# Patient Record
Sex: MALE | Race: WHITE | NOT HISPANIC OR LATINO | Employment: PART TIME | URBAN - METROPOLITAN AREA
[De-identification: names, ages, dates, MRNs, and addresses within clinical notes are randomized per-mention and may not be internally consistent; named-entity substitution may affect disease eponyms.]

---

## 2017-01-04 ENCOUNTER — LAB (OUTPATIENT)
Dept: LAB | Facility: CLINIC | Age: 72
End: 2017-01-04
Payer: COMMERCIAL

## 2017-01-04 DIAGNOSIS — R11.0 NAUSEA ALONE: ICD-10-CM

## 2017-01-04 DIAGNOSIS — C90.00 MULTIPLE MYELOMA, WITHOUT MENTION OF HAVING ACHIEVED REMISSION: ICD-10-CM

## 2017-01-04 DIAGNOSIS — R53.81 OTHER MALAISE AND FATIGUE: ICD-10-CM

## 2017-01-04 DIAGNOSIS — Z00.00 ROUTINE GENERAL MEDICAL EXAMINATION AT A HEALTH CARE FACILITY: ICD-10-CM

## 2017-01-04 DIAGNOSIS — D64.9 ANEMIA, UNSPECIFIED: Primary | ICD-10-CM

## 2017-01-04 DIAGNOSIS — E03.9 UNSPECIFIED HYPOTHYROIDISM: ICD-10-CM

## 2017-01-04 DIAGNOSIS — R53.83 OTHER MALAISE AND FATIGUE: ICD-10-CM

## 2017-01-04 DIAGNOSIS — I10 ESSENTIAL HYPERTENSION, MALIGNANT: ICD-10-CM

## 2017-01-04 DIAGNOSIS — E78.5 HYPERLIPIDEMIA, UNSPECIFIED HYPERLIPIDEMIA TYPE: ICD-10-CM

## 2017-01-04 LAB
ALBUMIN SERPL BCP-MCNC: 3.5 G/DL (ref 3.5–5)
ALP SERPL-CCNC: 63 U/L (ref 46–116)
ALT SERPL W P-5'-P-CCNC: 35 U/L (ref 12–78)
ANION GAP SERPL CALCULATED.3IONS-SCNC: 6 MMOL/L (ref 4–13)
AST SERPL W P-5'-P-CCNC: 14 U/L (ref 5–45)
BASOPHILS # BLD AUTO: 0.03 THOUSANDS/ΜL (ref 0–0.1)
BASOPHILS NFR BLD AUTO: 2 % (ref 0–1)
BILIRUB SERPL-MCNC: 0.98 MG/DL (ref 0.2–1)
BUN SERPL-MCNC: 23 MG/DL (ref 5–25)
CALCIUM SERPL-MCNC: 8.6 MG/DL (ref 8.3–10.1)
CHLORIDE SERPL-SCNC: 101 MMOL/L (ref 100–108)
CO2 SERPL-SCNC: 30 MMOL/L (ref 21–32)
CREAT SERPL-MCNC: 1 MG/DL (ref 0.6–1.3)
EOSINOPHIL # BLD AUTO: 0.16 THOUSAND/ΜL (ref 0–0.61)
EOSINOPHIL NFR BLD AUTO: 9 % (ref 0–6)
ERYTHROCYTE [DISTWIDTH] IN BLOOD BY AUTOMATED COUNT: 14.8 % (ref 11.6–15.1)
GFR SERPL CREATININE-BSD FRML MDRD: >60 ML/MIN/1.73SQ M
GLUCOSE SERPL-MCNC: 88 MG/DL (ref 65–140)
HCT VFR BLD AUTO: 32.7 % (ref 36.5–49.3)
HGB BLD-MCNC: 11.2 G/DL (ref 12–17)
LDH FLD L TO P-CCNC: 138 U/L
LYMPHOCYTES # BLD AUTO: 0.41 THOUSANDS/ΜL (ref 0.6–4.47)
LYMPHOCYTES NFR BLD AUTO: 24 % (ref 14–44)
MCH RBC QN AUTO: 34.6 PG (ref 26.8–34.3)
MCHC RBC AUTO-ENTMCNC: 34.3 G/DL (ref 31.4–37.4)
MCV RBC AUTO: 101 FL (ref 82–98)
MONOCYTES # BLD AUTO: 0.32 THOUSAND/ΜL (ref 0.17–1.22)
MONOCYTES NFR BLD AUTO: 19 % (ref 4–12)
NEUTROPHILS # BLD AUTO: 0.78 THOUSANDS/ΜL (ref 1.85–7.62)
NEUTS SEG NFR BLD AUTO: 46 % (ref 43–75)
NRBC BLD AUTO-RTO: 0 /100 WBCS
PHOSPHATE SERPL-MCNC: 2.9 MG/DL (ref 2.3–4.1)
PLATELET # BLD AUTO: 67 THOUSANDS/UL (ref 149–390)
PMV BLD AUTO: 10.6 FL (ref 8.9–12.7)
POTASSIUM SERPL-SCNC: 3.8 MMOL/L (ref 3.5–5.3)
PROT SERPL-MCNC: 6 G/DL (ref 6.4–8.2)
RBC # BLD AUTO: 3.24 MILLION/UL (ref 3.88–5.62)
SODIUM SERPL-SCNC: 137 MMOL/L (ref 136–145)
URATE SERPL-MCNC: 5.7 MG/DL (ref 4.2–8)
WBC # BLD AUTO: 1.72 THOUSAND/UL (ref 4.31–10.16)

## 2017-01-04 PROCEDURE — 36415 COLL VENOUS BLD VENIPUNCTURE: CPT

## 2017-01-04 PROCEDURE — 84550 ASSAY OF BLOOD/URIC ACID: CPT

## 2017-01-04 PROCEDURE — 85025 COMPLETE CBC W/AUTO DIFF WBC: CPT

## 2017-01-04 PROCEDURE — 84100 ASSAY OF PHOSPHORUS: CPT

## 2017-01-04 PROCEDURE — 83615 LACTATE (LD) (LDH) ENZYME: CPT

## 2017-01-04 PROCEDURE — 80053 COMPREHEN METABOLIC PANEL: CPT

## 2017-01-05 RX ORDER — DEXTROSE MONOHYDRATE 50 MG/ML
20 INJECTION, SOLUTION INTRAVENOUS CONTINUOUS
Status: DISCONTINUED | OUTPATIENT
Start: 2017-01-06 | End: 2017-01-09 | Stop reason: HOSPADM

## 2017-01-06 ENCOUNTER — HOSPITAL ENCOUNTER (OUTPATIENT)
Dept: INFUSION CENTER | Facility: CLINIC | Age: 72
Discharge: HOME | End: 2017-01-06
Payer: COMMERCIAL

## 2017-01-06 VITALS
HEART RATE: 64 BPM | RESPIRATION RATE: 16 BRPM | SYSTOLIC BLOOD PRESSURE: 140 MMHG | DIASTOLIC BLOOD PRESSURE: 64 MMHG | TEMPERATURE: 97.6 F

## 2017-01-06 PROCEDURE — 96361 HYDRATE IV INFUSION ADD-ON: CPT

## 2017-01-06 PROCEDURE — 96413 CHEMO IV INFUSION 1 HR: CPT

## 2017-01-06 RX ADMIN — CARFILZOMIB 38 MG: 60 INJECTION, POWDER, LYOPHILIZED, FOR SOLUTION INTRAVENOUS at 09:41

## 2017-01-06 RX ADMIN — SODIUM CHLORIDE 500 ML: 0.9 INJECTION, SOLUTION INTRAVENOUS at 08:56

## 2017-01-06 RX ADMIN — SODIUM CHLORIDE 500 ML: 0.9 INJECTION, SOLUTION INTRAVENOUS at 10:05

## 2017-01-06 NOTE — PLAN OF CARE

## 2017-01-11 ENCOUNTER — APPOINTMENT (OUTPATIENT)
Dept: LAB | Facility: CLINIC | Age: 72
End: 2017-01-11
Payer: COMMERCIAL

## 2017-01-11 DIAGNOSIS — D64.9 ANEMIA: ICD-10-CM

## 2017-01-11 DIAGNOSIS — C90.00 MULTIPLE MYELOMA NOT HAVING ACHIEVED REMISSION (HCC): ICD-10-CM

## 2017-01-11 DIAGNOSIS — R11.0 NAUSEA: ICD-10-CM

## 2017-01-11 DIAGNOSIS — E78.5 HYPERLIPIDEMIA: ICD-10-CM

## 2017-01-11 DIAGNOSIS — Z00.00 ENCOUNTER FOR GENERAL ADULT MEDICAL EXAMINATION WITHOUT ABNORMAL FINDINGS: ICD-10-CM

## 2017-01-11 DIAGNOSIS — R53.83 OTHER FATIGUE: ICD-10-CM

## 2017-01-11 DIAGNOSIS — E03.9 HYPOTHYROIDISM: ICD-10-CM

## 2017-01-11 DIAGNOSIS — I10 ESSENTIAL (PRIMARY) HYPERTENSION: ICD-10-CM

## 2017-01-11 LAB
ALBUMIN SERPL BCP-MCNC: 3.7 G/DL (ref 3.5–5)
ALP SERPL-CCNC: 71 U/L (ref 46–116)
ALT SERPL W P-5'-P-CCNC: 30 U/L (ref 12–78)
ANION GAP SERPL CALCULATED.3IONS-SCNC: 7 MMOL/L (ref 4–13)
AST SERPL W P-5'-P-CCNC: 13 U/L (ref 5–45)
BASOPHILS # BLD AUTO: 0.02 THOUSANDS/ΜL (ref 0–0.1)
BASOPHILS NFR BLD AUTO: 1 % (ref 0–1)
BILIRUB SERPL-MCNC: 1.19 MG/DL (ref 0.2–1)
BUN SERPL-MCNC: 21 MG/DL (ref 5–25)
CALCIUM SERPL-MCNC: 8.5 MG/DL (ref 8.3–10.1)
CHLORIDE SERPL-SCNC: 103 MMOL/L (ref 100–108)
CO2 SERPL-SCNC: 30 MMOL/L (ref 21–32)
CREAT SERPL-MCNC: 1.14 MG/DL (ref 0.6–1.3)
EOSINOPHIL # BLD AUTO: 0.32 THOUSAND/ΜL (ref 0–0.61)
EOSINOPHIL NFR BLD AUTO: 14 % (ref 0–6)
ERYTHROCYTE [DISTWIDTH] IN BLOOD BY AUTOMATED COUNT: 15.3 % (ref 11.6–15.1)
GFR SERPL CREATININE-BSD FRML MDRD: >60 ML/MIN/1.73SQ M
GLUCOSE SERPL-MCNC: 80 MG/DL (ref 65–140)
HCT VFR BLD AUTO: 35.2 % (ref 36.5–49.3)
HGB BLD-MCNC: 11.8 G/DL (ref 12–17)
LDH SERPL-CCNC: 152 U/L (ref 81–234)
LYMPHOCYTES # BLD AUTO: 0.61 THOUSANDS/ΜL (ref 0.6–4.47)
LYMPHOCYTES NFR BLD AUTO: 27 % (ref 14–44)
MCH RBC QN AUTO: 34.5 PG (ref 26.8–34.3)
MCHC RBC AUTO-ENTMCNC: 33.5 G/DL (ref 31.4–37.4)
MCV RBC AUTO: 103 FL (ref 82–98)
MONOCYTES # BLD AUTO: 0.28 THOUSAND/ΜL (ref 0.17–1.22)
MONOCYTES NFR BLD AUTO: 12 % (ref 4–12)
NEUTROPHILS # BLD AUTO: 1.03 THOUSANDS/ΜL (ref 1.85–7.62)
NEUTS SEG NFR BLD AUTO: 46 % (ref 43–75)
NRBC BLD AUTO-RTO: 0 /100 WBCS
PHOSPHATE SERPL-MCNC: 3 MG/DL (ref 2.3–4.1)
PLATELET # BLD AUTO: 61 THOUSANDS/UL (ref 149–390)
PMV BLD AUTO: 11.4 FL (ref 8.9–12.7)
POTASSIUM SERPL-SCNC: 4 MMOL/L (ref 3.5–5.3)
PROT SERPL-MCNC: 6.6 G/DL (ref 6.4–8.2)
RBC # BLD AUTO: 3.42 MILLION/UL (ref 3.88–5.62)
SODIUM SERPL-SCNC: 140 MMOL/L (ref 136–145)
URATE SERPL-MCNC: 6 MG/DL (ref 4.2–8)
WBC # BLD AUTO: 2.26 THOUSAND/UL (ref 4.31–10.16)

## 2017-01-11 PROCEDURE — 80053 COMPREHEN METABOLIC PANEL: CPT

## 2017-01-11 PROCEDURE — 84550 ASSAY OF BLOOD/URIC ACID: CPT

## 2017-01-11 PROCEDURE — 84100 ASSAY OF PHOSPHORUS: CPT

## 2017-01-11 PROCEDURE — 36415 COLL VENOUS BLD VENIPUNCTURE: CPT

## 2017-01-11 PROCEDURE — 85025 COMPLETE CBC W/AUTO DIFF WBC: CPT

## 2017-01-11 PROCEDURE — 83615 LACTATE (LD) (LDH) ENZYME: CPT

## 2017-01-12 RX ORDER — DEXTROSE MONOHYDRATE 50 MG/ML
20 INJECTION, SOLUTION INTRAVENOUS CONTINUOUS
Status: DISCONTINUED | OUTPATIENT
Start: 2017-01-13 | End: 2017-01-16 | Stop reason: HOSPADM

## 2017-01-13 ENCOUNTER — HOSPITAL ENCOUNTER (OUTPATIENT)
Dept: INFUSION CENTER | Facility: CLINIC | Age: 72
Discharge: HOME | End: 2017-01-13
Payer: COMMERCIAL

## 2017-01-13 VITALS
DIASTOLIC BLOOD PRESSURE: 64 MMHG | TEMPERATURE: 97.6 F | SYSTOLIC BLOOD PRESSURE: 121 MMHG | HEART RATE: 62 BPM | RESPIRATION RATE: 18 BRPM

## 2017-01-13 PROCEDURE — 96361 HYDRATE IV INFUSION ADD-ON: CPT

## 2017-01-13 PROCEDURE — 96413 CHEMO IV INFUSION 1 HR: CPT

## 2017-01-13 RX ADMIN — SODIUM CHLORIDE 500 ML: 0.9 INJECTION, SOLUTION INTRAVENOUS at 08:54

## 2017-01-13 RX ADMIN — CARFILZOMIB 38 MG: 60 INJECTION, POWDER, LYOPHILIZED, FOR SOLUTION INTRAVENOUS at 09:47

## 2017-01-13 RX ADMIN — DEXTROSE 20 ML/HR: 5 SOLUTION INTRAVENOUS at 09:46

## 2017-01-13 RX ADMIN — SODIUM CHLORIDE 500 ML: 0.9 INJECTION, SOLUTION INTRAVENOUS at 10:09

## 2017-01-13 NOTE — PLAN OF CARE

## 2017-01-18 ENCOUNTER — APPOINTMENT (OUTPATIENT)
Dept: LAB | Facility: CLINIC | Age: 72
End: 2017-01-18
Payer: COMMERCIAL

## 2017-01-18 DIAGNOSIS — R53.83 OTHER FATIGUE: ICD-10-CM

## 2017-01-18 DIAGNOSIS — R11.0 NAUSEA: ICD-10-CM

## 2017-01-18 DIAGNOSIS — E78.5 HYPERLIPIDEMIA: ICD-10-CM

## 2017-01-18 DIAGNOSIS — D64.9 ANEMIA: ICD-10-CM

## 2017-01-18 DIAGNOSIS — I10 ESSENTIAL (PRIMARY) HYPERTENSION: ICD-10-CM

## 2017-01-18 DIAGNOSIS — C90.00 MULTIPLE MYELOMA NOT HAVING ACHIEVED REMISSION (HCC): ICD-10-CM

## 2017-01-18 DIAGNOSIS — Z00.00 ENCOUNTER FOR GENERAL ADULT MEDICAL EXAMINATION WITHOUT ABNORMAL FINDINGS: ICD-10-CM

## 2017-01-18 DIAGNOSIS — E03.9 HYPOTHYROIDISM: ICD-10-CM

## 2017-01-18 LAB
ALBUMIN SERPL BCP-MCNC: 3.8 G/DL (ref 3.5–5)
ALP SERPL-CCNC: 66 U/L (ref 46–116)
ALT SERPL W P-5'-P-CCNC: 28 U/L (ref 12–78)
ANION GAP SERPL CALCULATED.3IONS-SCNC: 7 MMOL/L (ref 4–13)
AST SERPL W P-5'-P-CCNC: 10 U/L (ref 5–45)
BASOPHILS # BLD AUTO: 0.01 THOUSANDS/ΜL (ref 0–0.1)
BASOPHILS NFR BLD AUTO: 0 % (ref 0–1)
BILIRUB SERPL-MCNC: 1.42 MG/DL (ref 0.2–1)
BUN SERPL-MCNC: 21 MG/DL (ref 5–25)
CALCIUM SERPL-MCNC: 8.5 MG/DL (ref 8.3–10.1)
CHLORIDE SERPL-SCNC: 103 MMOL/L (ref 100–108)
CO2 SERPL-SCNC: 29 MMOL/L (ref 21–32)
CREAT SERPL-MCNC: 1.1 MG/DL (ref 0.6–1.3)
EOSINOPHIL # BLD AUTO: 0.31 THOUSAND/ΜL (ref 0–0.61)
EOSINOPHIL NFR BLD AUTO: 13 % (ref 0–6)
ERYTHROCYTE [DISTWIDTH] IN BLOOD BY AUTOMATED COUNT: 15.4 % (ref 11.6–15.1)
GFR SERPL CREATININE-BSD FRML MDRD: >60 ML/MIN/1.73SQ M
GLUCOSE SERPL-MCNC: 89 MG/DL (ref 65–140)
HCT VFR BLD AUTO: 34.1 % (ref 36.5–49.3)
HGB BLD-MCNC: 11.3 G/DL (ref 12–17)
LDH SERPL-CCNC: 144 U/L (ref 81–234)
LYMPHOCYTES # BLD AUTO: 0.74 THOUSANDS/ΜL (ref 0.6–4.47)
LYMPHOCYTES NFR BLD AUTO: 30 % (ref 14–44)
MCH RBC QN AUTO: 34.5 PG (ref 26.8–34.3)
MCHC RBC AUTO-ENTMCNC: 33.1 G/DL (ref 31.4–37.4)
MCV RBC AUTO: 104 FL (ref 82–98)
MONOCYTES # BLD AUTO: 0.4 THOUSAND/ΜL (ref 0.17–1.22)
MONOCYTES NFR BLD AUTO: 16 % (ref 4–12)
NEUTROPHILS # BLD AUTO: 1.01 THOUSANDS/ΜL (ref 1.85–7.62)
NEUTS SEG NFR BLD AUTO: 41 % (ref 43–75)
NRBC BLD AUTO-RTO: 0 /100 WBCS
PHOSPHATE SERPL-MCNC: 3.3 MG/DL (ref 2.3–4.1)
PLATELET # BLD AUTO: 59 THOUSANDS/UL (ref 149–390)
PMV BLD AUTO: 11.6 FL (ref 8.9–12.7)
POTASSIUM SERPL-SCNC: 4.1 MMOL/L (ref 3.5–5.3)
PROT SERPL-MCNC: 6.3 G/DL (ref 6.4–8.2)
RBC # BLD AUTO: 3.28 MILLION/UL (ref 3.88–5.62)
SODIUM SERPL-SCNC: 139 MMOL/L (ref 136–145)
URATE SERPL-MCNC: 6 MG/DL (ref 4.2–8)
WBC # BLD AUTO: 2.48 THOUSAND/UL (ref 4.31–10.16)

## 2017-01-18 PROCEDURE — 84550 ASSAY OF BLOOD/URIC ACID: CPT

## 2017-01-18 PROCEDURE — 84100 ASSAY OF PHOSPHORUS: CPT

## 2017-01-18 PROCEDURE — 36415 COLL VENOUS BLD VENIPUNCTURE: CPT

## 2017-01-18 PROCEDURE — 83615 LACTATE (LD) (LDH) ENZYME: CPT

## 2017-01-18 PROCEDURE — 85025 COMPLETE CBC W/AUTO DIFF WBC: CPT

## 2017-01-18 PROCEDURE — 80053 COMPREHEN METABOLIC PANEL: CPT

## 2017-01-20 ENCOUNTER — HOSPITAL ENCOUNTER (OUTPATIENT)
Dept: INFUSION CENTER | Facility: CLINIC | Age: 72
Discharge: HOME | End: 2017-01-20
Payer: COMMERCIAL

## 2017-01-20 PROCEDURE — 96361 HYDRATE IV INFUSION ADD-ON: CPT

## 2017-01-20 PROCEDURE — 96409 CHEMO IV PUSH SNGL DRUG: CPT

## 2017-01-20 RX ORDER — DEXTROSE MONOHYDRATE 50 MG/ML
20 INJECTION, SOLUTION INTRAVENOUS CONTINUOUS
Status: DISCONTINUED | OUTPATIENT
Start: 2017-01-20 | End: 2017-01-23 | Stop reason: HOSPADM

## 2017-01-20 RX ADMIN — CARFILZOMIB 38 MG: 60 INJECTION, POWDER, LYOPHILIZED, FOR SOLUTION INTRAVENOUS at 09:48

## 2017-01-20 RX ADMIN — SODIUM CHLORIDE 500 ML: 0.9 INJECTION, SOLUTION INTRAVENOUS at 08:40

## 2017-01-20 RX ADMIN — SODIUM CHLORIDE 500 ML: 0.9 INJECTION, SOLUTION INTRAVENOUS at 10:10

## 2017-01-20 RX ADMIN — DEXTROSE 20 ML/HR: 5 SOLUTION INTRAVENOUS at 09:40

## 2017-01-20 NOTE — PROGRESS NOTES
Pt without complaint, platelet LNMLQ=64,948  Altagracia LÓPEZ notified, ok to treat order obtained from Dr Chrystal Hui

## 2017-01-25 ENCOUNTER — GENERIC CONVERSION - ENCOUNTER (OUTPATIENT)
Dept: OTHER | Facility: OTHER | Age: 72
End: 2017-01-25

## 2017-01-26 ENCOUNTER — GENERIC CONVERSION - ENCOUNTER (OUTPATIENT)
Dept: OTHER | Facility: OTHER | Age: 72
End: 2017-01-26

## 2017-02-01 ENCOUNTER — APPOINTMENT (OUTPATIENT)
Dept: LAB | Facility: CLINIC | Age: 72
End: 2017-02-01
Payer: COMMERCIAL

## 2017-02-01 ENCOUNTER — TRANSCRIBE ORDERS (OUTPATIENT)
Dept: LAB | Facility: CLINIC | Age: 72
End: 2017-02-01

## 2017-02-01 DIAGNOSIS — D64.9 ANEMIA: ICD-10-CM

## 2017-02-01 DIAGNOSIS — Z00.00 ENCOUNTER FOR GENERAL ADULT MEDICAL EXAMINATION WITHOUT ABNORMAL FINDINGS: ICD-10-CM

## 2017-02-01 DIAGNOSIS — E78.5 HYPERLIPIDEMIA: ICD-10-CM

## 2017-02-01 DIAGNOSIS — R11.0 NAUSEA: ICD-10-CM

## 2017-02-01 DIAGNOSIS — I10 ESSENTIAL (PRIMARY) HYPERTENSION: ICD-10-CM

## 2017-02-01 DIAGNOSIS — C90.00 MULTIPLE MYELOMA NOT HAVING ACHIEVED REMISSION (HCC): ICD-10-CM

## 2017-02-01 DIAGNOSIS — E03.9 HYPOTHYROIDISM: ICD-10-CM

## 2017-02-01 DIAGNOSIS — R53.83 OTHER FATIGUE: ICD-10-CM

## 2017-02-01 LAB
ALBUMIN SERPL BCP-MCNC: 4 G/DL (ref 3.5–5)
ALP SERPL-CCNC: 73 U/L (ref 46–116)
ALT SERPL W P-5'-P-CCNC: 33 U/L (ref 12–78)
ANION GAP SERPL CALCULATED.3IONS-SCNC: 6 MMOL/L (ref 4–13)
AST SERPL W P-5'-P-CCNC: 13 U/L (ref 5–45)
BASOPHILS # BLD AUTO: 0.03 THOUSANDS/ΜL (ref 0–0.1)
BASOPHILS NFR BLD AUTO: 1 % (ref 0–1)
BILIRUB SERPL-MCNC: 1.47 MG/DL (ref 0.2–1)
BUN SERPL-MCNC: 21 MG/DL (ref 5–25)
CALCIUM SERPL-MCNC: 9 MG/DL (ref 8.3–10.1)
CHLORIDE SERPL-SCNC: 102 MMOL/L (ref 100–108)
CO2 SERPL-SCNC: 31 MMOL/L (ref 21–32)
CREAT SERPL-MCNC: 1.08 MG/DL (ref 0.6–1.3)
EOSINOPHIL # BLD AUTO: 0.08 THOUSAND/ΜL (ref 0–0.61)
EOSINOPHIL NFR BLD AUTO: 4 % (ref 0–6)
ERYTHROCYTE [DISTWIDTH] IN BLOOD BY AUTOMATED COUNT: 15.4 % (ref 11.6–15.1)
GFR SERPL CREATININE-BSD FRML MDRD: >60 ML/MIN/1.73SQ M
GLUCOSE SERPL-MCNC: 86 MG/DL (ref 65–140)
HCT VFR BLD AUTO: 34.6 % (ref 36.5–49.3)
HGB BLD-MCNC: 11.6 G/DL (ref 12–17)
LDH SERPL-CCNC: 154 U/L (ref 81–234)
LYMPHOCYTES # BLD AUTO: 0.5 THOUSANDS/ΜL (ref 0.6–4.47)
LYMPHOCYTES NFR BLD AUTO: 24 % (ref 14–44)
MCH RBC QN AUTO: 35.3 PG (ref 26.8–34.3)
MCHC RBC AUTO-ENTMCNC: 33.5 G/DL (ref 31.4–37.4)
MCV RBC AUTO: 105 FL (ref 82–98)
MONOCYTES # BLD AUTO: 0.39 THOUSAND/ΜL (ref 0.17–1.22)
MONOCYTES NFR BLD AUTO: 19 % (ref 4–12)
NEUTROPHILS # BLD AUTO: 1.07 THOUSANDS/ΜL (ref 1.85–7.62)
NEUTS SEG NFR BLD AUTO: 52 % (ref 43–75)
NRBC BLD AUTO-RTO: 0 /100 WBCS
PHOSPHATE SERPL-MCNC: 2.6 MG/DL (ref 2.3–4.1)
PLATELET # BLD AUTO: 76 THOUSANDS/UL (ref 149–390)
PMV BLD AUTO: 10.4 FL (ref 8.9–12.7)
POTASSIUM SERPL-SCNC: 4.4 MMOL/L (ref 3.5–5.3)
PROT SERPL-MCNC: 6.7 G/DL (ref 6.4–8.2)
RBC # BLD AUTO: 3.29 MILLION/UL (ref 3.88–5.62)
SODIUM SERPL-SCNC: 139 MMOL/L (ref 136–145)
URATE SERPL-MCNC: 5.8 MG/DL (ref 4.2–8)
WBC # BLD AUTO: 2.08 THOUSAND/UL (ref 4.31–10.16)

## 2017-02-01 PROCEDURE — 36415 COLL VENOUS BLD VENIPUNCTURE: CPT

## 2017-02-01 PROCEDURE — 85025 COMPLETE CBC W/AUTO DIFF WBC: CPT

## 2017-02-01 PROCEDURE — 84550 ASSAY OF BLOOD/URIC ACID: CPT

## 2017-02-01 PROCEDURE — 84100 ASSAY OF PHOSPHORUS: CPT

## 2017-02-01 PROCEDURE — 80053 COMPREHEN METABOLIC PANEL: CPT

## 2017-02-01 PROCEDURE — 83615 LACTATE (LD) (LDH) ENZYME: CPT

## 2017-02-02 RX ORDER — DEXTROSE MONOHYDRATE 50 MG/ML
20 INJECTION, SOLUTION INTRAVENOUS CONTINUOUS
Status: DISCONTINUED | OUTPATIENT
Start: 2017-02-03 | End: 2017-02-06 | Stop reason: HOSPADM

## 2017-02-03 ENCOUNTER — HOSPITAL ENCOUNTER (OUTPATIENT)
Dept: INFUSION CENTER | Facility: CLINIC | Age: 72
Discharge: HOME | End: 2017-02-03
Payer: COMMERCIAL

## 2017-02-03 VITALS
HEART RATE: 64 BPM | RESPIRATION RATE: 20 BRPM | SYSTOLIC BLOOD PRESSURE: 142 MMHG | TEMPERATURE: 97.4 F | DIASTOLIC BLOOD PRESSURE: 76 MMHG

## 2017-02-03 PROCEDURE — 96413 CHEMO IV INFUSION 1 HR: CPT

## 2017-02-03 PROCEDURE — 96361 HYDRATE IV INFUSION ADD-ON: CPT

## 2017-02-03 RX ADMIN — CARFILZOMIB 38 MG: 60 INJECTION, POWDER, LYOPHILIZED, FOR SOLUTION INTRAVENOUS at 10:08

## 2017-02-03 RX ADMIN — DEXTROSE 20 ML/HR: 5 SOLUTION INTRAVENOUS at 10:07

## 2017-02-03 RX ADMIN — SODIUM CHLORIDE 500 ML: 0.9 INJECTION, SOLUTION INTRAVENOUS at 10:28

## 2017-02-03 RX ADMIN — SODIUM CHLORIDE 500 ML: 0.9 INJECTION, SOLUTION INTRAVENOUS at 08:59

## 2017-02-03 NOTE — PROGRESS NOTES
Patient arrived to the unit reporting that his treatment in [address] [place] [address] went well and denied any complications

## 2017-02-03 NOTE — PLAN OF CARE
Problem: SAFETY ADULT  Goal: Patient will remain free of falls  INTERVENTIONS:  - Assess patient frequently for physical needs  - Identify cognitive and physical deficits and behaviors that affect risk of falls    - [place] fall precautions as indicated by assessment   - Educate patient/family on patient safety including physical limitations  - Instruct patient to call for assistance with activity based on assessment  - Modify environment to reduce risk of injury  - Consider OT/PT consult to assist with strengthening/mobility  Outcome: Progressing

## 2017-02-08 ENCOUNTER — GENERIC CONVERSION - ENCOUNTER (OUTPATIENT)
Dept: OTHER | Facility: OTHER | Age: 72
End: 2017-02-08

## 2017-02-08 ENCOUNTER — APPOINTMENT (OUTPATIENT)
Dept: LAB | Facility: CLINIC | Age: 72
End: 2017-02-08
Payer: COMMERCIAL

## 2017-02-08 DIAGNOSIS — I10 ESSENTIAL (PRIMARY) HYPERTENSION: ICD-10-CM

## 2017-02-08 DIAGNOSIS — D64.9 ANEMIA: ICD-10-CM

## 2017-02-08 DIAGNOSIS — Z00.00 ENCOUNTER FOR GENERAL ADULT MEDICAL EXAMINATION WITHOUT ABNORMAL FINDINGS: ICD-10-CM

## 2017-02-08 DIAGNOSIS — R11.0 NAUSEA: ICD-10-CM

## 2017-02-08 DIAGNOSIS — C90.00 MULTIPLE MYELOMA NOT HAVING ACHIEVED REMISSION (HCC): ICD-10-CM

## 2017-02-08 DIAGNOSIS — E78.5 HYPERLIPIDEMIA: ICD-10-CM

## 2017-02-08 DIAGNOSIS — E03.9 HYPOTHYROIDISM: ICD-10-CM

## 2017-02-08 DIAGNOSIS — R53.83 OTHER FATIGUE: ICD-10-CM

## 2017-02-08 LAB
ALBUMIN SERPL BCP-MCNC: 3.5 G/DL (ref 3.5–5)
ALP SERPL-CCNC: 77 U/L (ref 46–116)
ALT SERPL W P-5'-P-CCNC: 35 U/L (ref 12–78)
ANION GAP SERPL CALCULATED.3IONS-SCNC: 8 MMOL/L (ref 4–13)
AST SERPL W P-5'-P-CCNC: 15 U/L (ref 5–45)
BASOPHILS # BLD AUTO: 0.02 THOUSANDS/ΜL (ref 0–0.1)
BASOPHILS NFR BLD AUTO: 1 % (ref 0–1)
BILIRUB SERPL-MCNC: 1.27 MG/DL (ref 0.2–1)
BUN SERPL-MCNC: 25 MG/DL (ref 5–25)
CALCIUM SERPL-MCNC: 8.6 MG/DL (ref 8.3–10.1)
CHLORIDE SERPL-SCNC: 103 MMOL/L (ref 100–108)
CO2 SERPL-SCNC: 29 MMOL/L (ref 21–32)
CREAT SERPL-MCNC: 1.13 MG/DL (ref 0.6–1.3)
EOSINOPHIL # BLD AUTO: 0.06 THOUSAND/ΜL (ref 0–0.61)
EOSINOPHIL NFR BLD AUTO: 3 % (ref 0–6)
ERYTHROCYTE [DISTWIDTH] IN BLOOD BY AUTOMATED COUNT: 14.7 % (ref 11.6–15.1)
GFR SERPL CREATININE-BSD FRML MDRD: >60 ML/MIN/1.73SQ M
GLUCOSE SERPL-MCNC: 92 MG/DL (ref 65–140)
HCT VFR BLD AUTO: 33.3 % (ref 36.5–49.3)
HGB BLD-MCNC: 11.1 G/DL (ref 12–17)
LDH SERPL-CCNC: 151 U/L (ref 81–234)
LYMPHOCYTES # BLD AUTO: 0.43 THOUSANDS/ΜL (ref 0.6–4.47)
LYMPHOCYTES NFR BLD AUTO: 21 % (ref 14–44)
MCH RBC QN AUTO: 34.9 PG (ref 26.8–34.3)
MCHC RBC AUTO-ENTMCNC: 33.3 G/DL (ref 31.4–37.4)
MCV RBC AUTO: 105 FL (ref 82–98)
MONOCYTES # BLD AUTO: 0.45 THOUSAND/ΜL (ref 0.17–1.22)
MONOCYTES NFR BLD AUTO: 22 % (ref 4–12)
NEUTROPHILS # BLD AUTO: 1.08 THOUSANDS/ΜL (ref 1.85–7.62)
NEUTS SEG NFR BLD AUTO: 53 % (ref 43–75)
NRBC BLD AUTO-RTO: 0 /100 WBCS
PHOSPHATE SERPL-MCNC: 3.6 MG/DL (ref 2.3–4.1)
PLATELET # BLD AUTO: 46 THOUSANDS/UL (ref 149–390)
PMV BLD AUTO: 11 FL (ref 8.9–12.7)
POTASSIUM SERPL-SCNC: 4.3 MMOL/L (ref 3.5–5.3)
PROT SERPL-MCNC: 6.2 G/DL (ref 6.4–8.2)
RBC # BLD AUTO: 3.18 MILLION/UL (ref 3.88–5.62)
SODIUM SERPL-SCNC: 140 MMOL/L (ref 136–145)
URATE SERPL-MCNC: 5.2 MG/DL (ref 4.2–8)
WBC # BLD AUTO: 2.04 THOUSAND/UL (ref 4.31–10.16)

## 2017-02-08 PROCEDURE — 85025 COMPLETE CBC W/AUTO DIFF WBC: CPT

## 2017-02-08 PROCEDURE — 36415 COLL VENOUS BLD VENIPUNCTURE: CPT

## 2017-02-08 PROCEDURE — 84100 ASSAY OF PHOSPHORUS: CPT

## 2017-02-08 PROCEDURE — 83615 LACTATE (LD) (LDH) ENZYME: CPT

## 2017-02-08 PROCEDURE — 84550 ASSAY OF BLOOD/URIC ACID: CPT

## 2017-02-08 PROCEDURE — 80053 COMPREHEN METABOLIC PANEL: CPT

## 2017-02-14 ENCOUNTER — ALLSCRIPTS OFFICE VISIT (OUTPATIENT)
Dept: OTHER | Facility: OTHER | Age: 72
End: 2017-02-14

## 2017-02-15 ENCOUNTER — TRANSCRIBE ORDERS (OUTPATIENT)
Dept: LAB | Facility: CLINIC | Age: 72
End: 2017-02-15

## 2017-02-15 ENCOUNTER — APPOINTMENT (OUTPATIENT)
Dept: LAB | Facility: CLINIC | Age: 72
End: 2017-02-15
Payer: COMMERCIAL

## 2017-02-15 DIAGNOSIS — E03.9 UNSPECIFIED HYPOTHYROIDISM: ICD-10-CM

## 2017-02-15 DIAGNOSIS — E78.5 HYPERLIPIDEMIA, UNSPECIFIED HYPERLIPIDEMIA TYPE: ICD-10-CM

## 2017-02-15 DIAGNOSIS — I10 ESSENTIAL HYPERTENSION, MALIGNANT: ICD-10-CM

## 2017-02-15 DIAGNOSIS — D64.9 ANEMIA, UNSPECIFIED: Primary | ICD-10-CM

## 2017-02-15 DIAGNOSIS — R53.83 OTHER MALAISE AND FATIGUE: ICD-10-CM

## 2017-02-15 DIAGNOSIS — C90.00 MULTIPLE MYELOMA, WITHOUT MENTION OF HAVING ACHIEVED REMISSION: ICD-10-CM

## 2017-02-15 DIAGNOSIS — R11.0 NAUSEA ALONE: ICD-10-CM

## 2017-02-15 DIAGNOSIS — D64.9 ANEMIA, UNSPECIFIED: ICD-10-CM

## 2017-02-15 DIAGNOSIS — R53.81 OTHER MALAISE AND FATIGUE: ICD-10-CM

## 2017-02-15 LAB
ALBUMIN SERPL BCP-MCNC: 3.8 G/DL (ref 3.5–5)
ALP SERPL-CCNC: 66 U/L (ref 46–116)
ALT SERPL W P-5'-P-CCNC: 28 U/L (ref 12–78)
ANION GAP SERPL CALCULATED.3IONS-SCNC: 9 MMOL/L (ref 4–13)
AST SERPL W P-5'-P-CCNC: 9 U/L (ref 5–45)
BASOPHILS # BLD AUTO: 0.02 THOUSANDS/ΜL (ref 0–0.1)
BASOPHILS NFR BLD AUTO: 1 % (ref 0–1)
BILIRUB SERPL-MCNC: 1.59 MG/DL (ref 0.2–1)
BUN SERPL-MCNC: 26 MG/DL (ref 5–25)
CALCIUM SERPL-MCNC: 8.7 MG/DL (ref 8.3–10.1)
CHLORIDE SERPL-SCNC: 105 MMOL/L (ref 100–108)
CO2 SERPL-SCNC: 28 MMOL/L (ref 21–32)
CREAT SERPL-MCNC: 1.25 MG/DL (ref 0.6–1.3)
EOSINOPHIL # BLD AUTO: 0.14 THOUSAND/ΜL (ref 0–0.61)
EOSINOPHIL NFR BLD AUTO: 6 % (ref 0–6)
ERYTHROCYTE [DISTWIDTH] IN BLOOD BY AUTOMATED COUNT: 14.6 % (ref 11.6–15.1)
GFR SERPL CREATININE-BSD FRML MDRD: 57.3 ML/MIN/1.73SQ M
GLUCOSE SERPL-MCNC: 80 MG/DL (ref 65–140)
HCT VFR BLD AUTO: 34 % (ref 36.5–49.3)
HGB BLD-MCNC: 11.4 G/DL (ref 12–17)
LDH SERPL-CCNC: 137 U/L (ref 81–234)
LYMPHOCYTES # BLD AUTO: 0.77 THOUSANDS/ΜL (ref 0.6–4.47)
LYMPHOCYTES NFR BLD AUTO: 34 % (ref 14–44)
MCH RBC QN AUTO: 35.3 PG (ref 26.8–34.3)
MCHC RBC AUTO-ENTMCNC: 33.5 G/DL (ref 31.4–37.4)
MCV RBC AUTO: 105 FL (ref 82–98)
MONOCYTES # BLD AUTO: 0.35 THOUSAND/ΜL (ref 0.17–1.22)
MONOCYTES NFR BLD AUTO: 15 % (ref 4–12)
NEUTROPHILS # BLD AUTO: 1 THOUSANDS/ΜL (ref 1.85–7.62)
NEUTS SEG NFR BLD AUTO: 44 % (ref 43–75)
NRBC BLD AUTO-RTO: 0 /100 WBCS
PHOSPHATE SERPL-MCNC: 3.3 MG/DL (ref 2.3–4.1)
PLATELET # BLD AUTO: 57 THOUSANDS/UL (ref 149–390)
PMV BLD AUTO: 11.3 FL (ref 8.9–12.7)
POTASSIUM SERPL-SCNC: 4 MMOL/L (ref 3.5–5.3)
PROT SERPL-MCNC: 6.2 G/DL (ref 6.4–8.2)
RBC # BLD AUTO: 3.23 MILLION/UL (ref 3.88–5.62)
SODIUM SERPL-SCNC: 142 MMOL/L (ref 136–145)
URATE SERPL-MCNC: 6.2 MG/DL (ref 4.2–8)
WBC # BLD AUTO: 2.29 THOUSAND/UL (ref 4.31–10.16)

## 2017-02-15 PROCEDURE — 36415 COLL VENOUS BLD VENIPUNCTURE: CPT

## 2017-02-15 PROCEDURE — 84100 ASSAY OF PHOSPHORUS: CPT

## 2017-02-15 PROCEDURE — 83615 LACTATE (LD) (LDH) ENZYME: CPT

## 2017-02-15 PROCEDURE — 85025 COMPLETE CBC W/AUTO DIFF WBC: CPT

## 2017-02-15 PROCEDURE — 84550 ASSAY OF BLOOD/URIC ACID: CPT

## 2017-02-15 PROCEDURE — 80053 COMPREHEN METABOLIC PANEL: CPT

## 2017-02-16 RX ORDER — DEXTROSE MONOHYDRATE 50 MG/ML
20 INJECTION, SOLUTION INTRAVENOUS CONTINUOUS
Status: DISCONTINUED | OUTPATIENT
Start: 2017-02-17 | End: 2017-02-20 | Stop reason: HOSPADM

## 2017-02-17 ENCOUNTER — HOSPITAL ENCOUNTER (OUTPATIENT)
Dept: INFUSION CENTER | Facility: CLINIC | Age: 72
Discharge: HOME | End: 2017-02-17
Payer: COMMERCIAL

## 2017-02-17 VITALS
HEART RATE: 55 BPM | HEIGHT: 69 IN | BODY MASS INDEX: 23.84 KG/M2 | DIASTOLIC BLOOD PRESSURE: 79 MMHG | WEIGHT: 160.94 LBS | SYSTOLIC BLOOD PRESSURE: 137 MMHG | TEMPERATURE: 97.6 F | RESPIRATION RATE: 18 BRPM

## 2017-02-17 VITALS
HEIGHT: 69 IN | TEMPERATURE: 97.4 F | WEIGHT: 162.04 LBS | HEART RATE: 62 BPM | DIASTOLIC BLOOD PRESSURE: 75 MMHG | BODY MASS INDEX: 24 KG/M2 | SYSTOLIC BLOOD PRESSURE: 139 MMHG | RESPIRATION RATE: 16 BRPM

## 2017-02-17 PROCEDURE — 96409 CHEMO IV PUSH SNGL DRUG: CPT

## 2017-02-17 PROCEDURE — 96361 HYDRATE IV INFUSION ADD-ON: CPT

## 2017-02-17 RX ADMIN — DEXTROSE 20 ML/HR: 5 SOLUTION INTRAVENOUS at 09:42

## 2017-02-17 RX ADMIN — SODIUM CHLORIDE 500 ML: 0.9 INJECTION, SOLUTION INTRAVENOUS at 08:41

## 2017-02-17 RX ADMIN — CARFILZOMIB 38 MG: 60 INJECTION, POWDER, LYOPHILIZED, FOR SOLUTION INTRAVENOUS at 09:47

## 2017-02-17 RX ADMIN — SODIUM CHLORIDE 500 ML: 0.9 INJECTION, SOLUTION INTRAVENOUS at 10:06

## 2017-02-17 NOTE — PROGRESS NOTES
Patient tolerated chemotherapy  Denies any discomforts  ANC- 1000  No Granix required today as per order  Patient/wife aware and understand   Aware of next appointment

## 2017-02-17 NOTE — PROGRESS NOTES
Spoke with Sahil Roberts RN and made aware of ANC- 1 00 and Total bilirubin- 1 59   Cleared for chemotherapy today

## 2017-02-22 ENCOUNTER — APPOINTMENT (OUTPATIENT)
Dept: LAB | Facility: CLINIC | Age: 72
End: 2017-02-22
Payer: COMMERCIAL

## 2017-02-22 DIAGNOSIS — I10 ESSENTIAL HYPERTENSION, MALIGNANT: ICD-10-CM

## 2017-02-22 DIAGNOSIS — C90.00 MULTIPLE MYELOMA, WITHOUT MENTION OF HAVING ACHIEVED REMISSION: ICD-10-CM

## 2017-02-22 DIAGNOSIS — E78.5 HYPERLIPIDEMIA, UNSPECIFIED HYPERLIPIDEMIA TYPE: ICD-10-CM

## 2017-02-22 DIAGNOSIS — R53.81 OTHER MALAISE AND FATIGUE: ICD-10-CM

## 2017-02-22 DIAGNOSIS — E03.9 UNSPECIFIED HYPOTHYROIDISM: ICD-10-CM

## 2017-02-22 DIAGNOSIS — R11.0 NAUSEA ALONE: ICD-10-CM

## 2017-02-22 DIAGNOSIS — R53.83 OTHER MALAISE AND FATIGUE: ICD-10-CM

## 2017-02-22 DIAGNOSIS — D64.9 ANEMIA, UNSPECIFIED: ICD-10-CM

## 2017-02-22 LAB
ALBUMIN SERPL BCP-MCNC: 3.9 G/DL (ref 3.5–5)
ALP SERPL-CCNC: 70 U/L (ref 46–116)
ALT SERPL W P-5'-P-CCNC: 26 U/L (ref 12–78)
ANION GAP SERPL CALCULATED.3IONS-SCNC: 8 MMOL/L (ref 4–13)
AST SERPL W P-5'-P-CCNC: 11 U/L (ref 5–45)
BILIRUB SERPL-MCNC: 1.82 MG/DL (ref 0.2–1)
BUN SERPL-MCNC: 18 MG/DL (ref 5–25)
CALCIUM SERPL-MCNC: 8.4 MG/DL (ref 8.3–10.1)
CHLORIDE SERPL-SCNC: 101 MMOL/L (ref 100–108)
CO2 SERPL-SCNC: 28 MMOL/L (ref 21–32)
CREAT SERPL-MCNC: 1.11 MG/DL (ref 0.6–1.3)
ERYTHROCYTE [DISTWIDTH] IN BLOOD BY AUTOMATED COUNT: 14.5 % (ref 11.6–15.1)
GFR SERPL CREATININE-BSD FRML MDRD: >60 ML/MIN/1.73SQ M
GLUCOSE SERPL-MCNC: 86 MG/DL (ref 65–140)
HCT VFR BLD AUTO: 34.6 % (ref 36.5–49.3)
HGB BLD-MCNC: 11.7 G/DL (ref 12–17)
MCH RBC QN AUTO: 35 PG (ref 26.8–34.3)
MCHC RBC AUTO-ENTMCNC: 33.8 G/DL (ref 31.4–37.4)
MCV RBC AUTO: 104 FL (ref 82–98)
PHOSPHATE SERPL-MCNC: 2.9 MG/DL (ref 2.3–4.1)
PLATELET # BLD AUTO: 63 THOUSANDS/UL (ref 149–390)
PMV BLD AUTO: 11.3 FL (ref 8.9–12.7)
POTASSIUM SERPL-SCNC: 4.2 MMOL/L (ref 3.5–5.3)
PROT SERPL-MCNC: 6 G/DL (ref 6.4–8.2)
RBC # BLD AUTO: 3.34 MILLION/UL (ref 3.88–5.62)
SODIUM SERPL-SCNC: 137 MMOL/L (ref 136–145)
URATE SERPL-MCNC: 5.2 MG/DL (ref 4.2–8)
WBC # BLD AUTO: 1.9 THOUSAND/UL (ref 4.31–10.16)

## 2017-02-22 PROCEDURE — 36415 COLL VENOUS BLD VENIPUNCTURE: CPT

## 2017-02-22 PROCEDURE — 80053 COMPREHEN METABOLIC PANEL: CPT

## 2017-02-22 PROCEDURE — 84100 ASSAY OF PHOSPHORUS: CPT

## 2017-02-22 PROCEDURE — 85027 COMPLETE CBC AUTOMATED: CPT

## 2017-02-22 PROCEDURE — 84550 ASSAY OF BLOOD/URIC ACID: CPT

## 2017-02-23 RX ORDER — DEXTROSE MONOHYDRATE 50 MG/ML
20 INJECTION, SOLUTION INTRAVENOUS CONTINUOUS
Status: DISCONTINUED | OUTPATIENT
Start: 2017-02-24 | End: 2017-02-27 | Stop reason: HOSPADM

## 2017-02-24 ENCOUNTER — HOSPITAL ENCOUNTER (OUTPATIENT)
Dept: INFUSION CENTER | Facility: CLINIC | Age: 72
Discharge: HOME | End: 2017-02-24
Payer: COMMERCIAL

## 2017-02-24 VITALS
RESPIRATION RATE: 16 BRPM | HEIGHT: 69 IN | HEART RATE: 65 BPM | SYSTOLIC BLOOD PRESSURE: 135 MMHG | DIASTOLIC BLOOD PRESSURE: 86 MMHG | BODY MASS INDEX: 24.49 KG/M2 | WEIGHT: 165.34 LBS | TEMPERATURE: 98 F

## 2017-02-24 LAB
ANISOCYTOSIS BLD QL SMEAR: PRESENT
BASOPHILS # BLD AUTO: 0.02 THOUSAND/UL (ref 0–0.1)
BASOPHILS NFR MAR MANUAL: 1 % (ref 0–1)
EOSINOPHIL # BLD AUTO: 0.04 THOUSAND/UL (ref 0–0.61)
EOSINOPHIL NFR BLD MANUAL: 2 % (ref 0–6)
ERYTHROCYTE [DISTWIDTH] IN BLOOD BY AUTOMATED COUNT: 16.3 % (ref 11.6–15.1)
HCT VFR BLD AUTO: 33.8 % (ref 36.5–49.3)
HGB BLD-MCNC: 11.4 G/DL (ref 12–17)
LYMPHOCYTES # BLD AUTO: 1.06 THOUSAND/UL (ref 0.6–4.47)
LYMPHOCYTES # BLD AUTO: 48 % (ref 14–44)
MACROCYTES BLD QL AUTO: PRESENT
MCH RBC QN AUTO: 35.4 PG (ref 26.8–34.3)
MCHC RBC AUTO-ENTMCNC: 33.6 G/DL (ref 31.4–37.4)
MCV RBC AUTO: 105 FL (ref 82–98)
MONOCYTES # BLD AUTO: 0.15 THOUSAND/UL (ref 0–1.22)
MONOCYTES NFR BLD AUTO: 7 % (ref 4–12)
NEUTS BAND NFR BLD MANUAL: 1 % (ref 0–8)
NEUTS SEG # BLD: 0.92 THOUSAND/UL (ref 1.81–6.82)
NEUTS SEG NFR BLD AUTO: 41 % (ref 43–75)
PLATELET # BLD AUTO: 60 THOUSANDS/UL (ref 149–390)
PLATELET BLD QL SMEAR: ABNORMAL
PMV BLD AUTO: 7.5 FL (ref 8.9–12.7)
RBC # BLD AUTO: 3.21 MILLION/UL (ref 3.88–5.62)
TOTAL CELLS COUNTED SPEC: 100
WBC # BLD AUTO: 2.2 THOUSAND/UL (ref 4.31–10.16)
WBC NRBC COR # BLD: 2.2 THOUSAND/UL (ref 4.31–10.16)

## 2017-02-24 PROCEDURE — 96372 THER/PROPH/DIAG INJ SC/IM: CPT

## 2017-02-24 PROCEDURE — 85007 BL SMEAR W/DIFF WBC COUNT: CPT | Performed by: INTERNAL MEDICINE

## 2017-02-24 PROCEDURE — 85027 COMPLETE CBC AUTOMATED: CPT | Performed by: INTERNAL MEDICINE

## 2017-02-24 PROCEDURE — 96413 CHEMO IV INFUSION 1 HR: CPT

## 2017-02-24 PROCEDURE — 96361 HYDRATE IV INFUSION ADD-ON: CPT

## 2017-02-24 RX ADMIN — DEXTROSE 20 ML/HR: 5 SOLUTION INTRAVENOUS at 10:43

## 2017-02-24 RX ADMIN — SODIUM CHLORIDE 500 ML: 0.9 INJECTION, SOLUTION INTRAVENOUS at 11:11

## 2017-02-24 RX ADMIN — SODIUM CHLORIDE 500 ML: 0.9 INJECTION, SOLUTION INTRAVENOUS at 09:45

## 2017-02-24 RX ADMIN — TBO-FILGRASTIM 480 MCG: 480 INJECTION, SOLUTION SUBCUTANEOUS at 12:23

## 2017-02-24 RX ADMIN — CARFILZOMIB 38 MG: 60 INJECTION, POWDER, LYOPHILIZED, FOR SOLUTION INTRAVENOUS at 10:44

## 2017-02-24 NOTE — PROGRESS NOTES
[facility] Dr Ryan Huizar and made aware of WBC 1 90 on 2/22/2017 and that no differential was done   CBC redrawn this am- presently waiting on results

## 2017-02-24 NOTE — PROGRESS NOTES
Tolerated chemotherapy  Denies any discomforts  Tolerated Granix injection in abdomen- ANC- 0 92  Aware of next appointment   Refused AVS

## 2017-02-24 NOTE — PROGRESS NOTES
Spoke with Crystal with Dr Dania Chung and made aware of [address]- 0 92  Steven Baker Cleared for treatment today   Order to be sent

## 2017-03-01 ENCOUNTER — APPOINTMENT (OUTPATIENT)
Dept: LAB | Facility: CLINIC | Age: 72
End: 2017-03-01
Payer: COMMERCIAL

## 2017-03-01 DIAGNOSIS — E03.9 HYPOTHYROIDISM: ICD-10-CM

## 2017-03-01 DIAGNOSIS — C90.00 MULTIPLE MYELOMA NOT HAVING ACHIEVED REMISSION (HCC): ICD-10-CM

## 2017-03-01 DIAGNOSIS — I10 ESSENTIAL (PRIMARY) HYPERTENSION: ICD-10-CM

## 2017-03-01 DIAGNOSIS — R11.0 NAUSEA: ICD-10-CM

## 2017-03-01 DIAGNOSIS — R53.83 OTHER FATIGUE: ICD-10-CM

## 2017-03-01 DIAGNOSIS — D64.9 ANEMIA: ICD-10-CM

## 2017-03-01 DIAGNOSIS — Z00.00 ENCOUNTER FOR GENERAL ADULT MEDICAL EXAMINATION WITHOUT ABNORMAL FINDINGS: ICD-10-CM

## 2017-03-01 DIAGNOSIS — E78.5 HYPERLIPIDEMIA: ICD-10-CM

## 2017-03-01 LAB
ALBUMIN SERPL BCP-MCNC: 3.6 G/DL (ref 3.5–5)
ALP SERPL-CCNC: 67 U/L (ref 46–116)
ALT SERPL W P-5'-P-CCNC: 36 U/L (ref 12–78)
ANION GAP SERPL CALCULATED.3IONS-SCNC: 5 MMOL/L (ref 4–13)
AST SERPL W P-5'-P-CCNC: 11 U/L (ref 5–45)
BASOPHILS # BLD AUTO: 0.02 THOUSANDS/ΜL (ref 0–0.1)
BASOPHILS NFR BLD AUTO: 1 % (ref 0–1)
BILIRUB SERPL-MCNC: 1.85 MG/DL (ref 0.2–1)
BUN SERPL-MCNC: 24 MG/DL (ref 5–25)
CALCIUM SERPL-MCNC: 9 MG/DL (ref 8.3–10.1)
CHLORIDE SERPL-SCNC: 105 MMOL/L (ref 100–108)
CO2 SERPL-SCNC: 31 MMOL/L (ref 21–32)
CREAT SERPL-MCNC: 1.18 MG/DL (ref 0.6–1.3)
EOSINOPHIL # BLD AUTO: 0.06 THOUSAND/ΜL (ref 0–0.61)
EOSINOPHIL NFR BLD AUTO: 3 % (ref 0–6)
ERYTHROCYTE [DISTWIDTH] IN BLOOD BY AUTOMATED COUNT: 14.9 % (ref 11.6–15.1)
GFR SERPL CREATININE-BSD FRML MDRD: >60 ML/MIN/1.73SQ M
GLUCOSE SERPL-MCNC: 89 MG/DL (ref 65–140)
HCT VFR BLD AUTO: 34.4 % (ref 36.5–49.3)
HGB BLD-MCNC: 11.6 G/DL (ref 12–17)
LDH SERPL-CCNC: 148 U/L (ref 81–234)
LYMPHOCYTES # BLD AUTO: 0.43 THOUSANDS/ΜL (ref 0.6–4.47)
LYMPHOCYTES NFR BLD AUTO: 24 % (ref 14–44)
MCH RBC QN AUTO: 35.6 PG (ref 26.8–34.3)
MCHC RBC AUTO-ENTMCNC: 33.7 G/DL (ref 31.4–37.4)
MCV RBC AUTO: 106 FL (ref 82–98)
MONOCYTES # BLD AUTO: 0.43 THOUSAND/ΜL (ref 0.17–1.22)
MONOCYTES NFR BLD AUTO: 24 % (ref 4–12)
NEUTROPHILS # BLD AUTO: 0.83 THOUSANDS/ΜL (ref 1.85–7.62)
NEUTS SEG NFR BLD AUTO: 48 % (ref 43–75)
NRBC BLD AUTO-RTO: 0 /100 WBCS
PHOSPHATE SERPL-MCNC: 2.8 MG/DL (ref 2.3–4.1)
PLATELET # BLD AUTO: 63 THOUSANDS/UL (ref 149–390)
PMV BLD AUTO: 11.7 FL (ref 8.9–12.7)
POTASSIUM SERPL-SCNC: 4.6 MMOL/L (ref 3.5–5.3)
PROT SERPL-MCNC: 6.1 G/DL (ref 6.4–8.2)
RBC # BLD AUTO: 3.26 MILLION/UL (ref 3.88–5.62)
SODIUM SERPL-SCNC: 141 MMOL/L (ref 136–145)
URATE SERPL-MCNC: 5.8 MG/DL (ref 4.2–8)
WBC # BLD AUTO: 1.79 THOUSAND/UL (ref 4.31–10.16)

## 2017-03-01 PROCEDURE — 84100 ASSAY OF PHOSPHORUS: CPT

## 2017-03-01 PROCEDURE — 83615 LACTATE (LD) (LDH) ENZYME: CPT

## 2017-03-01 PROCEDURE — 84550 ASSAY OF BLOOD/URIC ACID: CPT

## 2017-03-01 PROCEDURE — 80053 COMPREHEN METABOLIC PANEL: CPT

## 2017-03-01 PROCEDURE — 36415 COLL VENOUS BLD VENIPUNCTURE: CPT

## 2017-03-01 PROCEDURE — 85025 COMPLETE CBC W/AUTO DIFF WBC: CPT

## 2017-03-02 RX ORDER — DEXTROSE MONOHYDRATE 50 MG/ML
20 INJECTION, SOLUTION INTRAVENOUS CONTINUOUS
Status: DISCONTINUED | OUTPATIENT
Start: 2017-03-03 | End: 2017-03-06 | Stop reason: HOSPADM

## 2017-03-03 ENCOUNTER — HOSPITAL ENCOUNTER (OUTPATIENT)
Dept: INFUSION CENTER | Facility: CLINIC | Age: 72
Discharge: HOME | End: 2017-03-03
Payer: COMMERCIAL

## 2017-03-03 VITALS
DIASTOLIC BLOOD PRESSURE: 79 MMHG | RESPIRATION RATE: 18 BRPM | WEIGHT: 166 LBS | SYSTOLIC BLOOD PRESSURE: 164 MMHG | TEMPERATURE: 97 F | HEART RATE: 61 BPM | BODY MASS INDEX: 24.45 KG/M2

## 2017-03-03 PROCEDURE — 96409 CHEMO IV PUSH SNGL DRUG: CPT

## 2017-03-03 PROCEDURE — 96372 THER/PROPH/DIAG INJ SC/IM: CPT

## 2017-03-03 PROCEDURE — 96361 HYDRATE IV INFUSION ADD-ON: CPT

## 2017-03-03 RX ADMIN — SODIUM CHLORIDE 500 ML: 0.9 INJECTION, SOLUTION INTRAVENOUS at 10:00

## 2017-03-03 RX ADMIN — SODIUM CHLORIDE 500 ML: 0.9 INJECTION, SOLUTION INTRAVENOUS at 08:39

## 2017-03-03 RX ADMIN — CARFILZOMIB 38 MG: 60 INJECTION, POWDER, LYOPHILIZED, FOR SOLUTION INTRAVENOUS at 09:40

## 2017-03-03 RX ADMIN — DEXTROSE 20 ML/HR: 5 SOLUTION INTRAVENOUS at 09:38

## 2017-03-03 RX ADMIN — TBO-FILGRASTIM 480 MCG: 480 INJECTION, SOLUTION SUBCUTANEOUS at 08:47

## 2017-03-03 NOTE — PROGRESS NOTES
Pt arrived to unit without complaint, CSK=997, MD aware and ok given to proceed with chemo today  All other labs within approved parameters  Granix administered as ordered (see MAR)

## 2017-03-08 ENCOUNTER — APPOINTMENT (OUTPATIENT)
Dept: LAB | Facility: CLINIC | Age: 72
End: 2017-03-08
Payer: COMMERCIAL

## 2017-03-08 DIAGNOSIS — R11.0 NAUSEA ALONE: ICD-10-CM

## 2017-03-08 DIAGNOSIS — E03.9 UNSPECIFIED HYPOTHYROIDISM: ICD-10-CM

## 2017-03-08 DIAGNOSIS — D64.9 ANEMIA, UNSPECIFIED: ICD-10-CM

## 2017-03-08 DIAGNOSIS — C90.00 MULTIPLE MYELOMA, WITHOUT MENTION OF HAVING ACHIEVED REMISSION: ICD-10-CM

## 2017-03-08 DIAGNOSIS — I10 ESSENTIAL HYPERTENSION, MALIGNANT: ICD-10-CM

## 2017-03-08 DIAGNOSIS — E78.5 HYPERLIPIDEMIA, UNSPECIFIED HYPERLIPIDEMIA TYPE: ICD-10-CM

## 2017-03-08 DIAGNOSIS — R53.83 OTHER MALAISE AND FATIGUE: ICD-10-CM

## 2017-03-08 DIAGNOSIS — R53.81 OTHER MALAISE AND FATIGUE: ICD-10-CM

## 2017-03-08 LAB
ALBUMIN SERPL BCP-MCNC: 3.5 G/DL (ref 3.5–5)
ALP SERPL-CCNC: 75 U/L (ref 46–116)
ALT SERPL W P-5'-P-CCNC: 33 U/L (ref 12–78)
ANION GAP SERPL CALCULATED.3IONS-SCNC: 7 MMOL/L (ref 4–13)
AST SERPL W P-5'-P-CCNC: 10 U/L (ref 5–45)
BASOPHILS # BLD AUTO: 0.01 THOUSANDS/ΜL (ref 0–0.1)
BASOPHILS NFR BLD AUTO: 0 % (ref 0–1)
BILIRUB SERPL-MCNC: 1.04 MG/DL (ref 0.2–1)
BUN SERPL-MCNC: 24 MG/DL (ref 5–25)
CALCIUM SERPL-MCNC: 8.6 MG/DL (ref 8.3–10.1)
CHLORIDE SERPL-SCNC: 105 MMOL/L (ref 100–108)
CO2 SERPL-SCNC: 29 MMOL/L (ref 21–32)
CREAT SERPL-MCNC: 1.18 MG/DL (ref 0.6–1.3)
EOSINOPHIL # BLD AUTO: 0.11 THOUSAND/ΜL (ref 0–0.61)
EOSINOPHIL NFR BLD AUTO: 3 % (ref 0–6)
ERYTHROCYTE [DISTWIDTH] IN BLOOD BY AUTOMATED COUNT: 15 % (ref 11.6–15.1)
GFR SERPL CREATININE-BSD FRML MDRD: >60 ML/MIN/1.73SQ M
GLUCOSE SERPL-MCNC: 107 MG/DL (ref 65–140)
HCT VFR BLD AUTO: 34 % (ref 36.5–49.3)
HGB BLD-MCNC: 11.3 G/DL (ref 12–17)
LDH SERPL-CCNC: 135 U/L (ref 81–234)
LYMPHOCYTES # BLD AUTO: 0.57 THOUSANDS/ΜL (ref 0.6–4.47)
LYMPHOCYTES NFR BLD AUTO: 17 % (ref 14–44)
MCH RBC QN AUTO: 35.3 PG (ref 26.8–34.3)
MCHC RBC AUTO-ENTMCNC: 33.2 G/DL (ref 31.4–37.4)
MCV RBC AUTO: 106 FL (ref 82–98)
MONOCYTES # BLD AUTO: 0.52 THOUSAND/ΜL (ref 0.17–1.22)
MONOCYTES NFR BLD AUTO: 16 % (ref 4–12)
NEUTROPHILS # BLD AUTO: 2.06 THOUSANDS/ΜL (ref 1.85–7.62)
NEUTS SEG NFR BLD AUTO: 64 % (ref 43–75)
NRBC BLD AUTO-RTO: 0 /100 WBCS
PHOSPHATE SERPL-MCNC: 3.5 MG/DL (ref 2.3–4.1)
PLATELET # BLD AUTO: 43 THOUSANDS/UL (ref 149–390)
PMV BLD AUTO: 12.2 FL (ref 8.9–12.7)
POTASSIUM SERPL-SCNC: 4.7 MMOL/L (ref 3.5–5.3)
PROT SERPL-MCNC: 6.1 G/DL (ref 6.4–8.2)
RBC # BLD AUTO: 3.2 MILLION/UL (ref 3.88–5.62)
SODIUM SERPL-SCNC: 141 MMOL/L (ref 136–145)
URATE SERPL-MCNC: 5.4 MG/DL (ref 4.2–8)
WBC # BLD AUTO: 3.28 THOUSAND/UL (ref 4.31–10.16)

## 2017-03-08 PROCEDURE — 80053 COMPREHEN METABOLIC PANEL: CPT

## 2017-03-08 PROCEDURE — 85025 COMPLETE CBC W/AUTO DIFF WBC: CPT

## 2017-03-08 PROCEDURE — 84550 ASSAY OF BLOOD/URIC ACID: CPT

## 2017-03-08 PROCEDURE — 83615 LACTATE (LD) (LDH) ENZYME: CPT

## 2017-03-08 PROCEDURE — 84100 ASSAY OF PHOSPHORUS: CPT

## 2017-03-08 PROCEDURE — 36415 COLL VENOUS BLD VENIPUNCTURE: CPT

## 2017-03-09 RX ORDER — DEXTROSE MONOHYDRATE 50 MG/ML
20 INJECTION, SOLUTION INTRAVENOUS CONTINUOUS
Status: DISCONTINUED | OUTPATIENT
Start: 2017-03-10 | End: 2017-03-13 | Stop reason: HOSPADM

## 2017-03-10 ENCOUNTER — HOSPITAL ENCOUNTER (OUTPATIENT)
Dept: INFUSION CENTER | Facility: CLINIC | Age: 72
Discharge: HOME | End: 2017-03-10
Payer: COMMERCIAL

## 2017-03-10 VITALS
SYSTOLIC BLOOD PRESSURE: 142 MMHG | HEIGHT: 69 IN | HEART RATE: 64 BPM | WEIGHT: 170.86 LBS | TEMPERATURE: 97 F | BODY MASS INDEX: 25.31 KG/M2 | DIASTOLIC BLOOD PRESSURE: 90 MMHG | RESPIRATION RATE: 18 BRPM

## 2017-03-10 PROCEDURE — 96413 CHEMO IV INFUSION 1 HR: CPT

## 2017-03-10 PROCEDURE — 96361 HYDRATE IV INFUSION ADD-ON: CPT

## 2017-03-10 RX ADMIN — DEXTROSE 20 ML/HR: 5 SOLUTION INTRAVENOUS at 09:46

## 2017-03-10 RX ADMIN — CARFILZOMIB 38 MG: 60 INJECTION, POWDER, LYOPHILIZED, FOR SOLUTION INTRAVENOUS at 09:53

## 2017-03-10 RX ADMIN — SODIUM CHLORIDE 500 ML: 0.9 INJECTION, SOLUTION INTRAVENOUS at 08:40

## 2017-03-10 RX ADMIN — SODIUM CHLORIDE 500 ML: 0.9 INJECTION, SOLUTION INTRAVENOUS at 10:26

## 2017-03-10 NOTE — PROGRESS NOTES
Ok to treat with platelets 47,772   Slight light pink rash noted on forearms, pt stated "started a different soap "

## 2017-03-15 ENCOUNTER — APPOINTMENT (OUTPATIENT)
Dept: LAB | Facility: CLINIC | Age: 72
End: 2017-03-15
Payer: COMMERCIAL

## 2017-03-15 DIAGNOSIS — C90.00 MULTIPLE MYELOMA, WITHOUT MENTION OF HAVING ACHIEVED REMISSION: ICD-10-CM

## 2017-03-15 DIAGNOSIS — R53.83 OTHER MALAISE AND FATIGUE: ICD-10-CM

## 2017-03-15 DIAGNOSIS — I10 ESSENTIAL HYPERTENSION, MALIGNANT: ICD-10-CM

## 2017-03-15 DIAGNOSIS — E78.5 HYPERLIPIDEMIA, UNSPECIFIED HYPERLIPIDEMIA TYPE: ICD-10-CM

## 2017-03-15 DIAGNOSIS — E03.9 UNSPECIFIED HYPOTHYROIDISM: ICD-10-CM

## 2017-03-15 DIAGNOSIS — R53.81 OTHER MALAISE AND FATIGUE: ICD-10-CM

## 2017-03-15 DIAGNOSIS — R11.0 NAUSEA ALONE: ICD-10-CM

## 2017-03-15 DIAGNOSIS — D64.9 ANEMIA, UNSPECIFIED: ICD-10-CM

## 2017-03-15 LAB
ALBUMIN SERPL BCP-MCNC: 3.7 G/DL (ref 3.5–5)
ALP SERPL-CCNC: 78 U/L (ref 46–116)
ALT SERPL W P-5'-P-CCNC: 29 U/L (ref 12–78)
ANION GAP SERPL CALCULATED.3IONS-SCNC: 8 MMOL/L (ref 4–13)
AST SERPL W P-5'-P-CCNC: 9 U/L (ref 5–45)
BASOPHILS # BLD AUTO: 0.01 THOUSANDS/ΜL (ref 0–0.1)
BASOPHILS NFR BLD AUTO: 0 % (ref 0–1)
BILIRUB SERPL-MCNC: 1.33 MG/DL (ref 0.2–1)
BUN SERPL-MCNC: 22 MG/DL (ref 5–25)
CALCIUM SERPL-MCNC: 8.4 MG/DL (ref 8.3–10.1)
CHLORIDE SERPL-SCNC: 103 MMOL/L (ref 100–108)
CO2 SERPL-SCNC: 28 MMOL/L (ref 21–32)
CREAT SERPL-MCNC: 1.09 MG/DL (ref 0.6–1.3)
EOSINOPHIL # BLD AUTO: 0.15 THOUSAND/ΜL (ref 0–0.61)
EOSINOPHIL NFR BLD AUTO: 5 % (ref 0–6)
ERYTHROCYTE [DISTWIDTH] IN BLOOD BY AUTOMATED COUNT: 14.8 % (ref 11.6–15.1)
GFR SERPL CREATININE-BSD FRML MDRD: >60 ML/MIN/1.73SQ M
GLUCOSE SERPL-MCNC: 80 MG/DL (ref 65–140)
HCT VFR BLD AUTO: 33.8 % (ref 36.5–49.3)
HGB BLD-MCNC: 11.4 G/DL (ref 12–17)
LDH SERPL-CCNC: 166 U/L (ref 81–234)
LYMPHOCYTES # BLD AUTO: 0.81 THOUSANDS/ΜL (ref 0.6–4.47)
LYMPHOCYTES NFR BLD AUTO: 26 % (ref 14–44)
MCH RBC QN AUTO: 35.7 PG (ref 26.8–34.3)
MCHC RBC AUTO-ENTMCNC: 33.7 G/DL (ref 31.4–37.4)
MCV RBC AUTO: 106 FL (ref 82–98)
MONOCYTES # BLD AUTO: 0.44 THOUSAND/ΜL (ref 0.17–1.22)
MONOCYTES NFR BLD AUTO: 14 % (ref 4–12)
NEUTROPHILS # BLD AUTO: 1.65 THOUSANDS/ΜL (ref 1.85–7.62)
NEUTS SEG NFR BLD AUTO: 55 % (ref 43–75)
NRBC BLD AUTO-RTO: 0 /100 WBCS
PHOSPHATE SERPL-MCNC: 3 MG/DL (ref 2.3–4.1)
PLATELET # BLD AUTO: 35 THOUSANDS/UL (ref 149–390)
PMV BLD AUTO: 12.2 FL (ref 8.9–12.7)
POTASSIUM SERPL-SCNC: 4.1 MMOL/L (ref 3.5–5.3)
PROT SERPL-MCNC: 6.4 G/DL (ref 6.4–8.2)
RBC # BLD AUTO: 3.19 MILLION/UL (ref 3.88–5.62)
SODIUM SERPL-SCNC: 139 MMOL/L (ref 136–145)
URATE SERPL-MCNC: 5.1 MG/DL (ref 4.2–8)
WBC # BLD AUTO: 3.07 THOUSAND/UL (ref 4.31–10.16)

## 2017-03-15 PROCEDURE — 80053 COMPREHEN METABOLIC PANEL: CPT

## 2017-03-15 PROCEDURE — 83615 LACTATE (LD) (LDH) ENZYME: CPT

## 2017-03-15 PROCEDURE — 84100 ASSAY OF PHOSPHORUS: CPT

## 2017-03-15 PROCEDURE — 84550 ASSAY OF BLOOD/URIC ACID: CPT

## 2017-03-15 PROCEDURE — 85025 COMPLETE CBC W/AUTO DIFF WBC: CPT

## 2017-03-15 PROCEDURE — 36415 COLL VENOUS BLD VENIPUNCTURE: CPT

## 2017-03-16 RX ORDER — DEXTROSE MONOHYDRATE 50 MG/ML
20 INJECTION, SOLUTION INTRAVENOUS CONTINUOUS
Status: DISCONTINUED | OUTPATIENT
Start: 2017-03-17 | End: 2017-03-20 | Stop reason: HOSPADM

## 2017-03-17 ENCOUNTER — HOSPITAL ENCOUNTER (OUTPATIENT)
Dept: INFUSION CENTER | Facility: CLINIC | Age: 72
Discharge: HOME | End: 2017-03-17
Payer: COMMERCIAL

## 2017-03-17 VITALS
WEIGHT: 169.75 LBS | HEIGHT: 69 IN | BODY MASS INDEX: 25.14 KG/M2 | SYSTOLIC BLOOD PRESSURE: 152 MMHG | HEART RATE: 68 BPM | TEMPERATURE: 97.6 F | RESPIRATION RATE: 16 BRPM | DIASTOLIC BLOOD PRESSURE: 82 MMHG

## 2017-03-17 PROCEDURE — 96409 CHEMO IV PUSH SNGL DRUG: CPT

## 2017-03-17 PROCEDURE — 96361 HYDRATE IV INFUSION ADD-ON: CPT

## 2017-03-17 RX ADMIN — CARFILZOMIB 38 MG: 60 INJECTION, POWDER, LYOPHILIZED, FOR SOLUTION INTRAVENOUS at 10:13

## 2017-03-17 RX ADMIN — DEXTROSE 20 ML/HR: 5 SOLUTION INTRAVENOUS at 09:45

## 2017-03-17 RX ADMIN — SODIUM CHLORIDE 500 ML: 0.9 INJECTION, SOLUTION INTRAVENOUS at 08:34

## 2017-03-17 RX ADMIN — SODIUM CHLORIDE 500 ML: 0.9 INJECTION, SOLUTION INTRAVENOUS at 10:30

## 2017-03-22 ENCOUNTER — APPOINTMENT (OUTPATIENT)
Dept: LAB | Facility: CLINIC | Age: 72
End: 2017-03-22
Payer: COMMERCIAL

## 2017-03-22 ENCOUNTER — TRANSCRIBE ORDERS (OUTPATIENT)
Dept: LAB | Facility: CLINIC | Age: 72
End: 2017-03-22

## 2017-03-22 DIAGNOSIS — E03.9 UNSPECIFIED HYPOTHYROIDISM: ICD-10-CM

## 2017-03-22 DIAGNOSIS — C90.00 MULTIPLE MYELOMA, WITHOUT MENTION OF HAVING ACHIEVED REMISSION: ICD-10-CM

## 2017-03-22 DIAGNOSIS — R11.0 NAUSEA ALONE: ICD-10-CM

## 2017-03-22 DIAGNOSIS — D64.9 ANEMIA, UNSPECIFIED: ICD-10-CM

## 2017-03-22 DIAGNOSIS — D64.9 ANEMIA, UNSPECIFIED: Primary | ICD-10-CM

## 2017-03-22 DIAGNOSIS — R53.83 OTHER MALAISE AND FATIGUE: ICD-10-CM

## 2017-03-22 DIAGNOSIS — E78.5 HYPERLIPIDEMIA, UNSPECIFIED HYPERLIPIDEMIA TYPE: ICD-10-CM

## 2017-03-22 DIAGNOSIS — R53.81 OTHER MALAISE AND FATIGUE: ICD-10-CM

## 2017-03-22 DIAGNOSIS — Z00.00 ROUTINE GENERAL MEDICAL EXAMINATION AT A HEALTH CARE FACILITY: ICD-10-CM

## 2017-03-22 DIAGNOSIS — I10 ESSENTIAL HYPERTENSION, MALIGNANT: ICD-10-CM

## 2017-03-22 LAB
ALBUMIN SERPL BCP-MCNC: 3.5 G/DL (ref 3.5–5)
ALP SERPL-CCNC: 77 U/L (ref 46–116)
ALT SERPL W P-5'-P-CCNC: 30 U/L (ref 12–78)
ANION GAP SERPL CALCULATED.3IONS-SCNC: 5 MMOL/L (ref 4–13)
AST SERPL W P-5'-P-CCNC: 11 U/L (ref 5–45)
BASOPHILS # BLD AUTO: 0.02 THOUSANDS/ΜL (ref 0–0.1)
BASOPHILS NFR BLD AUTO: 1 % (ref 0–1)
BILIRUB SERPL-MCNC: 1.11 MG/DL (ref 0.2–1)
BUN SERPL-MCNC: 23 MG/DL (ref 5–25)
CALCIUM SERPL-MCNC: 8.5 MG/DL (ref 8.3–10.1)
CHLORIDE SERPL-SCNC: 104 MMOL/L (ref 100–108)
CO2 SERPL-SCNC: 31 MMOL/L (ref 21–32)
CREAT SERPL-MCNC: 1.16 MG/DL (ref 0.6–1.3)
EOSINOPHIL # BLD AUTO: 0.13 THOUSAND/ΜL (ref 0–0.61)
EOSINOPHIL NFR BLD AUTO: 7 % (ref 0–6)
ERYTHROCYTE [DISTWIDTH] IN BLOOD BY AUTOMATED COUNT: 14.1 % (ref 11.6–15.1)
GFR SERPL CREATININE-BSD FRML MDRD: >60 ML/MIN/1.73SQ M
GLUCOSE SERPL-MCNC: 79 MG/DL (ref 65–140)
HCT VFR BLD AUTO: 33.1 % (ref 36.5–49.3)
HGB BLD-MCNC: 11.5 G/DL (ref 12–17)
LDH SERPL-CCNC: 151 U/L (ref 81–234)
LYMPHOCYTES # BLD AUTO: 0.52 THOUSANDS/ΜL (ref 0.6–4.47)
LYMPHOCYTES NFR BLD AUTO: 26 % (ref 14–44)
MCH RBC QN AUTO: 36.1 PG (ref 26.8–34.3)
MCHC RBC AUTO-ENTMCNC: 34.7 G/DL (ref 31.4–37.4)
MCV RBC AUTO: 104 FL (ref 82–98)
MONOCYTES # BLD AUTO: 0.45 THOUSAND/ΜL (ref 0.17–1.22)
MONOCYTES NFR BLD AUTO: 23 % (ref 4–12)
NEUTROPHILS # BLD AUTO: 0.85 THOUSANDS/ΜL (ref 1.85–7.62)
NEUTS SEG NFR BLD AUTO: 43 % (ref 43–75)
NRBC BLD AUTO-RTO: 0 /100 WBCS
PHOSPHATE SERPL-MCNC: 3 MG/DL (ref 2.3–4.1)
PLATELET # BLD AUTO: 45 THOUSANDS/UL (ref 149–390)
PMV BLD AUTO: 12.2 FL (ref 8.9–12.7)
POTASSIUM SERPL-SCNC: 4.1 MMOL/L (ref 3.5–5.3)
PROT SERPL-MCNC: 6 G/DL (ref 6.4–8.2)
RBC # BLD AUTO: 3.19 MILLION/UL (ref 3.88–5.62)
SODIUM SERPL-SCNC: 140 MMOL/L (ref 136–145)
URATE SERPL-MCNC: 5 MG/DL (ref 4.2–8)
WBC # BLD AUTO: 1.97 THOUSAND/UL (ref 4.31–10.16)

## 2017-03-22 PROCEDURE — 83615 LACTATE (LD) (LDH) ENZYME: CPT

## 2017-03-22 PROCEDURE — 36415 COLL VENOUS BLD VENIPUNCTURE: CPT

## 2017-03-22 PROCEDURE — 80053 COMPREHEN METABOLIC PANEL: CPT

## 2017-03-22 PROCEDURE — 85025 COMPLETE CBC W/AUTO DIFF WBC: CPT

## 2017-03-22 PROCEDURE — 84550 ASSAY OF BLOOD/URIC ACID: CPT

## 2017-03-22 PROCEDURE — 84100 ASSAY OF PHOSPHORUS: CPT

## 2017-03-23 RX ORDER — DEXTROSE MONOHYDRATE 50 MG/ML
20 INJECTION, SOLUTION INTRAVENOUS CONTINUOUS
Status: DISCONTINUED | OUTPATIENT
Start: 2017-03-24 | End: 2017-03-27 | Stop reason: HOSPADM

## 2017-03-24 ENCOUNTER — HOSPITAL ENCOUNTER (OUTPATIENT)
Dept: INFUSION CENTER | Facility: CLINIC | Age: 72
Discharge: HOME | End: 2017-03-24
Payer: COMMERCIAL

## 2017-03-24 VITALS
RESPIRATION RATE: 18 BRPM | DIASTOLIC BLOOD PRESSURE: 72 MMHG | HEART RATE: 61 BPM | SYSTOLIC BLOOD PRESSURE: 133 MMHG | TEMPERATURE: 96.7 F

## 2017-03-24 PROCEDURE — 96361 HYDRATE IV INFUSION ADD-ON: CPT

## 2017-03-24 PROCEDURE — 96372 THER/PROPH/DIAG INJ SC/IM: CPT

## 2017-03-24 PROCEDURE — 96413 CHEMO IV INFUSION 1 HR: CPT

## 2017-03-24 RX ADMIN — DEXTROSE 20 ML/HR: 5 SOLUTION INTRAVENOUS at 09:56

## 2017-03-24 RX ADMIN — TBO-FILGRASTIM 480 MCG: 480 INJECTION, SOLUTION SUBCUTANEOUS at 10:43

## 2017-03-24 RX ADMIN — CARFILZOMIB 38 MG: 60 INJECTION, POWDER, LYOPHILIZED, FOR SOLUTION INTRAVENOUS at 09:59

## 2017-03-24 RX ADMIN — SODIUM CHLORIDE 500 ML: 0.9 INJECTION, SOLUTION INTRAVENOUS at 08:51

## 2017-03-24 RX ADMIN — SODIUM CHLORIDE 500 ML: 0.9 INJECTION, SOLUTION INTRAVENOUS at 10:26

## 2017-03-24 NOTE — PLAN OF CARE
Problem: INFECTION - ADULT  Goal: Absence or prevention of progression during hospitalization  INTERVENTIONS:  - Assess and monitor for signs and symptoms of infection  - Monitor lab/diagnostic results  - Monitor all insertion sites, i e  indwelling lines, tubes, and drains  - Monitor endotracheal (as able) and nasal secretions for changes in amount and color  - [place] appropriate cooling/warming therapies per order  - Administer medications as ordered  - Instruct and encourage patient and family to use good hand hygiene technique  - Identify and instruct in appropriate isolation precautions for identified infection/condition  Outcome: Progressing  Goal: Absence of fever/infection during neutropenic period  INTERVENTIONS:  - Monitor WBC  - Implement neutropenic guidelines  Outcome: Progressing

## 2017-03-29 ENCOUNTER — APPOINTMENT (OUTPATIENT)
Dept: LAB | Facility: CLINIC | Age: 72
End: 2017-03-29
Payer: COMMERCIAL

## 2017-03-29 DIAGNOSIS — D64.9 ANEMIA, UNSPECIFIED: ICD-10-CM

## 2017-03-29 DIAGNOSIS — E78.5 HYPERLIPIDEMIA, UNSPECIFIED HYPERLIPIDEMIA TYPE: ICD-10-CM

## 2017-03-29 DIAGNOSIS — R11.0 NAUSEA ALONE: ICD-10-CM

## 2017-03-29 DIAGNOSIS — I10 ESSENTIAL HYPERTENSION, MALIGNANT: ICD-10-CM

## 2017-03-29 DIAGNOSIS — R53.81 OTHER MALAISE AND FATIGUE: ICD-10-CM

## 2017-03-29 DIAGNOSIS — E03.9 UNSPECIFIED HYPOTHYROIDISM: ICD-10-CM

## 2017-03-29 DIAGNOSIS — R53.83 OTHER MALAISE AND FATIGUE: ICD-10-CM

## 2017-03-29 DIAGNOSIS — C90.00 MULTIPLE MYELOMA, WITHOUT MENTION OF HAVING ACHIEVED REMISSION: ICD-10-CM

## 2017-03-29 DIAGNOSIS — Z00.00 ROUTINE GENERAL MEDICAL EXAMINATION AT A HEALTH CARE FACILITY: ICD-10-CM

## 2017-03-29 LAB
ALBUMIN SERPL BCP-MCNC: 3.7 G/DL (ref 3.5–5)
ALP SERPL-CCNC: 75 U/L (ref 46–116)
ALT SERPL W P-5'-P-CCNC: 26 U/L (ref 12–78)
ANION GAP SERPL CALCULATED.3IONS-SCNC: 7 MMOL/L (ref 4–13)
AST SERPL W P-5'-P-CCNC: 10 U/L (ref 5–45)
BASOPHILS # BLD AUTO: 0.05 THOUSANDS/ΜL (ref 0–0.1)
BASOPHILS NFR BLD AUTO: 2 % (ref 0–1)
BILIRUB SERPL-MCNC: 1.13 MG/DL (ref 0.2–1)
BUN SERPL-MCNC: 29 MG/DL (ref 5–25)
CALCIUM SERPL-MCNC: 8.6 MG/DL (ref 8.3–10.1)
CHLORIDE SERPL-SCNC: 107 MMOL/L (ref 100–108)
CO2 SERPL-SCNC: 29 MMOL/L (ref 21–32)
CREAT SERPL-MCNC: 1.11 MG/DL (ref 0.6–1.3)
EOSINOPHIL # BLD AUTO: 0.03 THOUSAND/ΜL (ref 0–0.61)
EOSINOPHIL NFR BLD AUTO: 1 % (ref 0–6)
ERYTHROCYTE [DISTWIDTH] IN BLOOD BY AUTOMATED COUNT: 14.4 % (ref 11.6–15.1)
GFR SERPL CREATININE-BSD FRML MDRD: >60 ML/MIN/1.73SQ M
GLUCOSE P FAST SERPL-MCNC: 87 MG/DL (ref 65–99)
HCT VFR BLD AUTO: 34.4 % (ref 36.5–49.3)
HGB BLD-MCNC: 11.6 G/DL (ref 12–17)
LDH SERPL-CCNC: 158 U/L (ref 81–234)
LYMPHOCYTES # BLD AUTO: 0.66 THOUSANDS/ΜL (ref 0.6–4.47)
LYMPHOCYTES NFR BLD AUTO: 29 % (ref 14–44)
MCH RBC QN AUTO: 35.9 PG (ref 26.8–34.3)
MCHC RBC AUTO-ENTMCNC: 33.7 G/DL (ref 31.4–37.4)
MCV RBC AUTO: 107 FL (ref 82–98)
MONOCYTES # BLD AUTO: 0.36 THOUSAND/ΜL (ref 0.17–1.22)
MONOCYTES NFR BLD AUTO: 16 % (ref 4–12)
NEUTROPHILS # BLD AUTO: 1.19 THOUSANDS/ΜL (ref 1.85–7.62)
NEUTS SEG NFR BLD AUTO: 52 % (ref 43–75)
NRBC BLD AUTO-RTO: 0 /100 WBCS
PHOSPHATE SERPL-MCNC: 2.7 MG/DL (ref 2.3–4.1)
PLATELET # BLD AUTO: 54 THOUSANDS/UL (ref 149–390)
PMV BLD AUTO: 12.3 FL (ref 8.9–12.7)
POTASSIUM SERPL-SCNC: 4.2 MMOL/L (ref 3.5–5.3)
PROT SERPL-MCNC: 6.2 G/DL (ref 6.4–8.2)
RBC # BLD AUTO: 3.23 MILLION/UL (ref 3.88–5.62)
SODIUM SERPL-SCNC: 143 MMOL/L (ref 136–145)
URATE SERPL-MCNC: 6.6 MG/DL (ref 4.2–8)
WBC # BLD AUTO: 2.3 THOUSAND/UL (ref 4.31–10.16)

## 2017-03-29 PROCEDURE — 84100 ASSAY OF PHOSPHORUS: CPT

## 2017-03-29 PROCEDURE — 85025 COMPLETE CBC W/AUTO DIFF WBC: CPT

## 2017-03-29 PROCEDURE — 36415 COLL VENOUS BLD VENIPUNCTURE: CPT

## 2017-03-29 PROCEDURE — 84550 ASSAY OF BLOOD/URIC ACID: CPT

## 2017-03-29 PROCEDURE — 83615 LACTATE (LD) (LDH) ENZYME: CPT

## 2017-03-29 PROCEDURE — 80053 COMPREHEN METABOLIC PANEL: CPT

## 2017-03-29 RX ORDER — ACETAMINOPHEN 650 MG
TABLET, EXTENDED RELEASE ORAL AS NEEDED
Qty: 480 ML | Refills: 0 | Status: CANCELLED | OUTPATIENT
Start: 2017-03-29

## 2017-03-30 RX ORDER — SODIUM CHLORIDE 9 MG/ML
20 INJECTION, SOLUTION INTRAVENOUS CONTINUOUS
Status: DISCONTINUED | OUTPATIENT
Start: 2017-03-31 | End: 2017-03-31

## 2017-03-31 ENCOUNTER — HOSPITAL ENCOUNTER (OUTPATIENT)
Dept: INFUSION CENTER | Facility: CLINIC | Age: 72
Discharge: HOME | End: 2017-03-31
Payer: COMMERCIAL

## 2017-03-31 VITALS
SYSTOLIC BLOOD PRESSURE: 155 MMHG | WEIGHT: 167.99 LBS | HEART RATE: 60 BPM | DIASTOLIC BLOOD PRESSURE: 87 MMHG | TEMPERATURE: 96.4 F | BODY MASS INDEX: 24.74 KG/M2 | RESPIRATION RATE: 18 BRPM

## 2017-03-31 PROCEDURE — 96409 CHEMO IV PUSH SNGL DRUG: CPT

## 2017-03-31 PROCEDURE — 96361 HYDRATE IV INFUSION ADD-ON: CPT

## 2017-03-31 RX ORDER — DEXTROSE MONOHYDRATE 50 MG/ML
20 INJECTION, SOLUTION INTRAVENOUS CONTINUOUS
Status: DISCONTINUED | OUTPATIENT
Start: 2017-03-31 | End: 2017-04-03 | Stop reason: HOSPADM

## 2017-03-31 RX ADMIN — CARFILZOMIB 38 MG: 60 INJECTION, POWDER, LYOPHILIZED, FOR SOLUTION INTRAVENOUS at 09:33

## 2017-03-31 RX ADMIN — SODIUM CHLORIDE 500 ML: 0.9 INJECTION, SOLUTION INTRAVENOUS at 09:50

## 2017-03-31 RX ADMIN — DEXTROSE 20 ML/HR: 5 SOLUTION INTRAVENOUS at 09:33

## 2017-03-31 RX ADMIN — SODIUM CHLORIDE 500 ML: 0.9 INJECTION, SOLUTION INTRAVENOUS at 08:30

## 2017-03-31 NOTE — PROGRESS NOTES
Patient tolerated chemotherapy infusion today  Denies any discomfort  Pt is aware of next appointment   Refused AVS

## 2017-04-05 ENCOUNTER — APPOINTMENT (OUTPATIENT)
Dept: LAB | Facility: CLINIC | Age: 72
End: 2017-04-05
Payer: COMMERCIAL

## 2017-04-05 DIAGNOSIS — Z00.00 ROUTINE GENERAL MEDICAL EXAMINATION AT A HEALTH CARE FACILITY: ICD-10-CM

## 2017-04-05 DIAGNOSIS — E78.5 HYPERLIPIDEMIA, UNSPECIFIED HYPERLIPIDEMIA TYPE: ICD-10-CM

## 2017-04-05 DIAGNOSIS — E03.9 UNSPECIFIED HYPOTHYROIDISM: ICD-10-CM

## 2017-04-05 DIAGNOSIS — R11.0 NAUSEA ALONE: ICD-10-CM

## 2017-04-05 DIAGNOSIS — D64.9 ANEMIA, UNSPECIFIED: ICD-10-CM

## 2017-04-05 DIAGNOSIS — I10 ESSENTIAL HYPERTENSION, MALIGNANT: ICD-10-CM

## 2017-04-05 LAB
ALBUMIN SERPL BCP-MCNC: 3.7 G/DL (ref 3.5–5)
ALP SERPL-CCNC: 71 U/L (ref 46–116)
ALT SERPL W P-5'-P-CCNC: 34 U/L (ref 12–78)
ANION GAP SERPL CALCULATED.3IONS-SCNC: 8 MMOL/L (ref 4–13)
AST SERPL W P-5'-P-CCNC: 11 U/L (ref 5–45)
BASOPHILS # BLD AUTO: 0.02 THOUSANDS/ΜL (ref 0–0.1)
BASOPHILS NFR BLD AUTO: 1 % (ref 0–1)
BILIRUB SERPL-MCNC: 0.97 MG/DL (ref 0.2–1)
BUN SERPL-MCNC: 25 MG/DL (ref 5–25)
CALCIUM SERPL-MCNC: 8.8 MG/DL (ref 8.3–10.1)
CHLORIDE SERPL-SCNC: 103 MMOL/L (ref 100–108)
CO2 SERPL-SCNC: 27 MMOL/L (ref 21–32)
CREAT SERPL-MCNC: 1.14 MG/DL (ref 0.6–1.3)
EOSINOPHIL # BLD AUTO: 0.1 THOUSAND/ΜL (ref 0–0.61)
EOSINOPHIL NFR BLD AUTO: 3 % (ref 0–6)
ERYTHROCYTE [DISTWIDTH] IN BLOOD BY AUTOMATED COUNT: 14.2 % (ref 11.6–15.1)
GFR SERPL CREATININE-BSD FRML MDRD: >60 ML/MIN/1.73SQ M
GLUCOSE SERPL-MCNC: 88 MG/DL (ref 65–140)
HCT VFR BLD AUTO: 34.9 % (ref 36.5–49.3)
HGB BLD-MCNC: 11.7 G/DL (ref 12–17)
LDH SERPL-CCNC: 154 U/L (ref 81–234)
LYMPHOCYTES # BLD AUTO: 0.76 THOUSANDS/ΜL (ref 0.6–4.47)
LYMPHOCYTES NFR BLD AUTO: 20 % (ref 14–44)
MCH RBC QN AUTO: 35.6 PG (ref 26.8–34.3)
MCHC RBC AUTO-ENTMCNC: 33.5 G/DL (ref 31.4–37.4)
MCV RBC AUTO: 106 FL (ref 82–98)
MONOCYTES # BLD AUTO: 0.29 THOUSAND/ΜL (ref 0.17–1.22)
MONOCYTES NFR BLD AUTO: 8 % (ref 4–12)
NEUTROPHILS # BLD AUTO: 2.7 THOUSANDS/ΜL (ref 1.85–7.62)
NEUTS SEG NFR BLD AUTO: 68 % (ref 43–75)
NRBC BLD AUTO-RTO: 0 /100 WBCS
PHOSPHATE SERPL-MCNC: 3.3 MG/DL (ref 2.3–4.1)
PLATELET # BLD AUTO: 61 THOUSANDS/UL (ref 149–390)
PMV BLD AUTO: 13 FL (ref 8.9–12.7)
POTASSIUM SERPL-SCNC: 4.4 MMOL/L (ref 3.5–5.3)
PROT SERPL-MCNC: 6.3 G/DL (ref 6.4–8.2)
RBC # BLD AUTO: 3.29 MILLION/UL (ref 3.88–5.62)
SODIUM SERPL-SCNC: 138 MMOL/L (ref 136–145)
URATE SERPL-MCNC: 6.1 MG/DL (ref 4.2–8)
WBC # BLD AUTO: 3.89 THOUSAND/UL (ref 4.31–10.16)

## 2017-04-05 PROCEDURE — 85025 COMPLETE CBC W/AUTO DIFF WBC: CPT

## 2017-04-05 PROCEDURE — 84550 ASSAY OF BLOOD/URIC ACID: CPT

## 2017-04-05 PROCEDURE — 80053 COMPREHEN METABOLIC PANEL: CPT

## 2017-04-05 PROCEDURE — 83615 LACTATE (LD) (LDH) ENZYME: CPT

## 2017-04-05 PROCEDURE — 36415 COLL VENOUS BLD VENIPUNCTURE: CPT

## 2017-04-05 PROCEDURE — 84100 ASSAY OF PHOSPHORUS: CPT

## 2017-04-06 RX ORDER — DEXTROSE MONOHYDRATE 50 MG/ML
20 INJECTION, SOLUTION INTRAVENOUS CONTINUOUS
Status: DISCONTINUED | OUTPATIENT
Start: 2017-04-07 | End: 2017-04-10 | Stop reason: HOSPADM

## 2017-04-07 ENCOUNTER — HOSPITAL ENCOUNTER (OUTPATIENT)
Dept: INFUSION CENTER | Facility: CLINIC | Age: 72
Discharge: HOME | End: 2017-04-07
Payer: COMMERCIAL

## 2017-04-07 VITALS
SYSTOLIC BLOOD PRESSURE: 159 MMHG | RESPIRATION RATE: 16 BRPM | DIASTOLIC BLOOD PRESSURE: 74 MMHG | TEMPERATURE: 97.4 F | HEART RATE: 62 BPM

## 2017-04-07 PROCEDURE — 96409 CHEMO IV PUSH SNGL DRUG: CPT

## 2017-04-07 PROCEDURE — 96361 HYDRATE IV INFUSION ADD-ON: CPT

## 2017-04-07 RX ADMIN — SODIUM CHLORIDE 500 ML: 0.9 INJECTION, SOLUTION INTRAVENOUS at 08:25

## 2017-04-07 RX ADMIN — CARFILZOMIB 38 MG: 60 INJECTION, POWDER, LYOPHILIZED, FOR SOLUTION INTRAVENOUS at 10:01

## 2017-04-07 RX ADMIN — DEXTROSE 20 ML/HR: 5 SOLUTION INTRAVENOUS at 09:30

## 2017-04-07 RX ADMIN — SODIUM CHLORIDE 500 ML: 0.9 INJECTION, SOLUTION INTRAVENOUS at 10:20

## 2017-04-07 NOTE — PROGRESS NOTES
Pt  Tolerated Kyprolis and hydration without adverse event  Future appointments reviewed    Declined AVS

## 2017-04-07 NOTE — PROGRESS NOTES
Pt  Denies new symptoms or concerns at this time  Labs reviewed and within normal parameters  Kyprolis ordered for infusion today

## 2017-04-07 NOTE — PLAN OF CARE
Problem: PAIN - ADULT  Goal: Verbalizes/displays adequate comfort level or baseline comfort level  Interventions:  - Encourage patient to monitor pain and request assistance  - Assess pain using appropriate pain scale  - Administer analgesics based on type and severity of pain and evaluate response  - Implement non-pharmacological measures as appropriate and evaluate response  - Consider cultural and social influences on pain and pain management  - Notify physician/advanced practitioner if interventions unsuccessful or patient reports new pain  Outcome: Progressing    Problem: INFECTION - ADULT  Goal: Absence or prevention of progression during hospitalization  INTERVENTIONS:  - Assess and monitor for signs and symptoms of infection  - Monitor lab/diagnostic results  - Monitor all insertion sites, i e  indwelling lines, tubes, and drains  - Monitor endotracheal (as able) and nasal secretions for changes in amount and color  - [place] appropriate cooling/warming therapies per order  - Administer medications as ordered  - Instruct and encourage patient and family to use good hand hygiene technique  - Identify and instruct in appropriate isolation precautions for identified infection/condition  Outcome: Progressing  Goal: Absence of fever/infection during neutropenic period  INTERVENTIONS:  - Monitor WBC  - Implement neutropenic guidelines  Outcome: Progressing    Problem: Knowledge Deficit  Goal: Patient/family/caregiver demonstrates understanding of disease process, treatment plan, medications, and discharge instructions  Complete learning assessment and assess knowledge base    Interventions:  - Provide teaching at level of understanding  - Provide teaching via preferred learning methods  Outcome: Progressing

## 2017-04-12 ENCOUNTER — APPOINTMENT (OUTPATIENT)
Dept: LAB | Facility: CLINIC | Age: 72
End: 2017-04-12
Payer: COMMERCIAL

## 2017-04-12 DIAGNOSIS — D64.9 ANEMIA, UNSPECIFIED: ICD-10-CM

## 2017-04-12 DIAGNOSIS — E78.5 HYPERLIPIDEMIA, UNSPECIFIED HYPERLIPIDEMIA TYPE: ICD-10-CM

## 2017-04-12 DIAGNOSIS — I10 ESSENTIAL HYPERTENSION, MALIGNANT: ICD-10-CM

## 2017-04-12 DIAGNOSIS — R11.0 NAUSEA ALONE: ICD-10-CM

## 2017-04-12 DIAGNOSIS — E03.9 UNSPECIFIED HYPOTHYROIDISM: ICD-10-CM

## 2017-04-12 DIAGNOSIS — Z00.00 ROUTINE GENERAL MEDICAL EXAMINATION AT A HEALTH CARE FACILITY: ICD-10-CM

## 2017-04-12 LAB
ALBUMIN SERPL BCP-MCNC: 3.6 G/DL (ref 3.5–5)
ALP SERPL-CCNC: 63 U/L (ref 46–116)
ALT SERPL W P-5'-P-CCNC: 27 U/L (ref 12–78)
ANION GAP SERPL CALCULATED.3IONS-SCNC: 9 MMOL/L (ref 4–13)
AST SERPL W P-5'-P-CCNC: 10 U/L (ref 5–45)
BASOPHILS # BLD AUTO: 0.02 THOUSANDS/ΜL (ref 0–0.1)
BASOPHILS NFR BLD AUTO: 1 % (ref 0–1)
BILIRUB SERPL-MCNC: 1.09 MG/DL (ref 0.2–1)
BUN SERPL-MCNC: 19 MG/DL (ref 5–25)
CALCIUM SERPL-MCNC: 8 MG/DL (ref 8.3–10.1)
CHLORIDE SERPL-SCNC: 106 MMOL/L (ref 100–108)
CO2 SERPL-SCNC: 28 MMOL/L (ref 21–32)
CREAT SERPL-MCNC: 1.15 MG/DL (ref 0.6–1.3)
EOSINOPHIL # BLD AUTO: 0.09 THOUSAND/ΜL (ref 0–0.61)
EOSINOPHIL NFR BLD AUTO: 3 % (ref 0–6)
ERYTHROCYTE [DISTWIDTH] IN BLOOD BY AUTOMATED COUNT: 13.4 % (ref 11.6–15.1)
GFR SERPL CREATININE-BSD FRML MDRD: >60 ML/MIN/1.73SQ M
GLUCOSE SERPL-MCNC: 87 MG/DL (ref 65–140)
HCT VFR BLD AUTO: 33.8 % (ref 36.5–49.3)
HGB BLD-MCNC: 11.5 G/DL (ref 12–17)
LDH SERPL-CCNC: 136 U/L (ref 81–234)
LYMPHOCYTES # BLD AUTO: 0.49 THOUSANDS/ΜL (ref 0.6–4.47)
LYMPHOCYTES NFR BLD AUTO: 18 % (ref 14–44)
MCH RBC QN AUTO: 35.7 PG (ref 26.8–34.3)
MCHC RBC AUTO-ENTMCNC: 34 G/DL (ref 31.4–37.4)
MCV RBC AUTO: 105 FL (ref 82–98)
MONOCYTES # BLD AUTO: 0.45 THOUSAND/ΜL (ref 0.17–1.22)
MONOCYTES NFR BLD AUTO: 17 % (ref 4–12)
NEUTROPHILS # BLD AUTO: 1.67 THOUSANDS/ΜL (ref 1.85–7.62)
NEUTS SEG NFR BLD AUTO: 61 % (ref 43–75)
NRBC BLD AUTO-RTO: 0 /100 WBCS
PHOSPHATE SERPL-MCNC: 2.7 MG/DL (ref 2.3–4.1)
PLATELET # BLD AUTO: 45 THOUSANDS/UL (ref 149–390)
PMV BLD AUTO: 12.3 FL (ref 8.9–12.7)
POTASSIUM SERPL-SCNC: 3.5 MMOL/L (ref 3.5–5.3)
PROT SERPL-MCNC: 5.9 G/DL (ref 6.4–8.2)
RBC # BLD AUTO: 3.22 MILLION/UL (ref 3.88–5.62)
SODIUM SERPL-SCNC: 143 MMOL/L (ref 136–145)
URATE SERPL-MCNC: 5.8 MG/DL (ref 4.2–8)
WBC # BLD AUTO: 2.73 THOUSAND/UL (ref 4.31–10.16)

## 2017-04-12 PROCEDURE — 36415 COLL VENOUS BLD VENIPUNCTURE: CPT

## 2017-04-12 PROCEDURE — 83615 LACTATE (LD) (LDH) ENZYME: CPT

## 2017-04-12 PROCEDURE — 84550 ASSAY OF BLOOD/URIC ACID: CPT

## 2017-04-12 PROCEDURE — 84100 ASSAY OF PHOSPHORUS: CPT

## 2017-04-12 PROCEDURE — 85025 COMPLETE CBC W/AUTO DIFF WBC: CPT

## 2017-04-12 PROCEDURE — 80053 COMPREHEN METABOLIC PANEL: CPT

## 2017-04-13 RX ORDER — SODIUM CHLORIDE 9 MG/ML
20 INJECTION, SOLUTION INTRAVENOUS CONTINUOUS
Status: DISCONTINUED | OUTPATIENT
Start: 2017-04-14 | End: 2017-04-13

## 2017-04-13 RX ORDER — DEXTROSE MONOHYDRATE 50 MG/ML
20 INJECTION, SOLUTION INTRAVENOUS CONTINUOUS
Status: DISCONTINUED | OUTPATIENT
Start: 2017-04-14 | End: 2017-04-17 | Stop reason: HOSPADM

## 2017-04-14 ENCOUNTER — HOSPITAL ENCOUNTER (OUTPATIENT)
Dept: INFUSION CENTER | Facility: CLINIC | Age: 72
Discharge: HOME | End: 2017-04-14
Payer: COMMERCIAL

## 2017-04-14 VITALS
HEIGHT: 69 IN | RESPIRATION RATE: 16 BRPM | WEIGHT: 169.2 LBS | SYSTOLIC BLOOD PRESSURE: 138 MMHG | HEART RATE: 64 BPM | DIASTOLIC BLOOD PRESSURE: 76 MMHG | TEMPERATURE: 96.9 F | BODY MASS INDEX: 25.06 KG/M2

## 2017-04-14 PROCEDURE — 96413 CHEMO IV INFUSION 1 HR: CPT

## 2017-04-14 PROCEDURE — 96361 HYDRATE IV INFUSION ADD-ON: CPT

## 2017-04-14 RX ADMIN — SODIUM CHLORIDE 500 ML: 0.9 INJECTION, SOLUTION INTRAVENOUS at 10:24

## 2017-04-14 RX ADMIN — CARFILZOMIB 38 MG: 60 INJECTION, POWDER, LYOPHILIZED, FOR SOLUTION INTRAVENOUS at 10:01

## 2017-04-14 RX ADMIN — SODIUM CHLORIDE 500 ML: 0.9 INJECTION, SOLUTION INTRAVENOUS at 08:45

## 2017-04-14 RX ADMIN — DEXTROSE 20 ML/HR: 5 SOLUTION INTRAVENOUS at 10:00

## 2017-04-14 NOTE — PLAN OF CARE
Problem: Potential for Falls  Goal: Patient will remain free of falls  INTERVENTIONS:  - Assess patient frequently for physical needs  - Identify cognitive and physical deficits and behaviors that affect risk of falls    - [place] fall precautions as indicated by assessment   - Educate patient/family on patient safety including physical limitations  - Instruct patient to call for assistance with activity based on assessment  - Modify environment to reduce risk of injury  - Consider OT/PT consult to assist with strengthening/mobility   Outcome: Progressing

## 2017-04-14 NOTE — PROGRESS NOTES
Patient cleared for chemo today as per parameters on orders  4/12/17 ANC- 1 67  No Granix as per parameter on order

## 2017-04-19 ENCOUNTER — APPOINTMENT (OUTPATIENT)
Dept: LAB | Facility: CLINIC | Age: 72
End: 2017-04-19
Payer: COMMERCIAL

## 2017-04-19 DIAGNOSIS — Z00.00 ROUTINE GENERAL MEDICAL EXAMINATION AT A HEALTH CARE FACILITY: ICD-10-CM

## 2017-04-19 DIAGNOSIS — E78.5 HYPERLIPIDEMIA, UNSPECIFIED HYPERLIPIDEMIA TYPE: ICD-10-CM

## 2017-04-19 DIAGNOSIS — E03.9 UNSPECIFIED HYPOTHYROIDISM: ICD-10-CM

## 2017-04-19 DIAGNOSIS — D64.9 ANEMIA, UNSPECIFIED: ICD-10-CM

## 2017-04-19 DIAGNOSIS — R11.0 NAUSEA ALONE: ICD-10-CM

## 2017-04-19 DIAGNOSIS — I10 ESSENTIAL HYPERTENSION, MALIGNANT: ICD-10-CM

## 2017-04-19 LAB
ALBUMIN SERPL BCP-MCNC: 3.5 G/DL (ref 3.5–5)
ALP SERPL-CCNC: 62 U/L (ref 46–116)
ALT SERPL W P-5'-P-CCNC: 28 U/L (ref 12–78)
ANION GAP SERPL CALCULATED.3IONS-SCNC: 7 MMOL/L (ref 4–13)
AST SERPL W P-5'-P-CCNC: 10 U/L (ref 5–45)
BASOPHILS # BLD AUTO: 0.02 THOUSANDS/ΜL (ref 0–0.1)
BASOPHILS NFR BLD AUTO: 1 % (ref 0–1)
BILIRUB SERPL-MCNC: 0.9 MG/DL (ref 0.2–1)
BUN SERPL-MCNC: 22 MG/DL (ref 5–25)
CALCIUM SERPL-MCNC: 8.5 MG/DL (ref 8.3–10.1)
CHLORIDE SERPL-SCNC: 104 MMOL/L (ref 100–108)
CO2 SERPL-SCNC: 32 MMOL/L (ref 21–32)
CREAT SERPL-MCNC: 1.18 MG/DL (ref 0.6–1.3)
EOSINOPHIL # BLD AUTO: 0.15 THOUSAND/ΜL (ref 0–0.61)
EOSINOPHIL NFR BLD AUTO: 7 % (ref 0–6)
ERYTHROCYTE [DISTWIDTH] IN BLOOD BY AUTOMATED COUNT: 13.6 % (ref 11.6–15.1)
GFR SERPL CREATININE-BSD FRML MDRD: >60 ML/MIN/1.73SQ M
GLUCOSE P FAST SERPL-MCNC: 99 MG/DL (ref 65–99)
HCT VFR BLD AUTO: 34.4 % (ref 36.5–49.3)
HGB BLD-MCNC: 11.8 G/DL (ref 12–17)
LDH SERPL-CCNC: 137 U/L (ref 81–234)
LYMPHOCYTES # BLD AUTO: 0.46 THOUSANDS/ΜL (ref 0.6–4.47)
LYMPHOCYTES NFR BLD AUTO: 22 % (ref 14–44)
MCH RBC QN AUTO: 36 PG (ref 26.8–34.3)
MCHC RBC AUTO-ENTMCNC: 34.3 G/DL (ref 31.4–37.4)
MCV RBC AUTO: 105 FL (ref 82–98)
MONOCYTES # BLD AUTO: 0.33 THOUSAND/ΜL (ref 0.17–1.22)
MONOCYTES NFR BLD AUTO: 16 % (ref 4–12)
NEUTROPHILS # BLD AUTO: 1.16 THOUSANDS/ΜL (ref 1.85–7.62)
NEUTS SEG NFR BLD AUTO: 54 % (ref 43–75)
NRBC BLD AUTO-RTO: 0 /100 WBCS
PHOSPHATE SERPL-MCNC: 3.3 MG/DL (ref 2.3–4.1)
PLATELET # BLD AUTO: 50 THOUSANDS/UL (ref 149–390)
PMV BLD AUTO: 12.4 FL (ref 8.9–12.7)
POTASSIUM SERPL-SCNC: 3.9 MMOL/L (ref 3.5–5.3)
PROT SERPL-MCNC: 5.9 G/DL (ref 6.4–8.2)
RBC # BLD AUTO: 3.28 MILLION/UL (ref 3.88–5.62)
SODIUM SERPL-SCNC: 143 MMOL/L (ref 136–145)
URATE SERPL-MCNC: 5.2 MG/DL (ref 4.2–8)
WBC # BLD AUTO: 2.13 THOUSAND/UL (ref 4.31–10.16)

## 2017-04-19 PROCEDURE — 85025 COMPLETE CBC W/AUTO DIFF WBC: CPT

## 2017-04-19 PROCEDURE — 84550 ASSAY OF BLOOD/URIC ACID: CPT

## 2017-04-19 PROCEDURE — 84100 ASSAY OF PHOSPHORUS: CPT

## 2017-04-19 PROCEDURE — 80053 COMPREHEN METABOLIC PANEL: CPT

## 2017-04-19 PROCEDURE — 83615 LACTATE (LD) (LDH) ENZYME: CPT

## 2017-04-19 PROCEDURE — 36415 COLL VENOUS BLD VENIPUNCTURE: CPT

## 2017-04-20 RX ORDER — SODIUM CHLORIDE 9 MG/ML
20 INJECTION, SOLUTION INTRAVENOUS CONTINUOUS
Status: DISCONTINUED | OUTPATIENT
Start: 2017-04-21 | End: 2017-04-24 | Stop reason: HOSPADM

## 2017-04-21 ENCOUNTER — HOSPITAL ENCOUNTER (OUTPATIENT)
Dept: INFUSION CENTER | Facility: CLINIC | Age: 72
Discharge: HOME | End: 2017-04-21
Payer: COMMERCIAL

## 2017-04-21 PROCEDURE — 96413 CHEMO IV INFUSION 1 HR: CPT

## 2017-04-21 PROCEDURE — 96361 HYDRATE IV INFUSION ADD-ON: CPT

## 2017-04-21 RX ORDER — DEXTROSE MONOHYDRATE 50 MG/ML
20 INJECTION, SOLUTION INTRAVENOUS CONTINUOUS
Status: DISCONTINUED | OUTPATIENT
Start: 2017-04-21 | End: 2017-04-24 | Stop reason: HOSPADM

## 2017-04-21 RX ADMIN — SODIUM CHLORIDE 20 ML/HR: 0.9 INJECTION, SOLUTION INTRAVENOUS at 09:50

## 2017-04-21 RX ADMIN — SODIUM CHLORIDE 500 ML: 0.9 INJECTION, SOLUTION INTRAVENOUS at 10:16

## 2017-04-21 RX ADMIN — CARFILZOMIB 38 MG: 30 INJECTION, POWDER, LYOPHILIZED, FOR SOLUTION INTRAVENOUS at 09:54

## 2017-04-21 RX ADMIN — DEXTROSE 20 ML/HR: 5 SOLUTION INTRAVENOUS at 09:55

## 2017-04-21 RX ADMIN — SODIUM CHLORIDE 500 ML: 0.9 INJECTION, SOLUTION INTRAVENOUS at 08:46

## 2017-04-21 NOTE — PLAN OF CARE
Problem: Potential for Falls  Goal: Patient will remain free of falls  INTERVENTIONS:  - Assess patient frequently for physical needs  - Identify cognitive and physical deficits and behaviors that affect risk of falls    - [place] fall precautions as indicated by assessment   - Educate patient/family on patient safety including physical limitations  - Instruct patient to call for assistance with activity based on assessment  - Modify environment to reduce risk of injury  - Consider OT/PT consult to assist with strengthening/mobility   Outcome: Progressing

## 2017-04-27 ENCOUNTER — GENERIC CONVERSION - ENCOUNTER (OUTPATIENT)
Dept: OTHER | Facility: OTHER | Age: 72
End: 2017-04-27

## 2017-05-03 ENCOUNTER — APPOINTMENT (OUTPATIENT)
Dept: LAB | Facility: CLINIC | Age: 72
End: 2017-05-03
Payer: COMMERCIAL

## 2017-05-03 ENCOUNTER — TRANSCRIBE ORDERS (OUTPATIENT)
Dept: LAB | Facility: CLINIC | Age: 72
End: 2017-05-03

## 2017-05-03 DIAGNOSIS — D64.9 ANEMIA: ICD-10-CM

## 2017-05-03 DIAGNOSIS — C90.00 MULTIPLE MYELOMA NOT HAVING ACHIEVED REMISSION (HCC): ICD-10-CM

## 2017-05-03 DIAGNOSIS — Z00.00 ROUTINE GENERAL MEDICAL EXAMINATION AT A HEALTH CARE FACILITY: ICD-10-CM

## 2017-05-03 DIAGNOSIS — E78.5 HYPERLIPIDEMIA: ICD-10-CM

## 2017-05-03 DIAGNOSIS — D64.9 ANEMIA, UNSPECIFIED: Primary | ICD-10-CM

## 2017-05-03 DIAGNOSIS — E78.5 OTHER AND UNSPECIFIED HYPERLIPIDEMIA: ICD-10-CM

## 2017-05-03 DIAGNOSIS — I10 ESSENTIAL (PRIMARY) HYPERTENSION: ICD-10-CM

## 2017-05-03 DIAGNOSIS — E03.9 HYPOTHYROIDISM: ICD-10-CM

## 2017-05-03 DIAGNOSIS — N08 MYELOMA KIDNEY (HCC): ICD-10-CM

## 2017-05-03 DIAGNOSIS — R11.0 NAUSEA: ICD-10-CM

## 2017-05-03 DIAGNOSIS — Z00.00 ENCOUNTER FOR GENERAL ADULT MEDICAL EXAMINATION WITHOUT ABNORMAL FINDINGS: ICD-10-CM

## 2017-05-03 DIAGNOSIS — E03.9 UNSPECIFIED HYPOTHYROIDISM: ICD-10-CM

## 2017-05-03 DIAGNOSIS — R11.0 NAUSEA ALONE: ICD-10-CM

## 2017-05-03 DIAGNOSIS — R53.83 OTHER FATIGUE: ICD-10-CM

## 2017-05-03 DIAGNOSIS — I10 ESSENTIAL HYPERTENSION, MALIGNANT: ICD-10-CM

## 2017-05-03 DIAGNOSIS — C90.00 MYELOMA KIDNEY (HCC): ICD-10-CM

## 2017-05-03 DIAGNOSIS — R53.83 FATIGUE, UNSPECIFIED TYPE: ICD-10-CM

## 2017-05-03 LAB
ALBUMIN SERPL BCP-MCNC: 3.9 G/DL (ref 3.5–5)
ALP SERPL-CCNC: 73 U/L (ref 46–116)
ALT SERPL W P-5'-P-CCNC: 36 U/L (ref 12–78)
ANION GAP SERPL CALCULATED.3IONS-SCNC: 13 MMOL/L (ref 4–13)
AST SERPL W P-5'-P-CCNC: 12 U/L (ref 5–45)
BASOPHILS # BLD AUTO: 0.03 THOUSANDS/ΜL (ref 0–0.1)
BASOPHILS NFR BLD AUTO: 1 % (ref 0–1)
BILIRUB SERPL-MCNC: 1.07 MG/DL (ref 0.2–1)
BUN SERPL-MCNC: 24 MG/DL (ref 5–25)
CALCIUM SERPL-MCNC: 9.4 MG/DL (ref 8.3–10.1)
CHLORIDE SERPL-SCNC: 99 MMOL/L (ref 100–108)
CO2 SERPL-SCNC: 27 MMOL/L (ref 21–32)
CREAT SERPL-MCNC: 1.17 MG/DL (ref 0.6–1.3)
EOSINOPHIL # BLD AUTO: 0.05 THOUSAND/ΜL (ref 0–0.61)
EOSINOPHIL NFR BLD AUTO: 2 % (ref 0–6)
ERYTHROCYTE [DISTWIDTH] IN BLOOD BY AUTOMATED COUNT: 14.2 % (ref 11.6–15.1)
GFR SERPL CREATININE-BSD FRML MDRD: >60 ML/MIN/1.73SQ M
GLUCOSE P FAST SERPL-MCNC: 94 MG/DL (ref 65–99)
HCT VFR BLD AUTO: 38.7 % (ref 36.5–49.3)
HGB BLD-MCNC: 12.7 G/DL (ref 12–17)
LDH SERPL-CCNC: 154 U/L (ref 81–234)
LYMPHOCYTES # BLD AUTO: 0.87 THOUSANDS/ΜL (ref 0.6–4.47)
LYMPHOCYTES NFR BLD AUTO: 32 % (ref 14–44)
MCH RBC QN AUTO: 36.1 PG (ref 26.8–34.3)
MCHC RBC AUTO-ENTMCNC: 32.8 G/DL (ref 31.4–37.4)
MCV RBC AUTO: 110 FL (ref 82–98)
MONOCYTES # BLD AUTO: 0.29 THOUSAND/ΜL (ref 0.17–1.22)
MONOCYTES NFR BLD AUTO: 11 % (ref 4–12)
NEUTROPHILS # BLD AUTO: 1.45 THOUSANDS/ΜL (ref 1.85–7.62)
NEUTS SEG NFR BLD AUTO: 54 % (ref 43–75)
NRBC BLD AUTO-RTO: 0 /100 WBCS
PHOSPHATE SERPL-MCNC: 2.9 MG/DL (ref 2.3–4.1)
PLATELET # BLD AUTO: 68 THOUSANDS/UL (ref 149–390)
PMV BLD AUTO: 11.5 FL (ref 8.9–12.7)
POTASSIUM SERPL-SCNC: 4 MMOL/L (ref 3.5–5.3)
PROT SERPL-MCNC: 6.6 G/DL (ref 6.4–8.2)
RBC # BLD AUTO: 3.52 MILLION/UL (ref 3.88–5.62)
SODIUM SERPL-SCNC: 139 MMOL/L (ref 136–145)
URATE SERPL-MCNC: 6 MG/DL (ref 4.2–8)
WBC # BLD AUTO: 2.7 THOUSAND/UL (ref 4.31–10.16)

## 2017-05-03 PROCEDURE — 84100 ASSAY OF PHOSPHORUS: CPT

## 2017-05-03 PROCEDURE — 80053 COMPREHEN METABOLIC PANEL: CPT

## 2017-05-03 PROCEDURE — 83615 LACTATE (LD) (LDH) ENZYME: CPT

## 2017-05-03 PROCEDURE — 85025 COMPLETE CBC W/AUTO DIFF WBC: CPT

## 2017-05-03 PROCEDURE — 36415 COLL VENOUS BLD VENIPUNCTURE: CPT

## 2017-05-03 PROCEDURE — 84550 ASSAY OF BLOOD/URIC ACID: CPT

## 2017-05-04 RX ORDER — DEXTROSE MONOHYDRATE 50 MG/ML
20 INJECTION, SOLUTION INTRAVENOUS CONTINUOUS
Status: DISCONTINUED | OUTPATIENT
Start: 2017-05-05 | End: 2017-05-08 | Stop reason: HOSPADM

## 2017-05-05 ENCOUNTER — HOSPITAL ENCOUNTER (OUTPATIENT)
Dept: INFUSION CENTER | Facility: CLINIC | Age: 72
Discharge: HOME | End: 2017-05-05
Payer: COMMERCIAL

## 2017-05-05 VITALS
HEART RATE: 66 BPM | TEMPERATURE: 97.4 F | HEIGHT: 69 IN | BODY MASS INDEX: 25.27 KG/M2 | WEIGHT: 170.64 LBS | RESPIRATION RATE: 16 BRPM | DIASTOLIC BLOOD PRESSURE: 86 MMHG | SYSTOLIC BLOOD PRESSURE: 150 MMHG

## 2017-05-05 PROCEDURE — 96361 HYDRATE IV INFUSION ADD-ON: CPT

## 2017-05-05 PROCEDURE — 96409 CHEMO IV PUSH SNGL DRUG: CPT

## 2017-05-05 RX ADMIN — SODIUM CHLORIDE 500 ML: 0.9 INJECTION, SOLUTION INTRAVENOUS at 08:34

## 2017-05-05 RX ADMIN — DEXTROSE 20 ML/HR: 5 SOLUTION INTRAVENOUS at 09:45

## 2017-05-05 RX ADMIN — SODIUM CHLORIDE 500 ML: 0.9 INJECTION, SOLUTION INTRAVENOUS at 10:16

## 2017-05-05 RX ADMIN — CARFILZOMIB 38 MG: 30 INJECTION, POWDER, LYOPHILIZED, FOR SOLUTION INTRAVENOUS at 09:54

## 2017-05-05 NOTE — PROGRESS NOTES
Patient tolerated chemotherapy  No Granix required today as per parameters on order   Aware of next appointment

## 2017-05-10 ENCOUNTER — APPOINTMENT (OUTPATIENT)
Dept: LAB | Facility: CLINIC | Age: 72
End: 2017-05-10
Payer: COMMERCIAL

## 2017-05-10 DIAGNOSIS — R53.83 FATIGUE, UNSPECIFIED TYPE: ICD-10-CM

## 2017-05-10 DIAGNOSIS — E03.9 UNSPECIFIED HYPOTHYROIDISM: ICD-10-CM

## 2017-05-10 DIAGNOSIS — I10 ESSENTIAL HYPERTENSION, MALIGNANT: ICD-10-CM

## 2017-05-10 DIAGNOSIS — D64.9 ANEMIA, UNSPECIFIED: ICD-10-CM

## 2017-05-10 DIAGNOSIS — R11.0 NAUSEA ALONE: ICD-10-CM

## 2017-05-10 DIAGNOSIS — E78.5 OTHER AND UNSPECIFIED HYPERLIPIDEMIA: ICD-10-CM

## 2017-05-10 DIAGNOSIS — C90.00 MYELOMA KIDNEY (HCC): ICD-10-CM

## 2017-05-10 DIAGNOSIS — Z00.00 ROUTINE GENERAL MEDICAL EXAMINATION AT A HEALTH CARE FACILITY: ICD-10-CM

## 2017-05-10 DIAGNOSIS — N08 MYELOMA KIDNEY (HCC): ICD-10-CM

## 2017-05-10 LAB
ALBUMIN SERPL BCP-MCNC: 3.6 G/DL (ref 3.5–5)
ALP SERPL-CCNC: 75 U/L (ref 46–116)
ALT SERPL W P-5'-P-CCNC: 36 U/L (ref 12–78)
ANION GAP SERPL CALCULATED.3IONS-SCNC: 7 MMOL/L (ref 4–13)
AST SERPL W P-5'-P-CCNC: 14 U/L (ref 5–45)
BASOPHILS # BLD AUTO: 0.02 THOUSANDS/ΜL (ref 0–0.1)
BASOPHILS NFR BLD AUTO: 1 % (ref 0–1)
BILIRUB SERPL-MCNC: 1.5 MG/DL (ref 0.2–1)
BUN SERPL-MCNC: 26 MG/DL (ref 5–25)
CALCIUM SERPL-MCNC: 8.3 MG/DL (ref 8.3–10.1)
CHLORIDE SERPL-SCNC: 99 MMOL/L (ref 100–108)
CO2 SERPL-SCNC: 31 MMOL/L (ref 21–32)
CREAT SERPL-MCNC: 1.19 MG/DL (ref 0.6–1.3)
EOSINOPHIL # BLD AUTO: 0.03 THOUSAND/ΜL (ref 0–0.61)
EOSINOPHIL NFR BLD AUTO: 1 % (ref 0–6)
ERYTHROCYTE [DISTWIDTH] IN BLOOD BY AUTOMATED COUNT: 13.4 % (ref 11.6–15.1)
GFR SERPL CREATININE-BSD FRML MDRD: >60 ML/MIN/1.73SQ M
GLUCOSE P FAST SERPL-MCNC: 88 MG/DL (ref 65–99)
HCT VFR BLD AUTO: 34.3 % (ref 36.5–49.3)
HGB BLD-MCNC: 11.7 G/DL (ref 12–17)
LDH SERPL-CCNC: 152 U/L (ref 81–234)
LYMPHOCYTES # BLD AUTO: 0.23 THOUSANDS/ΜL (ref 0.6–4.47)
LYMPHOCYTES NFR BLD AUTO: 10 % (ref 14–44)
MCH RBC QN AUTO: 36 PG (ref 26.8–34.3)
MCHC RBC AUTO-ENTMCNC: 34.1 G/DL (ref 31.4–37.4)
MCV RBC AUTO: 106 FL (ref 82–98)
MONOCYTES # BLD AUTO: 0.33 THOUSAND/ΜL (ref 0.17–1.22)
MONOCYTES NFR BLD AUTO: 14 % (ref 4–12)
NEUTROPHILS # BLD AUTO: 1.68 THOUSANDS/ΜL (ref 1.85–7.62)
NEUTS SEG NFR BLD AUTO: 74 % (ref 43–75)
NRBC BLD AUTO-RTO: 0 /100 WBCS
PHOSPHATE SERPL-MCNC: 2.5 MG/DL (ref 2.3–4.1)
PLATELET # BLD AUTO: 31 THOUSANDS/UL (ref 149–390)
PMV BLD AUTO: 12.8 FL (ref 8.9–12.7)
POTASSIUM SERPL-SCNC: 4.1 MMOL/L (ref 3.5–5.3)
PROT SERPL-MCNC: 6.1 G/DL (ref 6.4–8.2)
RBC # BLD AUTO: 3.25 MILLION/UL (ref 3.88–5.62)
SODIUM SERPL-SCNC: 137 MMOL/L (ref 136–145)
URATE SERPL-MCNC: 5.5 MG/DL (ref 4.2–8)
WBC # BLD AUTO: 2.3 THOUSAND/UL (ref 4.31–10.16)

## 2017-05-10 PROCEDURE — 80053 COMPREHEN METABOLIC PANEL: CPT

## 2017-05-10 PROCEDURE — 83615 LACTATE (LD) (LDH) ENZYME: CPT

## 2017-05-10 PROCEDURE — 85025 COMPLETE CBC W/AUTO DIFF WBC: CPT

## 2017-05-10 PROCEDURE — 84100 ASSAY OF PHOSPHORUS: CPT

## 2017-05-10 PROCEDURE — 84550 ASSAY OF BLOOD/URIC ACID: CPT

## 2017-05-10 PROCEDURE — 36415 COLL VENOUS BLD VENIPUNCTURE: CPT

## 2017-05-11 ENCOUNTER — GENERIC CONVERSION - ENCOUNTER (OUTPATIENT)
Dept: OTHER | Facility: OTHER | Age: 72
End: 2017-05-11

## 2017-05-16 ENCOUNTER — ALLSCRIPTS OFFICE VISIT (OUTPATIENT)
Dept: OTHER | Facility: OTHER | Age: 72
End: 2017-05-16

## 2017-05-17 ENCOUNTER — APPOINTMENT (OUTPATIENT)
Dept: LAB | Facility: CLINIC | Age: 72
End: 2017-05-17
Payer: COMMERCIAL

## 2017-05-17 ENCOUNTER — TRANSCRIBE ORDERS (OUTPATIENT)
Dept: LAB | Facility: CLINIC | Age: 72
End: 2017-05-17

## 2017-05-17 DIAGNOSIS — E03.9 HYPOTHYROIDISM: ICD-10-CM

## 2017-05-17 DIAGNOSIS — I10 ESSENTIAL (PRIMARY) HYPERTENSION: ICD-10-CM

## 2017-05-17 DIAGNOSIS — Z00.00 ENCOUNTER FOR GENERAL ADULT MEDICAL EXAMINATION WITHOUT ABNORMAL FINDINGS: ICD-10-CM

## 2017-05-17 DIAGNOSIS — Z00.00 ROUTINE GENERAL MEDICAL EXAMINATION AT A HEALTH CARE FACILITY: ICD-10-CM

## 2017-05-17 DIAGNOSIS — E78.5 HYPERLIPIDEMIA: ICD-10-CM

## 2017-05-17 DIAGNOSIS — I10 ESSENTIAL HYPERTENSION, MALIGNANT: ICD-10-CM

## 2017-05-17 DIAGNOSIS — C90.00 MULTIPLE MYELOMA NOT HAVING ACHIEVED REMISSION (HCC): ICD-10-CM

## 2017-05-17 DIAGNOSIS — R53.83 OTHER FATIGUE: ICD-10-CM

## 2017-05-17 DIAGNOSIS — D64.9 ANEMIA: ICD-10-CM

## 2017-05-17 DIAGNOSIS — E03.9 UNSPECIFIED HYPOTHYROIDISM: ICD-10-CM

## 2017-05-17 DIAGNOSIS — R53.83 FATIGUE, UNSPECIFIED TYPE: ICD-10-CM

## 2017-05-17 DIAGNOSIS — R11.0 NAUSEA: ICD-10-CM

## 2017-05-17 DIAGNOSIS — C90.00 MYELOMA KIDNEY (HCC): ICD-10-CM

## 2017-05-17 DIAGNOSIS — E78.5 OTHER AND UNSPECIFIED HYPERLIPIDEMIA: ICD-10-CM

## 2017-05-17 DIAGNOSIS — N08 MYELOMA KIDNEY (HCC): ICD-10-CM

## 2017-05-17 DIAGNOSIS — D64.9 ANEMIA, UNSPECIFIED: Primary | ICD-10-CM

## 2017-05-17 DIAGNOSIS — R11.0 NAUSEA ALONE: ICD-10-CM

## 2017-05-17 LAB
ALBUMIN SERPL BCP-MCNC: 3.3 G/DL (ref 3.5–5)
ALP SERPL-CCNC: 79 U/L (ref 46–116)
ALT SERPL W P-5'-P-CCNC: 54 U/L (ref 12–78)
ANION GAP SERPL CALCULATED.3IONS-SCNC: 7 MMOL/L (ref 4–13)
AST SERPL W P-5'-P-CCNC: 25 U/L (ref 5–45)
BASOPHILS # BLD AUTO: 0.02 THOUSANDS/ΜL (ref 0–0.1)
BASOPHILS NFR BLD AUTO: 1 % (ref 0–1)
BILIRUB SERPL-MCNC: 1 MG/DL (ref 0.2–1)
BUN SERPL-MCNC: 27 MG/DL (ref 5–25)
CALCIUM SERPL-MCNC: 9 MG/DL (ref 8.3–10.1)
CHLORIDE SERPL-SCNC: 103 MMOL/L (ref 100–108)
CO2 SERPL-SCNC: 28 MMOL/L (ref 21–32)
CREAT SERPL-MCNC: 1.23 MG/DL (ref 0.6–1.3)
EOSINOPHIL # BLD AUTO: 0.12 THOUSAND/ΜL (ref 0–0.61)
EOSINOPHIL NFR BLD AUTO: 4 % (ref 0–6)
ERYTHROCYTE [DISTWIDTH] IN BLOOD BY AUTOMATED COUNT: 13.5 % (ref 11.6–15.1)
GFR SERPL CREATININE-BSD FRML MDRD: 58.3 ML/MIN/1.73SQ M
GLUCOSE P FAST SERPL-MCNC: 104 MG/DL (ref 65–99)
HCT VFR BLD AUTO: 33 % (ref 36.5–49.3)
HGB BLD-MCNC: 11 G/DL (ref 12–17)
LDH SERPL-CCNC: 169 U/L (ref 81–234)
LYMPHOCYTES # BLD AUTO: 0.63 THOUSANDS/ΜL (ref 0.6–4.47)
LYMPHOCYTES NFR BLD AUTO: 21 % (ref 14–44)
MCH RBC QN AUTO: 35.7 PG (ref 26.8–34.3)
MCHC RBC AUTO-ENTMCNC: 33.3 G/DL (ref 31.4–37.4)
MCV RBC AUTO: 107 FL (ref 82–98)
MONOCYTES # BLD AUTO: 0.4 THOUSAND/ΜL (ref 0.17–1.22)
MONOCYTES NFR BLD AUTO: 14 % (ref 4–12)
NEUTROPHILS # BLD AUTO: 1.79 THOUSANDS/ΜL (ref 1.85–7.62)
NEUTS SEG NFR BLD AUTO: 60 % (ref 43–75)
NRBC BLD AUTO-RTO: 0 /100 WBCS
PHOSPHATE SERPL-MCNC: 2.9 MG/DL (ref 2.3–4.1)
PLATELET # BLD AUTO: 56 THOUSANDS/UL (ref 149–390)
PMV BLD AUTO: 11.5 FL (ref 8.9–12.7)
POTASSIUM SERPL-SCNC: 3.9 MMOL/L (ref 3.5–5.3)
PROT SERPL-MCNC: 6.6 G/DL (ref 6.4–8.2)
RBC # BLD AUTO: 3.08 MILLION/UL (ref 3.88–5.62)
SODIUM SERPL-SCNC: 138 MMOL/L (ref 136–145)
URATE SERPL-MCNC: 5.9 MG/DL (ref 4.2–8)
WBC # BLD AUTO: 2.97 THOUSAND/UL (ref 4.31–10.16)

## 2017-05-17 PROCEDURE — 84100 ASSAY OF PHOSPHORUS: CPT

## 2017-05-17 PROCEDURE — 80053 COMPREHEN METABOLIC PANEL: CPT

## 2017-05-17 PROCEDURE — 36415 COLL VENOUS BLD VENIPUNCTURE: CPT

## 2017-05-17 PROCEDURE — 84550 ASSAY OF BLOOD/URIC ACID: CPT

## 2017-05-17 PROCEDURE — 83615 LACTATE (LD) (LDH) ENZYME: CPT

## 2017-05-17 PROCEDURE — 85025 COMPLETE CBC W/AUTO DIFF WBC: CPT

## 2017-05-18 RX ORDER — DEXTROSE MONOHYDRATE 50 MG/ML
20 INJECTION, SOLUTION INTRAVENOUS CONTINUOUS
Status: DISCONTINUED | OUTPATIENT
Start: 2017-05-19 | End: 2017-05-22 | Stop reason: HOSPADM

## 2017-05-19 ENCOUNTER — HOSPITAL ENCOUNTER (OUTPATIENT)
Dept: INFUSION CENTER | Facility: CLINIC | Age: 72
Discharge: HOME | End: 2017-05-19
Payer: COMMERCIAL

## 2017-05-19 VITALS
WEIGHT: 164.35 LBS | SYSTOLIC BLOOD PRESSURE: 118 MMHG | RESPIRATION RATE: 16 BRPM | HEIGHT: 69 IN | DIASTOLIC BLOOD PRESSURE: 64 MMHG | HEART RATE: 65 BPM | BODY MASS INDEX: 24.34 KG/M2 | TEMPERATURE: 96.6 F

## 2017-05-19 PROCEDURE — 96409 CHEMO IV PUSH SNGL DRUG: CPT

## 2017-05-19 PROCEDURE — 96361 HYDRATE IV INFUSION ADD-ON: CPT

## 2017-05-19 RX ADMIN — DEXTROSE 20 ML/HR: 5 SOLUTION INTRAVENOUS at 09:33

## 2017-05-19 RX ADMIN — SODIUM CHLORIDE 500 ML: 0.9 INJECTION, SOLUTION INTRAVENOUS at 09:46

## 2017-05-19 RX ADMIN — CARFILZOMIB 38 MG: 60 INJECTION, POWDER, LYOPHILIZED, FOR SOLUTION INTRAVENOUS at 09:36

## 2017-05-19 RX ADMIN — SODIUM CHLORIDE 500 ML: 0.9 INJECTION, SOLUTION INTRAVENOUS at 08:29

## 2017-05-19 NOTE — PLAN OF CARE
Problem: Potential for Falls  Goal: Patient will remain free of falls  INTERVENTIONS:  - Assess patient frequently for physical needs  - Identify cognitive and physical deficits and behaviors that affect risk of falls    - [place] fall precautions as indicated by assessment   - Educate patient/family on patient safety including physical limitations  - Instruct patient to call for assistance with activity based on assessment  - Modify environment to reduce risk of injury  - Consider OT/PT consult to assist with strengthening/mobility   Outcome: Progressing

## 2017-05-19 NOTE — PROGRESS NOTES
Patient arrived to the infusion center for Kyprolis without complaints  Pt tolerated infusion well  He is aware of next appointment

## 2017-05-24 ENCOUNTER — APPOINTMENT (OUTPATIENT)
Dept: LAB | Facility: CLINIC | Age: 72
End: 2017-05-24
Payer: COMMERCIAL

## 2017-05-24 DIAGNOSIS — E03.9 UNSPECIFIED HYPOTHYROIDISM: ICD-10-CM

## 2017-05-24 DIAGNOSIS — R11.0 NAUSEA ALONE: ICD-10-CM

## 2017-05-24 DIAGNOSIS — C90.00 MYELOMA KIDNEY (HCC): ICD-10-CM

## 2017-05-24 DIAGNOSIS — Z00.00 ROUTINE GENERAL MEDICAL EXAMINATION AT A HEALTH CARE FACILITY: ICD-10-CM

## 2017-05-24 DIAGNOSIS — I10 ESSENTIAL HYPERTENSION, MALIGNANT: ICD-10-CM

## 2017-05-24 DIAGNOSIS — N08 MYELOMA KIDNEY (HCC): ICD-10-CM

## 2017-05-24 DIAGNOSIS — E78.5 OTHER AND UNSPECIFIED HYPERLIPIDEMIA: ICD-10-CM

## 2017-05-24 DIAGNOSIS — D64.9 ANEMIA, UNSPECIFIED: ICD-10-CM

## 2017-05-24 DIAGNOSIS — R53.83 FATIGUE, UNSPECIFIED TYPE: ICD-10-CM

## 2017-05-24 LAB
ALBUMIN SERPL BCP-MCNC: 3.3 G/DL (ref 3.5–5)
ALP SERPL-CCNC: 69 U/L (ref 46–116)
ALT SERPL W P-5'-P-CCNC: 28 U/L (ref 12–78)
ANION GAP SERPL CALCULATED.3IONS-SCNC: 5 MMOL/L (ref 4–13)
AST SERPL W P-5'-P-CCNC: 11 U/L (ref 5–45)
BASOPHILS # BLD AUTO: 0.02 THOUSANDS/ΜL (ref 0–0.1)
BASOPHILS NFR BLD AUTO: 1 % (ref 0–1)
BILIRUB SERPL-MCNC: 0.76 MG/DL (ref 0.2–1)
BUN SERPL-MCNC: 25 MG/DL (ref 5–25)
CALCIUM SERPL-MCNC: 8.7 MG/DL (ref 8.3–10.1)
CHLORIDE SERPL-SCNC: 104 MMOL/L (ref 100–108)
CO2 SERPL-SCNC: 29 MMOL/L (ref 21–32)
CREAT SERPL-MCNC: 1.17 MG/DL (ref 0.6–1.3)
EOSINOPHIL # BLD AUTO: 0.03 THOUSAND/ΜL (ref 0–0.61)
EOSINOPHIL NFR BLD AUTO: 1 % (ref 0–6)
ERYTHROCYTE [DISTWIDTH] IN BLOOD BY AUTOMATED COUNT: 12.9 % (ref 11.6–15.1)
GFR SERPL CREATININE-BSD FRML MDRD: >60 ML/MIN/1.73SQ M
GLUCOSE P FAST SERPL-MCNC: 88 MG/DL (ref 65–99)
HCT VFR BLD AUTO: 30.7 % (ref 36.5–49.3)
HGB BLD-MCNC: 10 G/DL (ref 12–17)
LDH SERPL-CCNC: 138 U/L (ref 81–234)
LYMPHOCYTES # BLD AUTO: 0.44 THOUSANDS/ΜL (ref 0.6–4.47)
LYMPHOCYTES NFR BLD AUTO: 17 % (ref 14–44)
MCH RBC QN AUTO: 34.1 PG (ref 26.8–34.3)
MCHC RBC AUTO-ENTMCNC: 32.6 G/DL (ref 31.4–37.4)
MCV RBC AUTO: 105 FL (ref 82–98)
MONOCYTES # BLD AUTO: 0.3 THOUSAND/ΜL (ref 0.17–1.22)
MONOCYTES NFR BLD AUTO: 11 % (ref 4–12)
NEUTROPHILS # BLD AUTO: 1.83 THOUSANDS/ΜL (ref 1.85–7.62)
NEUTS SEG NFR BLD AUTO: 70 % (ref 43–75)
NRBC BLD AUTO-RTO: 0 /100 WBCS
PHOSPHATE SERPL-MCNC: 3 MG/DL (ref 2.3–4.1)
PLATELET # BLD AUTO: 59 THOUSANDS/UL (ref 149–390)
PMV BLD AUTO: 11.2 FL (ref 8.9–12.7)
POTASSIUM SERPL-SCNC: 4.3 MMOL/L (ref 3.5–5.3)
PROT SERPL-MCNC: 6.2 G/DL (ref 6.4–8.2)
RBC # BLD AUTO: 2.93 MILLION/UL (ref 3.88–5.62)
SODIUM SERPL-SCNC: 138 MMOL/L (ref 136–145)
URATE SERPL-MCNC: 5.7 MG/DL (ref 4.2–8)
WBC # BLD AUTO: 2.63 THOUSAND/UL (ref 4.31–10.16)

## 2017-05-24 PROCEDURE — 80053 COMPREHEN METABOLIC PANEL: CPT

## 2017-05-24 PROCEDURE — 83615 LACTATE (LD) (LDH) ENZYME: CPT

## 2017-05-24 PROCEDURE — 36415 COLL VENOUS BLD VENIPUNCTURE: CPT

## 2017-05-24 PROCEDURE — 84550 ASSAY OF BLOOD/URIC ACID: CPT

## 2017-05-24 PROCEDURE — 84100 ASSAY OF PHOSPHORUS: CPT

## 2017-05-24 PROCEDURE — 85025 COMPLETE CBC W/AUTO DIFF WBC: CPT

## 2017-05-25 ENCOUNTER — GENERIC CONVERSION - ENCOUNTER (OUTPATIENT)
Dept: OTHER | Facility: OTHER | Age: 72
End: 2017-05-25

## 2017-05-25 RX ORDER — DEXTROSE MONOHYDRATE 50 MG/ML
20 INJECTION, SOLUTION INTRAVENOUS CONTINUOUS
Status: DISCONTINUED | OUTPATIENT
Start: 2017-05-26 | End: 2017-05-29 | Stop reason: HOSPADM

## 2017-05-26 ENCOUNTER — HOSPITAL ENCOUNTER (OUTPATIENT)
Dept: INFUSION CENTER | Facility: CLINIC | Age: 72
Discharge: HOME | End: 2017-05-26
Payer: COMMERCIAL

## 2017-05-26 VITALS
WEIGHT: 165.34 LBS | HEIGHT: 69 IN | HEART RATE: 63 BPM | DIASTOLIC BLOOD PRESSURE: 75 MMHG | BODY MASS INDEX: 24.49 KG/M2 | TEMPERATURE: 96.7 F | RESPIRATION RATE: 16 BRPM | SYSTOLIC BLOOD PRESSURE: 128 MMHG

## 2017-05-26 PROCEDURE — 96361 HYDRATE IV INFUSION ADD-ON: CPT

## 2017-05-26 PROCEDURE — 96409 CHEMO IV PUSH SNGL DRUG: CPT

## 2017-05-26 RX ADMIN — DEXTROSE 20 ML/HR: 5 SOLUTION INTRAVENOUS at 09:59

## 2017-05-26 RX ADMIN — SODIUM CHLORIDE 500 ML: 0.9 INJECTION, SOLUTION INTRAVENOUS at 08:47

## 2017-05-26 RX ADMIN — SODIUM CHLORIDE 500 ML: 0.9 INJECTION, SOLUTION INTRAVENOUS at 10:35

## 2017-05-26 RX ADMIN — CARFILZOMIB 38 MG: 60 INJECTION, POWDER, LYOPHILIZED, FOR SOLUTION INTRAVENOUS at 10:08

## 2017-05-31 ENCOUNTER — APPOINTMENT (OUTPATIENT)
Dept: LAB | Facility: CLINIC | Age: 72
End: 2017-05-31
Payer: COMMERCIAL

## 2017-05-31 DIAGNOSIS — I10 ESSENTIAL HYPERTENSION, MALIGNANT: ICD-10-CM

## 2017-05-31 DIAGNOSIS — Z00.00 ROUTINE GENERAL MEDICAL EXAMINATION AT A HEALTH CARE FACILITY: ICD-10-CM

## 2017-05-31 DIAGNOSIS — R11.0 NAUSEA ALONE: ICD-10-CM

## 2017-05-31 DIAGNOSIS — E03.9 UNSPECIFIED HYPOTHYROIDISM: ICD-10-CM

## 2017-05-31 DIAGNOSIS — N08 MYELOMA KIDNEY (HCC): ICD-10-CM

## 2017-05-31 DIAGNOSIS — E78.5 OTHER AND UNSPECIFIED HYPERLIPIDEMIA: ICD-10-CM

## 2017-05-31 DIAGNOSIS — R53.83 FATIGUE, UNSPECIFIED TYPE: ICD-10-CM

## 2017-05-31 DIAGNOSIS — C90.00 MYELOMA KIDNEY (HCC): ICD-10-CM

## 2017-05-31 DIAGNOSIS — D64.9 ANEMIA, UNSPECIFIED: ICD-10-CM

## 2017-05-31 LAB
ALBUMIN SERPL BCP-MCNC: 3.5 G/DL (ref 3.5–5)
ALP SERPL-CCNC: 65 U/L (ref 46–116)
ALT SERPL W P-5'-P-CCNC: 26 U/L (ref 12–78)
ANION GAP SERPL CALCULATED.3IONS-SCNC: 7 MMOL/L (ref 4–13)
AST SERPL W P-5'-P-CCNC: 13 U/L (ref 5–45)
BASOPHILS # BLD AUTO: 0.02 THOUSANDS/ΜL (ref 0–0.1)
BASOPHILS NFR BLD AUTO: 1 % (ref 0–1)
BILIRUB SERPL-MCNC: 0.93 MG/DL (ref 0.2–1)
BUN SERPL-MCNC: 21 MG/DL (ref 5–25)
CALCIUM SERPL-MCNC: 9.1 MG/DL (ref 8.3–10.1)
CHLORIDE SERPL-SCNC: 102 MMOL/L (ref 100–108)
CO2 SERPL-SCNC: 30 MMOL/L (ref 21–32)
CREAT SERPL-MCNC: 1.12 MG/DL (ref 0.6–1.3)
EOSINOPHIL # BLD AUTO: 0.05 THOUSAND/ΜL (ref 0–0.61)
EOSINOPHIL NFR BLD AUTO: 4 % (ref 0–6)
ERYTHROCYTE [DISTWIDTH] IN BLOOD BY AUTOMATED COUNT: 13.9 % (ref 11.6–15.1)
GFR SERPL CREATININE-BSD FRML MDRD: >60 ML/MIN/1.73SQ M
GLUCOSE P FAST SERPL-MCNC: 79 MG/DL (ref 65–99)
HCT VFR BLD AUTO: 30.5 % (ref 36.5–49.3)
HGB BLD-MCNC: 10.3 G/DL (ref 12–17)
LDH SERPL-CCNC: 143 U/L (ref 81–234)
LYMPHOCYTES # BLD AUTO: 0.36 THOUSANDS/ΜL (ref 0.6–4.47)
LYMPHOCYTES NFR BLD AUTO: 26 % (ref 14–44)
MCH RBC QN AUTO: 35.9 PG (ref 26.8–34.3)
MCHC RBC AUTO-ENTMCNC: 33.8 G/DL (ref 31.4–37.4)
MCV RBC AUTO: 106 FL (ref 82–98)
MONOCYTES # BLD AUTO: 0.28 THOUSAND/ΜL (ref 0.17–1.22)
MONOCYTES NFR BLD AUTO: 20 % (ref 4–12)
NEUTROPHILS # BLD AUTO: 0.67 THOUSANDS/ΜL (ref 1.85–7.62)
NEUTS SEG NFR BLD AUTO: 49 % (ref 43–75)
NRBC BLD AUTO-RTO: 0 /100 WBCS
PHOSPHATE SERPL-MCNC: 2.7 MG/DL (ref 2.3–4.1)
PLATELET # BLD AUTO: 45 THOUSANDS/UL (ref 149–390)
PMV BLD AUTO: 12.5 FL (ref 8.9–12.7)
POTASSIUM SERPL-SCNC: 4.2 MMOL/L (ref 3.5–5.3)
PROT SERPL-MCNC: 6 G/DL (ref 6.4–8.2)
RBC # BLD AUTO: 2.87 MILLION/UL (ref 3.88–5.62)
SODIUM SERPL-SCNC: 139 MMOL/L (ref 136–145)
URATE SERPL-MCNC: 6.1 MG/DL (ref 4.2–8)
WBC # BLD AUTO: 1.38 THOUSAND/UL (ref 4.31–10.16)

## 2017-05-31 PROCEDURE — 80053 COMPREHEN METABOLIC PANEL: CPT

## 2017-05-31 PROCEDURE — 85025 COMPLETE CBC W/AUTO DIFF WBC: CPT

## 2017-05-31 PROCEDURE — 84550 ASSAY OF BLOOD/URIC ACID: CPT

## 2017-05-31 PROCEDURE — 83615 LACTATE (LD) (LDH) ENZYME: CPT

## 2017-05-31 PROCEDURE — 36415 COLL VENOUS BLD VENIPUNCTURE: CPT

## 2017-05-31 PROCEDURE — 84100 ASSAY OF PHOSPHORUS: CPT

## 2017-06-01 RX ORDER — SODIUM CHLORIDE 9 MG/ML
20 INJECTION, SOLUTION INTRAVENOUS CONTINUOUS
Status: DISCONTINUED | OUTPATIENT
Start: 2017-06-02 | End: 2017-06-02

## 2017-06-02 ENCOUNTER — HOSPITAL ENCOUNTER (OUTPATIENT)
Dept: INFUSION CENTER | Facility: CLINIC | Age: 72
Discharge: HOME | End: 2017-06-02
Payer: COMMERCIAL

## 2017-06-02 VITALS
TEMPERATURE: 96.7 F | DIASTOLIC BLOOD PRESSURE: 74 MMHG | BODY MASS INDEX: 24.95 KG/M2 | HEART RATE: 57 BPM | RESPIRATION RATE: 16 BRPM | WEIGHT: 168.43 LBS | SYSTOLIC BLOOD PRESSURE: 148 MMHG | HEIGHT: 69 IN

## 2017-06-02 PROCEDURE — 96409 CHEMO IV PUSH SNGL DRUG: CPT

## 2017-06-02 PROCEDURE — 96372 THER/PROPH/DIAG INJ SC/IM: CPT

## 2017-06-02 PROCEDURE — 96361 HYDRATE IV INFUSION ADD-ON: CPT

## 2017-06-02 RX ORDER — DEXTROSE MONOHYDRATE 50 MG/ML
20 INJECTION, SOLUTION INTRAVENOUS CONTINUOUS
Status: DISCONTINUED | OUTPATIENT
Start: 2017-06-02 | End: 2017-06-05 | Stop reason: HOSPADM

## 2017-06-02 RX ADMIN — CARFILZOMIB 38 MG: 60 INJECTION, POWDER, LYOPHILIZED, FOR SOLUTION INTRAVENOUS at 09:43

## 2017-06-02 RX ADMIN — SODIUM CHLORIDE 500 ML: 0.9 INJECTION, SOLUTION INTRAVENOUS at 08:40

## 2017-06-02 RX ADMIN — DEXTROSE 20 ML/HR: 5 SOLUTION INTRAVENOUS at 09:38

## 2017-06-02 RX ADMIN — SODIUM CHLORIDE 500 ML: 0.9 INJECTION, SOLUTION INTRAVENOUS at 10:05

## 2017-06-02 RX ADMIN — TBO-FILGRASTIM 480 MCG: 480 INJECTION, SOLUTION SUBCUTANEOUS at 11:02

## 2017-06-02 NOTE — PROGRESS NOTES
Patient cleared for chemotherapy as per parameters on orders  ANC- 0 67  Patient will receive Granix today as per parameter on order   Patient /wife aware and understand

## 2017-06-07 ENCOUNTER — APPOINTMENT (OUTPATIENT)
Dept: LAB | Facility: CLINIC | Age: 72
End: 2017-06-07
Payer: COMMERCIAL

## 2017-06-07 ENCOUNTER — TRANSCRIBE ORDERS (OUTPATIENT)
Dept: LAB | Facility: CLINIC | Age: 72
End: 2017-06-07

## 2017-06-07 DIAGNOSIS — E03.9 HYPOTHYROIDISM: ICD-10-CM

## 2017-06-07 DIAGNOSIS — E78.5 HYPERLIPIDEMIA: ICD-10-CM

## 2017-06-07 DIAGNOSIS — Z00.00 ENCOUNTER FOR GENERAL ADULT MEDICAL EXAMINATION WITHOUT ABNORMAL FINDINGS: ICD-10-CM

## 2017-06-07 DIAGNOSIS — R53.83 OTHER FATIGUE: ICD-10-CM

## 2017-06-07 DIAGNOSIS — I10 ESSENTIAL (PRIMARY) HYPERTENSION: ICD-10-CM

## 2017-06-07 DIAGNOSIS — D64.9 ANEMIA: ICD-10-CM

## 2017-06-07 DIAGNOSIS — C90.00 MULTIPLE MYELOMA NOT HAVING ACHIEVED REMISSION (HCC): ICD-10-CM

## 2017-06-07 DIAGNOSIS — R11.0 NAUSEA: ICD-10-CM

## 2017-06-07 LAB
ALBUMIN SERPL BCP-MCNC: 3.5 G/DL (ref 3.5–5)
ALP SERPL-CCNC: 63 U/L (ref 46–116)
ALT SERPL W P-5'-P-CCNC: 32 U/L (ref 12–78)
ANION GAP SERPL CALCULATED.3IONS-SCNC: 4 MMOL/L (ref 4–13)
AST SERPL W P-5'-P-CCNC: 24 U/L (ref 5–45)
BASOPHILS # BLD AUTO: 0.02 THOUSANDS/ΜL (ref 0–0.1)
BASOPHILS NFR BLD AUTO: 1 % (ref 0–1)
BILIRUB SERPL-MCNC: 1.21 MG/DL (ref 0.2–1)
BUN SERPL-MCNC: 24 MG/DL (ref 5–25)
CALCIUM SERPL-MCNC: 8.5 MG/DL (ref 8.3–10.1)
CHLORIDE SERPL-SCNC: 104 MMOL/L (ref 100–108)
CO2 SERPL-SCNC: 30 MMOL/L (ref 21–32)
CREAT SERPL-MCNC: 1.07 MG/DL (ref 0.6–1.3)
EOSINOPHIL # BLD AUTO: 0.09 THOUSAND/ΜL (ref 0–0.61)
EOSINOPHIL NFR BLD AUTO: 3 % (ref 0–6)
ERYTHROCYTE [DISTWIDTH] IN BLOOD BY AUTOMATED COUNT: 14.5 % (ref 11.6–15.1)
GFR SERPL CREATININE-BSD FRML MDRD: >60 ML/MIN/1.73SQ M
GLUCOSE P FAST SERPL-MCNC: 88 MG/DL (ref 65–99)
HCT VFR BLD AUTO: 32.2 % (ref 36.5–49.3)
HGB BLD-MCNC: 10.6 G/DL (ref 12–17)
LDH SERPL-CCNC: 145 U/L (ref 81–234)
LYMPHOCYTES # BLD AUTO: 0.66 THOUSANDS/ΜL (ref 0.6–4.47)
LYMPHOCYTES NFR BLD AUTO: 25 % (ref 14–44)
MCH RBC QN AUTO: 35.1 PG (ref 26.8–34.3)
MCHC RBC AUTO-ENTMCNC: 32.9 G/DL (ref 31.4–37.4)
MCV RBC AUTO: 107 FL (ref 82–98)
MONOCYTES # BLD AUTO: 0.57 THOUSAND/ΜL (ref 0.17–1.22)
MONOCYTES NFR BLD AUTO: 22 % (ref 4–12)
NEUTROPHILS # BLD AUTO: 1.28 THOUSANDS/ΜL (ref 1.85–7.62)
NEUTS SEG NFR BLD AUTO: 49 % (ref 43–75)
NRBC BLD AUTO-RTO: 1 /100 WBCS
PHOSPHATE SERPL-MCNC: 2.7 MG/DL (ref 2.3–4.1)
PLATELET # BLD AUTO: 41 THOUSANDS/UL (ref 149–390)
PMV BLD AUTO: 12.6 FL (ref 8.9–12.7)
POTASSIUM SERPL-SCNC: 4.1 MMOL/L (ref 3.5–5.3)
PROT SERPL-MCNC: 5.9 G/DL (ref 6.4–8.2)
RBC # BLD AUTO: 3.02 MILLION/UL (ref 3.88–5.62)
SODIUM SERPL-SCNC: 138 MMOL/L (ref 136–145)
URATE SERPL-MCNC: 6.3 MG/DL (ref 4.2–8)
WBC # BLD AUTO: 2.64 THOUSAND/UL (ref 4.31–10.16)

## 2017-06-07 PROCEDURE — 80053 COMPREHEN METABOLIC PANEL: CPT

## 2017-06-07 PROCEDURE — 84550 ASSAY OF BLOOD/URIC ACID: CPT

## 2017-06-07 PROCEDURE — 36415 COLL VENOUS BLD VENIPUNCTURE: CPT

## 2017-06-07 PROCEDURE — 84100 ASSAY OF PHOSPHORUS: CPT

## 2017-06-07 PROCEDURE — 83615 LACTATE (LD) (LDH) ENZYME: CPT

## 2017-06-07 PROCEDURE — 85025 COMPLETE CBC W/AUTO DIFF WBC: CPT

## 2017-06-08 RX ORDER — DEXTROSE MONOHYDRATE 50 MG/ML
20 INJECTION, SOLUTION INTRAVENOUS CONTINUOUS
Status: DISCONTINUED | OUTPATIENT
Start: 2017-06-09 | End: 2017-06-12 | Stop reason: HOSPADM

## 2017-06-08 RX ORDER — SODIUM CHLORIDE 9 MG/ML
20 INJECTION, SOLUTION INTRAVENOUS CONTINUOUS
Status: DISCONTINUED | OUTPATIENT
Start: 2017-06-09 | End: 2017-06-12 | Stop reason: HOSPADM

## 2017-06-09 ENCOUNTER — HOSPITAL ENCOUNTER (OUTPATIENT)
Dept: INFUSION CENTER | Facility: CLINIC | Age: 72
Discharge: HOME | End: 2017-06-09
Payer: COMMERCIAL

## 2017-06-09 VITALS
BODY MASS INDEX: 25.39 KG/M2 | RESPIRATION RATE: 16 BRPM | DIASTOLIC BLOOD PRESSURE: 85 MMHG | TEMPERATURE: 96.8 F | WEIGHT: 172.4 LBS | HEART RATE: 58 BPM | SYSTOLIC BLOOD PRESSURE: 144 MMHG

## 2017-06-09 PROCEDURE — 96409 CHEMO IV PUSH SNGL DRUG: CPT

## 2017-06-09 PROCEDURE — 96361 HYDRATE IV INFUSION ADD-ON: CPT

## 2017-06-09 RX ADMIN — CARFILZOMIB 38 MG: 60 INJECTION, POWDER, LYOPHILIZED, FOR SOLUTION INTRAVENOUS at 09:31

## 2017-06-09 RX ADMIN — DEXTROSE 20 ML/HR: 5 SOLUTION INTRAVENOUS at 09:27

## 2017-06-09 RX ADMIN — SODIUM CHLORIDE 500 ML: 0.9 INJECTION, SOLUTION INTRAVENOUS at 08:25

## 2017-06-09 RX ADMIN — SODIUM CHLORIDE 500 ML: 0.9 INJECTION, SOLUTION INTRAVENOUS at 09:46

## 2017-06-09 NOTE — PROGRESS NOTES
Patient denies any discomforts  Cleared for chemotherapy per lab parameters on orders  ANC- 1 28 on 6/7/2017   No granix required today as per orders

## 2017-06-14 ENCOUNTER — APPOINTMENT (OUTPATIENT)
Dept: LAB | Facility: CLINIC | Age: 72
End: 2017-06-14
Payer: COMMERCIAL

## 2017-06-14 ENCOUNTER — TRANSCRIBE ORDERS (OUTPATIENT)
Dept: LAB | Facility: CLINIC | Age: 72
End: 2017-06-14

## 2017-06-14 DIAGNOSIS — R11.0 NAUSEA: ICD-10-CM

## 2017-06-14 DIAGNOSIS — E78.5 HYPERLIPIDEMIA: ICD-10-CM

## 2017-06-14 DIAGNOSIS — Z00.00 ENCOUNTER FOR GENERAL ADULT MEDICAL EXAMINATION WITHOUT ABNORMAL FINDINGS: ICD-10-CM

## 2017-06-14 DIAGNOSIS — I10 ESSENTIAL (PRIMARY) HYPERTENSION: ICD-10-CM

## 2017-06-14 DIAGNOSIS — E03.9 HYPOTHYROIDISM: ICD-10-CM

## 2017-06-14 DIAGNOSIS — R53.83 OTHER FATIGUE: ICD-10-CM

## 2017-06-14 DIAGNOSIS — C90.00 MULTIPLE MYELOMA NOT HAVING ACHIEVED REMISSION (HCC): ICD-10-CM

## 2017-06-14 DIAGNOSIS — D64.9 ANEMIA: ICD-10-CM

## 2017-06-14 LAB
ALBUMIN SERPL BCP-MCNC: 3.7 G/DL (ref 3.5–5)
ALP SERPL-CCNC: 64 U/L (ref 46–116)
ALT SERPL W P-5'-P-CCNC: 36 U/L (ref 12–78)
ANION GAP SERPL CALCULATED.3IONS-SCNC: 8 MMOL/L (ref 4–13)
AST SERPL W P-5'-P-CCNC: 16 U/L (ref 5–45)
BASOPHILS # BLD AUTO: 0.01 THOUSANDS/ΜL (ref 0–0.1)
BASOPHILS NFR BLD AUTO: 0 % (ref 0–1)
BILIRUB SERPL-MCNC: 1.12 MG/DL (ref 0.2–1)
BUN SERPL-MCNC: 22 MG/DL (ref 5–25)
CALCIUM SERPL-MCNC: 8.6 MG/DL (ref 8.3–10.1)
CHLORIDE SERPL-SCNC: 104 MMOL/L (ref 100–108)
CO2 SERPL-SCNC: 30 MMOL/L (ref 21–32)
CREAT SERPL-MCNC: 1.11 MG/DL (ref 0.6–1.3)
EOSINOPHIL # BLD AUTO: 0.11 THOUSAND/ΜL (ref 0–0.61)
EOSINOPHIL NFR BLD AUTO: 5 % (ref 0–6)
ERYTHROCYTE [DISTWIDTH] IN BLOOD BY AUTOMATED COUNT: 15.5 % (ref 11.6–15.1)
GFR SERPL CREATININE-BSD FRML MDRD: >60 ML/MIN/1.73SQ M
GLUCOSE P FAST SERPL-MCNC: 88 MG/DL (ref 65–99)
HCT VFR BLD AUTO: 32.2 % (ref 36.5–49.3)
HGB BLD-MCNC: 10.8 G/DL (ref 12–17)
LDH SERPL-CCNC: 170 U/L (ref 81–234)
LYMPHOCYTES # BLD AUTO: 0.55 THOUSANDS/ΜL (ref 0.6–4.47)
LYMPHOCYTES NFR BLD AUTO: 23 % (ref 14–44)
MCH RBC QN AUTO: 36 PG (ref 26.8–34.3)
MCHC RBC AUTO-ENTMCNC: 33.5 G/DL (ref 31.4–37.4)
MCV RBC AUTO: 107 FL (ref 82–98)
MONOCYTES # BLD AUTO: 0.28 THOUSAND/ΜL (ref 0.17–1.22)
MONOCYTES NFR BLD AUTO: 12 % (ref 4–12)
NEUTROPHILS # BLD AUTO: 1.39 THOUSANDS/ΜL (ref 1.85–7.62)
NEUTS SEG NFR BLD AUTO: 60 % (ref 43–75)
NRBC BLD AUTO-RTO: 0 /100 WBCS
PHOSPHATE SERPL-MCNC: 3.3 MG/DL (ref 2.3–4.1)
PLATELET # BLD AUTO: 40 THOUSANDS/UL (ref 149–390)
PMV BLD AUTO: 12.5 FL (ref 8.9–12.7)
POTASSIUM SERPL-SCNC: 4 MMOL/L (ref 3.5–5.3)
PROT SERPL-MCNC: 6.1 G/DL (ref 6.4–8.2)
RBC # BLD AUTO: 3 MILLION/UL (ref 3.88–5.62)
SODIUM SERPL-SCNC: 142 MMOL/L (ref 136–145)
URATE SERPL-MCNC: 6.4 MG/DL (ref 4.2–8)
WBC # BLD AUTO: 2.35 THOUSAND/UL (ref 4.31–10.16)

## 2017-06-14 PROCEDURE — 36415 COLL VENOUS BLD VENIPUNCTURE: CPT

## 2017-06-14 PROCEDURE — 84100 ASSAY OF PHOSPHORUS: CPT

## 2017-06-14 PROCEDURE — 83615 LACTATE (LD) (LDH) ENZYME: CPT

## 2017-06-14 PROCEDURE — 85025 COMPLETE CBC W/AUTO DIFF WBC: CPT

## 2017-06-14 PROCEDURE — 84550 ASSAY OF BLOOD/URIC ACID: CPT

## 2017-06-14 PROCEDURE — 80053 COMPREHEN METABOLIC PANEL: CPT

## 2017-06-15 RX ORDER — DEXTROSE MONOHYDRATE 50 MG/ML
20 INJECTION, SOLUTION INTRAVENOUS CONTINUOUS
Status: DISCONTINUED | OUTPATIENT
Start: 2017-06-16 | End: 2017-06-19 | Stop reason: HOSPADM

## 2017-06-16 ENCOUNTER — HOSPITAL ENCOUNTER (OUTPATIENT)
Dept: INFUSION CENTER | Facility: CLINIC | Age: 72
Discharge: HOME | End: 2017-06-16
Payer: COMMERCIAL

## 2017-06-16 VITALS
HEART RATE: 58 BPM | HEIGHT: 69 IN | WEIGHT: 172.62 LBS | DIASTOLIC BLOOD PRESSURE: 71 MMHG | TEMPERATURE: 96.7 F | RESPIRATION RATE: 16 BRPM | BODY MASS INDEX: 25.57 KG/M2 | SYSTOLIC BLOOD PRESSURE: 155 MMHG

## 2017-06-16 PROCEDURE — 96413 CHEMO IV INFUSION 1 HR: CPT

## 2017-06-16 PROCEDURE — 96361 HYDRATE IV INFUSION ADD-ON: CPT

## 2017-06-16 RX ADMIN — CARFILZOMIB 38 MG: 60 INJECTION, POWDER, LYOPHILIZED, FOR SOLUTION INTRAVENOUS at 09:37

## 2017-06-16 RX ADMIN — SODIUM CHLORIDE 500 ML: 0.9 INJECTION, SOLUTION INTRAVENOUS at 08:40

## 2017-06-16 RX ADMIN — DEXTROSE 20 ML/HR: 5 SOLUTION INTRAVENOUS at 09:36

## 2017-06-16 RX ADMIN — SODIUM CHLORIDE 500 ML: 0.9 INJECTION, SOLUTION INTRAVENOUS at 10:01

## 2017-06-16 NOTE — PLAN OF CARE

## 2017-06-21 ENCOUNTER — TRANSCRIBE ORDERS (OUTPATIENT)
Dept: LAB | Facility: CLINIC | Age: 72
End: 2017-06-21

## 2017-06-21 ENCOUNTER — APPOINTMENT (OUTPATIENT)
Dept: LAB | Facility: CLINIC | Age: 72
End: 2017-06-21
Payer: COMMERCIAL

## 2017-06-21 DIAGNOSIS — R11.0 NAUSEA: ICD-10-CM

## 2017-06-21 DIAGNOSIS — R53.83 OTHER FATIGUE: ICD-10-CM

## 2017-06-21 DIAGNOSIS — E03.9 HYPOTHYROIDISM: ICD-10-CM

## 2017-06-21 DIAGNOSIS — E78.5 HYPERLIPIDEMIA: ICD-10-CM

## 2017-06-21 DIAGNOSIS — Z00.00 ENCOUNTER FOR GENERAL ADULT MEDICAL EXAMINATION WITHOUT ABNORMAL FINDINGS: ICD-10-CM

## 2017-06-21 DIAGNOSIS — I10 ESSENTIAL (PRIMARY) HYPERTENSION: ICD-10-CM

## 2017-06-21 DIAGNOSIS — C90.00 MULTIPLE MYELOMA NOT HAVING ACHIEVED REMISSION (HCC): ICD-10-CM

## 2017-06-21 DIAGNOSIS — D64.9 ANEMIA: ICD-10-CM

## 2017-06-21 LAB
ALBUMIN SERPL BCP-MCNC: 3.7 G/DL (ref 3.5–5)
ALP SERPL-CCNC: 72 U/L (ref 46–116)
ALT SERPL W P-5'-P-CCNC: 94 U/L (ref 12–78)
ANION GAP SERPL CALCULATED.3IONS-SCNC: 7 MMOL/L (ref 4–13)
AST SERPL W P-5'-P-CCNC: 54 U/L (ref 5–45)
BASOPHILS # BLD AUTO: 0.02 THOUSANDS/ΜL (ref 0–0.1)
BASOPHILS NFR BLD AUTO: 1 % (ref 0–1)
BILIRUB SERPL-MCNC: 1.37 MG/DL (ref 0.2–1)
BUN SERPL-MCNC: 20 MG/DL (ref 5–25)
CALCIUM SERPL-MCNC: 8.6 MG/DL (ref 8.3–10.1)
CHLORIDE SERPL-SCNC: 104 MMOL/L (ref 100–108)
CO2 SERPL-SCNC: 30 MMOL/L (ref 21–32)
CREAT SERPL-MCNC: 1.18 MG/DL (ref 0.6–1.3)
EOSINOPHIL # BLD AUTO: 0.05 THOUSAND/ΜL (ref 0–0.61)
EOSINOPHIL NFR BLD AUTO: 3 % (ref 0–6)
ERYTHROCYTE [DISTWIDTH] IN BLOOD BY AUTOMATED COUNT: 15.6 % (ref 11.6–15.1)
GFR SERPL CREATININE-BSD FRML MDRD: >60 ML/MIN/1.73SQ M
GLUCOSE SERPL-MCNC: 99 MG/DL (ref 65–140)
HCT VFR BLD AUTO: 33.6 % (ref 36.5–49.3)
HGB BLD-MCNC: 11.3 G/DL (ref 12–17)
LDH SERPL-CCNC: 196 U/L (ref 81–234)
LYMPHOCYTES # BLD AUTO: 0.48 THOUSANDS/ΜL (ref 0.6–4.47)
LYMPHOCYTES NFR BLD AUTO: 26 % (ref 14–44)
MCH RBC QN AUTO: 36.1 PG (ref 26.8–34.3)
MCHC RBC AUTO-ENTMCNC: 33.6 G/DL (ref 31.4–37.4)
MCV RBC AUTO: 107 FL (ref 82–98)
MONOCYTES # BLD AUTO: 0.36 THOUSAND/ΜL (ref 0.17–1.22)
MONOCYTES NFR BLD AUTO: 20 % (ref 4–12)
NEUTROPHILS # BLD AUTO: 0.9 THOUSANDS/ΜL (ref 1.85–7.62)
NEUTS SEG NFR BLD AUTO: 50 % (ref 43–75)
NRBC BLD AUTO-RTO: 0 /100 WBCS
PHOSPHATE SERPL-MCNC: 2.8 MG/DL (ref 2.3–4.1)
PLATELET # BLD AUTO: 48 THOUSANDS/UL (ref 149–390)
PMV BLD AUTO: 11.3 FL (ref 8.9–12.7)
POTASSIUM SERPL-SCNC: 3.9 MMOL/L (ref 3.5–5.3)
PROT SERPL-MCNC: 6.3 G/DL (ref 6.4–8.2)
RBC # BLD AUTO: 3.13 MILLION/UL (ref 3.88–5.62)
SODIUM SERPL-SCNC: 141 MMOL/L (ref 136–145)
URATE SERPL-MCNC: 6.9 MG/DL (ref 4.2–8)
WBC # BLD AUTO: 1.82 THOUSAND/UL (ref 4.31–10.16)

## 2017-06-21 PROCEDURE — 83615 LACTATE (LD) (LDH) ENZYME: CPT

## 2017-06-21 PROCEDURE — 84100 ASSAY OF PHOSPHORUS: CPT

## 2017-06-21 PROCEDURE — 83880 ASSAY OF NATRIURETIC PEPTIDE: CPT | Performed by: INTERNAL MEDICINE

## 2017-06-21 PROCEDURE — 80053 COMPREHEN METABOLIC PANEL: CPT

## 2017-06-21 PROCEDURE — 84550 ASSAY OF BLOOD/URIC ACID: CPT

## 2017-06-21 PROCEDURE — 85025 COMPLETE CBC W/AUTO DIFF WBC: CPT

## 2017-06-21 PROCEDURE — 36415 COLL VENOUS BLD VENIPUNCTURE: CPT

## 2017-06-22 RX ORDER — DEXTROSE MONOHYDRATE 50 MG/ML
20 INJECTION, SOLUTION INTRAVENOUS CONTINUOUS
Status: DISCONTINUED | OUTPATIENT
Start: 2017-06-23 | End: 2017-06-26 | Stop reason: HOSPADM

## 2017-06-23 ENCOUNTER — HOSPITAL ENCOUNTER (OUTPATIENT)
Dept: INFUSION CENTER | Facility: CLINIC | Age: 72
Discharge: HOME | End: 2017-06-23
Payer: COMMERCIAL

## 2017-06-23 VITALS
RESPIRATION RATE: 16 BRPM | WEIGHT: 169.09 LBS | BODY MASS INDEX: 25.04 KG/M2 | HEIGHT: 69 IN | HEART RATE: 67 BPM | SYSTOLIC BLOOD PRESSURE: 150 MMHG | DIASTOLIC BLOOD PRESSURE: 87 MMHG | TEMPERATURE: 97.2 F

## 2017-06-23 PROCEDURE — 96361 HYDRATE IV INFUSION ADD-ON: CPT

## 2017-06-23 PROCEDURE — 96372 THER/PROPH/DIAG INJ SC/IM: CPT

## 2017-06-23 PROCEDURE — 96409 CHEMO IV PUSH SNGL DRUG: CPT

## 2017-06-23 RX ADMIN — DEXTROSE 20 ML/HR: 5 SOLUTION INTRAVENOUS at 09:30

## 2017-06-23 RX ADMIN — TBO-FILGRASTIM 480 MCG: 480 INJECTION, SOLUTION SUBCUTANEOUS at 11:09

## 2017-06-23 RX ADMIN — SODIUM CHLORIDE 500 ML: 0.9 INJECTION, SOLUTION INTRAVENOUS at 08:30

## 2017-06-23 RX ADMIN — CARFILZOMIB 38 MG: 60 INJECTION, POWDER, LYOPHILIZED, FOR SOLUTION INTRAVENOUS at 09:40

## 2017-06-23 RX ADMIN — SODIUM CHLORIDE 500 ML: 0.9 INJECTION, SOLUTION INTRAVENOUS at 10:02

## 2017-06-28 ENCOUNTER — TRANSCRIBE ORDERS (OUTPATIENT)
Dept: LAB | Facility: CLINIC | Age: 72
End: 2017-06-28

## 2017-06-28 ENCOUNTER — APPOINTMENT (OUTPATIENT)
Dept: LAB | Facility: CLINIC | Age: 72
End: 2017-06-28
Payer: COMMERCIAL

## 2017-06-28 DIAGNOSIS — N08 MYELOMA KIDNEY (HCC): ICD-10-CM

## 2017-06-28 DIAGNOSIS — D64.9 ANEMIA, UNSPECIFIED: Primary | ICD-10-CM

## 2017-06-28 DIAGNOSIS — C90.00 MYELOMA KIDNEY (HCC): ICD-10-CM

## 2017-06-28 DIAGNOSIS — R11.0 NAUSEA ALONE: ICD-10-CM

## 2017-06-28 DIAGNOSIS — E78.5 OTHER AND UNSPECIFIED HYPERLIPIDEMIA: ICD-10-CM

## 2017-06-28 DIAGNOSIS — E03.9 UNSPECIFIED HYPOTHYROIDISM: ICD-10-CM

## 2017-06-28 DIAGNOSIS — D64.9 ANEMIA, UNSPECIFIED: ICD-10-CM

## 2017-06-28 DIAGNOSIS — I10 ESSENTIAL HYPERTENSION, MALIGNANT: ICD-10-CM

## 2017-06-28 LAB
ALBUMIN SERPL BCP-MCNC: 3.7 G/DL (ref 3.5–5)
ALP SERPL-CCNC: 80 U/L (ref 46–116)
ALT SERPL W P-5'-P-CCNC: 37 U/L (ref 12–78)
ANION GAP SERPL CALCULATED.3IONS-SCNC: 9 MMOL/L (ref 4–13)
AST SERPL W P-5'-P-CCNC: 17 U/L (ref 5–45)
BASOPHILS # BLD AUTO: 0.02 THOUSANDS/ΜL (ref 0–0.1)
BASOPHILS NFR BLD AUTO: 1 % (ref 0–1)
BILIRUB SERPL-MCNC: 2.03 MG/DL (ref 0.2–1)
BUN SERPL-MCNC: 25 MG/DL (ref 5–25)
CALCIUM SERPL-MCNC: 9.2 MG/DL (ref 8.3–10.1)
CHLORIDE SERPL-SCNC: 101 MMOL/L (ref 100–108)
CO2 SERPL-SCNC: 26 MMOL/L (ref 21–32)
CREAT SERPL-MCNC: 1.22 MG/DL (ref 0.6–1.3)
EOSINOPHIL # BLD AUTO: 0.04 THOUSAND/ΜL (ref 0–0.61)
EOSINOPHIL NFR BLD AUTO: 1 % (ref 0–6)
ERYTHROCYTE [DISTWIDTH] IN BLOOD BY AUTOMATED COUNT: 15.3 % (ref 11.6–15.1)
GFR SERPL CREATININE-BSD FRML MDRD: 58.9 ML/MIN/1.73SQ M
GLUCOSE SERPL-MCNC: 94 MG/DL (ref 65–140)
HCT VFR BLD AUTO: 34.3 % (ref 36.5–49.3)
HGB BLD-MCNC: 11.5 G/DL (ref 12–17)
LDH SERPL-CCNC: 148 U/L (ref 81–234)
LYMPHOCYTES # BLD AUTO: 0.65 THOUSANDS/ΜL (ref 0.6–4.47)
LYMPHOCYTES NFR BLD AUTO: 17 % (ref 14–44)
MCH RBC QN AUTO: 36.2 PG (ref 26.8–34.3)
MCHC RBC AUTO-ENTMCNC: 33.5 G/DL (ref 31.4–37.4)
MCV RBC AUTO: 108 FL (ref 82–98)
MONOCYTES # BLD AUTO: 0.66 THOUSAND/ΜL (ref 0.17–1.22)
MONOCYTES NFR BLD AUTO: 17 % (ref 4–12)
NEUTROPHILS # BLD AUTO: 2.56 THOUSANDS/ΜL (ref 1.85–7.62)
NEUTS SEG NFR BLD AUTO: 64 % (ref 43–75)
NRBC BLD AUTO-RTO: 0 /100 WBCS
PHOSPHATE SERPL-MCNC: 2.5 MG/DL (ref 2.3–4.1)
PLATELET # BLD AUTO: 52 THOUSANDS/UL (ref 149–390)
PMV BLD AUTO: 13.3 FL (ref 8.9–12.7)
POTASSIUM SERPL-SCNC: 4.3 MMOL/L (ref 3.5–5.3)
PROT SERPL-MCNC: 6.6 G/DL (ref 6.4–8.2)
RBC # BLD AUTO: 3.18 MILLION/UL (ref 3.88–5.62)
SODIUM SERPL-SCNC: 136 MMOL/L (ref 136–145)
URATE SERPL-MCNC: 6.2 MG/DL (ref 4.2–8)
WBC # BLD AUTO: 3.94 THOUSAND/UL (ref 4.31–10.16)

## 2017-06-28 PROCEDURE — 80053 COMPREHEN METABOLIC PANEL: CPT

## 2017-06-28 PROCEDURE — 83615 LACTATE (LD) (LDH) ENZYME: CPT

## 2017-06-28 PROCEDURE — 85025 COMPLETE CBC W/AUTO DIFF WBC: CPT

## 2017-06-28 PROCEDURE — 36415 COLL VENOUS BLD VENIPUNCTURE: CPT

## 2017-06-28 PROCEDURE — 84550 ASSAY OF BLOOD/URIC ACID: CPT

## 2017-06-28 PROCEDURE — 84100 ASSAY OF PHOSPHORUS: CPT

## 2017-06-29 RX ORDER — DEXTROSE MONOHYDRATE 50 MG/ML
20 INJECTION, SOLUTION INTRAVENOUS CONTINUOUS
Status: DISCONTINUED | OUTPATIENT
Start: 2017-06-30 | End: 2017-06-30 | Stop reason: HOSPADM

## 2017-06-30 ENCOUNTER — APPOINTMENT (EMERGENCY)
Dept: RADIOLOGY | Facility: HOSPITAL | Age: 72
End: 2017-06-30
Payer: COMMERCIAL

## 2017-06-30 ENCOUNTER — HOSPITAL ENCOUNTER (EMERGENCY)
Facility: HOSPITAL | Age: 72
Discharge: HOME | End: 2017-06-30
Attending: EMERGENCY MEDICINE
Payer: COMMERCIAL

## 2017-06-30 ENCOUNTER — HOSPITAL ENCOUNTER (OUTPATIENT)
Dept: INFUSION CENTER | Facility: CLINIC | Age: 72
Discharge: HOME | End: 2017-06-30
Payer: COMMERCIAL

## 2017-06-30 VITALS
TEMPERATURE: 101.6 F | BODY MASS INDEX: 24.32 KG/M2 | DIASTOLIC BLOOD PRESSURE: 64 MMHG | SYSTOLIC BLOOD PRESSURE: 108 MMHG | WEIGHT: 165.12 LBS | RESPIRATION RATE: 18 BRPM | HEART RATE: 114 BPM

## 2017-06-30 VITALS
HEIGHT: 69 IN | RESPIRATION RATE: 16 BRPM | HEART RATE: 88 BPM | BODY MASS INDEX: 24.44 KG/M2 | DIASTOLIC BLOOD PRESSURE: 56 MMHG | OXYGEN SATURATION: 98 % | TEMPERATURE: 100.2 F | WEIGHT: 165 LBS | SYSTOLIC BLOOD PRESSURE: 110 MMHG

## 2017-06-30 DIAGNOSIS — R50.9 FEVER: Primary | ICD-10-CM

## 2017-06-30 DIAGNOSIS — I48.91 A-FIB (HCC): ICD-10-CM

## 2017-06-30 DIAGNOSIS — R53.83 FATIGUE: ICD-10-CM

## 2017-06-30 LAB
ALBUMIN SERPL BCP-MCNC: 3.3 G/DL (ref 3.5–5)
ALP SERPL-CCNC: 113 U/L (ref 46–116)
ALT SERPL W P-5'-P-CCNC: 78 U/L (ref 12–78)
ANION GAP SERPL CALCULATED.3IONS-SCNC: 7 MMOL/L (ref 4–13)
ANISOCYTOSIS BLD QL SMEAR: PRESENT
APTT PPP: 29 SECONDS (ref 23–35)
AST SERPL W P-5'-P-CCNC: 68 U/L (ref 5–45)
ATRIAL RATE: 141 BPM
BACTERIA UR QL AUTO: ABNORMAL /HPF
BASOPHILS # BLD MANUAL: 0 THOUSAND/UL (ref 0–0.1)
BASOPHILS NFR MAR MANUAL: 0 % (ref 0–1)
BILIRUB SERPL-MCNC: 1.96 MG/DL (ref 0.2–1)
BILIRUB UR QL STRIP: ABNORMAL
BUN SERPL-MCNC: 22 MG/DL (ref 5–25)
CALCIUM SERPL-MCNC: 8.5 MG/DL (ref 8.3–10.1)
CHLORIDE SERPL-SCNC: 95 MMOL/L (ref 100–108)
CLARITY UR: ABNORMAL
CO2 SERPL-SCNC: 25 MMOL/L (ref 21–32)
COLOR UR: ABNORMAL
CREAT SERPL-MCNC: 1.52 MG/DL (ref 0.6–1.3)
DOHLE BOD BLD QL SMEAR: PRESENT
EOSINOPHIL # BLD MANUAL: 0 THOUSAND/UL (ref 0–0.4)
EOSINOPHIL NFR BLD MANUAL: 0 % (ref 0–6)
ERYTHROCYTE [DISTWIDTH] IN BLOOD BY AUTOMATED COUNT: 15 % (ref 11.6–15.1)
FINE GRAN CASTS URNS QL MICRO: ABNORMAL /LPF
GFR SERPL CREATININE-BSD FRML MDRD: 45.7 ML/MIN/1.73SQ M
GLUCOSE SERPL-MCNC: 123 MG/DL (ref 65–140)
GLUCOSE UR STRIP-MCNC: NEGATIVE MG/DL
HCT VFR BLD AUTO: 32.9 % (ref 36.5–49.3)
HGB BLD-MCNC: 11.1 G/DL (ref 12–17)
HGB UR QL STRIP.AUTO: ABNORMAL
HOLD SPECIMEN: NORMAL
INR PPP: 1.13 (ref 0.86–1.16)
KETONES UR STRIP-MCNC: ABNORMAL MG/DL
LACTATE SERPL-SCNC: 1.6 MMOL/L (ref 0.5–2)
LEUKOCYTE ESTERASE UR QL STRIP: NEGATIVE
LYMPHOCYTES # BLD AUTO: 0.57 THOUSAND/UL (ref 0.6–4.47)
LYMPHOCYTES # BLD AUTO: 6 % (ref 14–44)
MCH RBC QN AUTO: 35.8 PG (ref 26.8–34.3)
MCHC RBC AUTO-ENTMCNC: 33.7 G/DL (ref 31.4–37.4)
MCV RBC AUTO: 106 FL (ref 82–98)
MONOCYTES # BLD AUTO: 0.1 THOUSAND/UL (ref 0–1.22)
MONOCYTES NFR BLD: 1 % (ref 4–12)
MUCOUS THREADS UR QL AUTO: ABNORMAL
MYELOCYTES NFR BLD MANUAL: 1 % (ref 0–1)
NEUTROPHILS # BLD MANUAL: 8.79 THOUSAND/UL (ref 1.85–7.62)
NEUTS BAND NFR BLD MANUAL: 2 % (ref 0–8)
NEUTS SEG NFR BLD AUTO: 90 % (ref 43–75)
NITRITE UR QL STRIP: NEGATIVE
NON-SQ EPI CELLS URNS QL MICRO: ABNORMAL /HPF
NRBC BLD AUTO-RTO: 0 /100 WBCS
PH UR STRIP.AUTO: 5.5 [PH] (ref 4.5–8)
PLATELET # BLD AUTO: 61 THOUSANDS/UL (ref 149–390)
PLATELET BLD QL SMEAR: ABNORMAL
PMV BLD AUTO: 12 FL (ref 8.9–12.7)
POTASSIUM SERPL-SCNC: 3.7 MMOL/L (ref 3.5–5.3)
PROT SERPL-MCNC: 6.7 G/DL (ref 6.4–8.2)
PROT UR STRIP-MCNC: ABNORMAL MG/DL
PROTHROMBIN TIME: 14.5 SECONDS (ref 12.1–14.4)
QRS AXIS: 53 DEGREES
QRSD INTERVAL: 92 MS
QT INTERVAL: 342 MS
QTC INTERVAL: 456 MS
RBC # BLD AUTO: 3.1 MILLION/UL (ref 3.88–5.62)
RBC #/AREA URNS AUTO: ABNORMAL /HPF
RBC MORPH BLD: PRESENT
SODIUM SERPL-SCNC: 127 MMOL/L (ref 136–145)
SP GR UR STRIP.AUTO: 1.03 (ref 1–1.03)
SPECIMEN SOURCE: NORMAL
T WAVE AXIS: 40 DEGREES
TOXIC GRANULES BLD QL SMEAR: PRESENT
TROPONIN I BLD-MCNC: 0.01 NG/ML (ref 0–0.08)
UROBILINOGEN UR QL STRIP.AUTO: 1 E.U./DL
VENTRICULAR RATE: 107 BPM
WBC # BLD AUTO: 9.55 THOUSAND/UL (ref 4.31–10.16)
WBC #/AREA URNS AUTO: ABNORMAL /HPF

## 2017-06-30 PROCEDURE — 87040 BLOOD CULTURE FOR BACTERIA: CPT | Performed by: EMERGENCY MEDICINE

## 2017-06-30 PROCEDURE — 93005 ELECTROCARDIOGRAM TRACING: CPT

## 2017-06-30 PROCEDURE — 96361 HYDRATE IV INFUSION ADD-ON: CPT

## 2017-06-30 PROCEDURE — 84484 ASSAY OF TROPONIN QUANT: CPT

## 2017-06-30 PROCEDURE — 80053 COMPREHEN METABOLIC PANEL: CPT | Performed by: EMERGENCY MEDICINE

## 2017-06-30 PROCEDURE — 85007 BL SMEAR W/DIFF WBC COUNT: CPT | Performed by: EMERGENCY MEDICINE

## 2017-06-30 PROCEDURE — 96374 THER/PROPH/DIAG INJ IV PUSH: CPT

## 2017-06-30 PROCEDURE — 85610 PROTHROMBIN TIME: CPT | Performed by: EMERGENCY MEDICINE

## 2017-06-30 PROCEDURE — 83605 ASSAY OF LACTIC ACID: CPT | Performed by: EMERGENCY MEDICINE

## 2017-06-30 PROCEDURE — 85730 THROMBOPLASTIN TIME PARTIAL: CPT | Performed by: EMERGENCY MEDICINE

## 2017-06-30 PROCEDURE — 36415 COLL VENOUS BLD VENIPUNCTURE: CPT

## 2017-06-30 PROCEDURE — 85027 COMPLETE CBC AUTOMATED: CPT | Performed by: EMERGENCY MEDICINE

## 2017-06-30 PROCEDURE — 81001 URINALYSIS AUTO W/SCOPE: CPT | Performed by: EMERGENCY MEDICINE

## 2017-06-30 PROCEDURE — 99284 EMERGENCY DEPT VISIT MOD MDM: CPT

## 2017-06-30 PROCEDURE — 71020 HB CHEST X-RAY 2VW FRONTAL&LATL: CPT

## 2017-06-30 PROCEDURE — 93005 ELECTROCARDIOGRAM TRACING: CPT | Performed by: EMERGENCY MEDICINE

## 2017-06-30 RX ORDER — LEVOFLOXACIN 750 MG/1
750 TABLET ORAL ONCE
Status: COMPLETED | OUTPATIENT
Start: 2017-06-30 | End: 2017-06-30

## 2017-06-30 RX ORDER — LEVOFLOXACIN 750 MG/1
750 TABLET ORAL DAILY
Qty: 4 TABLET | Refills: 0 | Status: SHIPPED | OUTPATIENT
Start: 2017-06-30 | End: 2017-07-04

## 2017-06-30 RX ORDER — ACETAMINOPHEN 325 MG/1
TABLET ORAL
Status: COMPLETED
Start: 2017-06-30 | End: 2017-06-30

## 2017-06-30 RX ADMIN — SODIUM CHLORIDE 1000 ML: 0.9 INJECTION, SOLUTION INTRAVENOUS at 13:56

## 2017-06-30 RX ADMIN — LEVOFLOXACIN 750 MG: 750 TABLET, FILM COATED ORAL at 14:17

## 2017-06-30 RX ADMIN — METOPROLOL TARTRATE 5 MG: 5 INJECTION INTRAVENOUS at 13:07

## 2017-06-30 RX ADMIN — ACETAMINOPHEN 650 MG: 325 TABLET, FILM COATED ORAL at 09:44

## 2017-06-30 RX ADMIN — SODIUM CHLORIDE 1000 ML: 0.9 INJECTION, SOLUTION INTRAVENOUS at 09:55

## 2017-06-30 NOTE — PROGRESS NOTES
Patient arrived to the unit with his wife  He reported feeling very week on Thursday am and called out of work  Around 2pm patient experienced shaking chills with a temp of 99 8  Patient took tylenol but the temp returned around 3:30 in the morning  He arrived to the infusion center today with a temp of 101  6  He appears pale  Lungs are clear and patient denies burning on urination  Chemotherapy was held for today  Patient is heading to the ER

## 2017-07-05 ENCOUNTER — TRANSCRIBE ORDERS (OUTPATIENT)
Dept: LAB | Facility: CLINIC | Age: 72
End: 2017-07-05

## 2017-07-05 ENCOUNTER — APPOINTMENT (OUTPATIENT)
Dept: LAB | Facility: CLINIC | Age: 72
End: 2017-07-05
Payer: COMMERCIAL

## 2017-07-05 DIAGNOSIS — C90.00 MULTIPLE MYELOMA NOT HAVING ACHIEVED REMISSION (HCC): ICD-10-CM

## 2017-07-05 DIAGNOSIS — R11.0 NAUSEA: ICD-10-CM

## 2017-07-05 DIAGNOSIS — I10 ESSENTIAL (PRIMARY) HYPERTENSION: ICD-10-CM

## 2017-07-05 DIAGNOSIS — D64.9 ANEMIA: ICD-10-CM

## 2017-07-05 DIAGNOSIS — Z00.00 ENCOUNTER FOR GENERAL ADULT MEDICAL EXAMINATION WITHOUT ABNORMAL FINDINGS: ICD-10-CM

## 2017-07-05 DIAGNOSIS — E78.5 HYPERLIPIDEMIA: ICD-10-CM

## 2017-07-05 DIAGNOSIS — E03.9 HYPOTHYROIDISM: ICD-10-CM

## 2017-07-05 DIAGNOSIS — R53.83 OTHER FATIGUE: ICD-10-CM

## 2017-07-05 LAB
ALBUMIN SERPL BCP-MCNC: 2.7 G/DL (ref 3.5–5)
ALP SERPL-CCNC: 124 U/L (ref 46–116)
ALT SERPL W P-5'-P-CCNC: 64 U/L (ref 12–78)
ANION GAP SERPL CALCULATED.3IONS-SCNC: 10 MMOL/L (ref 4–13)
AST SERPL W P-5'-P-CCNC: 20 U/L (ref 5–45)
BACTERIA BLD CULT: NORMAL
BACTERIA BLD CULT: NORMAL
BASOPHILS # BLD AUTO: 0.02 THOUSANDS/ΜL (ref 0–0.1)
BASOPHILS NFR BLD AUTO: 1 % (ref 0–1)
BILIRUB SERPL-MCNC: 0.63 MG/DL (ref 0.2–1)
BUN SERPL-MCNC: 25 MG/DL (ref 5–25)
CALCIUM SERPL-MCNC: 9.4 MG/DL (ref 8.3–10.1)
CHLORIDE SERPL-SCNC: 103 MMOL/L (ref 100–108)
CO2 SERPL-SCNC: 25 MMOL/L (ref 21–32)
CREAT SERPL-MCNC: 1.38 MG/DL (ref 0.6–1.3)
EOSINOPHIL # BLD AUTO: 0.05 THOUSAND/ΜL (ref 0–0.61)
EOSINOPHIL NFR BLD AUTO: 2 % (ref 0–6)
ERYTHROCYTE [DISTWIDTH] IN BLOOD BY AUTOMATED COUNT: 15.1 % (ref 11.6–15.1)
GFR SERPL CREATININE-BSD FRML MDRD: 51.1 ML/MIN/1.73SQ M
GLUCOSE SERPL-MCNC: 94 MG/DL (ref 65–140)
HCT VFR BLD AUTO: 29.1 % (ref 36.5–49.3)
HGB BLD-MCNC: 9.8 G/DL (ref 12–17)
LDH SERPL-CCNC: 113 U/L (ref 81–234)
LYMPHOCYTES # BLD AUTO: 0.4 THOUSANDS/ΜL (ref 0.6–4.47)
LYMPHOCYTES NFR BLD AUTO: 15 % (ref 14–44)
MCH RBC QN AUTO: 35 PG (ref 26.8–34.3)
MCHC RBC AUTO-ENTMCNC: 33.7 G/DL (ref 31.4–37.4)
MCV RBC AUTO: 104 FL (ref 82–98)
MONOCYTES # BLD AUTO: 0.46 THOUSAND/ΜL (ref 0.17–1.22)
MONOCYTES NFR BLD AUTO: 17 % (ref 4–12)
NEUTROPHILS # BLD AUTO: 1.71 THOUSANDS/ΜL (ref 1.85–7.62)
NEUTS SEG NFR BLD AUTO: 65 % (ref 43–75)
NRBC BLD AUTO-RTO: 0 /100 WBCS
PHOSPHATE SERPL-MCNC: 3.7 MG/DL (ref 2.3–4.1)
PLATELET # BLD AUTO: 57 THOUSANDS/UL (ref 149–390)
PMV BLD AUTO: 11.2 FL (ref 8.9–12.7)
POTASSIUM SERPL-SCNC: 3.6 MMOL/L (ref 3.5–5.3)
PROT SERPL-MCNC: 6.6 G/DL (ref 6.4–8.2)
RBC # BLD AUTO: 2.8 MILLION/UL (ref 3.88–5.62)
SODIUM SERPL-SCNC: 138 MMOL/L (ref 136–145)
URATE SERPL-MCNC: 7 MG/DL (ref 4.2–8)
WBC # BLD AUTO: 2.66 THOUSAND/UL (ref 4.31–10.16)

## 2017-07-05 PROCEDURE — 85025 COMPLETE CBC W/AUTO DIFF WBC: CPT

## 2017-07-05 PROCEDURE — 36415 COLL VENOUS BLD VENIPUNCTURE: CPT

## 2017-07-05 PROCEDURE — 84550 ASSAY OF BLOOD/URIC ACID: CPT

## 2017-07-05 PROCEDURE — 80053 COMPREHEN METABOLIC PANEL: CPT

## 2017-07-05 PROCEDURE — 84100 ASSAY OF PHOSPHORUS: CPT

## 2017-07-05 PROCEDURE — 83615 LACTATE (LD) (LDH) ENZYME: CPT

## 2017-07-06 RX ORDER — SODIUM CHLORIDE 9 MG/ML
20 INJECTION, SOLUTION INTRAVENOUS CONTINUOUS
Status: DISCONTINUED | OUTPATIENT
Start: 2017-07-07 | End: 2017-07-10 | Stop reason: HOSPADM

## 2017-07-07 ENCOUNTER — HOSPITAL ENCOUNTER (OUTPATIENT)
Dept: INFUSION CENTER | Facility: CLINIC | Age: 72
Discharge: HOME | End: 2017-07-07
Payer: COMMERCIAL

## 2017-07-07 VITALS
TEMPERATURE: 96.2 F | DIASTOLIC BLOOD PRESSURE: 71 MMHG | HEART RATE: 77 BPM | RESPIRATION RATE: 18 BRPM | WEIGHT: 162.92 LBS | BODY MASS INDEX: 24.13 KG/M2 | HEIGHT: 69 IN | SYSTOLIC BLOOD PRESSURE: 112 MMHG

## 2017-07-07 PROCEDURE — 96413 CHEMO IV INFUSION 1 HR: CPT

## 2017-07-07 PROCEDURE — 96361 HYDRATE IV INFUSION ADD-ON: CPT

## 2017-07-07 RX ADMIN — CARFILZOMIB 38 MG: 60 INJECTION, POWDER, LYOPHILIZED, FOR SOLUTION INTRAVENOUS at 10:00

## 2017-07-07 RX ADMIN — SODIUM CHLORIDE 500 ML: 0.9 INJECTION, SOLUTION INTRAVENOUS at 10:18

## 2017-07-07 RX ADMIN — SODIUM CHLORIDE 20 ML/HR: 0.9 INJECTION, SOLUTION INTRAVENOUS at 08:47

## 2017-07-07 RX ADMIN — SODIUM CHLORIDE 500 ML: 0.9 INJECTION, SOLUTION INTRAVENOUS at 08:44

## 2017-07-07 NOTE — PROGRESS NOTES
Patient arrived to the unit for chemotherapy  He reported that he feels much better than last week but not 100%  He has completed his antibiotic  Sergo Kiran made Dr Ben Hernandez is aware of his ER visit  Ok to proceed

## 2017-07-07 NOTE — PLAN OF CARE
Problem: Potential for Falls  Goal: Patient will remain free of falls  INTERVENTIONS:  - Assess patient frequently for physical needs  -  Identify cognitive and physical deficits and behaviors that affect risk of falls    -  [place] fall precautions as indicated by assessment   - Educate patient/family on patient safety including physical limitations  - Instruct patient to call for assistance with activity based on assessment  - Modify environment to reduce risk of injury  - Consider OT/PT consult to assist with strengthening/mobility   Outcome: Progressing

## 2017-07-11 ENCOUNTER — GENERIC CONVERSION - ENCOUNTER (OUTPATIENT)
Dept: OTHER | Facility: OTHER | Age: 72
End: 2017-07-11

## 2017-07-12 ENCOUNTER — APPOINTMENT (OUTPATIENT)
Dept: LAB | Facility: CLINIC | Age: 72
End: 2017-07-12
Payer: COMMERCIAL

## 2017-07-12 ENCOUNTER — TRANSCRIBE ORDERS (OUTPATIENT)
Dept: LAB | Facility: CLINIC | Age: 72
End: 2017-07-12

## 2017-07-12 DIAGNOSIS — Z00.00 ENCOUNTER FOR GENERAL ADULT MEDICAL EXAMINATION WITHOUT ABNORMAL FINDINGS: ICD-10-CM

## 2017-07-12 DIAGNOSIS — D64.9 ANEMIA: ICD-10-CM

## 2017-07-12 DIAGNOSIS — E03.9 HYPOTHYROIDISM: ICD-10-CM

## 2017-07-12 DIAGNOSIS — R11.0 NAUSEA: ICD-10-CM

## 2017-07-12 DIAGNOSIS — R53.83 OTHER FATIGUE: ICD-10-CM

## 2017-07-12 DIAGNOSIS — C90.00 MULTIPLE MYELOMA NOT HAVING ACHIEVED REMISSION (HCC): ICD-10-CM

## 2017-07-12 DIAGNOSIS — E78.5 HYPERLIPIDEMIA: ICD-10-CM

## 2017-07-12 DIAGNOSIS — I10 ESSENTIAL (PRIMARY) HYPERTENSION: ICD-10-CM

## 2017-07-12 LAB
ALBUMIN SERPL BCP-MCNC: 2.9 G/DL (ref 3.5–5)
ALP SERPL-CCNC: 89 U/L (ref 46–116)
ALT SERPL W P-5'-P-CCNC: 25 U/L (ref 12–78)
ANION GAP SERPL CALCULATED.3IONS-SCNC: 4 MMOL/L (ref 4–13)
AST SERPL W P-5'-P-CCNC: 10 U/L (ref 5–45)
BASOPHILS # BLD AUTO: 0.02 THOUSANDS/ΜL (ref 0–0.1)
BASOPHILS NFR BLD AUTO: 1 % (ref 0–1)
BILIRUB SERPL-MCNC: 0.44 MG/DL (ref 0.2–1)
BUN SERPL-MCNC: 18 MG/DL (ref 5–25)
CALCIUM SERPL-MCNC: 8.8 MG/DL (ref 8.3–10.1)
CHLORIDE SERPL-SCNC: 103 MMOL/L (ref 100–108)
CO2 SERPL-SCNC: 30 MMOL/L (ref 21–32)
CREAT SERPL-MCNC: 1.22 MG/DL (ref 0.6–1.3)
EOSINOPHIL # BLD AUTO: 0.1 THOUSAND/ΜL (ref 0–0.61)
EOSINOPHIL NFR BLD AUTO: 6 % (ref 0–6)
ERYTHROCYTE [DISTWIDTH] IN BLOOD BY AUTOMATED COUNT: 16 % (ref 11.6–15.1)
GFR SERPL CREATININE-BSD FRML MDRD: 58.9 ML/MIN/1.73SQ M
GLUCOSE SERPL-MCNC: 98 MG/DL (ref 65–140)
HCT VFR BLD AUTO: 30.9 % (ref 36.5–49.3)
HGB BLD-MCNC: 10.3 G/DL (ref 12–17)
LDH SERPL-CCNC: 121 U/L (ref 81–234)
LYMPHOCYTES # BLD AUTO: 0.36 THOUSANDS/ΜL (ref 0.6–4.47)
LYMPHOCYTES NFR BLD AUTO: 21 % (ref 14–44)
MCH RBC QN AUTO: 35.3 PG (ref 26.8–34.3)
MCHC RBC AUTO-ENTMCNC: 33.3 G/DL (ref 31.4–37.4)
MCV RBC AUTO: 106 FL (ref 82–98)
MONOCYTES # BLD AUTO: 0.13 THOUSAND/ΜL (ref 0.17–1.22)
MONOCYTES NFR BLD AUTO: 8 % (ref 4–12)
NEUTROPHILS # BLD AUTO: 1.1 THOUSANDS/ΜL (ref 1.85–7.62)
NEUTS SEG NFR BLD AUTO: 64 % (ref 43–75)
NRBC BLD AUTO-RTO: 0 /100 WBCS
PHOSPHATE SERPL-MCNC: 3.1 MG/DL (ref 2.3–4.1)
PLATELET # BLD AUTO: 57 THOUSANDS/UL (ref 149–390)
PMV BLD AUTO: 12.8 FL (ref 8.9–12.7)
POTASSIUM SERPL-SCNC: 4.3 MMOL/L (ref 3.5–5.3)
PROT SERPL-MCNC: 5.6 G/DL (ref 6.4–8.2)
RBC # BLD AUTO: 2.92 MILLION/UL (ref 3.88–5.62)
SODIUM SERPL-SCNC: 137 MMOL/L (ref 136–145)
URATE SERPL-MCNC: 6.8 MG/DL (ref 4.2–8)
WBC # BLD AUTO: 1.72 THOUSAND/UL (ref 4.31–10.16)

## 2017-07-12 PROCEDURE — 84550 ASSAY OF BLOOD/URIC ACID: CPT

## 2017-07-12 PROCEDURE — 36415 COLL VENOUS BLD VENIPUNCTURE: CPT

## 2017-07-12 PROCEDURE — 80053 COMPREHEN METABOLIC PANEL: CPT

## 2017-07-12 PROCEDURE — 84100 ASSAY OF PHOSPHORUS: CPT

## 2017-07-12 PROCEDURE — 85025 COMPLETE CBC W/AUTO DIFF WBC: CPT

## 2017-07-12 PROCEDURE — 83615 LACTATE (LD) (LDH) ENZYME: CPT

## 2017-07-13 RX ORDER — DEXTROSE MONOHYDRATE 50 MG/ML
20 INJECTION, SOLUTION INTRAVENOUS CONTINUOUS
Status: DISCONTINUED | OUTPATIENT
Start: 2017-07-14 | End: 2017-07-17 | Stop reason: HOSPADM

## 2017-07-14 ENCOUNTER — HOSPITAL ENCOUNTER (OUTPATIENT)
Dept: INFUSION CENTER | Facility: CLINIC | Age: 72
Discharge: HOME | End: 2017-07-14
Payer: COMMERCIAL

## 2017-07-14 VITALS
HEIGHT: 69 IN | WEIGHT: 166.45 LBS | RESPIRATION RATE: 16 BRPM | DIASTOLIC BLOOD PRESSURE: 63 MMHG | HEART RATE: 56 BPM | BODY MASS INDEX: 24.65 KG/M2 | SYSTOLIC BLOOD PRESSURE: 120 MMHG | TEMPERATURE: 96.6 F

## 2017-07-14 PROCEDURE — 96409 CHEMO IV PUSH SNGL DRUG: CPT

## 2017-07-14 PROCEDURE — 96361 HYDRATE IV INFUSION ADD-ON: CPT

## 2017-07-14 RX ADMIN — CARFILZOMIB 38 MG: 60 INJECTION, POWDER, LYOPHILIZED, FOR SOLUTION INTRAVENOUS at 09:52

## 2017-07-14 RX ADMIN — SODIUM CHLORIDE 500 ML: 0.9 INJECTION, SOLUTION INTRAVENOUS at 08:47

## 2017-07-14 RX ADMIN — DEXTROSE 20 ML/HR: 5 SOLUTION INTRAVENOUS at 09:49

## 2017-07-14 RX ADMIN — SODIUM CHLORIDE 500 ML: 0.9 INJECTION, SOLUTION INTRAVENOUS at 10:10

## 2017-07-19 ENCOUNTER — APPOINTMENT (OUTPATIENT)
Dept: LAB | Facility: CLINIC | Age: 72
End: 2017-07-19
Payer: COMMERCIAL

## 2017-07-19 ENCOUNTER — TRANSCRIBE ORDERS (OUTPATIENT)
Dept: LAB | Facility: CLINIC | Age: 72
End: 2017-07-19

## 2017-07-19 DIAGNOSIS — Z00.00 ENCOUNTER FOR GENERAL ADULT MEDICAL EXAMINATION WITHOUT ABNORMAL FINDINGS: ICD-10-CM

## 2017-07-19 DIAGNOSIS — R53.83 OTHER FATIGUE: ICD-10-CM

## 2017-07-19 DIAGNOSIS — D64.9 ANEMIA: ICD-10-CM

## 2017-07-19 DIAGNOSIS — I10 ESSENTIAL (PRIMARY) HYPERTENSION: ICD-10-CM

## 2017-07-19 DIAGNOSIS — E78.5 HYPERLIPIDEMIA: ICD-10-CM

## 2017-07-19 DIAGNOSIS — E03.9 HYPOTHYROIDISM: ICD-10-CM

## 2017-07-19 DIAGNOSIS — C90.00 MULTIPLE MYELOMA NOT HAVING ACHIEVED REMISSION (HCC): ICD-10-CM

## 2017-07-19 DIAGNOSIS — R11.0 NAUSEA: ICD-10-CM

## 2017-07-19 LAB
ALBUMIN SERPL BCP-MCNC: 3.6 G/DL (ref 3.5–5)
ALP SERPL-CCNC: 81 U/L (ref 46–116)
ALT SERPL W P-5'-P-CCNC: 23 U/L (ref 12–78)
ANION GAP SERPL CALCULATED.3IONS-SCNC: 6 MMOL/L (ref 4–13)
AST SERPL W P-5'-P-CCNC: 13 U/L (ref 5–45)
BASOPHILS # BLD AUTO: 0.01 THOUSANDS/ΜL (ref 0–0.1)
BASOPHILS NFR BLD AUTO: 0 % (ref 0–1)
BILIRUB SERPL-MCNC: 0.85 MG/DL (ref 0.2–1)
BUN SERPL-MCNC: 17 MG/DL (ref 5–25)
CALCIUM SERPL-MCNC: 8.7 MG/DL (ref 8.3–10.1)
CHLORIDE SERPL-SCNC: 101 MMOL/L (ref 100–108)
CO2 SERPL-SCNC: 29 MMOL/L (ref 21–32)
CREAT SERPL-MCNC: 1.15 MG/DL (ref 0.6–1.3)
EOSINOPHIL # BLD AUTO: 0.12 THOUSAND/ΜL (ref 0–0.61)
EOSINOPHIL NFR BLD AUTO: 4 % (ref 0–6)
ERYTHROCYTE [DISTWIDTH] IN BLOOD BY AUTOMATED COUNT: 16.2 % (ref 11.6–15.1)
GFR SERPL CREATININE-BSD FRML MDRD: >60 ML/MIN/1.73SQ M
GLUCOSE SERPL-MCNC: 83 MG/DL (ref 65–140)
HCT VFR BLD AUTO: 32.3 % (ref 36.5–49.3)
HGB BLD-MCNC: 10.6 G/DL (ref 12–17)
LDH SERPL-CCNC: 139 U/L (ref 81–234)
LYMPHOCYTES # BLD AUTO: 0.64 THOUSANDS/ΜL (ref 0.6–4.47)
LYMPHOCYTES NFR BLD AUTO: 24 % (ref 14–44)
MCH RBC QN AUTO: 35.3 PG (ref 26.8–34.3)
MCHC RBC AUTO-ENTMCNC: 32.8 G/DL (ref 31.4–37.4)
MCV RBC AUTO: 108 FL (ref 82–98)
MONOCYTES # BLD AUTO: 0.39 THOUSAND/ΜL (ref 0.17–1.22)
MONOCYTES NFR BLD AUTO: 14 % (ref 4–12)
NEUTROPHILS # BLD AUTO: 1.52 THOUSANDS/ΜL (ref 1.85–7.62)
NEUTS SEG NFR BLD AUTO: 58 % (ref 43–75)
NRBC BLD AUTO-RTO: 0 /100 WBCS
PHOSPHATE SERPL-MCNC: 3.1 MG/DL (ref 2.3–4.1)
PLATELET # BLD AUTO: 42 THOUSANDS/UL (ref 149–390)
PMV BLD AUTO: 12.4 FL (ref 8.9–12.7)
POTASSIUM SERPL-SCNC: 4 MMOL/L (ref 3.5–5.3)
PROT SERPL-MCNC: 6.2 G/DL (ref 6.4–8.2)
RBC # BLD AUTO: 3 MILLION/UL (ref 3.88–5.62)
SODIUM SERPL-SCNC: 136 MMOL/L (ref 136–145)
URATE SERPL-MCNC: 6.2 MG/DL (ref 4.2–8)
WBC # BLD AUTO: 2.7 THOUSAND/UL (ref 4.31–10.16)

## 2017-07-19 PROCEDURE — 84100 ASSAY OF PHOSPHORUS: CPT

## 2017-07-19 PROCEDURE — 36415 COLL VENOUS BLD VENIPUNCTURE: CPT

## 2017-07-19 PROCEDURE — 83615 LACTATE (LD) (LDH) ENZYME: CPT

## 2017-07-19 PROCEDURE — 84550 ASSAY OF BLOOD/URIC ACID: CPT

## 2017-07-19 PROCEDURE — 85025 COMPLETE CBC W/AUTO DIFF WBC: CPT

## 2017-07-19 PROCEDURE — 80053 COMPREHEN METABOLIC PANEL: CPT

## 2017-07-19 RX ORDER — SODIUM CHLORIDE 9 MG/ML
20 INJECTION, SOLUTION INTRAVENOUS CONTINUOUS
Status: DISCONTINUED | OUTPATIENT
Start: 2017-07-21 | End: 2017-07-21 | Stop reason: CLARIF

## 2017-07-21 ENCOUNTER — HOSPITAL ENCOUNTER (OUTPATIENT)
Dept: INFUSION CENTER | Facility: CLINIC | Age: 72
Discharge: HOME | End: 2017-07-21
Payer: COMMERCIAL

## 2017-07-21 VITALS
TEMPERATURE: 97 F | HEIGHT: 69 IN | SYSTOLIC BLOOD PRESSURE: 117 MMHG | RESPIRATION RATE: 18 BRPM | HEART RATE: 65 BPM | WEIGHT: 166.45 LBS | DIASTOLIC BLOOD PRESSURE: 71 MMHG | BODY MASS INDEX: 24.65 KG/M2

## 2017-07-21 PROCEDURE — 96413 CHEMO IV INFUSION 1 HR: CPT

## 2017-07-21 PROCEDURE — 96361 HYDRATE IV INFUSION ADD-ON: CPT

## 2017-07-21 RX ORDER — DEXTROSE MONOHYDRATE 50 MG/ML
20 INJECTION, SOLUTION INTRAVENOUS CONTINUOUS
Status: DISCONTINUED | OUTPATIENT
Start: 2017-07-21 | End: 2017-07-24 | Stop reason: HOSPADM

## 2017-07-21 RX ADMIN — CARFILZOMIB 38 MG: 60 INJECTION, POWDER, LYOPHILIZED, FOR SOLUTION INTRAVENOUS at 09:37

## 2017-07-21 RX ADMIN — SODIUM CHLORIDE 500 ML: 0.9 INJECTION, SOLUTION INTRAVENOUS at 08:40

## 2017-07-21 RX ADMIN — SODIUM CHLORIDE 500 ML: 0.9 INJECTION, SOLUTION INTRAVENOUS at 10:01

## 2017-07-21 RX ADMIN — DEXTROSE 20 ML/HR: 5 SOLUTION INTRAVENOUS at 10:18

## 2017-07-21 NOTE — PLAN OF CARE

## 2017-07-27 ENCOUNTER — GENERIC CONVERSION - ENCOUNTER (OUTPATIENT)
Dept: OTHER | Facility: OTHER | Age: 72
End: 2017-07-27

## 2017-08-02 ENCOUNTER — TRANSCRIBE ORDERS (OUTPATIENT)
Dept: LAB | Facility: CLINIC | Age: 72
End: 2017-08-02

## 2017-08-02 ENCOUNTER — APPOINTMENT (OUTPATIENT)
Dept: LAB | Facility: CLINIC | Age: 72
End: 2017-08-02
Payer: COMMERCIAL

## 2017-08-02 DIAGNOSIS — E78.5 OTHER AND UNSPECIFIED HYPERLIPIDEMIA: ICD-10-CM

## 2017-08-02 DIAGNOSIS — N08 MYELOMA KIDNEY (HCC): ICD-10-CM

## 2017-08-02 DIAGNOSIS — I15.9 SECONDARY HYPERTENSION: ICD-10-CM

## 2017-08-02 DIAGNOSIS — R11.0 NAUSEA ALONE: ICD-10-CM

## 2017-08-02 DIAGNOSIS — C90.00 MYELOMA KIDNEY (HCC): ICD-10-CM

## 2017-08-02 DIAGNOSIS — R53.83 FATIGUE, UNSPECIFIED TYPE: ICD-10-CM

## 2017-08-02 DIAGNOSIS — D64.9 ANEMIA, UNSPECIFIED: ICD-10-CM

## 2017-08-02 DIAGNOSIS — E03.9 UNSPECIFIED HYPOTHYROIDISM: ICD-10-CM

## 2017-08-02 DIAGNOSIS — D64.9 ANEMIA, UNSPECIFIED: Primary | ICD-10-CM

## 2017-08-02 LAB
ALBUMIN SERPL BCP-MCNC: 3.5 G/DL (ref 3.5–5)
ALP SERPL-CCNC: 61 U/L (ref 46–116)
ALT SERPL W P-5'-P-CCNC: 20 U/L (ref 12–78)
ANION GAP SERPL CALCULATED.3IONS-SCNC: 5 MMOL/L (ref 4–13)
AST SERPL W P-5'-P-CCNC: 9 U/L (ref 5–45)
BASOPHILS # BLD AUTO: 0.02 THOUSANDS/ΜL (ref 0–0.1)
BASOPHILS NFR BLD AUTO: 1 % (ref 0–1)
BILIRUB SERPL-MCNC: 0.95 MG/DL (ref 0.2–1)
BUN SERPL-MCNC: 22 MG/DL (ref 5–25)
CALCIUM SERPL-MCNC: 8.6 MG/DL (ref 8.3–10.1)
CHLORIDE SERPL-SCNC: 105 MMOL/L (ref 100–108)
CO2 SERPL-SCNC: 30 MMOL/L (ref 21–32)
CREAT SERPL-MCNC: 1.12 MG/DL (ref 0.6–1.3)
EOSINOPHIL # BLD AUTO: 0.08 THOUSAND/ΜL (ref 0–0.61)
EOSINOPHIL NFR BLD AUTO: 5 % (ref 0–6)
ERYTHROCYTE [DISTWIDTH] IN BLOOD BY AUTOMATED COUNT: 16.2 % (ref 11.6–15.1)
GFR SERPL CREATININE-BSD FRML MDRD: 67 ML/MIN/1.73SQ M
GLUCOSE P FAST SERPL-MCNC: 84 MG/DL (ref 65–99)
HCT VFR BLD AUTO: 33.3 % (ref 36.5–49.3)
HGB BLD-MCNC: 10.9 G/DL (ref 12–17)
LDH SERPL-CCNC: 135 U/L (ref 81–234)
LYMPHOCYTES # BLD AUTO: 0.55 THOUSANDS/ΜL (ref 0.6–4.47)
LYMPHOCYTES NFR BLD AUTO: 32 % (ref 14–44)
MCH RBC QN AUTO: 34.7 PG (ref 26.8–34.3)
MCHC RBC AUTO-ENTMCNC: 32.7 G/DL (ref 31.4–37.4)
MCV RBC AUTO: 106 FL (ref 82–98)
MONOCYTES # BLD AUTO: 0.33 THOUSAND/ΜL (ref 0.17–1.22)
MONOCYTES NFR BLD AUTO: 19 % (ref 4–12)
NEUTROPHILS # BLD AUTO: 0.75 THOUSANDS/ΜL (ref 1.85–7.62)
NEUTS SEG NFR BLD AUTO: 43 % (ref 43–75)
NRBC BLD AUTO-RTO: 0 /100 WBCS
PHOSPHATE SERPL-MCNC: 2.9 MG/DL (ref 2.3–4.1)
PLATELET # BLD AUTO: 65 THOUSANDS/UL (ref 149–390)
PMV BLD AUTO: 11.4 FL (ref 8.9–12.7)
POTASSIUM SERPL-SCNC: 4.3 MMOL/L (ref 3.5–5.3)
PROT SERPL-MCNC: 5.8 G/DL (ref 6.4–8.2)
RBC # BLD AUTO: 3.14 MILLION/UL (ref 3.88–5.62)
SODIUM SERPL-SCNC: 140 MMOL/L (ref 136–145)
URATE SERPL-MCNC: 6.3 MG/DL (ref 4.2–8)
WBC # BLD AUTO: 1.73 THOUSAND/UL (ref 4.31–10.16)

## 2017-08-02 PROCEDURE — 84100 ASSAY OF PHOSPHORUS: CPT

## 2017-08-02 PROCEDURE — 84550 ASSAY OF BLOOD/URIC ACID: CPT

## 2017-08-02 PROCEDURE — 80053 COMPREHEN METABOLIC PANEL: CPT

## 2017-08-02 PROCEDURE — 83615 LACTATE (LD) (LDH) ENZYME: CPT

## 2017-08-02 PROCEDURE — 36415 COLL VENOUS BLD VENIPUNCTURE: CPT

## 2017-08-02 PROCEDURE — 85025 COMPLETE CBC W/AUTO DIFF WBC: CPT

## 2017-08-03 RX ORDER — DEXTROSE MONOHYDRATE 50 MG/ML
20 INJECTION, SOLUTION INTRAVENOUS CONTINUOUS
Status: DISCONTINUED | OUTPATIENT
Start: 2017-08-04 | End: 2017-08-07 | Stop reason: HOSPADM

## 2017-08-04 ENCOUNTER — HOSPITAL ENCOUNTER (OUTPATIENT)
Dept: INFUSION CENTER | Facility: CLINIC | Age: 72
Discharge: HOME | End: 2017-08-04
Payer: COMMERCIAL

## 2017-08-04 VITALS
RESPIRATION RATE: 18 BRPM | SYSTOLIC BLOOD PRESSURE: 133 MMHG | TEMPERATURE: 97 F | HEART RATE: 65 BPM | DIASTOLIC BLOOD PRESSURE: 82 MMHG

## 2017-08-04 PROCEDURE — 96361 HYDRATE IV INFUSION ADD-ON: CPT

## 2017-08-04 PROCEDURE — 96372 THER/PROPH/DIAG INJ SC/IM: CPT

## 2017-08-04 PROCEDURE — 96413 CHEMO IV INFUSION 1 HR: CPT

## 2017-08-04 RX ADMIN — CARFILZOMIB 38 MG: 60 INJECTION, POWDER, LYOPHILIZED, FOR SOLUTION INTRAVENOUS at 09:35

## 2017-08-04 RX ADMIN — SODIUM CHLORIDE 500 ML: 0.9 INJECTION, SOLUTION INTRAVENOUS at 09:52

## 2017-08-04 RX ADMIN — SODIUM CHLORIDE 500 ML: 0.9 INJECTION, SOLUTION INTRAVENOUS at 08:43

## 2017-08-04 RX ADMIN — DEXTROSE 20 ML/HR: 5 SOLUTION INTRAVENOUS at 09:34

## 2017-08-04 RX ADMIN — TBO-FILGRASTIM 480 MCG: 480 INJECTION, SOLUTION SUBCUTANEOUS at 08:44

## 2017-08-04 NOTE — PROGRESS NOTES
Pt arrived to unit without complaint, pt afebrile  Pt's SDU=430, Granix administered as ordered for ANC<1000  Pt tolerated well

## 2017-08-09 ENCOUNTER — APPOINTMENT (OUTPATIENT)
Dept: LAB | Facility: CLINIC | Age: 72
End: 2017-08-09
Payer: COMMERCIAL

## 2017-08-09 ENCOUNTER — TRANSCRIBE ORDERS (OUTPATIENT)
Dept: LAB | Facility: CLINIC | Age: 72
End: 2017-08-09

## 2017-08-09 DIAGNOSIS — C90.00 MYELOMA KIDNEY (HCC): ICD-10-CM

## 2017-08-09 DIAGNOSIS — R11.0 NAUSEA ALONE: ICD-10-CM

## 2017-08-09 DIAGNOSIS — I10 ESSENTIAL HYPERTENSION, MALIGNANT: ICD-10-CM

## 2017-08-09 DIAGNOSIS — E03.9 UNSPECIFIED HYPOTHYROIDISM: ICD-10-CM

## 2017-08-09 DIAGNOSIS — E78.5 OTHER AND UNSPECIFIED HYPERLIPIDEMIA: ICD-10-CM

## 2017-08-09 DIAGNOSIS — D64.9 ANEMIA, UNSPECIFIED: ICD-10-CM

## 2017-08-09 DIAGNOSIS — R53.83 FATIGUE, UNSPECIFIED TYPE: ICD-10-CM

## 2017-08-09 DIAGNOSIS — N08 MYELOMA KIDNEY (HCC): ICD-10-CM

## 2017-08-09 DIAGNOSIS — D64.9 ANEMIA, UNSPECIFIED: Primary | ICD-10-CM

## 2017-08-09 LAB
ALBUMIN SERPL BCP-MCNC: 3.6 G/DL (ref 3.5–5)
ALP SERPL-CCNC: 64 U/L (ref 46–116)
ALT SERPL W P-5'-P-CCNC: 21 U/L (ref 12–78)
ANION GAP SERPL CALCULATED.3IONS-SCNC: 7 MMOL/L (ref 4–13)
AST SERPL W P-5'-P-CCNC: 10 U/L (ref 5–45)
BASOPHILS # BLD AUTO: 0.01 THOUSANDS/ΜL (ref 0–0.1)
BASOPHILS NFR BLD AUTO: 0 % (ref 0–1)
BILIRUB SERPL-MCNC: 1.31 MG/DL (ref 0.2–1)
BUN SERPL-MCNC: 21 MG/DL (ref 5–25)
CALCIUM SERPL-MCNC: 8.7 MG/DL (ref 8.3–10.1)
CHLORIDE SERPL-SCNC: 101 MMOL/L (ref 100–108)
CO2 SERPL-SCNC: 30 MMOL/L (ref 21–32)
CREAT SERPL-MCNC: 1.22 MG/DL (ref 0.6–1.3)
EOSINOPHIL # BLD AUTO: 0.06 THOUSAND/ΜL (ref 0–0.61)
EOSINOPHIL NFR BLD AUTO: 3 % (ref 0–6)
ERYTHROCYTE [DISTWIDTH] IN BLOOD BY AUTOMATED COUNT: 15.3 % (ref 11.6–15.1)
GFR SERPL CREATININE-BSD FRML MDRD: 60 ML/MIN/1.73SQ M
GLUCOSE SERPL-MCNC: 99 MG/DL (ref 65–140)
HCT VFR BLD AUTO: 33.5 % (ref 36.5–49.3)
HGB BLD-MCNC: 11.2 G/DL (ref 12–17)
LDH SERPL-CCNC: 121 U/L (ref 81–234)
LYMPHOCYTES # BLD AUTO: 0.56 THOUSANDS/ΜL (ref 0.6–4.47)
LYMPHOCYTES NFR BLD AUTO: 24 % (ref 14–44)
MCH RBC QN AUTO: 35.2 PG (ref 26.8–34.3)
MCHC RBC AUTO-ENTMCNC: 33.4 G/DL (ref 31.4–37.4)
MCV RBC AUTO: 105 FL (ref 82–98)
MONOCYTES # BLD AUTO: 0.53 THOUSAND/ΜL (ref 0.17–1.22)
MONOCYTES NFR BLD AUTO: 22 % (ref 4–12)
NEUTROPHILS # BLD AUTO: 1.21 THOUSANDS/ΜL (ref 1.85–7.62)
NEUTS SEG NFR BLD AUTO: 51 % (ref 43–75)
NRBC BLD AUTO-RTO: 0 /100 WBCS
PHOSPHATE SERPL-MCNC: 3.2 MG/DL (ref 2.3–4.1)
PLATELET # BLD AUTO: 36 THOUSANDS/UL (ref 149–390)
PMV BLD AUTO: 13.1 FL (ref 8.9–12.7)
POTASSIUM SERPL-SCNC: 3.9 MMOL/L (ref 3.5–5.3)
PROT SERPL-MCNC: 5.9 G/DL (ref 6.4–8.2)
RBC # BLD AUTO: 3.18 MILLION/UL (ref 3.88–5.62)
SODIUM SERPL-SCNC: 138 MMOL/L (ref 136–145)
URATE SERPL-MCNC: 6.6 MG/DL (ref 4.2–8)
WBC # BLD AUTO: 2.38 THOUSAND/UL (ref 4.31–10.16)

## 2017-08-09 PROCEDURE — 84550 ASSAY OF BLOOD/URIC ACID: CPT

## 2017-08-09 PROCEDURE — 80053 COMPREHEN METABOLIC PANEL: CPT

## 2017-08-09 PROCEDURE — 36415 COLL VENOUS BLD VENIPUNCTURE: CPT

## 2017-08-09 PROCEDURE — 84100 ASSAY OF PHOSPHORUS: CPT

## 2017-08-09 PROCEDURE — 85025 COMPLETE CBC W/AUTO DIFF WBC: CPT

## 2017-08-09 PROCEDURE — 83615 LACTATE (LD) (LDH) ENZYME: CPT

## 2017-08-10 RX ORDER — DEXTROSE MONOHYDRATE 50 MG/ML
20 INJECTION, SOLUTION INTRAVENOUS CONTINUOUS
Status: DISCONTINUED | OUTPATIENT
Start: 2017-08-11 | End: 2017-08-14 | Stop reason: HOSPADM

## 2017-08-11 ENCOUNTER — HOSPITAL ENCOUNTER (OUTPATIENT)
Dept: INFUSION CENTER | Facility: CLINIC | Age: 72
Discharge: HOME | End: 2017-08-11
Payer: COMMERCIAL

## 2017-08-11 VITALS
SYSTOLIC BLOOD PRESSURE: 123 MMHG | DIASTOLIC BLOOD PRESSURE: 68 MMHG | TEMPERATURE: 96.1 F | HEIGHT: 69 IN | WEIGHT: 164.24 LBS | RESPIRATION RATE: 16 BRPM | HEART RATE: 86 BPM | BODY MASS INDEX: 24.33 KG/M2

## 2017-08-11 PROCEDURE — 96409 CHEMO IV PUSH SNGL DRUG: CPT

## 2017-08-11 PROCEDURE — 96361 HYDRATE IV INFUSION ADD-ON: CPT

## 2017-08-11 RX ADMIN — DEXTROSE 20 ML/HR: 5 SOLUTION INTRAVENOUS at 09:57

## 2017-08-11 RX ADMIN — SODIUM CHLORIDE 500 ML: 0.9 INJECTION, SOLUTION INTRAVENOUS at 10:07

## 2017-08-11 RX ADMIN — SODIUM CHLORIDE 500 ML: 0.9 INJECTION, SOLUTION INTRAVENOUS at 08:39

## 2017-08-11 RX ADMIN — CARFILZOMIB 38 MG: 60 INJECTION, POWDER, LYOPHILIZED, FOR SOLUTION INTRAVENOUS at 09:47

## 2017-08-11 NOTE — PLAN OF CARE
Problem: INFECTION - ADULT  Goal: Absence or prevention of progression during hospitalization  INTERVENTIONS:  - Assess and monitor for signs and symptoms of infection  - Monitor lab/diagnostic results  - Monitor all insertion sites, i e  indwelling lines, tubes, and drains  - Monitor endotracheal (as able) and nasal secretions for changes in amount and color  - [place] appropriate cooling/warming therapies per order  - Administer medications as ordered  - Instruct and encourage patient and family to use good hand hygiene technique  - Identify and instruct in appropriate isolation precautions for identified infection/condition   Outcome: Progressing    Goal: Absence of fever/infection during neutropenic period  INTERVENTIONS:  - Monitor WBC  - Implement neutropenic guidelines   Outcome: Progressing

## 2017-08-11 NOTE — PROGRESS NOTES
Pt arrives with no complaints  Received hydration and Kyprolis with no adverse events  Aware of future events   Refused AVS

## 2017-08-15 ENCOUNTER — ALLSCRIPTS OFFICE VISIT (OUTPATIENT)
Dept: OTHER | Facility: OTHER | Age: 72
End: 2017-08-15

## 2017-08-15 DIAGNOSIS — R53.83 OTHER FATIGUE: ICD-10-CM

## 2017-08-15 DIAGNOSIS — E03.9 HYPOTHYROIDISM: ICD-10-CM

## 2017-08-16 ENCOUNTER — TRANSCRIBE ORDERS (OUTPATIENT)
Dept: LAB | Facility: CLINIC | Age: 72
End: 2017-08-16

## 2017-08-16 ENCOUNTER — ALLSCRIPTS OFFICE VISIT (OUTPATIENT)
Dept: OTHER | Facility: OTHER | Age: 72
End: 2017-08-16

## 2017-08-16 ENCOUNTER — APPOINTMENT (OUTPATIENT)
Dept: LAB | Facility: CLINIC | Age: 72
End: 2017-08-16
Payer: COMMERCIAL

## 2017-08-16 DIAGNOSIS — D64.9 ANEMIA, UNSPECIFIED: ICD-10-CM

## 2017-08-16 DIAGNOSIS — E78.5 HYPERLIPIDEMIA, UNSPECIFIED HYPERLIPIDEMIA TYPE: ICD-10-CM

## 2017-08-16 DIAGNOSIS — R11.0 NAUSEA: ICD-10-CM

## 2017-08-16 DIAGNOSIS — N08 MYELOMA KIDNEY (HCC): ICD-10-CM

## 2017-08-16 DIAGNOSIS — I15.9 SECONDARY HYPERTENSION: ICD-10-CM

## 2017-08-16 DIAGNOSIS — E03.9 UNSPECIFIED HYPOTHYROIDISM: ICD-10-CM

## 2017-08-16 DIAGNOSIS — C90.00 MYELOMA KIDNEY (HCC): ICD-10-CM

## 2017-08-16 DIAGNOSIS — R53.83 OTHER FATIGUE: ICD-10-CM

## 2017-08-16 DIAGNOSIS — R53.83 FATIGUE, UNSPECIFIED TYPE: Primary | ICD-10-CM

## 2017-08-16 DIAGNOSIS — R53.83 FATIGUE, UNSPECIFIED TYPE: ICD-10-CM

## 2017-08-16 LAB
ALBUMIN SERPL BCP-MCNC: 3.6 G/DL (ref 3.5–5)
ALP SERPL-CCNC: 76 U/L (ref 46–116)
ALT SERPL W P-5'-P-CCNC: 25 U/L (ref 12–78)
ANION GAP SERPL CALCULATED.3IONS-SCNC: 10 MMOL/L (ref 4–13)
AST SERPL W P-5'-P-CCNC: 11 U/L (ref 5–45)
BASOPHILS # BLD AUTO: 0.02 THOUSANDS/ΜL (ref 0–0.1)
BASOPHILS NFR BLD AUTO: 1 % (ref 0–1)
BILIRUB SERPL-MCNC: 1.2 MG/DL (ref 0.2–1)
BUN SERPL-MCNC: 25 MG/DL (ref 5–25)
CALCIUM SERPL-MCNC: 9 MG/DL (ref 8.3–10.1)
CHLORIDE SERPL-SCNC: 100 MMOL/L (ref 100–108)
CO2 SERPL-SCNC: 27 MMOL/L (ref 21–32)
CREAT SERPL-MCNC: 1.24 MG/DL (ref 0.6–1.3)
EOSINOPHIL # BLD AUTO: 0.06 THOUSAND/ΜL (ref 0–0.61)
EOSINOPHIL NFR BLD AUTO: 2 % (ref 0–6)
ERYTHROCYTE [DISTWIDTH] IN BLOOD BY AUTOMATED COUNT: 15.2 % (ref 11.6–15.1)
GFR SERPL CREATININE-BSD FRML MDRD: 59 ML/MIN/1.73SQ M
GLUCOSE P FAST SERPL-MCNC: 103 MG/DL (ref 65–99)
HCT VFR BLD AUTO: 36 % (ref 36.5–49.3)
HGB BLD-MCNC: 11.9 G/DL (ref 12–17)
LDH SERPL-CCNC: 135 U/L (ref 81–234)
LYMPHOCYTES # BLD AUTO: 0.86 THOUSANDS/ΜL (ref 0.6–4.47)
LYMPHOCYTES NFR BLD AUTO: 30 % (ref 14–44)
MCH RBC QN AUTO: 34.7 PG (ref 26.8–34.3)
MCHC RBC AUTO-ENTMCNC: 33.1 G/DL (ref 31.4–37.4)
MCV RBC AUTO: 105 FL (ref 82–98)
MONOCYTES # BLD AUTO: 0.33 THOUSAND/ΜL (ref 0.17–1.22)
MONOCYTES NFR BLD AUTO: 12 % (ref 4–12)
NEUTROPHILS # BLD AUTO: 1.57 THOUSANDS/ΜL (ref 1.85–7.62)
NEUTS SEG NFR BLD AUTO: 55 % (ref 43–75)
NRBC BLD AUTO-RTO: 0 /100 WBCS
PHOSPHATE SERPL-MCNC: 3.3 MG/DL (ref 2.3–4.1)
PLATELET # BLD AUTO: 50 THOUSANDS/UL (ref 149–390)
PMV BLD AUTO: 11.7 FL (ref 8.9–12.7)
POTASSIUM SERPL-SCNC: 4.4 MMOL/L (ref 3.5–5.3)
PROT SERPL-MCNC: 6.4 G/DL (ref 6.4–8.2)
RBC # BLD AUTO: 3.43 MILLION/UL (ref 3.88–5.62)
SODIUM SERPL-SCNC: 137 MMOL/L (ref 136–145)
URATE SERPL-MCNC: 7.2 MG/DL (ref 4.2–8)
WBC # BLD AUTO: 2.85 THOUSAND/UL (ref 4.31–10.16)

## 2017-08-16 PROCEDURE — 84550 ASSAY OF BLOOD/URIC ACID: CPT

## 2017-08-16 PROCEDURE — 85025 COMPLETE CBC W/AUTO DIFF WBC: CPT

## 2017-08-16 PROCEDURE — 83615 LACTATE (LD) (LDH) ENZYME: CPT

## 2017-08-16 PROCEDURE — 80053 COMPREHEN METABOLIC PANEL: CPT

## 2017-08-16 PROCEDURE — 84402 ASSAY OF FREE TESTOSTERONE: CPT

## 2017-08-16 PROCEDURE — 36415 COLL VENOUS BLD VENIPUNCTURE: CPT

## 2017-08-16 PROCEDURE — 84100 ASSAY OF PHOSPHORUS: CPT

## 2017-08-16 PROCEDURE — 84403 ASSAY OF TOTAL TESTOSTERONE: CPT

## 2017-08-17 LAB
TESTOST FREE SERPL-MCNC: 9.4 PG/ML (ref 6.6–18.1)
TESTOST SERPL-MCNC: 489 NG/DL (ref 264–916)

## 2017-08-17 RX ORDER — DEXTROSE MONOHYDRATE 50 MG/ML
20 INJECTION, SOLUTION INTRAVENOUS CONTINUOUS
Status: DISCONTINUED | OUTPATIENT
Start: 2017-08-18 | End: 2017-08-21 | Stop reason: HOSPADM

## 2017-08-18 ENCOUNTER — HOSPITAL ENCOUNTER (OUTPATIENT)
Dept: INFUSION CENTER | Facility: CLINIC | Age: 72
Discharge: HOME | End: 2017-08-18
Payer: COMMERCIAL

## 2017-08-18 VITALS
TEMPERATURE: 96.8 F | BODY MASS INDEX: 23.77 KG/M2 | RESPIRATION RATE: 16 BRPM | SYSTOLIC BLOOD PRESSURE: 118 MMHG | DIASTOLIC BLOOD PRESSURE: 73 MMHG | HEIGHT: 69 IN | HEART RATE: 74 BPM | WEIGHT: 160.5 LBS

## 2017-08-18 PROCEDURE — 96413 CHEMO IV INFUSION 1 HR: CPT

## 2017-08-18 PROCEDURE — 96367 TX/PROPH/DG ADDL SEQ IV INF: CPT

## 2017-08-18 PROCEDURE — 96361 HYDRATE IV INFUSION ADD-ON: CPT

## 2017-08-18 PROCEDURE — 96366 THER/PROPH/DIAG IV INF ADDON: CPT

## 2017-08-18 RX ADMIN — SODIUM CHLORIDE 500 ML: 0.9 INJECTION, SOLUTION INTRAVENOUS at 10:56

## 2017-08-18 RX ADMIN — SODIUM CHLORIDE 500 ML: 0.9 INJECTION, SOLUTION INTRAVENOUS at 08:36

## 2017-08-18 RX ADMIN — IMMUNE GLOBULIN (HUMAN) 30 G: 10 INJECTION INTRAVENOUS; SUBCUTANEOUS at 12:08

## 2017-08-18 RX ADMIN — CARFILZOMIB 90 MG: 60 INJECTION, POWDER, LYOPHILIZED, FOR SOLUTION INTRAVENOUS at 10:02

## 2017-08-18 RX ADMIN — DEXTROSE 20 ML/HR: 5 SOLUTION INTRAVENOUS at 09:44

## 2017-08-18 NOTE — PROGRESS NOTES
Patient tolerated IVIG  Denies any discomforts  Encouraged to notify physician if any issues after leaving infusion center  AVS given to patient   Aware of next appointment

## 2017-08-18 NOTE — PROGRESS NOTES
Patient tolerated Kyprolis  Denies any discomforts  IVIG initiated as ordered  Titrated as per protocal as this is first dose

## 2017-08-23 ENCOUNTER — APPOINTMENT (OUTPATIENT)
Dept: LAB | Facility: CLINIC | Age: 72
End: 2017-08-23
Payer: COMMERCIAL

## 2017-08-23 ENCOUNTER — TRANSCRIBE ORDERS (OUTPATIENT)
Dept: LAB | Facility: CLINIC | Age: 72
End: 2017-08-23

## 2017-08-23 DIAGNOSIS — E03.9 HYPOTHYROIDISM: ICD-10-CM

## 2017-08-23 DIAGNOSIS — R11.0 NAUSEA ALONE: ICD-10-CM

## 2017-08-23 DIAGNOSIS — R53.83 FATIGUE, UNSPECIFIED TYPE: ICD-10-CM

## 2017-08-23 DIAGNOSIS — I15.9 SECONDARY HYPERTENSION: ICD-10-CM

## 2017-08-23 DIAGNOSIS — E03.9 UNSPECIFIED HYPOTHYROIDISM: ICD-10-CM

## 2017-08-23 DIAGNOSIS — D64.9 ANEMIA, UNSPECIFIED: ICD-10-CM

## 2017-08-23 DIAGNOSIS — E78.5 HYPERLIPIDEMIA, UNSPECIFIED HYPERLIPIDEMIA TYPE: ICD-10-CM

## 2017-08-23 DIAGNOSIS — E03.9 UNSPECIFIED HYPOTHYROIDISM: Primary | ICD-10-CM

## 2017-08-23 DIAGNOSIS — D64.9 ANEMIA, UNSPECIFIED: Primary | ICD-10-CM

## 2017-08-23 LAB
ALBUMIN SERPL BCP-MCNC: 3.6 G/DL (ref 3.5–5)
ALP SERPL-CCNC: 76 U/L (ref 46–116)
ALT SERPL W P-5'-P-CCNC: 27 U/L (ref 12–78)
ANION GAP SERPL CALCULATED.3IONS-SCNC: 7 MMOL/L (ref 4–13)
AST SERPL W P-5'-P-CCNC: 16 U/L (ref 5–45)
BASOPHILS # BLD AUTO: 0 THOUSANDS/ΜL (ref 0–0.1)
BASOPHILS NFR BLD AUTO: 0 % (ref 0–1)
BILIRUB SERPL-MCNC: 1.23 MG/DL (ref 0.2–1)
BUN SERPL-MCNC: 29 MG/DL (ref 5–25)
CALCIUM SERPL-MCNC: 8.9 MG/DL (ref 8.3–10.1)
CHLORIDE SERPL-SCNC: 103 MMOL/L (ref 100–108)
CO2 SERPL-SCNC: 27 MMOL/L (ref 21–32)
CREAT SERPL-MCNC: 1.31 MG/DL (ref 0.6–1.3)
EOSINOPHIL # BLD AUTO: 0.03 THOUSAND/ΜL (ref 0–0.61)
EOSINOPHIL NFR BLD AUTO: 2 % (ref 0–6)
ERYTHROCYTE [DISTWIDTH] IN BLOOD BY AUTOMATED COUNT: 14.9 % (ref 11.6–15.1)
GFR SERPL CREATININE-BSD FRML MDRD: 55 ML/MIN/1.73SQ M
GLUCOSE P FAST SERPL-MCNC: 77 MG/DL (ref 65–99)
HCT VFR BLD AUTO: 36 % (ref 36.5–49.3)
HGB BLD-MCNC: 12.5 G/DL (ref 12–17)
LDH SERPL-CCNC: 142 U/L (ref 81–234)
LYMPHOCYTES # BLD AUTO: 0.37 THOUSANDS/ΜL (ref 0.6–4.47)
LYMPHOCYTES NFR BLD AUTO: 25 % (ref 14–44)
MCH RBC QN AUTO: 36 PG (ref 26.8–34.3)
MCHC RBC AUTO-ENTMCNC: 34.7 G/DL (ref 31.4–37.4)
MCV RBC AUTO: 104 FL (ref 82–98)
MONOCYTES # BLD AUTO: 0.42 THOUSAND/ΜL (ref 0.17–1.22)
MONOCYTES NFR BLD AUTO: 29 % (ref 4–12)
NEUTROPHILS # BLD AUTO: 0.64 THOUSANDS/ΜL (ref 1.85–7.62)
NEUTS SEG NFR BLD AUTO: 44 % (ref 43–75)
NRBC BLD AUTO-RTO: 0 /100 WBCS
PHOSPHATE SERPL-MCNC: 3.1 MG/DL (ref 2.3–4.1)
PLATELET # BLD AUTO: 28 THOUSANDS/UL (ref 149–390)
POTASSIUM SERPL-SCNC: 4.8 MMOL/L (ref 3.5–5.3)
PROT SERPL-MCNC: 7.1 G/DL (ref 6.4–8.2)
RBC # BLD AUTO: 3.47 MILLION/UL (ref 3.88–5.62)
SODIUM SERPL-SCNC: 137 MMOL/L (ref 136–145)
TSH SERPL DL<=0.05 MIU/L-ACNC: 11.8 UIU/ML (ref 0.36–3.74)
URATE SERPL-MCNC: 7.6 MG/DL (ref 4.2–8)
WBC # BLD AUTO: 1.47 THOUSAND/UL (ref 4.31–10.16)

## 2017-08-23 PROCEDURE — 85025 COMPLETE CBC W/AUTO DIFF WBC: CPT

## 2017-08-23 PROCEDURE — 80053 COMPREHEN METABOLIC PANEL: CPT

## 2017-08-23 PROCEDURE — 84550 ASSAY OF BLOOD/URIC ACID: CPT

## 2017-08-23 PROCEDURE — 83615 LACTATE (LD) (LDH) ENZYME: CPT

## 2017-08-23 PROCEDURE — 84100 ASSAY OF PHOSPHORUS: CPT

## 2017-08-23 PROCEDURE — 36415 COLL VENOUS BLD VENIPUNCTURE: CPT

## 2017-08-23 PROCEDURE — 84443 ASSAY THYROID STIM HORMONE: CPT

## 2017-08-24 RX ORDER — DEXTROSE MONOHYDRATE 50 MG/ML
20 INJECTION, SOLUTION INTRAVENOUS CONTINUOUS
Status: DISCONTINUED | OUTPATIENT
Start: 2017-08-25 | End: 2017-08-28 | Stop reason: HOSPADM

## 2017-08-25 ENCOUNTER — HOSPITAL ENCOUNTER (OUTPATIENT)
Dept: INFUSION CENTER | Facility: CLINIC | Age: 72
Discharge: HOME | End: 2017-08-25
Payer: COMMERCIAL

## 2017-08-25 VITALS
DIASTOLIC BLOOD PRESSURE: 79 MMHG | HEART RATE: 88 BPM | TEMPERATURE: 97 F | SYSTOLIC BLOOD PRESSURE: 102 MMHG | RESPIRATION RATE: 14 BRPM

## 2017-08-25 PROCEDURE — 96361 HYDRATE IV INFUSION ADD-ON: CPT

## 2017-08-25 PROCEDURE — 96372 THER/PROPH/DIAG INJ SC/IM: CPT

## 2017-08-25 PROCEDURE — 96413 CHEMO IV INFUSION 1 HR: CPT

## 2017-08-25 RX ORDER — AMOXICILLIN 500 MG/1
500 TABLET, FILM COATED ORAL 3 TIMES DAILY
COMMUNITY
Start: 2017-08-22 | End: 2017-08-29

## 2017-08-25 RX ADMIN — CARFILZOMIB 90 MG: 30 INJECTION, POWDER, LYOPHILIZED, FOR SOLUTION INTRAVENOUS at 09:50

## 2017-08-25 RX ADMIN — TBO-FILGRASTIM 480 MCG: 480 INJECTION, SOLUTION SUBCUTANEOUS at 11:40

## 2017-08-25 RX ADMIN — SODIUM CHLORIDE 500 ML: 0.9 INJECTION, SOLUTION INTRAVENOUS at 08:37

## 2017-08-25 RX ADMIN — DEXTROSE 20 ML/HR: 5 SOLUTION INTRAVENOUS at 09:45

## 2017-08-25 RX ADMIN — SODIUM CHLORIDE 500 ML: 0.9 INJECTION, SOLUTION INTRAVENOUS at 10:27

## 2017-08-25 NOTE — PROGRESS NOTES
Pt arrived to unit without complaint, pt afebrile  Kyprolis infused, pt tolerated without adverse reaction  Pt's FSS=244, Granix administered as ordered for ANC<1000  Pt tolerated well  AVS declined, pt aware of next appointment

## 2017-08-30 ENCOUNTER — APPOINTMENT (OUTPATIENT)
Dept: LAB | Facility: CLINIC | Age: 72
End: 2017-08-30
Payer: COMMERCIAL

## 2017-08-30 ENCOUNTER — TRANSCRIBE ORDERS (OUTPATIENT)
Dept: LAB | Facility: CLINIC | Age: 72
End: 2017-08-30

## 2017-08-30 DIAGNOSIS — D64.9 ANEMIA, UNSPECIFIED: ICD-10-CM

## 2017-08-30 DIAGNOSIS — N08 MYELOMA KIDNEY (HCC): ICD-10-CM

## 2017-08-30 DIAGNOSIS — C90.00 MYELOMA KIDNEY (HCC): ICD-10-CM

## 2017-08-30 DIAGNOSIS — R53.83 FATIGUE, UNSPECIFIED TYPE: ICD-10-CM

## 2017-08-30 DIAGNOSIS — I15.9 SECONDARY HYPERTENSION: ICD-10-CM

## 2017-08-30 DIAGNOSIS — D64.9 ANEMIA, UNSPECIFIED: Primary | ICD-10-CM

## 2017-08-30 DIAGNOSIS — R11.0 NAUSEA ALONE: ICD-10-CM

## 2017-08-30 DIAGNOSIS — E78.5 OTHER AND UNSPECIFIED HYPERLIPIDEMIA: ICD-10-CM

## 2017-08-30 DIAGNOSIS — E03.9 UNSPECIFIED HYPOTHYROIDISM: ICD-10-CM

## 2017-08-30 LAB
ALBUMIN SERPL BCP-MCNC: 3.4 G/DL (ref 3.5–5)
ALP SERPL-CCNC: 68 U/L (ref 46–116)
ALT SERPL W P-5'-P-CCNC: 38 U/L (ref 12–78)
ANION GAP SERPL CALCULATED.3IONS-SCNC: 8 MMOL/L (ref 4–13)
AST SERPL W P-5'-P-CCNC: 25 U/L (ref 5–45)
BASOPHILS # BLD AUTO: 0 THOUSANDS/ΜL (ref 0–0.1)
BASOPHILS NFR BLD AUTO: 0 % (ref 0–1)
BILIRUB SERPL-MCNC: 0.66 MG/DL (ref 0.2–1)
BUN SERPL-MCNC: 23 MG/DL (ref 5–25)
CALCIUM SERPL-MCNC: 8.7 MG/DL (ref 8.3–10.1)
CHLORIDE SERPL-SCNC: 103 MMOL/L (ref 100–108)
CO2 SERPL-SCNC: 27 MMOL/L (ref 21–32)
CREAT SERPL-MCNC: 1.31 MG/DL (ref 0.6–1.3)
EOSINOPHIL # BLD AUTO: 0.06 THOUSAND/ΜL (ref 0–0.61)
EOSINOPHIL NFR BLD AUTO: 3 % (ref 0–6)
ERYTHROCYTE [DISTWIDTH] IN BLOOD BY AUTOMATED COUNT: 15 % (ref 11.6–15.1)
GFR SERPL CREATININE-BSD FRML MDRD: 55 ML/MIN/1.73SQ M
GLUCOSE SERPL-MCNC: 98 MG/DL (ref 65–140)
HCT VFR BLD AUTO: 32.6 % (ref 36.5–49.3)
HGB BLD-MCNC: 11.3 G/DL (ref 12–17)
LDH SERPL-CCNC: 141 U/L (ref 81–234)
LYMPHOCYTES # BLD AUTO: 0.55 THOUSANDS/ΜL (ref 0.6–4.47)
LYMPHOCYTES NFR BLD AUTO: 23 % (ref 14–44)
MCH RBC QN AUTO: 35.8 PG (ref 26.8–34.3)
MCHC RBC AUTO-ENTMCNC: 34.7 G/DL (ref 31.4–37.4)
MCV RBC AUTO: 103 FL (ref 82–98)
MONOCYTES # BLD AUTO: 0.29 THOUSAND/ΜL (ref 0.17–1.22)
MONOCYTES NFR BLD AUTO: 12 % (ref 4–12)
NEUTROPHILS # BLD AUTO: 1.49 THOUSANDS/ΜL (ref 1.85–7.62)
NEUTS SEG NFR BLD AUTO: 62 % (ref 43–75)
NRBC BLD AUTO-RTO: 0 /100 WBCS
PHOSPHATE SERPL-MCNC: 3.7 MG/DL (ref 2.3–4.1)
PLATELET # BLD AUTO: 14 THOUSANDS/UL (ref 149–390)
POTASSIUM SERPL-SCNC: 4.2 MMOL/L (ref 3.5–5.3)
PROT SERPL-MCNC: 6.6 G/DL (ref 6.4–8.2)
RBC # BLD AUTO: 3.16 MILLION/UL (ref 3.88–5.62)
SODIUM SERPL-SCNC: 138 MMOL/L (ref 136–145)
URATE SERPL-MCNC: 7.1 MG/DL (ref 4.2–8)
WBC # BLD AUTO: 2.42 THOUSAND/UL (ref 4.31–10.16)

## 2017-08-30 PROCEDURE — 83615 LACTATE (LD) (LDH) ENZYME: CPT

## 2017-08-30 PROCEDURE — 84550 ASSAY OF BLOOD/URIC ACID: CPT

## 2017-08-30 PROCEDURE — 80053 COMPREHEN METABOLIC PANEL: CPT

## 2017-08-30 PROCEDURE — 36415 COLL VENOUS BLD VENIPUNCTURE: CPT

## 2017-08-30 PROCEDURE — 85025 COMPLETE CBC W/AUTO DIFF WBC: CPT

## 2017-08-30 PROCEDURE — 84100 ASSAY OF PHOSPHORUS: CPT

## 2017-08-31 RX ORDER — DEXTROSE MONOHYDRATE 50 MG/ML
20 INJECTION, SOLUTION INTRAVENOUS CONTINUOUS
Status: DISCONTINUED | OUTPATIENT
Start: 2017-09-01 | End: 2017-09-04 | Stop reason: HOSPADM

## 2017-09-01 ENCOUNTER — HOSPITAL ENCOUNTER (OUTPATIENT)
Dept: INFUSION CENTER | Facility: CLINIC | Age: 72
Discharge: HOME | End: 2017-09-01
Payer: COMMERCIAL

## 2017-09-01 VITALS
SYSTOLIC BLOOD PRESSURE: 133 MMHG | DIASTOLIC BLOOD PRESSURE: 76 MMHG | RESPIRATION RATE: 14 BRPM | TEMPERATURE: 96.8 F | HEART RATE: 63 BPM | BODY MASS INDEX: 24.33 KG/M2 | HEIGHT: 69 IN | WEIGHT: 164.24 LBS

## 2017-09-01 PROCEDURE — P9037 PLATE PHERES LEUKOREDU IRRAD: HCPCS

## 2017-09-01 PROCEDURE — 96413 CHEMO IV INFUSION 1 HR: CPT

## 2017-09-01 PROCEDURE — 96361 HYDRATE IV INFUSION ADD-ON: CPT

## 2017-09-01 RX ADMIN — DEXTROSE 20 ML/HR: 5 SOLUTION INTRAVENOUS at 10:05

## 2017-09-01 RX ADMIN — SODIUM CHLORIDE 500 ML: 0.9 INJECTION, SOLUTION INTRAVENOUS at 08:40

## 2017-09-01 RX ADMIN — SODIUM CHLORIDE 500 ML: 0.9 INJECTION, SOLUTION INTRAVENOUS at 10:56

## 2017-09-01 RX ADMIN — CARFILZOMIB 90 MG: 30 INJECTION, POWDER, LYOPHILIZED, FOR SOLUTION INTRAVENOUS at 10:19

## 2017-09-01 NOTE — PROGRESS NOTES
Labs within order parameters  Kyprolis given as ordered, pt tolerated without adverse reactions  Pts given as ordered, pt tolerated without complications  AVS declined, pt aware of next appointment

## 2017-09-02 LAB
ABO GROUP BLD BPU: NORMAL
BPU ID: NORMAL
UNIT DISPENSE STATUS: NORMAL
UNIT PRODUCT CODE: NORMAL
UNIT RH: NORMAL

## 2017-09-06 ENCOUNTER — TRANSCRIBE ORDERS (OUTPATIENT)
Dept: LAB | Facility: CLINIC | Age: 72
End: 2017-09-06

## 2017-09-06 ENCOUNTER — APPOINTMENT (OUTPATIENT)
Dept: LAB | Facility: CLINIC | Age: 72
End: 2017-09-06
Payer: COMMERCIAL

## 2017-09-06 DIAGNOSIS — R53.83 FATIGUE, UNSPECIFIED TYPE: ICD-10-CM

## 2017-09-06 DIAGNOSIS — R11.0 NAUSEA ALONE: ICD-10-CM

## 2017-09-06 DIAGNOSIS — E78.5 OTHER AND UNSPECIFIED HYPERLIPIDEMIA: ICD-10-CM

## 2017-09-06 DIAGNOSIS — C90.00 MYELOMA KIDNEY (HCC): Primary | ICD-10-CM

## 2017-09-06 DIAGNOSIS — E03.9 UNSPECIFIED HYPOTHYROIDISM: ICD-10-CM

## 2017-09-06 DIAGNOSIS — N08 MYELOMA KIDNEY (HCC): Primary | ICD-10-CM

## 2017-09-06 DIAGNOSIS — N08 MYELOMA KIDNEY (HCC): ICD-10-CM

## 2017-09-06 DIAGNOSIS — I10 ESSENTIAL HYPERTENSION, MALIGNANT: ICD-10-CM

## 2017-09-06 DIAGNOSIS — C90.00 MYELOMA KIDNEY (HCC): ICD-10-CM

## 2017-09-06 DIAGNOSIS — D64.9 ANEMIA, UNSPECIFIED: ICD-10-CM

## 2017-09-06 LAB
ALBUMIN SERPL BCP-MCNC: 3.2 G/DL (ref 3.5–5)
ALP SERPL-CCNC: 64 U/L (ref 46–116)
ALT SERPL W P-5'-P-CCNC: 44 U/L (ref 12–78)
ANION GAP SERPL CALCULATED.3IONS-SCNC: 9 MMOL/L (ref 4–13)
AST SERPL W P-5'-P-CCNC: 22 U/L (ref 5–45)
BASOPHILS # BLD AUTO: 0.01 THOUSANDS/ΜL (ref 0–0.1)
BASOPHILS NFR BLD AUTO: 1 % (ref 0–1)
BILIRUB SERPL-MCNC: 0.85 MG/DL (ref 0.2–1)
BUN SERPL-MCNC: 26 MG/DL (ref 5–25)
CALCIUM SERPL-MCNC: 8.3 MG/DL (ref 8.3–10.1)
CHLORIDE SERPL-SCNC: 106 MMOL/L (ref 100–108)
CO2 SERPL-SCNC: 25 MMOL/L (ref 21–32)
CREAT SERPL-MCNC: 1.2 MG/DL (ref 0.6–1.3)
EOSINOPHIL # BLD AUTO: 0.05 THOUSAND/ΜL (ref 0–0.61)
EOSINOPHIL NFR BLD AUTO: 3 % (ref 0–6)
ERYTHROCYTE [DISTWIDTH] IN BLOOD BY AUTOMATED COUNT: 14.7 % (ref 11.6–15.1)
GFR SERPL CREATININE-BSD FRML MDRD: 61 ML/MIN/1.73SQ M
GLUCOSE SERPL-MCNC: 103 MG/DL (ref 65–140)
HCT VFR BLD AUTO: 28.8 % (ref 36.5–49.3)
HGB BLD-MCNC: 10 G/DL (ref 12–17)
LDH SERPL-CCNC: 151 U/L (ref 81–234)
LYMPHOCYTES # BLD AUTO: 0.4 THOUSANDS/ΜL (ref 0.6–4.47)
LYMPHOCYTES NFR BLD AUTO: 27 % (ref 14–44)
MCH RBC QN AUTO: 35.8 PG (ref 26.8–34.3)
MCHC RBC AUTO-ENTMCNC: 34.7 G/DL (ref 31.4–37.4)
MCV RBC AUTO: 103 FL (ref 82–98)
MONOCYTES # BLD AUTO: 0.21 THOUSAND/ΜL (ref 0.17–1.22)
MONOCYTES NFR BLD AUTO: 14 % (ref 4–12)
NEUTROPHILS # BLD AUTO: 0.8 THOUSANDS/ΜL (ref 1.85–7.62)
NEUTS SEG NFR BLD AUTO: 55 % (ref 43–75)
NRBC BLD AUTO-RTO: 0 /100 WBCS
PHOSPHATE SERPL-MCNC: 2.9 MG/DL (ref 2.3–4.1)
PLATELET # BLD AUTO: 11 THOUSANDS/UL (ref 149–390)
POTASSIUM SERPL-SCNC: 4 MMOL/L (ref 3.5–5.3)
PROT SERPL-MCNC: 6.1 G/DL (ref 6.4–8.2)
RBC # BLD AUTO: 2.79 MILLION/UL (ref 3.88–5.62)
SODIUM SERPL-SCNC: 140 MMOL/L (ref 136–145)
TSH SERPL DL<=0.05 MIU/L-ACNC: 12.9 UIU/ML (ref 0.36–3.74)
URATE SERPL-MCNC: 7 MG/DL (ref 4.2–8)
WBC # BLD AUTO: 1.48 THOUSAND/UL (ref 4.31–10.16)

## 2017-09-06 PROCEDURE — 84550 ASSAY OF BLOOD/URIC ACID: CPT

## 2017-09-06 PROCEDURE — 85025 COMPLETE CBC W/AUTO DIFF WBC: CPT

## 2017-09-06 PROCEDURE — 84100 ASSAY OF PHOSPHORUS: CPT

## 2017-09-06 PROCEDURE — 84443 ASSAY THYROID STIM HORMONE: CPT

## 2017-09-06 PROCEDURE — 83615 LACTATE (LD) (LDH) ENZYME: CPT

## 2017-09-06 PROCEDURE — 80053 COMPREHEN METABOLIC PANEL: CPT

## 2017-09-06 PROCEDURE — 36415 COLL VENOUS BLD VENIPUNCTURE: CPT

## 2017-09-07 RX ORDER — DEXTROSE MONOHYDRATE 50 MG/ML
20 INJECTION, SOLUTION INTRAVENOUS CONTINUOUS
Status: DISCONTINUED | OUTPATIENT
Start: 2017-09-08 | End: 2017-09-07

## 2017-09-08 ENCOUNTER — HOSPITAL ENCOUNTER (OUTPATIENT)
Dept: INFUSION CENTER | Facility: CLINIC | Age: 72
Discharge: HOME | End: 2017-09-08
Payer: COMMERCIAL

## 2017-09-08 VITALS
RESPIRATION RATE: 16 BRPM | HEART RATE: 60 BPM | TEMPERATURE: 96.9 F | DIASTOLIC BLOOD PRESSURE: 88 MMHG | SYSTOLIC BLOOD PRESSURE: 156 MMHG

## 2017-09-08 PROCEDURE — 96372 THER/PROPH/DIAG INJ SC/IM: CPT

## 2017-09-08 PROCEDURE — 36430 TRANSFUSION BLD/BLD COMPNT: CPT

## 2017-09-08 PROCEDURE — P9037 PLATE PHERES LEUKOREDU IRRAD: HCPCS

## 2017-09-08 RX ADMIN — TBO-FILGRASTIM 480 MCG: 480 INJECTION, SOLUTION SUBCUTANEOUS at 10:01

## 2017-09-08 NOTE — PROGRESS NOTES
Krprolis held per order  Plts = 11  Platelets given per protocol, pt tolerated without adverse effects  ANC = 0 8  Granix given per order in abdomen, pt tolerated well  AVS declined, pt aware of next appointment

## 2017-09-13 ENCOUNTER — APPOINTMENT (OUTPATIENT)
Dept: LAB | Facility: CLINIC | Age: 72
End: 2017-09-13
Payer: COMMERCIAL

## 2017-09-13 ENCOUNTER — TRANSCRIBE ORDERS (OUTPATIENT)
Dept: LAB | Facility: CLINIC | Age: 72
End: 2017-09-13

## 2017-09-13 DIAGNOSIS — D64.9 ANEMIA, UNSPECIFIED: Primary | ICD-10-CM

## 2017-09-13 DIAGNOSIS — D64.9 ANEMIA, UNSPECIFIED: ICD-10-CM

## 2017-09-13 DIAGNOSIS — R53.83 FATIGUE, UNSPECIFIED TYPE: ICD-10-CM

## 2017-09-13 DIAGNOSIS — R11.0 NAUSEA ALONE: ICD-10-CM

## 2017-09-13 DIAGNOSIS — I10 ESSENTIAL HYPERTENSION, MALIGNANT: ICD-10-CM

## 2017-09-13 DIAGNOSIS — E03.9 UNSPECIFIED HYPOTHYROIDISM: ICD-10-CM

## 2017-09-13 DIAGNOSIS — E78.5 OTHER AND UNSPECIFIED HYPERLIPIDEMIA: ICD-10-CM

## 2017-09-13 DIAGNOSIS — C90.00 MYELOMA KIDNEY (HCC): Primary | ICD-10-CM

## 2017-09-13 DIAGNOSIS — N08 MYELOMA KIDNEY (HCC): Primary | ICD-10-CM

## 2017-09-13 DIAGNOSIS — N08 MYELOMA KIDNEY (HCC): ICD-10-CM

## 2017-09-13 DIAGNOSIS — C90.00 MYELOMA KIDNEY (HCC): ICD-10-CM

## 2017-09-13 LAB
ALBUMIN SERPL BCP-MCNC: 3.7 G/DL (ref 3.5–5)
ALP SERPL-CCNC: 83 U/L (ref 46–116)
ALT SERPL W P-5'-P-CCNC: 39 U/L (ref 12–78)
ANION GAP SERPL CALCULATED.3IONS-SCNC: 8 MMOL/L (ref 4–13)
AST SERPL W P-5'-P-CCNC: 20 U/L (ref 5–45)
BASOPHILS # BLD AUTO: 0.01 THOUSANDS/ΜL (ref 0–0.1)
BASOPHILS NFR BLD AUTO: 1 % (ref 0–1)
BILIRUB SERPL-MCNC: 1.1 MG/DL (ref 0.2–1)
BUN SERPL-MCNC: 19 MG/DL (ref 5–25)
CALCIUM SERPL-MCNC: 9.3 MG/DL (ref 8.3–10.1)
CHLORIDE SERPL-SCNC: 101 MMOL/L (ref 100–108)
CO2 SERPL-SCNC: 28 MMOL/L (ref 21–32)
CREAT SERPL-MCNC: 1.15 MG/DL (ref 0.6–1.3)
EOSINOPHIL # BLD AUTO: 0.03 THOUSAND/ΜL (ref 0–0.61)
EOSINOPHIL NFR BLD AUTO: 2 % (ref 0–6)
ERYTHROCYTE [DISTWIDTH] IN BLOOD BY AUTOMATED COUNT: 15.8 % (ref 11.6–15.1)
GFR SERPL CREATININE-BSD FRML MDRD: 65 ML/MIN/1.73SQ M
GLUCOSE SERPL-MCNC: 79 MG/DL (ref 65–140)
HCT VFR BLD AUTO: 31.2 % (ref 36.5–49.3)
HGB BLD-MCNC: 10.7 G/DL (ref 12–17)
LDH SERPL-CCNC: 167 U/L (ref 81–234)
LYMPHOCYTES # BLD AUTO: 0.38 THOUSANDS/ΜL (ref 0.6–4.47)
LYMPHOCYTES NFR BLD AUTO: 26 % (ref 14–44)
MCH RBC QN AUTO: 36.5 PG (ref 26.8–34.3)
MCHC RBC AUTO-ENTMCNC: 34.3 G/DL (ref 31.4–37.4)
MCV RBC AUTO: 107 FL (ref 82–98)
MONOCYTES # BLD AUTO: 0.31 THOUSAND/ΜL (ref 0.17–1.22)
MONOCYTES NFR BLD AUTO: 21 % (ref 4–12)
NEUTROPHILS # BLD AUTO: 0.75 THOUSANDS/ΜL (ref 1.85–7.62)
NEUTS SEG NFR BLD AUTO: 50 % (ref 43–75)
NRBC BLD AUTO-RTO: 0 /100 WBCS
PHOSPHATE SERPL-MCNC: 3.1 MG/DL (ref 2.3–4.1)
PLATELET # BLD AUTO: 35 THOUSANDS/UL (ref 149–390)
PMV BLD AUTO: 12.3 FL (ref 8.9–12.7)
POTASSIUM SERPL-SCNC: 4.1 MMOL/L (ref 3.5–5.3)
PROT SERPL-MCNC: 6.7 G/DL (ref 6.4–8.2)
RBC # BLD AUTO: 2.93 MILLION/UL (ref 3.88–5.62)
SODIUM SERPL-SCNC: 137 MMOL/L (ref 136–145)
URATE SERPL-MCNC: 8.2 MG/DL (ref 4.2–8)
WBC # BLD AUTO: 1.49 THOUSAND/UL (ref 4.31–10.16)

## 2017-09-13 PROCEDURE — 84550 ASSAY OF BLOOD/URIC ACID: CPT

## 2017-09-13 PROCEDURE — 85025 COMPLETE CBC W/AUTO DIFF WBC: CPT

## 2017-09-13 PROCEDURE — 84100 ASSAY OF PHOSPHORUS: CPT

## 2017-09-13 PROCEDURE — 80053 COMPREHEN METABOLIC PANEL: CPT

## 2017-09-13 PROCEDURE — 36415 COLL VENOUS BLD VENIPUNCTURE: CPT

## 2017-09-13 PROCEDURE — 83615 LACTATE (LD) (LDH) ENZYME: CPT

## 2017-09-14 RX ORDER — DEXTROSE MONOHYDRATE 50 MG/ML
20 INJECTION, SOLUTION INTRAVENOUS CONTINUOUS
Status: DISCONTINUED | OUTPATIENT
Start: 2017-09-15 | End: 2017-09-18 | Stop reason: HOSPADM

## 2017-09-14 RX ORDER — SODIUM CHLORIDE 9 MG/ML
20 INJECTION, SOLUTION INTRAVENOUS CONTINUOUS
Status: DISCONTINUED | OUTPATIENT
Start: 2017-09-15 | End: 2017-09-18 | Stop reason: HOSPADM

## 2017-09-15 ENCOUNTER — HOSPITAL ENCOUNTER (OUTPATIENT)
Dept: INFUSION CENTER | Facility: CLINIC | Age: 72
Discharge: HOME | End: 2017-09-15
Payer: COMMERCIAL

## 2017-09-15 VITALS
DIASTOLIC BLOOD PRESSURE: 76 MMHG | WEIGHT: 161.8 LBS | RESPIRATION RATE: 16 BRPM | HEART RATE: 65 BPM | SYSTOLIC BLOOD PRESSURE: 147 MMHG | HEIGHT: 69 IN | TEMPERATURE: 97 F | BODY MASS INDEX: 23.96 KG/M2

## 2017-09-15 PROCEDURE — 96367 TX/PROPH/DG ADDL SEQ IV INF: CPT

## 2017-09-15 PROCEDURE — 96372 THER/PROPH/DIAG INJ SC/IM: CPT

## 2017-09-15 PROCEDURE — 96366 THER/PROPH/DIAG IV INF ADDON: CPT

## 2017-09-15 PROCEDURE — 96409 CHEMO IV PUSH SNGL DRUG: CPT

## 2017-09-15 PROCEDURE — 96361 HYDRATE IV INFUSION ADD-ON: CPT

## 2017-09-15 RX ORDER — LEVOTHYROXINE SODIUM 0.05 MG/1
50 TABLET ORAL DAILY
COMMUNITY
End: 2018-02-05 | Stop reason: SDUPTHER

## 2017-09-15 RX ADMIN — CARFILZOMIB 90 MG: 30 INJECTION, POWDER, LYOPHILIZED, FOR SOLUTION INTRAVENOUS at 10:06

## 2017-09-15 RX ADMIN — IMMUNE GLOBULIN (HUMAN) 30 G: 10 INJECTION INTRAVENOUS; SUBCUTANEOUS at 12:01

## 2017-09-15 RX ADMIN — DEXTROSE 20 ML/HR: 5 SOLUTION INTRAVENOUS at 10:00

## 2017-09-15 RX ADMIN — SODIUM CHLORIDE 500 ML: 0.9 INJECTION, SOLUTION INTRAVENOUS at 10:22

## 2017-09-15 RX ADMIN — TBO-FILGRASTIM 480 MCG: 480 INJECTION, SOLUTION SUBCUTANEOUS at 14:45

## 2017-09-15 RX ADMIN — SODIUM CHLORIDE 20 ML/HR: 0.9 INJECTION, SOLUTION INTRAVENOUS at 11:23

## 2017-09-15 RX ADMIN — SODIUM CHLORIDE 500 ML: 0.9 INJECTION, SOLUTION INTRAVENOUS at 08:57

## 2017-09-15 NOTE — PLAN OF CARE
Problem: PAIN - ADULT  Goal: Verbalizes/displays adequate comfort level or baseline comfort level  Interventions:  - Encourage patient to monitor pain and request assistance  - Assess pain using appropriate pain scale  - Administer analgesics based on type and severity of pain and evaluate response  - Implement non-pharmacological measures as appropriate and evaluate response  - Consider cultural and social influences on pain and pain management  - Notify physician/advanced practitioner if interventions unsuccessful or patient reports new pain  Outcome: Progressing      Problem: INFECTION - ADULT  Goal: Absence or prevention of progression during hospitalization  INTERVENTIONS:  - Assess and monitor for signs and symptoms of infection  - Monitor lab/diagnostic results  - Monitor all insertion sites, i e  indwelling lines, tubes, and drains  - Monitor endotracheal (as able) and nasal secretions for changes in amount and color  - [place] appropriate cooling/warming therapies per order  - Administer medications as ordered  - Instruct and encourage patient and family to use good hand hygiene technique  - Identify and instruct in appropriate isolation precautions for identified infection/condition  Outcome: Progressing    Goal: Absence of fever/infection during neutropenic period  INTERVENTIONS:  - Monitor WBC  - Implement neutropenic guidelines  Outcome: Progressing      Problem: Knowledge Deficit  Goal: Patient/family/caregiver demonstrates understanding of disease process, treatment plan, medications, and discharge instructions  Complete learning assessment and assess knowledge base    Interventions:  - Provide teaching at level of understanding  - Provide teaching via preferred learning methods  Outcome: Progressing

## 2017-09-15 NOTE — PROGRESS NOTES
Pt  Tolerated Kyprolis and IVIG without adverse event  Future appointment reviewed  Declined AVS   Granix 480mg given SQ in Right side of abdomen  Copy of Labs gvien as requested

## 2017-09-15 NOTE — PROGRESS NOTES
Pt  Denies new symptoms or concerns  Labs reviewed and meet parameters for infusion ordered today    Pt  Will receive Granix per parameters as ordered

## 2017-09-20 ENCOUNTER — APPOINTMENT (OUTPATIENT)
Dept: LAB | Facility: CLINIC | Age: 72
End: 2017-09-20
Payer: COMMERCIAL

## 2017-09-20 ENCOUNTER — TRANSCRIBE ORDERS (OUTPATIENT)
Dept: LAB | Facility: CLINIC | Age: 72
End: 2017-09-20

## 2017-09-20 DIAGNOSIS — I10 ESSENTIAL HYPERTENSION, MALIGNANT: ICD-10-CM

## 2017-09-20 DIAGNOSIS — D64.9 ANEMIA, UNSPECIFIED: ICD-10-CM

## 2017-09-20 DIAGNOSIS — E03.9 UNSPECIFIED HYPOTHYROIDISM: ICD-10-CM

## 2017-09-20 DIAGNOSIS — R11.0 NAUSEA ALONE: ICD-10-CM

## 2017-09-20 DIAGNOSIS — E78.5 OTHER AND UNSPECIFIED HYPERLIPIDEMIA: ICD-10-CM

## 2017-09-20 DIAGNOSIS — N08 MYELOMA KIDNEY (HCC): Primary | ICD-10-CM

## 2017-09-20 DIAGNOSIS — R53.83 FATIGUE, UNSPECIFIED TYPE: ICD-10-CM

## 2017-09-20 DIAGNOSIS — C90.00 MYELOMA KIDNEY (HCC): ICD-10-CM

## 2017-09-20 DIAGNOSIS — N08 MYELOMA KIDNEY (HCC): ICD-10-CM

## 2017-09-20 DIAGNOSIS — C90.00 MYELOMA KIDNEY (HCC): Primary | ICD-10-CM

## 2017-09-20 LAB
ALBUMIN SERPL BCP-MCNC: 3.4 G/DL (ref 3.5–5)
ALP SERPL-CCNC: 77 U/L (ref 46–116)
ALT SERPL W P-5'-P-CCNC: 29 U/L (ref 12–78)
ANION GAP SERPL CALCULATED.3IONS-SCNC: 7 MMOL/L (ref 4–13)
AST SERPL W P-5'-P-CCNC: 19 U/L (ref 5–45)
BASOPHILS # BLD AUTO: 0 THOUSANDS/ΜL (ref 0–0.1)
BASOPHILS NFR BLD AUTO: 0 % (ref 0–1)
BILIRUB SERPL-MCNC: 1.02 MG/DL (ref 0.2–1)
BUN SERPL-MCNC: 20 MG/DL (ref 5–25)
CALCIUM SERPL-MCNC: 8.7 MG/DL (ref 8.3–10.1)
CHLORIDE SERPL-SCNC: 102 MMOL/L (ref 100–108)
CO2 SERPL-SCNC: 28 MMOL/L (ref 21–32)
CREAT SERPL-MCNC: 1.05 MG/DL (ref 0.6–1.3)
EOSINOPHIL # BLD AUTO: 0.04 THOUSAND/ΜL (ref 0–0.61)
EOSINOPHIL NFR BLD AUTO: 2 % (ref 0–6)
ERYTHROCYTE [DISTWIDTH] IN BLOOD BY AUTOMATED COUNT: 15.4 % (ref 11.6–15.1)
GFR SERPL CREATININE-BSD FRML MDRD: 72 ML/MIN/1.73SQ M
GLUCOSE SERPL-MCNC: 78 MG/DL (ref 65–140)
HCT VFR BLD AUTO: 30 % (ref 36.5–49.3)
HGB BLD-MCNC: 10.5 G/DL (ref 12–17)
LDH SERPL-CCNC: 155 U/L (ref 81–234)
LYMPHOCYTES # BLD AUTO: 0.33 THOUSANDS/ΜL (ref 0.6–4.47)
LYMPHOCYTES NFR BLD AUTO: 16 % (ref 14–44)
MCH RBC QN AUTO: 36.7 PG (ref 26.8–34.3)
MCHC RBC AUTO-ENTMCNC: 35 G/DL (ref 31.4–37.4)
MCV RBC AUTO: 105 FL (ref 82–98)
MONOCYTES # BLD AUTO: 0.21 THOUSAND/ΜL (ref 0.17–1.22)
MONOCYTES NFR BLD AUTO: 10 % (ref 4–12)
NEUTROPHILS # BLD AUTO: 1.49 THOUSANDS/ΜL (ref 1.85–7.62)
NEUTS SEG NFR BLD AUTO: 72 % (ref 43–75)
NRBC BLD AUTO-RTO: 0 /100 WBCS
PHOSPHATE SERPL-MCNC: 3.2 MG/DL (ref 2.3–4.1)
PLATELET # BLD AUTO: 13 THOUSANDS/UL (ref 149–390)
POTASSIUM SERPL-SCNC: 4.2 MMOL/L (ref 3.5–5.3)
PROT SERPL-MCNC: 6.9 G/DL (ref 6.4–8.2)
RBC # BLD AUTO: 2.86 MILLION/UL (ref 3.88–5.62)
SODIUM SERPL-SCNC: 137 MMOL/L (ref 136–145)
URATE SERPL-MCNC: 7.3 MG/DL (ref 4.2–8)
WBC # BLD AUTO: 2.08 THOUSAND/UL (ref 4.31–10.16)

## 2017-09-20 PROCEDURE — 36415 COLL VENOUS BLD VENIPUNCTURE: CPT

## 2017-09-20 PROCEDURE — 83615 LACTATE (LD) (LDH) ENZYME: CPT

## 2017-09-20 PROCEDURE — 84550 ASSAY OF BLOOD/URIC ACID: CPT

## 2017-09-20 PROCEDURE — 85025 COMPLETE CBC W/AUTO DIFF WBC: CPT

## 2017-09-20 PROCEDURE — 80053 COMPREHEN METABOLIC PANEL: CPT

## 2017-09-20 PROCEDURE — 84100 ASSAY OF PHOSPHORUS: CPT

## 2017-09-21 ENCOUNTER — GENERIC CONVERSION - ENCOUNTER (OUTPATIENT)
Dept: OTHER | Facility: OTHER | Age: 72
End: 2017-09-21

## 2017-09-21 RX ORDER — DEXTROSE MONOHYDRATE 50 MG/ML
20 INJECTION, SOLUTION INTRAVENOUS CONTINUOUS
Status: DISCONTINUED | OUTPATIENT
Start: 2017-09-22 | End: 2017-09-25 | Stop reason: HOSPADM

## 2017-09-21 RX ORDER — SODIUM CHLORIDE 9 MG/ML
20 INJECTION, SOLUTION INTRAVENOUS CONTINUOUS
Status: DISCONTINUED | OUTPATIENT
Start: 2017-09-22 | End: 2017-09-25 | Stop reason: HOSPADM

## 2017-09-22 ENCOUNTER — HOSPITAL ENCOUNTER (OUTPATIENT)
Dept: INFUSION CENTER | Facility: CLINIC | Age: 72
Discharge: HOME | End: 2017-09-22
Payer: COMMERCIAL

## 2017-09-22 VITALS
TEMPERATURE: 96 F | RESPIRATION RATE: 16 BRPM | DIASTOLIC BLOOD PRESSURE: 83 MMHG | HEART RATE: 60 BPM | SYSTOLIC BLOOD PRESSURE: 123 MMHG

## 2017-09-22 PROCEDURE — 96413 CHEMO IV INFUSION 1 HR: CPT

## 2017-09-22 PROCEDURE — 96361 HYDRATE IV INFUSION ADD-ON: CPT

## 2017-09-22 PROCEDURE — P9037 PLATE PHERES LEUKOREDU IRRAD: HCPCS

## 2017-09-22 PROCEDURE — 36430 TRANSFUSION BLD/BLD COMPNT: CPT

## 2017-09-22 RX ADMIN — SODIUM CHLORIDE 20 ML/HR: 0.9 INJECTION, SOLUTION INTRAVENOUS at 08:30

## 2017-09-22 RX ADMIN — CARFILZOMIB 90 MG: 30 INJECTION, POWDER, LYOPHILIZED, FOR SOLUTION INTRAVENOUS at 10:10

## 2017-09-22 RX ADMIN — SODIUM CHLORIDE 500 ML: 0.9 INJECTION, SOLUTION INTRAVENOUS at 08:30

## 2017-09-22 RX ADMIN — SODIUM CHLORIDE 500 ML: 0.9 INJECTION, SOLUTION INTRAVENOUS at 10:50

## 2017-09-22 RX ADMIN — DEXTROSE 20 ML/HR: 5 SOLUTION INTRAVENOUS at 09:36

## 2017-09-22 NOTE — PROGRESS NOTES
Pt  Tolerated platelets, hydration and Kyprolis without adverse event  Future appointments reviewed  AVS provided

## 2017-09-22 NOTE — PLAN OF CARE
Problem: PAIN - ADULT  Goal: Verbalizes/displays adequate comfort level or baseline comfort level  Interventions:  - Encourage patient to monitor pain and request assistance  - Assess pain using appropriate pain scale  - Administer analgesics based on type and severity of pain and evaluate response  - Implement non-pharmacological measures as appropriate and evaluate response  - Consider cultural and social influences on pain and pain management  - Notify physician/advanced practitioner if interventions unsuccessful or patient reports new pain  Outcome: Progressing      Problem: INFECTION - ADULT  Goal: Absence or prevention of progression during hospitalization  INTERVENTIONS:  - Assess and monitor for signs and symptoms of infection  - Monitor lab/diagnostic results  - Monitor all insertion sites, i e  indwelling lines, tubes, and drains  - Monitor endotracheal (as able) and nasal secretions for changes in amount and color  - [place] appropriate cooling/warming therapies per order  - Administer medications as ordered  - Instruct and encourage patient and family to use good hand hygiene technique  - Identify and instruct in appropriate isolation precautions for identified infection/condition  Outcome: Progressing    Goal: Absence of fever/infection during neutropenic period  INTERVENTIONS:  - Monitor WBC  - Implement neutropenic guidelines  Outcome: Progressing      Problem: Knowledge Deficit  Goal: Patient/family/caregiver demonstrates understanding of disease process, treatment plan, medications, and discharge instructions  Complete learning assessment and assess knowledge base    Interventions:  - Provide teaching at level of understanding  - Provide teaching via preferred learning methods  Outcome: Progressing

## 2017-09-22 NOTE — PROGRESS NOTES
Pt  Denies new symptoms or concerns including bleeding at this time  Platelets ordered today for infusion  Kyprolis ordered for infusion today  ANC 1 49  Granix will not be given today per parameters

## 2017-09-27 ENCOUNTER — TRANSCRIBE ORDERS (OUTPATIENT)
Dept: LAB | Facility: CLINIC | Age: 72
End: 2017-09-27

## 2017-09-27 ENCOUNTER — APPOINTMENT (OUTPATIENT)
Dept: LAB | Facility: CLINIC | Age: 72
End: 2017-09-27
Payer: COMMERCIAL

## 2017-09-27 DIAGNOSIS — D64.9 ANEMIA, UNSPECIFIED: ICD-10-CM

## 2017-09-27 DIAGNOSIS — R53.83 FATIGUE, UNSPECIFIED TYPE: ICD-10-CM

## 2017-09-27 DIAGNOSIS — I10 ESSENTIAL HYPERTENSION, MALIGNANT: ICD-10-CM

## 2017-09-27 DIAGNOSIS — E03.9 UNSPECIFIED HYPOTHYROIDISM: ICD-10-CM

## 2017-09-27 DIAGNOSIS — R11.0 NAUSEA ALONE: ICD-10-CM

## 2017-09-27 DIAGNOSIS — N08 MYELOMA KIDNEY (HCC): Primary | ICD-10-CM

## 2017-09-27 DIAGNOSIS — C90.00 MYELOMA KIDNEY (HCC): Primary | ICD-10-CM

## 2017-09-27 DIAGNOSIS — E78.5 OTHER AND UNSPECIFIED HYPERLIPIDEMIA: ICD-10-CM

## 2017-09-27 DIAGNOSIS — C90.00 MYELOMA KIDNEY (HCC): ICD-10-CM

## 2017-09-27 DIAGNOSIS — N08 MYELOMA KIDNEY (HCC): ICD-10-CM

## 2017-09-27 LAB
ALBUMIN SERPL BCP-MCNC: 3.5 G/DL (ref 3.5–5)
ALP SERPL-CCNC: 68 U/L (ref 46–116)
ALT SERPL W P-5'-P-CCNC: 24 U/L (ref 12–78)
ANION GAP SERPL CALCULATED.3IONS-SCNC: 6 MMOL/L (ref 4–13)
AST SERPL W P-5'-P-CCNC: 14 U/L (ref 5–45)
BASOPHILS # BLD AUTO: 0.02 THOUSANDS/ΜL (ref 0–0.1)
BASOPHILS NFR BLD AUTO: 1 % (ref 0–1)
BILIRUB SERPL-MCNC: 1.02 MG/DL (ref 0.2–1)
BUN SERPL-MCNC: 27 MG/DL (ref 5–25)
CALCIUM SERPL-MCNC: 8.7 MG/DL (ref 8.3–10.1)
CHLORIDE SERPL-SCNC: 102 MMOL/L (ref 100–108)
CO2 SERPL-SCNC: 27 MMOL/L (ref 21–32)
CREAT SERPL-MCNC: 1.19 MG/DL (ref 0.6–1.3)
EOSINOPHIL # BLD AUTO: 0.04 THOUSAND/ΜL (ref 0–0.61)
EOSINOPHIL NFR BLD AUTO: 2 % (ref 0–6)
ERYTHROCYTE [DISTWIDTH] IN BLOOD BY AUTOMATED COUNT: 14.8 % (ref 11.6–15.1)
GFR SERPL CREATININE-BSD FRML MDRD: 62 ML/MIN/1.73SQ M
GLUCOSE SERPL-MCNC: 81 MG/DL (ref 65–140)
HCT VFR BLD AUTO: 29.7 % (ref 36.5–49.3)
HGB BLD-MCNC: 10.4 G/DL (ref 12–17)
LDH SERPL-CCNC: 139 U/L (ref 81–234)
LYMPHOCYTES # BLD AUTO: 0.41 THOUSANDS/ΜL (ref 0.6–4.47)
LYMPHOCYTES NFR BLD AUTO: 24 % (ref 14–44)
MCH RBC QN AUTO: 36.4 PG (ref 26.8–34.3)
MCHC RBC AUTO-ENTMCNC: 35 G/DL (ref 31.4–37.4)
MCV RBC AUTO: 104 FL (ref 82–98)
MONOCYTES # BLD AUTO: 0.32 THOUSAND/ΜL (ref 0.17–1.22)
MONOCYTES NFR BLD AUTO: 19 % (ref 4–12)
NEUTROPHILS # BLD AUTO: 0.92 THOUSANDS/ΜL (ref 1.85–7.62)
NEUTS SEG NFR BLD AUTO: 54 % (ref 43–75)
NRBC BLD AUTO-RTO: 0 /100 WBCS
PHOSPHATE SERPL-MCNC: 3.1 MG/DL (ref 2.3–4.1)
PLATELET # BLD AUTO: 16 THOUSANDS/UL (ref 149–390)
POTASSIUM SERPL-SCNC: 4.4 MMOL/L (ref 3.5–5.3)
PROT SERPL-MCNC: 6.9 G/DL (ref 6.4–8.2)
RBC # BLD AUTO: 2.86 MILLION/UL (ref 3.88–5.62)
SODIUM SERPL-SCNC: 135 MMOL/L (ref 136–145)
URATE SERPL-MCNC: 6 MG/DL (ref 4.2–8)
WBC # BLD AUTO: 1.72 THOUSAND/UL (ref 4.31–10.16)

## 2017-09-27 PROCEDURE — 36415 COLL VENOUS BLD VENIPUNCTURE: CPT

## 2017-09-27 PROCEDURE — 83615 LACTATE (LD) (LDH) ENZYME: CPT

## 2017-09-27 PROCEDURE — 84550 ASSAY OF BLOOD/URIC ACID: CPT

## 2017-09-27 PROCEDURE — 84100 ASSAY OF PHOSPHORUS: CPT

## 2017-09-27 PROCEDURE — 85025 COMPLETE CBC W/AUTO DIFF WBC: CPT

## 2017-09-27 PROCEDURE — 80053 COMPREHEN METABOLIC PANEL: CPT

## 2017-09-28 RX ORDER — SODIUM CHLORIDE 9 MG/ML
20 INJECTION, SOLUTION INTRAVENOUS CONTINUOUS
Status: DISCONTINUED | OUTPATIENT
Start: 2017-09-29 | End: 2017-10-02 | Stop reason: HOSPADM

## 2017-09-28 RX ORDER — DEXTROSE MONOHYDRATE 50 MG/ML
20 INJECTION, SOLUTION INTRAVENOUS CONTINUOUS
Status: DISCONTINUED | OUTPATIENT
Start: 2017-09-29 | End: 2017-10-02 | Stop reason: HOSPADM

## 2017-09-29 ENCOUNTER — HOSPITAL ENCOUNTER (OUTPATIENT)
Dept: INFUSION CENTER | Facility: CLINIC | Age: 72
Discharge: HOME | End: 2017-09-29
Payer: COMMERCIAL

## 2017-09-29 VITALS
SYSTOLIC BLOOD PRESSURE: 135 MMHG | RESPIRATION RATE: 18 BRPM | DIASTOLIC BLOOD PRESSURE: 70 MMHG | HEART RATE: 65 BPM | TEMPERATURE: 96.4 F

## 2017-09-29 PROCEDURE — 36430 TRANSFUSION BLD/BLD COMPNT: CPT

## 2017-09-29 PROCEDURE — 96372 THER/PROPH/DIAG INJ SC/IM: CPT

## 2017-09-29 PROCEDURE — 96361 HYDRATE IV INFUSION ADD-ON: CPT

## 2017-09-29 PROCEDURE — P9037 PLATE PHERES LEUKOREDU IRRAD: HCPCS

## 2017-09-29 PROCEDURE — 96413 CHEMO IV INFUSION 1 HR: CPT

## 2017-09-29 RX ADMIN — SODIUM CHLORIDE 500 ML: 0.9 INJECTION, SOLUTION INTRAVENOUS at 11:12

## 2017-09-29 RX ADMIN — DEXTROSE 20 ML/HR: 5 SOLUTION INTRAVENOUS at 10:11

## 2017-09-29 RX ADMIN — TBO-FILGRASTIM 480 MCG: 480 INJECTION, SOLUTION SUBCUTANEOUS at 12:19

## 2017-09-29 RX ADMIN — SODIUM CHLORIDE 500 ML: 0.9 INJECTION, SOLUTION INTRAVENOUS at 09:10

## 2017-09-29 RX ADMIN — CARFILZOMIB 90 MG: 30 INJECTION, POWDER, LYOPHILIZED, FOR SOLUTION INTRAVENOUS at 10:23

## 2017-09-29 NOTE — PLAN OF CARE

## 2017-09-29 NOTE — PROGRESS NOTES
Tolerated chemo & platelet infusion well, Granix 480mcg given as ordered in R abdomen for anc of 920  Will return for next chemo as scheduled

## 2017-10-04 ENCOUNTER — APPOINTMENT (OUTPATIENT)
Dept: LAB | Facility: CLINIC | Age: 72
End: 2017-10-04
Payer: COMMERCIAL

## 2017-10-04 ENCOUNTER — TRANSCRIBE ORDERS (OUTPATIENT)
Dept: LAB | Facility: CLINIC | Age: 72
End: 2017-10-04

## 2017-10-04 DIAGNOSIS — E85.9 MYELOMA ASSOCIATED AMYLOIDOSIS (HCC): ICD-10-CM

## 2017-10-04 DIAGNOSIS — R53.83 FATIGUE, UNSPECIFIED TYPE: ICD-10-CM

## 2017-10-04 DIAGNOSIS — I10 ESSENTIAL HYPERTENSION, MALIGNANT: ICD-10-CM

## 2017-10-04 DIAGNOSIS — E03.9 MYXEDEMA HEART DISEASE: ICD-10-CM

## 2017-10-04 DIAGNOSIS — R11.0 NAUSEA: ICD-10-CM

## 2017-10-04 DIAGNOSIS — I51.9 MYXEDEMA HEART DISEASE: ICD-10-CM

## 2017-10-04 DIAGNOSIS — E78.5 HYPERLIPIDEMIA, UNSPECIFIED HYPERLIPIDEMIA TYPE: ICD-10-CM

## 2017-10-04 DIAGNOSIS — C90.00 MYELOMA ASSOCIATED AMYLOIDOSIS (HCC): ICD-10-CM

## 2017-10-04 DIAGNOSIS — E85.9 MYELOMA ASSOCIATED AMYLOIDOSIS (HCC): Primary | ICD-10-CM

## 2017-10-04 DIAGNOSIS — C90.00 MYELOMA ASSOCIATED AMYLOIDOSIS (HCC): Primary | ICD-10-CM

## 2017-10-04 DIAGNOSIS — D64.9 ANEMIA, UNSPECIFIED TYPE: ICD-10-CM

## 2017-10-04 LAB
ALBUMIN SERPL BCP-MCNC: 3.3 G/DL (ref 3.5–5)
ALP SERPL-CCNC: 68 U/L (ref 46–116)
ALT SERPL W P-5'-P-CCNC: 15 U/L (ref 12–78)
ANION GAP SERPL CALCULATED.3IONS-SCNC: 7 MMOL/L (ref 4–13)
AST SERPL W P-5'-P-CCNC: 16 U/L (ref 5–45)
BASOPHILS # BLD AUTO: 0.01 THOUSANDS/ΜL (ref 0–0.1)
BASOPHILS NFR BLD AUTO: 1 % (ref 0–1)
BILIRUB SERPL-MCNC: 0.71 MG/DL (ref 0.2–1)
BUN SERPL-MCNC: 23 MG/DL (ref 5–25)
CALCIUM SERPL-MCNC: 8.4 MG/DL (ref 8.3–10.1)
CHLORIDE SERPL-SCNC: 104 MMOL/L (ref 100–108)
CO2 SERPL-SCNC: 26 MMOL/L (ref 21–32)
CREAT SERPL-MCNC: 1.17 MG/DL (ref 0.6–1.3)
EOSINOPHIL # BLD AUTO: 0.06 THOUSAND/ΜL (ref 0–0.61)
EOSINOPHIL NFR BLD AUTO: 3 % (ref 0–6)
ERYTHROCYTE [DISTWIDTH] IN BLOOD BY AUTOMATED COUNT: 14.4 % (ref 11.6–15.1)
GFR SERPL CREATININE-BSD FRML MDRD: 63 ML/MIN/1.73SQ M
GLUCOSE SERPL-MCNC: 114 MG/DL (ref 65–140)
HCT VFR BLD AUTO: 29 % (ref 36.5–49.3)
HGB BLD-MCNC: 10.2 G/DL (ref 12–17)
LDH SERPL-CCNC: 181 U/L (ref 81–234)
LYMPHOCYTES # BLD AUTO: 0.34 THOUSANDS/ΜL (ref 0.6–4.47)
LYMPHOCYTES NFR BLD AUTO: 17 % (ref 14–44)
MCH RBC QN AUTO: 37.2 PG (ref 26.8–34.3)
MCHC RBC AUTO-ENTMCNC: 35.2 G/DL (ref 31.4–37.4)
MCV RBC AUTO: 106 FL (ref 82–98)
MONOCYTES # BLD AUTO: 0.44 THOUSAND/ΜL (ref 0.17–1.22)
MONOCYTES NFR BLD AUTO: 22 % (ref 4–12)
NEUTROPHILS # BLD AUTO: 1.13 THOUSANDS/ΜL (ref 1.85–7.62)
NEUTS SEG NFR BLD AUTO: 57 % (ref 43–75)
NRBC BLD AUTO-RTO: 0 /100 WBCS
PHOSPHATE SERPL-MCNC: 3.6 MG/DL (ref 2.3–4.1)
PLATELET # BLD AUTO: 23 THOUSANDS/UL (ref 149–390)
PMV BLD AUTO: 12.2 FL (ref 8.9–12.7)
POTASSIUM SERPL-SCNC: 4.4 MMOL/L (ref 3.5–5.3)
PROT SERPL-MCNC: 6.6 G/DL (ref 6.4–8.2)
RBC # BLD AUTO: 2.74 MILLION/UL (ref 3.88–5.62)
SODIUM SERPL-SCNC: 137 MMOL/L (ref 136–145)
URATE SERPL-MCNC: 6.2 MG/DL (ref 4.2–8)
WBC # BLD AUTO: 1.99 THOUSAND/UL (ref 4.31–10.16)

## 2017-10-04 PROCEDURE — 84550 ASSAY OF BLOOD/URIC ACID: CPT

## 2017-10-04 PROCEDURE — 36415 COLL VENOUS BLD VENIPUNCTURE: CPT

## 2017-10-04 PROCEDURE — 84100 ASSAY OF PHOSPHORUS: CPT

## 2017-10-04 PROCEDURE — 80053 COMPREHEN METABOLIC PANEL: CPT

## 2017-10-04 PROCEDURE — 85025 COMPLETE CBC W/AUTO DIFF WBC: CPT

## 2017-10-04 PROCEDURE — 83615 LACTATE (LD) (LDH) ENZYME: CPT

## 2017-10-05 DIAGNOSIS — E03.9 HYPOTHYROIDISM: ICD-10-CM

## 2017-10-05 RX ORDER — DEXTROSE MONOHYDRATE 50 MG/ML
20 INJECTION, SOLUTION INTRAVENOUS CONTINUOUS
Status: DISCONTINUED | OUTPATIENT
Start: 2017-10-06 | End: 2017-10-09 | Stop reason: HOSPADM

## 2017-10-06 ENCOUNTER — HOSPITAL ENCOUNTER (OUTPATIENT)
Dept: INFUSION CENTER | Facility: CLINIC | Age: 72
Discharge: HOME | End: 2017-10-06
Payer: COMMERCIAL

## 2017-10-06 VITALS
HEART RATE: 60 BPM | TEMPERATURE: 97 F | DIASTOLIC BLOOD PRESSURE: 70 MMHG | RESPIRATION RATE: 16 BRPM | WEIGHT: 166.78 LBS | HEIGHT: 69 IN | SYSTOLIC BLOOD PRESSURE: 128 MMHG | BODY MASS INDEX: 24.7 KG/M2

## 2017-10-06 PROCEDURE — 96413 CHEMO IV INFUSION 1 HR: CPT

## 2017-10-06 PROCEDURE — 96361 HYDRATE IV INFUSION ADD-ON: CPT

## 2017-10-06 PROCEDURE — P9037 PLATE PHERES LEUKOREDU IRRAD: HCPCS

## 2017-10-06 PROCEDURE — 36430 TRANSFUSION BLD/BLD COMPNT: CPT

## 2017-10-06 RX ADMIN — CARFILZOMIB 90 MG: 30 INJECTION, POWDER, LYOPHILIZED, FOR SOLUTION INTRAVENOUS at 10:09

## 2017-10-06 RX ADMIN — DEXTROSE 20 ML/HR: 5 SOLUTION INTRAVENOUS at 10:09

## 2017-10-06 RX ADMIN — SODIUM CHLORIDE 500 ML: 0.9 INJECTION, SOLUTION INTRAVENOUS at 10:50

## 2017-10-06 RX ADMIN — SODIUM CHLORIDE 500 ML: 0.9 INJECTION, SOLUTION INTRAVENOUS at 09:05

## 2017-10-06 NOTE — PROGRESS NOTES
Patient tolerated transfusion, chemotherapy and hydration well with no complications  Pt is aware of next appointment   Refused AVS

## 2017-10-11 ENCOUNTER — TRANSCRIBE ORDERS (OUTPATIENT)
Dept: LAB | Facility: CLINIC | Age: 72
End: 2017-10-11

## 2017-10-11 ENCOUNTER — APPOINTMENT (OUTPATIENT)
Dept: LAB | Facility: CLINIC | Age: 72
End: 2017-10-11
Payer: COMMERCIAL

## 2017-10-11 DIAGNOSIS — R53.83 OTHER FATIGUE: ICD-10-CM

## 2017-10-11 DIAGNOSIS — I51.9 MYXEDEMA HEART DISEASE: ICD-10-CM

## 2017-10-11 DIAGNOSIS — E03.9 MYXEDEMA HEART DISEASE: ICD-10-CM

## 2017-10-11 DIAGNOSIS — C90.00 MYELOMA ASSOCIATED AMYLOIDOSIS (HCC): ICD-10-CM

## 2017-10-11 DIAGNOSIS — I10 ESSENTIAL HYPERTENSION, MALIGNANT: ICD-10-CM

## 2017-10-11 DIAGNOSIS — E78.5 HYPERLIPIDEMIA, UNSPECIFIED HYPERLIPIDEMIA TYPE: ICD-10-CM

## 2017-10-11 DIAGNOSIS — E85.9 MYELOMA ASSOCIATED AMYLOIDOSIS (HCC): ICD-10-CM

## 2017-10-11 DIAGNOSIS — R11.0 NAUSEA: ICD-10-CM

## 2017-10-11 DIAGNOSIS — D64.9 ANEMIA, UNSPECIFIED TYPE: ICD-10-CM

## 2017-10-11 DIAGNOSIS — D64.9 ANEMIA, UNSPECIFIED TYPE: Primary | ICD-10-CM

## 2017-10-11 LAB
ALBUMIN SERPL BCP-MCNC: 3.1 G/DL (ref 3.5–5)
ALP SERPL-CCNC: 59 U/L (ref 46–116)
ALT SERPL W P-5'-P-CCNC: 22 U/L (ref 12–78)
ANION GAP SERPL CALCULATED.3IONS-SCNC: 8 MMOL/L (ref 4–13)
AST SERPL W P-5'-P-CCNC: 15 U/L (ref 5–45)
BASOPHILS # BLD AUTO: 0 THOUSANDS/ΜL (ref 0–0.1)
BASOPHILS NFR BLD AUTO: 0 % (ref 0–1)
BILIRUB SERPL-MCNC: 0.84 MG/DL (ref 0.2–1)
BUN SERPL-MCNC: 18 MG/DL (ref 5–25)
CALCIUM SERPL-MCNC: 8.1 MG/DL (ref 8.3–10.1)
CHLORIDE SERPL-SCNC: 105 MMOL/L (ref 100–108)
CO2 SERPL-SCNC: 27 MMOL/L (ref 21–32)
CREAT SERPL-MCNC: 1.06 MG/DL (ref 0.6–1.3)
EOSINOPHIL # BLD AUTO: 0.06 THOUSAND/ΜL (ref 0–0.61)
EOSINOPHIL NFR BLD AUTO: 4 % (ref 0–6)
ERYTHROCYTE [DISTWIDTH] IN BLOOD BY AUTOMATED COUNT: 14.5 % (ref 11.6–15.1)
GFR SERPL CREATININE-BSD FRML MDRD: 71 ML/MIN/1.73SQ M
GLUCOSE P FAST SERPL-MCNC: 81 MG/DL (ref 65–99)
HCT VFR BLD AUTO: 27.5 % (ref 36.5–49.3)
HGB BLD-MCNC: 9.4 G/DL (ref 12–17)
LDH SERPL-CCNC: 140 U/L (ref 81–234)
LYMPHOCYTES # BLD AUTO: 0.27 THOUSANDS/ΜL (ref 0.6–4.47)
LYMPHOCYTES NFR BLD AUTO: 19 % (ref 14–44)
MCH RBC QN AUTO: 36.4 PG (ref 26.8–34.3)
MCHC RBC AUTO-ENTMCNC: 34.2 G/DL (ref 31.4–37.4)
MCV RBC AUTO: 107 FL (ref 82–98)
MONOCYTES # BLD AUTO: 0.12 THOUSAND/ΜL (ref 0.17–1.22)
MONOCYTES NFR BLD AUTO: 9 % (ref 4–12)
NEUTROPHILS # BLD AUTO: 0.96 THOUSANDS/ΜL (ref 1.85–7.62)
NEUTS SEG NFR BLD AUTO: 68 % (ref 43–75)
NRBC BLD AUTO-RTO: 0 /100 WBCS
PHOSPHATE SERPL-MCNC: 3.1 MG/DL (ref 2.3–4.1)
PLATELET # BLD AUTO: 21 THOUSANDS/UL (ref 149–390)
PMV BLD AUTO: 12.3 FL (ref 8.9–12.7)
POTASSIUM SERPL-SCNC: 4.2 MMOL/L (ref 3.5–5.3)
PROT SERPL-MCNC: 6.1 G/DL (ref 6.4–8.2)
RBC # BLD AUTO: 2.58 MILLION/UL (ref 3.88–5.62)
SODIUM SERPL-SCNC: 140 MMOL/L (ref 136–145)
TSH SERPL DL<=0.05 MIU/L-ACNC: 4.57 UIU/ML (ref 0.36–3.74)
URATE SERPL-MCNC: 6.4 MG/DL (ref 4.2–8)
WBC # BLD AUTO: 1.42 THOUSAND/UL (ref 4.31–10.16)

## 2017-10-11 PROCEDURE — 85025 COMPLETE CBC W/AUTO DIFF WBC: CPT

## 2017-10-11 PROCEDURE — 84100 ASSAY OF PHOSPHORUS: CPT

## 2017-10-11 PROCEDURE — 80053 COMPREHEN METABOLIC PANEL: CPT

## 2017-10-11 PROCEDURE — 84443 ASSAY THYROID STIM HORMONE: CPT

## 2017-10-11 PROCEDURE — 83615 LACTATE (LD) (LDH) ENZYME: CPT

## 2017-10-11 PROCEDURE — 36415 COLL VENOUS BLD VENIPUNCTURE: CPT

## 2017-10-11 PROCEDURE — 84550 ASSAY OF BLOOD/URIC ACID: CPT

## 2017-10-12 RX ORDER — SODIUM CHLORIDE 9 MG/ML
20 INJECTION, SOLUTION INTRAVENOUS CONTINUOUS
Status: DISCONTINUED | OUTPATIENT
Start: 2017-10-13 | End: 2017-10-16 | Stop reason: HOSPADM

## 2017-10-12 RX ORDER — DEXTROSE MONOHYDRATE 50 MG/ML
20 INJECTION, SOLUTION INTRAVENOUS CONTINUOUS
Status: DISCONTINUED | OUTPATIENT
Start: 2017-10-13 | End: 2017-10-16 | Stop reason: HOSPADM

## 2017-10-13 ENCOUNTER — HOSPITAL ENCOUNTER (OUTPATIENT)
Dept: INFUSION CENTER | Facility: CLINIC | Age: 72
Discharge: HOME | End: 2017-10-13
Payer: COMMERCIAL

## 2017-10-13 VITALS
TEMPERATURE: 97.1 F | RESPIRATION RATE: 16 BRPM | HEART RATE: 57 BPM | DIASTOLIC BLOOD PRESSURE: 80 MMHG | SYSTOLIC BLOOD PRESSURE: 125 MMHG

## 2017-10-13 PROCEDURE — 96372 THER/PROPH/DIAG INJ SC/IM: CPT

## 2017-10-13 PROCEDURE — 96413 CHEMO IV INFUSION 1 HR: CPT

## 2017-10-13 PROCEDURE — 36430 TRANSFUSION BLD/BLD COMPNT: CPT

## 2017-10-13 PROCEDURE — 96361 HYDRATE IV INFUSION ADD-ON: CPT

## 2017-10-13 PROCEDURE — 96367 TX/PROPH/DG ADDL SEQ IV INF: CPT

## 2017-10-13 PROCEDURE — P9037 PLATE PHERES LEUKOREDU IRRAD: HCPCS

## 2017-10-13 PROCEDURE — 96366 THER/PROPH/DIAG IV INF ADDON: CPT

## 2017-10-13 RX ADMIN — CARFILZOMIB 90 MG: 30 INJECTION, POWDER, LYOPHILIZED, FOR SOLUTION INTRAVENOUS at 12:32

## 2017-10-13 RX ADMIN — IMMUNE GLOBULIN (HUMAN) 30 G: 10 INJECTION INTRAVENOUS; SUBCUTANEOUS at 08:58

## 2017-10-13 RX ADMIN — SODIUM CHLORIDE 500 ML: 0.9 INJECTION, SOLUTION INTRAVENOUS at 11:30

## 2017-10-13 RX ADMIN — SODIUM CHLORIDE 500 ML: 0.9 INJECTION, SOLUTION INTRAVENOUS at 13:05

## 2017-10-13 RX ADMIN — TBO-FILGRASTIM 480 MCG: 480 INJECTION, SOLUTION SUBCUTANEOUS at 13:43

## 2017-10-13 RX ADMIN — DEXTROSE 20 ML/HR: 5 SOLUTION INTRAVENOUS at 12:32

## 2017-10-13 RX ADMIN — SODIUM CHLORIDE 20 ML/HR: 0.9 INJECTION, SOLUTION INTRAVENOUS at 08:35

## 2017-10-13 NOTE — PROGRESS NOTES
Patient tolerated transfusion of 1 unit of platelets, IVIG, and Kyprolis  Pt offers no complaints  Patient and wife are aware of next appointment

## 2017-10-18 ENCOUNTER — APPOINTMENT (OUTPATIENT)
Dept: LAB | Facility: CLINIC | Age: 72
End: 2017-10-18
Payer: COMMERCIAL

## 2017-10-18 ENCOUNTER — TRANSCRIBE ORDERS (OUTPATIENT)
Dept: LAB | Facility: CLINIC | Age: 72
End: 2017-10-18

## 2017-10-18 DIAGNOSIS — E03.9 MYXEDEMA HEART DISEASE: ICD-10-CM

## 2017-10-18 DIAGNOSIS — R11.0 NAUSEA: ICD-10-CM

## 2017-10-18 DIAGNOSIS — E78.5 HYPERLIPIDEMIA, UNSPECIFIED HYPERLIPIDEMIA TYPE: ICD-10-CM

## 2017-10-18 DIAGNOSIS — R53.83 FATIGUE, UNSPECIFIED TYPE: ICD-10-CM

## 2017-10-18 DIAGNOSIS — C90.00 MYELOMA ASSOCIATED AMYLOIDOSIS (HCC): Primary | ICD-10-CM

## 2017-10-18 DIAGNOSIS — D64.9 ANEMIA, UNSPECIFIED TYPE: ICD-10-CM

## 2017-10-18 DIAGNOSIS — C90.00 MYELOMA ASSOCIATED AMYLOIDOSIS (HCC): ICD-10-CM

## 2017-10-18 DIAGNOSIS — I51.9 MYXEDEMA HEART DISEASE: ICD-10-CM

## 2017-10-18 DIAGNOSIS — E85.9 MYELOMA ASSOCIATED AMYLOIDOSIS (HCC): ICD-10-CM

## 2017-10-18 DIAGNOSIS — I10 ESSENTIAL HYPERTENSION, MALIGNANT: ICD-10-CM

## 2017-10-18 DIAGNOSIS — E85.9 MYELOMA ASSOCIATED AMYLOIDOSIS (HCC): Primary | ICD-10-CM

## 2017-10-18 LAB
ALBUMIN SERPL BCP-MCNC: 3.5 G/DL (ref 3.5–5)
ALP SERPL-CCNC: 71 U/L (ref 46–116)
ALT SERPL W P-5'-P-CCNC: 28 U/L (ref 12–78)
ANION GAP SERPL CALCULATED.3IONS-SCNC: 8 MMOL/L (ref 4–13)
AST SERPL W P-5'-P-CCNC: 17 U/L (ref 5–45)
BASOPHILS # BLD AUTO: 0.01 THOUSANDS/ΜL (ref 0–0.1)
BASOPHILS NFR BLD AUTO: 1 % (ref 0–1)
BILIRUB SERPL-MCNC: 1.06 MG/DL (ref 0.2–1)
BUN SERPL-MCNC: 20 MG/DL (ref 5–25)
CALCIUM SERPL-MCNC: 8.5 MG/DL (ref 8.3–10.1)
CHLORIDE SERPL-SCNC: 102 MMOL/L (ref 100–108)
CO2 SERPL-SCNC: 27 MMOL/L (ref 21–32)
CREAT SERPL-MCNC: 1.14 MG/DL (ref 0.6–1.3)
EOSINOPHIL # BLD AUTO: 0.05 THOUSAND/ΜL (ref 0–0.61)
EOSINOPHIL NFR BLD AUTO: 3 % (ref 0–6)
ERYTHROCYTE [DISTWIDTH] IN BLOOD BY AUTOMATED COUNT: 14 % (ref 11.6–15.1)
GFR SERPL CREATININE-BSD FRML MDRD: 65 ML/MIN/1.73SQ M
GLUCOSE P FAST SERPL-MCNC: 84 MG/DL (ref 65–99)
HCT VFR BLD AUTO: 30.8 % (ref 36.5–49.3)
HGB BLD-MCNC: 10.3 G/DL (ref 12–17)
LDH SERPL-CCNC: 161 U/L (ref 81–234)
LYMPHOCYTES # BLD AUTO: 0.46 THOUSANDS/ΜL (ref 0.6–4.47)
LYMPHOCYTES NFR BLD AUTO: 24 % (ref 14–44)
MCH RBC QN AUTO: 36.1 PG (ref 26.8–34.3)
MCHC RBC AUTO-ENTMCNC: 33.4 G/DL (ref 31.4–37.4)
MCV RBC AUTO: 108 FL (ref 82–98)
MONOCYTES # BLD AUTO: 0.47 THOUSAND/ΜL (ref 0.17–1.22)
MONOCYTES NFR BLD AUTO: 25 % (ref 4–12)
NEUTROPHILS # BLD AUTO: 0.92 THOUSANDS/ΜL (ref 1.85–7.62)
NEUTS SEG NFR BLD AUTO: 47 % (ref 43–75)
NRBC BLD AUTO-RTO: 0 /100 WBCS
PHOSPHATE SERPL-MCNC: 3.2 MG/DL (ref 2.3–4.1)
PLATELET # BLD AUTO: 15 THOUSANDS/UL (ref 149–390)
POTASSIUM SERPL-SCNC: 4.3 MMOL/L (ref 3.5–5.3)
PROT SERPL-MCNC: 7 G/DL (ref 6.4–8.2)
RBC # BLD AUTO: 2.85 MILLION/UL (ref 3.88–5.62)
SODIUM SERPL-SCNC: 137 MMOL/L (ref 136–145)
URATE SERPL-MCNC: 6 MG/DL (ref 4.2–8)
WBC # BLD AUTO: 1.92 THOUSAND/UL (ref 4.31–10.16)

## 2017-10-18 PROCEDURE — 84100 ASSAY OF PHOSPHORUS: CPT

## 2017-10-18 PROCEDURE — 36415 COLL VENOUS BLD VENIPUNCTURE: CPT

## 2017-10-18 PROCEDURE — 80053 COMPREHEN METABOLIC PANEL: CPT

## 2017-10-18 PROCEDURE — 84550 ASSAY OF BLOOD/URIC ACID: CPT

## 2017-10-18 PROCEDURE — 85025 COMPLETE CBC W/AUTO DIFF WBC: CPT

## 2017-10-18 PROCEDURE — 83615 LACTATE (LD) (LDH) ENZYME: CPT

## 2017-10-19 RX ORDER — SODIUM CHLORIDE 9 MG/ML
20 INJECTION, SOLUTION INTRAVENOUS CONTINUOUS
Status: DISCONTINUED | OUTPATIENT
Start: 2017-10-20 | End: 2017-10-23 | Stop reason: HOSPADM

## 2017-10-19 RX ORDER — DEXTROSE MONOHYDRATE 50 MG/ML
20 INJECTION, SOLUTION INTRAVENOUS CONTINUOUS
Status: DISCONTINUED | OUTPATIENT
Start: 2017-10-20 | End: 2017-10-23 | Stop reason: HOSPADM

## 2017-10-20 ENCOUNTER — HOSPITAL ENCOUNTER (OUTPATIENT)
Dept: INFUSION CENTER | Facility: CLINIC | Age: 72
Discharge: HOME | End: 2017-10-20
Payer: COMMERCIAL

## 2017-10-20 VITALS
HEART RATE: 80 BPM | SYSTOLIC BLOOD PRESSURE: 150 MMHG | TEMPERATURE: 97 F | DIASTOLIC BLOOD PRESSURE: 90 MMHG | RESPIRATION RATE: 16 BRPM

## 2017-10-20 PROCEDURE — 96413 CHEMO IV INFUSION 1 HR: CPT

## 2017-10-20 PROCEDURE — P9037 PLATE PHERES LEUKOREDU IRRAD: HCPCS

## 2017-10-20 PROCEDURE — 96361 HYDRATE IV INFUSION ADD-ON: CPT

## 2017-10-20 PROCEDURE — 96372 THER/PROPH/DIAG INJ SC/IM: CPT

## 2017-10-20 PROCEDURE — 36430 TRANSFUSION BLD/BLD COMPNT: CPT

## 2017-10-20 RX ADMIN — CARFILZOMIB 90 MG: 30 INJECTION, POWDER, LYOPHILIZED, FOR SOLUTION INTRAVENOUS at 10:07

## 2017-10-20 RX ADMIN — SODIUM CHLORIDE 500 ML: 0.9 INJECTION, SOLUTION INTRAVENOUS at 09:00

## 2017-10-20 RX ADMIN — SODIUM CHLORIDE 20 ML/HR: 0.9 INJECTION, SOLUTION INTRAVENOUS at 08:15

## 2017-10-20 RX ADMIN — SODIUM CHLORIDE 500 ML: 0.9 INJECTION, SOLUTION INTRAVENOUS at 10:50

## 2017-10-20 RX ADMIN — TBO-FILGRASTIM 480 MCG: 480 INJECTION, SOLUTION SUBCUTANEOUS at 11:55

## 2017-10-20 RX ADMIN — DEXTROSE 20 ML/HR: 5 SOLUTION INTRAVENOUS at 10:07

## 2017-10-20 NOTE — PROGRESS NOTES
Patient tolerated transfusion of Platelets, Kyprolis with hydration and Granix well with no adverse events  Pt is aware of next appointment

## 2017-10-26 ENCOUNTER — GENERIC CONVERSION - ENCOUNTER (OUTPATIENT)
Dept: OTHER | Facility: OTHER | Age: 72
End: 2017-10-26

## 2017-11-01 ENCOUNTER — APPOINTMENT (OUTPATIENT)
Dept: LAB | Facility: CLINIC | Age: 72
End: 2017-11-01
Payer: COMMERCIAL

## 2017-11-01 ENCOUNTER — TRANSCRIBE ORDERS (OUTPATIENT)
Dept: LAB | Facility: CLINIC | Age: 72
End: 2017-11-01

## 2017-11-01 DIAGNOSIS — R11.0 NAUSEA: ICD-10-CM

## 2017-11-01 DIAGNOSIS — I51.9 MYXEDEMA HEART DISEASE: ICD-10-CM

## 2017-11-01 DIAGNOSIS — E85.9 MYELOMA ASSOCIATED AMYLOIDOSIS (HCC): ICD-10-CM

## 2017-11-01 DIAGNOSIS — D64.9 ANEMIA, UNSPECIFIED TYPE: ICD-10-CM

## 2017-11-01 DIAGNOSIS — E03.9 MYXEDEMA HEART DISEASE: ICD-10-CM

## 2017-11-01 DIAGNOSIS — D64.9 ANEMIA, UNSPECIFIED TYPE: Primary | ICD-10-CM

## 2017-11-01 DIAGNOSIS — E78.5 HYPERLIPIDEMIA, UNSPECIFIED HYPERLIPIDEMIA TYPE: ICD-10-CM

## 2017-11-01 DIAGNOSIS — C90.00 MYELOMA ASSOCIATED AMYLOIDOSIS (HCC): ICD-10-CM

## 2017-11-01 DIAGNOSIS — R53.83 OTHER FATIGUE: ICD-10-CM

## 2017-11-01 DIAGNOSIS — I10 ESSENTIAL HYPERTENSION, MALIGNANT: ICD-10-CM

## 2017-11-01 LAB
ALBUMIN SERPL BCP-MCNC: 3.6 G/DL (ref 3.5–5)
ALP SERPL-CCNC: 67 U/L (ref 46–116)
ALT SERPL W P-5'-P-CCNC: 24 U/L (ref 12–78)
ANION GAP SERPL CALCULATED.3IONS-SCNC: 7 MMOL/L (ref 4–13)
AST SERPL W P-5'-P-CCNC: 18 U/L (ref 5–45)
BASOPHILS # BLD AUTO: 0.03 THOUSANDS/ΜL (ref 0–0.1)
BASOPHILS NFR BLD AUTO: 2 % (ref 0–1)
BILIRUB SERPL-MCNC: 0.98 MG/DL (ref 0.2–1)
BUN SERPL-MCNC: 22 MG/DL (ref 5–25)
CALCIUM SERPL-MCNC: 8.6 MG/DL (ref 8.3–10.1)
CHLORIDE SERPL-SCNC: 104 MMOL/L (ref 100–108)
CO2 SERPL-SCNC: 28 MMOL/L (ref 21–32)
CREAT SERPL-MCNC: 1.06 MG/DL (ref 0.6–1.3)
EOSINOPHIL # BLD AUTO: 0.05 THOUSAND/ΜL (ref 0–0.61)
EOSINOPHIL NFR BLD AUTO: 3 % (ref 0–6)
ERYTHROCYTE [DISTWIDTH] IN BLOOD BY AUTOMATED COUNT: 14 % (ref 11.6–15.1)
GFR SERPL CREATININE-BSD FRML MDRD: 71 ML/MIN/1.73SQ M
GLUCOSE SERPL-MCNC: 86 MG/DL (ref 65–140)
HCT VFR BLD AUTO: 29.4 % (ref 36.5–49.3)
HGB BLD-MCNC: 10.2 G/DL (ref 12–17)
LDH SERPL-CCNC: 149 U/L (ref 81–234)
LYMPHOCYTES # BLD AUTO: 0.45 THOUSANDS/ΜL (ref 0.6–4.47)
LYMPHOCYTES NFR BLD AUTO: 23 % (ref 14–44)
MCH RBC QN AUTO: 37.2 PG (ref 26.8–34.3)
MCHC RBC AUTO-ENTMCNC: 34.7 G/DL (ref 31.4–37.4)
MCV RBC AUTO: 107 FL (ref 82–98)
MONOCYTES # BLD AUTO: 0.14 THOUSAND/ΜL (ref 0.17–1.22)
MONOCYTES NFR BLD AUTO: 7 % (ref 4–12)
NEUTROPHILS # BLD AUTO: 1.25 THOUSANDS/ΜL (ref 1.85–7.62)
NEUTS SEG NFR BLD AUTO: 65 % (ref 43–75)
NRBC BLD AUTO-RTO: 0 /100 WBCS
PHOSPHATE SERPL-MCNC: 3.5 MG/DL (ref 2.3–4.1)
PLATELET # BLD AUTO: 31 THOUSANDS/UL (ref 149–390)
PMV BLD AUTO: 12.7 FL (ref 8.9–12.7)
POTASSIUM SERPL-SCNC: 3.9 MMOL/L (ref 3.5–5.3)
PROT SERPL-MCNC: 6.7 G/DL (ref 6.4–8.2)
RBC # BLD AUTO: 2.74 MILLION/UL (ref 3.88–5.62)
SODIUM SERPL-SCNC: 139 MMOL/L (ref 136–145)
URATE SERPL-MCNC: 5.2 MG/DL (ref 4.2–8)
WBC # BLD AUTO: 1.92 THOUSAND/UL (ref 4.31–10.16)

## 2017-11-01 PROCEDURE — 85025 COMPLETE CBC W/AUTO DIFF WBC: CPT

## 2017-11-01 PROCEDURE — 80053 COMPREHEN METABOLIC PANEL: CPT

## 2017-11-01 PROCEDURE — 84550 ASSAY OF BLOOD/URIC ACID: CPT

## 2017-11-01 PROCEDURE — 83615 LACTATE (LD) (LDH) ENZYME: CPT

## 2017-11-01 PROCEDURE — 84100 ASSAY OF PHOSPHORUS: CPT

## 2017-11-01 PROCEDURE — 36415 COLL VENOUS BLD VENIPUNCTURE: CPT

## 2017-11-02 RX ORDER — DEXTROSE MONOHYDRATE 50 MG/ML
20 INJECTION, SOLUTION INTRAVENOUS CONTINUOUS
Status: DISCONTINUED | OUTPATIENT
Start: 2017-11-03 | End: 2017-11-06 | Stop reason: HOSPADM

## 2017-11-02 RX ORDER — SODIUM CHLORIDE 9 MG/ML
20 INJECTION, SOLUTION INTRAVENOUS CONTINUOUS
Status: DISCONTINUED | OUTPATIENT
Start: 2017-11-03 | End: 2017-11-02

## 2017-11-03 ENCOUNTER — HOSPITAL ENCOUNTER (OUTPATIENT)
Dept: INFUSION CENTER | Facility: CLINIC | Age: 72
Discharge: HOME | End: 2017-11-03
Payer: COMMERCIAL

## 2017-11-03 VITALS
TEMPERATURE: 96.9 F | HEIGHT: 69 IN | SYSTOLIC BLOOD PRESSURE: 156 MMHG | RESPIRATION RATE: 18 BRPM | WEIGHT: 165.79 LBS | BODY MASS INDEX: 24.56 KG/M2 | DIASTOLIC BLOOD PRESSURE: 90 MMHG | HEART RATE: 57 BPM

## 2017-11-03 PROCEDURE — 96413 CHEMO IV INFUSION 1 HR: CPT

## 2017-11-03 PROCEDURE — 96361 HYDRATE IV INFUSION ADD-ON: CPT

## 2017-11-03 RX ADMIN — SODIUM CHLORIDE 500 ML: 0.9 INJECTION, SOLUTION INTRAVENOUS at 08:20

## 2017-11-03 RX ADMIN — SODIUM CHLORIDE 500 ML: 0.9 INJECTION, SOLUTION INTRAVENOUS at 10:00

## 2017-11-03 RX ADMIN — CARFILZOMIB 90 MG: 30 INJECTION, POWDER, LYOPHILIZED, FOR SOLUTION INTRAVENOUS at 09:27

## 2017-11-03 RX ADMIN — DEXTROSE 20 ML/HR: 5 SOLUTION INTRAVENOUS at 09:25

## 2017-11-03 NOTE — PROGRESS NOTES
Patient tolerated infusion well  Denies any discomfort  Patient and wife are aware of next appointment

## 2017-11-08 ENCOUNTER — APPOINTMENT (OUTPATIENT)
Dept: LAB | Facility: CLINIC | Age: 72
End: 2017-11-08
Payer: COMMERCIAL

## 2017-11-08 ENCOUNTER — TRANSCRIBE ORDERS (OUTPATIENT)
Dept: LAB | Facility: CLINIC | Age: 72
End: 2017-11-08

## 2017-11-08 DIAGNOSIS — D64.9 ANEMIA, UNSPECIFIED TYPE: Primary | ICD-10-CM

## 2017-11-08 DIAGNOSIS — C90.00 MYELOMA ASSOCIATED AMYLOIDOSIS (HCC): ICD-10-CM

## 2017-11-08 DIAGNOSIS — I51.9 MYXEDEMA HEART DISEASE: ICD-10-CM

## 2017-11-08 DIAGNOSIS — E78.5 HYPERLIPIDEMIA, UNSPECIFIED HYPERLIPIDEMIA TYPE: ICD-10-CM

## 2017-11-08 DIAGNOSIS — R53.83 FATIGUE, UNSPECIFIED TYPE: ICD-10-CM

## 2017-11-08 DIAGNOSIS — R11.0 NAUSEA: ICD-10-CM

## 2017-11-08 DIAGNOSIS — E85.9 MYELOMA ASSOCIATED AMYLOIDOSIS (HCC): ICD-10-CM

## 2017-11-08 DIAGNOSIS — E03.9 MYXEDEMA HEART DISEASE: ICD-10-CM

## 2017-11-08 DIAGNOSIS — I10 ESSENTIAL HYPERTENSION, MALIGNANT: ICD-10-CM

## 2017-11-08 DIAGNOSIS — D64.9 ANEMIA, UNSPECIFIED TYPE: ICD-10-CM

## 2017-11-08 LAB
ALBUMIN SERPL BCP-MCNC: 3.4 G/DL (ref 3.5–5)
ALP SERPL-CCNC: 66 U/L (ref 46–116)
ALT SERPL W P-5'-P-CCNC: 24 U/L (ref 12–78)
ANION GAP SERPL CALCULATED.3IONS-SCNC: 6 MMOL/L (ref 4–13)
AST SERPL W P-5'-P-CCNC: 20 U/L (ref 5–45)
BASOPHILS # BLD AUTO: 0.01 THOUSANDS/ΜL (ref 0–0.1)
BASOPHILS NFR BLD AUTO: 1 % (ref 0–1)
BILIRUB SERPL-MCNC: 1.06 MG/DL (ref 0.2–1)
BUN SERPL-MCNC: 17 MG/DL (ref 5–25)
CALCIUM SERPL-MCNC: 8.6 MG/DL (ref 8.3–10.1)
CHLORIDE SERPL-SCNC: 106 MMOL/L (ref 100–108)
CO2 SERPL-SCNC: 29 MMOL/L (ref 21–32)
CREAT SERPL-MCNC: 1.01 MG/DL (ref 0.6–1.3)
EOSINOPHIL # BLD AUTO: 0.09 THOUSAND/ΜL (ref 0–0.61)
EOSINOPHIL NFR BLD AUTO: 5 % (ref 0–6)
ERYTHROCYTE [DISTWIDTH] IN BLOOD BY AUTOMATED COUNT: 13.4 % (ref 11.6–15.1)
GFR SERPL CREATININE-BSD FRML MDRD: 75 ML/MIN/1.73SQ M
GLUCOSE SERPL-MCNC: 82 MG/DL (ref 65–140)
HCT VFR BLD AUTO: 30.9 % (ref 36.5–49.3)
HGB BLD-MCNC: 10.7 G/DL (ref 12–17)
LDH SERPL-CCNC: 168 U/L (ref 81–234)
LYMPHOCYTES # BLD AUTO: 0.43 THOUSANDS/ΜL (ref 0.6–4.47)
LYMPHOCYTES NFR BLD AUTO: 23 % (ref 14–44)
MCH RBC QN AUTO: 37.2 PG (ref 26.8–34.3)
MCHC RBC AUTO-ENTMCNC: 34.6 G/DL (ref 31.4–37.4)
MCV RBC AUTO: 107 FL (ref 82–98)
MONOCYTES # BLD AUTO: 0.35 THOUSAND/ΜL (ref 0.17–1.22)
MONOCYTES NFR BLD AUTO: 19 % (ref 4–12)
NEUTROPHILS # BLD AUTO: 0.95 THOUSANDS/ΜL (ref 1.85–7.62)
NEUTS SEG NFR BLD AUTO: 52 % (ref 43–75)
NRBC BLD AUTO-RTO: 0 /100 WBCS
PHOSPHATE SERPL-MCNC: 2.8 MG/DL (ref 2.3–4.1)
PLATELET # BLD AUTO: 15 THOUSANDS/UL (ref 149–390)
POTASSIUM SERPL-SCNC: 4.4 MMOL/L (ref 3.5–5.3)
PROT SERPL-MCNC: 6.4 G/DL (ref 6.4–8.2)
RBC # BLD AUTO: 2.88 MILLION/UL (ref 3.88–5.62)
SODIUM SERPL-SCNC: 141 MMOL/L (ref 136–145)
URATE SERPL-MCNC: 5.4 MG/DL (ref 4.2–8)
WBC # BLD AUTO: 1.84 THOUSAND/UL (ref 4.31–10.16)

## 2017-11-08 PROCEDURE — 83615 LACTATE (LD) (LDH) ENZYME: CPT

## 2017-11-08 PROCEDURE — 84550 ASSAY OF BLOOD/URIC ACID: CPT

## 2017-11-08 PROCEDURE — 84100 ASSAY OF PHOSPHORUS: CPT

## 2017-11-08 PROCEDURE — 85025 COMPLETE CBC W/AUTO DIFF WBC: CPT

## 2017-11-08 PROCEDURE — 80053 COMPREHEN METABOLIC PANEL: CPT

## 2017-11-08 PROCEDURE — 36415 COLL VENOUS BLD VENIPUNCTURE: CPT

## 2017-11-09 ENCOUNTER — GENERIC CONVERSION - ENCOUNTER (OUTPATIENT)
Dept: OTHER | Facility: OTHER | Age: 72
End: 2017-11-09

## 2017-11-09 RX ORDER — DEXTROSE MONOHYDRATE 50 MG/ML
20 INJECTION, SOLUTION INTRAVENOUS CONTINUOUS
Status: DISCONTINUED | OUTPATIENT
Start: 2017-11-10 | End: 2017-11-13 | Stop reason: HOSPADM

## 2017-11-10 ENCOUNTER — HOSPITAL ENCOUNTER (OUTPATIENT)
Dept: INFUSION CENTER | Facility: CLINIC | Age: 72
Discharge: HOME | End: 2017-11-10
Payer: COMMERCIAL

## 2017-11-10 VITALS
HEART RATE: 62 BPM | BODY MASS INDEX: 24.87 KG/M2 | SYSTOLIC BLOOD PRESSURE: 141 MMHG | TEMPERATURE: 97 F | WEIGHT: 168.87 LBS | RESPIRATION RATE: 16 BRPM | DIASTOLIC BLOOD PRESSURE: 83 MMHG

## 2017-11-10 PROCEDURE — P9035 PLATELET PHERES LEUKOREDUCED: HCPCS

## 2017-11-10 PROCEDURE — 36430 TRANSFUSION BLD/BLD COMPNT: CPT

## 2017-11-10 PROCEDURE — 96413 CHEMO IV INFUSION 1 HR: CPT

## 2017-11-10 PROCEDURE — 96361 HYDRATE IV INFUSION ADD-ON: CPT

## 2017-11-10 PROCEDURE — 96372 THER/PROPH/DIAG INJ SC/IM: CPT

## 2017-11-10 RX ADMIN — DEXTROSE 20 ML/HR: 5 SOLUTION INTRAVENOUS at 10:15

## 2017-11-10 RX ADMIN — SODIUM CHLORIDE 500 ML: 0.9 INJECTION, SOLUTION INTRAVENOUS at 09:10

## 2017-11-10 RX ADMIN — TBO-FILGRASTIM 480 MCG: 480 INJECTION, SOLUTION SUBCUTANEOUS at 11:50

## 2017-11-10 RX ADMIN — CARFILZOMIB 90 MG: 30 INJECTION, POWDER, LYOPHILIZED, FOR SOLUTION INTRAVENOUS at 10:18

## 2017-11-10 RX ADMIN — SODIUM CHLORIDE 500 ML: 0.9 INJECTION, SOLUTION INTRAVENOUS at 10:55

## 2017-11-10 NOTE — PROGRESS NOTES
Patient tolerated transfusion of 1 unit of platelets, Kyprolis infusion and Granix injection well  Denies any discomfort  Pt and wife are aware of next appointment

## 2017-11-15 ENCOUNTER — APPOINTMENT (OUTPATIENT)
Dept: LAB | Facility: CLINIC | Age: 72
End: 2017-11-15
Payer: COMMERCIAL

## 2017-11-15 ENCOUNTER — TRANSCRIBE ORDERS (OUTPATIENT)
Dept: LAB | Facility: CLINIC | Age: 72
End: 2017-11-15

## 2017-11-15 DIAGNOSIS — I51.9 MYXEDEMA HEART DISEASE: ICD-10-CM

## 2017-11-15 DIAGNOSIS — I10 ESSENTIAL HYPERTENSION, MALIGNANT: ICD-10-CM

## 2017-11-15 DIAGNOSIS — D64.9 ANEMIA, UNSPECIFIED TYPE: Primary | ICD-10-CM

## 2017-11-15 DIAGNOSIS — D64.9 ANEMIA, UNSPECIFIED TYPE: ICD-10-CM

## 2017-11-15 DIAGNOSIS — C90.00 MYELOMA ASSOCIATED AMYLOIDOSIS (HCC): ICD-10-CM

## 2017-11-15 DIAGNOSIS — R11.0 NAUSEA: ICD-10-CM

## 2017-11-15 DIAGNOSIS — E78.5 HYPERLIPIDEMIA, UNSPECIFIED HYPERLIPIDEMIA TYPE: ICD-10-CM

## 2017-11-15 DIAGNOSIS — E03.9 MYXEDEMA HEART DISEASE: ICD-10-CM

## 2017-11-15 DIAGNOSIS — E85.9 MYELOMA ASSOCIATED AMYLOIDOSIS (HCC): ICD-10-CM

## 2017-11-15 DIAGNOSIS — R53.83 OTHER FATIGUE: ICD-10-CM

## 2017-11-15 DIAGNOSIS — I10 ESSENTIAL HYPERTENSION, BENIGN: ICD-10-CM

## 2017-11-15 LAB
ALBUMIN SERPL BCP-MCNC: 3.4 G/DL (ref 3.5–5)
ALP SERPL-CCNC: 68 U/L (ref 46–116)
ALT SERPL W P-5'-P-CCNC: 24 U/L (ref 12–78)
ANION GAP SERPL CALCULATED.3IONS-SCNC: 6 MMOL/L (ref 4–13)
AST SERPL W P-5'-P-CCNC: 12 U/L (ref 5–45)
BASOPHILS # BLD AUTO: 0.01 THOUSANDS/ΜL (ref 0–0.1)
BASOPHILS NFR BLD AUTO: 1 % (ref 0–1)
BILIRUB SERPL-MCNC: 1.25 MG/DL (ref 0.2–1)
BUN SERPL-MCNC: 22 MG/DL (ref 5–25)
CALCIUM SERPL-MCNC: 8.4 MG/DL (ref 8.3–10.1)
CHLORIDE SERPL-SCNC: 103 MMOL/L (ref 100–108)
CO2 SERPL-SCNC: 29 MMOL/L (ref 21–32)
CREAT SERPL-MCNC: 1.13 MG/DL (ref 0.6–1.3)
EOSINOPHIL # BLD AUTO: 0.08 THOUSAND/ΜL (ref 0–0.61)
EOSINOPHIL NFR BLD AUTO: 4 % (ref 0–6)
ERYTHROCYTE [DISTWIDTH] IN BLOOD BY AUTOMATED COUNT: 13.7 % (ref 11.6–15.1)
GFR SERPL CREATININE-BSD FRML MDRD: 65 ML/MIN/1.73SQ M
GLUCOSE SERPL-MCNC: 87 MG/DL (ref 65–140)
HCT VFR BLD AUTO: 30.3 % (ref 36.5–49.3)
HGB BLD-MCNC: 10.6 G/DL (ref 12–17)
LDH SERPL-CCNC: 161 U/L (ref 81–234)
LYMPHOCYTES # BLD AUTO: 0.65 THOUSANDS/ΜL (ref 0.6–4.47)
LYMPHOCYTES NFR BLD AUTO: 33 % (ref 14–44)
MCH RBC QN AUTO: 37.5 PG (ref 26.8–34.3)
MCHC RBC AUTO-ENTMCNC: 35 G/DL (ref 31.4–37.4)
MCV RBC AUTO: 107 FL (ref 82–98)
MONOCYTES # BLD AUTO: 0.46 THOUSAND/ΜL (ref 0.17–1.22)
MONOCYTES NFR BLD AUTO: 23 % (ref 4–12)
NEUTROPHILS # BLD AUTO: 0.77 THOUSANDS/ΜL (ref 1.85–7.62)
NEUTS SEG NFR BLD AUTO: 39 % (ref 43–75)
NRBC BLD AUTO-RTO: 0 /100 WBCS
PHOSPHATE SERPL-MCNC: 2.7 MG/DL (ref 2.3–4.1)
PLATELET # BLD AUTO: 27 THOUSANDS/UL (ref 149–390)
PMV BLD AUTO: 12.2 FL (ref 8.9–12.7)
POTASSIUM SERPL-SCNC: 4.4 MMOL/L (ref 3.5–5.3)
PROT SERPL-MCNC: 6.6 G/DL (ref 6.4–8.2)
RBC # BLD AUTO: 2.83 MILLION/UL (ref 3.88–5.62)
SODIUM SERPL-SCNC: 138 MMOL/L (ref 136–145)
URATE SERPL-MCNC: 6.3 MG/DL (ref 4.2–8)
WBC # BLD AUTO: 1.97 THOUSAND/UL (ref 4.31–10.16)

## 2017-11-15 PROCEDURE — 84550 ASSAY OF BLOOD/URIC ACID: CPT

## 2017-11-15 PROCEDURE — 85025 COMPLETE CBC W/AUTO DIFF WBC: CPT

## 2017-11-15 PROCEDURE — 84100 ASSAY OF PHOSPHORUS: CPT

## 2017-11-15 PROCEDURE — 36415 COLL VENOUS BLD VENIPUNCTURE: CPT

## 2017-11-15 PROCEDURE — 80053 COMPREHEN METABOLIC PANEL: CPT

## 2017-11-15 PROCEDURE — 83615 LACTATE (LD) (LDH) ENZYME: CPT

## 2017-11-16 RX ORDER — SODIUM CHLORIDE 9 MG/ML
20 INJECTION, SOLUTION INTRAVENOUS CONTINUOUS
Status: DISCONTINUED | OUTPATIENT
Start: 2017-11-17 | End: 2017-11-20 | Stop reason: HOSPADM

## 2017-11-16 RX ORDER — DEXTROSE MONOHYDRATE 50 MG/ML
20 INJECTION, SOLUTION INTRAVENOUS CONTINUOUS
Status: DISCONTINUED | OUTPATIENT
Start: 2017-11-17 | End: 2017-11-20 | Stop reason: HOSPADM

## 2017-11-17 ENCOUNTER — HOSPITAL ENCOUNTER (OUTPATIENT)
Dept: INFUSION CENTER | Facility: CLINIC | Age: 72
Discharge: HOME | End: 2017-11-17
Payer: COMMERCIAL

## 2017-11-17 VITALS
HEIGHT: 70 IN | TEMPERATURE: 96.9 F | HEART RATE: 58 BPM | WEIGHT: 165.34 LBS | SYSTOLIC BLOOD PRESSURE: 148 MMHG | BODY MASS INDEX: 23.67 KG/M2 | RESPIRATION RATE: 18 BRPM | DIASTOLIC BLOOD PRESSURE: 80 MMHG

## 2017-11-17 PROCEDURE — 96361 HYDRATE IV INFUSION ADD-ON: CPT

## 2017-11-17 PROCEDURE — 96413 CHEMO IV INFUSION 1 HR: CPT

## 2017-11-17 PROCEDURE — 96366 THER/PROPH/DIAG IV INF ADDON: CPT

## 2017-11-17 PROCEDURE — 96372 THER/PROPH/DIAG INJ SC/IM: CPT

## 2017-11-17 PROCEDURE — 96367 TX/PROPH/DG ADDL SEQ IV INF: CPT

## 2017-11-17 RX ADMIN — SODIUM CHLORIDE 500 ML: 0.9 INJECTION, SOLUTION INTRAVENOUS at 08:24

## 2017-11-17 RX ADMIN — CARFILZOMIB 90 MG: 30 INJECTION, POWDER, LYOPHILIZED, FOR SOLUTION INTRAVENOUS at 09:39

## 2017-11-17 RX ADMIN — IMMUNE GLOBULIN (HUMAN) 30 G: 10 INJECTION INTRAVENOUS; SUBCUTANEOUS at 11:34

## 2017-11-17 RX ADMIN — DEXTROSE 20 ML/HR: 5 SOLUTION INTRAVENOUS at 09:35

## 2017-11-17 RX ADMIN — TBO-FILGRASTIM 480 MCG: 480 INJECTION, SOLUTION SUBCUTANEOUS at 14:11

## 2017-11-17 RX ADMIN — SODIUM CHLORIDE 500 ML: 0.9 INJECTION, SOLUTION INTRAVENOUS at 10:22

## 2017-11-17 NOTE — PLAN OF CARE
Problem: Potential for Falls  Goal: Patient will remain free of falls  INTERVENTIONS:  - Assess patient frequently for physical needs  -  Identify cognitive and physical deficits and behaviors that affect risk of falls    -  [place] fall precautions as indicated by assessment   - Educate patient/family on patient safety including physical limitations  - Instruct patient to call for assistance with activity based on assessment  - Modify environment to reduce risk of injury  - Consider OT/PT consult to assist with strengthening/mobility   Outcome: Progressing

## 2017-11-21 ENCOUNTER — GENERIC CONVERSION - ENCOUNTER (OUTPATIENT)
Dept: OTHER | Facility: OTHER | Age: 72
End: 2017-11-21

## 2017-11-21 DIAGNOSIS — C90.00 MULTIPLE MYELOMA NOT HAVING ACHIEVED REMISSION (HCC): ICD-10-CM

## 2017-11-22 ENCOUNTER — TRANSCRIBE ORDERS (OUTPATIENT)
Dept: LAB | Facility: CLINIC | Age: 72
End: 2017-11-22

## 2017-11-22 ENCOUNTER — APPOINTMENT (OUTPATIENT)
Dept: LAB | Facility: CLINIC | Age: 72
End: 2017-11-22
Payer: COMMERCIAL

## 2017-11-22 DIAGNOSIS — I51.9 MYXEDEMA HEART DISEASE: ICD-10-CM

## 2017-11-22 DIAGNOSIS — R11.0 NAUSEA: ICD-10-CM

## 2017-11-22 DIAGNOSIS — C90.00 MYELOMA ASSOCIATED AMYLOIDOSIS (HCC): ICD-10-CM

## 2017-11-22 DIAGNOSIS — I10 ESSENTIAL HYPERTENSION, MALIGNANT: ICD-10-CM

## 2017-11-22 DIAGNOSIS — E03.9 MYXEDEMA HEART DISEASE: ICD-10-CM

## 2017-11-22 DIAGNOSIS — D64.9 ANEMIA, UNSPECIFIED TYPE: ICD-10-CM

## 2017-11-22 DIAGNOSIS — R53.83 OTHER FATIGUE: ICD-10-CM

## 2017-11-22 DIAGNOSIS — E85.9 MYELOMA ASSOCIATED AMYLOIDOSIS (HCC): ICD-10-CM

## 2017-11-22 DIAGNOSIS — D64.9 ANEMIA, UNSPECIFIED TYPE: Primary | ICD-10-CM

## 2017-11-22 DIAGNOSIS — E78.5 HYPERLIPIDEMIA, UNSPECIFIED HYPERLIPIDEMIA TYPE: ICD-10-CM

## 2017-11-22 LAB
ALBUMIN SERPL BCP-MCNC: 3.4 G/DL (ref 3.5–5)
ALP SERPL-CCNC: 71 U/L (ref 46–116)
ALT SERPL W P-5'-P-CCNC: 25 U/L (ref 12–78)
ANION GAP SERPL CALCULATED.3IONS-SCNC: 5 MMOL/L (ref 4–13)
AST SERPL W P-5'-P-CCNC: 15 U/L (ref 5–45)
BASOPHILS # BLD AUTO: 0.01 THOUSANDS/ΜL (ref 0–0.1)
BASOPHILS NFR BLD AUTO: 0 % (ref 0–1)
BILIRUB SERPL-MCNC: 1.38 MG/DL (ref 0.2–1)
BUN SERPL-MCNC: 20 MG/DL (ref 5–25)
CALCIUM SERPL-MCNC: 8.4 MG/DL (ref 8.3–10.1)
CHLORIDE SERPL-SCNC: 102 MMOL/L (ref 100–108)
CO2 SERPL-SCNC: 29 MMOL/L (ref 21–32)
CREAT SERPL-MCNC: 1.17 MG/DL (ref 0.6–1.3)
EOSINOPHIL # BLD AUTO: 0.1 THOUSAND/ΜL (ref 0–0.61)
EOSINOPHIL NFR BLD AUTO: 4 % (ref 0–6)
ERYTHROCYTE [DISTWIDTH] IN BLOOD BY AUTOMATED COUNT: 13.7 % (ref 11.6–15.1)
GFR SERPL CREATININE-BSD FRML MDRD: 63 ML/MIN/1.73SQ M
GLUCOSE SERPL-MCNC: 80 MG/DL (ref 65–140)
HCT VFR BLD AUTO: 30.9 % (ref 36.5–49.3)
HGB BLD-MCNC: 10.6 G/DL (ref 12–17)
LDH SERPL-CCNC: 187 U/L (ref 81–234)
LYMPHOCYTES # BLD AUTO: 0.52 THOUSANDS/ΜL (ref 0.6–4.47)
LYMPHOCYTES NFR BLD AUTO: 23 % (ref 14–44)
MCH RBC QN AUTO: 36.7 PG (ref 26.8–34.3)
MCHC RBC AUTO-ENTMCNC: 34.3 G/DL (ref 31.4–37.4)
MCV RBC AUTO: 107 FL (ref 82–98)
MONOCYTES # BLD AUTO: 0.22 THOUSAND/ΜL (ref 0.17–1.22)
MONOCYTES NFR BLD AUTO: 10 % (ref 4–12)
NEUTROPHILS # BLD AUTO: 1.39 THOUSANDS/ΜL (ref 1.85–7.62)
NEUTS SEG NFR BLD AUTO: 63 % (ref 43–75)
NRBC BLD AUTO-RTO: 0 /100 WBCS
PHOSPHATE SERPL-MCNC: 3.5 MG/DL (ref 2.3–4.1)
PLATELET # BLD AUTO: 12 THOUSANDS/UL (ref 149–390)
POTASSIUM SERPL-SCNC: 4 MMOL/L (ref 3.5–5.3)
PROT SERPL-MCNC: 6.9 G/DL (ref 6.4–8.2)
RBC # BLD AUTO: 2.89 MILLION/UL (ref 3.88–5.62)
SODIUM SERPL-SCNC: 136 MMOL/L (ref 136–145)
URATE SERPL-MCNC: 6.6 MG/DL (ref 4.2–8)
WBC # BLD AUTO: 2.25 THOUSAND/UL (ref 4.31–10.16)

## 2017-11-22 PROCEDURE — 85025 COMPLETE CBC W/AUTO DIFF WBC: CPT

## 2017-11-22 PROCEDURE — 80053 COMPREHEN METABOLIC PANEL: CPT

## 2017-11-22 PROCEDURE — 83615 LACTATE (LD) (LDH) ENZYME: CPT

## 2017-11-22 PROCEDURE — 36415 COLL VENOUS BLD VENIPUNCTURE: CPT

## 2017-11-22 PROCEDURE — 84100 ASSAY OF PHOSPHORUS: CPT

## 2017-11-22 PROCEDURE — 84550 ASSAY OF BLOOD/URIC ACID: CPT

## 2017-11-22 RX ORDER — DEXTROSE MONOHYDRATE 50 MG/ML
20 INJECTION, SOLUTION INTRAVENOUS CONTINUOUS
Status: DISCONTINUED | OUTPATIENT
Start: 2017-11-24 | End: 2017-11-27 | Stop reason: HOSPADM

## 2017-11-24 ENCOUNTER — HOSPITAL ENCOUNTER (OUTPATIENT)
Dept: INFUSION CENTER | Facility: CLINIC | Age: 72
Discharge: HOME | End: 2017-11-24
Payer: COMMERCIAL

## 2017-11-24 VITALS
RESPIRATION RATE: 18 BRPM | WEIGHT: 165.34 LBS | HEIGHT: 70 IN | DIASTOLIC BLOOD PRESSURE: 73 MMHG | BODY MASS INDEX: 23.67 KG/M2 | HEART RATE: 54 BPM | SYSTOLIC BLOOD PRESSURE: 167 MMHG | TEMPERATURE: 97.2 F

## 2017-11-24 PROCEDURE — 96361 HYDRATE IV INFUSION ADD-ON: CPT

## 2017-11-24 PROCEDURE — 36430 TRANSFUSION BLD/BLD COMPNT: CPT

## 2017-11-24 PROCEDURE — 96413 CHEMO IV INFUSION 1 HR: CPT

## 2017-11-24 PROCEDURE — P9037 PLATE PHERES LEUKOREDU IRRAD: HCPCS

## 2017-11-24 PROCEDURE — 96367 TX/PROPH/DG ADDL SEQ IV INF: CPT

## 2017-11-24 RX ADMIN — SODIUM CHLORIDE 500 ML: 0.9 INJECTION, SOLUTION INTRAVENOUS at 08:34

## 2017-11-24 RX ADMIN — DEXTROSE 20 ML/HR: 5 SOLUTION INTRAVENOUS at 09:48

## 2017-11-24 RX ADMIN — CARFILZOMIB 90 MG: 30 INJECTION, POWDER, LYOPHILIZED, FOR SOLUTION INTRAVENOUS at 09:48

## 2017-11-24 RX ADMIN — ZOLEDRONIC ACID 3.5 MG: 4 INJECTION, SOLUTION, CONCENTRATE INTRAVENOUS at 09:11

## 2017-11-24 RX ADMIN — SODIUM CHLORIDE 500 ML: 0.9 INJECTION, SOLUTION INTRAVENOUS at 10:42

## 2017-11-24 NOTE — PROGRESS NOTES
Pt arrived to unit without complaint  Platelet LKYHX=75,415  OK given by Dr Kamar Pope to treat with Kyprolis today  Pt transfused with one unit platelets today and tolerated well  Pt received Zometa today as well  Pt's Zometa had been on hold d/t dental "issues", but per pt and his wife, these "issues" have resolved and he is OK for Zometa now  Dr Ornelas Osorio office visit note does not reflect the same, but it was clarified with his nurse, Anshul Bee, that pt is ok for Zometa today and orders sent that are signed by Dr Kamar Pope  Pt tolerated Zometa well without adverse reaction  AVS provided

## 2017-11-29 ENCOUNTER — APPOINTMENT (OUTPATIENT)
Dept: LAB | Facility: CLINIC | Age: 72
End: 2017-11-29
Payer: COMMERCIAL

## 2017-11-29 ENCOUNTER — TRANSCRIBE ORDERS (OUTPATIENT)
Dept: LAB | Facility: CLINIC | Age: 72
End: 2017-11-29

## 2017-11-29 DIAGNOSIS — D69.6 THROMBOPENIA (HCC): Primary | ICD-10-CM

## 2017-11-29 DIAGNOSIS — D64.9 ANEMIA: ICD-10-CM

## 2017-11-29 DIAGNOSIS — Z00.00 ENCOUNTER FOR GENERAL ADULT MEDICAL EXAMINATION WITHOUT ABNORMAL FINDINGS: ICD-10-CM

## 2017-11-29 DIAGNOSIS — E78.5 HYPERLIPIDEMIA: ICD-10-CM

## 2017-11-29 DIAGNOSIS — R11.0 NAUSEA: ICD-10-CM

## 2017-11-29 DIAGNOSIS — I10 ESSENTIAL (PRIMARY) HYPERTENSION: ICD-10-CM

## 2017-11-29 DIAGNOSIS — R53.83 OTHER FATIGUE: ICD-10-CM

## 2017-11-29 DIAGNOSIS — C90.00 MULTIPLE MYELOMA NOT HAVING ACHIEVED REMISSION (HCC): ICD-10-CM

## 2017-11-29 DIAGNOSIS — E03.9 HYPOTHYROIDISM: ICD-10-CM

## 2017-11-29 LAB
ALBUMIN SERPL BCP-MCNC: 3.4 G/DL (ref 3.5–5)
ALP SERPL-CCNC: 83 U/L (ref 46–116)
ALT SERPL W P-5'-P-CCNC: 31 U/L (ref 12–78)
ANION GAP SERPL CALCULATED.3IONS-SCNC: 4 MMOL/L (ref 4–13)
AST SERPL W P-5'-P-CCNC: 17 U/L (ref 5–45)
BASOPHILS # BLD AUTO: 0.01 THOUSANDS/ΜL (ref 0–0.1)
BASOPHILS NFR BLD AUTO: 1 % (ref 0–1)
BILIRUB SERPL-MCNC: 1.06 MG/DL (ref 0.2–1)
BUN SERPL-MCNC: 23 MG/DL (ref 5–25)
CALCIUM SERPL-MCNC: 7.9 MG/DL (ref 8.3–10.1)
CHLORIDE SERPL-SCNC: 108 MMOL/L (ref 100–108)
CO2 SERPL-SCNC: 27 MMOL/L (ref 21–32)
CREAT SERPL-MCNC: 1.1 MG/DL (ref 0.6–1.3)
EOSINOPHIL # BLD AUTO: 0.08 THOUSAND/ΜL (ref 0–0.61)
EOSINOPHIL NFR BLD AUTO: 4 % (ref 0–6)
ERYTHROCYTE [DISTWIDTH] IN BLOOD BY AUTOMATED COUNT: 13.8 % (ref 11.6–15.1)
GFR SERPL CREATININE-BSD FRML MDRD: 68 ML/MIN/1.73SQ M
GLUCOSE SERPL-MCNC: 77 MG/DL (ref 65–140)
HCT VFR BLD AUTO: 32.2 % (ref 36.5–49.3)
HGB BLD-MCNC: 10.9 G/DL (ref 12–17)
LDH SERPL-CCNC: 155 U/L (ref 81–234)
LYMPHOCYTES # BLD AUTO: 0.58 THOUSANDS/ΜL (ref 0.6–4.47)
LYMPHOCYTES NFR BLD AUTO: 26 % (ref 14–44)
MCH RBC QN AUTO: 36.6 PG (ref 26.8–34.3)
MCHC RBC AUTO-ENTMCNC: 33.9 G/DL (ref 31.4–37.4)
MCV RBC AUTO: 108 FL (ref 82–98)
MONOCYTES # BLD AUTO: 0.37 THOUSAND/ΜL (ref 0.17–1.22)
MONOCYTES NFR BLD AUTO: 17 % (ref 4–12)
NEUTROPHILS # BLD AUTO: 1.15 THOUSANDS/ΜL (ref 1.85–7.62)
NEUTS SEG NFR BLD AUTO: 52 % (ref 43–75)
NRBC BLD AUTO-RTO: 0 /100 WBCS
PHOSPHATE SERPL-MCNC: 2.7 MG/DL (ref 2.3–4.1)
PLATELET # BLD AUTO: 16 THOUSANDS/UL (ref 149–390)
POTASSIUM SERPL-SCNC: 4 MMOL/L (ref 3.5–5.3)
PROT SERPL-MCNC: 6.9 G/DL (ref 6.4–8.2)
RBC # BLD AUTO: 2.98 MILLION/UL (ref 3.88–5.62)
SODIUM SERPL-SCNC: 139 MMOL/L (ref 136–145)
URATE SERPL-MCNC: 5.4 MG/DL (ref 4.2–8)
WBC # BLD AUTO: 2.2 THOUSAND/UL (ref 4.31–10.16)

## 2017-11-29 PROCEDURE — 80053 COMPREHEN METABOLIC PANEL: CPT

## 2017-11-29 PROCEDURE — 84550 ASSAY OF BLOOD/URIC ACID: CPT

## 2017-11-29 PROCEDURE — 85025 COMPLETE CBC W/AUTO DIFF WBC: CPT

## 2017-11-29 PROCEDURE — 36415 COLL VENOUS BLD VENIPUNCTURE: CPT

## 2017-11-29 PROCEDURE — 84100 ASSAY OF PHOSPHORUS: CPT

## 2017-11-29 PROCEDURE — 83615 LACTATE (LD) (LDH) ENZYME: CPT

## 2017-11-30 RX ORDER — DEXTROSE MONOHYDRATE 50 MG/ML
20 INJECTION, SOLUTION INTRAVENOUS CONTINUOUS
Status: DISCONTINUED | OUTPATIENT
Start: 2017-12-01 | End: 2017-12-04 | Stop reason: HOSPADM

## 2017-12-01 ENCOUNTER — HOSPITAL ENCOUNTER (OUTPATIENT)
Dept: INFUSION CENTER | Facility: CLINIC | Age: 72
Discharge: HOME | End: 2017-12-01
Payer: COMMERCIAL

## 2017-12-01 VITALS
DIASTOLIC BLOOD PRESSURE: 80 MMHG | WEIGHT: 170.19 LBS | HEART RATE: 66 BPM | TEMPERATURE: 97.1 F | RESPIRATION RATE: 18 BRPM | SYSTOLIC BLOOD PRESSURE: 150 MMHG | HEIGHT: 70 IN | BODY MASS INDEX: 24.37 KG/M2

## 2017-12-01 PROCEDURE — 36430 TRANSFUSION BLD/BLD COMPNT: CPT

## 2017-12-01 PROCEDURE — 96413 CHEMO IV INFUSION 1 HR: CPT

## 2017-12-01 PROCEDURE — P9037 PLATE PHERES LEUKOREDU IRRAD: HCPCS

## 2017-12-01 PROCEDURE — 96361 HYDRATE IV INFUSION ADD-ON: CPT

## 2017-12-01 RX ADMIN — SODIUM CHLORIDE 500 ML: 0.9 INJECTION, SOLUTION INTRAVENOUS at 11:44

## 2017-12-01 RX ADMIN — DEXTROSE 20 ML/HR: 5 SOLUTION INTRAVENOUS at 10:10

## 2017-12-01 RX ADMIN — CARFILZOMIB 90 MG: 30 INJECTION, POWDER, LYOPHILIZED, FOR SOLUTION INTRAVENOUS at 10:53

## 2017-12-01 RX ADMIN — SODIUM CHLORIDE 500 ML: 0.9 INJECTION, SOLUTION INTRAVENOUS at 09:05

## 2017-12-01 NOTE — PROGRESS NOTES
Patient tolerated kyprolis treatment and platelet transfusion today without complications  Patient aware of upcoming appointments   Declined AVS

## 2017-12-06 ENCOUNTER — APPOINTMENT (OUTPATIENT)
Dept: LAB | Facility: CLINIC | Age: 72
End: 2017-12-06
Payer: COMMERCIAL

## 2017-12-06 ENCOUNTER — TRANSCRIBE ORDERS (OUTPATIENT)
Dept: LAB | Facility: CLINIC | Age: 72
End: 2017-12-06

## 2017-12-06 DIAGNOSIS — R53.83 OTHER FATIGUE: ICD-10-CM

## 2017-12-06 DIAGNOSIS — R11.0 NAUSEA: ICD-10-CM

## 2017-12-06 DIAGNOSIS — D64.9 ANEMIA, UNSPECIFIED TYPE: ICD-10-CM

## 2017-12-06 DIAGNOSIS — C90.00 MYELOMA ASSOCIATED AMYLOIDOSIS (HCC): Primary | ICD-10-CM

## 2017-12-06 DIAGNOSIS — E03.9 MYXEDEMA HEART DISEASE: ICD-10-CM

## 2017-12-06 DIAGNOSIS — C90.00 MYELOMA ASSOCIATED AMYLOIDOSIS (HCC): ICD-10-CM

## 2017-12-06 DIAGNOSIS — E85.9 MYELOMA ASSOCIATED AMYLOIDOSIS (HCC): Primary | ICD-10-CM

## 2017-12-06 DIAGNOSIS — I51.9 MYXEDEMA HEART DISEASE: ICD-10-CM

## 2017-12-06 DIAGNOSIS — E78.5 HYPERLIPIDEMIA, UNSPECIFIED HYPERLIPIDEMIA TYPE: ICD-10-CM

## 2017-12-06 DIAGNOSIS — I10 ESSENTIAL HYPERTENSION, MALIGNANT: ICD-10-CM

## 2017-12-06 DIAGNOSIS — E85.9 MYELOMA ASSOCIATED AMYLOIDOSIS (HCC): ICD-10-CM

## 2017-12-06 LAB
ALBUMIN SERPL BCP-MCNC: 3.4 G/DL (ref 3.5–5)
ALP SERPL-CCNC: 70 U/L (ref 46–116)
ALT SERPL W P-5'-P-CCNC: 24 U/L (ref 12–78)
ANION GAP SERPL CALCULATED.3IONS-SCNC: 5 MMOL/L (ref 4–13)
AST SERPL W P-5'-P-CCNC: 14 U/L (ref 5–45)
BASOPHILS # BLD AUTO: 0 THOUSANDS/ΜL (ref 0–0.1)
BASOPHILS NFR BLD AUTO: 0 % (ref 0–1)
BILIRUB SERPL-MCNC: 1.05 MG/DL (ref 0.2–1)
BUN SERPL-MCNC: 26 MG/DL (ref 5–25)
CALCIUM SERPL-MCNC: 8.4 MG/DL (ref 8.3–10.1)
CHLORIDE SERPL-SCNC: 105 MMOL/L (ref 100–108)
CO2 SERPL-SCNC: 29 MMOL/L (ref 21–32)
CREAT SERPL-MCNC: 1.1 MG/DL (ref 0.6–1.3)
EOSINOPHIL # BLD AUTO: 0.07 THOUSAND/ΜL (ref 0–0.61)
EOSINOPHIL NFR BLD AUTO: 4 % (ref 0–6)
ERYTHROCYTE [DISTWIDTH] IN BLOOD BY AUTOMATED COUNT: 13.7 % (ref 11.6–15.1)
GFR SERPL CREATININE-BSD FRML MDRD: 68 ML/MIN/1.73SQ M
GLUCOSE SERPL-MCNC: 84 MG/DL (ref 65–140)
HCT VFR BLD AUTO: 30.8 % (ref 36.5–49.3)
HGB BLD-MCNC: 10.7 G/DL (ref 12–17)
LDH SERPL-CCNC: 158 U/L (ref 81–234)
LYMPHOCYTES # BLD AUTO: 0.3 THOUSANDS/ΜL (ref 0.6–4.47)
LYMPHOCYTES NFR BLD AUTO: 17 % (ref 14–44)
MCH RBC QN AUTO: 36.6 PG (ref 26.8–34.3)
MCHC RBC AUTO-ENTMCNC: 34.7 G/DL (ref 31.4–37.4)
MCV RBC AUTO: 106 FL (ref 82–98)
MONOCYTES # BLD AUTO: 0.38 THOUSAND/ΜL (ref 0.17–1.22)
MONOCYTES NFR BLD AUTO: 22 % (ref 4–12)
NEUTROPHILS # BLD AUTO: 1.02 THOUSANDS/ΜL (ref 1.85–7.62)
NEUTS SEG NFR BLD AUTO: 57 % (ref 43–75)
NRBC BLD AUTO-RTO: 0 /100 WBCS
PHOSPHATE SERPL-MCNC: 2.9 MG/DL (ref 2.3–4.1)
PLATELET # BLD AUTO: 18 THOUSANDS/UL (ref 149–390)
POTASSIUM SERPL-SCNC: 4.3 MMOL/L (ref 3.5–5.3)
PROT SERPL-MCNC: 6.5 G/DL (ref 6.4–8.2)
RBC # BLD AUTO: 2.92 MILLION/UL (ref 3.88–5.62)
SODIUM SERPL-SCNC: 139 MMOL/L (ref 136–145)
URATE SERPL-MCNC: 5.6 MG/DL (ref 4.2–8)
WBC # BLD AUTO: 1.77 THOUSAND/UL (ref 4.31–10.16)

## 2017-12-06 PROCEDURE — 85025 COMPLETE CBC W/AUTO DIFF WBC: CPT

## 2017-12-06 PROCEDURE — 83615 LACTATE (LD) (LDH) ENZYME: CPT

## 2017-12-06 PROCEDURE — 80053 COMPREHEN METABOLIC PANEL: CPT

## 2017-12-06 PROCEDURE — 36415 COLL VENOUS BLD VENIPUNCTURE: CPT

## 2017-12-06 PROCEDURE — 84100 ASSAY OF PHOSPHORUS: CPT

## 2017-12-06 PROCEDURE — 84550 ASSAY OF BLOOD/URIC ACID: CPT

## 2017-12-07 RX ORDER — DEXTROSE MONOHYDRATE 50 MG/ML
20 INJECTION, SOLUTION INTRAVENOUS CONTINUOUS
Status: DISCONTINUED | OUTPATIENT
Start: 2017-12-08 | End: 2017-12-11 | Stop reason: HOSPADM

## 2017-12-08 ENCOUNTER — HOSPITAL ENCOUNTER (OUTPATIENT)
Dept: INFUSION CENTER | Facility: CLINIC | Age: 72
Discharge: HOME | End: 2017-12-08
Payer: COMMERCIAL

## 2017-12-08 VITALS
DIASTOLIC BLOOD PRESSURE: 66 MMHG | SYSTOLIC BLOOD PRESSURE: 143 MMHG | HEART RATE: 57 BPM | WEIGHT: 165.34 LBS | BODY MASS INDEX: 23.94 KG/M2 | TEMPERATURE: 98.3 F | RESPIRATION RATE: 18 BRPM

## 2017-12-08 PROCEDURE — P9037 PLATE PHERES LEUKOREDU IRRAD: HCPCS

## 2017-12-08 PROCEDURE — 96361 HYDRATE IV INFUSION ADD-ON: CPT

## 2017-12-08 PROCEDURE — 36430 TRANSFUSION BLD/BLD COMPNT: CPT

## 2017-12-08 PROCEDURE — 96413 CHEMO IV INFUSION 1 HR: CPT

## 2017-12-08 RX ORDER — NEBIVOLOL 2.5 MG/1
2.5 TABLET ORAL DAILY
COMMUNITY
End: 2018-04-26 | Stop reason: SDUPTHER

## 2017-12-08 RX ADMIN — SODIUM CHLORIDE 500 ML: 0.9 INJECTION, SOLUTION INTRAVENOUS at 08:56

## 2017-12-08 RX ADMIN — CARFILZOMIB 90 MG: 30 INJECTION, POWDER, LYOPHILIZED, FOR SOLUTION INTRAVENOUS at 09:56

## 2017-12-08 RX ADMIN — SODIUM CHLORIDE 500 ML: 0.9 INJECTION, SOLUTION INTRAVENOUS at 10:36

## 2017-12-08 RX ADMIN — DEXTROSE 20 ML/HR: 5 SOLUTION INTRAVENOUS at 09:56

## 2017-12-08 NOTE — PROGRESS NOTES
Patient tolerated transfusion of 1 unit of platelets well  Kyprolis infused as ordered  Pt offers no complaints  Pt and wife are aware of all future appointments

## 2017-12-13 ENCOUNTER — TRANSCRIBE ORDERS (OUTPATIENT)
Dept: LAB | Facility: CLINIC | Age: 72
End: 2017-12-13

## 2017-12-13 ENCOUNTER — APPOINTMENT (OUTPATIENT)
Dept: LAB | Facility: CLINIC | Age: 72
End: 2017-12-13
Payer: COMMERCIAL

## 2017-12-13 DIAGNOSIS — E78.5 HYPERLIPIDEMIA, UNSPECIFIED HYPERLIPIDEMIA TYPE: ICD-10-CM

## 2017-12-13 DIAGNOSIS — I51.9 MYXEDEMA HEART DISEASE: ICD-10-CM

## 2017-12-13 DIAGNOSIS — I10 ESSENTIAL HYPERTENSION, MALIGNANT: ICD-10-CM

## 2017-12-13 DIAGNOSIS — C90.00 MYELOMA ASSOCIATED AMYLOIDOSIS (HCC): ICD-10-CM

## 2017-12-13 DIAGNOSIS — E85.9 MYELOMA ASSOCIATED AMYLOIDOSIS (HCC): ICD-10-CM

## 2017-12-13 DIAGNOSIS — R53.83 FATIGUE, UNSPECIFIED TYPE: ICD-10-CM

## 2017-12-13 DIAGNOSIS — E85.9 MYELOMA ASSOCIATED AMYLOIDOSIS (HCC): Primary | ICD-10-CM

## 2017-12-13 DIAGNOSIS — C90.00 MYELOMA ASSOCIATED AMYLOIDOSIS (HCC): Primary | ICD-10-CM

## 2017-12-13 DIAGNOSIS — D64.9 ANEMIA, UNSPECIFIED TYPE: ICD-10-CM

## 2017-12-13 DIAGNOSIS — E03.9 MYXEDEMA HEART DISEASE: ICD-10-CM

## 2017-12-13 DIAGNOSIS — R11.0 NAUSEA: ICD-10-CM

## 2017-12-13 LAB
ALBUMIN SERPL BCP-MCNC: 3.3 G/DL (ref 3.5–5)
ALP SERPL-CCNC: 77 U/L (ref 46–116)
ALT SERPL W P-5'-P-CCNC: 19 U/L (ref 12–78)
ANION GAP SERPL CALCULATED.3IONS-SCNC: 4 MMOL/L (ref 4–13)
AST SERPL W P-5'-P-CCNC: 14 U/L (ref 5–45)
BASOPHILS # BLD AUTO: 0 THOUSANDS/ΜL (ref 0–0.1)
BASOPHILS NFR BLD AUTO: 0 % (ref 0–1)
BILIRUB SERPL-MCNC: 1.09 MG/DL (ref 0.2–1)
BUN SERPL-MCNC: 16 MG/DL (ref 5–25)
CALCIUM SERPL-MCNC: 8 MG/DL (ref 8.3–10.1)
CHLORIDE SERPL-SCNC: 107 MMOL/L (ref 100–108)
CO2 SERPL-SCNC: 29 MMOL/L (ref 21–32)
CREAT SERPL-MCNC: 1.05 MG/DL (ref 0.6–1.3)
EOSINOPHIL # BLD AUTO: 0.05 THOUSAND/ΜL (ref 0–0.61)
EOSINOPHIL NFR BLD AUTO: 3 % (ref 0–6)
ERYTHROCYTE [DISTWIDTH] IN BLOOD BY AUTOMATED COUNT: 13.8 % (ref 11.6–15.1)
GFR SERPL CREATININE-BSD FRML MDRD: 72 ML/MIN/1.73SQ M
GLUCOSE SERPL-MCNC: 77 MG/DL (ref 65–140)
HCT VFR BLD AUTO: 29.3 % (ref 36.5–49.3)
HGB BLD-MCNC: 10 G/DL (ref 12–17)
IGG SERPL-MCNC: 579 MG/DL (ref 700–1600)
LDH SERPL-CCNC: 162 U/L (ref 81–234)
LYMPHOCYTES # BLD AUTO: 0.28 THOUSANDS/ΜL (ref 0.6–4.47)
LYMPHOCYTES NFR BLD AUTO: 19 % (ref 14–44)
MCH RBC QN AUTO: 36.4 PG (ref 26.8–34.3)
MCHC RBC AUTO-ENTMCNC: 34.1 G/DL (ref 31.4–37.4)
MCV RBC AUTO: 107 FL (ref 82–98)
MONOCYTES # BLD AUTO: 0.16 THOUSAND/ΜL (ref 0.17–1.22)
MONOCYTES NFR BLD AUTO: 11 % (ref 4–12)
NEUTROPHILS # BLD AUTO: 0.98 THOUSANDS/ΜL (ref 1.85–7.62)
NEUTS SEG NFR BLD AUTO: 67 % (ref 43–75)
NRBC BLD AUTO-RTO: 0 /100 WBCS
PHOSPHATE SERPL-MCNC: 2.7 MG/DL (ref 2.3–4.1)
PLATELET # BLD AUTO: 20 THOUSANDS/UL (ref 149–390)
POTASSIUM SERPL-SCNC: 4.2 MMOL/L (ref 3.5–5.3)
PROT SERPL-MCNC: 6.4 G/DL (ref 6.4–8.2)
RBC # BLD AUTO: 2.75 MILLION/UL (ref 3.88–5.62)
SODIUM SERPL-SCNC: 140 MMOL/L (ref 136–145)
URATE SERPL-MCNC: 5.7 MG/DL (ref 4.2–8)
WBC # BLD AUTO: 1.47 THOUSAND/UL (ref 4.31–10.16)

## 2017-12-13 PROCEDURE — 85025 COMPLETE CBC W/AUTO DIFF WBC: CPT

## 2017-12-13 PROCEDURE — 83615 LACTATE (LD) (LDH) ENZYME: CPT

## 2017-12-13 PROCEDURE — 82784 ASSAY IGA/IGD/IGG/IGM EACH: CPT

## 2017-12-13 PROCEDURE — 84550 ASSAY OF BLOOD/URIC ACID: CPT

## 2017-12-13 PROCEDURE — 80053 COMPREHEN METABOLIC PANEL: CPT

## 2017-12-13 PROCEDURE — 36415 COLL VENOUS BLD VENIPUNCTURE: CPT

## 2017-12-13 PROCEDURE — 84100 ASSAY OF PHOSPHORUS: CPT

## 2017-12-14 RX ORDER — DEXTROSE MONOHYDRATE 50 MG/ML
20 INJECTION, SOLUTION INTRAVENOUS CONTINUOUS
Status: DISCONTINUED | OUTPATIENT
Start: 2017-12-15 | End: 2017-12-18 | Stop reason: HOSPADM

## 2017-12-15 ENCOUNTER — HOSPITAL ENCOUNTER (OUTPATIENT)
Dept: INFUSION CENTER | Facility: CLINIC | Age: 72
Discharge: HOME | End: 2017-12-15
Payer: COMMERCIAL

## 2017-12-15 VITALS
TEMPERATURE: 97.8 F | RESPIRATION RATE: 16 BRPM | DIASTOLIC BLOOD PRESSURE: 86 MMHG | HEART RATE: 51 BPM | SYSTOLIC BLOOD PRESSURE: 152 MMHG

## 2017-12-15 PROCEDURE — 96367 TX/PROPH/DG ADDL SEQ IV INF: CPT

## 2017-12-15 PROCEDURE — 96372 THER/PROPH/DIAG INJ SC/IM: CPT

## 2017-12-15 PROCEDURE — 96366 THER/PROPH/DIAG IV INF ADDON: CPT

## 2017-12-15 PROCEDURE — 96361 HYDRATE IV INFUSION ADD-ON: CPT

## 2017-12-15 PROCEDURE — 96413 CHEMO IV INFUSION 1 HR: CPT

## 2017-12-15 RX ADMIN — CARFILZOMIB 90 MG: 30 INJECTION, POWDER, LYOPHILIZED, FOR SOLUTION INTRAVENOUS at 12:25

## 2017-12-15 RX ADMIN — TBO-FILGRASTIM 480 MCG: 480 INJECTION, SOLUTION SUBCUTANEOUS at 08:50

## 2017-12-15 RX ADMIN — SODIUM CHLORIDE 500 ML: 0.9 INJECTION, SOLUTION INTRAVENOUS at 13:07

## 2017-12-15 RX ADMIN — SODIUM CHLORIDE 500 ML: 0.9 INJECTION, SOLUTION INTRAVENOUS at 11:09

## 2017-12-15 RX ADMIN — DEXTROSE 20 ML/HR: 5 SOLUTION INTRAVENOUS at 12:23

## 2017-12-15 RX ADMIN — IMMUNE GLOBULIN (HUMAN) 30 G: 10 INJECTION INTRAVENOUS; SUBCUTANEOUS at 08:54

## 2017-12-15 NOTE — PROGRESS NOTES
Dorota Layne RN made aware of Platelets- 85,654  Cleared for chemotherapy today  Order sent  ANC-0 98- Granix 480mcg administered in RA as per parameter on orders   IVIG initiated first- titrated as per protocal  (70kg) and rate increased every 15 minutes as per protocal as patient has had before and tolerated

## 2017-12-20 ENCOUNTER — APPOINTMENT (OUTPATIENT)
Dept: LAB | Facility: CLINIC | Age: 72
End: 2017-12-20
Payer: COMMERCIAL

## 2017-12-20 ENCOUNTER — TRANSCRIBE ORDERS (OUTPATIENT)
Dept: LAB | Facility: CLINIC | Age: 72
End: 2017-12-20

## 2017-12-20 ENCOUNTER — GENERIC CONVERSION - ENCOUNTER (OUTPATIENT)
Dept: OTHER | Facility: OTHER | Age: 72
End: 2017-12-20

## 2017-12-20 DIAGNOSIS — R11.0 NAUSEA: ICD-10-CM

## 2017-12-20 DIAGNOSIS — D64.9 ANEMIA, UNSPECIFIED TYPE: ICD-10-CM

## 2017-12-20 DIAGNOSIS — E85.9 MYELOMA ASSOCIATED AMYLOIDOSIS (HCC): Primary | ICD-10-CM

## 2017-12-20 DIAGNOSIS — R53.83 OTHER FATIGUE: ICD-10-CM

## 2017-12-20 DIAGNOSIS — C90.00 MYELOMA ASSOCIATED AMYLOIDOSIS (HCC): ICD-10-CM

## 2017-12-20 DIAGNOSIS — I10 ESSENTIAL HYPERTENSION, MALIGNANT: ICD-10-CM

## 2017-12-20 DIAGNOSIS — I51.9 MYXEDEMA HEART DISEASE: ICD-10-CM

## 2017-12-20 DIAGNOSIS — D69.6 THROMBOCYTOPENIA (HCC): Primary | ICD-10-CM

## 2017-12-20 DIAGNOSIS — E85.9 MYELOMA ASSOCIATED AMYLOIDOSIS (HCC): ICD-10-CM

## 2017-12-20 DIAGNOSIS — E03.9 MYXEDEMA HEART DISEASE: ICD-10-CM

## 2017-12-20 DIAGNOSIS — C90.00 MYELOMA ASSOCIATED AMYLOIDOSIS (HCC): Primary | ICD-10-CM

## 2017-12-20 DIAGNOSIS — E78.5 HYPERLIPIDEMIA, UNSPECIFIED HYPERLIPIDEMIA TYPE: ICD-10-CM

## 2017-12-20 LAB
ALBUMIN SERPL BCP-MCNC: 3.5 G/DL (ref 3.5–5)
ALP SERPL-CCNC: 82 U/L (ref 46–116)
ALT SERPL W P-5'-P-CCNC: 39 U/L (ref 12–78)
ANION GAP SERPL CALCULATED.3IONS-SCNC: 8 MMOL/L (ref 4–13)
AST SERPL W P-5'-P-CCNC: 15 U/L (ref 5–45)
BASOPHILS # BLD AUTO: 0.02 THOUSANDS/ΜL (ref 0–0.1)
BASOPHILS NFR BLD AUTO: 1 % (ref 0–1)
BILIRUB SERPL-MCNC: 1.46 MG/DL (ref 0.2–1)
BUN SERPL-MCNC: 19 MG/DL (ref 5–25)
CALCIUM SERPL-MCNC: 7.6 MG/DL (ref 8.3–10.1)
CHLORIDE SERPL-SCNC: 106 MMOL/L (ref 100–108)
CO2 SERPL-SCNC: 26 MMOL/L (ref 21–32)
CREAT SERPL-MCNC: 1.2 MG/DL (ref 0.6–1.3)
EOSINOPHIL # BLD AUTO: 0.05 THOUSAND/ΜL (ref 0–0.61)
EOSINOPHIL NFR BLD AUTO: 3 % (ref 0–6)
ERYTHROCYTE [DISTWIDTH] IN BLOOD BY AUTOMATED COUNT: 13.8 % (ref 11.6–15.1)
GFR SERPL CREATININE-BSD FRML MDRD: 61 ML/MIN/1.73SQ M
GLUCOSE SERPL-MCNC: 78 MG/DL (ref 65–140)
HCT VFR BLD AUTO: 28.2 % (ref 36.5–49.3)
HGB BLD-MCNC: 9.5 G/DL (ref 12–17)
LDH SERPL-CCNC: 170 U/L (ref 81–234)
LYMPHOCYTES # BLD AUTO: 0.34 THOUSANDS/ΜL (ref 0.6–4.47)
LYMPHOCYTES NFR BLD AUTO: 19 % (ref 14–44)
MCH RBC QN AUTO: 35.3 PG (ref 26.8–34.3)
MCHC RBC AUTO-ENTMCNC: 33.7 G/DL (ref 31.4–37.4)
MCV RBC AUTO: 105 FL (ref 82–98)
MONOCYTES # BLD AUTO: 0.33 THOUSAND/ΜL (ref 0.17–1.22)
MONOCYTES NFR BLD AUTO: 18 % (ref 4–12)
NEUTROPHILS # BLD AUTO: 1.05 THOUSANDS/ΜL (ref 1.85–7.62)
NEUTS SEG NFR BLD AUTO: 59 % (ref 43–75)
NRBC BLD AUTO-RTO: 0 /100 WBCS
PHOSPHATE SERPL-MCNC: 2.6 MG/DL (ref 2.3–4.1)
PLATELET # BLD AUTO: 8 THOUSANDS/UL (ref 149–390)
POTASSIUM SERPL-SCNC: 3.6 MMOL/L (ref 3.5–5.3)
PROT SERPL-MCNC: 6.7 G/DL (ref 6.4–8.2)
RBC # BLD AUTO: 2.69 MILLION/UL (ref 3.88–5.62)
SODIUM SERPL-SCNC: 140 MMOL/L (ref 136–145)
URATE SERPL-MCNC: 6.6 MG/DL (ref 4.2–8)
WBC # BLD AUTO: 1.8 THOUSAND/UL (ref 4.31–10.16)

## 2017-12-20 PROCEDURE — 80053 COMPREHEN METABOLIC PANEL: CPT

## 2017-12-20 PROCEDURE — 85025 COMPLETE CBC W/AUTO DIFF WBC: CPT

## 2017-12-20 PROCEDURE — 83615 LACTATE (LD) (LDH) ENZYME: CPT

## 2017-12-20 PROCEDURE — 84550 ASSAY OF BLOOD/URIC ACID: CPT

## 2017-12-20 PROCEDURE — 36415 COLL VENOUS BLD VENIPUNCTURE: CPT

## 2017-12-20 PROCEDURE — 84100 ASSAY OF PHOSPHORUS: CPT

## 2017-12-20 RX ORDER — DEXTROSE MONOHYDRATE 50 MG/ML
20 INJECTION, SOLUTION INTRAVENOUS CONTINUOUS
Status: DISCONTINUED | OUTPATIENT
Start: 2017-12-22 | End: 2017-12-25 | Stop reason: HOSPADM

## 2017-12-21 ENCOUNTER — GENERIC CONVERSION - ENCOUNTER (OUTPATIENT)
Dept: OTHER | Facility: OTHER | Age: 72
End: 2017-12-21

## 2017-12-22 ENCOUNTER — HOSPITAL ENCOUNTER (OUTPATIENT)
Dept: INFUSION CENTER | Facility: CLINIC | Age: 72
Discharge: HOME | End: 2017-12-24
Payer: COMMERCIAL

## 2017-12-22 VITALS
DIASTOLIC BLOOD PRESSURE: 72 MMHG | TEMPERATURE: 97.5 F | RESPIRATION RATE: 18 BRPM | SYSTOLIC BLOOD PRESSURE: 160 MMHG | HEART RATE: 50 BPM

## 2017-12-22 PROCEDURE — P9037 PLATE PHERES LEUKOREDU IRRAD: HCPCS

## 2017-12-22 PROCEDURE — 36430 TRANSFUSION BLD/BLD COMPNT: CPT

## 2017-12-22 PROCEDURE — 96361 HYDRATE IV INFUSION ADD-ON: CPT

## 2017-12-22 PROCEDURE — 96413 CHEMO IV INFUSION 1 HR: CPT

## 2017-12-22 RX ADMIN — SODIUM CHLORIDE 500 ML: 0.9 INJECTION, SOLUTION INTRAVENOUS at 10:30

## 2017-12-22 RX ADMIN — DEXTROSE MONOHYDRATE 20 ML/HR: 50 INJECTION, SOLUTION INTRAVENOUS at 09:38

## 2017-12-22 RX ADMIN — CARFILZOMIB 90 MG: 30 INJECTION, POWDER, LYOPHILIZED, FOR SOLUTION INTRAVENOUS at 09:40

## 2017-12-22 RX ADMIN — SODIUM CHLORIDE 500 ML: 0.9 INJECTION, SOLUTION INTRAVENOUS at 08:36

## 2017-12-22 NOTE — PROGRESS NOTES
Pt arrived to unit without complaint  Platelet OHHFT=8561, Dr Jeff Curry aware and platelet transfusion ordered and given  Pt tolerated well  Kyprolis administered as ordered, pt tolerated well without adverse reaction  Pt ANC=1 05, Granix not indicated per parameters  Pt left unit in stable condition without question or concern

## 2017-12-27 ENCOUNTER — APPOINTMENT (OUTPATIENT)
Dept: LAB | Facility: CLINIC | Age: 72
End: 2017-12-27
Payer: COMMERCIAL

## 2017-12-27 DIAGNOSIS — E78.5 HYPERLIPIDEMIA, UNSPECIFIED HYPERLIPIDEMIA TYPE: ICD-10-CM

## 2017-12-27 DIAGNOSIS — I51.9 MYXEDEMA HEART DISEASE: ICD-10-CM

## 2017-12-27 DIAGNOSIS — R11.0 NAUSEA: ICD-10-CM

## 2017-12-27 DIAGNOSIS — E85.9 MYELOMA ASSOCIATED AMYLOIDOSIS (HCC): ICD-10-CM

## 2017-12-27 DIAGNOSIS — E03.9 MYXEDEMA HEART DISEASE: ICD-10-CM

## 2017-12-27 DIAGNOSIS — D64.9 ANEMIA, UNSPECIFIED TYPE: ICD-10-CM

## 2017-12-27 DIAGNOSIS — I10 ESSENTIAL HYPERTENSION, MALIGNANT: ICD-10-CM

## 2017-12-27 DIAGNOSIS — R53.83 OTHER FATIGUE: ICD-10-CM

## 2017-12-27 DIAGNOSIS — C90.00 MYELOMA ASSOCIATED AMYLOIDOSIS (HCC): ICD-10-CM

## 2017-12-27 LAB
ALBUMIN SERPL BCP-MCNC: 3.4 G/DL (ref 3.5–5)
ALP SERPL-CCNC: 98 U/L (ref 46–116)
ALT SERPL W P-5'-P-CCNC: 31 U/L (ref 12–78)
ANION GAP SERPL CALCULATED.3IONS-SCNC: 6 MMOL/L (ref 4–13)
AST SERPL W P-5'-P-CCNC: 16 U/L (ref 5–45)
BASOPHILS # BLD AUTO: 0.02 THOUSANDS/ΜL (ref 0–0.1)
BASOPHILS NFR BLD AUTO: 1 % (ref 0–1)
BILIRUB SERPL-MCNC: 1.35 MG/DL (ref 0.2–1)
BUN SERPL-MCNC: 20 MG/DL (ref 5–25)
CALCIUM SERPL-MCNC: 8.3 MG/DL (ref 8.3–10.1)
CHLORIDE SERPL-SCNC: 107 MMOL/L (ref 100–108)
CO2 SERPL-SCNC: 27 MMOL/L (ref 21–32)
CREAT SERPL-MCNC: 1.12 MG/DL (ref 0.6–1.3)
EOSINOPHIL # BLD AUTO: 0.07 THOUSAND/ΜL (ref 0–0.61)
EOSINOPHIL NFR BLD AUTO: 4 % (ref 0–6)
ERYTHROCYTE [DISTWIDTH] IN BLOOD BY AUTOMATED COUNT: 14.5 % (ref 11.6–15.1)
GFR SERPL CREATININE-BSD FRML MDRD: 66 ML/MIN/1.73SQ M
GLUCOSE SERPL-MCNC: 82 MG/DL (ref 65–140)
HCT VFR BLD AUTO: 29.2 % (ref 36.5–49.3)
HGB BLD-MCNC: 9.9 G/DL (ref 12–17)
LDH SERPL-CCNC: 185 U/L (ref 81–234)
LYMPHOCYTES # BLD AUTO: 0.36 THOUSANDS/ΜL (ref 0.6–4.47)
LYMPHOCYTES NFR BLD AUTO: 23 % (ref 14–44)
MCH RBC QN AUTO: 35.4 PG (ref 26.8–34.3)
MCHC RBC AUTO-ENTMCNC: 33.9 G/DL (ref 31.4–37.4)
MCV RBC AUTO: 104 FL (ref 82–98)
MONOCYTES # BLD AUTO: 0.27 THOUSAND/ΜL (ref 0.17–1.22)
MONOCYTES NFR BLD AUTO: 17 % (ref 4–12)
NEUTROPHILS # BLD AUTO: 0.86 THOUSANDS/ΜL (ref 1.85–7.62)
NEUTS SEG NFR BLD AUTO: 55 % (ref 43–75)
NRBC BLD AUTO-RTO: 0 /100 WBCS
PHOSPHATE SERPL-MCNC: 2.8 MG/DL (ref 2.3–4.1)
PLATELET # BLD AUTO: 9 THOUSANDS/UL (ref 149–390)
POTASSIUM SERPL-SCNC: 3.9 MMOL/L (ref 3.5–5.3)
PROT SERPL-MCNC: 6.6 G/DL (ref 6.4–8.2)
RBC # BLD AUTO: 2.8 MILLION/UL (ref 3.88–5.62)
SODIUM SERPL-SCNC: 140 MMOL/L (ref 136–145)
URATE SERPL-MCNC: 7.3 MG/DL (ref 4.2–8)
WBC # BLD AUTO: 1.58 THOUSAND/UL (ref 4.31–10.16)

## 2017-12-27 PROCEDURE — 84550 ASSAY OF BLOOD/URIC ACID: CPT

## 2017-12-27 PROCEDURE — 85025 COMPLETE CBC W/AUTO DIFF WBC: CPT

## 2017-12-27 PROCEDURE — 36415 COLL VENOUS BLD VENIPUNCTURE: CPT

## 2017-12-27 PROCEDURE — 83615 LACTATE (LD) (LDH) ENZYME: CPT

## 2017-12-27 PROCEDURE — 84100 ASSAY OF PHOSPHORUS: CPT

## 2017-12-27 PROCEDURE — 80053 COMPREHEN METABOLIC PANEL: CPT

## 2017-12-28 RX ORDER — DEXTROSE MONOHYDRATE 50 MG/ML
20 INJECTION, SOLUTION INTRAVENOUS CONTINUOUS
Status: DISCONTINUED | OUTPATIENT
Start: 2017-12-29 | End: 2018-01-01 | Stop reason: HOSPADM

## 2017-12-28 RX ORDER — SODIUM CHLORIDE 9 MG/ML
20 INJECTION, SOLUTION INTRAVENOUS CONTINUOUS
Status: DISCONTINUED | OUTPATIENT
Start: 2017-12-29 | End: 2018-01-01 | Stop reason: HOSPADM

## 2017-12-29 ENCOUNTER — HOSPITAL ENCOUNTER (OUTPATIENT)
Dept: INFUSION CENTER | Facility: CLINIC | Age: 72
Discharge: HOME | End: 2017-12-31
Payer: COMMERCIAL

## 2017-12-29 VITALS
RESPIRATION RATE: 18 BRPM | BODY MASS INDEX: 25.04 KG/M2 | DIASTOLIC BLOOD PRESSURE: 86 MMHG | WEIGHT: 169.09 LBS | SYSTOLIC BLOOD PRESSURE: 148 MMHG | HEIGHT: 69 IN | HEART RATE: 47 BPM | TEMPERATURE: 97.6 F

## 2017-12-29 PROCEDURE — 96361 HYDRATE IV INFUSION ADD-ON: CPT

## 2017-12-29 PROCEDURE — 96367 TX/PROPH/DG ADDL SEQ IV INF: CPT

## 2017-12-29 PROCEDURE — 36430 TRANSFUSION BLD/BLD COMPNT: CPT

## 2017-12-29 PROCEDURE — 96372 THER/PROPH/DIAG INJ SC/IM: CPT

## 2017-12-29 PROCEDURE — P9037 PLATE PHERES LEUKOREDU IRRAD: HCPCS

## 2017-12-29 PROCEDURE — 96413 CHEMO IV INFUSION 1 HR: CPT

## 2017-12-29 RX ADMIN — CARFILZOMIB 90 MG: 30 INJECTION, POWDER, LYOPHILIZED, FOR SOLUTION INTRAVENOUS at 10:32

## 2017-12-29 RX ADMIN — SODIUM CHLORIDE 500 ML: 0.9 INJECTION, SOLUTION INTRAVENOUS at 09:33

## 2017-12-29 RX ADMIN — SODIUM CHLORIDE 500 ML: 0.9 INJECTION, SOLUTION INTRAVENOUS at 11:09

## 2017-12-29 RX ADMIN — DEXTROSE MONOHYDRATE 20 ML/HR: 50 INJECTION, SOLUTION INTRAVENOUS at 10:31

## 2017-12-29 RX ADMIN — TBO-FILGRASTIM 480 MCG: 480 INJECTION, SOLUTION SUBCUTANEOUS at 11:22

## 2017-12-29 RX ADMIN — SODIUM CHLORIDE 3.5 MG: 900 INJECTION, SOLUTION INTRAVENOUS at 11:12

## 2017-12-29 NOTE — PROGRESS NOTES
Burke RN for Dr Stan Gannon is aware of elevated bp  Patient is not symptomatic  Will continue to monitor

## 2017-12-29 NOTE — PLAN OF CARE

## 2018-01-03 ENCOUNTER — TRANSCRIBE ORDERS (OUTPATIENT)
Dept: LAB | Facility: CLINIC | Age: 73
End: 2018-01-03

## 2018-01-03 ENCOUNTER — APPOINTMENT (OUTPATIENT)
Dept: LAB | Facility: CLINIC | Age: 73
End: 2018-01-03
Payer: COMMERCIAL

## 2018-01-03 DIAGNOSIS — R11.0 NAUSEA: ICD-10-CM

## 2018-01-03 DIAGNOSIS — I10 ESSENTIAL HYPERTENSION, MALIGNANT: ICD-10-CM

## 2018-01-03 DIAGNOSIS — I51.9 MYXEDEMA HEART DISEASE: ICD-10-CM

## 2018-01-03 DIAGNOSIS — C90.00 MYELOMA ASSOCIATED AMYLOIDOSIS (HCC): ICD-10-CM

## 2018-01-03 DIAGNOSIS — E85.9 MYELOMA ASSOCIATED AMYLOIDOSIS (HCC): ICD-10-CM

## 2018-01-03 DIAGNOSIS — D64.9 ANEMIA, UNSPECIFIED TYPE: ICD-10-CM

## 2018-01-03 DIAGNOSIS — E85.9 MYELOMA ASSOCIATED AMYLOIDOSIS (HCC): Primary | ICD-10-CM

## 2018-01-03 DIAGNOSIS — E03.9 MYXEDEMA HEART DISEASE: ICD-10-CM

## 2018-01-03 DIAGNOSIS — C90.00 MYELOMA ASSOCIATED AMYLOIDOSIS (HCC): Primary | ICD-10-CM

## 2018-01-03 DIAGNOSIS — R53.83 OTHER FATIGUE: ICD-10-CM

## 2018-01-03 DIAGNOSIS — E78.5 HYPERLIPIDEMIA, UNSPECIFIED HYPERLIPIDEMIA TYPE: ICD-10-CM

## 2018-01-03 LAB
ALBUMIN SERPL BCP-MCNC: 3.4 G/DL (ref 3.5–5)
ALP SERPL-CCNC: 99 U/L (ref 46–116)
ALT SERPL W P-5'-P-CCNC: 34 U/L (ref 12–78)
ANION GAP SERPL CALCULATED.3IONS-SCNC: 6 MMOL/L (ref 4–13)
AST SERPL W P-5'-P-CCNC: 16 U/L (ref 5–45)
BASOPHILS # BLD AUTO: 0.01 THOUSANDS/ΜL (ref 0–0.1)
BASOPHILS NFR BLD AUTO: 1 % (ref 0–1)
BILIRUB SERPL-MCNC: 1.46 MG/DL (ref 0.2–1)
BUN SERPL-MCNC: 22 MG/DL (ref 5–25)
CALCIUM SERPL-MCNC: 8.1 MG/DL (ref 8.3–10.1)
CHLORIDE SERPL-SCNC: 107 MMOL/L (ref 100–108)
CO2 SERPL-SCNC: 28 MMOL/L (ref 21–32)
CREAT SERPL-MCNC: 1.14 MG/DL (ref 0.6–1.3)
EOSINOPHIL # BLD AUTO: 0.1 THOUSAND/ΜL (ref 0–0.61)
EOSINOPHIL NFR BLD AUTO: 5 % (ref 0–6)
ERYTHROCYTE [DISTWIDTH] IN BLOOD BY AUTOMATED COUNT: 15.1 % (ref 11.6–15.1)
GFR SERPL CREATININE-BSD FRML MDRD: 65 ML/MIN/1.73SQ M
GLUCOSE SERPL-MCNC: 80 MG/DL (ref 65–140)
HCT VFR BLD AUTO: 30.1 % (ref 36.5–49.3)
HGB BLD-MCNC: 10 G/DL (ref 12–17)
LDH SERPL-CCNC: 180 U/L (ref 81–234)
LYMPHOCYTES # BLD AUTO: 0.3 THOUSANDS/ΜL (ref 0.6–4.47)
LYMPHOCYTES NFR BLD AUTO: 16 % (ref 14–44)
MCH RBC QN AUTO: 35.6 PG (ref 26.8–34.3)
MCHC RBC AUTO-ENTMCNC: 33.2 G/DL (ref 31.4–37.4)
MCV RBC AUTO: 107 FL (ref 82–98)
MONOCYTES # BLD AUTO: 0.2 THOUSAND/ΜL (ref 0.17–1.22)
MONOCYTES NFR BLD AUTO: 11 % (ref 4–12)
NEUTROPHILS # BLD AUTO: 1.21 THOUSANDS/ΜL (ref 1.85–7.62)
NEUTS SEG NFR BLD AUTO: 67 % (ref 43–75)
NRBC BLD AUTO-RTO: 0 /100 WBCS
PHOSPHATE SERPL-MCNC: 3.1 MG/DL (ref 2.3–4.1)
PLATELET # BLD AUTO: 7 THOUSANDS/UL (ref 149–390)
POTASSIUM SERPL-SCNC: 3.8 MMOL/L (ref 3.5–5.3)
PROT SERPL-MCNC: 6.4 G/DL (ref 6.4–8.2)
RBC # BLD AUTO: 2.81 MILLION/UL (ref 3.88–5.62)
SODIUM SERPL-SCNC: 141 MMOL/L (ref 136–145)
URATE SERPL-MCNC: 5.9 MG/DL (ref 4.2–8)
WBC # BLD AUTO: 1.84 THOUSAND/UL (ref 4.31–10.16)

## 2018-01-03 PROCEDURE — 84100 ASSAY OF PHOSPHORUS: CPT

## 2018-01-03 PROCEDURE — 36415 COLL VENOUS BLD VENIPUNCTURE: CPT

## 2018-01-03 PROCEDURE — 84550 ASSAY OF BLOOD/URIC ACID: CPT

## 2018-01-03 PROCEDURE — 80053 COMPREHEN METABOLIC PANEL: CPT

## 2018-01-03 PROCEDURE — 85025 COMPLETE CBC W/AUTO DIFF WBC: CPT

## 2018-01-03 PROCEDURE — 83615 LACTATE (LD) (LDH) ENZYME: CPT

## 2018-01-04 RX ORDER — SODIUM CHLORIDE 9 MG/ML
20 INJECTION, SOLUTION INTRAVENOUS CONTINUOUS
Status: DISCONTINUED | OUTPATIENT
Start: 2018-01-05 | End: 2018-01-08 | Stop reason: HOSPADM

## 2018-01-04 RX ORDER — DEXTROSE MONOHYDRATE 50 MG/ML
20 INJECTION, SOLUTION INTRAVENOUS CONTINUOUS
Status: DISCONTINUED | OUTPATIENT
Start: 2018-01-05 | End: 2018-01-08 | Stop reason: HOSPADM

## 2018-01-05 ENCOUNTER — HOSPITAL ENCOUNTER (OUTPATIENT)
Dept: INFUSION CENTER | Facility: CLINIC | Age: 73
Discharge: HOME | End: 2018-01-07
Payer: COMMERCIAL

## 2018-01-05 VITALS
WEIGHT: 168.21 LBS | BODY MASS INDEX: 24.63 KG/M2 | SYSTOLIC BLOOD PRESSURE: 152 MMHG | RESPIRATION RATE: 16 BRPM | DIASTOLIC BLOOD PRESSURE: 72 MMHG | TEMPERATURE: 98 F | HEART RATE: 56 BPM

## 2018-01-05 PROCEDURE — 36430 TRANSFUSION BLD/BLD COMPNT: CPT

## 2018-01-05 PROCEDURE — P9037 PLATE PHERES LEUKOREDU IRRAD: HCPCS

## 2018-01-05 PROCEDURE — 96413 CHEMO IV INFUSION 1 HR: CPT

## 2018-01-05 PROCEDURE — 96361 HYDRATE IV INFUSION ADD-ON: CPT

## 2018-01-05 RX ADMIN — CARFILZOMIB 90 MG: 30 INJECTION, POWDER, LYOPHILIZED, FOR SOLUTION INTRAVENOUS at 10:03

## 2018-01-05 RX ADMIN — SODIUM CHLORIDE 500 ML: 0.9 INJECTION, SOLUTION INTRAVENOUS at 08:59

## 2018-01-05 RX ADMIN — DEXTROSE 20 ML/HR: 5 SOLUTION INTRAVENOUS at 10:03

## 2018-01-05 RX ADMIN — SODIUM CHLORIDE 20 ML/HR: 0.9 INJECTION, SOLUTION INTRAVENOUS at 09:10

## 2018-01-05 RX ADMIN — SODIUM CHLORIDE 500 ML: 0.9 INJECTION, SOLUTION INTRAVENOUS at 10:40

## 2018-01-05 NOTE — PROGRESS NOTES
Pt received 1 unit Plt and kyprolis without adverse events  Aware of future appointments    Refused AVS

## 2018-01-05 NOTE — PLAN OF CARE
Problem: Potential for Falls  Goal: Patient will remain free of falls  INTERVENTIONS:  - Assess patient frequently for physical needs  -  Identify cognitive and physical deficits and behaviors that affect risk of falls    -  [place] fall precautions as indicated by assessment   - Educate patient/family on patient safety including physical limitations  - Instruct patient to call for assistance with activity based on assessment  - Modify environment to reduce risk of injury  - Consider OT/PT consult to assist with strengthening/mobility   Outcome: Progressing

## 2018-01-09 NOTE — MISCELLANEOUS
Message   Recorded as Task   Date: 12/13/2016 09:15 AM, Created By: Homer Greene   Task Name: Call Back   Assigned To: Altagracia Kearney   Regarding Patient: Bushra Villagomez, Status: Active   CommentConnie Carmona - 13 Dec 2016 9:15 AM     TASK CREATED  Khalif Esqueda wife called- pt has had  a bad cold that he cant seem to 'shake off' would like to know if he should be on meds    [phone]   Discussed with Dr Castle Fee - Z pack sent to pharmacy  patient developed cold symptoms - congestion and runny nose  Denies fever  Active Problems    1  Anemia (285 9) (D64 9)   2  Chemotherapy-induced nausea (787 02,E933 1) (R11 0,T45  1X5A)   3  Encounter for prostate cancer screening (V76 44) (Z12 5)   4  Fatigue (780 79) (R53 83)   5  Hyperlipidemia (272 4) (E78 5)   6  Hypertension (401 9) (I10)   7  Hypothyroidism (244 9) (E03 9)   8  Low hemoglobin (285 9) (D64 9)   9  Multiple myeloma (203 00) (C90 00)   10  Pancytopenia (284 19) (D61 818)   11  Shortness of breath (786 05) (R06 02)    Current Meds   1  Acyclovir 400 MG Oral Tablet; TAKE 1 TABLET TWICE DAILY as directed; Therapy: 45YBR6994 to (Evaluate:68Nui4734)  Requested for: 00Zma7657; Last   Rx:79Egt3291 Ordered   2  Acyclovir 400 MG Oral Tablet; TAKE 1 TABLET TWICE DAILY; Therapy: (Recorded:23Ozp4220) to Recorded   3  Aspirin 81 MG TABS; Therapy: (Recorded:95Dlw8802) to Recorded   4  Azithromycin 250 MG Oral Tablet (Zithromax Z-José Luis); TAKE 2 TABLETS ON DAY 1 THEN   TAKE 1 TABLET A DAY FOR 4 DAYS; Therapy: 39JJB5490 to (Last Rx:03Thl6279)  Requested for: 23Zpb0419; Status:   ACTIVE - Retrospective By Protocol Authorization Ordered   5  Dexamethasone 4 MG Oral Tablet; take 3 tabs by mouth weekly; Therapy: (Recorded:19Dtu1131) to Recorded   6  Dexamethasone 4 MG Oral Tablet; take 3 tabs once a week; Therapy: 65BUI6913 to (Evaluate:20Qzi7612)  Requested for: 03Trv2650; Last   Rx:08Xst1998 Ordered   7   Escitalopram Oxalate 20 MG Oral Tablet (Lexapro); take one tablet by mouth one time   daily; Therapy: 78TJJ2108 to (21 [phone])  Requested for: 02Aug2016; Last   Rx:02Aug2016 Ordered   8  LORazepam 0 5 MG Oral Tablet; take 1/2 to 1 tablet every 4 hours as needed; Therapy: 68EBQ2882 to (Evaluate:06Nov2015); Last Rx:07Oct2015 Ordered   9  Multi-Vitamins TABS; Therapy: (Recorded:76Tsl6951) to Recorded   10  Ondansetron HCl - 4 MG Oral Tablet; One tab PO q 4-6 hours PRN nausea; Therapy: 86ZHN2599 to (Last Rx:13Oct2015) Ordered   11  Revlimid 15 MG Oral Capsule; started 7/14/16; Therapy: (Recorded:61Nts6036) to Recorded   12  Vitamin D3 TABS; Therapy: (Recorded:08Cfq0521) to Recorded    Allergies    1   No Known Drug Allergies    Signatures   Electronically signed by : Lacey Brooks, ; Dec 13 2016 11:04AM EST                       (Author)

## 2018-01-10 ENCOUNTER — APPOINTMENT (OUTPATIENT)
Dept: LAB | Facility: CLINIC | Age: 73
End: 2018-01-10
Payer: COMMERCIAL

## 2018-01-10 DIAGNOSIS — C90.00 MYELOMA ASSOCIATED AMYLOIDOSIS (HCC): ICD-10-CM

## 2018-01-10 DIAGNOSIS — I10 ESSENTIAL HYPERTENSION, MALIGNANT: ICD-10-CM

## 2018-01-10 DIAGNOSIS — R11.0 NAUSEA: ICD-10-CM

## 2018-01-10 DIAGNOSIS — E03.9 MYXEDEMA HEART DISEASE: ICD-10-CM

## 2018-01-10 DIAGNOSIS — I51.9 MYXEDEMA HEART DISEASE: ICD-10-CM

## 2018-01-10 DIAGNOSIS — E85.9 MYELOMA ASSOCIATED AMYLOIDOSIS (HCC): ICD-10-CM

## 2018-01-10 DIAGNOSIS — R53.83 OTHER FATIGUE: ICD-10-CM

## 2018-01-10 DIAGNOSIS — D64.9 ANEMIA, UNSPECIFIED TYPE: ICD-10-CM

## 2018-01-10 DIAGNOSIS — E78.5 HYPERLIPIDEMIA, UNSPECIFIED HYPERLIPIDEMIA TYPE: ICD-10-CM

## 2018-01-10 DIAGNOSIS — C90.00 MULTIPLE MYELOMA NOT HAVING ACHIEVED REMISSION (HCC): ICD-10-CM

## 2018-01-10 LAB
ALBUMIN SERPL BCP-MCNC: 3.5 G/DL (ref 3.5–5)
ALP SERPL-CCNC: 103 U/L (ref 46–116)
ALT SERPL W P-5'-P-CCNC: 30 U/L (ref 12–78)
ANION GAP SERPL CALCULATED.3IONS-SCNC: 5 MMOL/L (ref 4–13)
AST SERPL W P-5'-P-CCNC: 14 U/L (ref 5–45)
BASOPHILS # BLD AUTO: 0.01 THOUSANDS/ΜL (ref 0–0.1)
BASOPHILS NFR BLD AUTO: 0 % (ref 0–1)
BILIRUB SERPL-MCNC: 1.47 MG/DL (ref 0.2–1)
BUN SERPL-MCNC: 21 MG/DL (ref 5–25)
CALCIUM SERPL-MCNC: 8 MG/DL (ref 8.3–10.1)
CHLORIDE SERPL-SCNC: 105 MMOL/L (ref 100–108)
CO2 SERPL-SCNC: 28 MMOL/L (ref 21–32)
CREAT SERPL-MCNC: 1.12 MG/DL (ref 0.6–1.3)
EOSINOPHIL # BLD AUTO: 0.14 THOUSAND/ΜL (ref 0–0.61)
EOSINOPHIL NFR BLD AUTO: 6 % (ref 0–6)
ERYTHROCYTE [DISTWIDTH] IN BLOOD BY AUTOMATED COUNT: 14.7 % (ref 11.6–15.1)
GFR SERPL CREATININE-BSD FRML MDRD: 66 ML/MIN/1.73SQ M
GLUCOSE SERPL-MCNC: 80 MG/DL (ref 65–140)
HCT VFR BLD AUTO: 30.9 % (ref 36.5–49.3)
HGB BLD-MCNC: 10.4 G/DL (ref 12–17)
IGG SERPL-MCNC: 635 MG/DL (ref 700–1600)
LDH SERPL-CCNC: 186 U/L (ref 81–234)
LYMPHOCYTES # BLD AUTO: 0.45 THOUSANDS/ΜL (ref 0.6–4.47)
LYMPHOCYTES NFR BLD AUTO: 19 % (ref 14–44)
MCH RBC QN AUTO: 35.4 PG (ref 26.8–34.3)
MCHC RBC AUTO-ENTMCNC: 33.7 G/DL (ref 31.4–37.4)
MCV RBC AUTO: 105 FL (ref 82–98)
MONOCYTES # BLD AUTO: 0.2 THOUSAND/ΜL (ref 0.17–1.22)
MONOCYTES NFR BLD AUTO: 8 % (ref 4–12)
NEUTROPHILS # BLD AUTO: 1.6 THOUSANDS/ΜL (ref 1.85–7.62)
NEUTS SEG NFR BLD AUTO: 67 % (ref 43–75)
NRBC BLD AUTO-RTO: 0 /100 WBCS
PHOSPHATE SERPL-MCNC: 2.9 MG/DL (ref 2.3–4.1)
PLATELET # BLD AUTO: 10 THOUSANDS/UL (ref 149–390)
POTASSIUM SERPL-SCNC: 3.8 MMOL/L (ref 3.5–5.3)
PROT SERPL-MCNC: 6.7 G/DL (ref 6.4–8.2)
RBC # BLD AUTO: 2.94 MILLION/UL (ref 3.88–5.62)
SODIUM SERPL-SCNC: 138 MMOL/L (ref 136–145)
URATE SERPL-MCNC: 5.8 MG/DL (ref 4.2–8)
WBC # BLD AUTO: 2.4 THOUSAND/UL (ref 4.31–10.16)

## 2018-01-10 PROCEDURE — 36415 COLL VENOUS BLD VENIPUNCTURE: CPT

## 2018-01-10 PROCEDURE — 83615 LACTATE (LD) (LDH) ENZYME: CPT

## 2018-01-10 PROCEDURE — 82784 ASSAY IGA/IGD/IGG/IGM EACH: CPT

## 2018-01-10 PROCEDURE — 84550 ASSAY OF BLOOD/URIC ACID: CPT

## 2018-01-10 PROCEDURE — 80053 COMPREHEN METABOLIC PANEL: CPT

## 2018-01-10 PROCEDURE — 85025 COMPLETE CBC W/AUTO DIFF WBC: CPT

## 2018-01-10 PROCEDURE — 84100 ASSAY OF PHOSPHORUS: CPT

## 2018-01-10 RX ORDER — DEXTROSE MONOHYDRATE 50 MG/ML
20 INJECTION, SOLUTION INTRAVENOUS CONTINUOUS
Status: DISCONTINUED | OUTPATIENT
Start: 2018-01-12 | End: 2018-01-15 | Stop reason: HOSPADM

## 2018-01-10 RX ORDER — SODIUM CHLORIDE 9 MG/ML
20 INJECTION, SOLUTION INTRAVENOUS CONTINUOUS
Status: DISCONTINUED | OUTPATIENT
Start: 2018-01-12 | End: 2018-01-15 | Stop reason: HOSPADM

## 2018-01-11 NOTE — MISCELLANEOUS
Message   Recorded as Task   Date: 11/08/2017 04:23 PM, Created By: Denice Juarez   Task Name: Miscellaneous   Assigned To: Lucy Judd   Regarding Patient: Damián Esteban, Status: Active   CommentLyjoãoe Peabody - 08 Nov 2017 4:23 PM     TASK CREATED  Caller: DAVE; Other; [phone] (Work)  CALL FROM [facility] LABS - CRITICAL WBC 1 84; PLTS 15,000   Altagracia Kearney - 09 Nov 2017 8:40 AM     TASK REASSIGNED: Previously Assigned To Altagracia Kearney   D/W Altagracia RN and Dr Sunni Negron  Ordered 1 unit of platelets to be given before chemo treatment on 11/10/17  Reviewed this with [place] infusion and order sent  Patient aware  Active Problems    1  Anemia (285 9) (D64 9)   2  Chemotherapy-induced nausea (787 02,E933 1) (R11 0,T45  1X5A)   3  Creatinine elevation (790 99) (R79 89)   4  Encounter for prostate cancer screening (V76 44) (Z12 5)   5  Fatigue (780 79) (R53 83)   6  Hyperlipidemia (272 4) (E78 5)   7  Hypertension (401 9) (I10)   8  Hypothyroidism (244 9) (E03 9)   9  Low hemoglobin (285 9) (D64 9)   10  Multiple myeloma (203 00) (C90 00)   11  Pancytopenia (284 19) (D61 818)   12  Shortness of breath (786 05) (R06 02)    Current Meds   1  Acyclovir 400 MG Oral Tablet; TAKE 1 TABLET TWICE DAILY as directed; Therapy: 21UWO9600 to (Evaluate:69Eki3942)  Requested for: 99JNJ1270; Last   Rx:84Mwm7497 Ordered   2  Aspirin 81 MG TABS; Therapy: (Recorded:87Rvs9768) to Recorded   3  Calcium 500 TABS; Therapy: (Recorded:65Vhk1915) to Recorded   4  Dexamethasone 4 MG Oral Tablet; take 3 tabs once a week; Therapy: 21DJS2311 to (Evaluate:66Zes9866)  Requested for: 33KJK9750; Last   Rx:16Zuf8691 Ordered   5  Escitalopram Oxalate 20 MG Oral Tablet (Lexapro); TAKE (1) TABLET DAILY; Therapy: 37GWH6295 to (Last Rx:02Oct2017)  Requested for: 25MJW7230 Ordered   6  Levothyroxine Sodium 50 MCG Oral Tablet (Synthroid); TAKE 1 TABLET DAILY;    Therapy: 35EXH9368 to (Evaluate:05Jan2018)  Requested for: 76PBK8051; Last   Rx:67Tag5181 Ordered   7  LORazepam 0 5 MG Oral Tablet; take 1/2 to 1 tablet every 4 hours as needed; Therapy: 61UXW9093 to (Evaluate:06Nov2015); Last Rx:07Oct2015 Ordered   8  Multi-Vitamins TABS; Therapy: (Recorded:69Sfv8629) to Recorded   9  Ondansetron HCl - 4 MG Oral Tablet; One tab PO q 4-6 hours PRN nausea; Therapy: 64ZIX1869 to (Last Rx:13Oct2015) Ordered   10  Revlimid 15 MG Oral Capsule; started 7/14/16; Therapy: (Recorded:02Prv8521) to Recorded   11  Vitamin D3 TABS; Therapy: (Recorded:99Drl6899) to Recorded    Allergies    1   No Known Drug Allergies    Signatures   Electronically signed by : Jessi José, ; Nov 9 2017  9:03AM EST                       (Author)

## 2018-01-11 NOTE — MISCELLANEOUS
Message   Recorded as Task   Date: 12/22/2016 09:51 AM, Created By: Robyn Delfin   Task Name: Care Coordination   Assigned To: Roopa Lopes   Regarding Patient: Filiberto Francis, Status: Active   Comment:    Robyn Lenz - 22 Dec 2016 9:51 AM     TASK CREATED  Caller: Vandana Kahn, Spouse; Care Coordination; [phone]  Miracleioanaia Femi / WIFE IS CALLING TO SAY THAT ERIBERTO / DR HEARN'S PT'S LABS ARE ABNORMAL  THE BUN,CREATININE ARE HIGH AND HIS GFR IS LOW  WHAT IS THE NEXT STEP? PLEASE CALL  Patient will be having creat repeated tomorrow at infusion  All myeloma markers drawn last week were normal per patients wife  Patient did start on bactrim last week for sinus infection  Instructed wife the have patient D/C this      Active Problems    1  Anemia (285 9) (D64 9)   2  Chemotherapy-induced nausea (787 02,E933 1) (R11 0,T45  1X5A)   3  Encounter for prostate cancer screening (V76 44) (Z12 5)   4  Fatigue (780 79) (R53 83)   5  Hyperlipidemia (272 4) (E78 5)   6  Hypertension (401 9) (I10)   7  Hypothyroidism (244 9) (E03 9)   8  Low hemoglobin (285 9) (D64 9)   9  Multiple myeloma (203 00) (C90 00)   10  Pancytopenia (284 19) (D61 818)   11  Shortness of breath (786 05) (R06 02)    Current Meds   1  Acyclovir 400 MG Oral Tablet; TAKE 1 TABLET TWICE DAILY as directed; Therapy: 34VEE7538 to (Evaluate:21Yxg9104)  Requested for: 05Bng5201; Last   Rx:71Sch9297 Ordered   2  Acyclovir 400 MG Oral Tablet; TAKE 1 TABLET TWICE DAILY; Therapy: (Recorded:05Psv5429) to Recorded   3  Aspirin 81 MG TABS; Therapy: (Recorded:41Xfr6601) to Recorded   4  Azithromycin 250 MG Oral Tablet (Zithromax Z-José Luis); TAKE 2 TABLETS ON DAY 1 THEN   TAKE 1 TABLET A DAY FOR 4 DAYS; Therapy: 59CUJ9435 to (Last Rx:55Tst5006)  Requested for: 45WHP5439 Ordered   5  Dexamethasone 4 MG Oral Tablet; take 3 tabs by mouth weekly; Therapy: (Recorded:55Sto6205) to Recorded   6  Dexamethasone 4 MG Oral Tablet; take 3 tabs once a week;    Therapy: 19PQD2194 to (Evaluate:74Dkm0305)  Requested for: 01Aoa6585; Last   Rx:17Par9100 Ordered   7  Escitalopram Oxalate 20 MG Oral Tablet (Lexapro); take one tablet by mouth one time   daily; Therapy: 75UAP1152 to ([phone])  Requested for: 51Qge8070; Last   Rx:49Lat8794 Ordered   8  LORazepam 0 5 MG Oral Tablet; take 1/2 to 1 tablet every 4 hours as needed; Therapy: 83QPM6698 to (Evaluate:06Nov2015); Last Rx:07Oct2015 Ordered   9  Multi-Vitamins TABS; Therapy: (Recorded:51Ewf2803) to Recorded   10  Ondansetron HCl - 4 MG Oral Tablet; One tab PO q 4-6 hours PRN nausea; Therapy: 03TSP7496 to (Last Rx:13Oct2015) Ordered   11  Revlimid 15 MG Oral Capsule; started 7/14/16; Therapy: (Recorded:17Gmg1917) to Recorded   12  Vitamin D3 TABS; Therapy: (Recorded:60Cqv0043) to Recorded    Allergies    1   No Known Drug Allergies    Signatures   Electronically signed by : Andria Rodrigez, ; Dec 22 2016  1:42PM EST                       (Author)

## 2018-01-12 ENCOUNTER — HOSPITAL ENCOUNTER (OUTPATIENT)
Dept: INFUSION CENTER | Facility: CLINIC | Age: 73
Discharge: HOME | End: 2018-01-14
Payer: COMMERCIAL

## 2018-01-12 VITALS
WEIGHT: 167.55 LBS | TEMPERATURE: 97.2 F | DIASTOLIC BLOOD PRESSURE: 86 MMHG | HEART RATE: 54 BPM | SYSTOLIC BLOOD PRESSURE: 150 MMHG | BODY MASS INDEX: 24.54 KG/M2 | RESPIRATION RATE: 18 BRPM

## 2018-01-12 PROCEDURE — P9037 PLATE PHERES LEUKOREDU IRRAD: HCPCS

## 2018-01-12 PROCEDURE — 36430 TRANSFUSION BLD/BLD COMPNT: CPT

## 2018-01-12 PROCEDURE — 96366 THER/PROPH/DIAG IV INF ADDON: CPT

## 2018-01-12 PROCEDURE — 96367 TX/PROPH/DG ADDL SEQ IV INF: CPT

## 2018-01-12 PROCEDURE — 96413 CHEMO IV INFUSION 1 HR: CPT

## 2018-01-12 PROCEDURE — 96361 HYDRATE IV INFUSION ADD-ON: CPT

## 2018-01-12 RX ADMIN — SODIUM CHLORIDE 500 ML: 0.9 INJECTION, SOLUTION INTRAVENOUS at 11:00

## 2018-01-12 RX ADMIN — IMMUNE GLOBULIN (HUMAN) 30 G: 10 INJECTION INTRAVENOUS; SUBCUTANEOUS at 08:57

## 2018-01-12 RX ADMIN — CARFILZOMIB 90 MG: 30 INJECTION, POWDER, LYOPHILIZED, FOR SOLUTION INTRAVENOUS at 12:05

## 2018-01-12 RX ADMIN — SODIUM CHLORIDE 20 ML/HR: 0.9 INJECTION, SOLUTION INTRAVENOUS at 08:25

## 2018-01-12 RX ADMIN — SODIUM CHLORIDE 500 ML: 0.9 INJECTION, SOLUTION INTRAVENOUS at 12:45

## 2018-01-12 RX ADMIN — DEXTROSE MONOHYDRATE 20 ML/HR: 50 INJECTION, SOLUTION INTRAVENOUS at 12:05

## 2018-01-12 NOTE — PROGRESS NOTES
Patient tolerated 1 unit of Platelets, IVIG and chemotherapy infusions well with no complications  Pt offers no complaints  Pt left the infusion center in stable condition accompanied by his wife

## 2018-01-12 NOTE — MISCELLANEOUS
Message   Recorded as Task   Date: 05/11/2017 11:03 AM, Created By: Eliecer Mas   Task Name: Call Back   Assigned To: Altagracia Kearney   Regarding Patient: Silas Castellanos, Status: Active   CommentTisha Devoid - 11 May 2017 11:03 AM     TASK CREATED  Caller: Elisa Adler; Other; [phone]  PATIENT HAD A COLD AND STARTED A Z-PACK LAST NIGHT  THEY WERE WONDERING IF THAT WILL AFFECT THE CHEMO THE PATIENT HAS TOMORROW  PLEASE GIVE A CALL BACK WHEN YOU GET THE CHANCE    chemo will be held this week - patient will resume next week  Active Problems    1  Anemia (285 9) (D64 9)   2  Chemotherapy-induced nausea (787 02,E933 1) (R11 0,T45  1X5A)   3  Creatinine elevation (790 99) (R79 89)   4  Encounter for prostate cancer screening (V76 44) (Z12 5)   5  Fatigue (780 79) (R53 83)   6  Hyperlipidemia (272 4) (E78 5)   7  Hypertension (401 9) (I10)   8  Hypothyroidism (244 9) (E03 9)   9  Low hemoglobin (285 9) (D64 9)   10  Multiple myeloma (203 00) (C90 00)   11  Pancytopenia (284 19) (D61 818)   12  Shortness of breath (786 05) (R06 02)    Current Meds   1  Acyclovir 400 MG Oral Tablet; TAKE 1 TABLET TWICE DAILY as directed; Therapy: 88YJP1335 to (Evaluate:71Nxd2973)  Requested for: 58Nim4435; Last   Rx:24Bgf7692 Ordered   2  Acyclovir 400 MG Oral Tablet; TAKE 1 TABLET TWICE DAILY; Therapy: (Recorded:15Alm5931) to Recorded   3  Aspirin 81 MG TABS; Therapy: (Recorded:52Zhl0939) to Recorded   4  Azithromycin 250 MG Oral Tablet (Zithromax Z-José Luis); TAKE 2 TABLETS ON DAY 1 THEN   TAKE 1 TABLET A DAY FOR 4 DAYS; Therapy: 73JGR8202 to (Last Rx:16Qxi4498)  Requested for: 10CMB6953 Ordered   5  Dexamethasone 4 MG Oral Tablet; take 3 tabs by mouth weekly; Therapy: (Recorded:41Bso4758) to Recorded   6  Dexamethasone 4 MG Oral Tablet; take 3 tabs once a week; Therapy: 59PXI2325 to (Evaluate:51Whb6602)  Requested for: 89Tuz1432; Last   Rx:65Zjy8474 Ordered   7   Escitalopram Oxalate 20 MG Oral Tablet (Lexapro); TAKE (1) TABLET DAILY; Therapy: 61OFJ6516 to (Last Rx:06Mar2017)  Requested for: 26EAA1498 Ordered   8  LORazepam 0 5 MG Oral Tablet; take 1/2 to 1 tablet every 4 hours as needed; Therapy: 08XUB2123 to (Evaluate:06Nov2015); Last Rx:07Oct2015 Ordered   9  Multi-Vitamins TABS; Therapy: (Recorded:19Cec9905) to Recorded   10  Ondansetron HCl - 4 MG Oral Tablet; One tab PO q 4-6 hours PRN nausea; Therapy: 33NAU9247 to (Last Rx:13Oct2015) Ordered   11  Revlimid 15 MG Oral Capsule; started 7/14/16; Therapy: (Recorded:95Jwu5129) to Recorded   12  Vitamin D3 TABS; Therapy: (Recorded:97Jxq4543) to Recorded    Allergies    1   No Known Drug Allergies    Signatures   Electronically signed by : Erika Littlejohn, ; May 11 2017 12:00PM EST                       (Author)

## 2018-01-12 NOTE — PLAN OF CARE
Problem: Potential for Falls  Goal: Patient will remain free of falls  INTERVENTIONS:  - Assess patient frequently for physical needs  -  Identify cognitive and physical deficits and behaviors that affect risk of falls    -  [place] fall precautions as indicated by assessment   - Educate patient/family on patient safety including physical limitations  - Instruct patient to call for assistance with activity based on assessment  - Modify environment to reduce risk of injury  - Consider OT/PT consult to assist with strengthening/mobility   Outcome: Progressing

## 2018-01-13 VITALS
HEIGHT: 70 IN | BODY MASS INDEX: 23.41 KG/M2 | DIASTOLIC BLOOD PRESSURE: 68 MMHG | RESPIRATION RATE: 18 BRPM | TEMPERATURE: 97.4 F | OXYGEN SATURATION: 98 % | HEART RATE: 64 BPM | WEIGHT: 163.5 LBS | SYSTOLIC BLOOD PRESSURE: 140 MMHG

## 2018-01-13 NOTE — MISCELLANEOUS
Message   Recorded as Task   Date: 08/17/2016 04:01 PM, Created By: CLIFFORD CAMPOS   Task Name: Call Back   Assigned To: Altagracia Kearney   Regarding Patient: Santi Johnson, Status: Active   Comment:    Trina Chapa - 17 Aug 2016 4:01 PM     TASK CREATED  Pt's wife called and stated pt has a low grade fever and is not feeling well  Callback#: [phone]   Spoke with patients wife - She reports that since Monday patient has had low grade fever with chills on and off  No other signs of infection - denies cough or congestion  Instructed patients wife to call me with an update tomorrow or sooner if symptoms get worse  She verbalized understanding      Active Problems    1  Anemia (285 9) (D64 9)   2  Chemotherapy-induced nausea (787 02,E933 1) (R11 0,T45  1X5A)   3  Fatigue (780 79) (R53 83)   4  Hyperlipidemia (272 4) (E78 5)   5  Hypertension (401 9) (I10)   6  Hypothyroidism (244 9) (E03 9)   7  Low hemoglobin (285 9) (D64 9)   8  Multiple myeloma (203 00) (C90 00)   9  Pancytopenia (284 19) (D61 818)   10  Shortness of breath (786 05) (R06 02)    Current Meds   1  Acyclovir 400 MG Oral Tablet; TAKE 1 TABLET TWICE DAILY as directed; Therapy: 52VSC3297 to (Evaluate:79Ymb7711)  Requested for: 34Fzi0519; Last   Rx:01Xbk0135 Ordered   2  Acyclovir 400 MG Oral Tablet; TAKE 1 TABLET TWICE DAILY; Therapy: (Recorded:11Kkj2450) to Recorded   3  Aspirin 81 MG TABS; Therapy: (Recorded:66Dpz1453) to Recorded   4  Dexamethasone 4 MG Oral Tablet; take 3 tabs by mouth weekly; Therapy: (Recorded:96Agh5485) to Recorded   5  Dexamethasone 4 MG Oral Tablet; take 3 tabs once a week; Therapy: 21BUY1439 to (Evaluate:33Rmp0225)  Requested for: 11Ufm6836; Last   Rx:81Kvr3736 Ordered   6  Escitalopram Oxalate 20 MG Oral Tablet (Lexapro); take one tablet by mouth one time   daily; Therapy: 18IAD5698 to ([phone])  Requested for: 08Crh2717; Last   Rx:88Pdc7525 Ordered   7   LORazepam 0 5 MG Oral Tablet; take 1/2 to 1 tablet every 4 hours as needed; Therapy: 64DEB0572 to (Evaluate:06Nov2015); Last Rx:07Oct2015 Ordered   8  Multi-Vitamins TABS; Therapy: (Recorded:39Wji5293) to Recorded   9  Ondansetron HCl - 4 MG Oral Tablet; One tab PO q 4-6 hours PRN nausea; Therapy: 70ZDA0711 to (Last Rx:13Oct2015) Ordered   10  Revlimid 15 MG Oral Capsule; started 7/14/16; Therapy: (Recorded:40Lok7850) to Recorded   11  Vitamin D3 TABS; Therapy: (Recorded:33Mgt9259) to Recorded    Allergies    1   No Known Drug Allergies    Signatures   Electronically signed by : Jaspreet Hayes, ; Aug 17 2016  4:31PM EST                       (Author)

## 2018-01-14 VITALS
DIASTOLIC BLOOD PRESSURE: 72 MMHG | SYSTOLIC BLOOD PRESSURE: 110 MMHG | HEART RATE: 75 BPM | HEIGHT: 70 IN | OXYGEN SATURATION: 97 % | WEIGHT: 166.31 LBS | BODY MASS INDEX: 23.81 KG/M2

## 2018-01-14 VITALS
HEART RATE: 54 BPM | RESPIRATION RATE: 18 BRPM | SYSTOLIC BLOOD PRESSURE: 128 MMHG | TEMPERATURE: 97.2 F | DIASTOLIC BLOOD PRESSURE: 66 MMHG | BODY MASS INDEX: 23.21 KG/M2 | HEIGHT: 70 IN | WEIGHT: 162.13 LBS | OXYGEN SATURATION: 98 %

## 2018-01-14 VITALS
SYSTOLIC BLOOD PRESSURE: 116 MMHG | OXYGEN SATURATION: 97 % | TEMPERATURE: 96.4 F | BODY MASS INDEX: 23.8 KG/M2 | WEIGHT: 166.25 LBS | RESPIRATION RATE: 18 BRPM | HEART RATE: 67 BPM | HEIGHT: 70 IN | DIASTOLIC BLOOD PRESSURE: 64 MMHG

## 2018-01-14 NOTE — MISCELLANEOUS
Message   Recorded as Task   Date: 08/23/2016 09:15 AM, Created By: Darin Shabazz   Task Name: Follow Up   Assigned To: Altagracia Kearney   Regarding Patient: Madison Herman, Status: Active   CommentCharity Matter - 23 Aug 2016 9:15 AM     TASK CREATED  Caller: Self; General Medical Question; [phone] (Home); [phone] x,,,,, (Work)  pt is having tooth pulled 9/8 and Dentist Dr Paola Rhoades #[phone] would like patients Revlimid stopped? please call the pt with a verbal or the dentist   Spoke with patients wife - Dr Moses Noel will be speaking with Dr Keisha Kuhn regarding tooth extraction and Revlimid      Active Problems    1  Anemia (285 9) (D64 9)   2  Chemotherapy-induced nausea (787 02,E933 1) (R11 0,T45  1X5A)   3  Fatigue (780 79) (R53 83)   4  Hyperlipidemia (272 4) (E78 5)   5  Hypertension (401 9) (I10)   6  Hypothyroidism (244 9) (E03 9)   7  Low hemoglobin (285 9) (D64 9)   8  Multiple myeloma (203 00) (C90 00)   9  Pancytopenia (284 19) (D61 818)   10  Shortness of breath (786 05) (R06 02)    Current Meds   1  Acyclovir 400 MG Oral Tablet; TAKE 1 TABLET TWICE DAILY as directed; Therapy: 77VOV8515 to (Evaluate:01Haw2579)  Requested for: 38Pxr5630; Last   Rx:34Vfn1824 Ordered   2  Acyclovir 400 MG Oral Tablet; TAKE 1 TABLET TWICE DAILY; Therapy: (Recorded:56Pdf0570) to Recorded   3  Aspirin 81 MG TABS; Therapy: (Recorded:34Ywv6212) to Recorded   4  Dexamethasone 4 MG Oral Tablet; take 3 tabs by mouth weekly; Therapy: (Recorded:67Deb3226) to Recorded   5  Dexamethasone 4 MG Oral Tablet; take 3 tabs once a week; Therapy: 35DTP3423 to (Evaluate:39Jyh2985)  Requested for: 75Yhf6932; Last   Rx:17Exs6060 Ordered   6  Escitalopram Oxalate 20 MG Oral Tablet (Lexapro); take one tablet by mouth one time   daily; Therapy: 42IUF0167 to (46 884745)  Requested for: 02Aug2016; Last   Rx:46Xyt7636 Ordered   7   LORazepam 0 5 MG Oral Tablet; take 1/2 to 1 tablet every 4 hours as needed; Therapy: 25IZQ1673 to (Evaluate:81Tdf6387); Last Rx:07Oct2015 Ordered   8  Multi-Vitamins TABS; Therapy: (Recorded:01Srv9988) to Recorded   9  Ondansetron HCl - 4 MG Oral Tablet; One tab PO q 4-6 hours PRN nausea; Therapy: 05QDQ9586 to (Last Rx:13Oct2015) Ordered   10  Revlimid 15 MG Oral Capsule; started 7/14/16; Therapy: (Recorded:27Wve3086) to Recorded   11  Vitamin D3 TABS; Therapy: (Recorded:01Bsu7174) to Recorded    Allergies    1   No Known Drug Allergies    Signatures   Electronically signed by : Harshal Farmer, ; Aug 23 2016 12:21PM EST                       (Author)

## 2018-01-15 NOTE — MISCELLANEOUS
Message   Recorded as Task   Date: 02/08/2017 04:05 PM, Created By: Andree Olson   Task Name: Hospital Call   Assigned To: Altagracia Kearney   Regarding Patient: Rosy Mendoza, Status: Active   Kalani Quiroga - 08 Feb 2017 4:05 PM     TASK CREATED  González from [facility] called critical [address]t 46   Kyprolis will be held on Friday - patient to see Dr Tasha Montoya on Tuesday  Infusion center and patients wife aware      Active Problems    1  Anemia (285 9) (D64 9)   2  Chemotherapy-induced nausea (787 02,E933 1) (R11 0,T45  1X5A)   3  Creatinine elevation (790 99) (R79 89)   4  Encounter for prostate cancer screening (V76 44) (Z12 5)   5  Fatigue (780 79) (R53 83)   6  Hyperlipidemia (272 4) (E78 5)   7  Hypertension (401 9) (I10)   8  Hypothyroidism (244 9) (E03 9)   9  Low hemoglobin (285 9) (D64 9)   10  Multiple myeloma (203 00) (C90 00)   11  Pancytopenia (284 19) (D61 818)   12  Shortness of breath (786 05) (R06 02)    Current Meds   1  Acyclovir 400 MG Oral Tablet; TAKE 1 TABLET TWICE DAILY as directed; Therapy: 82VOJ4805 to (Evaluate:20Wff2640)  Requested for: 79Ytb2492; Last   Rx:39Blj6365 Ordered   2  Acyclovir 400 MG Oral Tablet; TAKE 1 TABLET TWICE DAILY; Therapy: (Recorded:33Zfc2668) to Recorded   3  Aspirin 81 MG TABS; Therapy: (Recorded:35Pxx5285) to Recorded   4  Azithromycin 250 MG Oral Tablet (Zithromax Z-José Luis); TAKE 2 TABLETS ON DAY 1 THEN   TAKE 1 TABLET A DAY FOR 4 DAYS; Therapy: 41VBO8922 to (Last Rx:17Biu9897)  Requested for: 31OWF3136 Ordered   5  Dexamethasone 4 MG Oral Tablet; take 3 tabs by mouth weekly; Therapy: (Recorded:47Uak0210) to Recorded   6  Dexamethasone 4 MG Oral Tablet; take 3 tabs once a week; Therapy: 81WLC7193 to (Evaluate:84Vwb7817)  Requested for: 03Wfs1281; Last   Rx:00Sze8129 Ordered   7  Escitalopram Oxalate 20 MG Oral Tablet (Lexapro); TAKE (1) TABLET DAILY; Therapy: 98CRX8511 to (Last Rx:72Jtb7740)  Requested for: 18CKS6837 Ordered   8   LORazepam 0 5 MG Oral Tablet; take 1/2 to 1 tablet every 4 hours as needed; Therapy: 14TPG8028 to (Evaluate:06Nov2015); Last Rx:07Oct2015 Ordered   9  Multi-Vitamins TABS; Therapy: (Recorded:04Sxh4952) to Recorded   10  Ondansetron HCl - 4 MG Oral Tablet; One tab PO q 4-6 hours PRN nausea; Therapy: 49JWU9954 to (Last Rx:13Oct2015) Ordered   11  Revlimid 15 MG Oral Capsule; started 7/14/16; Therapy: (Recorded:43Hok7988) to Recorded   12  Vitamin D3 TABS; Therapy: (Recorded:21Mfy5823) to Recorded    Allergies    1   No Known Drug Allergies    Signatures   Electronically signed by : Harshal Farmer, ; Feb 8 2017  4:20PM EST                       (Author)

## 2018-01-15 NOTE — MISCELLANEOUS
Message  Spoke with patient and his wife regarding platelet count being 13,000  Patient will begin on Promacta per MD in [place] - Revlimid dose reduced to 10mg PO day 1-14 Q 21 days  Platelet transfusion will be tomorrow - Kyprolis will continue      Active Problems    1  Anemia (285 9) (D64 9)   2  Chemotherapy-induced nausea (787 02,E933 1) (R11 0,T45  1X5A)   3  Creatinine elevation (790 99) (R79 89)   4  Encounter for prostate cancer screening (V76 44) (Z12 5)   5  Fatigue (780 79) (R53 83)   6  Hyperlipidemia (272 4) (E78 5)   7  Hypertension (401 9) (I10)   8  Hypothyroidism (244 9) (E03 9)   9  Low hemoglobin (285 9) (D64 9)   10  Multiple myeloma (203 00) (C90 00)   11  Pancytopenia (284 19) (D61 818)   12  Shortness of breath (786 05) (R06 02)    Current Meds   1  Acyclovir 400 MG Oral Tablet; TAKE 1 TABLET TWICE DAILY as directed; Therapy: 50TZN9667 to (Evaluate:56Azk2279)  Requested for: 15Etd0388; Last   Rx:16Oxd0795 Ordered   2  Aspirin 81 MG TABS; Therapy: (Recorded:36Fku8139) to Recorded   3  Calcium 500 TABS; Therapy: (Recorded:54Qvw8161) to Recorded   4  Dexamethasone 4 MG Oral Tablet; take 3 tabs once a week; Therapy: 59NNF2504 to (Evaluate:90Kbz5772)  Requested for: 77Nkd4044; Last   Rx:26Nta0235 Ordered   5  Escitalopram Oxalate 20 MG Oral Tablet (Lexapro); TAKE (1) TABLET DAILY; Therapy: 80GGY0285 to (Last Rx:06Mar2017)  Requested for: 38BSS1898 Ordered   6  Levothyroxine Sodium 50 MCG Oral Tablet (Synthroid); TAKE 1 TABLET DAILY; Therapy: 83Bww5265 to (Evaluate:05Jan2018)  Requested for: 48Seg2483; Last   Rx:15Mqv4011 Ordered   7  LORazepam 0 5 MG Oral Tablet; take 1/2 to 1 tablet every 4 hours as needed; Therapy: 23QUE5787 to (Evaluate:06Nov2015); Last Rx:07Oct2015 Ordered   8  Multi-Vitamins TABS; Therapy: (Recorded:04Qrc4478) to Recorded   9  Ondansetron HCl - 4 MG Oral Tablet; One tab PO q 4-6 hours PRN nausea; Therapy: 66VLT4403 to (Last Rx:13Oct2015) Ordered   10  Revlimid 15 MG Oral Capsule; started 7/14/16; Therapy: (Recorded:47Tfx7850) to Recorded   11  Vitamin D3 TABS; Therapy: (Recorded:53Qke7327) to Recorded    Allergies    1   No Known Drug Allergies    Signatures   Electronically signed by : Jaspreet Hayes, ; Sep 21 2017 10:59AM EST                       (Author)

## 2018-01-16 DIAGNOSIS — C90.00 MULTIPLE MYELOMA NOT HAVING ACHIEVED REMISSION (HCC): ICD-10-CM

## 2018-01-16 NOTE — PROGRESS NOTES
Assessment    1  Multiple myeloma (203 00) (C90 00)    Plan  Multiple myeloma    · Drink plenty of fluids ; Status:Complete;   Done: 91OQU6020   Ordered; For:Multiple myeloma; Ordered By:Elinor Alexandre;   · Follow-up visit in 3 months Evaluation and Treatment  Follow-up  Status: Hold For -  Scheduling  Requested for: 14IQA7184   Ordered; For: Multiple myeloma; Ordered By: Yovany Montoya Performed:  Due: 52YAQ0827   · (1) NT- BNP (PRO BRAIN NATRIURETIC PEPTIDE); Status:Active; Requested  PCS:04QNB4021;    Perform:[facility]; ORAL:32JFG0138;IQZKPDF; For:Multiple myeloma; Ordered By:Bob Alexandre;   · (1) NT- BNP (PRO BRAIN NATRIURETIC PEPTIDE); Status: In Progress - Order  Generated; Requested for:Recurring Schedule: 10/4/2016; 11/1/2016; 11/29/2016;  12/27/2016; 1/24/2017; 2/21/2017; 3/21/2017; 4   ;    Perform:[facility]; [facility]IYQ3119[facility]; For:Multiple myeloma; Ordered By:Elinro Alexandre;    Discussion/Summary  Discussion Summary:   In summary, this is a 70-year-old male history multiple myeloma as outlined  He's currently status post tandem transplant  He is doing fairly well  He is getting Kyprolis on a weekly basis  He has moderate fatigue for a few days afterwards  He is able to function without restriction  In the interim, however  He had a dental extraction about 3 weeks ago  Zometa was held for this procedure  He is hoping to have an dental implant placed in December  I would continue to hold Zometa  Extending out to approximately February 2017 to avoid any increased risk of ONJ  This is theoretically reasonable given that he probably has minimal if any disease detectable at this time  He'll be going to [facility] in 2 weeks for repeat evaluation with bone marrow biopsy, radiography, labs, etc   I await their recommendations in this regard  We will continue his treatment moving forward with Revlimid and Kyprolis as outlined    I'll see him back in 3 months for review, presuming his well in the interim  I reviewed the above with the patient and his wife  They voiced understanding and agreement  Understands and agrees with treatment plan: The treatment plan was reviewed with the patient/guardian  The patient/guardian understands and agrees with the treatment plan   Counseling Documentation With Imm: The patient was counseled regarding diagnostic results, instructions for management, patient and family education, impressions  total time of encounter was 25 minutes  Chief Complaint  Chief Complaint Free Text Note Form: Follow-up regarding myeloma  History of Present Illness  HPI: June 2015- routine FU at PCP all good  August 2015- developed VALE, headaches, recurrent nosebleed without trauma  Went to see Dr Claudetta Bible  Hgb 6  Went to ER  Got 2 UPRBC's  and DC'd  Hemocult negative  WBC is 3 6, MCV 96, platelet count 710, normal differential, INR 1 5 PTT 37  CMP showed total protein of 12 4, albumin 2 1, creatinine 1 8, normal calcium  Sodium 133  September 29, 2015-patient had bone marrow biopsy, showing approximately 80% plasma cells with some immature forms noted  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, +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, 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)  Mid October 2015- Started Velcade 1 3 mg per meter squared days 1, 8, 15, 22, Cytoxan 300 mg by mouth every week, dexamethasone 40 mg by mouth every week  Zometa 4 mg IV day one, ongoing on a 28 day cycle  November 2015  Patient underwent induction therapy followed by high-dose chemotherapy with stem cell support in tandem fashion  He achieved a complete response  Repeat PET/CT and MRI showed no focal lesions  July 29, 2016- He was initiated on maintenance therapy with Kyprolis 20 mg/m^2 every week, Revlimid 15 mg by mouth daily, 1-21, every 28 days  Zometa 4 mg IV every week  Interval History: Has some fatigue for few days after chemo  Had lower left tooth removed Mid-September        Review of Systems  Complete-Male:   Constitutional: No fever or chills, feels well, no tiredness, no recent weight gain or weight loss  Eyes: No complaints of eye pain, no red eyes, no discharge from eyes, no itchy eyes  ENT: no complaints of earache, no hearing loss, no nosebleeds, no nasal discharge, no sore throat, no hoarseness  Cardiovascular: No complaints of slow heart rate, no fast heart rate, no chest pain, no palpitations, no leg claudication, no lower extremity  Respiratory: No complaints of shortness of breath, no wheezing, no cough, no SOB on exertion, no orthopnea or PND  Gastrointestinal: 2015- colonoscopy-minute polyp , but No complaints of abdominal pain, no constipation, no nausea or vomiting, no diarrhea or bloody stools  Genitourinary: No complaints of dysuria, no incontinence, no hesitancy, no nocturia, no genital lesion, no testicular pain  Musculoskeletal: No complaints of arthralgia, no myalgias, no joint swelling or stiffness, no limb pain or swelling  Integumentary: No complaints of skin rash or skin lesions, no itching, no skin wound, no dry skin  Neurological: No compliants of headache, no confusion, no convulsions, no numbness or tingling, no dizziness or fainting, no limb weakness, no difficulty walking  Psychiatric: Is not suicidal, no sleep disturbances, no anxiety or depression, no change in personality, no emotional problems  Endocrine: No complaints of proptosis, no hot flashes, no muscle weakness, no erectile dysfunction, no deepening of the voice, no feelings of weakness  Hematologic/Lymphatic: No complaints of swollen glands, no swollen glands in the neck, does not bleed easily, no easy bruising  Active Problems    1  Anemia (285 9) (D64 9)   2  Chemotherapy-induced nausea (787 02,E933 1) (R11 0,T45  1X5A)   3  Fatigue (780 79) (R53 83)   4  Hyperlipidemia (272 4) (E78 5)   5  Hypertension (401 9) (I10)   6  Hypothyroidism (244 9) (E03 9)   7  Low hemoglobin (285 9) (D64 9)   8  Multiple myeloma (203 00) (C90 00)   9  Pancytopenia (284 19) (D61 818)   10  Shortness of breath (786 05) (R06 02)    Past Medical History    1  History of chest pain (V13 89) (Z87 898)   2  History of insomnia (V13 89) (Z87 898)   3  History of migraine (V12 49) (Z86 69)   4  History of shortness of breath (V13 89) (Z87 898)   5  History of Transient global amnesia (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) (G45 4)    Surgical History    1  History of Appendectomy   2  History of Arthroscopy Knee Left    Family History  Father    1  Family history of Heart Disease (V17 49)  Paternal Grandmother    2  Family history of Colon cancer    Social History    · Denied: History of Alcohol Use (History)   · Never A Smoker    Current Meds   1  Acyclovir 400 MG Oral Tablet; TAKE 1 TABLET TWICE DAILY as directed; Therapy: 40YCE7384 to (Evaluate:44Weg9849)  Requested for: 79Zka6904; Last   Rx:61Drr1021 Ordered   2  Acyclovir 400 MG Oral Tablet; TAKE 1 TABLET TWICE DAILY; Therapy: (Recorded:51Ges3895) to Recorded   3  Aspirin 81 MG TABS; Therapy: (Recorded:93Fby1158) to Recorded   4  Dexamethasone 4 MG Oral Tablet; take 3 tabs by mouth weekly; Therapy: (Recorded:27Otd1639) to Recorded   5  Dexamethasone 4 MG Oral Tablet; take 3 tabs once a week; Therapy: 60RYH6252 to (Evaluate:74Tsb1967)  Requested for: 00Pzk5759; Last   Rx:14Phk1956 Ordered   6  Escitalopram Oxalate 20 MG Oral Tablet; take one tablet by mouth one time daily; Therapy: 42CZO0119 to (([phone])  Requested for: 36Iij6912; Last   Rx:98Bmi9984 Ordered   7  LORazepam 0 5 MG Oral Tablet; take 1/2 to 1 tablet every 4 hours as needed; Therapy: 73RLT0647 to (Evaluate:06Nov2015); Last Rx:07Oct2015 Ordered   8  Multi-Vitamins TABS; Therapy: (Recorded:90Obb4647) to Recorded   9  Ondansetron HCl - 4 MG Oral Tablet; One tab PO q 4-6 hours PRN nausea; Therapy: 65YHC3506 to (Last Rx:13Oct2015) Ordered   10  Revlimid 15 MG Oral Capsule; started 7/14/16; Therapy: (Recorded:20Fek6123) to Recorded   11   Vitamin D3 TABS; Therapy: (Recorded:46Efa5262) to Recorded    Allergies    1  No Known Drug Allergies    Vitals  Vital Signs    Recorded: 79MUW5864 96:97AL   Systolic 599   Diastolic 66   Heart Rate 67   Respiration 18   Temperature 97 7 F   Pain Scale 0   O2 Saturation 96   Height 5 ft 10 in   Weight 158 lb 2 08 oz   BMI Calculated 22 69   BSA Calculated 1 89     Physical Exam    Constitutional   General appearance: No acute distress, well appearing and well nourished  Eyes   Conjunctiva and lids: No swelling, erythema, or discharge  Ears, Nose, Mouth, and Throat   External inspection of ears and nose: Normal     Oropharynx: Normal with no erythema, edema, exudate or lesions  Pulmonary   Auscultation of lungs: Clear to auscultation, equal breath sounds bilaterally, no wheezes, no rales, no rhonci  Cardiovascular   Auscultation of heart: Normal rate and rhythm, normal S1 and S2, without murmurs  Examination of extremities for edema and/or varicosities: Normal     Abdomen   Abdomen: Non-tender, no masses  Liver and spleen: No hepatomegaly or splenomegaly  Lymphatic   Palpation of lymph nodes in neck: No lymphadenopathy  Musculoskeletal   Gait and station: Normal     Skin   Skin and subcutaneous tissue: Normal without rashes or lesions  Neurologic   Cranial nerves: Cranial nerves 2-12 intact      Psychiatric   Orientation to person, place and time: Normal          Signatures   Electronically signed by : DUC Fuentes ,DO; Oct  4 2016 10:02AM EST                       (Author)

## 2018-01-17 ENCOUNTER — APPOINTMENT (OUTPATIENT)
Dept: LAB | Facility: CLINIC | Age: 73
End: 2018-01-17
Payer: COMMERCIAL

## 2018-01-17 DIAGNOSIS — D50.9 IRON DEFICIENCY ANEMIA, UNSPECIFIED IRON DEFICIENCY ANEMIA TYPE: Primary | ICD-10-CM

## 2018-01-17 LAB
ALBUMIN SERPL BCP-MCNC: 3.6 G/DL (ref 3.5–5)
ALP SERPL-CCNC: 83 U/L (ref 46–116)
ALT SERPL W P-5'-P-CCNC: 22 U/L (ref 12–78)
ANION GAP SERPL CALCULATED.3IONS-SCNC: 5 MMOL/L (ref 4–13)
AST SERPL W P-5'-P-CCNC: 13 U/L (ref 5–45)
BASOPHILS # BLD AUTO: 0.01 THOUSANDS/ΜL (ref 0–0.1)
BASOPHILS NFR BLD AUTO: 1 % (ref 0–1)
BILIRUB SERPL-MCNC: 1.41 MG/DL (ref 0.2–1)
BUN SERPL-MCNC: 21 MG/DL (ref 5–25)
CALCIUM SERPL-MCNC: 8.3 MG/DL (ref 8.3–10.1)
CHLORIDE SERPL-SCNC: 109 MMOL/L (ref 100–108)
CO2 SERPL-SCNC: 26 MMOL/L (ref 21–32)
CREAT SERPL-MCNC: 1.19 MG/DL (ref 0.6–1.3)
EOSINOPHIL # BLD AUTO: 0.03 THOUSAND/ΜL (ref 0–0.61)
EOSINOPHIL NFR BLD AUTO: 2 % (ref 0–6)
ERYTHROCYTE [DISTWIDTH] IN BLOOD BY AUTOMATED COUNT: 15.2 % (ref 11.6–15.1)
GFR SERPL CREATININE-BSD FRML MDRD: 62 ML/MIN/1.73SQ M
GLUCOSE P FAST SERPL-MCNC: 80 MG/DL (ref 65–99)
HCT VFR BLD AUTO: 31.1 % (ref 36.5–49.3)
HGB BLD-MCNC: 10.3 G/DL (ref 12–17)
LDH SERPL-CCNC: 188 U/L (ref 81–234)
LYMPHOCYTES # BLD AUTO: 0.37 THOUSANDS/ΜL (ref 0.6–4.47)
LYMPHOCYTES NFR BLD AUTO: 26 % (ref 14–44)
MCH RBC QN AUTO: 35.4 PG (ref 26.8–34.3)
MCHC RBC AUTO-ENTMCNC: 33.1 G/DL (ref 31.4–37.4)
MCV RBC AUTO: 107 FL (ref 82–98)
MONOCYTES # BLD AUTO: 0.34 THOUSAND/ΜL (ref 0.17–1.22)
MONOCYTES NFR BLD AUTO: 23 % (ref 4–12)
NEUTROPHILS # BLD AUTO: 0.68 THOUSANDS/ΜL (ref 1.85–7.62)
NEUTS SEG NFR BLD AUTO: 48 % (ref 43–75)
NRBC BLD AUTO-RTO: 2 /100 WBCS
PHOSPHATE SERPL-MCNC: 2.9 MG/DL (ref 2.3–4.1)
PLATELET # BLD AUTO: 14 THOUSANDS/UL (ref 149–390)
POTASSIUM SERPL-SCNC: 4.2 MMOL/L (ref 3.5–5.3)
PROT SERPL-MCNC: 7.1 G/DL (ref 6.4–8.2)
RBC # BLD AUTO: 2.91 MILLION/UL (ref 3.88–5.62)
SODIUM SERPL-SCNC: 140 MMOL/L (ref 136–145)
URATE SERPL-MCNC: 6.3 MG/DL (ref 4.2–8)
WBC # BLD AUTO: 1.45 THOUSAND/UL (ref 4.31–10.16)

## 2018-01-17 PROCEDURE — 85025 COMPLETE CBC W/AUTO DIFF WBC: CPT

## 2018-01-17 PROCEDURE — 83615 LACTATE (LD) (LDH) ENZYME: CPT

## 2018-01-17 PROCEDURE — 36415 COLL VENOUS BLD VENIPUNCTURE: CPT

## 2018-01-17 PROCEDURE — 84100 ASSAY OF PHOSPHORUS: CPT

## 2018-01-17 PROCEDURE — 80053 COMPREHEN METABOLIC PANEL: CPT

## 2018-01-17 PROCEDURE — 84550 ASSAY OF BLOOD/URIC ACID: CPT

## 2018-01-17 NOTE — MISCELLANEOUS
Message   Recorded as Task   Date: 07/10/2017 04:31 PM, Created By: Serena Contreras   Task Name: Follow Up   Assigned To: Altagracia Kearney   Regarding Patient: Mireya Latham, Status: Active   Comment:    France Pinedo - 10 Jul 2017 4:31 PM     TASK CREATED  Caller: Dotty-wife; General Medical Question; [phone]  Please call back and clarify pt's Revlimid schedule moving forward  Spoke with patients wife - Alirio Manriquez did not take his Revlimid x 7 days due to illness  He restarted on 7/7/17 - he will take his remaining 2 weeks of medication and then renew      Active Problems    1  Anemia (285 9) (D64 9)   2  Chemotherapy-induced nausea (787 02,E933 1) (R11 0,T45  1X5A)   3  Creatinine elevation (790 99) (R79 89)   4  Encounter for prostate cancer screening (V76 44) (Z12 5)   5  Fatigue (780 79) (R53 83)   6  Hyperlipidemia (272 4) (E78 5)   7  Hypertension (401 9) (I10)   8  Hypothyroidism (244 9) (E03 9)   9  Low hemoglobin (285 9) (D64 9)   10  Multiple myeloma (203 00) (C90 00)   11  Pancytopenia (284 19) (D61 818)   12  Shortness of breath (786 05) (R06 02)    Current Meds   1  Acyclovir 400 MG Oral Tablet; TAKE 1 TABLET TWICE DAILY as directed; Therapy: 64IXQ3020 to (Evaluate:54Xdn6001)  Requested for: 86Qdy8064; Last   Rx:82Mwz8092 Ordered   2  Acyclovir 400 MG Oral Tablet; TAKE 1 TABLET TWICE DAILY; Therapy: (Recorded:16Jpq0095) to Recorded   3  Aspirin 81 MG TABS; Therapy: (Recorded:21Qjj8687) to Recorded   4  Azithromycin 250 MG Oral Tablet (Zithromax Z-José Luis); TAKE 2 TABLETS ON DAY 1 THEN   TAKE 1 TABLET A DAY FOR 4 DAYS; Therapy: 00KXU4326 to (Last Rx:95Xbz1684)  Requested for: 92IGG5833 Ordered   5  Dexamethasone 4 MG Oral Tablet; take 3 tabs by mouth weekly; Therapy: (Recorded:67Vzc9747) to Recorded   6  Dexamethasone 4 MG Oral Tablet; take 3 tabs once a week; Therapy: 64FQG1310 to (Evaluate:62Bao4966)  Requested for: 55Rxb4706; Last   Rx:09Hhe5260 Ordered   7   Escitalopram Oxalate 20 MG Oral Tablet (Lexapro); TAKE (1) TABLET DAILY; Therapy: 78JGN4742 to (Last Rx:06Mar2017)  Requested for: 98EAZ6168 Ordered   8  LORazepam 0 5 MG Oral Tablet; take 1/2 to 1 tablet every 4 hours as needed; Therapy: 52ABY8275 to (Evaluate:06Nov2015); Last Rx:07Oct2015 Ordered   9  Multi-Vitamins TABS; Therapy: (Recorded:21Vrq8781) to Recorded   10  Ondansetron HCl - 4 MG Oral Tablet; One tab PO q 4-6 hours PRN nausea; Therapy: 95DPM1683 to (Last Rx:13Oct2015) Ordered   11  Revlimid 15 MG Oral Capsule; started 7/14/16; Therapy: (Recorded:89Kxi3076) to Recorded   12  Vitamin D3 TABS; Therapy: (Recorded:28Wzt3468) to Recorded    Allergies    1   No Known Drug Allergies    Signatures   Electronically signed by : Holly Matta, ; Jul 11 2017  9:34AM EST                       (Author)

## 2018-01-18 RX ORDER — DEXTROSE MONOHYDRATE 50 MG/ML
20 INJECTION, SOLUTION INTRAVENOUS CONTINUOUS
Status: DISCONTINUED | OUTPATIENT
Start: 2018-01-19 | End: 2018-01-22 | Stop reason: HOSPADM

## 2018-01-18 NOTE — MISCELLANEOUS
Message   Recorded as Task   Date: 07/28/2016 01:52 PM, Created By: Azul Payan   Task Name: Call Back   Assigned To: Altagracia Kearney   Regarding Patient: Ivette Siu, Status: Active   Comment:    Katerine Larson - 28 Jul 2016 1:52 PM     TASK CREATED  Caller: Elva/Ijeoma BS; [phone]  Insurance was contacted by pt's employer regarding pt's cancer medication (they were not told which one) but stating pt could not get the medication b/c of cost   Pt's insurance was following up on this  Altagracia Kearney - 28 Jul 2016 3:31 PM     TASK REASSIGNED: Previously Assigned To Beatriz Vernon - 28 Jul 2016 3:48 PM     TASK REPLIED TO: Previously Assigned To Argenis Rodrigez        Active Problems    1  Anemia (285 9) (D64 9)   2  Chemotherapy-induced nausea (787 02,E933 1) (R11 0,T45  1X5A)   3  Fatigue (780 79) (R53 83)   4  Hyperlipidemia (272 4) (E78 5)   5  Hypertension (401 9) (I10)   6  Hypothyroidism (244 9) (E03 9)   7  Low hemoglobin (285 9) (D64 9)   8  Multiple myeloma (203 00) (C90 00)   9  Pancytopenia (284 19) (D61 818)   10  Shortness of breath (786 05) (R06 02)    Current Meds   1  Acyclovir 400 MG Oral Tablet; TAKE 1 TABLET TWICE DAILY as directed; Therapy: 73MTN2998 to (Evaluate:05Nnt6862)  Requested for: 56Xai6578; Last   Rx:34Crv7241 Ordered   2  Acyclovir 400 MG Oral Tablet; TAKE 1 TABLET TWICE DAILY; Therapy: (Recorded:69Fat1016) to Recorded   3  Aspirin 81 MG TABS; Therapy: (Recorded:75Pkk7553) to Recorded   4  Dexamethasone 4 MG Oral Tablet; take 3 tabs by mouth weekly; Therapy: (Recorded:03Sar6648) to Recorded   5  Dexamethasone 4 MG Oral Tablet; take 3 tabs once a week; Therapy: 51HVO1955 to (Evaluate:66Nrg8272)  Requested for: 60Lol0491; Last   Rx:52Axp3037 Ordered   6  Escitalopram Oxalate 20 MG Oral Tablet (Lexapro); take one tablet by mouth one time   daily; Therapy: 28NSC6527 to (Evaluate:66Nwa9524)  Requested for: 28Jun2016; Last   Rx:28Jun2016 Ordered   7  LORazepam 0 5 MG Oral Tablet; take 1/2 to 1 tablet every 4 hours as needed; Therapy: 68KOJ9955 to (Evaluate:06Nov2015); Last Rx:07Oct2015 Ordered   8  Multi-Vitamins TABS; Therapy: (Recorded:10Cgf3802) to Recorded   9  Ondansetron HCl - 4 MG Oral Tablet; One tab PO q 4-6 hours PRN nausea; Therapy: 08TEJ8213 to (Last Rx:13Oct2015) Ordered   10  Revlimid 15 MG Oral Capsule; started 7/14/16; Therapy: (Recorded:77Qod4649) to Recorded   11  Vitamin D3 TABS; Therapy: (Recorded:66Vco7900) to Recorded    Allergies    1   No Known Drug Allergies    Signatures   Electronically signed by : Berta Sanders, ; Jul 29 2016  8:53AM EST                       (Author)

## 2018-01-19 ENCOUNTER — HOSPITAL ENCOUNTER (OUTPATIENT)
Dept: INFUSION CENTER | Facility: CLINIC | Age: 73
Discharge: HOME | End: 2018-01-19
Payer: COMMERCIAL

## 2018-01-19 VITALS
TEMPERATURE: 96.2 F | WEIGHT: 165.12 LBS | RESPIRATION RATE: 18 BRPM | DIASTOLIC BLOOD PRESSURE: 76 MMHG | BODY MASS INDEX: 24.18 KG/M2 | HEART RATE: 50 BPM | SYSTOLIC BLOOD PRESSURE: 152 MMHG

## 2018-01-19 PROCEDURE — 96361 HYDRATE IV INFUSION ADD-ON: CPT

## 2018-01-19 PROCEDURE — P9037 PLATE PHERES LEUKOREDU IRRAD: HCPCS

## 2018-01-19 PROCEDURE — 96413 CHEMO IV INFUSION 1 HR: CPT

## 2018-01-19 PROCEDURE — 96372 THER/PROPH/DIAG INJ SC/IM: CPT

## 2018-01-19 PROCEDURE — 36430 TRANSFUSION BLD/BLD COMPNT: CPT

## 2018-01-19 RX ADMIN — CARFILZOMIB 90 MG: 30 INJECTION, POWDER, LYOPHILIZED, FOR SOLUTION INTRAVENOUS at 10:03

## 2018-01-19 RX ADMIN — DEXTROSE 20 ML/HR: 5 SOLUTION INTRAVENOUS at 10:03

## 2018-01-19 RX ADMIN — TBO-FILGRASTIM 480 MCG: 480 INJECTION, SOLUTION SUBCUTANEOUS at 10:44

## 2018-01-19 RX ADMIN — SODIUM CHLORIDE 500 ML: 0.9 INJECTION, SOLUTION INTRAVENOUS at 08:56

## 2018-01-19 RX ADMIN — SODIUM CHLORIDE 500 ML: 0.9 INJECTION, SOLUTION INTRAVENOUS at 10:43

## 2018-01-19 NOTE — PROGRESS NOTES
Patient tolerated his kyprilos and his platelet transfusion well without adverse affects   Patient did have his neulasta for his wbc of  68

## 2018-01-19 NOTE — PROGRESS NOTES
Confirmed with Arnaldo Monreal Rn that the patient is supposed to be receiving herceptin 6mg/kg and not the 8mg/kg referred to in Dr Tasha Horowitz note  Sai Cantrell was also made aware of patient's elevated bilirubin of 1 2

## 2018-01-19 NOTE — PLAN OF CARE
Problem: INFECTION - ADULT  Goal: Absence or prevention of progression during hospitalization  INTERVENTIONS:  - Assess and monitor for signs and symptoms of infection  - Monitor lab/diagnostic results  - Monitor all insertion sites, i e  indwelling lines, tubes, and drains  - Monitor endotracheal (as able) and nasal secretions for changes in amount and color  - [place] appropriate cooling/warming therapies per order  - Administer medications as ordered  - Instruct and encourage patient and family to use good hand hygiene technique  - Identify and instruct in appropriate isolation precautions for identified infection/condition  Outcome: Progressing    Goal: Absence of fever/infection during neutropenic period  INTERVENTIONS:  - Monitor WBC  - Implement neutropenic guidelines  Outcome: Progressing

## 2018-01-22 ENCOUNTER — GENERIC CONVERSION - ENCOUNTER (OUTPATIENT)
Dept: OTHER | Facility: OTHER | Age: 73
End: 2018-01-22

## 2018-01-22 VITALS
HEIGHT: 70 IN | SYSTOLIC BLOOD PRESSURE: 134 MMHG | WEIGHT: 168.13 LBS | RESPIRATION RATE: 17 BRPM | OXYGEN SATURATION: 98 % | TEMPERATURE: 96.8 F | BODY MASS INDEX: 24.07 KG/M2 | HEART RATE: 49 BPM | DIASTOLIC BLOOD PRESSURE: 68 MMHG

## 2018-01-23 NOTE — MISCELLANEOUS
Message  I spoke to patient's wife Phoebe Borden who in turn spoke to Lisbeth Roa by telephone this evening ([phone]): The patient and his wife are aware of lab testing reported today, i e  WBC 1 8, hemoglobin 9 5, platelets 8  He last received carfilzomib 90 mg and IVIG 30 g on December 14, 2017 and TBO filgrastim on December 15, 2017  He completed a 14 day course of lenalidomide 10 mg daily on December 19, 2017  At this time he is feeling well  There is no nose bleeds, gingival bleeding or excessive bruising  The patient and his wife are aware that he is at high risk for serious and potentially life-threatening bleeding including gastrointestinal bleeding and intracranial hemorrhage  Furthermore, they were advised that he should be evaluated at a hospital ED this evening, he could potentially be given a platelet transfusion and then be discharged to home  The patient is not in favor of being evaluated at a hospital ED this evening  He is willing to receive a platelet transfusion tomorrow at the [address] [place] [address]  The patient and his wife were advised that he seek medical attention promptly for fever, chills, nose bleeds, gingival bleeding, easy bruising, headache, visual disturbance, change in mental status, excessive fatigue, further new problems arise        Signatures   Electronically signed by : DUC Russell ; Dec 20 2017 11:55PM EST                       (Author)

## 2018-01-23 NOTE — MISCELLANEOUS
Message   Recorded as Task   Date: 12/21/2017 10:03 AM, Created By: Jewell Sarmiento   Task Name: Care Coordination   Assigned To: Natacha Meyer   Regarding Patient: Sy Ley, Status: Active   CommentAlveda Ree - 21 Dec 2017 10:03 AM     TASK CREATED  Caller: Karina Vee; Care Coordination; [phone]  Patient had labs drawn on 12/20 and had a PLT count of 8  Jak Bedoya spoke with Dr Curry Marrero last night and was instructed to take him to the ER  She told Dr Curry Marrero that he is scheduled for a PLT transfusion tomorrow but he asked that she call today and speak with you  Returned call to patients wife - Ambrose Lane will get platelets tomorrow - he denies any bleeding at the moment      Active Problems    1  Anemia (285 9) (D64 9)   2  Chemotherapy-induced nausea (787 02,E933 1) (R11 0,T45  1X5A)   3  Creatinine elevation (790 99) (R79 89)   4  Encounter for prostate cancer screening (V76 44) (Z12 5)   5  Fatigue (780 79) (R53 83)   6  Hyperlipidemia (272 4) (E78 5)   7  Hypertension (401 9) (I10)   8  Hypothyroidism (244 9) (E03 9)   9  Low hemoglobin (285 9) (D64 9)   10  Multiple myeloma (203 00) (C90 00)   11  Pancytopenia (284 19) (D61 818)   12  Shortness of breath (786 05) (R06 02)    Current Meds   1  Acyclovir 400 MG Oral Tablet; TAKE 1 TABLET TWICE DAILY as directed; Therapy: 41NLV2273 to (Rima Parks)  Requested for: 30BKO9595; Last   Rx:02Pix3218 Ordered   2  Aspirin 81 MG TABS; Therapy: (Recorded:94Duh1109) to Recorded   3  Bystolic 5 MG Oral Tablet; Take 1/2 po daily; Therapy: 10SCL9149 to (Evaluate:02Jun2018)  Requested for: 82SQF2948; Last   Rx:12Cwu4006 Ordered   4  Dexamethasone 4 MG Oral Tablet; take 3 tabs once a week; Therapy: 67YJJ7102 to (Evaluate:16Nov2018)  Requested for: 34LAG7754; Last   Rx:21Nov2017 Ordered   5  Escitalopram Oxalate 20 MG Oral Tablet (Lexapro); TAKE (1) TABLET DAILY; Therapy: 50WTH4496 to (Last Rx:02Oct2017)  Requested for: 92EZF8082 Ordered   6  Levothyroxine Sodium 50 MCG Oral Tablet (Synthroid); TAKE (1) TABLET DAILY; Therapy: 71VOJ0111 to (Last Rx:70Lau3152)  Requested for: 67IVF0172 Ordered   7  LORazepam 0 5 MG Oral Tablet; take 1/2 to 1 tablet every 4 hours as needed; Therapy: 25XFL7966 to (Evaluate:06Nov2015); Last Rx:07Oct2015 Ordered   8  Multi-Vitamins TABS; Therapy: (Recorded:43Ehv8889) to Recorded   9  Ondansetron HCl - 4 MG Oral Tablet; One tab PO q 4-6 hours PRN nausea; Therapy: 48NBN2960 to (Last Rx:13Oct2015) Ordered   10  Promacta 50 MG Oral Tablet; 2 TABLETS DAILY; Therapy: (Recorded:21Nov2017) to Recorded   11  Revlimid 15 MG Oral Capsule; started 7/14/16; Therapy: (Recorded:46Ryz5384) to Recorded   12  Vitamin D3 TABS; Therapy: (Recorded:32Zif0042) to Recorded    Allergies    1   No Known Drug Allergies    Signatures   Electronically signed by : Maggy Sanon, ; Dec 21 2017 11:13AM EST                       (Author)

## 2018-01-24 ENCOUNTER — TELEPHONE (OUTPATIENT)
Dept: HEMATOLOGY ONCOLOGY | Facility: CLINIC | Age: 73
End: 2018-01-24

## 2018-01-24 ENCOUNTER — APPOINTMENT (OUTPATIENT)
Dept: LAB | Facility: CLINIC | Age: 73
End: 2018-01-24
Payer: COMMERCIAL

## 2018-01-24 DIAGNOSIS — R53.83 OTHER FATIGUE: ICD-10-CM

## 2018-01-24 DIAGNOSIS — E03.9 MYXEDEMA HEART DISEASE: ICD-10-CM

## 2018-01-24 DIAGNOSIS — E78.5 HYPERLIPIDEMIA, UNSPECIFIED HYPERLIPIDEMIA TYPE: ICD-10-CM

## 2018-01-24 DIAGNOSIS — I10 ESSENTIAL HYPERTENSION, MALIGNANT: ICD-10-CM

## 2018-01-24 DIAGNOSIS — I51.9 MYXEDEMA HEART DISEASE: ICD-10-CM

## 2018-01-24 DIAGNOSIS — R11.0 NAUSEA: ICD-10-CM

## 2018-01-24 DIAGNOSIS — D64.9 ANEMIA, UNSPECIFIED TYPE: ICD-10-CM

## 2018-01-24 DIAGNOSIS — C90.00 MYELOMA ASSOCIATED AMYLOIDOSIS (HCC): ICD-10-CM

## 2018-01-24 DIAGNOSIS — E85.9 MYELOMA ASSOCIATED AMYLOIDOSIS (HCC): ICD-10-CM

## 2018-01-24 LAB
ALBUMIN SERPL BCP-MCNC: 3.4 G/DL (ref 3.5–5)
ALP SERPL-CCNC: 92 U/L (ref 46–116)
ALT SERPL W P-5'-P-CCNC: 32 U/L (ref 12–78)
ANION GAP SERPL CALCULATED.3IONS-SCNC: 6 MMOL/L (ref 4–13)
AST SERPL W P-5'-P-CCNC: 16 U/L (ref 5–45)
BASOPHILS # BLD AUTO: 0.01 THOUSANDS/ΜL (ref 0–0.1)
BASOPHILS NFR BLD AUTO: 1 % (ref 0–1)
BILIRUB SERPL-MCNC: 1.68 MG/DL (ref 0.2–1)
BUN SERPL-MCNC: 22 MG/DL (ref 5–25)
CALCIUM SERPL-MCNC: 8 MG/DL (ref 8.3–10.1)
CHLORIDE SERPL-SCNC: 106 MMOL/L (ref 100–108)
CO2 SERPL-SCNC: 27 MMOL/L (ref 21–32)
CREAT SERPL-MCNC: 1.16 MG/DL (ref 0.6–1.3)
EOSINOPHIL # BLD AUTO: 0.08 THOUSAND/ΜL (ref 0–0.61)
EOSINOPHIL NFR BLD AUTO: 5 % (ref 0–6)
ERYTHROCYTE [DISTWIDTH] IN BLOOD BY AUTOMATED COUNT: 15.6 % (ref 11.6–15.1)
GFR SERPL CREATININE-BSD FRML MDRD: 63 ML/MIN/1.73SQ M
GLUCOSE SERPL-MCNC: 81 MG/DL (ref 65–140)
HCT VFR BLD AUTO: 28.8 % (ref 36.5–49.3)
HGB BLD-MCNC: 9.6 G/DL (ref 12–17)
LDH SERPL-CCNC: 168 U/L (ref 81–234)
LYMPHOCYTES # BLD AUTO: 0.3 THOUSANDS/ΜL (ref 0.6–4.47)
LYMPHOCYTES NFR BLD AUTO: 18 % (ref 14–44)
MCH RBC QN AUTO: 35.7 PG (ref 26.8–34.3)
MCHC RBC AUTO-ENTMCNC: 33.3 G/DL (ref 31.4–37.4)
MCV RBC AUTO: 107 FL (ref 82–98)
MONOCYTES # BLD AUTO: 0.16 THOUSAND/ΜL (ref 0.17–1.22)
MONOCYTES NFR BLD AUTO: 10 % (ref 4–12)
NEUTROPHILS # BLD AUTO: 1.08 THOUSANDS/ΜL (ref 1.85–7.62)
NEUTS SEG NFR BLD AUTO: 66 % (ref 43–75)
NRBC BLD AUTO-RTO: 0 /100 WBCS
PHOSPHATE SERPL-MCNC: 3.5 MG/DL (ref 2.3–4.1)
PLATELET # BLD AUTO: 9 THOUSANDS/UL (ref 149–390)
POTASSIUM SERPL-SCNC: 3.9 MMOL/L (ref 3.5–5.3)
PROT SERPL-MCNC: 6.5 G/DL (ref 6.4–8.2)
RBC # BLD AUTO: 2.69 MILLION/UL (ref 3.88–5.62)
SODIUM SERPL-SCNC: 139 MMOL/L (ref 136–145)
URATE SERPL-MCNC: 7.4 MG/DL (ref 4.2–8)
WBC # BLD AUTO: 1.65 THOUSAND/UL (ref 4.31–10.16)

## 2018-01-24 PROCEDURE — 80053 COMPREHEN METABOLIC PANEL: CPT

## 2018-01-24 PROCEDURE — 83615 LACTATE (LD) (LDH) ENZYME: CPT

## 2018-01-24 PROCEDURE — 84100 ASSAY OF PHOSPHORUS: CPT

## 2018-01-24 PROCEDURE — 85025 COMPLETE CBC W/AUTO DIFF WBC: CPT

## 2018-01-24 PROCEDURE — 36415 COLL VENOUS BLD VENIPUNCTURE: CPT

## 2018-01-24 PROCEDURE — 84550 ASSAY OF BLOOD/URIC ACID: CPT

## 2018-01-24 NOTE — TELEPHONE ENCOUNTER
I was called by laboratory regarding his critical lab result, showing WBC 1 6 and platelet count 9  I gave in a call to his home phone number as well as his cell phone  Both of phone call reached to his answering machine  I left a message to his cell phone to call us back

## 2018-01-25 ENCOUNTER — TELEPHONE (OUTPATIENT)
Dept: HEMATOLOGY ONCOLOGY | Facility: CLINIC | Age: 73
End: 2018-01-25

## 2018-01-25 RX ORDER — DEXTROSE MONOHYDRATE 50 MG/ML
20 INJECTION, SOLUTION INTRAVENOUS CONTINUOUS
Status: DISCONTINUED | OUTPATIENT
Start: 2018-01-26 | End: 2018-01-26

## 2018-01-25 NOTE — TELEPHONE ENCOUNTER
Spoke with patients wife - They will be going to see MD in [place] on Sunday  Pt wishes to hold his Kyprolis tomorrow  He will get platelet transfusion and hydration with Zometa    I will call and speak with infusion center

## 2018-01-25 NOTE — TELEPHONE ENCOUNTER
terry that you call her back regarding his platelet count & his chemo txs  His next tx is on Friday

## 2018-01-26 ENCOUNTER — HOSPITAL ENCOUNTER (OUTPATIENT)
Dept: INFUSION CENTER | Facility: CLINIC | Age: 73
Discharge: HOME | End: 2018-01-26
Payer: COMMERCIAL

## 2018-01-26 VITALS
RESPIRATION RATE: 16 BRPM | HEART RATE: 48 BPM | DIASTOLIC BLOOD PRESSURE: 78 MMHG | TEMPERATURE: 96.8 F | SYSTOLIC BLOOD PRESSURE: 173 MMHG

## 2018-01-26 PROCEDURE — 36430 TRANSFUSION BLD/BLD COMPNT: CPT

## 2018-01-26 PROCEDURE — P9037 PLATE PHERES LEUKOREDU IRRAD: HCPCS

## 2018-01-26 PROCEDURE — 96365 THER/PROPH/DIAG IV INF INIT: CPT

## 2018-01-26 PROCEDURE — 96361 HYDRATE IV INFUSION ADD-ON: CPT

## 2018-01-26 RX ORDER — SODIUM CHLORIDE 9 MG/ML
20 INJECTION, SOLUTION INTRAVENOUS CONTINUOUS
Status: DISCONTINUED | OUTPATIENT
Start: 2018-01-26 | End: 2018-01-29 | Stop reason: HOSPADM

## 2018-01-26 RX ADMIN — SODIUM CHLORIDE 20 ML/HR: 0.9 INJECTION, SOLUTION INTRAVENOUS at 08:35

## 2018-01-26 RX ADMIN — ZOLEDRONIC ACID 3.5 MG: 0.8 INJECTION, SOLUTION, CONCENTRATE INTRAVENOUS at 08:35

## 2018-01-26 RX ADMIN — SODIUM CHLORIDE 500 ML: 0.9 INJECTION, SOLUTION INTRAVENOUS at 09:35

## 2018-01-26 RX ADMIN — SODIUM CHLORIDE 500 ML: 0.9 INJECTION, SOLUTION INTRAVENOUS at 08:35

## 2018-01-26 NOTE — PROGRESS NOTES
Patient tolerated Transfusion of 1 unit of platelets well  Hydration and Zometa given as ordered  Pt offers no complaints  Pt and wife are aware of all future appointments

## 2018-01-26 NOTE — PLAN OF CARE
Problem: INFECTION - ADULT  Goal: Absence of fever/infection during neutropenic period  INTERVENTIONS:  - Monitor WBC  - Implement neutropenic guidelines   Outcome: Progressing

## 2018-02-05 DIAGNOSIS — E03.9 HYPOTHYROIDISM, UNSPECIFIED TYPE: Primary | ICD-10-CM

## 2018-02-05 RX ORDER — LEVOTHYROXINE SODIUM 0.05 MG/1
TABLET ORAL
Qty: 30 TABLET | Refills: 0 | Status: SHIPPED | OUTPATIENT
Start: 2018-02-05 | End: 2018-04-04 | Stop reason: SDUPTHER

## 2018-02-07 ENCOUNTER — TRANSCRIBE ORDERS (OUTPATIENT)
Dept: LAB | Facility: CLINIC | Age: 73
End: 2018-02-07

## 2018-02-07 ENCOUNTER — LAB (OUTPATIENT)
Dept: LAB | Facility: CLINIC | Age: 73
End: 2018-02-07
Payer: COMMERCIAL

## 2018-02-07 DIAGNOSIS — E85.9 MYELOMA ASSOCIATED AMYLOIDOSIS (HCC): ICD-10-CM

## 2018-02-07 DIAGNOSIS — R53.83 FATIGUE, UNSPECIFIED TYPE: ICD-10-CM

## 2018-02-07 DIAGNOSIS — E78.5 HYPERLIPIDEMIA, UNSPECIFIED HYPERLIPIDEMIA TYPE: ICD-10-CM

## 2018-02-07 DIAGNOSIS — R11.0 NAUSEA: ICD-10-CM

## 2018-02-07 DIAGNOSIS — E03.9 MYXEDEMA HEART DISEASE: ICD-10-CM

## 2018-02-07 DIAGNOSIS — I10 ESSENTIAL HYPERTENSION, MALIGNANT: ICD-10-CM

## 2018-02-07 DIAGNOSIS — I51.9 MYXEDEMA HEART DISEASE: ICD-10-CM

## 2018-02-07 DIAGNOSIS — D64.9 ANEMIA, UNSPECIFIED TYPE: ICD-10-CM

## 2018-02-07 DIAGNOSIS — C90.00 MYELOMA ASSOCIATED AMYLOIDOSIS (HCC): Primary | ICD-10-CM

## 2018-02-07 DIAGNOSIS — E85.9 MYELOMA ASSOCIATED AMYLOIDOSIS (HCC): Primary | ICD-10-CM

## 2018-02-07 DIAGNOSIS — C90.00 MYELOMA ASSOCIATED AMYLOIDOSIS (HCC): ICD-10-CM

## 2018-02-07 LAB
ALBUMIN SERPL BCP-MCNC: 3.6 G/DL (ref 3.5–5)
ALP SERPL-CCNC: 80 U/L (ref 46–116)
ALT SERPL W P-5'-P-CCNC: 22 U/L (ref 12–78)
ANION GAP SERPL CALCULATED.3IONS-SCNC: 5 MMOL/L (ref 4–13)
AST SERPL W P-5'-P-CCNC: 15 U/L (ref 5–45)
BASOPHILS # BLD AUTO: 0.05 THOUSANDS/ΜL (ref 0–0.1)
BASOPHILS NFR BLD AUTO: 3 % (ref 0–1)
BILIRUB SERPL-MCNC: 1.32 MG/DL (ref 0.2–1)
BUN SERPL-MCNC: 18 MG/DL (ref 5–25)
CALCIUM SERPL-MCNC: 8.1 MG/DL (ref 8.3–10.1)
CHLORIDE SERPL-SCNC: 106 MMOL/L (ref 100–108)
CO2 SERPL-SCNC: 28 MMOL/L (ref 21–32)
CREAT SERPL-MCNC: 0.99 MG/DL (ref 0.6–1.3)
EOSINOPHIL # BLD AUTO: 0.04 THOUSAND/ΜL (ref 0–0.61)
EOSINOPHIL NFR BLD AUTO: 3 % (ref 0–6)
ERYTHROCYTE [DISTWIDTH] IN BLOOD BY AUTOMATED COUNT: 14.3 % (ref 11.6–15.1)
GFR SERPL CREATININE-BSD FRML MDRD: 77 ML/MIN/1.73SQ M
GLUCOSE SERPL-MCNC: 77 MG/DL (ref 65–140)
HCT VFR BLD AUTO: 32 % (ref 36.5–49.3)
HGB BLD-MCNC: 10.6 G/DL (ref 12–17)
LDH SERPL-CCNC: 178 U/L (ref 81–234)
LYMPHOCYTES # BLD AUTO: 0.43 THOUSANDS/ΜL (ref 0.6–4.47)
LYMPHOCYTES NFR BLD AUTO: 28 % (ref 14–44)
MCH RBC QN AUTO: 34.9 PG (ref 26.8–34.3)
MCHC RBC AUTO-ENTMCNC: 33.1 G/DL (ref 31.4–37.4)
MCV RBC AUTO: 105 FL (ref 82–98)
MONOCYTES # BLD AUTO: 0.27 THOUSAND/ΜL (ref 0.17–1.22)
MONOCYTES NFR BLD AUTO: 18 % (ref 4–12)
NEUTROPHILS # BLD AUTO: 0.73 THOUSANDS/ΜL (ref 1.85–7.62)
NEUTS SEG NFR BLD AUTO: 48 % (ref 43–75)
NRBC BLD AUTO-RTO: 0 /100 WBCS
PHOSPHATE SERPL-MCNC: 2.2 MG/DL (ref 2.3–4.1)
PLATELET # BLD AUTO: 25 THOUSANDS/UL (ref 149–390)
POTASSIUM SERPL-SCNC: 3.9 MMOL/L (ref 3.5–5.3)
PROT SERPL-MCNC: 6.6 G/DL (ref 6.4–8.2)
RBC # BLD AUTO: 3.04 MILLION/UL (ref 3.88–5.62)
SODIUM SERPL-SCNC: 139 MMOL/L (ref 136–145)
URATE SERPL-MCNC: 5.6 MG/DL (ref 4.2–8)
WBC # BLD AUTO: 1.52 THOUSAND/UL (ref 4.31–10.16)

## 2018-02-07 PROCEDURE — 83615 LACTATE (LD) (LDH) ENZYME: CPT

## 2018-02-07 PROCEDURE — 84550 ASSAY OF BLOOD/URIC ACID: CPT

## 2018-02-07 PROCEDURE — 36415 COLL VENOUS BLD VENIPUNCTURE: CPT

## 2018-02-07 PROCEDURE — 84100 ASSAY OF PHOSPHORUS: CPT

## 2018-02-07 PROCEDURE — 85025 COMPLETE CBC W/AUTO DIFF WBC: CPT

## 2018-02-07 PROCEDURE — 80053 COMPREHEN METABOLIC PANEL: CPT

## 2018-02-08 ENCOUNTER — TELEPHONE (OUTPATIENT)
Dept: HEMATOLOGY ONCOLOGY | Facility: CLINIC | Age: 73
End: 2018-02-08

## 2018-02-08 RX ORDER — SODIUM CHLORIDE 9 MG/ML
20 INJECTION, SOLUTION INTRAVENOUS CONTINUOUS
Status: DISCONTINUED | OUTPATIENT
Start: 2018-02-09 | End: 2018-02-08 | Stop reason: CLARIF

## 2018-02-08 NOTE — TELEPHONE ENCOUNTER
Spoke with patients wife - treatment on HOLD for the moment - platelet count needs to recover  Current result reviewed  MD from [facility] to contact Dr Carina Archibald about future treatment plan    Infusion center aware patient will not be coming

## 2018-02-09 ENCOUNTER — HOSPITAL ENCOUNTER (OUTPATIENT)
Dept: INFUSION CENTER | Facility: CLINIC | Age: 73
Discharge: HOME | End: 2018-02-09
Payer: COMMERCIAL

## 2018-02-13 ENCOUNTER — OFFICE VISIT (OUTPATIENT)
Dept: INTERNAL MEDICINE CLINIC | Facility: CLINIC | Age: 73
End: 2018-02-13
Payer: COMMERCIAL

## 2018-02-13 DIAGNOSIS — I10 HYPERTENSION, UNSPECIFIED TYPE: ICD-10-CM

## 2018-02-13 DIAGNOSIS — R00.1 BRADYCARDIA: ICD-10-CM

## 2018-02-13 DIAGNOSIS — Z13.0 SCREENING FOR ENDOCRINE, METABOLIC AND IMMUNITY DISORDER: Primary | ICD-10-CM

## 2018-02-13 DIAGNOSIS — Z13.29 SCREENING FOR ENDOCRINE, METABOLIC AND IMMUNITY DISORDER: Primary | ICD-10-CM

## 2018-02-13 DIAGNOSIS — Z13.228 SCREENING FOR ENDOCRINE, METABOLIC AND IMMUNITY DISORDER: Primary | ICD-10-CM

## 2018-02-13 PROBLEM — C90.00 MYELOMA (HCC): Status: ACTIVE | Noted: 2018-02-13

## 2018-02-13 PROBLEM — I48.19 PERSISTENT ATRIAL FIBRILLATION (HCC): Status: ACTIVE | Noted: 2018-02-13

## 2018-02-13 PROCEDURE — 99214 OFFICE O/P EST MOD 30 MIN: CPT | Performed by: INTERNAL MEDICINE

## 2018-02-13 PROCEDURE — 93000 ELECTROCARDIOGRAM COMPLETE: CPT | Performed by: INTERNAL MEDICINE

## 2018-02-13 RX ORDER — AMLODIPINE BESYLATE 2.5 MG/1
2.5 TABLET ORAL DAILY
Qty: 90 TABLET | Refills: 0 | Status: SHIPPED | OUTPATIENT
Start: 2018-02-13 | End: 2018-05-25 | Stop reason: ALTCHOICE

## 2018-02-13 NOTE — PROGRESS NOTES
Gladis Taylor today for visit accompanied by his wife does the  He continues to be under the care of Dr Javi Katz his local oncologist for multiple myeloma and also Dr Ivana Palomo his myeloma specialist in [place]  He has developed severe thrombocytopenia as part of his chemotherapy and is also getting some medication called grand X to raise his platelet count unfortunately the medication has resulted in spikes in his blood pressure and his wife has restarted 2 5 mg of Bystolic  Pierce Rao is clearly a survivor he has wonderful attitude he continues to work full-time he is involved in many community projects and is clearly a vigorous optimist Dr Swathi Payne man's progress note of 01/22/2018 very nicely outlines his treatment  At this point Pierce Jalen feels well his main complaint when is the headache that he gets several hours after his chemotherapy is initiated his wife is a registered nurse and has been taking his blood pressure and often his blood pressure will rise from 120-1 30-1  after the medication is ingested  Markus Myers appears well and in no distress he is alopecia toe talus his blood pressure taken by me today was 158/84 taken in the left arm with a large cuff his pulse is approximately 70 but seems irregularly irregular on physical examination the neck veins are flat carotid pulses normal lungs are clear cardiac examination reveals in impalpable heart size irregular rhythm I do not hear any murmurs or gallops today  His abdomen is soft the no palpable organ enlargement or masses the skin is warm and dry and there is no edema a resting electrocardiogram done in the office shows increased precordial voltage an irregular rhythm with a per with a heart rate of 70 at the baseline is difficult to discern but I do believe I can see some P waves I am not sure that the rhythm may not be atrial fibrillation    One to suggest a Pierce Rao this point that he continue Bystolic 2 5 mg which she actually took earlier today we are going to refer him to 1 of the cardiologist he does tell me that he had an echocardiogram done as part of his and oncologic evaluation and has an ejection fraction of between 50 and 55%  Also supplied with a prescription for Norvasc 2 5 in case his blood pressure spikes and we need to use additional medication    Complete set of lab studies will be done including a CBC chemistries magnesium level and a TSH to assist the cardiologist   In view of the patient's thrombocytopenia I would be reluctant to initiate any anticoagulation at this time

## 2018-02-14 ENCOUNTER — LAB (OUTPATIENT)
Dept: LAB | Facility: CLINIC | Age: 73
End: 2018-02-14
Payer: COMMERCIAL

## 2018-02-14 DIAGNOSIS — Z13.0 SCREENING FOR ENDOCRINE, METABOLIC AND IMMUNITY DISORDER: ICD-10-CM

## 2018-02-14 DIAGNOSIS — C90.00 MYELOMA ASSOCIATED AMYLOIDOSIS (HCC): ICD-10-CM

## 2018-02-14 DIAGNOSIS — Z13.228 SCREENING FOR ENDOCRINE, METABOLIC AND IMMUNITY DISORDER: ICD-10-CM

## 2018-02-14 DIAGNOSIS — I10 ESSENTIAL HYPERTENSION, MALIGNANT: ICD-10-CM

## 2018-02-14 DIAGNOSIS — D64.9 ANEMIA, UNSPECIFIED TYPE: ICD-10-CM

## 2018-02-14 DIAGNOSIS — I51.9 MYXEDEMA HEART DISEASE: ICD-10-CM

## 2018-02-14 DIAGNOSIS — Z13.29 SCREENING FOR ENDOCRINE, METABOLIC AND IMMUNITY DISORDER: ICD-10-CM

## 2018-02-14 DIAGNOSIS — R11.0 NAUSEA: ICD-10-CM

## 2018-02-14 DIAGNOSIS — E03.9 MYXEDEMA HEART DISEASE: ICD-10-CM

## 2018-02-14 DIAGNOSIS — R53.83 FATIGUE, UNSPECIFIED TYPE: ICD-10-CM

## 2018-02-14 DIAGNOSIS — E85.9 MYELOMA ASSOCIATED AMYLOIDOSIS (HCC): ICD-10-CM

## 2018-02-14 DIAGNOSIS — E78.5 HYPERLIPIDEMIA, UNSPECIFIED HYPERLIPIDEMIA TYPE: ICD-10-CM

## 2018-02-14 LAB
ALBUMIN SERPL BCP-MCNC: 3.5 G/DL (ref 3.5–5)
ALP SERPL-CCNC: 68 U/L (ref 46–116)
ALT SERPL W P-5'-P-CCNC: 18 U/L (ref 12–78)
ANION GAP SERPL CALCULATED.3IONS-SCNC: 6 MMOL/L (ref 4–13)
AST SERPL W P-5'-P-CCNC: 14 U/L (ref 5–45)
BASOPHILS # BLD AUTO: 0.03 THOUSANDS/ΜL (ref 0–0.1)
BASOPHILS NFR BLD AUTO: 2 % (ref 0–1)
BILIRUB SERPL-MCNC: 1.26 MG/DL (ref 0.2–1)
BUN SERPL-MCNC: 20 MG/DL (ref 5–25)
CALCIUM SERPL-MCNC: 8.4 MG/DL (ref 8.3–10.1)
CHLORIDE SERPL-SCNC: 107 MMOL/L (ref 100–108)
CO2 SERPL-SCNC: 29 MMOL/L (ref 21–32)
CREAT SERPL-MCNC: 1.06 MG/DL (ref 0.6–1.3)
EOSINOPHIL # BLD AUTO: 0.05 THOUSAND/ΜL (ref 0–0.61)
EOSINOPHIL NFR BLD AUTO: 4 % (ref 0–6)
ERYTHROCYTE [DISTWIDTH] IN BLOOD BY AUTOMATED COUNT: 13.8 % (ref 11.6–15.1)
GFR SERPL CREATININE-BSD FRML MDRD: 71 ML/MIN/1.73SQ M
GLUCOSE SERPL-MCNC: 86 MG/DL (ref 65–140)
HCT VFR BLD AUTO: 31.6 % (ref 36.5–49.3)
HGB BLD-MCNC: 10.4 G/DL (ref 12–17)
IGG SERPL-MCNC: 596 MG/DL (ref 700–1600)
LDH SERPL-CCNC: 155 U/L (ref 81–234)
LYMPHOCYTES # BLD AUTO: 0.33 THOUSANDS/ΜL (ref 0.6–4.47)
LYMPHOCYTES NFR BLD AUTO: 25 % (ref 14–44)
MAGNESIUM SERPL-MCNC: 2 MG/DL (ref 1.6–2.6)
MCH RBC QN AUTO: 34.4 PG (ref 26.8–34.3)
MCHC RBC AUTO-ENTMCNC: 32.9 G/DL (ref 31.4–37.4)
MCV RBC AUTO: 105 FL (ref 82–98)
MONOCYTES # BLD AUTO: 0.3 THOUSAND/ΜL (ref 0.17–1.22)
MONOCYTES NFR BLD AUTO: 23 % (ref 4–12)
NEUTROPHILS # BLD AUTO: 0.6 THOUSANDS/ΜL (ref 1.85–7.62)
NEUTS SEG NFR BLD AUTO: 46 % (ref 43–75)
NRBC BLD AUTO-RTO: 0 /100 WBCS
PHOSPHATE SERPL-MCNC: 2.8 MG/DL (ref 2.3–4.1)
PLATELET # BLD AUTO: 55 THOUSANDS/UL (ref 149–390)
PMV BLD AUTO: 12.2 FL (ref 8.9–12.7)
POTASSIUM SERPL-SCNC: 4.1 MMOL/L (ref 3.5–5.3)
PROT SERPL-MCNC: 6.4 G/DL (ref 6.4–8.2)
RBC # BLD AUTO: 3.02 MILLION/UL (ref 3.88–5.62)
SODIUM SERPL-SCNC: 142 MMOL/L (ref 136–145)
TSH SERPL DL<=0.05 MIU/L-ACNC: 6.53 UIU/ML
URATE SERPL-MCNC: 5.5 MG/DL (ref 4.2–8)
WBC # BLD AUTO: 1.31 THOUSAND/UL (ref 4.31–10.16)

## 2018-02-14 PROCEDURE — 80053 COMPREHEN METABOLIC PANEL: CPT

## 2018-02-14 PROCEDURE — 36415 COLL VENOUS BLD VENIPUNCTURE: CPT

## 2018-02-14 PROCEDURE — 85025 COMPLETE CBC W/AUTO DIFF WBC: CPT

## 2018-02-14 PROCEDURE — 82784 ASSAY IGA/IGD/IGG/IGM EACH: CPT

## 2018-02-14 PROCEDURE — 83735 ASSAY OF MAGNESIUM: CPT

## 2018-02-14 PROCEDURE — 83615 LACTATE (LD) (LDH) ENZYME: CPT

## 2018-02-14 PROCEDURE — 84550 ASSAY OF BLOOD/URIC ACID: CPT

## 2018-02-14 PROCEDURE — 84100 ASSAY OF PHOSPHORUS: CPT

## 2018-02-14 PROCEDURE — 84443 ASSAY THYROID STIM HORMONE: CPT

## 2018-02-15 ENCOUNTER — TELEPHONE (OUTPATIENT)
Dept: HEMATOLOGY ONCOLOGY | Facility: CLINIC | Age: 73
End: 2018-02-15

## 2018-02-15 NOTE — TELEPHONE ENCOUNTER
Spoke with patients wife - Treatment plan to be reviewed tomorrow in office  Infusion center aware that pt only getting IVIG tomorrow

## 2018-02-15 NOTE — TELEPHONE ENCOUNTER
Patient's wife calling about platelets-drawn yesterday at --and wants an update on the treatment plan   Call [phone]

## 2018-02-16 ENCOUNTER — HOSPITAL ENCOUNTER (OUTPATIENT)
Dept: INFUSION CENTER | Facility: CLINIC | Age: 73
Discharge: HOME | End: 2018-02-16
Payer: COMMERCIAL

## 2018-02-16 VITALS
HEART RATE: 52 BPM | SYSTOLIC BLOOD PRESSURE: 160 MMHG | RESPIRATION RATE: 16 BRPM | DIASTOLIC BLOOD PRESSURE: 84 MMHG | TEMPERATURE: 96.8 F

## 2018-02-16 PROCEDURE — 96372 THER/PROPH/DIAG INJ SC/IM: CPT

## 2018-02-16 PROCEDURE — 96366 THER/PROPH/DIAG IV INF ADDON: CPT

## 2018-02-16 PROCEDURE — 96365 THER/PROPH/DIAG IV INF INIT: CPT

## 2018-02-16 RX ORDER — SODIUM CHLORIDE 9 MG/ML
20 INJECTION, SOLUTION INTRAVENOUS CONTINUOUS
Status: DISCONTINUED | OUTPATIENT
Start: 2018-02-16 | End: 2018-02-19 | Stop reason: HOSPADM

## 2018-02-16 RX ADMIN — TBO-FILGRASTIM 480 MCG: 480 INJECTION, SOLUTION SUBCUTANEOUS at 10:22

## 2018-02-16 RX ADMIN — SODIUM CHLORIDE 20 ML/HR: 0.9 INJECTION, SOLUTION INTRAVENOUS at 08:30

## 2018-02-16 RX ADMIN — IMMUNE GLOBULIN (HUMAN) 30 G: 10 INJECTION INTRAVENOUS; SUBCUTANEOUS at 08:39

## 2018-02-16 NOTE — PROGRESS NOTES
IVIG infused and Granix given as ordered  Pt tolerated well  Pt and wife are aware of all future appointments

## 2018-02-19 DIAGNOSIS — C90.00 MULTIPLE MYELOMA NOT HAVING ACHIEVED REMISSION (HCC): Primary | ICD-10-CM

## 2018-02-21 ENCOUNTER — TELEPHONE (OUTPATIENT)
Dept: HEMATOLOGY ONCOLOGY | Facility: CLINIC | Age: 73
End: 2018-02-21

## 2018-02-21 ENCOUNTER — APPOINTMENT (OUTPATIENT)
Dept: LAB | Facility: CLINIC | Age: 73
End: 2018-02-21
Payer: COMMERCIAL

## 2018-02-21 DIAGNOSIS — C90.00 MYELOMA ASSOCIATED AMYLOIDOSIS (HCC): ICD-10-CM

## 2018-02-21 DIAGNOSIS — D64.9 ANEMIA, UNSPECIFIED TYPE: ICD-10-CM

## 2018-02-21 DIAGNOSIS — E78.5 HYPERLIPIDEMIA, UNSPECIFIED HYPERLIPIDEMIA TYPE: ICD-10-CM

## 2018-02-21 DIAGNOSIS — C90.00: ICD-10-CM

## 2018-02-21 DIAGNOSIS — I51.9 MYXEDEMA HEART DISEASE: ICD-10-CM

## 2018-02-21 DIAGNOSIS — R11.0 NAUSEA: ICD-10-CM

## 2018-02-21 DIAGNOSIS — R53.83 FATIGUE, UNSPECIFIED TYPE: ICD-10-CM

## 2018-02-21 DIAGNOSIS — D63.0: ICD-10-CM

## 2018-02-21 DIAGNOSIS — E85.9 MYELOMA ASSOCIATED AMYLOIDOSIS (HCC): ICD-10-CM

## 2018-02-21 DIAGNOSIS — I10 ESSENTIAL HYPERTENSION, MALIGNANT: ICD-10-CM

## 2018-02-21 DIAGNOSIS — E03.9 MYXEDEMA HEART DISEASE: ICD-10-CM

## 2018-02-21 DIAGNOSIS — C90.00 MULTIPLE MYELOMA NOT HAVING ACHIEVED REMISSION (HCC): Primary | ICD-10-CM

## 2018-02-21 LAB
ALBUMIN SERPL BCP-MCNC: 3.5 G/DL (ref 3.5–5)
ALP SERPL-CCNC: 81 U/L (ref 46–116)
ALT SERPL W P-5'-P-CCNC: 17 U/L (ref 12–78)
ANION GAP SERPL CALCULATED.3IONS-SCNC: 5 MMOL/L (ref 4–13)
AST SERPL W P-5'-P-CCNC: 19 U/L (ref 5–45)
BASOPHILS # BLD AUTO: 0.02 THOUSANDS/ΜL (ref 0–0.1)
BASOPHILS NFR BLD AUTO: 1 % (ref 0–1)
BILIRUB SERPL-MCNC: 0.96 MG/DL (ref 0.2–1)
BUN SERPL-MCNC: 20 MG/DL (ref 5–25)
CALCIUM SERPL-MCNC: 8.7 MG/DL (ref 8.3–10.1)
CHLORIDE SERPL-SCNC: 104 MMOL/L (ref 100–108)
CO2 SERPL-SCNC: 31 MMOL/L (ref 21–32)
CREAT SERPL-MCNC: 1.1 MG/DL (ref 0.6–1.3)
EOSINOPHIL # BLD AUTO: 0.04 THOUSAND/ΜL (ref 0–0.61)
EOSINOPHIL NFR BLD AUTO: 2 % (ref 0–6)
ERYTHROCYTE [DISTWIDTH] IN BLOOD BY AUTOMATED COUNT: 13.4 % (ref 11.6–15.1)
GFR SERPL CREATININE-BSD FRML MDRD: 68 ML/MIN/1.73SQ M
GLUCOSE SERPL-MCNC: 83 MG/DL (ref 65–140)
HCT VFR BLD AUTO: 33.8 % (ref 36.5–49.3)
HGB BLD-MCNC: 11.4 G/DL (ref 12–17)
LDH SERPL-CCNC: 174 U/L (ref 81–234)
LYMPHOCYTES # BLD AUTO: 0.44 THOUSANDS/ΜL (ref 0.6–4.47)
LYMPHOCYTES NFR BLD AUTO: 17 % (ref 14–44)
MCH RBC QN AUTO: 34.7 PG (ref 26.8–34.3)
MCHC RBC AUTO-ENTMCNC: 33.7 G/DL (ref 31.4–37.4)
MCV RBC AUTO: 103 FL (ref 82–98)
MONOCYTES # BLD AUTO: 0.43 THOUSAND/ΜL (ref 0.17–1.22)
MONOCYTES NFR BLD AUTO: 16 % (ref 4–12)
NEUTROPHILS # BLD AUTO: 1.68 THOUSANDS/ΜL (ref 1.85–7.62)
NEUTS SEG NFR BLD AUTO: 64 % (ref 43–75)
NRBC BLD AUTO-RTO: 0 /100 WBCS
PHOSPHATE SERPL-MCNC: 3 MG/DL (ref 2.3–4.1)
PLATELET # BLD AUTO: 17 THOUSANDS/UL (ref 149–390)
POTASSIUM SERPL-SCNC: 3.7 MMOL/L (ref 3.5–5.3)
PROT SERPL-MCNC: 6.9 G/DL (ref 6.4–8.2)
RBC # BLD AUTO: 3.29 MILLION/UL (ref 3.88–5.62)
SODIUM SERPL-SCNC: 140 MMOL/L (ref 136–145)
URATE SERPL-MCNC: 5.8 MG/DL (ref 4.2–8)
WBC # BLD AUTO: 2.62 THOUSAND/UL (ref 4.31–10.16)

## 2018-02-21 PROCEDURE — 83615 LACTATE (LD) (LDH) ENZYME: CPT

## 2018-02-21 PROCEDURE — 84100 ASSAY OF PHOSPHORUS: CPT

## 2018-02-21 PROCEDURE — 85025 COMPLETE CBC W/AUTO DIFF WBC: CPT

## 2018-02-21 PROCEDURE — 36415 COLL VENOUS BLD VENIPUNCTURE: CPT

## 2018-02-21 PROCEDURE — 80053 COMPREHEN METABOLIC PANEL: CPT

## 2018-02-21 PROCEDURE — 84550 ASSAY OF BLOOD/URIC ACID: CPT

## 2018-02-22 RX ORDER — ACETAMINOPHEN 325 MG/1
650 TABLET ORAL ONCE
Status: COMPLETED | OUTPATIENT
Start: 2018-02-23 | End: 2018-02-23

## 2018-02-22 RX ORDER — SODIUM CHLORIDE 9 MG/ML
20 INJECTION, SOLUTION INTRAVENOUS CONTINUOUS
Status: DISCONTINUED | OUTPATIENT
Start: 2018-02-23 | End: 2018-02-26 | Stop reason: HOSPADM

## 2018-02-23 ENCOUNTER — HOSPITAL ENCOUNTER (OUTPATIENT)
Dept: INFUSION CENTER | Facility: CLINIC | Age: 73
Discharge: HOME | End: 2018-02-23
Payer: COMMERCIAL

## 2018-02-23 VITALS
WEIGHT: 168.43 LBS | BODY MASS INDEX: 24.11 KG/M2 | HEART RATE: 61 BPM | SYSTOLIC BLOOD PRESSURE: 168 MMHG | RESPIRATION RATE: 16 BRPM | HEIGHT: 70 IN | DIASTOLIC BLOOD PRESSURE: 90 MMHG | TEMPERATURE: 97.3 F

## 2018-02-23 LAB
ABO GROUP BLD: NORMAL
BLD GP AB SCN SERPL QL: NEGATIVE
RH BLD: POSITIVE
SPECIMEN EXPIRATION DATE: NORMAL

## 2018-02-23 PROCEDURE — 96367 TX/PROPH/DG ADDL SEQ IV INF: CPT

## 2018-02-23 PROCEDURE — 96413 CHEMO IV INFUSION 1 HR: CPT

## 2018-02-23 PROCEDURE — 86850 RBC ANTIBODY SCREEN: CPT | Performed by: INTERNAL MEDICINE

## 2018-02-23 PROCEDURE — 96415 CHEMO IV INFUSION ADDL HR: CPT

## 2018-02-23 PROCEDURE — 36430 TRANSFUSION BLD/BLD COMPNT: CPT

## 2018-02-23 PROCEDURE — P9037 PLATE PHERES LEUKOREDU IRRAD: HCPCS

## 2018-02-23 PROCEDURE — 86900 BLOOD TYPING SEROLOGIC ABO: CPT | Performed by: INTERNAL MEDICINE

## 2018-02-23 PROCEDURE — 86901 BLOOD TYPING SEROLOGIC RH(D): CPT | Performed by: INTERNAL MEDICINE

## 2018-02-23 RX ADMIN — DARATUMUMAB 1200 MG: 100 INJECTION, SOLUTION, CONCENTRATE INTRAVENOUS at 09:26

## 2018-02-23 RX ADMIN — SODIUM CHLORIDE 100 MG: 0.9 INJECTION, SOLUTION INTRAVENOUS at 08:10

## 2018-02-23 RX ADMIN — SODIUM CHLORIDE 20 ML/HR: 0.9 INJECTION, SOLUTION INTRAVENOUS at 07:50

## 2018-02-23 RX ADMIN — ACETAMINOPHEN 650 MG: 325 TABLET, FILM COATED ORAL at 08:40

## 2018-02-23 RX ADMIN — DIPHENHYDRAMINE HYDROCHLORIDE 25 MG: 50 INJECTION, SOLUTION INTRAMUSCULAR; INTRAVENOUS at 07:50

## 2018-02-23 RX ADMIN — ZOLEDRONIC ACID 3.5 MG: 0.8 INJECTION, SOLUTION, CONCENTRATE INTRAVENOUS at 08:35

## 2018-02-23 NOTE — PROGRESS NOTES
Patient tolerated Darzalex infusion well with no adverse events  Patient offers no complaints  Pt and wife are aware of all future appointments   Refused AVS

## 2018-02-28 ENCOUNTER — TELEPHONE (OUTPATIENT)
Dept: HEMATOLOGY ONCOLOGY | Facility: CLINIC | Age: 73
End: 2018-02-28

## 2018-02-28 ENCOUNTER — APPOINTMENT (OUTPATIENT)
Dept: LAB | Facility: CLINIC | Age: 73
End: 2018-02-28
Payer: COMMERCIAL

## 2018-02-28 DIAGNOSIS — C90.00 MYELOMA ASSOCIATED AMYLOIDOSIS (HCC): ICD-10-CM

## 2018-02-28 DIAGNOSIS — I10 ESSENTIAL HYPERTENSION, MALIGNANT: ICD-10-CM

## 2018-02-28 DIAGNOSIS — R53.83 FATIGUE, UNSPECIFIED TYPE: ICD-10-CM

## 2018-02-28 DIAGNOSIS — D64.9 ANEMIA, UNSPECIFIED TYPE: ICD-10-CM

## 2018-02-28 DIAGNOSIS — I51.9 MYXEDEMA HEART DISEASE: ICD-10-CM

## 2018-02-28 DIAGNOSIS — R11.0 NAUSEA: ICD-10-CM

## 2018-02-28 DIAGNOSIS — E03.9 MYXEDEMA HEART DISEASE: ICD-10-CM

## 2018-02-28 DIAGNOSIS — E85.9 MYELOMA ASSOCIATED AMYLOIDOSIS (HCC): ICD-10-CM

## 2018-02-28 DIAGNOSIS — E78.5 HYPERLIPIDEMIA, UNSPECIFIED HYPERLIPIDEMIA TYPE: ICD-10-CM

## 2018-02-28 LAB
ALBUMIN SERPL BCP-MCNC: 3.5 G/DL (ref 3.5–5)
ALP SERPL-CCNC: 87 U/L (ref 46–116)
ALT SERPL W P-5'-P-CCNC: 30 U/L (ref 12–78)
ANION GAP SERPL CALCULATED.3IONS-SCNC: 6 MMOL/L (ref 4–13)
AST SERPL W P-5'-P-CCNC: 19 U/L (ref 5–45)
BASOPHILS # BLD AUTO: 0.01 THOUSANDS/ΜL (ref 0–0.1)
BASOPHILS NFR BLD AUTO: 0 % (ref 0–1)
BILIRUB SERPL-MCNC: 1.17 MG/DL (ref 0.2–1)
BUN SERPL-MCNC: 20 MG/DL (ref 5–25)
CALCIUM SERPL-MCNC: 8.2 MG/DL (ref 8.3–10.1)
CHLORIDE SERPL-SCNC: 105 MMOL/L (ref 100–108)
CO2 SERPL-SCNC: 29 MMOL/L (ref 21–32)
CREAT SERPL-MCNC: 1.07 MG/DL (ref 0.6–1.3)
EOSINOPHIL # BLD AUTO: 0.05 THOUSAND/ΜL (ref 0–0.61)
EOSINOPHIL NFR BLD AUTO: 1 % (ref 0–6)
ERYTHROCYTE [DISTWIDTH] IN BLOOD BY AUTOMATED COUNT: 13.6 % (ref 11.6–15.1)
GFR SERPL CREATININE-BSD FRML MDRD: 70 ML/MIN/1.73SQ M
GLUCOSE SERPL-MCNC: 92 MG/DL (ref 65–140)
HCT VFR BLD AUTO: 34.6 % (ref 36.5–49.3)
HGB BLD-MCNC: 11.4 G/DL (ref 12–17)
LDH SERPL-CCNC: 176 U/L (ref 81–234)
LYMPHOCYTES # BLD AUTO: 0.31 THOUSANDS/ΜL (ref 0.6–4.47)
LYMPHOCYTES NFR BLD AUTO: 7 % (ref 14–44)
MCH RBC QN AUTO: 33.7 PG (ref 26.8–34.3)
MCHC RBC AUTO-ENTMCNC: 32.9 G/DL (ref 31.4–37.4)
MCV RBC AUTO: 102 FL (ref 82–98)
MONOCYTES # BLD AUTO: 0.22 THOUSAND/ΜL (ref 0.17–1.22)
MONOCYTES NFR BLD AUTO: 5 % (ref 4–12)
NEUTROPHILS # BLD AUTO: 4.02 THOUSANDS/ΜL (ref 1.85–7.62)
NEUTS SEG NFR BLD AUTO: 87 % (ref 43–75)
NRBC BLD AUTO-RTO: 0 /100 WBCS
PHOSPHATE SERPL-MCNC: 3.2 MG/DL (ref 2.3–4.1)
PLATELET # BLD AUTO: 32 THOUSANDS/UL (ref 149–390)
POTASSIUM SERPL-SCNC: 3.7 MMOL/L (ref 3.5–5.3)
PROT SERPL-MCNC: 7 G/DL (ref 6.4–8.2)
RBC # BLD AUTO: 3.38 MILLION/UL (ref 3.88–5.62)
SODIUM SERPL-SCNC: 140 MMOL/L (ref 136–145)
URATE SERPL-MCNC: 6.1 MG/DL (ref 4.2–8)
WBC # BLD AUTO: 4.63 THOUSAND/UL (ref 4.31–10.16)

## 2018-02-28 PROCEDURE — 83615 LACTATE (LD) (LDH) ENZYME: CPT

## 2018-02-28 PROCEDURE — 84100 ASSAY OF PHOSPHORUS: CPT

## 2018-02-28 PROCEDURE — 80053 COMPREHEN METABOLIC PANEL: CPT

## 2018-02-28 PROCEDURE — 85025 COMPLETE CBC W/AUTO DIFF WBC: CPT

## 2018-02-28 PROCEDURE — 36415 COLL VENOUS BLD VENIPUNCTURE: CPT

## 2018-02-28 PROCEDURE — 84550 ASSAY OF BLOOD/URIC ACID: CPT

## 2018-03-01 RX ORDER — SODIUM CHLORIDE 9 MG/ML
20 INJECTION, SOLUTION INTRAVENOUS CONTINUOUS
Status: DISCONTINUED | OUTPATIENT
Start: 2018-03-02 | End: 2018-03-05 | Stop reason: HOSPADM

## 2018-03-01 RX ORDER — ACETAMINOPHEN 325 MG/1
650 TABLET ORAL ONCE
Status: COMPLETED | OUTPATIENT
Start: 2018-03-02 | End: 2018-03-02

## 2018-03-02 ENCOUNTER — HOSPITAL ENCOUNTER (OUTPATIENT)
Dept: INFUSION CENTER | Facility: CLINIC | Age: 73
Discharge: HOME | End: 2018-03-02
Payer: COMMERCIAL

## 2018-03-02 VITALS
SYSTOLIC BLOOD PRESSURE: 170 MMHG | RESPIRATION RATE: 16 BRPM | TEMPERATURE: 96.2 F | DIASTOLIC BLOOD PRESSURE: 95 MMHG | HEART RATE: 63 BPM | BODY MASS INDEX: 24.58 KG/M2 | WEIGHT: 171.3 LBS

## 2018-03-02 PROCEDURE — 96413 CHEMO IV INFUSION 1 HR: CPT

## 2018-03-02 PROCEDURE — 96367 TX/PROPH/DG ADDL SEQ IV INF: CPT

## 2018-03-02 PROCEDURE — 96375 TX/PRO/DX INJ NEW DRUG ADDON: CPT

## 2018-03-02 PROCEDURE — 96415 CHEMO IV INFUSION ADDL HR: CPT

## 2018-03-02 RX ADMIN — DARATUMUMAB 1200 MG: 100 INJECTION, SOLUTION, CONCENTRATE INTRAVENOUS at 09:33

## 2018-03-02 RX ADMIN — SODIUM CHLORIDE 100 MG: 9 INJECTION, SOLUTION INTRAVENOUS at 08:35

## 2018-03-02 RX ADMIN — ACETAMINOPHEN 650 MG: 325 TABLET, FILM COATED ORAL at 08:11

## 2018-03-02 RX ADMIN — DIPHENHYDRAMINE HYDROCHLORIDE 25 MG: 50 INJECTION, SOLUTION INTRAMUSCULAR; INTRAVENOUS at 08:20

## 2018-03-02 RX ADMIN — SODIUM CHLORIDE 20 ML/HR: 9 INJECTION, SOLUTION INTRAVENOUS at 08:20

## 2018-03-02 NOTE — PROGRESS NOTES
Patient tolerated infusion well  Denies any discomfort  Pt and and wife are aware of all future appointments

## 2018-03-08 ENCOUNTER — APPOINTMENT (OUTPATIENT)
Dept: LAB | Facility: CLINIC | Age: 73
End: 2018-03-08
Payer: COMMERCIAL

## 2018-03-08 DIAGNOSIS — C90.00 MYELOMA ASSOCIATED AMYLOIDOSIS (HCC): ICD-10-CM

## 2018-03-08 DIAGNOSIS — E85.9 MYELOMA ASSOCIATED AMYLOIDOSIS (HCC): ICD-10-CM

## 2018-03-08 DIAGNOSIS — D64.9 ANEMIA, UNSPECIFIED TYPE: ICD-10-CM

## 2018-03-08 DIAGNOSIS — R11.0 NAUSEA: ICD-10-CM

## 2018-03-08 DIAGNOSIS — I51.9 MYXEDEMA HEART DISEASE: ICD-10-CM

## 2018-03-08 DIAGNOSIS — E03.9 MYXEDEMA HEART DISEASE: ICD-10-CM

## 2018-03-08 DIAGNOSIS — R53.83 FATIGUE, UNSPECIFIED TYPE: ICD-10-CM

## 2018-03-08 DIAGNOSIS — E78.5 HYPERLIPIDEMIA, UNSPECIFIED HYPERLIPIDEMIA TYPE: ICD-10-CM

## 2018-03-08 DIAGNOSIS — I10 ESSENTIAL HYPERTENSION, MALIGNANT: ICD-10-CM

## 2018-03-08 LAB
ALBUMIN SERPL BCP-MCNC: 3.4 G/DL (ref 3.5–5)
ALP SERPL-CCNC: 89 U/L (ref 46–116)
ALT SERPL W P-5'-P-CCNC: 34 U/L (ref 12–78)
ANION GAP SERPL CALCULATED.3IONS-SCNC: 4 MMOL/L (ref 4–13)
AST SERPL W P-5'-P-CCNC: 12 U/L (ref 5–45)
BASOPHILS # BLD AUTO: 0 THOUSANDS/ΜL (ref 0–0.1)
BASOPHILS NFR BLD AUTO: 0 % (ref 0–1)
BILIRUB SERPL-MCNC: 1.62 MG/DL (ref 0.2–1)
BUN SERPL-MCNC: 21 MG/DL (ref 5–25)
CALCIUM SERPL-MCNC: 8.5 MG/DL (ref 8.3–10.1)
CHLORIDE SERPL-SCNC: 102 MMOL/L (ref 100–108)
CO2 SERPL-SCNC: 31 MMOL/L (ref 21–32)
CREAT SERPL-MCNC: 1.06 MG/DL (ref 0.6–1.3)
EOSINOPHIL # BLD AUTO: 0.06 THOUSAND/ΜL (ref 0–0.61)
EOSINOPHIL NFR BLD AUTO: 2 % (ref 0–6)
ERYTHROCYTE [DISTWIDTH] IN BLOOD BY AUTOMATED COUNT: 14.1 % (ref 11.6–15.1)
GFR SERPL CREATININE-BSD FRML MDRD: 71 ML/MIN/1.73SQ M
GLUCOSE SERPL-MCNC: 86 MG/DL (ref 65–140)
HCT VFR BLD AUTO: 31.1 % (ref 36.5–49.3)
HGB BLD-MCNC: 10.3 G/DL (ref 12–17)
IGG SERPL-MCNC: 636 MG/DL (ref 700–1600)
LDH SERPL-CCNC: 153 U/L (ref 81–234)
LYMPHOCYTES # BLD AUTO: 0.22 THOUSANDS/ΜL (ref 0.6–4.47)
LYMPHOCYTES NFR BLD AUTO: 8 % (ref 14–44)
MCH RBC QN AUTO: 34.2 PG (ref 26.8–34.3)
MCHC RBC AUTO-ENTMCNC: 33.1 G/DL (ref 31.4–37.4)
MCV RBC AUTO: 103 FL (ref 82–98)
MONOCYTES # BLD AUTO: 0.35 THOUSAND/ΜL (ref 0.17–1.22)
MONOCYTES NFR BLD AUTO: 13 % (ref 4–12)
NEUTROPHILS # BLD AUTO: 2.08 THOUSANDS/ΜL (ref 1.85–7.62)
NEUTS SEG NFR BLD AUTO: 77 % (ref 43–75)
NRBC BLD AUTO-RTO: 0 /100 WBCS
PHOSPHATE SERPL-MCNC: 2.8 MG/DL (ref 2.3–4.1)
PLATELET # BLD AUTO: 21 THOUSANDS/UL (ref 149–390)
POTASSIUM SERPL-SCNC: 4.1 MMOL/L (ref 3.5–5.3)
PROT SERPL-MCNC: 6.6 G/DL (ref 6.4–8.2)
RBC # BLD AUTO: 3.01 MILLION/UL (ref 3.88–5.62)
SODIUM SERPL-SCNC: 137 MMOL/L (ref 136–145)
URATE SERPL-MCNC: 5.5 MG/DL (ref 4.2–8)
WBC # BLD AUTO: 2.71 THOUSAND/UL (ref 4.31–10.16)

## 2018-03-08 PROCEDURE — 84100 ASSAY OF PHOSPHORUS: CPT

## 2018-03-08 PROCEDURE — 85025 COMPLETE CBC W/AUTO DIFF WBC: CPT

## 2018-03-08 PROCEDURE — 84550 ASSAY OF BLOOD/URIC ACID: CPT

## 2018-03-08 PROCEDURE — 36415 COLL VENOUS BLD VENIPUNCTURE: CPT

## 2018-03-08 PROCEDURE — 82784 ASSAY IGA/IGD/IGG/IGM EACH: CPT

## 2018-03-08 PROCEDURE — 80053 COMPREHEN METABOLIC PANEL: CPT

## 2018-03-08 PROCEDURE — 83615 LACTATE (LD) (LDH) ENZYME: CPT

## 2018-03-08 RX ORDER — SODIUM CHLORIDE 9 MG/ML
20 INJECTION, SOLUTION INTRAVENOUS CONTINUOUS
Status: DISCONTINUED | OUTPATIENT
Start: 2018-03-09 | End: 2018-03-12 | Stop reason: HOSPADM

## 2018-03-08 RX ORDER — ACETAMINOPHEN 325 MG/1
650 TABLET ORAL ONCE
Status: COMPLETED | OUTPATIENT
Start: 2018-03-09 | End: 2018-03-09

## 2018-03-09 ENCOUNTER — HOSPITAL ENCOUNTER (OUTPATIENT)
Dept: INFUSION CENTER | Facility: CLINIC | Age: 73
Discharge: HOME | End: 2018-03-09
Payer: COMMERCIAL

## 2018-03-09 VITALS
BODY MASS INDEX: 24.48 KG/M2 | WEIGHT: 170.64 LBS | TEMPERATURE: 97.4 F | HEART RATE: 64 BPM | RESPIRATION RATE: 16 BRPM | SYSTOLIC BLOOD PRESSURE: 162 MMHG | DIASTOLIC BLOOD PRESSURE: 84 MMHG

## 2018-03-09 PROCEDURE — 96367 TX/PROPH/DG ADDL SEQ IV INF: CPT

## 2018-03-09 PROCEDURE — 96413 CHEMO IV INFUSION 1 HR: CPT

## 2018-03-09 PROCEDURE — 96415 CHEMO IV INFUSION ADDL HR: CPT

## 2018-03-09 PROCEDURE — 96375 TX/PRO/DX INJ NEW DRUG ADDON: CPT

## 2018-03-09 RX ADMIN — DARATUMUMAB 1200 MG: 100 INJECTION, SOLUTION, CONCENTRATE INTRAVENOUS at 10:01

## 2018-03-09 RX ADMIN — ACETAMINOPHEN 650 MG: 325 TABLET, FILM COATED ORAL at 08:35

## 2018-03-09 RX ADMIN — Medication 25 MG: at 09:12

## 2018-03-09 RX ADMIN — SODIUM CHLORIDE 20 ML/HR: 0.9 INJECTION, SOLUTION INTRAVENOUS at 08:35

## 2018-03-09 RX ADMIN — SODIUM CHLORIDE 100 MG: 0.9 INJECTION, SOLUTION INTRAVENOUS at 08:36

## 2018-03-09 NOTE — PROGRESS NOTES
Patient arrived on unit for Darzalex  Denies any discomforts  Platelets on 6/9/3626- 21,000  Cleared for chemotherapy today- order received  Premeds initiated

## 2018-03-09 NOTE — PLAN OF CARE
Problem: Potential for Falls  Goal: Patient will remain free of falls  INTERVENTIONS:  - Assess patient frequently for physical needs  -  Identify cognitive and physical deficits and behaviors that affect risk of falls    -  [place] fall precautions as indicated by assessment   - Educate patient/family on patient safety including physical limitations  - Instruct patient to call for assistance with activity based on assessment  - Modify environment to reduce risk of injury  - Consider OT/PT consult to assist with strengthening/mobility   Outcome: Progressing

## 2018-03-13 DIAGNOSIS — C90.00 MULTIPLE MYELOMA NOT HAVING ACHIEVED REMISSION (HCC): ICD-10-CM

## 2018-03-14 ENCOUNTER — TRANSCRIBE ORDERS (OUTPATIENT)
Dept: LAB | Facility: CLINIC | Age: 73
End: 2018-03-14

## 2018-03-14 ENCOUNTER — APPOINTMENT (OUTPATIENT)
Dept: LAB | Facility: CLINIC | Age: 73
End: 2018-03-14
Payer: COMMERCIAL

## 2018-03-14 DIAGNOSIS — E03.9 MYXEDEMA HEART DISEASE: ICD-10-CM

## 2018-03-14 DIAGNOSIS — E85.9 MYELOMA ASSOCIATED AMYLOIDOSIS (HCC): ICD-10-CM

## 2018-03-14 DIAGNOSIS — R53.83 FATIGUE, UNSPECIFIED TYPE: ICD-10-CM

## 2018-03-14 DIAGNOSIS — E85.9 MYELOMA ASSOCIATED AMYLOIDOSIS (HCC): Primary | ICD-10-CM

## 2018-03-14 DIAGNOSIS — E78.5 HYPERLIPIDEMIA, UNSPECIFIED HYPERLIPIDEMIA TYPE: ICD-10-CM

## 2018-03-14 DIAGNOSIS — I10 ESSENTIAL HYPERTENSION, MALIGNANT: ICD-10-CM

## 2018-03-14 DIAGNOSIS — C90.00 MYELOMA ASSOCIATED AMYLOIDOSIS (HCC): ICD-10-CM

## 2018-03-14 DIAGNOSIS — D64.9 ANEMIA, UNSPECIFIED TYPE: ICD-10-CM

## 2018-03-14 DIAGNOSIS — R11.0 NAUSEA: ICD-10-CM

## 2018-03-14 DIAGNOSIS — C90.00 MYELOMA ASSOCIATED AMYLOIDOSIS (HCC): Primary | ICD-10-CM

## 2018-03-14 DIAGNOSIS — I51.9 MYXEDEMA HEART DISEASE: ICD-10-CM

## 2018-03-14 LAB
ALBUMIN SERPL BCP-MCNC: 3.7 G/DL (ref 3.5–5)
ALP SERPL-CCNC: 85 U/L (ref 46–116)
ALT SERPL W P-5'-P-CCNC: 24 U/L (ref 12–78)
ANION GAP SERPL CALCULATED.3IONS-SCNC: 6 MMOL/L (ref 4–13)
AST SERPL W P-5'-P-CCNC: 11 U/L (ref 5–45)
BASOPHILS # BLD AUTO: 0.01 THOUSANDS/ΜL (ref 0–0.1)
BASOPHILS NFR BLD AUTO: 0 % (ref 0–1)
BILIRUB SERPL-MCNC: 1.39 MG/DL (ref 0.2–1)
BUN SERPL-MCNC: 23 MG/DL (ref 5–25)
CALCIUM SERPL-MCNC: 8.9 MG/DL (ref 8.3–10.1)
CHLORIDE SERPL-SCNC: 100 MMOL/L (ref 100–108)
CO2 SERPL-SCNC: 31 MMOL/L (ref 21–32)
CREAT SERPL-MCNC: 1.2 MG/DL (ref 0.6–1.3)
EOSINOPHIL # BLD AUTO: 0.05 THOUSAND/ΜL (ref 0–0.61)
EOSINOPHIL NFR BLD AUTO: 2 % (ref 0–6)
ERYTHROCYTE [DISTWIDTH] IN BLOOD BY AUTOMATED COUNT: 14 % (ref 11.6–15.1)
GFR SERPL CREATININE-BSD FRML MDRD: 61 ML/MIN/1.73SQ M
GLUCOSE SERPL-MCNC: 93 MG/DL (ref 65–140)
HCT VFR BLD AUTO: 36.6 % (ref 36.5–49.3)
HGB BLD-MCNC: 12.4 G/DL (ref 12–17)
LDH SERPL-CCNC: 153 U/L (ref 81–234)
LYMPHOCYTES # BLD AUTO: 0.2 THOUSANDS/ΜL (ref 0.6–4.47)
LYMPHOCYTES NFR BLD AUTO: 7 % (ref 14–44)
MCH RBC QN AUTO: 34.4 PG (ref 26.8–34.3)
MCHC RBC AUTO-ENTMCNC: 33.9 G/DL (ref 31.4–37.4)
MCV RBC AUTO: 102 FL (ref 82–98)
MONOCYTES # BLD AUTO: 0.89 THOUSAND/ΜL (ref 0.17–1.22)
MONOCYTES NFR BLD AUTO: 32 % (ref 4–12)
NEUTROPHILS # BLD AUTO: 1.62 THOUSANDS/ΜL (ref 1.85–7.62)
NEUTS SEG NFR BLD AUTO: 59 % (ref 43–75)
NRBC BLD AUTO-RTO: 0 /100 WBCS
PHOSPHATE SERPL-MCNC: 3.6 MG/DL (ref 2.3–4.1)
PLATELET # BLD AUTO: 30 THOUSANDS/UL (ref 149–390)
POTASSIUM SERPL-SCNC: 4 MMOL/L (ref 3.5–5.3)
PROT SERPL-MCNC: 7.2 G/DL (ref 6.4–8.2)
RBC # BLD AUTO: 3.6 MILLION/UL (ref 3.88–5.62)
SODIUM SERPL-SCNC: 137 MMOL/L (ref 136–145)
URATE SERPL-MCNC: 5.9 MG/DL (ref 4.2–8)
WBC # BLD AUTO: 2.77 THOUSAND/UL (ref 4.31–10.16)

## 2018-03-14 PROCEDURE — 80053 COMPREHEN METABOLIC PANEL: CPT

## 2018-03-14 PROCEDURE — 84550 ASSAY OF BLOOD/URIC ACID: CPT

## 2018-03-14 PROCEDURE — 85025 COMPLETE CBC W/AUTO DIFF WBC: CPT

## 2018-03-14 PROCEDURE — 36415 COLL VENOUS BLD VENIPUNCTURE: CPT

## 2018-03-14 PROCEDURE — 84100 ASSAY OF PHOSPHORUS: CPT

## 2018-03-14 PROCEDURE — 83615 LACTATE (LD) (LDH) ENZYME: CPT

## 2018-03-15 RX ORDER — SODIUM CHLORIDE 9 MG/ML
20 INJECTION, SOLUTION INTRAVENOUS CONTINUOUS
Status: DISCONTINUED | OUTPATIENT
Start: 2018-03-16 | End: 2018-03-19 | Stop reason: HOSPADM

## 2018-03-15 RX ORDER — ACETAMINOPHEN 325 MG/1
650 TABLET ORAL ONCE
Status: COMPLETED | OUTPATIENT
Start: 2018-03-16 | End: 2018-03-16

## 2018-03-16 ENCOUNTER — HOSPITAL ENCOUNTER (OUTPATIENT)
Dept: INFUSION CENTER | Facility: CLINIC | Age: 73
Discharge: HOME | End: 2018-03-16
Payer: COMMERCIAL

## 2018-03-16 VITALS
WEIGHT: 168.87 LBS | DIASTOLIC BLOOD PRESSURE: 105 MMHG | HEART RATE: 62 BPM | TEMPERATURE: 97.1 F | RESPIRATION RATE: 16 BRPM | SYSTOLIC BLOOD PRESSURE: 163 MMHG | BODY MASS INDEX: 24.23 KG/M2

## 2018-03-16 PROCEDURE — 96367 TX/PROPH/DG ADDL SEQ IV INF: CPT

## 2018-03-16 PROCEDURE — 96375 TX/PRO/DX INJ NEW DRUG ADDON: CPT

## 2018-03-16 PROCEDURE — 96415 CHEMO IV INFUSION ADDL HR: CPT

## 2018-03-16 PROCEDURE — 96413 CHEMO IV INFUSION 1 HR: CPT

## 2018-03-16 PROCEDURE — 96366 THER/PROPH/DIAG IV INF ADDON: CPT

## 2018-03-16 RX ADMIN — IMMUNE GLOBULIN (HUMAN) 30 G: 10 INJECTION INTRAVENOUS; SUBCUTANEOUS at 12:57

## 2018-03-16 RX ADMIN — SODIUM CHLORIDE 100 MG: 9 INJECTION, SOLUTION INTRAVENOUS at 08:45

## 2018-03-16 RX ADMIN — SODIUM CHLORIDE 20 ML/HR: 0.9 INJECTION, SOLUTION INTRAVENOUS at 08:30

## 2018-03-16 RX ADMIN — ACETAMINOPHEN 650 MG: 325 TABLET, FILM COATED ORAL at 08:31

## 2018-03-16 RX ADMIN — DARATUMUMAB 1200 MG: 100 INJECTION, SOLUTION, CONCENTRATE INTRAVENOUS at 09:20

## 2018-03-16 RX ADMIN — DIPHENHYDRAMINE HYDROCHLORIDE 25 MG: 50 INJECTION, SOLUTION INTRAMUSCULAR; INTRAVENOUS at 08:30

## 2018-03-16 NOTE — PROGRESS NOTES
Patient tolerated Darzalex and IVIG infusion well  Denies any discomfort  Pt and wife are aware of next appointment

## 2018-03-21 ENCOUNTER — APPOINTMENT (OUTPATIENT)
Dept: LAB | Facility: CLINIC | Age: 73
End: 2018-03-21
Payer: COMMERCIAL

## 2018-03-21 DIAGNOSIS — R53.83 FATIGUE, UNSPECIFIED TYPE: ICD-10-CM

## 2018-03-21 DIAGNOSIS — D64.9 ANEMIA, UNSPECIFIED TYPE: ICD-10-CM

## 2018-03-21 DIAGNOSIS — I51.9 MYXEDEMA HEART DISEASE: ICD-10-CM

## 2018-03-21 DIAGNOSIS — C90.00 MYELOMA ASSOCIATED AMYLOIDOSIS (HCC): ICD-10-CM

## 2018-03-21 DIAGNOSIS — E85.9 MYELOMA ASSOCIATED AMYLOIDOSIS (HCC): ICD-10-CM

## 2018-03-21 DIAGNOSIS — E03.9 MYXEDEMA HEART DISEASE: ICD-10-CM

## 2018-03-21 DIAGNOSIS — R11.0 NAUSEA: ICD-10-CM

## 2018-03-21 DIAGNOSIS — E78.5 HYPERLIPIDEMIA, UNSPECIFIED HYPERLIPIDEMIA TYPE: ICD-10-CM

## 2018-03-21 DIAGNOSIS — I10 ESSENTIAL HYPERTENSION, MALIGNANT: ICD-10-CM

## 2018-03-21 LAB
ALBUMIN SERPL BCP-MCNC: 3.5 G/DL (ref 3.5–5)
ALP SERPL-CCNC: 83 U/L (ref 46–116)
ALT SERPL W P-5'-P-CCNC: 28 U/L (ref 12–78)
ANION GAP SERPL CALCULATED.3IONS-SCNC: 5 MMOL/L (ref 4–13)
AST SERPL W P-5'-P-CCNC: 17 U/L (ref 5–45)
BASOPHILS # BLD AUTO: 0.02 THOUSANDS/ΜL (ref 0–0.1)
BASOPHILS NFR BLD AUTO: 2 % (ref 0–1)
BILIRUB SERPL-MCNC: 1.24 MG/DL (ref 0.2–1)
BUN SERPL-MCNC: 22 MG/DL (ref 5–25)
CALCIUM SERPL-MCNC: 8.4 MG/DL (ref 8.3–10.1)
CHLORIDE SERPL-SCNC: 102 MMOL/L (ref 100–108)
CO2 SERPL-SCNC: 30 MMOL/L (ref 21–32)
CREAT SERPL-MCNC: 1.1 MG/DL (ref 0.6–1.3)
EOSINOPHIL # BLD AUTO: 0.02 THOUSAND/ΜL (ref 0–0.61)
EOSINOPHIL NFR BLD AUTO: 2 % (ref 0–6)
ERYTHROCYTE [DISTWIDTH] IN BLOOD BY AUTOMATED COUNT: 13.8 % (ref 11.6–15.1)
GFR SERPL CREATININE-BSD FRML MDRD: 68 ML/MIN/1.73SQ M
GLUCOSE SERPL-MCNC: 91 MG/DL (ref 65–140)
HCT VFR BLD AUTO: 37.4 % (ref 36.5–49.3)
HGB BLD-MCNC: 12.7 G/DL (ref 12–17)
LDH SERPL-CCNC: 142 U/L (ref 81–234)
LYMPHOCYTES # BLD AUTO: 0.35 THOUSANDS/ΜL (ref 0.6–4.47)
LYMPHOCYTES NFR BLD AUTO: 27 % (ref 14–44)
MCH RBC QN AUTO: 34.1 PG (ref 26.8–34.3)
MCHC RBC AUTO-ENTMCNC: 34 G/DL (ref 31.4–37.4)
MCV RBC AUTO: 101 FL (ref 82–98)
MONOCYTES # BLD AUTO: 0.15 THOUSAND/ΜL (ref 0.17–1.22)
MONOCYTES NFR BLD AUTO: 12 % (ref 4–12)
NEUTROPHILS # BLD AUTO: 0.77 THOUSANDS/ΜL (ref 1.85–7.62)
NEUTS SEG NFR BLD AUTO: 57 % (ref 43–75)
NRBC BLD AUTO-RTO: 0 /100 WBCS
PHOSPHATE SERPL-MCNC: 3.3 MG/DL (ref 2.3–4.1)
PLATELET # BLD AUTO: 29 THOUSANDS/UL (ref 149–390)
POTASSIUM SERPL-SCNC: 4.1 MMOL/L (ref 3.5–5.3)
PROT SERPL-MCNC: 7.1 G/DL (ref 6.4–8.2)
RBC # BLD AUTO: 3.72 MILLION/UL (ref 3.88–5.62)
SODIUM SERPL-SCNC: 137 MMOL/L (ref 136–145)
URATE SERPL-MCNC: 4.2 MG/DL (ref 4.2–8)
WBC # BLD AUTO: 1.31 THOUSAND/UL (ref 4.31–10.16)

## 2018-03-21 PROCEDURE — 84100 ASSAY OF PHOSPHORUS: CPT

## 2018-03-21 PROCEDURE — 80053 COMPREHEN METABOLIC PANEL: CPT

## 2018-03-21 PROCEDURE — 85025 COMPLETE CBC W/AUTO DIFF WBC: CPT

## 2018-03-21 PROCEDURE — 84550 ASSAY OF BLOOD/URIC ACID: CPT

## 2018-03-21 PROCEDURE — 36415 COLL VENOUS BLD VENIPUNCTURE: CPT

## 2018-03-21 PROCEDURE — 83615 LACTATE (LD) (LDH) ENZYME: CPT

## 2018-03-22 RX ORDER — ACETAMINOPHEN 325 MG/1
650 TABLET ORAL ONCE
Status: COMPLETED | OUTPATIENT
Start: 2018-03-23 | End: 2018-03-23

## 2018-03-22 RX ORDER — SODIUM CHLORIDE 9 MG/ML
20 INJECTION, SOLUTION INTRAVENOUS CONTINUOUS
Status: DISCONTINUED | OUTPATIENT
Start: 2018-03-23 | End: 2018-03-26 | Stop reason: HOSPADM

## 2018-03-23 ENCOUNTER — HOSPITAL ENCOUNTER (OUTPATIENT)
Dept: INFUSION CENTER | Facility: CLINIC | Age: 73
Discharge: HOME | End: 2018-03-23
Payer: COMMERCIAL

## 2018-03-23 VITALS
HEIGHT: 70 IN | BODY MASS INDEX: 24.78 KG/M2 | SYSTOLIC BLOOD PRESSURE: 149 MMHG | HEART RATE: 56 BPM | TEMPERATURE: 95.7 F | WEIGHT: 173.06 LBS | RESPIRATION RATE: 18 BRPM | DIASTOLIC BLOOD PRESSURE: 76 MMHG

## 2018-03-23 PROCEDURE — 96375 TX/PRO/DX INJ NEW DRUG ADDON: CPT

## 2018-03-23 PROCEDURE — 96413 CHEMO IV INFUSION 1 HR: CPT

## 2018-03-23 PROCEDURE — 96415 CHEMO IV INFUSION ADDL HR: CPT

## 2018-03-23 PROCEDURE — 96372 THER/PROPH/DIAG INJ SC/IM: CPT

## 2018-03-23 PROCEDURE — 96367 TX/PROPH/DG ADDL SEQ IV INF: CPT

## 2018-03-23 RX ADMIN — SODIUM CHLORIDE 20 ML/HR: 0.9 INJECTION, SOLUTION INTRAVENOUS at 08:25

## 2018-03-23 RX ADMIN — SODIUM CHLORIDE 100 MG: 9 INJECTION, SOLUTION INTRAVENOUS at 08:45

## 2018-03-23 RX ADMIN — ZOLEDRONIC ACID 3.5 MG: 0.8 INJECTION, SOLUTION, CONCENTRATE INTRAVENOUS at 09:10

## 2018-03-23 RX ADMIN — DIPHENHYDRAMINE HYDROCHLORIDE 25 MG: 50 INJECTION, SOLUTION INTRAMUSCULAR; INTRAVENOUS at 08:25

## 2018-03-23 RX ADMIN — DARATUMUMAB 1200 MG: 100 INJECTION, SOLUTION, CONCENTRATE INTRAVENOUS at 09:41

## 2018-03-23 RX ADMIN — TBO-FILGRASTIM 480 MCG: 480 INJECTION, SOLUTION SUBCUTANEOUS at 13:23

## 2018-03-23 RX ADMIN — ACETAMINOPHEN 650 MG: 325 TABLET, FILM COATED ORAL at 08:20

## 2018-03-23 NOTE — PROGRESS NOTES
Patient tolerated chemotherapy and Zometa infusions well  Granix given as ordered  Pt denies any discomfort  Pt and wife are aware of all future appointments

## 2018-03-28 ENCOUNTER — APPOINTMENT (OUTPATIENT)
Dept: LAB | Facility: CLINIC | Age: 73
End: 2018-03-28
Payer: COMMERCIAL

## 2018-03-28 ENCOUNTER — TELEPHONE (OUTPATIENT)
Dept: HEMATOLOGY ONCOLOGY | Facility: CLINIC | Age: 73
End: 2018-03-28

## 2018-03-28 DIAGNOSIS — I51.9 MYXEDEMA HEART DISEASE: ICD-10-CM

## 2018-03-28 DIAGNOSIS — C90.00 MYELOMA ASSOCIATED AMYLOIDOSIS (HCC): ICD-10-CM

## 2018-03-28 DIAGNOSIS — E03.9 MYXEDEMA HEART DISEASE: ICD-10-CM

## 2018-03-28 DIAGNOSIS — D64.9 ANEMIA, UNSPECIFIED TYPE: ICD-10-CM

## 2018-03-28 DIAGNOSIS — R11.0 NAUSEA: ICD-10-CM

## 2018-03-28 DIAGNOSIS — E85.9 MYELOMA ASSOCIATED AMYLOIDOSIS (HCC): ICD-10-CM

## 2018-03-28 DIAGNOSIS — E78.5 HYPERLIPIDEMIA, UNSPECIFIED HYPERLIPIDEMIA TYPE: ICD-10-CM

## 2018-03-28 DIAGNOSIS — R53.83 FATIGUE, UNSPECIFIED TYPE: ICD-10-CM

## 2018-03-28 DIAGNOSIS — I10 ESSENTIAL HYPERTENSION, MALIGNANT: ICD-10-CM

## 2018-03-28 LAB
ALBUMIN SERPL BCP-MCNC: 3.3 G/DL (ref 3.5–5)
ALP SERPL-CCNC: 76 U/L (ref 46–116)
ALT SERPL W P-5'-P-CCNC: 31 U/L (ref 12–78)
ANION GAP SERPL CALCULATED.3IONS-SCNC: 4 MMOL/L (ref 4–13)
AST SERPL W P-5'-P-CCNC: 14 U/L (ref 5–45)
BASOPHILS # BLD AUTO: 0.05 THOUSANDS/ΜL (ref 0–0.1)
BASOPHILS NFR BLD AUTO: 3 % (ref 0–1)
BILIRUB SERPL-MCNC: 0.98 MG/DL (ref 0.2–1)
BUN SERPL-MCNC: 24 MG/DL (ref 5–25)
CALCIUM SERPL-MCNC: 8.7 MG/DL (ref 8.3–10.1)
CHLORIDE SERPL-SCNC: 100 MMOL/L (ref 100–108)
CO2 SERPL-SCNC: 31 MMOL/L (ref 21–32)
CREAT SERPL-MCNC: 1.14 MG/DL (ref 0.6–1.3)
EOSINOPHIL # BLD AUTO: 0.04 THOUSAND/ΜL (ref 0–0.61)
EOSINOPHIL NFR BLD AUTO: 2 % (ref 0–6)
ERYTHROCYTE [DISTWIDTH] IN BLOOD BY AUTOMATED COUNT: 13.5 % (ref 11.6–15.1)
GFR SERPL CREATININE-BSD FRML MDRD: 65 ML/MIN/1.73SQ M
GLUCOSE SERPL-MCNC: 91 MG/DL (ref 65–140)
HCT VFR BLD AUTO: 33.7 % (ref 36.5–49.3)
HGB BLD-MCNC: 11 G/DL (ref 12–17)
LDH SERPL-CCNC: 117 U/L (ref 81–234)
LYMPHOCYTES # BLD AUTO: 0.28 THOUSANDS/ΜL (ref 0.6–4.47)
LYMPHOCYTES NFR BLD AUTO: 16 % (ref 14–44)
MCH RBC QN AUTO: 33.7 PG (ref 26.8–34.3)
MCHC RBC AUTO-ENTMCNC: 32.6 G/DL (ref 31.4–37.4)
MCV RBC AUTO: 103 FL (ref 82–98)
MONOCYTES # BLD AUTO: 0.83 THOUSAND/ΜL (ref 0.17–1.22)
MONOCYTES NFR BLD AUTO: 47 % (ref 4–12)
NEUTROPHILS # BLD AUTO: 0.57 THOUSANDS/ΜL (ref 1.85–7.62)
NEUTS SEG NFR BLD AUTO: 32 % (ref 43–75)
NRBC BLD AUTO-RTO: 0 /100 WBCS
PHOSPHATE SERPL-MCNC: 3 MG/DL (ref 2.3–4.1)
PLATELET # BLD AUTO: 59 THOUSANDS/UL (ref 149–390)
PMV BLD AUTO: 12.3 FL (ref 8.9–12.7)
POTASSIUM SERPL-SCNC: 4.4 MMOL/L (ref 3.5–5.3)
PROT SERPL-MCNC: 6.9 G/DL (ref 6.4–8.2)
RBC # BLD AUTO: 3.26 MILLION/UL (ref 3.88–5.62)
SODIUM SERPL-SCNC: 135 MMOL/L (ref 136–145)
URATE SERPL-MCNC: 6.2 MG/DL (ref 4.2–8)
WBC # BLD AUTO: 1.78 THOUSAND/UL (ref 4.31–10.16)

## 2018-03-28 PROCEDURE — 36415 COLL VENOUS BLD VENIPUNCTURE: CPT

## 2018-03-28 PROCEDURE — 80053 COMPREHEN METABOLIC PANEL: CPT

## 2018-03-28 PROCEDURE — 83615 LACTATE (LD) (LDH) ENZYME: CPT

## 2018-03-28 PROCEDURE — 85025 COMPLETE CBC W/AUTO DIFF WBC: CPT

## 2018-03-28 PROCEDURE — 84100 ASSAY OF PHOSPHORUS: CPT

## 2018-03-28 PROCEDURE — 84550 ASSAY OF BLOOD/URIC ACID: CPT

## 2018-03-29 RX ORDER — SODIUM CHLORIDE 9 MG/ML
20 INJECTION, SOLUTION INTRAVENOUS CONTINUOUS
Status: DISCONTINUED | OUTPATIENT
Start: 2018-03-30 | End: 2018-04-02 | Stop reason: HOSPADM

## 2018-03-29 RX ORDER — ACETAMINOPHEN 325 MG/1
650 TABLET ORAL ONCE
Status: COMPLETED | OUTPATIENT
Start: 2018-03-30 | End: 2018-03-30

## 2018-03-30 ENCOUNTER — HOSPITAL ENCOUNTER (OUTPATIENT)
Dept: INFUSION CENTER | Facility: CLINIC | Age: 73
Discharge: HOME | End: 2018-03-30
Payer: COMMERCIAL

## 2018-03-30 VITALS
BODY MASS INDEX: 24.32 KG/M2 | HEART RATE: 56 BPM | TEMPERATURE: 97.8 F | DIASTOLIC BLOOD PRESSURE: 75 MMHG | SYSTOLIC BLOOD PRESSURE: 134 MMHG | WEIGHT: 167.99 LBS | RESPIRATION RATE: 18 BRPM

## 2018-03-30 PROCEDURE — 96413 CHEMO IV INFUSION 1 HR: CPT

## 2018-03-30 PROCEDURE — 96375 TX/PRO/DX INJ NEW DRUG ADDON: CPT

## 2018-03-30 PROCEDURE — 96367 TX/PROPH/DG ADDL SEQ IV INF: CPT

## 2018-03-30 PROCEDURE — 96372 THER/PROPH/DIAG INJ SC/IM: CPT

## 2018-03-30 PROCEDURE — 96415 CHEMO IV INFUSION ADDL HR: CPT

## 2018-03-30 RX ADMIN — ACETAMINOPHEN 650 MG: 325 TABLET, FILM COATED ORAL at 08:19

## 2018-03-30 RX ADMIN — SODIUM CHLORIDE 100 MG: 0.9 INJECTION, SOLUTION INTRAVENOUS at 08:40

## 2018-03-30 RX ADMIN — DIPHENHYDRAMINE HYDROCHLORIDE 25 MG: 50 INJECTION, SOLUTION INTRAMUSCULAR; INTRAVENOUS at 08:25

## 2018-03-30 RX ADMIN — DARATUMUMAB 1200 MG: 100 INJECTION, SOLUTION, CONCENTRATE INTRAVENOUS at 09:12

## 2018-03-30 RX ADMIN — SODIUM CHLORIDE 20 ML/HR: 0.9 INJECTION, SOLUTION INTRAVENOUS at 08:25

## 2018-03-30 RX ADMIN — TBO-FILGRASTIM 480 MCG: 480 INJECTION, SOLUTION SUBCUTANEOUS at 12:34

## 2018-03-30 NOTE — PROGRESS NOTES
Patient tolerated Darzalex infusion well with no complications  Granix given as ordered  Pt and wife are aware of all future appointments

## 2018-03-30 NOTE — PLAN OF CARE
Problem: Potential for Falls  Goal: Patient will remain free of falls  INTERVENTIONS:  - Assess patient frequently for physical needs  -  Identify cognitive and physical deficits and behaviors that affect risk of falls    -  [place] fall precautions as indicated by assessment   - Educate patient/family on patient safety including physical limitations  - Instruct patient to call for assistance with activity based on assessment  - Modify environment to reduce risk of injury  - Consider OT/PT consult to assist with strengthening/mobility   Outcome: Progressing

## 2018-04-04 ENCOUNTER — APPOINTMENT (OUTPATIENT)
Dept: LAB | Facility: CLINIC | Age: 73
End: 2018-04-04
Payer: COMMERCIAL

## 2018-04-04 DIAGNOSIS — I10 ESSENTIAL HYPERTENSION, MALIGNANT: ICD-10-CM

## 2018-04-04 DIAGNOSIS — D64.9 ANEMIA, UNSPECIFIED TYPE: ICD-10-CM

## 2018-04-04 DIAGNOSIS — E03.9 HYPOTHYROIDISM, UNSPECIFIED TYPE: ICD-10-CM

## 2018-04-04 DIAGNOSIS — E03.9 MYXEDEMA HEART DISEASE: ICD-10-CM

## 2018-04-04 DIAGNOSIS — C90.00 MYELOMA ASSOCIATED AMYLOIDOSIS (HCC): ICD-10-CM

## 2018-04-04 DIAGNOSIS — E78.5 HYPERLIPIDEMIA, UNSPECIFIED HYPERLIPIDEMIA TYPE: ICD-10-CM

## 2018-04-04 DIAGNOSIS — R53.83 FATIGUE, UNSPECIFIED TYPE: ICD-10-CM

## 2018-04-04 DIAGNOSIS — R11.0 NAUSEA: ICD-10-CM

## 2018-04-04 DIAGNOSIS — I51.9 MYXEDEMA HEART DISEASE: ICD-10-CM

## 2018-04-04 DIAGNOSIS — E85.9 MYELOMA ASSOCIATED AMYLOIDOSIS (HCC): ICD-10-CM

## 2018-04-04 LAB
ALBUMIN SERPL BCP-MCNC: 3.3 G/DL (ref 3.5–5)
ALP SERPL-CCNC: 77 U/L (ref 46–116)
ALT SERPL W P-5'-P-CCNC: 24 U/L (ref 12–78)
ANION GAP SERPL CALCULATED.3IONS-SCNC: 4 MMOL/L (ref 4–13)
AST SERPL W P-5'-P-CCNC: 11 U/L (ref 5–45)
BASOPHILS # BLD AUTO: 0.02 THOUSANDS/ΜL (ref 0–0.1)
BASOPHILS NFR BLD AUTO: 1 % (ref 0–1)
BILIRUB SERPL-MCNC: 0.86 MG/DL (ref 0.2–1)
BUN SERPL-MCNC: 28 MG/DL (ref 5–25)
CALCIUM SERPL-MCNC: 8.7 MG/DL (ref 8.3–10.1)
CHLORIDE SERPL-SCNC: 101 MMOL/L (ref 100–108)
CO2 SERPL-SCNC: 30 MMOL/L (ref 21–32)
CREAT SERPL-MCNC: 1.13 MG/DL (ref 0.6–1.3)
EOSINOPHIL # BLD AUTO: 0.04 THOUSAND/ΜL (ref 0–0.61)
EOSINOPHIL NFR BLD AUTO: 1 % (ref 0–6)
ERYTHROCYTE [DISTWIDTH] IN BLOOD BY AUTOMATED COUNT: 14.2 % (ref 11.6–15.1)
GFR SERPL CREATININE-BSD FRML MDRD: 65 ML/MIN/1.73SQ M
GLUCOSE SERPL-MCNC: 87 MG/DL (ref 65–140)
HCT VFR BLD AUTO: 33.8 % (ref 36.5–49.3)
HGB BLD-MCNC: 11.3 G/DL (ref 12–17)
LDH SERPL-CCNC: 125 U/L (ref 81–234)
LYMPHOCYTES # BLD AUTO: 0.21 THOUSANDS/ΜL (ref 0.6–4.47)
LYMPHOCYTES NFR BLD AUTO: 7 % (ref 14–44)
MCH RBC QN AUTO: 33.8 PG (ref 26.8–34.3)
MCHC RBC AUTO-ENTMCNC: 33.4 G/DL (ref 31.4–37.4)
MCV RBC AUTO: 101 FL (ref 82–98)
MONOCYTES # BLD AUTO: 0.24 THOUSAND/ΜL (ref 0.17–1.22)
MONOCYTES NFR BLD AUTO: 8 % (ref 4–12)
NEUTROPHILS # BLD AUTO: 2.65 THOUSANDS/ΜL (ref 1.85–7.62)
NEUTS SEG NFR BLD AUTO: 83 % (ref 43–75)
NRBC BLD AUTO-RTO: 0 /100 WBCS
PHOSPHATE SERPL-MCNC: 3.8 MG/DL (ref 2.3–4.1)
PLATELET # BLD AUTO: 80 THOUSANDS/UL (ref 149–390)
PMV BLD AUTO: 11.2 FL (ref 8.9–12.7)
POTASSIUM SERPL-SCNC: 4.1 MMOL/L (ref 3.5–5.3)
PROT SERPL-MCNC: 6.7 G/DL (ref 6.4–8.2)
RBC # BLD AUTO: 3.34 MILLION/UL (ref 3.88–5.62)
SODIUM SERPL-SCNC: 135 MMOL/L (ref 136–145)
URATE SERPL-MCNC: 6 MG/DL (ref 4.2–8)
WBC # BLD AUTO: 3.18 THOUSAND/UL (ref 4.31–10.16)

## 2018-04-04 PROCEDURE — 36415 COLL VENOUS BLD VENIPUNCTURE: CPT

## 2018-04-04 PROCEDURE — 85025 COMPLETE CBC W/AUTO DIFF WBC: CPT

## 2018-04-04 PROCEDURE — 84100 ASSAY OF PHOSPHORUS: CPT

## 2018-04-04 PROCEDURE — 83615 LACTATE (LD) (LDH) ENZYME: CPT

## 2018-04-04 PROCEDURE — 84550 ASSAY OF BLOOD/URIC ACID: CPT

## 2018-04-04 PROCEDURE — 80053 COMPREHEN METABOLIC PANEL: CPT

## 2018-04-04 RX ORDER — LEVOTHYROXINE SODIUM 0.05 MG/1
50 TABLET ORAL DAILY
Qty: 30 TABLET | Refills: 5 | Status: SHIPPED | OUTPATIENT
Start: 2018-04-04 | End: 2018-10-04 | Stop reason: SDUPTHER

## 2018-04-05 RX ORDER — ACETAMINOPHEN 325 MG/1
650 TABLET ORAL ONCE
Status: COMPLETED | OUTPATIENT
Start: 2018-04-06 | End: 2018-04-06

## 2018-04-05 RX ORDER — SODIUM CHLORIDE 9 MG/ML
20 INJECTION, SOLUTION INTRAVENOUS CONTINUOUS
Status: DISCONTINUED | OUTPATIENT
Start: 2018-04-06 | End: 2018-04-09 | Stop reason: HOSPADM

## 2018-04-06 ENCOUNTER — HOSPITAL ENCOUNTER (OUTPATIENT)
Dept: INFUSION CENTER | Facility: CLINIC | Age: 73
Discharge: HOME | End: 2018-04-06
Payer: COMMERCIAL

## 2018-04-06 PROCEDURE — 96367 TX/PROPH/DG ADDL SEQ IV INF: CPT

## 2018-04-06 PROCEDURE — 96415 CHEMO IV INFUSION ADDL HR: CPT

## 2018-04-06 PROCEDURE — 96413 CHEMO IV INFUSION 1 HR: CPT

## 2018-04-06 PROCEDURE — 96375 TX/PRO/DX INJ NEW DRUG ADDON: CPT

## 2018-04-06 RX ADMIN — DIPHENHYDRAMINE HYDROCHLORIDE 25 MG: 50 INJECTION, SOLUTION INTRAMUSCULAR; INTRAVENOUS at 08:20

## 2018-04-06 RX ADMIN — SODIUM CHLORIDE 100 MG: 9 INJECTION, SOLUTION INTRAVENOUS at 08:40

## 2018-04-06 RX ADMIN — ACETAMINOPHEN 650 MG: 325 TABLET, FILM COATED ORAL at 08:14

## 2018-04-06 RX ADMIN — DARATUMUMAB 1200 MG: 100 INJECTION, SOLUTION, CONCENTRATE INTRAVENOUS at 09:10

## 2018-04-06 RX ADMIN — SODIUM CHLORIDE 20 ML/HR: 0.9 INJECTION, SOLUTION INTRAVENOUS at 08:20

## 2018-04-06 NOTE — PROGRESS NOTES
Patient tolerated Darzalex infusion well  Denies any discomfort  Pt and wife are aware of all future appointments

## 2018-04-11 ENCOUNTER — APPOINTMENT (OUTPATIENT)
Dept: LAB | Facility: CLINIC | Age: 73
End: 2018-04-11
Payer: COMMERCIAL

## 2018-04-11 DIAGNOSIS — C90.00 MYELOMA ASSOCIATED AMYLOIDOSIS (HCC): ICD-10-CM

## 2018-04-11 DIAGNOSIS — E85.9 MYELOMA ASSOCIATED AMYLOIDOSIS (HCC): ICD-10-CM

## 2018-04-11 DIAGNOSIS — R11.0 NAUSEA: ICD-10-CM

## 2018-04-11 DIAGNOSIS — E03.9 MYXEDEMA HEART DISEASE: ICD-10-CM

## 2018-04-11 DIAGNOSIS — E78.5 HYPERLIPIDEMIA, UNSPECIFIED HYPERLIPIDEMIA TYPE: ICD-10-CM

## 2018-04-11 DIAGNOSIS — I51.9 MYXEDEMA HEART DISEASE: ICD-10-CM

## 2018-04-11 DIAGNOSIS — I10 ESSENTIAL HYPERTENSION, MALIGNANT: ICD-10-CM

## 2018-04-11 DIAGNOSIS — R53.83 FATIGUE, UNSPECIFIED TYPE: ICD-10-CM

## 2018-04-11 DIAGNOSIS — D64.9 ANEMIA, UNSPECIFIED TYPE: ICD-10-CM

## 2018-04-11 LAB
ALBUMIN SERPL BCP-MCNC: 3.5 G/DL (ref 3.5–5)
ALP SERPL-CCNC: 75 U/L (ref 46–116)
ALT SERPL W P-5'-P-CCNC: 23 U/L (ref 12–78)
ANION GAP SERPL CALCULATED.3IONS-SCNC: 5 MMOL/L (ref 4–13)
AST SERPL W P-5'-P-CCNC: 12 U/L (ref 5–45)
BASOPHILS # BLD AUTO: 0.02 THOUSANDS/ΜL (ref 0–0.1)
BASOPHILS NFR BLD AUTO: 1 % (ref 0–1)
BILIRUB SERPL-MCNC: 1.09 MG/DL (ref 0.2–1)
BUN SERPL-MCNC: 27 MG/DL (ref 5–25)
CALCIUM SERPL-MCNC: 8.9 MG/DL
CHLORIDE SERPL-SCNC: 101 MMOL/L (ref 100–108)
CO2 SERPL-SCNC: 30 MMOL/L (ref 21–32)
CREAT SERPL-MCNC: 1.22 MG/DL (ref 0.6–1.3)
EOSINOPHIL # BLD AUTO: 0.11 THOUSAND/ΜL (ref 0–0.61)
EOSINOPHIL NFR BLD AUTO: 3 % (ref 0–6)
ERYTHROCYTE [DISTWIDTH] IN BLOOD BY AUTOMATED COUNT: 14.2 % (ref 11.6–15.1)
GFR SERPL CREATININE-BSD FRML MDRD: 60 ML/MIN/1.73SQ M
GLUCOSE SERPL-MCNC: 92 MG/DL (ref 65–140)
HCT VFR BLD AUTO: 34.8 % (ref 36.5–49.3)
HGB BLD-MCNC: 11.8 G/DL (ref 12–17)
IGG SERPL-MCNC: 605 MG/DL (ref 700–1600)
LDH SERPL-CCNC: 120 U/L (ref 81–234)
LYMPHOCYTES # BLD AUTO: 0.54 THOUSANDS/ΜL (ref 0.6–4.47)
LYMPHOCYTES NFR BLD AUTO: 15 % (ref 14–44)
MCH RBC QN AUTO: 34.4 PG (ref 26.8–34.3)
MCHC RBC AUTO-ENTMCNC: 33.9 G/DL (ref 31.4–37.4)
MCV RBC AUTO: 102 FL (ref 82–98)
MONOCYTES # BLD AUTO: 0.47 THOUSAND/ΜL (ref 0.17–1.22)
MONOCYTES NFR BLD AUTO: 13 % (ref 4–12)
NEUTROPHILS # BLD AUTO: 2.51 THOUSANDS/ΜL (ref 1.85–7.62)
NEUTS SEG NFR BLD AUTO: 68 % (ref 43–75)
NRBC BLD AUTO-RTO: 0 /100 WBCS
PHOSPHATE SERPL-MCNC: 3.7 MG/DL (ref 2.3–4.1)
PLATELET # BLD AUTO: 72 THOUSANDS/UL (ref 149–390)
PMV BLD AUTO: 12 FL (ref 8.9–12.7)
POTASSIUM SERPL-SCNC: 4.2 MMOL/L (ref 3.5–5.3)
PROT SERPL-MCNC: 6.7 G/DL (ref 6.4–8.2)
RBC # BLD AUTO: 3.43 MILLION/UL (ref 3.88–5.62)
SODIUM SERPL-SCNC: 136 MMOL/L (ref 136–145)
URATE SERPL-MCNC: 5.6 MG/DL (ref 4.2–8)
WBC # BLD AUTO: 3.66 THOUSAND/UL (ref 4.31–10.16)

## 2018-04-11 PROCEDURE — 83615 LACTATE (LD) (LDH) ENZYME: CPT

## 2018-04-11 PROCEDURE — 85025 COMPLETE CBC W/AUTO DIFF WBC: CPT

## 2018-04-11 PROCEDURE — 84100 ASSAY OF PHOSPHORUS: CPT

## 2018-04-11 PROCEDURE — 84550 ASSAY OF BLOOD/URIC ACID: CPT

## 2018-04-11 PROCEDURE — 36415 COLL VENOUS BLD VENIPUNCTURE: CPT

## 2018-04-11 PROCEDURE — 80053 COMPREHEN METABOLIC PANEL: CPT

## 2018-04-11 PROCEDURE — 82784 ASSAY IGA/IGD/IGG/IGM EACH: CPT

## 2018-04-12 RX ORDER — ACETAMINOPHEN 325 MG/1
650 TABLET ORAL ONCE
Status: COMPLETED | OUTPATIENT
Start: 2018-04-13 | End: 2018-04-13

## 2018-04-12 RX ORDER — SODIUM CHLORIDE 9 MG/ML
20 INJECTION, SOLUTION INTRAVENOUS CONTINUOUS
Status: DISCONTINUED | OUTPATIENT
Start: 2018-04-13 | End: 2018-04-16 | Stop reason: HOSPADM

## 2018-04-13 ENCOUNTER — HOSPITAL ENCOUNTER (OUTPATIENT)
Dept: INFUSION CENTER | Facility: CLINIC | Age: 73
Discharge: HOME | End: 2018-04-13
Payer: COMMERCIAL

## 2018-04-13 VITALS
DIASTOLIC BLOOD PRESSURE: 72 MMHG | BODY MASS INDEX: 24.93 KG/M2 | RESPIRATION RATE: 18 BRPM | WEIGHT: 172.18 LBS | TEMPERATURE: 96.7 F | SYSTOLIC BLOOD PRESSURE: 168 MMHG | HEART RATE: 60 BPM

## 2018-04-13 PROCEDURE — 96413 CHEMO IV INFUSION 1 HR: CPT

## 2018-04-13 PROCEDURE — 96366 THER/PROPH/DIAG IV INF ADDON: CPT

## 2018-04-13 PROCEDURE — 96367 TX/PROPH/DG ADDL SEQ IV INF: CPT

## 2018-04-13 PROCEDURE — 96415 CHEMO IV INFUSION ADDL HR: CPT

## 2018-04-13 PROCEDURE — 96375 TX/PRO/DX INJ NEW DRUG ADDON: CPT

## 2018-04-13 RX ADMIN — ACETAMINOPHEN 650 MG: 325 TABLET, FILM COATED ORAL at 08:17

## 2018-04-13 RX ADMIN — DIPHENHYDRAMINE HYDROCHLORIDE 25 MG: 50 INJECTION, SOLUTION INTRAMUSCULAR; INTRAVENOUS at 08:25

## 2018-04-13 RX ADMIN — SODIUM CHLORIDE 100 MG: 0.9 INJECTION, SOLUTION INTRAVENOUS at 08:40

## 2018-04-13 RX ADMIN — DARATUMUMAB 1200 MG: 100 INJECTION, SOLUTION, CONCENTRATE INTRAVENOUS at 09:08

## 2018-04-13 RX ADMIN — IMMUNE GLOBULIN (HUMAN) 30 G: 10 INJECTION INTRAVENOUS; SUBCUTANEOUS at 12:45

## 2018-04-13 RX ADMIN — SODIUM CHLORIDE 20 ML/HR: 0.9 INJECTION, SOLUTION INTRAVENOUS at 08:25

## 2018-04-13 NOTE — PROGRESS NOTES
Patient tolerated Darzalex and IVIG infusions well  Denies any discomfort  Pt and wife are aware of all future appointments

## 2018-04-18 ENCOUNTER — APPOINTMENT (OUTPATIENT)
Dept: LAB | Facility: CLINIC | Age: 73
End: 2018-04-18
Payer: COMMERCIAL

## 2018-04-18 ENCOUNTER — TRANSCRIBE ORDERS (OUTPATIENT)
Dept: LAB | Facility: CLINIC | Age: 73
End: 2018-04-18

## 2018-04-18 DIAGNOSIS — E78.5 HYPERLIPIDEMIA, UNSPECIFIED HYPERLIPIDEMIA TYPE: ICD-10-CM

## 2018-04-18 DIAGNOSIS — E85.9 MYELOMA ASSOCIATED AMYLOIDOSIS (HCC): ICD-10-CM

## 2018-04-18 DIAGNOSIS — I10 ESSENTIAL HYPERTENSION, MALIGNANT: ICD-10-CM

## 2018-04-18 DIAGNOSIS — D64.9 ANEMIA, UNSPECIFIED TYPE: ICD-10-CM

## 2018-04-18 DIAGNOSIS — I51.9 MYXEDEMA HEART DISEASE: ICD-10-CM

## 2018-04-18 DIAGNOSIS — E03.9 MYXEDEMA HEART DISEASE: ICD-10-CM

## 2018-04-18 DIAGNOSIS — E85.9 MYELOMA ASSOCIATED AMYLOIDOSIS (HCC): Primary | ICD-10-CM

## 2018-04-18 DIAGNOSIS — C90.00 MYELOMA ASSOCIATED AMYLOIDOSIS (HCC): ICD-10-CM

## 2018-04-18 DIAGNOSIS — R53.83 OTHER FATIGUE: ICD-10-CM

## 2018-04-18 DIAGNOSIS — R11.0 NAUSEA: ICD-10-CM

## 2018-04-18 DIAGNOSIS — C90.00 MYELOMA ASSOCIATED AMYLOIDOSIS (HCC): Primary | ICD-10-CM

## 2018-04-18 LAB
ALBUMIN SERPL BCP-MCNC: 3.2 G/DL (ref 3.5–5)
ALP SERPL-CCNC: 69 U/L (ref 46–116)
ALT SERPL W P-5'-P-CCNC: 25 U/L (ref 12–78)
ANION GAP SERPL CALCULATED.3IONS-SCNC: 7 MMOL/L (ref 4–13)
AST SERPL W P-5'-P-CCNC: 15 U/L (ref 5–45)
BASOPHILS # BLD AUTO: 0.01 THOUSANDS/ΜL (ref 0–0.1)
BASOPHILS NFR BLD AUTO: 1 % (ref 0–1)
BILIRUB SERPL-MCNC: 0.81 MG/DL (ref 0.2–1)
BUN SERPL-MCNC: 27 MG/DL (ref 5–25)
CALCIUM SERPL-MCNC: 8.8 MG/DL (ref 8.3–10.1)
CHLORIDE SERPL-SCNC: 106 MMOL/L (ref 100–108)
CO2 SERPL-SCNC: 27 MMOL/L (ref 21–32)
CREAT SERPL-MCNC: 1.27 MG/DL (ref 0.6–1.3)
EOSINOPHIL # BLD AUTO: 0.11 THOUSAND/ΜL (ref 0–0.61)
EOSINOPHIL NFR BLD AUTO: 8 % (ref 0–6)
ERYTHROCYTE [DISTWIDTH] IN BLOOD BY AUTOMATED COUNT: 14.4 % (ref 11.6–15.1)
GFR SERPL CREATININE-BSD FRML MDRD: 57 ML/MIN/1.73SQ M
GLUCOSE SERPL-MCNC: 78 MG/DL (ref 65–140)
HCT VFR BLD AUTO: 33.8 % (ref 36.5–49.3)
HGB BLD-MCNC: 11 G/DL (ref 12–17)
LDH SERPL-CCNC: 127 U/L (ref 81–234)
LYMPHOCYTES # BLD AUTO: 0.16 THOUSANDS/ΜL (ref 0.6–4.47)
LYMPHOCYTES NFR BLD AUTO: 34 % (ref 14–44)
MCH RBC QN AUTO: 34 PG (ref 26.8–34.3)
MCHC RBC AUTO-ENTMCNC: 32.5 G/DL (ref 31.4–37.4)
MCV RBC AUTO: 104 FL (ref 82–98)
MONOCYTES # BLD AUTO: 0.59 THOUSAND/ΜL (ref 0.17–1.22)
MONOCYTES NFR BLD AUTO: 8 % (ref 4–12)
NEUTROPHILS # BLD AUTO: 0.65 THOUSANDS/ΜL (ref 1.85–7.62)
NEUTS SEG NFR BLD AUTO: 49 % (ref 43–75)
NRBC BLD AUTO-RTO: 0 /100 WBCS
PHOSPHATE SERPL-MCNC: 4 MG/DL (ref 2.3–4.1)
PLATELET # BLD AUTO: 53 THOUSANDS/UL (ref 149–390)
PMV BLD AUTO: 11.5 FL (ref 8.9–12.7)
POTASSIUM SERPL-SCNC: 4.3 MMOL/L (ref 3.5–5.3)
PROT SERPL-MCNC: 6.8 G/DL (ref 6.4–8.2)
RBC # BLD AUTO: 3.24 MILLION/UL (ref 3.88–5.62)
SODIUM SERPL-SCNC: 140 MMOL/L (ref 136–145)
URATE SERPL-MCNC: 4.7 MG/DL (ref 4.2–8)
WBC # BLD AUTO: 1.52 THOUSAND/UL (ref 4.31–10.16)

## 2018-04-18 PROCEDURE — 80053 COMPREHEN METABOLIC PANEL: CPT

## 2018-04-18 PROCEDURE — 85025 COMPLETE CBC W/AUTO DIFF WBC: CPT

## 2018-04-18 PROCEDURE — 84550 ASSAY OF BLOOD/URIC ACID: CPT

## 2018-04-18 PROCEDURE — 84100 ASSAY OF PHOSPHORUS: CPT

## 2018-04-18 PROCEDURE — 36415 COLL VENOUS BLD VENIPUNCTURE: CPT

## 2018-04-18 PROCEDURE — 83615 LACTATE (LD) (LDH) ENZYME: CPT

## 2018-04-25 ENCOUNTER — APPOINTMENT (OUTPATIENT)
Dept: LAB | Facility: CLINIC | Age: 73
End: 2018-04-25
Payer: COMMERCIAL

## 2018-04-25 DIAGNOSIS — I51.9 MYXEDEMA HEART DISEASE: ICD-10-CM

## 2018-04-25 DIAGNOSIS — R11.0 NAUSEA: ICD-10-CM

## 2018-04-25 DIAGNOSIS — E03.9 MYXEDEMA HEART DISEASE: ICD-10-CM

## 2018-04-25 DIAGNOSIS — D64.9 ANEMIA, UNSPECIFIED TYPE: ICD-10-CM

## 2018-04-25 DIAGNOSIS — I10 ESSENTIAL HYPERTENSION, MALIGNANT: ICD-10-CM

## 2018-04-25 DIAGNOSIS — E85.9 MYELOMA ASSOCIATED AMYLOIDOSIS (HCC): ICD-10-CM

## 2018-04-25 DIAGNOSIS — E78.5 HYPERLIPIDEMIA, UNSPECIFIED HYPERLIPIDEMIA TYPE: ICD-10-CM

## 2018-04-25 DIAGNOSIS — R53.83 OTHER FATIGUE: ICD-10-CM

## 2018-04-25 DIAGNOSIS — C90.00 MYELOMA ASSOCIATED AMYLOIDOSIS (HCC): ICD-10-CM

## 2018-04-25 LAB
ALBUMIN SERPL BCP-MCNC: 3.3 G/DL (ref 3.5–5)
ALP SERPL-CCNC: 81 U/L (ref 46–116)
ALT SERPL W P-5'-P-CCNC: 27 U/L (ref 12–78)
ANION GAP SERPL CALCULATED.3IONS-SCNC: 8 MMOL/L (ref 4–13)
AST SERPL W P-5'-P-CCNC: 15 U/L (ref 5–45)
BASOPHILS # BLD AUTO: 0.05 THOUSANDS/ΜL (ref 0–0.1)
BASOPHILS NFR BLD AUTO: 2 % (ref 0–1)
BILIRUB SERPL-MCNC: 0.93 MG/DL (ref 0.2–1)
BUN SERPL-MCNC: 22 MG/DL (ref 5–25)
CALCIUM SERPL-MCNC: 9.1 MG/DL (ref 8.3–10.1)
CHLORIDE SERPL-SCNC: 100 MMOL/L (ref 100–108)
CO2 SERPL-SCNC: 27 MMOL/L (ref 21–32)
CREAT SERPL-MCNC: 1.18 MG/DL (ref 0.6–1.3)
EOSINOPHIL # BLD AUTO: 0.03 THOUSAND/ΜL (ref 0–0.61)
EOSINOPHIL NFR BLD AUTO: 1 % (ref 0–6)
ERYTHROCYTE [DISTWIDTH] IN BLOOD BY AUTOMATED COUNT: 14.4 % (ref 11.6–15.1)
GFR SERPL CREATININE-BSD FRML MDRD: 62 ML/MIN/1.73SQ M
GLUCOSE SERPL-MCNC: 99 MG/DL (ref 65–140)
HCT VFR BLD AUTO: 34 % (ref 36.5–49.3)
HGB BLD-MCNC: 11.1 G/DL (ref 12–17)
LDH SERPL-CCNC: 156 U/L (ref 81–234)
LYMPHOCYTES # BLD AUTO: 0.25 THOUSANDS/ΜL (ref 0.6–4.47)
LYMPHOCYTES NFR BLD AUTO: 11 % (ref 14–44)
MCH RBC QN AUTO: 33.8 PG (ref 26.8–34.3)
MCHC RBC AUTO-ENTMCNC: 32.6 G/DL (ref 31.4–37.4)
MCV RBC AUTO: 104 FL (ref 82–98)
MONOCYTES # BLD AUTO: 0.91 THOUSAND/ΜL (ref 0.17–1.22)
MONOCYTES NFR BLD AUTO: 38 % (ref 4–12)
NEUTROPHILS # BLD AUTO: 1.12 THOUSANDS/ΜL (ref 1.85–7.62)
NEUTS SEG NFR BLD AUTO: 48 % (ref 43–75)
NRBC BLD AUTO-RTO: 0 /100 WBCS
PHOSPHATE SERPL-MCNC: 3.1 MG/DL (ref 2.3–4.1)
PLATELET # BLD AUTO: 96 THOUSANDS/UL (ref 149–390)
PMV BLD AUTO: 10.6 FL (ref 8.9–12.7)
POTASSIUM SERPL-SCNC: 4.2 MMOL/L (ref 3.5–5.3)
PROT SERPL-MCNC: 7.4 G/DL (ref 6.4–8.2)
RBC # BLD AUTO: 3.28 MILLION/UL (ref 3.88–5.62)
SODIUM SERPL-SCNC: 135 MMOL/L (ref 136–145)
URATE SERPL-MCNC: 5.1 MG/DL (ref 4.2–8)
WBC # BLD AUTO: 2.37 THOUSAND/UL (ref 4.31–10.16)

## 2018-04-25 PROCEDURE — 83615 LACTATE (LD) (LDH) ENZYME: CPT

## 2018-04-25 PROCEDURE — 84550 ASSAY OF BLOOD/URIC ACID: CPT

## 2018-04-25 PROCEDURE — 80053 COMPREHEN METABOLIC PANEL: CPT

## 2018-04-25 PROCEDURE — 84100 ASSAY OF PHOSPHORUS: CPT

## 2018-04-25 PROCEDURE — 36415 COLL VENOUS BLD VENIPUNCTURE: CPT

## 2018-04-25 PROCEDURE — 85025 COMPLETE CBC W/AUTO DIFF WBC: CPT

## 2018-04-26 ENCOUNTER — OFFICE VISIT (OUTPATIENT)
Dept: INTERNAL MEDICINE CLINIC | Facility: CLINIC | Age: 73
End: 2018-04-26
Payer: COMMERCIAL

## 2018-04-26 VITALS
HEIGHT: 70 IN | HEART RATE: 60 BPM | OXYGEN SATURATION: 99 % | DIASTOLIC BLOOD PRESSURE: 72 MMHG | WEIGHT: 169 LBS | SYSTOLIC BLOOD PRESSURE: 116 MMHG | BODY MASS INDEX: 24.2 KG/M2

## 2018-04-26 DIAGNOSIS — C90.01 MULTIPLE MYELOMA IN REMISSION (HCC): ICD-10-CM

## 2018-04-26 DIAGNOSIS — I10 HYPERTENSION, UNSPECIFIED TYPE: Primary | ICD-10-CM

## 2018-04-26 PROCEDURE — 99213 OFFICE O/P EST LOW 20 MIN: CPT | Performed by: INTERNAL MEDICINE

## 2018-04-26 RX ORDER — METHYLPREDNISOLONE SODIUM SUCCINATE 125 MG/2ML
100 INJECTION, POWDER, LYOPHILIZED, FOR SOLUTION INTRAMUSCULAR; INTRAVENOUS ONCE
Status: DISCONTINUED | OUTPATIENT
Start: 2018-04-27 | End: 2018-04-27 | Stop reason: SDUPTHER

## 2018-04-26 RX ORDER — BISOPROLOL FUMARATE 5 MG/1
5 TABLET ORAL DAILY
Qty: 15 TABLET | Refills: 3 | Status: SHIPPED | OUTPATIENT
Start: 2018-04-26 | End: 2018-05-25 | Stop reason: ALTCHOICE

## 2018-04-26 RX ORDER — SODIUM CHLORIDE 9 MG/ML
20 INJECTION, SOLUTION INTRAVENOUS CONTINUOUS
Status: DISCONTINUED | OUTPATIENT
Start: 2018-04-27 | End: 2018-04-30 | Stop reason: HOSPADM

## 2018-04-26 RX ORDER — NEBIVOLOL 2.5 MG/1
2.5 TABLET ORAL DAILY
Qty: 30 TABLET | Refills: 3 | Status: SHIPPED | OUTPATIENT
Start: 2018-04-26 | End: 2019-05-16 | Stop reason: HOSPADM

## 2018-04-26 RX ORDER — ACETAMINOPHEN 325 MG/1
650 TABLET ORAL ONCE
Status: COMPLETED | OUTPATIENT
Start: 2018-04-27 | End: 2018-04-27

## 2018-04-26 NOTE — PROGRESS NOTES
Assessment/Plan:    No problem-specific Assessment & Plan notes found for this encounter  Diagnoses and all orders for this visit:    Hypertension, unspecified type  -     bisoprolol (ZEBETA) 5 mg tablet; Take 1 tablet (5 mg total) by mouth daily  -     nebivolol (BYSTOLIC) 2 5 mg tablet; Take 1 tablet (2 5 mg total) by mouth daily    Multiple myeloma in remission ([facility] [address] )    Other orders  -      Colonoscopy        Subjective:      Patient ID: Cirilo Ledesma is a 81 y o  male  HPI  Jade Karimi is here today for visit accompanied by his wife piotr  He actually feels well he will be going to [facility] in the near future where his myeloma care has originated  He continues to feel well is active and vigorous he does tire easily he takes his medications correctly  One of his oncologic medications could raise the blood pressure and he has a supply of amlodipine at home which she has not needed to take     The following portions of the patient's history were reviewed and updated as appropriate: allergies, current medications, past family history, past medical history, past social history, past surgical history and problem list     Review of Systems  Jade Karimi occasionally gets migraine type headaches when he is tired or stressed but these seem to respond to small doses of Tylenol  Objective:      /72   Pulse 60   Ht 5' 9 69" (1 77 m)   Wt 76 7 kg (169 lb)   SpO2 99%   BMI 24 47 kg/m²          Physical Exam  Jade Karimi is smiling and looks quite well he is thin but otherwise in no distress his pressure is 116/72 tympanic membranes canals and pharynx are normal cardiac examination reveals a regular rhythm physiologic rate no murmurs or gallops his lungs are clear the abdomen is unremarkable the skin is warm and dry    Antonio's quite stable the only thing we are going to do is suggested he take the modify his dose of escitalopram by taking a full tablet 1 day and alternating that with a half tablet the next day will also gave a prescription for some bisoprolol 2 5 to try as the Bystolic is a bit expensive will see him back in 4 months we did talk to about a variety of other subjects pertinent to his situation  I also discussed with the Mr  Mrs Fernandez in the new a vaccine for herpes zoster and asked that they discuss it with the oncologist for his approval

## 2018-04-27 ENCOUNTER — HOSPITAL ENCOUNTER (OUTPATIENT)
Dept: INFUSION CENTER | Facility: CLINIC | Age: 73
Discharge: HOME | End: 2018-04-27
Payer: COMMERCIAL

## 2018-04-27 VITALS
WEIGHT: 172.62 LBS | HEIGHT: 70 IN | BODY MASS INDEX: 24.71 KG/M2 | TEMPERATURE: 98.2 F | RESPIRATION RATE: 16 BRPM | HEART RATE: 60 BPM | SYSTOLIC BLOOD PRESSURE: 143 MMHG | DIASTOLIC BLOOD PRESSURE: 76 MMHG

## 2018-04-27 PROCEDURE — 96367 TX/PROPH/DG ADDL SEQ IV INF: CPT

## 2018-04-27 PROCEDURE — 96413 CHEMO IV INFUSION 1 HR: CPT

## 2018-04-27 PROCEDURE — 96375 TX/PRO/DX INJ NEW DRUG ADDON: CPT

## 2018-04-27 PROCEDURE — 96415 CHEMO IV INFUSION ADDL HR: CPT

## 2018-04-27 RX ADMIN — ZOLEDRONIC ACID 3.5 MG: 4 INJECTION, SOLUTION, CONCENTRATE INTRAVENOUS at 09:00

## 2018-04-27 RX ADMIN — DIPHENHYDRAMINE HYDROCHLORIDE 25 MG: 50 INJECTION, SOLUTION INTRAMUSCULAR; INTRAVENOUS at 08:20

## 2018-04-27 RX ADMIN — DARATUMUMAB 1200 MG: 100 INJECTION, SOLUTION, CONCENTRATE INTRAVENOUS at 09:29

## 2018-04-27 RX ADMIN — ACETAMINOPHEN 650 MG: 325 TABLET, FILM COATED ORAL at 08:14

## 2018-04-27 RX ADMIN — SODIUM CHLORIDE 20 ML/HR: 0.9 INJECTION, SOLUTION INTRAVENOUS at 08:20

## 2018-04-27 RX ADMIN — SODIUM CHLORIDE 100 MG: 0.9 INJECTION, SOLUTION INTRAVENOUS at 08:35

## 2018-05-02 ENCOUNTER — APPOINTMENT (OUTPATIENT)
Dept: LAB | Facility: CLINIC | Age: 73
End: 2018-05-02
Payer: COMMERCIAL

## 2018-05-02 DIAGNOSIS — I51.9 MYXEDEMA HEART DISEASE: ICD-10-CM

## 2018-05-02 DIAGNOSIS — C90.00 MYELOMA ASSOCIATED AMYLOIDOSIS (HCC): ICD-10-CM

## 2018-05-02 DIAGNOSIS — E85.9 MYELOMA ASSOCIATED AMYLOIDOSIS (HCC): ICD-10-CM

## 2018-05-02 DIAGNOSIS — R11.0 NAUSEA: ICD-10-CM

## 2018-05-02 DIAGNOSIS — E78.5 HYPERLIPIDEMIA, UNSPECIFIED HYPERLIPIDEMIA TYPE: ICD-10-CM

## 2018-05-02 DIAGNOSIS — R53.83 OTHER FATIGUE: ICD-10-CM

## 2018-05-02 DIAGNOSIS — I10 ESSENTIAL HYPERTENSION, MALIGNANT: ICD-10-CM

## 2018-05-02 DIAGNOSIS — D64.9 ANEMIA, UNSPECIFIED TYPE: ICD-10-CM

## 2018-05-02 DIAGNOSIS — E03.9 MYXEDEMA HEART DISEASE: ICD-10-CM

## 2018-05-02 LAB
ALBUMIN SERPL BCP-MCNC: 3.2 G/DL (ref 3.5–5)
ALP SERPL-CCNC: 82 U/L (ref 46–116)
ALT SERPL W P-5'-P-CCNC: 21 U/L (ref 12–78)
ANION GAP SERPL CALCULATED.3IONS-SCNC: 5 MMOL/L (ref 4–13)
AST SERPL W P-5'-P-CCNC: 11 U/L (ref 5–45)
BASOPHILS # BLD AUTO: 0.02 THOUSANDS/ΜL (ref 0–0.1)
BASOPHILS NFR BLD AUTO: 1 % (ref 0–1)
BILIRUB SERPL-MCNC: 0.82 MG/DL (ref 0.2–1)
BUN SERPL-MCNC: 21 MG/DL (ref 5–25)
CALCIUM SERPL-MCNC: 9.2 MG/DL (ref 8.3–10.1)
CHLORIDE SERPL-SCNC: 103 MMOL/L (ref 100–108)
CO2 SERPL-SCNC: 27 MMOL/L (ref 21–32)
CREAT SERPL-MCNC: 1.2 MG/DL (ref 0.6–1.3)
EOSINOPHIL # BLD AUTO: 0.06 THOUSAND/ΜL (ref 0–0.61)
EOSINOPHIL NFR BLD AUTO: 2 % (ref 0–6)
ERYTHROCYTE [DISTWIDTH] IN BLOOD BY AUTOMATED COUNT: 14.5 % (ref 11.6–15.1)
GFR SERPL CREATININE-BSD FRML MDRD: 61 ML/MIN/1.73SQ M
GLUCOSE SERPL-MCNC: 95 MG/DL (ref 65–140)
HCT VFR BLD AUTO: 34 % (ref 36.5–49.3)
HGB BLD-MCNC: 11.1 G/DL (ref 12–17)
LDH SERPL-CCNC: 147 U/L (ref 81–234)
LYMPHOCYTES # BLD AUTO: 0.16 THOUSANDS/ΜL (ref 0.6–4.47)
LYMPHOCYTES NFR BLD AUTO: 4 % (ref 14–44)
MCH RBC QN AUTO: 33.8 PG (ref 26.8–34.3)
MCHC RBC AUTO-ENTMCNC: 32.6 G/DL (ref 31.4–37.4)
MCV RBC AUTO: 104 FL (ref 82–98)
MONOCYTES # BLD AUTO: 0.24 THOUSAND/ΜL (ref 0.17–1.22)
MONOCYTES NFR BLD AUTO: 7 % (ref 4–12)
NEUTROPHILS # BLD AUTO: 3.14 THOUSANDS/ΜL (ref 1.85–7.62)
NEUTS SEG NFR BLD AUTO: 86 % (ref 43–75)
NRBC BLD AUTO-RTO: 1 /100 WBCS
PHOSPHATE SERPL-MCNC: 3.3 MG/DL (ref 2.3–4.1)
PLATELET # BLD AUTO: 126 THOUSANDS/UL (ref 149–390)
PMV BLD AUTO: 10 FL (ref 8.9–12.7)
POTASSIUM SERPL-SCNC: 4.3 MMOL/L (ref 3.5–5.3)
PROT SERPL-MCNC: 7.3 G/DL (ref 6.4–8.2)
RBC # BLD AUTO: 3.28 MILLION/UL (ref 3.88–5.62)
SODIUM SERPL-SCNC: 135 MMOL/L (ref 136–145)
URATE SERPL-MCNC: 4.6 MG/DL (ref 4.2–8)
WBC # BLD AUTO: 3.64 THOUSAND/UL (ref 4.31–10.16)

## 2018-05-02 PROCEDURE — 84100 ASSAY OF PHOSPHORUS: CPT

## 2018-05-02 PROCEDURE — 36415 COLL VENOUS BLD VENIPUNCTURE: CPT

## 2018-05-02 PROCEDURE — 84550 ASSAY OF BLOOD/URIC ACID: CPT

## 2018-05-02 PROCEDURE — 80053 COMPREHEN METABOLIC PANEL: CPT

## 2018-05-02 PROCEDURE — 83615 LACTATE (LD) (LDH) ENZYME: CPT

## 2018-05-02 PROCEDURE — 85025 COMPLETE CBC W/AUTO DIFF WBC: CPT

## 2018-05-03 ENCOUNTER — OFFICE VISIT (OUTPATIENT)
Dept: HEMATOLOGY ONCOLOGY | Facility: CLINIC | Age: 73
End: 2018-05-03
Payer: COMMERCIAL

## 2018-05-03 VITALS
OXYGEN SATURATION: 97 % | WEIGHT: 171.6 LBS | DIASTOLIC BLOOD PRESSURE: 76 MMHG | SYSTOLIC BLOOD PRESSURE: 130 MMHG | TEMPERATURE: 98.7 F | RESPIRATION RATE: 16 BRPM | HEART RATE: 64 BPM | HEIGHT: 70 IN | BODY MASS INDEX: 24.57 KG/M2

## 2018-05-03 DIAGNOSIS — C90.01 MULTIPLE MYELOMA IN REMISSION (HCC): Primary | ICD-10-CM

## 2018-05-03 PROCEDURE — 99214 OFFICE O/P EST MOD 30 MIN: CPT | Performed by: INTERNAL MEDICINE

## 2018-05-03 NOTE — LETTER
May 3, 2018     Michael Munoz MD  [id number] [place] [address]    Patient: Marni Smith   YOB: 1945   Date of Visit: 5/3/2018       Dear Dr Yoni Welch: Thank you for referring Cobymanjinder Brannonkner to me for evaluation  Below are my notes for this consultation  If you have questions, please do not hesitate to call me  I look forward to following your patient along with you  Sincerely,        Alexander Bentley DO        CC: No Recipients  Alexander Bentley DO  5/3/2018  8:25 AM  Sign at close encounter  Marni Smith  1945  [address] [facility] [address]  [phone]    Chief Complaint   Patient presents with   Kylie Mackey Follow-up           Oncology History    June 2015- routine FU at [facility]  2015- developed VALE, headaches, recurrent nosebleed without trauma  to see Dr Polo Lagunas  Hgb 6  Went to ER  Got 2 UPRBC's  and DC'd  Hemocult negative  WBC is 3 6, MCV 96, platelet count 654, normal differential, INR 1 5 PTT 37  CMP showed total protein of 12 4, albumin 2 1, creatinine 1 8, normal calcium  Sodium 133  29, 2015-patient had bone marrow biopsy, showing approximately 80% plasma cells with some immature forms noted  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, +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, 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)  October 2015- Started Velcade 1 3 mg per meter squared days 1, 8, 15, 22, Cytoxan 300 mg by mouth every week, dexamethasone 40 mg by mouth every week  Zometa 4 mg IV day one, ongoing on a 28 day cycle  2015  Patient underwent induction therapy followed by high-dose chemotherapy with stem cell support in tandem fashion  He achieved a complete response  Repeat PET/CT and MRI showed no focal lesions  29, 2016  He was initiated on maintenance therapy with Kyprolis 20 mg/mÂ² every week, Revlimid 15 mg by mouth daily, 1-21, every 28 days  Zometa 4 mg IV every week     February 23, 2018 bone marrow biopsy showed positive minimum residual disease  Daratumumab 60 mg/m2 weekly x8 then every other week x8 then monthly  Pomalyst 4 mg p o  daily 1-21 Q 28 days  Dexamethasone 20 mg p o  q week  Myeloma ([facility] )    6/13/2015 Initial Diagnosis     Myeloma ([facility] )         2/23/2018 -  Chemotherapy     February 23, 2018 bone marrow biopsy showed positive minimum residual disease  Daratumumab 60 mg/m2 weekly x8 then every other week x8 then monthly  Pomalyst 4 mg p o  daily 1-21 Q 28 days  Dexamethasone 20 mg p o  q week  History of Present Illness:  -No ref  provider found:  -Previous Therapy (if not listed in Oncology History):  -Current Therapy (if not listed in Oncology History):  -Disease Status:  -Interval History:  -Tumor Markers:    Review of Systems:  Review of Systems   Constitutional: Negative for chills and fever  HENT: Negative for nosebleeds  Eyes: Negative for discharge  Respiratory: Negative for cough and shortness of breath  Cardiovascular: Negative for chest pain  Gastrointestinal: Negative for abdominal pain, constipation and diarrhea  Endocrine: Negative for polydipsia  Genitourinary: Negative for hematuria  Musculoskeletal: Negative for arthralgias  Skin: Negative for color change  Allergic/Immunologic: Negative for immunocompromised state  Neurological: Negative for dizziness and headaches  Hematological: Negative for adenopathy  Psychiatric/Behavioral: Negative for agitation         Patient Active Problem List   Diagnosis    Myeloma ([facility] )    Persistent atrial fibrillation ([place] [address] )    Essential hypertension     Past Medical History:   Diagnosis Date    History of autologous stem cell transplant ([place] [address] )     Hypertension     History of HTN-stable since weight loss    Multiple myeloma ([facility] )      Past Surgical History:   Procedure Laterality Date    APPENDECTOMY      EYE SURGERY      Lasik    KNEE CARTILAGE SURGERY      LIMBAL STEM CELL TRANSPLANT      Patient had 2 in [place]-2/29/2016 and 4/13/2016     No family history on file  Social History     Social History    Marital status: /Civil Union     Spouse name: N/A    Number of children: N/A    Years of education: N/A     Occupational History    Not on file  Social History Main Topics    Smoking status: Never Smoker    Smokeless tobacco: Not on file    Alcohol use No    Drug use: No    Sexual activity: Not on file     Other Topics Concern    Not on file     Social History Narrative    No narrative on file       Current Outpatient Prescriptions:     acetaminophen (TYLENOL) 325 mg tablet, Take 650 mg by mouth every 6 (six) hours as needed for mild pain, Disp: , Rfl:     acyclovir (ZOVIRAX) 400 MG tablet, Take 400 mg by mouth 2 (two) times a day , Disp: , Rfl:     aspirin 81 MG tablet, Take 81 mg by mouth daily  , Disp: , Rfl:     Cholecalciferol (VITAMIN D-3 PO), Take 1 tablet by mouth daily  , Disp: , Rfl:     dexamethasone (DECADRON) 4 mg tablet, Take 4 mg by mouth once a week  3 tabs, Disp: , Rfl:     Docusate Sodium (COLACE PO), Take 1 tablet by mouth as needed  , Disp: , Rfl:     eltrombopag (PROMACTA) 50 MG tablet, Take 50 mg by mouth daily Administer on an empty stomach, 1 hour before or 2 hours after a meal   , Disp: , Rfl:     escitalopram (LEXAPRO) 20 mg tablet, Take 20 mg by mouth daily  , Disp: , Rfl:     Glutamine POWD, by Does not apply route, Disp: , Rfl:     levothyroxine 50 mcg tablet, Take 1 tablet (50 mcg total) by mouth daily, Disp: 30 tablet, Rfl: 5    LORazepam (ATIVAN) 0 5 mg tablet, Take 0 5 mg by mouth every 6 (six) hours as needed for anxiety  , Disp: , Rfl:     Multiple Vitamin (MULTIVITAMINS PO), Take 1 tablet by mouth daily  , Disp: , Rfl:     nebivolol (BYSTOLIC) 2 5 mg tablet, Take 1 tablet (2 5 mg total) by mouth daily, Disp: 30 tablet, Rfl: 3    ondansetron (ZOFRAN) 4 mg tablet, Take 4 mg by mouth every 8 (eight) hours as needed for nausea or vomiting , Disp: , Rfl:   Pomalidomide (POMALYST) 4 MG CAPS, Take 4 mg by mouth daily Days 1 thru 21, Disp: , Rfl:     amLODIPine (NORVASC) 2 5 mg tablet, Take 1 tablet (2 5 mg total) by mouth daily, Disp: 90 tablet, Rfl: 0    bisoprolol (ZEBETA) 5 mg tablet, Take 1 tablet (5 mg total) by mouth daily, Disp: 15 tablet, Rfl: 3  No Known Allergies  Vitals:    05/03/18 0804   BP: 130/76   Pulse: 64   Resp: 16   Temp: 98 7 °F (37 1 °C)   SpO2: 97%         Physical Exam   Constitutional: He is oriented to person, place, and time  He appears well-developed  HENT:   Head: Normocephalic  Eyes: Pupils are equal, round, and reactive to light  Neck: Neck supple  Cardiovascular: Normal rate and regular rhythm  No murmur heard  Pulmonary/Chest: Breath sounds normal  He has no wheezes  He has no rales  Abdominal: Soft  There is no tenderness  Musculoskeletal: Normal range of motion  He exhibits no edema or tenderness  Lymphadenopathy:     He has no cervical adenopathy  Neurological: He is alert and oriented to person, place, and time  He has normal reflexes  No cranial nerve deficit  Skin: No rash noted  No erythema  Psychiatric: He has a normal mood and affect  His behavior is normal            Performance Status: ECOG/Zubrod/WHO: 0 - Asymptomatic    Labs:  CBC, Coags, BMP, Mg, Phos     Imaging  No results found  I reviewed the above laboratory and imaging data        Discussion/Summary:

## 2018-05-03 NOTE — PROGRESS NOTES
Isacc Tatum  1945  [address] [facility] Enedina Lucas  [address] [address] [phone]    Chief Complaint   Patient presents with   Tiki Patel Follow-up           Oncology History    June 2015- routine FU at [facility]  2015- developed VALE, headaches, recurrent nosebleed without trauma  to see Dr Britney Ruiz  Hgb 6  Went to ER  Got 2 UPRBC's  and DC'd  Hemocult negative  WBC is 3 6, MCV 96, platelet count 105, normal differential, INR 1 5 PTT 37  CMP showed total protein of 12 4, albumin 2 1, creatinine 1 8, normal calcium  Sodium 133  29, 2015-patient had bone marrow biopsy, showing approximately 80% plasma cells with some immature forms noted  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, +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, 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)  October 2015- Started Velcade 1 3 mg per meter squared days 1, 8, 15, 22, Cytoxan 300 mg by mouth every week, dexamethasone 40 mg by mouth every week  Zometa 4 mg IV day one, ongoing on a 28 day cycle  2015  Patient underwent induction therapy followed by high-dose chemotherapy with stem cell support in tandem fashion  He achieved a complete response  Repeat PET/CT and MRI showed no focal lesions  29, 2016  He was initiated on maintenance therapy with Kyprolis 20 mg/mÂ² every week, Revlimid 15 mg by mouth daily, 1-21, every 28 days  Zometa 4 mg IV every week  February 23, 2018 bone marrow biopsy showed positive minimum residual disease  Daratumumab 60 mg/m2 weekly x8 then every other week x8 then monthly  Pomalyst 4 mg p o  daily 1-21 Q 28 days  Dexamethasone 20 mg p o  q week  Myeloma ([facility] [address] )    6/13/2015 Initial Diagnosis     Myeloma ([address] )         2/23/2018 -  Chemotherapy     February 23, 2018 bone marrow biopsy showed positive minimum residual disease  Daratumumab 60 mg/m2 weekly x8 then every other week x8 then monthly  Pomalyst 4 mg p o  daily 1-21 Q 28 days  Dexamethasone 20 mg p o  q week              History of Present Illness:  -No ref  provider found:  -Previous Therapy (if not listed in Oncology History):  -Current Therapy (if not listed in Oncology History):  -Disease Status:  -Interval History:  -Tumor Markers:    Review of Systems:  Review of Systems   Constitutional: Negative for chills and fever  HENT: Negative for nosebleeds  Eyes: Negative for discharge  Respiratory: Negative for cough and shortness of breath  Cardiovascular: Negative for chest pain  Gastrointestinal: Negative for abdominal pain, constipation and diarrhea  Endocrine: Negative for polydipsia  Genitourinary: Negative for hematuria  Musculoskeletal: Negative for arthralgias  Skin: Negative for color change  Allergic/Immunologic: Negative for immunocompromised state  Neurological: Negative for dizziness and headaches  Hematological: Negative for adenopathy  Psychiatric/Behavioral: Negative for agitation  Patient Active Problem List   Diagnosis    Myeloma ([facility] )    Persistent atrial fibrillation ([facility] )    Essential hypertension     Past Medical History:   Diagnosis Date    History of autologous stem cell transplant ([facility] )     Hypertension     History of HTN-stable since weight loss    Multiple myeloma ([facility] )      Past Surgical History:   Procedure Laterality Date    APPENDECTOMY      EYE SURGERY      Lasik    KNEE CARTILAGE SURGERY      LIMBAL STEM CELL TRANSPLANT      Patient had 2 in [place]-2/29/2016 and 4/13/2016     No family history on file  Social History     Social History    Marital status: /Civil Union     Spouse name: N/A    Number of children: N/A    Years of education: N/A     Occupational History    Not on file       Social History Main Topics    Smoking status: Never Smoker    Smokeless tobacco: Not on file    Alcohol use No    Drug use: No    Sexual activity: Not on file     Other Topics Concern    Not on file     Social History Narrative    No narrative on file       Current Outpatient Prescriptions:     acetaminophen (TYLENOL) 325 mg tablet, Take 650 mg by mouth every 6 (six) hours as needed for mild pain, Disp: , Rfl:     acyclovir (ZOVIRAX) 400 MG tablet, Take 400 mg by mouth 2 (two) times a day , Disp: , Rfl:     aspirin 81 MG tablet, Take 81 mg by mouth daily  , Disp: , Rfl:     Cholecalciferol (VITAMIN D-3 PO), Take 1 tablet by mouth daily  , Disp: , Rfl:     dexamethasone (DECADRON) 4 mg tablet, Take 4 mg by mouth once a week  3 tabs, Disp: , Rfl:     Docusate Sodium (COLACE PO), Take 1 tablet by mouth as needed  , Disp: , Rfl:     eltrombopag (PROMACTA) 50 MG tablet, Take 50 mg by mouth daily Administer on an empty stomach, 1 hour before or 2 hours after a meal   , Disp: , Rfl:     escitalopram (LEXAPRO) 20 mg tablet, Take 20 mg by mouth daily  , Disp: , Rfl:     Glutamine POWD, by Does not apply route, Disp: , Rfl:     levothyroxine 50 mcg tablet, Take 1 tablet (50 mcg total) by mouth daily, Disp: 30 tablet, Rfl: 5    LORazepam (ATIVAN) 0 5 mg tablet, Take 0 5 mg by mouth every 6 (six) hours as needed for anxiety  , Disp: , Rfl:     Multiple Vitamin (MULTIVITAMINS PO), Take 1 tablet by mouth daily  , Disp: , Rfl:     nebivolol (BYSTOLIC) 2 5 mg tablet, Take 1 tablet (2 5 mg total) by mouth daily, Disp: 30 tablet, Rfl: 3    ondansetron (ZOFRAN) 4 mg tablet, Take 4 mg by mouth every 8 (eight) hours as needed for nausea or vomiting , Disp: , Rfl:     Pomalidomide (POMALYST) 4 MG CAPS, Take 4 mg by mouth daily Days 1 thru 21, Disp: , Rfl:     amLODIPine (NORVASC) 2 5 mg tablet, Take 1 tablet (2 5 mg total) by mouth daily, Disp: 90 tablet, Rfl: 0    bisoprolol (ZEBETA) 5 mg tablet, Take 1 tablet (5 mg total) by mouth daily, Disp: 15 tablet, Rfl: 3  No Known Allergies  Vitals:    05/03/18 0804   BP: 130/76   Pulse: 64   Resp: 16   Temp: 98 7 °F (37 1 °C)   SpO2: 97%         Physical Exam   Constitutional: He is oriented to person, place, and time   He appears well-developed  HENT:   Head: Normocephalic  Eyes: Pupils are equal, round, and reactive to light  Neck: Neck supple  Cardiovascular: Normal rate and regular rhythm  No murmur heard  Pulmonary/Chest: Breath sounds normal  He has no wheezes  He has no rales  Abdominal: Soft  There is no tenderness  Musculoskeletal: Normal range of motion  He exhibits no edema or tenderness  Lymphadenopathy:     He has no cervical adenopathy  Neurological: He is alert and oriented to person, place, and time  He has normal reflexes  No cranial nerve deficit  Skin: No rash noted  No erythema  Psychiatric: He has a normal mood and affect  His behavior is normal            Performance Status: ECOG/Zubrod/WHO: 0 - Asymptomatic    Labs:  CBC, Coags, BMP, Mg, Phos     Imaging  No results found  I reviewed the above laboratory and imaging data  Discussion/Summary: In summary, this is a 68-year-old male history of multiple myeloma  He is currently on daratumumab, Pomalyst, dexamethasone  He continues on Promacta 50 mg p o  Daily for thrombocytopenia  His thrombocytopenia has gradually been improving  I suspect this was largely attributable to previous   Carfilzomib therapy  He will be traveling to [place] in 3 weeks  Bone marrow biopsy is anticipated at that time  Treatment is continued in the interim  Pancytopenia has largely improved/ near normalized  Chemistries are normal     I reviewed the above with the patient and his wife  They voiced understanding and agreement

## 2018-05-07 DIAGNOSIS — J40 BRONCHITIS: Primary | ICD-10-CM

## 2018-05-07 DIAGNOSIS — R50.81 FEVER AND NEUTROPENIA (HCC): Primary | ICD-10-CM

## 2018-05-07 DIAGNOSIS — D70.9 FEVER AND NEUTROPENIA (HCC): Primary | ICD-10-CM

## 2018-05-07 RX ORDER — AZITHROMYCIN 250 MG/1
TABLET, FILM COATED ORAL
Qty: 6 TABLET | Refills: 0 | Status: SHIPPED | OUTPATIENT
Start: 2018-05-07 | End: 2018-05-11

## 2018-05-07 NOTE — TELEPHONE ENCOUNTER
Spoke with patients wife - Pt c/o chills, low grade temp, fatigue, congestion and cough  He is coughing up minimal amounts of sputum  Dr Gigi Norman would like to culture sputum if possible - wife will obtain specimen container from the lab and drop off if possible  Z-pack sent to pharmacy  Reinforced the importance that patient be seen / evaluated if symptoms do not resolve  Wife is aware that with his compromised immune system he needs to be treated appropriately     She verbalized understanding

## 2018-05-07 NOTE — TELEPHONE ENCOUNTER
Pt's wife Winsome Mcdaniels called  Pt was seen last Thursday by Dr Richmond Nix and cold sx Camille Krause were discussed  Has not gone away  Pt started with chills yesterday morning and T 99, given Tylenol  Last not chills started again  T  99 and Tylenol given  Then had profuse diaphoresis  Recommended pt go to ER for blood cultures and work up  States pt does not want to go to ER and wondering if Dr Richmond Nix would order an antibiotic  Continues with URI sx and reports lungs clear and BP fine   Please call Winsome Mcdaniels  [phone]

## 2018-05-08 DIAGNOSIS — C90.00 MULTIPLE MYELOMA NOT HAVING ACHIEVED REMISSION (HCC): ICD-10-CM

## 2018-05-09 ENCOUNTER — APPOINTMENT (OUTPATIENT)
Dept: LAB | Facility: CLINIC | Age: 73
End: 2018-05-09
Payer: COMMERCIAL

## 2018-05-09 DIAGNOSIS — E78.5 HYPERLIPIDEMIA, UNSPECIFIED HYPERLIPIDEMIA TYPE: ICD-10-CM

## 2018-05-09 DIAGNOSIS — I51.9 MYXEDEMA HEART DISEASE: ICD-10-CM

## 2018-05-09 DIAGNOSIS — E03.9 MYXEDEMA HEART DISEASE: ICD-10-CM

## 2018-05-09 DIAGNOSIS — D64.9 ANEMIA, UNSPECIFIED TYPE: ICD-10-CM

## 2018-05-09 DIAGNOSIS — R11.0 NAUSEA: ICD-10-CM

## 2018-05-09 DIAGNOSIS — E85.9 MYELOMA ASSOCIATED AMYLOIDOSIS (HCC): ICD-10-CM

## 2018-05-09 DIAGNOSIS — C90.00 MYELOMA ASSOCIATED AMYLOIDOSIS (HCC): ICD-10-CM

## 2018-05-09 DIAGNOSIS — R53.83 OTHER FATIGUE: ICD-10-CM

## 2018-05-09 DIAGNOSIS — I10 ESSENTIAL HYPERTENSION, MALIGNANT: ICD-10-CM

## 2018-05-09 LAB
ALBUMIN SERPL BCP-MCNC: 2.8 G/DL (ref 3.5–5)
ALP SERPL-CCNC: 80 U/L (ref 46–116)
ALT SERPL W P-5'-P-CCNC: 24 U/L (ref 12–78)
ANION GAP SERPL CALCULATED.3IONS-SCNC: 10 MMOL/L (ref 4–13)
AST SERPL W P-5'-P-CCNC: 12 U/L (ref 5–45)
BASOPHILS # BLD AUTO: 0.02 THOUSANDS/ΜL (ref 0–0.1)
BASOPHILS NFR BLD AUTO: 0 % (ref 0–1)
BILIRUB SERPL-MCNC: 0.68 MG/DL (ref 0.2–1)
BUN SERPL-MCNC: 21 MG/DL (ref 5–25)
CALCIUM SERPL-MCNC: 8.8 MG/DL (ref 8.3–10.1)
CHLORIDE SERPL-SCNC: 100 MMOL/L (ref 100–108)
CO2 SERPL-SCNC: 25 MMOL/L (ref 21–32)
CREAT SERPL-MCNC: 1.17 MG/DL (ref 0.6–1.3)
EOSINOPHIL # BLD AUTO: 0.12 THOUSAND/ΜL (ref 0–0.61)
EOSINOPHIL NFR BLD AUTO: 2 % (ref 0–6)
ERYTHROCYTE [DISTWIDTH] IN BLOOD BY AUTOMATED COUNT: 14.4 % (ref 11.6–15.1)
GFR SERPL CREATININE-BSD FRML MDRD: 63 ML/MIN/1.73SQ M
GLUCOSE SERPL-MCNC: 123 MG/DL (ref 65–140)
HCT VFR BLD AUTO: 33 % (ref 36.5–49.3)
HGB BLD-MCNC: 10.7 G/DL (ref 12–17)
IGG SERPL-MCNC: 452 MG/DL (ref 700–1600)
LDH SERPL-CCNC: 160 U/L (ref 81–234)
LYMPHOCYTES # BLD AUTO: 0.29 THOUSANDS/ΜL (ref 0.6–4.47)
LYMPHOCYTES NFR BLD AUTO: 6 % (ref 14–44)
MCH RBC QN AUTO: 33.2 PG (ref 26.8–34.3)
MCHC RBC AUTO-ENTMCNC: 32.4 G/DL (ref 31.4–37.4)
MCV RBC AUTO: 103 FL (ref 82–98)
MONOCYTES # BLD AUTO: 0.82 THOUSAND/ΜL (ref 0.17–1.22)
MONOCYTES NFR BLD AUTO: 16 % (ref 4–12)
NEUTROPHILS # BLD AUTO: 3.91 THOUSANDS/ΜL (ref 1.85–7.62)
NEUTS SEG NFR BLD AUTO: 76 % (ref 43–75)
NRBC BLD AUTO-RTO: 0 /100 WBCS
PHOSPHATE SERPL-MCNC: 2.3 MG/DL (ref 2.3–4.1)
PLATELET # BLD AUTO: 100 THOUSANDS/UL (ref 149–390)
PMV BLD AUTO: 9.9 FL (ref 8.9–12.7)
POTASSIUM SERPL-SCNC: 4 MMOL/L (ref 3.5–5.3)
PROT SERPL-MCNC: 7 G/DL (ref 6.4–8.2)
RBC # BLD AUTO: 3.22 MILLION/UL (ref 3.88–5.62)
SODIUM SERPL-SCNC: 135 MMOL/L (ref 136–145)
URATE SERPL-MCNC: 4.3 MG/DL (ref 4.2–8)
WBC # BLD AUTO: 5.18 THOUSAND/UL (ref 4.31–10.16)

## 2018-05-09 PROCEDURE — 80053 COMPREHEN METABOLIC PANEL: CPT

## 2018-05-09 PROCEDURE — 82784 ASSAY IGA/IGD/IGG/IGM EACH: CPT

## 2018-05-09 PROCEDURE — 84550 ASSAY OF BLOOD/URIC ACID: CPT

## 2018-05-09 PROCEDURE — 85025 COMPLETE CBC W/AUTO DIFF WBC: CPT

## 2018-05-09 PROCEDURE — 84100 ASSAY OF PHOSPHORUS: CPT

## 2018-05-09 PROCEDURE — 83615 LACTATE (LD) (LDH) ENZYME: CPT

## 2018-05-09 PROCEDURE — 36415 COLL VENOUS BLD VENIPUNCTURE: CPT

## 2018-05-10 DIAGNOSIS — F41.9 ANXIETY: Primary | ICD-10-CM

## 2018-05-10 RX ORDER — SODIUM CHLORIDE 9 MG/ML
20 INJECTION, SOLUTION INTRAVENOUS CONTINUOUS
Status: DISCONTINUED | OUTPATIENT
Start: 2018-05-11 | End: 2018-05-14 | Stop reason: HOSPADM

## 2018-05-10 RX ORDER — ACETAMINOPHEN 325 MG/1
650 TABLET ORAL ONCE
Status: COMPLETED | OUTPATIENT
Start: 2018-05-11 | End: 2018-05-11

## 2018-05-10 RX ORDER — ACETAMINOPHEN 325 MG/1
650 TABLET ORAL ONCE
Status: DISCONTINUED | OUTPATIENT
Start: 2018-05-11 | End: 2018-05-10 | Stop reason: CLARIF

## 2018-05-10 RX ORDER — ESCITALOPRAM OXALATE 20 MG/1
20 TABLET ORAL DAILY
Qty: 90 TABLET | Refills: 1 | Status: SHIPPED | OUTPATIENT
Start: 2018-05-10 | End: 2018-11-05 | Stop reason: SDUPTHER

## 2018-05-11 ENCOUNTER — HOSPITAL ENCOUNTER (OUTPATIENT)
Dept: INFUSION CENTER | Facility: CLINIC | Age: 73
Discharge: HOME | End: 2018-05-11
Payer: COMMERCIAL

## 2018-05-11 VITALS
SYSTOLIC BLOOD PRESSURE: 131 MMHG | BODY MASS INDEX: 24.87 KG/M2 | RESPIRATION RATE: 16 BRPM | DIASTOLIC BLOOD PRESSURE: 67 MMHG | TEMPERATURE: 97.9 F | WEIGHT: 170.86 LBS | HEART RATE: 65 BPM

## 2018-05-11 PROCEDURE — 96415 CHEMO IV INFUSION ADDL HR: CPT

## 2018-05-11 PROCEDURE — 96367 TX/PROPH/DG ADDL SEQ IV INF: CPT

## 2018-05-11 PROCEDURE — 96375 TX/PRO/DX INJ NEW DRUG ADDON: CPT

## 2018-05-11 PROCEDURE — 96366 THER/PROPH/DIAG IV INF ADDON: CPT

## 2018-05-11 PROCEDURE — 96413 CHEMO IV INFUSION 1 HR: CPT

## 2018-05-11 RX ADMIN — DARATUMUMAB 1200 MG: 100 INJECTION, SOLUTION, CONCENTRATE INTRAVENOUS at 09:26

## 2018-05-11 RX ADMIN — SODIUM CHLORIDE 100 MG: 9 INJECTION, SOLUTION INTRAVENOUS at 09:00

## 2018-05-11 RX ADMIN — DIPHENHYDRAMINE HYDROCHLORIDE 25 MG: 50 INJECTION, SOLUTION INTRAMUSCULAR; INTRAVENOUS at 08:30

## 2018-05-11 RX ADMIN — IMMUNE GLOBULIN (HUMAN) 30 G: 10 INJECTION INTRAVENOUS; SUBCUTANEOUS at 13:00

## 2018-05-11 RX ADMIN — ACETAMINOPHEN 650 MG: 325 TABLET, FILM COATED ORAL at 08:21

## 2018-05-11 RX ADMIN — SODIUM CHLORIDE 20 ML/HR: 0.9 INJECTION, SOLUTION INTRAVENOUS at 08:30

## 2018-05-11 NOTE — PLAN OF CARE
Problem: Potential for Falls  Goal: Patient will remain free of falls  INTERVENTIONS:  - Assess patient frequently for physical needs  -  Identify cognitive and physical deficits and behaviors that affect risk of falls    -  [place] fall precautions as indicated by assessment   - Educate patient/family on patient safety including physical limitations  - Instruct patient to call for assistance with activity based on assessment  - Modify environment to reduce risk of injury  - Consider OT/PT consult to assist with strengthening/mobility   Outcome: Progressing

## 2018-05-16 ENCOUNTER — APPOINTMENT (OUTPATIENT)
Dept: LAB | Facility: CLINIC | Age: 73
End: 2018-05-16
Payer: COMMERCIAL

## 2018-05-16 DIAGNOSIS — C90.00 MYELOMA ASSOCIATED AMYLOIDOSIS (HCC): ICD-10-CM

## 2018-05-16 DIAGNOSIS — E85.9 MYELOMA ASSOCIATED AMYLOIDOSIS (HCC): ICD-10-CM

## 2018-05-16 DIAGNOSIS — E78.5 HYPERLIPIDEMIA, UNSPECIFIED HYPERLIPIDEMIA TYPE: ICD-10-CM

## 2018-05-16 DIAGNOSIS — R53.83 OTHER FATIGUE: ICD-10-CM

## 2018-05-16 DIAGNOSIS — R11.0 NAUSEA: ICD-10-CM

## 2018-05-16 DIAGNOSIS — I51.9 MYXEDEMA HEART DISEASE: ICD-10-CM

## 2018-05-16 DIAGNOSIS — D64.9 ANEMIA, UNSPECIFIED TYPE: ICD-10-CM

## 2018-05-16 DIAGNOSIS — E03.9 MYXEDEMA HEART DISEASE: ICD-10-CM

## 2018-05-16 DIAGNOSIS — I10 ESSENTIAL HYPERTENSION, MALIGNANT: ICD-10-CM

## 2018-05-16 LAB
ALBUMIN SERPL BCP-MCNC: 2.8 G/DL (ref 3.5–5)
ALP SERPL-CCNC: 67 U/L (ref 46–116)
ALT SERPL W P-5'-P-CCNC: 17 U/L (ref 12–78)
ANION GAP SERPL CALCULATED.3IONS-SCNC: 6 MMOL/L (ref 4–13)
AST SERPL W P-5'-P-CCNC: 6 U/L (ref 5–45)
BASOPHILS # BLD AUTO: 0.04 THOUSANDS/ΜL (ref 0–0.1)
BASOPHILS NFR BLD AUTO: 2 % (ref 0–1)
BILIRUB SERPL-MCNC: 0.59 MG/DL (ref 0.2–1)
BUN SERPL-MCNC: 15 MG/DL (ref 5–25)
CALCIUM SERPL-MCNC: 8.5 MG/DL (ref 8.3–10.1)
CHLORIDE SERPL-SCNC: 105 MMOL/L (ref 100–108)
CO2 SERPL-SCNC: 26 MMOL/L (ref 21–32)
CREAT SERPL-MCNC: 1.03 MG/DL (ref 0.6–1.3)
EOSINOPHIL # BLD AUTO: 0.13 THOUSAND/ΜL (ref 0–0.61)
EOSINOPHIL NFR BLD AUTO: 7 % (ref 0–6)
ERYTHROCYTE [DISTWIDTH] IN BLOOD BY AUTOMATED COUNT: 14.8 % (ref 11.6–15.1)
GFR SERPL CREATININE-BSD FRML MDRD: 73 ML/MIN/1.73SQ M
GLUCOSE SERPL-MCNC: 87 MG/DL (ref 65–140)
HCT VFR BLD AUTO: 29.7 % (ref 36.5–49.3)
HGB BLD-MCNC: 9.8 G/DL (ref 12–17)
IMM GRANULOCYTES # BLD AUTO: 0 THOUSAND/UL (ref 0–0.2)
IMM GRANULOCYTES NFR BLD AUTO: 0 % (ref 0–2)
LDH SERPL-CCNC: 127 U/L (ref 81–234)
LYMPHOCYTES # BLD AUTO: 0.33 THOUSANDS/ΜL (ref 0.6–4.47)
LYMPHOCYTES NFR BLD AUTO: 17 % (ref 14–44)
MCH RBC QN AUTO: 33.6 PG (ref 26.8–34.3)
MCHC RBC AUTO-ENTMCNC: 33 G/DL (ref 31.4–37.4)
MCV RBC AUTO: 102 FL (ref 82–98)
MONOCYTES # BLD AUTO: 0.4 THOUSAND/ΜL (ref 0.17–1.22)
MONOCYTES NFR BLD AUTO: 21 % (ref 4–12)
NEUTROPHILS # BLD AUTO: 1.01 THOUSANDS/ΜL (ref 1.85–7.62)
NEUTS SEG NFR BLD AUTO: 53 % (ref 43–75)
NRBC BLD AUTO-RTO: 0 /100 WBCS
PHOSPHATE SERPL-MCNC: 2.8 MG/DL (ref 2.3–4.1)
PLATELET # BLD AUTO: 60 THOUSANDS/UL (ref 149–390)
PMV BLD AUTO: 10.6 FL (ref 8.9–12.7)
POTASSIUM SERPL-SCNC: 3.8 MMOL/L (ref 3.5–5.3)
PROT SERPL-MCNC: 6.7 G/DL (ref 6.4–8.2)
RBC # BLD AUTO: 2.92 MILLION/UL (ref 3.88–5.62)
SODIUM SERPL-SCNC: 137 MMOL/L (ref 136–145)
URATE SERPL-MCNC: 4.5 MG/DL (ref 4.2–8)
WBC # BLD AUTO: 1.91 THOUSAND/UL (ref 4.31–10.16)

## 2018-05-16 PROCEDURE — 36415 COLL VENOUS BLD VENIPUNCTURE: CPT

## 2018-05-16 PROCEDURE — 83615 LACTATE (LD) (LDH) ENZYME: CPT

## 2018-05-16 PROCEDURE — 85025 COMPLETE CBC W/AUTO DIFF WBC: CPT

## 2018-05-16 PROCEDURE — 80053 COMPREHEN METABOLIC PANEL: CPT

## 2018-05-16 PROCEDURE — 84550 ASSAY OF BLOOD/URIC ACID: CPT

## 2018-05-16 PROCEDURE — 84100 ASSAY OF PHOSPHORUS: CPT

## 2018-05-24 RX ORDER — ACETAMINOPHEN 325 MG/1
650 TABLET ORAL ONCE
Status: COMPLETED | OUTPATIENT
Start: 2018-05-25 | End: 2018-05-25

## 2018-05-24 RX ORDER — SODIUM CHLORIDE 9 MG/ML
20 INJECTION, SOLUTION INTRAVENOUS CONTINUOUS
Status: DISCONTINUED | OUTPATIENT
Start: 2018-05-25 | End: 2018-05-28 | Stop reason: HOSPADM

## 2018-05-25 ENCOUNTER — HOSPITAL ENCOUNTER (OUTPATIENT)
Dept: INFUSION CENTER | Facility: CLINIC | Age: 73
Discharge: HOME | End: 2018-05-25
Payer: COMMERCIAL

## 2018-05-25 VITALS
BODY MASS INDEX: 23.99 KG/M2 | SYSTOLIC BLOOD PRESSURE: 146 MMHG | HEIGHT: 70 IN | WEIGHT: 167.55 LBS | HEART RATE: 69 BPM | DIASTOLIC BLOOD PRESSURE: 90 MMHG | TEMPERATURE: 97 F | RESPIRATION RATE: 18 BRPM

## 2018-05-25 PROCEDURE — 96413 CHEMO IV INFUSION 1 HR: CPT

## 2018-05-25 PROCEDURE — 96367 TX/PROPH/DG ADDL SEQ IV INF: CPT

## 2018-05-25 PROCEDURE — 96415 CHEMO IV INFUSION ADDL HR: CPT

## 2018-05-25 RX ADMIN — DARATUMUMAB 1200 MG: 100 INJECTION, SOLUTION, CONCENTRATE INTRAVENOUS at 10:25

## 2018-05-25 RX ADMIN — ACETAMINOPHEN 650 MG: 325 TABLET, FILM COATED ORAL at 08:22

## 2018-05-25 RX ADMIN — ZOLEDRONIC ACID 3.5 MG: 4 INJECTION, SOLUTION, CONCENTRATE INTRAVENOUS at 09:18

## 2018-05-25 RX ADMIN — SODIUM CHLORIDE 20 ML/HR: 0.9 INJECTION, SOLUTION INTRAVENOUS at 08:33

## 2018-05-25 RX ADMIN — SODIUM CHLORIDE 100 MG: 0.9 INJECTION, SOLUTION INTRAVENOUS at 08:33

## 2018-05-25 RX ADMIN — DIPHENHYDRAMINE HYDROCHLORIDE 25 MG: 50 INJECTION, SOLUTION INTRAMUSCULAR; INTRAVENOUS at 08:57

## 2018-05-25 NOTE — PROGRESS NOTES
Pt arrived to unit without complaint, Zometa and Darzalex administered as ordered  Pt's Zometa dose decreased per pharmacy protocol to 3 5mg based on creat clearance calculated at 58  6  Pt tolerated both well without any adverse reaction  Pt left unit in stable condition without question or concern, AVS provided

## 2018-05-25 NOTE — PLAN OF CARE
Problem: Potential for Falls  Goal: Patient will remain free of falls  INTERVENTIONS:  - Assess patient frequently for physical needs  -  Identify cognitive and physical deficits and behaviors that affect risk of falls    -  [place] fall precautions as indicated by assessment   - Educate patient/family on patient safety including physical limitations  - Instruct patient to call for assistance with activity based on assessment  - Modify environment to reduce risk of injury  - Consider OT/PT consult to assist with strengthening/mobility   Outcome: Progressing

## 2018-05-30 ENCOUNTER — TRANSCRIBE ORDERS (OUTPATIENT)
Dept: LAB | Facility: CLINIC | Age: 73
End: 2018-05-30

## 2018-05-30 ENCOUNTER — APPOINTMENT (OUTPATIENT)
Dept: LAB | Facility: CLINIC | Age: 73
End: 2018-05-30
Payer: COMMERCIAL

## 2018-05-30 ENCOUNTER — TELEPHONE (OUTPATIENT)
Dept: HEMATOLOGY ONCOLOGY | Facility: CLINIC | Age: 73
End: 2018-05-30

## 2018-05-30 DIAGNOSIS — E03.9 MYXEDEMA HEART DISEASE: ICD-10-CM

## 2018-05-30 DIAGNOSIS — I51.9 MYXEDEMA HEART DISEASE: ICD-10-CM

## 2018-05-30 DIAGNOSIS — R53.83 FATIGUE, UNSPECIFIED TYPE: ICD-10-CM

## 2018-05-30 DIAGNOSIS — E78.5 HYPERLIPIDEMIA, UNSPECIFIED HYPERLIPIDEMIA TYPE: ICD-10-CM

## 2018-05-30 DIAGNOSIS — R11.0 NAUSEA: ICD-10-CM

## 2018-05-30 DIAGNOSIS — C90.00 MYELOMA ASSOCIATED AMYLOIDOSIS (HCC): Primary | ICD-10-CM

## 2018-05-30 DIAGNOSIS — D64.9 ANEMIA, UNSPECIFIED TYPE: ICD-10-CM

## 2018-05-30 DIAGNOSIS — C90.00 MYELOMA ASSOCIATED AMYLOIDOSIS (HCC): ICD-10-CM

## 2018-05-30 DIAGNOSIS — E85.9 MYELOMA ASSOCIATED AMYLOIDOSIS (HCC): ICD-10-CM

## 2018-05-30 DIAGNOSIS — E85.9 MYELOMA ASSOCIATED AMYLOIDOSIS (HCC): Primary | ICD-10-CM

## 2018-05-30 DIAGNOSIS — I10 ESSENTIAL HYPERTENSION, MALIGNANT: ICD-10-CM

## 2018-05-30 LAB
ALBUMIN SERPL BCP-MCNC: 3.2 G/DL (ref 3.5–5)
ALP SERPL-CCNC: 61 U/L (ref 46–116)
ALT SERPL W P-5'-P-CCNC: 22 U/L (ref 12–78)
ANION GAP SERPL CALCULATED.3IONS-SCNC: 6 MMOL/L (ref 4–13)
ANISOCYTOSIS BLD QL SMEAR: PRESENT
AST SERPL W P-5'-P-CCNC: 13 U/L (ref 5–45)
BASOPHILS # BLD MANUAL: 0.06 THOUSAND/UL (ref 0–0.1)
BASOPHILS NFR MAR MANUAL: 3 % (ref 0–1)
BILIRUB SERPL-MCNC: 1.18 MG/DL (ref 0.2–1)
BUN SERPL-MCNC: 24 MG/DL (ref 5–25)
CALCIUM SERPL-MCNC: 8.9 MG/DL (ref 8.3–10.1)
CHLORIDE SERPL-SCNC: 105 MMOL/L (ref 100–108)
CO2 SERPL-SCNC: 28 MMOL/L (ref 21–32)
CREAT SERPL-MCNC: 1.16 MG/DL (ref 0.6–1.3)
EOSINOPHIL # BLD MANUAL: 0.11 THOUSAND/UL (ref 0–0.4)
EOSINOPHIL NFR BLD MANUAL: 6 % (ref 0–6)
ERYTHROCYTE [DISTWIDTH] IN BLOOD BY AUTOMATED COUNT: 16.1 % (ref 11.6–15.1)
GFR SERPL CREATININE-BSD FRML MDRD: 63 ML/MIN/1.73SQ M
GLUCOSE SERPL-MCNC: 82 MG/DL (ref 65–140)
HCT VFR BLD AUTO: 31.1 % (ref 36.5–49.3)
HGB BLD-MCNC: 10.3 G/DL (ref 12–17)
LDH SERPL-CCNC: 148 U/L (ref 81–234)
LYMPHOCYTES # BLD AUTO: 0.38 THOUSAND/UL (ref 0.6–4.47)
LYMPHOCYTES # BLD AUTO: 20 % (ref 14–44)
MCH RBC QN AUTO: 35 PG (ref 26.8–34.3)
MCHC RBC AUTO-ENTMCNC: 33.1 G/DL (ref 31.4–37.4)
MCV RBC AUTO: 106 FL (ref 82–98)
MONOCYTES # BLD AUTO: 0.36 THOUSAND/UL (ref 0–1.22)
MONOCYTES NFR BLD: 19 % (ref 4–12)
NEUTROPHILS # BLD MANUAL: 0.99 THOUSAND/UL (ref 1.85–7.62)
NEUTS SEG NFR BLD AUTO: 52 % (ref 43–75)
NRBC BLD AUTO-RTO: 0 /100 WBCS
PHOSPHATE SERPL-MCNC: 3 MG/DL (ref 2.3–4.1)
PLATELET # BLD AUTO: 94 THOUSANDS/UL (ref 149–390)
PLATELET BLD QL SMEAR: ABNORMAL
PMV BLD AUTO: 11.4 FL (ref 8.9–12.7)
POTASSIUM SERPL-SCNC: 4.1 MMOL/L (ref 3.5–5.3)
PROT SERPL-MCNC: 6.3 G/DL (ref 6.4–8.2)
RBC # BLD AUTO: 2.94 MILLION/UL (ref 3.88–5.62)
SODIUM SERPL-SCNC: 139 MMOL/L (ref 136–145)
TOTAL CELLS COUNTED SPEC: 100
URATE SERPL-MCNC: 6.2 MG/DL (ref 4.2–8)
WBC # BLD AUTO: 1.91 THOUSAND/UL (ref 4.31–10.16)

## 2018-05-30 PROCEDURE — 85007 BL SMEAR W/DIFF WBC COUNT: CPT

## 2018-05-30 PROCEDURE — 80053 COMPREHEN METABOLIC PANEL: CPT

## 2018-05-30 PROCEDURE — 85027 COMPLETE CBC AUTOMATED: CPT

## 2018-05-30 PROCEDURE — 84550 ASSAY OF BLOOD/URIC ACID: CPT

## 2018-05-30 PROCEDURE — 36415 COLL VENOUS BLD VENIPUNCTURE: CPT

## 2018-05-30 PROCEDURE — 83615 LACTATE (LD) (LDH) ENZYME: CPT

## 2018-05-30 PROCEDURE — 84100 ASSAY OF PHOSPHORUS: CPT

## 2018-05-30 NOTE — TELEPHONE ENCOUNTER
Critical WBC 1 91 called by Karime West at [facility] lab   Results called to [facility] office given to KEESHA JOYCE [facility]

## 2018-06-06 ENCOUNTER — APPOINTMENT (OUTPATIENT)
Dept: LAB | Facility: CLINIC | Age: 73
End: 2018-06-06
Payer: COMMERCIAL

## 2018-06-06 DIAGNOSIS — E85.9 MYELOMA ASSOCIATED AMYLOIDOSIS (HCC): ICD-10-CM

## 2018-06-06 DIAGNOSIS — R53.83 FATIGUE, UNSPECIFIED TYPE: ICD-10-CM

## 2018-06-06 DIAGNOSIS — I10 ESSENTIAL HYPERTENSION, MALIGNANT: ICD-10-CM

## 2018-06-06 DIAGNOSIS — I51.9 MYXEDEMA HEART DISEASE: ICD-10-CM

## 2018-06-06 DIAGNOSIS — D64.9 ANEMIA, UNSPECIFIED TYPE: ICD-10-CM

## 2018-06-06 DIAGNOSIS — E03.9 MYXEDEMA HEART DISEASE: ICD-10-CM

## 2018-06-06 DIAGNOSIS — E78.5 HYPERLIPIDEMIA, UNSPECIFIED HYPERLIPIDEMIA TYPE: ICD-10-CM

## 2018-06-06 DIAGNOSIS — C90.00 MYELOMA ASSOCIATED AMYLOIDOSIS (HCC): ICD-10-CM

## 2018-06-06 DIAGNOSIS — R11.0 NAUSEA: ICD-10-CM

## 2018-06-06 LAB
ALBUMIN SERPL BCP-MCNC: 3.5 G/DL (ref 3.5–5)
ALP SERPL-CCNC: 67 U/L (ref 46–116)
ALT SERPL W P-5'-P-CCNC: 25 U/L (ref 12–78)
ANION GAP SERPL CALCULATED.3IONS-SCNC: 7 MMOL/L (ref 4–13)
AST SERPL W P-5'-P-CCNC: 11 U/L (ref 5–45)
BASOPHILS # BLD AUTO: 0.02 THOUSANDS/ΜL (ref 0–0.1)
BASOPHILS NFR BLD AUTO: 1 % (ref 0–1)
BILIRUB SERPL-MCNC: 1.18 MG/DL (ref 0.2–1)
BUN SERPL-MCNC: 21 MG/DL (ref 5–25)
CALCIUM SERPL-MCNC: 8.2 MG/DL (ref 8.3–10.1)
CHLORIDE SERPL-SCNC: 105 MMOL/L (ref 100–108)
CO2 SERPL-SCNC: 27 MMOL/L (ref 21–32)
CREAT SERPL-MCNC: 1.2 MG/DL (ref 0.6–1.3)
EOSINOPHIL # BLD AUTO: 0.13 THOUSAND/ΜL (ref 0–0.61)
EOSINOPHIL NFR BLD AUTO: 5 % (ref 0–6)
ERYTHROCYTE [DISTWIDTH] IN BLOOD BY AUTOMATED COUNT: 16.1 % (ref 11.6–15.1)
GFR SERPL CREATININE-BSD FRML MDRD: 61 ML/MIN/1.73SQ M
GLUCOSE SERPL-MCNC: 86 MG/DL (ref 65–140)
HCT VFR BLD AUTO: 31.5 % (ref 36.5–49.3)
HGB BLD-MCNC: 10.3 G/DL (ref 12–17)
IMM GRANULOCYTES # BLD AUTO: 0.03 THOUSAND/UL (ref 0–0.2)
IMM GRANULOCYTES NFR BLD AUTO: 1 % (ref 0–2)
LDH SERPL-CCNC: 142 U/L (ref 81–234)
LYMPHOCYTES # BLD AUTO: 0.2 THOUSANDS/ΜL (ref 0.6–4.47)
LYMPHOCYTES NFR BLD AUTO: 8 % (ref 14–44)
MCH RBC QN AUTO: 35 PG (ref 26.8–34.3)
MCHC RBC AUTO-ENTMCNC: 32.7 G/DL (ref 31.4–37.4)
MCV RBC AUTO: 107 FL (ref 82–98)
MONOCYTES # BLD AUTO: 0.58 THOUSAND/ΜL (ref 0.17–1.22)
MONOCYTES NFR BLD AUTO: 22 % (ref 4–12)
NEUTROPHILS # BLD AUTO: 1.68 THOUSANDS/ΜL (ref 1.85–7.62)
NEUTS SEG NFR BLD AUTO: 63 % (ref 43–75)
NRBC BLD AUTO-RTO: 0 /100 WBCS
PHOSPHATE SERPL-MCNC: 2.4 MG/DL (ref 2.3–4.1)
PLATELET # BLD AUTO: 67 THOUSANDS/UL (ref 149–390)
PMV BLD AUTO: 11.2 FL (ref 8.9–12.7)
POTASSIUM SERPL-SCNC: 3.8 MMOL/L (ref 3.5–5.3)
PROT SERPL-MCNC: 6.5 G/DL (ref 6.4–8.2)
RBC # BLD AUTO: 2.94 MILLION/UL (ref 3.88–5.62)
SODIUM SERPL-SCNC: 139 MMOL/L (ref 136–145)
URATE SERPL-MCNC: 5.5 MG/DL (ref 4.2–8)
WBC # BLD AUTO: 2.64 THOUSAND/UL (ref 4.31–10.16)

## 2018-06-06 PROCEDURE — 80053 COMPREHEN METABOLIC PANEL: CPT

## 2018-06-06 PROCEDURE — 84100 ASSAY OF PHOSPHORUS: CPT

## 2018-06-06 PROCEDURE — 84550 ASSAY OF BLOOD/URIC ACID: CPT

## 2018-06-06 PROCEDURE — 85025 COMPLETE CBC W/AUTO DIFF WBC: CPT

## 2018-06-06 PROCEDURE — 36415 COLL VENOUS BLD VENIPUNCTURE: CPT

## 2018-06-06 PROCEDURE — 83615 LACTATE (LD) (LDH) ENZYME: CPT

## 2018-06-07 RX ORDER — ACETAMINOPHEN 325 MG/1
650 TABLET ORAL ONCE
Status: COMPLETED | OUTPATIENT
Start: 2018-06-08 | End: 2018-06-08

## 2018-06-07 RX ORDER — SODIUM CHLORIDE 9 MG/ML
20 INJECTION, SOLUTION INTRAVENOUS CONTINUOUS
Status: DISCONTINUED | OUTPATIENT
Start: 2018-06-08 | End: 2018-06-11 | Stop reason: HOSPADM

## 2018-06-08 ENCOUNTER — HOSPITAL ENCOUNTER (OUTPATIENT)
Dept: INFUSION CENTER | Facility: CLINIC | Age: 73
Discharge: HOME | End: 2018-06-08
Payer: COMMERCIAL

## 2018-06-08 VITALS
TEMPERATURE: 97.1 F | HEART RATE: 60 BPM | DIASTOLIC BLOOD PRESSURE: 64 MMHG | RESPIRATION RATE: 18 BRPM | SYSTOLIC BLOOD PRESSURE: 137 MMHG | BODY MASS INDEX: 24.96 KG/M2 | WEIGHT: 172.4 LBS

## 2018-06-08 PROCEDURE — 96413 CHEMO IV INFUSION 1 HR: CPT

## 2018-06-08 PROCEDURE — 96375 TX/PRO/DX INJ NEW DRUG ADDON: CPT

## 2018-06-08 PROCEDURE — 96367 TX/PROPH/DG ADDL SEQ IV INF: CPT

## 2018-06-08 PROCEDURE — 96366 THER/PROPH/DIAG IV INF ADDON: CPT

## 2018-06-08 PROCEDURE — 96415 CHEMO IV INFUSION ADDL HR: CPT

## 2018-06-08 RX ADMIN — Medication 30 G: at 12:30

## 2018-06-08 RX ADMIN — ACETAMINOPHEN 650 MG: 325 TABLET, FILM COATED ORAL at 08:22

## 2018-06-08 RX ADMIN — SODIUM CHLORIDE 20 ML/HR: 0.9 INJECTION, SOLUTION INTRAVENOUS at 08:15

## 2018-06-08 RX ADMIN — DARATUMUMAB 1200 MG: 100 INJECTION, SOLUTION, CONCENTRATE INTRAVENOUS at 08:59

## 2018-06-08 RX ADMIN — SODIUM CHLORIDE 100 MG: 0.9 INJECTION, SOLUTION INTRAVENOUS at 08:30

## 2018-06-08 RX ADMIN — DIPHENHYDRAMINE HYDROCHLORIDE 25 MG: 50 INJECTION, SOLUTION INTRAMUSCULAR; INTRAVENOUS at 08:15

## 2018-06-08 NOTE — PROGRESS NOTES
Patient tolerated Darzalex and IVIG infusions well  Denies any discomfort  Pt and wife are aware of all future appointment   Refused AVS

## 2018-06-08 NOTE — PLAN OF CARE
Problem: Potential for Falls  Goal: Patient will remain free of falls  INTERVENTIONS:  - Assess patient frequently for physical needs  -  Identify cognitive and physical deficits and behaviors that affect risk of falls    -  [place] fall precautions as indicated by assessment   - Educate patient/family on patient safety including physical limitations  - Instruct patient to call for assistance with activity based on assessment  - Modify environment to reduce risk of injury  - Consider OT/PT consult to assist with strengthening/mobility   Outcome: Progressing

## 2018-06-20 ENCOUNTER — APPOINTMENT (OUTPATIENT)
Dept: LAB | Facility: CLINIC | Age: 73
End: 2018-06-20
Payer: COMMERCIAL

## 2018-06-20 DIAGNOSIS — C90.00 MYELOMA ASSOCIATED AMYLOIDOSIS (HCC): ICD-10-CM

## 2018-06-20 DIAGNOSIS — I10 ESSENTIAL HYPERTENSION, MALIGNANT: ICD-10-CM

## 2018-06-20 DIAGNOSIS — E85.9 MYELOMA ASSOCIATED AMYLOIDOSIS (HCC): ICD-10-CM

## 2018-06-20 DIAGNOSIS — E03.9 MYXEDEMA HEART DISEASE: ICD-10-CM

## 2018-06-20 DIAGNOSIS — D64.9 ANEMIA, UNSPECIFIED TYPE: ICD-10-CM

## 2018-06-20 DIAGNOSIS — E78.5 HYPERLIPIDEMIA, UNSPECIFIED HYPERLIPIDEMIA TYPE: ICD-10-CM

## 2018-06-20 DIAGNOSIS — R53.83 FATIGUE, UNSPECIFIED TYPE: ICD-10-CM

## 2018-06-20 DIAGNOSIS — I51.9 MYXEDEMA HEART DISEASE: ICD-10-CM

## 2018-06-20 DIAGNOSIS — R11.0 NAUSEA: ICD-10-CM

## 2018-06-20 LAB
ALBUMIN SERPL BCP-MCNC: 3.6 G/DL (ref 3.5–5)
ALP SERPL-CCNC: 62 U/L (ref 46–116)
ALT SERPL W P-5'-P-CCNC: 25 U/L (ref 12–78)
ANION GAP SERPL CALCULATED.3IONS-SCNC: 6 MMOL/L (ref 4–13)
AST SERPL W P-5'-P-CCNC: 16 U/L (ref 5–45)
BASOPHILS # BLD AUTO: 0.1 THOUSANDS/ΜL (ref 0–0.1)
BASOPHILS NFR BLD AUTO: 4 % (ref 0–1)
BILIRUB SERPL-MCNC: 1.43 MG/DL (ref 0.2–1)
BUN SERPL-MCNC: 28 MG/DL (ref 5–25)
CALCIUM SERPL-MCNC: 8.5 MG/DL (ref 8.3–10.1)
CHLORIDE SERPL-SCNC: 103 MMOL/L (ref 100–108)
CO2 SERPL-SCNC: 28 MMOL/L (ref 21–32)
CREAT SERPL-MCNC: 1.32 MG/DL (ref 0.6–1.3)
EOSINOPHIL # BLD AUTO: 0.13 THOUSAND/ΜL (ref 0–0.61)
EOSINOPHIL NFR BLD AUTO: 5 % (ref 0–6)
ERYTHROCYTE [DISTWIDTH] IN BLOOD BY AUTOMATED COUNT: 15.3 % (ref 11.6–15.1)
GFR SERPL CREATININE-BSD FRML MDRD: 54 ML/MIN/1.73SQ M
GLUCOSE SERPL-MCNC: 87 MG/DL (ref 65–140)
HCT VFR BLD AUTO: 33.3 % (ref 36.5–49.3)
HGB BLD-MCNC: 11 G/DL (ref 12–17)
IMM GRANULOCYTES # BLD AUTO: 0 THOUSAND/UL (ref 0–0.2)
IMM GRANULOCYTES NFR BLD AUTO: 0 % (ref 0–2)
LDH SERPL-CCNC: 157 U/L (ref 81–234)
LYMPHOCYTES # BLD AUTO: 0.33 THOUSANDS/ΜL (ref 0.6–4.47)
LYMPHOCYTES NFR BLD AUTO: 13 % (ref 14–44)
MCH RBC QN AUTO: 35.7 PG (ref 26.8–34.3)
MCHC RBC AUTO-ENTMCNC: 33 G/DL (ref 31.4–37.4)
MCV RBC AUTO: 108 FL (ref 82–98)
MONOCYTES # BLD AUTO: 0.7 THOUSAND/ΜL (ref 0.17–1.22)
MONOCYTES NFR BLD AUTO: 27 % (ref 4–12)
NEUTROPHILS # BLD AUTO: 1.3 THOUSANDS/ΜL (ref 1.85–7.62)
NEUTS SEG NFR BLD AUTO: 51 % (ref 43–75)
NRBC BLD AUTO-RTO: 0 /100 WBCS
PHOSPHATE SERPL-MCNC: 2.7 MG/DL (ref 2.3–4.1)
PLATELET # BLD AUTO: 101 THOUSANDS/UL (ref 149–390)
PMV BLD AUTO: 10.5 FL (ref 8.9–12.7)
POTASSIUM SERPL-SCNC: 4.1 MMOL/L (ref 3.5–5.3)
PROT SERPL-MCNC: 7 G/DL (ref 6.4–8.2)
RBC # BLD AUTO: 3.08 MILLION/UL (ref 3.88–5.62)
SODIUM SERPL-SCNC: 137 MMOL/L (ref 136–145)
URATE SERPL-MCNC: 5.3 MG/DL (ref 4.2–8)
WBC # BLD AUTO: 2.56 THOUSAND/UL (ref 4.31–10.16)

## 2018-06-20 PROCEDURE — 83615 LACTATE (LD) (LDH) ENZYME: CPT

## 2018-06-20 PROCEDURE — 85025 COMPLETE CBC W/AUTO DIFF WBC: CPT

## 2018-06-20 PROCEDURE — 80053 COMPREHEN METABOLIC PANEL: CPT

## 2018-06-20 PROCEDURE — 84550 ASSAY OF BLOOD/URIC ACID: CPT

## 2018-06-20 PROCEDURE — 36415 COLL VENOUS BLD VENIPUNCTURE: CPT

## 2018-06-20 PROCEDURE — 84100 ASSAY OF PHOSPHORUS: CPT

## 2018-06-22 ENCOUNTER — HOSPITAL ENCOUNTER (OUTPATIENT)
Dept: INFUSION CENTER | Facility: CLINIC | Age: 73
Discharge: HOME | End: 2018-06-22
Payer: COMMERCIAL

## 2018-06-22 VITALS
DIASTOLIC BLOOD PRESSURE: 81 MMHG | RESPIRATION RATE: 20 BRPM | BODY MASS INDEX: 24.9 KG/M2 | SYSTOLIC BLOOD PRESSURE: 142 MMHG | TEMPERATURE: 97.8 F | OXYGEN SATURATION: 99 % | WEIGHT: 171.96 LBS | HEART RATE: 65 BPM

## 2018-06-22 PROCEDURE — 96413 CHEMO IV INFUSION 1 HR: CPT

## 2018-06-22 PROCEDURE — 96367 TX/PROPH/DG ADDL SEQ IV INF: CPT

## 2018-06-22 PROCEDURE — 96415 CHEMO IV INFUSION ADDL HR: CPT

## 2018-06-22 RX ORDER — ACETAMINOPHEN 325 MG/1
650 TABLET ORAL ONCE
Status: COMPLETED | OUTPATIENT
Start: 2018-06-22 | End: 2018-06-22

## 2018-06-22 RX ORDER — SODIUM CHLORIDE 9 MG/ML
20 INJECTION, SOLUTION INTRAVENOUS CONTINUOUS
Status: DISCONTINUED | OUTPATIENT
Start: 2018-06-22 | End: 2018-06-25 | Stop reason: HOSPADM

## 2018-06-22 RX ADMIN — ZOLEDRONIC ACID 3.3 MG: 4 INJECTION, SOLUTION, CONCENTRATE INTRAVENOUS at 14:25

## 2018-06-22 RX ADMIN — SODIUM CHLORIDE 100 MG: 0.9 INJECTION, SOLUTION INTRAVENOUS at 09:51

## 2018-06-22 RX ADMIN — ACETAMINOPHEN 650 MG: 325 TABLET, FILM COATED ORAL at 09:14

## 2018-06-22 RX ADMIN — DARATUMUMAB 1200 MG: 100 INJECTION, SOLUTION, CONCENTRATE INTRAVENOUS at 10:41

## 2018-06-22 RX ADMIN — DIPHENHYDRAMINE HYDROCHLORIDE 25 MG: 50 INJECTION, SOLUTION INTRAMUSCULAR; INTRAVENOUS at 09:17

## 2018-06-22 RX ADMIN — SODIUM CHLORIDE 20 ML/HR: 0.9 INJECTION, SOLUTION INTRAVENOUS at 08:51

## 2018-06-22 NOTE — PLAN OF CARE
Problem: Potential for Falls  Goal: Patient will remain free of falls  INTERVENTIONS:  - Assess patient frequently for physical needs  -  Identify cognitive and physical deficits and behaviors that affect risk of falls    -  [place] fall precautions as indicated by assessment   - Educate patient/family on patient safety including physical limitations  - Instruct patient to call for assistance with activity based on assessment  - Modify environment to reduce risk of injury  - Consider OT/PT consult to assist with strengthening/mobility   Outcome: Progressing

## 2018-06-27 ENCOUNTER — APPOINTMENT (OUTPATIENT)
Dept: LAB | Facility: CLINIC | Age: 73
End: 2018-06-27
Payer: COMMERCIAL

## 2018-06-27 DIAGNOSIS — R53.83 FATIGUE, UNSPECIFIED TYPE: ICD-10-CM

## 2018-06-27 DIAGNOSIS — R11.0 NAUSEA: ICD-10-CM

## 2018-06-27 DIAGNOSIS — E78.5 HYPERLIPIDEMIA, UNSPECIFIED HYPERLIPIDEMIA TYPE: ICD-10-CM

## 2018-06-27 DIAGNOSIS — E85.9 MYELOMA ASSOCIATED AMYLOIDOSIS (HCC): ICD-10-CM

## 2018-06-27 DIAGNOSIS — C90.00 MYELOMA ASSOCIATED AMYLOIDOSIS (HCC): ICD-10-CM

## 2018-06-27 DIAGNOSIS — E03.9 MYXEDEMA HEART DISEASE: ICD-10-CM

## 2018-06-27 DIAGNOSIS — I51.9 MYXEDEMA HEART DISEASE: ICD-10-CM

## 2018-06-27 DIAGNOSIS — I10 ESSENTIAL HYPERTENSION, MALIGNANT: ICD-10-CM

## 2018-06-27 DIAGNOSIS — D64.9 ANEMIA, UNSPECIFIED TYPE: ICD-10-CM

## 2018-06-27 LAB
ALBUMIN SERPL BCP-MCNC: 3.4 G/DL (ref 3.5–5)
ALP SERPL-CCNC: 56 U/L (ref 46–116)
ALT SERPL W P-5'-P-CCNC: 24 U/L (ref 12–78)
ANION GAP SERPL CALCULATED.3IONS-SCNC: 6 MMOL/L (ref 4–13)
ANISOCYTOSIS BLD QL SMEAR: PRESENT
AST SERPL W P-5'-P-CCNC: 14 U/L (ref 5–45)
BASOPHILS # BLD MANUAL: 0.14 THOUSAND/UL (ref 0–0.1)
BASOPHILS NFR MAR MANUAL: 6 % (ref 0–1)
BILIRUB SERPL-MCNC: 0.83 MG/DL (ref 0.2–1)
BUN SERPL-MCNC: 21 MG/DL (ref 5–25)
CALCIUM SERPL-MCNC: 8.3 MG/DL (ref 8.3–10.1)
CHLORIDE SERPL-SCNC: 103 MMOL/L (ref 100–108)
CO2 SERPL-SCNC: 29 MMOL/L (ref 21–32)
CREAT SERPL-MCNC: 1.22 MG/DL (ref 0.6–1.3)
EOSINOPHIL # BLD MANUAL: 0.21 THOUSAND/UL (ref 0–0.4)
EOSINOPHIL NFR BLD MANUAL: 9 % (ref 0–6)
ERYTHROCYTE [DISTWIDTH] IN BLOOD BY AUTOMATED COUNT: 14.9 % (ref 11.6–15.1)
GFR SERPL CREATININE-BSD FRML MDRD: 60 ML/MIN/1.73SQ M
GLUCOSE SERPL-MCNC: 86 MG/DL (ref 65–140)
HCT VFR BLD AUTO: 32 % (ref 36.5–49.3)
HGB BLD-MCNC: 10.6 G/DL (ref 12–17)
LDH SERPL-CCNC: 145 U/L (ref 81–234)
LYMPHOCYTES # BLD AUTO: 0.21 THOUSAND/UL (ref 0.6–4.47)
LYMPHOCYTES # BLD AUTO: 9 % (ref 14–44)
MACROCYTES BLD QL AUTO: PRESENT
MCH RBC QN AUTO: 36.1 PG (ref 26.8–34.3)
MCHC RBC AUTO-ENTMCNC: 33.1 G/DL (ref 31.4–37.4)
MCV RBC AUTO: 109 FL (ref 82–98)
MONOCYTES # BLD AUTO: 0.21 THOUSAND/UL (ref 0–1.22)
MONOCYTES NFR BLD: 9 % (ref 4–12)
NEUTROPHILS # BLD MANUAL: 1.54 THOUSAND/UL (ref 1.85–7.62)
NEUTS SEG NFR BLD AUTO: 67 % (ref 43–75)
NRBC BLD AUTO-RTO: 0 /100 WBCS
PHOSPHATE SERPL-MCNC: 3.4 MG/DL (ref 2.3–4.1)
PLATELET # BLD AUTO: 127 THOUSANDS/UL (ref 149–390)
PLATELET BLD QL SMEAR: ABNORMAL
PMV BLD AUTO: 10.4 FL (ref 8.9–12.7)
POIKILOCYTOSIS BLD QL SMEAR: PRESENT
POLYCHROMASIA BLD QL SMEAR: PRESENT
POTASSIUM SERPL-SCNC: 3.9 MMOL/L (ref 3.5–5.3)
PROT SERPL-MCNC: 6.4 G/DL (ref 6.4–8.2)
RBC # BLD AUTO: 2.94 MILLION/UL (ref 3.88–5.62)
RBC MORPH BLD: PRESENT
SODIUM SERPL-SCNC: 138 MMOL/L (ref 136–145)
URATE SERPL-MCNC: 5.9 MG/DL (ref 4.2–8)
WBC # BLD AUTO: 2.3 THOUSAND/UL (ref 4.31–10.16)

## 2018-06-27 PROCEDURE — 85027 COMPLETE CBC AUTOMATED: CPT

## 2018-06-27 PROCEDURE — 84550 ASSAY OF BLOOD/URIC ACID: CPT

## 2018-06-27 PROCEDURE — 36415 COLL VENOUS BLD VENIPUNCTURE: CPT

## 2018-06-27 PROCEDURE — 84100 ASSAY OF PHOSPHORUS: CPT

## 2018-06-27 PROCEDURE — 83615 LACTATE (LD) (LDH) ENZYME: CPT

## 2018-06-27 PROCEDURE — 80053 COMPREHEN METABOLIC PANEL: CPT

## 2018-06-27 PROCEDURE — 85007 BL SMEAR W/DIFF WBC COUNT: CPT

## 2018-07-03 ENCOUNTER — TRANSCRIBE ORDERS (OUTPATIENT)
Dept: LAB | Facility: CLINIC | Age: 73
End: 2018-07-03

## 2018-07-03 ENCOUNTER — APPOINTMENT (OUTPATIENT)
Dept: LAB | Facility: CLINIC | Age: 73
End: 2018-07-03
Payer: COMMERCIAL

## 2018-07-03 DIAGNOSIS — E03.9 MYXEDEMA HEART DISEASE: ICD-10-CM

## 2018-07-03 DIAGNOSIS — C90.00 MYELOMA ASSOCIATED AMYLOIDOSIS (HCC): ICD-10-CM

## 2018-07-03 DIAGNOSIS — D64.9 ANEMIA, UNSPECIFIED TYPE: Primary | ICD-10-CM

## 2018-07-03 DIAGNOSIS — D64.9 ANEMIA, UNSPECIFIED TYPE: ICD-10-CM

## 2018-07-03 DIAGNOSIS — E85.9 MYELOMA ASSOCIATED AMYLOIDOSIS (HCC): ICD-10-CM

## 2018-07-03 DIAGNOSIS — C90.00 MULTIPLE MYELOMA NOT HAVING ACHIEVED REMISSION (HCC): ICD-10-CM

## 2018-07-03 DIAGNOSIS — I10 ESSENTIAL HYPERTENSION, MALIGNANT: ICD-10-CM

## 2018-07-03 DIAGNOSIS — R11.0 NAUSEA: ICD-10-CM

## 2018-07-03 DIAGNOSIS — I51.9 MYXEDEMA HEART DISEASE: ICD-10-CM

## 2018-07-03 DIAGNOSIS — R53.83 OTHER FATIGUE: ICD-10-CM

## 2018-07-03 LAB
ALBUMIN SERPL BCP-MCNC: 3.6 G/DL (ref 3.5–5)
ALP SERPL-CCNC: 57 U/L (ref 46–116)
ALT SERPL W P-5'-P-CCNC: 23 U/L (ref 12–78)
ANION GAP SERPL CALCULATED.3IONS-SCNC: 4 MMOL/L (ref 4–13)
AST SERPL W P-5'-P-CCNC: 15 U/L (ref 5–45)
BASOPHILS # BLD AUTO: 0.08 THOUSANDS/ΜL (ref 0–0.1)
BASOPHILS NFR BLD AUTO: 2 % (ref 0–1)
BILIRUB SERPL-MCNC: 0.81 MG/DL (ref 0.2–1)
BUN SERPL-MCNC: 23 MG/DL (ref 5–25)
CALCIUM SERPL-MCNC: 9.1 MG/DL (ref 8.3–10.1)
CHLORIDE SERPL-SCNC: 102 MMOL/L (ref 100–108)
CO2 SERPL-SCNC: 30 MMOL/L (ref 21–32)
CREAT SERPL-MCNC: 1.21 MG/DL (ref 0.6–1.3)
EOSINOPHIL # BLD AUTO: 0.22 THOUSAND/ΜL (ref 0–0.61)
EOSINOPHIL NFR BLD AUTO: 5 % (ref 0–6)
ERYTHROCYTE [DISTWIDTH] IN BLOOD BY AUTOMATED COUNT: 14.6 % (ref 11.6–15.1)
GFR SERPL CREATININE-BSD FRML MDRD: 60 ML/MIN/1.73SQ M
GLUCOSE SERPL-MCNC: 97 MG/DL (ref 65–140)
HCT VFR BLD AUTO: 33.2 % (ref 36.5–49.3)
HGB BLD-MCNC: 11 G/DL (ref 12–17)
IMM GRANULOCYTES # BLD AUTO: 0.03 THOUSAND/UL (ref 0–0.2)
IMM GRANULOCYTES NFR BLD AUTO: 1 % (ref 0–2)
LDH SERPL-CCNC: 145 U/L (ref 81–234)
LYMPHOCYTES # BLD AUTO: 0.26 THOUSANDS/ΜL (ref 0.6–4.47)
LYMPHOCYTES NFR BLD AUTO: 6 % (ref 14–44)
MCH RBC QN AUTO: 35.6 PG (ref 26.8–34.3)
MCHC RBC AUTO-ENTMCNC: 33.1 G/DL (ref 31.4–37.4)
MCV RBC AUTO: 107 FL (ref 82–98)
MONOCYTES # BLD AUTO: 1.04 THOUSAND/ΜL (ref 0.17–1.22)
MONOCYTES NFR BLD AUTO: 23 % (ref 4–12)
NEUTROPHILS # BLD AUTO: 2.88 THOUSANDS/ΜL (ref 1.85–7.62)
NEUTS SEG NFR BLD AUTO: 63 % (ref 43–75)
NRBC BLD AUTO-RTO: 0 /100 WBCS
PHOSPHATE SERPL-MCNC: 3.3 MG/DL (ref 2.3–4.1)
PMV BLD AUTO: 10.5 FL (ref 8.9–12.7)
POTASSIUM SERPL-SCNC: 4.1 MMOL/L (ref 3.5–5.3)
PROT SERPL-MCNC: 6.8 G/DL (ref 6.4–8.2)
RBC # BLD AUTO: 3.09 MILLION/UL (ref 3.88–5.62)
SODIUM SERPL-SCNC: 136 MMOL/L (ref 136–145)
URATE SERPL-MCNC: 4.9 MG/DL (ref 4.2–8)
WBC # BLD AUTO: 4.51 THOUSAND/UL (ref 4.31–10.16)

## 2018-07-03 PROCEDURE — 80053 COMPREHEN METABOLIC PANEL: CPT

## 2018-07-03 PROCEDURE — 36415 COLL VENOUS BLD VENIPUNCTURE: CPT

## 2018-07-03 PROCEDURE — 84550 ASSAY OF BLOOD/URIC ACID: CPT

## 2018-07-03 PROCEDURE — 85025 COMPLETE CBC W/AUTO DIFF WBC: CPT

## 2018-07-03 PROCEDURE — 83615 LACTATE (LD) (LDH) ENZYME: CPT

## 2018-07-03 PROCEDURE — 84100 ASSAY OF PHOSPHORUS: CPT

## 2018-07-05 ENCOUNTER — TELEPHONE (OUTPATIENT)
Dept: HEMATOLOGY ONCOLOGY | Facility: CLINIC | Age: 73
End: 2018-07-05

## 2018-07-05 DIAGNOSIS — C90.00 MULTIPLE MYELOMA, REMISSION STATUS UNSPECIFIED (HCC): Primary | ICD-10-CM

## 2018-07-05 RX ORDER — ACETAMINOPHEN 325 MG/1
650 TABLET ORAL ONCE
Status: COMPLETED | OUTPATIENT
Start: 2018-07-06 | End: 2018-07-06

## 2018-07-05 RX ORDER — SODIUM CHLORIDE 9 MG/ML
20 INJECTION, SOLUTION INTRAVENOUS CONTINUOUS
Status: DISCONTINUED | OUTPATIENT
Start: 2018-07-06 | End: 2018-07-09 | Stop reason: HOSPADM

## 2018-07-05 NOTE — TELEPHONE ENCOUNTER
Calling because the blood results for platelets says Clumping  Could this be related to the Pomalyst? Should she stop giving him the Promacta? Could the platelets be low and get clumping? He will be getting his Darzolex tomorrow

## 2018-07-05 NOTE — TELEPHONE ENCOUNTER
Called the patient's wife and informed her that we would be redrawing the platelet count before treatment tomorrow  I instructed her not to discontinue any of his medications

## 2018-07-06 ENCOUNTER — HOSPITAL ENCOUNTER (OUTPATIENT)
Dept: INFUSION CENTER | Facility: CLINIC | Age: 73
Discharge: HOME | End: 2018-07-06
Payer: COMMERCIAL

## 2018-07-06 VITALS
HEART RATE: 64 BPM | DIASTOLIC BLOOD PRESSURE: 80 MMHG | RESPIRATION RATE: 18 BRPM | SYSTOLIC BLOOD PRESSURE: 146 MMHG | WEIGHT: 175.49 LBS | TEMPERATURE: 97.2 F | BODY MASS INDEX: 25.41 KG/M2

## 2018-07-06 DIAGNOSIS — C90.00 MULTIPLE MYELOMA, REMISSION STATUS UNSPECIFIED (HCC): ICD-10-CM

## 2018-07-06 LAB
ANISOCYTOSIS BLD QL SMEAR: PRESENT
BASOPHILS # BLD AUTO: 0 THOUSAND/UL (ref 0–0.1)
BASOPHILS NFR MAR MANUAL: 0 % (ref 0–1)
EOSINOPHIL # BLD AUTO: 0.04 THOUSAND/UL (ref 0–0.61)
EOSINOPHIL NFR BLD MANUAL: 1 % (ref 0–6)
ERYTHROCYTE [DISTWIDTH] IN BLOOD BY AUTOMATED COUNT: 17.4 % (ref 11.6–15.1)
HCT VFR BLD AUTO: 36.1 % (ref 36.5–49.3)
HGB BLD-MCNC: 11.6 G/DL (ref 12–17)
LG PLATELETS BLD QL SMEAR: PRESENT
LYMPHOCYTES # BLD AUTO: 0.84 THOUSAND/UL (ref 0.6–4.47)
LYMPHOCYTES # BLD AUTO: 20 % (ref 14–44)
MACROCYTES BLD QL AUTO: PRESENT
MCH RBC QN AUTO: 35.2 PG (ref 26.8–34.3)
MCHC RBC AUTO-ENTMCNC: 32 G/DL (ref 31.4–37.4)
MCV RBC AUTO: 110 FL (ref 82–98)
METAMYELOCYTES NFR BLD MANUAL: 4 % (ref 0–1)
MONOCYTES # BLD AUTO: 0.08 THOUSAND/UL (ref 0–1.22)
MONOCYTES NFR BLD AUTO: 2 % (ref 4–12)
MYELOCYTES NFR BLD MANUAL: 2 % (ref 0–1)
NEUTS BAND NFR BLD MANUAL: 5 % (ref 0–8)
NEUTS SEG # BLD: 2.98 THOUSAND/UL (ref 1.81–6.82)
NEUTS SEG NFR BLD AUTO: 66 % (ref 43–75)
OVALOCYTES BLD QL SMEAR: PRESENT
PLATELET # BLD AUTO: 74 THOUSANDS/UL (ref 149–390)
PLATELET BLD QL SMEAR: ABNORMAL
PMV BLD AUTO: 7.2 FL (ref 8.9–12.7)
RBC # BLD AUTO: 3.28 MILLION/UL (ref 3.88–5.62)
TOTAL CELLS COUNTED SPEC: 100
WBC # BLD AUTO: 4.2 THOUSAND/UL (ref 4.31–10.16)
WBC NRBC COR # BLD: 4.2 THOUSAND/UL (ref 4.31–10.16)

## 2018-07-06 PROCEDURE — 96375 TX/PRO/DX INJ NEW DRUG ADDON: CPT

## 2018-07-06 PROCEDURE — 96366 THER/PROPH/DIAG IV INF ADDON: CPT

## 2018-07-06 PROCEDURE — 85007 BL SMEAR W/DIFF WBC COUNT: CPT

## 2018-07-06 PROCEDURE — 96413 CHEMO IV INFUSION 1 HR: CPT

## 2018-07-06 PROCEDURE — 85027 COMPLETE CBC AUTOMATED: CPT

## 2018-07-06 PROCEDURE — 96367 TX/PROPH/DG ADDL SEQ IV INF: CPT

## 2018-07-06 PROCEDURE — 96415 CHEMO IV INFUSION ADDL HR: CPT

## 2018-07-06 RX ADMIN — SODIUM CHLORIDE 20 ML/HR: 0.9 INJECTION, SOLUTION INTRAVENOUS at 08:20

## 2018-07-06 RX ADMIN — Medication 30 G: at 08:20

## 2018-07-06 RX ADMIN — DARATUMUMAB 1200 MG: 100 INJECTION, SOLUTION, CONCENTRATE INTRAVENOUS at 11:00

## 2018-07-06 RX ADMIN — SODIUM CHLORIDE 100 MG: 0.9 INJECTION, SOLUTION INTRAVENOUS at 10:30

## 2018-07-06 RX ADMIN — ACETAMINOPHEN 650 MG: 325 TABLET, FILM COATED ORAL at 10:00

## 2018-07-06 RX ADMIN — DIPHENHYDRAMINE HYDROCHLORIDE 25 MG: 50 INJECTION, SOLUTION INTRAMUSCULAR; INTRAVENOUS at 10:15

## 2018-07-06 NOTE — PLAN OF CARE
Problem: Potential for Falls  Goal: Patient will remain free of falls  INTERVENTIONS:  - Assess patient frequently for physical needs  -  Identify cognitive and physical deficits and behaviors that affect risk of falls    -  [place] fall precautions as indicated by assessment   - Educate patient/family on patient safety including physical limitations  - Instruct patient to call for assistance with activity based on assessment  - Modify environment to reduce risk of injury  - Consider OT/PT consult to assist with strengthening/mobility   Outcome: Progressing

## 2018-07-12 ENCOUNTER — APPOINTMENT (OUTPATIENT)
Dept: LAB | Facility: CLINIC | Age: 73
End: 2018-07-12
Payer: COMMERCIAL

## 2018-07-12 DIAGNOSIS — I51.9 MYXEDEMA HEART DISEASE: ICD-10-CM

## 2018-07-12 DIAGNOSIS — E03.9 MYXEDEMA HEART DISEASE: ICD-10-CM

## 2018-07-12 DIAGNOSIS — I10 ESSENTIAL HYPERTENSION, MALIGNANT: ICD-10-CM

## 2018-07-12 DIAGNOSIS — R53.83 OTHER FATIGUE: ICD-10-CM

## 2018-07-12 DIAGNOSIS — D64.9 ANEMIA, UNSPECIFIED TYPE: ICD-10-CM

## 2018-07-12 DIAGNOSIS — C90.00 MYELOMA ASSOCIATED AMYLOIDOSIS (HCC): ICD-10-CM

## 2018-07-12 DIAGNOSIS — R11.0 NAUSEA: ICD-10-CM

## 2018-07-12 DIAGNOSIS — E85.9 MYELOMA ASSOCIATED AMYLOIDOSIS (HCC): ICD-10-CM

## 2018-07-12 LAB
ALBUMIN SERPL BCP-MCNC: 3.4 G/DL (ref 3.5–5)
ALP SERPL-CCNC: 55 U/L (ref 46–116)
ALT SERPL W P-5'-P-CCNC: 23 U/L (ref 12–78)
ANION GAP SERPL CALCULATED.3IONS-SCNC: 5 MMOL/L (ref 4–13)
AST SERPL W P-5'-P-CCNC: 18 U/L (ref 5–45)
BASOPHILS # BLD AUTO: 0.04 THOUSANDS/ΜL (ref 0–0.1)
BASOPHILS NFR BLD AUTO: 2 % (ref 0–1)
BILIRUB SERPL-MCNC: 1.22 MG/DL (ref 0.2–1)
BUN SERPL-MCNC: 20 MG/DL (ref 5–25)
CALCIUM SERPL-MCNC: 9.1 MG/DL (ref 8.3–10.1)
CHLORIDE SERPL-SCNC: 103 MMOL/L (ref 100–108)
CO2 SERPL-SCNC: 27 MMOL/L (ref 21–32)
CREAT SERPL-MCNC: 1.09 MG/DL (ref 0.6–1.3)
EOSINOPHIL # BLD AUTO: 0.2 THOUSAND/ΜL (ref 0–0.61)
EOSINOPHIL NFR BLD AUTO: 10 % (ref 0–6)
ERYTHROCYTE [DISTWIDTH] IN BLOOD BY AUTOMATED COUNT: 14.2 % (ref 11.6–15.1)
GFR SERPL CREATININE-BSD FRML MDRD: 68 ML/MIN/1.73SQ M
GLUCOSE SERPL-MCNC: 87 MG/DL (ref 65–140)
HCT VFR BLD AUTO: 32.4 % (ref 36.5–49.3)
HGB BLD-MCNC: 10.8 G/DL (ref 12–17)
IMM GRANULOCYTES # BLD AUTO: 0.01 THOUSAND/UL (ref 0–0.2)
IMM GRANULOCYTES NFR BLD AUTO: 1 % (ref 0–2)
LDH SERPL-CCNC: 158 U/L (ref 81–234)
LYMPHOCYTES # BLD AUTO: 0.19 THOUSANDS/ΜL (ref 0.6–4.47)
LYMPHOCYTES NFR BLD AUTO: 10 % (ref 14–44)
MCH RBC QN AUTO: 35.9 PG (ref 26.8–34.3)
MCHC RBC AUTO-ENTMCNC: 33.3 G/DL (ref 31.4–37.4)
MCV RBC AUTO: 108 FL (ref 82–98)
MONOCYTES # BLD AUTO: 0.66 THOUSAND/ΜL (ref 0.17–1.22)
MONOCYTES NFR BLD AUTO: 33 % (ref 4–12)
NEUTROPHILS # BLD AUTO: 0.91 THOUSANDS/ΜL (ref 1.85–7.62)
NEUTS SEG NFR BLD AUTO: 44 % (ref 43–75)
NRBC BLD AUTO-RTO: 0 /100 WBCS
PHOSPHATE SERPL-MCNC: 2.8 MG/DL (ref 2.3–4.1)
PLATELET # BLD AUTO: 66 THOUSANDS/UL (ref 149–390)
PMV BLD AUTO: 11.5 FL (ref 8.9–12.7)
POTASSIUM SERPL-SCNC: 4.2 MMOL/L (ref 3.5–5.3)
PROT SERPL-MCNC: 7 G/DL (ref 6.4–8.2)
RBC # BLD AUTO: 3.01 MILLION/UL (ref 3.88–5.62)
SODIUM SERPL-SCNC: 135 MMOL/L (ref 136–145)
URATE SERPL-MCNC: 4.9 MG/DL (ref 4.2–8)
WBC # BLD AUTO: 2.01 THOUSAND/UL (ref 4.31–10.16)

## 2018-07-12 PROCEDURE — 83615 LACTATE (LD) (LDH) ENZYME: CPT

## 2018-07-12 PROCEDURE — 36415 COLL VENOUS BLD VENIPUNCTURE: CPT

## 2018-07-12 PROCEDURE — 84550 ASSAY OF BLOOD/URIC ACID: CPT

## 2018-07-12 PROCEDURE — 80053 COMPREHEN METABOLIC PANEL: CPT

## 2018-07-12 PROCEDURE — 84100 ASSAY OF PHOSPHORUS: CPT

## 2018-07-12 PROCEDURE — 85025 COMPLETE CBC W/AUTO DIFF WBC: CPT

## 2018-07-18 ENCOUNTER — APPOINTMENT (OUTPATIENT)
Dept: LAB | Facility: CLINIC | Age: 73
End: 2018-07-18
Payer: COMMERCIAL

## 2018-07-18 DIAGNOSIS — E03.9 MYXEDEMA HEART DISEASE: ICD-10-CM

## 2018-07-18 DIAGNOSIS — D64.9 ANEMIA, UNSPECIFIED TYPE: ICD-10-CM

## 2018-07-18 DIAGNOSIS — E85.9 MYELOMA ASSOCIATED AMYLOIDOSIS (HCC): ICD-10-CM

## 2018-07-18 DIAGNOSIS — C90.00 MYELOMA ASSOCIATED AMYLOIDOSIS (HCC): ICD-10-CM

## 2018-07-18 DIAGNOSIS — R53.83 OTHER FATIGUE: ICD-10-CM

## 2018-07-18 DIAGNOSIS — I10 ESSENTIAL HYPERTENSION, MALIGNANT: ICD-10-CM

## 2018-07-18 DIAGNOSIS — I51.9 MYXEDEMA HEART DISEASE: ICD-10-CM

## 2018-07-18 DIAGNOSIS — R11.0 NAUSEA: ICD-10-CM

## 2018-07-18 LAB
ALBUMIN SERPL BCP-MCNC: 3.7 G/DL (ref 3.5–5)
ALP SERPL-CCNC: 67 U/L (ref 46–116)
ALT SERPL W P-5'-P-CCNC: 25 U/L (ref 12–78)
ANION GAP SERPL CALCULATED.3IONS-SCNC: 6 MMOL/L (ref 4–13)
AST SERPL W P-5'-P-CCNC: 17 U/L (ref 5–45)
BASOPHILS # BLD AUTO: 0.08 THOUSANDS/ΜL (ref 0–0.1)
BASOPHILS NFR BLD AUTO: 3 % (ref 0–1)
BILIRUB SERPL-MCNC: 0.85 MG/DL (ref 0.2–1)
BUN SERPL-MCNC: 24 MG/DL (ref 5–25)
CALCIUM SERPL-MCNC: 8.6 MG/DL (ref 8.3–10.1)
CHLORIDE SERPL-SCNC: 104 MMOL/L (ref 100–108)
CO2 SERPL-SCNC: 27 MMOL/L (ref 21–32)
CREAT SERPL-MCNC: 1.24 MG/DL (ref 0.6–1.3)
EOSINOPHIL # BLD AUTO: 0.12 THOUSAND/ΜL (ref 0–0.61)
EOSINOPHIL NFR BLD AUTO: 5 % (ref 0–6)
ERYTHROCYTE [DISTWIDTH] IN BLOOD BY AUTOMATED COUNT: 14.2 % (ref 11.6–15.1)
GFR SERPL CREATININE-BSD FRML MDRD: 59 ML/MIN/1.73SQ M
GLUCOSE SERPL-MCNC: 83 MG/DL (ref 65–140)
HCT VFR BLD AUTO: 35.8 % (ref 36.5–49.3)
HGB BLD-MCNC: 11.7 G/DL (ref 12–17)
IMM GRANULOCYTES # BLD AUTO: 0.01 THOUSAND/UL (ref 0–0.2)
IMM GRANULOCYTES NFR BLD AUTO: 0 % (ref 0–2)
LDH SERPL-CCNC: 171 U/L (ref 81–234)
LYMPHOCYTES # BLD AUTO: 0.32 THOUSANDS/ΜL (ref 0.6–4.47)
LYMPHOCYTES NFR BLD AUTO: 13 % (ref 14–44)
MCH RBC QN AUTO: 35.1 PG (ref 26.8–34.3)
MCHC RBC AUTO-ENTMCNC: 32.7 G/DL (ref 31.4–37.4)
MCV RBC AUTO: 108 FL (ref 82–98)
MONOCYTES # BLD AUTO: 0.83 THOUSAND/ΜL (ref 0.17–1.22)
MONOCYTES NFR BLD AUTO: 33 % (ref 4–12)
NEUTROPHILS # BLD AUTO: 1.18 THOUSANDS/ΜL (ref 1.85–7.62)
NEUTS SEG NFR BLD AUTO: 46 % (ref 43–75)
NRBC BLD AUTO-RTO: 0 /100 WBCS
PHOSPHATE SERPL-MCNC: 2.2 MG/DL (ref 2.3–4.1)
PLATELET # BLD AUTO: 97 THOUSANDS/UL (ref 149–390)
PMV BLD AUTO: 10.9 FL (ref 8.9–12.7)
POTASSIUM SERPL-SCNC: 4.3 MMOL/L (ref 3.5–5.3)
PROT SERPL-MCNC: 7.4 G/DL (ref 6.4–8.2)
RBC # BLD AUTO: 3.33 MILLION/UL (ref 3.88–5.62)
SODIUM SERPL-SCNC: 137 MMOL/L (ref 136–145)
URATE SERPL-MCNC: 5 MG/DL (ref 4.2–8)
WBC # BLD AUTO: 2.54 THOUSAND/UL (ref 4.31–10.16)

## 2018-07-18 PROCEDURE — 84550 ASSAY OF BLOOD/URIC ACID: CPT

## 2018-07-18 PROCEDURE — 84100 ASSAY OF PHOSPHORUS: CPT

## 2018-07-18 PROCEDURE — 80053 COMPREHEN METABOLIC PANEL: CPT

## 2018-07-18 PROCEDURE — 83615 LACTATE (LD) (LDH) ENZYME: CPT

## 2018-07-18 PROCEDURE — 85025 COMPLETE CBC W/AUTO DIFF WBC: CPT

## 2018-07-18 PROCEDURE — 36415 COLL VENOUS BLD VENIPUNCTURE: CPT

## 2018-07-19 RX ORDER — ACETAMINOPHEN 325 MG/1
650 TABLET ORAL ONCE
Status: COMPLETED | OUTPATIENT
Start: 2018-07-20 | End: 2018-07-20

## 2018-07-19 RX ORDER — SODIUM CHLORIDE 9 MG/ML
20 INJECTION, SOLUTION INTRAVENOUS CONTINUOUS
Status: DISCONTINUED | OUTPATIENT
Start: 2018-07-20 | End: 2018-07-23 | Stop reason: HOSPADM

## 2018-07-20 ENCOUNTER — HOSPITAL ENCOUNTER (OUTPATIENT)
Dept: INFUSION CENTER | Facility: CLINIC | Age: 73
Discharge: HOME | End: 2018-07-20
Payer: COMMERCIAL

## 2018-07-20 VITALS
HEART RATE: 63 BPM | BODY MASS INDEX: 24.78 KG/M2 | RESPIRATION RATE: 16 BRPM | DIASTOLIC BLOOD PRESSURE: 82 MMHG | SYSTOLIC BLOOD PRESSURE: 114 MMHG | HEIGHT: 70 IN | WEIGHT: 173.06 LBS | TEMPERATURE: 97.1 F

## 2018-07-20 PROCEDURE — 96415 CHEMO IV INFUSION ADDL HR: CPT

## 2018-07-20 PROCEDURE — 96413 CHEMO IV INFUSION 1 HR: CPT

## 2018-07-20 PROCEDURE — 96367 TX/PROPH/DG ADDL SEQ IV INF: CPT

## 2018-07-20 RX ADMIN — SODIUM CHLORIDE 20 ML/HR: 0.9 INJECTION, SOLUTION INTRAVENOUS at 08:45

## 2018-07-20 RX ADMIN — ACETAMINOPHEN 650 MG: 325 TABLET, FILM COATED ORAL at 08:56

## 2018-07-20 RX ADMIN — DARATUMUMAB 1200 MG: 100 INJECTION, SOLUTION, CONCENTRATE INTRAVENOUS at 10:21

## 2018-07-20 RX ADMIN — DIPHENHYDRAMINE HYDROCHLORIDE 25 MG: 50 INJECTION, SOLUTION INTRAMUSCULAR; INTRAVENOUS at 09:17

## 2018-07-20 RX ADMIN — SODIUM CHLORIDE 100 MG: 0.9 INJECTION, SOLUTION INTRAVENOUS at 08:51

## 2018-07-20 RX ADMIN — ZOLEDRONIC ACID 3.5 MG: 4 INJECTION, SOLUTION, CONCENTRATE INTRAVENOUS at 09:37

## 2018-07-20 NOTE — PLAN OF CARE
Problem: PAIN - ADULT  Goal: Verbalizes/displays adequate comfort level or baseline comfort level  Interventions:  - Encourage patient to monitor pain and request assistance  - Assess pain using appropriate pain scale  - Administer analgesics based on type and severity of pain and evaluate response  - Implement non-pharmacological measures as appropriate and evaluate response  - Consider cultural and social influences on pain and pain management  - Notify physician/advanced practitioner if interventions unsuccessful or patient reports new pain  Outcome: Progressing      Problem: INFECTION - ADULT  Goal: Absence or prevention of progression during hospitalization  INTERVENTIONS:  - Assess and monitor for signs and symptoms of infection  - Monitor lab/diagnostic results  - Monitor all insertion sites, i e  indwelling lines, tubes, and drains  - Monitor endotracheal (as able) and nasal secretions for changes in amount and color  - [place] appropriate cooling/warming therapies per order  - Administer medications as ordered  - Instruct and encourage patient and family to use good hand hygiene technique  - Identify and instruct in appropriate isolation precautions for identified infection/condition  Outcome: Progressing    Goal: Absence of fever/infection during neutropenic period  INTERVENTIONS:  - Monitor WBC  - Implement neutropenic guidelines  Outcome: Progressing      Problem: Knowledge Deficit  Goal: Patient/family/caregiver demonstrates understanding of disease process, treatment plan, medications, and discharge instructions  Complete learning assessment and assess knowledge base    Interventions:  - Provide teaching at level of understanding  - Provide teaching via preferred learning methods  Outcome: Progressing

## 2018-07-25 ENCOUNTER — APPOINTMENT (OUTPATIENT)
Dept: LAB | Facility: CLINIC | Age: 73
End: 2018-07-25
Payer: COMMERCIAL

## 2018-07-25 DIAGNOSIS — I10 ESSENTIAL HYPERTENSION, MALIGNANT: ICD-10-CM

## 2018-07-25 DIAGNOSIS — D64.9 ANEMIA, UNSPECIFIED TYPE: ICD-10-CM

## 2018-07-25 DIAGNOSIS — E85.9 MYELOMA ASSOCIATED AMYLOIDOSIS (HCC): ICD-10-CM

## 2018-07-25 DIAGNOSIS — R11.0 NAUSEA: ICD-10-CM

## 2018-07-25 DIAGNOSIS — C90.00 MYELOMA ASSOCIATED AMYLOIDOSIS (HCC): ICD-10-CM

## 2018-07-25 DIAGNOSIS — R53.83 OTHER FATIGUE: ICD-10-CM

## 2018-07-25 DIAGNOSIS — I51.9 MYXEDEMA HEART DISEASE: ICD-10-CM

## 2018-07-25 DIAGNOSIS — E03.9 MYXEDEMA HEART DISEASE: ICD-10-CM

## 2018-07-25 LAB
ALBUMIN SERPL BCP-MCNC: 3.3 G/DL (ref 3.5–5)
ALP SERPL-CCNC: 64 U/L (ref 46–116)
ALT SERPL W P-5'-P-CCNC: 25 U/L (ref 12–78)
ANION GAP SERPL CALCULATED.3IONS-SCNC: 6 MMOL/L (ref 4–13)
AST SERPL W P-5'-P-CCNC: 12 U/L (ref 5–45)
BASOPHILS # BLD MANUAL: 0.05 THOUSAND/UL (ref 0–0.1)
BASOPHILS NFR MAR MANUAL: 2 % (ref 0–1)
BILIRUB SERPL-MCNC: 0.87 MG/DL (ref 0.2–1)
BUN SERPL-MCNC: 23 MG/DL (ref 5–25)
CALCIUM SERPL-MCNC: 8.4 MG/DL (ref 8.3–10.1)
CHLORIDE SERPL-SCNC: 104 MMOL/L (ref 100–108)
CO2 SERPL-SCNC: 26 MMOL/L (ref 21–32)
CREAT SERPL-MCNC: 1.16 MG/DL (ref 0.6–1.3)
EOSINOPHIL # BLD MANUAL: 0.12 THOUSAND/UL (ref 0–0.4)
EOSINOPHIL NFR BLD MANUAL: 5 % (ref 0–6)
ERYTHROCYTE [DISTWIDTH] IN BLOOD BY AUTOMATED COUNT: 13.7 % (ref 11.6–15.1)
GFR SERPL CREATININE-BSD FRML MDRD: 63 ML/MIN/1.73SQ M
GLUCOSE SERPL-MCNC: 91 MG/DL (ref 65–140)
HCT VFR BLD AUTO: 33.9 % (ref 36.5–49.3)
HGB BLD-MCNC: 11.6 G/DL (ref 12–17)
LDH SERPL-CCNC: 151 U/L (ref 81–234)
LYMPHOCYTES # BLD AUTO: 0.1 THOUSAND/UL (ref 0.6–4.47)
LYMPHOCYTES # BLD AUTO: 4 % (ref 14–44)
MCH RBC QN AUTO: 36.4 PG (ref 26.8–34.3)
MCHC RBC AUTO-ENTMCNC: 34.2 G/DL (ref 31.4–37.4)
MCV RBC AUTO: 106 FL (ref 82–98)
METAMYELOCYTES NFR BLD MANUAL: 1 % (ref 0–1)
MONOCYTES # BLD AUTO: 0.36 THOUSAND/UL (ref 0–1.22)
MONOCYTES NFR BLD: 15 % (ref 4–12)
NEUTROPHILS # BLD MANUAL: 1.76 THOUSAND/UL (ref 1.85–7.62)
NEUTS SEG NFR BLD AUTO: 73 % (ref 43–75)
NRBC BLD AUTO-RTO: 0 /100 WBCS
PHOSPHATE SERPL-MCNC: 2.6 MG/DL (ref 2.3–4.1)
PLATELET # BLD AUTO: 136 THOUSANDS/UL (ref 149–390)
PLATELET BLD QL SMEAR: ABNORMAL
PMV BLD AUTO: 11.1 FL (ref 8.9–12.7)
POTASSIUM SERPL-SCNC: 3.8 MMOL/L (ref 3.5–5.3)
PROT SERPL-MCNC: 6.8 G/DL (ref 6.4–8.2)
RBC # BLD AUTO: 3.19 MILLION/UL (ref 3.88–5.62)
SODIUM SERPL-SCNC: 136 MMOL/L (ref 136–145)
URATE SERPL-MCNC: 5.2 MG/DL (ref 4.2–8)
WBC # BLD AUTO: 2.41 THOUSAND/UL (ref 4.31–10.16)

## 2018-07-25 PROCEDURE — 83615 LACTATE (LD) (LDH) ENZYME: CPT

## 2018-07-25 PROCEDURE — 85007 BL SMEAR W/DIFF WBC COUNT: CPT

## 2018-07-25 PROCEDURE — 85027 COMPLETE CBC AUTOMATED: CPT

## 2018-07-25 PROCEDURE — 84550 ASSAY OF BLOOD/URIC ACID: CPT

## 2018-07-25 PROCEDURE — 80053 COMPREHEN METABOLIC PANEL: CPT

## 2018-07-25 PROCEDURE — 36415 COLL VENOUS BLD VENIPUNCTURE: CPT

## 2018-07-25 PROCEDURE — 84100 ASSAY OF PHOSPHORUS: CPT

## 2018-08-01 ENCOUNTER — APPOINTMENT (OUTPATIENT)
Dept: LAB | Facility: CLINIC | Age: 73
End: 2018-08-01
Payer: COMMERCIAL

## 2018-08-01 ENCOUNTER — TRANSCRIBE ORDERS (OUTPATIENT)
Dept: LAB | Facility: CLINIC | Age: 73
End: 2018-08-01

## 2018-08-01 DIAGNOSIS — E78.5 HYPERLIPIDEMIA, UNSPECIFIED HYPERLIPIDEMIA TYPE: ICD-10-CM

## 2018-08-01 DIAGNOSIS — R11.0 NAUSEA: ICD-10-CM

## 2018-08-01 DIAGNOSIS — D64.89 ANEMIA DUE TO OTHER CAUSE, NOT CLASSIFIED: ICD-10-CM

## 2018-08-01 DIAGNOSIS — Z00.00 ROUTINE GENERAL MEDICAL EXAMINATION AT A HEALTH CARE FACILITY: ICD-10-CM

## 2018-08-01 DIAGNOSIS — R53.83 FATIGUE, UNSPECIFIED TYPE: ICD-10-CM

## 2018-08-01 DIAGNOSIS — D64.89 ANEMIA DUE TO OTHER CAUSE, NOT CLASSIFIED: Primary | ICD-10-CM

## 2018-08-01 LAB
ALBUMIN SERPL BCP-MCNC: 3.6 G/DL (ref 3.5–5)
ALP SERPL-CCNC: 73 U/L (ref 46–116)
ALT SERPL W P-5'-P-CCNC: 21 U/L (ref 12–78)
ANION GAP SERPL CALCULATED.3IONS-SCNC: 8 MMOL/L (ref 4–13)
AST SERPL W P-5'-P-CCNC: 14 U/L (ref 5–45)
BASOPHILS # BLD AUTO: 0.03 THOUSANDS/ΜL (ref 0–0.1)
BASOPHILS NFR BLD AUTO: 1 % (ref 0–1)
BILIRUB SERPL-MCNC: 1.12 MG/DL (ref 0.2–1)
BUN SERPL-MCNC: 20 MG/DL (ref 5–25)
CALCIUM SERPL-MCNC: 8.7 MG/DL (ref 8.3–10.1)
CHLORIDE SERPL-SCNC: 99 MMOL/L (ref 100–108)
CO2 SERPL-SCNC: 26 MMOL/L (ref 21–32)
CREAT SERPL-MCNC: 1.28 MG/DL (ref 0.6–1.3)
EOSINOPHIL # BLD AUTO: 0.29 THOUSAND/ΜL (ref 0–0.61)
EOSINOPHIL NFR BLD AUTO: 7 % (ref 0–6)
ERYTHROCYTE [DISTWIDTH] IN BLOOD BY AUTOMATED COUNT: 13.6 % (ref 11.6–15.1)
GFR SERPL CREATININE-BSD FRML MDRD: 56 ML/MIN/1.73SQ M
GLUCOSE P FAST SERPL-MCNC: 86 MG/DL (ref 65–99)
HCT VFR BLD AUTO: 36.2 % (ref 36.5–49.3)
HGB BLD-MCNC: 12.2 G/DL (ref 12–17)
IMM GRANULOCYTES # BLD AUTO: 0.03 THOUSAND/UL (ref 0–0.2)
IMM GRANULOCYTES NFR BLD AUTO: 1 % (ref 0–2)
LDH SERPL-CCNC: 157 U/L (ref 81–234)
LYMPHOCYTES # BLD AUTO: 0.15 THOUSANDS/ΜL (ref 0.6–4.47)
LYMPHOCYTES NFR BLD AUTO: 4 % (ref 14–44)
MCH RBC QN AUTO: 35.8 PG (ref 26.8–34.3)
MCHC RBC AUTO-ENTMCNC: 33.7 G/DL (ref 31.4–37.4)
MCV RBC AUTO: 106 FL (ref 82–98)
MONOCYTES # BLD AUTO: 0.82 THOUSAND/ΜL (ref 0.17–1.22)
MONOCYTES NFR BLD AUTO: 21 % (ref 4–12)
NEUTROPHILS # BLD AUTO: 2.63 THOUSANDS/ΜL (ref 1.85–7.62)
NEUTS SEG NFR BLD AUTO: 66 % (ref 43–75)
NRBC BLD AUTO-RTO: 0 /100 WBCS
PHOSPHATE SERPL-MCNC: 2.8 MG/DL (ref 2.3–4.1)
PLATELET # BLD AUTO: 107 THOUSANDS/UL (ref 149–390)
PMV BLD AUTO: 10.8 FL (ref 8.9–12.7)
POTASSIUM SERPL-SCNC: 4.1 MMOL/L (ref 3.5–5.3)
PROT SERPL-MCNC: 7.4 G/DL (ref 6.4–8.2)
RBC # BLD AUTO: 3.41 MILLION/UL (ref 3.88–5.62)
SODIUM SERPL-SCNC: 133 MMOL/L (ref 136–145)
URATE SERPL-MCNC: 6.1 MG/DL (ref 4.2–8)
WBC # BLD AUTO: 3.95 THOUSAND/UL (ref 4.31–10.16)

## 2018-08-01 PROCEDURE — 80053 COMPREHEN METABOLIC PANEL: CPT

## 2018-08-01 PROCEDURE — 83615 LACTATE (LD) (LDH) ENZYME: CPT

## 2018-08-01 PROCEDURE — 84100 ASSAY OF PHOSPHORUS: CPT

## 2018-08-01 PROCEDURE — 84550 ASSAY OF BLOOD/URIC ACID: CPT

## 2018-08-01 PROCEDURE — 36415 COLL VENOUS BLD VENIPUNCTURE: CPT

## 2018-08-01 PROCEDURE — 85025 COMPLETE CBC W/AUTO DIFF WBC: CPT

## 2018-08-02 ENCOUNTER — OFFICE VISIT (OUTPATIENT)
Dept: INTERNAL MEDICINE CLINIC | Facility: CLINIC | Age: 73
End: 2018-08-02
Payer: COMMERCIAL

## 2018-08-02 VITALS
SYSTOLIC BLOOD PRESSURE: 120 MMHG | OXYGEN SATURATION: 96 % | BODY MASS INDEX: 26.66 KG/M2 | WEIGHT: 180 LBS | DIASTOLIC BLOOD PRESSURE: 70 MMHG | HEIGHT: 69 IN | HEART RATE: 62 BPM

## 2018-08-02 DIAGNOSIS — E55.9 VITAMIN D DEFICIENCY: ICD-10-CM

## 2018-08-02 DIAGNOSIS — Z11.59 NEED FOR HEPATITIS C SCREENING TEST: Primary | ICD-10-CM

## 2018-08-02 DIAGNOSIS — E03.9 HYPOTHYROIDISM, UNSPECIFIED TYPE: ICD-10-CM

## 2018-08-02 DIAGNOSIS — Z12.11 SCREEN FOR COLON CANCER: ICD-10-CM

## 2018-08-02 DIAGNOSIS — I10 ESSENTIAL HYPERTENSION: ICD-10-CM

## 2018-08-02 PROCEDURE — 3074F SYST BP LT 130 MM HG: CPT | Performed by: INTERNAL MEDICINE

## 2018-08-02 PROCEDURE — 1101F PT FALLS ASSESS-DOCD LE1/YR: CPT | Performed by: INTERNAL MEDICINE

## 2018-08-02 PROCEDURE — 1036F TOBACCO NON-USER: CPT | Performed by: INTERNAL MEDICINE

## 2018-08-02 PROCEDURE — 99213 OFFICE O/P EST LOW 20 MIN: CPT | Performed by: INTERNAL MEDICINE

## 2018-08-02 PROCEDURE — 3078F DIAST BP <80 MM HG: CPT | Performed by: INTERNAL MEDICINE

## 2018-08-02 RX ORDER — ACETAMINOPHEN 325 MG/1
650 TABLET ORAL ONCE
Status: COMPLETED | OUTPATIENT
Start: 2018-08-03 | End: 2018-08-03

## 2018-08-02 RX ORDER — SODIUM CHLORIDE 9 MG/ML
20 INJECTION, SOLUTION INTRAVENOUS CONTINUOUS
Status: DISCONTINUED | OUTPATIENT
Start: 2018-08-03 | End: 2018-08-06 | Stop reason: HOSPADM

## 2018-08-03 ENCOUNTER — HOSPITAL ENCOUNTER (OUTPATIENT)
Dept: INFUSION CENTER | Facility: CLINIC | Age: 73
Discharge: HOME | End: 2018-08-03
Payer: COMMERCIAL

## 2018-08-03 VITALS
RESPIRATION RATE: 18 BRPM | DIASTOLIC BLOOD PRESSURE: 75 MMHG | TEMPERATURE: 97.5 F | SYSTOLIC BLOOD PRESSURE: 133 MMHG | HEART RATE: 57 BPM

## 2018-08-03 PROCEDURE — 96367 TX/PROPH/DG ADDL SEQ IV INF: CPT

## 2018-08-03 PROCEDURE — 96413 CHEMO IV INFUSION 1 HR: CPT

## 2018-08-03 PROCEDURE — 96375 TX/PRO/DX INJ NEW DRUG ADDON: CPT

## 2018-08-03 PROCEDURE — 96415 CHEMO IV INFUSION ADDL HR: CPT

## 2018-08-03 PROCEDURE — 96366 THER/PROPH/DIAG IV INF ADDON: CPT

## 2018-08-03 RX ADMIN — SODIUM CHLORIDE 100 MG: 0.9 INJECTION, SOLUTION INTRAVENOUS at 08:35

## 2018-08-03 RX ADMIN — Medication 30 G: at 12:33

## 2018-08-03 RX ADMIN — ACETAMINOPHEN 650 MG: 325 TABLET, FILM COATED ORAL at 08:28

## 2018-08-03 RX ADMIN — DARATUMUMAB 1200 MG: 100 INJECTION, SOLUTION, CONCENTRATE INTRAVENOUS at 09:09

## 2018-08-03 RX ADMIN — SODIUM CHLORIDE 20 ML/HR: 0.9 INJECTION, SOLUTION INTRAVENOUS at 08:20

## 2018-08-03 RX ADMIN — DIPHENHYDRAMINE HYDROCHLORIDE 25 MG: 50 INJECTION, SOLUTION INTRAMUSCULAR; INTRAVENOUS at 08:20

## 2018-08-03 NOTE — PROGRESS NOTES
Patient tolerated infusion well  Denies any discomfort  Pt and wife are aware of all future appointments   Refused AVS

## 2018-08-07 ENCOUNTER — OFFICE VISIT (OUTPATIENT)
Dept: HEMATOLOGY ONCOLOGY | Facility: CLINIC | Age: 73
End: 2018-08-07
Payer: COMMERCIAL

## 2018-08-07 VITALS
HEIGHT: 69 IN | SYSTOLIC BLOOD PRESSURE: 128 MMHG | DIASTOLIC BLOOD PRESSURE: 70 MMHG | BODY MASS INDEX: 27.4 KG/M2 | OXYGEN SATURATION: 98 % | WEIGHT: 185 LBS | RESPIRATION RATE: 18 BRPM | HEART RATE: 68 BPM | TEMPERATURE: 97.8 F

## 2018-08-07 DIAGNOSIS — C90.01 MULTIPLE MYELOMA IN REMISSION (HCC): Primary | ICD-10-CM

## 2018-08-07 PROCEDURE — 99214 OFFICE O/P EST MOD 30 MIN: CPT | Performed by: INTERNAL MEDICINE

## 2018-08-07 NOTE — LETTER
August 7, 2018     Narciso Patel MD  [id number] [place] [address]    Patient: Dillon Dobbins   YOB: 1945   Date of Visit: 8/7/2018       Dear Dr Ofelia Aldana: Thank you for referring Eileen Ruiz to me for evaluation  Below are my notes for this consultation  If you have questions, please do not hesitate to call me  I look forward to following your patient along with you  Sincerely,        Lebron Paez DO        CC: MD Lebron Joshua DO  8/7/2018  7:48 AM  Sign at close encounter  [address]  [address] [id number] [phone]    Jodi Hwang,1945, [id number]  08/07/18    Discussion:   In summary, this is a 44-year-old male history of multiple myeloma  Clinically he is doing quite well  He generally functions without restriction  CBC shows minor thrombocytopenia and leukopenia  He continues on daratumumab, Pomalyst, dexamethasone, IVIG, 0 4 grams/kilogram Q 28 days, Zometa  He is also on Promacta 50 mg p o  daily  He is able to work full-time  He occasionally takes a nap in the afternoon  He has an upcoming appointment at [facility] in a few weeks  He has a minor streak of erythema on the mid ventral forearm extending toward the antecubital fossa  There is some ecchymosis at the bottom of this from a previous vena puncture  I suspect he may have some mild lymphangitis  The patient prefers observation  If this worsens antibiotic would be prescribed  I discussed the above with the patient  The patient and his wife voiced understanding and agreement   ______________________________________________________________________    Chief Complaint   Patient presents with    Follow-up       HPI:  Oncology History    June 2015- [facility] FU [facility]  2015- developed VALE, headaches, recurrent nosebleed without trauma  to see Dr Chasidy Myers   Hgb 6  Went to ER  Got 2 UPRBC's  and DC'd  Hemocult negative  WBC is 3 6, MCV 96, platelet count 309, normal differential, INR 1 5 PTT 37  CMP showed total protein of 12 4, albumin 2 1, creatinine 1 8, normal calcium  Sodium 133  29, 2015-patient had bone marrow biopsy, showing approximately 80% plasma cells with some immature forms noted  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, +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, 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)  October 2015- Started Velcade 1 3 mg per meter squared days 1, 8, 15, 22, Cytoxan 300 mg by mouth every week, dexamethasone 40 mg by mouth every week  Zometa 4 mg IV day one, ongoing on a 28 day cycle  2015  Patient underwent induction therapy followed by high-dose chemotherapy with stem cell support in tandem fashion  He achieved a complete response  Repeat PET/CT and MRI showed no focal lesions  29, 2016  He was initiated on maintenance therapy with Kyprolis 20 mg/mÂ² every week, Revlimid 15 mg by mouth daily, 1-21, every 28 days  Zometa 4 mg IV every week  February 23, 2018 bone marrow biopsy showed positive minimum residual disease  Daratumumab 60 mg/m2 weekly x8 then every other week x8 then monthly  Pomalyst 4 mg p o  daily 1-21 Q 28 days  Dexamethasone 20 mg p o  q week  Myeloma ([facility] [address] )    6/13/2015 Initial Diagnosis     Myeloma ([facility] [address] )         2/23/2018 -  Chemotherapy     February 23, 2018 bone marrow biopsy showed positive minimum residual disease  Daratumumab 60 mg/m2 weekly x8 then every other week x8 then monthly  Pomalyst 4 mg p o  daily 1-21 Q 28 days  Dexamethasone 20 mg p o  q week  Interval History:  Clinically stable  0 - Asymptomatic    Review of Systems   Constitutional: Negative for chills and fever  HENT: Negative for nosebleeds  Eyes: Negative for discharge  Respiratory: Negative for cough and shortness of breath  Cardiovascular: Negative for chest pain  Gastrointestinal: Negative for abdominal pain, constipation and diarrhea  Endocrine: Negative for polydipsia  Genitourinary: Negative for hematuria  Musculoskeletal: Negative for arthralgias  Skin: Negative for color change  Allergic/Immunologic: Negative for immunocompromised state  Neurological: Negative for dizziness and headaches  Hematological: Negative for adenopathy  Psychiatric/Behavioral: Negative for agitation  Past Medical History:   Diagnosis Date    History of autologous stem cell transplant ([place] [address] )     Hypertension     History of HTN-stable since weight loss    Insomnia     Multiple myeloma (HCC)      Patient Active Problem List   Diagnosis    Myeloma ([place] [address] )    Persistent atrial fibrillation ([place] [address] )    Essential hypertension       Current Outpatient Prescriptions:     acetaminophen (TYLENOL) 325 mg tablet, Take 650 mg by mouth every 6 (six) hours as needed for mild pain, Disp: , Rfl:     acyclovir (ZOVIRAX) 400 MG tablet, Take 400 mg by mouth 2 (two) times a day , Disp: , Rfl:     aspirin 81 MG tablet, Take 81 mg by mouth daily  , Disp: , Rfl:     Cholecalciferol (VITAMIN D-3 PO), Take 1 tablet by mouth daily  , Disp: , Rfl:     dexamethasone (DECADRON) 4 mg tablet, Take 4 mg by mouth see administration instructions Pt takes total of 12 mg every week on Friday other than week he received Darzalex  On weeks he receivs Darzalex, he takes 4mg day 2 and day 3 of the cycle  , Disp: , Rfl:     Docusate Sodium (COLACE PO), Take 1 tablet by mouth as needed  , Disp: , Rfl:     eltrombopag (PROMACTA) 50 MG tablet, Take 50 mg by mouth daily Administer on an empty stomach, 1 hour before or 2 hours after a meal   , Disp: , Rfl:     escitalopram (LEXAPRO) 20 mg tablet, Take 1 tablet (20 mg total) by mouth daily, Disp: 90 tablet, Rfl: 1    Glutamine POWD, by Does not apply route, Disp: , Rfl:     levothyroxine 50 mcg tablet, Take 1 tablet (50 mcg total) by mouth daily, Disp: 30 tablet, Rfl: 5    LORazepam (ATIVAN) 0 5 mg tablet, Take 0 5 mg by mouth every 6 (six) hours as needed for anxiety  , Disp: , Rfl:     Multiple Vitamin (MULTIVITAMINS PO), Take 1 tablet by mouth daily  , Disp: , Rfl:     nebivolol (BYSTOLIC) 2 5 mg tablet, Take 1 tablet (2 5 mg total) by mouth daily, Disp: 30 tablet, Rfl: 3    ondansetron (ZOFRAN) 4 mg tablet, Take 4 mg by mouth every 8 (eight) hours as needed for nausea or vomiting , Disp: , Rfl:     Pomalidomide (POMALYST) 4 MG CAPS, Take 4 mg by mouth daily Days 1 thru 21, Disp: , Rfl:   No Known Allergies  Past Surgical History:   Procedure Laterality Date    APPENDECTOMY      Last assessed: 9/25/15    ARTHROSCOPY KNEE Left     9/30/09    EYE SURGERY      Lasik    KNEE CARTILAGE SURGERY      LIMBAL STEM CELL TRANSPLANT      Patient had 2 in [place]-2/29/2016 and 4/13/2016     Social History     Objective:  Vitals:    08/07/18 0700   BP: 128/70   BP Location: Left arm   Cuff Size: Large   Pulse: 68   Resp: 18   Temp: 97 8 °F (36 6 °C)   TempSrc: Tympanic   SpO2: 98%   Weight: 83 9 kg (185 lb)   Height: 5' 9" (1 753 m)     Physical Exam   Constitutional: He is oriented to person, place, and time  He appears well-developed  HENT:   Head: Normocephalic  Eyes: Pupils are equal, round, and reactive to light  Neck: Neck supple  Cardiovascular: Normal rate and regular rhythm  No murmur heard  Pulmonary/Chest: Breath sounds normal  He has no wheezes  He has no rales  Abdominal: Soft  There is no tenderness  Musculoskeletal: Normal range of motion  He exhibits no edema or tenderness  Lymphadenopathy:     He has no cervical adenopathy  Neurological: He is alert and oriented to person, place, and time  He has normal reflexes  No cranial nerve deficit  Skin: No rash noted  No erythema  Psychiatric: He has a normal mood and affect  His behavior is normal          Labs: I personally reviewed the labs and imaging pertinent to this patient care

## 2018-08-07 NOTE — PROGRESS NOTES
[facility] Junious Manifold  [address] [address] [phone]    Tracy Hwang,1945, [id number]  08/07/18    Discussion:   In summary, this is a 79-year-old male history of multiple myeloma  Clinically he is doing quite well  He generally functions without restriction  CBC shows minor thrombocytopenia and leukopenia  He continues on daratumumab, Pomalyst, dexamethasone, IVIG, 0 4 grams/kilogram Q 28 days, Zometa  He is also on Promacta 50 mg p o  daily  He is able to work full-time  He occasionally takes a nap in the afternoon  He has an upcoming appointment at [facility] in a few weeks  He has a minor streak of erythema on the mid ventral forearm extending toward the antecubital fossa  There is some ecchymosis at the bottom of this from a previous vena puncture  I suspect he may have some mild lymphangitis  The patient prefers observation  If this worsens antibiotic would be prescribed  I discussed the above with the patient  The patient and his wife voiced understanding and agreement   ______________________________________________________________________    Chief Complaint   Patient presents with    Follow-up       HPI:  Oncology History    June 2015- routine FU at [facility]  2015- developed VALE, headaches, recurrent nosebleed without trauma  to see Dr Betty Mckinley  Hgb 6  Went to ER  Got 2 UPRBC's  and DC'd  Hemocult negative  WBC is 3 6, MCV 96, platelet count 397, normal differential, INR 1 5 PTT 37  CMP showed total protein of 12 4, albumin 2 1, creatinine 1 8, normal calcium  Sodium 133  29, 2015-patient had bone marrow biopsy, showing approximately 80% plasma cells with some immature forms noted  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, +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, 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)  October 2015- Started Velcade 1 3 mg per meter squared days 1, 8, 15, 22, Cytoxan 300 mg by mouth every week, dexamethasone 40 mg by mouth every week   Zometa 4 mg IV day one, ongoing on a 28 day cycle  2015  Patient underwent induction therapy followed by high-dose chemotherapy with stem cell support in tandem fashion  He achieved a complete response  Repeat PET/CT and MRI showed no focal lesions  29, 2016  He was initiated on maintenance therapy with Kyprolis 20 mg/mÂ² every week, Revlimid 15 mg by mouth daily, 1-21, every 28 days  Zometa 4 mg IV every week  February 23, 2018 bone marrow biopsy showed positive minimum residual disease  Daratumumab 60 mg/m2 weekly x8 then every other week x8 then monthly  Pomalyst 4 mg p o  daily 1-21 Q 28 days  Dexamethasone 20 mg p o  q week  Myeloma ([facility] )    6/13/2015 Initial Diagnosis     Myeloma ([place] [address] )         2/23/2018 -  Chemotherapy     February 23, 2018 bone marrow biopsy showed positive minimum residual disease  Daratumumab 60 mg/m2 weekly x8 then every other week x8 then monthly  Pomalyst 4 mg p o  daily 1-21 Q 28 days  Dexamethasone 20 mg p o  q week  Interval History:  Clinically stable  0 - Asymptomatic    Review of Systems   Constitutional: Negative for chills and fever  HENT: Negative for nosebleeds  Eyes: Negative for discharge  Respiratory: Negative for cough and shortness of breath  Cardiovascular: Negative for chest pain  Gastrointestinal: Negative for abdominal pain, constipation and diarrhea  Endocrine: Negative for polydipsia  Genitourinary: Negative for hematuria  Musculoskeletal: Negative for arthralgias  Skin: Negative for color change  Allergic/Immunologic: Negative for immunocompromised state  Neurological: Negative for dizziness and headaches  Hematological: Negative for adenopathy  Psychiatric/Behavioral: Negative for agitation         Past Medical History:   Diagnosis Date    History of autologous stem cell transplant ([facility] )     Hypertension     History of HTN-stable since weight loss    Insomnia     Multiple myeloma [facility])      Patient Active Problem List Diagnosis    [facility] [address] [facility]    [facility]    Essential hypertension       Current Outpatient Prescriptions:     acetaminophen (TYLENOL) 325 mg tablet, Take 650 mg by mouth every 6 (six) hours as needed for mild pain, Disp: , Rfl:     acyclovir (ZOVIRAX) 400 MG tablet, Take 400 mg by mouth 2 (two) times a day , Disp: , Rfl:     aspirin 81 MG tablet, Take 81 mg by mouth daily  , Disp: , Rfl:     Cholecalciferol (VITAMIN D-3 PO), Take 1 tablet by mouth daily  , Disp: , Rfl:     dexamethasone (DECADRON) 4 mg tablet, Take 4 mg by mouth see administration instructions Pt takes total of 12 mg every week on Friday other than week he received Darzalex  On weeks he receivs Darzalex, he takes 4mg day 2 and day 3 of the cycle  , Disp: , Rfl:     Docusate Sodium (COLACE PO), Take 1 tablet by mouth as needed  , Disp: , Rfl:     eltrombopag (PROMACTA) 50 MG tablet, Take 50 mg by mouth daily Administer on an empty stomach, 1 hour before or 2 hours after a meal   , Disp: , Rfl:     escitalopram (LEXAPRO) 20 mg tablet, Take 1 tablet (20 mg total) by mouth daily, Disp: 90 tablet, Rfl: 1    Glutamine POWD, by Does not apply route, Disp: , Rfl:     levothyroxine 50 mcg tablet, Take 1 tablet (50 mcg total) by mouth daily, Disp: 30 tablet, Rfl: 5    LORazepam (ATIVAN) 0 5 mg tablet, Take 0 5 mg by mouth every 6 (six) hours as needed for anxiety  , Disp: , Rfl:     Multiple Vitamin (MULTIVITAMINS PO), Take 1 tablet by mouth daily  , Disp: , Rfl:     nebivolol (BYSTOLIC) 2 5 mg tablet, Take 1 tablet (2 5 mg total) by mouth daily, Disp: 30 tablet, Rfl: 3    ondansetron (ZOFRAN) 4 mg tablet, Take 4 mg by mouth every 8 (eight) hours as needed for nausea or vomiting , Disp: , Rfl:     Pomalidomide (POMALYST) 4 MG CAPS, Take 4 mg by mouth daily Days 1 thru 21, Disp: , Rfl:   No Known Allergies  Past Surgical History:   Procedure Laterality Date    APPENDECTOMY      Last assessed: 9/25/15    ARTHROSCOPY KNEE Left     9/30/09    EYE SURGERY      Lasik    KNEE CARTILAGE SURGERY      LIMBAL STEM CELL TRANSPLANT      Patient had 2 in [place]-2/29/2016 and 4/13/2016     Social History     Objective:  Vitals:    08/07/18 0700   BP: 128/70   BP Location: Left arm   Cuff Size: Large   Pulse: 68   Resp: 18   Temp: 97 8 °F (36 6 °C)   TempSrc: Tympanic   SpO2: 98%   Weight: 83 9 kg (185 lb)   Height: 5' 9" (1 753 m)     Physical Exam   Constitutional: He is oriented to person, place, and time  He appears well-developed  HENT:   Head: Normocephalic  Eyes: Pupils are equal, round, and reactive to light  Neck: Neck supple  Cardiovascular: Normal rate and regular rhythm  No murmur heard  Pulmonary/Chest: Breath sounds normal  He has no wheezes  He has no rales  Abdominal: Soft  There is no tenderness  Musculoskeletal: Normal range of motion  He exhibits no edema or tenderness  Lymphadenopathy:     He has no cervical adenopathy  Neurological: He is alert and oriented to person, place, and time  He has normal reflexes  No cranial nerve deficit  Skin: No rash noted  No erythema  Psychiatric: He has a normal mood and affect  His behavior is normal          Labs: I personally reviewed the labs and imaging pertinent to this patient care

## 2018-08-28 DIAGNOSIS — C90.00 MULTIPLE MYELOMA NOT HAVING ACHIEVED REMISSION (HCC): ICD-10-CM

## 2018-08-29 ENCOUNTER — LAB (OUTPATIENT)
Dept: LAB | Facility: CLINIC | Age: 73
End: 2018-08-29
Payer: COMMERCIAL

## 2018-08-29 ENCOUNTER — TELEPHONE (OUTPATIENT)
Dept: INTERNAL MEDICINE CLINIC | Facility: CLINIC | Age: 73
End: 2018-08-29

## 2018-08-29 DIAGNOSIS — C90.01 MULTIPLE MYELOMA IN REMISSION (HCC): ICD-10-CM

## 2018-08-29 DIAGNOSIS — Z11.59 NEED FOR HEPATITIS C SCREENING TEST: ICD-10-CM

## 2018-08-29 DIAGNOSIS — E03.9 HYPOTHYROIDISM, UNSPECIFIED TYPE: ICD-10-CM

## 2018-08-29 DIAGNOSIS — I10 ESSENTIAL HYPERTENSION: ICD-10-CM

## 2018-08-29 DIAGNOSIS — E55.9 VITAMIN D DEFICIENCY: ICD-10-CM

## 2018-08-29 LAB
25(OH)D3 SERPL-MCNC: 28.5 NG/ML (ref 30–100)
ALBUMIN SERPL BCP-MCNC: 3.3 G/DL (ref 3.5–5)
ALP SERPL-CCNC: 61 U/L (ref 46–116)
ALT SERPL W P-5'-P-CCNC: 20 U/L (ref 12–78)
ANION GAP SERPL CALCULATED.3IONS-SCNC: 9 MMOL/L (ref 4–13)
AST SERPL W P-5'-P-CCNC: 13 U/L (ref 5–45)
BASOPHILS # BLD AUTO: 0.06 THOUSANDS/ΜL (ref 0–0.1)
BASOPHILS NFR BLD AUTO: 2 % (ref 0–1)
BILIRUB SERPL-MCNC: 0.93 MG/DL (ref 0.2–1)
BUN SERPL-MCNC: 25 MG/DL (ref 5–25)
CALCIUM SERPL-MCNC: 9.3 MG/DL (ref 8.3–10.1)
CHLORIDE SERPL-SCNC: 106 MMOL/L (ref 100–108)
CHOLEST SERPL-MCNC: 294 MG/DL (ref 50–200)
CO2 SERPL-SCNC: 26 MMOL/L (ref 21–32)
CREAT SERPL-MCNC: 1.27 MG/DL (ref 0.6–1.3)
EOSINOPHIL # BLD AUTO: 0.24 THOUSAND/ΜL (ref 0–0.61)
EOSINOPHIL NFR BLD AUTO: 7 % (ref 0–6)
ERYTHROCYTE [DISTWIDTH] IN BLOOD BY AUTOMATED COUNT: 13.4 % (ref 11.6–15.1)
GFR SERPL CREATININE-BSD FRML MDRD: 57 ML/MIN/1.73SQ M
GLUCOSE P FAST SERPL-MCNC: 88 MG/DL (ref 65–99)
HCT VFR BLD AUTO: 35.5 % (ref 36.5–49.3)
HDLC SERPL-MCNC: 61 MG/DL (ref 40–60)
HGB BLD-MCNC: 12 G/DL (ref 12–17)
IMM GRANULOCYTES # BLD AUTO: 0.03 THOUSAND/UL (ref 0–0.2)
IMM GRANULOCYTES NFR BLD AUTO: 1 % (ref 0–2)
LDLC SERPL CALC-MCNC: 179 MG/DL (ref 0–100)
LYMPHOCYTES # BLD AUTO: 0.18 THOUSANDS/ΜL (ref 0.6–4.47)
LYMPHOCYTES NFR BLD AUTO: 5 % (ref 14–44)
MCH RBC QN AUTO: 35.1 PG (ref 26.8–34.3)
MCHC RBC AUTO-ENTMCNC: 33.8 G/DL (ref 31.4–37.4)
MCV RBC AUTO: 104 FL (ref 82–98)
MONOCYTES # BLD AUTO: 0.76 THOUSAND/ΜL (ref 0.17–1.22)
MONOCYTES NFR BLD AUTO: 20 % (ref 4–12)
NEUTROPHILS # BLD AUTO: 2.45 THOUSANDS/ΜL (ref 1.85–7.62)
NEUTS SEG NFR BLD AUTO: 65 % (ref 43–75)
NONHDLC SERPL-MCNC: 233 MG/DL
NRBC BLD AUTO-RTO: 0 /100 WBCS
PLATELET # BLD AUTO: 108 THOUSANDS/UL (ref 149–390)
PMV BLD AUTO: 10.3 FL (ref 8.9–12.7)
POTASSIUM SERPL-SCNC: 4.1 MMOL/L (ref 3.5–5.3)
PROT SERPL-MCNC: 6.8 G/DL (ref 6.4–8.2)
RBC # BLD AUTO: 3.42 MILLION/UL (ref 3.88–5.62)
SODIUM SERPL-SCNC: 141 MMOL/L (ref 136–145)
TRIGL SERPL-MCNC: 271 MG/DL
TSH SERPL DL<=0.05 MIU/L-ACNC: 5.58 UIU/ML (ref 0.36–3.74)
WBC # BLD AUTO: 3.72 THOUSAND/UL (ref 4.31–10.16)

## 2018-08-29 PROCEDURE — 84443 ASSAY THYROID STIM HORMONE: CPT

## 2018-08-29 PROCEDURE — 85025 COMPLETE CBC W/AUTO DIFF WBC: CPT

## 2018-08-29 PROCEDURE — 80061 LIPID PANEL: CPT

## 2018-08-29 PROCEDURE — 86803 HEPATITIS C AB TEST: CPT

## 2018-08-29 PROCEDURE — 36415 COLL VENOUS BLD VENIPUNCTURE: CPT

## 2018-08-29 PROCEDURE — 80053 COMPREHEN METABOLIC PANEL: CPT

## 2018-08-29 PROCEDURE — 82306 VITAMIN D 25 HYDROXY: CPT

## 2018-08-30 DIAGNOSIS — E78.5 HYPERLIPIDEMIA, UNSPECIFIED HYPERLIPIDEMIA TYPE: Primary | ICD-10-CM

## 2018-08-30 LAB — HCV AB SER QL: NORMAL

## 2018-08-30 RX ORDER — ACETAMINOPHEN 325 MG/1
650 TABLET ORAL ONCE
Status: COMPLETED | OUTPATIENT
Start: 2018-08-31 | End: 2018-08-31

## 2018-08-30 RX ORDER — ATORVASTATIN CALCIUM 20 MG/1
20 TABLET, FILM COATED ORAL DAILY
Qty: 30 TABLET | Refills: 5 | Status: SHIPPED | OUTPATIENT
Start: 2018-08-30 | End: 2019-03-07 | Stop reason: SDUPTHER

## 2018-08-30 RX ORDER — SODIUM CHLORIDE 9 MG/ML
20 INJECTION, SOLUTION INTRAVENOUS CONTINUOUS
Status: DISCONTINUED | OUTPATIENT
Start: 2018-08-31 | End: 2018-09-03 | Stop reason: HOSPADM

## 2018-08-30 NOTE — TELEPHONE ENCOUNTER
----- Message from Abdulaziz Quiroga MD sent at 8/29/2018  8:44 PM EDT -----  Please call the patient regarding his abnormal result  Cholesterol  Now 294     Suggest going back on a statin, e g  lipitor 20 qd if ok with pt and oncologist

## 2018-08-30 NOTE — TELEPHONE ENCOUNTER
----- Message from Stepan Barron MD sent at 8/29/2018  8:44 PM EDT -----  Please call the patient regarding his abnormal result  Cholesterol  Now 294     Suggest going back on a statin, e g  lipitor 20 qd if ok with pt and oncologist

## 2018-08-31 ENCOUNTER — HOSPITAL ENCOUNTER (OUTPATIENT)
Dept: INFUSION CENTER | Facility: CLINIC | Age: 73
Discharge: HOME | End: 2018-08-31
Payer: COMMERCIAL

## 2018-08-31 VITALS
BODY MASS INDEX: 27.02 KG/M2 | WEIGHT: 182.98 LBS | TEMPERATURE: 97.5 F | HEART RATE: 54 BPM | RESPIRATION RATE: 16 BRPM | SYSTOLIC BLOOD PRESSURE: 129 MMHG | DIASTOLIC BLOOD PRESSURE: 82 MMHG

## 2018-08-31 PROCEDURE — 96367 TX/PROPH/DG ADDL SEQ IV INF: CPT

## 2018-08-31 PROCEDURE — 96366 THER/PROPH/DIAG IV INF ADDON: CPT

## 2018-08-31 PROCEDURE — 96415 CHEMO IV INFUSION ADDL HR: CPT

## 2018-08-31 PROCEDURE — 96375 TX/PRO/DX INJ NEW DRUG ADDON: CPT

## 2018-08-31 PROCEDURE — 96413 CHEMO IV INFUSION 1 HR: CPT

## 2018-08-31 RX ADMIN — ZOLEDRONIC ACID 3.5 MG: 4 INJECTION, SOLUTION, CONCENTRATE INTRAVENOUS at 11:03

## 2018-08-31 RX ADMIN — ACETAMINOPHEN 650 MG: 325 TABLET, FILM COATED ORAL at 10:33

## 2018-08-31 RX ADMIN — SODIUM CHLORIDE 100 MG: 0.9 INJECTION, SOLUTION INTRAVENOUS at 10:40

## 2018-08-31 RX ADMIN — DARATUMUMAB 1300 MG: 100 INJECTION, SOLUTION, CONCENTRATE INTRAVENOUS at 11:34

## 2018-08-31 RX ADMIN — Medication 30 G: at 08:30

## 2018-08-31 RX ADMIN — SODIUM CHLORIDE 20 ML/HR: 0.9 INJECTION, SOLUTION INTRAVENOUS at 08:30

## 2018-08-31 RX ADMIN — DIPHENHYDRAMINE HYDROCHLORIDE 25 MG: 50 INJECTION, SOLUTION INTRAMUSCULAR; INTRAVENOUS at 10:25

## 2018-08-31 NOTE — PLAN OF CARE
Problem: Potential for Falls  Goal: Patient will remain free of falls  INTERVENTIONS:  - Assess patient frequently for physical needs  -  Identify cognitive and physical deficits and behaviors that affect risk of falls    -  [place] fall precautions as indicated by assessment   - Educate patient/family on patient safety including physical limitations  - Instruct patient to call for assistance with activity based on assessment  - Modify environment to reduce risk of injury  - Consider OT/PT consult to assist with strengthening/mobility   Outcome: Progressing

## 2018-08-31 NOTE — PROGRESS NOTES
Patient tolerated infusion well  Offers no complaints  Pt and wife are aware of all future appointments

## 2018-09-07 ENCOUNTER — TELEPHONE (OUTPATIENT)
Dept: HEMATOLOGY ONCOLOGY | Facility: CLINIC | Age: 73
End: 2018-09-07

## 2018-09-10 DIAGNOSIS — C90.00 MULTIPLE MYELOMA NOT HAVING ACHIEVED REMISSION (HCC): Primary | ICD-10-CM

## 2018-09-12 ENCOUNTER — TRANSCRIBE ORDERS (OUTPATIENT)
Dept: LAB | Facility: CLINIC | Age: 73
End: 2018-09-12

## 2018-09-12 ENCOUNTER — APPOINTMENT (OUTPATIENT)
Dept: LAB | Facility: CLINIC | Age: 73
End: 2018-09-12
Payer: COMMERCIAL

## 2018-09-12 LAB
ALBUMIN SERPL BCP-MCNC: 3.6 G/DL (ref 3.5–5)
ALP SERPL-CCNC: 64 U/L (ref 46–116)
ALT SERPL W P-5'-P-CCNC: 24 U/L (ref 12–78)
ANION GAP SERPL CALCULATED.3IONS-SCNC: 8 MMOL/L (ref 4–13)
AST SERPL W P-5'-P-CCNC: 15 U/L (ref 5–45)
BASOPHILS # BLD AUTO: 0.07 THOUSANDS/ΜL (ref 0–0.1)
BASOPHILS NFR BLD AUTO: 2 % (ref 0–1)
BILIRUB SERPL-MCNC: 0.91 MG/DL (ref 0.2–1)
BUN SERPL-MCNC: 24 MG/DL (ref 5–25)
CALCIUM SERPL-MCNC: 8.9 MG/DL (ref 8.3–10.1)
CHLORIDE SERPL-SCNC: 104 MMOL/L (ref 100–108)
CO2 SERPL-SCNC: 26 MMOL/L (ref 21–32)
CREAT SERPL-MCNC: 1.3 MG/DL (ref 0.6–1.3)
EOSINOPHIL # BLD AUTO: 0.12 THOUSAND/ΜL (ref 0–0.61)
EOSINOPHIL NFR BLD AUTO: 4 % (ref 0–6)
ERYTHROCYTE [DISTWIDTH] IN BLOOD BY AUTOMATED COUNT: 13.8 % (ref 11.6–15.1)
GFR SERPL CREATININE-BSD FRML MDRD: 55 ML/MIN/1.73SQ M
GLUCOSE SERPL-MCNC: 89 MG/DL (ref 65–140)
HCT VFR BLD AUTO: 35.5 % (ref 36.5–49.3)
HGB BLD-MCNC: 11.7 G/DL (ref 12–17)
IMM GRANULOCYTES # BLD AUTO: 0.01 THOUSAND/UL (ref 0–0.2)
IMM GRANULOCYTES NFR BLD AUTO: 0 % (ref 0–2)
LYMPHOCYTES # BLD AUTO: 0.29 THOUSANDS/ΜL (ref 0.6–4.47)
LYMPHOCYTES NFR BLD AUTO: 10 % (ref 14–44)
MCH RBC QN AUTO: 34.7 PG (ref 26.8–34.3)
MCHC RBC AUTO-ENTMCNC: 33 G/DL (ref 31.4–37.4)
MCV RBC AUTO: 105 FL (ref 82–98)
MONOCYTES # BLD AUTO: 0.78 THOUSAND/ΜL (ref 0.17–1.22)
MONOCYTES NFR BLD AUTO: 26 % (ref 4–12)
NEUTROPHILS # BLD AUTO: 1.71 THOUSANDS/ΜL (ref 1.85–7.62)
NEUTS SEG NFR BLD AUTO: 58 % (ref 43–75)
NRBC BLD AUTO-RTO: 0 /100 WBCS
PLATELET # BLD AUTO: 121 THOUSANDS/UL (ref 149–390)
PMV BLD AUTO: 11.3 FL (ref 8.9–12.7)
POTASSIUM SERPL-SCNC: 3.9 MMOL/L (ref 3.5–5.3)
PROT SERPL-MCNC: 7.2 G/DL (ref 6.4–8.2)
RBC # BLD AUTO: 3.37 MILLION/UL (ref 3.88–5.62)
SODIUM SERPL-SCNC: 138 MMOL/L (ref 136–145)
WBC # BLD AUTO: 2.98 THOUSAND/UL (ref 4.31–10.16)

## 2018-09-12 PROCEDURE — 85025 COMPLETE CBC W/AUTO DIFF WBC: CPT

## 2018-09-12 PROCEDURE — 36415 COLL VENOUS BLD VENIPUNCTURE: CPT

## 2018-09-12 PROCEDURE — 80053 COMPREHEN METABOLIC PANEL: CPT

## 2018-09-26 ENCOUNTER — TRANSCRIBE ORDERS (OUTPATIENT)
Dept: LAB | Facility: CLINIC | Age: 73
End: 2018-09-26

## 2018-09-26 ENCOUNTER — APPOINTMENT (OUTPATIENT)
Dept: LAB | Facility: CLINIC | Age: 73
End: 2018-09-26
Payer: COMMERCIAL

## 2018-09-26 DIAGNOSIS — C90.01 MULTIPLE MYELOMA IN REMISSION (HCC): Primary | ICD-10-CM

## 2018-09-26 DIAGNOSIS — C90.01 MULTIPLE MYELOMA IN REMISSION (HCC): ICD-10-CM

## 2018-09-26 LAB
ALBUMIN SERPL BCP-MCNC: 3.7 G/DL (ref 3.5–5)
ALP SERPL-CCNC: 61 U/L (ref 46–116)
ALT SERPL W P-5'-P-CCNC: 28 U/L (ref 12–78)
ANION GAP SERPL CALCULATED.3IONS-SCNC: 7 MMOL/L (ref 4–13)
AST SERPL W P-5'-P-CCNC: 14 U/L (ref 5–45)
BASOPHILS # BLD AUTO: 0.08 THOUSANDS/ΜL (ref 0–0.1)
BASOPHILS NFR BLD AUTO: 1 % (ref 0–1)
BILIRUB SERPL-MCNC: 1.2 MG/DL (ref 0.2–1)
BUN SERPL-MCNC: 23 MG/DL (ref 5–25)
CALCIUM SERPL-MCNC: 8.7 MG/DL (ref 8.3–10.1)
CHLORIDE SERPL-SCNC: 102 MMOL/L (ref 100–108)
CO2 SERPL-SCNC: 27 MMOL/L (ref 21–32)
CREAT SERPL-MCNC: 1.2 MG/DL (ref 0.6–1.3)
EOSINOPHIL # BLD AUTO: 0.22 THOUSAND/ΜL (ref 0–0.61)
EOSINOPHIL NFR BLD AUTO: 4 % (ref 0–6)
ERYTHROCYTE [DISTWIDTH] IN BLOOD BY AUTOMATED COUNT: 13.9 % (ref 11.6–15.1)
GFR SERPL CREATININE-BSD FRML MDRD: 61 ML/MIN/1.73SQ M
GLUCOSE P FAST SERPL-MCNC: 99 MG/DL (ref 65–99)
HCT VFR BLD AUTO: 35.7 % (ref 36.5–49.3)
HGB BLD-MCNC: 11.8 G/DL (ref 12–17)
IMM GRANULOCYTES # BLD AUTO: 0.03 THOUSAND/UL (ref 0–0.2)
IMM GRANULOCYTES NFR BLD AUTO: 1 % (ref 0–2)
LYMPHOCYTES # BLD AUTO: 0.22 THOUSANDS/ΜL (ref 0.6–4.47)
LYMPHOCYTES NFR BLD AUTO: 4 % (ref 14–44)
MCH RBC QN AUTO: 35.1 PG (ref 26.8–34.3)
MCHC RBC AUTO-ENTMCNC: 33.1 G/DL (ref 31.4–37.4)
MCV RBC AUTO: 106 FL (ref 82–98)
MONOCYTES # BLD AUTO: 0.94 THOUSAND/ΜL (ref 0.17–1.22)
MONOCYTES NFR BLD AUTO: 17 % (ref 4–12)
NEUTROPHILS # BLD AUTO: 4.22 THOUSANDS/ΜL (ref 1.85–7.62)
NEUTS SEG NFR BLD AUTO: 73 % (ref 43–75)
NRBC BLD AUTO-RTO: 0 /100 WBCS
PLATELET # BLD AUTO: 119 THOUSANDS/UL (ref 149–390)
PMV BLD AUTO: 10.7 FL (ref 8.9–12.7)
POTASSIUM SERPL-SCNC: 3.9 MMOL/L (ref 3.5–5.3)
PROT SERPL-MCNC: 7.3 G/DL (ref 6.4–8.2)
RBC # BLD AUTO: 3.36 MILLION/UL (ref 3.88–5.62)
SODIUM SERPL-SCNC: 136 MMOL/L (ref 136–145)
WBC # BLD AUTO: 5.71 THOUSAND/UL (ref 4.31–10.16)

## 2018-09-26 PROCEDURE — 36415 COLL VENOUS BLD VENIPUNCTURE: CPT

## 2018-09-26 PROCEDURE — 80053 COMPREHEN METABOLIC PANEL: CPT

## 2018-09-26 PROCEDURE — 85025 COMPLETE CBC W/AUTO DIFF WBC: CPT

## 2018-09-27 RX ORDER — ACETAMINOPHEN 325 MG/1
650 TABLET ORAL ONCE
Status: COMPLETED | OUTPATIENT
Start: 2018-09-28 | End: 2018-09-28

## 2018-09-27 RX ORDER — SODIUM CHLORIDE 9 MG/ML
20 INJECTION, SOLUTION INTRAVENOUS CONTINUOUS
Status: DISCONTINUED | OUTPATIENT
Start: 2018-09-28 | End: 2018-10-01 | Stop reason: HOSPADM

## 2018-09-28 ENCOUNTER — HOSPITAL ENCOUNTER (OUTPATIENT)
Dept: INFUSION CENTER | Facility: CLINIC | Age: 73
Discharge: HOME | End: 2018-09-28
Payer: COMMERCIAL

## 2018-09-28 VITALS
HEART RATE: 69 BPM | TEMPERATURE: 98.7 F | DIASTOLIC BLOOD PRESSURE: 87 MMHG | SYSTOLIC BLOOD PRESSURE: 148 MMHG | HEIGHT: 70 IN | WEIGHT: 185.19 LBS | RESPIRATION RATE: 16 BRPM | BODY MASS INDEX: 26.51 KG/M2

## 2018-09-28 PROCEDURE — 96415 CHEMO IV INFUSION ADDL HR: CPT

## 2018-09-28 PROCEDURE — 96367 TX/PROPH/DG ADDL SEQ IV INF: CPT

## 2018-09-28 PROCEDURE — 96413 CHEMO IV INFUSION 1 HR: CPT

## 2018-09-28 PROCEDURE — 96366 THER/PROPH/DIAG IV INF ADDON: CPT

## 2018-09-28 RX ADMIN — Medication 30 G: at 13:59

## 2018-09-28 RX ADMIN — ACETAMINOPHEN 650 MG: 325 TABLET, FILM COATED ORAL at 08:28

## 2018-09-28 RX ADMIN — SODIUM CHLORIDE 20 ML/HR: 0.9 INJECTION, SOLUTION INTRAVENOUS at 08:43

## 2018-09-28 RX ADMIN — DARATUMUMAB 1300 MG: 100 INJECTION, SOLUTION, CONCENTRATE INTRAVENOUS at 10:22

## 2018-09-28 RX ADMIN — ZOLEDRONIC ACID 3.5 MG: 4 INJECTION, SOLUTION, CONCENTRATE INTRAVENOUS at 09:36

## 2018-09-28 RX ADMIN — SODIUM CHLORIDE 100 MG: 0.9 INJECTION, SOLUTION INTRAVENOUS at 08:43

## 2018-09-28 RX ADMIN — DIPHENHYDRAMINE HYDROCHLORIDE 25 MG: 50 INJECTION, SOLUTION INTRAMUSCULAR; INTRAVENOUS at 09:10

## 2018-09-28 NOTE — PROGRESS NOTES
Pt reported to unit today without complaint  Pt denies recent or upcoming invasive dental procedures, jaw pain, or infections/abx usage  Pt tolerated Zometa, Darzalex, and IVIGG infusions well, no adverse events  AVS provided to pt  Pt left unit without questions/concerns

## 2018-10-04 DIAGNOSIS — E03.9 HYPOTHYROIDISM, UNSPECIFIED TYPE: ICD-10-CM

## 2018-10-04 RX ORDER — LEVOTHYROXINE SODIUM 0.05 MG/1
TABLET ORAL
Qty: 30 TABLET | Refills: 0 | Status: SHIPPED | OUTPATIENT
Start: 2018-10-04 | End: 2018-11-05 | Stop reason: SDUPTHER

## 2018-10-10 ENCOUNTER — APPOINTMENT (OUTPATIENT)
Dept: LAB | Facility: CLINIC | Age: 73
End: 2018-10-10
Payer: COMMERCIAL

## 2018-10-24 ENCOUNTER — APPOINTMENT (OUTPATIENT)
Dept: LAB | Facility: CLINIC | Age: 73
End: 2018-10-24
Payer: COMMERCIAL

## 2018-10-24 DIAGNOSIS — C90.00 MULTIPLE MYELOMA NOT HAVING ACHIEVED REMISSION (HCC): ICD-10-CM

## 2018-10-24 LAB
ALBUMIN SERPL BCP-MCNC: 3.4 G/DL (ref 3.5–5)
ALP SERPL-CCNC: 57 U/L (ref 46–116)
ALT SERPL W P-5'-P-CCNC: 27 U/L (ref 12–78)
ANION GAP SERPL CALCULATED.3IONS-SCNC: 5 MMOL/L (ref 4–13)
AST SERPL W P-5'-P-CCNC: 14 U/L (ref 5–45)
BASOPHILS # BLD AUTO: 0.03 THOUSANDS/ΜL (ref 0–0.1)
BASOPHILS NFR BLD AUTO: 1 % (ref 0–1)
BILIRUB SERPL-MCNC: 1.31 MG/DL (ref 0.2–1)
BUN SERPL-MCNC: 21 MG/DL (ref 5–25)
CALCIUM SERPL-MCNC: 8.4 MG/DL (ref 8.3–10.1)
CHLORIDE SERPL-SCNC: 103 MMOL/L (ref 100–108)
CO2 SERPL-SCNC: 29 MMOL/L (ref 21–32)
CREAT SERPL-MCNC: 1.22 MG/DL (ref 0.6–1.3)
EOSINOPHIL # BLD AUTO: 0.27 THOUSAND/ΜL (ref 0–0.61)
EOSINOPHIL NFR BLD AUTO: 6 % (ref 0–6)
ERYTHROCYTE [DISTWIDTH] IN BLOOD BY AUTOMATED COUNT: 14.5 % (ref 11.6–15.1)
GFR SERPL CREATININE-BSD FRML MDRD: 59 ML/MIN/1.73SQ M
GLUCOSE SERPL-MCNC: 86 MG/DL (ref 65–140)
HCT VFR BLD AUTO: 34.9 % (ref 36.5–49.3)
HGB BLD-MCNC: 11.7 G/DL (ref 12–17)
IMM GRANULOCYTES # BLD AUTO: 0.02 THOUSAND/UL (ref 0–0.2)
IMM GRANULOCYTES NFR BLD AUTO: 1 % (ref 0–2)
LYMPHOCYTES # BLD AUTO: 0.11 THOUSANDS/ΜL (ref 0.6–4.47)
LYMPHOCYTES NFR BLD AUTO: 3 % (ref 14–44)
MCH RBC QN AUTO: 35 PG (ref 26.8–34.3)
MCHC RBC AUTO-ENTMCNC: 33.5 G/DL (ref 31.4–37.4)
MCV RBC AUTO: 105 FL (ref 82–98)
MONOCYTES # BLD AUTO: 0.9 THOUSAND/ΜL (ref 0.17–1.22)
MONOCYTES NFR BLD AUTO: 21 % (ref 4–12)
NEUTROPHILS # BLD AUTO: 2.93 THOUSANDS/ΜL (ref 1.85–7.62)
NEUTS SEG NFR BLD AUTO: 68 % (ref 43–75)
NRBC BLD AUTO-RTO: 0 /100 WBCS
PMV BLD AUTO: 10.9 FL (ref 8.9–12.7)
POTASSIUM SERPL-SCNC: 4 MMOL/L (ref 3.5–5.3)
PROT SERPL-MCNC: 6.8 G/DL (ref 6.4–8.2)
RBC # BLD AUTO: 3.34 MILLION/UL (ref 3.88–5.62)
SODIUM SERPL-SCNC: 137 MMOL/L (ref 136–145)
WBC # BLD AUTO: 4.26 THOUSAND/UL (ref 4.31–10.16)

## 2018-10-24 PROCEDURE — 85025 COMPLETE CBC W/AUTO DIFF WBC: CPT

## 2018-10-24 PROCEDURE — 36415 COLL VENOUS BLD VENIPUNCTURE: CPT

## 2018-10-24 PROCEDURE — 80053 COMPREHEN METABOLIC PANEL: CPT

## 2018-10-25 RX ORDER — SODIUM CHLORIDE 9 MG/ML
20 INJECTION, SOLUTION INTRAVENOUS CONTINUOUS
Status: DISCONTINUED | OUTPATIENT
Start: 2018-10-26 | End: 2018-10-29 | Stop reason: HOSPADM

## 2018-10-25 RX ORDER — ACETAMINOPHEN 325 MG/1
650 TABLET ORAL ONCE
Status: COMPLETED | OUTPATIENT
Start: 2018-10-26 | End: 2018-10-26

## 2018-10-26 ENCOUNTER — HOSPITAL ENCOUNTER (OUTPATIENT)
Dept: INFUSION CENTER | Facility: CLINIC | Age: 73
Discharge: HOME | End: 2018-10-26
Payer: COMMERCIAL

## 2018-10-26 VITALS
SYSTOLIC BLOOD PRESSURE: 155 MMHG | WEIGHT: 189.6 LBS | HEIGHT: 70 IN | DIASTOLIC BLOOD PRESSURE: 82 MMHG | RESPIRATION RATE: 18 BRPM | HEART RATE: 65 BPM | TEMPERATURE: 97.9 F | BODY MASS INDEX: 27.14 KG/M2

## 2018-10-26 LAB
ANISOCYTOSIS BLD QL SMEAR: PRESENT
BASOPHILS # BLD AUTO: 0 THOUSAND/UL (ref 0–0.1)
BASOPHILS NFR MAR MANUAL: 0 % (ref 0–1)
EOSINOPHIL # BLD AUTO: 0.52 THOUSAND/UL (ref 0–0.61)
EOSINOPHIL NFR BLD MANUAL: 10 % (ref 0–6)
ERYTHROCYTE [DISTWIDTH] IN BLOOD BY AUTOMATED COUNT: 15.6 % (ref 11.6–15.1)
HCT VFR BLD AUTO: 34.9 % (ref 36.5–49.3)
HGB BLD-MCNC: 11.7 G/DL (ref 12–17)
LYMPHOCYTES # BLD AUTO: 0.52 THOUSAND/UL (ref 0.6–4.47)
LYMPHOCYTES # BLD AUTO: 10 % (ref 14–44)
MACROCYTES BLD QL AUTO: PRESENT
MCH RBC QN AUTO: 35.1 PG (ref 26.8–34.3)
MCHC RBC AUTO-ENTMCNC: 33.6 G/DL (ref 31.4–37.4)
MCV RBC AUTO: 104 FL (ref 82–98)
MONOCYTES # BLD AUTO: 0.47 THOUSAND/UL (ref 0–1.22)
MONOCYTES NFR BLD AUTO: 9 % (ref 4–12)
NEUTS BAND NFR BLD MANUAL: 9 % (ref 0–8)
NEUTS SEG # BLD: 3.69 THOUSAND/UL (ref 1.81–6.82)
NEUTS SEG NFR BLD AUTO: 62 % (ref 43–75)
PLATELET # BLD AUTO: 105 THOUSANDS/UL (ref 149–390)
PLATELET BLD QL SMEAR: ABNORMAL
PMV BLD AUTO: 7.2 FL (ref 8.9–12.7)
RBC # BLD AUTO: 3.34 MILLION/UL (ref 3.88–5.62)
TOTAL CELLS COUNTED SPEC: 100
WBC # BLD AUTO: 5.2 THOUSAND/UL (ref 4.31–10.16)
WBC NRBC COR # BLD: 5.2 THOUSAND/UL (ref 4.31–10.16)

## 2018-10-26 PROCEDURE — 96413 CHEMO IV INFUSION 1 HR: CPT

## 2018-10-26 PROCEDURE — 85007 BL SMEAR W/DIFF WBC COUNT: CPT | Performed by: INTERNAL MEDICINE

## 2018-10-26 PROCEDURE — 85027 COMPLETE CBC AUTOMATED: CPT | Performed by: INTERNAL MEDICINE

## 2018-10-26 PROCEDURE — 96415 CHEMO IV INFUSION ADDL HR: CPT

## 2018-10-26 PROCEDURE — 96367 TX/PROPH/DG ADDL SEQ IV INF: CPT

## 2018-10-26 PROCEDURE — 96366 THER/PROPH/DIAG IV INF ADDON: CPT

## 2018-10-26 PROCEDURE — 96375 TX/PRO/DX INJ NEW DRUG ADDON: CPT

## 2018-10-26 RX ADMIN — Medication 30 G: at 09:18

## 2018-10-26 RX ADMIN — ACETAMINOPHEN 650 MG: 325 TABLET, FILM COATED ORAL at 11:55

## 2018-10-26 RX ADMIN — DIPHENHYDRAMINE HYDROCHLORIDE 25 MG: 50 INJECTION, SOLUTION INTRAMUSCULAR; INTRAVENOUS at 12:33

## 2018-10-26 RX ADMIN — SODIUM CHLORIDE 100 MG: 0.9 INJECTION, SOLUTION INTRAVENOUS at 12:04

## 2018-10-26 RX ADMIN — DARATUMUMAB 1300 MG: 100 INJECTION, SOLUTION, CONCENTRATE INTRAVENOUS at 12:57

## 2018-10-26 RX ADMIN — ZOLEDRONIC ACID 3.5 MG: 4 INJECTION, SOLUTION, CONCENTRATE INTRAVENOUS at 11:24

## 2018-10-26 NOTE — PLAN OF CARE
Problem: PAIN - ADULT  Goal: Verbalizes/displays adequate comfort level or baseline comfort level  Interventions:  - Encourage patient to monitor pain and request assistance  - Assess pain using appropriate pain scale  - Administer analgesics based on type and severity of pain and evaluate response  - Implement non-pharmacological measures as appropriate and evaluate response  - Consider cultural and social influences on pain and pain management  - Notify physician/advanced practitioner if interventions unsuccessful or patient reports new pain  Outcome: Progressing      Problem: INFECTION - ADULT  Goal: Absence or prevention of progression during hospitalization  INTERVENTIONS:  - Assess and monitor for signs and symptoms of infection  - Monitor lab/diagnostic results  - Monitor all insertion sites, i e  indwelling lines, tubes, and drains  - Monitor endotracheal (as able) and nasal secretions for changes in amount and color  - [place] appropriate cooling/warming therapies per order  - Administer medications as ordered  - Instruct and encourage patient and family to use good hand hygiene technique  - Identify and instruct in appropriate isolation precautions for identified infection/condition  Outcome: Progressing    Goal: Absence of fever/infection during neutropenic period  INTERVENTIONS:  - Monitor WBC  - Implement neutropenic guidelines  Outcome: Progressing      Problem: Knowledge Deficit  Goal: Patient/family/caregiver demonstrates understanding of disease process, treatment plan, medications, and discharge instructions  Complete learning assessment and assess knowledge base    Interventions:  - Provide teaching at level of understanding  - Provide teaching via preferred learning methods  Outcome: Progressing

## 2018-10-26 NOTE — PROGRESS NOTES
Pt  Denies new symptoms or concerns  CBC obtained and ordered related to platelet clumping  Zometa, IVIG and Darzalex ordered for infusion today  Will wait for CBC results

## 2018-10-26 NOTE — PROGRESS NOTES
Pt  Tolerated IVIG, Zometa 3 5, and Darzalex without adverse event  Future appointments reviewed  Declined AVS  Granix not given per parameters  AND 3 69

## 2018-11-02 ENCOUNTER — TELEPHONE (OUTPATIENT)
Dept: HEMATOLOGY ONCOLOGY | Facility: CLINIC | Age: 73
End: 2018-11-02

## 2018-11-02 NOTE — TELEPHONE ENCOUNTER
LM for pt stating that Dr Abbe Yarbrough will not be available at 7:20 on 11/6, told pt I moved his appt to the same day with Janet BORGES  Asked pt to call if pt was not able to make that appt

## 2018-11-05 DIAGNOSIS — F41.9 ANXIETY: ICD-10-CM

## 2018-11-05 DIAGNOSIS — E03.9 HYPOTHYROIDISM, UNSPECIFIED TYPE: ICD-10-CM

## 2018-11-05 RX ORDER — ESCITALOPRAM OXALATE 20 MG/1
20 TABLET ORAL DAILY
Qty: 90 TABLET | Refills: 0 | Status: SHIPPED | OUTPATIENT
Start: 2018-11-05 | End: 2019-02-21 | Stop reason: SDUPTHER

## 2018-11-05 RX ORDER — LEVOTHYROXINE SODIUM 0.05 MG/1
50 TABLET ORAL DAILY
Qty: 90 TABLET | Refills: 0 | Status: SHIPPED | OUTPATIENT
Start: 2018-11-05 | End: 2019-02-06 | Stop reason: SDUPTHER

## 2018-11-06 ENCOUNTER — OFFICE VISIT (OUTPATIENT)
Dept: HEMATOLOGY ONCOLOGY | Facility: CLINIC | Age: 73
End: 2018-11-06
Payer: COMMERCIAL

## 2018-11-06 VITALS
HEIGHT: 70 IN | TEMPERATURE: 97.6 F | DIASTOLIC BLOOD PRESSURE: 62 MMHG | RESPIRATION RATE: 16 BRPM | WEIGHT: 190 LBS | SYSTOLIC BLOOD PRESSURE: 142 MMHG | HEART RATE: 59 BPM | OXYGEN SATURATION: 96 % | BODY MASS INDEX: 27.2 KG/M2

## 2018-11-06 DIAGNOSIS — C90.00 IGG MULTIPLE MYELOMA (HCC): Primary | ICD-10-CM

## 2018-11-06 PROCEDURE — 99214 OFFICE O/P EST MOD 30 MIN: CPT | Performed by: PHYSICIAN ASSISTANT

## 2018-11-06 NOTE — PROGRESS NOTES
[address] [facility] [place]  [address] [address] [phone]  Progress Note  Arian Bennett, 1945, [id number]  11/6/2018    Assessment/Plan:  1  IgG multiple myeloma ([address] )  Patient is doing quite well and past workup in August 2018 demonstrated no evidence of disease  He is due for re-staging at the end of November  Hemoglobin creatinine and calcium appear to be stable over the last 2 months  Patient will be attending appointments at the [address] [place] [address] at that time  Patient is doing quite well on below maintenance regiment  Patient is due for next daratumumab on 11/23/18  Patient does not have any questions or concerns at this time  Regimen:  Daratumumab 16mg/m2 IV Day 1  Zometa 4 mg IV Day 1  Pomalyst 4 mg p o  daily 1-21  Dexamethasone 4 mg PO Days 2, 3  Dexamethasone 12 mg PODays 8, 15, 22  Cycle length = 28 days    2  Thrombocytopenia, improved  This was likely multifactorial secondary to stem Cell Transplant/chemotherapy  Patient started on Promacta and continues to take 50 mg of this medication daily  Patient will discuss discontinuation/reduced dose or taper with physicians at the [address] [place] [address]  Patient's baseline is between 106-117  One sample did demonstrate platelet clumping  The patient is scheduled for follow-up in approximately 3 months with Dr Sulma Barrow  Patient voiced agreement and understanding to the above  Patient knows to call the Hematology/Oncology office with any questions and concerns regarding the above  Carefully review your medication list in verify the list is accurate and up-to-date  Please call the hematologic/Oncology office if there medications missing from the less, medications on the list that your not currently taking or if there is a dosage or instruction that is different from higher taking medication      I have spent 20 minutes with Patient and family today in which greater than 50% of this time was spent in counseling/coordination of care regarding Diagnostic results and Intructions for management  The patient's current treatment goals are continued remission  Barrier(s) to care identified  None  The patient is able to self care     -------------------------------------------------------------------------------------------------------    Chief complaint:   Chief Complaint   Patient presents with    Follow-up     3 month myeloma       History of present illness/Cancer History:   Oncology History    June 2015- routine FU at PCP all good  IgG multiple myeloma ([facility] [address] )    9/2015 Initial Diagnosis     Found to have Hbg of 6 with SOB, creatinine 1 8 and normal calcium with albumin of 2 1  Additonial blood work showed elevated protien level (12 1g/dL)  9/29/2015 Biopsy     Bone marrow biopsy demonstrated approximately 80% plasma cells with some immature forms noted  These studies showed 13q14 deletion, +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, 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)           10/14/2015 - 11/11/2015 Chemotherapy     Velcade 1 3 mg/m2 Days 1,8,15,22  Cytoxan 300 mg/m2  PO Days 1, 8,15, 22  Dexamethasone 40 mg PO Days 1,8,15, 22  Zometa 4 mg IV Day 1  Cycle length = 28 days    Completed 2 cycles  Day one of cycle 2 was on 11/11/15          12/2015 - 2/2016 Chemotherapy     VDT-PACE x 2 cycles   (completed at [facility] in [place], [address] [place] [address])         2/2016 Transplant     Melphalane 200 mg/m2 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  MRD +         8/2016 - 1/26/2018 Chemotherapy     Maintenance therapy:  Carfilzomib, orginally dosed 27 mg weekly then increased to 45 mg weekly after MRD reactivitation  Revlimid  Dexamethasone          2/2018 Biopsy     Bone marrow demonstrated positive minimal residual disease           2/23/2018 -  Chemotherapy     Daratumumab 16mg/m2 IV Day 1  Pomalyst 4 mg p o  daily 1-21  Dexamethasone 4 mg PO Days 2, 3  Dexamethasone 12 mg PODays 8, 15, 22  Cycle length = 28 days           ECO - Asymptomatic    Interval history:       Patient notes feeling well  Patient states occasional fatigue  However patient looks forward to when he takes his steroids as it appears to have a stimulating effect for him  Otherwise patient notes that he is due for follow-up at the [address] [place] [address] at the end of November  Patient does not have any additional questions or concerns  Patient's wife does not have any comments regarding declining performance status  Patient's review of systems was largely negative with the exception of some baseline fatigue which she has noted previously  There is no progression of fatigue  Review of Systems   Constitutional: Positive for fatigue  Negative for activity change, appetite change, chills, fever and unexpected weight change  HENT: Negative for hearing loss, mouth sores, nosebleeds, sore throat, trouble swallowing and voice change  Eyes: Negative for visual disturbance  Respiratory: Negative for cough and shortness of breath  Cardiovascular: Negative for chest pain, palpitations and leg swelling  Gastrointestinal: Negative for abdominal pain, blood in stool, constipation, diarrhea, nausea and vomiting  Endocrine: Negative for cold intolerance  Genitourinary: Negative for decreased urine volume, dysuria and hematuria  Musculoskeletal: Negative for arthralgias and myalgias  Skin: Negative for rash  Neurological: Negative for dizziness, weakness, numbness and headaches  Hematological: Negative for adenopathy  Does not bruise/bleed easily  Psychiatric/Behavioral: Negative for dysphoric mood  Current Outpatient Prescriptions:     acetaminophen (TYLENOL) 325 mg tablet, Take 650 mg by mouth every 6 (six) hours as needed for mild pain, Disp: , Rfl:     acyclovir (ZOVIRAX) 400 MG tablet, Take 400 mg by mouth 2 (two) times a day , Disp: , Rfl:     aspirin 81 MG tablet, Take 81 mg by mouth daily  , Disp: , Rfl:     atorvastatin (LIPITOR) 20 mg tablet, Take 1 tablet (20 mg total) by mouth daily, Disp: 30 tablet, Rfl: 5    Cholecalciferol (VITAMIN D-3 PO), Take 1 tablet by mouth daily  , Disp: , Rfl:     dexamethasone (DECADRON) 4 mg tablet, Take 4 mg by mouth see administration instructions Pt takes total of 12 mg every week on Friday other than week he received Darzalex  On weeks he receivs Darzalex, he takes 4mg day 2 and day 3 of the cycle  , Disp: , Rfl:     Docusate Sodium (COLACE PO), Take 1 tablet by mouth as needed  , Disp: , Rfl:     eltrombopag (PROMACTA) 50 MG tablet, Take 50 mg by mouth daily Administer on an empty stomach, 1 hour before or 2 hours after a meal   , Disp: , Rfl:     escitalopram (LEXAPRO) 20 mg tablet, Take 1 tablet (20 mg total) by mouth daily, Disp: 90 tablet, Rfl: 0    Glutamine POWD, by Does not apply route, Disp: , Rfl:     levothyroxine 50 mcg tablet, Take 1 tablet (50 mcg total) by mouth daily, Disp: 90 tablet, Rfl: 0    LORazepam (ATIVAN) 0 5 mg tablet, Take 0 5 mg by mouth every 6 (six) hours as needed for anxiety  , Disp: , Rfl:     Multiple Vitamin (MULTIVITAMINS PO), Take 1 tablet by mouth daily  , Disp: , Rfl:     nebivolol (BYSTOLIC) 2 5 mg tablet, Take 1 tablet (2 5 mg total) by mouth daily, Disp: 30 tablet, Rfl: 3    ondansetron (ZOFRAN) 4 mg tablet, Take 4 mg by mouth every 8 (eight) hours as needed for nausea or vomiting , Disp: , Rfl:     Pomalidomide (POMALYST) 4 MG CAPS, Take 4 mg by mouth daily Days 1 thru 21, Disp: , Rfl:       No current facility-administered medications for this visit        No Known Allergies    Objective:   /62   Pulse 59   Temp 97 6 °F (36 4 °C) (Tympanic)   Resp 16   Ht 5' 10" (1 778 m)   Wt 86 2 kg (190 lb)   SpO2 96%   BMI 27 26 kg/m²   Wt Readings from Last 3 Encounters:   11/06/18 86 2 kg (190 lb)   10/26/18 86 kg (189 lb 9 5 oz)   09/28/18 84 kg (185 lb 3 oz)     Physical Exam   Constitutional: He is oriented to person, place, and time  He appears well-developed and well-nourished  No distress  HENT:   Head: Normocephalic and atraumatic  Mouth/Throat: No oropharyngeal exudate  Eyes: Pupils are equal, round, and reactive to light  Conjunctivae and EOM are normal  No scleral icterus  Neck: Normal range of motion  Cardiovascular: Normal rate and regular rhythm  No murmur heard  Pulmonary/Chest: Effort normal and breath sounds normal  No respiratory distress  Abdominal: Soft  Bowel sounds are normal  He exhibits no distension  There is no hepatosplenomegaly  There is no tenderness  Musculoskeletal: He exhibits no edema or tenderness  Lymphadenopathy:     He has no cervical adenopathy  Neurological: He is alert and oriented to person, place, and time  No cranial nerve deficit  Skin: No rash noted  No erythema  No pallor  Pertinent Laboratory Results and Imaging Review:  Hospital Outpatient Visit on 10/26/2018   Component Date Value Ref Range Status    WBC 10/26/2018 5 20  4  31 - 10 16 Thousand/uL Final    Adjusted WBC 10/26/2018 5 20  4  31 - 10 16 Thousand/uL Final    RBC 10/26/2018 3 34* 3 88 - 5 62 Million/uL Final    Hemoglobin 10/26/2018 11 7* 12 0 - 17 0 g/dL Final    Hematocrit 10/26/2018 34 9* 36 5 - 49 3 % Final    MCV 10/26/2018 104* 82 - 98 fL Final    MCH 10/26/2018 35 1* 26 8 - 34 3 pg Final    MCHC 10/26/2018 33 6  31 4 - 37 4 g/dL Final    RDW 10/26/2018 15 6* 11 6 - 15 1 % Final    MPV 10/26/2018 7 2* 8 9 - 12 7 fL Final    Platelets 78/66/3995 105* 149 - 390 Thousands/uL Final    Segmented % 10/26/2018 62  43 - 75 % Final    Bands % 10/26/2018 9* 0 - 8 % Final    Lymphocytes % 10/26/2018 10* 14 - 44 % Final    Monocytes % 10/26/2018 9  4 - 12 % Final    Eosinophils, % 10/26/2018 10* 0 - 6 % Final    Basophils % 10/26/2018 0  0 - 1 % Final    Neutrophils Absolute 10/26/2018 3 69  1 81 - 6 82 Thousand/uL Final    Lymphocytes Absolute 10/26/2018 0 52* 0 60 - 4 47 Thousand/uL Final    Monocytes Absolute 10/26/2018 0 47  0 00 - 1 22 Thousand/uL Final    Eosinophils Absolute 10/26/2018 0 52  0 00 - 0 61 Thousand/uL Final    Basophils Absolute 10/26/2018 0 00  0 00 - 0 10 Thousand/uL Final    Total Counted 10/26/2018 100   Final    Anisocytosis 10/26/2018 Present   Final    Macrocytes 10/26/2018 Present   Final    Platelet Estimate 83/24/2231 Borderline* Adequate Final   Appointment on 10/24/2018   Component Date Value Ref Range Status    WBC 10/24/2018 4 26* 4 31 - 10 16 Thousand/uL Final    RBC 10/24/2018 3 34* 3 88 - 5 62 Million/uL Final    Hemoglobin 10/24/2018 11 7* 12 0 - 17 0 g/dL Final    Hematocrit 10/24/2018 34 9* 36 5 - 49 3 % Final    MCV 10/24/2018 105* 82 - 98 fL Final    MCH 10/24/2018 35 0* 26 8 - 34 3 pg Final    MCHC 10/24/2018 33 5  31 4 - 37 4 g/dL Final    RDW 10/24/2018 14 5  11 6 - 15 1 % Final    MPV 10/24/2018 10 9  8 9 - 12 7 fL Final    Platelets 08/81/2879   149 - 390 Thousands/uL Final    Not reported due to platelet clumping      nRBC 10/24/2018 0  /100 WBCs Final    Neutrophils Relative 10/24/2018 68  43 - 75 % Final    Immat GRANS % 10/24/2018 1  0 - 2 % Final    Lymphocytes Relative 10/24/2018 3* 14 - 44 % Final    Monocytes Relative 10/24/2018 21* 4 - 12 % Final    Eosinophils Relative 10/24/2018 6  0 - 6 % Final    Basophils Relative 10/24/2018 1  0 - 1 % Final    Neutrophils Absolute 10/24/2018 2 93  1 85 - 7 62 Thousands/µL Final    Immature Grans Absolute 10/24/2018 0 02  0 00 - 0 20 Thousand/uL Final    Lymphocytes Absolute 10/24/2018 0 11* 0 60 - 4 47 Thousands/µL Final    Monocytes Absolute 10/24/2018 0 90  0 17 - 1 22 Thousand/µL Final    Eosinophils Absolute 10/24/2018 0 27  0 00 - 0 61 Thousand/µL Final    Basophils Absolute 10/24/2018 0 03  0 00 - 0 10 Thousands/µL Final    Sodium 10/24/2018 137  136 - 145 mmol/L Final    Potassium 10/24/2018 4 0  3 5 - 5 3 mmol/L Final    Chloride 10/24/2018 103  100 - 108 mmol/L Final    CO2 10/24/2018 29  21 - 32 mmol/L Final    ANION GAP 10/24/2018 5  4 - 13 mmol/L Final    BUN 10/24/2018 21  5 - 25 mg/dL Final    Creatinine 10/24/2018 1 22  0 60 - 1 30 mg/dL Final    Standardized to IDMS reference method    Glucose 10/24/2018 86  65 - 140 mg/dL Final      If the patient is fasting, the ADA then defines impaired fasting glucose as > 100 mg/dL and diabetes as > or equal to 123 mg/dL  Specimen collection should occur prior to Sulfasalazine administration due to the potential for falsely depressed results  Specimen collection should occur prior to Sulfapyridine administration due to the potential for falsely elevated results   Calcium 10/24/2018 8 4  8 3 - 10 1 mg/dL Final    AST 10/24/2018 14  5 - 45 U/L Final      Specimen collection should occur prior to Sulfasalazine administration due to the potential for falsely depressed results   ALT 10/24/2018 27  12 - 78 U/L Final      Specimen collection should occur prior to Sulfasalazine and/or Sulfapyridine administration due to the potential for falsely depressed results       Alkaline Phosphatase 10/24/2018 57  46 - 116 U/L Final    Total Protein 10/24/2018 6 8  6 4 - 8 2 g/dL Final    Albumin 10/24/2018 3 4* 3 5 - 5 0 g/dL Final    Total Bilirubin 10/24/2018 1 31* 0 20 - 1 00 mg/dL Final    eGFR 10/24/2018 59  ml/min/1 73sq m Final   Ancillary Orders on 10/05/2018   Component Date Value Ref Range Status    WBC 10/10/2018 4 18* 4 31 - 10 16 Thousand/uL Final    RBC 10/10/2018 3 55* 3 88 - 5 62 Million/uL Final    Hemoglobin 10/10/2018 12 4  12 0 - 17 0 g/dL Final    Hematocrit 10/10/2018 36 9  36 5 - 49 3 % Final    MCV 10/10/2018 104* 82 - 98 fL Final    MCH 10/10/2018 34 9* 26 8 - 34 3 pg Final    MCHC 10/10/2018 33 6  31 4 - 37 4 g/dL Final    RDW 10/10/2018 14 2  11 6 - 15 1 % Final    MPV 10/10/2018 10 8  8 9 - 12 7 fL Final    Platelets 34/11/1764 117* 149 - 390 Thousands/uL Final    nRBC 10/10/2018 0  /100 WBCs Final    Neutrophils Relative 10/10/2018 63  43 - 75 % Final    Immat GRANS % 10/10/2018 0  0 - 2 % Final    Lymphocytes Relative 10/10/2018 7* 14 - 44 % Final    Monocytes Relative 10/10/2018 25* 4 - 12 % Final    Eosinophils Relative 10/10/2018 3  0 - 6 % Final    Basophils Relative 10/10/2018 2* 0 - 1 % Final    Neutrophils Absolute 10/10/2018 2 61  1 85 - 7 62 Thousands/µL Final    Immature Grans Absolute 10/10/2018 0 01  0 00 - 0 20 Thousand/uL Final    Lymphocytes Absolute 10/10/2018 0 30* 0 60 - 4 47 Thousands/µL Final    Monocytes Absolute 10/10/2018 1 06  0 17 - 1 22 Thousand/µL Final    Eosinophils Absolute 10/10/2018 0 11  0 00 - 0 61 Thousand/µL Final    Basophils Absolute 10/10/2018 0 09  0 00 - 0 10 Thousands/µL Final    Sodium 10/10/2018 133* 136 - 145 mmol/L Final    Potassium 10/10/2018 4 1  3 5 - 5 3 mmol/L Final    Chloride 10/10/2018 101  100 - 108 mmol/L Final    CO2 10/10/2018 28  21 - 32 mmol/L Final    ANION GAP 10/10/2018 4  4 - 13 mmol/L Final    BUN 10/10/2018 24  5 - 25 mg/dL Final    Creatinine 10/10/2018 1 35* 0 60 - 1 30 mg/dL Final    Standardized to IDMS reference method    Glucose 10/10/2018 95  65 - 140 mg/dL Final      If the patient is fasting, the ADA then defines impaired fasting glucose as > 100 mg/dL and diabetes as > or equal to 123 mg/dL  Specimen collection should occur prior to Sulfasalazine administration due to the potential for falsely depressed results  Specimen collection should occur prior to Sulfapyridine administration due to the potential for falsely elevated results   Calcium 10/10/2018 8 7  8 3 - 10 1 mg/dL Final    AST 10/10/2018 15  5 - 45 U/L Final      Specimen collection should occur prior to Sulfasalazine administration due to the potential for falsely depressed results       ALT 10/10/2018 32  12 - 78 U/L Final      Specimen collection should occur prior to Sulfasalazine and/or Sulfapyridine administration due to the potential for falsely depressed results       Alkaline Phosphatase 10/10/2018 63  46 - 116 U/L Final    Total Protein 10/10/2018 7 2  6 4 - 8 2 g/dL Final    Albumin 10/10/2018 3 5  3 5 - 5 0 g/dL Final    Total Bilirubin 10/10/2018 1 31* 0 20 - 1 00 mg/dL Final    eGFR 10/10/2018 53  ml/min/1 73sq m Final   Appointment on 09/26/2018   Component Date Value Ref Range Status    WBC 09/26/2018 5 71  4 31 - 10 16 Thousand/uL Final    RBC 09/26/2018 3 36* 3 88 - 5 62 Million/uL Final    Hemoglobin 09/26/2018 11 8* 12 0 - 17 0 g/dL Final    Hematocrit 09/26/2018 35 7* 36 5 - 49 3 % Final    MCV 09/26/2018 106* 82 - 98 fL Final    MCH 09/26/2018 35 1* 26 8 - 34 3 pg Final    MCHC 09/26/2018 33 1  31 4 - 37 4 g/dL Final    RDW 09/26/2018 13 9  11 6 - 15 1 % Final    MPV 09/26/2018 10 7  8 9 - 12 7 fL Final    Platelets 54/24/6140 119* 149 - 390 Thousands/uL Final    nRBC 09/26/2018 0  /100 WBCs Final    Neutrophils Relative 09/26/2018 73  43 - 75 % Final    Immat GRANS % 09/26/2018 1  0 - 2 % Final    Lymphocytes Relative 09/26/2018 4* 14 - 44 % Final    Monocytes Relative 09/26/2018 17* 4 - 12 % Final    Eosinophils Relative 09/26/2018 4  0 - 6 % Final    Basophils Relative 09/26/2018 1  0 - 1 % Final    Neutrophils Absolute 09/26/2018 4 22  1 85 - 7 62 Thousands/µL Final    Immature Grans Absolute 09/26/2018 0 03  0 00 - 0 20 Thousand/uL Final    Lymphocytes Absolute 09/26/2018 0 22* 0 60 - 4 47 Thousands/µL Final    Monocytes Absolute 09/26/2018 0 94  0 17 - 1 22 Thousand/µL Final    Eosinophils Absolute 09/26/2018 0 22  0 00 - 0 61 Thousand/µL Final    Basophils Absolute 09/26/2018 0 08  0 00 - 0 10 Thousands/µL Final    Sodium 09/26/2018 136  136 - 145 mmol/L Final    Potassium 09/26/2018 3 9  3 5 - 5 3 mmol/L Final    Chloride 09/26/2018 102  100 - 108 mmol/L Final    CO2 09/26/2018 27  21 - 32 mmol/L Final    ANION GAP 09/26/2018 7  4 - 13 mmol/L Final    BUN 09/26/2018 23 5 - 25 mg/dL Final    Creatinine 09/26/2018 1 20  0 60 - 1 30 mg/dL Final    Standardized to IDMS reference method    Glucose, Fasting 09/26/2018 99  65 - 99 mg/dL Final      Specimen collection should occur prior to Sulfasalazine administration due to the potential for falsely depressed results  Specimen collection should occur prior to Sulfapyridine administration due to the potential for falsely elevated results   Calcium 09/26/2018 8 7  8 3 - 10 1 mg/dL Final    AST 09/26/2018 14  5 - 45 U/L Final      Specimen collection should occur prior to Sulfasalazine administration due to the potential for falsely depressed results   ALT 09/26/2018 28  12 - 78 U/L Final      Specimen collection should occur prior to Sulfasalazine and/or Sulfapyridine administration due to the potential for falsely depressed results       Alkaline Phosphatase 09/26/2018 61  46 - 116 U/L Final    Total Protein 09/26/2018 7 3  6 4 - 8 2 g/dL Final    Albumin 09/26/2018 3 7  3 5 - 5 0 g/dL Final    Total Bilirubin 09/26/2018 1 20* 0 20 - 1 00 mg/dL Final    eGFR 09/26/2018 61  ml/min/1 73sq m Final   Orders Only on 09/10/2018   Component Date Value Ref Range Status    WBC 09/12/2018 2 98* 4 31 - 10 16 Thousand/uL Final    RBC 09/12/2018 3 37* 3 88 - 5 62 Million/uL Final    Hemoglobin 09/12/2018 11 7* 12 0 - 17 0 g/dL Final    Hematocrit 09/12/2018 35 5* 36 5 - 49 3 % Final    MCV 09/12/2018 105* 82 - 98 fL Final    MCH 09/12/2018 34 7* 26 8 - 34 3 pg Final    MCHC 09/12/2018 33 0  31 4 - 37 4 g/dL Final    RDW 09/12/2018 13 8  11 6 - 15 1 % Final    MPV 09/12/2018 11 3  8 9 - 12 7 fL Final    Platelets 93/78/4793 121* 149 - 390 Thousands/uL Final    nRBC 09/12/2018 0  /100 WBCs Final    Neutrophils Relative 09/12/2018 58  43 - 75 % Final    Immat GRANS % 09/12/2018 0  0 - 2 % Final    Lymphocytes Relative 09/12/2018 10* 14 - 44 % Final    Monocytes Relative 09/12/2018 26* 4 - 12 % Final    Eosinophils Relative 09/12/2018 4  0 - 6 % Final    Basophils Relative 09/12/2018 2* 0 - 1 % Final    Neutrophils Absolute 09/12/2018 1 71* 1 85 - 7 62 Thousands/µL Final    Immature Grans Absolute 09/12/2018 0 01  0 00 - 0 20 Thousand/uL Final    Lymphocytes Absolute 09/12/2018 0 29* 0 60 - 4 47 Thousands/µL Final    Monocytes Absolute 09/12/2018 0 78  0 17 - 1 22 Thousand/µL Final    Eosinophils Absolute 09/12/2018 0 12  0 00 - 0 61 Thousand/µL Final    Basophils Absolute 09/12/2018 0 07  0 00 - 0 10 Thousands/µL Final    Sodium 09/12/2018 138  136 - 145 mmol/L Final    Potassium 09/12/2018 3 9  3 5 - 5 3 mmol/L Final    Chloride 09/12/2018 104  100 - 108 mmol/L Final    CO2 09/12/2018 26  21 - 32 mmol/L Final    ANION GAP 09/12/2018 8  4 - 13 mmol/L Final    BUN 09/12/2018 24  5 - 25 mg/dL Final    Creatinine 09/12/2018 1 30  0 60 - 1 30 mg/dL Final    Standardized to IDMS reference method    Glucose 09/12/2018 89  65 - 140 mg/dL Final      If the patient is fasting, the ADA then defines impaired fasting glucose as > 100 mg/dL and diabetes as > or equal to 123 mg/dL  Specimen collection should occur prior to Sulfasalazine administration due to the potential for falsely depressed results  Specimen collection should occur prior to Sulfapyridine administration due to the potential for falsely elevated results   Calcium 09/12/2018 8 9  8 3 - 10 1 mg/dL Final    AST 09/12/2018 15  5 - 45 U/L Final      Specimen collection should occur prior to Sulfasalazine administration due to the potential for falsely depressed results   ALT 09/12/2018 24  12 - 78 U/L Final      Specimen collection should occur prior to Sulfasalazine and/or Sulfapyridine administration due to the potential for falsely depressed results       Alkaline Phosphatase 09/12/2018 64  46 - 116 U/L Final    Total Protein 09/12/2018 7 2  6 4 - 8 2 g/dL Final    Albumin 09/12/2018 3 6  3 5 - 5 0 g/dL Final    Total Bilirubin 09/12/2018 0 91  0 20 - 1 00 mg/dL Final    eGFR 09/12/2018 55  ml/min/1 73sq m Final         The following historical data was reviewed  Past Medical History:   Diagnosis Date    History of autologous stem cell transplant ([facility] [address] )     Hypertension     History of HTN-stable since weight loss    Insomnia     Multiple myeloma (HCC)        Past Surgical History:   Procedure Laterality Date    APPENDECTOMY      Last assessed: 9/25/15    ARTHROSCOPY KNEE Left     9/30/09    EYE SURGERY      Lasik    KNEE CARTILAGE SURGERY      LIMBAL STEM CELL TRANSPLANT      Patient had 2 in [place]-2/29/2016 and 4/13/2016       Social History     Social History    Marital status: /Civil Union     Spouse name: N/A    Number of children: N/A    Years of education: N/A     Social History Main Topics    Smoking status: Never Smoker    Smokeless tobacco: Never Used    Alcohol use No    Drug use: No    Sexual activity: Not on file     Other Topics Concern    Not on file     Social History Narrative    No narrative on file       Family History   Problem Relation Age of Onset    Heart disease Father     Colon cancer Paternal Grandmother        Please note: This report has been generated by a voice recognition software system  Therefore there may be syntax, spelling, and/or grammatical errors  Please call if you have any questions

## 2018-11-07 ENCOUNTER — APPOINTMENT (OUTPATIENT)
Dept: LAB | Facility: CLINIC | Age: 73
End: 2018-11-07
Payer: COMMERCIAL

## 2018-11-07 DIAGNOSIS — C90.00 MULTIPLE MYELOMA NOT HAVING ACHIEVED REMISSION (HCC): ICD-10-CM

## 2018-11-07 LAB
ALBUMIN SERPL BCP-MCNC: 3.5 G/DL (ref 3.5–5)
ALP SERPL-CCNC: 68 U/L (ref 46–116)
ALT SERPL W P-5'-P-CCNC: 29 U/L (ref 12–78)
ANION GAP SERPL CALCULATED.3IONS-SCNC: 4 MMOL/L (ref 4–13)
AST SERPL W P-5'-P-CCNC: 17 U/L (ref 5–45)
BASOPHILS # BLD AUTO: 0.08 THOUSANDS/ΜL (ref 0–0.1)
BASOPHILS NFR BLD AUTO: 2 % (ref 0–1)
BILIRUB SERPL-MCNC: 1.08 MG/DL (ref 0.2–1)
BUN SERPL-MCNC: 25 MG/DL (ref 5–25)
CALCIUM SERPL-MCNC: 9.1 MG/DL (ref 8.3–10.1)
CHLORIDE SERPL-SCNC: 105 MMOL/L (ref 100–108)
CO2 SERPL-SCNC: 30 MMOL/L (ref 21–32)
CREAT SERPL-MCNC: 1.39 MG/DL (ref 0.6–1.3)
EOSINOPHIL # BLD AUTO: 0.11 THOUSAND/ΜL (ref 0–0.61)
EOSINOPHIL NFR BLD AUTO: 3 % (ref 0–6)
ERYTHROCYTE [DISTWIDTH] IN BLOOD BY AUTOMATED COUNT: 14.4 % (ref 11.6–15.1)
GFR SERPL CREATININE-BSD FRML MDRD: 51 ML/MIN/1.73SQ M
GLUCOSE SERPL-MCNC: 88 MG/DL (ref 65–140)
HCT VFR BLD AUTO: 35.9 % (ref 36.5–49.3)
HGB BLD-MCNC: 11.7 G/DL (ref 12–17)
IMM GRANULOCYTES # BLD AUTO: 0.01 THOUSAND/UL (ref 0–0.2)
IMM GRANULOCYTES NFR BLD AUTO: 0 % (ref 0–2)
LYMPHOCYTES # BLD AUTO: 0.33 THOUSANDS/ΜL (ref 0.6–4.47)
LYMPHOCYTES NFR BLD AUTO: 8 % (ref 14–44)
MCH RBC QN AUTO: 34.5 PG (ref 26.8–34.3)
MCHC RBC AUTO-ENTMCNC: 32.6 G/DL (ref 31.4–37.4)
MCV RBC AUTO: 106 FL (ref 82–98)
MONOCYTES # BLD AUTO: 0.89 THOUSAND/ΜL (ref 0.17–1.22)
MONOCYTES NFR BLD AUTO: 22 % (ref 4–12)
NEUTROPHILS # BLD AUTO: 2.55 THOUSANDS/ΜL (ref 1.85–7.62)
NEUTS SEG NFR BLD AUTO: 65 % (ref 43–75)
NRBC BLD AUTO-RTO: 0 /100 WBCS
PLATELET # BLD AUTO: 128 THOUSANDS/UL (ref 149–390)
PMV BLD AUTO: 10.7 FL (ref 8.9–12.7)
POTASSIUM SERPL-SCNC: 4.2 MMOL/L (ref 3.5–5.3)
PROT SERPL-MCNC: 7.4 G/DL (ref 6.4–8.2)
RBC # BLD AUTO: 3.39 MILLION/UL (ref 3.88–5.62)
SODIUM SERPL-SCNC: 139 MMOL/L (ref 136–145)
WBC # BLD AUTO: 3.97 THOUSAND/UL (ref 4.31–10.16)

## 2018-11-07 PROCEDURE — 36415 COLL VENOUS BLD VENIPUNCTURE: CPT

## 2018-11-07 PROCEDURE — 85025 COMPLETE CBC W/AUTO DIFF WBC: CPT

## 2018-11-07 PROCEDURE — 80053 COMPREHEN METABOLIC PANEL: CPT

## 2018-11-21 ENCOUNTER — APPOINTMENT (OUTPATIENT)
Dept: LAB | Facility: CLINIC | Age: 73
End: 2018-11-21
Payer: COMMERCIAL

## 2018-11-21 RX ORDER — ACETAMINOPHEN 325 MG/1
650 TABLET ORAL ONCE
Status: COMPLETED | OUTPATIENT
Start: 2018-11-23 | End: 2018-11-23

## 2018-11-21 RX ORDER — SODIUM CHLORIDE 9 MG/ML
20 INJECTION, SOLUTION INTRAVENOUS CONTINUOUS
Status: DISCONTINUED | OUTPATIENT
Start: 2018-11-23 | End: 2018-11-26 | Stop reason: HOSPADM

## 2018-11-23 ENCOUNTER — HOSPITAL ENCOUNTER (OUTPATIENT)
Dept: INFUSION CENTER | Facility: CLINIC | Age: 73
Discharge: HOME | End: 2018-11-23
Payer: COMMERCIAL

## 2018-11-23 VITALS
WEIGHT: 193.12 LBS | HEART RATE: 60 BPM | SYSTOLIC BLOOD PRESSURE: 131 MMHG | BODY MASS INDEX: 27.65 KG/M2 | RESPIRATION RATE: 16 BRPM | HEIGHT: 70 IN | DIASTOLIC BLOOD PRESSURE: 62 MMHG | TEMPERATURE: 99.2 F

## 2018-11-23 PROCEDURE — 96413 CHEMO IV INFUSION 1 HR: CPT

## 2018-11-23 PROCEDURE — 96415 CHEMO IV INFUSION ADDL HR: CPT

## 2018-11-23 PROCEDURE — 96367 TX/PROPH/DG ADDL SEQ IV INF: CPT

## 2018-11-23 PROCEDURE — 96366 THER/PROPH/DIAG IV INF ADDON: CPT

## 2018-11-23 RX ADMIN — SODIUM CHLORIDE 100 MG: 0.9 INJECTION, SOLUTION INTRAVENOUS at 12:07

## 2018-11-23 RX ADMIN — ACETAMINOPHEN 650 MG: 325 TABLET, FILM COATED ORAL at 12:07

## 2018-11-23 RX ADMIN — SODIUM CHLORIDE 20 ML/HR: 0.9 INJECTION, SOLUTION INTRAVENOUS at 08:25

## 2018-11-23 RX ADMIN — ZOLEDRONIC ACID 3.3 MG: 4 INJECTION, SOLUTION, CONCENTRATE INTRAVENOUS at 11:24

## 2018-11-23 RX ADMIN — Medication 30 G: at 09:05

## 2018-11-23 RX ADMIN — DIPHENHYDRAMINE HYDROCHLORIDE 25 MG: 50 INJECTION, SOLUTION INTRAMUSCULAR; INTRAVENOUS at 12:32

## 2018-11-23 RX ADMIN — DARATUMUMAB 1400 MG: 100 INJECTION, SOLUTION, CONCENTRATE INTRAVENOUS at 12:55

## 2018-11-23 NOTE — PROGRESS NOTES
Pt  Denies new symptoms or concerns at this time  Labs reviewed and within normal parameters for Darzalex infusion  IVIG and Zometa also ordered for infusion today

## 2018-11-23 NOTE — PROGRESS NOTES
Pt  Tolerated IVIG, Zometa and Darzalex without adverse event  Future appointments reviewed  AVS provided

## 2018-11-23 NOTE — PLAN OF CARE
Problem: PAIN - ADULT  Goal: Verbalizes/displays adequate comfort level or baseline comfort level  Interventions:  - Encourage patient to monitor pain and request assistance  - Assess pain using appropriate pain scale  - Administer analgesics based on type and severity of pain and evaluate response  - Implement non-pharmacological measures as appropriate and evaluate response  - Consider cultural and social influences on pain and pain management  - Notify physician/advanced practitioner if interventions unsuccessful or patient reports new pain  Outcome: Progressing      Problem: INFECTION - ADULT  Goal: Absence or prevention of progression during hospitalization  INTERVENTIONS:  - Assess and monitor for signs and symptoms of infection  - Monitor lab/diagnostic results  - Monitor all insertion sites, i e  indwelling lines, tubes, and drains  - Monitor endotracheal (as able) and nasal secretions for changes in amount and color  - Loiza appropriate cooling/warming therapies per order  - Administer medications as ordered  - Instruct and encourage patient and family to use good hand hygiene technique  - Identify and instruct in appropriate isolation precautions for identified infection/condition  Outcome: Progressing    Goal: Absence of fever/infection during neutropenic period  INTERVENTIONS:  - Monitor WBC  - Implement neutropenic guidelines  Outcome: Progressing      Problem: Knowledge Deficit  Goal: Patient/family/caregiver demonstrates understanding of disease process, treatment plan, medications, and discharge instructions  Complete learning assessment and assess knowledge base    Interventions:  - Provide teaching at level of understanding  - Provide teaching via preferred learning methods  Outcome: Progressing

## 2018-12-03 ENCOUNTER — TELEPHONE (OUTPATIENT)
Dept: HEMATOLOGY ONCOLOGY | Facility: CLINIC | Age: 73
End: 2018-12-03

## 2018-12-03 DIAGNOSIS — C90.00 MULTIPLE MYELOMA NOT HAVING ACHIEVED REMISSION (HCC): Primary | ICD-10-CM

## 2018-12-03 RX ORDER — ACYCLOVIR 400 MG/1
400 TABLET ORAL 2 TIMES DAILY
Qty: 60 TABLET | Refills: 3 | Status: SHIPPED | OUTPATIENT
Start: 2018-12-03 | End: 2019-03-26 | Stop reason: SDUPTHER

## 2018-12-03 NOTE — TELEPHONE ENCOUNTER
Requesting a refill  Acyclovir 400mg  # 60 sitab Bid   Please call in to [facility]

## 2018-12-06 ENCOUNTER — OFFICE VISIT (OUTPATIENT)
Dept: INTERNAL MEDICINE CLINIC | Facility: CLINIC | Age: 73
End: 2018-12-06
Payer: COMMERCIAL

## 2018-12-06 VITALS
HEIGHT: 70 IN | OXYGEN SATURATION: 97 % | BODY MASS INDEX: 27.11 KG/M2 | WEIGHT: 189.4 LBS | HEART RATE: 64 BPM | TEMPERATURE: 97.6 F

## 2018-12-06 DIAGNOSIS — I10 HYPERTENSION, UNSPECIFIED TYPE: Primary | ICD-10-CM

## 2018-12-06 PROCEDURE — 99214 OFFICE O/P EST MOD 30 MIN: CPT | Performed by: INTERNAL MEDICINE

## 2018-12-06 PROCEDURE — 3008F BODY MASS INDEX DOCD: CPT | Performed by: INTERNAL MEDICINE

## 2018-12-06 PROCEDURE — 1160F RVW MEDS BY RX/DR IN RCRD: CPT | Performed by: INTERNAL MEDICINE

## 2018-12-06 NOTE — PROGRESS NOTES
Assessment/Plan:    No problem-specific Assessment & Plan notes found for this encounter  Problem List Items Addressed This Visit     None            Subjective:      Patient ID: Nikolas Mane is a 72 y o  male  HPIael is here today for a visit accompanied by his wife Skylar  He feels well he continues under the care of Dr Castro Bring in his local oncologist and also a team at the [facility] where he had a stem-cell transplant he has been off nebivolol for several months as blood pressures at home have been satisfactory  He takes his other medications very faithful in compulsively is still working in is in involved in a variety a very meaningful community commitments  Appetite bowels digestion of her are are good and he really has no major complaints  He did have a colonoscopy in September of 2014 had a small polyp and a repeat was suggested in 2019  The following portions of the patient's history were reviewed and updated as appropriate: allergies, current medications, past family history, past medical history, past social history, past surgical history and problem list     Review of Systems  entirely noncontributory    Objective:      Pulse 64   Temp 97 6 °F (36 4 °C) (Tympanic)   Ht 5' 9 69" (1 77 m)   Wt 85 9 kg (189 lb 6 4 oz)   SpO2 97%   BMI 27 42 kg/m²          Physical Exam  vital appears well in no distress he has total alopecia toe talus his pressure is 122/80 the carotids are quiet neck is unremarkable lungs are clear cardiac examination reveals regular rhythm physiologic rate no murmurs or gallops the abdomen is benign and there is no peripheral edema the skin is warm and dry Adelina Mura looks great we talked about the pros and cons of PSA testing and at this point my suggestion is that this to be abandoned  I am going to ask him to discuss the merits if any of PSA testing with Dr Kidd and also his oncologist in [place]    Normally he might be due for a colonoscopy in the near future but because of his myeloma low platelet counts and so forth I am not sure that this is particularly good idea either at any rate no changes are made in his medication he is up-to-date with lab studies he did get a flu shot we did notice that his creatinine has been slowly clip creeping up and the most recent 1 was 1 4 of questionable to go easy on NSAIDs in any physician that prescribes medication for him must keep his renal function in mind will see him back for blood pressure check in general surveillance in a few months

## 2018-12-19 ENCOUNTER — APPOINTMENT (OUTPATIENT)
Dept: LAB | Facility: CLINIC | Age: 73
End: 2018-12-19
Payer: COMMERCIAL

## 2018-12-19 LAB
ALBUMIN SERPL BCP-MCNC: 3.4 G/DL (ref 3.5–5)
ALP SERPL-CCNC: 57 U/L (ref 46–116)
ALT SERPL W P-5'-P-CCNC: 32 U/L (ref 12–78)
ANION GAP SERPL CALCULATED.3IONS-SCNC: 5 MMOL/L (ref 4–13)
AST SERPL W P-5'-P-CCNC: 17 U/L (ref 5–45)
BILIRUB SERPL-MCNC: 1.25 MG/DL (ref 0.2–1)
BUN SERPL-MCNC: 18 MG/DL (ref 5–25)
CALCIUM SERPL-MCNC: 8.4 MG/DL (ref 8.3–10.1)
CHLORIDE SERPL-SCNC: 104 MMOL/L (ref 100–108)
CO2 SERPL-SCNC: 27 MMOL/L (ref 21–32)
CREAT SERPL-MCNC: 1.18 MG/DL (ref 0.6–1.3)
GFR SERPL CREATININE-BSD FRML MDRD: 62 ML/MIN/1.73SQ M
GLUCOSE SERPL-MCNC: 83 MG/DL (ref 65–140)
POTASSIUM SERPL-SCNC: 4 MMOL/L (ref 3.5–5.3)
PROT SERPL-MCNC: 6.6 G/DL (ref 6.4–8.2)
SODIUM SERPL-SCNC: 136 MMOL/L (ref 136–145)

## 2018-12-19 PROCEDURE — 36415 COLL VENOUS BLD VENIPUNCTURE: CPT | Performed by: INTERNAL MEDICINE

## 2018-12-19 PROCEDURE — 80053 COMPREHEN METABOLIC PANEL: CPT | Performed by: INTERNAL MEDICINE

## 2018-12-19 PROCEDURE — 85025 COMPLETE CBC W/AUTO DIFF WBC: CPT | Performed by: INTERNAL MEDICINE

## 2018-12-20 LAB
BASOPHILS # BLD AUTO: 0.04 THOUSANDS/ΜL (ref 0–0.1)
BASOPHILS NFR BLD AUTO: 1 % (ref 0–1)
EOSINOPHIL # BLD AUTO: 0.15 THOUSAND/ΜL (ref 0–0.61)
EOSINOPHIL NFR BLD AUTO: 4 % (ref 0–6)
ERYTHROCYTE [DISTWIDTH] IN BLOOD BY AUTOMATED COUNT: 14.2 % (ref 11.6–15.1)
HCT VFR BLD AUTO: 35.9 % (ref 36.5–49.3)
HGB BLD-MCNC: 11.7 G/DL (ref 12–17)
IMM GRANULOCYTES # BLD AUTO: 0.02 THOUSAND/UL (ref 0–0.2)
IMM GRANULOCYTES NFR BLD AUTO: 1 % (ref 0–2)
LYMPHOCYTES # BLD AUTO: 0.19 THOUSANDS/ΜL (ref 0.6–4.47)
LYMPHOCYTES NFR BLD AUTO: 6 % (ref 14–44)
MCH RBC QN AUTO: 34.2 PG (ref 26.8–34.3)
MCHC RBC AUTO-ENTMCNC: 32.6 G/DL (ref 31.4–37.4)
MCV RBC AUTO: 105 FL (ref 82–98)
MONOCYTES # BLD AUTO: 0.81 THOUSAND/ΜL (ref 0.17–1.22)
MONOCYTES NFR BLD AUTO: 24 % (ref 4–12)
NEUTROPHILS # BLD AUTO: 2.23 THOUSANDS/ΜL (ref 1.85–7.62)
NEUTS SEG NFR BLD AUTO: 64 % (ref 43–75)
NRBC BLD AUTO-RTO: 0 /100 WBCS
PLATELET # BLD AUTO: 96 THOUSANDS/UL (ref 149–390)
RBC # BLD AUTO: 3.42 MILLION/UL (ref 3.88–5.62)
WBC # BLD AUTO: 3.44 THOUSAND/UL (ref 4.31–10.16)

## 2018-12-20 RX ORDER — SODIUM CHLORIDE 9 MG/ML
20 INJECTION, SOLUTION INTRAVENOUS CONTINUOUS
Status: DISCONTINUED | OUTPATIENT
Start: 2018-12-21 | End: 2018-12-24 | Stop reason: HOSPADM

## 2018-12-20 RX ORDER — ACETAMINOPHEN 325 MG/1
650 TABLET ORAL ONCE
Status: COMPLETED | OUTPATIENT
Start: 2018-12-21 | End: 2018-12-21

## 2018-12-21 ENCOUNTER — HOSPITAL ENCOUNTER (OUTPATIENT)
Dept: INFUSION CENTER | Facility: CLINIC | Age: 73
Discharge: HOME | End: 2018-12-21
Payer: COMMERCIAL

## 2018-12-21 VITALS
RESPIRATION RATE: 16 BRPM | HEIGHT: 70 IN | BODY MASS INDEX: 27.87 KG/M2 | WEIGHT: 194.67 LBS | HEART RATE: 59 BPM | TEMPERATURE: 98.7 F | DIASTOLIC BLOOD PRESSURE: 74 MMHG | SYSTOLIC BLOOD PRESSURE: 133 MMHG

## 2018-12-21 PROCEDURE — 96367 TX/PROPH/DG ADDL SEQ IV INF: CPT

## 2018-12-21 PROCEDURE — 96413 CHEMO IV INFUSION 1 HR: CPT

## 2018-12-21 PROCEDURE — 96375 TX/PRO/DX INJ NEW DRUG ADDON: CPT

## 2018-12-21 PROCEDURE — 96415 CHEMO IV INFUSION ADDL HR: CPT

## 2018-12-21 PROCEDURE — 96366 THER/PROPH/DIAG IV INF ADDON: CPT

## 2018-12-21 RX ORDER — FEXOFENADINE HCL 180 MG/1
180 TABLET ORAL 2 TIMES DAILY PRN
COMMUNITY
End: 2020-07-16 | Stop reason: ALTCHOICE

## 2018-12-21 RX ADMIN — DARATUMUMAB 1400 MG: 100 INJECTION, SOLUTION, CONCENTRATE INTRAVENOUS at 09:26

## 2018-12-21 RX ADMIN — Medication 30 G: at 12:52

## 2018-12-21 RX ADMIN — SODIUM CHLORIDE 100 MG: 0.9 INJECTION, SOLUTION INTRAVENOUS at 08:30

## 2018-12-21 RX ADMIN — SODIUM CHLORIDE 20 ML/HR: 0.9 INJECTION, SOLUTION INTRAVENOUS at 08:15

## 2018-12-21 RX ADMIN — DIPHENHYDRAMINE HYDROCHLORIDE 25 MG: 50 INJECTION, SOLUTION INTRAMUSCULAR; INTRAVENOUS at 08:15

## 2018-12-21 RX ADMIN — ZOLEDRONIC ACID 3.5 MG: 4 INJECTION, SOLUTION, CONCENTRATE INTRAVENOUS at 08:55

## 2018-12-21 RX ADMIN — ACETAMINOPHEN 650 MG: 325 TABLET, FILM COATED ORAL at 08:23

## 2018-12-21 NOTE — PLAN OF CARE
Problem: Potential for Falls  Goal: Patient will remain free of falls  INTERVENTIONS:  - Assess patient frequently for physical needs  -  Identify cognitive and physical deficits and behaviors that affect risk of falls    -  [place] fall precautions as indicated by assessment   - Educate patient/family on patient safety including physical limitations  - Instruct patient to call for assistance with activity based on assessment  - Modify environment to reduce risk of injury  - Consider OT/PT consult to assist with strengthening/mobility   Outcome: Progressing

## 2018-12-21 NOTE — PROGRESS NOTES
Patient tolerated Darzalex, Zometa and IVIG infusions well  Denies any discomfort  Pt and wife are aware of all future appointments

## 2019-01-02 ENCOUNTER — APPOINTMENT (OUTPATIENT)
Dept: LAB | Facility: CLINIC | Age: 74
End: 2019-01-02
Payer: COMMERCIAL

## 2019-01-02 ENCOUNTER — TRANSCRIBE ORDERS (OUTPATIENT)
Dept: LAB | Facility: CLINIC | Age: 74
End: 2019-01-02

## 2019-01-02 DIAGNOSIS — C90.01 MULTIPLE MYELOMA IN REMISSION (HCC): Primary | ICD-10-CM

## 2019-01-02 DIAGNOSIS — C90.01 MULTIPLE MYELOMA IN REMISSION (HCC): ICD-10-CM

## 2019-01-02 LAB
ALBUMIN SERPL BCP-MCNC: 3.6 G/DL (ref 3.5–5)
ALP SERPL-CCNC: 73 U/L (ref 46–116)
ALT SERPL W P-5'-P-CCNC: 26 U/L (ref 12–78)
ANION GAP SERPL CALCULATED.3IONS-SCNC: 7 MMOL/L (ref 4–13)
AST SERPL W P-5'-P-CCNC: 16 U/L (ref 5–45)
BASOPHILS # BLD AUTO: 0.1 THOUSANDS/ΜL (ref 0–0.1)
BASOPHILS NFR BLD AUTO: 3 % (ref 0–1)
BILIRUB SERPL-MCNC: 1.31 MG/DL (ref 0.2–1)
BUN SERPL-MCNC: 23 MG/DL (ref 5–25)
CALCIUM SERPL-MCNC: 9 MG/DL (ref 8.3–10.1)
CHLORIDE SERPL-SCNC: 102 MMOL/L (ref 100–108)
CO2 SERPL-SCNC: 27 MMOL/L (ref 21–32)
CREAT SERPL-MCNC: 1.35 MG/DL (ref 0.6–1.3)
EOSINOPHIL # BLD AUTO: 0.07 THOUSAND/ΜL (ref 0–0.61)
EOSINOPHIL NFR BLD AUTO: 2 % (ref 0–6)
ERYTHROCYTE [DISTWIDTH] IN BLOOD BY AUTOMATED COUNT: 14.3 % (ref 11.6–15.1)
GFR SERPL CREATININE-BSD FRML MDRD: 52 ML/MIN/1.73SQ M
GLUCOSE SERPL-MCNC: 82 MG/DL (ref 65–140)
HCT VFR BLD AUTO: 37.3 % (ref 36.5–49.3)
HGB BLD-MCNC: 12.5 G/DL (ref 12–17)
IMM GRANULOCYTES # BLD AUTO: 0.02 THOUSAND/UL (ref 0–0.2)
IMM GRANULOCYTES NFR BLD AUTO: 1 % (ref 0–2)
LYMPHOCYTES # BLD AUTO: 0.38 THOUSANDS/ΜL (ref 0.6–4.47)
LYMPHOCYTES NFR BLD AUTO: 10 % (ref 14–44)
MCH RBC QN AUTO: 34.4 PG (ref 26.8–34.3)
MCHC RBC AUTO-ENTMCNC: 33.5 G/DL (ref 31.4–37.4)
MCV RBC AUTO: 103 FL (ref 82–98)
MONOCYTES # BLD AUTO: 1.19 THOUSAND/ΜL (ref 0.17–1.22)
MONOCYTES NFR BLD AUTO: 33 % (ref 4–12)
NEUTROPHILS # BLD AUTO: 1.9 THOUSANDS/ΜL (ref 1.85–7.62)
NEUTS SEG NFR BLD AUTO: 51 % (ref 43–75)
NRBC BLD AUTO-RTO: 0 /100 WBCS
PLATELET # BLD AUTO: 129 THOUSANDS/UL (ref 149–390)
PMV BLD AUTO: 11.1 FL (ref 8.9–12.7)
POTASSIUM SERPL-SCNC: 3.8 MMOL/L (ref 3.5–5.3)
PROT SERPL-MCNC: 7.4 G/DL (ref 6.4–8.2)
RBC # BLD AUTO: 3.63 MILLION/UL (ref 3.88–5.62)
SODIUM SERPL-SCNC: 136 MMOL/L (ref 136–145)
WBC # BLD AUTO: 3.66 THOUSAND/UL (ref 4.31–10.16)

## 2019-01-02 PROCEDURE — 36415 COLL VENOUS BLD VENIPUNCTURE: CPT

## 2019-01-02 PROCEDURE — 80053 COMPREHEN METABOLIC PANEL: CPT

## 2019-01-02 PROCEDURE — 85025 COMPLETE CBC W/AUTO DIFF WBC: CPT

## 2019-01-16 ENCOUNTER — APPOINTMENT (OUTPATIENT)
Dept: LAB | Facility: CLINIC | Age: 74
End: 2019-01-16
Payer: COMMERCIAL

## 2019-01-16 DIAGNOSIS — C90.01 MULTIPLE MYELOMA IN REMISSION (HCC): ICD-10-CM

## 2019-01-16 LAB
ALBUMIN SERPL BCP-MCNC: 3.4 G/DL (ref 3.5–5)
ALP SERPL-CCNC: 67 U/L (ref 46–116)
ALT SERPL W P-5'-P-CCNC: 25 U/L (ref 12–78)
ANION GAP SERPL CALCULATED.3IONS-SCNC: 6 MMOL/L (ref 4–13)
AST SERPL W P-5'-P-CCNC: 11 U/L (ref 5–45)
BASOPHILS # BLD AUTO: 0.03 THOUSANDS/ΜL (ref 0–0.1)
BASOPHILS NFR BLD AUTO: 1 % (ref 0–1)
BILIRUB SERPL-MCNC: 0.87 MG/DL (ref 0.2–1)
BUN SERPL-MCNC: 24 MG/DL (ref 5–25)
CALCIUM SERPL-MCNC: 8.5 MG/DL (ref 8.3–10.1)
CHLORIDE SERPL-SCNC: 105 MMOL/L (ref 100–108)
CO2 SERPL-SCNC: 27 MMOL/L (ref 21–32)
CREAT SERPL-MCNC: 1.16 MG/DL (ref 0.6–1.3)
EOSINOPHIL # BLD AUTO: 0.13 THOUSAND/ΜL (ref 0–0.61)
EOSINOPHIL NFR BLD AUTO: 2 % (ref 0–6)
ERYTHROCYTE [DISTWIDTH] IN BLOOD BY AUTOMATED COUNT: 14.4 % (ref 11.6–15.1)
GFR SERPL CREATININE-BSD FRML MDRD: 63 ML/MIN/1.73SQ M
GLUCOSE SERPL-MCNC: 98 MG/DL (ref 65–140)
HCT VFR BLD AUTO: 35.4 % (ref 36.5–49.3)
HGB BLD-MCNC: 11.4 G/DL (ref 12–17)
IMM GRANULOCYTES # BLD AUTO: 0.03 THOUSAND/UL (ref 0–0.2)
IMM GRANULOCYTES NFR BLD AUTO: 1 % (ref 0–2)
LYMPHOCYTES # BLD AUTO: 0.24 THOUSANDS/ΜL (ref 0.6–4.47)
LYMPHOCYTES NFR BLD AUTO: 4 % (ref 14–44)
MCH RBC QN AUTO: 33.4 PG (ref 26.8–34.3)
MCHC RBC AUTO-ENTMCNC: 32.2 G/DL (ref 31.4–37.4)
MCV RBC AUTO: 104 FL (ref 82–98)
MONOCYTES # BLD AUTO: 1.01 THOUSAND/ΜL (ref 0.17–1.22)
MONOCYTES NFR BLD AUTO: 18 % (ref 4–12)
NEUTROPHILS # BLD AUTO: 4.31 THOUSANDS/ΜL (ref 1.85–7.62)
NEUTS SEG NFR BLD AUTO: 74 % (ref 43–75)
NRBC BLD AUTO-RTO: 0 /100 WBCS
PLATELET # BLD AUTO: 117 THOUSANDS/UL (ref 149–390)
PMV BLD AUTO: 10.8 FL (ref 8.9–12.7)
POTASSIUM SERPL-SCNC: 3.8 MMOL/L (ref 3.5–5.3)
PROT SERPL-MCNC: 6.9 G/DL (ref 6.4–8.2)
RBC # BLD AUTO: 3.41 MILLION/UL (ref 3.88–5.62)
SODIUM SERPL-SCNC: 138 MMOL/L (ref 136–145)
WBC # BLD AUTO: 5.75 THOUSAND/UL (ref 4.31–10.16)

## 2019-01-16 PROCEDURE — 36415 COLL VENOUS BLD VENIPUNCTURE: CPT

## 2019-01-16 PROCEDURE — 85025 COMPLETE CBC W/AUTO DIFF WBC: CPT

## 2019-01-16 PROCEDURE — 80053 COMPREHEN METABOLIC PANEL: CPT

## 2019-01-17 RX ORDER — SODIUM CHLORIDE 9 MG/ML
20 INJECTION, SOLUTION INTRAVENOUS CONTINUOUS
Status: DISCONTINUED | OUTPATIENT
Start: 2019-01-18 | End: 2019-01-21 | Stop reason: HOSPADM

## 2019-01-17 RX ORDER — ACETAMINOPHEN 325 MG/1
650 TABLET ORAL ONCE
Status: COMPLETED | OUTPATIENT
Start: 2019-01-18 | End: 2019-01-18

## 2019-01-18 ENCOUNTER — HOSPITAL ENCOUNTER (OUTPATIENT)
Dept: INFUSION CENTER | Facility: CLINIC | Age: 74
Discharge: HOME | End: 2019-01-18
Payer: COMMERCIAL

## 2019-01-18 VITALS
TEMPERATURE: 98.2 F | HEIGHT: 70 IN | RESPIRATION RATE: 16 BRPM | BODY MASS INDEX: 27.62 KG/M2 | HEART RATE: 70 BPM | DIASTOLIC BLOOD PRESSURE: 72 MMHG | SYSTOLIC BLOOD PRESSURE: 165 MMHG | WEIGHT: 192.9 LBS

## 2019-01-18 PROCEDURE — 96413 CHEMO IV INFUSION 1 HR: CPT

## 2019-01-18 PROCEDURE — 96367 TX/PROPH/DG ADDL SEQ IV INF: CPT

## 2019-01-18 PROCEDURE — 96366 THER/PROPH/DIAG IV INF ADDON: CPT

## 2019-01-18 PROCEDURE — 96415 CHEMO IV INFUSION ADDL HR: CPT

## 2019-01-18 RX ADMIN — DIPHENHYDRAMINE HYDROCHLORIDE 25 MG: 50 INJECTION, SOLUTION INTRAMUSCULAR; INTRAVENOUS at 09:07

## 2019-01-18 RX ADMIN — SODIUM CHLORIDE 20 ML/HR: 0.9 INJECTION, SOLUTION INTRAVENOUS at 08:34

## 2019-01-18 RX ADMIN — Medication 30 G: at 13:32

## 2019-01-18 RX ADMIN — ACETAMINOPHEN 650 MG: 325 TABLET, FILM COATED ORAL at 08:41

## 2019-01-18 RX ADMIN — DARATUMUMAB 1400 MG: 100 INJECTION, SOLUTION, CONCENTRATE INTRAVENOUS at 10:10

## 2019-01-18 RX ADMIN — SODIUM CHLORIDE 100 MG: 0.9 INJECTION, SOLUTION INTRAVENOUS at 08:34

## 2019-01-18 RX ADMIN — ZOLEDRONIC ACID 3.5 MG: 4 INJECTION, SOLUTION, CONCENTRATE INTRAVENOUS at 09:32

## 2019-01-18 NOTE — PROGRESS NOTES
Patient arrived on unit for treatment today  Denies any discomforts  Cleared for Darzalex as per lab parameters on orders  Also to receive Zometa and IVIG  Treatment initiated

## 2019-01-18 NOTE — PROGRESS NOTES
Patient arrived on unit for treatment  Denies any discomforts  Cleared for treatment today as per parameters on orders   Treatment initiated

## 2019-01-18 NOTE — PLAN OF CARE
Problem: Potential for Falls  Goal: Patient will remain free of falls  INTERVENTIONS:  - Assess patient frequently for physical needs  -  Identify cognitive and physical deficits and behaviors that affect risk of falls    -  [place] fall precautions as indicated by assessment   - Educate patient/family on patient safety including physical limitations  - Instruct patient to call for assistance with activity based on assessment  - Modify environment to reduce risk of injury  - Consider OT/PT consult to assist with strengthening/mobility   Outcome: Progressing

## 2019-01-18 NOTE — PROGRESS NOTES
Patient arrived on unit for treatment  Denies any discomforts  Cleared for chemotherapy as per note on labs by Estrada PINTO   Treatment initiated

## 2019-01-18 NOTE — PROGRESS NOTES
Tolerated Zometa, Darzalex, and IVIG infusions  Denies any discomforts  Aware of next appointment   Declined AVS

## 2019-02-06 DIAGNOSIS — E03.9 HYPOTHYROIDISM, UNSPECIFIED TYPE: ICD-10-CM

## 2019-02-06 RX ORDER — LEVOTHYROXINE SODIUM 0.05 MG/1
50 TABLET ORAL DAILY
Qty: 90 TABLET | Refills: 3 | Status: SHIPPED | OUTPATIENT
Start: 2019-02-06 | End: 2020-01-07 | Stop reason: SDUPTHER

## 2019-02-07 ENCOUNTER — OFFICE VISIT (OUTPATIENT)
Dept: HEMATOLOGY ONCOLOGY | Facility: CLINIC | Age: 74
End: 2019-02-07
Payer: COMMERCIAL

## 2019-02-07 ENCOUNTER — TELEPHONE (OUTPATIENT)
Dept: HEMATOLOGY ONCOLOGY | Facility: CLINIC | Age: 74
End: 2019-02-07

## 2019-02-07 VITALS
WEIGHT: 191.4 LBS | HEART RATE: 63 BPM | OXYGEN SATURATION: 95 % | DIASTOLIC BLOOD PRESSURE: 72 MMHG | TEMPERATURE: 98 F | BODY MASS INDEX: 27.4 KG/M2 | RESPIRATION RATE: 17 BRPM | HEIGHT: 70 IN | SYSTOLIC BLOOD PRESSURE: 130 MMHG

## 2019-02-07 DIAGNOSIS — C90.00 IGG MULTIPLE MYELOMA (HCC): Primary | ICD-10-CM

## 2019-02-07 PROCEDURE — 99215 OFFICE O/P EST HI 40 MIN: CPT | Performed by: INTERNAL MEDICINE

## 2019-02-07 RX ORDER — DEXAMETHASONE 4 MG/1
TABLET ORAL
Qty: 36 TABLET | Refills: 5 | Status: SHIPPED | OUTPATIENT
Start: 2019-02-07 | End: 2020-05-11 | Stop reason: SDUPTHER

## 2019-02-07 NOTE — TELEPHONE ENCOUNTER
FYI: Patient's wife called to inform that patient signed up for the Medical Marijuana program through the dept of Health's website  Patient will call back if any other questions

## 2019-02-07 NOTE — PROGRESS NOTES
[facility] HEMATOLOGY ONCOLOGY Mallory Valadez  [address] [place] [id number] [phone]    Huber Hwang,1945, [id number]  02/07/19    Discussion:   In summary, this is a 65-year-old man with history of multiple myeloma  He is currently on daratumumab, Pomalyst, dexamethasone  He tolerates this relatively well  Recent blood work shows minimal anemia and thrombocytopenia  He continues on Promacta which he tolerates well  Platelets are certainly acceptable, 117  Generally he functions without restriction  A few days after daratumumab he has some fatigue and leaves work early  He will be going to [place] in the near future for re-evaluation  Daratumumab and other elements of his maintenance program are continued for another year  Additionally, he reports that for the past 2-3 years he has had moderate to severe headaches  He has used variety of analgesics without much benefit  These are quite debilitating when they occur  There random in onset and can occur several times a week  The patient qualifies for use of MMJ in the [facility] by having the following medical condition - cancer, intractable headache  From a palliative care stand point the patient is suffering with cancer, intractable headache  These symptoms and side effects might be alleviated with use of MMJ products  The patient registered online  The patient read and I reviewed the informed consent document with the patient  I answered all questions related to it before the patient signed it  The patient's medical certification was completed on this date  The patient was given a signed copy of the informed consent and medical certification  I issued a certification for MMJ use with palliative intent -  To help alleviate cancer related symptoms and cancer treatment related side effects    I do not endorse the belief that MMJ can treat cancer and strongly encouraged the patient to continue treatments and surveillance as recommend by cancer specialists  I discussed the above with the patient  The patient and his wife voiced understanding and agreement   ______________________________________________________________________    Chief Complaint   Patient presents with    Follow-up       HPI:  Oncology History    June 2015- routine FU at PCP all good  IgG multiple myeloma ([facility] [address] )    9/2015 Initial Diagnosis     Found to have Hbg of 6 with SOB, creatinine 1 8 and normal calcium with albumin of 2 1  Additonial blood work showed elevated protien level (12 1g/dL)  9/29/2015 Biopsy     Bone marrow biopsy demonstrated approximately 80% plasma cells with some immature forms noted  These studies showed 13q14 deletion, +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, T (4:14)           10/14/2015 - 11/11/2015 Chemotherapy     Velcade 1 3 mg/m2 Days 1,8,15,22  Cytoxan 300 mg/m2  PO Days 1, 8,15, 22  Dexamethasone 40 mg PO Days 1,8,15, 22  Zometa 4 mg IV Day 1  Cycle length = 28 days    Completed 2 cycles  Day one of cycle 2 was on 11/11/15          12/2015 - 2/2016 Chemotherapy     VDT-PACE x 2 cycles   (completed at [facility] in [place], [address] [place] [address])         2/2016 Transplant     Melphalane 200 mg/m2 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  MRD +         8/2016 - 1/26/2018 Chemotherapy     Maintenance therapy:  Carfilzomib, orginally dosed 27 mg weekly then increased to 45 mg weekly after MRD reactivitation  Revlimid  Dexamethasone          2/2018 Biopsy     Bone marrow demonstrated positive minimal residual disease  2/23/2018 -  Chemotherapy     Daratumumab 16mg/m2 IV Day 1  Pomalyst 4 mg p o  daily 1-21  Dexamethasone 4 mg PO Days 2, 3  Dexamethasone 12 mg PODays 8, 15, 22  Cycle length = 28 days            Interval History:  Clinically stable  1 - Symptomatic but completely ambulatory    Review of Systems   Constitutional: Negative for chills and fever  HENT: Negative for nosebleeds  Eyes: Negative for discharge  Respiratory: Negative for cough and shortness of breath  Cardiovascular: Negative for chest pain  Gastrointestinal: Negative for abdominal pain, constipation and diarrhea  Endocrine: Negative for polydipsia  Genitourinary: Negative for hematuria  Musculoskeletal: Negative for arthralgias  Skin: Negative for color change  Allergic/Immunologic: Negative for immunocompromised state  Neurological: Negative for dizziness and headaches  Hematological: Negative for adenopathy  Psychiatric/Behavioral: Negative for agitation  Past Medical History:   Diagnosis Date    History of autologous stem cell transplant ([facility] [address] )     Hypertension     History of HTN-stable since weight loss    Insomnia     Multiple myeloma (HCC)      Patient Active Problem List   Diagnosis    IgG multiple myeloma (HCC)    Persistent atrial fibrillation (HCC)    Essential hypertension       Current Outpatient Prescriptions:     acetaminophen (TYLENOL) 325 mg tablet, Take 650 mg by mouth every 6 (six) hours as needed for mild pain, Disp: , Rfl:     acyclovir (ZOVIRAX) 400 MG tablet, Take 1 tablet (400 mg total) by mouth 2 (two) times a day for 30 days, Disp: 60 tablet, Rfl: 3    aspirin 81 MG tablet, Take 81 mg by mouth daily  , Disp: , Rfl:     atorvastatin (LIPITOR) 20 mg tablet, Take 1 tablet (20 mg total) by mouth daily, Disp: 30 tablet, Rfl: 5    Cholecalciferol (VITAMIN D-3 PO), Take 1 tablet by mouth daily  , Disp: , Rfl:     daratumumab (DARZALEX) 100 mg/5 mL injection, Infuse 16 mg/kg into a venous catheter every 28 days, Disp: , Rfl:     dexamethasone (DECADRON) 4 mg tablet, Take 4 mg by mouth see administration instructions Pt takes total of 12 mg every week on Friday other than week he received Darzalex  On weeks he receivs Darzalex, he takes 4mg day 2 and day 3 of the cycle  , Disp: , Rfl:     Docusate Sodium (COLACE PO), Take 1 tablet by mouth as needed  , Disp: , Rfl:    eltrombopag (PROMACTA) 50 MG tablet, Take 25 mg by mouth daily Administer on an empty stomach, 1 hour before or 2 hours after a meal   , Disp: , Rfl:     escitalopram (LEXAPRO) 20 mg tablet, Take 1 tablet (20 mg total) by mouth daily, Disp: 90 tablet, Rfl: 0    fexofenadine (ALLEGRA) 180 MG tablet, Take 180 mg by mouth 2 (two) times a day as needed, Disp: , Rfl:     Glutamine POWD, by Does not apply route, Disp: , Rfl:     levothyroxine 50 mcg tablet, Take 1 tablet (50 mcg total) by mouth daily, Disp: 90 tablet, Rfl: 3    LORazepam (ATIVAN) 0 5 mg tablet, Take 0 5 mg by mouth every 6 (six) hours as needed for anxiety  , Disp: , Rfl:     Multiple Vitamin (MULTIVITAMINS PO), Take 1 tablet by mouth daily  , Disp: , Rfl:     ondansetron (ZOFRAN) 4 mg tablet, Take 4 mg by mouth every 8 (eight) hours as needed for nausea or vomiting , Disp: , Rfl:     Pomalidomide (POMALYST) 4 MG CAPS, Take 4 mg by mouth daily Days 1 thru 21, Disp: , Rfl:     nebivolol (BYSTOLIC) 2 5 mg tablet, Take 1 tablet (2 5 mg total) by mouth daily (Patient not taking: Reported on 12/6/2018 ), Disp: 30 tablet, Rfl: 3  No Known Allergies  Past Surgical History:   Procedure Laterality Date    APPENDECTOMY      Last assessed: 9/25/15    ARTHROSCOPY KNEE Left     9/30/09    EYE SURGERY      Lasik    KNEE CARTILAGE SURGERY      LIMBAL STEM CELL TRANSPLANT      Patient had 2 in [place]-2/29/2016 and 4/13/2016     Social History     Objective:  Vitals:    02/07/19 0803   BP: 130/72   BP Location: Right arm   Patient Position: Sitting   Pulse: 63   Resp: 17   Temp: 98 °F (36 7 °C)   TempSrc: Tympanic   SpO2: 95%   Weight: 86 8 kg (191 lb 6 4 oz)   Height: 5' 10" (1 778 m)     Physical Exam   Constitutional: He is oriented to person, place, and time  He appears well-developed  HENT:   Head: Normocephalic  Eyes: Pupils are equal, round, and reactive to light  Neck: Neck supple  Cardiovascular: Normal rate and regular rhythm      No murmur heard   Pulmonary/Chest: Breath sounds normal  He has no wheezes  He has no rales  Abdominal: Soft  There is no tenderness  Musculoskeletal: Normal range of motion  He exhibits no edema or tenderness  Lymphadenopathy:     He has no cervical adenopathy  Neurological: He is alert and oriented to person, place, and time  He has normal reflexes  No cranial nerve deficit  Skin: No rash noted  No erythema  Psychiatric: He has a normal mood and affect  His behavior is normal          Labs: I personally reviewed the labs and imaging pertinent to this patient care

## 2019-02-13 ENCOUNTER — TRANSCRIBE ORDERS (OUTPATIENT)
Dept: LAB | Facility: CLINIC | Age: 74
End: 2019-02-13

## 2019-02-13 ENCOUNTER — APPOINTMENT (OUTPATIENT)
Dept: LAB | Facility: CLINIC | Age: 74
End: 2019-02-13
Payer: COMMERCIAL

## 2019-02-13 DIAGNOSIS — C90.01 MULTIPLE MYELOMA IN REMISSION (HCC): ICD-10-CM

## 2019-02-13 DIAGNOSIS — C90.01 MULTIPLE MYELOMA IN REMISSION (HCC): Primary | ICD-10-CM

## 2019-02-13 LAB
ALBUMIN SERPL BCP-MCNC: 3.6 G/DL (ref 3.5–5)
ALP SERPL-CCNC: 73 U/L (ref 46–116)
ALT SERPL W P-5'-P-CCNC: 28 U/L (ref 12–78)
ANION GAP SERPL CALCULATED.3IONS-SCNC: 6 MMOL/L (ref 4–13)
AST SERPL W P-5'-P-CCNC: 20 U/L (ref 5–45)
BASOPHILS # BLD AUTO: 0.04 THOUSANDS/ΜL (ref 0–0.1)
BASOPHILS NFR BLD AUTO: 1 % (ref 0–1)
BILIRUB SERPL-MCNC: 0.82 MG/DL (ref 0.2–1)
BUN SERPL-MCNC: 22 MG/DL (ref 5–25)
CALCIUM SERPL-MCNC: 8.5 MG/DL (ref 8.3–10.1)
CHLORIDE SERPL-SCNC: 104 MMOL/L (ref 100–108)
CO2 SERPL-SCNC: 27 MMOL/L (ref 21–32)
CREAT SERPL-MCNC: 1.25 MG/DL (ref 0.6–1.3)
EOSINOPHIL # BLD AUTO: 0.13 THOUSAND/ΜL (ref 0–0.61)
EOSINOPHIL NFR BLD AUTO: 3 % (ref 0–6)
ERYTHROCYTE [DISTWIDTH] IN BLOOD BY AUTOMATED COUNT: 14.3 % (ref 11.6–15.1)
GFR SERPL CREATININE-BSD FRML MDRD: 58 ML/MIN/1.73SQ M
GLUCOSE SERPL-MCNC: 86 MG/DL (ref 65–140)
HCT VFR BLD AUTO: 36.8 % (ref 36.5–49.3)
HGB BLD-MCNC: 12.1 G/DL (ref 12–17)
IMM GRANULOCYTES # BLD AUTO: 0.03 THOUSAND/UL (ref 0–0.2)
IMM GRANULOCYTES NFR BLD AUTO: 1 % (ref 0–2)
LYMPHOCYTES # BLD AUTO: 0.23 THOUSANDS/ΜL (ref 0.6–4.47)
LYMPHOCYTES NFR BLD AUTO: 5 % (ref 14–44)
MCH RBC QN AUTO: 34.1 PG (ref 26.8–34.3)
MCHC RBC AUTO-ENTMCNC: 32.9 G/DL (ref 31.4–37.4)
MCV RBC AUTO: 104 FL (ref 82–98)
MONOCYTES # BLD AUTO: 1 THOUSAND/ΜL (ref 0.17–1.22)
MONOCYTES NFR BLD AUTO: 21 % (ref 4–12)
NEUTROPHILS # BLD AUTO: 3.45 THOUSANDS/ΜL (ref 1.85–7.62)
NEUTS SEG NFR BLD AUTO: 69 % (ref 43–75)
NRBC BLD AUTO-RTO: 0 /100 WBCS
PLATELET # BLD AUTO: 123 THOUSANDS/UL (ref 149–390)
PMV BLD AUTO: 10.9 FL (ref 8.9–12.7)
POTASSIUM SERPL-SCNC: 4.2 MMOL/L (ref 3.5–5.3)
PROT SERPL-MCNC: 6.9 G/DL (ref 6.4–8.2)
RBC # BLD AUTO: 3.55 MILLION/UL (ref 3.88–5.62)
SODIUM SERPL-SCNC: 137 MMOL/L (ref 136–145)
WBC # BLD AUTO: 4.88 THOUSAND/UL (ref 4.31–10.16)

## 2019-02-13 PROCEDURE — 80053 COMPREHEN METABOLIC PANEL: CPT

## 2019-02-13 PROCEDURE — 36415 COLL VENOUS BLD VENIPUNCTURE: CPT

## 2019-02-13 PROCEDURE — 85025 COMPLETE CBC W/AUTO DIFF WBC: CPT

## 2019-02-14 RX ORDER — SODIUM CHLORIDE 9 MG/ML
20 INJECTION, SOLUTION INTRAVENOUS CONTINUOUS
Status: DISCONTINUED | OUTPATIENT
Start: 2019-02-15 | End: 2019-02-18 | Stop reason: HOSPADM

## 2019-02-14 RX ORDER — ACETAMINOPHEN 325 MG/1
650 TABLET ORAL ONCE
Status: COMPLETED | OUTPATIENT
Start: 2019-02-15 | End: 2019-02-15

## 2019-02-15 ENCOUNTER — HOSPITAL ENCOUNTER (OUTPATIENT)
Dept: INFUSION CENTER | Facility: CLINIC | Age: 74
Discharge: HOME | End: 2019-02-15
Payer: COMMERCIAL

## 2019-02-15 VITALS
HEART RATE: 58 BPM | DIASTOLIC BLOOD PRESSURE: 77 MMHG | HEIGHT: 70 IN | WEIGHT: 197.31 LBS | TEMPERATURE: 98 F | SYSTOLIC BLOOD PRESSURE: 125 MMHG | RESPIRATION RATE: 16 BRPM | BODY MASS INDEX: 28.25 KG/M2

## 2019-02-15 PROCEDURE — 96413 CHEMO IV INFUSION 1 HR: CPT

## 2019-02-15 PROCEDURE — 96366 THER/PROPH/DIAG IV INF ADDON: CPT

## 2019-02-15 PROCEDURE — 96415 CHEMO IV INFUSION ADDL HR: CPT

## 2019-02-15 PROCEDURE — 96367 TX/PROPH/DG ADDL SEQ IV INF: CPT

## 2019-02-15 RX ADMIN — SODIUM CHLORIDE 100 MG: 0.9 INJECTION, SOLUTION INTRAVENOUS at 08:38

## 2019-02-15 RX ADMIN — Medication 30 G: at 13:08

## 2019-02-15 RX ADMIN — ZOLEDRONIC ACID 3.5 MG: 4 INJECTION, SOLUTION, CONCENTRATE INTRAVENOUS at 09:04

## 2019-02-15 RX ADMIN — DIPHENHYDRAMINE HYDROCHLORIDE 25 MG: 50 INJECTION, SOLUTION INTRAMUSCULAR; INTRAVENOUS at 08:20

## 2019-02-15 RX ADMIN — SODIUM CHLORIDE 20 ML/HR: 0.9 INJECTION, SOLUTION INTRAVENOUS at 08:20

## 2019-02-15 RX ADMIN — DARATUMUMAB 1400 MG: 100 INJECTION, SOLUTION, CONCENTRATE INTRAVENOUS at 09:36

## 2019-02-15 RX ADMIN — ACETAMINOPHEN 650 MG: 325 TABLET, FILM COATED ORAL at 08:17

## 2019-02-15 NOTE — PROGRESS NOTES
Patient tolerated Darzalex, IVIG and Zometa infusions well  Denies any discomfort  Pt and wife are aware of all future appointments   Refused AVS

## 2019-02-15 NOTE — PLAN OF CARE
Problem: Potential for Falls  Goal: Patient will remain free of falls  Description  INTERVENTIONS:  - Assess patient frequently for physical needs  -  Identify cognitive and physical deficits and behaviors that affect risk of falls    -  [place] fall precautions as indicated by assessment   - Educate patient/family on patient safety including physical limitations  - Instruct patient to call for assistance with activity based on assessment  - Modify environment to reduce risk of injury  - Consider OT/PT consult to assist with strengthening/mobility  Outcome: Progressing

## 2019-02-21 DIAGNOSIS — F41.9 ANXIETY: ICD-10-CM

## 2019-02-21 RX ORDER — ESCITALOPRAM OXALATE 20 MG/1
TABLET ORAL
Qty: 90 TABLET | Refills: 0 | Status: SHIPPED | OUTPATIENT
Start: 2019-02-21 | End: 2019-03-06 | Stop reason: SDUPTHER

## 2019-02-22 RX ORDER — ESCITALOPRAM OXALATE 20 MG/1
20 TABLET ORAL DAILY
Qty: 90 TABLET | Refills: 3 | Status: SHIPPED | OUTPATIENT
Start: 2019-02-22 | End: 2019-06-16 | Stop reason: SDUPTHER

## 2019-03-06 ENCOUNTER — OFFICE VISIT (OUTPATIENT)
Dept: INTERNAL MEDICINE CLINIC | Facility: CLINIC | Age: 74
End: 2019-03-06
Payer: COMMERCIAL

## 2019-03-06 VITALS
DIASTOLIC BLOOD PRESSURE: 82 MMHG | HEART RATE: 66 BPM | TEMPERATURE: 98.1 F | HEIGHT: 69 IN | OXYGEN SATURATION: 97 % | BODY MASS INDEX: 29.03 KG/M2 | SYSTOLIC BLOOD PRESSURE: 140 MMHG | WEIGHT: 196 LBS

## 2019-03-06 DIAGNOSIS — E03.9 HYPOTHYROIDISM, UNSPECIFIED TYPE: ICD-10-CM

## 2019-03-06 DIAGNOSIS — E55.9 VITAMIN D DEFICIENCY: ICD-10-CM

## 2019-03-06 DIAGNOSIS — Z12.5 SCREENING PSA (PROSTATE SPECIFIC ANTIGEN): ICD-10-CM

## 2019-03-06 DIAGNOSIS — E78.5 HYPERLIPIDEMIA, UNSPECIFIED HYPERLIPIDEMIA TYPE: ICD-10-CM

## 2019-03-06 DIAGNOSIS — I10 ESSENTIAL HYPERTENSION: Primary | ICD-10-CM

## 2019-03-06 PROBLEM — D69.3 CHRONIC ITP (IDIOPATHIC THROMBOCYTOPENIA) (HCC): Status: ACTIVE | Noted: 2017-09-18

## 2019-03-06 PROCEDURE — 3008F BODY MASS INDEX DOCD: CPT | Performed by: INTERNAL MEDICINE

## 2019-03-06 PROCEDURE — 1160F RVW MEDS BY RX/DR IN RCRD: CPT | Performed by: INTERNAL MEDICINE

## 2019-03-06 PROCEDURE — 1036F TOBACCO NON-USER: CPT | Performed by: INTERNAL MEDICINE

## 2019-03-06 PROCEDURE — 99214 OFFICE O/P EST MOD 30 MIN: CPT | Performed by: INTERNAL MEDICINE

## 2019-03-06 NOTE — PROGRESS NOTES
Assessment/Plan:    No problem-specific Assessment & Plan notes found for this encounter  Diagnoses and all orders for this visit:    Essential hypertension  -     Lipid panel; Future  -     UA w Reflex to Microscopic w Reflex to Culture    Hypothyroidism, unspecified type  -     TSH, 3rd generation; Future    Hyperlipidemia, unspecified hyperlipidemia type    Vitamin D deficiency  -     Vitamin D 25 hydroxy; Future    Screening PSA (prostate specific antigen)  -     PSA, Total Screen; Future        Subjective:      Patient ID: Meena Matson is a 72 y o  male  HPI  Bob Garcia is here today for visit accompanied by his wife  He continues to feel well continues to get excellent oncologic care at [facility] under the auspices of Dr Cassie Kaur  He had a bone marrow done which revealed no myeloma cells and apparently is a has no myeloma cells seen in the 100,000 other cells further studies are in progress but he generally feels well he continues on a variety of medications to keep the myeloma quiescent    Continues to be active in a variety of activities at and and is now on the board of directors of [employer] review of systems is entirely 9 he previously has discussed with his oncologist whether not he should be a candidate for the new shingles vaccine it was recommended that he avoid this    The following portions of the patient's history were reviewed and updated as appropriate: allergies, current medications, past family history, past medical history, past social history, past surgical history and problem list     Review of Systems  contributory only for fatigue    Objective:      /82   Pulse 66   Temp 98 1 °F (36 7 °C) (Tympanic)   Ht 5' 9" (1 753 m)   Wt 88 9 kg (196 lb)   SpO2 97%   BMI 28 94 kg/m²          Physical Exam  Bob Garcia appears well in no distress he has a little bit cushingoid his pressure is 140/82 the neck is unremarkable the heart lungs unremarkable the abdomen is benign I did see that he is not able to button his trousers however because of his increased abdominal girth extremities show a trace of edema in the skin is warm and dry he has alopecia toe talus we will send him for variety of lab studies we discussed at length the pros and cons of PSA testing his wife is clearly and not for this she is a registered nurse Laney Salas is deliberation he did decline also a digital rectal exam today    He is up-to-date with colonoscopy will only suggest that he changed the Lexapro from 20 mg daily to 10 mg alternating with 20 and see if his fatigue improves he is off the Bystolic will see him in 4 months

## 2019-03-07 DIAGNOSIS — E03.9 HYPOTHYROIDISM, UNSPECIFIED TYPE: ICD-10-CM

## 2019-03-07 DIAGNOSIS — E78.5 HYPERLIPIDEMIA, UNSPECIFIED HYPERLIPIDEMIA TYPE: ICD-10-CM

## 2019-03-07 RX ORDER — ATORVASTATIN CALCIUM 20 MG/1
TABLET, FILM COATED ORAL
Qty: 30 TABLET | Refills: 0 | Status: SHIPPED | OUTPATIENT
Start: 2019-03-07 | End: 2019-04-09 | Stop reason: SDUPTHER

## 2019-03-07 RX ORDER — LEVOTHYROXINE SODIUM 0.05 MG/1
TABLET ORAL
Qty: 30 TABLET | Refills: 0 | Status: SHIPPED | OUTPATIENT
Start: 2019-03-07 | End: 2019-05-16 | Stop reason: SDUPTHER

## 2019-03-13 ENCOUNTER — APPOINTMENT (OUTPATIENT)
Dept: LAB | Facility: CLINIC | Age: 74
End: 2019-03-13
Payer: COMMERCIAL

## 2019-03-13 DIAGNOSIS — I10 ESSENTIAL HYPERTENSION: ICD-10-CM

## 2019-03-13 DIAGNOSIS — E03.9 HYPOTHYROIDISM, UNSPECIFIED TYPE: ICD-10-CM

## 2019-03-13 DIAGNOSIS — C90.01 MULTIPLE MYELOMA IN REMISSION (HCC): Primary | ICD-10-CM

## 2019-03-13 DIAGNOSIS — E55.9 VITAMIN D DEFICIENCY: ICD-10-CM

## 2019-03-13 DIAGNOSIS — Z12.5 SCREENING PSA (PROSTATE SPECIFIC ANTIGEN): ICD-10-CM

## 2019-03-13 LAB
25(OH)D3 SERPL-MCNC: 36.6 NG/ML (ref 30–100)
ALBUMIN SERPL BCP-MCNC: 3.6 G/DL (ref 3.5–5)
ALP SERPL-CCNC: 73 U/L (ref 46–116)
ALT SERPL W P-5'-P-CCNC: 28 U/L (ref 12–78)
ANION GAP SERPL CALCULATED.3IONS-SCNC: 9 MMOL/L (ref 4–13)
AST SERPL W P-5'-P-CCNC: 15 U/L (ref 5–45)
BACTERIA UR QL AUTO: ABNORMAL /HPF
BASOPHILS # BLD AUTO: 0.07 THOUSANDS/ΜL (ref 0–0.1)
BASOPHILS NFR BLD AUTO: 1 % (ref 0–1)
BILIRUB SERPL-MCNC: 0.9 MG/DL (ref 0.2–1)
BILIRUB UR QL STRIP: NEGATIVE
BUN SERPL-MCNC: 19 MG/DL (ref 5–25)
CALCIUM SERPL-MCNC: 8.3 MG/DL (ref 8.3–10.1)
CHLORIDE SERPL-SCNC: 101 MMOL/L (ref 100–108)
CHOLEST SERPL-MCNC: 207 MG/DL (ref 50–200)
CLARITY UR: CLEAR
CO2 SERPL-SCNC: 28 MMOL/L (ref 21–32)
COLOR UR: YELLOW
CREAT SERPL-MCNC: 1.22 MG/DL (ref 0.6–1.3)
EOSINOPHIL # BLD AUTO: 0.14 THOUSAND/ΜL (ref 0–0.61)
EOSINOPHIL NFR BLD AUTO: 3 % (ref 0–6)
ERYTHROCYTE [DISTWIDTH] IN BLOOD BY AUTOMATED COUNT: 14.4 % (ref 11.6–15.1)
GFR SERPL CREATININE-BSD FRML MDRD: 59 ML/MIN/1.73SQ M
GLUCOSE P FAST SERPL-MCNC: 83 MG/DL (ref 65–99)
GLUCOSE UR STRIP-MCNC: NEGATIVE MG/DL
HCT VFR BLD AUTO: 36.7 % (ref 36.5–49.3)
HDLC SERPL-MCNC: 67 MG/DL (ref 40–60)
HGB BLD-MCNC: 12.1 G/DL (ref 12–17)
HGB UR QL STRIP.AUTO: ABNORMAL
HYALINE CASTS #/AREA URNS LPF: ABNORMAL /LPF
IMM GRANULOCYTES # BLD AUTO: 0.02 THOUSAND/UL (ref 0–0.2)
IMM GRANULOCYTES NFR BLD AUTO: 0 % (ref 0–2)
KETONES UR STRIP-MCNC: NEGATIVE MG/DL
LDLC SERPL CALC-MCNC: 100 MG/DL (ref 0–100)
LEUKOCYTE ESTERASE UR QL STRIP: NEGATIVE
LYMPHOCYTES # BLD AUTO: 0.14 THOUSANDS/ΜL (ref 0.6–4.47)
LYMPHOCYTES NFR BLD AUTO: 3 % (ref 14–44)
MCH RBC QN AUTO: 34 PG (ref 26.8–34.3)
MCHC RBC AUTO-ENTMCNC: 33 G/DL (ref 31.4–37.4)
MCV RBC AUTO: 103 FL (ref 82–98)
MONOCYTES # BLD AUTO: 0.99 THOUSAND/ΜL (ref 0.17–1.22)
MONOCYTES NFR BLD AUTO: 19 % (ref 4–12)
NEUTROPHILS # BLD AUTO: 3.77 THOUSANDS/ΜL (ref 1.85–7.62)
NEUTS SEG NFR BLD AUTO: 74 % (ref 43–75)
NITRITE UR QL STRIP: NEGATIVE
NON-SQ EPI CELLS URNS QL MICRO: ABNORMAL /HPF
NONHDLC SERPL-MCNC: 140 MG/DL
NRBC BLD AUTO-RTO: 0 /100 WBCS
PH UR STRIP.AUTO: 6 [PH]
PLATELET # BLD AUTO: 127 THOUSANDS/UL (ref 149–390)
PMV BLD AUTO: 10.9 FL (ref 8.9–12.7)
POTASSIUM SERPL-SCNC: 3.7 MMOL/L (ref 3.5–5.3)
PROT SERPL-MCNC: 7 G/DL (ref 6.4–8.2)
PROT UR STRIP-MCNC: ABNORMAL MG/DL
PSA SERPL-MCNC: 0.5 NG/ML (ref 0–4)
RBC # BLD AUTO: 3.56 MILLION/UL (ref 3.88–5.62)
RBC #/AREA URNS AUTO: ABNORMAL /HPF
SODIUM SERPL-SCNC: 138 MMOL/L (ref 136–145)
SP GR UR STRIP.AUTO: 1.02 (ref 1–1.03)
TRIGL SERPL-MCNC: 199 MG/DL
TSH SERPL DL<=0.05 MIU/L-ACNC: 5.22 UIU/ML (ref 0.36–3.74)
UROBILINOGEN UR QL STRIP.AUTO: 0.2 E.U./DL
WBC # BLD AUTO: 5.13 THOUSAND/UL (ref 4.31–10.16)
WBC #/AREA URNS AUTO: ABNORMAL /HPF

## 2019-03-13 PROCEDURE — 82306 VITAMIN D 25 HYDROXY: CPT

## 2019-03-13 PROCEDURE — 85025 COMPLETE CBC W/AUTO DIFF WBC: CPT

## 2019-03-13 PROCEDURE — 80053 COMPREHEN METABOLIC PANEL: CPT

## 2019-03-13 PROCEDURE — 80061 LIPID PANEL: CPT

## 2019-03-13 PROCEDURE — 84443 ASSAY THYROID STIM HORMONE: CPT

## 2019-03-13 PROCEDURE — 36415 COLL VENOUS BLD VENIPUNCTURE: CPT

## 2019-03-13 PROCEDURE — G0103 PSA SCREENING: HCPCS

## 2019-03-13 PROCEDURE — 81001 URINALYSIS AUTO W/SCOPE: CPT | Performed by: INTERNAL MEDICINE

## 2019-03-14 RX ORDER — ACETAMINOPHEN 325 MG/1
650 TABLET ORAL ONCE
Status: COMPLETED | OUTPATIENT
Start: 2019-03-15 | End: 2019-03-15

## 2019-03-14 RX ORDER — SODIUM CHLORIDE 9 MG/ML
20 INJECTION, SOLUTION INTRAVENOUS CONTINUOUS
Status: DISCONTINUED | OUTPATIENT
Start: 2019-03-15 | End: 2019-03-18 | Stop reason: HOSPADM

## 2019-03-15 ENCOUNTER — HOSPITAL ENCOUNTER (OUTPATIENT)
Dept: INFUSION CENTER | Facility: CLINIC | Age: 74
Discharge: HOME | End: 2019-03-15
Payer: COMMERCIAL

## 2019-03-15 VITALS
HEART RATE: 62 BPM | SYSTOLIC BLOOD PRESSURE: 121 MMHG | TEMPERATURE: 97.8 F | RESPIRATION RATE: 16 BRPM | WEIGHT: 195.33 LBS | DIASTOLIC BLOOD PRESSURE: 78 MMHG | BODY MASS INDEX: 27.96 KG/M2 | HEIGHT: 70 IN

## 2019-03-15 PROCEDURE — 96367 TX/PROPH/DG ADDL SEQ IV INF: CPT

## 2019-03-15 PROCEDURE — 96415 CHEMO IV INFUSION ADDL HR: CPT

## 2019-03-15 PROCEDURE — 96413 CHEMO IV INFUSION 1 HR: CPT

## 2019-03-15 PROCEDURE — 96375 TX/PRO/DX INJ NEW DRUG ADDON: CPT

## 2019-03-15 RX ADMIN — ACETAMINOPHEN 650 MG: 325 TABLET, FILM COATED ORAL at 08:34

## 2019-03-15 RX ADMIN — ZOLEDRONIC ACID 3.5 MG: 4 INJECTION, SOLUTION, CONCENTRATE INTRAVENOUS at 09:12

## 2019-03-15 RX ADMIN — DIPHENHYDRAMINE HYDROCHLORIDE 25 MG: 50 INJECTION, SOLUTION INTRAMUSCULAR; INTRAVENOUS at 08:25

## 2019-03-15 RX ADMIN — SODIUM CHLORIDE 20 ML/HR: 0.9 INJECTION, SOLUTION INTRAVENOUS at 08:25

## 2019-03-15 RX ADMIN — DARATUMUMAB 1400 MG: 100 INJECTION, SOLUTION, CONCENTRATE INTRAVENOUS at 09:47

## 2019-03-15 RX ADMIN — SODIUM CHLORIDE 100 MG: 0.9 INJECTION, SOLUTION INTRAVENOUS at 08:40

## 2019-03-15 NOTE — PLAN OF CARE
Problem: Potential for Falls  Goal: Patient will remain free of falls  Description  INTERVENTIONS:  - Assess patient frequently for physical needs  -  Identify cognitive and physical deficits and behaviors that affect risk of falls    -  [place] fall precautions as indicated by assessment   - Educate patient/family on patient safety including physical limitations  - Instruct patient to call for assistance with activity based on assessment  - Modify environment to reduce risk of injury  - Consider OT/PT consult to assist with strengthening/mobility  Outcome: Progressing

## 2019-03-15 NOTE — PROGRESS NOTES
Patient tolerated Zometa and Darzalex infusions well  Offers no complaints  Pt and wife are aware of all future appointments   Refused AVS

## 2019-03-18 ENCOUNTER — TELEPHONE (OUTPATIENT)
Dept: INTERNAL MEDICINE CLINIC | Facility: CLINIC | Age: 74
End: 2019-03-18

## 2019-03-18 DIAGNOSIS — R31.29 OTHER MICROSCOPIC HEMATURIA: Primary | ICD-10-CM

## 2019-03-18 NOTE — TELEPHONE ENCOUNTER
----- Message from Daniela Saldivar MD sent at 3/13/2019  4:39 PM EDT -----  Note trace of blood in urine    Suggest u/s kidneys and bladder, repeat  u/a

## 2019-03-18 NOTE — TELEPHONE ENCOUNTER
Wife called in, I relayed the message, advised her order is in and the patient can contact Rafaela Rubio to set up an appt

## 2019-03-22 ENCOUNTER — HOSPITAL ENCOUNTER (OUTPATIENT)
Dept: ULTRASOUND IMAGING | Facility: HOSPITAL | Age: 74
Discharge: HOME | End: 2019-03-22
Payer: COMMERCIAL

## 2019-03-22 DIAGNOSIS — R31.29 OTHER MICROSCOPIC HEMATURIA: ICD-10-CM

## 2019-03-22 PROCEDURE — 76770 US EXAM ABDO BACK WALL COMP: CPT

## 2019-03-24 DIAGNOSIS — C90.00 MULTIPLE MYELOMA NOT HAVING ACHIEVED REMISSION (HCC): ICD-10-CM

## 2019-03-24 RX ORDER — ACYCLOVIR 400 MG/1
400 TABLET ORAL 2 TIMES DAILY
Qty: 60 TABLET | Refills: 0 | Status: CANCELLED | OUTPATIENT
Start: 2019-03-24 | End: 2019-04-23

## 2019-03-25 ENCOUNTER — TELEPHONE (OUTPATIENT)
Dept: INTERNAL MEDICINE CLINIC | Facility: CLINIC | Age: 74
End: 2019-03-25

## 2019-03-26 DIAGNOSIS — C90.00 MULTIPLE MYELOMA NOT HAVING ACHIEVED REMISSION (HCC): ICD-10-CM

## 2019-03-27 ENCOUNTER — TELEPHONE (OUTPATIENT)
Dept: HEMATOLOGY ONCOLOGY | Facility: CLINIC | Age: 74
End: 2019-03-27

## 2019-03-27 RX ORDER — ACYCLOVIR 400 MG/1
400 TABLET ORAL 2 TIMES DAILY
Qty: 60 TABLET | Refills: 3 | Status: SHIPPED | OUTPATIENT
Start: 2019-03-27 | End: 2019-08-06 | Stop reason: SDUPTHER

## 2019-03-27 NOTE — TELEPHONE ENCOUNTER
Pt's wife called for an update regarding the authorization needed for the IVIG  There was an issue two weeks ago with the insurance and it not being covered  If you can please call pt back

## 2019-04-02 ENCOUNTER — TELEPHONE (OUTPATIENT)
Dept: HEMATOLOGY ONCOLOGY | Facility: CLINIC | Age: 74
End: 2019-04-02

## 2019-04-03 ENCOUNTER — TELEPHONE (OUTPATIENT)
Dept: HEMATOLOGY ONCOLOGY | Facility: CLINIC | Age: 74
End: 2019-04-03

## 2019-04-08 DIAGNOSIS — E78.5 HYPERLIPIDEMIA, UNSPECIFIED HYPERLIPIDEMIA TYPE: ICD-10-CM

## 2019-04-09 DIAGNOSIS — E78.5 HYPERLIPIDEMIA, UNSPECIFIED HYPERLIPIDEMIA TYPE: ICD-10-CM

## 2019-04-10 ENCOUNTER — TRANSCRIBE ORDERS (OUTPATIENT)
Dept: LAB | Facility: CLINIC | Age: 74
End: 2019-04-10

## 2019-04-10 ENCOUNTER — APPOINTMENT (OUTPATIENT)
Dept: LAB | Facility: CLINIC | Age: 74
End: 2019-04-10
Payer: COMMERCIAL

## 2019-04-10 DIAGNOSIS — C90.01 MULTIPLE MYELOMA IN REMISSION (HCC): Primary | ICD-10-CM

## 2019-04-10 DIAGNOSIS — C90.01 MULTIPLE MYELOMA IN REMISSION (HCC): ICD-10-CM

## 2019-04-10 LAB
ALBUMIN SERPL BCP-MCNC: 3.8 G/DL (ref 3.5–5)
ALP SERPL-CCNC: 64 U/L (ref 46–116)
ALT SERPL W P-5'-P-CCNC: 28 U/L (ref 12–78)
ANION GAP SERPL CALCULATED.3IONS-SCNC: 6 MMOL/L (ref 4–13)
AST SERPL W P-5'-P-CCNC: 14 U/L (ref 5–45)
BASOPHILS # BLD AUTO: 0.07 THOUSANDS/ΜL (ref 0–0.1)
BASOPHILS NFR BLD AUTO: 2 % (ref 0–1)
BILIRUB SERPL-MCNC: 0.83 MG/DL (ref 0.2–1)
BUN SERPL-MCNC: 21 MG/DL (ref 5–25)
CALCIUM SERPL-MCNC: 8.8 MG/DL (ref 8.3–10.1)
CHLORIDE SERPL-SCNC: 105 MMOL/L (ref 100–108)
CO2 SERPL-SCNC: 28 MMOL/L (ref 21–32)
CREAT SERPL-MCNC: 1.56 MG/DL (ref 0.6–1.3)
EOSINOPHIL # BLD AUTO: 0.14 THOUSAND/ΜL (ref 0–0.61)
EOSINOPHIL NFR BLD AUTO: 3 % (ref 0–6)
ERYTHROCYTE [DISTWIDTH] IN BLOOD BY AUTOMATED COUNT: 14.5 % (ref 11.6–15.1)
GFR SERPL CREATININE-BSD FRML MDRD: 44 ML/MIN/1.73SQ M
GLUCOSE SERPL-MCNC: 97 MG/DL (ref 65–140)
HCT VFR BLD AUTO: 40.3 % (ref 36.5–49.3)
HGB BLD-MCNC: 13.1 G/DL (ref 12–17)
IMM GRANULOCYTES # BLD AUTO: 0.04 THOUSAND/UL (ref 0–0.2)
IMM GRANULOCYTES NFR BLD AUTO: 1 % (ref 0–2)
LYMPHOCYTES # BLD AUTO: 0.24 THOUSANDS/ΜL (ref 0.6–4.47)
LYMPHOCYTES NFR BLD AUTO: 5 % (ref 14–44)
MCH RBC QN AUTO: 33.5 PG (ref 26.8–34.3)
MCHC RBC AUTO-ENTMCNC: 32.5 G/DL (ref 31.4–37.4)
MCV RBC AUTO: 103 FL (ref 82–98)
MONOCYTES # BLD AUTO: 0.94 THOUSAND/ΜL (ref 0.17–1.22)
MONOCYTES NFR BLD AUTO: 20 % (ref 4–12)
NEUTROPHILS # BLD AUTO: 3.3 THOUSANDS/ΜL (ref 1.85–7.62)
NEUTS SEG NFR BLD AUTO: 69 % (ref 43–75)
NRBC BLD AUTO-RTO: 0 /100 WBCS
PLATELET # BLD AUTO: 112 THOUSANDS/UL (ref 149–390)
PMV BLD AUTO: 10.8 FL (ref 8.9–12.7)
POTASSIUM SERPL-SCNC: 3.9 MMOL/L (ref 3.5–5.3)
PROT SERPL-MCNC: 6.7 G/DL (ref 6.4–8.2)
RBC # BLD AUTO: 3.91 MILLION/UL (ref 3.88–5.62)
SODIUM SERPL-SCNC: 139 MMOL/L (ref 136–145)
WBC # BLD AUTO: 4.73 THOUSAND/UL (ref 4.31–10.16)

## 2019-04-10 PROCEDURE — 36415 COLL VENOUS BLD VENIPUNCTURE: CPT

## 2019-04-10 PROCEDURE — 85025 COMPLETE CBC W/AUTO DIFF WBC: CPT

## 2019-04-10 PROCEDURE — 80053 COMPREHEN METABOLIC PANEL: CPT

## 2019-04-10 RX ORDER — ATORVASTATIN CALCIUM 20 MG/1
TABLET, FILM COATED ORAL
Qty: 30 TABLET | Refills: 0 | Status: SHIPPED | OUTPATIENT
Start: 2019-04-10 | End: 2019-05-16 | Stop reason: SDUPTHER

## 2019-04-10 RX ORDER — ATORVASTATIN CALCIUM 20 MG/1
20 TABLET, FILM COATED ORAL DAILY
Qty: 30 TABLET | Refills: 5 | Status: SHIPPED | OUTPATIENT
Start: 2019-04-10 | End: 2019-10-29 | Stop reason: SDUPTHER

## 2019-04-11 RX ORDER — SODIUM CHLORIDE 9 MG/ML
20 INJECTION, SOLUTION INTRAVENOUS CONTINUOUS
Status: DISCONTINUED | OUTPATIENT
Start: 2019-04-12 | End: 2019-04-15 | Stop reason: HOSPADM

## 2019-04-11 RX ORDER — ACETAMINOPHEN 325 MG/1
650 TABLET ORAL ONCE
Status: COMPLETED | OUTPATIENT
Start: 2019-04-12 | End: 2019-04-12

## 2019-04-12 ENCOUNTER — HOSPITAL ENCOUNTER (OUTPATIENT)
Dept: INFUSION CENTER | Facility: CLINIC | Age: 74
Discharge: HOME | End: 2019-04-12
Payer: COMMERCIAL

## 2019-04-12 VITALS
HEIGHT: 70 IN | BODY MASS INDEX: 27.93 KG/M2 | SYSTOLIC BLOOD PRESSURE: 122 MMHG | HEART RATE: 60 BPM | DIASTOLIC BLOOD PRESSURE: 83 MMHG | RESPIRATION RATE: 16 BRPM | WEIGHT: 195.11 LBS | TEMPERATURE: 96.7 F

## 2019-04-12 PROCEDURE — 96415 CHEMO IV INFUSION ADDL HR: CPT

## 2019-04-12 PROCEDURE — 96367 TX/PROPH/DG ADDL SEQ IV INF: CPT

## 2019-04-12 PROCEDURE — 96413 CHEMO IV INFUSION 1 HR: CPT

## 2019-04-12 PROCEDURE — 96366 THER/PROPH/DIAG IV INF ADDON: CPT

## 2019-04-12 RX ADMIN — Medication 30 G: at 13:07

## 2019-04-12 RX ADMIN — SODIUM CHLORIDE 100 MG: 0.9 INJECTION, SOLUTION INTRAVENOUS at 08:33

## 2019-04-12 RX ADMIN — ZOLEDRONIC ACID 3.3 MG: 4 INJECTION, SOLUTION, CONCENTRATE INTRAVENOUS at 09:00

## 2019-04-12 RX ADMIN — ACETAMINOPHEN 650 MG: 325 TABLET, FILM COATED ORAL at 08:34

## 2019-04-12 RX ADMIN — DARATUMUMAB 1400 MG: 100 INJECTION, SOLUTION, CONCENTRATE INTRAVENOUS at 09:34

## 2019-04-12 RX ADMIN — DIPHENHYDRAMINE HYDROCHLORIDE 25 MG: 50 INJECTION, SOLUTION INTRAMUSCULAR; INTRAVENOUS at 08:15

## 2019-04-12 RX ADMIN — SODIUM CHLORIDE 20 ML/HR: 0.9 INJECTION, SOLUTION INTRAVENOUS at 08:15

## 2019-04-12 NOTE — PROGRESS NOTES
Patient arrived to the infusion center for Darzalex, Zometa and IVIG infusions  Pt offers no complaints  Per my conversation with Claudai Umaña RN, Dr Cory Rodrigez is aware of elevated Creatinine 1 56 and we are okay to proceed with treatment  Pt instructed to increase PO fluid intake  Verbalized understanding

## 2019-04-12 NOTE — PLAN OF CARE
Problem: Potential for Falls  Goal: Patient will remain free of falls  Description  INTERVENTIONS:  - Assess patient frequently for physical needs  -  Identify cognitive and physical deficits and behaviors that affect risk of falls    -  [place] fall precautions as indicated by assessment   - Educate patient/family on patient safety including physical limitations  - Instruct patient to call for assistance with activity based on assessment  - Modify environment to reduce risk of injury  - Consider OT/PT consult to assist with strengthening/mobility  Outcome: Progressing

## 2019-04-24 DIAGNOSIS — C90.00 IGG MULTIPLE MYELOMA (HCC): ICD-10-CM

## 2019-04-24 DIAGNOSIS — D80.1 HYPOGAMMAGLOBULINEMIA (HCC): ICD-10-CM

## 2019-04-24 RX ORDER — SODIUM CHLORIDE 9 MG/ML
20 INJECTION, SOLUTION INTRAVENOUS ONCE
Status: CANCELLED | OUTPATIENT
Start: 2019-06-07

## 2019-04-24 RX ORDER — ACETAMINOPHEN 325 MG/1
650 TABLET ORAL ONCE
Status: CANCELLED | OUTPATIENT
Start: 2019-06-07

## 2019-04-24 RX ORDER — DIPHENHYDRAMINE HCL 25 MG
50 TABLET ORAL ONCE
Status: CANCELLED | OUTPATIENT
Start: 2019-06-07

## 2019-05-08 ENCOUNTER — APPOINTMENT (OUTPATIENT)
Dept: LAB | Facility: CLINIC | Age: 74
End: 2019-05-08
Payer: COMMERCIAL

## 2019-05-08 DIAGNOSIS — C90.00 IGG MULTIPLE MYELOMA (HCC): ICD-10-CM

## 2019-05-08 LAB
ALBUMIN SERPL BCP-MCNC: 3.6 G/DL (ref 3.5–5)
ALP SERPL-CCNC: 58 U/L (ref 46–116)
ALT SERPL W P-5'-P-CCNC: 30 U/L (ref 12–78)
ANION GAP SERPL CALCULATED.3IONS-SCNC: 7 MMOL/L (ref 4–13)
AST SERPL W P-5'-P-CCNC: 12 U/L (ref 5–45)
BASOPHILS # BLD AUTO: 0.03 THOUSANDS/ΜL (ref 0–0.1)
BASOPHILS NFR BLD AUTO: 1 % (ref 0–1)
BILIRUB SERPL-MCNC: 1.18 MG/DL (ref 0.2–1)
BUN SERPL-MCNC: 20 MG/DL (ref 5–25)
CALCIUM SERPL-MCNC: 8.6 MG/DL (ref 8.3–10.1)
CHLORIDE SERPL-SCNC: 103 MMOL/L (ref 100–108)
CO2 SERPL-SCNC: 27 MMOL/L (ref 21–32)
CREAT SERPL-MCNC: 1.37 MG/DL (ref 0.6–1.3)
EOSINOPHIL # BLD AUTO: 0.1 THOUSAND/ΜL (ref 0–0.61)
EOSINOPHIL NFR BLD AUTO: 2 % (ref 0–6)
ERYTHROCYTE [DISTWIDTH] IN BLOOD BY AUTOMATED COUNT: 14.4 % (ref 11.6–15.1)
GFR SERPL CREATININE-BSD FRML MDRD: 52 ML/MIN/1.73SQ M
GLUCOSE SERPL-MCNC: 94 MG/DL (ref 65–140)
HCT VFR BLD AUTO: 36.5 % (ref 36.5–49.3)
HGB BLD-MCNC: 12 G/DL (ref 12–17)
IMM GRANULOCYTES # BLD AUTO: 0.03 THOUSAND/UL (ref 0–0.2)
IMM GRANULOCYTES NFR BLD AUTO: 1 % (ref 0–2)
LYMPHOCYTES # BLD AUTO: 0.22 THOUSANDS/ΜL (ref 0.6–4.47)
LYMPHOCYTES NFR BLD AUTO: 5 % (ref 14–44)
MCH RBC QN AUTO: 33.6 PG (ref 26.8–34.3)
MCHC RBC AUTO-ENTMCNC: 32.9 G/DL (ref 31.4–37.4)
MCV RBC AUTO: 102 FL (ref 82–98)
MONOCYTES # BLD AUTO: 0.97 THOUSAND/ΜL (ref 0.17–1.22)
MONOCYTES NFR BLD AUTO: 23 % (ref 4–12)
NEUTROPHILS # BLD AUTO: 2.93 THOUSANDS/ΜL (ref 1.85–7.62)
NEUTS SEG NFR BLD AUTO: 68 % (ref 43–75)
NRBC BLD AUTO-RTO: 0 /100 WBCS
PLATELET # BLD AUTO: 96 THOUSANDS/UL (ref 149–390)
PMV BLD AUTO: 11.1 FL (ref 8.9–12.7)
POTASSIUM SERPL-SCNC: 3.7 MMOL/L (ref 3.5–5.3)
PROT SERPL-MCNC: 6.8 G/DL (ref 6.4–8.2)
RBC # BLD AUTO: 3.57 MILLION/UL (ref 3.88–5.62)
SODIUM SERPL-SCNC: 137 MMOL/L (ref 136–145)
WBC # BLD AUTO: 4.28 THOUSAND/UL (ref 4.31–10.16)

## 2019-05-08 PROCEDURE — 80053 COMPREHEN METABOLIC PANEL: CPT

## 2019-05-08 PROCEDURE — 36415 COLL VENOUS BLD VENIPUNCTURE: CPT

## 2019-05-08 PROCEDURE — 85025 COMPLETE CBC W/AUTO DIFF WBC: CPT

## 2019-05-09 RX ORDER — ACETAMINOPHEN 325 MG/1
650 TABLET ORAL ONCE
Status: DISCONTINUED | OUTPATIENT
Start: 2019-05-10 | End: 2019-05-09 | Stop reason: SDUPTHER

## 2019-05-09 RX ORDER — SODIUM CHLORIDE 9 MG/ML
20 INJECTION, SOLUTION INTRAVENOUS CONTINUOUS
Status: DISCONTINUED | OUTPATIENT
Start: 2019-05-10 | End: 2019-05-13 | Stop reason: HOSPADM

## 2019-05-09 RX ORDER — ACETAMINOPHEN 325 MG/1
650 TABLET ORAL ONCE
Status: COMPLETED | OUTPATIENT
Start: 2019-05-10 | End: 2019-05-10

## 2019-05-10 ENCOUNTER — HOSPITAL ENCOUNTER (OUTPATIENT)
Dept: INFUSION CENTER | Facility: CLINIC | Age: 74
Discharge: HOME | End: 2019-05-10
Payer: COMMERCIAL

## 2019-05-10 VITALS
HEIGHT: 70 IN | TEMPERATURE: 97.3 F | WEIGHT: 194.22 LBS | SYSTOLIC BLOOD PRESSURE: 138 MMHG | RESPIRATION RATE: 16 BRPM | DIASTOLIC BLOOD PRESSURE: 78 MMHG | HEART RATE: 62 BPM | BODY MASS INDEX: 27.81 KG/M2

## 2019-05-10 PROCEDURE — 96375 TX/PRO/DX INJ NEW DRUG ADDON: CPT

## 2019-05-10 PROCEDURE — 96366 THER/PROPH/DIAG IV INF ADDON: CPT

## 2019-05-10 PROCEDURE — 96415 CHEMO IV INFUSION ADDL HR: CPT

## 2019-05-10 PROCEDURE — 96413 CHEMO IV INFUSION 1 HR: CPT

## 2019-05-10 PROCEDURE — 96367 TX/PROPH/DG ADDL SEQ IV INF: CPT

## 2019-05-10 RX ADMIN — ZOLEDRONIC ACID 3.3 MG: 4 INJECTION, SOLUTION, CONCENTRATE INTRAVENOUS at 09:16

## 2019-05-10 RX ADMIN — SODIUM CHLORIDE 100 MG: 0.9 INJECTION, SOLUTION INTRAVENOUS at 08:40

## 2019-05-10 RX ADMIN — DARATUMUMAB 1400 MG: 100 INJECTION, SOLUTION, CONCENTRATE INTRAVENOUS at 09:48

## 2019-05-10 RX ADMIN — SODIUM CHLORIDE 20 ML/HR: 0.9 INJECTION, SOLUTION INTRAVENOUS at 08:25

## 2019-05-10 RX ADMIN — DIPHENHYDRAMINE HYDROCHLORIDE 25 MG: 50 INJECTION INTRAMUSCULAR; INTRAVENOUS at 08:25

## 2019-05-10 RX ADMIN — ACETAMINOPHEN 650 MG: 325 TABLET, FILM COATED ORAL at 08:28

## 2019-05-10 RX ADMIN — Medication 30 G: at 13:19

## 2019-05-10 NOTE — PLAN OF CARE
Problem: Potential for Falls  Goal: Patient will remain free of falls  Description  INTERVENTIONS:  - Assess patient frequently for physical needs  -  Identify cognitive and physical deficits and behaviors that affect risk of falls    -  [place] fall precautions as indicated by assessment   - Educate patient/family on patient safety including physical limitations  - Instruct patient to call for assistance with activity based on assessment  - Modify environment to reduce risk of injury  - Consider OT/PT consult to assist with strengthening/mobility  Outcome: Progressing

## 2019-05-10 NOTE — PROGRESS NOTES
Patient tolerated Zometa, Darzalex and IVIG infusions well  Denies any discomfort  Pt is aware of all future appointments   Refused AVS

## 2019-05-16 ENCOUNTER — OFFICE VISIT (OUTPATIENT)
Dept: HEMATOLOGY ONCOLOGY | Facility: CLINIC | Age: 74
End: 2019-05-16
Payer: COMMERCIAL

## 2019-05-16 VITALS
DIASTOLIC BLOOD PRESSURE: 74 MMHG | HEART RATE: 60 BPM | TEMPERATURE: 98.6 F | SYSTOLIC BLOOD PRESSURE: 126 MMHG | HEIGHT: 69 IN | BODY MASS INDEX: 28.88 KG/M2 | WEIGHT: 195 LBS | RESPIRATION RATE: 16 BRPM

## 2019-05-16 DIAGNOSIS — C90.00 IGG MULTIPLE MYELOMA (HCC): Primary | ICD-10-CM

## 2019-05-16 DIAGNOSIS — D80.1 HYPOGAMMAGLOBULINEMIA (HCC): ICD-10-CM

## 2019-05-16 PROCEDURE — 99214 OFFICE O/P EST MOD 30 MIN: CPT | Performed by: INTERNAL MEDICINE

## 2019-06-05 ENCOUNTER — APPOINTMENT (OUTPATIENT)
Dept: LAB | Facility: CLINIC | Age: 74
End: 2019-06-05
Payer: COMMERCIAL

## 2019-06-05 DIAGNOSIS — C90.00 IGG MULTIPLE MYELOMA (HCC): ICD-10-CM

## 2019-06-05 LAB
ALBUMIN SERPL BCP-MCNC: 3.5 G/DL (ref 3.5–5)
ALP SERPL-CCNC: 67 U/L (ref 46–116)
ALT SERPL W P-5'-P-CCNC: 21 U/L (ref 12–78)
ANION GAP SERPL CALCULATED.3IONS-SCNC: 7 MMOL/L (ref 4–13)
AST SERPL W P-5'-P-CCNC: 7 U/L (ref 5–45)
BASOPHILS # BLD AUTO: 0.05 THOUSANDS/ΜL (ref 0–0.1)
BASOPHILS NFR BLD AUTO: 1 % (ref 0–1)
BILIRUB SERPL-MCNC: 1.54 MG/DL (ref 0.2–1)
BUN SERPL-MCNC: 15 MG/DL (ref 5–25)
CALCIUM SERPL-MCNC: 8.6 MG/DL (ref 8.3–10.1)
CHLORIDE SERPL-SCNC: 101 MMOL/L (ref 100–108)
CO2 SERPL-SCNC: 26 MMOL/L (ref 21–32)
CREAT SERPL-MCNC: 1.43 MG/DL (ref 0.6–1.3)
EOSINOPHIL # BLD AUTO: 0.15 THOUSAND/ΜL (ref 0–0.61)
EOSINOPHIL NFR BLD AUTO: 2 % (ref 0–6)
ERYTHROCYTE [DISTWIDTH] IN BLOOD BY AUTOMATED COUNT: 14.8 % (ref 11.6–15.1)
GFR SERPL CREATININE-BSD FRML MDRD: 49 ML/MIN/1.73SQ M
GLUCOSE SERPL-MCNC: 111 MG/DL (ref 65–140)
HCT VFR BLD AUTO: 37.7 % (ref 36.5–49.3)
HGB BLD-MCNC: 12.4 G/DL (ref 12–17)
IMM GRANULOCYTES # BLD AUTO: 0.05 THOUSAND/UL (ref 0–0.2)
IMM GRANULOCYTES NFR BLD AUTO: 1 % (ref 0–2)
LYMPHOCYTES # BLD AUTO: 0.1 THOUSANDS/ΜL (ref 0.6–4.47)
LYMPHOCYTES NFR BLD AUTO: 2 % (ref 14–44)
MCH RBC QN AUTO: 34.1 PG (ref 26.8–34.3)
MCHC RBC AUTO-ENTMCNC: 32.9 G/DL (ref 31.4–37.4)
MCV RBC AUTO: 104 FL (ref 82–98)
MONOCYTES # BLD AUTO: 1.06 THOUSAND/ΜL (ref 0.17–1.22)
MONOCYTES NFR BLD AUTO: 17 % (ref 4–12)
NEUTROPHILS # BLD AUTO: 4.97 THOUSANDS/ΜL (ref 1.85–7.62)
NEUTS SEG NFR BLD AUTO: 77 % (ref 43–75)
NRBC BLD AUTO-RTO: 0 /100 WBCS
PLATELET # BLD AUTO: 116 THOUSANDS/UL (ref 149–390)
PMV BLD AUTO: 10.9 FL (ref 8.9–12.7)
POTASSIUM SERPL-SCNC: 4.1 MMOL/L (ref 3.5–5.3)
PROT SERPL-MCNC: 7.5 G/DL (ref 6.4–8.2)
RBC # BLD AUTO: 3.64 MILLION/UL (ref 3.88–5.62)
SODIUM SERPL-SCNC: 134 MMOL/L (ref 136–145)
WBC # BLD AUTO: 6.38 THOUSAND/UL (ref 4.31–10.16)

## 2019-06-05 PROCEDURE — 80053 COMPREHEN METABOLIC PANEL: CPT

## 2019-06-05 PROCEDURE — 36415 COLL VENOUS BLD VENIPUNCTURE: CPT

## 2019-06-05 PROCEDURE — 85025 COMPLETE CBC W/AUTO DIFF WBC: CPT

## 2019-06-07 ENCOUNTER — HOSPITAL ENCOUNTER (OUTPATIENT)
Dept: INFUSION CENTER | Facility: CLINIC | Age: 74
Discharge: HOME | End: 2019-06-07
Payer: COMMERCIAL

## 2019-06-07 VITALS
RESPIRATION RATE: 16 BRPM | BODY MASS INDEX: 27.92 KG/M2 | WEIGHT: 188.49 LBS | TEMPERATURE: 98.6 F | DIASTOLIC BLOOD PRESSURE: 70 MMHG | HEART RATE: 66 BPM | SYSTOLIC BLOOD PRESSURE: 109 MMHG | HEIGHT: 69 IN

## 2019-06-07 DIAGNOSIS — D80.1 HYPOGAMMAGLOBULINEMIA (HCC): Primary | ICD-10-CM

## 2019-06-07 DIAGNOSIS — C90.00 IGG MULTIPLE MYELOMA (HCC): ICD-10-CM

## 2019-06-07 PROCEDURE — 96366 THER/PROPH/DIAG IV INF ADDON: CPT

## 2019-06-07 PROCEDURE — 96415 CHEMO IV INFUSION ADDL HR: CPT

## 2019-06-07 PROCEDURE — 96367 TX/PROPH/DG ADDL SEQ IV INF: CPT

## 2019-06-07 PROCEDURE — 96413 CHEMO IV INFUSION 1 HR: CPT

## 2019-06-07 RX ORDER — SODIUM CHLORIDE 9 MG/ML
20 INJECTION, SOLUTION INTRAVENOUS ONCE
Status: COMPLETED | OUTPATIENT
Start: 2019-06-07 | End: 2019-06-07

## 2019-06-07 RX ORDER — ACETAMINOPHEN 325 MG/1
650 TABLET ORAL ONCE
Status: COMPLETED | OUTPATIENT
Start: 2019-06-07 | End: 2019-06-07

## 2019-06-07 RX ADMIN — DARATUMUMAB 1416 MG: 100 INJECTION, SOLUTION, CONCENTRATE INTRAVENOUS at 09:47

## 2019-06-07 RX ADMIN — Medication 30 G: at 13:18

## 2019-06-07 RX ADMIN — DIPHENHYDRAMINE HYDROCHLORIDE 25 MG: 50 INJECTION INTRAMUSCULAR; INTRAVENOUS at 08:35

## 2019-06-07 RX ADMIN — ACETAMINOPHEN 650 MG: 325 TABLET, FILM COATED ORAL at 08:25

## 2019-06-07 RX ADMIN — SODIUM CHLORIDE 20 ML/HR: 0.9 INJECTION, SOLUTION INTRAVENOUS at 08:35

## 2019-06-07 RX ADMIN — SODIUM CHLORIDE 100 MG: 0.9 INJECTION, SOLUTION INTRAVENOUS at 08:52

## 2019-06-07 RX ADMIN — ZOLEDRONIC ACID 3.5 MG: 4 INJECTION, SOLUTION, CONCENTRATE INTRAVENOUS at 09:15

## 2019-06-07 NOTE — PROGRESS NOTES
Patient tolerated Darzalex, Zometa and IVIG infusions well  Offers no complaints  Pt and wife are aware of all future appointments   Refused AVS

## 2019-06-07 NOTE — PLAN OF CARE
Problem: Potential for Falls  Goal: Patient will remain free of falls  Description  INTERVENTIONS:  - Assess patient frequently for physical needs  -  Identify cognitive and physical deficits and behaviors that affect risk of falls    -  [place] fall precautions as indicated by assessment   - Educate patient/family on patient safety including physical limitations  - Instruct patient to call for assistance with activity based on assessment  - Modify environment to reduce risk of injury  - Consider OT/PT consult to assist with strengthening/mobility  Outcome: Progressing

## 2019-06-07 NOTE — PROGRESS NOTES
Per the Zometa renal dose protocol, adjust Zometa dose to 3 5mg IV  Based on CrCl 513ml/min using adjusted body weight    Ht:69in  Wt:85 5Kg  Cr: 1 43

## 2019-06-16 DIAGNOSIS — F41.9 ANXIETY: ICD-10-CM

## 2019-06-17 RX ORDER — ESCITALOPRAM OXALATE 20 MG/1
20 TABLET ORAL DAILY
Qty: 90 TABLET | Refills: 0 | Status: SHIPPED | OUTPATIENT
Start: 2019-06-17 | End: 2019-09-19 | Stop reason: SDUPTHER

## 2019-06-28 RX ORDER — SODIUM CHLORIDE 9 MG/ML
20 INJECTION, SOLUTION INTRAVENOUS ONCE
Status: CANCELLED | OUTPATIENT
Start: 2019-07-05

## 2019-06-28 RX ORDER — ACETAMINOPHEN 325 MG/1
650 TABLET ORAL ONCE
Status: CANCELLED | OUTPATIENT
Start: 2019-07-05

## 2019-07-03 ENCOUNTER — APPOINTMENT (OUTPATIENT)
Dept: LAB | Facility: CLINIC | Age: 74
End: 2019-07-03
Payer: COMMERCIAL

## 2019-07-03 DIAGNOSIS — C90.00 IGG MULTIPLE MYELOMA (HCC): ICD-10-CM

## 2019-07-03 LAB
ALBUMIN SERPL BCP-MCNC: 3.5 G/DL (ref 3.5–5)
ALP SERPL-CCNC: 86 U/L (ref 46–116)
ALT SERPL W P-5'-P-CCNC: 22 U/L (ref 12–78)
ANION GAP SERPL CALCULATED.3IONS-SCNC: 10 MMOL/L (ref 4–13)
AST SERPL W P-5'-P-CCNC: 14 U/L (ref 5–45)
BASOPHILS # BLD AUTO: 0.05 THOUSANDS/ΜL (ref 0–0.1)
BASOPHILS NFR BLD AUTO: 1 % (ref 0–1)
BILIRUB SERPL-MCNC: 1.29 MG/DL (ref 0.2–1)
BUN SERPL-MCNC: 17 MG/DL (ref 5–25)
CALCIUM SERPL-MCNC: 8.9 MG/DL (ref 8.3–10.1)
CHLORIDE SERPL-SCNC: 102 MMOL/L (ref 100–108)
CO2 SERPL-SCNC: 27 MMOL/L (ref 21–32)
CREAT SERPL-MCNC: 1.42 MG/DL (ref 0.6–1.3)
EOSINOPHIL # BLD AUTO: 0.16 THOUSAND/ΜL (ref 0–0.61)
EOSINOPHIL NFR BLD AUTO: 3 % (ref 0–6)
ERYTHROCYTE [DISTWIDTH] IN BLOOD BY AUTOMATED COUNT: 15.3 % (ref 11.6–15.1)
GFR SERPL CREATININE-BSD FRML MDRD: 49 ML/MIN/1.73SQ M
GLUCOSE SERPL-MCNC: 87 MG/DL (ref 65–140)
HCT VFR BLD AUTO: 34.7 % (ref 36.5–49.3)
HGB BLD-MCNC: 11.4 G/DL (ref 12–17)
IMM GRANULOCYTES # BLD AUTO: 0.03 THOUSAND/UL (ref 0–0.2)
IMM GRANULOCYTES NFR BLD AUTO: 1 % (ref 0–2)
LYMPHOCYTES # BLD AUTO: 0.09 THOUSANDS/ΜL (ref 0.6–4.47)
LYMPHOCYTES NFR BLD AUTO: 2 % (ref 14–44)
MCH RBC QN AUTO: 34.4 PG (ref 26.8–34.3)
MCHC RBC AUTO-ENTMCNC: 32.9 G/DL (ref 31.4–37.4)
MCV RBC AUTO: 105 FL (ref 82–98)
MONOCYTES # BLD AUTO: 0.84 THOUSAND/ΜL (ref 0.17–1.22)
MONOCYTES NFR BLD AUTO: 18 % (ref 4–12)
NEUTROPHILS # BLD AUTO: 3.57 THOUSANDS/ΜL (ref 1.85–7.62)
NEUTS SEG NFR BLD AUTO: 75 % (ref 43–75)
NRBC BLD AUTO-RTO: 0 /100 WBCS
PLATELET # BLD AUTO: 103 THOUSANDS/UL (ref 149–390)
PMV BLD AUTO: 11.5 FL (ref 8.9–12.7)
POTASSIUM SERPL-SCNC: 3.9 MMOL/L (ref 3.5–5.3)
PROT SERPL-MCNC: 6.7 G/DL (ref 6.4–8.2)
RBC # BLD AUTO: 3.31 MILLION/UL (ref 3.88–5.62)
SODIUM SERPL-SCNC: 139 MMOL/L (ref 136–145)
WBC # BLD AUTO: 4.74 THOUSAND/UL (ref 4.31–10.16)

## 2019-07-03 PROCEDURE — 85025 COMPLETE CBC W/AUTO DIFF WBC: CPT

## 2019-07-03 PROCEDURE — 80053 COMPREHEN METABOLIC PANEL: CPT

## 2019-07-03 PROCEDURE — 36415 COLL VENOUS BLD VENIPUNCTURE: CPT

## 2019-07-05 ENCOUNTER — HOSPITAL ENCOUNTER (OUTPATIENT)
Dept: INFUSION CENTER | Facility: CLINIC | Age: 74
Discharge: HOME | End: 2019-07-05
Payer: COMMERCIAL

## 2019-07-05 VITALS
HEART RATE: 56 BPM | TEMPERATURE: 97.7 F | DIASTOLIC BLOOD PRESSURE: 75 MMHG | WEIGHT: 187.39 LBS | HEIGHT: 69 IN | RESPIRATION RATE: 16 BRPM | BODY MASS INDEX: 27.76 KG/M2 | SYSTOLIC BLOOD PRESSURE: 149 MMHG

## 2019-07-05 DIAGNOSIS — D80.1 HYPOGAMMAGLOBULINEMIA (HCC): Primary | ICD-10-CM

## 2019-07-05 DIAGNOSIS — C90.00 IGG MULTIPLE MYELOMA (HCC): ICD-10-CM

## 2019-07-05 PROCEDURE — 96366 THER/PROPH/DIAG IV INF ADDON: CPT

## 2019-07-05 PROCEDURE — 96415 CHEMO IV INFUSION ADDL HR: CPT

## 2019-07-05 PROCEDURE — 96367 TX/PROPH/DG ADDL SEQ IV INF: CPT

## 2019-07-05 PROCEDURE — 96413 CHEMO IV INFUSION 1 HR: CPT

## 2019-07-05 RX ORDER — ACETAMINOPHEN 325 MG/1
650 TABLET ORAL ONCE
Status: COMPLETED | OUTPATIENT
Start: 2019-07-05 | End: 2019-07-05

## 2019-07-05 RX ORDER — SODIUM CHLORIDE 9 MG/ML
20 INJECTION, SOLUTION INTRAVENOUS ONCE
Status: COMPLETED | OUTPATIENT
Start: 2019-07-05 | End: 2019-07-05

## 2019-07-05 RX ADMIN — Medication 30 G: at 13:27

## 2019-07-05 RX ADMIN — DIPHENHYDRAMINE HYDROCHLORIDE 25 MG: 50 INJECTION, SOLUTION INTRAMUSCULAR; INTRAVENOUS at 08:40

## 2019-07-05 RX ADMIN — SODIUM CHLORIDE 100 MG: 0.9 INJECTION, SOLUTION INTRAVENOUS at 09:00

## 2019-07-05 RX ADMIN — ZOLEDRONIC ACID 3.3 MG: 4 INJECTION, SOLUTION, CONCENTRATE INTRAVENOUS at 09:20

## 2019-07-05 RX ADMIN — DARATUMUMAB 1400 MG: 100 INJECTION, SOLUTION, CONCENTRATE INTRAVENOUS at 09:54

## 2019-07-05 RX ADMIN — SODIUM CHLORIDE 20 ML/HR: 0.9 INJECTION, SOLUTION INTRAVENOUS at 08:40

## 2019-07-05 RX ADMIN — ACETAMINOPHEN 650 MG: 325 TABLET, FILM COATED ORAL at 08:26

## 2019-07-05 NOTE — PLAN OF CARE
Problem: Potential for Falls  Goal: Patient will remain free of falls  Description  INTERVENTIONS:  - Assess patient frequently for physical needs  -  Identify cognitive and physical deficits and behaviors that affect risk of falls    -  [place] fall precautions as indicated by assessment   - Educate patient/family on patient safety including physical limitations  - Instruct patient to call for assistance with activity based on assessment  - Modify environment to reduce risk of injury  - Consider OT/PT consult to assist with strengthening/mobility  Outcome: Progressing

## 2019-07-05 NOTE — PROGRESS NOTES
Per the Zometa renal dose protocol, adjust Zometa dose to 3 3mg IV  Based on CrCl 42 9 ml/min using ideal body weight    Ht:175 2 cm  Wt: 85 Kg  Cr: 1 42 mg/dl  --corrected to 1 58 mg/dl

## 2019-07-26 RX ORDER — ACETAMINOPHEN 325 MG/1
650 TABLET ORAL ONCE
Status: CANCELLED | OUTPATIENT
Start: 2019-08-02

## 2019-07-26 RX ORDER — SODIUM CHLORIDE 9 MG/ML
20 INJECTION, SOLUTION INTRAVENOUS ONCE
Status: CANCELLED | OUTPATIENT
Start: 2019-08-02

## 2019-07-31 ENCOUNTER — APPOINTMENT (OUTPATIENT)
Dept: LAB | Facility: CLINIC | Age: 74
End: 2019-07-31
Payer: COMMERCIAL

## 2019-07-31 DIAGNOSIS — C90.00 IGG MULTIPLE MYELOMA (HCC): ICD-10-CM

## 2019-07-31 LAB
ALBUMIN SERPL BCP-MCNC: 3.3 G/DL (ref 3.5–5)
ALP SERPL-CCNC: 80 U/L (ref 46–116)
ALT SERPL W P-5'-P-CCNC: 18 U/L (ref 12–78)
ANION GAP SERPL CALCULATED.3IONS-SCNC: 7 MMOL/L (ref 4–13)
AST SERPL W P-5'-P-CCNC: 12 U/L (ref 5–45)
BASOPHILS # BLD AUTO: 0.05 THOUSANDS/ΜL (ref 0–0.1)
BASOPHILS NFR BLD AUTO: 1 % (ref 0–1)
BILIRUB SERPL-MCNC: 1.15 MG/DL (ref 0.2–1)
BUN SERPL-MCNC: 18 MG/DL (ref 5–25)
CALCIUM SERPL-MCNC: 9.8 MG/DL (ref 8.3–10.1)
CHLORIDE SERPL-SCNC: 102 MMOL/L (ref 100–108)
CO2 SERPL-SCNC: 28 MMOL/L (ref 21–32)
CREAT SERPL-MCNC: 1.48 MG/DL (ref 0.6–1.3)
EOSINOPHIL # BLD AUTO: 0.49 THOUSAND/ΜL (ref 0–0.61)
EOSINOPHIL NFR BLD AUTO: 11 % (ref 0–6)
ERYTHROCYTE [DISTWIDTH] IN BLOOD BY AUTOMATED COUNT: 14.8 % (ref 11.6–15.1)
GFR SERPL CREATININE-BSD FRML MDRD: 47 ML/MIN/1.73SQ M
GLUCOSE P FAST SERPL-MCNC: 85 MG/DL (ref 65–99)
HCT VFR BLD AUTO: 34.7 % (ref 36.5–49.3)
HGB BLD-MCNC: 11.2 G/DL (ref 12–17)
IMM GRANULOCYTES # BLD AUTO: 0.03 THOUSAND/UL (ref 0–0.2)
IMM GRANULOCYTES NFR BLD AUTO: 1 % (ref 0–2)
LYMPHOCYTES # BLD AUTO: 0.09 THOUSANDS/ΜL (ref 0.6–4.47)
LYMPHOCYTES NFR BLD AUTO: 2 % (ref 14–44)
MCH RBC QN AUTO: 34 PG (ref 26.8–34.3)
MCHC RBC AUTO-ENTMCNC: 32.3 G/DL (ref 31.4–37.4)
MCV RBC AUTO: 106 FL (ref 82–98)
MONOCYTES # BLD AUTO: 1.04 THOUSAND/ΜL (ref 0.17–1.22)
MONOCYTES NFR BLD AUTO: 23 % (ref 4–12)
NEUTROPHILS # BLD AUTO: 2.81 THOUSANDS/ΜL (ref 1.85–7.62)
NEUTS SEG NFR BLD AUTO: 62 % (ref 43–75)
NRBC BLD AUTO-RTO: 0 /100 WBCS
PLATELET # BLD AUTO: 139 THOUSANDS/UL (ref 149–390)
PMV BLD AUTO: 11.3 FL (ref 8.9–12.7)
POTASSIUM SERPL-SCNC: 4.3 MMOL/L (ref 3.5–5.3)
PROT SERPL-MCNC: 7 G/DL (ref 6.4–8.2)
RBC # BLD AUTO: 3.29 MILLION/UL (ref 3.88–5.62)
SODIUM SERPL-SCNC: 137 MMOL/L (ref 136–145)
WBC # BLD AUTO: 4.51 THOUSAND/UL (ref 4.31–10.16)

## 2019-07-31 PROCEDURE — 36415 COLL VENOUS BLD VENIPUNCTURE: CPT

## 2019-07-31 PROCEDURE — 80053 COMPREHEN METABOLIC PANEL: CPT

## 2019-07-31 PROCEDURE — 85025 COMPLETE CBC W/AUTO DIFF WBC: CPT

## 2019-08-02 ENCOUNTER — HOSPITAL ENCOUNTER (OUTPATIENT)
Dept: INFUSION CENTER | Facility: CLINIC | Age: 74
Discharge: HOME | End: 2019-08-02
Payer: COMMERCIAL

## 2019-08-02 VITALS
DIASTOLIC BLOOD PRESSURE: 65 MMHG | HEART RATE: 80 BPM | HEIGHT: 69 IN | SYSTOLIC BLOOD PRESSURE: 112 MMHG | BODY MASS INDEX: 27.33 KG/M2 | WEIGHT: 184.53 LBS | TEMPERATURE: 98.3 F | RESPIRATION RATE: 18 BRPM

## 2019-08-02 DIAGNOSIS — D80.1 HYPOGAMMAGLOBULINEMIA (HCC): Primary | ICD-10-CM

## 2019-08-02 DIAGNOSIS — C90.00 IGG MULTIPLE MYELOMA (HCC): ICD-10-CM

## 2019-08-02 PROCEDURE — 96367 TX/PROPH/DG ADDL SEQ IV INF: CPT

## 2019-08-02 PROCEDURE — 96366 THER/PROPH/DIAG IV INF ADDON: CPT

## 2019-08-02 PROCEDURE — 96375 TX/PRO/DX INJ NEW DRUG ADDON: CPT

## 2019-08-02 PROCEDURE — 96415 CHEMO IV INFUSION ADDL HR: CPT

## 2019-08-02 PROCEDURE — 96413 CHEMO IV INFUSION 1 HR: CPT

## 2019-08-02 RX ORDER — ACETAMINOPHEN 325 MG/1
650 TABLET ORAL ONCE
Status: COMPLETED | OUTPATIENT
Start: 2019-08-02 | End: 2019-08-02

## 2019-08-02 RX ORDER — SODIUM CHLORIDE 9 MG/ML
20 INJECTION, SOLUTION INTRAVENOUS ONCE
Status: COMPLETED | OUTPATIENT
Start: 2019-08-02 | End: 2019-08-02

## 2019-08-02 RX ADMIN — ACETAMINOPHEN 650 MG: 325 TABLET, FILM COATED ORAL at 10:33

## 2019-08-02 RX ADMIN — SODIUM CHLORIDE 100 MG: 0.9 INJECTION, SOLUTION INTRAVENOUS at 11:13

## 2019-08-02 RX ADMIN — Medication 30 G: at 09:00

## 2019-08-02 RX ADMIN — ZOLEDRONIC ACID 3.3 MG: 4 INJECTION, SOLUTION, CONCENTRATE INTRAVENOUS at 08:25

## 2019-08-02 RX ADMIN — SODIUM CHLORIDE 20 ML/HR: 0.9 INJECTION, SOLUTION INTRAVENOUS at 08:20

## 2019-08-02 RX ADMIN — DARATUMUMAB 1400 MG: 100 INJECTION, SOLUTION, CONCENTRATE INTRAVENOUS at 11:41

## 2019-08-02 RX ADMIN — DIPHENHYDRAMINE HYDROCHLORIDE 25 MG: 50 INJECTION, SOLUTION INTRAMUSCULAR; INTRAVENOUS at 10:59

## 2019-08-02 NOTE — PROGRESS NOTES
Patient tolerated Zometa, IVIG, and Darzalex infusions well  Offers no complaints  Pt is aware of all future appointments   Refused AVS

## 2019-08-02 NOTE — PLAN OF CARE
Problem: Potential for Falls  Goal: Patient will remain free of falls  Description  INTERVENTIONS:  - Assess patient frequently for physical needs  -  Identify cognitive and physical deficits and behaviors that affect risk of falls    -  [place] fall precautions as indicated by assessment   - Educate patient/family on patient safety including physical limitations  - Instruct patient to call for assistance with activity based on assessment  - Modify environment to reduce risk of injury  - Consider OT/PT consult to assist with strengthening/mobility  Outcome: Progressing

## 2019-08-05 ENCOUNTER — OFFICE VISIT (OUTPATIENT)
Dept: INTERNAL MEDICINE CLINIC | Facility: CLINIC | Age: 74
End: 2019-08-05
Payer: COMMERCIAL

## 2019-08-05 VITALS — HEART RATE: 65 BPM | BODY MASS INDEX: 27.85 KG/M2 | OXYGEN SATURATION: 98 % | WEIGHT: 188 LBS | HEIGHT: 69 IN

## 2019-08-05 DIAGNOSIS — C90.00 MULTIPLE MYELOMA NOT HAVING ACHIEVED REMISSION (HCC): ICD-10-CM

## 2019-08-05 DIAGNOSIS — Z00.00 HEALTHCARE MAINTENANCE: Primary | ICD-10-CM

## 2019-08-05 PROCEDURE — 1160F RVW MEDS BY RX/DR IN RCRD: CPT | Performed by: INTERNAL MEDICINE

## 2019-08-05 PROCEDURE — 3008F BODY MASS INDEX DOCD: CPT | Performed by: INTERNAL MEDICINE

## 2019-08-05 PROCEDURE — 99214 OFFICE O/P EST MOD 30 MIN: CPT | Performed by: INTERNAL MEDICINE

## 2019-08-05 RX ORDER — ACYCLOVIR 400 MG/1
400 TABLET ORAL 2 TIMES DAILY
Qty: 60 TABLET | Refills: 0 | Status: CANCELLED | OUTPATIENT
Start: 2019-08-05 | End: 2019-12-03

## 2019-08-05 NOTE — PROGRESS NOTES
Assessment/Plan:    No problem-specific Assessment & Plan notes found for this encounter  There are no diagnoses linked to this encounter  Subjective:      Patient ID: Cirilo Ledesma is a 72 y o  male  HPI  Jade Karimi is here today for visit accompanied by his wife noted he looks extremely well he is very active and involved with a bunch of community organizations he has got the lots of energy he continues to see the oncology group at [facility] a by think of and he gets a monthly chemotherapy and also infusions of IgG really has no complaints the scar the he is in he he is all continues 80 him aspirin as some of his medications are thrombogenic    The following portions of the patient's history were reviewed and updated as appropriate: allergies, current medications, past family history, past medical history, past social history, past surgical history and problem list     Review of Systems      Objective:      Pulse 65   Ht 5' 8 98" (1 752 m)   Wt 85 3 kg (188 lb)   SpO2 98%   BMI 27 78 kg/m²    Physical Exam  noncontributory Jade Karimi his color is good he is the distress the heart lungs extremities and so   This voice dictation system continues to confound may    Blood 142/82 the heart lungs and extremities unremarkable the skin is warm and dry he is quite stable I will order some lab studies his current regimen is satisfactory will see him back at his request at 3 months he will discuss with the people [facility] the advisability of the new shingles vaccine

## 2019-08-06 DIAGNOSIS — C90.00 MULTIPLE MYELOMA NOT HAVING ACHIEVED REMISSION (HCC): ICD-10-CM

## 2019-08-06 RX ORDER — ACYCLOVIR 400 MG/1
400 TABLET ORAL 2 TIMES DAILY
Qty: 60 TABLET | Refills: 3 | Status: SHIPPED | OUTPATIENT
Start: 2019-08-06 | End: 2020-03-23 | Stop reason: DRUGHIGH

## 2019-08-28 ENCOUNTER — APPOINTMENT (OUTPATIENT)
Dept: LAB | Facility: CLINIC | Age: 74
End: 2019-08-28
Payer: COMMERCIAL

## 2019-08-28 DIAGNOSIS — C90.00 IGG MULTIPLE MYELOMA (HCC): ICD-10-CM

## 2019-08-28 LAB
ALBUMIN SERPL BCP-MCNC: 3.5 G/DL (ref 3.5–5)
ALP SERPL-CCNC: 98 U/L (ref 46–116)
ALT SERPL W P-5'-P-CCNC: 22 U/L (ref 12–78)
ANION GAP SERPL CALCULATED.3IONS-SCNC: 7 MMOL/L (ref 4–13)
AST SERPL W P-5'-P-CCNC: 12 U/L (ref 5–45)
BASOPHILS # BLD AUTO: 0.06 THOUSANDS/ΜL (ref 0–0.1)
BASOPHILS NFR BLD AUTO: 1 % (ref 0–1)
BILIRUB SERPL-MCNC: 1.21 MG/DL (ref 0.2–1)
BUN SERPL-MCNC: 19 MG/DL (ref 5–25)
CALCIUM SERPL-MCNC: 9.5 MG/DL (ref 8.3–10.1)
CHLORIDE SERPL-SCNC: 102 MMOL/L (ref 100–108)
CO2 SERPL-SCNC: 26 MMOL/L (ref 21–32)
CREAT SERPL-MCNC: 1.77 MG/DL (ref 0.6–1.3)
EOSINOPHIL # BLD AUTO: 0.14 THOUSAND/ΜL (ref 0–0.61)
EOSINOPHIL NFR BLD AUTO: 3 % (ref 0–6)
ERYTHROCYTE [DISTWIDTH] IN BLOOD BY AUTOMATED COUNT: 15.4 % (ref 11.6–15.1)
GFR SERPL CREATININE-BSD FRML MDRD: 38 ML/MIN/1.73SQ M
GLUCOSE SERPL-MCNC: 97 MG/DL (ref 65–140)
HCT VFR BLD AUTO: 36.9 % (ref 36.5–49.3)
HGB BLD-MCNC: 11.9 G/DL (ref 12–17)
IMM GRANULOCYTES # BLD AUTO: 0.01 THOUSAND/UL (ref 0–0.2)
IMM GRANULOCYTES NFR BLD AUTO: 0 % (ref 0–2)
LYMPHOCYTES # BLD AUTO: 0.26 THOUSANDS/ΜL (ref 0.6–4.47)
LYMPHOCYTES NFR BLD AUTO: 5 % (ref 14–44)
MCH RBC QN AUTO: 33.4 PG (ref 26.8–34.3)
MCHC RBC AUTO-ENTMCNC: 32.2 G/DL (ref 31.4–37.4)
MCV RBC AUTO: 104 FL (ref 82–98)
MONOCYTES # BLD AUTO: 1.08 THOUSAND/ΜL (ref 0.17–1.22)
MONOCYTES NFR BLD AUTO: 19 % (ref 4–12)
NEUTROPHILS # BLD AUTO: 4.01 THOUSANDS/ΜL (ref 1.85–7.62)
NEUTS SEG NFR BLD AUTO: 72 % (ref 43–75)
NRBC BLD AUTO-RTO: 0 /100 WBCS
PLATELET # BLD AUTO: 127 THOUSANDS/UL (ref 149–390)
PMV BLD AUTO: 11 FL (ref 8.9–12.7)
POTASSIUM SERPL-SCNC: 3.7 MMOL/L (ref 3.5–5.3)
PROT SERPL-MCNC: 7.4 G/DL (ref 6.4–8.2)
RBC # BLD AUTO: 3.56 MILLION/UL (ref 3.88–5.62)
SODIUM SERPL-SCNC: 135 MMOL/L (ref 136–145)
WBC # BLD AUTO: 5.56 THOUSAND/UL (ref 4.31–10.16)

## 2019-08-28 PROCEDURE — 85025 COMPLETE CBC W/AUTO DIFF WBC: CPT

## 2019-08-28 PROCEDURE — 36415 COLL VENOUS BLD VENIPUNCTURE: CPT

## 2019-08-28 PROCEDURE — 80053 COMPREHEN METABOLIC PANEL: CPT

## 2019-08-29 ENCOUNTER — DOCUMENTATION (OUTPATIENT)
Dept: HEMATOLOGY ONCOLOGY | Facility: CLINIC | Age: 74
End: 2019-08-29

## 2019-08-29 ENCOUNTER — OFFICE VISIT (OUTPATIENT)
Dept: HEMATOLOGY ONCOLOGY | Facility: CLINIC | Age: 74
End: 2019-08-29
Payer: COMMERCIAL

## 2019-08-29 VITALS
TEMPERATURE: 98.2 F | RESPIRATION RATE: 17 BRPM | HEIGHT: 69 IN | DIASTOLIC BLOOD PRESSURE: 72 MMHG | OXYGEN SATURATION: 97 % | HEART RATE: 68 BPM | WEIGHT: 181.4 LBS | SYSTOLIC BLOOD PRESSURE: 116 MMHG | BODY MASS INDEX: 26.87 KG/M2

## 2019-08-29 DIAGNOSIS — C90.00 IGG MULTIPLE MYELOMA (HCC): ICD-10-CM

## 2019-08-29 PROCEDURE — 99215 OFFICE O/P EST HI 40 MIN: CPT | Performed by: INTERNAL MEDICINE

## 2019-08-29 RX ORDER — SODIUM CHLORIDE 9 MG/ML
500 INJECTION, SOLUTION INTRAVENOUS ONCE
Status: CANCELLED
Start: 2019-08-30

## 2019-08-29 RX ORDER — SODIUM CHLORIDE 9 MG/ML
500 INJECTION, SOLUTION INTRAVENOUS ONCE
Status: CANCELLED
Start: 2019-09-13

## 2019-08-29 NOTE — PROGRESS NOTES
[facility] [place]  [address] [id number] [phone]    Corina Hwang,1945, [id number]  08/29/19    Discussion:   In summary, this is a 72-year-old male history of multiple myeloma  He was recently seen at the [place] [address] in [place]  He had a bone marrow biopsy showing an increase in minimum residual disease  Change in treatment was recommended  Velcade 1 3 mg/m2 subcu q week and Cytoxan intravenous 500 mg/m2 Q 2 weeks was recommended  Dexamethasone 12 mg p o  Q week  The patient has been on these medications at the beginning of his diagnosis  He tolerated this relatively well  We reviewed potential toxicities including but not limited to nausea, vomiting, diarrhea, peripheral neuropathy, fatigue, cytopenias, etc   We reviewed prophylactic measures taken routinely as well as monitoring to allow for early intervention as appropriate  Our chemotherapy nurse review the above information as well as providing written information is regard  Patient was agreeable to proceed as outlined and we made arrangements accordingly  Zometa and IVIG continue on the regular schedule  I discussed the above with the patient  The patient and his wife voiced understanding and agreement   ______________________________________________________________________    Chief Complaint   Patient presents with    Follow-up       HPI:  Oncology History    June 2015- routine FU at [facility]CP [facility]  IgG multiple myeloma ([facility] [address] )    9/2015 Initial Diagnosis     Found to have Hbg of 6 with SOB, creatinine 1 8 and normal calcium with albumin of 2 1  Additonial blood work showed elevated protien level (12 1g/dL)  9/29/2015 Biopsy     Bone marrow biopsy demonstrated approximately 80% plasma cells with some immature forms noted    These studies showed 13q14 deletion, +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, T (4:14)        10/14/2015 - 11/11/2015 Chemotherapy     Velcade 1 3 mg/m2 Days 1,8,15,22  Cytoxan 300 mg/m2  PO Days 1, 8,15, 22  Dexamethasone 40 mg PO Days 1,8,15, 22  Zometa 4 mg IV Day 1  Cycle length = 28 days    Completed 2 cycles  Day one of cycle 2 was on 11/11/15       12/2015 - 2/2016 Chemotherapy     VDT-PACE x 2 cycles   (completed at [facility] in [place], [address] [place] [address])      2/2016 Transplant     Melphalane 200 mg/m2 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  MRD +      8/2016 - 1/26/2018 Chemotherapy     Maintenance therapy:  Carfilzomib, orginally dosed 27 mg weekly then increased to 45 mg weekly after MRD reactivitation  Revlimid  Dexamethasone       2/2018 Biopsy     Bone marrow demonstrated positive minimal residual disease  2/23/2018 -  Chemotherapy     Daratumumab 16mg/m2 IV Day 1  Pomalyst 4 mg p o  daily 1-21  Dexamethasone 4 mg PO Days 2, 3  Dexamethasone 12 mg PODays 8, 15, 22  Cycle length = 28 days      4/11/2019 -  Chemotherapy     methylPREDNISolone sodium succinate (Solu-MEDROL) 100 mg in sodium chloride 0 9 % 250 mL IVPB, 100 mg, Intravenous, Once, 5 of 12 cycles  Administration: 100 mg (6/7/2019), 100 mg (7/5/2019), 100 mg (8/2/2019)         Interval History:  Clinically stable  Resolving sinus infection after completion of 5 days of Levaquin yesterday  ECOG-  1 - Symptomatic but completely ambulatory    Review of Systems   Constitutional: Negative for chills and fever  HENT: Negative for nosebleeds  Eyes: Negative for discharge  Respiratory: Negative for cough and shortness of breath  Cardiovascular: Negative for chest pain  Gastrointestinal: Negative for abdominal pain, constipation and diarrhea  Endocrine: Negative for polydipsia  Genitourinary: Negative for hematuria  Musculoskeletal: Negative for arthralgias  Skin: Negative for color change  Allergic/Immunologic: Negative for immunocompromised state  Neurological: Negative for dizziness and headaches  Hematological: Negative for adenopathy  Psychiatric/Behavioral: Negative for agitation  Past Medical History:   Diagnosis Date    History of autologous stem cell transplant ([facility] [address] )     Hypertension     History of HTN-stable since weight loss    Insomnia     Multiple myeloma (HCC)      Patient Active Problem List   Diagnosis    IgG multiple myeloma (HCC)    Persistent atrial fibrillation (HCC)    Essential hypertension    Chronic ITP (idiopathic thrombocytopenia) (HCC)    Hyperlipidemia    Hypocalcemia    Hypothyroidism    Long term current use of systemic steroids    Other fatigue    Prophylactic measure    Hypogammaglobulinemia (HCC)       Current Outpatient Medications:     acetaminophen (TYLENOL) 325 mg tablet, Take 650 mg by mouth every 6 (six) hours as needed for mild pain, Disp: , Rfl:     acyclovir (ZOVIRAX) 400 MG tablet, Take 1 tablet (400 mg total) by mouth 2 (two) times a day for 120 days, Disp: 60 tablet, Rfl: 3    aspirin 81 MG tablet, Take 81 mg by mouth daily  , Disp: , Rfl:     atorvastatin (LIPITOR) 20 mg tablet, Take 1 tablet (20 mg total) by mouth daily, Disp: 30 tablet, Rfl: 5    Cholecalciferol (VITAMIN D-3 PO), Take 1 tablet by mouth daily  , Disp: , Rfl:     dexamethasone (DECADRON) 4 mg tablet, Take as directed (Patient taking differently: 12 mg weekly or as directed), Disp: 36 tablet, Rfl: 5    escitalopram (LEXAPRO) 20 mg tablet, Take 1 tablet (20 mg total) by mouth daily, Disp: 90 tablet, Rfl: 0    fexofenadine (ALLEGRA) 180 MG tablet, Take 180 mg by mouth 2 (two) times a day as needed, Disp: , Rfl:     Glutamine POWD, by Does not apply route, Disp: , Rfl:     levothyroxine 50 mcg tablet, Take 1 tablet (50 mcg total) by mouth daily, Disp: 90 tablet, Rfl: 3    LORazepam (ATIVAN) 0 5 mg tablet, Take 0 5 mg by mouth every 6 (six) hours as needed for anxiety  , Disp: , Rfl:     Multiple Vitamin (MULTIVITAMINS PO), Take 1 tablet by mouth daily  , Disp: , Rfl:     ondansetron (ZOFRAN) 4 mg tablet, Take 4 mg by mouth every 8 (eight) hours as needed for nausea or vomiting , Disp: , Rfl:     daratumumab (DARZALEX) 100 mg/5 mL injection, Infuse 16 mg/kg into a venous catheter every 28 days, Disp: , Rfl:     Docusate Sodium (COLACE PO), Take 1 tablet by mouth as needed  , Disp: , Rfl:   No Known Allergies  Past Surgical History:   Procedure Laterality Date    APPENDECTOMY      Last assessed: 9/25/15    ARTHROSCOPY KNEE Left     9/30/09    EYE SURGERY      Lasik    KNEE CARTILAGE SURGERY      LIMBAL STEM CELL TRANSPLANT      Patient had 2 in [place]-2/29/2016 and 4/13/2016     Social History     Objective:  Vitals:    08/29/19 0803   BP: 116/72   BP Location: Right arm   Patient Position: Sitting   Pulse: 68   Resp: 17   Temp: 98 2 °F (36 8 °C)   TempSrc: Tympanic   SpO2: 97%   Weight: 82 3 kg (181 lb 6 4 oz)   Height: 5' 8 9" (1 75 m)     Physical Exam   Constitutional: He is oriented to person, place, and time  He appears well-developed  HENT:   Head: Normocephalic  Eyes: Pupils are equal, round, and reactive to light  Neck: Neck supple  Cardiovascular: Normal rate and regular rhythm  No murmur heard  Pulmonary/Chest: Breath sounds normal  He has no wheezes  He has no rales  Abdominal: Soft  There is no tenderness  Musculoskeletal: Normal range of motion  He exhibits no edema or tenderness  Lymphadenopathy:     He has no cervical adenopathy  Neurological: He is alert and oriented to person, place, and time  He has normal reflexes  No cranial nerve deficit  Skin: No rash noted  No erythema  Psychiatric: He has a normal mood and affect  His behavior is normal          Labs: I personally reviewed the labs and imaging pertinent to this patient care

## 2019-08-29 NOTE — PROGRESS NOTES
kayode email from Jose J at [facility] that pt is having a treatment change & wants to know if cytoxan & velcade will be covered  Called the insurance co spoke to [facility] & she sd that the drugs are covered at 100%   Emailed Jose J to let her know this & she sd that she will tell the pt

## 2019-08-30 ENCOUNTER — HOSPITAL ENCOUNTER (OUTPATIENT)
Dept: INFUSION CENTER | Facility: CLINIC | Age: 74
Discharge: HOME | End: 2019-08-30
Payer: COMMERCIAL

## 2019-08-30 VITALS
HEART RATE: 93 BPM | SYSTOLIC BLOOD PRESSURE: 124 MMHG | TEMPERATURE: 98.4 F | RESPIRATION RATE: 18 BRPM | BODY MASS INDEX: 27 KG/M2 | DIASTOLIC BLOOD PRESSURE: 80 MMHG | WEIGHT: 182.32 LBS | HEIGHT: 69 IN

## 2019-08-30 DIAGNOSIS — C90.00 IGG MULTIPLE MYELOMA (HCC): Primary | ICD-10-CM

## 2019-08-30 DIAGNOSIS — D80.1 HYPOGAMMAGLOBULINEMIA (HCC): ICD-10-CM

## 2019-08-30 PROCEDURE — 96375 TX/PRO/DX INJ NEW DRUG ADDON: CPT

## 2019-08-30 PROCEDURE — 96366 THER/PROPH/DIAG IV INF ADDON: CPT

## 2019-08-30 PROCEDURE — 96401 CHEMO ANTI-NEOPL SQ/IM: CPT

## 2019-08-30 PROCEDURE — 96413 CHEMO IV INFUSION 1 HR: CPT

## 2019-08-30 PROCEDURE — 96367 TX/PROPH/DG ADDL SEQ IV INF: CPT

## 2019-08-30 PROCEDURE — 96361 HYDRATE IV INFUSION ADD-ON: CPT

## 2019-08-30 RX ORDER — ONDANSETRON HYDROCHLORIDE 8 MG/1
8 TABLET, FILM COATED ORAL EVERY 8 HOURS PRN
Qty: 20 TABLET | Refills: 3 | Status: SHIPPED | OUTPATIENT
Start: 2019-08-30 | End: 2019-12-02 | Stop reason: SDUPTHER

## 2019-08-30 RX ORDER — SODIUM CHLORIDE 9 MG/ML
500 INJECTION, SOLUTION INTRAVENOUS ONCE
Status: COMPLETED | OUTPATIENT
Start: 2019-08-30 | End: 2019-08-30

## 2019-08-30 RX ADMIN — SODIUM CHLORIDE 500 ML/HR: 0.9 INJECTION, SOLUTION INTRAVENOUS at 11:30

## 2019-08-30 RX ADMIN — BORTEZOMIB 2.6 MG: 3.5 INJECTION, POWDER, LYOPHILIZED, FOR SOLUTION INTRAVENOUS; SUBCUTANEOUS at 12:32

## 2019-08-30 RX ADMIN — ONDANSETRON 16 MG: 2 INJECTION INTRAMUSCULAR; INTRAVENOUS at 10:35

## 2019-08-30 RX ADMIN — Medication 30 G: at 08:40

## 2019-08-30 RX ADMIN — CYCLOPHOSPHAMIDE 1000 MG: 1 INJECTION, POWDER, FOR SOLUTION INTRAVENOUS; ORAL at 10:56

## 2019-08-30 RX ADMIN — ZOLEDRONIC ACID 3 MG: 4 INJECTION, SOLUTION, CONCENTRATE INTRAVENOUS at 11:26

## 2019-08-30 NOTE — PROGRESS NOTES
Patient tolerated IVIG, Zometa and Cytoxan infusionS well  Velcade given sq  Pt offers no complaints  Pt is aware of all future appointments   Refused AVS

## 2019-09-04 ENCOUNTER — APPOINTMENT (OUTPATIENT)
Dept: LAB | Facility: CLINIC | Age: 74
End: 2019-09-04
Payer: COMMERCIAL

## 2019-09-04 DIAGNOSIS — C90.00 IGG MULTIPLE MYELOMA (HCC): ICD-10-CM

## 2019-09-04 LAB
ALBUMIN SERPL BCP-MCNC: 3.5 G/DL (ref 3.5–5)
ALP SERPL-CCNC: 99 U/L (ref 46–116)
ALT SERPL W P-5'-P-CCNC: 20 U/L (ref 12–78)
ANION GAP SERPL CALCULATED.3IONS-SCNC: 9 MMOL/L (ref 4–13)
AST SERPL W P-5'-P-CCNC: 18 U/L (ref 5–45)
BASOPHILS # BLD AUTO: 0.04 THOUSANDS/ΜL (ref 0–0.1)
BASOPHILS NFR BLD AUTO: 2 % (ref 0–1)
BILIRUB SERPL-MCNC: 0.95 MG/DL (ref 0.2–1)
BUN SERPL-MCNC: 19 MG/DL (ref 5–25)
CALCIUM SERPL-MCNC: 9.3 MG/DL (ref 8.3–10.1)
CHLORIDE SERPL-SCNC: 102 MMOL/L (ref 100–108)
CO2 SERPL-SCNC: 24 MMOL/L (ref 21–32)
CREAT SERPL-MCNC: 1.7 MG/DL (ref 0.6–1.3)
EOSINOPHIL # BLD AUTO: 0.14 THOUSAND/ΜL (ref 0–0.61)
EOSINOPHIL NFR BLD AUTO: 6 % (ref 0–6)
ERYTHROCYTE [DISTWIDTH] IN BLOOD BY AUTOMATED COUNT: 15.5 % (ref 11.6–15.1)
GFR SERPL CREATININE-BSD FRML MDRD: 40 ML/MIN/1.73SQ M
GLUCOSE SERPL-MCNC: 92 MG/DL (ref 65–140)
HCT VFR BLD AUTO: 36 % (ref 36.5–49.3)
HGB BLD-MCNC: 12.2 G/DL (ref 12–17)
IMM GRANULOCYTES # BLD AUTO: 0.01 THOUSAND/UL (ref 0–0.2)
IMM GRANULOCYTES NFR BLD AUTO: 0 % (ref 0–2)
LYMPHOCYTES # BLD AUTO: 0 THOUSANDS/ΜL (ref 0.6–4.47)
LYMPHOCYTES NFR BLD AUTO: 0 % (ref 14–44)
MCH RBC QN AUTO: 34.7 PG (ref 26.8–34.3)
MCHC RBC AUTO-ENTMCNC: 33.9 G/DL (ref 31.4–37.4)
MCV RBC AUTO: 102 FL (ref 82–98)
MONOCYTES # BLD AUTO: 1.15 THOUSAND/ΜL (ref 0.17–1.22)
MONOCYTES NFR BLD AUTO: 51 % (ref 4–12)
NEUTROPHILS # BLD AUTO: 0.93 THOUSANDS/ΜL (ref 1.85–7.62)
NEUTS SEG NFR BLD AUTO: 41 % (ref 43–75)
NRBC BLD AUTO-RTO: 1 /100 WBCS
PMV BLD AUTO: 12 FL (ref 8.9–12.7)
POTASSIUM SERPL-SCNC: 3.8 MMOL/L (ref 3.5–5.3)
PROT SERPL-MCNC: 7.9 G/DL (ref 6.4–8.2)
RBC # BLD AUTO: 3.52 MILLION/UL (ref 3.88–5.62)
SODIUM SERPL-SCNC: 135 MMOL/L (ref 136–145)
WBC # BLD AUTO: 2.27 THOUSAND/UL (ref 4.31–10.16)

## 2019-09-04 PROCEDURE — 80053 COMPREHEN METABOLIC PANEL: CPT

## 2019-09-04 PROCEDURE — 36415 COLL VENOUS BLD VENIPUNCTURE: CPT

## 2019-09-04 PROCEDURE — 85025 COMPLETE CBC W/AUTO DIFF WBC: CPT

## 2019-09-05 ENCOUNTER — TELEPHONE (OUTPATIENT)
Dept: INTERNAL MEDICINE CLINIC | Facility: CLINIC | Age: 74
End: 2019-09-05

## 2019-09-05 ENCOUNTER — TELEPHONE (OUTPATIENT)
Dept: HEMATOLOGY ONCOLOGY | Facility: CLINIC | Age: 74
End: 2019-09-05

## 2019-09-05 NOTE — TELEPHONE ENCOUNTER
Patients wife called to notify Dr Virginia Ying that her   is suffering from a sinus infection  Cough, congestion and stuffy nose  No fever  He was on an antibiotic for similar symptoms last week, but he doesn't seen to be feeling any better

## 2019-09-05 NOTE — TELEPHONE ENCOUNTER
Pt was in [place] at the end of August, pt was put on five day of Levaquin to help with his sinusitis  Patient is again stuffy, post nasal drip, and cough  Can we call something in?   Pt's lungs are clear and there is no fever

## 2019-09-06 ENCOUNTER — APPOINTMENT (OUTPATIENT)
Dept: LAB | Facility: CLINIC | Age: 74
End: 2019-09-06
Payer: COMMERCIAL

## 2019-09-06 ENCOUNTER — HOSPITAL ENCOUNTER (OUTPATIENT)
Dept: INFUSION CENTER | Facility: CLINIC | Age: 74
Discharge: HOME | End: 2019-09-06
Payer: COMMERCIAL

## 2019-09-06 VITALS
BODY MASS INDEX: 26.45 KG/M2 | RESPIRATION RATE: 16 BRPM | TEMPERATURE: 97.8 F | HEART RATE: 93 BPM | WEIGHT: 178.57 LBS | SYSTOLIC BLOOD PRESSURE: 116 MMHG | HEIGHT: 69 IN | DIASTOLIC BLOOD PRESSURE: 81 MMHG

## 2019-09-06 DIAGNOSIS — C90.00 IGG MULTIPLE MYELOMA (HCC): ICD-10-CM

## 2019-09-06 DIAGNOSIS — C90.00 IGG MULTIPLE MYELOMA (HCC): Primary | ICD-10-CM

## 2019-09-06 LAB
BASOPHILS # BLD AUTO: 0.04 THOUSANDS/ΜL (ref 0–0.1)
BASOPHILS NFR BLD AUTO: 2 % (ref 0–1)
EOSINOPHIL # BLD AUTO: 0.12 THOUSAND/ΜL (ref 0–0.61)
EOSINOPHIL NFR BLD AUTO: 6 % (ref 0–6)
ERYTHROCYTE [DISTWIDTH] IN BLOOD BY AUTOMATED COUNT: 15.4 % (ref 11.6–15.1)
HCT VFR BLD AUTO: 32.9 % (ref 36.5–49.3)
HGB BLD-MCNC: 11.3 G/DL (ref 12–17)
LYMPHOCYTES # BLD AUTO: 0.19 THOUSANDS/ΜL (ref 0.6–4.47)
LYMPHOCYTES NFR BLD AUTO: 9 % (ref 14–44)
MCH RBC QN AUTO: 34.5 PG (ref 26.8–34.3)
MCHC RBC AUTO-ENTMCNC: 34.3 G/DL (ref 31.4–37.4)
MCV RBC AUTO: 100 FL (ref 82–98)
MONOCYTES # BLD AUTO: 0.5 THOUSAND/ΜL (ref 0.17–1.22)
MONOCYTES NFR BLD AUTO: 25 % (ref 4–12)
NEUTROPHILS # BLD AUTO: 1.17 THOUSANDS/ΜL (ref 1.85–7.62)
NEUTS SEG NFR BLD AUTO: 58 % (ref 43–75)
PLATELET # BLD AUTO: 76 THOUSANDS/UL (ref 149–390)
PLATELET # BLD AUTO: 78 THOUSANDS/UL (ref 149–390)
PMV BLD AUTO: 10.8 FL (ref 8.9–12.7)
RBC # BLD AUTO: 3.28 MILLION/UL (ref 3.88–5.62)
WBC # BLD AUTO: 2.02 THOUSAND/UL (ref 4.31–10.16)

## 2019-09-06 PROCEDURE — 36415 COLL VENOUS BLD VENIPUNCTURE: CPT

## 2019-09-06 PROCEDURE — 85025 COMPLETE CBC W/AUTO DIFF WBC: CPT

## 2019-09-06 NOTE — PROGRESS NOTES
Patient arrived on unit for Day #8 of velcade  CBC and platelet clumping lab drawn as ordered  ANC-1 17; platelets- 25,184  Called and spoke with Michelle Hernandez RN , who made Tyler PINTO aware of lab values  Velcade held today  Labs to be drawn next week and patient to proceed with Day #15 if within parameters  Patient and wife made aware and verbalized understanding  Aware of next appointment   Declined AVS

## 2019-09-10 RX ORDER — SODIUM CHLORIDE 9 MG/ML
20 INJECTION, SOLUTION INTRAVENOUS CONTINUOUS
Status: DISCONTINUED | OUTPATIENT
Start: 2019-09-13 | End: 2019-09-16 | Stop reason: HOSPADM

## 2019-09-11 ENCOUNTER — APPOINTMENT (OUTPATIENT)
Dept: LAB | Facility: CLINIC | Age: 74
End: 2019-09-11
Payer: COMMERCIAL

## 2019-09-11 DIAGNOSIS — C90.00 IGG MULTIPLE MYELOMA (HCC): ICD-10-CM

## 2019-09-11 LAB
ALBUMIN SERPL BCP-MCNC: 3.9 G/DL (ref 3.5–5)
ALP SERPL-CCNC: 82 U/L (ref 46–116)
ALT SERPL W P-5'-P-CCNC: 20 U/L (ref 12–78)
ANION GAP SERPL CALCULATED.3IONS-SCNC: 9 MMOL/L (ref 4–13)
AST SERPL W P-5'-P-CCNC: 13 U/L (ref 5–45)
BASOPHILS # BLD AUTO: 0.05 THOUSANDS/ΜL (ref 0–0.1)
BASOPHILS NFR BLD AUTO: 2 % (ref 0–1)
BILIRUB SERPL-MCNC: 0.79 MG/DL (ref 0.2–1)
BUN SERPL-MCNC: 25 MG/DL (ref 5–25)
CALCIUM SERPL-MCNC: 9.7 MG/DL (ref 8.3–10.1)
CHLORIDE SERPL-SCNC: 106 MMOL/L (ref 100–108)
CO2 SERPL-SCNC: 23 MMOL/L (ref 21–32)
CREAT SERPL-MCNC: 1.59 MG/DL (ref 0.6–1.3)
EOSINOPHIL # BLD AUTO: 0.07 THOUSAND/ΜL (ref 0–0.61)
EOSINOPHIL NFR BLD AUTO: 2 % (ref 0–6)
ERYTHROCYTE [DISTWIDTH] IN BLOOD BY AUTOMATED COUNT: 15.8 % (ref 11.6–15.1)
GFR SERPL CREATININE-BSD FRML MDRD: 43 ML/MIN/1.73SQ M
GLUCOSE SERPL-MCNC: 99 MG/DL (ref 65–140)
HCT VFR BLD AUTO: 33.5 % (ref 36.5–49.3)
HGB BLD-MCNC: 10.9 G/DL (ref 12–17)
IMM GRANULOCYTES # BLD AUTO: 0.02 THOUSAND/UL (ref 0–0.2)
IMM GRANULOCYTES NFR BLD AUTO: 1 % (ref 0–2)
LYMPHOCYTES # BLD AUTO: 0.17 THOUSANDS/ΜL (ref 0.6–4.47)
LYMPHOCYTES NFR BLD AUTO: 5 % (ref 14–44)
MCH RBC QN AUTO: 33.4 PG (ref 26.8–34.3)
MCHC RBC AUTO-ENTMCNC: 32.5 G/DL (ref 31.4–37.4)
MCV RBC AUTO: 103 FL (ref 82–98)
MONOCYTES # BLD AUTO: 0.5 THOUSAND/ΜL (ref 0.17–1.22)
MONOCYTES NFR BLD AUTO: 15 % (ref 4–12)
NEUTROPHILS # BLD AUTO: 2.55 THOUSANDS/ΜL (ref 1.85–7.62)
NEUTS SEG NFR BLD AUTO: 75 % (ref 43–75)
NRBC BLD AUTO-RTO: 0 /100 WBCS
PLATELET # BLD AUTO: 101 THOUSANDS/UL (ref 149–390)
PMV BLD AUTO: 12.4 FL (ref 8.9–12.7)
POTASSIUM SERPL-SCNC: 4 MMOL/L (ref 3.5–5.3)
PROT SERPL-MCNC: 7.4 G/DL (ref 6.4–8.2)
RBC # BLD AUTO: 3.26 MILLION/UL (ref 3.88–5.62)
SODIUM SERPL-SCNC: 138 MMOL/L (ref 136–145)
WBC # BLD AUTO: 3.36 THOUSAND/UL (ref 4.31–10.16)

## 2019-09-11 PROCEDURE — 36415 COLL VENOUS BLD VENIPUNCTURE: CPT

## 2019-09-11 PROCEDURE — 80053 COMPREHEN METABOLIC PANEL: CPT

## 2019-09-11 PROCEDURE — 85025 COMPLETE CBC W/AUTO DIFF WBC: CPT

## 2019-09-13 ENCOUNTER — HOSPITAL ENCOUNTER (OUTPATIENT)
Dept: INFUSION CENTER | Facility: CLINIC | Age: 74
Discharge: HOME | End: 2019-09-13
Payer: COMMERCIAL

## 2019-09-13 VITALS
WEIGHT: 180.78 LBS | HEIGHT: 69 IN | DIASTOLIC BLOOD PRESSURE: 91 MMHG | BODY MASS INDEX: 26.78 KG/M2 | TEMPERATURE: 97.8 F | HEART RATE: 94 BPM | SYSTOLIC BLOOD PRESSURE: 135 MMHG | RESPIRATION RATE: 16 BRPM

## 2019-09-13 DIAGNOSIS — C90.00 IGG MULTIPLE MYELOMA (HCC): Primary | ICD-10-CM

## 2019-09-13 PROCEDURE — 96413 CHEMO IV INFUSION 1 HR: CPT

## 2019-09-13 PROCEDURE — 96375 TX/PRO/DX INJ NEW DRUG ADDON: CPT

## 2019-09-13 PROCEDURE — 96361 HYDRATE IV INFUSION ADD-ON: CPT

## 2019-09-13 PROCEDURE — 96401 CHEMO ANTI-NEOPL SQ/IM: CPT

## 2019-09-13 RX ORDER — ONDANSETRON HYDROCHLORIDE 8 MG/1
8 TABLET, FILM COATED ORAL EVERY 8 HOURS PRN
Qty: 20 TABLET | Refills: 3 | Status: SHIPPED | OUTPATIENT
Start: 2019-09-13 | End: 2019-10-04 | Stop reason: SDUPTHER

## 2019-09-13 RX ORDER — SODIUM CHLORIDE 9 MG/ML
500 INJECTION, SOLUTION INTRAVENOUS ONCE
Status: COMPLETED | OUTPATIENT
Start: 2019-09-13 | End: 2019-09-13

## 2019-09-13 RX ADMIN — BORTEZOMIB 2.6 MG: 3.5 INJECTION, POWDER, LYOPHILIZED, FOR SOLUTION INTRAVENOUS; SUBCUTANEOUS at 10:36

## 2019-09-13 RX ADMIN — CYCLOPHOSPHAMIDE 1000 MG: 1 INJECTION, POWDER, FOR SOLUTION INTRAVENOUS; ORAL at 09:19

## 2019-09-13 RX ADMIN — SODIUM CHLORIDE 20 ML/HR: 0.9 INJECTION, SOLUTION INTRAVENOUS at 08:33

## 2019-09-13 RX ADMIN — SODIUM CHLORIDE 500 ML/HR: 0.9 INJECTION, SOLUTION INTRAVENOUS at 09:50

## 2019-09-13 RX ADMIN — ONDANSETRON 16 MG: 2 INJECTION INTRAMUSCULAR; INTRAVENOUS at 08:33

## 2019-09-13 NOTE — PLAN OF CARE
Problem: Potential for Falls  Goal: Patient will remain free of falls  Description  INTERVENTIONS:  - Assess patient frequently for physical needs  -  Identify cognitive and physical deficits and behaviors that affect risk of falls    -  [place] fall precautions as indicated by assessment   - Educate patient/family on patient safety including physical limitations  - Instruct patient to call for assistance with activity based on assessment  - Modify environment to reduce risk of injury  - Consider OT/PT consult to assist with strengthening/mobility  Outcome: Progressing

## 2019-09-13 NOTE — PROGRESS NOTES
Patient tolerated Cytoxan infusion and Velcade injection well  Offers no complaints  Pt is aware of all future appointments   Refused AVS

## 2019-09-15 DIAGNOSIS — F41.9 ANXIETY: ICD-10-CM

## 2019-09-16 RX ORDER — ESCITALOPRAM OXALATE 20 MG/1
20 TABLET ORAL DAILY
Qty: 90 TABLET | Refills: 0 | OUTPATIENT
Start: 2019-09-16

## 2019-09-18 ENCOUNTER — APPOINTMENT (OUTPATIENT)
Dept: LAB | Facility: CLINIC | Age: 74
End: 2019-09-18
Payer: COMMERCIAL

## 2019-09-18 DIAGNOSIS — C90.00 IGG MULTIPLE MYELOMA (HCC): ICD-10-CM

## 2019-09-18 LAB
ALBUMIN SERPL BCP-MCNC: 3.9 G/DL (ref 3.5–5)
ALP SERPL-CCNC: 82 U/L (ref 46–116)
ALT SERPL W P-5'-P-CCNC: 15 U/L (ref 12–78)
ANION GAP SERPL CALCULATED.3IONS-SCNC: 8 MMOL/L (ref 4–13)
AST SERPL W P-5'-P-CCNC: 10 U/L (ref 5–45)
BASOPHILS # BLD AUTO: 0.02 THOUSANDS/ΜL (ref 0–0.1)
BASOPHILS NFR BLD AUTO: 1 % (ref 0–1)
BILIRUB SERPL-MCNC: 0.78 MG/DL (ref 0.2–1)
BUN SERPL-MCNC: 17 MG/DL (ref 5–25)
CALCIUM SERPL-MCNC: 9.5 MG/DL (ref 8.3–10.1)
CHLORIDE SERPL-SCNC: 103 MMOL/L (ref 100–108)
CO2 SERPL-SCNC: 26 MMOL/L (ref 21–32)
CREAT SERPL-MCNC: 1.49 MG/DL (ref 0.6–1.3)
EOSINOPHIL # BLD AUTO: 0.11 THOUSAND/ΜL (ref 0–0.61)
EOSINOPHIL NFR BLD AUTO: 3 % (ref 0–6)
ERYTHROCYTE [DISTWIDTH] IN BLOOD BY AUTOMATED COUNT: 15.8 % (ref 11.6–15.1)
GFR SERPL CREATININE-BSD FRML MDRD: 47 ML/MIN/1.73SQ M
GLUCOSE SERPL-MCNC: 90 MG/DL (ref 65–140)
HCT VFR BLD AUTO: 31.8 % (ref 36.5–49.3)
HGB BLD-MCNC: 10.3 G/DL (ref 12–17)
IMM GRANULOCYTES # BLD AUTO: 0.04 THOUSAND/UL (ref 0–0.2)
IMM GRANULOCYTES NFR BLD AUTO: 1 % (ref 0–2)
LYMPHOCYTES # BLD AUTO: 0.21 THOUSANDS/ΜL (ref 0.6–4.47)
LYMPHOCYTES NFR BLD AUTO: 5 % (ref 14–44)
MCH RBC QN AUTO: 33.4 PG (ref 26.8–34.3)
MCHC RBC AUTO-ENTMCNC: 32.4 G/DL (ref 31.4–37.4)
MCV RBC AUTO: 103 FL (ref 82–98)
MONOCYTES # BLD AUTO: 0.55 THOUSAND/ΜL (ref 0.17–1.22)
MONOCYTES NFR BLD AUTO: 13 % (ref 4–12)
NEUTROPHILS # BLD AUTO: 3.44 THOUSANDS/ΜL (ref 1.85–7.62)
NEUTS SEG NFR BLD AUTO: 77 % (ref 43–75)
NRBC BLD AUTO-RTO: 0 /100 WBCS
PLATELET # BLD AUTO: 87 THOUSANDS/UL (ref 149–390)
PMV BLD AUTO: 12.4 FL (ref 8.9–12.7)
POTASSIUM SERPL-SCNC: 3.7 MMOL/L (ref 3.5–5.3)
PROT SERPL-MCNC: 7.6 G/DL (ref 6.4–8.2)
RBC # BLD AUTO: 3.08 MILLION/UL (ref 3.88–5.62)
SODIUM SERPL-SCNC: 137 MMOL/L (ref 136–145)
WBC # BLD AUTO: 4.37 THOUSAND/UL (ref 4.31–10.16)

## 2019-09-18 PROCEDURE — 85025 COMPLETE CBC W/AUTO DIFF WBC: CPT

## 2019-09-18 PROCEDURE — 36415 COLL VENOUS BLD VENIPUNCTURE: CPT

## 2019-09-18 PROCEDURE — 80053 COMPREHEN METABOLIC PANEL: CPT

## 2019-09-19 DIAGNOSIS — F41.9 ANXIETY: ICD-10-CM

## 2019-09-20 ENCOUNTER — HOSPITAL ENCOUNTER (OUTPATIENT)
Dept: INFUSION CENTER | Facility: CLINIC | Age: 74
Discharge: HOME | End: 2019-09-20
Payer: COMMERCIAL

## 2019-09-20 VITALS
RESPIRATION RATE: 16 BRPM | SYSTOLIC BLOOD PRESSURE: 116 MMHG | TEMPERATURE: 97.3 F | DIASTOLIC BLOOD PRESSURE: 76 MMHG | HEIGHT: 69 IN | BODY MASS INDEX: 26.38 KG/M2 | WEIGHT: 178.13 LBS | HEART RATE: 89 BPM

## 2019-09-20 DIAGNOSIS — C90.00 IGG MULTIPLE MYELOMA (HCC): Primary | ICD-10-CM

## 2019-09-20 PROCEDURE — 96401 CHEMO ANTI-NEOPL SQ/IM: CPT

## 2019-09-20 RX ORDER — ESCITALOPRAM OXALATE 20 MG/1
20 TABLET ORAL DAILY
Qty: 90 TABLET | Refills: 0 | Status: SHIPPED | OUTPATIENT
Start: 2019-09-20 | End: 2020-01-07 | Stop reason: SDUPTHER

## 2019-09-20 RX ADMIN — BORTEZOMIB 2.6 MG: 3.5 INJECTION, POWDER, LYOPHILIZED, FOR SOLUTION INTRAVENOUS; SUBCUTANEOUS at 08:37

## 2019-09-20 NOTE — PLAN OF CARE
Problem: Potential for Falls  Goal: Patient will remain free of falls  Description  INTERVENTIONS:  - Assess patient frequently for physical needs  -  Identify cognitive and physical deficits and behaviors that affect risk of falls    -  [place] fall precautions as indicated by assessment   - Educate patient/family on patient safety including physical limitations  - Instruct patient to call for assistance with activity based on assessment  - Modify environment to reduce risk of injury  - Consider OT/PT consult to assist with strengthening/mobility  Outcome: Progressing

## 2019-09-20 NOTE — PROGRESS NOTES
Velcade given as ordered  Pt offers no complaints  Pt is aware of all future appointments   Refused AVS

## 2019-09-25 ENCOUNTER — APPOINTMENT (OUTPATIENT)
Dept: LAB | Facility: CLINIC | Age: 74
End: 2019-09-25
Payer: COMMERCIAL

## 2019-09-25 DIAGNOSIS — C90.00 IGG MULTIPLE MYELOMA (HCC): ICD-10-CM

## 2019-09-25 LAB
ALBUMIN SERPL BCP-MCNC: 3.4 G/DL (ref 3.5–5)
ALP SERPL-CCNC: 84 U/L (ref 46–116)
ALT SERPL W P-5'-P-CCNC: 22 U/L (ref 12–78)
ANION GAP SERPL CALCULATED.3IONS-SCNC: 8 MMOL/L (ref 4–13)
AST SERPL W P-5'-P-CCNC: 14 U/L (ref 5–45)
BASOPHILS # BLD AUTO: 0.02 THOUSANDS/ΜL (ref 0–0.1)
BASOPHILS NFR BLD AUTO: 0 % (ref 0–1)
BILIRUB SERPL-MCNC: 0.7 MG/DL (ref 0.2–1)
BUN SERPL-MCNC: 18 MG/DL (ref 5–25)
CALCIUM SERPL-MCNC: 9 MG/DL (ref 8.3–10.1)
CHLORIDE SERPL-SCNC: 103 MMOL/L (ref 100–108)
CO2 SERPL-SCNC: 25 MMOL/L (ref 21–32)
CREAT SERPL-MCNC: 1.43 MG/DL (ref 0.6–1.3)
EOSINOPHIL # BLD AUTO: 0.06 THOUSAND/ΜL (ref 0–0.61)
EOSINOPHIL NFR BLD AUTO: 1 % (ref 0–6)
ERYTHROCYTE [DISTWIDTH] IN BLOOD BY AUTOMATED COUNT: 15.7 % (ref 11.6–15.1)
GFR SERPL CREATININE-BSD FRML MDRD: 49 ML/MIN/1.73SQ M
GLUCOSE SERPL-MCNC: 83 MG/DL (ref 65–140)
HCT VFR BLD AUTO: 33.9 % (ref 36.5–49.3)
HGB BLD-MCNC: 10.9 G/DL (ref 12–17)
IMM GRANULOCYTES # BLD AUTO: 0.01 THOUSAND/UL (ref 0–0.2)
IMM GRANULOCYTES NFR BLD AUTO: 0 % (ref 0–2)
LYMPHOCYTES # BLD AUTO: 0.15 THOUSANDS/ΜL (ref 0.6–4.47)
LYMPHOCYTES NFR BLD AUTO: 3 % (ref 14–44)
MCH RBC QN AUTO: 33 PG (ref 26.8–34.3)
MCHC RBC AUTO-ENTMCNC: 32.2 G/DL (ref 31.4–37.4)
MCV RBC AUTO: 103 FL (ref 82–98)
MONOCYTES # BLD AUTO: 0.53 THOUSAND/ΜL (ref 0.17–1.22)
MONOCYTES NFR BLD AUTO: 11 % (ref 4–12)
NEUTROPHILS # BLD AUTO: 3.97 THOUSANDS/ΜL (ref 1.85–7.62)
NEUTS SEG NFR BLD AUTO: 85 % (ref 43–75)
NRBC BLD AUTO-RTO: 0 /100 WBCS
PLATELET # BLD AUTO: 60 THOUSANDS/UL (ref 149–390)
PMV BLD AUTO: 12.3 FL (ref 8.9–12.7)
POTASSIUM SERPL-SCNC: 3.6 MMOL/L (ref 3.5–5.3)
PROT SERPL-MCNC: 7.3 G/DL (ref 6.4–8.2)
RBC # BLD AUTO: 3.3 MILLION/UL (ref 3.88–5.62)
SODIUM SERPL-SCNC: 136 MMOL/L (ref 136–145)
WBC # BLD AUTO: 4.74 THOUSAND/UL (ref 4.31–10.16)

## 2019-09-25 PROCEDURE — 85025 COMPLETE CBC W/AUTO DIFF WBC: CPT

## 2019-09-25 PROCEDURE — 80053 COMPREHEN METABOLIC PANEL: CPT

## 2019-09-25 PROCEDURE — 36415 COLL VENOUS BLD VENIPUNCTURE: CPT

## 2019-09-26 ENCOUNTER — OFFICE VISIT (OUTPATIENT)
Dept: HEMATOLOGY ONCOLOGY | Facility: CLINIC | Age: 74
End: 2019-09-26
Payer: COMMERCIAL

## 2019-09-26 VITALS
DIASTOLIC BLOOD PRESSURE: 62 MMHG | TEMPERATURE: 97.3 F | HEIGHT: 69 IN | RESPIRATION RATE: 14 BRPM | OXYGEN SATURATION: 97 % | WEIGHT: 177.4 LBS | HEART RATE: 62 BPM | BODY MASS INDEX: 26.28 KG/M2 | SYSTOLIC BLOOD PRESSURE: 122 MMHG

## 2019-09-26 DIAGNOSIS — C90.00 IGG MULTIPLE MYELOMA (HCC): Primary | ICD-10-CM

## 2019-09-26 DIAGNOSIS — D69.3 CHRONIC ITP (IDIOPATHIC THROMBOCYTOPENIA) (HCC): ICD-10-CM

## 2019-09-26 PROCEDURE — 99214 OFFICE O/P EST MOD 30 MIN: CPT | Performed by: INTERNAL MEDICINE

## 2019-09-26 RX ORDER — SODIUM CHLORIDE 9 MG/ML
500 INJECTION, SOLUTION INTRAVENOUS ONCE
Status: CANCELLED
Start: 2019-10-11

## 2019-09-26 RX ORDER — SODIUM CHLORIDE 9 MG/ML
20 INJECTION, SOLUTION INTRAVENOUS CONTINUOUS
Status: DISCONTINUED | OUTPATIENT
Start: 2019-09-27 | End: 2019-09-30 | Stop reason: HOSPADM

## 2019-09-26 RX ORDER — SODIUM CHLORIDE 9 MG/ML
500 INJECTION, SOLUTION INTRAVENOUS ONCE
Status: CANCELLED
Start: 2019-09-27

## 2019-09-26 NOTE — PROGRESS NOTES
[facility]  [address] [facility] [id number] [phone]    Edinson Hwang,1945, [id number]  09/26/19    Discussion:   In summary, this is a 37-year-old male history of multiple myeloma  He is on CYBOR-D  He is tolerating this well with the exception of cytopenias, leukopenia, moderate thrombocytopenia  He will be starting his 3rd cycle tomorrow  I would make any changes in his treatment at this time  He had neutropenia  Neulasta is considered  He had some sinus symptoms which are improving with treatment  He will be returning to [place] in early November  We await the results of biopsy to be performed at that visit  He continues to work without restriction  I discussed the above with the patient  The patient and his wife voiced understanding and agreement   ______________________________________________________________________    Chief Complaint   Patient presents with    Follow-up       HPI:  Oncology History    June 2015- routine FU at PCP [facility]  IgG multiple myeloma ([address] )    9/2015 Initial Diagnosis     Found to have Hbg of 6 with SOB, creatinine 1 8 and normal calcium with albumin of 2 1  Additonial blood work showed elevated protien level (12 1g/dL)  9/29/2015 Biopsy     Bone marrow biopsy demonstrated approximately 80% plasma cells with some immature forms noted  These studies showed 13q14 deletion, +1q21, T (4:14)        10/14/2015 - 11/11/2015 Chemotherapy     Velcade 1 3 mg/m2 Days 1,8,15,22  Cytoxan 300 mg/m2  PO Days 1, 8,15, 22  Dexamethasone 40 mg PO Days 1,8,15, 22  Zometa 4 mg IV Day 1  Cycle length = 28 days    Completed 2 cycles    Day one of cycle 2 was on 11/11/15       12/2015 - 2/2016 Chemotherapy     VDT-PACE x 2 cycles   (completed at [facility] in [place], [address] [place] [address])      2/2016 Transplant     Melphalane 200 mg/m2 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  MRD +      8/2016 - 1/26/2018 Chemotherapy     Maintenance therapy:  Carfilzomib, orginally dosed 27 mg weekly then increased to 45 mg weekly after MRD reactivitation  Revlimid  Dexamethasone       2/2018 Biopsy     Bone marrow demonstrated positive minimal residual disease  2/23/2018 -  Chemotherapy     Daratumumab 16mg/m2 IV Day 1  Pomalyst 4 mg p o  daily 1-21  Dexamethasone 4 mg PO Days 2, 3  Dexamethasone 12 mg PODays 8, 15, 22  Cycle length = 28 days      4/11/2019 - 8/29/2019 Chemotherapy     methylPREDNISolone sodium succinate (Solu-MEDROL) 100 mg in sodium chloride 0 9 % 250 mL IVPB, 100 mg, Intravenous, Once, 5 of 12 cycles  Administration: 100 mg (6/7/2019), 100 mg (7/5/2019), 100 mg (8/2/2019)      8/30/2019 -  Chemotherapy     cyclophosphamide (CYTOXAN) 1,000 mg in sodium chloride 0 9 % 250 mL IVPB, 990 mg (66 7 % of original dose 750 mg/m2), Intravenous, Once, 1 of 4 cycles  Dose modification: 500 mg/m2 (original dose 750 mg/m2, Cycle 1, Reason: Other (See Comments))  Administration: 1,000 mg (8/30/2019), 1,000 mg (9/13/2019)  bortezomib (VELCADE) subcutaneous injection 2 6 mg, 1 3 mg/m2 = 2 6 mg (86 7 % of original dose 1 5 mg/m2), Subcutaneous, Once, 1 of 4 cycles  Dose modification: 1 3 mg/m2 (original dose 1 5 mg/m2, Cycle 1, Reason: Other (See Comments))  Administration: 2 6 mg (8/30/2019), 2 6 mg (9/13/2019), 2 6 mg (9/20/2019)         Interval History:  Clinically stable  ECOG -  1 - Symptomatic but completely ambulatory    Review of Systems   Constitutional: Negative for chills and fever  HENT: Negative for nosebleeds  Eyes: Negative for discharge  Respiratory: Negative for cough and shortness of breath  Cardiovascular: Negative for chest pain  Gastrointestinal: Negative for abdominal pain, constipation and diarrhea  Endocrine: Negative for polydipsia  Genitourinary: Negative for hematuria  Musculoskeletal: Negative for arthralgias     Skin: Negative for color change  Allergic/Immunologic: Negative for immunocompromised state  Neurological: Negative for dizziness and headaches  Hematological: Negative for adenopathy  Psychiatric/Behavioral: Negative for agitation  Past Medical History:   Diagnosis Date    History of autologous stem cell transplant ([facility] [address] )     Hypertension     History of HTN-stable since weight loss    Insomnia     Multiple myeloma (HCC)      Patient Active Problem List   Diagnosis    IgG multiple myeloma (HCC)    Persistent atrial fibrillation (HCC)    Essential hypertension    Chronic ITP (idiopathic thrombocytopenia) (HCC)    Hyperlipidemia    Hypocalcemia    Hypothyroidism    Long term current use of systemic steroids    Other fatigue    Prophylactic measure    Hypogammaglobulinemia (HCC)       Current Outpatient Medications:     acetaminophen (TYLENOL) 325 mg tablet, Take 650 mg by mouth every 6 (six) hours as needed for mild pain, Disp: , Rfl:     acyclovir (ZOVIRAX) 400 MG tablet, Take 1 tablet (400 mg total) by mouth 2 (two) times a day for 120 days, Disp: 60 tablet, Rfl: 3    aspirin 81 MG tablet, Take 81 mg by mouth daily  , Disp: , Rfl:     atorvastatin (LIPITOR) 20 mg tablet, Take 1 tablet (20 mg total) by mouth daily, Disp: 30 tablet, Rfl: 5    Cholecalciferol (VITAMIN D-3 PO), Take 1 tablet by mouth daily  , Disp: , Rfl:     daratumumab (DARZALEX) 100 mg/5 mL injection, Infuse 16 mg/kg into a venous catheter every 28 days, Disp: , Rfl:     dexamethasone (DECADRON) 4 mg tablet, Take as directed (Patient taking differently: 12 mg weekly or as directed), Disp: 36 tablet, Rfl: 5    Docusate Sodium (COLACE PO), Take 1 tablet by mouth as needed  , Disp: , Rfl:     escitalopram (LEXAPRO) 20 mg tablet, Take 1 tablet (20 mg total) by mouth daily, Disp: 90 tablet, Rfl: 0    fexofenadine (ALLEGRA) 180 MG tablet, Take 180 mg by mouth 2 (two) times a day as needed, Disp: , Rfl:     Glutamine POWD, by Does not apply route, Disp: , Rfl:     levothyroxine 50 mcg tablet, Take 1 tablet (50 mcg total) by mouth daily, Disp: 90 tablet, Rfl: 3    LORazepam (ATIVAN) 0 5 mg tablet, Take 0 5 mg by mouth every 6 (six) hours as needed for anxiety  , Disp: , Rfl:     Multiple Vitamin (MULTIVITAMINS PO), Take 1 tablet by mouth daily  , Disp: , Rfl:     ondansetron (ZOFRAN) 4 mg tablet, Take 4 mg by mouth every 8 (eight) hours as needed for nausea or vomiting , Disp: , Rfl:     ondansetron (ZOFRAN) 8 mg tablet, Take 1 tablet (8 mg total) by mouth every 8 (eight) hours as needed for nausea or vomiting, Disp: 20 tablet, Rfl: 3    ondansetron (ZOFRAN) 8 mg tablet, Take 1 tablet (8 mg total) by mouth every 8 (eight) hours as needed for nausea or vomiting, Disp: 20 tablet, Rfl: 3  No Known Allergies  Past Surgical History:   Procedure Laterality Date    APPENDECTOMY      Last assessed: 9/25/15    ARTHROSCOPY KNEE Left     9/30/09    EYE SURGERY      Lasik    KNEE CARTILAGE SURGERY      LIMBAL STEM CELL TRANSPLANT      Patient had 2 in [place]-2/29/2016 and 4/13/2016     Social History     Objective:  Vitals:    09/26/19 0758   BP: 122/62   BP Location: Right arm   Patient Position: Sitting   Cuff Size: Standard   Pulse: 62   Resp: 14   Temp: (!) 97 3 °F (36 3 °C)   SpO2: 97%   Weight: 80 5 kg (177 lb 6 4 oz)   Height: 5' 8 9" (1 75 m)     Physical Exam   Constitutional: He is oriented to person, place, and time  He appears well-developed  HENT:   Head: Normocephalic  Eyes: Pupils are equal, round, and reactive to light  Neck: Neck supple  Cardiovascular: Normal rate and regular rhythm  No murmur heard  Pulmonary/Chest: Breath sounds normal  He has no wheezes  He has no rales  Abdominal: Soft  There is no tenderness  Musculoskeletal: Normal range of motion  He exhibits no edema or tenderness  Lymphadenopathy:     He has no cervical adenopathy  Neurological: He is alert and oriented to person, place, and time   He has normal reflexes  No cranial nerve deficit  Skin: No rash noted  No erythema  Psychiatric: He has a normal mood and affect  His behavior is normal          Labs: I personally reviewed the labs and imaging pertinent to this patient care

## 2019-09-27 ENCOUNTER — HOSPITAL ENCOUNTER (OUTPATIENT)
Dept: INFUSION CENTER | Facility: CLINIC | Age: 74
Discharge: HOME | End: 2019-09-27
Payer: COMMERCIAL

## 2019-09-27 VITALS
DIASTOLIC BLOOD PRESSURE: 73 MMHG | RESPIRATION RATE: 16 BRPM | HEART RATE: 75 BPM | SYSTOLIC BLOOD PRESSURE: 134 MMHG | WEIGHT: 176.81 LBS | TEMPERATURE: 97.9 F | BODY MASS INDEX: 26.19 KG/M2 | HEIGHT: 69 IN

## 2019-09-27 DIAGNOSIS — D80.1 HYPOGAMMAGLOBULINEMIA (HCC): ICD-10-CM

## 2019-09-27 DIAGNOSIS — C90.00 IGG MULTIPLE MYELOMA (HCC): Primary | ICD-10-CM

## 2019-09-27 PROCEDURE — 96401 CHEMO ANTI-NEOPL SQ/IM: CPT

## 2019-09-27 PROCEDURE — 96375 TX/PRO/DX INJ NEW DRUG ADDON: CPT

## 2019-09-27 PROCEDURE — 96366 THER/PROPH/DIAG IV INF ADDON: CPT

## 2019-09-27 PROCEDURE — 96413 CHEMO IV INFUSION 1 HR: CPT

## 2019-09-27 PROCEDURE — 96361 HYDRATE IV INFUSION ADD-ON: CPT

## 2019-09-27 PROCEDURE — 96367 TX/PROPH/DG ADDL SEQ IV INF: CPT

## 2019-09-27 RX ORDER — SODIUM CHLORIDE 9 MG/ML
500 INJECTION, SOLUTION INTRAVENOUS ONCE
Status: COMPLETED | OUTPATIENT
Start: 2019-09-27 | End: 2019-09-27

## 2019-09-27 RX ORDER — ONDANSETRON HYDROCHLORIDE 8 MG/1
8 TABLET, FILM COATED ORAL EVERY 8 HOURS PRN
Qty: 20 TABLET | Refills: 3 | Status: SHIPPED | OUTPATIENT
Start: 2019-09-27 | End: 2019-10-04 | Stop reason: SDUPTHER

## 2019-09-27 RX ADMIN — ZOLEDRONIC ACID 3.3 MG: 4 INJECTION, SOLUTION, CONCENTRATE INTRAVENOUS at 08:26

## 2019-09-27 RX ADMIN — BORTEZOMIB 2.6 MG: 3.5 INJECTION, POWDER, LYOPHILIZED, FOR SOLUTION INTRAVENOUS; SUBCUTANEOUS at 12:56

## 2019-09-27 RX ADMIN — CYCLOPHOSPHAMIDE 1000 MG: 1 INJECTION, POWDER, FOR SOLUTION INTRAVENOUS; ORAL at 11:20

## 2019-09-27 RX ADMIN — ONDANSETRON 16 MG: 2 INJECTION INTRAMUSCULAR; INTRAVENOUS at 11:00

## 2019-09-27 RX ADMIN — SODIUM CHLORIDE 500 ML/HR: 0.9 INJECTION, SOLUTION INTRAVENOUS at 11:55

## 2019-09-27 RX ADMIN — SODIUM CHLORIDE 20 ML/HR: 0.9 INJECTION, SOLUTION INTRAVENOUS at 08:26

## 2019-09-27 RX ADMIN — Medication 30 G: at 09:01

## 2019-09-27 NOTE — PLAN OF CARE
Problem: Potential for Falls  Goal: Patient will remain free of falls  Description  INTERVENTIONS:  - Assess patient frequently for physical needs  -  Identify cognitive and physical deficits and behaviors that affect risk of falls    -  [place] fall precautions as indicated by assessment   - Educate patient/family on patient safety including physical limitations  - Instruct patient to call for assistance with activity based on assessment  - Modify environment to reduce risk of injury  - Consider OT/PT consult to assist with strengthening/mobility  Outcome: Progressing

## 2019-09-27 NOTE — PROGRESS NOTES
Patient tolerated Zometa, IVIG and Cytoxan infusions well  Offers no complaints  Pt is aware of all future appointments   Refused AVS

## 2019-10-02 ENCOUNTER — APPOINTMENT (OUTPATIENT)
Dept: LAB | Facility: CLINIC | Age: 74
End: 2019-10-02
Payer: COMMERCIAL

## 2019-10-02 ENCOUNTER — TRANSCRIBE ORDERS (OUTPATIENT)
Dept: LAB | Facility: CLINIC | Age: 74
End: 2019-10-02

## 2019-10-02 DIAGNOSIS — C90.00 IGG MULTIPLE MYELOMA (HCC): ICD-10-CM

## 2019-10-02 LAB
ALBUMIN SERPL BCP-MCNC: 3.6 G/DL (ref 3.5–5)
ALP SERPL-CCNC: 67 U/L (ref 46–116)
ALT SERPL W P-5'-P-CCNC: 17 U/L (ref 12–78)
ANION GAP SERPL CALCULATED.3IONS-SCNC: 8 MMOL/L (ref 4–13)
AST SERPL W P-5'-P-CCNC: 13 U/L (ref 5–45)
BASOPHILS # BLD AUTO: 0.01 THOUSANDS/ΜL (ref 0–0.1)
BASOPHILS NFR BLD AUTO: 0 % (ref 0–1)
BILIRUB SERPL-MCNC: 0.72 MG/DL (ref 0.2–1)
BUN SERPL-MCNC: 16 MG/DL (ref 5–25)
CALCIUM SERPL-MCNC: 8.9 MG/DL (ref 8.3–10.1)
CHLORIDE SERPL-SCNC: 105 MMOL/L (ref 100–108)
CO2 SERPL-SCNC: 24 MMOL/L (ref 21–32)
CREAT SERPL-MCNC: 1.48 MG/DL (ref 0.6–1.3)
EOSINOPHIL # BLD AUTO: 0.01 THOUSAND/ΜL (ref 0–0.61)
EOSINOPHIL NFR BLD AUTO: 0 % (ref 0–6)
ERYTHROCYTE [DISTWIDTH] IN BLOOD BY AUTOMATED COUNT: 15.6 % (ref 11.6–15.1)
GFR SERPL CREATININE-BSD FRML MDRD: 47 ML/MIN/1.73SQ M
GLUCOSE P FAST SERPL-MCNC: 86 MG/DL (ref 65–99)
HCT VFR BLD AUTO: 31.7 % (ref 36.5–49.3)
HGB BLD-MCNC: 10.3 G/DL (ref 12–17)
IMM GRANULOCYTES # BLD AUTO: 0.03 THOUSAND/UL (ref 0–0.2)
IMM GRANULOCYTES NFR BLD AUTO: 1 % (ref 0–2)
LYMPHOCYTES # BLD AUTO: 0.08 THOUSANDS/ΜL (ref 0.6–4.47)
LYMPHOCYTES NFR BLD AUTO: 2 % (ref 14–44)
MCH RBC QN AUTO: 33.3 PG (ref 26.8–34.3)
MCHC RBC AUTO-ENTMCNC: 32.5 G/DL (ref 31.4–37.4)
MCV RBC AUTO: 103 FL (ref 82–98)
MONOCYTES # BLD AUTO: 0.55 THOUSAND/ΜL (ref 0.17–1.22)
MONOCYTES NFR BLD AUTO: 11 % (ref 4–12)
NEUTROPHILS # BLD AUTO: 4.56 THOUSANDS/ΜL (ref 1.85–7.62)
NEUTS SEG NFR BLD AUTO: 86 % (ref 43–75)
NRBC BLD AUTO-RTO: 0 /100 WBCS
PLATELET # BLD AUTO: 25 THOUSANDS/UL (ref 149–390)
PMV BLD AUTO: 12.1 FL (ref 8.9–12.7)
POTASSIUM SERPL-SCNC: 3.8 MMOL/L (ref 3.5–5.3)
PROT SERPL-MCNC: 7.6 G/DL (ref 6.4–8.2)
RBC # BLD AUTO: 3.09 MILLION/UL (ref 3.88–5.62)
SODIUM SERPL-SCNC: 137 MMOL/L (ref 136–145)
WBC # BLD AUTO: 5.24 THOUSAND/UL (ref 4.31–10.16)

## 2019-10-02 PROCEDURE — 80053 COMPREHEN METABOLIC PANEL: CPT

## 2019-10-02 PROCEDURE — 36415 COLL VENOUS BLD VENIPUNCTURE: CPT

## 2019-10-02 PROCEDURE — 85025 COMPLETE CBC W/AUTO DIFF WBC: CPT

## 2019-10-03 ENCOUNTER — TELEPHONE (OUTPATIENT)
Dept: HEMATOLOGY ONCOLOGY | Facility: CLINIC | Age: 74
End: 2019-10-03

## 2019-10-03 NOTE — TELEPHONE ENCOUNTER
Patients most recent platelet count = 69,675  Per Dr Crockett Less - patient to resume Promacta 25mg PO daily  Instructions reviewed with patients wife  Patient is ok for velcade tomorrow as ordered

## 2019-10-03 NOTE — TELEPHONE ENCOUNTER
Patients wife called stating that she is   Returning your   Phone call if you could  Please call them back

## 2019-10-04 ENCOUNTER — HOSPITAL ENCOUNTER (OUTPATIENT)
Dept: INFUSION CENTER | Facility: CLINIC | Age: 74
Discharge: HOME | End: 2019-10-04
Payer: COMMERCIAL

## 2019-10-04 VITALS
WEIGHT: 176.37 LBS | HEART RATE: 61 BPM | SYSTOLIC BLOOD PRESSURE: 122 MMHG | TEMPERATURE: 97.5 F | BODY MASS INDEX: 26.12 KG/M2 | RESPIRATION RATE: 18 BRPM | HEIGHT: 69 IN | DIASTOLIC BLOOD PRESSURE: 72 MMHG

## 2019-10-04 DIAGNOSIS — C90.00 IGG MULTIPLE MYELOMA (HCC): Primary | ICD-10-CM

## 2019-10-04 PROCEDURE — 96401 CHEMO ANTI-NEOPL SQ/IM: CPT

## 2019-10-04 RX ADMIN — BORTEZOMIB 2.6 MG: 3.5 INJECTION, POWDER, LYOPHILIZED, FOR SOLUTION INTRAVENOUS; SUBCUTANEOUS at 09:50

## 2019-10-04 NOTE — PROGRESS NOTES
Velcade given as ordered  Pt offers no complaints  Pt is aware of all future appointments   Declines AVS

## 2019-10-09 ENCOUNTER — TELEPHONE (OUTPATIENT)
Dept: HEMATOLOGY ONCOLOGY | Facility: CLINIC | Age: 74
End: 2019-10-09

## 2019-10-09 ENCOUNTER — APPOINTMENT (OUTPATIENT)
Dept: LAB | Facility: CLINIC | Age: 74
End: 2019-10-09
Payer: COMMERCIAL

## 2019-10-09 DIAGNOSIS — C90.00 IGG MULTIPLE MYELOMA (HCC): ICD-10-CM

## 2019-10-09 LAB
ALBUMIN SERPL BCP-MCNC: 3.4 G/DL (ref 3.5–5)
ALP SERPL-CCNC: 66 U/L (ref 46–116)
ALT SERPL W P-5'-P-CCNC: 21 U/L (ref 12–78)
ANION GAP SERPL CALCULATED.3IONS-SCNC: 7 MMOL/L (ref 4–13)
AST SERPL W P-5'-P-CCNC: 15 U/L (ref 5–45)
BASOPHILS # BLD AUTO: 0.01 THOUSANDS/ΜL (ref 0–0.1)
BASOPHILS NFR BLD AUTO: 0 % (ref 0–1)
BILIRUB SERPL-MCNC: 0.94 MG/DL (ref 0.2–1)
BUN SERPL-MCNC: 19 MG/DL (ref 5–25)
CALCIUM SERPL-MCNC: 9 MG/DL (ref 8.3–10.1)
CHLORIDE SERPL-SCNC: 106 MMOL/L (ref 100–108)
CO2 SERPL-SCNC: 25 MMOL/L (ref 21–32)
CREAT SERPL-MCNC: 1.46 MG/DL (ref 0.6–1.3)
EOSINOPHIL # BLD AUTO: 0.03 THOUSAND/ΜL (ref 0–0.61)
EOSINOPHIL NFR BLD AUTO: 1 % (ref 0–6)
ERYTHROCYTE [DISTWIDTH] IN BLOOD BY AUTOMATED COUNT: 15.3 % (ref 11.6–15.1)
GFR SERPL CREATININE-BSD FRML MDRD: 48 ML/MIN/1.73SQ M
GLUCOSE SERPL-MCNC: 91 MG/DL (ref 65–140)
HCT VFR BLD AUTO: 31.2 % (ref 36.5–49.3)
HGB BLD-MCNC: 10.4 G/DL (ref 12–17)
IMM GRANULOCYTES # BLD AUTO: 0.01 THOUSAND/UL (ref 0–0.2)
IMM GRANULOCYTES NFR BLD AUTO: 0 % (ref 0–2)
LYMPHOCYTES # BLD AUTO: 0.13 THOUSANDS/ΜL (ref 0.6–4.47)
LYMPHOCYTES NFR BLD AUTO: 4 % (ref 14–44)
MCH RBC QN AUTO: 33.5 PG (ref 26.8–34.3)
MCHC RBC AUTO-ENTMCNC: 33.3 G/DL (ref 31.4–37.4)
MCV RBC AUTO: 101 FL (ref 82–98)
MONOCYTES # BLD AUTO: 0.52 THOUSAND/ΜL (ref 0.17–1.22)
MONOCYTES NFR BLD AUTO: 15 % (ref 4–12)
NEUTROPHILS # BLD AUTO: 2.69 THOUSANDS/ΜL (ref 1.85–7.62)
NEUTS SEG NFR BLD AUTO: 80 % (ref 43–75)
NRBC BLD AUTO-RTO: 0 /100 WBCS
PLATELET # BLD AUTO: 20 THOUSANDS/UL (ref 149–390)
POTASSIUM SERPL-SCNC: 4 MMOL/L (ref 3.5–5.3)
PROT SERPL-MCNC: 7 G/DL (ref 6.4–8.2)
RBC # BLD AUTO: 3.1 MILLION/UL (ref 3.88–5.62)
SODIUM SERPL-SCNC: 138 MMOL/L (ref 136–145)
WBC # BLD AUTO: 3.39 THOUSAND/UL (ref 4.31–10.16)

## 2019-10-09 PROCEDURE — 36415 COLL VENOUS BLD VENIPUNCTURE: CPT

## 2019-10-09 PROCEDURE — 80053 COMPREHEN METABOLIC PANEL: CPT

## 2019-10-09 PROCEDURE — 85025 COMPLETE CBC W/AUTO DIFF WBC: CPT

## 2019-10-10 DIAGNOSIS — D69.3 CHRONIC ITP (IDIOPATHIC THROMBOCYTOPENIA) (HCC): Primary | ICD-10-CM

## 2019-10-10 NOTE — TELEPHONE ENCOUNTER
Spoke with patients wife today - she is aware that platelet count is 63,550  Patient will proceed with treatment tomorrow with a dose reduction of Cytoxan  Promacta dose increase from 25 - 50mg PO daily  Patient will repeat CBC on Monday and call me for the results    Patient does not have bleeding symptoms at this time

## 2019-10-11 ENCOUNTER — HOSPITAL ENCOUNTER (OUTPATIENT)
Dept: INFUSION CENTER | Facility: CLINIC | Age: 74
Discharge: HOME | End: 2019-10-11
Payer: COMMERCIAL

## 2019-10-11 VITALS
HEIGHT: 69 IN | TEMPERATURE: 97.5 F | HEART RATE: 74 BPM | SYSTOLIC BLOOD PRESSURE: 132 MMHG | DIASTOLIC BLOOD PRESSURE: 88 MMHG | WEIGHT: 176.37 LBS | RESPIRATION RATE: 18 BRPM | BODY MASS INDEX: 26.12 KG/M2

## 2019-10-11 DIAGNOSIS — C90.00 IGG MULTIPLE MYELOMA (HCC): Primary | ICD-10-CM

## 2019-10-11 PROCEDURE — 96413 CHEMO IV INFUSION 1 HR: CPT

## 2019-10-11 PROCEDURE — 96401 CHEMO ANTI-NEOPL SQ/IM: CPT

## 2019-10-11 PROCEDURE — 96367 TX/PROPH/DG ADDL SEQ IV INF: CPT

## 2019-10-11 RX ORDER — SODIUM CHLORIDE 9 MG/ML
20 INJECTION, SOLUTION INTRAVENOUS CONTINUOUS
Status: DISCONTINUED | OUTPATIENT
Start: 2019-10-11 | End: 2019-10-14 | Stop reason: HOSPADM

## 2019-10-11 RX ORDER — SODIUM CHLORIDE 9 MG/ML
500 INJECTION, SOLUTION INTRAVENOUS ONCE
Status: COMPLETED | OUTPATIENT
Start: 2019-10-11 | End: 2019-10-11

## 2019-10-11 RX ORDER — ONDANSETRON HYDROCHLORIDE 8 MG/1
8 TABLET, FILM COATED ORAL EVERY 8 HOURS PRN
Qty: 20 TABLET | Refills: 3 | Status: SHIPPED | OUTPATIENT
Start: 2019-10-11 | End: 2019-12-03

## 2019-10-11 RX ADMIN — SODIUM CHLORIDE 500 ML/HR: 0.9 INJECTION, SOLUTION INTRAVENOUS at 09:15

## 2019-10-11 RX ADMIN — CYCLOPHOSPHAMIDE 500 MG: 500 INJECTION, POWDER, FOR SOLUTION INTRAVENOUS; ORAL at 08:43

## 2019-10-11 RX ADMIN — ONDANSETRON 16 MG: 2 INJECTION INTRAMUSCULAR; INTRAVENOUS at 08:20

## 2019-10-11 RX ADMIN — SODIUM CHLORIDE 20 ML/HR: 0.9 INJECTION, SOLUTION INTRAVENOUS at 08:20

## 2019-10-11 RX ADMIN — BORTEZOMIB 2.6 MG: 3.5 INJECTION, POWDER, LYOPHILIZED, FOR SOLUTION INTRAVENOUS; SUBCUTANEOUS at 10:18

## 2019-10-11 NOTE — PLAN OF CARE
Problem: Potential for Falls  Goal: Patient will remain free of falls  Description  INTERVENTIONS:  - Assess patient frequently for physical needs  -  Identify cognitive and physical deficits and behaviors that affect risk of falls    -  [place] fall precautions as indicated by assessment   - Educate patient/family on patient safety including physical limitations  - Instruct patient to call for assistance with activity based on assessment  - Modify environment to reduce risk of injury  - Consider OT/PT consult to assist with strengthening/mobility  Outcome: Progressing

## 2019-10-14 ENCOUNTER — APPOINTMENT (OUTPATIENT)
Dept: LAB | Facility: CLINIC | Age: 74
End: 2019-10-14
Payer: COMMERCIAL

## 2019-10-14 ENCOUNTER — TELEPHONE (OUTPATIENT)
Dept: HEMATOLOGY ONCOLOGY | Facility: CLINIC | Age: 74
End: 2019-10-14

## 2019-10-14 DIAGNOSIS — D69.3 CHRONIC ITP (IDIOPATHIC THROMBOCYTOPENIA) (HCC): ICD-10-CM

## 2019-10-14 LAB
BASOPHILS # BLD AUTO: 0.01 THOUSANDS/ΜL (ref 0–0.1)
BASOPHILS NFR BLD AUTO: 0 % (ref 0–1)
EOSINOPHIL # BLD AUTO: 0.03 THOUSAND/ΜL (ref 0–0.61)
EOSINOPHIL NFR BLD AUTO: 1 % (ref 0–6)
ERYTHROCYTE [DISTWIDTH] IN BLOOD BY AUTOMATED COUNT: 15.4 % (ref 11.6–15.1)
HCT VFR BLD AUTO: 33.6 % (ref 36.5–49.3)
HGB BLD-MCNC: 11 G/DL (ref 12–17)
IMM GRANULOCYTES # BLD AUTO: 0.02 THOUSAND/UL (ref 0–0.2)
IMM GRANULOCYTES NFR BLD AUTO: 1 % (ref 0–2)
LYMPHOCYTES # BLD AUTO: 0.11 THOUSANDS/ΜL (ref 0.6–4.47)
LYMPHOCYTES NFR BLD AUTO: 3 % (ref 14–44)
MCH RBC QN AUTO: 33.3 PG (ref 26.8–34.3)
MCHC RBC AUTO-ENTMCNC: 32.7 G/DL (ref 31.4–37.4)
MCV RBC AUTO: 102 FL (ref 82–98)
MONOCYTES # BLD AUTO: 0.6 THOUSAND/ΜL (ref 0.17–1.22)
MONOCYTES NFR BLD AUTO: 16 % (ref 4–12)
NEUTROPHILS # BLD AUTO: 2.92 THOUSANDS/ΜL (ref 1.85–7.62)
NEUTS SEG NFR BLD AUTO: 79 % (ref 43–75)
NRBC BLD AUTO-RTO: 0 /100 WBCS
PLATELET # BLD AUTO: 21 THOUSANDS/UL (ref 149–390)
RBC # BLD AUTO: 3.3 MILLION/UL (ref 3.88–5.62)
WBC # BLD AUTO: 3.69 THOUSAND/UL (ref 4.31–10.16)

## 2019-10-14 PROCEDURE — 85025 COMPLETE CBC W/AUTO DIFF WBC: CPT

## 2019-10-14 PROCEDURE — 36415 COLL VENOUS BLD VENIPUNCTURE: CPT

## 2019-10-15 ENCOUNTER — TELEPHONE (OUTPATIENT)
Dept: HEMATOLOGY ONCOLOGY | Facility: CLINIC | Age: 74
End: 2019-10-15

## 2019-10-17 ENCOUNTER — APPOINTMENT (OUTPATIENT)
Dept: LAB | Facility: CLINIC | Age: 74
End: 2019-10-17
Payer: COMMERCIAL

## 2019-10-17 DIAGNOSIS — C90.00 IGG MULTIPLE MYELOMA (HCC): ICD-10-CM

## 2019-10-17 LAB
ALBUMIN SERPL BCP-MCNC: 4.1 G/DL (ref 3.5–5)
ALP SERPL-CCNC: 72 U/L (ref 46–116)
ALT SERPL W P-5'-P-CCNC: 21 U/L (ref 12–78)
ANION GAP SERPL CALCULATED.3IONS-SCNC: 11 MMOL/L (ref 4–13)
AST SERPL W P-5'-P-CCNC: 17 U/L (ref 5–45)
BASOPHILS # BLD AUTO: 0.01 THOUSANDS/ΜL (ref 0–0.1)
BASOPHILS NFR BLD AUTO: 0 % (ref 0–1)
BILIRUB SERPL-MCNC: 1.19 MG/DL (ref 0.2–1)
BUN SERPL-MCNC: 14 MG/DL (ref 5–25)
CALCIUM SERPL-MCNC: 9.1 MG/DL (ref 8.3–10.1)
CHLORIDE SERPL-SCNC: 104 MMOL/L (ref 100–108)
CO2 SERPL-SCNC: 24 MMOL/L (ref 21–32)
CREAT SERPL-MCNC: 1.46 MG/DL (ref 0.6–1.3)
EOSINOPHIL # BLD AUTO: 0.02 THOUSAND/ΜL (ref 0–0.61)
EOSINOPHIL NFR BLD AUTO: 1 % (ref 0–6)
ERYTHROCYTE [DISTWIDTH] IN BLOOD BY AUTOMATED COUNT: 15.5 % (ref 11.6–15.1)
GFR SERPL CREATININE-BSD FRML MDRD: 48 ML/MIN/1.73SQ M
GLUCOSE SERPL-MCNC: 84 MG/DL (ref 65–140)
HCT VFR BLD AUTO: 31.3 % (ref 36.5–49.3)
HGB BLD-MCNC: 10.1 G/DL (ref 12–17)
IMM GRANULOCYTES # BLD AUTO: 0.01 THOUSAND/UL (ref 0–0.2)
IMM GRANULOCYTES NFR BLD AUTO: 0 % (ref 0–2)
LYMPHOCYTES # BLD AUTO: 0.11 THOUSANDS/ΜL (ref 0.6–4.47)
LYMPHOCYTES NFR BLD AUTO: 5 % (ref 14–44)
MCH RBC QN AUTO: 33.3 PG (ref 26.8–34.3)
MCHC RBC AUTO-ENTMCNC: 32.3 G/DL (ref 31.4–37.4)
MCV RBC AUTO: 103 FL (ref 82–98)
MONOCYTES # BLD AUTO: 0.38 THOUSAND/ΜL (ref 0.17–1.22)
MONOCYTES NFR BLD AUTO: 17 % (ref 4–12)
NEUTROPHILS # BLD AUTO: 1.78 THOUSANDS/ΜL (ref 1.85–7.62)
NEUTS SEG NFR BLD AUTO: 77 % (ref 43–75)
NRBC BLD AUTO-RTO: 0 /100 WBCS
PLATELET # BLD AUTO: 20 THOUSANDS/UL (ref 149–390)
PMV BLD AUTO: 14.1 FL (ref 8.9–12.7)
POTASSIUM SERPL-SCNC: 3.6 MMOL/L (ref 3.5–5.3)
PROT SERPL-MCNC: 7.1 G/DL (ref 6.4–8.2)
RBC # BLD AUTO: 3.03 MILLION/UL (ref 3.88–5.62)
SODIUM SERPL-SCNC: 139 MMOL/L (ref 136–145)
WBC # BLD AUTO: 2.31 THOUSAND/UL (ref 4.31–10.16)

## 2019-10-17 PROCEDURE — 36415 COLL VENOUS BLD VENIPUNCTURE: CPT

## 2019-10-17 PROCEDURE — 85025 COMPLETE CBC W/AUTO DIFF WBC: CPT

## 2019-10-17 PROCEDURE — 80053 COMPREHEN METABOLIC PANEL: CPT

## 2019-10-18 ENCOUNTER — HOSPITAL ENCOUNTER (OUTPATIENT)
Dept: INFUSION CENTER | Facility: CLINIC | Age: 74
Discharge: HOME | End: 2019-10-18
Payer: COMMERCIAL

## 2019-10-18 VITALS
HEART RATE: 71 BPM | RESPIRATION RATE: 18 BRPM | BODY MASS INDEX: 26.12 KG/M2 | WEIGHT: 176.37 LBS | DIASTOLIC BLOOD PRESSURE: 77 MMHG | TEMPERATURE: 97.8 F | HEIGHT: 69 IN | SYSTOLIC BLOOD PRESSURE: 120 MMHG

## 2019-10-18 DIAGNOSIS — C90.00 IGG MULTIPLE MYELOMA (HCC): Primary | ICD-10-CM

## 2019-10-18 PROCEDURE — 96401 CHEMO ANTI-NEOPL SQ/IM: CPT

## 2019-10-18 RX ADMIN — BORTEZOMIB 2.6 MG: 3.5 INJECTION, POWDER, LYOPHILIZED, FOR SOLUTION INTRAVENOUS; SUBCUTANEOUS at 08:40

## 2019-10-18 NOTE — PLAN OF CARE
Problem: Potential for Falls  Goal: Patient will remain free of falls  Description  INTERVENTIONS:  - Assess patient frequently for physical needs  -  Identify cognitive and physical deficits and behaviors that affect risk of falls    -  [place] fall precautions as indicated by assessment   - Educate patient/family on patient safety including physical limitations  - Instruct patient to call for assistance with activity based on assessment  - Modify environment to reduce risk of injury  - Consider OT/PT consult to assist with strengthening/mobility  Outcome: Progressing

## 2019-10-18 NOTE — PROGRESS NOTES
Pt without complaint, tolerated Velcade injection in R abdomen well, declines AVS today, aware of all appts

## 2019-10-23 ENCOUNTER — TELEPHONE (OUTPATIENT)
Dept: HEMATOLOGY ONCOLOGY | Facility: CLINIC | Age: 74
End: 2019-10-23

## 2019-10-23 ENCOUNTER — APPOINTMENT (OUTPATIENT)
Dept: LAB | Facility: CLINIC | Age: 74
End: 2019-10-23
Payer: COMMERCIAL

## 2019-10-23 DIAGNOSIS — C90.00 IGG MULTIPLE MYELOMA (HCC): ICD-10-CM

## 2019-10-23 LAB
ALBUMIN SERPL BCP-MCNC: 4.1 G/DL (ref 3.5–5)
ALP SERPL-CCNC: 73 U/L (ref 46–116)
ALT SERPL W P-5'-P-CCNC: 20 U/L (ref 12–78)
ANION GAP SERPL CALCULATED.3IONS-SCNC: 9 MMOL/L (ref 4–13)
AST SERPL W P-5'-P-CCNC: 13 U/L (ref 5–45)
BASOPHILS # BLD AUTO: 0 THOUSANDS/ΜL (ref 0–0.1)
BASOPHILS NFR BLD AUTO: 0 % (ref 0–1)
BILIRUB SERPL-MCNC: 1.12 MG/DL (ref 0.2–1)
BUN SERPL-MCNC: 19 MG/DL (ref 5–25)
CALCIUM SERPL-MCNC: 9.4 MG/DL (ref 8.3–10.1)
CHLORIDE SERPL-SCNC: 106 MMOL/L (ref 100–108)
CO2 SERPL-SCNC: 24 MMOL/L (ref 21–32)
CREAT SERPL-MCNC: 1.54 MG/DL (ref 0.6–1.3)
EOSINOPHIL # BLD AUTO: 0.02 THOUSAND/ΜL (ref 0–0.61)
EOSINOPHIL NFR BLD AUTO: 1 % (ref 0–6)
ERYTHROCYTE [DISTWIDTH] IN BLOOD BY AUTOMATED COUNT: 15.7 % (ref 11.6–15.1)
GFR SERPL CREATININE-BSD FRML MDRD: 44 ML/MIN/1.73SQ M
GLUCOSE SERPL-MCNC: 90 MG/DL (ref 65–140)
HCT VFR BLD AUTO: 31.3 % (ref 36.5–49.3)
HGB BLD-MCNC: 10.2 G/DL (ref 12–17)
IMM GRANULOCYTES # BLD AUTO: 0.01 THOUSAND/UL (ref 0–0.2)
IMM GRANULOCYTES NFR BLD AUTO: 0 % (ref 0–2)
LYMPHOCYTES # BLD AUTO: 0.11 THOUSANDS/ΜL (ref 0.6–4.47)
LYMPHOCYTES NFR BLD AUTO: 4 % (ref 14–44)
MCH RBC QN AUTO: 33.4 PG (ref 26.8–34.3)
MCHC RBC AUTO-ENTMCNC: 32.6 G/DL (ref 31.4–37.4)
MCV RBC AUTO: 103 FL (ref 82–98)
MONOCYTES # BLD AUTO: 0.49 THOUSAND/ΜL (ref 0.17–1.22)
MONOCYTES NFR BLD AUTO: 17 % (ref 4–12)
NEUTROPHILS # BLD AUTO: 2.21 THOUSANDS/ΜL (ref 1.85–7.62)
NEUTS SEG NFR BLD AUTO: 78 % (ref 43–75)
NRBC BLD AUTO-RTO: 0 /100 WBCS
PLATELET # BLD AUTO: 16 THOUSANDS/UL (ref 149–390)
POTASSIUM SERPL-SCNC: 3.8 MMOL/L (ref 3.5–5.3)
PROT SERPL-MCNC: 7.2 G/DL (ref 6.4–8.2)
RBC # BLD AUTO: 3.05 MILLION/UL (ref 3.88–5.62)
SODIUM SERPL-SCNC: 139 MMOL/L (ref 136–145)
WBC # BLD AUTO: 2.84 THOUSAND/UL (ref 4.31–10.16)

## 2019-10-23 PROCEDURE — 85025 COMPLETE CBC W/AUTO DIFF WBC: CPT

## 2019-10-23 PROCEDURE — 36415 COLL VENOUS BLD VENIPUNCTURE: CPT

## 2019-10-23 PROCEDURE — 80053 COMPREHEN METABOLIC PANEL: CPT

## 2019-10-23 RX ORDER — SODIUM CHLORIDE 9 MG/ML
500 INJECTION, SOLUTION INTRAVENOUS ONCE
Status: CANCELLED
Start: 2019-11-08

## 2019-10-23 RX ORDER — SODIUM CHLORIDE 9 MG/ML
500 INJECTION, SOLUTION INTRAVENOUS ONCE
Status: CANCELLED
Start: 2019-10-25

## 2019-10-24 ENCOUNTER — TELEPHONE (OUTPATIENT)
Dept: HEMATOLOGY ONCOLOGY | Facility: CLINIC | Age: 74
End: 2019-10-24

## 2019-10-24 DIAGNOSIS — D69.3 CHRONIC ITP (IDIOPATHIC THROMBOCYTOPENIA) (HCC): Primary | ICD-10-CM

## 2019-10-24 NOTE — PROGRESS NOTES
TIME OUT - Call received from [facility] with Dr Liseth Carson  Pt's platelets are [phone]  Cytoxan will be held from tomorrow's treatment  Muriel Morrell in pharmacy notified

## 2019-10-24 NOTE — TELEPHONE ENCOUNTER
Per Crystal patient is to increase dosage to 75 mg--patient will need refill as soon as possible  Only has enough for one week    Confirmed pharmacy as  [facility]

## 2019-10-24 NOTE — TELEPHONE ENCOUNTER
Spoke with patients wife and infusion center today  Cytoxan will be held tomorrow  Patient to increase Promacta to 75mg PO daily    Re check CBC in 1 week

## 2019-10-25 ENCOUNTER — HOSPITAL ENCOUNTER (OUTPATIENT)
Dept: INFUSION CENTER | Facility: CLINIC | Age: 74
Discharge: HOME | End: 2019-10-25
Payer: COMMERCIAL

## 2019-10-25 VITALS
RESPIRATION RATE: 16 BRPM | SYSTOLIC BLOOD PRESSURE: 123 MMHG | WEIGHT: 174.16 LBS | BODY MASS INDEX: 25.8 KG/M2 | TEMPERATURE: 97.2 F | HEIGHT: 69 IN | DIASTOLIC BLOOD PRESSURE: 75 MMHG | HEART RATE: 71 BPM

## 2019-10-25 DIAGNOSIS — D80.1 HYPOGAMMAGLOBULINEMIA (HCC): ICD-10-CM

## 2019-10-25 DIAGNOSIS — C90.00 IGG MULTIPLE MYELOMA (HCC): Primary | ICD-10-CM

## 2019-10-25 PROCEDURE — 96366 THER/PROPH/DIAG IV INF ADDON: CPT

## 2019-10-25 PROCEDURE — 96365 THER/PROPH/DIAG IV INF INIT: CPT

## 2019-10-25 PROCEDURE — 96367 TX/PROPH/DG ADDL SEQ IV INF: CPT

## 2019-10-25 PROCEDURE — 96401 CHEMO ANTI-NEOPL SQ/IM: CPT

## 2019-10-25 RX ADMIN — Medication 30 G: at 08:20

## 2019-10-25 RX ADMIN — BORTEZOMIB 2.6 MG: 3.5 INJECTION, POWDER, LYOPHILIZED, FOR SOLUTION INTRAVENOUS; SUBCUTANEOUS at 11:00

## 2019-10-25 RX ADMIN — ZOLEDRONIC ACID 3.3 MG: 4 INJECTION, SOLUTION, CONCENTRATE INTRAVENOUS at 10:20

## 2019-10-25 NOTE — PROGRESS NOTES
Patient tolerated IVIG, Zometa, and Velcade  Offers no complaints  Pt and wife are aware of all future appointments   Refused AVS

## 2019-10-29 DIAGNOSIS — E78.5 HYPERLIPIDEMIA, UNSPECIFIED HYPERLIPIDEMIA TYPE: ICD-10-CM

## 2019-10-30 ENCOUNTER — TELEPHONE (OUTPATIENT)
Dept: HEMATOLOGY ONCOLOGY | Facility: CLINIC | Age: 74
End: 2019-10-30

## 2019-10-30 ENCOUNTER — APPOINTMENT (OUTPATIENT)
Dept: LAB | Facility: CLINIC | Age: 74
End: 2019-10-30
Payer: COMMERCIAL

## 2019-10-30 DIAGNOSIS — C90.00 IGG MULTIPLE MYELOMA (HCC): ICD-10-CM

## 2019-10-30 LAB
ALBUMIN SERPL BCP-MCNC: 3.4 G/DL (ref 3.5–5)
ALP SERPL-CCNC: 76 U/L (ref 46–116)
ALT SERPL W P-5'-P-CCNC: 24 U/L (ref 12–78)
ANION GAP SERPL CALCULATED.3IONS-SCNC: 8 MMOL/L (ref 4–13)
AST SERPL W P-5'-P-CCNC: 16 U/L (ref 5–45)
BASOPHILS # BLD AUTO: 0.01 THOUSANDS/ΜL (ref 0–0.1)
BASOPHILS NFR BLD AUTO: 0 % (ref 0–1)
BILIRUB SERPL-MCNC: 0.98 MG/DL (ref 0.2–1)
BUN SERPL-MCNC: 19 MG/DL (ref 5–25)
CALCIUM SERPL-MCNC: 8.9 MG/DL (ref 8.3–10.1)
CHLORIDE SERPL-SCNC: 107 MMOL/L (ref 100–108)
CO2 SERPL-SCNC: 23 MMOL/L (ref 21–32)
CREAT SERPL-MCNC: 1.39 MG/DL (ref 0.6–1.3)
EOSINOPHIL # BLD AUTO: 0.03 THOUSAND/ΜL (ref 0–0.61)
EOSINOPHIL NFR BLD AUTO: 1 % (ref 0–6)
ERYTHROCYTE [DISTWIDTH] IN BLOOD BY AUTOMATED COUNT: 15.8 % (ref 11.6–15.1)
GFR SERPL CREATININE-BSD FRML MDRD: 50 ML/MIN/1.73SQ M
GLUCOSE SERPL-MCNC: 87 MG/DL (ref 65–140)
HCT VFR BLD AUTO: 30.2 % (ref 36.5–49.3)
HGB BLD-MCNC: 9.8 G/DL (ref 12–17)
IMM GRANULOCYTES # BLD AUTO: 0 THOUSAND/UL (ref 0–0.2)
IMM GRANULOCYTES NFR BLD AUTO: 0 % (ref 0–2)
LYMPHOCYTES # BLD AUTO: 0.11 THOUSANDS/ΜL (ref 0.6–4.47)
LYMPHOCYTES NFR BLD AUTO: 4 % (ref 14–44)
MCH RBC QN AUTO: 33.4 PG (ref 26.8–34.3)
MCHC RBC AUTO-ENTMCNC: 32.5 G/DL (ref 31.4–37.4)
MCV RBC AUTO: 103 FL (ref 82–98)
MONOCYTES # BLD AUTO: 0.69 THOUSAND/ΜL (ref 0.17–1.22)
MONOCYTES NFR BLD AUTO: 26 % (ref 4–12)
NEUTROPHILS # BLD AUTO: 1.81 THOUSANDS/ΜL (ref 1.85–7.62)
NEUTS SEG NFR BLD AUTO: 69 % (ref 43–75)
NRBC BLD AUTO-RTO: 0 /100 WBCS
PLATELET # BLD AUTO: 14 THOUSANDS/UL (ref 149–390)
POTASSIUM SERPL-SCNC: 4.1 MMOL/L (ref 3.5–5.3)
PROT SERPL-MCNC: 7.6 G/DL (ref 6.4–8.2)
RBC # BLD AUTO: 2.93 MILLION/UL (ref 3.88–5.62)
SODIUM SERPL-SCNC: 138 MMOL/L (ref 136–145)
WBC # BLD AUTO: 2.65 THOUSAND/UL (ref 4.31–10.16)

## 2019-10-30 PROCEDURE — 85025 COMPLETE CBC W/AUTO DIFF WBC: CPT

## 2019-10-30 PROCEDURE — 80053 COMPREHEN METABOLIC PANEL: CPT

## 2019-10-30 PROCEDURE — 36415 COLL VENOUS BLD VENIPUNCTURE: CPT

## 2019-10-30 RX ORDER — ATORVASTATIN CALCIUM 20 MG/1
20 TABLET, FILM COATED ORAL DAILY
Qty: 30 TABLET | Refills: 0 | Status: SHIPPED | OUTPATIENT
Start: 2019-10-30 | End: 2020-01-07 | Stop reason: SDUPTHER

## 2019-10-31 NOTE — TELEPHONE ENCOUNTER
Spoke with patients wife today - explained that patient ok to proceed with Velcade as ordered  Patient will be going to [address] [place] [address] on 11/10/19 for follow up bone marrow biopsy and scans  I will be calling to check the status of [facility]  Wife verbalized understanding

## 2019-11-01 ENCOUNTER — HOSPITAL ENCOUNTER (OUTPATIENT)
Dept: INFUSION CENTER | Facility: CLINIC | Age: 74
Discharge: HOME | End: 2019-11-01
Payer: COMMERCIAL

## 2019-11-01 VITALS
DIASTOLIC BLOOD PRESSURE: 76 MMHG | SYSTOLIC BLOOD PRESSURE: 133 MMHG | HEART RATE: 75 BPM | RESPIRATION RATE: 18 BRPM | HEIGHT: 69 IN | BODY MASS INDEX: 25.89 KG/M2 | WEIGHT: 174.82 LBS | TEMPERATURE: 97.1 F

## 2019-11-01 DIAGNOSIS — C90.00 IGG MULTIPLE MYELOMA (HCC): Primary | ICD-10-CM

## 2019-11-01 PROCEDURE — 96401 CHEMO ANTI-NEOPL SQ/IM: CPT

## 2019-11-01 RX ADMIN — BORTEZOMIB 2.6 MG: 3.5 INJECTION, POWDER, LYOPHILIZED, FOR SOLUTION INTRAVENOUS; SUBCUTANEOUS at 08:34

## 2019-11-01 NOTE — PLAN OF CARE
Problem: Potential for Falls  Goal: Patient will remain free of falls  Description  INTERVENTIONS:  - Assess patient frequently for physical needs  -  Identify cognitive and physical deficits and behaviors that affect risk of falls    -  [place] fall precautions as indicated by assessment   - Educate patient/family on patient safety including physical limitations  - Instruct patient to call for assistance with activity based on assessment  - Modify environment to reduce risk of injury  - Consider OT/PT consult to assist with strengthening/mobility  Outcome: Progressing

## 2019-11-01 NOTE — PROGRESS NOTES
Pt given velcade sq as ordered in the right abdominal tissue  Pt declined AVS but is aware of next appt

## 2019-11-06 ENCOUNTER — TELEPHONE (OUTPATIENT)
Dept: HEMATOLOGY ONCOLOGY | Facility: CLINIC | Age: 74
End: 2019-11-06

## 2019-11-06 ENCOUNTER — APPOINTMENT (OUTPATIENT)
Dept: LAB | Facility: CLINIC | Age: 74
End: 2019-11-06
Payer: COMMERCIAL

## 2019-11-06 DIAGNOSIS — C90.00 IGG MULTIPLE MYELOMA (HCC): ICD-10-CM

## 2019-11-06 LAB
ALBUMIN SERPL BCP-MCNC: 3.7 G/DL (ref 3.5–5)
ALP SERPL-CCNC: 79 U/L (ref 46–116)
ALT SERPL W P-5'-P-CCNC: 25 U/L (ref 12–78)
ANION GAP SERPL CALCULATED.3IONS-SCNC: 9 MMOL/L (ref 4–13)
AST SERPL W P-5'-P-CCNC: 20 U/L (ref 5–45)
BASOPHILS # BLD AUTO: 0.02 THOUSANDS/ΜL (ref 0–0.1)
BASOPHILS NFR BLD AUTO: 1 % (ref 0–1)
BILIRUB SERPL-MCNC: 0.93 MG/DL (ref 0.2–1)
BUN SERPL-MCNC: 18 MG/DL (ref 5–25)
CALCIUM SERPL-MCNC: 9.4 MG/DL (ref 8.3–10.1)
CHLORIDE SERPL-SCNC: 104 MMOL/L (ref 100–108)
CO2 SERPL-SCNC: 24 MMOL/L (ref 21–32)
CREAT SERPL-MCNC: 1.42 MG/DL (ref 0.6–1.3)
EOSINOPHIL # BLD AUTO: 0.01 THOUSAND/ΜL (ref 0–0.61)
EOSINOPHIL NFR BLD AUTO: 0 % (ref 0–6)
ERYTHROCYTE [DISTWIDTH] IN BLOOD BY AUTOMATED COUNT: 15.6 % (ref 11.6–15.1)
GFR SERPL CREATININE-BSD FRML MDRD: 49 ML/MIN/1.73SQ M
GLUCOSE SERPL-MCNC: 93 MG/DL (ref 65–140)
HCT VFR BLD AUTO: 32.2 % (ref 36.5–49.3)
HGB BLD-MCNC: 10.5 G/DL (ref 12–17)
IMM GRANULOCYTES # BLD AUTO: 0.02 THOUSAND/UL (ref 0–0.2)
IMM GRANULOCYTES NFR BLD AUTO: 1 % (ref 0–2)
LYMPHOCYTES # BLD AUTO: 0.17 THOUSANDS/ΜL (ref 0.6–4.47)
LYMPHOCYTES NFR BLD AUTO: 5 % (ref 14–44)
MCH RBC QN AUTO: 33.2 PG (ref 26.8–34.3)
MCHC RBC AUTO-ENTMCNC: 32.6 G/DL (ref 31.4–37.4)
MCV RBC AUTO: 102 FL (ref 82–98)
MONOCYTES # BLD AUTO: 0.59 THOUSAND/ΜL (ref 0.17–1.22)
MONOCYTES NFR BLD AUTO: 16 % (ref 4–12)
NEUTROPHILS # BLD AUTO: 2.92 THOUSANDS/ΜL (ref 1.85–7.62)
NEUTS SEG NFR BLD AUTO: 77 % (ref 43–75)
NRBC BLD AUTO-RTO: 0 /100 WBCS
PLATELET # BLD AUTO: 19 THOUSANDS/UL (ref 149–390)
POTASSIUM SERPL-SCNC: 3.8 MMOL/L (ref 3.5–5.3)
PROT SERPL-MCNC: 7.8 G/DL (ref 6.4–8.2)
RBC # BLD AUTO: 3.16 MILLION/UL (ref 3.88–5.62)
SODIUM SERPL-SCNC: 137 MMOL/L (ref 136–145)
WBC # BLD AUTO: 3.73 THOUSAND/UL (ref 4.31–10.16)

## 2019-11-06 PROCEDURE — 36415 COLL VENOUS BLD VENIPUNCTURE: CPT

## 2019-11-06 PROCEDURE — 85025 COMPLETE CBC W/AUTO DIFF WBC: CPT

## 2019-11-06 PROCEDURE — 80053 COMPREHEN METABOLIC PANEL: CPT

## 2019-11-06 NOTE — TELEPHONE ENCOUNTER
Call transferred from ZAIRA Zepeda from [facility] [facility] reporting a critical platelet count of [phone]  Labs repeated with read back  Mane Soria RN contacted via IM and tasked for Dr Ben Hernandez

## 2019-11-08 ENCOUNTER — HOSPITAL ENCOUNTER (OUTPATIENT)
Dept: INFUSION CENTER | Facility: CLINIC | Age: 74
Discharge: HOME | End: 2019-11-08
Payer: COMMERCIAL

## 2019-11-08 VITALS
RESPIRATION RATE: 16 BRPM | SYSTOLIC BLOOD PRESSURE: 135 MMHG | HEART RATE: 74 BPM | TEMPERATURE: 98.3 F | WEIGHT: 173.2 LBS | DIASTOLIC BLOOD PRESSURE: 80 MMHG | HEIGHT: 69 IN | BODY MASS INDEX: 25.65 KG/M2

## 2019-11-08 DIAGNOSIS — C90.00 IGG MULTIPLE MYELOMA (HCC): Primary | ICD-10-CM

## 2019-11-08 PROCEDURE — 96401 CHEMO ANTI-NEOPL SQ/IM: CPT

## 2019-11-08 RX ADMIN — BORTEZOMIB 2.6 MG: 3.5 INJECTION, POWDER, LYOPHILIZED, FOR SOLUTION INTRAVENOUS; SUBCUTANEOUS at 08:54

## 2019-11-08 NOTE — PROGRESS NOTES
Patient to the unit for Velcade injection  Denies any fever or recent infection  Denies any abnormal bleeding  Injection given in left abdomen  Voices no questions or concerns    Declined AVS

## 2019-11-08 NOTE — PLAN OF CARE
Problem: SAFETY ADULT  Goal: Patient will remain free of falls  Description  INTERVENTIONS:  - Assess patient frequently for physical needs  -  Identify cognitive and physical deficits and behaviors that affect risk of falls    -  [place] fall precautions as indicated by assessment   - Educate patient/family on patient safety including physical limitations  - Instruct patient to call for assistance with activity based on assessment  - Modify environment to reduce risk of injury  - Consider OT/PT consult to assist with strengthening/mobility  Outcome: Progressing

## 2019-11-15 ENCOUNTER — HOSPITAL ENCOUNTER (OUTPATIENT)
Dept: INFUSION CENTER | Facility: CLINIC | Age: 74
Discharge: HOME | End: 2019-11-15

## 2019-11-20 ENCOUNTER — APPOINTMENT (OUTPATIENT)
Dept: LAB | Facility: CLINIC | Age: 74
End: 2019-11-20
Payer: COMMERCIAL

## 2019-11-20 ENCOUNTER — TELEPHONE (OUTPATIENT)
Dept: HEMATOLOGY ONCOLOGY | Facility: CLINIC | Age: 74
End: 2019-11-20

## 2019-11-20 DIAGNOSIS — C90.00 IGG MULTIPLE MYELOMA (HCC): ICD-10-CM

## 2019-11-20 LAB
ALBUMIN SERPL BCP-MCNC: 3.9 G/DL (ref 3.5–5)
ALP SERPL-CCNC: 76 U/L (ref 46–116)
ALT SERPL W P-5'-P-CCNC: 21 U/L (ref 12–78)
ANION GAP SERPL CALCULATED.3IONS-SCNC: 10 MMOL/L (ref 4–13)
AST SERPL W P-5'-P-CCNC: 15 U/L (ref 5–45)
BASOPHILS # BLD AUTO: 0.01 THOUSANDS/ΜL (ref 0–0.1)
BASOPHILS NFR BLD AUTO: 0 % (ref 0–1)
BILIRUB SERPL-MCNC: 1.04 MG/DL (ref 0.2–1)
BUN SERPL-MCNC: 18 MG/DL (ref 5–25)
CALCIUM SERPL-MCNC: 9.4 MG/DL (ref 8.3–10.1)
CHLORIDE SERPL-SCNC: 104 MMOL/L (ref 100–108)
CO2 SERPL-SCNC: 25 MMOL/L (ref 21–32)
CREAT SERPL-MCNC: 1.4 MG/DL (ref 0.6–1.3)
EOSINOPHIL # BLD AUTO: 0.03 THOUSAND/ΜL (ref 0–0.61)
EOSINOPHIL NFR BLD AUTO: 1 % (ref 0–6)
ERYTHROCYTE [DISTWIDTH] IN BLOOD BY AUTOMATED COUNT: 15.6 % (ref 11.6–15.1)
GFR SERPL CREATININE-BSD FRML MDRD: 50 ML/MIN/1.73SQ M
GLUCOSE SERPL-MCNC: 96 MG/DL (ref 65–140)
HCT VFR BLD AUTO: 31.2 % (ref 36.5–49.3)
HGB BLD-MCNC: 10.1 G/DL (ref 12–17)
IMM GRANULOCYTES # BLD AUTO: 0.01 THOUSAND/UL (ref 0–0.2)
IMM GRANULOCYTES NFR BLD AUTO: 0 % (ref 0–2)
LYMPHOCYTES # BLD AUTO: 0.16 THOUSANDS/ΜL (ref 0.6–4.47)
LYMPHOCYTES NFR BLD AUTO: 4 % (ref 14–44)
MCH RBC QN AUTO: 33.6 PG (ref 26.8–34.3)
MCHC RBC AUTO-ENTMCNC: 32.4 G/DL (ref 31.4–37.4)
MCV RBC AUTO: 104 FL (ref 82–98)
MONOCYTES # BLD AUTO: 0.63 THOUSAND/ΜL (ref 0.17–1.22)
MONOCYTES NFR BLD AUTO: 15 % (ref 4–12)
NEUTROPHILS # BLD AUTO: 3.36 THOUSANDS/ΜL (ref 1.85–7.62)
NEUTS SEG NFR BLD AUTO: 80 % (ref 43–75)
NRBC BLD AUTO-RTO: 0 /100 WBCS
PLATELET # BLD AUTO: 52 THOUSANDS/UL (ref 149–390)
PMV BLD AUTO: 13.2 FL (ref 8.9–12.7)
POTASSIUM SERPL-SCNC: 4 MMOL/L (ref 3.5–5.3)
PROT SERPL-MCNC: 7.4 G/DL (ref 6.4–8.2)
RBC # BLD AUTO: 3.01 MILLION/UL (ref 3.88–5.62)
SODIUM SERPL-SCNC: 139 MMOL/L (ref 136–145)
WBC # BLD AUTO: 4.2 THOUSAND/UL (ref 4.31–10.16)

## 2019-11-20 PROCEDURE — 80053 COMPREHEN METABOLIC PANEL: CPT

## 2019-11-20 PROCEDURE — 85025 COMPLETE CBC W/AUTO DIFF WBC: CPT

## 2019-11-20 PROCEDURE — 36415 COLL VENOUS BLD VENIPUNCTURE: CPT

## 2019-11-20 NOTE — TELEPHONE ENCOUNTER
Returned call to patients wife - she reports that per MD in [place] patient is to HOLD velcade this week  OK to proceed with Zometa and IVIG as arranged  Chemotherapy plan for the following week well be determined based on lab results  Wife will call next week with an update on treatment plan  Infusion center notified of chemo hold for this week

## 2019-11-20 NOTE — TELEPHONE ENCOUNTER
Patients wife called to say they are back from their trip to [facility], and wanted to update Altagracia on what they learned, just wanted to keep her updated

## 2019-11-22 ENCOUNTER — HOSPITAL ENCOUNTER (OUTPATIENT)
Dept: INFUSION CENTER | Facility: CLINIC | Age: 74
Discharge: HOME | End: 2019-11-22
Payer: COMMERCIAL

## 2019-11-22 VITALS
TEMPERATURE: 98.1 F | WEIGHT: 174.38 LBS | HEART RATE: 74 BPM | HEIGHT: 69 IN | BODY MASS INDEX: 25.83 KG/M2 | RESPIRATION RATE: 18 BRPM | SYSTOLIC BLOOD PRESSURE: 147 MMHG | DIASTOLIC BLOOD PRESSURE: 82 MMHG

## 2019-11-22 DIAGNOSIS — C90.00 IGG MULTIPLE MYELOMA (HCC): Primary | ICD-10-CM

## 2019-11-22 DIAGNOSIS — D80.1 HYPOGAMMAGLOBULINEMIA (HCC): ICD-10-CM

## 2019-11-22 PROCEDURE — 96366 THER/PROPH/DIAG IV INF ADDON: CPT

## 2019-11-22 PROCEDURE — 96365 THER/PROPH/DIAG IV INF INIT: CPT

## 2019-11-22 PROCEDURE — 96367 TX/PROPH/DG ADDL SEQ IV INF: CPT

## 2019-11-22 RX ADMIN — ZOLEDRONIC ACID 3.3 MG: 4 INJECTION, SOLUTION, CONCENTRATE INTRAVENOUS at 09:00

## 2019-11-22 RX ADMIN — Medication 30 G: at 09:47

## 2019-11-22 NOTE — PROGRESS NOTES
Patient to the unit, denies any recent dental work, fevers or recent infections  Patient tolerated Zometa and Gamunex-c without adverse reaction  Declined AVS   Aware of next appointment  Denies any questions or concerns at time of discharge

## 2019-11-22 NOTE — PLAN OF CARE
Problem: SAFETY ADULT  Goal: Patient will remain free of falls  Description  INTERVENTIONS:  - Assess patient frequently for physical needs  -  Identify cognitive and physical deficits and behaviors that affect risk of falls    -  [place] fall precautions as indicated by assessment   - Educate patient/family on patient safety including physical limitations  - Instruct patient to call for assistance with activity based on assessment  - Modify environment to reduce risk of injury  - Consider OT/PT consult to assist with strengthening/mobility  Outcome: Progressing

## 2019-11-22 NOTE — PLAN OF CARE

## 2019-11-25 DIAGNOSIS — E78.5 HYPERLIPIDEMIA, UNSPECIFIED HYPERLIPIDEMIA TYPE: ICD-10-CM

## 2019-11-25 RX ORDER — ATORVASTATIN CALCIUM 20 MG/1
20 TABLET, FILM COATED ORAL DAILY
Qty: 30 TABLET | Refills: 0 | Status: CANCELLED | OUTPATIENT
Start: 2019-11-25

## 2019-11-27 ENCOUNTER — APPOINTMENT (OUTPATIENT)
Dept: LAB | Facility: CLINIC | Age: 74
End: 2019-11-27
Payer: COMMERCIAL

## 2019-11-27 DIAGNOSIS — C90.00 IGG MULTIPLE MYELOMA (HCC): ICD-10-CM

## 2019-11-27 LAB
ALBUMIN SERPL BCP-MCNC: 3.8 G/DL (ref 3.5–5)
ALP SERPL-CCNC: 91 U/L (ref 46–116)
ALT SERPL W P-5'-P-CCNC: 24 U/L (ref 12–78)
ANION GAP SERPL CALCULATED.3IONS-SCNC: 5 MMOL/L (ref 4–13)
AST SERPL W P-5'-P-CCNC: 16 U/L (ref 5–45)
BASOPHILS # BLD AUTO: 0.02 THOUSANDS/ΜL (ref 0–0.1)
BASOPHILS NFR BLD AUTO: 1 % (ref 0–1)
BILIRUB SERPL-MCNC: 0.93 MG/DL (ref 0.2–1)
BUN SERPL-MCNC: 18 MG/DL (ref 5–25)
CALCIUM SERPL-MCNC: 9.3 MG/DL (ref 8.3–10.1)
CHLORIDE SERPL-SCNC: 105 MMOL/L (ref 100–108)
CO2 SERPL-SCNC: 28 MMOL/L (ref 21–32)
CREAT SERPL-MCNC: 1.51 MG/DL (ref 0.6–1.3)
EOSINOPHIL # BLD AUTO: 0.03 THOUSAND/ΜL (ref 0–0.61)
EOSINOPHIL NFR BLD AUTO: 1 % (ref 0–6)
ERYTHROCYTE [DISTWIDTH] IN BLOOD BY AUTOMATED COUNT: 16 % (ref 11.6–15.1)
GFR SERPL CREATININE-BSD FRML MDRD: 45 ML/MIN/1.73SQ M
GLUCOSE SERPL-MCNC: 97 MG/DL (ref 65–140)
HCT VFR BLD AUTO: 30.8 % (ref 36.5–49.3)
HGB BLD-MCNC: 9.7 G/DL (ref 12–17)
IMM GRANULOCYTES # BLD AUTO: 0.01 THOUSAND/UL (ref 0–0.2)
IMM GRANULOCYTES NFR BLD AUTO: 0 % (ref 0–2)
LYMPHOCYTES # BLD AUTO: 0.22 THOUSANDS/ΜL (ref 0.6–4.47)
LYMPHOCYTES NFR BLD AUTO: 5 % (ref 14–44)
MCH RBC QN AUTO: 33.4 PG (ref 26.8–34.3)
MCHC RBC AUTO-ENTMCNC: 31.5 G/DL (ref 31.4–37.4)
MCV RBC AUTO: 106 FL (ref 82–98)
MONOCYTES # BLD AUTO: 0.77 THOUSAND/ΜL (ref 0.17–1.22)
MONOCYTES NFR BLD AUTO: 18 % (ref 4–12)
NEUTROPHILS # BLD AUTO: 3.33 THOUSANDS/ΜL (ref 1.85–7.62)
NEUTS SEG NFR BLD AUTO: 75 % (ref 43–75)
NRBC BLD AUTO-RTO: 0 /100 WBCS
PLATELET # BLD AUTO: 61 THOUSANDS/UL (ref 149–390)
PMV BLD AUTO: 12.7 FL (ref 8.9–12.7)
POTASSIUM SERPL-SCNC: 3.9 MMOL/L (ref 3.5–5.3)
PROT SERPL-MCNC: 8.1 G/DL (ref 6.4–8.2)
RBC # BLD AUTO: 2.9 MILLION/UL (ref 3.88–5.62)
SODIUM SERPL-SCNC: 138 MMOL/L (ref 136–145)
WBC # BLD AUTO: 4.38 THOUSAND/UL (ref 4.31–10.16)

## 2019-11-27 PROCEDURE — 36415 COLL VENOUS BLD VENIPUNCTURE: CPT

## 2019-11-27 PROCEDURE — 80053 COMPREHEN METABOLIC PANEL: CPT

## 2019-11-27 PROCEDURE — 85025 COMPLETE CBC W/AUTO DIFF WBC: CPT

## 2019-11-29 ENCOUNTER — HOSPITAL ENCOUNTER (OUTPATIENT)
Dept: INFUSION CENTER | Facility: CLINIC | Age: 74
End: 2019-11-29

## 2019-12-02 ENCOUNTER — OFFICE VISIT (OUTPATIENT)
Dept: HEMATOLOGY ONCOLOGY | Facility: CLINIC | Age: 74
End: 2019-12-02
Payer: COMMERCIAL

## 2019-12-02 ENCOUNTER — TELEPHONE (OUTPATIENT)
Dept: HEMATOLOGY ONCOLOGY | Facility: CLINIC | Age: 74
End: 2019-12-02

## 2019-12-02 VITALS
WEIGHT: 176.2 LBS | SYSTOLIC BLOOD PRESSURE: 128 MMHG | BODY MASS INDEX: 26.1 KG/M2 | RESPIRATION RATE: 17 BRPM | HEART RATE: 87 BPM | DIASTOLIC BLOOD PRESSURE: 72 MMHG | OXYGEN SATURATION: 98 % | HEIGHT: 69 IN | TEMPERATURE: 98.2 F

## 2019-12-02 DIAGNOSIS — D80.1 HYPOGAMMAGLOBULINEMIA (HCC): ICD-10-CM

## 2019-12-02 DIAGNOSIS — C90.00 IGG MULTIPLE MYELOMA (HCC): Primary | ICD-10-CM

## 2019-12-02 PROCEDURE — 99214 OFFICE O/P EST MOD 30 MIN: CPT | Performed by: INTERNAL MEDICINE

## 2019-12-02 NOTE — TELEPHONE ENCOUNTER
Pt's wife called and was wondering if your team received treatment orders yet from [facility] for patients future tx appts

## 2019-12-03 ENCOUNTER — TELEPHONE (OUTPATIENT)
Dept: HEMATOLOGY ONCOLOGY | Facility: CLINIC | Age: 74
End: 2019-12-03

## 2019-12-03 NOTE — TELEPHONE ENCOUNTER
Treatment plan information received from Dr Tania Monique  Patient will begin Daratumumab on Friday 12/6  Orders placed and scheduled with infusion center    Spoke with patients wife - she is aware to arrive at 7:30

## 2019-12-03 NOTE — TELEPHONE ENCOUNTER
Raffi Gonzalez states [facility] is taking care of the Thalidomide for patient's treatment    [phone]

## 2019-12-04 ENCOUNTER — TRANSCRIBE ORDERS (OUTPATIENT)
Dept: LAB | Facility: CLINIC | Age: 74
End: 2019-12-04

## 2019-12-04 ENCOUNTER — APPOINTMENT (OUTPATIENT)
Dept: LAB | Facility: CLINIC | Age: 74
End: 2019-12-04
Payer: COMMERCIAL

## 2019-12-04 DIAGNOSIS — C90.00 IGG MULTIPLE MYELOMA (HCC): ICD-10-CM

## 2019-12-04 DIAGNOSIS — C90.00 IGG MULTIPLE MYELOMA (HCC): Primary | ICD-10-CM

## 2019-12-04 LAB
ALBUMIN SERPL BCP-MCNC: 3.7 G/DL (ref 3.5–5)
ALP SERPL-CCNC: 85 U/L (ref 46–116)
ALT SERPL W P-5'-P-CCNC: 20 U/L (ref 12–78)
ANION GAP SERPL CALCULATED.3IONS-SCNC: 6 MMOL/L (ref 4–13)
AST SERPL W P-5'-P-CCNC: 12 U/L (ref 5–45)
BASOPHILS # BLD AUTO: 0.01 THOUSANDS/ΜL (ref 0–0.1)
BASOPHILS NFR BLD AUTO: 0 % (ref 0–1)
BILIRUB SERPL-MCNC: 0.68 MG/DL (ref 0.2–1)
BUN SERPL-MCNC: 17 MG/DL (ref 5–25)
CALCIUM SERPL-MCNC: 9.5 MG/DL (ref 8.3–10.1)
CHLORIDE SERPL-SCNC: 104 MMOL/L (ref 100–108)
CO2 SERPL-SCNC: 27 MMOL/L (ref 21–32)
CREAT SERPL-MCNC: 1.48 MG/DL (ref 0.6–1.3)
EOSINOPHIL # BLD AUTO: 0.03 THOUSAND/ΜL (ref 0–0.61)
EOSINOPHIL NFR BLD AUTO: 1 % (ref 0–6)
ERYTHROCYTE [DISTWIDTH] IN BLOOD BY AUTOMATED COUNT: 15.3 % (ref 11.6–15.1)
GFR SERPL CREATININE-BSD FRML MDRD: 47 ML/MIN/1.73SQ M
GLUCOSE SERPL-MCNC: 87 MG/DL (ref 65–140)
HCT VFR BLD AUTO: 30.1 % (ref 36.5–49.3)
HGB BLD-MCNC: 9.7 G/DL (ref 12–17)
IMM GRANULOCYTES # BLD AUTO: 0.01 THOUSAND/UL (ref 0–0.2)
IMM GRANULOCYTES NFR BLD AUTO: 0 % (ref 0–2)
LYMPHOCYTES # BLD AUTO: 0.19 THOUSANDS/ΜL (ref 0.6–4.47)
LYMPHOCYTES NFR BLD AUTO: 4 % (ref 14–44)
MCH RBC QN AUTO: 33.4 PG (ref 26.8–34.3)
MCHC RBC AUTO-ENTMCNC: 32.2 G/DL (ref 31.4–37.4)
MCV RBC AUTO: 104 FL (ref 82–98)
MONOCYTES # BLD AUTO: 0.53 THOUSAND/ΜL (ref 0.17–1.22)
MONOCYTES NFR BLD AUTO: 12 % (ref 4–12)
NEUTROPHILS # BLD AUTO: 3.67 THOUSANDS/ΜL (ref 1.85–7.62)
NEUTS SEG NFR BLD AUTO: 83 % (ref 43–75)
NRBC BLD AUTO-RTO: 0 /100 WBCS
PLATELET # BLD AUTO: 69 THOUSANDS/UL (ref 149–390)
PMV BLD AUTO: 12 FL (ref 8.9–12.7)
POTASSIUM SERPL-SCNC: 3.6 MMOL/L (ref 3.5–5.3)
PROT SERPL-MCNC: 7.7 G/DL (ref 6.4–8.2)
RBC # BLD AUTO: 2.9 MILLION/UL (ref 3.88–5.62)
SODIUM SERPL-SCNC: 137 MMOL/L (ref 136–145)
WBC # BLD AUTO: 4.44 THOUSAND/UL (ref 4.31–10.16)

## 2019-12-04 PROCEDURE — 36415 COLL VENOUS BLD VENIPUNCTURE: CPT

## 2019-12-04 PROCEDURE — 85025 COMPLETE CBC W/AUTO DIFF WBC: CPT

## 2019-12-04 PROCEDURE — 80053 COMPREHEN METABOLIC PANEL: CPT

## 2019-12-04 RX ORDER — SODIUM CHLORIDE 9 MG/ML
20 INJECTION, SOLUTION INTRAVENOUS ONCE
Status: CANCELLED | OUTPATIENT
Start: 2019-12-27

## 2019-12-04 RX ORDER — SODIUM CHLORIDE 9 MG/ML
20 INJECTION, SOLUTION INTRAVENOUS ONCE
Status: CANCELLED | OUTPATIENT
Start: 2019-12-20

## 2019-12-04 RX ORDER — SODIUM CHLORIDE 9 MG/ML
20 INJECTION, SOLUTION INTRAVENOUS ONCE
Status: CANCELLED | OUTPATIENT
Start: 2019-12-13

## 2019-12-04 RX ORDER — ACETAMINOPHEN 325 MG/1
650 TABLET ORAL ONCE
Status: CANCELLED | OUTPATIENT
Start: 2019-12-20

## 2019-12-04 RX ORDER — ACETAMINOPHEN 325 MG/1
650 TABLET ORAL ONCE
Status: CANCELLED | OUTPATIENT
Start: 2019-12-27

## 2019-12-04 RX ORDER — ACETAMINOPHEN 325 MG/1
650 TABLET ORAL ONCE
Status: CANCELLED | OUTPATIENT
Start: 2019-12-13

## 2019-12-04 RX ORDER — ACETAMINOPHEN 325 MG/1
650 TABLET ORAL ONCE
Status: CANCELLED | OUTPATIENT
Start: 2019-12-06

## 2019-12-04 RX ORDER — SODIUM CHLORIDE 9 MG/ML
20 INJECTION, SOLUTION INTRAVENOUS ONCE
Status: CANCELLED | OUTPATIENT
Start: 2019-12-06

## 2019-12-06 ENCOUNTER — HOSPITAL ENCOUNTER (OUTPATIENT)
Dept: INFUSION CENTER | Facility: CLINIC | Age: 74
Discharge: HOME | End: 2019-12-06

## 2019-12-06 ENCOUNTER — HOSPITAL ENCOUNTER (OUTPATIENT)
Dept: INFUSION CENTER | Facility: CLINIC | Age: 74
Discharge: HOME | End: 2019-12-06
Payer: COMMERCIAL

## 2019-12-06 VITALS
DIASTOLIC BLOOD PRESSURE: 89 MMHG | BODY MASS INDEX: 25.8 KG/M2 | WEIGHT: 174.2 LBS | HEIGHT: 69 IN | RESPIRATION RATE: 18 BRPM | TEMPERATURE: 97.7 F | SYSTOLIC BLOOD PRESSURE: 143 MMHG | HEART RATE: 78 BPM

## 2019-12-06 DIAGNOSIS — C90.00 IGG MULTIPLE MYELOMA (HCC): Primary | ICD-10-CM

## 2019-12-06 DIAGNOSIS — C90.00 IGG MULTIPLE MYELOMA (HCC): ICD-10-CM

## 2019-12-06 PROCEDURE — 86850 RBC ANTIBODY SCREEN: CPT | Performed by: INTERNAL MEDICINE

## 2019-12-06 PROCEDURE — 96415 CHEMO IV INFUSION ADDL HR: CPT

## 2019-12-06 PROCEDURE — 96413 CHEMO IV INFUSION 1 HR: CPT

## 2019-12-06 PROCEDURE — 86901 BLOOD TYPING SEROLOGIC RH(D): CPT | Performed by: INTERNAL MEDICINE

## 2019-12-06 PROCEDURE — 86900 BLOOD TYPING SEROLOGIC ABO: CPT | Performed by: INTERNAL MEDICINE

## 2019-12-06 PROCEDURE — 96367 TX/PROPH/DG ADDL SEQ IV INF: CPT

## 2019-12-06 RX ORDER — SODIUM CHLORIDE 9 MG/ML
20 INJECTION, SOLUTION INTRAVENOUS ONCE
Status: COMPLETED | OUTPATIENT
Start: 2019-12-06 | End: 2019-12-06

## 2019-12-06 RX ORDER — ACETAMINOPHEN 325 MG/1
650 TABLET ORAL ONCE
Status: COMPLETED | OUTPATIENT
Start: 2019-12-06 | End: 2019-12-06

## 2019-12-06 RX ADMIN — DIPHENHYDRAMINE HYDROCHLORIDE 25 MG: 50 INJECTION, SOLUTION INTRAMUSCULAR; INTRAVENOUS at 07:46

## 2019-12-06 RX ADMIN — SODIUM CHLORIDE 20 ML/HR: 0.9 INJECTION, SOLUTION INTRAVENOUS at 07:44

## 2019-12-06 RX ADMIN — ACETAMINOPHEN 650 MG: 325 TABLET, FILM COATED ORAL at 07:46

## 2019-12-06 RX ADMIN — DARATUMUMAB 1300 MG: 100 INJECTION, SOLUTION, CONCENTRATE INTRAVENOUS at 09:36

## 2019-12-06 RX ADMIN — SODIUM CHLORIDE 100 MG: 0.9 INJECTION, SOLUTION INTRAVENOUS at 08:10

## 2019-12-06 NOTE — PLAN OF CARE
Problem: Potential for Falls  Goal: Patient will remain free of falls  Description  INTERVENTIONS:  - Assess patient frequently for physical needs  -  Identify cognitive and physical deficits and behaviors that affect risk of falls    -  [place] fall precautions as indicated by assessment   - Educate patient/family on patient safety including physical limitations  - Instruct patient to call for assistance with activity based on assessment  - Modify environment to reduce risk of injury  - Consider OT/PT consult to assist with strengthening/mobility  Outcome: Progressing

## 2019-12-11 ENCOUNTER — APPOINTMENT (OUTPATIENT)
Dept: LAB | Facility: CLINIC | Age: 74
End: 2019-12-11
Payer: COMMERCIAL

## 2019-12-11 DIAGNOSIS — C90.00 IGG MULTIPLE MYELOMA (HCC): ICD-10-CM

## 2019-12-11 LAB
ALBUMIN SERPL BCP-MCNC: 3.7 G/DL (ref 3.5–5)
ALP SERPL-CCNC: 82 U/L (ref 46–116)
ALT SERPL W P-5'-P-CCNC: 26 U/L (ref 12–78)
ANION GAP SERPL CALCULATED.3IONS-SCNC: 6 MMOL/L (ref 4–13)
AST SERPL W P-5'-P-CCNC: 12 U/L (ref 5–45)
BASOPHILS # BLD AUTO: 0.01 THOUSANDS/ΜL (ref 0–0.1)
BASOPHILS NFR BLD AUTO: 0 % (ref 0–1)
BILIRUB SERPL-MCNC: 0.99 MG/DL (ref 0.2–1)
BUN SERPL-MCNC: 23 MG/DL (ref 5–25)
CALCIUM SERPL-MCNC: 9.1 MG/DL (ref 8.3–10.1)
CHLORIDE SERPL-SCNC: 105 MMOL/L (ref 100–108)
CO2 SERPL-SCNC: 27 MMOL/L (ref 21–32)
CREAT SERPL-MCNC: 1.53 MG/DL (ref 0.6–1.3)
EOSINOPHIL # BLD AUTO: 0.03 THOUSAND/ΜL (ref 0–0.61)
EOSINOPHIL NFR BLD AUTO: 1 % (ref 0–6)
ERYTHROCYTE [DISTWIDTH] IN BLOOD BY AUTOMATED COUNT: 15.8 % (ref 11.6–15.1)
GFR SERPL CREATININE-BSD FRML MDRD: 45 ML/MIN/1.73SQ M
GLUCOSE P FAST SERPL-MCNC: 94 MG/DL (ref 65–99)
HCT VFR BLD AUTO: 31.9 % (ref 36.5–49.3)
HGB BLD-MCNC: 10.2 G/DL (ref 12–17)
IMM GRANULOCYTES # BLD AUTO: 0.02 THOUSAND/UL (ref 0–0.2)
IMM GRANULOCYTES NFR BLD AUTO: 0 % (ref 0–2)
LYMPHOCYTES # BLD AUTO: 0.14 THOUSANDS/ΜL (ref 0.6–4.47)
LYMPHOCYTES NFR BLD AUTO: 2 % (ref 14–44)
MCH RBC QN AUTO: 34 PG (ref 26.8–34.3)
MCHC RBC AUTO-ENTMCNC: 32 G/DL (ref 31.4–37.4)
MCV RBC AUTO: 106 FL (ref 82–98)
MONOCYTES # BLD AUTO: 0.79 THOUSAND/ΜL (ref 0.17–1.22)
MONOCYTES NFR BLD AUTO: 13 % (ref 4–12)
NEUTROPHILS # BLD AUTO: 5.24 THOUSANDS/ΜL (ref 1.85–7.62)
NEUTS SEG NFR BLD AUTO: 84 % (ref 43–75)
NRBC BLD AUTO-RTO: 0 /100 WBCS
PLATELET # BLD AUTO: 76 THOUSANDS/UL (ref 149–390)
PMV BLD AUTO: 12.4 FL (ref 8.9–12.7)
POTASSIUM SERPL-SCNC: 3.7 MMOL/L (ref 3.5–5.3)
PROT SERPL-MCNC: 7.5 G/DL (ref 6.4–8.2)
RBC # BLD AUTO: 3 MILLION/UL (ref 3.88–5.62)
SODIUM SERPL-SCNC: 138 MMOL/L (ref 136–145)
WBC # BLD AUTO: 6.23 THOUSAND/UL (ref 4.31–10.16)

## 2019-12-11 PROCEDURE — 80053 COMPREHEN METABOLIC PANEL: CPT

## 2019-12-11 PROCEDURE — 36415 COLL VENOUS BLD VENIPUNCTURE: CPT

## 2019-12-11 PROCEDURE — 85025 COMPLETE CBC W/AUTO DIFF WBC: CPT

## 2019-12-13 ENCOUNTER — HOSPITAL ENCOUNTER (OUTPATIENT)
Dept: INFUSION CENTER | Facility: CLINIC | Age: 74
Discharge: HOME | End: 2019-12-13
Payer: COMMERCIAL

## 2019-12-13 ENCOUNTER — DOCUMENTATION (OUTPATIENT)
Dept: HEMATOLOGY ONCOLOGY | Facility: CLINIC | Age: 74
End: 2019-12-13

## 2019-12-13 VITALS
TEMPERATURE: 98.7 F | SYSTOLIC BLOOD PRESSURE: 129 MMHG | DIASTOLIC BLOOD PRESSURE: 84 MMHG | HEART RATE: 65 BPM | WEIGHT: 172.62 LBS | RESPIRATION RATE: 16 BRPM | BODY MASS INDEX: 25.57 KG/M2

## 2019-12-13 DIAGNOSIS — C90.00 IGG MULTIPLE MYELOMA (HCC): Primary | ICD-10-CM

## 2019-12-13 PROCEDURE — 96413 CHEMO IV INFUSION 1 HR: CPT

## 2019-12-13 PROCEDURE — 96415 CHEMO IV INFUSION ADDL HR: CPT

## 2019-12-13 PROCEDURE — 96367 TX/PROPH/DG ADDL SEQ IV INF: CPT

## 2019-12-13 RX ORDER — ACETAMINOPHEN 325 MG/1
650 TABLET ORAL ONCE
Status: COMPLETED | OUTPATIENT
Start: 2019-12-13 | End: 2019-12-13

## 2019-12-13 RX ORDER — SODIUM CHLORIDE 9 MG/ML
20 INJECTION, SOLUTION INTRAVENOUS ONCE
Status: COMPLETED | OUTPATIENT
Start: 2019-12-13 | End: 2019-12-13

## 2019-12-13 RX ADMIN — DIPHENHYDRAMINE HYDROCHLORIDE 25 MG: 50 INJECTION, SOLUTION INTRAMUSCULAR; INTRAVENOUS at 08:11

## 2019-12-13 RX ADMIN — DARATUMUMAB 1300 MG: 100 INJECTION, SOLUTION, CONCENTRATE INTRAVENOUS at 09:30

## 2019-12-13 RX ADMIN — SODIUM CHLORIDE 20 ML/HR: 0.9 INJECTION, SOLUTION INTRAVENOUS at 08:10

## 2019-12-13 RX ADMIN — ACETAMINOPHEN 650 MG: 325 TABLET, FILM COATED ORAL at 08:07

## 2019-12-13 RX ADMIN — SODIUM CHLORIDE 100 MG: 0.9 INJECTION, SOLUTION INTRAVENOUS at 08:35

## 2019-12-13 NOTE — PROGRESS NOTES
12/11/2019  Received notification from [place] the pt's promacta needs to be re-authorized  Pt has Sophia 73  ID # [id number]  BIN #[id number]  GRP # [id number]    Submitted for auth through cover my meds  12/13/2019  Received notification from cover my meds that this  Has been approved  Called [employer] @ 1:48 ([phone]) spoke with Jacinda Sanchez who stated  CASE ID # [id number] is valid from 10/13/2019 through 12/12/2020    Notified clinical and [place]

## 2019-12-13 NOTE — PROGRESS NOTES
Patient tolerated Darzalex infusion well  Offers no complaints  Pt and wife are aware of all future appointments   Refused AVS

## 2019-12-13 NOTE — PLAN OF CARE
Problem: Potential for Falls  Goal: Patient will remain free of falls  Description  INTERVENTIONS:  - Assess patient frequently for physical needs  -  Identify cognitive and physical deficits and behaviors that affect risk of falls  -  [place] fall precautions as indicated by assessment   - Educate patient/family on patient safety including physical limitations  - Instruct patient to call for assistance with activity based on assessment  - Modify environment to reduce risk of injury  - Consider OT/PT consult to assist with strengthening/mobility  Outcome: Progressing     Problem: PAIN - ADULT  Goal: Verbalizes/displays adequate comfort level or baseline comfort level  Description  Interventions:  - Encourage patient to monitor pain and request assistance  - Assess pain using appropriate pain scale  - Administer analgesics based on type and severity of pain and evaluate response  - Implement non-pharmacological measures as appropriate and evaluate response  - Consider cultural and social influences on pain and pain management  - Notify physician/advanced practitioner if interventions unsuccessful or patient reports new pain  Outcome: Progressing     Problem: SAFETY ADULT  Goal: Patient will remain free of falls  Description  INTERVENTIONS:  - Assess patient frequently for physical needs  -  Identify cognitive and physical deficits and behaviors that affect risk of falls    -  [place] fall precautions as indicated by assessment   - Educate patient/family on patient safety including physical limitations  - Instruct patient to call for assistance with activity based on assessment  - Modify environment to reduce risk of injury  - Consider OT/PT consult to assist with strengthening/mobility  Outcome: Progressing     Problem: DISCHARGE PLANNING  Goal: Discharge to home or other facility with appropriate resources  Description  INTERVENTIONS:  - Identify barriers to discharge w/patient and caregiver  - Arrange for needed discharge resources and transportation as appropriate  - Identify discharge learning needs (meds, wound care, etc )  - Arrange for interpretive services to assist at discharge as needed  - Refer to Case Management Department for coordinating discharge planning if the patient needs post-hospital services based on physician/advanced practitioner order or complex needs related to functional status, cognitive ability, or social support system  Outcome: Progressing     Problem: Knowledge Deficit  Goal: Patient/family/caregiver demonstrates understanding of disease process, treatment plan, medications, and discharge instructions  Description  Complete learning assessment and assess knowledge base    Interventions:  - Provide teaching at level of understanding  - Provide teaching via preferred learning methods  Outcome: Progressing

## 2019-12-17 ENCOUNTER — OFFICE VISIT (OUTPATIENT)
Dept: INTERNAL MEDICINE CLINIC | Facility: CLINIC | Age: 74
End: 2019-12-17
Payer: COMMERCIAL

## 2019-12-17 VITALS
BODY MASS INDEX: 25.92 KG/M2 | DIASTOLIC BLOOD PRESSURE: 72 MMHG | TEMPERATURE: 98.4 F | WEIGHT: 175 LBS | OXYGEN SATURATION: 98 % | HEART RATE: 70 BPM | SYSTOLIC BLOOD PRESSURE: 138 MMHG | HEIGHT: 69 IN

## 2019-12-17 DIAGNOSIS — E55.9 VITAMIN D DEFICIENCY: ICD-10-CM

## 2019-12-17 DIAGNOSIS — F41.9 ANXIETY: ICD-10-CM

## 2019-12-17 DIAGNOSIS — R22.41 NODULE OF SKIN OF RIGHT LOWER LEG: ICD-10-CM

## 2019-12-17 DIAGNOSIS — E03.9 HYPOTHYROIDISM, UNSPECIFIED TYPE: ICD-10-CM

## 2019-12-17 DIAGNOSIS — Z12.5 SCREENING PSA (PROSTATE SPECIFIC ANTIGEN): ICD-10-CM

## 2019-12-17 DIAGNOSIS — C90.00 IGG MULTIPLE MYELOMA (HCC): Primary | ICD-10-CM

## 2019-12-17 DIAGNOSIS — E78.5 HYPERLIPIDEMIA, UNSPECIFIED HYPERLIPIDEMIA TYPE: ICD-10-CM

## 2019-12-17 PROCEDURE — 1036F TOBACCO NON-USER: CPT | Performed by: INTERNAL MEDICINE

## 2019-12-17 PROCEDURE — 1101F PT FALLS ASSESS-DOCD LE1/YR: CPT | Performed by: INTERNAL MEDICINE

## 2019-12-17 PROCEDURE — 3008F BODY MASS INDEX DOCD: CPT | Performed by: INTERNAL MEDICINE

## 2019-12-17 PROCEDURE — 1160F RVW MEDS BY RX/DR IN RCRD: CPT | Performed by: INTERNAL MEDICINE

## 2019-12-17 PROCEDURE — 99214 OFFICE O/P EST MOD 30 MIN: CPT | Performed by: INTERNAL MEDICINE

## 2019-12-17 RX ORDER — ESCITALOPRAM OXALATE 20 MG/1
20 TABLET ORAL DAILY
Qty: 90 TABLET | Refills: 0 | Status: CANCELLED | OUTPATIENT
Start: 2019-12-17

## 2019-12-17 RX ORDER — ATORVASTATIN CALCIUM 20 MG/1
20 TABLET, FILM COATED ORAL DAILY
Qty: 30 TABLET | Refills: 0 | Status: CANCELLED | OUTPATIENT
Start: 2019-12-17

## 2019-12-17 NOTE — PROGRESS NOTES
Assessment/Plan:     Diagnoses and all orders for this visit: IgG multiple myeloma (HCC)    Nodule of skin of right lower leg    Hyperlipidemia, unspecified hyperlipidemia type  -     Lipid panel; Future    Vitamin D deficiency  -     Vitamin D 25 hydroxy; Future    Hypothyroidism, unspecified type  -     TSH, 3rd generation; Future  -     UA w Reflex to Microscopic w Reflex to Culture    Screening PSA (prostate specific antigen)  -     PSA, Total Screen; Future          Subjective:      Patient ID: Xin Blanco is a 69 y o  male  HPI  Candy Neves is here today for visit accompanied by his wife he actually is doing quite well he continues under the care of Dr Ivett Pleitez his oncologist at [address] [place] [address] and also the group at [facility]  We reviewed his chemotherapy and also his recent lab studies continues to be very busy he is in have a  generally feels well although he tires much more quickly than he had in the past he discussed with his oncology team the possibilities of receiving the new shingles vaccine and he was advised against it he has been on acyclovir 400 mg twice a day chronically however    The following portions of the patient's history were reviewed and updated as appropriate: allergies, current medications, past family history, past medical history, past social history, past surgical history and problem list     Review of Systems    Noncontributory  Objective:      /72   Pulse 70   Temp 98 4 °F (36 9 °C) (Tympanic)   Ht 5' 8 9" (1 75 m)   Wt 79 4 kg (175 lb)   SpO2 98%   BMI 25 92 kg/m²    BP Readings from Last 3 Encounters:   12/17/19 138/72   12/13/19 129/84   12/06/19 143/89      Wt Readings from Last 3 Encounters:   12/17/19 79 4 kg (175 lb)   12/13/19 78 3 kg (172 lb 9 9 oz)   12/06/19 79 kg (174 lb 3 2 oz)      BMI: Estimated body mass index is 25 92 kg/m² as calculated from the following:    Height as of this encounter: 5' 8 9" (1 75 m)      Weight as of this encounter: 79 4 kg (175 lb)  BSA: Estimated body surface area is 1 95 meters squared as calculated from the following:    Height as of this encounter: 5' 8 9" (1 75 m)  Weight as of this encounter: 79 4 kg (175 lb)  Physical Exam   he has a approximately 1 cm bluish nodule on his I believe right lower leg an appointment has already been made for him to see a plastic surgeon for an excisional biopsy  he appears well in no distress his pressure is 138/72 his lungs are clear cardiac examination reveals a regular rhythm physiologic rate no murmurs or gallops extremities are free of edema will give him a note for TSH and PSA lipids and a vitamin-D will see him back in a few months he is welcome to call me at any time for problems we discussed this TSH level which I think is acceptable  Colonoscopy (not recommended due to the patients myeloma and possible risks)    Current Outpatient Medications:     acetaminophen (TYLENOL) 325 mg tablet, Take 650 mg by mouth every 6 (six) hours as needed for mild pain, Disp: , Rfl:     aspirin 81 MG tablet, Take 81 mg by mouth daily  , Disp: , Rfl:     atorvastatin (LIPITOR) 20 mg tablet, Take 1 tablet (20 mg total) by mouth daily, Disp: 30 tablet, Rfl: 0    Cholecalciferol (VITAMIN D-3 PO), Take 1 tablet by mouth daily  , Disp: , Rfl:     dexamethasone (DECADRON) 4 mg tablet, Take as directed (Patient taking differently: 12 mg weekly or as directed), Disp: 36 tablet, Rfl: 5    Docusate Sodium (COLACE PO), Take 1 tablet by mouth as needed  , Disp: , Rfl:     eltrombopag (PROMACTA) 25 mg tablet, Take 3 tablets (75 mg total) by mouth daily Administer on an empty stomach, 1 hour before or 2 hours after a meal , Disp: 30 tablet, Rfl: 6    escitalopram (LEXAPRO) 20 mg tablet, Take 1 tablet (20 mg total) by mouth daily, Disp: 90 tablet, Rfl: 0    fexofenadine (ALLEGRA) 180 MG tablet, Take 180 mg by mouth 2 (two) times a day as needed, Disp: , Rfl:     Glutamine POWD, by Does not apply route, Disp: , Rfl:     levothyroxine 50 mcg tablet, Take 1 tablet (50 mcg total) by mouth daily, Disp: 90 tablet, Rfl: 3    Multiple Vitamin (MULTIVITAMINS PO), Take 1 tablet by mouth daily  , Disp: , Rfl:     ondansetron (ZOFRAN) 4 mg tablet, Take 4 mg by mouth every 8 (eight) hours as needed for nausea or vomiting , Disp: , Rfl:     acyclovir (ZOVIRAX) 400 MG tablet, Take 1 tablet (400 mg total) by mouth 2 (two) times a day for 120 days (Patient taking differently: Take 200 mg by mouth 2 (two) times a day ), Disp: 60 tablet, Rfl: 3    LORazepam (ATIVAN) 0 5 mg tablet, Take 0 5 mg by mouth every 6 (six) hours as needed for anxiety  , Disp: , Rfl:     This note was completed in part utilizing [employer] Software  Grammatical errors, random word insertions, spelling mistakes, and incomplete sentences can be an occasional consequence of this system secondary to software limitations, ambient noise, and hardware issues  If you have any question or concerns about the content, text, or information contained within the body of this dictation, please contact the provider for clarification

## 2019-12-18 ENCOUNTER — APPOINTMENT (OUTPATIENT)
Dept: LAB | Facility: CLINIC | Age: 74
End: 2019-12-18
Payer: COMMERCIAL

## 2019-12-18 DIAGNOSIS — C90.00 IGG MULTIPLE MYELOMA (HCC): ICD-10-CM

## 2019-12-18 LAB
ALBUMIN SERPL BCP-MCNC: 3.5 G/DL (ref 3.5–5)
ALP SERPL-CCNC: 80 U/L (ref 46–116)
ALT SERPL W P-5'-P-CCNC: 28 U/L (ref 12–78)
ANION GAP SERPL CALCULATED.3IONS-SCNC: 7 MMOL/L (ref 4–13)
AST SERPL W P-5'-P-CCNC: 14 U/L (ref 5–45)
BASOPHILS # BLD AUTO: 0.02 THOUSANDS/ΜL (ref 0–0.1)
BASOPHILS NFR BLD AUTO: 0 % (ref 0–1)
BILIRUB SERPL-MCNC: 1.21 MG/DL (ref 0.2–1)
BUN SERPL-MCNC: 20 MG/DL (ref 5–25)
CALCIUM SERPL-MCNC: 9.3 MG/DL (ref 8.3–10.1)
CHLORIDE SERPL-SCNC: 104 MMOL/L (ref 100–108)
CO2 SERPL-SCNC: 27 MMOL/L (ref 21–32)
CREAT SERPL-MCNC: 1.45 MG/DL (ref 0.6–1.3)
EOSINOPHIL # BLD AUTO: 0.01 THOUSAND/ΜL (ref 0–0.61)
EOSINOPHIL NFR BLD AUTO: 0 % (ref 0–6)
ERYTHROCYTE [DISTWIDTH] IN BLOOD BY AUTOMATED COUNT: 16.2 % (ref 11.6–15.1)
GFR SERPL CREATININE-BSD FRML MDRD: 48 ML/MIN/1.73SQ M
GLUCOSE SERPL-MCNC: 92 MG/DL (ref 65–140)
HCT VFR BLD AUTO: 33.1 % (ref 36.5–49.3)
HGB BLD-MCNC: 10.5 G/DL (ref 12–17)
IMM GRANULOCYTES # BLD AUTO: 0.01 THOUSAND/UL (ref 0–0.2)
IMM GRANULOCYTES NFR BLD AUTO: 0 % (ref 0–2)
LYMPHOCYTES # BLD AUTO: 0.17 THOUSANDS/ΜL (ref 0.6–4.47)
LYMPHOCYTES NFR BLD AUTO: 3 % (ref 14–44)
MCH RBC QN AUTO: 33.7 PG (ref 26.8–34.3)
MCHC RBC AUTO-ENTMCNC: 31.7 G/DL (ref 31.4–37.4)
MCV RBC AUTO: 106 FL (ref 82–98)
MONOCYTES # BLD AUTO: 0.56 THOUSAND/ΜL (ref 0.17–1.22)
MONOCYTES NFR BLD AUTO: 10 % (ref 4–12)
NEUTROPHILS # BLD AUTO: 4.76 THOUSANDS/ΜL (ref 1.85–7.62)
NEUTS SEG NFR BLD AUTO: 87 % (ref 43–75)
NRBC BLD AUTO-RTO: 0 /100 WBCS
PLATELET # BLD AUTO: 83 THOUSANDS/UL (ref 149–390)
PMV BLD AUTO: 12 FL (ref 8.9–12.7)
POTASSIUM SERPL-SCNC: 4 MMOL/L (ref 3.5–5.3)
PROT SERPL-MCNC: 7.5 G/DL (ref 6.4–8.2)
RBC # BLD AUTO: 3.12 MILLION/UL (ref 3.88–5.62)
SODIUM SERPL-SCNC: 138 MMOL/L (ref 136–145)
WBC # BLD AUTO: 5.53 THOUSAND/UL (ref 4.31–10.16)

## 2019-12-18 PROCEDURE — 36415 COLL VENOUS BLD VENIPUNCTURE: CPT

## 2019-12-18 PROCEDURE — 80053 COMPREHEN METABOLIC PANEL: CPT

## 2019-12-18 PROCEDURE — 85025 COMPLETE CBC W/AUTO DIFF WBC: CPT

## 2019-12-19 DIAGNOSIS — F41.9 ANXIETY: ICD-10-CM

## 2019-12-19 DIAGNOSIS — E78.5 HYPERLIPIDEMIA, UNSPECIFIED HYPERLIPIDEMIA TYPE: ICD-10-CM

## 2019-12-19 RX ORDER — ESCITALOPRAM OXALATE 20 MG/1
20 TABLET ORAL DAILY
Qty: 90 TABLET | Refills: 0 | Status: CANCELLED | OUTPATIENT
Start: 2019-12-19

## 2019-12-19 RX ORDER — ATORVASTATIN CALCIUM 20 MG/1
20 TABLET, FILM COATED ORAL DAILY
Qty: 30 TABLET | Refills: 0 | Status: CANCELLED | OUTPATIENT
Start: 2019-12-19

## 2019-12-20 ENCOUNTER — HOSPITAL ENCOUNTER (OUTPATIENT)
Dept: INFUSION CENTER | Facility: CLINIC | Age: 74
Discharge: HOME | End: 2019-12-20
Payer: COMMERCIAL

## 2019-12-20 VITALS
RESPIRATION RATE: 18 BRPM | BODY MASS INDEX: 25.47 KG/M2 | WEIGHT: 171.96 LBS | SYSTOLIC BLOOD PRESSURE: 121 MMHG | TEMPERATURE: 97.8 F | DIASTOLIC BLOOD PRESSURE: 83 MMHG | HEART RATE: 76 BPM

## 2019-12-20 DIAGNOSIS — C90.00 IGG MULTIPLE MYELOMA (HCC): Primary | ICD-10-CM

## 2019-12-20 DIAGNOSIS — D80.1 HYPOGAMMAGLOBULINEMIA (HCC): ICD-10-CM

## 2019-12-20 PROCEDURE — 96415 CHEMO IV INFUSION ADDL HR: CPT

## 2019-12-20 PROCEDURE — 96366 THER/PROPH/DIAG IV INF ADDON: CPT

## 2019-12-20 PROCEDURE — 96413 CHEMO IV INFUSION 1 HR: CPT

## 2019-12-20 PROCEDURE — 96367 TX/PROPH/DG ADDL SEQ IV INF: CPT

## 2019-12-20 RX ORDER — SODIUM CHLORIDE 9 MG/ML
20 INJECTION, SOLUTION INTRAVENOUS ONCE
Status: COMPLETED | OUTPATIENT
Start: 2019-12-20 | End: 2019-12-20

## 2019-12-20 RX ORDER — ACETAMINOPHEN 325 MG/1
650 TABLET ORAL ONCE
Status: COMPLETED | OUTPATIENT
Start: 2019-12-20 | End: 2019-12-20

## 2019-12-20 RX ADMIN — DARATUMUMAB 1300 MG: 100 INJECTION, SOLUTION, CONCENTRATE INTRAVENOUS at 09:48

## 2019-12-20 RX ADMIN — SODIUM CHLORIDE 100 MG: 0.9 INJECTION, SOLUTION INTRAVENOUS at 08:49

## 2019-12-20 RX ADMIN — SODIUM CHLORIDE 20 ML/HR: 0.9 INJECTION, SOLUTION INTRAVENOUS at 08:29

## 2019-12-20 RX ADMIN — Medication 30 G: at 13:15

## 2019-12-20 RX ADMIN — ZOLEDRONIC ACID 3.5 MG: 4 INJECTION, SOLUTION, CONCENTRATE INTRAVENOUS at 09:11

## 2019-12-20 RX ADMIN — DIPHENHYDRAMINE HYDROCHLORIDE 25 MG: 50 INJECTION, SOLUTION INTRAMUSCULAR; INTRAVENOUS at 08:30

## 2019-12-20 RX ADMIN — ACETAMINOPHEN 650 MG: 325 TABLET, FILM COATED ORAL at 08:16

## 2019-12-20 NOTE — PLAN OF CARE
Problem: Potential for Falls  Goal: Patient will remain free of falls  Description  INTERVENTIONS:  - Assess patient frequently for physical needs  -  Identify cognitive and physical deficits and behaviors that affect risk of falls    -  [place] fall precautions as indicated by assessment   - Educate patient/family on patient safety including physical limitations  - Instruct patient to call for assistance with activity based on assessment  - Modify environment to reduce risk of injury  - Consider OT/PT consult to assist with strengthening/mobility  Outcome: Progressing

## 2019-12-23 PROCEDURE — 88341 IMHCHEM/IMCYTCHM EA ADD ANTB: CPT | Performed by: PATHOLOGY

## 2019-12-23 PROCEDURE — 88342 IMHCHEM/IMCYTCHM 1ST ANTB: CPT | Performed by: PATHOLOGY

## 2019-12-23 PROCEDURE — 88305 TISSUE EXAM BY PATHOLOGIST: CPT | Performed by: PATHOLOGY

## 2019-12-24 ENCOUNTER — APPOINTMENT (OUTPATIENT)
Dept: LAB | Facility: CLINIC | Age: 74
End: 2019-12-24
Payer: COMMERCIAL

## 2019-12-24 DIAGNOSIS — C90.00 IGG MULTIPLE MYELOMA (HCC): ICD-10-CM

## 2019-12-24 LAB
ALBUMIN SERPL BCP-MCNC: 3.5 G/DL (ref 3.5–5)
ALP SERPL-CCNC: 70 U/L (ref 46–116)
ALT SERPL W P-5'-P-CCNC: 30 U/L (ref 12–78)
ANION GAP SERPL CALCULATED.3IONS-SCNC: 7 MMOL/L (ref 4–13)
AST SERPL W P-5'-P-CCNC: 16 U/L (ref 5–45)
BASOPHILS # BLD AUTO: 0.01 THOUSANDS/ΜL (ref 0–0.1)
BASOPHILS NFR BLD AUTO: 0 % (ref 0–1)
BILIRUB SERPL-MCNC: 1.02 MG/DL (ref 0.2–1)
BUN SERPL-MCNC: 19 MG/DL (ref 5–25)
CALCIUM SERPL-MCNC: 9.3 MG/DL (ref 8.3–10.1)
CHLORIDE SERPL-SCNC: 105 MMOL/L (ref 100–108)
CO2 SERPL-SCNC: 27 MMOL/L (ref 21–32)
CREAT SERPL-MCNC: 1.41 MG/DL (ref 0.6–1.3)
EOSINOPHIL # BLD AUTO: 0.03 THOUSAND/ΜL (ref 0–0.61)
EOSINOPHIL NFR BLD AUTO: 1 % (ref 0–6)
ERYTHROCYTE [DISTWIDTH] IN BLOOD BY AUTOMATED COUNT: 16.4 % (ref 11.6–15.1)
GFR SERPL CREATININE-BSD FRML MDRD: 49 ML/MIN/1.73SQ M
GLUCOSE SERPL-MCNC: 84 MG/DL (ref 65–140)
HCT VFR BLD AUTO: 29.9 % (ref 36.5–49.3)
HGB BLD-MCNC: 9.6 G/DL (ref 12–17)
IMM GRANULOCYTES # BLD AUTO: 0.01 THOUSAND/UL (ref 0–0.2)
IMM GRANULOCYTES NFR BLD AUTO: 0 % (ref 0–2)
LYMPHOCYTES # BLD AUTO: 0.13 THOUSANDS/ΜL (ref 0.6–4.47)
LYMPHOCYTES NFR BLD AUTO: 3 % (ref 14–44)
MCH RBC QN AUTO: 34.4 PG (ref 26.8–34.3)
MCHC RBC AUTO-ENTMCNC: 32.1 G/DL (ref 31.4–37.4)
MCV RBC AUTO: 107 FL (ref 82–98)
MONOCYTES # BLD AUTO: 0.46 THOUSAND/ΜL (ref 0.17–1.22)
MONOCYTES NFR BLD AUTO: 12 % (ref 4–12)
NEUTROPHILS # BLD AUTO: 3.15 THOUSANDS/ΜL (ref 1.85–7.62)
NEUTS SEG NFR BLD AUTO: 84 % (ref 43–75)
NRBC BLD AUTO-RTO: 0 /100 WBCS
PLATELET # BLD AUTO: 69 THOUSANDS/UL (ref 149–390)
PMV BLD AUTO: 12.2 FL (ref 8.9–12.7)
POTASSIUM SERPL-SCNC: 3.7 MMOL/L (ref 3.5–5.3)
PROT SERPL-MCNC: 7.8 G/DL (ref 6.4–8.2)
RBC # BLD AUTO: 2.79 MILLION/UL (ref 3.88–5.62)
SODIUM SERPL-SCNC: 139 MMOL/L (ref 136–145)
WBC # BLD AUTO: 3.79 THOUSAND/UL (ref 4.31–10.16)

## 2019-12-24 PROCEDURE — 36415 COLL VENOUS BLD VENIPUNCTURE: CPT

## 2019-12-24 PROCEDURE — 85025 COMPLETE CBC W/AUTO DIFF WBC: CPT

## 2019-12-24 PROCEDURE — 80053 COMPREHEN METABOLIC PANEL: CPT

## 2019-12-26 ENCOUNTER — LAB REQUISITION (OUTPATIENT)
Dept: LAB | Facility: HOSPITAL | Age: 74
End: 2019-12-26
Payer: COMMERCIAL

## 2019-12-26 DIAGNOSIS — D49.2 NEOPLASM OF UNSPECIFIED BEHAVIOR OF BONE, SOFT TISSUE, AND SKIN: ICD-10-CM

## 2019-12-27 ENCOUNTER — HOSPITAL ENCOUNTER (OUTPATIENT)
Dept: INFUSION CENTER | Facility: CLINIC | Age: 74
Discharge: HOME | End: 2019-12-27
Payer: COMMERCIAL

## 2019-12-27 VITALS
SYSTOLIC BLOOD PRESSURE: 118 MMHG | DIASTOLIC BLOOD PRESSURE: 67 MMHG | RESPIRATION RATE: 18 BRPM | TEMPERATURE: 97.3 F | HEART RATE: 99 BPM | WEIGHT: 170.86 LBS | BODY MASS INDEX: 25.3 KG/M2

## 2019-12-27 DIAGNOSIS — C90.00 IGG MULTIPLE MYELOMA (HCC): Primary | ICD-10-CM

## 2019-12-27 PROCEDURE — 96413 CHEMO IV INFUSION 1 HR: CPT

## 2019-12-27 PROCEDURE — 96417 CHEMO IV INFUS EACH ADDL SEQ: CPT

## 2019-12-27 PROCEDURE — 96415 CHEMO IV INFUSION ADDL HR: CPT

## 2019-12-27 PROCEDURE — 96367 TX/PROPH/DG ADDL SEQ IV INF: CPT

## 2019-12-27 RX ORDER — SODIUM CHLORIDE 9 MG/ML
20 INJECTION, SOLUTION INTRAVENOUS ONCE
Status: COMPLETED | OUTPATIENT
Start: 2019-12-27 | End: 2019-12-27

## 2019-12-27 RX ORDER — ACETAMINOPHEN 325 MG/1
650 TABLET ORAL ONCE
Status: COMPLETED | OUTPATIENT
Start: 2019-12-27 | End: 2019-12-27

## 2019-12-27 RX ADMIN — SODIUM CHLORIDE 100 MG: 0.9 INJECTION, SOLUTION INTRAVENOUS at 08:35

## 2019-12-27 RX ADMIN — DIPHENHYDRAMINE HYDROCHLORIDE 25 MG: 50 INJECTION, SOLUTION INTRAMUSCULAR; INTRAVENOUS at 08:15

## 2019-12-27 RX ADMIN — SODIUM CHLORIDE 20 ML/HR: 0.9 INJECTION, SOLUTION INTRAVENOUS at 08:15

## 2019-12-27 RX ADMIN — DARATUMUMAB 1300 MG: 100 INJECTION, SOLUTION, CONCENTRATE INTRAVENOUS at 09:06

## 2019-12-27 RX ADMIN — ACETAMINOPHEN 650 MG: 325 TABLET, FILM COATED ORAL at 08:14

## 2019-12-27 NOTE — PLAN OF CARE
Problem: Potential for Falls  Goal: Patient will remain free of falls  Description  INTERVENTIONS:  - Assess patient frequently for physical needs  -  Identify cognitive and physical deficits and behaviors that affect risk of falls    -  [place] fall precautions as indicated by assessment   - Educate patient/family on patient safety including physical limitations  - Instruct patient to call for assistance with activity based on assessment  - Modify environment to reduce risk of injury  - Consider OT/PT consult to assist with strengthening/mobility  Outcome: Progressing

## 2019-12-27 NOTE — PROGRESS NOTES
Patient tolerated Darzalex infusion well  Offers no complaints  Pt is aware of all future appointments   Refused AVS

## 2019-12-31 ENCOUNTER — APPOINTMENT (OUTPATIENT)
Dept: LAB | Facility: CLINIC | Age: 74
End: 2019-12-31
Payer: COMMERCIAL

## 2019-12-31 DIAGNOSIS — C90.00 IGG MULTIPLE MYELOMA (HCC): ICD-10-CM

## 2019-12-31 LAB
ALBUMIN SERPL BCP-MCNC: 3.4 G/DL (ref 3.5–5)
ALP SERPL-CCNC: 79 U/L (ref 46–116)
ALT SERPL W P-5'-P-CCNC: 22 U/L (ref 12–78)
ANION GAP SERPL CALCULATED.3IONS-SCNC: 4 MMOL/L (ref 4–13)
AST SERPL W P-5'-P-CCNC: 9 U/L (ref 5–45)
BASOPHILS # BLD AUTO: 0.01 THOUSANDS/ΜL (ref 0–0.1)
BASOPHILS NFR BLD AUTO: 0 % (ref 0–1)
BILIRUB SERPL-MCNC: 1.23 MG/DL (ref 0.2–1)
BUN SERPL-MCNC: 22 MG/DL (ref 5–25)
CALCIUM SERPL-MCNC: 9 MG/DL (ref 8.3–10.1)
CHLORIDE SERPL-SCNC: 107 MMOL/L (ref 100–108)
CO2 SERPL-SCNC: 28 MMOL/L (ref 21–32)
CREAT SERPL-MCNC: 1.42 MG/DL (ref 0.6–1.3)
EOSINOPHIL # BLD AUTO: 0.02 THOUSAND/ΜL (ref 0–0.61)
EOSINOPHIL NFR BLD AUTO: 1 % (ref 0–6)
ERYTHROCYTE [DISTWIDTH] IN BLOOD BY AUTOMATED COUNT: 16.2 % (ref 11.6–15.1)
GFR SERPL CREATININE-BSD FRML MDRD: 49 ML/MIN/1.73SQ M
GLUCOSE SERPL-MCNC: 84 MG/DL (ref 65–140)
HCT VFR BLD AUTO: 30.6 % (ref 36.5–49.3)
HGB BLD-MCNC: 9.8 G/DL (ref 12–17)
IMM GRANULOCYTES # BLD AUTO: 0.01 THOUSAND/UL (ref 0–0.2)
IMM GRANULOCYTES NFR BLD AUTO: 0 % (ref 0–2)
LYMPHOCYTES # BLD AUTO: 0.1 THOUSANDS/ΜL (ref 0.6–4.47)
LYMPHOCYTES NFR BLD AUTO: 2 % (ref 14–44)
MCH RBC QN AUTO: 34.5 PG (ref 26.8–34.3)
MCHC RBC AUTO-ENTMCNC: 32 G/DL (ref 31.4–37.4)
MCV RBC AUTO: 108 FL (ref 82–98)
MONOCYTES # BLD AUTO: 0.74 THOUSAND/ΜL (ref 0.17–1.22)
MONOCYTES NFR BLD AUTO: 18 % (ref 4–12)
NEUTROPHILS # BLD AUTO: 3.24 THOUSANDS/ΜL (ref 1.85–7.62)
NEUTS SEG NFR BLD AUTO: 79 % (ref 43–75)
NRBC BLD AUTO-RTO: 0 /100 WBCS
PLATELET # BLD AUTO: 52 THOUSANDS/UL (ref 149–390)
PMV BLD AUTO: 11.1 FL (ref 8.9–12.7)
POTASSIUM SERPL-SCNC: 3.9 MMOL/L (ref 3.5–5.3)
PROT SERPL-MCNC: 7.3 G/DL (ref 6.4–8.2)
RBC # BLD AUTO: 2.84 MILLION/UL (ref 3.88–5.62)
SODIUM SERPL-SCNC: 139 MMOL/L (ref 136–145)
WBC # BLD AUTO: 4.12 THOUSAND/UL (ref 4.31–10.16)

## 2019-12-31 PROCEDURE — 36415 COLL VENOUS BLD VENIPUNCTURE: CPT

## 2019-12-31 PROCEDURE — 80053 COMPREHEN METABOLIC PANEL: CPT

## 2019-12-31 PROCEDURE — 85025 COMPLETE CBC W/AUTO DIFF WBC: CPT

## 2020-01-02 ENCOUNTER — OFFICE VISIT (OUTPATIENT)
Dept: HEMATOLOGY ONCOLOGY | Facility: CLINIC | Age: 75
End: 2020-01-02
Payer: COMMERCIAL

## 2020-01-02 VITALS
WEIGHT: 174.8 LBS | OXYGEN SATURATION: 98 % | HEIGHT: 69 IN | HEART RATE: 57 BPM | TEMPERATURE: 97.8 F | SYSTOLIC BLOOD PRESSURE: 130 MMHG | RESPIRATION RATE: 17 BRPM | BODY MASS INDEX: 25.89 KG/M2 | DIASTOLIC BLOOD PRESSURE: 72 MMHG

## 2020-01-02 DIAGNOSIS — J00 NASOPHARYNGITIS: Primary | ICD-10-CM

## 2020-01-02 PROCEDURE — 99214 OFFICE O/P EST MOD 30 MIN: CPT | Performed by: INTERNAL MEDICINE

## 2020-01-02 RX ORDER — AZITHROMYCIN 250 MG/1
TABLET, FILM COATED ORAL
Qty: 6 TABLET | Refills: 0 | Status: SHIPPED | OUTPATIENT
Start: 2020-01-02 | End: 2020-03-10

## 2020-01-02 NOTE — PROGRESS NOTES
[facility] [place]  [address] [facility] [id number] [phone]    Nasra Hwang,1945, [id number]  01/02/20    Discussion:   In summary, this is a 68-year-old male history of multiple myeloma as outlined  He is currently on daratumumab, thalidomide, dexamethasone  He has increase in sinus congestion over the past few days  Previously had actually felt relatively well  He admits that he is in the public sphere and may have had some exposures this way  He has mild pancytopenia which is stable  Platelets are trending downward, 52  This is likely due to a combination of treatment toxicities  Currently acceptable  Observation favored  Antibiotics are prescribed for the possibility of superimposed bacterial nasal pharyngitis  We will see him back after he returns from [facility] in 6 weeks  I discussed the above with the patient  The patient and his wife voiced understanding and agreement   ______________________________________________________________________    Chief Complaint   Patient presents with    Follow-up       HPI:  Oncology History    June 2015- routine FU at [facility]  IgG multiple myeloma ([facility] [address] )    9/2015 Initial Diagnosis     Found to have Hbg of 6 with SOB, creatinine 1 8 and normal calcium with albumin of 2 1  Additonial blood work showed elevated protien level (12 1g/dL)  9/29/2015 Biopsy     Bone marrow biopsy demonstrated approximately 80% plasma cells with some immature forms noted  These studies showed 13q14 deletion, +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, T (4:14)        10/14/2015 - 11/11/2015 Chemotherapy     Velcade 1 3 mg/m2 Days 1,8,15,22  Cytoxan 300 mg/m2  PO Days 1, 8,15, 22  Dexamethasone 40 mg PO Days 1,8,15, 22  Zometa 4 mg IV Day 1  Cycle length = 28 days    Completed 2 cycles    Day one of cycle 2 was on 11/11/15       12/2015 - 2/2016 Chemotherapy     VDT-PACE x 2 cycles   (completed at [facility] in [facility], [place])      2/2016 Transplant     Melphalane 200 mg/m2 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  MRD +      8/2016 - 1/26/2018 Chemotherapy     Maintenance therapy:  Carfilzomib, orginally dosed 27 mg weekly then increased to 45 mg weekly after MRD reactivitation  Revlimid  Dexamethasone       2/2018 Biopsy     Bone marrow demonstrated positive minimal residual disease        2/23/2018 -  Chemotherapy     Daratumumab 16mg/m2 IV Day 1  Pomalyst 4 mg p o  daily 1-21  Dexamethasone 4 mg PO Days 2, 3  Dexamethasone 12 mg PODays 8, 15, 22  Cycle length = 28 days      4/11/2019 - 8/29/2019 Chemotherapy     methylPREDNISolone sodium succinate (Solu-MEDROL) 100 mg in sodium chloride 0 9 % 250 mL IVPB, 100 mg, Intravenous, Once, 5 of 12 cycles  Administration: 100 mg (6/7/2019), 100 mg (7/5/2019), 100 mg (8/2/2019)      8/30/2019 - 11/8/2019 Chemotherapy     cyclophosphamide (CYTOXAN) 1,000 mg in sodium chloride 0 9 % 250 mL IVPB, 990 mg (66 7 % of original dose 750 mg/m2), Intravenous, Once, 2 of 3 cycles  Dose modification: 500 mg/m2 (original dose 750 mg/m2, Cycle 1, Reason: Other (See Comments)), 250 mg/m2 (original dose 750 mg/m2, Cycle 4, Reason: Treatment Parameters Not Met)  Administration: 1,000 mg (8/30/2019), 1,000 mg (9/13/2019), 1,000 mg (9/27/2019), 500 mg (10/11/2019)  bortezomib (VELCADE) subcutaneous injection 2 6 mg, 1 3 mg/m2 = 2 6 mg (86 7 % of original dose 1 5 mg/m2), Subcutaneous, Once, 3 of 4 cycles  Dose modification: 1 3 mg/m2 (original dose 1 5 mg/m2, Cycle 1, Reason: Other (See Comments))  Administration: 2 6 mg (8/30/2019), 2 6 mg (9/13/2019), 2 6 mg (10/25/2019), 2 6 mg (11/8/2019), 2 6 mg (9/27/2019), 2 6 mg (11/1/2019), 2 6 mg (9/20/2019), 2 6 mg (10/4/2019), 2 6 mg (10/11/2019), 2 6 mg (10/18/2019)      12/6/2019 -  Chemotherapy     methylPREDNISolone sodium succinate (Solu-MEDROL) 100 mg in sodium chloride 0 9 % 250 mL IVPB, 100 mg, Intravenous, Once, 1 of 12 cycles  Administration: 100 mg (12/6/2019), 100 mg (12/13/2019), 100 mg (12/20/2019), 100 mg (12/27/2019)         Interval History:  Clinically stable  ECOG-  1 - Symptomatic but completely ambulatory    Review of Systems   Constitutional: Negative for chills and fever  HENT: Negative for nosebleeds  Eyes: Negative for discharge  Respiratory: Negative for cough and shortness of breath  Cardiovascular: Negative for chest pain  Gastrointestinal: Negative for abdominal pain, constipation and diarrhea  Endocrine: Negative for polydipsia  Genitourinary: Negative for hematuria  Musculoskeletal: Negative for arthralgias  Skin: Negative for color change  Allergic/Immunologic: Negative for immunocompromised state  Neurological: Negative for dizziness and headaches  Hematological: Negative for adenopathy  Psychiatric/Behavioral: Negative for agitation  Past Medical History:   Diagnosis Date    History of autologous stem cell transplant ([facility] [address] )     Hypertension     History of HTN-stable since weight loss    Insomnia     Multiple myeloma (HCC)      Patient Active Problem List   Diagnosis    IgG multiple myeloma (HCC)    Persistent atrial fibrillation    Essential hypertension    Chronic ITP (idiopathic thrombocytopenia) ([facility])    Hyperlipidemia    Hypocalcemia    Hypothyroidism    Long term current use of systemic steroids    Other fatigue    Prophylactic measure    Hypogammaglobulinemia (HCC)       Current Outpatient Medications:     acetaminophen (TYLENOL) 325 mg tablet, Take 650 mg by mouth every 6 (six) hours as needed for mild pain, Disp: , Rfl:     aspirin 81 MG tablet, Take 81 mg by mouth daily  , Disp: , Rfl:     atorvastatin (LIPITOR) 20 mg tablet, Take 1 tablet (20 mg total) by mouth daily, Disp: 30 tablet, Rfl: 0    Cholecalciferol (VITAMIN D-3 PO), Take 1 tablet by mouth daily  , Disp: , Rfl:     dexamethasone (DECADRON) 4 mg tablet, Take as directed (Patient taking differently: 12 mg weekly or as directed), Disp: 36 tablet, Rfl: 5    Docusate Sodium (COLACE PO), Take 1 tablet by mouth as needed  , Disp: , Rfl:     eltrombopag (PROMACTA) 25 mg tablet, Take 3 tablets (75 mg total) by mouth daily Administer on an empty stomach, 1 hour before or 2 hours after a meal , Disp: 30 tablet, Rfl: 6    escitalopram (LEXAPRO) 20 mg tablet, Take 1 tablet (20 mg total) by mouth daily, Disp: 90 tablet, Rfl: 0    fexofenadine (ALLEGRA) 180 MG tablet, Take 180 mg by mouth 2 (two) times a day as needed, Disp: , Rfl:     Glutamine POWD, by Does not apply route, Disp: , Rfl:     levothyroxine 50 mcg tablet, Take 1 tablet (50 mcg total) by mouth daily, Disp: 90 tablet, Rfl: 3    LORazepam (ATIVAN) 0 5 mg tablet, Take 0 5 mg by mouth every 6 (six) hours as needed for anxiety  , Disp: , Rfl:     Multiple Vitamin (MULTIVITAMINS PO), Take 1 tablet by mouth daily  , Disp: , Rfl:     ondansetron (ZOFRAN) 4 mg tablet, Take 4 mg by mouth every 8 (eight) hours as needed for nausea or vomiting , Disp: , Rfl:     thalidomide (THALOMID) 100 MG capsule, Take 100 mg by mouth daily at bedtime, Disp: , Rfl:     acyclovir (ZOVIRAX) 400 MG tablet, Take 1 tablet (400 mg total) by mouth 2 (two) times a day for 120 days (Patient taking differently: Take 200 mg by mouth 2 (two) times a day ), Disp: 60 tablet, Rfl: 3  No Known Allergies  Past Surgical History:   Procedure Laterality Date    APPENDECTOMY      Last assessed: 9/25/15    ARTHROSCOPY KNEE Left     9/30/09    EYE SURGERY      Lasik    KNEE CARTILAGE SURGERY      LIMBAL STEM CELL TRANSPLANT      Patient had 2 in [place]-2/29/2016 and 4/13/2016     Social History     Objective:  Vitals:    01/02/20 1422   BP: 130/72   BP Location: Right arm   Patient Position: Sitting   Pulse: 57   Resp: 17   Temp: 97 8 °F (36 6 °C)   TempSrc: Tympanic   SpO2: 98%   Weight: 79 3 kg (174 lb 12 8 oz)   Height: 5' 8 9" (1 75 m)     Physical Exam   Constitutional: He is oriented to person, place, and time  He appears well-developed  HENT:   Head: Normocephalic  Eyes: Pupils are equal, round, and reactive to light  Neck: Neck supple  Cardiovascular: Normal rate and regular rhythm  No murmur heard  Pulmonary/Chest: Breath sounds normal  He has no wheezes  He has no rales  Abdominal: Soft  There is no tenderness  Musculoskeletal: Normal range of motion  He exhibits no edema or tenderness  Lymphadenopathy:     He has no cervical adenopathy  Neurological: He is alert and oriented to person, place, and time  He has normal reflexes  No cranial nerve deficit  Skin: No rash noted  No erythema  Psychiatric: He has a normal mood and affect  His behavior is normal          Labs: I personally reviewed the labs and imaging pertinent to this patient care

## 2020-01-03 ENCOUNTER — HOSPITAL ENCOUNTER (OUTPATIENT)
Dept: INFUSION CENTER | Facility: CLINIC | Age: 75
Discharge: HOME | End: 2020-01-03
Payer: COMMERCIAL

## 2020-01-03 VITALS
RESPIRATION RATE: 18 BRPM | DIASTOLIC BLOOD PRESSURE: 70 MMHG | SYSTOLIC BLOOD PRESSURE: 132 MMHG | HEART RATE: 61 BPM | BODY MASS INDEX: 25.47 KG/M2 | TEMPERATURE: 97.8 F | WEIGHT: 171.96 LBS

## 2020-01-03 DIAGNOSIS — C90.00 IGG MULTIPLE MYELOMA (HCC): Primary | ICD-10-CM

## 2020-01-03 PROCEDURE — 96415 CHEMO IV INFUSION ADDL HR: CPT

## 2020-01-03 PROCEDURE — 96413 CHEMO IV INFUSION 1 HR: CPT

## 2020-01-03 PROCEDURE — 96367 TX/PROPH/DG ADDL SEQ IV INF: CPT

## 2020-01-03 RX ORDER — ACETAMINOPHEN 325 MG/1
650 TABLET ORAL ONCE
Status: CANCELLED | OUTPATIENT
Start: 2020-01-10

## 2020-01-03 RX ORDER — SODIUM CHLORIDE 9 MG/ML
20 INJECTION, SOLUTION INTRAVENOUS ONCE
Status: COMPLETED | OUTPATIENT
Start: 2020-01-03 | End: 2020-01-03

## 2020-01-03 RX ORDER — SODIUM CHLORIDE 9 MG/ML
20 INJECTION, SOLUTION INTRAVENOUS ONCE
Status: CANCELLED | OUTPATIENT
Start: 2020-01-03

## 2020-01-03 RX ORDER — ACETAMINOPHEN 325 MG/1
650 TABLET ORAL ONCE
Status: COMPLETED | OUTPATIENT
Start: 2020-01-03 | End: 2020-01-03

## 2020-01-03 RX ORDER — SODIUM CHLORIDE 9 MG/ML
20 INJECTION, SOLUTION INTRAVENOUS ONCE
Status: CANCELLED | OUTPATIENT
Start: 2020-01-24

## 2020-01-03 RX ORDER — ACETAMINOPHEN 325 MG/1
650 TABLET ORAL ONCE
Status: CANCELLED | OUTPATIENT
Start: 2020-01-03

## 2020-01-03 RX ORDER — SODIUM CHLORIDE 9 MG/ML
20 INJECTION, SOLUTION INTRAVENOUS ONCE
Status: CANCELLED | OUTPATIENT
Start: 2020-01-10

## 2020-01-03 RX ORDER — SODIUM CHLORIDE 9 MG/ML
20 INJECTION, SOLUTION INTRAVENOUS ONCE
Status: CANCELLED | OUTPATIENT
Start: 2020-01-17

## 2020-01-03 RX ORDER — ACETAMINOPHEN 325 MG/1
650 TABLET ORAL ONCE
Status: CANCELLED | OUTPATIENT
Start: 2020-01-24

## 2020-01-03 RX ORDER — ACETAMINOPHEN 325 MG/1
650 TABLET ORAL ONCE
Status: CANCELLED | OUTPATIENT
Start: 2020-01-17

## 2020-01-03 RX ADMIN — DARATUMUMAB 1300 MG: 100 INJECTION, SOLUTION, CONCENTRATE INTRAVENOUS at 09:38

## 2020-01-03 RX ADMIN — ACETAMINOPHEN 650 MG: 325 TABLET ORAL at 08:34

## 2020-01-03 RX ADMIN — DIPHENHYDRAMINE HYDROCHLORIDE 25 MG: 50 INJECTION, SOLUTION INTRAMUSCULAR; INTRAVENOUS at 08:49

## 2020-01-03 RX ADMIN — SODIUM CHLORIDE 100 MG: 0.9 INJECTION, SOLUTION INTRAVENOUS at 09:09

## 2020-01-03 RX ADMIN — SODIUM CHLORIDE 20 ML/HR: 0.9 INJECTION, SOLUTION INTRAVENOUS at 08:25

## 2020-01-03 NOTE — PLAN OF CARE
Problem: Potential for Falls  Goal: Patient will remain free of falls  Description  INTERVENTIONS:  - Assess patient frequently for physical needs  -  Identify cognitive and physical deficits and behaviors that affect risk of falls    -  [place] fall precautions as indicated by assessment   - Educate patient/family on patient safety including physical limitations  - Instruct patient to call for assistance with activity based on assessment  - Modify environment to reduce risk of injury  - Consider OT/PT consult to assist with strengthening/mobility  Outcome: Progressing

## 2020-01-07 DIAGNOSIS — F41.9 ANXIETY: ICD-10-CM

## 2020-01-07 DIAGNOSIS — E78.5 HYPERLIPIDEMIA, UNSPECIFIED HYPERLIPIDEMIA TYPE: ICD-10-CM

## 2020-01-07 DIAGNOSIS — E03.9 HYPOTHYROIDISM, UNSPECIFIED TYPE: ICD-10-CM

## 2020-01-07 RX ORDER — LEVOTHYROXINE SODIUM 0.05 MG/1
50 TABLET ORAL DAILY
Qty: 90 TABLET | Refills: 3 | Status: SHIPPED | OUTPATIENT
Start: 2020-01-07 | End: 2020-10-05 | Stop reason: SDUPTHER

## 2020-01-07 RX ORDER — ATORVASTATIN CALCIUM 20 MG/1
20 TABLET, FILM COATED ORAL DAILY
Qty: 30 TABLET | Refills: 0 | Status: SHIPPED | OUTPATIENT
Start: 2020-01-07 | End: 2020-02-03

## 2020-01-07 RX ORDER — ESCITALOPRAM OXALATE 20 MG/1
20 TABLET ORAL DAILY
Qty: 90 TABLET | Refills: 0 | Status: SHIPPED | OUTPATIENT
Start: 2020-01-07 | End: 2020-03-31 | Stop reason: SDUPTHER

## 2020-01-08 ENCOUNTER — APPOINTMENT (OUTPATIENT)
Dept: LAB | Facility: CLINIC | Age: 75
End: 2020-01-08
Payer: COMMERCIAL

## 2020-01-08 ENCOUNTER — TRANSCRIBE ORDERS (OUTPATIENT)
Dept: LAB | Facility: CLINIC | Age: 75
End: 2020-01-08

## 2020-01-08 DIAGNOSIS — C90.00 IGG MULTIPLE MYELOMA (HCC): ICD-10-CM

## 2020-01-08 DIAGNOSIS — C90.00 IGG MULTIPLE MYELOMA (HCC): Primary | ICD-10-CM

## 2020-01-08 LAB
ALBUMIN SERPL BCP-MCNC: 3.3 G/DL (ref 3.5–5)
ALP SERPL-CCNC: 78 U/L (ref 46–116)
ALT SERPL W P-5'-P-CCNC: 23 U/L (ref 12–78)
ANION GAP SERPL CALCULATED.3IONS-SCNC: 6 MMOL/L (ref 4–13)
AST SERPL W P-5'-P-CCNC: 8 U/L (ref 5–45)
BASOPHILS # BLD AUTO: 0.04 THOUSANDS/ΜL (ref 0–0.1)
BASOPHILS NFR BLD AUTO: 1 % (ref 0–1)
BILIRUB SERPL-MCNC: 0.85 MG/DL (ref 0.2–1)
BUN SERPL-MCNC: 18 MG/DL (ref 5–25)
CALCIUM SERPL-MCNC: 8.4 MG/DL (ref 8.3–10.1)
CHLORIDE SERPL-SCNC: 107 MMOL/L (ref 100–108)
CO2 SERPL-SCNC: 27 MMOL/L (ref 21–32)
CREAT SERPL-MCNC: 1.36 MG/DL (ref 0.6–1.3)
EOSINOPHIL # BLD AUTO: 0.01 THOUSAND/ΜL (ref 0–0.61)
EOSINOPHIL NFR BLD AUTO: 0 % (ref 0–6)
ERYTHROCYTE [DISTWIDTH] IN BLOOD BY AUTOMATED COUNT: 16.1 % (ref 11.6–15.1)
GFR SERPL CREATININE-BSD FRML MDRD: 52 ML/MIN/1.73SQ M
GLUCOSE SERPL-MCNC: 86 MG/DL (ref 65–140)
HCT VFR BLD AUTO: 32.9 % (ref 36.5–49.3)
HGB BLD-MCNC: 10.5 G/DL (ref 12–17)
IMM GRANULOCYTES # BLD AUTO: 0.01 THOUSAND/UL (ref 0–0.2)
IMM GRANULOCYTES NFR BLD AUTO: 0 % (ref 0–2)
LYMPHOCYTES # BLD AUTO: 0.17 THOUSANDS/ΜL (ref 0.6–4.47)
LYMPHOCYTES NFR BLD AUTO: 5 % (ref 14–44)
MCH RBC QN AUTO: 34.1 PG (ref 26.8–34.3)
MCHC RBC AUTO-ENTMCNC: 31.9 G/DL (ref 31.4–37.4)
MCV RBC AUTO: 107 FL (ref 82–98)
MONOCYTES # BLD AUTO: 0.75 THOUSAND/ΜL (ref 0.17–1.22)
MONOCYTES NFR BLD AUTO: 20 % (ref 4–12)
NEUTROPHILS # BLD AUTO: 2.81 THOUSANDS/ΜL (ref 1.85–7.62)
NEUTS SEG NFR BLD AUTO: 74 % (ref 43–75)
NRBC BLD AUTO-RTO: 0 /100 WBCS
PLATELET # BLD AUTO: 80 THOUSANDS/UL (ref 149–390)
PMV BLD AUTO: 11.4 FL (ref 8.9–12.7)
POTASSIUM SERPL-SCNC: 4.1 MMOL/L (ref 3.5–5.3)
PROT SERPL-MCNC: 7.1 G/DL (ref 6.4–8.2)
RBC # BLD AUTO: 3.08 MILLION/UL (ref 3.88–5.62)
SODIUM SERPL-SCNC: 140 MMOL/L (ref 136–145)
WBC # BLD AUTO: 3.79 THOUSAND/UL (ref 4.31–10.16)

## 2020-01-08 PROCEDURE — 80053 COMPREHEN METABOLIC PANEL: CPT

## 2020-01-08 PROCEDURE — 36415 COLL VENOUS BLD VENIPUNCTURE: CPT

## 2020-01-08 PROCEDURE — 85025 COMPLETE CBC W/AUTO DIFF WBC: CPT

## 2020-01-10 ENCOUNTER — HOSPITAL ENCOUNTER (OUTPATIENT)
Dept: INFUSION CENTER | Facility: CLINIC | Age: 75
Discharge: HOME | End: 2020-01-10
Payer: COMMERCIAL

## 2020-01-10 VITALS
WEIGHT: 172.4 LBS | TEMPERATURE: 96.7 F | HEART RATE: 87 BPM | RESPIRATION RATE: 18 BRPM | BODY MASS INDEX: 25.53 KG/M2 | SYSTOLIC BLOOD PRESSURE: 136 MMHG | DIASTOLIC BLOOD PRESSURE: 87 MMHG

## 2020-01-10 DIAGNOSIS — C90.00 IGG MULTIPLE MYELOMA (HCC): Primary | ICD-10-CM

## 2020-01-10 PROCEDURE — 96413 CHEMO IV INFUSION 1 HR: CPT

## 2020-01-10 PROCEDURE — 96367 TX/PROPH/DG ADDL SEQ IV INF: CPT

## 2020-01-10 PROCEDURE — 96415 CHEMO IV INFUSION ADDL HR: CPT

## 2020-01-10 RX ORDER — ACETAMINOPHEN 325 MG/1
650 TABLET ORAL ONCE
Status: COMPLETED | OUTPATIENT
Start: 2020-01-10 | End: 2020-01-10

## 2020-01-10 RX ORDER — SODIUM CHLORIDE 9 MG/ML
20 INJECTION, SOLUTION INTRAVENOUS ONCE
Status: COMPLETED | OUTPATIENT
Start: 2020-01-10 | End: 2020-01-10

## 2020-01-10 RX ADMIN — SODIUM CHLORIDE 20 ML/HR: 0.9 INJECTION, SOLUTION INTRAVENOUS at 08:20

## 2020-01-10 RX ADMIN — SODIUM CHLORIDE 100 MG: 0.9 INJECTION, SOLUTION INTRAVENOUS at 08:40

## 2020-01-10 RX ADMIN — DIPHENHYDRAMINE HYDROCHLORIDE 25 MG: 50 INJECTION, SOLUTION INTRAMUSCULAR; INTRAVENOUS at 08:20

## 2020-01-10 RX ADMIN — DARATUMUMAB 1300 MG: 100 INJECTION, SOLUTION, CONCENTRATE INTRAVENOUS at 09:11

## 2020-01-10 RX ADMIN — ACETAMINOPHEN 650 MG: 325 TABLET, FILM COATED ORAL at 08:13

## 2020-01-10 NOTE — PLAN OF CARE
Problem: Potential for Falls  Goal: Patient will remain free of falls  Description  INTERVENTIONS:  - Assess patient frequently for physical needs  -  Identify cognitive and physical deficits and behaviors that affect risk of falls    -  [place] fall precautions as indicated by assessment   - Educate patient/family on patient safety including physical limitations  - Instruct patient to call for assistance with activity based on assessment  - Modify environment to reduce risk of injury  - Consider OT/PT consult to assist with strengthening/mobility  Outcome: Progressing

## 2020-01-15 ENCOUNTER — APPOINTMENT (OUTPATIENT)
Dept: LAB | Facility: CLINIC | Age: 75
End: 2020-01-15
Payer: COMMERCIAL

## 2020-01-15 DIAGNOSIS — C90.00 IGG MULTIPLE MYELOMA (HCC): Primary | ICD-10-CM

## 2020-01-15 DIAGNOSIS — E55.9 VITAMIN D DEFICIENCY: ICD-10-CM

## 2020-01-15 DIAGNOSIS — Z12.5 SCREENING PSA (PROSTATE SPECIFIC ANTIGEN): ICD-10-CM

## 2020-01-15 DIAGNOSIS — E78.5 HYPERLIPIDEMIA, UNSPECIFIED HYPERLIPIDEMIA TYPE: ICD-10-CM

## 2020-01-15 DIAGNOSIS — E03.9 HYPOTHYROIDISM, UNSPECIFIED TYPE: ICD-10-CM

## 2020-01-15 LAB
ALBUMIN SERPL BCP-MCNC: 3.3 G/DL (ref 3.5–5)
ALP SERPL-CCNC: 72 U/L (ref 46–116)
ALT SERPL W P-5'-P-CCNC: 22 U/L (ref 12–78)
ANION GAP SERPL CALCULATED.3IONS-SCNC: 7 MMOL/L (ref 4–13)
AST SERPL W P-5'-P-CCNC: 11 U/L (ref 5–45)
BACTERIA UR QL AUTO: ABNORMAL /HPF
BASOPHILS # BLD AUTO: 0.04 THOUSANDS/ΜL (ref 0–0.1)
BASOPHILS NFR BLD AUTO: 1 % (ref 0–1)
BILIRUB SERPL-MCNC: 0.71 MG/DL (ref 0.2–1)
BILIRUB UR QL STRIP: NEGATIVE
BUN SERPL-MCNC: 24 MG/DL (ref 5–25)
CALCIUM SERPL-MCNC: 8.7 MG/DL (ref 8.3–10.1)
CHLORIDE SERPL-SCNC: 103 MMOL/L (ref 100–108)
CHOLEST SERPL-MCNC: 216 MG/DL (ref 50–200)
CLARITY UR: CLEAR
CO2 SERPL-SCNC: 28 MMOL/L (ref 21–32)
COLOR UR: YELLOW
CREAT SERPL-MCNC: 1.25 MG/DL (ref 0.6–1.3)
EOSINOPHIL # BLD AUTO: 0.03 THOUSAND/ΜL (ref 0–0.61)
EOSINOPHIL NFR BLD AUTO: 1 % (ref 0–6)
ERYTHROCYTE [DISTWIDTH] IN BLOOD BY AUTOMATED COUNT: 15.6 % (ref 11.6–15.1)
GFR SERPL CREATININE-BSD FRML MDRD: 57 ML/MIN/1.73SQ M
GLUCOSE P FAST SERPL-MCNC: 86 MG/DL (ref 65–99)
GLUCOSE UR STRIP-MCNC: NEGATIVE MG/DL
HCT VFR BLD AUTO: 32.5 % (ref 36.5–49.3)
HDLC SERPL-MCNC: 54 MG/DL
HGB BLD-MCNC: 10.5 G/DL (ref 12–17)
HGB UR QL STRIP.AUTO: NEGATIVE
HYALINE CASTS #/AREA URNS LPF: ABNORMAL /LPF
IMM GRANULOCYTES # BLD AUTO: 0.02 THOUSAND/UL (ref 0–0.2)
IMM GRANULOCYTES NFR BLD AUTO: 1 % (ref 0–2)
KETONES UR STRIP-MCNC: NEGATIVE MG/DL
LDLC SERPL CALC-MCNC: 115 MG/DL (ref 0–100)
LEUKOCYTE ESTERASE UR QL STRIP: NEGATIVE
LYMPHOCYTES # BLD AUTO: 0.2 THOUSANDS/ΜL (ref 0.6–4.47)
LYMPHOCYTES NFR BLD AUTO: 6 % (ref 14–44)
MCH RBC QN AUTO: 34.8 PG (ref 26.8–34.3)
MCHC RBC AUTO-ENTMCNC: 32.3 G/DL (ref 31.4–37.4)
MCV RBC AUTO: 108 FL (ref 82–98)
MONOCYTES # BLD AUTO: 0.65 THOUSAND/ΜL (ref 0.17–1.22)
MONOCYTES NFR BLD AUTO: 20 % (ref 4–12)
NEUTROPHILS # BLD AUTO: 2.3 THOUSANDS/ΜL (ref 1.85–7.62)
NEUTS SEG NFR BLD AUTO: 71 % (ref 43–75)
NITRITE UR QL STRIP: NEGATIVE
NON-SQ EPI CELLS URNS QL MICRO: ABNORMAL /HPF
NONHDLC SERPL-MCNC: 162 MG/DL
NRBC BLD AUTO-RTO: 0 /100 WBCS
PH UR STRIP.AUTO: 7 [PH]
PLATELET # BLD AUTO: 106 THOUSANDS/UL (ref 149–390)
PMV BLD AUTO: 11 FL (ref 8.9–12.7)
POTASSIUM SERPL-SCNC: 4.2 MMOL/L (ref 3.5–5.3)
PROT SERPL-MCNC: 6.9 G/DL (ref 6.4–8.2)
PROT UR STRIP-MCNC: ABNORMAL MG/DL
PSA SERPL-MCNC: 0.2 NG/ML (ref 0–4)
RBC # BLD AUTO: 3.02 MILLION/UL (ref 3.88–5.62)
RBC #/AREA URNS AUTO: ABNORMAL /HPF
SODIUM SERPL-SCNC: 138 MMOL/L (ref 136–145)
SP GR UR STRIP.AUTO: 1.02 (ref 1–1.03)
TRIGL SERPL-MCNC: 234 MG/DL
TSH SERPL DL<=0.05 MIU/L-ACNC: 2.64 UIU/ML (ref 0.36–3.74)
UROBILINOGEN UR QL STRIP.AUTO: 0.2 E.U./DL
WBC # BLD AUTO: 3.24 THOUSAND/UL (ref 4.31–10.16)
WBC #/AREA URNS AUTO: ABNORMAL /HPF

## 2020-01-15 PROCEDURE — 84443 ASSAY THYROID STIM HORMONE: CPT

## 2020-01-15 PROCEDURE — 81001 URINALYSIS AUTO W/SCOPE: CPT | Performed by: INTERNAL MEDICINE

## 2020-01-15 PROCEDURE — 80061 LIPID PANEL: CPT

## 2020-01-15 PROCEDURE — 85025 COMPLETE CBC W/AUTO DIFF WBC: CPT

## 2020-01-15 PROCEDURE — 80053 COMPREHEN METABOLIC PANEL: CPT

## 2020-01-15 PROCEDURE — 36415 COLL VENOUS BLD VENIPUNCTURE: CPT

## 2020-01-15 PROCEDURE — G0103 PSA SCREENING: HCPCS

## 2020-01-15 PROCEDURE — 82306 VITAMIN D 25 HYDROXY: CPT

## 2020-01-16 LAB — 25(OH)D3 SERPL-MCNC: 43.1 NG/ML (ref 30–100)

## 2020-01-17 ENCOUNTER — HOSPITAL ENCOUNTER (OUTPATIENT)
Dept: INFUSION CENTER | Facility: CLINIC | Age: 75
Discharge: HOME | End: 2020-01-17
Payer: COMMERCIAL

## 2020-01-17 ENCOUNTER — TELEPHONE (OUTPATIENT)
Dept: INTERNAL MEDICINE CLINIC | Facility: CLINIC | Age: 75
End: 2020-01-17

## 2020-01-17 VITALS
HEART RATE: 52 BPM | SYSTOLIC BLOOD PRESSURE: 130 MMHG | DIASTOLIC BLOOD PRESSURE: 76 MMHG | BODY MASS INDEX: 25.89 KG/M2 | TEMPERATURE: 97.1 F | WEIGHT: 174.82 LBS | RESPIRATION RATE: 18 BRPM

## 2020-01-17 DIAGNOSIS — D80.1 HYPOGAMMAGLOBULINEMIA (HCC): ICD-10-CM

## 2020-01-17 DIAGNOSIS — C90.00 IGG MULTIPLE MYELOMA (HCC): Primary | ICD-10-CM

## 2020-01-17 PROCEDURE — 96366 THER/PROPH/DIAG IV INF ADDON: CPT

## 2020-01-17 PROCEDURE — 96415 CHEMO IV INFUSION ADDL HR: CPT

## 2020-01-17 PROCEDURE — 96413 CHEMO IV INFUSION 1 HR: CPT

## 2020-01-17 PROCEDURE — 96367 TX/PROPH/DG ADDL SEQ IV INF: CPT

## 2020-01-17 RX ORDER — SODIUM CHLORIDE 9 MG/ML
20 INJECTION, SOLUTION INTRAVENOUS ONCE
Status: COMPLETED | OUTPATIENT
Start: 2020-01-17 | End: 2020-01-17

## 2020-01-17 RX ORDER — ACETAMINOPHEN 325 MG/1
650 TABLET ORAL ONCE
Status: COMPLETED | OUTPATIENT
Start: 2020-01-17 | End: 2020-01-17

## 2020-01-17 RX ADMIN — SODIUM CHLORIDE 100 MG: 0.9 INJECTION, SOLUTION INTRAVENOUS at 08:35

## 2020-01-17 RX ADMIN — ACETAMINOPHEN 650 MG: 325 TABLET ORAL at 08:39

## 2020-01-17 RX ADMIN — Medication 30 G: at 13:10

## 2020-01-17 RX ADMIN — ZOLEDRONIC ACID 3.5 MG: 4 INJECTION, SOLUTION, CONCENTRATE INTRAVENOUS at 09:03

## 2020-01-17 RX ADMIN — DIPHENHYDRAMINE HYDROCHLORIDE 25 MG: 50 INJECTION, SOLUTION INTRAMUSCULAR; INTRAVENOUS at 08:15

## 2020-01-17 RX ADMIN — SODIUM CHLORIDE 20 ML/HR: 0.9 INJECTION, SOLUTION INTRAVENOUS at 08:15

## 2020-01-17 RX ADMIN — DARATUMUMAB 1300 MG: 100 INJECTION, SOLUTION, CONCENTRATE INTRAVENOUS at 09:47

## 2020-01-17 NOTE — PROGRESS NOTES
Patient tolerated Darzalex, IVIG and Zometa infusions well  Offers no complaints  Pt is aware of all future appointments   Refused AVS

## 2020-01-17 NOTE — PLAN OF CARE
Problem: Potential for Falls  Goal: Patient will remain free of falls  Description  INTERVENTIONS:  - Assess patient frequently for physical needs  -  Identify cognitive and physical deficits and behaviors that affect risk of falls    -  [place] fall precautions as indicated by assessment   - Educate patient/family on patient safety including physical limitations  - Instruct patient to call for assistance with activity based on assessment  - Modify environment to reduce risk of injury  - Consider OT/PT consult to assist with strengthening/mobility  Outcome: Progressing

## 2020-01-22 ENCOUNTER — APPOINTMENT (OUTPATIENT)
Dept: LAB | Facility: CLINIC | Age: 75
End: 2020-01-22
Payer: COMMERCIAL

## 2020-01-22 DIAGNOSIS — C90.00 IGG MULTIPLE MYELOMA (HCC): ICD-10-CM

## 2020-01-22 LAB
ALBUMIN SERPL BCP-MCNC: 3.2 G/DL (ref 3.5–5)
ALP SERPL-CCNC: 65 U/L (ref 46–116)
ALT SERPL W P-5'-P-CCNC: 27 U/L (ref 12–78)
ANION GAP SERPL CALCULATED.3IONS-SCNC: 5 MMOL/L (ref 4–13)
AST SERPL W P-5'-P-CCNC: 16 U/L (ref 5–45)
BASOPHILS # BLD AUTO: 0.04 THOUSANDS/ΜL (ref 0–0.1)
BASOPHILS NFR BLD AUTO: 1 % (ref 0–1)
BILIRUB SERPL-MCNC: 0.62 MG/DL (ref 0.2–1)
BUN SERPL-MCNC: 25 MG/DL (ref 5–25)
CALCIUM SERPL-MCNC: 9.4 MG/DL (ref 8.3–10.1)
CHLORIDE SERPL-SCNC: 100 MMOL/L (ref 100–108)
CO2 SERPL-SCNC: 29 MMOL/L (ref 21–32)
CREAT SERPL-MCNC: 1.38 MG/DL (ref 0.6–1.3)
EOSINOPHIL # BLD AUTO: 0.05 THOUSAND/ΜL (ref 0–0.61)
EOSINOPHIL NFR BLD AUTO: 2 % (ref 0–6)
ERYTHROCYTE [DISTWIDTH] IN BLOOD BY AUTOMATED COUNT: 16.1 % (ref 11.6–15.1)
GFR SERPL CREATININE-BSD FRML MDRD: 51 ML/MIN/1.73SQ M
GLUCOSE SERPL-MCNC: 82 MG/DL (ref 65–140)
HCT VFR BLD AUTO: 34.2 % (ref 36.5–49.3)
HGB BLD-MCNC: 10.9 G/DL (ref 12–17)
IMM GRANULOCYTES # BLD AUTO: 0.03 THOUSAND/UL (ref 0–0.2)
IMM GRANULOCYTES NFR BLD AUTO: 1 % (ref 0–2)
LYMPHOCYTES # BLD AUTO: 0.27 THOUSANDS/ΜL (ref 0.6–4.47)
LYMPHOCYTES NFR BLD AUTO: 8 % (ref 14–44)
MCH RBC QN AUTO: 34.7 PG (ref 26.8–34.3)
MCHC RBC AUTO-ENTMCNC: 31.9 G/DL (ref 31.4–37.4)
MCV RBC AUTO: 109 FL (ref 82–98)
MONOCYTES # BLD AUTO: 0.8 THOUSAND/ΜL (ref 0.17–1.22)
MONOCYTES NFR BLD AUTO: 23 % (ref 4–12)
NEUTROPHILS # BLD AUTO: 2.24 THOUSANDS/ΜL (ref 1.85–7.62)
NEUTS SEG NFR BLD AUTO: 65 % (ref 43–75)
NRBC BLD AUTO-RTO: 0 /100 WBCS
PLATELET # BLD AUTO: 98 THOUSANDS/UL (ref 149–390)
PMV BLD AUTO: 11.4 FL (ref 8.9–12.7)
POTASSIUM SERPL-SCNC: 4.1 MMOL/L (ref 3.5–5.3)
PROT SERPL-MCNC: 7.3 G/DL (ref 6.4–8.2)
RBC # BLD AUTO: 3.14 MILLION/UL (ref 3.88–5.62)
SODIUM SERPL-SCNC: 134 MMOL/L (ref 136–145)
WBC # BLD AUTO: 3.43 THOUSAND/UL (ref 4.31–10.16)

## 2020-01-22 PROCEDURE — 85025 COMPLETE CBC W/AUTO DIFF WBC: CPT

## 2020-01-22 PROCEDURE — 80053 COMPREHEN METABOLIC PANEL: CPT

## 2020-01-22 PROCEDURE — 36415 COLL VENOUS BLD VENIPUNCTURE: CPT

## 2020-01-24 ENCOUNTER — HOSPITAL ENCOUNTER (OUTPATIENT)
Dept: INFUSION CENTER | Facility: CLINIC | Age: 75
Discharge: HOME | End: 2020-01-24
Payer: COMMERCIAL

## 2020-01-24 VITALS
SYSTOLIC BLOOD PRESSURE: 144 MMHG | DIASTOLIC BLOOD PRESSURE: 81 MMHG | RESPIRATION RATE: 18 BRPM | TEMPERATURE: 97.4 F | HEART RATE: 54 BPM | BODY MASS INDEX: 26.38 KG/M2 | WEIGHT: 178.13 LBS

## 2020-01-24 DIAGNOSIS — C90.00 IGG MULTIPLE MYELOMA (HCC): Primary | ICD-10-CM

## 2020-01-24 PROCEDURE — 96375 TX/PRO/DX INJ NEW DRUG ADDON: CPT

## 2020-01-24 PROCEDURE — 96415 CHEMO IV INFUSION ADDL HR: CPT

## 2020-01-24 PROCEDURE — 96367 TX/PROPH/DG ADDL SEQ IV INF: CPT

## 2020-01-24 PROCEDURE — 96413 CHEMO IV INFUSION 1 HR: CPT

## 2020-01-24 RX ORDER — ACETAMINOPHEN 325 MG/1
650 TABLET ORAL ONCE
Status: COMPLETED | OUTPATIENT
Start: 2020-01-24 | End: 2020-01-24

## 2020-01-24 RX ORDER — SODIUM CHLORIDE 9 MG/ML
20 INJECTION, SOLUTION INTRAVENOUS ONCE
Status: COMPLETED | OUTPATIENT
Start: 2020-01-24 | End: 2020-01-24

## 2020-01-24 RX ADMIN — SODIUM CHLORIDE 100 MG: 0.9 INJECTION, SOLUTION INTRAVENOUS at 08:39

## 2020-01-24 RX ADMIN — SODIUM CHLORIDE 20 ML/HR: 0.9 INJECTION, SOLUTION INTRAVENOUS at 08:24

## 2020-01-24 RX ADMIN — DARATUMUMAB 1300 MG: 100 INJECTION, SOLUTION, CONCENTRATE INTRAVENOUS at 09:22

## 2020-01-24 RX ADMIN — ACETAMINOPHEN 650 MG: 325 TABLET, FILM COATED ORAL at 08:14

## 2020-01-24 RX ADMIN — DIPHENHYDRAMINE HYDROCHLORIDE 25 MG: 50 INJECTION, SOLUTION INTRAMUSCULAR; INTRAVENOUS at 08:24

## 2020-01-24 NOTE — PLAN OF CARE
Problem: Potential for Falls  Goal: Patient will remain free of falls  Description  INTERVENTIONS:  - Assess patient frequently for physical needs  -  Identify cognitive and physical deficits and behaviors that affect risk of falls    -  [place] fall precautions as indicated by assessment   - Educate patient/family on patient safety including physical limitations  - Instruct patient to call for assistance with activity based on assessment  - Modify environment to reduce risk of injury  - Consider OT/PT consult to assist with strengthening/mobility  Outcome: Progressing

## 2020-01-28 DIAGNOSIS — C90.00 IGG MULTIPLE MYELOMA (HCC): ICD-10-CM

## 2020-01-28 RX ORDER — SODIUM CHLORIDE 9 MG/ML
20 INJECTION, SOLUTION INTRAVENOUS ONCE
Status: CANCELLED | OUTPATIENT
Start: 2020-02-14

## 2020-01-28 RX ORDER — ACETAMINOPHEN 325 MG/1
650 TABLET ORAL ONCE
Status: CANCELLED | OUTPATIENT
Start: 2020-01-31

## 2020-01-28 RX ORDER — SODIUM CHLORIDE 9 MG/ML
20 INJECTION, SOLUTION INTRAVENOUS ONCE
Status: CANCELLED | OUTPATIENT
Start: 2020-01-31

## 2020-01-28 RX ORDER — ACETAMINOPHEN 325 MG/1
650 TABLET ORAL ONCE
Status: CANCELLED | OUTPATIENT
Start: 2020-02-14

## 2020-01-29 ENCOUNTER — APPOINTMENT (OUTPATIENT)
Dept: LAB | Facility: CLINIC | Age: 75
End: 2020-01-29
Payer: COMMERCIAL

## 2020-01-29 DIAGNOSIS — C90.00 IGG MULTIPLE MYELOMA (HCC): ICD-10-CM

## 2020-01-29 LAB
ALBUMIN SERPL BCP-MCNC: 3.2 G/DL (ref 3.5–5)
ALP SERPL-CCNC: 70 U/L (ref 46–116)
ALT SERPL W P-5'-P-CCNC: 27 U/L (ref 12–78)
ANION GAP SERPL CALCULATED.3IONS-SCNC: 6 MMOL/L (ref 4–13)
AST SERPL W P-5'-P-CCNC: 13 U/L (ref 5–45)
BASOPHILS # BLD AUTO: 0.03 THOUSANDS/ΜL (ref 0–0.1)
BASOPHILS NFR BLD AUTO: 1 % (ref 0–1)
BILIRUB SERPL-MCNC: 0.9 MG/DL (ref 0.2–1)
BUN SERPL-MCNC: 25 MG/DL (ref 5–25)
CALCIUM SERPL-MCNC: 8.9 MG/DL (ref 8.3–10.1)
CHLORIDE SERPL-SCNC: 104 MMOL/L (ref 100–108)
CO2 SERPL-SCNC: 27 MMOL/L (ref 21–32)
CREAT SERPL-MCNC: 1.25 MG/DL (ref 0.6–1.3)
EOSINOPHIL # BLD AUTO: 0.08 THOUSAND/ΜL (ref 0–0.61)
EOSINOPHIL NFR BLD AUTO: 2 % (ref 0–6)
ERYTHROCYTE [DISTWIDTH] IN BLOOD BY AUTOMATED COUNT: 15.6 % (ref 11.6–15.1)
GFR SERPL CREATININE-BSD FRML MDRD: 57 ML/MIN/1.73SQ M
GLUCOSE SERPL-MCNC: 79 MG/DL (ref 65–140)
HCT VFR BLD AUTO: 33.3 % (ref 36.5–49.3)
HGB BLD-MCNC: 10.8 G/DL (ref 12–17)
IMM GRANULOCYTES # BLD AUTO: 0 THOUSAND/UL (ref 0–0.2)
IMM GRANULOCYTES NFR BLD AUTO: 0 % (ref 0–2)
LYMPHOCYTES # BLD AUTO: 0.18 THOUSANDS/ΜL (ref 0.6–4.47)
LYMPHOCYTES NFR BLD AUTO: 5 % (ref 14–44)
MCH RBC QN AUTO: 34.7 PG (ref 26.8–34.3)
MCHC RBC AUTO-ENTMCNC: 32.4 G/DL (ref 31.4–37.4)
MCV RBC AUTO: 107 FL (ref 82–98)
MONOCYTES # BLD AUTO: 0.76 THOUSAND/ΜL (ref 0.17–1.22)
MONOCYTES NFR BLD AUTO: 22 % (ref 4–12)
NEUTROPHILS # BLD AUTO: 2.49 THOUSANDS/ΜL (ref 1.85–7.62)
NEUTS SEG NFR BLD AUTO: 70 % (ref 43–75)
NRBC BLD AUTO-RTO: 0 /100 WBCS
PLATELET # BLD AUTO: 106 THOUSANDS/UL (ref 149–390)
PMV BLD AUTO: 10.7 FL (ref 8.9–12.7)
POTASSIUM SERPL-SCNC: 3.8 MMOL/L (ref 3.5–5.3)
PROT SERPL-MCNC: 7 G/DL (ref 6.4–8.2)
RBC # BLD AUTO: 3.11 MILLION/UL (ref 3.88–5.62)
SODIUM SERPL-SCNC: 137 MMOL/L (ref 136–145)
WBC # BLD AUTO: 3.54 THOUSAND/UL (ref 4.31–10.16)

## 2020-01-29 PROCEDURE — 80053 COMPREHEN METABOLIC PANEL: CPT

## 2020-01-29 PROCEDURE — 85025 COMPLETE CBC W/AUTO DIFF WBC: CPT

## 2020-01-29 PROCEDURE — 36415 COLL VENOUS BLD VENIPUNCTURE: CPT

## 2020-01-31 ENCOUNTER — HOSPITAL ENCOUNTER (OUTPATIENT)
Dept: INFUSION CENTER | Facility: CLINIC | Age: 75
Discharge: HOME | End: 2020-01-31
Payer: COMMERCIAL

## 2020-01-31 VITALS
RESPIRATION RATE: 18 BRPM | HEART RATE: 59 BPM | DIASTOLIC BLOOD PRESSURE: 79 MMHG | TEMPERATURE: 97.8 F | WEIGHT: 178.13 LBS | SYSTOLIC BLOOD PRESSURE: 137 MMHG | BODY MASS INDEX: 26.38 KG/M2

## 2020-01-31 DIAGNOSIS — C90.00 IGG MULTIPLE MYELOMA (HCC): Primary | ICD-10-CM

## 2020-01-31 PROCEDURE — 96367 TX/PROPH/DG ADDL SEQ IV INF: CPT

## 2020-01-31 PROCEDURE — 96413 CHEMO IV INFUSION 1 HR: CPT

## 2020-01-31 PROCEDURE — 96415 CHEMO IV INFUSION ADDL HR: CPT

## 2020-01-31 RX ORDER — SODIUM CHLORIDE 9 MG/ML
20 INJECTION, SOLUTION INTRAVENOUS ONCE
Status: COMPLETED | OUTPATIENT
Start: 2020-01-31 | End: 2020-01-31

## 2020-01-31 RX ORDER — ACETAMINOPHEN 325 MG/1
650 TABLET ORAL ONCE
Status: COMPLETED | OUTPATIENT
Start: 2020-01-31 | End: 2020-01-31

## 2020-01-31 RX ADMIN — DARATUMUMAB 1300 MG: 100 INJECTION, SOLUTION, CONCENTRATE INTRAVENOUS at 09:18

## 2020-01-31 RX ADMIN — SODIUM CHLORIDE 100 MG: 0.9 INJECTION, SOLUTION INTRAVENOUS at 08:47

## 2020-01-31 RX ADMIN — DIPHENHYDRAMINE HYDROCHLORIDE 25 MG: 50 INJECTION, SOLUTION INTRAMUSCULAR; INTRAVENOUS at 08:30

## 2020-01-31 RX ADMIN — ACETAMINOPHEN 650 MG: 325 TABLET ORAL at 08:17

## 2020-01-31 RX ADMIN — SODIUM CHLORIDE 20 ML/HR: 0.9 INJECTION, SOLUTION INTRAVENOUS at 08:25

## 2020-01-31 NOTE — PLAN OF CARE
Problem: Potential for Falls  Goal: Patient will remain free of falls  Description  INTERVENTIONS:  - Assess patient frequently for physical needs  -  Identify cognitive and physical deficits and behaviors that affect risk of falls    -  [place] fall precautions as indicated by assessment   - Educate patient/family on patient safety including physical limitations  - Instruct patient to call for assistance with activity based on assessment  - Modify environment to reduce risk of injury  - Consider OT/PT consult to assist with strengthening/mobility  Outcome: Progressing

## 2020-02-03 DIAGNOSIS — E78.5 HYPERLIPIDEMIA, UNSPECIFIED HYPERLIPIDEMIA TYPE: ICD-10-CM

## 2020-02-03 RX ORDER — ATORVASTATIN CALCIUM 20 MG/1
TABLET, FILM COATED ORAL
Qty: 30 TABLET | Refills: 0 | Status: SHIPPED | OUTPATIENT
Start: 2020-02-03 | End: 2020-03-02 | Stop reason: SDUPTHER

## 2020-02-12 ENCOUNTER — APPOINTMENT (OUTPATIENT)
Dept: LAB | Facility: CLINIC | Age: 75
End: 2020-02-12
Payer: COMMERCIAL

## 2020-02-12 DIAGNOSIS — C90.00 IGG MULTIPLE MYELOMA (HCC): Primary | ICD-10-CM

## 2020-02-12 LAB
ALBUMIN SERPL BCP-MCNC: 3.3 G/DL (ref 3.5–5)
ALP SERPL-CCNC: 69 U/L (ref 46–116)
ALT SERPL W P-5'-P-CCNC: 29 U/L (ref 12–78)
ANION GAP SERPL CALCULATED.3IONS-SCNC: 2 MMOL/L (ref 4–13)
AST SERPL W P-5'-P-CCNC: 13 U/L (ref 5–45)
BASOPHILS # BLD AUTO: 0.03 THOUSANDS/ΜL (ref 0–0.1)
BASOPHILS NFR BLD AUTO: 1 % (ref 0–1)
BILIRUB SERPL-MCNC: 0.67 MG/DL (ref 0.2–1)
BUN SERPL-MCNC: 25 MG/DL (ref 5–25)
CALCIUM SERPL-MCNC: 8.7 MG/DL (ref 8.3–10.1)
CHLORIDE SERPL-SCNC: 107 MMOL/L (ref 100–108)
CO2 SERPL-SCNC: 27 MMOL/L (ref 21–32)
CREAT SERPL-MCNC: 1.37 MG/DL (ref 0.6–1.3)
EOSINOPHIL # BLD AUTO: 0.06 THOUSAND/ΜL (ref 0–0.61)
EOSINOPHIL NFR BLD AUTO: 2 % (ref 0–6)
ERYTHROCYTE [DISTWIDTH] IN BLOOD BY AUTOMATED COUNT: 15.6 % (ref 11.6–15.1)
GFR SERPL CREATININE-BSD FRML MDRD: 51 ML/MIN/1.73SQ M
GLUCOSE SERPL-MCNC: 86 MG/DL (ref 65–140)
HCT VFR BLD AUTO: 37.1 % (ref 36.5–49.3)
HGB BLD-MCNC: 11.7 G/DL (ref 12–17)
IMM GRANULOCYTES # BLD AUTO: 0.03 THOUSAND/UL (ref 0–0.2)
IMM GRANULOCYTES NFR BLD AUTO: 1 % (ref 0–2)
LYMPHOCYTES # BLD AUTO: 0.16 THOUSANDS/ΜL (ref 0.6–4.47)
LYMPHOCYTES NFR BLD AUTO: 5 % (ref 14–44)
MCH RBC QN AUTO: 35 PG (ref 26.8–34.3)
MCHC RBC AUTO-ENTMCNC: 31.5 G/DL (ref 31.4–37.4)
MCV RBC AUTO: 111 FL (ref 82–98)
MONOCYTES # BLD AUTO: 0.71 THOUSAND/ΜL (ref 0.17–1.22)
MONOCYTES NFR BLD AUTO: 22 % (ref 4–12)
NEUTROPHILS # BLD AUTO: 2.29 THOUSANDS/ΜL (ref 1.85–7.62)
NEUTS SEG NFR BLD AUTO: 69 % (ref 43–75)
NRBC BLD AUTO-RTO: 0 /100 WBCS
PLATELET # BLD AUTO: 133 THOUSANDS/UL (ref 149–390)
PMV BLD AUTO: 10.8 FL (ref 8.9–12.7)
POTASSIUM SERPL-SCNC: 4.5 MMOL/L (ref 3.5–5.3)
PROT SERPL-MCNC: 7 G/DL (ref 6.4–8.2)
RBC # BLD AUTO: 3.34 MILLION/UL (ref 3.88–5.62)
SODIUM SERPL-SCNC: 136 MMOL/L (ref 136–145)
WBC # BLD AUTO: 3.28 THOUSAND/UL (ref 4.31–10.16)

## 2020-02-12 PROCEDURE — 36415 COLL VENOUS BLD VENIPUNCTURE: CPT

## 2020-02-12 PROCEDURE — 85025 COMPLETE CBC W/AUTO DIFF WBC: CPT

## 2020-02-12 PROCEDURE — 80053 COMPREHEN METABOLIC PANEL: CPT

## 2020-02-13 ENCOUNTER — TELEPHONE (OUTPATIENT)
Dept: HEMATOLOGY ONCOLOGY | Facility: CLINIC | Age: 75
End: 2020-02-13

## 2020-02-13 NOTE — TELEPHONE ENCOUNTER
Patient wife Ayaka Bey called stating that Christine Rodriguez renewed his registration for medical marijuana and will need a new form from Dr Peter Hamman  She provided his patient registration # [id number]  Best call back [phone]

## 2020-02-14 ENCOUNTER — HOSPITAL ENCOUNTER (OUTPATIENT)
Dept: INFUSION CENTER | Facility: CLINIC | Age: 75
Discharge: HOME | End: 2020-02-14
Payer: COMMERCIAL

## 2020-02-14 VITALS
TEMPERATURE: 97.8 F | RESPIRATION RATE: 18 BRPM | WEIGHT: 181.22 LBS | HEIGHT: 69 IN | DIASTOLIC BLOOD PRESSURE: 78 MMHG | BODY MASS INDEX: 26.84 KG/M2 | HEART RATE: 56 BPM | SYSTOLIC BLOOD PRESSURE: 131 MMHG

## 2020-02-14 DIAGNOSIS — C90.00 IGG MULTIPLE MYELOMA (HCC): Primary | ICD-10-CM

## 2020-02-14 DIAGNOSIS — D80.1 HYPOGAMMAGLOBULINEMIA (HCC): ICD-10-CM

## 2020-02-14 DIAGNOSIS — C90.00 IGG MULTIPLE MYELOMA (HCC): ICD-10-CM

## 2020-02-14 PROCEDURE — 96413 CHEMO IV INFUSION 1 HR: CPT

## 2020-02-14 PROCEDURE — 96415 CHEMO IV INFUSION ADDL HR: CPT

## 2020-02-14 PROCEDURE — 96366 THER/PROPH/DIAG IV INF ADDON: CPT

## 2020-02-14 PROCEDURE — 96367 TX/PROPH/DG ADDL SEQ IV INF: CPT

## 2020-02-14 RX ORDER — SODIUM CHLORIDE 9 MG/ML
20 INJECTION, SOLUTION INTRAVENOUS ONCE
Status: COMPLETED | OUTPATIENT
Start: 2020-02-14 | End: 2020-02-14

## 2020-02-14 RX ORDER — ACETAMINOPHEN 325 MG/1
650 TABLET ORAL ONCE
Status: COMPLETED | OUTPATIENT
Start: 2020-02-14 | End: 2020-02-14

## 2020-02-14 RX ADMIN — ZOLEDRONIC ACID 3.3 MG: 4 INJECTION, SOLUTION, CONCENTRATE INTRAVENOUS at 09:12

## 2020-02-14 RX ADMIN — ACETAMINOPHEN 650 MG: 325 TABLET, FILM COATED ORAL at 08:53

## 2020-02-14 RX ADMIN — DARATUMUMAB 1300 MG: 100 INJECTION, SOLUTION, CONCENTRATE INTRAVENOUS at 09:45

## 2020-02-14 RX ADMIN — DIPHENHYDRAMINE HYDROCHLORIDE 25 MG: 50 INJECTION, SOLUTION INTRAMUSCULAR; INTRAVENOUS at 08:15

## 2020-02-14 RX ADMIN — SODIUM CHLORIDE 20 ML/HR: 0.9 INJECTION, SOLUTION INTRAVENOUS at 08:15

## 2020-02-14 RX ADMIN — Medication 30 G: at 13:13

## 2020-02-14 RX ADMIN — SODIUM CHLORIDE 100 MG: 0.9 INJECTION, SOLUTION INTRAVENOUS at 08:52

## 2020-02-14 NOTE — PLAN OF CARE
Problem: Potential for Falls  Goal: Patient will remain free of falls  Description  INTERVENTIONS:  - Assess patient frequently for physical needs  -  Identify cognitive and physical deficits and behaviors that affect risk of falls    -  [place] fall precautions as indicated by assessment   - Educate patient/family on patient safety including physical limitations  - Instruct patient to call for assistance with activity based on assessment  - Modify environment to reduce risk of injury  - Consider OT/PT consult to assist with strengthening/mobility  Outcome: Progressing

## 2020-02-14 NOTE — PROGRESS NOTES
Patient tolerated Darzalex, Zometa, and IVIG infusions well  Offers no complaints  Pt and wife are aware of all future appointments   Refused AVS

## 2020-02-26 ENCOUNTER — TRANSCRIBE ORDERS (OUTPATIENT)
Dept: LAB | Facility: CLINIC | Age: 75
End: 2020-02-26

## 2020-02-26 ENCOUNTER — APPOINTMENT (OUTPATIENT)
Dept: LAB | Facility: CLINIC | Age: 75
End: 2020-02-26
Payer: COMMERCIAL

## 2020-02-26 DIAGNOSIS — C90.00 IGG MULTIPLE MYELOMA (HCC): ICD-10-CM

## 2020-02-26 DIAGNOSIS — C90.00 IGG MULTIPLE MYELOMA (HCC): Primary | ICD-10-CM

## 2020-02-26 LAB
ALBUMIN SERPL BCP-MCNC: 3.5 G/DL (ref 3.5–5)
ALP SERPL-CCNC: 70 U/L (ref 46–116)
ALT SERPL W P-5'-P-CCNC: 25 U/L (ref 12–78)
ANION GAP SERPL CALCULATED.3IONS-SCNC: 7 MMOL/L (ref 4–13)
AST SERPL W P-5'-P-CCNC: 14 U/L (ref 5–45)
BASOPHILS # BLD AUTO: 0.03 THOUSANDS/ΜL (ref 0–0.1)
BASOPHILS NFR BLD AUTO: 1 % (ref 0–1)
BILIRUB SERPL-MCNC: 0.92 MG/DL (ref 0.2–1)
BUN SERPL-MCNC: 16 MG/DL (ref 5–25)
CALCIUM SERPL-MCNC: 9.3 MG/DL (ref 8.3–10.1)
CHLORIDE SERPL-SCNC: 105 MMOL/L (ref 100–108)
CO2 SERPL-SCNC: 26 MMOL/L (ref 21–32)
CREAT SERPL-MCNC: 1.51 MG/DL (ref 0.6–1.3)
EOSINOPHIL # BLD AUTO: 0.04 THOUSAND/ΜL (ref 0–0.61)
EOSINOPHIL NFR BLD AUTO: 1 % (ref 0–6)
ERYTHROCYTE [DISTWIDTH] IN BLOOD BY AUTOMATED COUNT: 14.6 % (ref 11.6–15.1)
GFR SERPL CREATININE-BSD FRML MDRD: 45 ML/MIN/1.73SQ M
GLUCOSE SERPL-MCNC: 80 MG/DL (ref 65–140)
HCT VFR BLD AUTO: 36.7 % (ref 36.5–49.3)
HGB BLD-MCNC: 11.9 G/DL (ref 12–17)
IMM GRANULOCYTES # BLD AUTO: 0.02 THOUSAND/UL (ref 0–0.2)
IMM GRANULOCYTES NFR BLD AUTO: 1 % (ref 0–2)
LYMPHOCYTES # BLD AUTO: 0.2 THOUSANDS/ΜL (ref 0.6–4.47)
LYMPHOCYTES NFR BLD AUTO: 6 % (ref 14–44)
MCH RBC QN AUTO: 34.6 PG (ref 26.8–34.3)
MCHC RBC AUTO-ENTMCNC: 32.4 G/DL (ref 31.4–37.4)
MCV RBC AUTO: 107 FL (ref 82–98)
MONOCYTES # BLD AUTO: 0.74 THOUSAND/ΜL (ref 0.17–1.22)
MONOCYTES NFR BLD AUTO: 21 % (ref 4–12)
NEUTROPHILS # BLD AUTO: 2.45 THOUSANDS/ΜL (ref 1.85–7.62)
NEUTS SEG NFR BLD AUTO: 70 % (ref 43–75)
NRBC BLD AUTO-RTO: 0 /100 WBCS
PLATELET # BLD AUTO: 112 THOUSANDS/UL (ref 149–390)
PMV BLD AUTO: 11 FL (ref 8.9–12.7)
POTASSIUM SERPL-SCNC: 3.5 MMOL/L (ref 3.5–5.3)
PROT SERPL-MCNC: 7.5 G/DL (ref 6.4–8.2)
RBC # BLD AUTO: 3.44 MILLION/UL (ref 3.88–5.62)
SODIUM SERPL-SCNC: 138 MMOL/L (ref 136–145)
WBC # BLD AUTO: 3.48 THOUSAND/UL (ref 4.31–10.16)

## 2020-02-26 PROCEDURE — 36415 COLL VENOUS BLD VENIPUNCTURE: CPT

## 2020-02-26 PROCEDURE — 80053 COMPREHEN METABOLIC PANEL: CPT

## 2020-02-26 PROCEDURE — 85025 COMPLETE CBC W/AUTO DIFF WBC: CPT

## 2020-02-26 RX ORDER — SODIUM CHLORIDE 9 MG/ML
20 INJECTION, SOLUTION INTRAVENOUS ONCE
Status: CANCELLED | OUTPATIENT
Start: 2020-03-13

## 2020-02-26 RX ORDER — SODIUM CHLORIDE 9 MG/ML
20 INJECTION, SOLUTION INTRAVENOUS ONCE
Status: CANCELLED | OUTPATIENT
Start: 2020-02-28

## 2020-02-26 RX ORDER — ACETAMINOPHEN 325 MG/1
650 TABLET ORAL ONCE
Status: CANCELLED | OUTPATIENT
Start: 2020-03-13

## 2020-02-26 RX ORDER — ACETAMINOPHEN 325 MG/1
650 TABLET ORAL ONCE
Status: CANCELLED | OUTPATIENT
Start: 2020-02-28

## 2020-02-27 ENCOUNTER — OFFICE VISIT (OUTPATIENT)
Dept: HEMATOLOGY ONCOLOGY | Facility: CLINIC | Age: 75
End: 2020-02-27
Payer: COMMERCIAL

## 2020-02-27 VITALS
WEIGHT: 176.8 LBS | HEART RATE: 67 BPM | SYSTOLIC BLOOD PRESSURE: 116 MMHG | BODY MASS INDEX: 26.19 KG/M2 | HEIGHT: 69 IN | TEMPERATURE: 97.8 F | OXYGEN SATURATION: 99 % | DIASTOLIC BLOOD PRESSURE: 64 MMHG | RESPIRATION RATE: 17 BRPM

## 2020-02-27 DIAGNOSIS — C90.00 IGG MULTIPLE MYELOMA (HCC): ICD-10-CM

## 2020-02-27 PROCEDURE — 3078F DIAST BP <80 MM HG: CPT | Performed by: INTERNAL MEDICINE

## 2020-02-27 PROCEDURE — 99215 OFFICE O/P EST HI 40 MIN: CPT | Performed by: INTERNAL MEDICINE

## 2020-02-27 PROCEDURE — 1160F RVW MEDS BY RX/DR IN RCRD: CPT | Performed by: INTERNAL MEDICINE

## 2020-02-27 PROCEDURE — 3074F SYST BP LT 130 MM HG: CPT | Performed by: INTERNAL MEDICINE

## 2020-02-27 PROCEDURE — 3008F BODY MASS INDEX DOCD: CPT | Performed by: INTERNAL MEDICINE

## 2020-02-27 PROCEDURE — 1036F TOBACCO NON-USER: CPT | Performed by: INTERNAL MEDICINE

## 2020-02-27 PROCEDURE — 4040F PNEUMOC VAC/ADMIN/RCVD: CPT | Performed by: INTERNAL MEDICINE

## 2020-02-27 RX ORDER — SODIUM CHLORIDE 9 MG/ML
250 INJECTION, SOLUTION INTRAVENOUS CONTINUOUS
Status: CANCELLED | OUTPATIENT
Start: 2020-02-28

## 2020-02-27 RX ORDER — SODIUM CHLORIDE 9 MG/ML
250 INJECTION, SOLUTION INTRAVENOUS CONTINUOUS
Status: CANCELLED | OUTPATIENT
Start: 2020-03-13

## 2020-02-27 RX ORDER — SODIUM CHLORIDE 9 MG/ML
250 INJECTION, SOLUTION INTRAVENOUS ONCE
Status: CANCELLED | OUTPATIENT
Start: 2020-03-13

## 2020-02-27 RX ORDER — SODIUM CHLORIDE 9 MG/ML
250 INJECTION, SOLUTION INTRAVENOUS ONCE
Status: CANCELLED | OUTPATIENT
Start: 2020-02-28

## 2020-02-27 NOTE — LETTER
February 27, 2020     Iram Espino MD  [address]  [facility] [address]    Patient: Derek Martines   YOB: 1945   Date of Visit: 2/27/2020       Dear Dr Jose Raul Martinez: Thank you for referring Charbel Go to me for evaluation  Below are my notes for this consultation  If you have questions, please do not hesitate to call me  I look forward to following your patient along with you  Sincerely,        Alessandro Mina DO        CC: MD Alessandro Valentin DO  2/27/2020 12:50 PM  Sign at close encounter  [address]  [address] [facility]  [place] [address]  [facility] [id number]  [facility] [address],1945, [id number]  02/27/20    Discussion:   In summary, this is a 31-year-old male history of multiple myeloma  He was recently re-evaluated at [facility]  There was some uptake in his myeloma status  Bone marrow biopsy showed 10-15% monoclonal plasma cells  M protein increased only minimally, however  I suspect that the truth is probably somewhere between these 2 things with serologic tests indicating progression and the biopsy probably over estimating the progression through sampling issues  In any event, a change in treatment was recommended with the addition of Cytoxan 400 mg/m2 IV Q 2 weeks in addition to ongoing daratumumab, thalidomide 100 mg p o  Daily and dexamethasone q week  He has previously been on Cytoxan  This was generally well tolerated with the exception of thrombocytopenia  Obviously continued vigilance for thrombocytopenia will be carried out  Neulasta is planned to minimize impact of neutropenia which is expected  Hydration is added to diminish urologic complications  I discussed the above with the patient    The patient and his wife voiced understanding and agreement   ______________________________________________________________________    Chief Complaint Patient presents with    Follow-up       HPI:  Oncology History    June 2015- routine FU at [facility]  IgG multiple myeloma ([facility] [address] )    9/2015 Initial Diagnosis     Found to have Hbg of 6 with SOB, creatinine 1 8 and normal calcium with albumin of 2 1  Additonial blood work showed elevated protien level (12 1g/dL)  9/29/2015 Biopsy     Bone marrow biopsy demonstrated approximately 80% plasma cells with some immature forms noted  These studies showed 13q14 deletion, +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, T (4:14)        10/14/2015 - 11/11/2015 Chemotherapy     Velcade 1 3 mg/m2 Days 1,8,15,22  Cytoxan 300 mg/m2  PO Days 1, 8,15, 22  Dexamethasone 40 mg PO Days 1,8,15, 22  Zometa 4 mg IV Day 1  Cycle length = 28 days    Completed 2 cycles  Day one of cycle 2 was on 11/11/15       12/2015 - 2/2016 Chemotherapy     VDT-PACE x 2 cycles   (completed at [facility] in [address])      2/2016 Transplant     Melphalane 200 mg/m2 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  MRD +      8/2016 - 1/26/2018 Chemotherapy     Maintenance therapy:  Carfilzomib, orginally dosed 27 mg weekly then increased to 45 mg weekly after MRD reactivitation  Revlimid  Dexamethasone       2/2018 Biopsy     Bone marrow demonstrated positive minimal residual disease        2/23/2018 -  Chemotherapy     Daratumumab 16mg/m2 IV Day 1  Pomalyst 4 mg p o  daily 1-21  Dexamethasone 4 mg PO Days 2, 3  Dexamethasone 12 mg PODays 8, 15, 22  Cycle length = 28 days      4/11/2019 - 8/29/2019 Chemotherapy     methylPREDNISolone sodium succinate (Solu-MEDROL) 100 mg in sodium chloride 0 9 % 250 mL IVPB, 100 mg, Intravenous, Once, 5 of 12 cycles  Administration: 100 mg (6/7/2019), 100 mg (7/5/2019), 100 mg (8/2/2019)      8/30/2019 - 11/8/2019 Chemotherapy     cyclophosphamide (CYTOXAN) 1,000 mg in sodium chloride 0 9 % 250 mL IVPB, 990 mg (66 7 % of original dose 750 mg/m2), Intravenous, Once, 2 of 3 cycles  Dose modification: 500 mg/m2 (original dose 750 mg/m2, Cycle 1, Reason: Other (See Comments)), 250 mg/m2 (original dose 750 mg/m2, Cycle 4, Reason: Treatment Parameters Not Met)  Administration: 1,000 mg (8/30/2019), 1,000 mg (9/13/2019), 1,000 mg (9/27/2019), 500 mg (10/11/2019)  bortezomib (VELCADE) subcutaneous injection 2 6 mg, 1 3 mg/m2 = 2 6 mg (86 7 % of original dose 1 5 mg/m2), Subcutaneous, Once, 3 of 4 cycles  Dose modification: 1 3 mg/m2 (original dose 1 5 mg/m2, Cycle 1, Reason: Other (See Comments))  Administration: 2 6 mg (8/30/2019), 2 6 mg (9/13/2019), 2 6 mg (10/25/2019), 2 6 mg (11/8/2019), 2 6 mg (9/27/2019), 2 6 mg (11/1/2019), 2 6 mg (9/20/2019), 2 6 mg (10/4/2019), 2 6 mg (10/11/2019), 2 6 mg (10/18/2019)      12/6/2019 -  Chemotherapy     methylPREDNISolone sodium succinate (Solu-MEDROL) 100 mg in sodium chloride 0 9 % 250 mL IVPB, 100 mg, Intravenous, Once, 3 of 12 cycles  Administration: 100 mg (12/6/2019), 100 mg (12/13/2019), 100 mg (12/20/2019), 100 mg (1/3/2020), 100 mg (1/10/2020), 100 mg (1/17/2020), 100 mg (1/31/2020), 100 mg (2/14/2020), 100 mg (12/27/2019), 100 mg (1/24/2020)         Interval History:  Clinically stable  0 - Asymptomatic    Review of Systems   Constitutional: Negative for chills and fever  HENT: Negative for nosebleeds  Eyes: Negative for discharge  Respiratory: Negative for cough and shortness of breath  Cardiovascular: Negative for chest pain  Gastrointestinal: Negative for abdominal pain, constipation and diarrhea  Endocrine: Negative for polydipsia  Genitourinary: Negative for hematuria  Musculoskeletal: Negative for arthralgias  Skin: Negative for color change  Allergic/Immunologic: Negative for immunocompromised state  Neurological: Negative for dizziness and headaches  Hematological: Negative for adenopathy  Psychiatric/Behavioral: Negative for agitation         Past Medical History:   Diagnosis Date    [facility]     Hypertension     History of HTN-stable since weight loss    Insomnia     Multiple myeloma (HCC)      Patient Active Problem List   Diagnosis    IgG multiple myeloma (HCC)    Persistent atrial fibrillation    Essential hypertension    Chronic ITP (idiopathic thrombocytopenia) (HCC)    Hyperlipidemia    Hypocalcemia    Hypothyroidism    Long term current use of systemic steroids    Other fatigue    Prophylactic measure    Hypogammaglobulinemia (HCC)       Current Outpatient Medications:     acetaminophen (TYLENOL) 325 mg tablet, Take 650 mg by mouth every 6 (six) hours as needed for mild pain, Disp: , Rfl:     aspirin 81 MG tablet, Take 81 mg by mouth daily  , Disp: , Rfl:     atorvastatin (LIPITOR) 20 mg tablet, TAKE (1) TABLET DAILY  , Disp: 30 tablet, Rfl: 0    Cholecalciferol (VITAMIN D-3 PO), Take 1 tablet by mouth daily  , Disp: , Rfl:     dexamethasone (DECADRON) 4 mg tablet, Take as directed (Patient taking differently: 12 mg weekly or as directed), Disp: 36 tablet, Rfl: 5    Docusate Sodium (COLACE PO), Take 1 tablet by mouth as needed  , Disp: , Rfl:     eltrombopag (PROMACTA) 25 mg tablet, Take 3 tablets (75 mg total) by mouth daily Administer on an empty stomach, 1 hour before or 2 hours after a meal , Disp: 30 tablet, Rfl: 6    escitalopram (LEXAPRO) 20 mg tablet, Take 1 tablet (20 mg total) by mouth daily, Disp: 90 tablet, Rfl: 0    fexofenadine (ALLEGRA) 180 MG tablet, Take 180 mg by mouth 2 (two) times a day as needed, Disp: , Rfl:     Glutamine POWD, by Does not apply route, Disp: , Rfl:     levothyroxine 50 mcg tablet, Take 1 tablet (50 mcg total) by mouth daily, Disp: 90 tablet, Rfl: 3    LORazepam (ATIVAN) 0 5 mg tablet, Take 0 5 mg by mouth every 6 (six) hours as needed for anxiety  , Disp: , Rfl:     Multiple Vitamin (MULTIVITAMINS PO), Take 1 tablet by mouth daily  , Disp: , Rfl:     ondansetron (ZOFRAN) 4 mg tablet, Take 4 mg by mouth every 8 (eight) hours as needed for nausea or vomiting , Disp: , Rfl:     thalidomide (THALOMID) 100 MG capsule, Take 100 mg by mouth daily at bedtime, Disp: , Rfl:     acyclovir (ZOVIRAX) 400 MG tablet, Take 1 tablet (400 mg total) by mouth 2 (two) times a day for 120 days (Patient taking differently: Take 200 mg by mouth 2 (two) times a day ), Disp: 60 tablet, Rfl: 3  No Known Allergies  Past Surgical History:   Procedure Laterality Date    APPENDECTOMY      Last assessed: 9/25/15    ARTHROSCOPY KNEE Left     9/30/09    EYE SURGERY      Lasik    KNEE CARTILAGE SURGERY      LIMBAL STEM CELL TRANSPLANT      Patient had 2 in [place]-2/29/2016 and 4/13/2016     Social History     Objective:  Vitals:    02/27/20 1129   BP: 116/64   BP Location: Right arm   Patient Position: Sitting   Pulse: 67   Resp: 17   Temp: 97 8 °F (36 6 °C)   TempSrc: Tympanic   SpO2: 99%   Weight: 80 2 kg (176 lb 12 8 oz)   Height: 5' 8 8" (1 748 m)     Physical Exam   Constitutional: He is oriented to person, place, and time  He appears well-developed  HENT:   Head: Normocephalic  Eyes: Pupils are equal, round, and reactive to light  Neck: Neck supple  Cardiovascular: Normal rate and regular rhythm  No murmur heard  Pulmonary/Chest: Breath sounds normal  He has no wheezes  He has no rales  Abdominal: Soft  There is no tenderness  Musculoskeletal: Normal range of motion  He exhibits no edema or tenderness  Lymphadenopathy:     He has no cervical adenopathy  Neurological: He is alert and oriented to person, place, and time  He has normal reflexes  No cranial nerve deficit  Skin: No rash noted  No erythema  Psychiatric: He has a normal mood and affect  His behavior is normal          Labs: I personally reviewed the labs and imaging pertinent to this patient care  Cheek Interpolation Flap Text: A decision was made to reconstruct the defect utilizing an interpolation axial flap and a staged reconstruction.  A telfa template was made of the defect.  This telfa template was then used to outline the Cheek Interpolation flap.  The donor area for the pedicle flap was then injected with anesthesia.  The flap was excised through the skin and subcutaneous tissue down to the layer of the underlying musculature.  The interpolation flap was carefully excised within this deep plane to maintain its blood supply.  The edges of the donor site were undermined.   The donor site was closed in a primary fashion.  The pedicle was then rotated into position and sutured.  Once the tube was sutured into place, adequate blood supply was confirmed with blanching and refill.  The pedicle was then wrapped with xeroform gauze and dressed appropriately with a telfa and gauze bandage to ensure continued blood supply and protect the attached pedicle.

## 2020-02-27 NOTE — LETTER
February 27, 2020     Gildardo Fletcher MD  [address]  [facility]  [address]    Patient: Julian Masters   YOB: 1945   Date of Visit: 2/27/2020       Dear Dr Janes Vickers: Thank you for referring Kasandra Rojas to me for evaluation  Below are my notes for this consultation  If you have questions, please do not hesitate to call me  I look forward to following your patient along with you  Sincerely,        Davonna Cheadle, DO        CC: Geraldene Locker, MD Davonna Cheadle, DO  2/27/2020 12:49 PM  Incomplete  [facility] [place]  [address] [facility]  [address]  [facility] [address]  [phone]    Leyla Hwang,1945, [id number]  02/27/20    Discussion:   In summary, this is a 72-year-old male history of multiple myeloma  He was recently re-evaluated at [facility]  There was some uptake in his myeloma status  Bone marrow biopsy showed 10-15% monoclonal plasma cells  M protein increased only minimally, however  I suspect that the truth is probably somewhere between these 2 things with serologic tests indicating progression and the biopsy probably over estimating the progression through sampling issues  In any event, a change in treatment was recommended with the addition of Cytoxan 400 mg/m2 IV Q 2 weeks in addition to ongoing daratumumab, thalidomide 100 mg p o  Daily and dexamethasone q week  He has previously been on Cytoxan  This was generally well tolerated with the exception of thrombocytopenia  Obviously continued vigilance for thrombocytopenia will be carried out  Neulasta is planned to minimize impact of neutropenia which is expected  Hydration is added to diminish urologic complications  I discussed the above with the patient    The patient and his wife voiced understanding and agreement   ______________________________________________________________________    Chief Complaint   Patient presents with    Follow-up       HPI:  Oncology History    June 2015- routine FU at PCP all good  IgG multiple myeloma ([facility] [address] )    9/2015 Initial Diagnosis     Found to have Hbg of 6 with SOB, creatinine 1 8 and normal calcium with albumin of 2 1  Additonial blood work showed elevated protien level (12 1g/dL)  9/29/2015 Biopsy     Bone marrow biopsy demonstrated approximately 80% plasma cells with some immature forms noted  These studies showed 13q14 deletion, +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, T (4:14)        10/14/2015 - 11/11/2015 Chemotherapy     Velcade 1 3 mg/m2 Days 1,8,15,22  Cytoxan 300 mg/m2  PO Days 1, 8,15, 22  Dexamethasone 40 mg PO Days 1,8,15, 22  Zometa 4 mg IV Day 1  Cycle length = 28 days    Completed 2 cycles  Day one of cycle 2 was on 11/11/15       12/2015 - 2/2016 Chemotherapy     VDT-PACE x 2 cycles   (completed at [facility] in [address])      2/2016 Transplant     Melphalane 200 mg/m2 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  MRD +      8/2016 - 1/26/2018 Chemotherapy     Maintenance therapy:  Carfilzomib, orginally dosed 27 mg weekly then increased to 45 mg weekly after MRD reactivitation  Revlimid  Dexamethasone       2/2018 Biopsy     Bone marrow demonstrated positive minimal residual disease        2/23/2018 -  Chemotherapy     Daratumumab 16mg/m2 IV Day 1  Pomalyst 4 mg p o  daily 1-21  Dexamethasone 4 mg PO Days 2, 3  Dexamethasone 12 mg PODays 8, 15, 22  Cycle length = 28 days      4/11/2019 - 8/29/2019 Chemotherapy     methylPREDNISolone sodium succinate (Solu-MEDROL) 100 mg in sodium chloride 0 9 % 250 mL IVPB, 100 mg, Intravenous, Once, 5 of 12 cycles  Administration: 100 mg (6/7/2019), 100 mg (7/5/2019), 100 mg (8/2/2019)      8/30/2019 - 11/8/2019 Chemotherapy     cyclophosphamide (CYTOXAN) 1,000 mg in sodium chloride 0 9 % 250 mL IVPB, 990 mg (66 7 % of original dose 750 mg/m2), Intravenous, Once, 2 of 3 cycles  Dose modification: 500 mg/m2 (original dose 750 mg/m2, Cycle 1, Reason: Other (See Comments)), 250 mg/m2 (original dose 750 mg/m2, Cycle 4, Reason: Treatment Parameters Not Met)  Administration: 1,000 mg (8/30/2019), 1,000 mg (9/13/2019), 1,000 mg (9/27/2019), 500 mg (10/11/2019)  bortezomib (VELCADE) subcutaneous injection 2 6 mg, 1 3 mg/m2 = 2 6 mg (86 7 % of original dose 1 5 mg/m2), Subcutaneous, Once, 3 of 4 cycles  Dose modification: 1 3 mg/m2 (original dose 1 5 mg/m2, Cycle 1, Reason: Other (See Comments))  Administration: 2 6 mg (8/30/2019), 2 6 mg (9/13/2019), 2 6 mg (10/25/2019), 2 6 mg (11/8/2019), 2 6 mg (9/27/2019), 2 6 mg (11/1/2019), 2 6 mg (9/20/2019), 2 6 mg (10/4/2019), 2 6 mg (10/11/2019), 2 6 mg (10/18/2019)      12/6/2019 -  Chemotherapy     methylPREDNISolone sodium succinate (Solu-MEDROL) 100 mg in sodium chloride 0 9 % 250 mL IVPB, 100 mg, Intravenous, Once, 3 of 12 cycles  Administration: 100 mg (12/6/2019), 100 mg (12/13/2019), 100 mg (12/20/2019), 100 mg (1/3/2020), 100 mg (1/10/2020), 100 mg (1/17/2020), 100 mg (1/31/2020), 100 mg (2/14/2020), 100 mg (12/27/2019), 100 mg (1/24/2020)         Interval History:  Clinically stable  0 - Asymptomatic    Review of Systems   Constitutional: Negative for chills and fever  HENT: Negative for nosebleeds  Eyes: Negative for discharge  Respiratory: Negative for cough and shortness of breath  Cardiovascular: Negative for chest pain  Gastrointestinal: Negative for abdominal pain, constipation and diarrhea  Endocrine: Negative for polydipsia  Genitourinary: Negative for hematuria  Musculoskeletal: Negative for arthralgias  Skin: Negative for color change  Allergic/Immunologic: Negative for immunocompromised state  Neurological: Negative for dizziness and headaches  Hematological: Negative for adenopathy  Psychiatric/Behavioral: Negative for agitation         Past Medical History:   Diagnosis Date    History of autologous stem cell [facility] ([facility] [address] )     Hypertension     History of HTN-stable since weight loss    Insomnia     Multiple myeloma (HCC)      Patient Active Problem List   Diagnosis    IgG multiple myeloma (HCC)    Persistent atrial fibrillation    Essential hypertension    Chronic ITP (idiopathic thrombocytopenia) (HCC)    Hyperlipidemia    Hypocalcemia    Hypothyroidism    Long term current use of systemic steroids    Other fatigue    Prophylactic measure    Hypogammaglobulinemia (HCC)       Current Outpatient Medications:     acetaminophen (TYLENOL) 325 mg tablet, Take 650 mg by mouth every 6 (six) hours as needed for mild pain, Disp: , Rfl:     aspirin 81 MG tablet, Take 81 mg by mouth daily  , Disp: , Rfl:     atorvastatin (LIPITOR) 20 mg tablet, TAKE (1) TABLET DAILY  , Disp: 30 tablet, Rfl: 0    Cholecalciferol (VITAMIN D-3 PO), Take 1 tablet by mouth daily  , Disp: , Rfl:     dexamethasone (DECADRON) 4 mg tablet, Take as directed (Patient taking differently: 12 mg weekly or as directed), Disp: 36 tablet, Rfl: 5    Docusate Sodium (COLACE PO), Take 1 tablet by mouth as needed  , Disp: , Rfl:     eltrombopag (PROMACTA) 25 mg tablet, Take 3 tablets (75 mg total) by mouth daily Administer on an empty stomach, 1 hour before or 2 hours after a meal , Disp: 30 tablet, Rfl: 6    escitalopram (LEXAPRO) 20 mg tablet, Take 1 tablet (20 mg total) by mouth daily, Disp: 90 tablet, Rfl: 0    fexofenadine (ALLEGRA) 180 MG tablet, Take 180 mg by mouth 2 (two) times a day as needed, Disp: , Rfl:     Glutamine POWD, by Does not apply route, Disp: , Rfl:     levothyroxine 50 mcg tablet, Take 1 tablet (50 mcg total) by mouth daily, Disp: 90 tablet, Rfl: 3    LORazepam (ATIVAN) 0 5 mg tablet, Take 0 5 mg by mouth every 6 (six) hours as needed for anxiety  , Disp: , Rfl:     Multiple Vitamin (MULTIVITAMINS PO), Take 1 tablet by mouth daily  , Disp: , Rfl:     ondansetron (ZOFRAN) 4 mg tablet, Take 4 mg by mouth every 8 (eight) hours as needed for nausea or vomiting , Disp: , Rfl:     thalidomide (THALOMID) 100 MG capsule, Take 100 mg by mouth daily at bedtime, Disp: , Rfl:     acyclovir (ZOVIRAX) 400 MG tablet, Take 1 tablet (400 mg total) by mouth 2 (two) times a day for 120 days (Patient taking differently: Take 200 mg by mouth 2 (two) times a day ), Disp: 60 tablet, Rfl: 3  No Known Allergies  Past Surgical History:   Procedure Laterality Date    APPENDECTOMY      Last assessed: 9/25/15    ARTHROSCOPY KNEE Left     9/30/09    EYE SURGERY      Lasik    KNEE CARTILAGE SURGERY      LIMBAL STEM CELL TRANSPLANT      Patient had 2 in [place]-2/29/2016 and 4/13/2016     Social History     Objective:  Vitals:    02/27/20 1129   BP: 116/64   BP Location: Right arm   Patient Position: Sitting   Pulse: 67   Resp: 17   Temp: 97 8 °F (36 6 °C)   TempSrc: Tympanic   SpO2: 99%   Weight: 80 2 kg (176 lb 12 8 oz)   Height: 5' 8 8" (1 748 m)     Physical Exam   Constitutional: He is oriented to person, place, and time  He appears well-developed  HENT:   Head: Normocephalic  Eyes: Pupils are equal, round, and reactive to light  Neck: Neck supple  Cardiovascular: Normal rate and regular rhythm  No murmur heard  Pulmonary/Chest: Breath sounds normal  He has no wheezes  He has no rales  Abdominal: Soft  There is no tenderness  Musculoskeletal: Normal range of motion  He exhibits no edema or tenderness  Lymphadenopathy:     He has no cervical adenopathy  Neurological: He is alert and oriented to person, place, and time  He has normal reflexes  No cranial nerve deficit  Skin: No rash noted  No erythema  Psychiatric: He has a normal mood and affect  His behavior is normal          Labs: I personally reviewed the labs and imaging pertinent to this patient care      Gypsy Murphy DO  2/27/2020 11:46 AM  Sign at close encounter  [address]  [phone]    Natanael Zimmerman [place]iden,1945, [id number]  02/27/20    Discussion:   ***    I discussed the above with the patient  The patient *** voiced understanding and agreement   ______________________________________________________________________    Chief Complaint   Patient presents with    Follow-up       HPI:  Oncology History    June 2015- routine FU at PCP all good  IgG multiple myeloma ([facility] [address] )    9/2015 Initial Diagnosis     Found to have Hbg of 6 with SOB, creatinine 1 8 and normal calcium with albumin of 2 1  Additonial blood work showed elevated protien level (12 1g/dL)  9/29/2015 Biopsy     Bone marrow biopsy demonstrated approximately 80% plasma cells with some immature forms noted  These studies showed 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 deletion, +1q21, T (4:14)        10/14/2015 - 11/11/2015 Chemotherapy     Velcade 1 3 mg/m2 Days 1,8,15,22  Cytoxan 300 mg/m2  PO Days 1, 8,15, 22  Dexamethasone 40 mg PO Days 1,8,15, 22  Zometa 4 mg IV Day 1  Cycle length = 28 days    Completed 2 cycles  Day one of cycle 2 was on 11/11/15       12/2015 - 2/2016 Chemotherapy     VDT-PACE x 2 cycles   (completed at [facility] in [place], [address] [place] [address])      2/2016 Transplant     Melphalane 200 mg/m2 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  MRD +      8/2016 - 1/26/2018 Chemotherapy     Maintenance therapy:  Carfilzomib, orginally dosed 27 mg weekly then increased to 45 mg weekly after MRD reactivitation  Revlimid  Dexamethasone       2/2018 Biopsy     Bone marrow demonstrated positive minimal residual disease        2/23/2018 -  Chemotherapy     Daratumumab 16mg/m2 IV Day 1  Pomalyst 4 mg p o  daily 1-21  Dexamethasone 4 mg PO Days 2, 3  Dexamethasone 12 mg PODays 8, 15, 22  Cycle length = 28 days      4/11/2019 - 8/29/2019 Chemotherapy     methylPREDNISolone sodium succinate (Solu-MEDROL) 100 mg in sodium chloride 0 9 % 250 mL IVPB, 100 mg, Intravenous, Once, 5 of 12 cycles  Administration: 100 mg (6/7/2019), 100 mg (7/5/2019), 100 mg (8/2/2019)      8/30/2019 - 11/8/2019 Chemotherapy     cyclophosphamide (CYTOXAN) 1,000 mg in sodium chloride 0 9 % 250 mL IVPB, 990 mg (66 7 % of original dose 750 mg/m2), Intravenous, Once, 2 of 3 cycles  Dose modification: 500 mg/m2 (original dose 750 mg/m2, Cycle 1, Reason: Other (See Comments)), 250 mg/m2 (original dose 750 mg/m2, Cycle 4, Reason: Treatment Parameters Not Met)  Administration: 1,000 mg (8/30/2019), 1,000 mg (9/13/2019), 1,000 mg (9/27/2019), 500 mg (10/11/2019)  bortezomib (VELCADE) subcutaneous injection 2 6 mg, 1 3 mg/m2 = 2 6 mg (86 7 % of original dose 1 5 mg/m2), Subcutaneous, Once, 3 of 4 cycles  Dose modification: 1 3 mg/m2 (original dose 1 5 mg/m2, Cycle 1, Reason: Other (See Comments))  Administration: 2 6 mg (8/30/2019), 2 6 mg (9/13/2019), 2 6 mg (10/25/2019), 2 6 mg (11/8/2019), 2 6 mg (9/27/2019), 2 6 mg (11/1/2019), 2 6 mg (9/20/2019), 2 6 mg (10/4/2019), 2 6 mg (10/11/2019), 2 6 mg (10/18/2019)      12/6/2019 -  Chemotherapy     methylPREDNISolone sodium succinate (Solu-MEDROL) 100 mg in sodium chloride 0 9 % 250 mL IVPB, 100 mg, Intravenous, Once, 3 of 12 cycles  Administration: 100 mg (12/6/2019), 100 mg (12/13/2019), 100 mg (12/20/2019), 100 mg (1/3/2020), 100 mg (1/10/2020), 100 mg (1/17/2020), 100 mg (1/31/2020), 100 mg (2/14/2020), 100 mg (12/27/2019), 100 mg (1/24/2020)         Interval History:  ***  {performance status:75478}    Review of Systems    Past Medical History:   Diagnosis Date    History of autologous [facility] cell transplant ([facility] )     Hypertension     History of HTN-stable since weight loss    Insomnia     Multiple myeloma ([facility] )      Patient Active Problem List   Diagnosis    IgG multiple myeloma (HCC)    Persistent atrial fibrillation    Essential hypertension    Chronic ITP (idiopathic thrombocytopenia) (HCC)    Hyperlipidemia    Hypocalcemia    Hypothyroidism    Long term current use of systemic steroids    Other fatigue    Prophylactic measure    Hypogammaglobulinemia (HCC)       Current Outpatient Medications:     acetaminophen (TYLENOL) 325 mg tablet, Take 650 mg by mouth every 6 (six) hours as needed for mild pain, Disp: , Rfl:     aspirin 81 MG tablet, Take 81 mg by mouth daily  , Disp: , Rfl:     atorvastatin (LIPITOR) 20 mg tablet, TAKE (1) TABLET DAILY  , Disp: 30 tablet, Rfl: 0    Cholecalciferol (VITAMIN D-3 PO), Take 1 tablet by mouth daily  , Disp: , Rfl:     dexamethasone (DECADRON) 4 mg tablet, Take as directed (Patient taking differently: 12 mg weekly or as directed), Disp: 36 tablet, Rfl: 5    Docusate Sodium (COLACE PO), Take 1 tablet by mouth as needed  , Disp: , Rfl:     eltrombopag (PROMACTA) 25 mg tablet, Take 3 tablets (75 mg total) by mouth daily Administer on an empty stomach, 1 hour before or 2 hours after a meal , Disp: 30 tablet, Rfl: 6    escitalopram (LEXAPRO) 20 mg tablet, Take 1 tablet (20 mg total) by mouth daily, Disp: 90 tablet, Rfl: 0    fexofenadine (ALLEGRA) 180 MG tablet, Take 180 mg by mouth 2 (two) times a day as needed, Disp: , Rfl:     Glutamine POWD, by Does not apply route, Disp: , Rfl:     levothyroxine 50 mcg tablet, Take 1 tablet (50 mcg total) by mouth daily, Disp: 90 tablet, Rfl: 3    LORazepam (ATIVAN) 0 5 mg tablet, Take 0 5 mg by mouth every 6 (six) hours as needed for anxiety  , Disp: , Rfl:     Multiple Vitamin (MULTIVITAMINS PO), Take 1 tablet by mouth daily  , Disp: , Rfl:     ondansetron (ZOFRAN) 4 mg tablet, Take 4 mg by mouth every 8 (eight) hours as needed for nausea or vomiting , Disp: , Rfl:     thalidomide (THALOMID) 100 MG capsule, Take 100 mg by mouth daily at bedtime, Disp: , Rfl:     acyclovir (ZOVIRAX) 400 MG tablet, Take 1 tablet (400 mg total) by mouth 2 (two) times a day for 120 days (Patient taking differently: Take 200 mg by mouth 2 (two) times a day ), Disp: 60 tablet, Rfl: 3  No Known Allergies  Past Surgical History:   Procedure Laterality Date    APPENDECTOMY Last assessed: 9/25/15    ARTHROSCOPY KNEE Left     9/30/09    EYE SURGERY      Lasik    KNEE CARTILAGE SURGERY      LIMBAL STEM CELL TRANSPLANT      Patient had 2 in [place]-2/29/2016 and 4/13/2016     Social History     Objective:  Vitals:    02/27/20 1129   BP: 116/64   BP Location: Right arm   Patient Position: Sitting   Pulse: 67   Resp: 17   Temp: 97 8 °F (36 6 °C)   TempSrc: Tympanic   SpO2: 99%   Weight: 80 2 kg (176 lb 12 8 oz)   Height: 5' 8 8" (1 748 m)     Physical Exam      Labs: I personally reviewed the labs and imaging pertinent to this patient care

## 2020-02-27 NOTE — PROGRESS NOTES
[facility] [place]  [address] [facility] [address]  [address] [place] [address] Jaiden,1945, [id number]  02/27/20    Discussion:   In summary, this is a 24-year-old male history of multiple myeloma  He was recently re-evaluated at [facility]  There was some uptake in his myeloma status  Bone marrow biopsy showed 10-15% monoclonal plasma cells  M protein increased only minimally, however  I suspect that the truth is probably somewhere between these 2 things with serologic tests indicating progression and the biopsy probably over estimating the progression through sampling issues  In any event, a change in treatment was recommended with the addition of Cytoxan 400 mg/m2 IV Q 2 weeks in addition to ongoing daratumumab, thalidomide 100 mg p o  Daily and dexamethasone q week  He has previously been on Cytoxan  This was generally well tolerated with the exception of thrombocytopenia  Obviously continued vigilance for thrombocytopenia will be carried out  Neulasta is planned to minimize impact of neutropenia which is expected  Hydration is added to diminish urologic complications  I discussed the above with the patient  The patient and his wife voiced understanding and agreement   ______________________________________________________________________    Chief Complaint   Patient presents with    Follow-up       HPI:  Oncology History    June 2015- routine FU at PCP all good  IgG multiple myeloma ([facility] [address] )    9/2015 Initial Diagnosis     Found to have Hbg of 6 with SOB, creatinine 1 8 and normal calcium with albumin of 2 1  Additonial blood work showed elevated protien level (12 1g/dL)  9/29/2015 Biopsy     Bone marrow biopsy demonstrated approximately 80% plasma cells with some immature forms noted    These studies showed 13q14 deletion, +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, T (4:14)        10/14/2015 - 11/11/2015 Chemotherapy     Velcade 1 3 mg/m2 Days 1,8,15,22  Cytoxan 300 mg/m2  PO Days 1, 8,15, 22  Dexamethasone 40 mg PO Days 1,8,15, 22  Zometa 4 mg IV Day 1  Cycle length = 28 days    Completed 2 cycles  Day one of cycle 2 was on 11/11/15       12/2015 - 2/2016 Chemotherapy     VDT-PACE x 2 cycles   (completed at [facility] in [place], [address] [place] [address] [place])      2/2016 Transplant     Melphalane 200 mg/m2 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  MRD +      8/2016 - 1/26/2018 Chemotherapy     Maintenance therapy:  Carfilzomib, orginally dosed 27 mg weekly then increased to 45 mg weekly after MRD reactivitation  Revlimid  Dexamethasone       2/2018 Biopsy     Bone marrow demonstrated positive minimal residual disease        2/23/2018 -  Chemotherapy     Daratumumab 16mg/m2 IV Day 1  Pomalyst 4 mg p o  daily 1-21  Dexamethasone 4 mg PO Days 2, 3  Dexamethasone 12 mg PODays 8, 15, 22  Cycle length = 28 days      4/11/2019 - 8/29/2019 Chemotherapy     methylPREDNISolone sodium succinate (Solu-MEDROL) 100 mg in sodium chloride 0 9 % 250 mL IVPB, 100 mg, Intravenous, Once, 5 of 12 cycles  Administration: 100 mg (6/7/2019), 100 mg (7/5/2019), 100 mg (8/2/2019)      8/30/2019 - 11/8/2019 Chemotherapy     cyclophosphamide (CYTOXAN) 1,000 mg in sodium chloride 0 9 % 250 mL IVPB, 990 mg (66 7 % of original dose 750 mg/m2), Intravenous, Once, 2 of 3 cycles  Dose modification: 500 mg/m2 (original dose 750 mg/m2, Cycle 1, Reason: Other (See Comments)), 250 mg/m2 (original dose 750 mg/m2, Cycle 4, Reason: Treatment Parameters Not Met)  Administration: 1,000 mg (8/30/2019), 1,000 mg (9/13/2019), 1,000 mg (9/27/2019), 500 mg (10/11/2019)  bortezomib (VELCADE) subcutaneous injection 2 6 mg, 1 3 mg/m2 = 2 6 mg (86 7 % of original dose 1 5 mg/m2), Subcutaneous, Once, 3 of 4 cycles  Dose modification: 1 3 mg/m2 (original dose 1 5 mg/m2, Cycle 1, Reason: Other (See Comments))  Administration: 2 6 mg (8/30/2019), 2 6 mg (9/13/2019), 2 6 mg (10/25/2019), 2 6 mg (11/8/2019), 2 6 mg (9/27/2019), 2 6 mg (11/1/2019), 2 6 mg (9/20/2019), 2 6 mg (10/4/2019), 2 6 mg (10/11/2019), 2 6 mg (10/18/2019)      12/6/2019 -  Chemotherapy     methylPREDNISolone sodium succinate (Solu-MEDROL) 100 mg in sodium chloride 0 9 % 250 mL IVPB, 100 mg, Intravenous, Once, 3 of 12 cycles  Administration: 100 mg (12/6/2019), 100 mg (12/13/2019), 100 mg (12/20/2019), 100 mg (1/3/2020), 100 mg (1/10/2020), 100 mg (1/17/2020), 100 mg (1/31/2020), 100 mg (2/14/2020), 100 mg (12/27/2019), 100 mg (1/24/2020)         Interval History:  Clinically stable  0 - Asymptomatic    Review of Systems   Constitutional: Negative for chills and fever  HENT: Negative for nosebleeds  Eyes: Negative for discharge  Respiratory: Negative for cough and shortness of breath  Cardiovascular: Negative for chest pain  Gastrointestinal: Negative for abdominal pain, constipation and diarrhea  Endocrine: Negative for polydipsia  Genitourinary: Negative for hematuria  Musculoskeletal: Negative for arthralgias  Skin: Negative for color change  Allergic/Immunologic: Negative for immunocompromised state  Neurological: Negative for dizziness and headaches  Hematological: Negative for adenopathy  Psychiatric/Behavioral: Negative for agitation         Past Medical History:   Diagnosis Date    History of autologous stem cell transplant ([facility] [address] )     Hypertension     History of HTN-stable since weight loss    Insomnia     Multiple myeloma (HCC)      Patient Active Problem List   Diagnosis    IgG multiple myeloma (HCC)    Persistent atrial fibrillation    Essential hypertension    Chronic ITP (idiopathic thrombocytopenia) (HCC)    Hyperlipidemia    Hypocalcemia    Hypothyroidism    Long term current use of systemic steroids    Other fatigue    Prophylactic measure    Hypogammaglobulinemia (HCC)       Current Outpatient Medications:     acetaminophen (TYLENOL) 325 mg tablet, Take 650 mg by mouth every 6 (six) hours as needed for mild pain, Disp: , Rfl:     aspirin 81 MG tablet, Take 81 mg by mouth daily  , Disp: , Rfl:     atorvastatin (LIPITOR) 20 mg tablet, TAKE (1) TABLET DAILY  , Disp: 30 tablet, Rfl: 0    Cholecalciferol (VITAMIN D-3 PO), Take 1 tablet by mouth daily  , Disp: , Rfl:     dexamethasone (DECADRON) 4 mg tablet, Take as directed (Patient taking differently: 12 mg weekly or as directed), Disp: 36 tablet, Rfl: 5    Docusate Sodium (COLACE PO), Take 1 tablet by mouth as needed  , Disp: , Rfl:     eltrombopag (PROMACTA) 25 mg tablet, Take 3 tablets (75 mg total) by mouth daily Administer on an empty stomach, 1 hour before or 2 hours after a meal , Disp: 30 tablet, Rfl: 6    escitalopram (LEXAPRO) 20 mg tablet, Take 1 tablet (20 mg total) by mouth daily, Disp: 90 tablet, Rfl: 0    fexofenadine (ALLEGRA) 180 MG tablet, Take 180 mg by mouth 2 (two) times a day as needed, Disp: , Rfl:     Glutamine POWD, by Does not apply route, Disp: , Rfl:     levothyroxine 50 mcg tablet, Take 1 tablet (50 mcg total) by mouth daily, Disp: 90 tablet, Rfl: 3    LORazepam (ATIVAN) 0 5 mg tablet, Take 0 5 mg by mouth every 6 (six) hours as needed for anxiety  , Disp: , Rfl:     Multiple Vitamin (MULTIVITAMINS PO), Take 1 tablet by mouth daily  , Disp: , Rfl:     ondansetron (ZOFRAN) 4 mg tablet, Take 4 mg by mouth every 8 (eight) hours as needed for nausea or vomiting , Disp: , Rfl:     thalidomide (THALOMID) 100 MG capsule, Take 100 mg by mouth daily at bedtime, Disp: , Rfl:     acyclovir (ZOVIRAX) 400 MG tablet, Take 1 tablet (400 mg total) by mouth 2 (two) times a day for 120 days (Patient taking differently: Take 200 mg by mouth 2 (two) times a day ), Disp: 60 tablet, Rfl: 3  No Known Allergies  Past Surgical History:   Procedure Laterality Date    APPENDECTOMY      Last assessed: 9/25/15    ARTHROSCOPY KNEE Left     9/30/09    EYE SURGERY      Lasik    KNEE CARTILAGE SURGERY      LIMBAL STEM CELL TRANSPLANT      Patient had 2 in [place]-2/29/2016 and 4/13/2016     Social History     Objective:  Vitals:    02/27/20 1129   BP: 116/64   BP Location: Right arm   Patient Position: Sitting   Pulse: 67   Resp: 17   Temp: 97 8 °F (36 6 °C)   TempSrc: Tympanic   SpO2: 99%   Weight: 80 2 kg (176 lb 12 8 oz)   Height: 5' 8 8" (1 748 m)     Physical Exam   Constitutional: He is oriented to person, place, and time  He appears well-developed  HENT:   Head: Normocephalic  Eyes: Pupils are equal, round, and reactive to light  Neck: Neck supple  Cardiovascular: Normal rate and regular rhythm  No murmur heard  Pulmonary/Chest: Breath sounds normal  He has no wheezes  He has no rales  Abdominal: Soft  There is no tenderness  Musculoskeletal: Normal range of motion  He exhibits no edema or tenderness  Lymphadenopathy:     He has no cervical adenopathy  Neurological: He is alert and oriented to person, place, and time  He has normal reflexes  No cranial nerve deficit  Skin: No rash noted  No erythema  Psychiatric: He has a normal mood and affect  His behavior is normal          Labs: I personally reviewed the labs and imaging pertinent to this patient care

## 2020-02-28 ENCOUNTER — HOSPITAL ENCOUNTER (OUTPATIENT)
Dept: INFUSION CENTER | Facility: CLINIC | Age: 75
Discharge: HOME | End: 2020-02-28
Payer: COMMERCIAL

## 2020-02-28 VITALS
HEIGHT: 69 IN | BODY MASS INDEX: 26.12 KG/M2 | DIASTOLIC BLOOD PRESSURE: 74 MMHG | TEMPERATURE: 97.6 F | HEART RATE: 56 BPM | SYSTOLIC BLOOD PRESSURE: 109 MMHG | RESPIRATION RATE: 18 BRPM | WEIGHT: 176.37 LBS

## 2020-02-28 DIAGNOSIS — C90.00 IGG MULTIPLE MYELOMA (HCC): Primary | ICD-10-CM

## 2020-02-28 PROCEDURE — 96415 CHEMO IV INFUSION ADDL HR: CPT

## 2020-02-28 PROCEDURE — 96413 CHEMO IV INFUSION 1 HR: CPT

## 2020-02-28 PROCEDURE — 96417 CHEMO IV INFUS EACH ADDL SEQ: CPT

## 2020-02-28 PROCEDURE — 96377 APPLICATON ON-BODY INJECTOR: CPT

## 2020-02-28 PROCEDURE — 96367 TX/PROPH/DG ADDL SEQ IV INF: CPT

## 2020-02-28 PROCEDURE — 96375 TX/PRO/DX INJ NEW DRUG ADDON: CPT

## 2020-02-28 RX ORDER — ACETAMINOPHEN 325 MG/1
650 TABLET ORAL ONCE
Status: COMPLETED | OUTPATIENT
Start: 2020-02-28 | End: 2020-02-28

## 2020-02-28 RX ORDER — SODIUM CHLORIDE 9 MG/ML
20 INJECTION, SOLUTION INTRAVENOUS ONCE
Status: COMPLETED | OUTPATIENT
Start: 2020-02-28 | End: 2020-02-28

## 2020-02-28 RX ORDER — SODIUM CHLORIDE 9 MG/ML
250 INJECTION, SOLUTION INTRAVENOUS ONCE
Status: COMPLETED | OUTPATIENT
Start: 2020-02-28 | End: 2020-02-28

## 2020-02-28 RX ADMIN — CYCLOPHOSPHAMIDE 784 MG: 1 INJECTION, POWDER, FOR SOLUTION INTRAVENOUS; ORAL at 13:34

## 2020-02-28 RX ADMIN — DIPHENHYDRAMINE HYDROCHLORIDE 25 MG: 50 INJECTION, SOLUTION INTRAMUSCULAR; INTRAVENOUS at 08:30

## 2020-02-28 RX ADMIN — SODIUM CHLORIDE 250 ML/HR: 0.9 INJECTION, SOLUTION INTRAVENOUS at 08:30

## 2020-02-28 RX ADMIN — ACETAMINOPHEN 650 MG: 325 TABLET ORAL at 09:00

## 2020-02-28 RX ADMIN — SODIUM CHLORIDE 100 MG: 0.9 INJECTION, SOLUTION INTRAVENOUS at 08:45

## 2020-02-28 RX ADMIN — ONDANSETRON 16 MG: 2 INJECTION INTRAMUSCULAR; INTRAVENOUS at 13:15

## 2020-02-28 RX ADMIN — PEGFILGRASTIM 6 MG: KIT SUBCUTANEOUS at 13:36

## 2020-02-28 RX ADMIN — DARATUMUMAB 1300 MG: 100 INJECTION, SOLUTION, CONCENTRATE INTRAVENOUS at 09:33

## 2020-02-28 RX ADMIN — SODIUM CHLORIDE 20 ML/HR: 0.9 INJECTION, SOLUTION INTRAVENOUS at 08:30

## 2020-02-28 NOTE — PROGRESS NOTES
Patient tolerated Darzalex and Cytoxan infusions well  Neulasta Onpro placed on right arm  Pt offers no complaints  Pt is aware of all future appointments   Refused AVS

## 2020-03-01 NOTE — PROGRESS NOTES
Patient tolerated Cytoxan infusion and Velcade injection well  Offers no complaints  Pt is aware of all future appointments   Refused AVS 
TIME OUT:     Per Altagracia Callejas RN, A 50% dose reduction of Cytoxan will be in place r/t plt count of 20  Cornelia Calvo in pharmacy is aware 
wine

## 2020-03-02 ENCOUNTER — TELEPHONE (OUTPATIENT)
Dept: HEMATOLOGY ONCOLOGY | Facility: CLINIC | Age: 75
End: 2020-03-02

## 2020-03-02 DIAGNOSIS — E78.5 HYPERLIPIDEMIA, UNSPECIFIED HYPERLIPIDEMIA TYPE: ICD-10-CM

## 2020-03-02 RX ORDER — ATORVASTATIN CALCIUM 20 MG/1
20 TABLET, FILM COATED ORAL DAILY
Qty: 90 TABLET | Refills: 0 | Status: SHIPPED | OUTPATIENT
Start: 2020-03-02 | End: 2020-05-27 | Stop reason: SDUPTHER

## 2020-03-02 NOTE — TELEPHONE ENCOUNTER
Patient's wife, Christine Ramey, is calling in requesting for a script of acyclovir 200mg, she indicates that they discussed with Stacey Mendoza on Friday but when she called the pharmacy there was no script     They use [facility] in [facility]      Please return her call at :[phone]

## 2020-03-05 ENCOUNTER — APPOINTMENT (OUTPATIENT)
Dept: LAB | Facility: CLINIC | Age: 75
End: 2020-03-05
Payer: COMMERCIAL

## 2020-03-05 DIAGNOSIS — C90.00 IGG MULTIPLE MYELOMA (HCC): ICD-10-CM

## 2020-03-05 LAB
ALBUMIN SERPL BCP-MCNC: 3.3 G/DL (ref 3.5–5)
ALP SERPL-CCNC: 126 U/L (ref 46–116)
ALT SERPL W P-5'-P-CCNC: 19 U/L (ref 12–78)
ANION GAP SERPL CALCULATED.3IONS-SCNC: 11 MMOL/L (ref 4–13)
ANISOCYTOSIS BLD QL SMEAR: PRESENT
AST SERPL W P-5'-P-CCNC: 15 U/L (ref 5–45)
BASOPHILS # BLD MANUAL: 0.3 THOUSAND/UL (ref 0–0.1)
BASOPHILS NFR MAR MANUAL: 3 % (ref 0–1)
BILIRUB SERPL-MCNC: 0.64 MG/DL (ref 0.2–1)
BUN SERPL-MCNC: 21 MG/DL (ref 5–25)
CALCIUM SERPL-MCNC: 8.6 MG/DL (ref 8.3–10.1)
CHLORIDE SERPL-SCNC: 105 MMOL/L (ref 100–108)
CO2 SERPL-SCNC: 24 MMOL/L (ref 21–32)
CREAT SERPL-MCNC: 1.64 MG/DL (ref 0.6–1.3)
EOSINOPHIL # BLD MANUAL: 0.2 THOUSAND/UL (ref 0–0.4)
EOSINOPHIL NFR BLD MANUAL: 2 % (ref 0–6)
ERYTHROCYTE [DISTWIDTH] IN BLOOD BY AUTOMATED COUNT: 15.1 % (ref 11.6–15.1)
GFR SERPL CREATININE-BSD FRML MDRD: 41 ML/MIN/1.73SQ M
GLUCOSE SERPL-MCNC: 95 MG/DL (ref 65–140)
HCT VFR BLD AUTO: 37.5 % (ref 36.5–49.3)
HGB BLD-MCNC: 11.9 G/DL (ref 12–17)
LYMPHOCYTES # BLD AUTO: 0.1 THOUSAND/UL (ref 0.6–4.47)
LYMPHOCYTES # BLD AUTO: 1 % (ref 14–44)
MACROCYTES BLD QL AUTO: PRESENT
MCH RBC QN AUTO: 34.2 PG (ref 26.8–34.3)
MCHC RBC AUTO-ENTMCNC: 31.7 G/DL (ref 31.4–37.4)
MCV RBC AUTO: 108 FL (ref 82–98)
METAMYELOCYTES NFR BLD MANUAL: 1 % (ref 0–1)
MONOCYTES # BLD AUTO: 1.31 THOUSAND/UL (ref 0–1.22)
MONOCYTES NFR BLD: 13 % (ref 4–12)
MYELOCYTES NFR BLD MANUAL: 3 % (ref 0–1)
NEUTROPHILS # BLD MANUAL: 7.78 THOUSAND/UL (ref 1.85–7.62)
NEUTS BAND NFR BLD MANUAL: 15 % (ref 0–8)
NEUTS SEG NFR BLD AUTO: 62 % (ref 43–75)
NRBC BLD AUTO-RTO: 0 /100 WBCS
PLATELET # BLD AUTO: 90 THOUSANDS/UL (ref 149–390)
PLATELET BLD QL SMEAR: ABNORMAL
PMV BLD AUTO: 11.5 FL (ref 8.9–12.7)
POIKILOCYTOSIS BLD QL SMEAR: PRESENT
POLYCHROMASIA BLD QL SMEAR: PRESENT
POTASSIUM SERPL-SCNC: 3.2 MMOL/L (ref 3.5–5.3)
PROT SERPL-MCNC: 7.4 G/DL (ref 6.4–8.2)
RBC # BLD AUTO: 3.48 MILLION/UL (ref 3.88–5.62)
RBC MORPH BLD: PRESENT
SODIUM SERPL-SCNC: 140 MMOL/L (ref 136–145)
WBC # BLD AUTO: 10.1 THOUSAND/UL (ref 4.31–10.16)

## 2020-03-05 PROCEDURE — 85007 BL SMEAR W/DIFF WBC COUNT: CPT

## 2020-03-05 PROCEDURE — 85027 COMPLETE CBC AUTOMATED: CPT

## 2020-03-05 PROCEDURE — 36415 COLL VENOUS BLD VENIPUNCTURE: CPT

## 2020-03-05 PROCEDURE — 80053 COMPREHEN METABOLIC PANEL: CPT

## 2020-03-09 ENCOUNTER — TELEPHONE (OUTPATIENT)
Dept: HEMATOLOGY ONCOLOGY | Facility: CLINIC | Age: 75
End: 2020-03-09

## 2020-03-09 ENCOUNTER — HOSPITAL ENCOUNTER (OUTPATIENT)
Dept: RADIOLOGY | Facility: HOSPITAL | Age: 75
Discharge: HOME | End: 2020-03-09
Payer: COMMERCIAL

## 2020-03-09 DIAGNOSIS — J00 NASOPHARYNGITIS: ICD-10-CM

## 2020-03-09 DIAGNOSIS — J40 BRONCHITIS: ICD-10-CM

## 2020-03-09 DIAGNOSIS — R50.81 FEVER AND NEUTROPENIA (HCC): Primary | ICD-10-CM

## 2020-03-09 DIAGNOSIS — R05.9 COUGH: ICD-10-CM

## 2020-03-09 DIAGNOSIS — R05.9 COUGH: Primary | ICD-10-CM

## 2020-03-09 DIAGNOSIS — D70.9 FEVER AND NEUTROPENIA (HCC): Primary | ICD-10-CM

## 2020-03-09 PROCEDURE — 71046 X-RAY EXAM CHEST 2 VIEWS: CPT

## 2020-03-09 NOTE — TELEPHONE ENCOUNTER
Telephone Triage-Symptom Call      Derek Martines  1945  [phone]    [address]  Chief Complaint:Cough  Current Treatment patient:  Yes  Name of treatment:Daratumumab  Date of last treatment:2/28/20  Recent illness or Surgery: No      Description of symptoms per ONS Guidelines review (charting by exception): Patient's wife Vicsilvia De La Torrews called to report that patient is experiencing a nonproductive cough x 1 5 weeks  Occasionally he will have a productive cough for yellow/tan mucous  Patient temperature range 98 0 to 99 0   + clear nasal drainage, bilateral ears blockage  Estefania Perez is requesting that Dr Khari Yuan prescribe a Humza Feeling for patient  Preferred pharmacy:[facility], [address] [place] [address]  Guidelines followed: Yes    Disposition: Requesting Z-betty  NEXT STEPS REQUIRED: Call Estefania Perez if Dr Khari Yuan will order Z-betty       Patient verbalizes understanding and is in agreement with plan: YES    Verified that guidelines were completed and documented: YES

## 2020-03-09 NOTE — TELEPHONE ENCOUNTER
Spoke with patient's wife regarding his cough with congestion  RX approved by Dr Lynne Valdez for [address]  Also ordered a chest ray  Spoke with Mrs Soy aHuser, and Mr  Soy Hauser will be having this Xray today if possible

## 2020-03-10 ENCOUNTER — TELEPHONE (OUTPATIENT)
Dept: HEMATOLOGY ONCOLOGY | Facility: MEDICAL CENTER | Age: 75
End: 2020-03-10

## 2020-03-10 ENCOUNTER — TELEPHONE (OUTPATIENT)
Dept: INFUSION CENTER | Facility: HOSPITAL | Age: 75
End: 2020-03-10

## 2020-03-10 RX ORDER — AZITHROMYCIN 250 MG/1
TABLET, FILM COATED ORAL
Qty: 6 TABLET | Refills: 0 | Status: SHIPPED | OUTPATIENT
Start: 2020-03-10 | End: 2020-03-14

## 2020-03-10 RX ORDER — AZITHROMYCIN 250 MG/1
TABLET, FILM COATED ORAL
Qty: 6 TABLET | Refills: 0 | Status: SHIPPED | OUTPATIENT
Start: 2020-03-10 | End: 2020-03-13

## 2020-03-10 NOTE — TELEPHONE ENCOUNTER
Patient informed that Z-pac is available at [facility]  Patient had CXR yesterday, has not been read  Patient instructed to call office if respiratory symptoms do not improve after starting abx  Patient verbalizes understanding

## 2020-03-10 NOTE — TELEPHONE ENCOUNTER
Patient called requesting a refill on: ZPak  Patient has 0   pills left      [facility] pharmacy:    [facility]          Patient's call back #  [phone]

## 2020-03-11 ENCOUNTER — APPOINTMENT (OUTPATIENT)
Dept: LAB | Facility: CLINIC | Age: 75
End: 2020-03-11
Payer: COMMERCIAL

## 2020-03-11 DIAGNOSIS — C90.00 IGG MULTIPLE MYELOMA (HCC): ICD-10-CM

## 2020-03-11 LAB
ALBUMIN SERPL BCP-MCNC: 3 G/DL (ref 3.5–5)
ALP SERPL-CCNC: 92 U/L (ref 46–116)
ALT SERPL W P-5'-P-CCNC: 16 U/L (ref 12–78)
ANION GAP SERPL CALCULATED.3IONS-SCNC: 8 MMOL/L (ref 4–13)
AST SERPL W P-5'-P-CCNC: 15 U/L (ref 5–45)
BASOPHILS # BLD AUTO: 0.04 THOUSANDS/ΜL (ref 0–0.1)
BASOPHILS NFR BLD AUTO: 1 % (ref 0–1)
BILIRUB SERPL-MCNC: 0.96 MG/DL (ref 0.2–1)
BUN SERPL-MCNC: 18 MG/DL (ref 5–25)
CALCIUM SERPL-MCNC: 8.7 MG/DL (ref 8.3–10.1)
CHLORIDE SERPL-SCNC: 107 MMOL/L (ref 100–108)
CO2 SERPL-SCNC: 25 MMOL/L (ref 21–32)
CREAT SERPL-MCNC: 1.46 MG/DL (ref 0.6–1.3)
EOSINOPHIL # BLD AUTO: 0.15 THOUSAND/ΜL (ref 0–0.61)
EOSINOPHIL NFR BLD AUTO: 3 % (ref 0–6)
ERYTHROCYTE [DISTWIDTH] IN BLOOD BY AUTOMATED COUNT: 14.6 % (ref 11.6–15.1)
GFR SERPL CREATININE-BSD FRML MDRD: 47 ML/MIN/1.73SQ M
GLUCOSE SERPL-MCNC: 93 MG/DL (ref 65–140)
HCT VFR BLD AUTO: 37.6 % (ref 36.5–49.3)
HGB BLD-MCNC: 12 G/DL (ref 12–17)
IMM GRANULOCYTES # BLD AUTO: 0.03 THOUSAND/UL (ref 0–0.2)
IMM GRANULOCYTES NFR BLD AUTO: 1 % (ref 0–2)
LYMPHOCYTES # BLD AUTO: 0.07 THOUSANDS/ΜL (ref 0.6–4.47)
LYMPHOCYTES NFR BLD AUTO: 1 % (ref 14–44)
MCH RBC QN AUTO: 33.9 PG (ref 26.8–34.3)
MCHC RBC AUTO-ENTMCNC: 31.9 G/DL (ref 31.4–37.4)
MCV RBC AUTO: 106 FL (ref 82–98)
MONOCYTES # BLD AUTO: 0.31 THOUSAND/ΜL (ref 0.17–1.22)
MONOCYTES NFR BLD AUTO: 6 % (ref 4–12)
NEUTROPHILS # BLD AUTO: 4.36 THOUSANDS/ΜL (ref 1.85–7.62)
NEUTS SEG NFR BLD AUTO: 88 % (ref 43–75)
NRBC BLD AUTO-RTO: 0 /100 WBCS
PLATELET # BLD AUTO: 101 THOUSANDS/UL (ref 149–390)
PMV BLD AUTO: 12 FL (ref 8.9–12.7)
POTASSIUM SERPL-SCNC: 3.6 MMOL/L (ref 3.5–5.3)
PROT SERPL-MCNC: 7.5 G/DL (ref 6.4–8.2)
RBC # BLD AUTO: 3.54 MILLION/UL (ref 3.88–5.62)
SODIUM SERPL-SCNC: 140 MMOL/L (ref 136–145)
WBC # BLD AUTO: 4.96 THOUSAND/UL (ref 4.31–10.16)

## 2020-03-11 PROCEDURE — 85025 COMPLETE CBC W/AUTO DIFF WBC: CPT

## 2020-03-11 PROCEDURE — 36415 COLL VENOUS BLD VENIPUNCTURE: CPT

## 2020-03-11 PROCEDURE — 80053 COMPREHEN METABOLIC PANEL: CPT

## 2020-03-13 ENCOUNTER — HOSPITAL ENCOUNTER (OUTPATIENT)
Dept: INFUSION CENTER | Facility: CLINIC | Age: 75
Discharge: HOME | End: 2020-03-13
Payer: COMMERCIAL

## 2020-03-13 VITALS
SYSTOLIC BLOOD PRESSURE: 119 MMHG | WEIGHT: 171.08 LBS | BODY MASS INDEX: 25.34 KG/M2 | RESPIRATION RATE: 18 BRPM | TEMPERATURE: 98.3 F | HEIGHT: 69 IN | HEART RATE: 60 BPM | DIASTOLIC BLOOD PRESSURE: 65 MMHG

## 2020-03-13 DIAGNOSIS — D80.1 HYPOGAMMAGLOBULINEMIA (HCC): ICD-10-CM

## 2020-03-13 DIAGNOSIS — C90.00 IGG MULTIPLE MYELOMA (HCC): Primary | ICD-10-CM

## 2020-03-13 PROCEDURE — 96413 CHEMO IV INFUSION 1 HR: CPT

## 2020-03-13 PROCEDURE — 96415 CHEMO IV INFUSION ADDL HR: CPT

## 2020-03-13 PROCEDURE — 96367 TX/PROPH/DG ADDL SEQ IV INF: CPT

## 2020-03-13 PROCEDURE — 96375 TX/PRO/DX INJ NEW DRUG ADDON: CPT

## 2020-03-13 PROCEDURE — 96366 THER/PROPH/DIAG IV INF ADDON: CPT

## 2020-03-13 PROCEDURE — 96417 CHEMO IV INFUS EACH ADDL SEQ: CPT

## 2020-03-13 PROCEDURE — 96377 APPLICATON ON-BODY INJECTOR: CPT

## 2020-03-13 RX ORDER — SODIUM CHLORIDE 9 MG/ML
20 INJECTION, SOLUTION INTRAVENOUS ONCE
Status: COMPLETED | OUTPATIENT
Start: 2020-03-13 | End: 2020-03-13

## 2020-03-13 RX ORDER — SODIUM CHLORIDE 9 MG/ML
250 INJECTION, SOLUTION INTRAVENOUS ONCE
Status: COMPLETED | OUTPATIENT
Start: 2020-03-13 | End: 2020-03-13

## 2020-03-13 RX ORDER — ACETAMINOPHEN 325 MG/1
650 TABLET ORAL ONCE
Status: COMPLETED | OUTPATIENT
Start: 2020-03-13 | End: 2020-03-13

## 2020-03-13 RX ADMIN — ONDANSETRON 16 MG: 2 INJECTION INTRAMUSCULAR; INTRAVENOUS at 08:25

## 2020-03-13 RX ADMIN — CYCLOPHOSPHAMIDE 784 MG: 1 INJECTION, POWDER, FOR SOLUTION INTRAVENOUS; ORAL at 10:02

## 2020-03-13 RX ADMIN — SODIUM CHLORIDE 100 MG: 0.9 INJECTION, SOLUTION INTRAVENOUS at 08:55

## 2020-03-13 RX ADMIN — ZOLEDRONIC ACID 3.3 MG: 4 INJECTION, SOLUTION, CONCENTRATE INTRAVENOUS at 09:20

## 2020-03-13 RX ADMIN — SODIUM CHLORIDE 250 ML/HR: 0.9 INJECTION, SOLUTION INTRAVENOUS at 08:35

## 2020-03-13 RX ADMIN — PEGFILGRASTIM 6 MG: KIT SUBCUTANEOUS at 10:37

## 2020-03-13 RX ADMIN — SODIUM CHLORIDE 20 ML/HR: 9 INJECTION, SOLUTION INTRAVENOUS at 08:23

## 2020-03-13 RX ADMIN — ACETAMINOPHEN 650 MG: 325 TABLET, FILM COATED ORAL at 08:22

## 2020-03-13 RX ADMIN — Medication 30 G: at 14:05

## 2020-03-13 RX ADMIN — DIPHENHYDRAMINE HYDROCHLORIDE 25 MG: 50 INJECTION, SOLUTION INTRAMUSCULAR; INTRAVENOUS at 08:40

## 2020-03-13 RX ADMIN — DARATUMUMAB 1300 MG: 100 INJECTION, SOLUTION, CONCENTRATE INTRAVENOUS at 10:36

## 2020-03-13 NOTE — PLAN OF CARE
Problem: Potential for Falls  Goal: Patient will remain free of falls  Description  INTERVENTIONS:  - Assess patient frequently for physical needs  -  Identify cognitive and physical deficits and behaviors that affect risk of falls    -  [place] fall precautions as indicated by assessment   - Educate patient/family on patient safety including physical limitations  - Instruct patient to call for assistance with activity based on assessment  - Modify environment to reduce risk of injury  - Consider OT/PT consult to assist with strengthening/mobility  Outcome: Progressing

## 2020-03-13 NOTE — PROGRESS NOTES
Patient tolerated Zometa, Cytoxan, Darzalex, and IVIG infusions well  Neulasta Onpro placed on right arm  Pt offers no complaints  Pt and wife are aware of all future appointments   Refused AVS

## 2020-03-18 ENCOUNTER — APPOINTMENT (OUTPATIENT)
Dept: LAB | Facility: CLINIC | Age: 75
End: 2020-03-18
Payer: COMMERCIAL

## 2020-03-18 DIAGNOSIS — C90.00 IGG MULTIPLE MYELOMA (HCC): ICD-10-CM

## 2020-03-18 LAB
ALBUMIN SERPL BCP-MCNC: 2.9 G/DL (ref 3.5–5)
ALP SERPL-CCNC: 126 U/L (ref 46–116)
ALT SERPL W P-5'-P-CCNC: 20 U/L (ref 12–78)
ANION GAP SERPL CALCULATED.3IONS-SCNC: 6 MMOL/L (ref 4–13)
ANISOCYTOSIS BLD QL SMEAR: PRESENT
AST SERPL W P-5'-P-CCNC: 17 U/L (ref 5–45)
BASOPHILS # BLD MANUAL: 0 THOUSAND/UL (ref 0–0.1)
BASOPHILS NFR MAR MANUAL: 0 % (ref 0–1)
BILIRUB SERPL-MCNC: 0.65 MG/DL (ref 0.2–1)
BUN SERPL-MCNC: 14 MG/DL (ref 5–25)
CALCIUM SERPL-MCNC: 8.7 MG/DL (ref 8.3–10.1)
CHLORIDE SERPL-SCNC: 106 MMOL/L (ref 100–108)
CO2 SERPL-SCNC: 25 MMOL/L (ref 21–32)
CREAT SERPL-MCNC: 1.6 MG/DL (ref 0.6–1.3)
EOSINOPHIL # BLD MANUAL: 0.23 THOUSAND/UL (ref 0–0.4)
EOSINOPHIL NFR BLD MANUAL: 2 % (ref 0–6)
ERYTHROCYTE [DISTWIDTH] IN BLOOD BY AUTOMATED COUNT: 14.6 % (ref 11.6–15.1)
GFR SERPL CREATININE-BSD FRML MDRD: 42 ML/MIN/1.73SQ M
GLUCOSE SERPL-MCNC: 91 MG/DL (ref 65–140)
HCT VFR BLD AUTO: 34.9 % (ref 36.5–49.3)
HGB BLD-MCNC: 11.1 G/DL (ref 12–17)
LYMPHOCYTES # BLD AUTO: 0.11 THOUSAND/UL (ref 0.6–4.47)
LYMPHOCYTES # BLD AUTO: 1 % (ref 14–44)
MACROCYTES BLD QL AUTO: PRESENT
MCH RBC QN AUTO: 33.9 PG (ref 26.8–34.3)
MCHC RBC AUTO-ENTMCNC: 31.8 G/DL (ref 31.4–37.4)
MCV RBC AUTO: 107 FL (ref 82–98)
METAMYELOCYTES NFR BLD MANUAL: 1 % (ref 0–1)
MONOCYTES # BLD AUTO: 0.68 THOUSAND/UL (ref 0–1.22)
MONOCYTES NFR BLD: 6 % (ref 4–12)
NEUTROPHILS # BLD MANUAL: 10.04 THOUSAND/UL (ref 1.85–7.62)
NEUTS BAND NFR BLD MANUAL: 8 % (ref 0–8)
NEUTS SEG NFR BLD AUTO: 80 % (ref 43–75)
NRBC BLD AUTO-RTO: 0 /100 WBCS
PLATELET # BLD AUTO: 144 THOUSANDS/UL (ref 149–390)
PLATELET BLD QL SMEAR: ADEQUATE
PMV BLD AUTO: 11.7 FL (ref 8.9–12.7)
POIKILOCYTOSIS BLD QL SMEAR: PRESENT
POTASSIUM SERPL-SCNC: 3.4 MMOL/L (ref 3.5–5.3)
PROT SERPL-MCNC: 7.8 G/DL (ref 6.4–8.2)
RBC # BLD AUTO: 3.27 MILLION/UL (ref 3.88–5.62)
RBC MORPH BLD: PRESENT
SODIUM SERPL-SCNC: 137 MMOL/L (ref 136–145)
VARIANT LYMPHS # BLD AUTO: 2 %
WBC # BLD AUTO: 11.41 THOUSAND/UL (ref 4.31–10.16)
WBC TOXIC VACUOLES BLD QL SMEAR: PRESENT

## 2020-03-18 PROCEDURE — 80053 COMPREHEN METABOLIC PANEL: CPT

## 2020-03-18 PROCEDURE — 36415 COLL VENOUS BLD VENIPUNCTURE: CPT

## 2020-03-18 PROCEDURE — 85007 BL SMEAR W/DIFF WBC COUNT: CPT

## 2020-03-18 PROCEDURE — 85027 COMPLETE CBC AUTOMATED: CPT

## 2020-03-23 DIAGNOSIS — C90.00 MULTIPLE MYELOMA NOT HAVING ACHIEVED REMISSION (HCC): Primary | ICD-10-CM

## 2020-03-23 DIAGNOSIS — C90.00 MULTIPLE MYELOMA NOT HAVING ACHIEVED REMISSION (HCC): ICD-10-CM

## 2020-03-23 RX ORDER — ACYCLOVIR 400 MG/1
TABLET ORAL
Qty: 60 TABLET | Refills: 0 | OUTPATIENT
Start: 2020-03-23

## 2020-03-23 RX ORDER — ACYCLOVIR 400 MG/1
400 TABLET ORAL 2 TIMES DAILY
Qty: 60 TABLET | Refills: 0 | Status: CANCELLED | OUTPATIENT
Start: 2020-03-23 | End: 2020-07-21

## 2020-03-23 RX ORDER — ACYCLOVIR 200 MG/1
200 CAPSULE ORAL 2 TIMES DAILY
Qty: 60 CAPSULE | Refills: 1 | Status: SHIPPED | OUTPATIENT
Start: 2020-03-23 | End: 2020-05-20 | Stop reason: SDUPTHER

## 2020-03-23 NOTE — TELEPHONE ENCOUNTER
Gabrielle Marrero from [address] [place] [address] called to verify acyclovir (ZOVIRAX) 200 mg capsule BID  Patient had previously been on 400mg BID  Advised that Dr Stan Gannon discontinues the 400mg and ordered 200mg BID  Called patient and left a message on his voicemail

## 2020-03-24 ENCOUNTER — TELEPHONE (OUTPATIENT)
Dept: HEMATOLOGY ONCOLOGY | Facility: MEDICAL CENTER | Age: 75
End: 2020-03-24

## 2020-03-24 DIAGNOSIS — C90.00 IGG MULTIPLE MYELOMA (HCC): Primary | ICD-10-CM

## 2020-03-24 RX ORDER — SODIUM CHLORIDE 9 MG/ML
20 INJECTION, SOLUTION INTRAVENOUS ONCE
Status: CANCELLED | OUTPATIENT
Start: 2020-04-10

## 2020-03-24 RX ORDER — ACETAMINOPHEN 325 MG/1
650 TABLET ORAL ONCE
Status: CANCELLED | OUTPATIENT
Start: 2020-04-10

## 2020-03-24 RX ORDER — SODIUM CHLORIDE 9 MG/ML
20 INJECTION, SOLUTION INTRAVENOUS ONCE
Status: CANCELLED | OUTPATIENT
Start: 2020-03-27

## 2020-03-24 RX ORDER — ACETAMINOPHEN 325 MG/1
650 TABLET ORAL ONCE
Status: CANCELLED | OUTPATIENT
Start: 2020-03-27

## 2020-03-24 RX ORDER — SODIUM CHLORIDE 9 MG/ML
250 INJECTION, SOLUTION INTRAVENOUS ONCE
Status: CANCELLED | OUTPATIENT
Start: 2020-03-27

## 2020-03-24 RX ORDER — SODIUM CHLORIDE 9 MG/ML
250 INJECTION, SOLUTION INTRAVENOUS ONCE
Status: CANCELLED | OUTPATIENT
Start: 2020-04-10

## 2020-03-24 NOTE — TELEPHONE ENCOUNTER
Patient wife called in stated that she wants to know if they can use the labs from last week can be used for Dr Lennox Pepper appointment,  A good call back number for Kayleigh Hsu is [phone]

## 2020-03-24 NOTE — TELEPHONE ENCOUNTER
Returned call to patients wife  Per Dr Pacheco Flank patient does not need to have labs repeated again this week before treatment    Ok to use labs from 3/17/20

## 2020-03-24 NOTE — TELEPHONE ENCOUNTER
Patient's wife Estefania Perez called back  She was questioning if his 3/18/20 labs could be used for his 3/27/20 Infusion  She reported that the [facility] lab close to their house is closed down during the COVID-19  I did inform her that normally we like the labs to be drawn within 72 hours prior to the infusion  She did request that we review with Dr Khari Caballero's call back # [phone] (M)

## 2020-03-27 ENCOUNTER — HOSPITAL ENCOUNTER (OUTPATIENT)
Dept: INFUSION CENTER | Facility: CLINIC | Age: 75
Discharge: HOME | End: 2020-03-27
Payer: COMMERCIAL

## 2020-03-27 VITALS
DIASTOLIC BLOOD PRESSURE: 68 MMHG | RESPIRATION RATE: 18 BRPM | HEART RATE: 52 BPM | SYSTOLIC BLOOD PRESSURE: 117 MMHG | HEIGHT: 69 IN | WEIGHT: 172.84 LBS | TEMPERATURE: 97.4 F | BODY MASS INDEX: 25.6 KG/M2

## 2020-03-27 DIAGNOSIS — C90.00 IGG MULTIPLE MYELOMA (HCC): Primary | ICD-10-CM

## 2020-03-27 DIAGNOSIS — C90.00 IGG MULTIPLE MYELOMA (HCC): ICD-10-CM

## 2020-03-27 PROCEDURE — 96367 TX/PROPH/DG ADDL SEQ IV INF: CPT

## 2020-03-27 PROCEDURE — 96413 CHEMO IV INFUSION 1 HR: CPT

## 2020-03-27 PROCEDURE — 96377 APPLICATON ON-BODY INJECTOR: CPT

## 2020-03-27 PROCEDURE — 96375 TX/PRO/DX INJ NEW DRUG ADDON: CPT

## 2020-03-27 PROCEDURE — 96417 CHEMO IV INFUS EACH ADDL SEQ: CPT

## 2020-03-27 PROCEDURE — 96415 CHEMO IV INFUSION ADDL HR: CPT

## 2020-03-27 RX ORDER — SODIUM CHLORIDE 9 MG/ML
20 INJECTION, SOLUTION INTRAVENOUS ONCE
Status: COMPLETED | OUTPATIENT
Start: 2020-03-27 | End: 2020-03-27

## 2020-03-27 RX ORDER — SODIUM CHLORIDE 9 MG/ML
250 INJECTION, SOLUTION INTRAVENOUS ONCE
Status: COMPLETED | OUTPATIENT
Start: 2020-03-27 | End: 2020-03-27

## 2020-03-27 RX ORDER — ACETAMINOPHEN 325 MG/1
650 TABLET ORAL ONCE
Status: COMPLETED | OUTPATIENT
Start: 2020-03-27 | End: 2020-03-27

## 2020-03-27 RX ADMIN — DARATUMUMAB 1300 MG: 100 INJECTION, SOLUTION, CONCENTRATE INTRAVENOUS at 09:52

## 2020-03-27 RX ADMIN — SODIUM CHLORIDE 250 ML/HR: 0.9 INJECTION, SOLUTION INTRAVENOUS at 08:20

## 2020-03-27 RX ADMIN — CYCLOPHOSPHAMIDE 784 MG: 1 INJECTION, POWDER, FOR SOLUTION INTRAVENOUS; ORAL at 09:17

## 2020-03-27 RX ADMIN — SODIUM CHLORIDE 100 MG: 0.9 INJECTION, SOLUTION INTRAVENOUS at 08:54

## 2020-03-27 RX ADMIN — DIPHENHYDRAMINE HYDROCHLORIDE 25 MG: 50 INJECTION, SOLUTION INTRAMUSCULAR; INTRAVENOUS at 08:39

## 2020-03-27 RX ADMIN — ACETAMINOPHEN 650 MG: 325 TABLET, FILM COATED ORAL at 08:16

## 2020-03-27 RX ADMIN — SODIUM CHLORIDE 20 ML/HR: 0.9 INJECTION, SOLUTION INTRAVENOUS at 08:20

## 2020-03-27 RX ADMIN — ONDANSETRON 16 MG: 2 INJECTION INTRAMUSCULAR; INTRAVENOUS at 08:20

## 2020-03-27 RX ADMIN — PEGFILGRASTIM 6 MG: KIT SUBCUTANEOUS at 10:17

## 2020-03-31 DIAGNOSIS — F41.9 ANXIETY: ICD-10-CM

## 2020-03-31 RX ORDER — ESCITALOPRAM OXALATE 20 MG/1
20 TABLET ORAL DAILY
Qty: 90 TABLET | Refills: 0 | Status: SHIPPED | OUTPATIENT
Start: 2020-03-31 | End: 2020-07-06 | Stop reason: SDUPTHER

## 2020-04-01 ENCOUNTER — TELEPHONE (OUTPATIENT)
Dept: HEMATOLOGY ONCOLOGY | Facility: CLINIC | Age: 75
End: 2020-04-01

## 2020-04-02 ENCOUNTER — TELEMEDICINE (OUTPATIENT)
Dept: HEMATOLOGY ONCOLOGY | Facility: CLINIC | Age: 75
End: 2020-04-02
Payer: COMMERCIAL

## 2020-04-02 ENCOUNTER — TELEMEDICINE (OUTPATIENT)
Dept: HEMATOLOGY ONCOLOGY | Facility: CLINIC | Age: 75
End: 2020-04-02

## 2020-04-02 DIAGNOSIS — C90.00 IGG MULTIPLE MYELOMA (HCC): Primary | ICD-10-CM

## 2020-04-02 PROCEDURE — 1160F RVW MEDS BY RX/DR IN RCRD: CPT | Performed by: INTERNAL MEDICINE

## 2020-04-02 PROCEDURE — 99214 OFFICE O/P EST MOD 30 MIN: CPT | Performed by: INTERNAL MEDICINE

## 2020-04-08 ENCOUNTER — APPOINTMENT (OUTPATIENT)
Dept: LAB | Facility: CLINIC | Age: 75
End: 2020-04-08
Payer: COMMERCIAL

## 2020-04-08 DIAGNOSIS — C90.00 IGG MULTIPLE MYELOMA (HCC): ICD-10-CM

## 2020-04-08 LAB
ALBUMIN SERPL BCP-MCNC: 3.5 G/DL (ref 3.5–5)
ALP SERPL-CCNC: 122 U/L (ref 46–116)
ALT SERPL W P-5'-P-CCNC: 28 U/L (ref 12–78)
ANION GAP SERPL CALCULATED.3IONS-SCNC: 7 MMOL/L (ref 4–13)
AST SERPL W P-5'-P-CCNC: 15 U/L (ref 5–45)
BASOPHILS # BLD AUTO: 0.1 THOUSANDS/ΜL (ref 0–0.1)
BASOPHILS NFR BLD AUTO: 2 % (ref 0–1)
BILIRUB SERPL-MCNC: 0.8 MG/DL (ref 0.2–1)
BUN SERPL-MCNC: 17 MG/DL (ref 5–25)
CALCIUM SERPL-MCNC: 9.3 MG/DL (ref 8.3–10.1)
CHLORIDE SERPL-SCNC: 108 MMOL/L (ref 100–108)
CO2 SERPL-SCNC: 27 MMOL/L (ref 21–32)
CREAT SERPL-MCNC: 1.47 MG/DL (ref 0.6–1.3)
EOSINOPHIL # BLD AUTO: 0.51 THOUSAND/ΜL (ref 0–0.61)
EOSINOPHIL NFR BLD AUTO: 8 % (ref 0–6)
ERYTHROCYTE [DISTWIDTH] IN BLOOD BY AUTOMATED COUNT: 17.2 % (ref 11.6–15.1)
GFR SERPL CREATININE-BSD FRML MDRD: 47 ML/MIN/1.73SQ M
GLUCOSE P FAST SERPL-MCNC: 92 MG/DL (ref 65–99)
HCT VFR BLD AUTO: 36.8 % (ref 36.5–49.3)
HGB BLD-MCNC: 11.6 G/DL (ref 12–17)
IMM GRANULOCYTES # BLD AUTO: 0.15 THOUSAND/UL (ref 0–0.2)
IMM GRANULOCYTES NFR BLD AUTO: 2 % (ref 0–2)
LYMPHOCYTES # BLD AUTO: 0.27 THOUSANDS/ΜL (ref 0.6–4.47)
LYMPHOCYTES NFR BLD AUTO: 4 % (ref 14–44)
MCH RBC QN AUTO: 34.5 PG (ref 26.8–34.3)
MCHC RBC AUTO-ENTMCNC: 31.5 G/DL (ref 31.4–37.4)
MCV RBC AUTO: 110 FL (ref 82–98)
MONOCYTES # BLD AUTO: 0.63 THOUSAND/ΜL (ref 0.17–1.22)
MONOCYTES NFR BLD AUTO: 9 % (ref 4–12)
NEUTROPHILS # BLD AUTO: 5.18 THOUSANDS/ΜL (ref 1.85–7.62)
NEUTS SEG NFR BLD AUTO: 75 % (ref 43–75)
NRBC BLD AUTO-RTO: 0 /100 WBCS
PLATELET # BLD AUTO: 163 THOUSANDS/UL (ref 149–390)
PMV BLD AUTO: 11 FL (ref 8.9–12.7)
POTASSIUM SERPL-SCNC: 4.6 MMOL/L (ref 3.5–5.3)
PROT SERPL-MCNC: 7.3 G/DL (ref 6.4–8.2)
RBC # BLD AUTO: 3.36 MILLION/UL (ref 3.88–5.62)
SODIUM SERPL-SCNC: 142 MMOL/L (ref 136–145)
WBC # BLD AUTO: 6.84 THOUSAND/UL (ref 4.31–10.16)

## 2020-04-08 PROCEDURE — 80053 COMPREHEN METABOLIC PANEL: CPT

## 2020-04-08 PROCEDURE — 36415 COLL VENOUS BLD VENIPUNCTURE: CPT

## 2020-04-08 PROCEDURE — 85025 COMPLETE CBC W/AUTO DIFF WBC: CPT

## 2020-04-10 ENCOUNTER — HOSPITAL ENCOUNTER (OUTPATIENT)
Dept: INFUSION CENTER | Facility: CLINIC | Age: 75
Discharge: HOME | End: 2020-04-10
Payer: COMMERCIAL

## 2020-04-10 VITALS
HEIGHT: 69 IN | HEART RATE: 57 BPM | WEIGHT: 172.62 LBS | DIASTOLIC BLOOD PRESSURE: 61 MMHG | SYSTOLIC BLOOD PRESSURE: 108 MMHG | TEMPERATURE: 97.8 F | BODY MASS INDEX: 25.57 KG/M2 | RESPIRATION RATE: 18 BRPM

## 2020-04-10 DIAGNOSIS — D80.1 HYPOGAMMAGLOBULINEMIA (HCC): ICD-10-CM

## 2020-04-10 DIAGNOSIS — C90.00 IGG MULTIPLE MYELOMA (HCC): Primary | ICD-10-CM

## 2020-04-10 PROCEDURE — 96377 APPLICATON ON-BODY INJECTOR: CPT

## 2020-04-10 PROCEDURE — 96367 TX/PROPH/DG ADDL SEQ IV INF: CPT

## 2020-04-10 PROCEDURE — 96413 CHEMO IV INFUSION 1 HR: CPT

## 2020-04-10 PROCEDURE — 96415 CHEMO IV INFUSION ADDL HR: CPT

## 2020-04-10 PROCEDURE — 96417 CHEMO IV INFUS EACH ADDL SEQ: CPT

## 2020-04-10 PROCEDURE — 96366 THER/PROPH/DIAG IV INF ADDON: CPT

## 2020-04-10 RX ORDER — SODIUM CHLORIDE 9 MG/ML
20 INJECTION, SOLUTION INTRAVENOUS ONCE
Status: COMPLETED | OUTPATIENT
Start: 2020-04-10 | End: 2020-04-10

## 2020-04-10 RX ORDER — ACETAMINOPHEN 325 MG/1
650 TABLET ORAL ONCE
Status: COMPLETED | OUTPATIENT
Start: 2020-04-10 | End: 2020-04-10

## 2020-04-10 RX ORDER — SODIUM CHLORIDE 9 MG/ML
250 INJECTION, SOLUTION INTRAVENOUS ONCE
Status: COMPLETED | OUTPATIENT
Start: 2020-04-10 | End: 2020-04-10

## 2020-04-10 RX ADMIN — PEGFILGRASTIM 6 MG: KIT SUBCUTANEOUS at 10:16

## 2020-04-10 RX ADMIN — DARATUMUMAB 1300 MG: 100 INJECTION, SOLUTION, CONCENTRATE INTRAVENOUS at 10:29

## 2020-04-10 RX ADMIN — ACETAMINOPHEN 650 MG: 325 TABLET, FILM COATED ORAL at 08:06

## 2020-04-10 RX ADMIN — SODIUM CHLORIDE 250 ML/HR: 0.9 INJECTION, SOLUTION INTRAVENOUS at 08:10

## 2020-04-10 RX ADMIN — DIPHENHYDRAMINE HYDROCHLORIDE 25 MG: 50 INJECTION, SOLUTION INTRAMUSCULAR; INTRAVENOUS at 08:39

## 2020-04-10 RX ADMIN — ONDANSETRON 16 MG: 2 INJECTION INTRAMUSCULAR; INTRAVENOUS at 08:20

## 2020-04-10 RX ADMIN — SODIUM CHLORIDE 100 MG: 0.9 INJECTION, SOLUTION INTRAVENOUS at 08:59

## 2020-04-10 RX ADMIN — SODIUM CHLORIDE 20 ML/HR: 0.9 INJECTION, SOLUTION INTRAVENOUS at 08:10

## 2020-04-10 RX ADMIN — Medication 30 G: at 13:54

## 2020-04-10 RX ADMIN — CYCLOPHOSPHAMIDE 784 MG: 1 INJECTION, POWDER, FOR SOLUTION INTRAVENOUS; ORAL at 09:56

## 2020-04-10 RX ADMIN — ZOLEDRONIC ACID 3.3 MG: 4 INJECTION, SOLUTION, CONCENTRATE INTRAVENOUS at 09:20

## 2020-04-15 ENCOUNTER — TELEPHONE (OUTPATIENT)
Dept: HEMATOLOGY ONCOLOGY | Facility: CLINIC | Age: 75
End: 2020-04-15

## 2020-04-21 RX ORDER — SODIUM CHLORIDE 9 MG/ML
20 INJECTION, SOLUTION INTRAVENOUS ONCE
Status: CANCELLED | OUTPATIENT
Start: 2020-04-24

## 2020-04-21 RX ORDER — SODIUM CHLORIDE 9 MG/ML
20 INJECTION, SOLUTION INTRAVENOUS ONCE
Status: CANCELLED | OUTPATIENT
Start: 2020-05-08

## 2020-04-21 RX ORDER — ACETAMINOPHEN 325 MG/1
650 TABLET ORAL ONCE
Status: CANCELLED | OUTPATIENT
Start: 2020-05-08

## 2020-04-21 RX ORDER — SODIUM CHLORIDE 9 MG/ML
250 INJECTION, SOLUTION INTRAVENOUS ONCE
Status: CANCELLED | OUTPATIENT
Start: 2020-04-24

## 2020-04-21 RX ORDER — ACETAMINOPHEN 325 MG/1
650 TABLET ORAL ONCE
Status: CANCELLED | OUTPATIENT
Start: 2020-04-24

## 2020-04-21 RX ORDER — SODIUM CHLORIDE 9 MG/ML
250 INJECTION, SOLUTION INTRAVENOUS ONCE
Status: CANCELLED | OUTPATIENT
Start: 2020-05-08

## 2020-04-22 ENCOUNTER — APPOINTMENT (OUTPATIENT)
Dept: LAB | Facility: CLINIC | Age: 75
End: 2020-04-22
Payer: COMMERCIAL

## 2020-04-22 ENCOUNTER — TRANSCRIBE ORDERS (OUTPATIENT)
Dept: URGENT CARE | Facility: CLINIC | Age: 75
End: 2020-04-22

## 2020-04-22 DIAGNOSIS — C90.00 IGG MULTIPLE MYELOMA (HCC): Primary | ICD-10-CM

## 2020-04-22 DIAGNOSIS — C90.00 IGG MULTIPLE MYELOMA (HCC): ICD-10-CM

## 2020-04-22 LAB
ALBUMIN SERPL BCP-MCNC: 3.5 G/DL (ref 3.5–5)
ALP SERPL-CCNC: 112 U/L (ref 46–116)
ALT SERPL W P-5'-P-CCNC: 32 U/L (ref 12–78)
ANION GAP SERPL CALCULATED.3IONS-SCNC: 7 MMOL/L (ref 4–13)
ANISOCYTOSIS BLD QL SMEAR: PRESENT
AST SERPL W P-5'-P-CCNC: 15 U/L (ref 5–45)
BASOPHILS # BLD MANUAL: 0.26 THOUSAND/UL (ref 0–0.1)
BASOPHILS NFR MAR MANUAL: 3 % (ref 0–1)
BILIRUB SERPL-MCNC: 0.68 MG/DL (ref 0.2–1)
BUN SERPL-MCNC: 22 MG/DL (ref 5–25)
CALCIUM SERPL-MCNC: 8.9 MG/DL (ref 8.3–10.1)
CHLORIDE SERPL-SCNC: 106 MMOL/L (ref 100–108)
CO2 SERPL-SCNC: 25 MMOL/L (ref 21–32)
CREAT SERPL-MCNC: 1.55 MG/DL (ref 0.6–1.3)
DACRYOCYTES BLD QL SMEAR: PRESENT
EOSINOPHIL # BLD MANUAL: 0.52 THOUSAND/UL (ref 0–0.4)
EOSINOPHIL NFR BLD MANUAL: 6 % (ref 0–6)
ERYTHROCYTE [DISTWIDTH] IN BLOOD BY AUTOMATED COUNT: 18.2 % (ref 11.6–15.1)
GFR SERPL CREATININE-BSD FRML MDRD: 44 ML/MIN/1.73SQ M
GLUCOSE P FAST SERPL-MCNC: 94 MG/DL (ref 65–99)
HCT VFR BLD AUTO: 36 % (ref 36.5–49.3)
HGB BLD-MCNC: 11.5 G/DL (ref 12–17)
LYMPHOCYTES # BLD AUTO: 0.17 THOUSAND/UL (ref 0.6–4.47)
LYMPHOCYTES # BLD AUTO: 2 % (ref 14–44)
MACROCYTES BLD QL AUTO: PRESENT
MCH RBC QN AUTO: 35 PG (ref 26.8–34.3)
MCHC RBC AUTO-ENTMCNC: 31.9 G/DL (ref 31.4–37.4)
MCV RBC AUTO: 109 FL (ref 82–98)
MONOCYTES # BLD AUTO: 0.95 THOUSAND/UL (ref 0–1.22)
MONOCYTES NFR BLD: 11 % (ref 4–12)
NEUTROPHILS # BLD MANUAL: 6.45 THOUSAND/UL (ref 1.85–7.62)
NEUTS BAND NFR BLD MANUAL: 2 % (ref 0–8)
NEUTS SEG NFR BLD AUTO: 73 % (ref 43–75)
NRBC BLD AUTO-RTO: 0 /100 WBCS
PLATELET # BLD AUTO: 132 THOUSANDS/UL (ref 149–390)
PLATELET BLD QL SMEAR: ABNORMAL
PMV BLD AUTO: 11.4 FL (ref 8.9–12.7)
POIKILOCYTOSIS BLD QL SMEAR: PRESENT
POLYCHROMASIA BLD QL SMEAR: PRESENT
POTASSIUM SERPL-SCNC: 4.4 MMOL/L (ref 3.5–5.3)
PROT SERPL-MCNC: 7.1 G/DL (ref 6.4–8.2)
RBC # BLD AUTO: 3.29 MILLION/UL (ref 3.88–5.62)
RBC MORPH BLD: PRESENT
SODIUM SERPL-SCNC: 138 MMOL/L (ref 136–145)
VARIANT LYMPHS # BLD AUTO: 3 %
WBC # BLD AUTO: 8.6 THOUSAND/UL (ref 4.31–10.16)

## 2020-04-22 PROCEDURE — 85027 COMPLETE CBC AUTOMATED: CPT | Performed by: INTERNAL MEDICINE

## 2020-04-22 PROCEDURE — 85007 BL SMEAR W/DIFF WBC COUNT: CPT | Performed by: INTERNAL MEDICINE

## 2020-04-22 PROCEDURE — 36415 COLL VENOUS BLD VENIPUNCTURE: CPT | Performed by: INTERNAL MEDICINE

## 2020-04-22 PROCEDURE — 80053 COMPREHEN METABOLIC PANEL: CPT

## 2020-04-24 ENCOUNTER — HOSPITAL ENCOUNTER (OUTPATIENT)
Dept: INFUSION CENTER | Facility: CLINIC | Age: 75
Discharge: HOME | End: 2020-04-24
Payer: COMMERCIAL

## 2020-04-24 VITALS
HEIGHT: 69 IN | WEIGHT: 178.13 LBS | HEART RATE: 58 BPM | BODY MASS INDEX: 26.38 KG/M2 | DIASTOLIC BLOOD PRESSURE: 70 MMHG | SYSTOLIC BLOOD PRESSURE: 128 MMHG | TEMPERATURE: 97.8 F | RESPIRATION RATE: 18 BRPM

## 2020-04-24 DIAGNOSIS — C90.00 IGG MULTIPLE MYELOMA (HCC): Primary | ICD-10-CM

## 2020-04-24 PROCEDURE — 96413 CHEMO IV INFUSION 1 HR: CPT

## 2020-04-24 PROCEDURE — 96377 APPLICATON ON-BODY INJECTOR: CPT

## 2020-04-24 PROCEDURE — 96417 CHEMO IV INFUS EACH ADDL SEQ: CPT

## 2020-04-24 PROCEDURE — 96367 TX/PROPH/DG ADDL SEQ IV INF: CPT

## 2020-04-24 PROCEDURE — 96415 CHEMO IV INFUSION ADDL HR: CPT

## 2020-04-24 RX ORDER — SODIUM CHLORIDE 9 MG/ML
20 INJECTION, SOLUTION INTRAVENOUS ONCE
Status: COMPLETED | OUTPATIENT
Start: 2020-04-24 | End: 2020-04-24

## 2020-04-24 RX ORDER — SODIUM CHLORIDE 9 MG/ML
250 INJECTION, SOLUTION INTRAVENOUS ONCE
Status: COMPLETED | OUTPATIENT
Start: 2020-04-24 | End: 2020-04-24

## 2020-04-24 RX ORDER — ACETAMINOPHEN 325 MG/1
650 TABLET ORAL ONCE
Status: COMPLETED | OUTPATIENT
Start: 2020-04-24 | End: 2020-04-24

## 2020-04-24 RX ADMIN — CYCLOPHOSPHAMIDE 784 MG: 1 INJECTION, POWDER, FOR SOLUTION INTRAVENOUS; ORAL at 09:29

## 2020-04-24 RX ADMIN — DARATUMUMAB 1300 MG: 100 INJECTION, SOLUTION, CONCENTRATE INTRAVENOUS at 10:06

## 2020-04-24 RX ADMIN — SODIUM CHLORIDE 100 MG: 0.9 INJECTION, SOLUTION INTRAVENOUS at 09:05

## 2020-04-24 RX ADMIN — ACETAMINOPHEN 650 MG: 325 TABLET, FILM COATED ORAL at 08:19

## 2020-04-24 RX ADMIN — PEGFILGRASTIM 6 MG: KIT SUBCUTANEOUS at 10:08

## 2020-04-24 RX ADMIN — ONDANSETRON 16 MG: 2 INJECTION INTRAMUSCULAR; INTRAVENOUS at 08:25

## 2020-04-24 RX ADMIN — SODIUM CHLORIDE 20 ML/HR: 0.9 INJECTION, SOLUTION INTRAVENOUS at 08:25

## 2020-04-24 RX ADMIN — DIPHENHYDRAMINE HYDROCHLORIDE 25 MG: 50 INJECTION, SOLUTION INTRAMUSCULAR; INTRAVENOUS at 08:45

## 2020-04-24 RX ADMIN — SODIUM CHLORIDE 250 ML/HR: 0.9 INJECTION, SOLUTION INTRAVENOUS at 08:25

## 2020-05-06 ENCOUNTER — APPOINTMENT (OUTPATIENT)
Dept: LAB | Facility: CLINIC | Age: 75
End: 2020-05-06
Payer: COMMERCIAL

## 2020-05-06 ENCOUNTER — TRANSCRIBE ORDERS (OUTPATIENT)
Dept: URGENT CARE | Facility: CLINIC | Age: 75
End: 2020-05-06

## 2020-05-06 DIAGNOSIS — D75.9 BLOOD DISORDER: ICD-10-CM

## 2020-05-06 DIAGNOSIS — D75.9 BLOOD DISORDER: Primary | ICD-10-CM

## 2020-05-06 LAB
ALBUMIN SERPL BCP-MCNC: 3.5 G/DL (ref 3.5–5)
ALP SERPL-CCNC: 99 U/L (ref 46–116)
ALT SERPL W P-5'-P-CCNC: 23 U/L (ref 12–78)
ANION GAP SERPL CALCULATED.3IONS-SCNC: 7 MMOL/L (ref 4–13)
AST SERPL W P-5'-P-CCNC: 11 U/L (ref 5–45)
BASOPHILS # BLD AUTO: 0.11 THOUSANDS/ΜL (ref 0–0.1)
BASOPHILS NFR BLD AUTO: 1 % (ref 0–1)
BILIRUB SERPL-MCNC: 0.65 MG/DL (ref 0.2–1)
BUN SERPL-MCNC: 20 MG/DL (ref 5–25)
CALCIUM SERPL-MCNC: 8.8 MG/DL (ref 8.3–10.1)
CHLORIDE SERPL-SCNC: 106 MMOL/L (ref 100–108)
CO2 SERPL-SCNC: 27 MMOL/L (ref 21–32)
CREAT SERPL-MCNC: 1.47 MG/DL (ref 0.6–1.3)
EOSINOPHIL # BLD AUTO: 0.54 THOUSAND/ΜL (ref 0–0.61)
EOSINOPHIL NFR BLD AUTO: 6 % (ref 0–6)
ERYTHROCYTE [DISTWIDTH] IN BLOOD BY AUTOMATED COUNT: 18 % (ref 11.6–15.1)
GFR SERPL CREATININE-BSD FRML MDRD: 47 ML/MIN/1.73SQ M
GLUCOSE P FAST SERPL-MCNC: 91 MG/DL (ref 65–99)
HCT VFR BLD AUTO: 33.6 % (ref 36.5–49.3)
HGB BLD-MCNC: 10.8 G/DL (ref 12–17)
IMM GRANULOCYTES # BLD AUTO: 0.14 THOUSAND/UL (ref 0–0.2)
IMM GRANULOCYTES NFR BLD AUTO: 2 % (ref 0–2)
LYMPHOCYTES # BLD AUTO: 0.17 THOUSANDS/ΜL (ref 0.6–4.47)
LYMPHOCYTES NFR BLD AUTO: 2 % (ref 14–44)
MCH RBC QN AUTO: 35.6 PG (ref 26.8–34.3)
MCHC RBC AUTO-ENTMCNC: 32.1 G/DL (ref 31.4–37.4)
MCV RBC AUTO: 111 FL (ref 82–98)
MONOCYTES # BLD AUTO: 0.87 THOUSAND/ΜL (ref 0.17–1.22)
MONOCYTES NFR BLD AUTO: 10 % (ref 4–12)
NEUTROPHILS # BLD AUTO: 6.7 THOUSANDS/ΜL (ref 1.85–7.62)
NEUTS SEG NFR BLD AUTO: 79 % (ref 43–75)
NRBC BLD AUTO-RTO: 0 /100 WBCS
PLATELET # BLD AUTO: 137 THOUSANDS/UL (ref 149–390)
PMV BLD AUTO: 11.8 FL (ref 8.9–12.7)
POTASSIUM SERPL-SCNC: 4.3 MMOL/L (ref 3.5–5.3)
PROT SERPL-MCNC: 6.8 G/DL (ref 6.4–8.2)
RBC # BLD AUTO: 3.03 MILLION/UL (ref 3.88–5.62)
SODIUM SERPL-SCNC: 140 MMOL/L (ref 136–145)
WBC # BLD AUTO: 8.53 THOUSAND/UL (ref 4.31–10.16)

## 2020-05-06 PROCEDURE — 80053 COMPREHEN METABOLIC PANEL: CPT

## 2020-05-06 PROCEDURE — 36415 COLL VENOUS BLD VENIPUNCTURE: CPT | Performed by: INTERNAL MEDICINE

## 2020-05-06 PROCEDURE — 85025 COMPLETE CBC W/AUTO DIFF WBC: CPT | Performed by: INTERNAL MEDICINE

## 2020-05-08 ENCOUNTER — HOSPITAL ENCOUNTER (OUTPATIENT)
Dept: INFUSION CENTER | Facility: CLINIC | Age: 75
Discharge: HOME | End: 2020-05-08
Payer: COMMERCIAL

## 2020-05-08 VITALS
DIASTOLIC BLOOD PRESSURE: 66 MMHG | HEART RATE: 54 BPM | BODY MASS INDEX: 26.45 KG/M2 | WEIGHT: 178.57 LBS | TEMPERATURE: 98.5 F | SYSTOLIC BLOOD PRESSURE: 116 MMHG | HEIGHT: 69 IN | RESPIRATION RATE: 18 BRPM

## 2020-05-08 DIAGNOSIS — C90.00 IGG MULTIPLE MYELOMA (HCC): ICD-10-CM

## 2020-05-08 DIAGNOSIS — D80.1 HYPOGAMMAGLOBULINEMIA (HCC): ICD-10-CM

## 2020-05-08 DIAGNOSIS — C90.00 IGG MULTIPLE MYELOMA (HCC): Primary | ICD-10-CM

## 2020-05-08 PROCEDURE — 96377 APPLICATON ON-BODY INJECTOR: CPT

## 2020-05-08 PROCEDURE — 96413 CHEMO IV INFUSION 1 HR: CPT

## 2020-05-08 PROCEDURE — 96417 CHEMO IV INFUS EACH ADDL SEQ: CPT

## 2020-05-08 PROCEDURE — 96367 TX/PROPH/DG ADDL SEQ IV INF: CPT

## 2020-05-08 PROCEDURE — 96415 CHEMO IV INFUSION ADDL HR: CPT

## 2020-05-08 PROCEDURE — 96366 THER/PROPH/DIAG IV INF ADDON: CPT

## 2020-05-08 RX ORDER — ACETAMINOPHEN 325 MG/1
650 TABLET ORAL ONCE
Status: COMPLETED | OUTPATIENT
Start: 2020-05-08 | End: 2020-05-08

## 2020-05-08 RX ORDER — DEXAMETHASONE 4 MG/1
TABLET ORAL
Qty: 36 TABLET | Refills: 0 | Status: CANCELLED | OUTPATIENT
Start: 2020-05-08

## 2020-05-08 RX ORDER — SODIUM CHLORIDE 9 MG/ML
20 INJECTION, SOLUTION INTRAVENOUS ONCE
Status: COMPLETED | OUTPATIENT
Start: 2020-05-08 | End: 2020-05-08

## 2020-05-08 RX ORDER — SODIUM CHLORIDE 9 MG/ML
250 INJECTION, SOLUTION INTRAVENOUS ONCE
Status: COMPLETED | OUTPATIENT
Start: 2020-05-08 | End: 2020-05-08

## 2020-05-08 RX ADMIN — ACETAMINOPHEN 650 MG: 325 TABLET ORAL at 08:29

## 2020-05-08 RX ADMIN — SODIUM CHLORIDE 250 ML/HR: 0.9 INJECTION, SOLUTION INTRAVENOUS at 08:30

## 2020-05-08 RX ADMIN — SODIUM CHLORIDE 100 MG: 0.9 INJECTION, SOLUTION INTRAVENOUS at 09:08

## 2020-05-08 RX ADMIN — Medication 30 G: at 14:14

## 2020-05-08 RX ADMIN — ONDANSETRON 16 MG: 2 INJECTION INTRAMUSCULAR; INTRAVENOUS at 08:31

## 2020-05-08 RX ADMIN — DIPHENHYDRAMINE HYDROCHLORIDE 25 MG: 50 INJECTION, SOLUTION INTRAMUSCULAR; INTRAVENOUS at 08:49

## 2020-05-08 RX ADMIN — SODIUM CHLORIDE 20 ML/HR: 0.9 INJECTION, SOLUTION INTRAVENOUS at 08:30

## 2020-05-08 RX ADMIN — ZOLEDRONIC ACID 3.3 MG: 4 INJECTION, SOLUTION, CONCENTRATE INTRAVENOUS at 09:31

## 2020-05-08 RX ADMIN — DARATUMUMAB 1300 MG: 100 INJECTION, SOLUTION, CONCENTRATE INTRAVENOUS at 10:38

## 2020-05-08 RX ADMIN — CYCLOPHOSPHAMIDE 784 MG: 1 INJECTION, POWDER, FOR SOLUTION INTRAVENOUS; ORAL at 10:03

## 2020-05-08 RX ADMIN — PEGFILGRASTIM 6 MG: KIT SUBCUTANEOUS at 15:43

## 2020-05-11 DIAGNOSIS — C90.00 IGG MULTIPLE MYELOMA (HCC): ICD-10-CM

## 2020-05-11 RX ORDER — DEXAMETHASONE 4 MG/1
TABLET ORAL
Qty: 36 TABLET | Refills: 5 | Status: SHIPPED | OUTPATIENT
Start: 2020-05-11 | End: 2020-07-16 | Stop reason: ALTCHOICE

## 2020-05-19 DIAGNOSIS — C90.00 IGG MULTIPLE MYELOMA (HCC): ICD-10-CM

## 2020-05-19 RX ORDER — ACETAMINOPHEN 325 MG/1
650 TABLET ORAL ONCE
Status: CANCELLED | OUTPATIENT
Start: 2020-05-22

## 2020-05-19 RX ORDER — SODIUM CHLORIDE 9 MG/ML
20 INJECTION, SOLUTION INTRAVENOUS ONCE
Status: CANCELLED | OUTPATIENT
Start: 2020-05-22

## 2020-05-20 ENCOUNTER — TRANSCRIBE ORDERS (OUTPATIENT)
Dept: URGENT CARE | Facility: CLINIC | Age: 75
End: 2020-05-20

## 2020-05-20 ENCOUNTER — APPOINTMENT (OUTPATIENT)
Dept: LAB | Facility: CLINIC | Age: 75
End: 2020-05-20
Payer: COMMERCIAL

## 2020-05-20 ENCOUNTER — TELEPHONE (OUTPATIENT)
Dept: HEMATOLOGY ONCOLOGY | Facility: CLINIC | Age: 75
End: 2020-05-20

## 2020-05-20 DIAGNOSIS — C90.00 MULTIPLE MYELOMA, REMISSION STATUS UNSPECIFIED (HCC): Primary | ICD-10-CM

## 2020-05-20 DIAGNOSIS — C90.00 MULTIPLE MYELOMA NOT HAVING ACHIEVED REMISSION (HCC): ICD-10-CM

## 2020-05-20 DIAGNOSIS — C90.00 IGG MULTIPLE MYELOMA (HCC): ICD-10-CM

## 2020-05-20 LAB
ALBUMIN SERPL BCP-MCNC: 3.6 G/DL (ref 3.5–5)
ALP SERPL-CCNC: 112 U/L (ref 46–116)
ALT SERPL W P-5'-P-CCNC: 26 U/L (ref 12–78)
ANION GAP SERPL CALCULATED.3IONS-SCNC: 3 MMOL/L (ref 4–13)
ANISOCYTOSIS BLD QL SMEAR: PRESENT
AST SERPL W P-5'-P-CCNC: 10 U/L (ref 5–45)
BASOPHILS # BLD MANUAL: 0 THOUSAND/UL (ref 0–0.1)
BASOPHILS NFR MAR MANUAL: 0 % (ref 0–1)
BILIRUB SERPL-MCNC: 0.63 MG/DL (ref 0.2–1)
BLASTS NFR BLD MANUAL: 1 %
BUN SERPL-MCNC: 24 MG/DL (ref 5–25)
CALCIUM SERPL-MCNC: 9.1 MG/DL (ref 8.3–10.1)
CHLORIDE SERPL-SCNC: 108 MMOL/L (ref 100–108)
CO2 SERPL-SCNC: 26 MMOL/L (ref 21–32)
CREAT SERPL-MCNC: 1.55 MG/DL (ref 0.6–1.3)
EOSINOPHIL # BLD MANUAL: 0.35 THOUSAND/UL (ref 0–0.4)
EOSINOPHIL NFR BLD MANUAL: 4 % (ref 0–6)
ERYTHROCYTE [DISTWIDTH] IN BLOOD BY AUTOMATED COUNT: 17.3 % (ref 11.6–15.1)
GFR SERPL CREATININE-BSD FRML MDRD: 44 ML/MIN/1.73SQ M
GLUCOSE P FAST SERPL-MCNC: 89 MG/DL (ref 65–99)
HCT VFR BLD AUTO: 35.6 % (ref 36.5–49.3)
HGB BLD-MCNC: 11.5 G/DL (ref 12–17)
LYMPHOCYTES # BLD AUTO: 0 % (ref 14–44)
LYMPHOCYTES # BLD AUTO: 0 THOUSAND/UL (ref 0.6–4.47)
MACROCYTES BLD QL AUTO: PRESENT
MCH RBC QN AUTO: 35.7 PG (ref 26.8–34.3)
MCHC RBC AUTO-ENTMCNC: 32.3 G/DL (ref 31.4–37.4)
MCV RBC AUTO: 111 FL (ref 82–98)
METAMYELOCYTES NFR BLD MANUAL: 4 % (ref 0–1)
MONOCYTES # BLD AUTO: 0.35 THOUSAND/UL (ref 0–1.22)
MONOCYTES NFR BLD: 4 % (ref 4–12)
MYELOCYTES NFR BLD MANUAL: 6 % (ref 0–1)
NEUTROPHILS # BLD MANUAL: 6.94 THOUSAND/UL (ref 1.85–7.62)
NEUTS BAND NFR BLD MANUAL: 6 % (ref 0–8)
NEUTS SEG NFR BLD AUTO: 74 % (ref 43–75)
NRBC BLD AUTO-RTO: 0 /100 WBCS
PLATELET # BLD AUTO: 139 THOUSANDS/UL (ref 149–390)
PLATELET BLD QL SMEAR: ADEQUATE
PMV BLD AUTO: 11.4 FL (ref 8.9–12.7)
POLYCHROMASIA BLD QL SMEAR: PRESENT
POTASSIUM SERPL-SCNC: 4.5 MMOL/L (ref 3.5–5.3)
PROT SERPL-MCNC: 7.3 G/DL (ref 6.4–8.2)
RBC # BLD AUTO: 3.22 MILLION/UL (ref 3.88–5.62)
RBC MORPH BLD: PRESENT
SODIUM SERPL-SCNC: 137 MMOL/L (ref 136–145)
VARIANT LYMPHS # BLD AUTO: 1 %
WBC # BLD AUTO: 8.67 THOUSAND/UL (ref 4.31–10.16)

## 2020-05-20 PROCEDURE — 36415 COLL VENOUS BLD VENIPUNCTURE: CPT | Performed by: INTERNAL MEDICINE

## 2020-05-20 PROCEDURE — 80053 COMPREHEN METABOLIC PANEL: CPT

## 2020-05-20 PROCEDURE — 85007 BL SMEAR W/DIFF WBC COUNT: CPT | Performed by: INTERNAL MEDICINE

## 2020-05-20 PROCEDURE — 85027 COMPLETE CBC AUTOMATED: CPT | Performed by: INTERNAL MEDICINE

## 2020-05-20 RX ORDER — SODIUM CHLORIDE 9 MG/ML
20 INJECTION, SOLUTION INTRAVENOUS ONCE
Status: CANCELLED | OUTPATIENT
Start: 2020-06-05

## 2020-05-20 RX ORDER — ACETAMINOPHEN 325 MG/1
650 TABLET ORAL ONCE
Status: CANCELLED | OUTPATIENT
Start: 2020-06-05

## 2020-05-20 RX ORDER — SODIUM CHLORIDE 9 MG/ML
250 INJECTION, SOLUTION INTRAVENOUS ONCE
Status: CANCELLED | OUTPATIENT
Start: 2020-06-05

## 2020-05-20 RX ORDER — SODIUM CHLORIDE 9 MG/ML
250 INJECTION, SOLUTION INTRAVENOUS ONCE
Status: CANCELLED | OUTPATIENT
Start: 2020-05-22

## 2020-05-20 RX ORDER — ACYCLOVIR 200 MG/1
200 CAPSULE ORAL 2 TIMES DAILY
Qty: 60 CAPSULE | Refills: 1 | Status: SHIPPED | OUTPATIENT
Start: 2020-05-20 | End: 2020-07-08

## 2020-05-20 RX ORDER — ACYCLOVIR 200 MG/1
200 CAPSULE ORAL 2 TIMES DAILY
Qty: 60 CAPSULE | Refills: 1 | Status: CANCELLED | OUTPATIENT
Start: 2020-05-20 | End: 2020-06-19

## 2020-05-22 ENCOUNTER — HOSPITAL ENCOUNTER (OUTPATIENT)
Dept: INFUSION CENTER | Facility: CLINIC | Age: 75
Discharge: HOME | End: 2020-05-22
Payer: COMMERCIAL

## 2020-05-22 VITALS
RESPIRATION RATE: 18 BRPM | BODY MASS INDEX: 26.78 KG/M2 | HEIGHT: 69 IN | SYSTOLIC BLOOD PRESSURE: 122 MMHG | DIASTOLIC BLOOD PRESSURE: 77 MMHG | TEMPERATURE: 97.5 F | WEIGHT: 180.78 LBS | HEART RATE: 54 BPM

## 2020-05-22 DIAGNOSIS — C90.00 IGG MULTIPLE MYELOMA (HCC): Primary | ICD-10-CM

## 2020-05-22 PROCEDURE — 96377 APPLICATON ON-BODY INJECTOR: CPT

## 2020-05-22 PROCEDURE — 96413 CHEMO IV INFUSION 1 HR: CPT

## 2020-05-22 PROCEDURE — 96417 CHEMO IV INFUS EACH ADDL SEQ: CPT

## 2020-05-22 PROCEDURE — 96367 TX/PROPH/DG ADDL SEQ IV INF: CPT

## 2020-05-22 PROCEDURE — 96375 TX/PRO/DX INJ NEW DRUG ADDON: CPT

## 2020-05-22 PROCEDURE — 96415 CHEMO IV INFUSION ADDL HR: CPT

## 2020-05-22 RX ORDER — ACETAMINOPHEN 325 MG/1
650 TABLET ORAL ONCE
Status: COMPLETED | OUTPATIENT
Start: 2020-05-22 | End: 2020-05-22

## 2020-05-22 RX ORDER — SODIUM CHLORIDE 9 MG/ML
250 INJECTION, SOLUTION INTRAVENOUS ONCE
Status: COMPLETED | OUTPATIENT
Start: 2020-05-22 | End: 2020-05-22

## 2020-05-22 RX ORDER — SODIUM CHLORIDE 9 MG/ML
20 INJECTION, SOLUTION INTRAVENOUS ONCE
Status: COMPLETED | OUTPATIENT
Start: 2020-05-22 | End: 2020-05-22

## 2020-05-22 RX ADMIN — DIPHENHYDRAMINE HYDROCHLORIDE 25 MG: 50 INJECTION, SOLUTION INTRAMUSCULAR; INTRAVENOUS at 08:29

## 2020-05-22 RX ADMIN — SODIUM CHLORIDE 20 ML/HR: 0.9 INJECTION, SOLUTION INTRAVENOUS at 08:29

## 2020-05-22 RX ADMIN — ONDANSETRON 16 MG: 2 INJECTION, SOLUTION INTRAMUSCULAR; INTRAVENOUS at 08:45

## 2020-05-22 RX ADMIN — PEGFILGRASTIM 6 MG: KIT SUBCUTANEOUS at 10:06

## 2020-05-22 RX ADMIN — DARATUMUMAB 1300 MG: 100 INJECTION, SOLUTION, CONCENTRATE INTRAVENOUS at 10:04

## 2020-05-22 RX ADMIN — SODIUM CHLORIDE 100 MG: 0.9 INJECTION, SOLUTION INTRAVENOUS at 09:00

## 2020-05-22 RX ADMIN — SODIUM CHLORIDE 250 ML/HR: 0.9 INJECTION, SOLUTION INTRAVENOUS at 09:00

## 2020-05-22 RX ADMIN — ACETAMINOPHEN 650 MG: 325 TABLET, FILM COATED ORAL at 08:33

## 2020-05-22 RX ADMIN — CYCLOPHOSPHAMIDE 784 MG: 1 INJECTION, POWDER, FOR SOLUTION INTRAVENOUS; ORAL at 09:27

## 2020-05-27 DIAGNOSIS — E78.5 HYPERLIPIDEMIA, UNSPECIFIED HYPERLIPIDEMIA TYPE: ICD-10-CM

## 2020-05-27 RX ORDER — ATORVASTATIN CALCIUM 20 MG/1
20 TABLET, FILM COATED ORAL DAILY
Qty: 90 TABLET | Refills: 0 | Status: SHIPPED | OUTPATIENT
Start: 2020-05-27 | End: 2020-07-10

## 2020-06-03 ENCOUNTER — APPOINTMENT (OUTPATIENT)
Dept: LAB | Facility: CLINIC | Age: 75
End: 2020-06-03
Payer: COMMERCIAL

## 2020-06-03 ENCOUNTER — TRANSCRIBE ORDERS (OUTPATIENT)
Dept: URGENT CARE | Facility: CLINIC | Age: 75
End: 2020-06-03

## 2020-06-03 DIAGNOSIS — C90.00 IGG MULTIPLE MYELOMA (HCC): ICD-10-CM

## 2020-06-03 DIAGNOSIS — C90.00 IGG MULTIPLE MYELOMA (HCC): Primary | ICD-10-CM

## 2020-06-03 LAB
ALBUMIN SERPL BCP-MCNC: 3.4 G/DL (ref 3.5–5)
ALP SERPL-CCNC: 100 U/L (ref 46–116)
ALT SERPL W P-5'-P-CCNC: 24 U/L (ref 12–78)
ANION GAP SERPL CALCULATED.3IONS-SCNC: 7 MMOL/L (ref 4–13)
AST SERPL W P-5'-P-CCNC: 12 U/L (ref 5–45)
BASOPHILS # BLD AUTO: 0.07 THOUSANDS/ΜL (ref 0–0.1)
BASOPHILS NFR BLD AUTO: 1 % (ref 0–1)
BILIRUB SERPL-MCNC: 0.8 MG/DL (ref 0.2–1)
BUN SERPL-MCNC: 20 MG/DL (ref 5–25)
CALCIUM SERPL-MCNC: 8.7 MG/DL (ref 8.3–10.1)
CHLORIDE SERPL-SCNC: 106 MMOL/L (ref 100–108)
CO2 SERPL-SCNC: 26 MMOL/L (ref 21–32)
CREAT SERPL-MCNC: 1.51 MG/DL (ref 0.6–1.3)
EOSINOPHIL # BLD AUTO: 0.37 THOUSAND/ΜL (ref 0–0.61)
EOSINOPHIL NFR BLD AUTO: 4 % (ref 0–6)
ERYTHROCYTE [DISTWIDTH] IN BLOOD BY AUTOMATED COUNT: 16.2 % (ref 11.6–15.1)
GFR SERPL CREATININE-BSD FRML MDRD: 45 ML/MIN/1.73SQ M
GLUCOSE P FAST SERPL-MCNC: 85 MG/DL (ref 65–99)
HCT VFR BLD AUTO: 36 % (ref 36.5–49.3)
HGB BLD-MCNC: 11.6 G/DL (ref 12–17)
IMM GRANULOCYTES # BLD AUTO: 0.13 THOUSAND/UL (ref 0–0.2)
IMM GRANULOCYTES NFR BLD AUTO: 2 % (ref 0–2)
LYMPHOCYTES # BLD AUTO: 0.43 THOUSANDS/ΜL (ref 0.6–4.47)
LYMPHOCYTES NFR BLD AUTO: 5 % (ref 14–44)
MCH RBC QN AUTO: 35.5 PG (ref 26.8–34.3)
MCHC RBC AUTO-ENTMCNC: 32.2 G/DL (ref 31.4–37.4)
MCV RBC AUTO: 110 FL (ref 82–98)
MONOCYTES # BLD AUTO: 0.79 THOUSAND/ΜL (ref 0.17–1.22)
MONOCYTES NFR BLD AUTO: 9 % (ref 4–12)
NEUTROPHILS # BLD AUTO: 6.61 THOUSANDS/ΜL (ref 1.85–7.62)
NEUTS SEG NFR BLD AUTO: 79 % (ref 43–75)
NRBC BLD AUTO-RTO: 0 /100 WBCS
PLATELET # BLD AUTO: 144 THOUSANDS/UL (ref 149–390)
PMV BLD AUTO: 11.2 FL (ref 8.9–12.7)
POTASSIUM SERPL-SCNC: 4 MMOL/L (ref 3.5–5.3)
PROT SERPL-MCNC: 6.9 G/DL (ref 6.4–8.2)
RBC # BLD AUTO: 3.27 MILLION/UL (ref 3.88–5.62)
SODIUM SERPL-SCNC: 139 MMOL/L (ref 136–145)
WBC # BLD AUTO: 8.4 THOUSAND/UL (ref 4.31–10.16)

## 2020-06-03 PROCEDURE — 80053 COMPREHEN METABOLIC PANEL: CPT

## 2020-06-03 PROCEDURE — 36415 COLL VENOUS BLD VENIPUNCTURE: CPT | Performed by: INTERNAL MEDICINE

## 2020-06-03 PROCEDURE — 85025 COMPLETE CBC W/AUTO DIFF WBC: CPT | Performed by: INTERNAL MEDICINE

## 2020-06-05 ENCOUNTER — HOSPITAL ENCOUNTER (OUTPATIENT)
Dept: INFUSION CENTER | Facility: CLINIC | Age: 75
Discharge: HOME | End: 2020-06-05
Payer: COMMERCIAL

## 2020-06-05 VITALS
RESPIRATION RATE: 18 BRPM | SYSTOLIC BLOOD PRESSURE: 117 MMHG | HEART RATE: 68 BPM | HEIGHT: 69 IN | WEIGHT: 179.68 LBS | DIASTOLIC BLOOD PRESSURE: 79 MMHG | TEMPERATURE: 87.6 F | BODY MASS INDEX: 26.61 KG/M2

## 2020-06-05 DIAGNOSIS — D80.1 HYPOGAMMAGLOBULINEMIA (HCC): ICD-10-CM

## 2020-06-05 DIAGNOSIS — C90.00 IGG MULTIPLE MYELOMA (HCC): Primary | ICD-10-CM

## 2020-06-05 PROCEDURE — 96366 THER/PROPH/DIAG IV INF ADDON: CPT

## 2020-06-05 PROCEDURE — 96367 TX/PROPH/DG ADDL SEQ IV INF: CPT

## 2020-06-05 PROCEDURE — 96377 APPLICATON ON-BODY INJECTOR: CPT

## 2020-06-05 PROCEDURE — 96413 CHEMO IV INFUSION 1 HR: CPT

## 2020-06-05 PROCEDURE — 96361 HYDRATE IV INFUSION ADD-ON: CPT

## 2020-06-05 RX ORDER — SODIUM CHLORIDE 9 MG/ML
250 INJECTION, SOLUTION INTRAVENOUS ONCE
Status: COMPLETED | OUTPATIENT
Start: 2020-06-05 | End: 2020-06-05

## 2020-06-05 RX ORDER — SODIUM CHLORIDE 9 MG/ML
20 INJECTION, SOLUTION INTRAVENOUS ONCE
Status: COMPLETED | OUTPATIENT
Start: 2020-06-05 | End: 2020-06-05

## 2020-06-05 RX ADMIN — PEGFILGRASTIM 6 MG: KIT SUBCUTANEOUS at 11:08

## 2020-06-05 RX ADMIN — CYCLOPHOSPHAMIDE 784 MG: 1 INJECTION, POWDER, FOR SOLUTION INTRAVENOUS; ORAL at 10:08

## 2020-06-05 RX ADMIN — SODIUM CHLORIDE 250 ML/HR: 0.9 INJECTION, SOLUTION INTRAVENOUS at 08:36

## 2020-06-05 RX ADMIN — ONDANSETRON 16 MG: 2 INJECTION INTRAMUSCULAR; INTRAVENOUS at 09:00

## 2020-06-05 RX ADMIN — SODIUM CHLORIDE 20 ML/HR: 0.9 INJECTION, SOLUTION INTRAVENOUS at 08:34

## 2020-06-05 RX ADMIN — ZOLEDRONIC ACID 3.3 MG: 4 INJECTION, SOLUTION, CONCENTRATE INTRAVENOUS at 09:26

## 2020-06-05 RX ADMIN — Medication 30 G: at 10:55

## 2020-06-16 DIAGNOSIS — C90.00 IGG MULTIPLE MYELOMA (HCC): ICD-10-CM

## 2020-06-16 RX ORDER — ACETAMINOPHEN 325 MG/1
650 TABLET ORAL ONCE
Status: CANCELLED | OUTPATIENT
Start: 2020-06-19

## 2020-06-16 RX ORDER — SODIUM CHLORIDE 9 MG/ML
250 INJECTION, SOLUTION INTRAVENOUS ONCE
Status: CANCELLED | OUTPATIENT
Start: 2020-06-19

## 2020-06-16 RX ORDER — SODIUM CHLORIDE 9 MG/ML
250 INJECTION, SOLUTION INTRAVENOUS ONCE
Status: CANCELLED | OUTPATIENT
Start: 2020-07-03

## 2020-06-16 RX ORDER — SODIUM CHLORIDE 9 MG/ML
20 INJECTION, SOLUTION INTRAVENOUS ONCE
Status: CANCELLED | OUTPATIENT
Start: 2020-06-19

## 2020-06-16 RX ORDER — SODIUM CHLORIDE 9 MG/ML
20 INJECTION, SOLUTION INTRAVENOUS ONCE
Status: CANCELLED | OUTPATIENT
Start: 2020-07-03

## 2020-06-19 ENCOUNTER — HOSPITAL ENCOUNTER (OUTPATIENT)
Dept: INFUSION CENTER | Facility: CLINIC | Age: 75
Discharge: HOME | End: 2020-06-19
Payer: COMMERCIAL

## 2020-06-19 VITALS
DIASTOLIC BLOOD PRESSURE: 67 MMHG | TEMPERATURE: 97.4 F | RESPIRATION RATE: 18 BRPM | HEIGHT: 69 IN | HEART RATE: 56 BPM | BODY MASS INDEX: 26.29 KG/M2 | WEIGHT: 177.47 LBS | SYSTOLIC BLOOD PRESSURE: 109 MMHG

## 2020-06-19 DIAGNOSIS — C90.00 IGG MULTIPLE MYELOMA (HCC): Primary | ICD-10-CM

## 2020-06-19 LAB
ALBUMIN SERPL BCP-MCNC: 3.9 G/DL (ref 3.5–5.7)
ALP SERPL-CCNC: 80 U/L (ref 46–116)
ALT SERPL W P-5'-P-CCNC: 18 U/L (ref 12–78)
ANION GAP SERPL CALCULATED.3IONS-SCNC: 8 MMOL/L (ref 4–13)
AST SERPL W P-5'-P-CCNC: 23 U/L (ref 5–45)
BASOPHILS # BLD MANUAL: 0 THOUSAND/UL (ref 0–0.1)
BASOPHILS NFR MAR MANUAL: 0 % (ref 0–1)
BILIRUB SERPL-MCNC: 1.2 MG/DL (ref 0.2–1)
BUN SERPL-MCNC: 15 MG/DL (ref 5–25)
CALCIUM SERPL-MCNC: 8.9 MG/DL (ref 8.3–10.1)
CHLORIDE SERPL-SCNC: 103 MMOL/L (ref 98–108)
CO2 SERPL-SCNC: 26 MMOL/L (ref 21–32)
CREAT SERPL-MCNC: 1.6 MG/DL (ref 0.6–1.3)
DACRYOCYTES BLD QL SMEAR: PRESENT
EOSINOPHIL # BLD MANUAL: 0.54 THOUSAND/UL (ref 0–0.4)
EOSINOPHIL NFR BLD MANUAL: 8 % (ref 0–6)
ERYTHROCYTE [DISTWIDTH] IN BLOOD BY AUTOMATED COUNT: 14.5 % (ref 11.6–15.1)
GFR SERPL CREATININE-BSD FRML MDRD: 42 ML/MIN/1.73SQ M
GLUCOSE SERPL-MCNC: 93 MG/DL (ref 65–140)
HCT VFR BLD AUTO: 35.4 % (ref 36.5–49.3)
HGB BLD-MCNC: 11.9 G/DL (ref 12–17)
LYMPHOCYTES # BLD AUTO: 0.34 THOUSAND/UL (ref 0.6–4.47)
LYMPHOCYTES # BLD AUTO: 5 % (ref 14–44)
MACROCYTES BLD QL AUTO: PRESENT
MCH RBC QN AUTO: 36 PG (ref 26.8–34.3)
MCHC RBC AUTO-ENTMCNC: 33.6 G/DL (ref 31.4–37.4)
MCV RBC AUTO: 107 FL (ref 82–98)
MONOCYTES # BLD AUTO: 0.47 THOUSAND/UL (ref 0–1.22)
MONOCYTES NFR BLD: 7 % (ref 4–12)
MYELOCYTES NFR BLD MANUAL: 1 % (ref 0–1)
NEUTROPHILS # BLD MANUAL: 5.32 THOUSAND/UL (ref 1.85–7.62)
NEUTS BAND NFR BLD MANUAL: 9 % (ref 0–8)
NEUTS SEG NFR BLD AUTO: 70 % (ref 43–75)
OVALOCYTES BLD QL SMEAR: PRESENT
PLATELET # BLD AUTO: 154 THOUSANDS/UL (ref 149–390)
PLATELET BLD QL SMEAR: ADEQUATE
PMV BLD AUTO: 10.5 FL (ref 8.9–12.7)
POTASSIUM SERPL-SCNC: 3.6 MMOL/L (ref 3.5–5.3)
PROT SERPL-MCNC: 6.8 G/DL (ref 6.4–8.2)
RBC # BLD AUTO: 3.31 MILLION/UL (ref 3.88–5.62)
SODIUM SERPL-SCNC: 137 MMOL/L (ref 136–145)
TOTAL CELLS COUNTED SPEC: 100
WBC # BLD AUTO: 6.74 THOUSAND/UL (ref 4.31–10.16)

## 2020-06-19 PROCEDURE — 96413 CHEMO IV INFUSION 1 HR: CPT

## 2020-06-19 PROCEDURE — 96367 TX/PROPH/DG ADDL SEQ IV INF: CPT

## 2020-06-19 PROCEDURE — 96377 APPLICATON ON-BODY INJECTOR: CPT

## 2020-06-19 PROCEDURE — 96415 CHEMO IV INFUSION ADDL HR: CPT

## 2020-06-19 PROCEDURE — 80053 COMPREHEN METABOLIC PANEL: CPT | Performed by: INTERNAL MEDICINE

## 2020-06-19 PROCEDURE — 96375 TX/PRO/DX INJ NEW DRUG ADDON: CPT

## 2020-06-19 PROCEDURE — 85007 BL SMEAR W/DIFF WBC COUNT: CPT | Performed by: INTERNAL MEDICINE

## 2020-06-19 PROCEDURE — 96417 CHEMO IV INFUS EACH ADDL SEQ: CPT

## 2020-06-19 PROCEDURE — 85027 COMPLETE CBC AUTOMATED: CPT | Performed by: INTERNAL MEDICINE

## 2020-06-19 RX ORDER — SODIUM CHLORIDE 9 MG/ML
20 INJECTION, SOLUTION INTRAVENOUS ONCE
Status: COMPLETED | OUTPATIENT
Start: 2020-06-19 | End: 2020-06-19

## 2020-06-19 RX ORDER — SODIUM CHLORIDE 9 MG/ML
250 INJECTION, SOLUTION INTRAVENOUS ONCE
Status: COMPLETED | OUTPATIENT
Start: 2020-06-19 | End: 2020-06-19

## 2020-06-19 RX ORDER — ACETAMINOPHEN 325 MG/1
650 TABLET ORAL ONCE
Status: COMPLETED | OUTPATIENT
Start: 2020-06-19 | End: 2020-06-19

## 2020-06-19 RX ADMIN — DIPHENHYDRAMINE HYDROCHLORIDE 25 MG: 50 INJECTION, SOLUTION INTRAMUSCULAR; INTRAVENOUS at 08:45

## 2020-06-19 RX ADMIN — ACETAMINOPHEN 650 MG: 325 TABLET ORAL at 08:31

## 2020-06-19 RX ADMIN — PEGFILGRASTIM 6 MG: KIT SUBCUTANEOUS at 10:24

## 2020-06-19 RX ADMIN — SODIUM CHLORIDE 100 MG: 0.9 INJECTION, SOLUTION INTRAVENOUS at 08:58

## 2020-06-19 RX ADMIN — SODIUM CHLORIDE 250 ML/HR: 0.9 INJECTION, SOLUTION INTRAVENOUS at 08:25

## 2020-06-19 RX ADMIN — ONDANSETRON 16 MG: 2 INJECTION INTRAMUSCULAR; INTRAVENOUS at 08:25

## 2020-06-19 RX ADMIN — DARATUMUMAB 1300 MG: 100 INJECTION, SOLUTION, CONCENTRATE INTRAVENOUS at 10:11

## 2020-06-19 RX ADMIN — SODIUM CHLORIDE 20 ML/HR: 0.9 INJECTION, SOLUTION INTRAVENOUS at 08:25

## 2020-06-19 RX ADMIN — CYCLOPHOSPHAMIDE 784 MG: 1 INJECTION, POWDER, FOR SOLUTION INTRAVENOUS; ORAL at 09:35

## 2020-06-25 ENCOUNTER — TELEPHONE (OUTPATIENT)
Dept: HEMATOLOGY ONCOLOGY | Facility: CLINIC | Age: 75
End: 2020-06-25

## 2020-06-29 RX ORDER — SODIUM CHLORIDE 9 MG/ML
20 INJECTION, SOLUTION INTRAVENOUS CONTINUOUS
Status: DISCONTINUED | OUTPATIENT
Start: 2020-07-02 | End: 2020-07-05 | Stop reason: HOSPADM

## 2020-06-29 RX ORDER — DIPHENHYDRAMINE HCL 25 MG
50 TABLET ORAL ONCE
Status: COMPLETED | OUTPATIENT
Start: 2020-07-02 | End: 2020-07-02

## 2020-06-29 RX ORDER — ACETAMINOPHEN 325 MG/1
650 TABLET ORAL ONCE
Status: COMPLETED | OUTPATIENT
Start: 2020-07-02 | End: 2020-07-02

## 2020-06-29 RX ORDER — FAMOTIDINE 20 MG/1
40 TABLET, FILM COATED ORAL ONCE
Status: COMPLETED | OUTPATIENT
Start: 2020-07-02 | End: 2020-07-02

## 2020-06-30 ENCOUNTER — APPOINTMENT (OUTPATIENT)
Dept: LAB | Facility: CLINIC | Age: 75
End: 2020-06-30
Payer: COMMERCIAL

## 2020-06-30 ENCOUNTER — TELEPHONE (OUTPATIENT)
Dept: HEMATOLOGY ONCOLOGY | Facility: CLINIC | Age: 75
End: 2020-06-30

## 2020-06-30 ENCOUNTER — TRANSCRIBE ORDERS (OUTPATIENT)
Dept: URGENT CARE | Facility: CLINIC | Age: 75
End: 2020-06-30

## 2020-06-30 DIAGNOSIS — C90.00 IGG MULTIPLE MYELOMA (HCC): ICD-10-CM

## 2020-06-30 DIAGNOSIS — C90.00 IGG MULTIPLE MYELOMA (HCC): Primary | ICD-10-CM

## 2020-06-30 LAB
ALBUMIN SERPL BCP-MCNC: 3.7 G/DL (ref 3.5–5)
ALP SERPL-CCNC: 106 U/L (ref 46–116)
ALT SERPL W P-5'-P-CCNC: 27 U/L (ref 12–78)
ANION GAP SERPL CALCULATED.3IONS-SCNC: 7 MMOL/L (ref 4–13)
AST SERPL W P-5'-P-CCNC: 12 U/L (ref 5–45)
BASOPHILS # BLD MANUAL: 0.29 THOUSAND/UL (ref 0–0.1)
BASOPHILS NFR MAR MANUAL: 3 % (ref 0–1)
BILIRUB SERPL-MCNC: 0.75 MG/DL (ref 0.2–1)
BUN SERPL-MCNC: 23 MG/DL (ref 5–25)
CALCIUM SERPL-MCNC: 8.8 MG/DL (ref 8.3–10.1)
CHLORIDE SERPL-SCNC: 102 MMOL/L (ref 100–108)
CO2 SERPL-SCNC: 26 MMOL/L (ref 21–32)
CREAT SERPL-MCNC: 1.71 MG/DL (ref 0.6–1.3)
EOSINOPHIL # BLD MANUAL: 0.29 THOUSAND/UL (ref 0–0.4)
EOSINOPHIL NFR BLD MANUAL: 3 % (ref 0–6)
ERYTHROCYTE [DISTWIDTH] IN BLOOD BY AUTOMATED COUNT: 14 % (ref 11.6–15.1)
GFR SERPL CREATININE-BSD FRML MDRD: 39 ML/MIN/1.73SQ M
GLUCOSE P FAST SERPL-MCNC: 87 MG/DL (ref 65–99)
HCT VFR BLD AUTO: 36.6 % (ref 36.5–49.3)
HGB BLD-MCNC: 11.9 G/DL (ref 12–17)
LYMPHOCYTES # BLD AUTO: 0.1 THOUSAND/UL (ref 0.6–4.47)
LYMPHOCYTES # BLD AUTO: 1 % (ref 14–44)
MCH RBC QN AUTO: 35.5 PG (ref 26.8–34.3)
MCHC RBC AUTO-ENTMCNC: 32.5 G/DL (ref 31.4–37.4)
MCV RBC AUTO: 109 FL (ref 82–98)
METAMYELOCYTES NFR BLD MANUAL: 2 % (ref 0–1)
MONOCYTES # BLD AUTO: 0.67 THOUSAND/UL (ref 0–1.22)
MONOCYTES NFR BLD: 7 % (ref 4–12)
NEUTROPHILS # BLD MANUAL: 7.73 THOUSAND/UL (ref 1.85–7.62)
NEUTS BAND NFR BLD MANUAL: 1 % (ref 0–8)
NEUTS SEG NFR BLD AUTO: 80 % (ref 43–75)
NRBC BLD AUTO-RTO: 0 /100 WBCS
PLATELET # BLD AUTO: 166 THOUSANDS/UL (ref 149–390)
PLATELET BLD QL SMEAR: ADEQUATE
PMV BLD AUTO: 12.1 FL (ref 8.9–12.7)
POTASSIUM SERPL-SCNC: 4.1 MMOL/L (ref 3.5–5.3)
PROT SERPL-MCNC: 7.2 G/DL (ref 6.4–8.2)
RBC # BLD AUTO: 3.35 MILLION/UL (ref 3.88–5.62)
SODIUM SERPL-SCNC: 135 MMOL/L (ref 136–145)
VARIANT LYMPHS # BLD AUTO: 3 %
WBC # BLD AUTO: 9.54 THOUSAND/UL (ref 4.31–10.16)

## 2020-06-30 PROCEDURE — 36415 COLL VENOUS BLD VENIPUNCTURE: CPT | Performed by: INTERNAL MEDICINE

## 2020-06-30 PROCEDURE — 80053 COMPREHEN METABOLIC PANEL: CPT

## 2020-06-30 PROCEDURE — 85027 COMPLETE CBC AUTOMATED: CPT | Performed by: INTERNAL MEDICINE

## 2020-06-30 PROCEDURE — 85007 BL SMEAR W/DIFF WBC COUNT: CPT | Performed by: INTERNAL MEDICINE

## 2020-07-02 ENCOUNTER — HOSPITAL ENCOUNTER (OUTPATIENT)
Dept: INFUSION CENTER | Facility: CLINIC | Age: 75
Discharge: HOME | End: 2020-07-02
Payer: COMMERCIAL

## 2020-07-02 VITALS
HEART RATE: 50 BPM | SYSTOLIC BLOOD PRESSURE: 111 MMHG | DIASTOLIC BLOOD PRESSURE: 68 MMHG | RESPIRATION RATE: 16 BRPM | TEMPERATURE: 97.3 F | WEIGHT: 179.68 LBS | BODY MASS INDEX: 26.64 KG/M2

## 2020-07-02 DIAGNOSIS — C90.00 IGG MULTIPLE MYELOMA (HCC): Primary | ICD-10-CM

## 2020-07-02 DIAGNOSIS — D80.1 HYPOGAMMAGLOBULINEMIA (HCC): ICD-10-CM

## 2020-07-02 PROCEDURE — 96366 THER/PROPH/DIAG IV INF ADDON: CPT

## 2020-07-02 PROCEDURE — 96413 CHEMO IV INFUSION 1 HR: CPT

## 2020-07-02 PROCEDURE — 96367 TX/PROPH/DG ADDL SEQ IV INF: CPT

## 2020-07-02 PROCEDURE — 96415 CHEMO IV INFUSION ADDL HR: CPT

## 2020-07-02 RX ADMIN — SODIUM CHLORIDE 20 ML/HR: 0.9 INJECTION, SOLUTION INTRAVENOUS at 08:25

## 2020-07-02 RX ADMIN — DIPHENHYDRAMINE HCL 50 MG: 25 TABLET ORAL at 08:20

## 2020-07-02 RX ADMIN — FAMOTIDINE 40 MG: 20 TABLET ORAL at 08:21

## 2020-07-02 RX ADMIN — DEXAMETHASONE SODIUM PHOSPHATE 20 MG: 10 INJECTION, SOLUTION INTRAMUSCULAR; INTRAVENOUS at 08:25

## 2020-07-02 RX ADMIN — Medication 30 G: at 13:06

## 2020-07-02 RX ADMIN — SODIUM CHLORIDE: 0.9 INJECTION, SOLUTION INTRAVENOUS at 09:38

## 2020-07-02 RX ADMIN — ACETAMINOPHEN 650 MG: 325 TABLET ORAL at 08:21

## 2020-07-02 NOTE — PLAN OF CARE
Problem: Potential for Falls  Goal: Patient will remain free of falls  Description  INTERVENTIONS:  - Assess patient frequently for physical needs  -  Identify cognitive and physical deficits and behaviors that affect risk of falls    -  [place] fall precautions as indicated by assessment   - Educate patient/family on patient safety including physical limitations  - Instruct patient to call for assistance with activity based on assessment  - Modify environment to reduce risk of injury  - Consider OT/PT consult to assist with strengthening/mobility  Outcome: Progressing

## 2020-07-02 NOTE — PROGRESS NOTES
Patient tolerated first dose of isatuximab infusion well with no adverse events or complications  Also, tolerated IVIG infusion  Pt refused Zometa due to tooth pain  Pt will follow up with his dentist  Pt is aware to return next week for 2nd dose of isatuximab  Discharged ambulatory in stable condition

## 2020-07-06 DIAGNOSIS — F41.9 ANXIETY: ICD-10-CM

## 2020-07-06 RX ORDER — ESCITALOPRAM OXALATE 20 MG/1
20 TABLET ORAL DAILY
Qty: 90 TABLET | Refills: 0 | Status: SHIPPED | OUTPATIENT
Start: 2020-07-06 | End: 2020-07-08

## 2020-07-07 RX ORDER — DIPHENHYDRAMINE HCL 25 MG
50 TABLET ORAL ONCE
Status: DISCONTINUED | OUTPATIENT
Start: 2020-07-10 | End: 2020-07-10

## 2020-07-07 RX ORDER — ACETAMINOPHEN 325 MG/1
650 TABLET ORAL ONCE
Status: DISCONTINUED | OUTPATIENT
Start: 2020-07-10 | End: 2020-07-10

## 2020-07-07 RX ORDER — FAMOTIDINE 20 MG/1
40 TABLET, FILM COATED ORAL ONCE
Status: DISCONTINUED | OUTPATIENT
Start: 2020-07-10 | End: 2020-07-10

## 2020-07-07 RX ORDER — SODIUM CHLORIDE 9 MG/ML
20 INJECTION, SOLUTION INTRAVENOUS CONTINUOUS
Status: DISCONTINUED | OUTPATIENT
Start: 2020-07-10 | End: 2020-07-13 | Stop reason: HOSPADM

## 2020-07-08 ENCOUNTER — APPOINTMENT (OUTPATIENT)
Dept: LAB | Facility: CLINIC | Age: 75
End: 2020-07-08
Payer: COMMERCIAL

## 2020-07-08 ENCOUNTER — TRANSCRIBE ORDERS (OUTPATIENT)
Dept: URGENT CARE | Facility: CLINIC | Age: 75
End: 2020-07-08

## 2020-07-08 DIAGNOSIS — E78.5 HYPERLIPIDEMIA, UNSPECIFIED HYPERLIPIDEMIA TYPE: ICD-10-CM

## 2020-07-08 DIAGNOSIS — E85.9 MYELOMA ASSOCIATED AMYLOIDOSIS (HCC): Primary | ICD-10-CM

## 2020-07-08 DIAGNOSIS — C90.00 IGG MULTIPLE MYELOMA (HCC): ICD-10-CM

## 2020-07-08 DIAGNOSIS — F41.9 ANXIETY: ICD-10-CM

## 2020-07-08 DIAGNOSIS — C90.00 MULTIPLE MYELOMA NOT HAVING ACHIEVED REMISSION (HCC): ICD-10-CM

## 2020-07-08 DIAGNOSIS — C90.00 MYELOMA ASSOCIATED AMYLOIDOSIS (HCC): Primary | ICD-10-CM

## 2020-07-08 LAB
ALBUMIN SERPL BCP-MCNC: 3.7 G/DL (ref 3.5–5)
ALP SERPL-CCNC: 74 U/L (ref 46–116)
ALT SERPL W P-5'-P-CCNC: 22 U/L (ref 12–78)
ANION GAP SERPL CALCULATED.3IONS-SCNC: 4 MMOL/L (ref 4–13)
AST SERPL W P-5'-P-CCNC: 15 U/L (ref 5–45)
BASOPHILS # BLD AUTO: 0.03 THOUSANDS/ΜL (ref 0–0.1)
BASOPHILS NFR BLD AUTO: 1 % (ref 0–1)
BILIRUB SERPL-MCNC: 1.02 MG/DL (ref 0.2–1)
BUN SERPL-MCNC: 24 MG/DL (ref 5–25)
CALCIUM SERPL-MCNC: 8.9 MG/DL (ref 8.3–10.1)
CHLORIDE SERPL-SCNC: 105 MMOL/L (ref 100–108)
CO2 SERPL-SCNC: 27 MMOL/L (ref 21–32)
CREAT SERPL-MCNC: 1.76 MG/DL (ref 0.6–1.3)
EOSINOPHIL # BLD AUTO: 0.17 THOUSAND/ΜL (ref 0–0.61)
EOSINOPHIL NFR BLD AUTO: 4 % (ref 0–6)
ERYTHROCYTE [DISTWIDTH] IN BLOOD BY AUTOMATED COUNT: 13.9 % (ref 11.6–15.1)
GFR SERPL CREATININE-BSD FRML MDRD: 38 ML/MIN/1.73SQ M
GLUCOSE P FAST SERPL-MCNC: 89 MG/DL (ref 65–99)
HCT VFR BLD AUTO: 38.6 % (ref 36.5–49.3)
HGB BLD-MCNC: 12.4 G/DL (ref 12–17)
IMM GRANULOCYTES # BLD AUTO: 0.01 THOUSAND/UL (ref 0–0.2)
IMM GRANULOCYTES NFR BLD AUTO: 0 % (ref 0–2)
LYMPHOCYTES # BLD AUTO: 0.25 THOUSANDS/ΜL (ref 0.6–4.47)
LYMPHOCYTES NFR BLD AUTO: 5 % (ref 14–44)
MCH RBC QN AUTO: 35.1 PG (ref 26.8–34.3)
MCHC RBC AUTO-ENTMCNC: 32.1 G/DL (ref 31.4–37.4)
MCV RBC AUTO: 109 FL (ref 82–98)
MONOCYTES # BLD AUTO: 0.85 THOUSAND/ΜL (ref 0.17–1.22)
MONOCYTES NFR BLD AUTO: 18 % (ref 4–12)
NEUTROPHILS # BLD AUTO: 3.44 THOUSANDS/ΜL (ref 1.85–7.62)
NEUTS SEG NFR BLD AUTO: 72 % (ref 43–75)
NRBC BLD AUTO-RTO: 0 /100 WBCS
PLATELET # BLD AUTO: 182 THOUSANDS/UL (ref 149–390)
PMV BLD AUTO: 11.4 FL (ref 8.9–12.7)
POTASSIUM SERPL-SCNC: 4 MMOL/L (ref 3.5–5.3)
PROT SERPL-MCNC: 7.4 G/DL (ref 6.4–8.2)
RBC # BLD AUTO: 3.53 MILLION/UL (ref 3.88–5.62)
SODIUM SERPL-SCNC: 136 MMOL/L (ref 136–145)
WBC # BLD AUTO: 4.75 THOUSAND/UL (ref 4.31–10.16)

## 2020-07-08 PROCEDURE — 85025 COMPLETE CBC W/AUTO DIFF WBC: CPT | Performed by: INTERNAL MEDICINE

## 2020-07-08 PROCEDURE — 80053 COMPREHEN METABOLIC PANEL: CPT

## 2020-07-08 PROCEDURE — 36415 COLL VENOUS BLD VENIPUNCTURE: CPT | Performed by: INTERNAL MEDICINE

## 2020-07-08 RX ORDER — ACYCLOVIR 200 MG/1
CAPSULE ORAL
Qty: 60 CAPSULE | Refills: 0 | Status: SHIPPED | OUTPATIENT
Start: 2020-07-08 | End: 2020-07-17 | Stop reason: SDUPTHER

## 2020-07-08 RX ORDER — ESCITALOPRAM OXALATE 20 MG/1
TABLET ORAL
Qty: 90 TABLET | Refills: 0 | Status: SHIPPED | OUTPATIENT
Start: 2020-07-08 | End: 2020-10-05 | Stop reason: SDUPTHER

## 2020-07-10 ENCOUNTER — HOSPITAL ENCOUNTER (OUTPATIENT)
Dept: INFUSION CENTER | Facility: CLINIC | Age: 75
Discharge: HOME | End: 2020-07-10
Payer: COMMERCIAL

## 2020-07-10 VITALS
DIASTOLIC BLOOD PRESSURE: 73 MMHG | WEIGHT: 177.69 LBS | HEART RATE: 58 BPM | RESPIRATION RATE: 16 BRPM | TEMPERATURE: 97.6 F | BODY MASS INDEX: 26.35 KG/M2 | SYSTOLIC BLOOD PRESSURE: 118 MMHG

## 2020-07-10 PROCEDURE — C9399 UNCLASSIFIED DRUGS OR BIOLOG: HCPCS | Performed by: INTERNAL MEDICINE

## 2020-07-10 PROCEDURE — 96367 TX/PROPH/DG ADDL SEQ IV INF: CPT

## 2020-07-10 PROCEDURE — 96413 CHEMO IV INFUSION 1 HR: CPT

## 2020-07-10 PROCEDURE — 96415 CHEMO IV INFUSION ADDL HR: CPT

## 2020-07-10 RX ORDER — ACETAMINOPHEN 325 MG/1
650 TABLET ORAL ONCE
Status: COMPLETED | OUTPATIENT
Start: 2020-07-10 | End: 2020-07-10

## 2020-07-10 RX ORDER — FAMOTIDINE 20 MG/1
40 TABLET, FILM COATED ORAL ONCE
Status: COMPLETED | OUTPATIENT
Start: 2020-07-10 | End: 2020-07-10

## 2020-07-10 RX ORDER — ATORVASTATIN CALCIUM 20 MG/1
TABLET, FILM COATED ORAL
Qty: 90 TABLET | Refills: 0 | Status: SHIPPED | OUTPATIENT
Start: 2020-07-10 | End: 2020-08-31 | Stop reason: SDUPTHER

## 2020-07-10 RX ORDER — DIPHENHYDRAMINE HCL 25 MG
50 TABLET ORAL ONCE
Status: COMPLETED | OUTPATIENT
Start: 2020-07-10 | End: 2020-07-10

## 2020-07-10 RX ADMIN — DEXAMETHASONE SODIUM PHOSPHATE 20 MG: 10 INJECTION, SOLUTION INTRAMUSCULAR; INTRAVENOUS at 08:15

## 2020-07-10 RX ADMIN — SODIUM CHLORIDE 20 ML/HR: 0.9 INJECTION, SOLUTION INTRAVENOUS at 08:15

## 2020-07-10 RX ADMIN — FAMOTIDINE 40 MG: 20 TABLET ORAL at 08:13

## 2020-07-10 RX ADMIN — ACETAMINOPHEN 650 MG: 325 TABLET ORAL at 08:14

## 2020-07-10 RX ADMIN — SODIUM CHLORIDE: 0.9 INJECTION, SOLUTION INTRAVENOUS at 09:03

## 2020-07-10 RX ADMIN — DIPHENHYDRAMINE HCL 50 MG: 25 TABLET ORAL at 08:14

## 2020-07-10 NOTE — PLAN OF CARE
Problem: Potential for Falls  Goal: Patient will remain free of falls  Description  INTERVENTIONS:  - Assess patient frequently for physical needs  -  Identify cognitive and physical deficits and behaviors that affect risk of falls    -  [place] fall precautions as indicated by assessment   - Educate patient/family on patient safety including physical limitations  - Instruct patient to call for assistance with activity based on assessment  - Modify environment to reduce risk of injury  - Consider OT/PT consult to assist with strengthening/mobility  Outcome: Progressing

## 2020-07-10 NOTE — PROGRESS NOTES
Patient tolerated isatuxImab infusion without incident  Offers no complaints  Pt is aware to return in 1 week for next infusion   Refused AVS

## 2020-07-14 RX ORDER — DIPHENHYDRAMINE HCL 25 MG
50 TABLET ORAL ONCE
Status: COMPLETED | OUTPATIENT
Start: 2020-07-17 | End: 2020-07-17

## 2020-07-14 RX ORDER — FAMOTIDINE 20 MG/1
40 TABLET, FILM COATED ORAL ONCE
Status: COMPLETED | OUTPATIENT
Start: 2020-07-17 | End: 2020-07-17

## 2020-07-14 RX ORDER — ACETAMINOPHEN 325 MG/1
650 TABLET ORAL ONCE
Status: COMPLETED | OUTPATIENT
Start: 2020-07-17 | End: 2020-07-17

## 2020-07-14 RX ORDER — SODIUM CHLORIDE 9 MG/ML
20 INJECTION, SOLUTION INTRAVENOUS CONTINUOUS
Status: DISCONTINUED | OUTPATIENT
Start: 2020-07-17 | End: 2020-07-20 | Stop reason: HOSPADM

## 2020-07-15 ENCOUNTER — TRANSCRIBE ORDERS (OUTPATIENT)
Dept: URGENT CARE | Facility: CLINIC | Age: 75
End: 2020-07-15

## 2020-07-15 ENCOUNTER — APPOINTMENT (OUTPATIENT)
Dept: LAB | Facility: CLINIC | Age: 75
End: 2020-07-15
Payer: COMMERCIAL

## 2020-07-15 DIAGNOSIS — E85.9 MYELOMA ASSOCIATED AMYLOIDOSIS (HCC): Primary | ICD-10-CM

## 2020-07-15 DIAGNOSIS — C90.00 MYELOMA ASSOCIATED AMYLOIDOSIS (HCC): Primary | ICD-10-CM

## 2020-07-15 DIAGNOSIS — C90.00 IGG MULTIPLE MYELOMA (HCC): ICD-10-CM

## 2020-07-15 LAB
ALBUMIN SERPL BCP-MCNC: 3.4 G/DL (ref 3.5–5)
ALP SERPL-CCNC: 57 U/L (ref 46–116)
ALT SERPL W P-5'-P-CCNC: 20 U/L (ref 12–78)
ANION GAP SERPL CALCULATED.3IONS-SCNC: 7 MMOL/L (ref 4–13)
AST SERPL W P-5'-P-CCNC: 10 U/L (ref 5–45)
BASOPHILS # BLD AUTO: 0.04 THOUSANDS/ΜL (ref 0–0.1)
BASOPHILS NFR BLD AUTO: 1 % (ref 0–1)
BILIRUB SERPL-MCNC: 1.02 MG/DL (ref 0.2–1)
BUN SERPL-MCNC: 21 MG/DL (ref 5–25)
CALCIUM SERPL-MCNC: 9 MG/DL (ref 8.3–10.1)
CHLORIDE SERPL-SCNC: 103 MMOL/L (ref 100–108)
CO2 SERPL-SCNC: 25 MMOL/L (ref 21–32)
CREAT SERPL-MCNC: 1.61 MG/DL (ref 0.6–1.3)
EOSINOPHIL # BLD AUTO: 0.12 THOUSAND/ΜL (ref 0–0.61)
EOSINOPHIL NFR BLD AUTO: 4 % (ref 0–6)
ERYTHROCYTE [DISTWIDTH] IN BLOOD BY AUTOMATED COUNT: 13.4 % (ref 11.6–15.1)
GFR SERPL CREATININE-BSD FRML MDRD: 42 ML/MIN/1.73SQ M
GLUCOSE P FAST SERPL-MCNC: 91 MG/DL (ref 65–99)
HCT VFR BLD AUTO: 35.1 % (ref 36.5–49.3)
HGB BLD-MCNC: 11.6 G/DL (ref 12–17)
IMM GRANULOCYTES # BLD AUTO: 0.01 THOUSAND/UL (ref 0–0.2)
IMM GRANULOCYTES NFR BLD AUTO: 0 % (ref 0–2)
LYMPHOCYTES # BLD AUTO: 0.17 THOUSANDS/ΜL (ref 0.6–4.47)
LYMPHOCYTES NFR BLD AUTO: 5 % (ref 14–44)
MCH RBC QN AUTO: 35.4 PG (ref 26.8–34.3)
MCHC RBC AUTO-ENTMCNC: 33 G/DL (ref 31.4–37.4)
MCV RBC AUTO: 107 FL (ref 82–98)
MONOCYTES # BLD AUTO: 0.66 THOUSAND/ΜL (ref 0.17–1.22)
MONOCYTES NFR BLD AUTO: 21 % (ref 4–12)
NEUTROPHILS # BLD AUTO: 2.15 THOUSANDS/ΜL (ref 1.85–7.62)
NEUTS SEG NFR BLD AUTO: 69 % (ref 43–75)
NRBC BLD AUTO-RTO: 0 /100 WBCS
PLATELET # BLD AUTO: 156 THOUSANDS/UL (ref 149–390)
PMV BLD AUTO: 11.2 FL (ref 8.9–12.7)
POTASSIUM SERPL-SCNC: 3.8 MMOL/L (ref 3.5–5.3)
PROT SERPL-MCNC: 7.3 G/DL (ref 6.4–8.2)
RBC # BLD AUTO: 3.28 MILLION/UL (ref 3.88–5.62)
SODIUM SERPL-SCNC: 135 MMOL/L (ref 136–145)
WBC # BLD AUTO: 3.15 THOUSAND/UL (ref 4.31–10.16)

## 2020-07-15 PROCEDURE — 85025 COMPLETE CBC W/AUTO DIFF WBC: CPT | Performed by: INTERNAL MEDICINE

## 2020-07-15 PROCEDURE — 36415 COLL VENOUS BLD VENIPUNCTURE: CPT | Performed by: INTERNAL MEDICINE

## 2020-07-15 PROCEDURE — 80053 COMPREHEN METABOLIC PANEL: CPT

## 2020-07-16 ENCOUNTER — OFFICE VISIT (OUTPATIENT)
Dept: HEMATOLOGY ONCOLOGY | Facility: CLINIC | Age: 75
End: 2020-07-16
Payer: COMMERCIAL

## 2020-07-16 VITALS
RESPIRATION RATE: 17 BRPM | SYSTOLIC BLOOD PRESSURE: 128 MMHG | WEIGHT: 179.4 LBS | HEIGHT: 68 IN | DIASTOLIC BLOOD PRESSURE: 78 MMHG | OXYGEN SATURATION: 98 % | HEART RATE: 64 BPM | TEMPERATURE: 97.9 F | BODY MASS INDEX: 27.19 KG/M2

## 2020-07-16 DIAGNOSIS — N18.30 ANEMIA DUE TO STAGE 3 CHRONIC KIDNEY DISEASE (HCC): ICD-10-CM

## 2020-07-16 DIAGNOSIS — C90.00 MULTIPLE MYELOMA NOT HAVING ACHIEVED REMISSION (HCC): Primary | ICD-10-CM

## 2020-07-16 DIAGNOSIS — D70.9 NEUTROPENIA, UNSPECIFIED TYPE (HCC): ICD-10-CM

## 2020-07-16 DIAGNOSIS — Z29.9 PROPHYLACTIC MEASURE: ICD-10-CM

## 2020-07-16 DIAGNOSIS — D63.1 ANEMIA DUE TO STAGE 3 CHRONIC KIDNEY DISEASE (HCC): ICD-10-CM

## 2020-07-16 DIAGNOSIS — C90.00 IGG MULTIPLE MYELOMA (HCC): ICD-10-CM

## 2020-07-16 PROBLEM — D72.819 LEUKOPENIA: Status: ACTIVE | Noted: 2020-07-16

## 2020-07-16 PROCEDURE — 99215 OFFICE O/P EST HI 40 MIN: CPT | Performed by: INTERNAL MEDICINE

## 2020-07-16 PROCEDURE — 1036F TOBACCO NON-USER: CPT | Performed by: INTERNAL MEDICINE

## 2020-07-16 PROCEDURE — 3008F BODY MASS INDEX DOCD: CPT | Performed by: INTERNAL MEDICINE

## 2020-07-16 PROCEDURE — 1160F RVW MEDS BY RX/DR IN RCRD: CPT | Performed by: INTERNAL MEDICINE

## 2020-07-16 PROCEDURE — 3074F SYST BP LT 130 MM HG: CPT | Performed by: INTERNAL MEDICINE

## 2020-07-16 PROCEDURE — 4040F PNEUMOC VAC/ADMIN/RCVD: CPT | Performed by: INTERNAL MEDICINE

## 2020-07-16 PROCEDURE — 3078F DIAST BP <80 MM HG: CPT | Performed by: INTERNAL MEDICINE

## 2020-07-16 RX ORDER — LEVOFLOXACIN 250 MG/1
250 TABLET ORAL DAILY
Qty: 30 TABLET | Refills: 5 | Status: SHIPPED | OUTPATIENT
Start: 2020-07-16 | End: 2020-08-15

## 2020-07-16 NOTE — PROGRESS NOTES
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  Platelets are normal   Clinically he is doing well  He generally functions without restriction  He has no specific complaints at this time  He is on acyclovir prophylaxis  Levaquin for bacterial prophylaxis will be added, 250 mg p o  Daily, renal adjustment  He will be going to [place] for re-evaluation in early September  I will see him back thereafter to review recommendations  I discussed the above with the patient  The patient and his wife voiced understanding and agreement   ______________________________________________________________________    Chief Complaint   Patient presents with    Follow-up       HPI:     IgG multiple myeloma ([address] )    9/2015 Initial Diagnosis     Found to have Hbg of 6 with SOB, creatinine 1 8 and normal calcium with albumin of 2 1  Additonial blood work showed elevated protien level (12 1g/dL)  9/29/2015 Biopsy     Bone marrow biopsy demonstrated approximately 80% plasma cells with some immature forms noted  These studies showed 13q14 deletion, +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, 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)        10/14/2015 - 11/11/2015 Chemotherapy     Velcade 1 3 mg/m2 Days 1,8,15,22  Cytoxan 300 mg/m2  PO Days 1, 8,15, 22  Dexamethasone 40 mg PO Days 1,8,15, 22  Zometa 4 mg IV Day 1  Cycle length = 28 days    Completed 2 cycles    Day one of cycle 2 was on 11/11/15       12/2015 - 2/2016 Chemotherapy     VDT-PACE x 2 cycles   (completed at [facility] [place], [address] [place] [address])      2/2016 Transplant     Melphalane 200 mg/m2 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  MRD +      8/2016 - 1/26/2018 Chemotherapy     Maintenance therapy:  Carfilzomib, orginally dosed 27 mg weekly then increased to 45 mg weekly after MRD reactivitation  Revlimid  Dexamethasone       2/2018 Biopsy     Bone marrow demonstrated positive minimal residual disease        2/23/2018 -  Chemotherapy     Daratumumab 16mg/m2 IV Day 1  Pomalyst 4 mg p o  daily 1-21  Dexamethasone 4 mg PO Days 2, 3  Dexamethasone 12 mg PODays 8, 15, 22  Cycle length = 28 days      4/11/2019 - 8/29/2019 Chemotherapy     methylPREDNISolone sodium succinate (Solu-MEDROL) 100 mg in sodium chloride 0 9 % 250 mL IVPB, 100 mg, Intravenous, Once, 5 of 12 cycles  Administration: 100 mg (6/7/2019), 100 mg (7/5/2019), 100 mg (8/2/2019)      8/30/2019 - 11/8/2019 Chemotherapy     cyclophosphamide (CYTOXAN) 1,000 mg in sodium chloride 0 9 % 250 mL IVPB, 990 mg (66 7 % of original dose 750 mg/m2), Intravenous, Once, 2 of 3 cycles  Dose modification: 500 mg/m2 (original dose 750 mg/m2, Cycle 1, Reason: Other (See Comments)), 250 mg/m2 (original dose 750 mg/m2, Cycle 4, Reason: Treatment Parameters Not Met)  Administration: 1,000 mg (8/30/2019), 1,000 mg (9/13/2019), 1,000 mg (9/27/2019), 500 mg (10/11/2019)  bortezomib (VELCADE) subcutaneous injection 2 6 mg, 1 3 mg/m2 = 2 6 mg (86 7 % of original dose 1 5 mg/m2), Subcutaneous, Once, 3 of 4 cycles  Dose modification: 1 3 mg/m2 (original dose 1 5 mg/m2, Cycle 1, Reason: Other (See Comments))  Administration: 2 6 mg (8/30/2019), 2 6 mg (9/13/2019), 2 6 mg (10/25/2019), 2 6 mg (11/8/2019), 2 6 mg (9/27/2019), 2 6 mg (11/1/2019), 2 6 mg (9/20/2019), 2 6 mg (10/4/2019), 2 6 mg (10/11/2019), 2 6 mg (10/18/2019)      12/6/2019 - 6/19/2020 Chemotherapy     pegfilgrastim (NEULASTA ONPRO) subcutaneous injection kit 6 mg, 6 mg, Subcutaneous, Once, 5 of 9 cycles  Administration: 6 mg (2/28/2020), 6 mg (3/13/2020), 6 mg (3/27/2020), 6 mg (4/10/2020), 6 mg (4/24/2020), 6 mg (5/8/2020), 6 mg (6/5/2020), 6 mg (5/22/2020), 6 mg (6/19/2020)  cyclophosphamide (CYTOXAN) 784 mg in sodium chloride 0 9 % 250 mL IVPB, 400 mg/m2 = 784 mg, Intravenous, Once, 5 of 9 cycles  Dose modification: 400 mg/m2 (original dose 750 mg/m2, Cycle 7, Reason: Other (See Comments))  Administration: 784 mg (2/28/2020), 784 mg (3/13/2020), 784 mg (3/27/2020), 784 mg (4/10/2020), 784 mg (4/24/2020), 784 mg (5/8/2020), 784 mg (5/22/2020), 784 mg (6/19/2020), 784 mg (6/5/2020)  methylPREDNISolone sodium succinate (Solu-MEDROL) 100 mg in sodium chloride 0 9 % 250 mL IVPB, 100 mg, Intravenous, Once, 8 of 12 cycles  Administration: 100 mg (12/6/2019), 100 mg (12/13/2019), 100 mg (12/20/2019), 100 mg (1/3/2020), 100 mg (1/10/2020), 100 mg (1/17/2020), 100 mg (1/31/2020), 100 mg (2/14/2020), 100 mg (5/22/2020), 100 mg (12/27/2019), 100 mg (1/24/2020), 100 mg (2/28/2020), 100 mg (3/13/2020), 100 mg (3/27/2020), 100 mg (4/10/2020), 100 mg (4/24/2020), 100 mg (5/8/2020), 100 mg (6/19/2020)      7/2/2020 -  Chemotherapy            Interval History:  Clinically stable  ECOG-  0 - Asymptomatic    Review of Systems   Constitutional: Negative for chills and fever  HENT: Negative for nosebleeds  Eyes: Negative for discharge  Respiratory: Negative for cough and shortness of breath  Cardiovascular: Negative for chest pain  Gastrointestinal: Negative for abdominal pain, constipation and diarrhea  Endocrine: Negative for polydipsia  Genitourinary: Negative for hematuria  Musculoskeletal: Negative for arthralgias  Skin: Negative for color change  Allergic/Immunologic: Negative for immunocompromised state  Neurological: Negative for dizziness and headaches  Hematological: Negative for adenopathy  Psychiatric/Behavioral: Negative for agitation         Past Medical History:   Diagnosis Date    History of autologous stem cell transplant ([facility] [address] )     Hypertension     History of HTN-stable since weight loss    Insomnia     Multiple myeloma (HCC)      Patient Active Problem List   Diagnosis    IgG multiple myeloma (HCC)    Persistent atrial fibrillation (HCC)    Essential hypertension    Chronic ITP (idiopathic thrombocytopenia) (HCC)    Hyperlipidemia    Hypocalcemia    Hypothyroidism    Long term current use of systemic steroids    Other fatigue    Prophylactic measure    Hypogammaglobulinemia (HCC)       Current Outpatient Medications:     acetaminophen (TYLENOL) 325 mg tablet, Take 650 mg by mouth every 6 (six) hours as needed for mild pain, Disp: , Rfl:     acyclovir (ZOVIRAX) 200 mg capsule, TAKE  (1)  CAPSULE  TWICE DAILY  , Disp: 60 capsule, Rfl: 0    aspirin 81 MG tablet, Take 81 mg by mouth daily  , Disp: , Rfl:     atorvastatin (LIPITOR) 20 mg tablet, TAKE (1) TABLET DAILY  , Disp: 90 tablet, Rfl: 0    Cholecalciferol (VITAMIN D-3 PO), Take 1 tablet by mouth daily  , Disp: , Rfl:     Docusate Sodium (COLACE PO), Take 1 tablet by mouth as needed  , Disp: , Rfl:     eltrombopag (PROMACTA) 50 MG tablet, Take 50 mg by mouth daily Administer on an empty stomach, 1 hour before or 2 hours after a meal , Disp: , Rfl:     escitalopram (LEXAPRO) 20 mg tablet, TAKE (1) TABLET DAILY  , Disp: 90 tablet, Rfl: 0    Glutamine POWD, by Does not apply route, Disp: , Rfl:     levothyroxine 50 mcg tablet, Take 1 tablet (50 mcg total) by mouth daily (Patient taking differently: Take 50 mcg by mouth daily ), Disp: 90 tablet, Rfl: 3    LORazepam (ATIVAN) 0 5 mg tablet, Take 0 5 mg by mouth every 6 (six) hours as needed for anxiety  , Disp: , Rfl:     Multiple Vitamin (MULTIVITAMINS PO), Take 1 tablet by mouth daily  , Disp: , Rfl:     ondansetron (ZOFRAN) 4 mg tablet, Take 4 mg by mouth every 8 (eight) hours as needed for nausea or vomiting , Disp: , Rfl:     Pomalidomide (Pomalyst) 4 MG CAPS, Take 4 mg by mouth Days 1-21 off 7 days, Disp: , Rfl:   No current facility-administered medications for this visit  Facility-Administered Medications Ordered in Other Visits:     [START ON 7/17/2020] acetaminophen (TYLENOL) tablet 650 mg, 650 mg, Oral, Once, Devere Jacob, DO    [START ON 7/17/2020] dexamethasone (DECADRON) 20 mg in sodium chloride 0 9 % 52 mL IVPB, 20 mg, Intravenous, Once, Devere Jacob, DO    [START ON 7/17/2020] diphenhydrAMINE (BENADRYL) tablet 50 mg, 50 mg, Oral, Once, Devere Jacob, DO    [START ON 7/17/2020] famotidine (PEPCID) tablet 40 mg, 40 mg, Oral, Once, Devere Jacob, DO    [START ON 7/17/2020] isatuximab (SARCLISA) 800 mg in sodium chloride 0 9 % 250 mL IVPB, , Intravenous, Once, Devere Jacob, DO    [START ON 7/17/2020] sodium chloride 0 9 % infusion, 20 mL/hr, Intravenous, Continuous, Devere Jacob, DO  No Known Allergies  Past Surgical History:   Procedure Laterality Date    APPENDECTOMY      Last assessed: 9/25/15    ARTHROSCOPY KNEE Left     9/30/09    EYE SURGERY      Lasik    KNEE CARTILAGE SURGERY      LIMBAL STEM CELL TRANSPLANT      Patient had 2 in [place]-2/29/2016 and 4/13/2016     Social History     Objective:  Vitals:    07/16/20 0856   BP: 128/78   BP Location: Right arm   Patient Position: Sitting   Pulse: 64   Resp: 17   Temp: 97 9 °F (36 6 °C)   TempSrc: Tympanic   SpO2: 98%   Weight: 81 4 kg (179 lb 6 4 oz)   Height: 5' 8" (1 727 m)     Physical Exam   Constitutional: He is oriented to person, place, and time  He appears well-developed  HENT:   Head: Normocephalic and atraumatic  Eyes: Pupils are equal, round, and reactive to light  Neck: Neck supple  Cardiovascular: Normal rate and regular rhythm  No murmur heard  Pulmonary/Chest: Breath sounds normal  He has no wheezes  He has no rales  Abdominal: Soft  There is no tenderness  Musculoskeletal: Normal range of motion  He exhibits no edema or tenderness  Lymphadenopathy:     He has no cervical adenopathy  Neurological: He is alert and oriented to person, place, and time   He has normal reflexes  No cranial nerve deficit  Skin: No rash noted  No erythema  Psychiatric: He has a normal mood and affect  His behavior is normal          Labs: I personally reviewed the labs and imaging pertinent to this patient care

## 2020-07-17 ENCOUNTER — HOSPITAL ENCOUNTER (OUTPATIENT)
Dept: INFUSION CENTER | Facility: CLINIC | Age: 75
Discharge: HOME | End: 2020-07-17
Payer: COMMERCIAL

## 2020-07-17 VITALS
BODY MASS INDEX: 27.45 KG/M2 | TEMPERATURE: 97.7 F | DIASTOLIC BLOOD PRESSURE: 66 MMHG | RESPIRATION RATE: 18 BRPM | WEIGHT: 180.56 LBS | SYSTOLIC BLOOD PRESSURE: 113 MMHG | HEART RATE: 63 BPM

## 2020-07-17 DIAGNOSIS — C90.00 MULTIPLE MYELOMA NOT HAVING ACHIEVED REMISSION (HCC): Primary | ICD-10-CM

## 2020-07-17 PROCEDURE — 96367 TX/PROPH/DG ADDL SEQ IV INF: CPT

## 2020-07-17 PROCEDURE — 96413 CHEMO IV INFUSION 1 HR: CPT

## 2020-07-17 RX ORDER — ACYCLOVIR 200 MG/1
200 CAPSULE ORAL 2 TIMES DAILY
Qty: 60 CAPSULE | Refills: 2 | Status: SHIPPED | OUTPATIENT
Start: 2020-07-17 | End: 2020-11-12 | Stop reason: SDUPTHER

## 2020-07-17 RX ADMIN — DEXAMETHASONE SODIUM PHOSPHATE 20 MG: 10 INJECTION, SOLUTION INTRAMUSCULAR; INTRAVENOUS at 08:12

## 2020-07-17 RX ADMIN — FAMOTIDINE 40 MG: 20 TABLET ORAL at 08:16

## 2020-07-17 RX ADMIN — SODIUM CHLORIDE 20 ML/HR: 0.9 INJECTION, SOLUTION INTRAVENOUS at 08:12

## 2020-07-17 RX ADMIN — SODIUM CHLORIDE: 0.9 INJECTION, SOLUTION INTRAVENOUS at 08:48

## 2020-07-17 RX ADMIN — DIPHENHYDRAMINE HCL 50 MG: 25 TABLET ORAL at 08:16

## 2020-07-17 RX ADMIN — ACETAMINOPHEN 650 MG: 325 TABLET ORAL at 08:16

## 2020-07-17 NOTE — PROGRESS NOTES
Patient tolerated Isatuximab infusion well  Offers no complaints  Pt is aware of all future appointments   Refused AVS

## 2020-07-17 NOTE — PLAN OF CARE
Problem: Potential for Falls  Goal: Patient will remain free of falls  Description  INTERVENTIONS:  - Assess patient frequently for physical needs  -  Identify cognitive and physical deficits and behaviors that affect risk of falls    -  [place] fall precautions as indicated by assessment   - Educate patient/family on patient safety including physical limitations  - Instruct patient to call for assistance with activity based on assessment  - Modify environment to reduce risk of injury  - Consider OT/PT consult to assist with strengthening/mobility  Outcome: Progressing

## 2020-07-22 ENCOUNTER — TRANSCRIBE ORDERS (OUTPATIENT)
Dept: URGENT CARE | Facility: CLINIC | Age: 75
End: 2020-07-22

## 2020-07-22 ENCOUNTER — APPOINTMENT (OUTPATIENT)
Dept: LAB | Facility: CLINIC | Age: 75
End: 2020-07-22
Payer: COMMERCIAL

## 2020-07-22 DIAGNOSIS — C90.00 IGG MULTIPLE MYELOMA (HCC): ICD-10-CM

## 2020-07-22 DIAGNOSIS — C90.00 IGG MULTIPLE MYELOMA (HCC): Primary | ICD-10-CM

## 2020-07-22 LAB
ALBUMIN SERPL BCP-MCNC: 3.6 G/DL (ref 3.5–5)
ALP SERPL-CCNC: 55 U/L (ref 46–116)
ALT SERPL W P-5'-P-CCNC: 23 U/L (ref 12–78)
ANION GAP SERPL CALCULATED.3IONS-SCNC: 8 MMOL/L (ref 4–13)
AST SERPL W P-5'-P-CCNC: 12 U/L (ref 5–45)
BASOPHILS # BLD AUTO: 0.02 THOUSANDS/ΜL (ref 0–0.1)
BASOPHILS NFR BLD AUTO: 1 % (ref 0–1)
BILIRUB SERPL-MCNC: 0.71 MG/DL (ref 0.2–1)
BUN SERPL-MCNC: 23 MG/DL (ref 5–25)
CALCIUM SERPL-MCNC: 9.2 MG/DL (ref 8.3–10.1)
CHLORIDE SERPL-SCNC: 108 MMOL/L (ref 100–108)
CO2 SERPL-SCNC: 25 MMOL/L (ref 21–32)
CREAT SERPL-MCNC: 1.71 MG/DL (ref 0.6–1.3)
EOSINOPHIL # BLD AUTO: 0.21 THOUSAND/ΜL (ref 0–0.61)
EOSINOPHIL NFR BLD AUTO: 5 % (ref 0–6)
ERYTHROCYTE [DISTWIDTH] IN BLOOD BY AUTOMATED COUNT: 13.6 % (ref 11.6–15.1)
GFR SERPL CREATININE-BSD FRML MDRD: 39 ML/MIN/1.73SQ M
GLUCOSE P FAST SERPL-MCNC: 88 MG/DL (ref 65–99)
HCT VFR BLD AUTO: 34.8 % (ref 36.5–49.3)
HGB BLD-MCNC: 11.2 G/DL (ref 12–17)
IMM GRANULOCYTES # BLD AUTO: 0.03 THOUSAND/UL (ref 0–0.2)
IMM GRANULOCYTES NFR BLD AUTO: 1 % (ref 0–2)
LYMPHOCYTES # BLD AUTO: 0.22 THOUSANDS/ΜL (ref 0.6–4.47)
LYMPHOCYTES NFR BLD AUTO: 5 % (ref 14–44)
MCH RBC QN AUTO: 34.6 PG (ref 26.8–34.3)
MCHC RBC AUTO-ENTMCNC: 32.2 G/DL (ref 31.4–37.4)
MCV RBC AUTO: 107 FL (ref 82–98)
MONOCYTES # BLD AUTO: 1.32 THOUSAND/ΜL (ref 0.17–1.22)
MONOCYTES NFR BLD AUTO: 31 % (ref 4–12)
NEUTROPHILS # BLD AUTO: 2.46 THOUSANDS/ΜL (ref 1.85–7.62)
NEUTS SEG NFR BLD AUTO: 57 % (ref 43–75)
NRBC BLD AUTO-RTO: 0 /100 WBCS
PLATELET # BLD AUTO: 146 THOUSANDS/UL (ref 149–390)
PMV BLD AUTO: 11.1 FL (ref 8.9–12.7)
POTASSIUM SERPL-SCNC: 4.1 MMOL/L (ref 3.5–5.3)
PROT SERPL-MCNC: 6.9 G/DL (ref 6.4–8.2)
RBC # BLD AUTO: 3.24 MILLION/UL (ref 3.88–5.62)
SODIUM SERPL-SCNC: 141 MMOL/L (ref 136–145)
WBC # BLD AUTO: 4.26 THOUSAND/UL (ref 4.31–10.16)

## 2020-07-22 PROCEDURE — 36415 COLL VENOUS BLD VENIPUNCTURE: CPT | Performed by: INTERNAL MEDICINE

## 2020-07-22 PROCEDURE — 85025 COMPLETE CBC W/AUTO DIFF WBC: CPT | Performed by: INTERNAL MEDICINE

## 2020-07-22 PROCEDURE — 80053 COMPREHEN METABOLIC PANEL: CPT

## 2020-07-23 RX ORDER — SODIUM CHLORIDE 9 MG/ML
20 INJECTION, SOLUTION INTRAVENOUS CONTINUOUS
Status: DISCONTINUED | OUTPATIENT
Start: 2020-07-24 | End: 2020-07-27 | Stop reason: HOSPADM

## 2020-07-23 RX ORDER — DIPHENHYDRAMINE HCL 25 MG
50 TABLET ORAL ONCE
Status: COMPLETED | OUTPATIENT
Start: 2020-07-24 | End: 2020-07-24

## 2020-07-23 RX ORDER — ACETAMINOPHEN 325 MG/1
650 TABLET ORAL ONCE
Status: COMPLETED | OUTPATIENT
Start: 2020-07-24 | End: 2020-07-24

## 2020-07-23 RX ORDER — FAMOTIDINE 20 MG/1
40 TABLET, FILM COATED ORAL ONCE
Status: COMPLETED | OUTPATIENT
Start: 2020-07-24 | End: 2020-07-24

## 2020-07-24 ENCOUNTER — HOSPITAL ENCOUNTER (OUTPATIENT)
Dept: INFUSION CENTER | Facility: CLINIC | Age: 75
Discharge: HOME | End: 2020-07-24
Payer: COMMERCIAL

## 2020-07-24 VITALS
SYSTOLIC BLOOD PRESSURE: 121 MMHG | TEMPERATURE: 97.5 F | BODY MASS INDEX: 28.02 KG/M2 | WEIGHT: 184.3 LBS | DIASTOLIC BLOOD PRESSURE: 74 MMHG | RESPIRATION RATE: 18 BRPM | HEART RATE: 64 BPM

## 2020-07-24 PROCEDURE — 96367 TX/PROPH/DG ADDL SEQ IV INF: CPT

## 2020-07-24 PROCEDURE — 96415 CHEMO IV INFUSION ADDL HR: CPT

## 2020-07-24 PROCEDURE — C9399 UNCLASSIFIED DRUGS OR BIOLOG: HCPCS | Performed by: INTERNAL MEDICINE

## 2020-07-24 PROCEDURE — 96413 CHEMO IV INFUSION 1 HR: CPT

## 2020-07-24 RX ADMIN — SODIUM CHLORIDE 20 ML/HR: 0.9 INJECTION, SOLUTION INTRAVENOUS at 08:20

## 2020-07-24 RX ADMIN — DIPHENHYDRAMINE HCL 50 MG: 25 TABLET ORAL at 08:12

## 2020-07-24 RX ADMIN — FAMOTIDINE 40 MG: 20 TABLET, FILM COATED ORAL at 08:12

## 2020-07-24 RX ADMIN — SODIUM CHLORIDE: 0.9 INJECTION, SOLUTION INTRAVENOUS at 09:13

## 2020-07-24 RX ADMIN — DEXAMETHASONE SODIUM PHOSPHATE 20 MG: 10 INJECTION, SOLUTION INTRAMUSCULAR; INTRAVENOUS at 08:20

## 2020-07-24 RX ADMIN — ACETAMINOPHEN 650 MG: 325 TABLET ORAL at 08:12

## 2020-07-24 NOTE — PLAN OF CARE
Problem: Potential for Falls  Goal: Patient will remain free of falls  Description  INTERVENTIONS:  - Assess patient frequently for physical needs  -  Identify cognitive and physical deficits and behaviors that affect risk of falls    -  [place] fall precautions as indicated by assessment   - Educate patient/family on patient safety including physical limitations  - Instruct patient to call for assistance with activity based on assessment  - Modify environment to reduce risk of injury  - Consider OT/PT consult to assist with strengthening/mobility  Outcome: Progressing

## 2020-07-24 NOTE — PROGRESS NOTES
Patient tolerated infusion well  Offers no complaints  Pt is aware of all future appointments   Refused AVS

## 2020-07-31 LAB — MISCELLANEOUS LAB TEST RESULT: NORMAL

## 2020-08-05 ENCOUNTER — APPOINTMENT (OUTPATIENT)
Dept: LAB | Facility: CLINIC | Age: 75
End: 2020-08-05
Payer: COMMERCIAL

## 2020-08-05 ENCOUNTER — TRANSCRIBE ORDERS (OUTPATIENT)
Dept: URGENT CARE | Facility: CLINIC | Age: 75
End: 2020-08-05

## 2020-08-05 DIAGNOSIS — C90.00 IGG MULTIPLE MYELOMA (HCC): ICD-10-CM

## 2020-08-05 DIAGNOSIS — C90.00 IGG MULTIPLE MYELOMA (HCC): Primary | ICD-10-CM

## 2020-08-05 LAB
ALBUMIN SERPL BCP-MCNC: 3.5 G/DL (ref 3.5–5)
ALP SERPL-CCNC: 54 U/L (ref 46–116)
ALT SERPL W P-5'-P-CCNC: 21 U/L (ref 12–78)
ANION GAP SERPL CALCULATED.3IONS-SCNC: 5 MMOL/L (ref 4–13)
AST SERPL W P-5'-P-CCNC: 17 U/L (ref 5–45)
BASOPHILS # BLD AUTO: 0.07 THOUSANDS/ΜL (ref 0–0.1)
BASOPHILS NFR BLD AUTO: 3 % (ref 0–1)
BILIRUB SERPL-MCNC: 1.07 MG/DL (ref 0.2–1)
BUN SERPL-MCNC: 23 MG/DL (ref 5–25)
CALCIUM SERPL-MCNC: 9.3 MG/DL (ref 8.3–10.1)
CHLORIDE SERPL-SCNC: 108 MMOL/L (ref 100–108)
CO2 SERPL-SCNC: 26 MMOL/L (ref 21–32)
CREAT SERPL-MCNC: 1.58 MG/DL (ref 0.6–1.3)
EOSINOPHIL # BLD AUTO: 0.07 THOUSAND/ΜL (ref 0–0.61)
EOSINOPHIL NFR BLD AUTO: 3 % (ref 0–6)
ERYTHROCYTE [DISTWIDTH] IN BLOOD BY AUTOMATED COUNT: 13.7 % (ref 11.6–15.1)
GFR SERPL CREATININE-BSD FRML MDRD: 43 ML/MIN/1.73SQ M
GLUCOSE P FAST SERPL-MCNC: 96 MG/DL (ref 65–99)
HCT VFR BLD AUTO: 35.7 % (ref 36.5–49.3)
HGB BLD-MCNC: 11.8 G/DL (ref 12–17)
IMM GRANULOCYTES # BLD AUTO: 0.02 THOUSAND/UL (ref 0–0.2)
IMM GRANULOCYTES NFR BLD AUTO: 1 % (ref 0–2)
LYMPHOCYTES # BLD AUTO: 0.26 THOUSANDS/ΜL (ref 0.6–4.47)
LYMPHOCYTES NFR BLD AUTO: 10 % (ref 14–44)
MCH RBC QN AUTO: 35 PG (ref 26.8–34.3)
MCHC RBC AUTO-ENTMCNC: 33.1 G/DL (ref 31.4–37.4)
MCV RBC AUTO: 106 FL (ref 82–98)
MONOCYTES # BLD AUTO: 0.83 THOUSAND/ΜL (ref 0.17–1.22)
MONOCYTES NFR BLD AUTO: 33 % (ref 4–12)
NEUTROPHILS # BLD AUTO: 1.27 THOUSANDS/ΜL (ref 1.85–7.62)
NEUTS SEG NFR BLD AUTO: 50 % (ref 43–75)
NRBC BLD AUTO-RTO: 0 /100 WBCS
PLATELET # BLD AUTO: 121 THOUSANDS/UL (ref 149–390)
PMV BLD AUTO: 11.4 FL (ref 8.9–12.7)
POTASSIUM SERPL-SCNC: 4.1 MMOL/L (ref 3.5–5.3)
PROT SERPL-MCNC: 6.9 G/DL (ref 6.4–8.2)
RBC # BLD AUTO: 3.37 MILLION/UL (ref 3.88–5.62)
SODIUM SERPL-SCNC: 139 MMOL/L (ref 136–145)
WBC # BLD AUTO: 2.52 THOUSAND/UL (ref 4.31–10.16)

## 2020-08-05 PROCEDURE — 36415 COLL VENOUS BLD VENIPUNCTURE: CPT | Performed by: INTERNAL MEDICINE

## 2020-08-05 PROCEDURE — 80053 COMPREHEN METABOLIC PANEL: CPT

## 2020-08-05 PROCEDURE — 85025 COMPLETE CBC W/AUTO DIFF WBC: CPT | Performed by: INTERNAL MEDICINE

## 2020-08-06 RX ORDER — SODIUM CHLORIDE 9 MG/ML
20 INJECTION, SOLUTION INTRAVENOUS CONTINUOUS
Status: DISCONTINUED | OUTPATIENT
Start: 2020-08-07 | End: 2020-08-10 | Stop reason: HOSPADM

## 2020-08-06 RX ORDER — ACETAMINOPHEN 325 MG/1
650 TABLET ORAL ONCE
Status: COMPLETED | OUTPATIENT
Start: 2020-08-07 | End: 2020-08-07

## 2020-08-07 ENCOUNTER — HOSPITAL ENCOUNTER (OUTPATIENT)
Dept: INFUSION CENTER | Facility: CLINIC | Age: 75
Discharge: HOME | End: 2020-08-07
Payer: COMMERCIAL

## 2020-08-07 VITALS
DIASTOLIC BLOOD PRESSURE: 75 MMHG | WEIGHT: 183.64 LBS | HEART RATE: 66 BPM | TEMPERATURE: 97.7 F | RESPIRATION RATE: 16 BRPM | SYSTOLIC BLOOD PRESSURE: 130 MMHG | BODY MASS INDEX: 27.92 KG/M2

## 2020-08-07 DIAGNOSIS — D80.1 HYPOGAMMAGLOBULINEMIA (HCC): ICD-10-CM

## 2020-08-07 DIAGNOSIS — C90.00 IGG MULTIPLE MYELOMA (HCC): Primary | ICD-10-CM

## 2020-08-07 PROCEDURE — 96413 CHEMO IV INFUSION 1 HR: CPT

## 2020-08-07 PROCEDURE — 96367 TX/PROPH/DG ADDL SEQ IV INF: CPT

## 2020-08-07 PROCEDURE — 96366 THER/PROPH/DIAG IV INF ADDON: CPT

## 2020-08-07 RX ORDER — FAMOTIDINE 20 MG/1
40 TABLET, FILM COATED ORAL ONCE
Status: COMPLETED | OUTPATIENT
Start: 2020-08-07 | End: 2020-08-07

## 2020-08-07 RX ORDER — DIPHENHYDRAMINE HCL 25 MG
50 TABLET ORAL ONCE
Status: COMPLETED | OUTPATIENT
Start: 2020-08-07 | End: 2020-08-07

## 2020-08-07 RX ADMIN — SODIUM CHLORIDE: 0.9 INJECTION, SOLUTION INTRAVENOUS at 12:00

## 2020-08-07 RX ADMIN — SODIUM CHLORIDE 20 ML/HR: 9 INJECTION, SOLUTION INTRAVENOUS at 08:22

## 2020-08-07 RX ADMIN — ACETAMINOPHEN 650 MG: 325 TABLET, FILM COATED ORAL at 10:54

## 2020-08-07 RX ADMIN — Medication 30 G: at 08:35

## 2020-08-07 RX ADMIN — DIPHENHYDRAMINE HCL 50 MG: 25 TABLET ORAL at 10:54

## 2020-08-07 RX ADMIN — FAMOTIDINE 40 MG: 20 TABLET ORAL at 10:54

## 2020-08-07 RX ADMIN — ZOLEDRONIC ACID 3.3 MG: 4 INJECTION INTRAVENOUS at 11:18

## 2020-08-07 RX ADMIN — DEXAMETHASONE SODIUM PHOSPHATE 20 MG: 10 INJECTION, SOLUTION INTRAMUSCULAR; INTRAVENOUS at 10:50

## 2020-08-07 NOTE — PLAN OF CARE
Problem: Potential for Falls  Goal: Patient will remain free of falls  Description: INTERVENTIONS:  - Assess patient frequently for physical needs  -  Identify cognitive and physical deficits and behaviors that affect risk of falls    -  [place] fall precautions as indicated by assessment   - Educate patient/family on patient safety including physical limitations  - Instruct patient to call for assistance with activity based on assessment  - Modify environment to reduce risk of injury  - Consider OT/PT consult to assist with strengthening/mobility  Outcome: Progressing

## 2020-08-18 RX ORDER — ACETAMINOPHEN 325 MG/1
650 TABLET ORAL ONCE
Status: COMPLETED | OUTPATIENT
Start: 2020-08-21 | End: 2020-08-21

## 2020-08-18 RX ORDER — FAMOTIDINE 20 MG/1
40 TABLET, FILM COATED ORAL ONCE
Status: COMPLETED | OUTPATIENT
Start: 2020-08-21 | End: 2020-08-21

## 2020-08-18 RX ORDER — DIPHENHYDRAMINE HCL 25 MG
50 TABLET ORAL ONCE
Status: COMPLETED | OUTPATIENT
Start: 2020-08-21 | End: 2020-08-21

## 2020-08-18 RX ORDER — SODIUM CHLORIDE 9 MG/ML
20 INJECTION, SOLUTION INTRAVENOUS CONTINUOUS
Status: DISCONTINUED | OUTPATIENT
Start: 2020-08-21 | End: 2020-08-24 | Stop reason: HOSPADM

## 2020-08-19 ENCOUNTER — APPOINTMENT (OUTPATIENT)
Dept: LAB | Facility: CLINIC | Age: 75
End: 2020-08-19
Payer: COMMERCIAL

## 2020-08-19 ENCOUNTER — TRANSCRIBE ORDERS (OUTPATIENT)
Dept: URGENT CARE | Facility: CLINIC | Age: 75
End: 2020-08-19

## 2020-08-19 DIAGNOSIS — C90.00 IGG MULTIPLE MYELOMA (HCC): ICD-10-CM

## 2020-08-19 DIAGNOSIS — C90.00 IGG MULTIPLE MYELOMA (HCC): Primary | ICD-10-CM

## 2020-08-19 LAB
ALBUMIN SERPL BCP-MCNC: 3.7 G/DL (ref 3.5–5)
ALP SERPL-CCNC: 56 U/L (ref 46–116)
ALT SERPL W P-5'-P-CCNC: 28 U/L (ref 12–78)
ANION GAP SERPL CALCULATED.3IONS-SCNC: 5 MMOL/L (ref 4–13)
AST SERPL W P-5'-P-CCNC: 15 U/L (ref 5–45)
BASOPHILS # BLD AUTO: 0.06 THOUSANDS/ΜL (ref 0–0.1)
BASOPHILS NFR BLD AUTO: 1 % (ref 0–1)
BILIRUB SERPL-MCNC: 0.75 MG/DL (ref 0.2–1)
BUN SERPL-MCNC: 27 MG/DL (ref 5–25)
CALCIUM SERPL-MCNC: 8.7 MG/DL (ref 8.3–10.1)
CHLORIDE SERPL-SCNC: 107 MMOL/L (ref 100–108)
CO2 SERPL-SCNC: 28 MMOL/L (ref 21–32)
CREAT SERPL-MCNC: 1.72 MG/DL (ref 0.6–1.3)
EOSINOPHIL # BLD AUTO: 0.2 THOUSAND/ΜL (ref 0–0.61)
EOSINOPHIL NFR BLD AUTO: 4 % (ref 0–6)
ERYTHROCYTE [DISTWIDTH] IN BLOOD BY AUTOMATED COUNT: 14 % (ref 11.6–15.1)
GFR SERPL CREATININE-BSD FRML MDRD: 39 ML/MIN/1.73SQ M
GLUCOSE P FAST SERPL-MCNC: 89 MG/DL (ref 65–99)
HCT VFR BLD AUTO: 36.9 % (ref 36.5–49.3)
HGB BLD-MCNC: 11.7 G/DL (ref 12–17)
IMM GRANULOCYTES # BLD AUTO: 0.04 THOUSAND/UL (ref 0–0.2)
IMM GRANULOCYTES NFR BLD AUTO: 1 % (ref 0–2)
LYMPHOCYTES # BLD AUTO: 0.21 THOUSANDS/ΜL (ref 0.6–4.47)
LYMPHOCYTES NFR BLD AUTO: 5 % (ref 14–44)
MCH RBC QN AUTO: 33.9 PG (ref 26.8–34.3)
MCHC RBC AUTO-ENTMCNC: 31.7 G/DL (ref 31.4–37.4)
MCV RBC AUTO: 107 FL (ref 82–98)
MONOCYTES # BLD AUTO: 1.23 THOUSAND/ΜL (ref 0.17–1.22)
MONOCYTES NFR BLD AUTO: 27 % (ref 4–12)
NEUTROPHILS # BLD AUTO: 2.76 THOUSANDS/ΜL (ref 1.85–7.62)
NEUTS SEG NFR BLD AUTO: 62 % (ref 43–75)
NRBC BLD AUTO-RTO: 0 /100 WBCS
PLATELET # BLD AUTO: 102 THOUSANDS/UL (ref 149–390)
PMV BLD AUTO: 11.4 FL (ref 8.9–12.7)
POTASSIUM SERPL-SCNC: 4.1 MMOL/L (ref 3.5–5.3)
PROT SERPL-MCNC: 7.3 G/DL (ref 6.4–8.2)
RBC # BLD AUTO: 3.45 MILLION/UL (ref 3.88–5.62)
SODIUM SERPL-SCNC: 140 MMOL/L (ref 136–145)
WBC # BLD AUTO: 4.5 THOUSAND/UL (ref 4.31–10.16)

## 2020-08-19 PROCEDURE — 36415 COLL VENOUS BLD VENIPUNCTURE: CPT | Performed by: INTERNAL MEDICINE

## 2020-08-19 PROCEDURE — 85025 COMPLETE CBC W/AUTO DIFF WBC: CPT | Performed by: INTERNAL MEDICINE

## 2020-08-19 PROCEDURE — 80053 COMPREHEN METABOLIC PANEL: CPT

## 2020-08-21 ENCOUNTER — HOSPITAL ENCOUNTER (OUTPATIENT)
Dept: INFUSION CENTER | Facility: CLINIC | Age: 75
Discharge: HOME | End: 2020-08-21
Payer: COMMERCIAL

## 2020-08-21 VITALS
DIASTOLIC BLOOD PRESSURE: 79 MMHG | BODY MASS INDEX: 28.12 KG/M2 | SYSTOLIC BLOOD PRESSURE: 122 MMHG | TEMPERATURE: 98.3 F | HEART RATE: 61 BPM | WEIGHT: 184.97 LBS | RESPIRATION RATE: 16 BRPM

## 2020-08-21 PROCEDURE — 96367 TX/PROPH/DG ADDL SEQ IV INF: CPT

## 2020-08-21 PROCEDURE — 96413 CHEMO IV INFUSION 1 HR: CPT

## 2020-08-21 RX ADMIN — ACETAMINOPHEN 650 MG: 325 TABLET, FILM COATED ORAL at 08:12

## 2020-08-21 RX ADMIN — DEXAMETHASONE SODIUM PHOSPHATE 20 MG: 10 INJECTION, SOLUTION INTRAMUSCULAR; INTRAVENOUS at 08:25

## 2020-08-21 RX ADMIN — SODIUM CHLORIDE 20 ML/HR: 0.9 INJECTION, SOLUTION INTRAVENOUS at 08:14

## 2020-08-21 RX ADMIN — SODIUM CHLORIDE: 0.9 INJECTION, SOLUTION INTRAVENOUS at 08:57

## 2020-08-21 RX ADMIN — DIPHENHYDRAMINE HCL 50 MG: 25 TABLET ORAL at 08:12

## 2020-08-21 RX ADMIN — FAMOTIDINE 40 MG: 20 TABLET, FILM COATED ORAL at 08:12

## 2020-08-21 NOTE — PLAN OF CARE
Problem: Potential for Falls  Goal: Patient will remain free of falls  Description: INTERVENTIONS:  - Assess patient frequently for physical needs  -  Identify cognitive and physical deficits and behaviors that affect risk of falls    -  [place] fall precautions as indicated by assessment   - Educate patient/family on patient safety including physical limitations  - Instruct patient to call for assistance with activity based on assessment  - Modify environment to reduce risk of injury  - Consider OT/PT consult to assist with strengthening/mobility  Outcome: Progressing

## 2020-08-31 DIAGNOSIS — E78.5 HYPERLIPIDEMIA, UNSPECIFIED HYPERLIPIDEMIA TYPE: ICD-10-CM

## 2020-08-31 RX ORDER — ATORVASTATIN CALCIUM 20 MG/1
20 TABLET, FILM COATED ORAL DAILY
Qty: 90 TABLET | Refills: 0 | Status: SHIPPED | OUTPATIENT
Start: 2020-08-31 | End: 2021-02-17 | Stop reason: SDUPTHER

## 2020-09-01 RX ORDER — FAMOTIDINE 20 MG/1
40 TABLET, FILM COATED ORAL ONCE
Status: COMPLETED | OUTPATIENT
Start: 2020-09-04 | End: 2020-09-04

## 2020-09-01 RX ORDER — SODIUM CHLORIDE 9 MG/ML
20 INJECTION, SOLUTION INTRAVENOUS CONTINUOUS
Status: DISCONTINUED | OUTPATIENT
Start: 2020-09-04 | End: 2020-09-07 | Stop reason: HOSPADM

## 2020-09-01 RX ORDER — DIPHENHYDRAMINE HCL 25 MG
50 TABLET ORAL ONCE
Status: COMPLETED | OUTPATIENT
Start: 2020-09-04 | End: 2020-09-04

## 2020-09-01 RX ORDER — ACETAMINOPHEN 325 MG/1
650 TABLET ORAL ONCE
Status: COMPLETED | OUTPATIENT
Start: 2020-09-04 | End: 2020-09-04

## 2020-09-02 ENCOUNTER — APPOINTMENT (OUTPATIENT)
Dept: LAB | Facility: CLINIC | Age: 75
End: 2020-09-02
Payer: COMMERCIAL

## 2020-09-02 ENCOUNTER — TRANSCRIBE ORDERS (OUTPATIENT)
Dept: URGENT CARE | Facility: CLINIC | Age: 75
End: 2020-09-02

## 2020-09-02 DIAGNOSIS — C90.00 IGG MULTIPLE MYELOMA (HCC): Primary | ICD-10-CM

## 2020-09-02 DIAGNOSIS — C90.00 IGG MULTIPLE MYELOMA (HCC): ICD-10-CM

## 2020-09-02 DIAGNOSIS — C90.00 MULTIPLE MYELOMA, REMISSION STATUS UNSPECIFIED (HCC): ICD-10-CM

## 2020-09-02 LAB
ALBUMIN SERPL BCP-MCNC: 3.8 G/DL (ref 3.5–5)
ALP SERPL-CCNC: 62 U/L (ref 46–116)
ALT SERPL W P-5'-P-CCNC: 26 U/L (ref 12–78)
ANION GAP SERPL CALCULATED.3IONS-SCNC: 8 MMOL/L (ref 4–13)
AST SERPL W P-5'-P-CCNC: 18 U/L (ref 5–45)
BASOPHILS # BLD AUTO: 0.07 THOUSANDS/ΜL (ref 0–0.1)
BASOPHILS NFR BLD AUTO: 3 % (ref 0–1)
BILIRUB SERPL-MCNC: 1.43 MG/DL (ref 0.2–1)
BUN SERPL-MCNC: 21 MG/DL (ref 5–25)
CALCIUM SERPL-MCNC: 9.4 MG/DL (ref 8.3–10.1)
CHLORIDE SERPL-SCNC: 105 MMOL/L (ref 100–108)
CO2 SERPL-SCNC: 26 MMOL/L (ref 21–32)
CREAT SERPL-MCNC: 1.81 MG/DL (ref 0.6–1.3)
EOSINOPHIL # BLD AUTO: 0.08 THOUSAND/ΜL (ref 0–0.61)
EOSINOPHIL NFR BLD AUTO: 3 % (ref 0–6)
ERYTHROCYTE [DISTWIDTH] IN BLOOD BY AUTOMATED COUNT: 14.5 % (ref 11.6–15.1)
GFR SERPL CREATININE-BSD FRML MDRD: 37 ML/MIN/1.73SQ M
GLUCOSE P FAST SERPL-MCNC: 90 MG/DL (ref 65–99)
HCT VFR BLD AUTO: 38.2 % (ref 36.5–49.3)
HGB BLD-MCNC: 12.5 G/DL (ref 12–17)
IMM GRANULOCYTES # BLD AUTO: 0 THOUSAND/UL (ref 0–0.2)
IMM GRANULOCYTES NFR BLD AUTO: 0 % (ref 0–2)
LYMPHOCYTES # BLD AUTO: 0.49 THOUSANDS/ΜL (ref 0.6–4.47)
LYMPHOCYTES NFR BLD AUTO: 20 % (ref 14–44)
MCH RBC QN AUTO: 33.9 PG (ref 26.8–34.3)
MCHC RBC AUTO-ENTMCNC: 32.7 G/DL (ref 31.4–37.4)
MCV RBC AUTO: 104 FL (ref 82–98)
MONOCYTES # BLD AUTO: 0.8 THOUSAND/ΜL (ref 0.17–1.22)
MONOCYTES NFR BLD AUTO: 33 % (ref 4–12)
NEUTROPHILS # BLD AUTO: 0.98 THOUSANDS/ΜL (ref 1.85–7.62)
NEUTS SEG NFR BLD AUTO: 41 % (ref 43–75)
NRBC BLD AUTO-RTO: 0 /100 WBCS
PLATELET # BLD AUTO: 139 THOUSANDS/UL (ref 149–390)
PMV BLD AUTO: 10.7 FL (ref 8.9–12.7)
POTASSIUM SERPL-SCNC: 4.1 MMOL/L (ref 3.5–5.3)
PROT SERPL-MCNC: 7.4 G/DL (ref 6.4–8.2)
RBC # BLD AUTO: 3.69 MILLION/UL (ref 3.88–5.62)
SODIUM SERPL-SCNC: 139 MMOL/L (ref 136–145)
WBC # BLD AUTO: 2.42 THOUSAND/UL (ref 4.31–10.16)

## 2020-09-02 PROCEDURE — 36415 COLL VENOUS BLD VENIPUNCTURE: CPT

## 2020-09-02 PROCEDURE — 80053 COMPREHEN METABOLIC PANEL: CPT

## 2020-09-02 PROCEDURE — 85025 COMPLETE CBC W/AUTO DIFF WBC: CPT

## 2020-09-04 ENCOUNTER — HOSPITAL ENCOUNTER (OUTPATIENT)
Dept: INFUSION CENTER | Facility: CLINIC | Age: 75
Discharge: HOME | End: 2020-09-04
Payer: COMMERCIAL

## 2020-09-04 VITALS
DIASTOLIC BLOOD PRESSURE: 78 MMHG | SYSTOLIC BLOOD PRESSURE: 124 MMHG | BODY MASS INDEX: 28.49 KG/M2 | WEIGHT: 187.39 LBS | TEMPERATURE: 98 F | HEART RATE: 94 BPM | RESPIRATION RATE: 18 BRPM

## 2020-09-04 DIAGNOSIS — C90.00 IGG MULTIPLE MYELOMA (HCC): Primary | ICD-10-CM

## 2020-09-04 DIAGNOSIS — D70.9 NEUTROPENIA, UNSPECIFIED TYPE (HCC): ICD-10-CM

## 2020-09-04 DIAGNOSIS — D80.1 HYPOGAMMAGLOBULINEMIA (HCC): ICD-10-CM

## 2020-09-04 DIAGNOSIS — C90.00 IGG MULTIPLE MYELOMA (HCC): ICD-10-CM

## 2020-09-04 DIAGNOSIS — C90.00 MULTIPLE MYELOMA NOT HAVING ACHIEVED REMISSION (HCC): Primary | ICD-10-CM

## 2020-09-04 PROCEDURE — 96368 THER/DIAG CONCURRENT INF: CPT

## 2020-09-04 PROCEDURE — 96367 TX/PROPH/DG ADDL SEQ IV INF: CPT

## 2020-09-04 PROCEDURE — 96413 CHEMO IV INFUSION 1 HR: CPT

## 2020-09-04 PROCEDURE — 96366 THER/PROPH/DIAG IV INF ADDON: CPT

## 2020-09-04 PROCEDURE — 96415 CHEMO IV INFUSION ADDL HR: CPT

## 2020-09-04 RX ADMIN — DIPHENHYDRAMINE HCL 50 MG: 25 TABLET ORAL at 08:36

## 2020-09-04 RX ADMIN — Medication 30 G: at 10:38

## 2020-09-04 RX ADMIN — SODIUM CHLORIDE 20 ML/HR: 0.9 INJECTION, SOLUTION INTRAVENOUS at 08:31

## 2020-09-04 RX ADMIN — ACETAMINOPHEN 650 MG: 325 TABLET, FILM COATED ORAL at 08:36

## 2020-09-04 RX ADMIN — FAMOTIDINE 40 MG: 20 TABLET, FILM COATED ORAL at 08:36

## 2020-09-04 RX ADMIN — DEXAMETHASONE SODIUM PHOSPHATE 20 MG: 10 INJECTION, SOLUTION INTRAMUSCULAR; INTRAVENOUS at 08:31

## 2020-09-04 RX ADMIN — SODIUM CHLORIDE: 0.9 INJECTION, SOLUTION INTRAVENOUS at 09:04

## 2020-09-04 NOTE — PLAN OF CARE
Problem: Potential for Falls  Goal: Patient will remain free of falls  Description: INTERVENTIONS:  - Assess patient frequently for physical needs  -  Identify cognitive and physical deficits and behaviors that affect risk of falls    -  [place] fall precautions as indicated by assessment   - Educate patient/family on patient safety including physical limitations  - Instruct patient to call for assistance with activity based on assessment  - Modify environment to reduce risk of injury  - Consider OT/PT consult to assist with strengthening/mobility  Outcome: Progressing

## 2020-09-04 NOTE — PROGRESS NOTES
Pt tolerated isatuximab and IVIGG infusions today without any adverse reaction  Pt left unit in stable condition, AVS provided  Pt without question or concern

## 2020-09-04 NOTE — PROGRESS NOTES
Pt arrived to unit without complaint  CBC CMP reviewed and within parameters for treatment today  Pt is declining Zometa today, he has a tooth extraction scheduled for October 13th  Dr Bear Price made aware via Mariano Barahona

## 2020-09-15 RX ORDER — ACETAMINOPHEN 325 MG/1
650 TABLET ORAL ONCE
Status: COMPLETED | OUTPATIENT
Start: 2020-09-18 | End: 2020-09-18

## 2020-09-15 RX ORDER — FAMOTIDINE 20 MG/1
40 TABLET, FILM COATED ORAL ONCE
Status: DISCONTINUED | OUTPATIENT
Start: 2020-09-18 | End: 2020-09-17

## 2020-09-15 RX ORDER — DIPHENHYDRAMINE HCL 25 MG
50 TABLET ORAL ONCE
Status: COMPLETED | OUTPATIENT
Start: 2020-09-18 | End: 2020-09-18

## 2020-09-15 RX ORDER — SODIUM CHLORIDE 9 MG/ML
20 INJECTION, SOLUTION INTRAVENOUS CONTINUOUS
Status: DISCONTINUED | OUTPATIENT
Start: 2020-09-18 | End: 2020-09-21 | Stop reason: HOSPADM

## 2020-09-16 ENCOUNTER — APPOINTMENT (OUTPATIENT)
Dept: LAB | Facility: CLINIC | Age: 75
End: 2020-09-16
Payer: COMMERCIAL

## 2020-09-16 DIAGNOSIS — C90.00 IGG MULTIPLE MYELOMA (HCC): ICD-10-CM

## 2020-09-16 LAB
ALBUMIN SERPL BCP-MCNC: 3.8 G/DL (ref 3.5–5)
ALP SERPL-CCNC: 59 U/L (ref 46–116)
ALT SERPL W P-5'-P-CCNC: 24 U/L (ref 12–78)
ANION GAP SERPL CALCULATED.3IONS-SCNC: 10 MMOL/L (ref 4–13)
AST SERPL W P-5'-P-CCNC: 15 U/L (ref 5–45)
BASOPHILS # BLD AUTO: 0.04 THOUSANDS/ΜL (ref 0–0.1)
BASOPHILS NFR BLD AUTO: 1 % (ref 0–1)
BILIRUB SERPL-MCNC: 0.95 MG/DL (ref 0.2–1)
BUN SERPL-MCNC: 20 MG/DL (ref 5–25)
CALCIUM SERPL-MCNC: 9.5 MG/DL (ref 8.3–10.1)
CHLORIDE SERPL-SCNC: 106 MMOL/L (ref 100–108)
CO2 SERPL-SCNC: 23 MMOL/L (ref 21–32)
CREAT SERPL-MCNC: 1.69 MG/DL (ref 0.6–1.3)
EOSINOPHIL # BLD AUTO: 0.15 THOUSAND/ΜL (ref 0–0.61)
EOSINOPHIL NFR BLD AUTO: 3 % (ref 0–6)
ERYTHROCYTE [DISTWIDTH] IN BLOOD BY AUTOMATED COUNT: 14.4 % (ref 11.6–15.1)
GFR SERPL CREATININE-BSD FRML MDRD: 40 ML/MIN/1.73SQ M
GLUCOSE P FAST SERPL-MCNC: 83 MG/DL (ref 65–99)
HCT VFR BLD AUTO: 40.6 % (ref 36.5–49.3)
HGB BLD-MCNC: 12.9 G/DL (ref 12–17)
IMM GRANULOCYTES # BLD AUTO: 0.04 THOUSAND/UL (ref 0–0.2)
IMM GRANULOCYTES NFR BLD AUTO: 1 % (ref 0–2)
LYMPHOCYTES # BLD AUTO: 0.38 THOUSANDS/ΜL (ref 0.6–4.47)
LYMPHOCYTES NFR BLD AUTO: 8 % (ref 14–44)
MCH RBC QN AUTO: 33.3 PG (ref 26.8–34.3)
MCHC RBC AUTO-ENTMCNC: 31.8 G/DL (ref 31.4–37.4)
MCV RBC AUTO: 105 FL (ref 82–98)
MONOCYTES # BLD AUTO: 1.13 THOUSAND/ΜL (ref 0.17–1.22)
MONOCYTES NFR BLD AUTO: 23 % (ref 4–12)
NEUTROPHILS # BLD AUTO: 3.16 THOUSANDS/ΜL (ref 1.85–7.62)
NEUTS SEG NFR BLD AUTO: 64 % (ref 43–75)
NRBC BLD AUTO-RTO: 0 /100 WBCS
PLATELET # BLD AUTO: 124 THOUSANDS/UL (ref 149–390)
PMV BLD AUTO: 11.6 FL (ref 8.9–12.7)
POTASSIUM SERPL-SCNC: 4 MMOL/L (ref 3.5–5.3)
PROT SERPL-MCNC: 7.7 G/DL (ref 6.4–8.2)
RBC # BLD AUTO: 3.87 MILLION/UL (ref 3.88–5.62)
SODIUM SERPL-SCNC: 139 MMOL/L (ref 136–145)
WBC # BLD AUTO: 4.9 THOUSAND/UL (ref 4.31–10.16)

## 2020-09-16 PROCEDURE — 80053 COMPREHEN METABOLIC PANEL: CPT

## 2020-09-16 PROCEDURE — 36415 COLL VENOUS BLD VENIPUNCTURE: CPT

## 2020-09-16 PROCEDURE — 85025 COMPLETE CBC W/AUTO DIFF WBC: CPT

## 2020-09-17 ENCOUNTER — OFFICE VISIT (OUTPATIENT)
Dept: HEMATOLOGY ONCOLOGY | Facility: CLINIC | Age: 75
End: 2020-09-17
Payer: COMMERCIAL

## 2020-09-17 VITALS
HEART RATE: 64 BPM | OXYGEN SATURATION: 96 % | BODY MASS INDEX: 28.95 KG/M2 | WEIGHT: 190.4 LBS | TEMPERATURE: 97.4 F | RESPIRATION RATE: 17 BRPM | DIASTOLIC BLOOD PRESSURE: 74 MMHG | SYSTOLIC BLOOD PRESSURE: 126 MMHG

## 2020-09-17 DIAGNOSIS — C90.00 IGG MULTIPLE MYELOMA (HCC): Primary | ICD-10-CM

## 2020-09-17 DIAGNOSIS — R53.83 OTHER FATIGUE: ICD-10-CM

## 2020-09-17 PROCEDURE — 3078F DIAST BP <80 MM HG: CPT | Performed by: INTERNAL MEDICINE

## 2020-09-17 PROCEDURE — 99215 OFFICE O/P EST HI 40 MIN: CPT | Performed by: INTERNAL MEDICINE

## 2020-09-17 PROCEDURE — 1160F RVW MEDS BY RX/DR IN RCRD: CPT | Performed by: INTERNAL MEDICINE

## 2020-09-17 RX ORDER — FAMOTIDINE 20 MG/1
20 TABLET, FILM COATED ORAL ONCE
Status: COMPLETED | OUTPATIENT
Start: 2020-09-18 | End: 2020-09-18

## 2020-09-17 NOTE — PROGRESS NOTES
TIME OUT - call received from [place]  with Dr Haris Valencia  Cytoxan will be added to patient treatment starting tomorrow 9/18/20  Pharmacist, Lavon Sipcer, is aware

## 2020-09-17 NOTE — PROGRESS NOTES
[facility] [place]  [address] [address]  [address] [place] [id number], [id number]  09/17/20    Discussion:   In summary, this is a 79-year-old male history of multiple myeloma  He was evaluated in [employer] last week  He has persistent disease  Recommendation was to add Cytoxan to ongoing [place], Pomalyst, dexamethasone  He had been on Cytoxan in the past and tolerated this relatively well  Neulasta on pro is also recommended  We reviewed the above at some length today  Fortunately, he is able to continue to function without restriction  He does have some fatigue and takes a nap occasionally  He is going to work on a regular basis although a lot of this is done remotely during the [facility]  I discussed the above with the patient  The patient and his wife voiced understanding and agreement   ______________________________________________________________________    Chief Complaint   Patient presents with    Follow-up       HPI:  Oncology History   IgG multiple myeloma ([facility] [address] )   9/2015 Initial Diagnosis    Found to have Hbg of 6 with SOB, creatinine 1 8 and normal calcium with albumin of 2 1  Additonial blood work showed elevated protien level (12 1g/dL)  9/29/2015 Biopsy    Bone marrow biopsy demonstrated approximately 80% plasma cells with some immature forms noted  These studies showed 13q14 deletion, +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, T (4:14)       10/14/2015 - 11/11/2015 Chemotherapy    Velcade 1 3 mg/m2 Days 1,8,15,22  Cytoxan 300 mg/m2  PO Days 1, 8,15, 22  Dexamethasone 40 mg PO Days 1,8,15, 22  Zometa 4 mg IV Day 1  Cycle length = 28 days    Completed 2 cycles  Day one of cycle 2 was on 11/11/15      12/2015 - 2/2016 Chemotherapy    VDT-PACE x 2 cycles   (completed at [facility] in [employer] [facility], [address]")     2/2016 Transplant    Melphalane 200 mg/m2 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      MRD + 8/2016 - 1/26/2018 Chemotherapy    Maintenance therapy:  Carfilzomib, orginally dosed 27 mg weekly then increased to 45 mg weekly after MRD reactivitation  Revlimid  Dexamethasone      2/2018 Biopsy    Bone marrow demonstrated positive minimal residual disease       2/23/2018 -  Chemotherapy    Daratumumab 16mg/m2 IV Day 1  Pomalyst 4 mg p o  daily 1-21  Dexamethasone 4 mg PO Days 2, 3  Dexamethasone 12 mg PODays 8, 15, 22  Cycle length = 28 days     4/11/2019 - 8/29/2019 Chemotherapy    methylPREDNISolone sodium succinate (Solu-MEDROL) 100 mg in sodium chloride 0 9 % 250 mL IVPB, 100 mg, Intravenous, Once, 5 of 12 cycles  Administration: 100 mg (6/7/2019), 100 mg (7/5/2019), 100 mg (8/2/2019)     8/30/2019 - 11/8/2019 Chemotherapy    cyclophosphamide (CYTOXAN) 1,000 mg in sodium chloride 0 9 % 250 mL IVPB, 990 mg (66 7 % of original dose 750 mg/m2), Intravenous, Once, 2 of 3 cycles  Dose modification: 500 mg/m2 (original dose 750 mg/m2, Cycle 1, Reason: Other (See Comments)), 250 mg/m2 (original dose 750 mg/m2, Cycle 4, Reason: Treatment Parameters Not Met)  Administration: 1,000 mg (8/30/2019), 1,000 mg (9/13/2019), 1,000 mg (9/27/2019), 500 mg (10/11/2019)  bortezomib (VELCADE) subcutaneous injection 2 6 mg, 1 3 mg/m2 = 2 6 mg (86 7 % of original dose 1 5 mg/m2), Subcutaneous, Once, 3 of 4 cycles  Dose modification: 1 3 mg/m2 (original dose 1 5 mg/m2, Cycle 1, Reason: Other (See Comments))  Administration: 2 6 mg (8/30/2019), 2 6 mg (9/13/2019), 2 6 mg (10/25/2019), 2 6 mg (11/8/2019), 2 6 mg (9/27/2019), 2 6 mg (11/1/2019), 2 6 mg (9/20/2019), 2 6 mg (10/4/2019), 2 6 mg (10/11/2019), 2 6 mg (10/18/2019)     12/6/2019 - 6/19/2020 Chemotherapy    pegfilgrastim (NEULASTA ONPRO) subcutaneous injection kit 6 mg, 6 mg, Subcutaneous, Once, 5 of 9 cycles  Administration: 6 mg (2/28/2020), 6 mg (3/13/2020), 6 mg (3/27/2020), 6 mg (4/10/2020), 6 mg (4/24/2020), 6 mg (5/8/2020), 6 mg (6/5/2020), 6 mg (5/22/2020), 6 mg (6/19/2020)  cyclophosphamide (CYTOXAN) 784 mg in sodium chloride 0 9 % 250 mL IVPB, 400 mg/m2 = 784 mg, Intravenous, Once, 5 of 9 cycles  Dose modification: 400 mg/m2 (original dose 750 mg/m2, Cycle 7, Reason: Other (See Comments))  Administration: 784 mg (2/28/2020), 784 mg (3/13/2020), 784 mg (3/27/2020), 784 mg (4/10/2020), 784 mg (4/24/2020), 784 mg (5/8/2020), 784 mg (5/22/2020), 784 mg (6/19/2020), 784 mg (6/5/2020)  methylPREDNISolone sodium succinate (Solu-MEDROL) 100 mg in sodium chloride 0 9 % 250 mL IVPB, 100 mg, Intravenous, Once, 8 of 12 cycles  Administration: 100 mg (12/6/2019), 100 mg (12/13/2019), 100 mg (12/20/2019), 100 mg (1/3/2020), 100 mg (1/10/2020), 100 mg (1/17/2020), 100 mg (1/31/2020), 100 mg (2/14/2020), 100 mg (5/22/2020), 100 mg (12/27/2019), 100 mg (1/24/2020), 100 mg (2/28/2020), 100 mg (3/13/2020), 100 mg (3/27/2020), 100 mg (4/10/2020), 100 mg (4/24/2020), 100 mg (5/8/2020), 100 mg (6/19/2020)     7/2/2020 -  Chemotherapy           Interval History:  Clinically stable  ECOG-  1 - Symptomatic but completely ambulatory    Review of Systems   Constitutional: Negative for chills and fever  HENT: Negative for nosebleeds  Eyes: Negative for discharge  Respiratory: Negative for cough and shortness of breath  Cardiovascular: Negative for chest pain  Gastrointestinal: Negative for abdominal pain, constipation and diarrhea  Endocrine: Negative for polydipsia  Genitourinary: Negative for hematuria  Musculoskeletal: Negative for arthralgias  Skin: Negative for color change  Allergic/Immunologic: Negative for immunocompromised state  Neurological: Negative for dizziness and headaches  Hematological: Negative for adenopathy  Psychiatric/Behavioral: Negative for agitation         Past Medical History:   Diagnosis Date    History of autologous stem cell transplant ([facility] [address] )     Hypertension     History of HTN-stable since weight loss    Insomnia     [facility] myeloma [facility])      Patient Active Problem List   Diagnosis    IgG multiple myeloma (HCC)    Persistent atrial fibrillation (HCC)    Essential hypertension    Chronic ITP (idiopathic thrombocytopenia) (HCC)    Hyperlipidemia    Hypocalcemia    Hypothyroidism    Long term current use of systemic steroids    Other fatigue    Prophylactic measure    Hypogammaglobulinemia (HCC)    Leukopenia    Anemia due to stage 3 chronic kidney disease (HCC)       Current Outpatient Medications:     acetaminophen (TYLENOL) 325 mg tablet, Take 650 mg by mouth every 6 (six) hours as needed for mild pain, Disp: , Rfl:     aspirin 81 MG tablet, Take 81 mg by mouth daily  , Disp: , Rfl:     atorvastatin (LIPITOR) 20 mg tablet, Take 1 tablet (20 mg total) by mouth daily, Disp: 90 tablet, Rfl: 0    Cholecalciferol (VITAMIN D-3 PO), Take 1 tablet by mouth daily  , Disp: , Rfl:     Docusate Sodium (COLACE PO), Take 1 tablet by mouth as needed  , Disp: , Rfl:     eltrombopag (PROMACTA) 50 MG tablet, Take 50 mg by mouth daily Administer on an empty stomach, 1 hour before or 2 hours after a meal , Disp: , Rfl:     escitalopram (LEXAPRO) 20 mg tablet, TAKE (1) TABLET DAILY  , Disp: 90 tablet, Rfl: 0    Glutamine POWD, by Does not apply route, Disp: , Rfl:     levothyroxine 50 mcg tablet, Take 1 tablet (50 mcg total) by mouth daily (Patient taking differently: Take 50 mcg by mouth daily ), Disp: 90 tablet, Rfl: 3    LORazepam (ATIVAN) 0 5 mg tablet, Take 0 5 mg by mouth every 6 (six) hours as needed for anxiety  , Disp: , Rfl:     Multiple Vitamin (MULTIVITAMINS PO), Take 1 tablet by mouth daily  , Disp: , Rfl:     ondansetron (ZOFRAN) 4 mg tablet, Take 4 mg by mouth every 8 (eight) hours as needed for nausea or vomiting , Disp: , Rfl:     Pomalidomide (Pomalyst) 4 MG CAPS, Take 4 mg by mouth Days 1-21 off 7 days, Disp: , Rfl:     acyclovir (ZOVIRAX) 200 mg capsule, Take 1 capsule (200 mg total) by mouth 2 (two) times a day, Disp: 60 capsule, Rfl: 2  No current facility-administered medications for this visit  Facility-Administered Medications Ordered in Other Visits:     [START ON 9/18/2020] acetaminophen (TYLENOL) tablet 650 mg, 650 mg, Oral, Once, Kesha Awkward, DO    [START ON 9/18/2020] dexamethasone (DECADRON) 20 mg in sodium chloride 0 9 % 52 mL IVPB, 20 mg, Intravenous, Once, Kesha Awkward, DO    [START ON 9/18/2020] diphenhydrAMINE (BENADRYL) tablet 50 mg, 50 mg, Oral, Once, Kesha Awkward, DO    [START ON 9/18/2020] famotidine (PEPCID) tablet 40 mg, 40 mg, Oral, Once, Kesha Awkward, DO    [START ON 9/18/2020] isatuximab (SARCLISA) 850 mg in sodium chloride 0 9 % 250 mL IVPB, , Intravenous, Once, Kesha Awkward, DO    [START ON 9/18/2020] sodium chloride 0 9 % infusion, 20 mL/hr, Intravenous, Continuous, Kesha Awkward, DO  No Known Allergies  Past Surgical History:   Procedure Laterality Date    APPENDECTOMY      Last assessed: 9/25/15    ARTHROSCOPY KNEE Left     9/30/09    EYE SURGERY      Lasik    KNEE CARTILAGE SURGERY      LIMBAL STEM CELL TRANSPLANT      Patient had 2 in [place]-2/29/2016 and 4/13/2016     Social History     Objective:  Vitals:    09/17/20 0910   BP: 126/74   BP Location: Left arm   Patient Position: Sitting   Pulse: 64   Resp: 17   Temp: (!) 97 4 °F (36 3 °C)   TempSrc: Tympanic   SpO2: 96%   Weight: 86 4 kg (190 lb 6 4 oz)     Physical Exam  Constitutional:       Appearance: He is well-developed  HENT:      Head: Normocephalic and atraumatic  Eyes:      Pupils: Pupils are equal, round, and reactive to light  Neck:      Musculoskeletal: Neck supple  Cardiovascular:      Rate and Rhythm: Normal rate and regular rhythm  Heart sounds: No murmur  Pulmonary:      Breath sounds: Normal breath sounds  No wheezing or rales  Abdominal:      Palpations: Abdomen is soft  Tenderness: There is no abdominal tenderness  Musculoskeletal: Normal range of motion  General: No tenderness  Lymphadenopathy:      Cervical: No cervical adenopathy  Skin:     Findings: No erythema or rash  Neurological:      Mental Status: He is alert and oriented to person, place, and time  Cranial Nerves: No cranial nerve deficit  Deep Tendon Reflexes: Reflexes are normal and symmetric  Psychiatric:         Behavior: Behavior normal            Labs: I personally reviewed the labs and imaging pertinent to this patient care

## 2020-09-18 ENCOUNTER — HOSPITAL ENCOUNTER (OUTPATIENT)
Dept: INFUSION CENTER | Facility: CLINIC | Age: 75
Discharge: HOME | End: 2020-09-18
Payer: COMMERCIAL

## 2020-09-18 VITALS
TEMPERATURE: 97.5 F | HEART RATE: 66 BPM | RESPIRATION RATE: 18 BRPM | SYSTOLIC BLOOD PRESSURE: 145 MMHG | DIASTOLIC BLOOD PRESSURE: 79 MMHG | BODY MASS INDEX: 28.73 KG/M2 | HEIGHT: 68 IN | WEIGHT: 189.6 LBS

## 2020-09-18 PROCEDURE — 96413 CHEMO IV INFUSION 1 HR: CPT

## 2020-09-18 PROCEDURE — 96377 APPLICATON ON-BODY INJECTOR: CPT

## 2020-09-18 PROCEDURE — 96417 CHEMO IV INFUS EACH ADDL SEQ: CPT

## 2020-09-18 PROCEDURE — 96367 TX/PROPH/DG ADDL SEQ IV INF: CPT

## 2020-09-18 PROCEDURE — 96415 CHEMO IV INFUSION ADDL HR: CPT

## 2020-09-18 RX ADMIN — ACETAMINOPHEN 650 MG: 325 TABLET, FILM COATED ORAL at 08:46

## 2020-09-18 RX ADMIN — FAMOTIDINE 20 MG: 20 TABLET ORAL at 08:46

## 2020-09-18 RX ADMIN — DIPHENHYDRAMINE HCL 50 MG: 25 TABLET ORAL at 08:46

## 2020-09-18 RX ADMIN — SODIUM CHLORIDE 864 MG: 0.9 INJECTION, SOLUTION INTRAVENOUS at 09:27

## 2020-09-18 RX ADMIN — PEGFILGRASTIM 6 MG: KIT SUBCUTANEOUS at 11:55

## 2020-09-18 RX ADMIN — ONDANSETRON 16 MG: 2 INJECTION INTRAMUSCULAR; INTRAVENOUS at 08:42

## 2020-09-18 RX ADMIN — DEXAMETHASONE SODIUM PHOSPHATE 20 MG: 10 INJECTION, SOLUTION INTRAMUSCULAR; INTRAVENOUS at 09:06

## 2020-09-18 RX ADMIN — CYCLOPHOSPHAMIDE 800 MG: 1 INJECTION, POWDER, FOR SOLUTION INTRAVENOUS; ORAL at 11:00

## 2020-09-18 RX ADMIN — SODIUM CHLORIDE 20 ML/HR: 0.9 INJECTION, SOLUTION INTRAVENOUS at 08:43

## 2020-09-18 NOTE — PROGRESS NOTES
Pt arrived to unit without complaint,   Treatment administered today as ordered including Cytoxan and isatuximab  Pt continues to decline Zometa d/t upcoming dental procedure  Pt tolerated all well without adverse reaction  Pt left unit in stable condition  Neulasta onpro placed on L arm  Pt knowledgeable re: monitoring and removal of device, AVS provided  Pt without any question or concern

## 2020-09-18 NOTE — PLAN OF CARE
Problem: Potential for Falls  Goal: Patient will remain free of falls  Description: INTERVENTIONS:  - Assess patient frequently for physical needs  -  Identify cognitive and physical deficits and behaviors that affect risk of falls    -  [place] fall precautions as indicated by assessment   - Educate patient/family on patient safety including physical limitations  - Instruct patient to call for assistance with activity based on assessment  - Modify environment to reduce risk of injury  - Consider OT/PT consult to assist with strengthening/mobility  Outcome: Progressing

## 2020-09-29 RX ORDER — DIPHENHYDRAMINE HCL 25 MG
50 TABLET ORAL ONCE
Status: COMPLETED | OUTPATIENT
Start: 2020-10-02 | End: 2020-10-02

## 2020-09-29 RX ORDER — SODIUM CHLORIDE 9 MG/ML
20 INJECTION, SOLUTION INTRAVENOUS CONTINUOUS
Status: DISCONTINUED | OUTPATIENT
Start: 2020-10-02 | End: 2020-10-05 | Stop reason: HOSPADM

## 2020-09-29 RX ORDER — FAMOTIDINE 20 MG/1
20 TABLET, FILM COATED ORAL ONCE
Status: COMPLETED | OUTPATIENT
Start: 2020-10-02 | End: 2020-10-02

## 2020-09-29 RX ORDER — ACETAMINOPHEN 325 MG/1
650 TABLET ORAL ONCE
Status: COMPLETED | OUTPATIENT
Start: 2020-10-02 | End: 2020-10-02

## 2020-09-30 ENCOUNTER — APPOINTMENT (OUTPATIENT)
Dept: LAB | Facility: CLINIC | Age: 75
End: 2020-09-30
Payer: COMMERCIAL

## 2020-09-30 NOTE — PROGRESS NOTES
"Katy Islas is a 33 year old female who is being evaluated via a billable telephone visit.      The patient has been notified of following:     \"This telephone visit will be conducted via a call between you and your physician/provider. We have found that certain health care needs can be provided without the need for a physical exam.  This service lets us provide the care you need with a short phone conversation.  If a prescription is necessary we can send it directly to your pharmacy.  If lab work is needed we can place an order for that and you can then stop by our lab to have the test done at a later time.    Telephone visits are billed at different rates depending on your insurance coverage. During this emergency period, for some insurers they may be billed the same as an in-person visit.  Please reach out to your insurance provider with any questions.    If during the course of the call the physician/provider feels a telephone visit is not appropriate, you will not be charged for this service.\"    Patient has given verbal consent for Telephone visit?  Yes    What phone number would you like to be contacted at? [phone]    How would you like to obtain your AVS? Elsat    Subjective     Katy Islas is a 33 year old female who presents via phone visit today for the following health issues:    HPI    ED/UC Followup:    Facility:  [facility]  Date of visit: 9/28/2020  Reason for visit:body aches, abd pain, back pain   Current Status: still feeling like she has a sinus infection, was not given antibiotics. Green mucus.    Pt was also in ER 09- for back pain - 's    Pt was in ER at [facility] 09- - left w/o being seen.     Per chart review- Pt was seen in the ER 2 days ago at [facility].     I do feel a little bit better. I feel like I have a sinus infection. Has felt sick for 4 days.   Both nostrils are bloody, raw, scabby, uncomfortable, ST, HA, green phlegm.   She was " TIME OUT - call received from [facility] with Dr Peter Hamman  Pt will only receive IVIG and Zometa on his next treatment date, 11/22/19  No chemo will be ordered  [facility] pharmacist made aware  "tested for covid and told they suspected covid- result pending.   Temps 99 or less.   No CP or SOB.   Little bit of a cough.     Not eating much. She states she has cramps in her stomach from crohns. Denies diarrhea or recent bleeding.   States not on meds for crohns cannot afford it.     She had CT- no pneumonia.   No abs given.   She state zithromax works well for her.     Also in the ER reported back and radicular pain.   She had back surgery 2 weeks ago.   They did a CT of her lumbar spine  She states \"found out that the surgeon didn't fix the problem and I still have a herniated disK\"  States she has a lot of pain.  States she is seeing her surgeon on Friday.     Per Care [facility] where:   CT chest abdomen and pelvis ([facility]) - 09-.   [facility]  [facility]  CT thoracic spine w/o contrast ([facility]) - 09-  CT lumbar spine w/o contrast ([facility])- 09-      Review of Systems   Constitutional, HEENT, cardiovascular, pulmonary, gi and gu systems are negative, except as otherwise noted.       Objective          Vitals:  No vitals were obtained today due to virtual visit.    healthy, alert and no distress  PSYCH: Alert and oriented times 3; coherent speech, normal   rate and volume, able to articulate logical thoughts, able   to abstract reason, no tangential thoughts, no hallucinations   or delusions  Her affect is - through  unable to tell. Speech is argumentative pattern.   RESP: No cough, no audible wheezing, able to talk in full sentences  Remainder of exam unable to be completed due to telephone visits            Assessment/Plan:    Assessment & Plan     H/O lumbosacral spine surgery  Drug-seeking behavior  Pt has had 3 ER visits this week for back pain, documented behavior of requesting narcotics and inappropriate behavior (swearing etc).   She initiated virtual visit for back pain and URI sx.   She has had extensive spine imaging in past 4 days " "through ERs- No acute findings  She has spine specialist and possible pain specialist(?) was advised to return to specialist.   Which she states \"I can't live with this pain until then. I don't want to see that person any way. You are not helpful.\" and hung up on me    Viral URI  Pt has had 4 days of URI sx with low grade temps (none over 100.0).   She is requesting antibiotics  She was told concern for covid- but appears that test is negative via [facility].  She was advised sx management.   No pneumonia on chest CT done 2 d ago.        BMI:   Estimated body mass index is 36.66 kg/m  as calculated from the following:    Height as of 7/15/20: 1.549 m (5' 1\").    Weight as of 9/22/20: 88 kg (194 lb).            As above. Pt to follow up with spine or pain specialist.     No follow-ups on file.    Ines Tong PA-C  [facility]    Phone call duration:  9 minutes              "

## 2020-10-02 ENCOUNTER — HOSPITAL ENCOUNTER (OUTPATIENT)
Dept: INFUSION CENTER | Facility: CLINIC | Age: 75
Discharge: HOME | End: 2020-10-02
Payer: COMMERCIAL

## 2020-10-02 VITALS
HEIGHT: 68 IN | SYSTOLIC BLOOD PRESSURE: 128 MMHG | HEART RATE: 83 BPM | BODY MASS INDEX: 27.9 KG/M2 | WEIGHT: 184.08 LBS | RESPIRATION RATE: 16 BRPM | DIASTOLIC BLOOD PRESSURE: 83 MMHG | TEMPERATURE: 97.4 F

## 2020-10-02 DIAGNOSIS — D80.1 HYPOGAMMAGLOBULINEMIA (HCC): Primary | ICD-10-CM

## 2020-10-02 DIAGNOSIS — C90.00 IGG MULTIPLE MYELOMA (HCC): ICD-10-CM

## 2020-10-02 PROCEDURE — 96367 TX/PROPH/DG ADDL SEQ IV INF: CPT

## 2020-10-02 PROCEDURE — 96377 APPLICATON ON-BODY INJECTOR: CPT

## 2020-10-02 PROCEDURE — 96366 THER/PROPH/DIAG IV INF ADDON: CPT

## 2020-10-02 PROCEDURE — 96413 CHEMO IV INFUSION 1 HR: CPT

## 2020-10-02 PROCEDURE — 96417 CHEMO IV INFUS EACH ADDL SEQ: CPT

## 2020-10-02 RX ADMIN — FAMOTIDINE 20 MG: 20 TABLET, FILM COATED ORAL at 11:15

## 2020-10-02 RX ADMIN — ACETAMINOPHEN 650 MG: 325 TABLET, FILM COATED ORAL at 11:15

## 2020-10-02 RX ADMIN — Medication 30 G: at 08:55

## 2020-10-02 RX ADMIN — SODIUM CHLORIDE 20 ML/HR: 0.9 INJECTION, SOLUTION INTRAVENOUS at 08:55

## 2020-10-02 RX ADMIN — DEXAMETHASONE SODIUM PHOSPHATE 20 MG: 10 INJECTION, SOLUTION INTRAMUSCULAR; INTRAVENOUS at 11:17

## 2020-10-02 RX ADMIN — PEGFILGRASTIM 6 MG: KIT SUBCUTANEOUS at 13:05

## 2020-10-02 RX ADMIN — ONDANSETRON 16 MG: 2 INJECTION INTRAMUSCULAR; INTRAVENOUS at 11:37

## 2020-10-02 RX ADMIN — DIPHENHYDRAMINE HCL 50 MG: 25 TABLET ORAL at 11:14

## 2020-10-02 RX ADMIN — CYCLOPHOSPHAMIDE 800 MG: 1 INJECTION, POWDER, FOR SOLUTION INTRAVENOUS; ORAL at 11:58

## 2020-10-02 RX ADMIN — SODIUM CHLORIDE 864 MG: 0.9 INJECTION, SOLUTION INTRAVENOUS at 13:01

## 2020-10-05 DIAGNOSIS — F41.9 ANXIETY: ICD-10-CM

## 2020-10-05 DIAGNOSIS — E03.9 HYPOTHYROIDISM, UNSPECIFIED TYPE: ICD-10-CM

## 2020-10-05 RX ORDER — ESCITALOPRAM OXALATE 20 MG/1
20 TABLET ORAL DAILY
Qty: 90 TABLET | Refills: 0 | Status: SHIPPED | OUTPATIENT
Start: 2020-10-05 | End: 2021-03-30 | Stop reason: SDUPTHER

## 2020-10-05 RX ORDER — LEVOTHYROXINE SODIUM 0.05 MG/1
50 TABLET ORAL DAILY
Qty: 90 TABLET | Refills: 0 | Status: SHIPPED | OUTPATIENT
Start: 2020-10-05 | End: 2021-03-30 | Stop reason: SDUPTHER

## 2020-10-14 ENCOUNTER — APPOINTMENT (OUTPATIENT)
Dept: LAB | Facility: CLINIC | Age: 75
End: 2020-10-14
Payer: COMMERCIAL

## 2020-10-14 DIAGNOSIS — C90.00 MULTIPLE MYELOMA, REMISSION STATUS UNSPECIFIED (HCC): Primary | ICD-10-CM

## 2020-10-15 RX ORDER — ACETAMINOPHEN 325 MG/1
650 TABLET ORAL ONCE
Status: COMPLETED | OUTPATIENT
Start: 2020-10-16 | End: 2020-10-16

## 2020-10-15 RX ORDER — DIPHENHYDRAMINE HCL 25 MG
50 TABLET ORAL ONCE
Status: COMPLETED | OUTPATIENT
Start: 2020-10-16 | End: 2020-10-16

## 2020-10-15 RX ORDER — FAMOTIDINE 20 MG/1
20 TABLET, FILM COATED ORAL ONCE
Status: COMPLETED | OUTPATIENT
Start: 2020-10-16 | End: 2020-10-16

## 2020-10-16 ENCOUNTER — HOSPITAL ENCOUNTER (OUTPATIENT)
Dept: INFUSION CENTER | Facility: CLINIC | Age: 75
Discharge: HOME | End: 2020-10-16
Payer: COMMERCIAL

## 2020-10-16 ENCOUNTER — TELEPHONE (OUTPATIENT)
Dept: HEMATOLOGY ONCOLOGY | Facility: CLINIC | Age: 75
End: 2020-10-16

## 2020-10-16 VITALS
WEIGHT: 185.19 LBS | BODY MASS INDEX: 28.16 KG/M2 | RESPIRATION RATE: 18 BRPM | TEMPERATURE: 98.4 F | SYSTOLIC BLOOD PRESSURE: 126 MMHG | DIASTOLIC BLOOD PRESSURE: 74 MMHG | HEART RATE: 64 BPM

## 2020-10-16 DIAGNOSIS — Z78.9 NEED FOR FOLLOW-UP BY SOCIAL WORKER: Primary | ICD-10-CM

## 2020-10-16 PROCEDURE — 96413 CHEMO IV INFUSION 1 HR: CPT

## 2020-10-16 PROCEDURE — 96367 TX/PROPH/DG ADDL SEQ IV INF: CPT

## 2020-10-16 PROCEDURE — 96415 CHEMO IV INFUSION ADDL HR: CPT

## 2020-10-16 PROCEDURE — 96417 CHEMO IV INFUS EACH ADDL SEQ: CPT

## 2020-10-16 PROCEDURE — 96377 APPLICATON ON-BODY INJECTOR: CPT

## 2020-10-16 RX ADMIN — PEGFILGRASTIM 6 MG: KIT SUBCUTANEOUS at 10:45

## 2020-10-16 RX ADMIN — FAMOTIDINE 20 MG: 20 TABLET, FILM COATED ORAL at 08:38

## 2020-10-16 RX ADMIN — CYCLOPHOSPHAMIDE 400 MG: 500 INJECTION, POWDER, FOR SOLUTION INTRAVENOUS; ORAL at 09:38

## 2020-10-16 RX ADMIN — ACETAMINOPHEN 650 MG: 325 TABLET, FILM COATED ORAL at 08:38

## 2020-10-16 RX ADMIN — DEXAMETHASONE SODIUM PHOSPHATE 20 MG: 10 INJECTION, SOLUTION INTRAMUSCULAR; INTRAVENOUS at 09:03

## 2020-10-16 RX ADMIN — DIPHENHYDRAMINE HCL 50 MG: 25 TABLET ORAL at 08:38

## 2020-10-16 RX ADMIN — SODIUM CHLORIDE 800 MG: 0.9 INJECTION, SOLUTION INTRAVENOUS at 10:41

## 2020-10-16 RX ADMIN — ONDANSETRON 16 MG: 2 INJECTION INTRAMUSCULAR; INTRAVENOUS at 08:39

## 2020-10-20 ENCOUNTER — PATIENT OUTREACH (OUTPATIENT)
Dept: CASE MANAGEMENT | Facility: HOSPITAL | Age: 75
End: 2020-10-20

## 2020-10-27 RX ORDER — DIPHENHYDRAMINE HCL 25 MG
50 TABLET ORAL ONCE
Status: COMPLETED | OUTPATIENT
Start: 2020-10-30 | End: 2020-10-30

## 2020-10-27 RX ORDER — FAMOTIDINE 20 MG/1
20 TABLET, FILM COATED ORAL ONCE
Status: COMPLETED | OUTPATIENT
Start: 2020-10-30 | End: 2020-10-30

## 2020-10-27 RX ORDER — SODIUM CHLORIDE 9 MG/ML
20 INJECTION, SOLUTION INTRAVENOUS CONTINUOUS
Status: DISCONTINUED | OUTPATIENT
Start: 2020-10-30 | End: 2020-11-02 | Stop reason: HOSPADM

## 2020-10-27 RX ORDER — ACETAMINOPHEN 325 MG/1
650 TABLET ORAL ONCE
Status: COMPLETED | OUTPATIENT
Start: 2020-10-30 | End: 2020-10-30

## 2020-10-28 ENCOUNTER — APPOINTMENT (OUTPATIENT)
Dept: LAB | Facility: CLINIC | Age: 75
End: 2020-10-28
Payer: COMMERCIAL

## 2020-10-29 LAB
MISCELLANEOUS LAB TEST RESULT: NORMAL

## 2020-10-30 ENCOUNTER — HOSPITAL ENCOUNTER (OUTPATIENT)
Dept: INFUSION CENTER | Facility: CLINIC | Age: 75
Discharge: HOME | End: 2020-10-30
Payer: COMMERCIAL

## 2020-10-30 VITALS
DIASTOLIC BLOOD PRESSURE: 73 MMHG | RESPIRATION RATE: 18 BRPM | SYSTOLIC BLOOD PRESSURE: 105 MMHG | TEMPERATURE: 97.5 F | WEIGHT: 184.08 LBS | BODY MASS INDEX: 28 KG/M2 | HEART RATE: 89 BPM

## 2020-10-30 DIAGNOSIS — D80.1 HYPOGAMMAGLOBULINEMIA (HCC): Primary | ICD-10-CM

## 2020-10-30 DIAGNOSIS — C90.00 IGG MULTIPLE MYELOMA (HCC): ICD-10-CM

## 2020-10-30 PROCEDURE — 96413 CHEMO IV INFUSION 1 HR: CPT

## 2020-10-30 PROCEDURE — 96367 TX/PROPH/DG ADDL SEQ IV INF: CPT

## 2020-10-30 PROCEDURE — 96417 CHEMO IV INFUS EACH ADDL SEQ: CPT

## 2020-10-30 PROCEDURE — 96366 THER/PROPH/DIAG IV INF ADDON: CPT

## 2020-10-30 PROCEDURE — 96377 APPLICATON ON-BODY INJECTOR: CPT

## 2020-10-30 RX ADMIN — DEXAMETHASONE SODIUM PHOSPHATE 20 MG: 10 INJECTION, SOLUTION INTRAMUSCULAR; INTRAVENOUS at 08:53

## 2020-10-30 RX ADMIN — CYCLOPHOSPHAMIDE 400 MG: 500 INJECTION, POWDER, FOR SOLUTION INTRAVENOUS; ORAL at 09:16

## 2020-10-30 RX ADMIN — ACETAMINOPHEN 650 MG: 325 TABLET, FILM COATED ORAL at 08:22

## 2020-10-30 RX ADMIN — DIPHENHYDRAMINE HCL 50 MG: 25 TABLET ORAL at 08:22

## 2020-10-30 RX ADMIN — Medication 30 G: at 11:42

## 2020-10-30 RX ADMIN — FAMOTIDINE 20 MG: 20 TABLET, FILM COATED ORAL at 08:22

## 2020-10-30 RX ADMIN — PEGFILGRASTIM 6 MG: KIT SUBCUTANEOUS at 10:16

## 2020-10-30 RX ADMIN — SODIUM CHLORIDE 20 ML/HR: 0.9 INJECTION, SOLUTION INTRAVENOUS at 08:30

## 2020-10-30 RX ADMIN — ONDANSETRON 16 MG: 2 INJECTION INTRAMUSCULAR; INTRAVENOUS at 08:30

## 2020-11-10 RX ORDER — FAMOTIDINE 20 MG/1
20 TABLET, FILM COATED ORAL ONCE
Status: COMPLETED | OUTPATIENT
Start: 2020-11-13 | End: 2020-11-13

## 2020-11-10 RX ORDER — ACETAMINOPHEN 325 MG/1
650 TABLET ORAL ONCE
Status: COMPLETED | OUTPATIENT
Start: 2020-11-13 | End: 2020-11-13

## 2020-11-10 RX ORDER — DIPHENHYDRAMINE HCL 25 MG
50 TABLET ORAL ONCE
Status: COMPLETED | OUTPATIENT
Start: 2020-11-13 | End: 2020-11-13

## 2020-11-11 ENCOUNTER — TRANSCRIBE ORDERS (OUTPATIENT)
Dept: LAB | Facility: CLINIC | Age: 75
End: 2020-11-11

## 2020-11-11 ENCOUNTER — LAB (OUTPATIENT)
Dept: LAB | Facility: CLINIC | Age: 75
End: 2020-11-11
Payer: COMMERCIAL

## 2020-11-11 DIAGNOSIS — C90.00 MULTIPLE MYELOMA, REMISSION STATUS UNSPECIFIED (HCC): Primary | ICD-10-CM

## 2020-11-11 DIAGNOSIS — C90.00 MULTIPLE MYELOMA, REMISSION STATUS UNSPECIFIED (HCC): ICD-10-CM

## 2020-11-12 ENCOUNTER — OFFICE VISIT (OUTPATIENT)
Dept: HEMATOLOGY ONCOLOGY | Facility: CLINIC | Age: 75
End: 2020-11-12
Payer: COMMERCIAL

## 2020-11-12 VITALS
SYSTOLIC BLOOD PRESSURE: 124 MMHG | WEIGHT: 186.2 LBS | TEMPERATURE: 97.6 F | BODY MASS INDEX: 28.22 KG/M2 | OXYGEN SATURATION: 99 % | DIASTOLIC BLOOD PRESSURE: 72 MMHG | HEART RATE: 57 BPM | RESPIRATION RATE: 18 BRPM | HEIGHT: 68 IN

## 2020-11-12 DIAGNOSIS — C90.00 IGG MULTIPLE MYELOMA (HCC): Primary | ICD-10-CM

## 2020-11-12 DIAGNOSIS — C90.00 MULTIPLE MYELOMA NOT HAVING ACHIEVED REMISSION (HCC): ICD-10-CM

## 2020-11-12 LAB — MISCELLANEOUS LAB TEST RESULT: NORMAL

## 2020-11-12 PROCEDURE — 3078F DIAST BP <80 MM HG: CPT | Performed by: INTERNAL MEDICINE

## 2020-11-12 PROCEDURE — 3008F BODY MASS INDEX DOCD: CPT | Performed by: INTERNAL MEDICINE

## 2020-11-12 PROCEDURE — 1036F TOBACCO NON-USER: CPT | Performed by: INTERNAL MEDICINE

## 2020-11-12 PROCEDURE — 3074F SYST BP LT 130 MM HG: CPT | Performed by: INTERNAL MEDICINE

## 2020-11-12 PROCEDURE — 99214 OFFICE O/P EST MOD 30 MIN: CPT | Performed by: INTERNAL MEDICINE

## 2020-11-12 PROCEDURE — 1160F RVW MEDS BY RX/DR IN RCRD: CPT | Performed by: INTERNAL MEDICINE

## 2020-11-12 RX ORDER — ACYCLOVIR 200 MG/1
200 CAPSULE ORAL 2 TIMES DAILY
Qty: 60 CAPSULE | Refills: 0 | Status: SHIPPED | OUTPATIENT
Start: 2020-11-12 | End: 2020-12-08 | Stop reason: SDUPTHER

## 2020-11-13 ENCOUNTER — HOSPITAL ENCOUNTER (OUTPATIENT)
Dept: INFUSION CENTER | Facility: CLINIC | Age: 75
Discharge: HOME | End: 2020-11-13
Payer: COMMERCIAL

## 2020-11-13 VITALS
BODY MASS INDEX: 28.4 KG/M2 | HEIGHT: 68 IN | TEMPERATURE: 97.4 F | WEIGHT: 187.39 LBS | DIASTOLIC BLOOD PRESSURE: 65 MMHG | SYSTOLIC BLOOD PRESSURE: 136 MMHG | HEART RATE: 62 BPM | RESPIRATION RATE: 18 BRPM

## 2020-11-13 PROCEDURE — 96367 TX/PROPH/DG ADDL SEQ IV INF: CPT

## 2020-11-13 PROCEDURE — 96417 CHEMO IV INFUS EACH ADDL SEQ: CPT

## 2020-11-13 PROCEDURE — 96377 APPLICATON ON-BODY INJECTOR: CPT

## 2020-11-13 PROCEDURE — 96413 CHEMO IV INFUSION 1 HR: CPT

## 2020-11-13 RX ADMIN — DEXAMETHASONE SODIUM PHOSPHATE 20 MG: 10 INJECTION, SOLUTION INTRAMUSCULAR; INTRAVENOUS at 08:47

## 2020-11-13 RX ADMIN — ACETAMINOPHEN 650 MG: 325 TABLET, FILM COATED ORAL at 08:20

## 2020-11-13 RX ADMIN — FAMOTIDINE 20 MG: 20 TABLET, FILM COATED ORAL at 09:41

## 2020-11-13 RX ADMIN — DIPHENHYDRAMINE HCL 50 MG: 25 TABLET ORAL at 09:42

## 2020-11-13 RX ADMIN — ONDANSETRON 16 MG: 2 INJECTION INTRAMUSCULAR; INTRAVENOUS at 08:27

## 2020-11-13 RX ADMIN — PEGFILGRASTIM 6 MG: KIT SUBCUTANEOUS at 10:53

## 2020-11-13 RX ADMIN — CYCLOPHOSPHAMIDE 400 MG: 500 INJECTION, POWDER, FOR SOLUTION INTRAVENOUS; ORAL at 09:44

## 2020-11-13 RX ADMIN — SODIUM CHLORIDE 800 MG: 0.9 INJECTION, SOLUTION INTRAVENOUS at 10:50

## 2020-11-23 RX ORDER — FAMOTIDINE 20 MG/1
20 TABLET, FILM COATED ORAL ONCE
Status: COMPLETED | OUTPATIENT
Start: 2020-11-27 | End: 2020-11-27

## 2020-11-23 RX ORDER — DIPHENHYDRAMINE HCL 25 MG
50 TABLET ORAL ONCE
Status: COMPLETED | OUTPATIENT
Start: 2020-11-27 | End: 2020-11-27

## 2020-11-23 RX ORDER — ACETAMINOPHEN 325 MG/1
650 TABLET ORAL ONCE
Status: COMPLETED | OUTPATIENT
Start: 2020-11-27 | End: 2020-11-27

## 2020-11-25 ENCOUNTER — LAB (OUTPATIENT)
Dept: LAB | Facility: CLINIC | Age: 75
End: 2020-11-25
Payer: COMMERCIAL

## 2020-11-27 ENCOUNTER — HOSPITAL ENCOUNTER (OUTPATIENT)
Dept: INFUSION CENTER | Facility: CLINIC | Age: 75
Discharge: HOME | End: 2020-11-27
Payer: COMMERCIAL

## 2020-11-27 VITALS
TEMPERATURE: 97.3 F | RESPIRATION RATE: 18 BRPM | BODY MASS INDEX: 27.89 KG/M2 | WEIGHT: 182.98 LBS | HEART RATE: 64 BPM | SYSTOLIC BLOOD PRESSURE: 113 MMHG | DIASTOLIC BLOOD PRESSURE: 71 MMHG

## 2020-11-27 DIAGNOSIS — C90.00 IGG MULTIPLE MYELOMA (HCC): ICD-10-CM

## 2020-11-27 DIAGNOSIS — D80.1 HYPOGAMMAGLOBULINEMIA (HCC): Primary | ICD-10-CM

## 2020-11-27 PROCEDURE — 96367 TX/PROPH/DG ADDL SEQ IV INF: CPT

## 2020-11-27 PROCEDURE — 96417 CHEMO IV INFUS EACH ADDL SEQ: CPT

## 2020-11-27 PROCEDURE — 96366 THER/PROPH/DIAG IV INF ADDON: CPT

## 2020-11-27 PROCEDURE — 96413 CHEMO IV INFUSION 1 HR: CPT

## 2020-11-27 PROCEDURE — 96377 APPLICATON ON-BODY INJECTOR: CPT

## 2020-11-27 PROCEDURE — 96415 CHEMO IV INFUSION ADDL HR: CPT

## 2020-11-27 RX ADMIN — DIPHENHYDRAMINE HCL 50 MG: 25 TABLET ORAL at 10:29

## 2020-11-27 RX ADMIN — CYCLOPHOSPHAMIDE 400 MG: 500 INJECTION, POWDER, FOR SOLUTION INTRAVENOUS; ORAL at 11:09

## 2020-11-27 RX ADMIN — ACETAMINOPHEN 650 MG: 325 TABLET, FILM COATED ORAL at 10:29

## 2020-11-27 RX ADMIN — ONDANSETRON 16 MG: 2 INJECTION INTRAMUSCULAR; INTRAVENOUS at 10:31

## 2020-11-27 RX ADMIN — PEGFILGRASTIM 6 MG: KIT SUBCUTANEOUS at 12:28

## 2020-11-27 RX ADMIN — Medication 30 G: at 08:37

## 2020-11-27 RX ADMIN — DEXAMETHASONE SODIUM PHOSPHATE 20 MG: 10 INJECTION, SOLUTION INTRAMUSCULAR; INTRAVENOUS at 10:50

## 2020-11-27 RX ADMIN — SODIUM CHLORIDE 800 MG: 0.9 INJECTION, SOLUTION INTRAVENOUS at 12:14

## 2020-11-27 RX ADMIN — FAMOTIDINE 20 MG: 20 TABLET, FILM COATED ORAL at 10:29

## 2020-12-08 DIAGNOSIS — C90.00 MULTIPLE MYELOMA NOT HAVING ACHIEVED REMISSION (HCC): ICD-10-CM

## 2020-12-08 RX ORDER — FAMOTIDINE 20 MG/1
20 TABLET, FILM COATED ORAL ONCE
Status: COMPLETED | OUTPATIENT
Start: 2020-12-11 | End: 2020-12-11

## 2020-12-08 RX ORDER — ACYCLOVIR 200 MG/1
200 CAPSULE ORAL 2 TIMES DAILY
Qty: 60 CAPSULE | Refills: 0 | Status: CANCELLED | OUTPATIENT
Start: 2020-12-08 | End: 2021-01-07

## 2020-12-08 RX ORDER — SODIUM CHLORIDE 9 MG/ML
20 INJECTION, SOLUTION INTRAVENOUS CONTINUOUS
Status: DISCONTINUED | OUTPATIENT
Start: 2020-12-11 | End: 2020-12-14 | Stop reason: HOSPADM

## 2020-12-08 RX ORDER — ACYCLOVIR 200 MG/1
200 CAPSULE ORAL 2 TIMES DAILY
Qty: 60 CAPSULE | Refills: 0
Start: 2020-12-08 | End: 2020-12-10

## 2020-12-08 RX ORDER — ACETAMINOPHEN 325 MG/1
650 TABLET ORAL ONCE
Status: COMPLETED | OUTPATIENT
Start: 2020-12-11 | End: 2020-12-11

## 2020-12-08 RX ORDER — DIPHENHYDRAMINE HCL 25 MG
50 TABLET ORAL ONCE
Status: COMPLETED | OUTPATIENT
Start: 2020-12-11 | End: 2020-12-11

## 2020-12-09 ENCOUNTER — LAB (OUTPATIENT)
Dept: LAB | Facility: CLINIC | Age: 75
End: 2020-12-09
Payer: COMMERCIAL

## 2020-12-10 DIAGNOSIS — C90.00 MULTIPLE MYELOMA NOT HAVING ACHIEVED REMISSION (HCC): ICD-10-CM

## 2020-12-10 RX ORDER — ACYCLOVIR 200 MG/1
CAPSULE ORAL
Qty: 60 CAPSULE | Refills: 5 | Status: SHIPPED | OUTPATIENT
Start: 2020-12-10 | End: 2021-06-15 | Stop reason: SDUPTHER

## 2020-12-11 ENCOUNTER — HOSPITAL ENCOUNTER (OUTPATIENT)
Dept: INFUSION CENTER | Facility: CLINIC | Age: 75
Discharge: HOME | End: 2020-12-11
Payer: COMMERCIAL

## 2020-12-11 VITALS
WEIGHT: 188.27 LBS | TEMPERATURE: 97.2 F | RESPIRATION RATE: 18 BRPM | HEART RATE: 66 BPM | SYSTOLIC BLOOD PRESSURE: 118 MMHG | BODY MASS INDEX: 28.7 KG/M2 | DIASTOLIC BLOOD PRESSURE: 79 MMHG

## 2020-12-11 PROCEDURE — 96417 CHEMO IV INFUS EACH ADDL SEQ: CPT

## 2020-12-11 PROCEDURE — 96367 TX/PROPH/DG ADDL SEQ IV INF: CPT

## 2020-12-11 PROCEDURE — 96413 CHEMO IV INFUSION 1 HR: CPT

## 2020-12-11 PROCEDURE — 96375 TX/PRO/DX INJ NEW DRUG ADDON: CPT

## 2020-12-11 PROCEDURE — 96377 APPLICATON ON-BODY INJECTOR: CPT

## 2020-12-11 PROCEDURE — 96415 CHEMO IV INFUSION ADDL HR: CPT

## 2020-12-11 RX ADMIN — SODIUM CHLORIDE 800 MG: 0.9 INJECTION, SOLUTION INTRAVENOUS at 10:32

## 2020-12-11 RX ADMIN — DEXAMETHASONE SODIUM PHOSPHATE 20 MG: 10 INJECTION, SOLUTION INTRAMUSCULAR; INTRAVENOUS at 08:36

## 2020-12-11 RX ADMIN — SODIUM CHLORIDE 20 ML/HR: 0.9 INJECTION, SOLUTION INTRAVENOUS at 08:35

## 2020-12-11 RX ADMIN — PEGFILGRASTIM 6 MG: KIT SUBCUTANEOUS at 10:37

## 2020-12-11 RX ADMIN — DIPHENHYDRAMINE HCL 50 MG: 25 TABLET ORAL at 08:27

## 2020-12-11 RX ADMIN — FAMOTIDINE 20 MG: 20 TABLET, FILM COATED ORAL at 08:27

## 2020-12-11 RX ADMIN — ONDANSETRON 16 MG: 2 INJECTION INTRAMUSCULAR; INTRAVENOUS at 08:51

## 2020-12-11 RX ADMIN — ACETAMINOPHEN 650 MG: 325 TABLET, FILM COATED ORAL at 08:27

## 2020-12-11 RX ADMIN — CYCLOPHOSPHAMIDE 400 MG: 500 INJECTION, POWDER, FOR SOLUTION INTRAVENOUS; ORAL at 09:28

## 2020-12-21 RX ORDER — DIPHENHYDRAMINE HCL 25 MG
50 TABLET ORAL ONCE
Status: COMPLETED | OUTPATIENT
Start: 2020-12-24 | End: 2020-12-24

## 2020-12-21 RX ORDER — ACETAMINOPHEN 325 MG/1
650 TABLET ORAL ONCE
Status: COMPLETED | OUTPATIENT
Start: 2020-12-24 | End: 2020-12-24

## 2020-12-21 RX ORDER — SODIUM CHLORIDE 9 MG/ML
20 INJECTION, SOLUTION INTRAVENOUS CONTINUOUS
Status: DISCONTINUED | OUTPATIENT
Start: 2020-12-24 | End: 2020-12-27 | Stop reason: HOSPADM

## 2020-12-21 RX ORDER — FAMOTIDINE 20 MG/1
20 TABLET, FILM COATED ORAL ONCE
Status: COMPLETED | OUTPATIENT
Start: 2020-12-24 | End: 2020-12-24

## 2020-12-23 ENCOUNTER — LAB (OUTPATIENT)
Dept: LAB | Facility: CLINIC | Age: 75
End: 2020-12-23
Payer: COMMERCIAL

## 2020-12-24 ENCOUNTER — HOSPITAL ENCOUNTER (OUTPATIENT)
Dept: INFUSION CENTER | Facility: CLINIC | Age: 75
Discharge: HOME | End: 2020-12-24
Payer: COMMERCIAL

## 2020-12-24 VITALS
WEIGHT: 187.17 LBS | HEART RATE: 88 BPM | DIASTOLIC BLOOD PRESSURE: 85 MMHG | BODY MASS INDEX: 28.53 KG/M2 | TEMPERATURE: 97.6 F | RESPIRATION RATE: 18 BRPM | SYSTOLIC BLOOD PRESSURE: 130 MMHG

## 2020-12-24 DIAGNOSIS — C90.00 IGG MULTIPLE MYELOMA (HCC): ICD-10-CM

## 2020-12-24 DIAGNOSIS — D80.1 HYPOGAMMAGLOBULINEMIA (HCC): Primary | ICD-10-CM

## 2020-12-24 DIAGNOSIS — C90.00 IGG MULTIPLE MYELOMA (HCC): Primary | ICD-10-CM

## 2020-12-24 PROCEDURE — 96417 CHEMO IV INFUS EACH ADDL SEQ: CPT

## 2020-12-24 PROCEDURE — 96415 CHEMO IV INFUSION ADDL HR: CPT

## 2020-12-24 PROCEDURE — 96377 APPLICATON ON-BODY INJECTOR: CPT

## 2020-12-24 PROCEDURE — 96366 THER/PROPH/DIAG IV INF ADDON: CPT

## 2020-12-24 PROCEDURE — 96413 CHEMO IV INFUSION 1 HR: CPT

## 2020-12-24 PROCEDURE — 96367 TX/PROPH/DG ADDL SEQ IV INF: CPT

## 2020-12-24 RX ADMIN — PEGFILGRASTIM 6 MG: KIT SUBCUTANEOUS at 12:51

## 2020-12-24 RX ADMIN — ACETAMINOPHEN 650 MG: 325 TABLET, FILM COATED ORAL at 10:21

## 2020-12-24 RX ADMIN — ZOLEDRONIC ACID 3 MG: 4 INJECTION, SOLUTION, CONCENTRATE INTRAVENOUS at 11:01

## 2020-12-24 RX ADMIN — FAMOTIDINE 20 MG: 20 TABLET, FILM COATED ORAL at 10:27

## 2020-12-24 RX ADMIN — SODIUM CHLORIDE 800 MG: 0.9 INJECTION, SOLUTION INTRAVENOUS at 12:46

## 2020-12-24 RX ADMIN — DIPHENHYDRAMINE HCL 50 MG: 25 TABLET ORAL at 10:21

## 2020-12-24 RX ADMIN — SODIUM CHLORIDE 20 ML/HR: 0.9 INJECTION, SOLUTION INTRAVENOUS at 08:23

## 2020-12-24 RX ADMIN — DEXAMETHASONE SODIUM PHOSPHATE 20 MG: 10 INJECTION, SOLUTION INTRAMUSCULAR; INTRAVENOUS at 10:26

## 2020-12-24 RX ADMIN — Medication 30 G: at 08:30

## 2020-12-24 RX ADMIN — ONDANSETRON 16 MG: 2 INJECTION INTRAMUSCULAR; INTRAVENOUS at 10:42

## 2020-12-24 RX ADMIN — CYCLOPHOSPHAMIDE 400 MG: 500 INJECTION, POWDER, FOR SOLUTION INTRAVENOUS; ORAL at 11:44

## 2021-01-05 RX ORDER — FAMOTIDINE 20 MG/1
20 TABLET, FILM COATED ORAL ONCE
Status: COMPLETED | OUTPATIENT
Start: 2021-01-08 | End: 2021-01-08

## 2021-01-05 RX ORDER — SODIUM CHLORIDE 9 MG/ML
20 INJECTION, SOLUTION INTRAVENOUS CONTINUOUS
Status: DISCONTINUED | OUTPATIENT
Start: 2021-01-08 | End: 2021-01-11 | Stop reason: HOSPADM

## 2021-01-05 RX ORDER — ACETAMINOPHEN 325 MG/1
650 TABLET ORAL ONCE
Status: COMPLETED | OUTPATIENT
Start: 2021-01-08 | End: 2021-01-08

## 2021-01-05 RX ORDER — DIPHENHYDRAMINE HCL 25 MG
50 TABLET ORAL ONCE
Status: COMPLETED | OUTPATIENT
Start: 2021-01-08 | End: 2021-01-08

## 2021-01-06 ENCOUNTER — DOCUMENTATION (OUTPATIENT)
Dept: HEMATOLOGY ONCOLOGY | Facility: MEDICAL CENTER | Age: 76
End: 2021-01-06

## 2021-01-06 ENCOUNTER — APPOINTMENT (OUTPATIENT)
Dept: LAB | Facility: CLINIC | Age: 76
End: 2021-01-06
Payer: COMMERCIAL

## 2021-01-06 NOTE — PROGRESS NOTES
Left message asking that patient call the office to confirm appointment with Dr Lg Monreal on 01/07 and to screen for COVID

## 2021-01-07 ENCOUNTER — OFFICE VISIT (OUTPATIENT)
Dept: HEMATOLOGY ONCOLOGY | Facility: CLINIC | Age: 76
End: 2021-01-07
Payer: COMMERCIAL

## 2021-01-07 VITALS
WEIGHT: 188.6 LBS | HEART RATE: 62 BPM | OXYGEN SATURATION: 100 % | RESPIRATION RATE: 16 BRPM | SYSTOLIC BLOOD PRESSURE: 126 MMHG | TEMPERATURE: 97 F | DIASTOLIC BLOOD PRESSURE: 76 MMHG | BODY MASS INDEX: 28.58 KG/M2 | HEIGHT: 68 IN

## 2021-01-07 DIAGNOSIS — T45.1X5A IMMUNOSUPPRESSED DUE TO CHEMOTHERAPY (HCC): Primary | ICD-10-CM

## 2021-01-07 DIAGNOSIS — Z29.9 PROPHYLACTIC MEASURE: ICD-10-CM

## 2021-01-07 DIAGNOSIS — D84.821 IMMUNOSUPPRESSED DUE TO CHEMOTHERAPY (HCC): Primary | ICD-10-CM

## 2021-01-07 DIAGNOSIS — C90.00 IGG MULTIPLE MYELOMA (HCC): ICD-10-CM

## 2021-01-07 DIAGNOSIS — Z79.899 IMMUNOSUPPRESSED DUE TO CHEMOTHERAPY (HCC): Primary | ICD-10-CM

## 2021-01-07 PROCEDURE — 3074F SYST BP LT 130 MM HG: CPT | Performed by: INTERNAL MEDICINE

## 2021-01-07 PROCEDURE — 1036F TOBACCO NON-USER: CPT | Performed by: INTERNAL MEDICINE

## 2021-01-07 PROCEDURE — 3078F DIAST BP <80 MM HG: CPT | Performed by: INTERNAL MEDICINE

## 2021-01-07 PROCEDURE — 99214 OFFICE O/P EST MOD 30 MIN: CPT | Performed by: INTERNAL MEDICINE

## 2021-01-07 PROCEDURE — 3008F BODY MASS INDEX DOCD: CPT | Performed by: INTERNAL MEDICINE

## 2021-01-07 PROCEDURE — 1160F RVW MEDS BY RX/DR IN RCRD: CPT | Performed by: INTERNAL MEDICINE

## 2021-01-07 RX ORDER — LEVOFLOXACIN 250 MG/1
250 TABLET ORAL DAILY
COMMUNITY
Start: 2020-12-08 | End: 2021-01-07 | Stop reason: SDUPTHER

## 2021-01-07 RX ORDER — LEVOFLOXACIN 250 MG/1
250 TABLET ORAL DAILY
Qty: 90 TABLET | Refills: 3 | Status: SHIPPED | OUTPATIENT
Start: 2021-01-07 | End: 2021-04-07

## 2021-01-07 NOTE — PROGRESS NOTES
[facility] [place]  [address] [address]  [address] [place] [address],1945, [id number]  01/07/21    Discussion:   In summary, this is a 70-year-old male history of multiple myeloma  Clinically he is doing well  He was recently in [employer]  Disease was felt to be stable and continuation of current treatment was recommended  He [place] the airport  He admits that he generally does not drink much fluid when he travels  He recovered with fluid resuscitation and has not had episodes since that time  Most recent blood counts are near normal   CMP shows creatinine 1 4, otherwise normal   We reviewed some issues related to the recently approved COVID vaccine  Cancer patients were excluded from the registration trial   The medical oncology division consensus is that the risk benefit ratio is favorable and administration is suggested  I discussed the above with the patient  The patient and his wife voiced understanding and agreement   ______________________________________________________________________    Chief Complaint   Patient presents with    Follow-up       HPI:  Oncology History   IgG multiple myeloma ([address] )   9/2015 Initial Diagnosis    Found to have Hbg of 6 with SOB, creatinine 1 8 and normal calcium with albumin of 2 1  Additonial blood work showed elevated protien level (12 1g/dL)  9/29/2015 Biopsy    Bone marrow biopsy demonstrated approximately 80% plasma cells with some immature forms noted  These studies showed 13q14 deletion, +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, 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)       10/14/2015 - 11/11/2015 Chemotherapy    Velcade 1 3 mg/m2 Days 1,8,15,22  Cytoxan 300 mg/m2  PO Days 1, 8,15, 22  Dexamethasone 40 mg PO Days 1,8,15, 22  Zometa 4 mg IV Day 1  Cycle length = 28 days    Completed 2 cycles    Day one of cycle 2 was on 11/11/15      12/2015 - 2/2016 Chemotherapy    VDT-PACE x 2 cycles   (completed at [facility] in [place], [place])     2/2016 Transplant    Melphalane 200 mg/m2 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  MRD +     8/2016 - 1/26/2018 Chemotherapy    Maintenance therapy:  Carfilzomib, orginally dosed 27 mg weekly then increased to 45 mg weekly after MRD reactivitation  Revlimid  Dexamethasone      2/2018 Biopsy    Bone marrow demonstrated positive minimal residual disease       2/23/2018 -  Chemotherapy    Daratumumab 16mg/m2 IV Day 1  Pomalyst 4 mg p o  daily 1-21  Dexamethasone 4 mg PO Days 2, 3  Dexamethasone 12 mg PODays 8, 15, 22  Cycle length = 28 days     4/11/2019 - 8/29/2019 Chemotherapy    methylPREDNISolone sodium succinate (Solu-MEDROL) 100 mg in sodium chloride 0 9 % 250 mL IVPB, 100 mg, Intravenous, Once, 5 of 12 cycles  Administration: 100 mg (6/7/2019), 100 mg (7/5/2019), 100 mg (8/2/2019)     8/30/2019 - 11/8/2019 Chemotherapy    cyclophosphamide (CYTOXAN) 1,000 mg in sodium chloride 0 9 % 250 mL IVPB, 990 mg (66 7 % of original dose 750 mg/m2), Intravenous, Once, 2 of 3 cycles  Dose modification: 500 mg/m2 (original dose 750 mg/m2, Cycle 1, Reason: Other (See Comments)), 250 mg/m2 (original dose 750 mg/m2, Cycle 4, Reason: Treatment Parameters Not Met)  Administration: 1,000 mg (8/30/2019), 1,000 mg (9/13/2019), 1,000 mg (9/27/2019), 500 mg (10/11/2019)  bortezomib (VELCADE) subcutaneous injection 2 6 mg, 1 3 mg/m2 = 2 6 mg (86 7 % of original dose 1 5 mg/m2), Subcutaneous, Once, 3 of 4 cycles  Dose modification: 1 3 mg/m2 (original dose 1 5 mg/m2, Cycle 1, Reason: Other (See Comments))  Administration: 2 6 mg (8/30/2019), 2 6 mg (9/13/2019), 2 6 mg (10/25/2019), 2 6 mg (11/8/2019), 2 6 mg (9/27/2019), 2 6 mg (11/1/2019), 2 6 mg (9/20/2019), 2 6 mg (10/4/2019), 2 6 mg (10/11/2019), 2 6 mg (10/18/2019)     12/6/2019 - 6/19/2020 Chemotherapy    pegfilgrastim (NEULASTA ONPRO) subcutaneous injection kit 6 mg, 6 mg, Subcutaneous, Once, 5 of 9 cycles  Administration: 6 mg (2/28/2020), 6 mg (3/13/2020), 6 mg (3/27/2020), 6 mg (4/10/2020), 6 mg (4/24/2020), 6 mg (5/8/2020), 6 mg (6/5/2020), 6 mg (5/22/2020), 6 mg (6/19/2020)  cyclophosphamide (CYTOXAN) 784 mg in sodium chloride 0 9 % 250 mL IVPB, 400 mg/m2 = 784 mg, Intravenous, Once, 5 of 9 cycles  Dose modification: 400 mg/m2 (original dose 750 mg/m2, Cycle 7, Reason: Other (See Comments))  Administration: 784 mg (2/28/2020), 784 mg (3/13/2020), 784 mg (3/27/2020), 784 mg (4/10/2020), 784 mg (4/24/2020), 784 mg (5/8/2020), 784 mg (5/22/2020), 784 mg (6/19/2020), 784 mg (6/5/2020)  methylPREDNISolone sodium succinate (Solu-MEDROL) 100 mg in sodium chloride 0 9 % 250 mL IVPB, 100 mg, Intravenous, Once, 8 of 12 cycles  Administration: 100 mg (12/6/2019), 100 mg (12/13/2019), 100 mg (12/20/2019), 100 mg (1/3/2020), 100 mg (1/10/2020), 100 mg (1/17/2020), 100 mg (1/31/2020), 100 mg (2/14/2020), 100 mg (5/22/2020), 100 mg (12/27/2019), 100 mg (1/24/2020), 100 mg (2/28/2020), 100 mg (3/13/2020), 100 mg (3/27/2020), 100 mg (4/10/2020), 100 mg (4/24/2020), 100 mg (5/8/2020), 100 mg (6/19/2020)     7/2/2020 -  Chemotherapy           Interval History:  Clinically stable  ECOG-  1 - Symptomatic but completely ambulatory    Review of Systems   Constitutional: Negative for chills and fever  HENT: Negative for nosebleeds  Eyes: Negative for discharge  Respiratory: Negative for cough and shortness of breath  Cardiovascular: Negative for chest pain  Gastrointestinal: Negative for abdominal pain, constipation and diarrhea  Endocrine: Negative for polydipsia  Genitourinary: Negative for hematuria  Musculoskeletal: Negative for arthralgias  Skin: Negative for color change  Allergic/Immunologic: Negative for immunocompromised state  Neurological: Negative for dizziness and headaches  Hematological: Negative for adenopathy  Psychiatric/Behavioral: Negative for agitation         Past Medical History:   Diagnosis Date    History of autologous stem cell transplant ([facility] [address] )     Hypertension     History of HTN-stable since weight loss    Insomnia     Multiple myeloma (HCC)      Patient Active Problem List   Diagnosis    IgG multiple myeloma (HCC)    Persistent atrial fibrillation (HCC)    Essential hypertension    Chronic ITP (idiopathic thrombocytopenia) (HCC)    Hyperlipidemia    Hypocalcemia    Hypothyroidism    Long term current use of systemic steroids    Other fatigue    Prophylactic measure    Hypogammaglobulinemia (HCC)    Leukopenia    Anemia due to stage 3 chronic kidney disease       Current Outpatient Medications:     acetaminophen (TYLENOL) 325 mg tablet, Take 650 mg by mouth every 6 (six) hours as needed for mild pain, Disp: , Rfl:     acyclovir (ZOVIRAX) 200 mg capsule, TAKE  (1)  CAPSULE  TWICE DAILY  , Disp: 60 capsule, Rfl: 5    aspirin 81 MG tablet, Take 81 mg by mouth daily  , Disp: , Rfl:     atorvastatin (LIPITOR) 20 mg tablet, Take 1 tablet (20 mg total) by mouth daily, Disp: 90 tablet, Rfl: 0    Cholecalciferol (VITAMIN D-3 PO), Take 1 tablet by mouth daily  , Disp: , Rfl:     Docusate Sodium (COLACE PO), Take 1 tablet by mouth as needed  , Disp: , Rfl:     eltrombopag (PROMACTA) 25 mg tablet, Take 25 mg by mouth daily Administer on an empty stomach, 1 hour before or 2 hours after a meal  , Disp: , Rfl:     escitalopram (LEXAPRO) 20 mg tablet, Take 1 tablet (20 mg total) by mouth daily, Disp: 90 tablet, Rfl: 0    Glutamine POWD, by Does not apply route, Disp: , Rfl:     levofloxacin (LEVAQUIN) 250 mg tablet, Take 1 tablet (250 mg total) by mouth daily, Disp: 90 tablet, Rfl: 3    levothyroxine 50 mcg tablet, Take 1 tablet (50 mcg total) by mouth daily, Disp: 90 tablet, Rfl: 0    LORazepam (ATIVAN) 0 5 mg tablet, Take 0 5 mg by mouth every 6 (six) hours as needed for anxiety  , Disp: , Rfl:     Multiple Vitamin (MULTIVITAMINS PO), Take 1 tablet by mouth daily  , Disp: , Rfl:     ondansetron (ZOFRAN) 4 mg tablet, Take 4 mg by mouth every 8 (eight) hours as needed for nausea or vomiting , Disp: , Rfl:     Pomalidomide (Pomalyst) 4 MG CAPS, Take 4 mg by mouth Days 1-21 off 7 days, Disp: , Rfl:   No current facility-administered medications for this visit       Facility-Administered Medications Ordered in Other Visits:     [START ON 1/8/2021] acetaminophen (TYLENOL) tablet 650 mg, 650 mg, Oral, Once, Jh Anel, DO    [START ON 1/8/2021] cyclophosphamide (CYTOXAN) 400 mg in sodium chloride 0 9 % 250 mL IVPB, 400 mg, Intravenous, Once, Jh Anel, DO    [START ON 1/8/2021] dexamethasone (DECADRON) 20 mg in sodium chloride 0 9 % 52 mL IVPB, 20 mg, Intravenous, Once, Jh Anel, DO    [START ON 1/8/2021] diphenhydrAMINE (BENADRYL) tablet 50 mg, 50 mg, Oral, Once, Jh Anel, DO    [START ON 1/8/2021] famotidine (PEPCID) tablet 20 mg, 20 mg, Oral, Once, Jh Anel, DO    [START ON 1/8/2021] isatuximab (SARCLISA) 800 mg in sodium chloride 0 9 % 250 mL IVPB, 800 mg, Intravenous, Once, Jh Anel, DO    [START ON 1/8/2021] ondansetron (ZOFRAN) 16 mg in sodium chloride 0 9 % 50 mL IVPB, 16 mg, Intravenous, Once, Jh Anel, DO    [START ON 1/8/2021] pegfilgrastim (NEULASTA ONPRO) subcutaneous injection kit 6 mg, 6 mg, Subcutaneous, Once, Jh Anel, DO    [START ON 1/8/2021] sodium chloride 0 9 % infusion, 20 mL/hr, Intravenous, Continuous, Jh Anel, DO  No Known Allergies  Past Surgical History:   Procedure Laterality Date    APPENDECTOMY      Last assessed: 9/25/15    ARTHROSCOPY KNEE Left     9/30/09    EYE SURGERY      Lasik    KNEE CARTILAGE SURGERY      LIMBAL STEM CELL TRANSPLANT      Patient had 2 in [place]-2/29/2016 and 4/13/2016     Social History     Objective:  Vitals:    01/07/21 0850   BP: 126/76   BP Location: Left arm   Patient Position: Sitting   Pulse: 62   Resp: 16   Temp: (!) 97 °F (36 1 °C)   TempSrc: Tympanic   SpO2: 100%   Weight: 85 5 kg (188 lb 9 6 oz)   Height: 5' 8" (1 727 m)     Physical Exam  Constitutional:       Appearance: He is well-developed  HENT:      Head: Normocephalic and atraumatic  Eyes:      Pupils: Pupils are equal, round, and reactive to light  Neck:      Musculoskeletal: Neck supple  Cardiovascular:      Rate and Rhythm: Normal rate and regular rhythm  Heart sounds: No murmur  Pulmonary:      Breath sounds: Normal breath sounds  No wheezing or rales  Abdominal:      Palpations: Abdomen is soft  Tenderness: There is no abdominal tenderness  Musculoskeletal: Normal range of motion  General: No tenderness  Lymphadenopathy:      Cervical: No cervical adenopathy  Skin:     Findings: No erythema or rash  Neurological:      Mental Status: He is alert and oriented to person, place, and time  Cranial Nerves: No cranial nerve deficit  Deep Tendon Reflexes: Reflexes are normal and symmetric  Psychiatric:         Behavior: Behavior normal            Labs: I personally reviewed the labs and imaging pertinent to this patient care

## 2021-01-08 ENCOUNTER — HOSPITAL ENCOUNTER (OUTPATIENT)
Dept: INFUSION CENTER | Facility: CLINIC | Age: 76
Discharge: HOME | End: 2021-01-08
Payer: COMMERCIAL

## 2021-01-08 VITALS
DIASTOLIC BLOOD PRESSURE: 84 MMHG | WEIGHT: 186.29 LBS | HEART RATE: 93 BPM | SYSTOLIC BLOOD PRESSURE: 142 MMHG | RESPIRATION RATE: 18 BRPM | BODY MASS INDEX: 28.33 KG/M2

## 2021-01-08 PROCEDURE — 96377 APPLICATON ON-BODY INJECTOR: CPT

## 2021-01-08 PROCEDURE — 96367 TX/PROPH/DG ADDL SEQ IV INF: CPT

## 2021-01-08 PROCEDURE — 96417 CHEMO IV INFUS EACH ADDL SEQ: CPT

## 2021-01-08 PROCEDURE — 96415 CHEMO IV INFUSION ADDL HR: CPT

## 2021-01-08 PROCEDURE — 96413 CHEMO IV INFUSION 1 HR: CPT

## 2021-01-08 RX ADMIN — DEXAMETHASONE SODIUM PHOSPHATE 20 MG: 10 INJECTION, SOLUTION INTRAMUSCULAR; INTRAVENOUS at 09:05

## 2021-01-08 RX ADMIN — ONDANSETRON 16 MG: 2 INJECTION INTRAMUSCULAR; INTRAVENOUS at 08:45

## 2021-01-08 RX ADMIN — DIPHENHYDRAMINE HCL 50 MG: 25 TABLET ORAL at 08:39

## 2021-01-08 RX ADMIN — CYCLOPHOSPHAMIDE 400 MG: 500 INJECTION, POWDER, FOR SOLUTION INTRAVENOUS; ORAL at 09:29

## 2021-01-08 RX ADMIN — PEGFILGRASTIM 6 MG: KIT SUBCUTANEOUS at 10:50

## 2021-01-08 RX ADMIN — SODIUM CHLORIDE 800 MG: 0.9 INJECTION, SOLUTION INTRAVENOUS at 10:35

## 2021-01-08 RX ADMIN — SODIUM CHLORIDE 20 ML/HR: 0.9 INJECTION, SOLUTION INTRAVENOUS at 08:45

## 2021-01-08 RX ADMIN — FAMOTIDINE 20 MG: 20 TABLET, FILM COATED ORAL at 08:39

## 2021-01-08 RX ADMIN — ACETAMINOPHEN 650 MG: 325 TABLET, FILM COATED ORAL at 08:39

## 2021-01-08 NOTE — PROGRESS NOTES
Patient tolerated infusion well with no complications  Neulasta Onpro placed on right arm  Pt is aware of all future appointments   Declined AVS

## 2021-01-08 NOTE — PLAN OF CARE
Problem: Potential for Falls  Goal: Patient will remain free of falls  Description: INTERVENTIONS:  - Assess patient frequently for physical needs  -  Identify cognitive and physical deficits and behaviors that affect risk of falls    -  [place] fall precautions as indicated by assessment   - Educate patient/family on patient safety including physical limitations  - Instruct patient to call for assistance with activity based on assessment  - Modify environment to reduce risk of injury  - Consider OT/PT consult to assist with strengthening/mobility  Outcome: Progressing

## 2021-01-19 RX ORDER — FAMOTIDINE 20 MG/1
20 TABLET, FILM COATED ORAL ONCE
Status: COMPLETED | OUTPATIENT
Start: 2021-01-22 | End: 2021-01-22

## 2021-01-19 RX ORDER — ACETAMINOPHEN 325 MG/1
650 TABLET ORAL ONCE
Status: COMPLETED | OUTPATIENT
Start: 2021-01-22 | End: 2021-01-22

## 2021-01-19 RX ORDER — DIPHENHYDRAMINE HCL 25 MG
50 TABLET ORAL ONCE
Status: COMPLETED | OUTPATIENT
Start: 2021-01-22 | End: 2021-01-22

## 2021-01-19 RX ORDER — SODIUM CHLORIDE 9 MG/ML
20 INJECTION, SOLUTION INTRAVENOUS CONTINUOUS
Status: DISCONTINUED | OUTPATIENT
Start: 2021-01-22 | End: 2021-01-25 | Stop reason: HOSPADM

## 2021-01-20 ENCOUNTER — LAB (OUTPATIENT)
Dept: LAB | Facility: CLINIC | Age: 76
End: 2021-01-20
Payer: COMMERCIAL

## 2021-01-20 DIAGNOSIS — C90.00 MULTIPLE MYELOMA, STAGE 2 (HCC): Primary | ICD-10-CM

## 2021-01-22 ENCOUNTER — HOSPITAL ENCOUNTER (OUTPATIENT)
Dept: INFUSION CENTER | Facility: CLINIC | Age: 76
Discharge: HOME | End: 2021-01-22
Payer: COMMERCIAL

## 2021-01-22 VITALS
TEMPERATURE: 97.8 F | WEIGHT: 182.98 LBS | DIASTOLIC BLOOD PRESSURE: 80 MMHG | RESPIRATION RATE: 18 BRPM | BODY MASS INDEX: 27.73 KG/M2 | HEIGHT: 68 IN | SYSTOLIC BLOOD PRESSURE: 128 MMHG | HEART RATE: 80 BPM

## 2021-01-22 DIAGNOSIS — D80.1 HYPOGAMMAGLOBULINEMIA (HCC): Primary | ICD-10-CM

## 2021-01-22 DIAGNOSIS — C90.00 IGG MULTIPLE MYELOMA (HCC): ICD-10-CM

## 2021-01-22 PROCEDURE — 96366 THER/PROPH/DIAG IV INF ADDON: CPT

## 2021-01-22 PROCEDURE — 96417 CHEMO IV INFUS EACH ADDL SEQ: CPT

## 2021-01-22 PROCEDURE — 96415 CHEMO IV INFUSION ADDL HR: CPT

## 2021-01-22 PROCEDURE — 96413 CHEMO IV INFUSION 1 HR: CPT

## 2021-01-22 PROCEDURE — 96367 TX/PROPH/DG ADDL SEQ IV INF: CPT

## 2021-01-22 PROCEDURE — 96377 APPLICATON ON-BODY INJECTOR: CPT

## 2021-01-22 RX ADMIN — DIPHENHYDRAMINE HCL 50 MG: 25 TABLET ORAL at 10:30

## 2021-01-22 RX ADMIN — Medication 30 G: at 08:31

## 2021-01-22 RX ADMIN — ACETAMINOPHEN 650 MG: 325 TABLET, FILM COATED ORAL at 10:30

## 2021-01-22 RX ADMIN — SODIUM CHLORIDE 20 ML/HR: 0.9 INJECTION, SOLUTION INTRAVENOUS at 08:25

## 2021-01-22 RX ADMIN — ZOLEDRONIC ACID 3 MG: 4 INJECTION, SOLUTION, CONCENTRATE INTRAVENOUS at 10:52

## 2021-01-22 RX ADMIN — PEGFILGRASTIM 6 MG: KIT SUBCUTANEOUS at 13:38

## 2021-01-22 RX ADMIN — CYCLOPHOSPHAMIDE 400 MG: 500 INJECTION, POWDER, FOR SOLUTION INTRAVENOUS; ORAL at 13:05

## 2021-01-22 RX ADMIN — DEXAMETHASONE SODIUM PHOSPHATE: 10 INJECTION, SOLUTION INTRAMUSCULAR; INTRAVENOUS at 10:31

## 2021-01-22 RX ADMIN — FAMOTIDINE 20 MG: 20 TABLET, FILM COATED ORAL at 10:30

## 2021-01-22 RX ADMIN — SODIUM CHLORIDE 800 MG: 0.9 INJECTION, SOLUTION INTRAVENOUS at 11:32

## 2021-01-22 NOTE — PLAN OF CARE
Problem: Potential for Falls  Goal: Patient will remain free of falls  Description: INTERVENTIONS:  - Assess patient frequently for physical needs  -  Identify cognitive and physical deficits and behaviors that affect risk of falls    -  [place] fall precautions as indicated by assessment   - Educate patient/family on patient safety including physical limitations  - Instruct patient to call for assistance with activity based on assessment  - Modify environment to reduce risk of injury  - Consider OT/PT consult to assist with strengthening/mobility  Outcome: Progressing

## 2021-01-22 NOTE — PROGRESS NOTES
Patient tolerated infusion well  Offers no complaints  Neulasta Onpro placed on right arm  Pt is aware of all future appointments   Declined AVS

## 2021-01-27 ENCOUNTER — IMMUNIZATIONS (OUTPATIENT)
Dept: FAMILY MEDICINE CLINIC | Facility: HOSPITAL | Age: 76
End: 2021-01-27

## 2021-01-27 DIAGNOSIS — Z23 ENCOUNTER FOR IMMUNIZATION: Primary | ICD-10-CM

## 2021-01-27 PROCEDURE — 91301 SARS-COV-2 / COVID-19 MRNA VACCINE (MODERNA) 100 MCG: CPT

## 2021-01-27 PROCEDURE — 0011A SARS-COV-2 / COVID-19 MRNA VACCINE (MODERNA) 100 MCG: CPT

## 2021-01-28 ENCOUNTER — TELEPHONE (OUTPATIENT)
Dept: HEMATOLOGY ONCOLOGY | Facility: CLINIC | Age: 76
End: 2021-01-28

## 2021-01-28 NOTE — TELEPHONE ENCOUNTER
Patient's wife Bimal Garber called to get patient's Medical Marijuana card recertified Please call Bimal Garber back at [phone]

## 2021-01-28 NOTE — TELEPHONE ENCOUNTER
Spoke with wife and let her know that we cannot re certify pt until the day of the [facility] card has   She will call back the day of the expiration and ask us to re certify  She voiced understanding

## 2021-02-03 ENCOUNTER — TRANSCRIBE ORDERS (OUTPATIENT)
Dept: URGENT CARE | Facility: CLINIC | Age: 76
End: 2021-02-03

## 2021-02-03 ENCOUNTER — LAB (OUTPATIENT)
Dept: LAB | Facility: CLINIC | Age: 76
End: 2021-02-03
Payer: COMMERCIAL

## 2021-02-03 DIAGNOSIS — C90.00 IGG MULTIPLE MYELOMA (HCC): ICD-10-CM

## 2021-02-03 RX ORDER — DIPHENHYDRAMINE HCL 25 MG
50 TABLET ORAL ONCE
Status: COMPLETED | OUTPATIENT
Start: 2021-02-05 | End: 2021-02-05

## 2021-02-03 RX ORDER — SODIUM CHLORIDE 9 MG/ML
20 INJECTION, SOLUTION INTRAVENOUS CONTINUOUS
Status: DISCONTINUED | OUTPATIENT
Start: 2021-02-05 | End: 2021-02-08 | Stop reason: HOSPADM

## 2021-02-03 RX ORDER — FAMOTIDINE 20 MG/1
20 TABLET, FILM COATED ORAL ONCE
Status: COMPLETED | OUTPATIENT
Start: 2021-02-05 | End: 2021-02-05

## 2021-02-03 RX ORDER — ACETAMINOPHEN 325 MG/1
650 TABLET ORAL ONCE
Status: COMPLETED | OUTPATIENT
Start: 2021-02-05 | End: 2021-02-05

## 2021-02-05 ENCOUNTER — HOSPITAL ENCOUNTER (OUTPATIENT)
Dept: INFUSION CENTER | Facility: CLINIC | Age: 76
Discharge: HOME | End: 2021-02-05
Payer: COMMERCIAL

## 2021-02-05 VITALS
SYSTOLIC BLOOD PRESSURE: 112 MMHG | WEIGHT: 189.6 LBS | HEART RATE: 63 BPM | DIASTOLIC BLOOD PRESSURE: 70 MMHG | BODY MASS INDEX: 28.73 KG/M2 | HEIGHT: 68 IN | TEMPERATURE: 97.3 F

## 2021-02-05 PROCEDURE — 96415 CHEMO IV INFUSION ADDL HR: CPT

## 2021-02-05 PROCEDURE — 96377 APPLICATON ON-BODY INJECTOR: CPT

## 2021-02-05 PROCEDURE — 96413 CHEMO IV INFUSION 1 HR: CPT

## 2021-02-05 PROCEDURE — 96367 TX/PROPH/DG ADDL SEQ IV INF: CPT

## 2021-02-05 PROCEDURE — 96417 CHEMO IV INFUS EACH ADDL SEQ: CPT

## 2021-02-05 RX ADMIN — CYCLOPHOSPHAMIDE 400 MG: 500 INJECTION, POWDER, FOR SOLUTION INTRAVENOUS; ORAL at 10:39

## 2021-02-05 RX ADMIN — DIPHENHYDRAMINE HCL 50 MG: 25 TABLET ORAL at 08:33

## 2021-02-05 RX ADMIN — SODIUM CHLORIDE 800 MG: 0.9 INJECTION, SOLUTION INTRAVENOUS at 09:04

## 2021-02-05 RX ADMIN — PEGFILGRASTIM 6 MG: KIT SUBCUTANEOUS at 11:48

## 2021-02-05 RX ADMIN — ACETAMINOPHEN 650 MG: 325 TABLET, FILM COATED ORAL at 08:33

## 2021-02-05 RX ADMIN — DEXAMETHASONE SODIUM PHOSPHATE: 10 INJECTION, SOLUTION INTRAMUSCULAR; INTRAVENOUS at 08:36

## 2021-02-05 RX ADMIN — SODIUM CHLORIDE 20 ML/HR: 0.9 INJECTION, SOLUTION INTRAVENOUS at 08:36

## 2021-02-05 RX ADMIN — FAMOTIDINE 20 MG: 20 TABLET, FILM COATED ORAL at 08:33

## 2021-02-05 NOTE — PLAN OF CARE
Problem: Potential for Falls  Goal: Patient will remain free of falls  Description: INTERVENTIONS:  - Assess patient frequently for physical needs  -  Identify cognitive and physical deficits and behaviors that affect risk of falls    -  [place] fall precautions as indicated by assessment   - Educate patient/family on patient safety including physical limitations  - Instruct patient to call for assistance with activity based on assessment  - Modify environment to reduce risk of injury  - Consider OT/PT consult to assist with strengthening/mobility  Outcome: Progressing

## 2021-02-05 NOTE — PROGRESS NOTES
Patient here for Isatuximab and Cytoxan infusions  Tolerated well  Received Neulasta Onpro applied to right arm  Time of delivery and end times reviewed with patient, patient expressed understanding  Denied AVS, aware of follow-up appointments

## 2021-02-17 ENCOUNTER — LAB (OUTPATIENT)
Dept: LAB | Facility: CLINIC | Age: 76
End: 2021-02-17
Payer: COMMERCIAL

## 2021-02-17 DIAGNOSIS — E85.9 MYELOMA ASSOCIATED AMYLOIDOSIS (HCC): Primary | ICD-10-CM

## 2021-02-17 DIAGNOSIS — E78.5 HYPERLIPIDEMIA, UNSPECIFIED HYPERLIPIDEMIA TYPE: ICD-10-CM

## 2021-02-17 DIAGNOSIS — C90.00 MYELOMA ASSOCIATED AMYLOIDOSIS (HCC): Primary | ICD-10-CM

## 2021-02-17 DIAGNOSIS — C90.00 MULTIPLE MYELOMA, REMISSION STATUS UNSPECIFIED (HCC): ICD-10-CM

## 2021-02-17 RX ORDER — ATORVASTATIN CALCIUM 20 MG/1
20 TABLET, FILM COATED ORAL DAILY
Qty: 90 TABLET | Refills: 0 | Status: SHIPPED | OUTPATIENT
Start: 2021-02-17 | End: 2021-06-01 | Stop reason: SDUPTHER

## 2021-02-19 ENCOUNTER — HOSPITAL ENCOUNTER (OUTPATIENT)
Dept: INFUSION CENTER | Facility: CLINIC | Age: 76
Discharge: HOME | End: 2021-02-19
Payer: COMMERCIAL

## 2021-02-19 VITALS
TEMPERATURE: 98.7 F | WEIGHT: 185.63 LBS | BODY MASS INDEX: 28.23 KG/M2 | SYSTOLIC BLOOD PRESSURE: 112 MMHG | RESPIRATION RATE: 18 BRPM | DIASTOLIC BLOOD PRESSURE: 71 MMHG | HEART RATE: 76 BPM

## 2021-02-19 DIAGNOSIS — D80.1 HYPOGAMMAGLOBULINEMIA (HCC): Primary | ICD-10-CM

## 2021-02-19 DIAGNOSIS — C90.00 IGG MULTIPLE MYELOMA (HCC): ICD-10-CM

## 2021-02-19 PROCEDURE — 96377 APPLICATON ON-BODY INJECTOR: CPT

## 2021-02-19 PROCEDURE — 96413 CHEMO IV INFUSION 1 HR: CPT

## 2021-02-19 PROCEDURE — 96366 THER/PROPH/DIAG IV INF ADDON: CPT

## 2021-02-19 PROCEDURE — 96367 TX/PROPH/DG ADDL SEQ IV INF: CPT

## 2021-02-19 RX ORDER — ACETAMINOPHEN 325 MG/1
650 TABLET ORAL ONCE
Status: COMPLETED | OUTPATIENT
Start: 2021-02-19 | End: 2021-02-19

## 2021-02-19 RX ORDER — FAMOTIDINE 20 MG/1
20 TABLET, FILM COATED ORAL ONCE
Status: COMPLETED | OUTPATIENT
Start: 2021-02-19 | End: 2021-02-19

## 2021-02-19 RX ORDER — DIPHENHYDRAMINE HCL 25 MG
50 TABLET ORAL ONCE
Status: DISCONTINUED | OUTPATIENT
Start: 2021-02-19 | End: 2021-02-22 | Stop reason: HOSPADM

## 2021-02-19 RX ADMIN — ZOLEDRONIC ACID 3.3 MG: 4 INJECTION, SOLUTION, CONCENTRATE INTRAVENOUS at 10:33

## 2021-02-19 RX ADMIN — FAMOTIDINE 20 MG: 20 TABLET, FILM COATED ORAL at 10:37

## 2021-02-19 RX ADMIN — CYCLOPHOSPHAMIDE 400 MG: 500 INJECTION, POWDER, FOR SOLUTION INTRAVENOUS; ORAL at 11:27

## 2021-02-19 RX ADMIN — PEGFILGRASTIM 6 MG: KIT SUBCUTANEOUS at 12:30

## 2021-02-19 RX ADMIN — DEXAMETHASONE SODIUM PHOSPHATE: 10 INJECTION, SOLUTION INTRAMUSCULAR; INTRAVENOUS at 11:04

## 2021-02-19 RX ADMIN — Medication 30 G: at 08:32

## 2021-02-19 RX ADMIN — ACETAMINOPHEN 650 MG: 325 TABLET, FILM COATED ORAL at 10:37

## 2021-02-19 NOTE — PROGRESS NOTES
Patient tolerated IVIG, Zometa, and Cytoxan infusions well  Neulasta Onpro placed on right arm  [facility] held today due to unavailability of medication due to the weather impacting delays in deliveries  Pt scheduled to return on Monday for Sarclisa infusion   Declined AVS

## 2021-02-19 NOTE — PLAN OF CARE
Problem: Potential for Falls  Goal: Patient will remain free of falls  Description: INTERVENTIONS:  - Assess patient frequently for physical needs  -  Identify cognitive and physical deficits and behaviors that affect risk of falls    -  [place] fall precautions as indicated by assessment   - Educate patient/family on patient safety including physical limitations  - Instruct patient to call for assistance with activity based on assessment  - Modify environment to reduce risk of injury  - Consider OT/PT consult to assist with strengthening/mobility  Outcome: Progressing

## 2021-02-22 ENCOUNTER — HOSPITAL ENCOUNTER (OUTPATIENT)
Dept: INFUSION CENTER | Facility: CLINIC | Age: 76
End: 2021-02-22

## 2021-02-24 ENCOUNTER — TELEPHONE (OUTPATIENT)
Dept: HEMATOLOGY ONCOLOGY | Facility: CLINIC | Age: 76
End: 2021-02-24

## 2021-02-24 ENCOUNTER — IMMUNIZATIONS (OUTPATIENT)
Dept: FAMILY MEDICINE CLINIC | Facility: HOSPITAL | Age: 76
End: 2021-02-24

## 2021-02-24 DIAGNOSIS — Z23 ENCOUNTER FOR IMMUNIZATION: Primary | ICD-10-CM

## 2021-02-24 PROCEDURE — 0012A SARS-COV-2 / COVID-19 MRNA VACCINE (MODERNA) 100 MCG: CPT

## 2021-02-24 PROCEDURE — 91301 SARS-COV-2 / COVID-19 MRNA VACCINE (MODERNA) 100 MCG: CPT

## 2021-02-24 NOTE — TELEPHONE ENCOUNTER
Spoke with wife and let her know that Dr Chanel Helm will be renewing hi MMJ certification  She voiced understanding

## 2021-02-24 NOTE — TELEPHONE ENCOUNTER
Patient's wife, Debbie Rosa, is calling in to inform that her 's medical mariajuana card expires today and will need a renewal  Debbie Rosa can be reached back at [phone]

## 2021-03-02 DIAGNOSIS — D80.1 HYPOGAMMAGLOBULINEMIA (HCC): Primary | ICD-10-CM

## 2021-03-02 DIAGNOSIS — C90.00 IGG MULTIPLE MYELOMA (HCC): ICD-10-CM

## 2021-03-03 ENCOUNTER — TRANSCRIBE ORDERS (OUTPATIENT)
Dept: URGENT CARE | Facility: CLINIC | Age: 76
End: 2021-03-03

## 2021-03-03 ENCOUNTER — LAB (OUTPATIENT)
Dept: LAB | Facility: CLINIC | Age: 76
End: 2021-03-03
Payer: COMMERCIAL

## 2021-03-03 DIAGNOSIS — C90.00 MULTIPLE MYELOMA NOT HAVING ACHIEVED REMISSION (HCC): Primary | ICD-10-CM

## 2021-03-03 DIAGNOSIS — C90.00 IGG MULTIPLE MYELOMA (HCC): ICD-10-CM

## 2021-03-03 DIAGNOSIS — C90.00 MULTIPLE MYELOMA NOT HAVING ACHIEVED REMISSION (HCC): ICD-10-CM

## 2021-03-03 DIAGNOSIS — D80.1 HYPOGAMMAGLOBULINEMIA (HCC): Primary | ICD-10-CM

## 2021-03-03 LAB
ALBUMIN SERPL BCP-MCNC: 3.5 G/DL (ref 3.5–5)
ALP SERPL-CCNC: 106 U/L (ref 46–116)
ALT SERPL W P-5'-P-CCNC: 20 U/L (ref 12–78)
ANION GAP SERPL CALCULATED.3IONS-SCNC: 11 MMOL/L (ref 4–13)
AST SERPL W P-5'-P-CCNC: 15 U/L (ref 5–45)
BASOPHILS # BLD AUTO: 0.08 THOUSANDS/ΜL (ref 0–0.1)
BASOPHILS NFR BLD AUTO: 2 % (ref 0–1)
BILIRUB SERPL-MCNC: 1.12 MG/DL (ref 0.2–1)
BUN SERPL-MCNC: 19 MG/DL (ref 5–25)
CALCIUM SERPL-MCNC: 9.4 MG/DL (ref 8.3–10.1)
CHLORIDE SERPL-SCNC: 100 MMOL/L (ref 100–108)
CO2 SERPL-SCNC: 26 MMOL/L (ref 21–32)
CREAT SERPL-MCNC: 1.71 MG/DL (ref 0.6–1.3)
EOSINOPHIL # BLD AUTO: 0.49 THOUSAND/ΜL (ref 0–0.61)
EOSINOPHIL NFR BLD AUTO: 10 % (ref 0–6)
ERYTHROCYTE [DISTWIDTH] IN BLOOD BY AUTOMATED COUNT: 14.4 % (ref 11.6–15.1)
GFR SERPL CREATININE-BSD FRML MDRD: 39 ML/MIN/1.73SQ M
GLUCOSE P FAST SERPL-MCNC: 93 MG/DL (ref 65–99)
HCT VFR BLD AUTO: 38.4 % (ref 36.5–49.3)
HGB BLD-MCNC: 12.3 G/DL (ref 12–17)
IMM GRANULOCYTES # BLD AUTO: 0.07 THOUSAND/UL (ref 0–0.2)
IMM GRANULOCYTES NFR BLD AUTO: 1 % (ref 0–2)
LYMPHOCYTES # BLD AUTO: 0.29 THOUSANDS/ΜL (ref 0.6–4.47)
LYMPHOCYTES NFR BLD AUTO: 6 % (ref 14–44)
MCH RBC QN AUTO: 33.1 PG (ref 26.8–34.3)
MCHC RBC AUTO-ENTMCNC: 32 G/DL (ref 31.4–37.4)
MCV RBC AUTO: 103 FL (ref 82–98)
MONOCYTES # BLD AUTO: 1.06 THOUSAND/ΜL (ref 0.17–1.22)
MONOCYTES NFR BLD AUTO: 21 % (ref 4–12)
NEUTROPHILS # BLD AUTO: 2.97 THOUSANDS/ΜL (ref 1.85–7.62)
NEUTS SEG NFR BLD AUTO: 60 % (ref 43–75)
NRBC BLD AUTO-RTO: 0 /100 WBCS
PLATELET # BLD AUTO: 131 THOUSANDS/UL (ref 149–390)
PMV BLD AUTO: 11.6 FL (ref 8.9–12.7)
POTASSIUM SERPL-SCNC: 3.9 MMOL/L (ref 3.5–5.3)
PROT SERPL-MCNC: 8 G/DL (ref 6.4–8.2)
RBC # BLD AUTO: 3.72 MILLION/UL (ref 3.88–5.62)
SODIUM SERPL-SCNC: 137 MMOL/L (ref 136–145)
WBC # BLD AUTO: 4.96 THOUSAND/UL (ref 4.31–10.16)

## 2021-03-03 PROCEDURE — 36415 COLL VENOUS BLD VENIPUNCTURE: CPT

## 2021-03-03 PROCEDURE — 80053 COMPREHEN METABOLIC PANEL: CPT

## 2021-03-03 PROCEDURE — 85025 COMPLETE CBC W/AUTO DIFF WBC: CPT

## 2021-03-04 RX ORDER — FAMOTIDINE 20 MG/1
20 TABLET, FILM COATED ORAL ONCE
Status: COMPLETED | OUTPATIENT
Start: 2021-03-05 | End: 2021-03-05

## 2021-03-04 RX ORDER — SODIUM CHLORIDE 9 MG/ML
20 INJECTION, SOLUTION INTRAVENOUS CONTINUOUS
Status: DISCONTINUED | OUTPATIENT
Start: 2021-03-05 | End: 2021-03-08 | Stop reason: HOSPADM

## 2021-03-04 RX ORDER — ACETAMINOPHEN 325 MG/1
650 TABLET ORAL ONCE
Status: COMPLETED | OUTPATIENT
Start: 2021-03-05 | End: 2021-03-05

## 2021-03-04 RX ORDER — DIPHENHYDRAMINE HCL 25 MG
25 TABLET ORAL ONCE
Status: COMPLETED | OUTPATIENT
Start: 2021-03-05 | End: 2021-03-05

## 2021-03-05 ENCOUNTER — TELEPHONE (OUTPATIENT)
Dept: HEMATOLOGY ONCOLOGY | Facility: CLINIC | Age: 76
End: 2021-03-05

## 2021-03-05 ENCOUNTER — HOSPITAL ENCOUNTER (OUTPATIENT)
Dept: INFUSION CENTER | Facility: CLINIC | Age: 76
Discharge: HOME | End: 2021-03-05
Payer: COMMERCIAL

## 2021-03-05 VITALS
TEMPERATURE: 97.8 F | WEIGHT: 180.78 LBS | RESPIRATION RATE: 18 BRPM | DIASTOLIC BLOOD PRESSURE: 68 MMHG | HEART RATE: 64 BPM | SYSTOLIC BLOOD PRESSURE: 139 MMHG | BODY MASS INDEX: 27.49 KG/M2

## 2021-03-05 PROCEDURE — 96413 CHEMO IV INFUSION 1 HR: CPT

## 2021-03-05 PROCEDURE — 96417 CHEMO IV INFUS EACH ADDL SEQ: CPT

## 2021-03-05 PROCEDURE — 96415 CHEMO IV INFUSION ADDL HR: CPT

## 2021-03-05 PROCEDURE — 96367 TX/PROPH/DG ADDL SEQ IV INF: CPT

## 2021-03-05 PROCEDURE — 96377 APPLICATON ON-BODY INJECTOR: CPT

## 2021-03-05 RX ADMIN — PEGFILGRASTIM 6 MG: KIT SUBCUTANEOUS at 12:11

## 2021-03-05 RX ADMIN — DIPHENHYDRAMINE HCL 25 MG: 25 TABLET ORAL at 08:22

## 2021-03-05 RX ADMIN — ACETAMINOPHEN 650 MG: 325 TABLET, FILM COATED ORAL at 08:22

## 2021-03-05 RX ADMIN — SODIUM CHLORIDE 20 ML/HR: 0.9 INJECTION, SOLUTION INTRAVENOUS at 08:35

## 2021-03-05 RX ADMIN — DEXAMETHASONE SODIUM PHOSPHATE: 10 INJECTION, SOLUTION INTRAMUSCULAR; INTRAVENOUS at 08:36

## 2021-03-05 RX ADMIN — CYCLOPHOSPHAMIDE 400 MG: 500 INJECTION, POWDER, FOR SOLUTION INTRAVENOUS; ORAL at 10:58

## 2021-03-05 RX ADMIN — FAMOTIDINE 20 MG: 20 TABLET, FILM COATED ORAL at 08:22

## 2021-03-05 RX ADMIN — SODIUM CHLORIDE 900 MG: 0.9 INJECTION, SOLUTION INTRAVENOUS at 09:15

## 2021-03-05 NOTE — TELEPHONE ENCOUNTER
Received a message from [facility] regarding patient's elevated creatinine which is trending up from 1 53 to 1 71  Per Dr Lynne Valdez, encourage PO fluids  Notified Sammy Lazo RN at [facility]

## 2021-03-05 NOTE — PROGRESS NOTES
Patient tolerated chemotherapy infusion well with no complications  Pt is aware of all future appointments   Declined AVS

## 2021-03-05 NOTE — PLAN OF CARE
Problem: Potential for Falls  Goal: Patient will remain free of falls  Description: INTERVENTIONS:  - Assess patient frequently for physical needs  -  Identify cognitive and physical deficits and behaviors that affect risk of falls    -  [place] fall precautions as indicated by assessment   - Educate patient/family on patient safety including physical limitations  - Instruct patient to call for assistance with activity based on assessment  - Modify environment to reduce risk of injury  - Consider OT/PT consult to assist with strengthening/mobility  Outcome: Progressing

## 2021-03-11 ENCOUNTER — OFFICE VISIT (OUTPATIENT)
Dept: HEMATOLOGY ONCOLOGY | Facility: CLINIC | Age: 76
End: 2021-03-11
Payer: COMMERCIAL

## 2021-03-11 VITALS
HEIGHT: 68 IN | RESPIRATION RATE: 14 BRPM | DIASTOLIC BLOOD PRESSURE: 80 MMHG | OXYGEN SATURATION: 99 % | TEMPERATURE: 97.7 F | BODY MASS INDEX: 27.74 KG/M2 | HEART RATE: 67 BPM | SYSTOLIC BLOOD PRESSURE: 128 MMHG | WEIGHT: 183 LBS

## 2021-03-11 DIAGNOSIS — C90.00 MULTIPLE MYELOMA NOT HAVING ACHIEVED REMISSION (HCC): Primary | ICD-10-CM

## 2021-03-11 PROCEDURE — 1160F RVW MEDS BY RX/DR IN RCRD: CPT | Performed by: INTERNAL MEDICINE

## 2021-03-11 PROCEDURE — 1036F TOBACCO NON-USER: CPT | Performed by: INTERNAL MEDICINE

## 2021-03-11 PROCEDURE — 3008F BODY MASS INDEX DOCD: CPT | Performed by: INTERNAL MEDICINE

## 2021-03-11 PROCEDURE — 99214 OFFICE O/P EST MOD 30 MIN: CPT | Performed by: INTERNAL MEDICINE

## 2021-03-11 RX ORDER — MULTIVIT WITH MINERALS/LUTEIN
1000 TABLET ORAL DAILY
COMMUNITY

## 2021-03-11 NOTE — PROGRESS NOTES
[facility] [place]  [address] [address]  [address] [place] [id number], [phone]  03/11/21    Discussion:   In summary, this is a 81-year-old male history of Status post therapies as outlined  At most recent evaluation in [facility] he had increase in monoclonal protein, previously 0 7, currently 1 0  Lambda kappa ratio has also increased, 13 7  CBC is essentially normal   CMP shows slight increase in creatinine, 1 7  His disease is trending on favorably  He has an appointment at [facility] next week to evaluate other treatment options/clinical trials  I agree that that is reasonable  In the interim we will continue his treatment until in indication to change  Fortunately, performance status remains good  He does have some days when he is quite fatigued  He is being better about hydration although he admits he could be more consistent with this  We reviewed the above considerations at length  We reviewed some issues related to his current situation, treatment options, overall views on disease control, survival/mortality, etcetera  I discussed the above with the patient  The patient and his wife voiced understanding and agreement   ______________________________________________________________________    Chief Complaint   Patient presents with    Follow-up       HPI:  Oncology History   IgG multiple myeloma ([facility] [address] )   9/2015 Initial Diagnosis    Found to have Hbg of 6 with SOB, creatinine 1 8 and normal calcium with albumin of 2 1  Additonial blood work showed elevated protien level (12 1g/dL)  9/29/2015 Biopsy    Bone marrow biopsy demonstrated approximately 80% plasma cells with some immature forms noted    These studies showed 13q14 deletion, +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, 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)       10/14/2015 - 11/11/2015 Chemotherapy    Velcade 1 3 mg/m2 Days 1,8,15,22  Cytoxan 300 mg/m2  PO Days 1, 8,15, 22  Dexamethasone 40 mg PO Days 1,8,15, 22  Zometa 4 mg IV Day 1  Cycle length = 28 days    Completed 2 cycles  Day one of cycle 2 was on 11/11/15      12/2015 - 2/2016 Chemotherapy    VDT-PACE x 2 cycles   (completed at [facility] in [place], [address] [place], [place] [address])     2/2016 Transplant    Melphalane 200 mg/m2 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  MRD +     8/2016 - 1/26/2018 Chemotherapy    Maintenance therapy:  Carfilzomib, orginally dosed 27 mg weekly then increased to 45 mg weekly after MRD reactivitation  Revlimid  Dexamethasone      2/2018 Biopsy    Bone marrow demonstrated positive minimal residual disease       2/23/2018 -  Chemotherapy    Daratumumab 16mg/m2 IV Day 1  Pomalyst 4 mg p o  daily 1-21  Dexamethasone 4 mg PO Days 2, 3  Dexamethasone 12 mg PODays 8, 15, 22  Cycle length = 28 days     4/11/2019 - 8/29/2019 Chemotherapy    methylPREDNISolone sodium succinate (Solu-MEDROL) 100 mg in sodium chloride 0 9 % 250 mL IVPB, 100 mg, Intravenous, Once, 5 of 12 cycles  Administration: 100 mg (6/7/2019), 100 mg (7/5/2019), 100 mg (8/2/2019)     8/30/2019 - 11/8/2019 Chemotherapy    cyclophosphamide (CYTOXAN) 1,000 mg in sodium chloride 0 9 % 250 mL IVPB, 990 mg (66 7 % of original dose 750 mg/m2), Intravenous, Once, 2 of 3 cycles  Dose modification: 500 mg/m2 (original dose 750 mg/m2, Cycle 1, Reason: Other (See Comments)), 250 mg/m2 (original dose 750 mg/m2, Cycle 4, Reason: Treatment Parameters Not Met)  Administration: 1,000 mg (8/30/2019), 1,000 mg (9/13/2019), 1,000 mg (9/27/2019), 500 mg (10/11/2019)  bortezomib (VELCADE) subcutaneous injection 2 6 mg, 1 3 mg/m2 = 2 6 mg (86 7 % of original dose 1 5 mg/m2), Subcutaneous, Once, 3 of 4 cycles  Dose modification: 1 3 mg/m2 (original dose 1 5 mg/m2, Cycle 1, Reason: Other (See Comments))  Administration: 2 6 mg (8/30/2019), 2 6 mg (9/13/2019), 2 6 mg (10/25/2019), 2 6 mg (11/8/2019), 2 6 mg (9/27/2019), 2 6 mg (11/1/2019), 2 6 mg (9/20/2019), 2 6 mg (10/4/2019), 2 6 mg (10/11/2019), 2 6 mg (10/18/2019)     12/6/2019 - 6/19/2020 Chemotherapy    pegfilgrastim (NEULASTA ONPRO) subcutaneous injection kit 6 mg, 6 mg, Subcutaneous, Once, 5 of 9 cycles  Administration: 6 mg (2/28/2020), 6 mg (3/13/2020), 6 mg (3/27/2020), 6 mg (4/10/2020), 6 mg (4/24/2020), 6 mg (5/8/2020), 6 mg (6/5/2020), 6 mg (5/22/2020), 6 mg (6/19/2020)  cyclophosphamide (CYTOXAN) 784 mg in sodium chloride 0 9 % 250 mL IVPB, 400 mg/m2 = 784 mg, Intravenous, Once, 5 of 9 cycles  Dose modification: 400 mg/m2 (original dose 750 mg/m2, Cycle 7, Reason: Other (See Comments))  Administration: 784 mg (2/28/2020), 784 mg (3/13/2020), 784 mg (3/27/2020), 784 mg (4/10/2020), 784 mg (4/24/2020), 784 mg (5/8/2020), 784 mg (5/22/2020), 784 mg (6/19/2020), 784 mg (6/5/2020)  methylPREDNISolone sodium succinate (Solu-MEDROL) 100 mg in sodium chloride 0 9 % 250 mL IVPB, 100 mg, Intravenous, Once, 8 of 12 cycles  Administration: 100 mg (12/6/2019), 100 mg (12/13/2019), 100 mg (12/20/2019), 100 mg (1/3/2020), 100 mg (1/10/2020), 100 mg (1/17/2020), 100 mg (1/31/2020), 100 mg (2/14/2020), 100 mg (5/22/2020), 100 mg (12/27/2019), 100 mg (1/24/2020), 100 mg (2/28/2020), 100 mg (3/13/2020), 100 mg (3/27/2020), 100 mg (4/10/2020), 100 mg (4/24/2020), 100 mg (5/8/2020), 100 mg (6/19/2020)     7/2/2020 -  Chemotherapy           Interval History:  Clinically stable  ECOG-  1 - Symptomatic but completely ambulatory    Review of Systems   Constitutional: Negative for chills and fever  HENT: Negative for nosebleeds  Eyes: Negative for discharge  Respiratory: Negative for cough and shortness of breath  Cardiovascular: Negative for chest pain  Gastrointestinal: Negative for abdominal pain, constipation and diarrhea  Endocrine: Negative for polydipsia  Genitourinary: Negative for hematuria  Musculoskeletal: Negative for arthralgias  Skin: Negative for color change  Allergic/Immunologic: Negative for immunocompromised state  Neurological: Negative for dizziness and headaches  Hematological: Negative for adenopathy  Psychiatric/Behavioral: Negative for agitation  Past Medical History:   Diagnosis Date    History of autologous stem cell transplant ([facility] [address] )     Hypertension     History of HTN-stable since weight loss    Insomnia     Multiple myeloma (HCC)      Patient Active Problem List   Diagnosis    IgG multiple myeloma (HCC)    Persistent atrial fibrillation (HCC)    Essential hypertension    Chronic ITP (idiopathic thrombocytopenia) (HCC)    Hyperlipidemia    Hypocalcemia    Hypothyroidism    Long term current use of systemic steroids    Other fatigue    Prophylactic measure    Hypogammaglobulinemia (HCC)    Leukopenia    Anemia due to stage 3 chronic kidney disease       Current Outpatient Medications:     acetaminophen (TYLENOL) 325 mg tablet, Take 650 mg by mouth every 6 (six) hours as needed for mild pain, Disp: , Rfl:     acyclovir (ZOVIRAX) 200 mg capsule, TAKE  (1)  CAPSULE  TWICE DAILY  , Disp: 60 capsule, Rfl: 5    Ascorbic Acid (vitamin C) 1000 MG tablet, Take 1,000 mg by mouth daily, Disp: , Rfl:     aspirin 81 MG tablet, Take 81 mg by mouth daily  , Disp: , Rfl:     atorvastatin (LIPITOR) 20 mg tablet, Take 1 tablet (20 mg total) by mouth daily, Disp: 90 tablet, Rfl: 0    Cholecalciferol (VITAMIN D-3 PO), Take 1,000 Units by mouth daily , Disp: , Rfl:     Docusate Sodium (COLACE PO), Take 1 tablet by mouth as needed  , Disp: , Rfl:     eltrombopag (PROMACTA) 25 mg tablet, Take 25 mg by mouth daily Administer on an empty stomach, 1 hour before or 2 hours after a meal  , Disp: , Rfl:     escitalopram (LEXAPRO) 20 mg tablet, Take 1 tablet (20 mg total) by mouth daily, Disp: 90 tablet, Rfl: 0    Glutamine POWD, by Does not apply route, Disp: , Rfl:     levofloxacin (LEVAQUIN) 250 mg tablet, Take 1 tablet (250 mg total) by mouth daily, Disp: 90 tablet, Rfl: 3    levothyroxine 50 mcg tablet, Take 1 tablet (50 mcg total) by mouth daily, Disp: 90 tablet, Rfl: 0    Multiple Vitamin (MULTIVITAMINS PO), Take 1 tablet by mouth daily  , Disp: , Rfl:     ondansetron (ZOFRAN) 4 mg tablet, Take 4 mg by mouth every 8 (eight) hours as needed for nausea or vomiting , Disp: , Rfl:     Pomalidomide (Pomalyst) 4 MG CAPS, Take 4 mg by mouth Days 1-21 off 7 days, Disp: , Rfl:     LORazepam (ATIVAN) 0 5 mg tablet, Take 0 5 mg by mouth every 6 (six) hours as needed for anxiety  , Disp: , Rfl:   No Known Allergies  Past Surgical History:   Procedure Laterality Date    APPENDECTOMY      Last assessed: 9/25/15    ARTHROSCOPY KNEE Left     9/30/09    EYE SURGERY      Lasik    KNEE CARTILAGE SURGERY      LIMBAL STEM CELL TRANSPLANT      Patient had 2 in [place]-2/29/2016 and 4/13/2016     Social History     Objective:  Vitals:    03/11/21 0844   BP: 128/80   BP Location: Left arm   Patient Position: Sitting   Cuff Size: Adult   Pulse: 67   Resp: 14   Temp: 97 7 °F (36 5 °C)   TempSrc: Temporal   SpO2: 99%   Weight: 83 kg (183 lb)   Height: 5' 7 99" (1 727 m)     Physical Exam  Constitutional:       Appearance: He is well-developed  HENT:      Head: Normocephalic and atraumatic  Eyes:      Pupils: Pupils are equal, round, and reactive to light  Neck:      Musculoskeletal: Neck supple  Cardiovascular:      Rate and Rhythm: Normal rate and regular rhythm  Heart sounds: No murmur  Pulmonary:      Breath sounds: Normal breath sounds  No wheezing or rales  Abdominal:      Palpations: Abdomen is soft  Tenderness: There is no abdominal tenderness  Musculoskeletal: Normal range of motion  General: No tenderness  Lymphadenopathy:      Cervical: No cervical adenopathy  Skin:     Findings: No erythema or rash  Neurological:      Mental Status: He is alert and oriented to person, place, and time        Cranial Nerves: No cranial nerve deficit  Deep Tendon Reflexes: Reflexes are normal and symmetric  Psychiatric:         Behavior: Behavior normal            Labs: I personally reviewed the labs and imaging pertinent to this patient care

## 2021-03-18 ENCOUNTER — TELEPHONE (OUTPATIENT)
Dept: HEMATOLOGY ONCOLOGY | Facility: CLINIC | Age: 76
End: 2021-03-18

## 2021-03-18 DIAGNOSIS — C90.00 MULTIPLE MYELOMA NOT HAVING ACHIEVED REMISSION (HCC): Primary | ICD-10-CM

## 2021-03-18 DIAGNOSIS — C90.00 IGG MULTIPLE MYELOMA (HCC): Primary | ICD-10-CM

## 2021-03-18 NOTE — TELEPHONE ENCOUNTER
[facility] is calling to have a CBCD, CMP, magnesium, phosphorus, and uric acid ordered one time locally  Patient will be participating in a clinical trial at [facility]  Clinical:  TASQUINIMOD study drug  Patient is due to start on 3/26/21  Please verify with Dr Beth Nation, NP that it is ok to place lab orders

## 2021-03-18 NOTE — TELEPHONE ENCOUNTER
Call from wife  Patient had appt 3/16/2021 at [facility]  Patient will begin clinical trial involving tasquinimod  Dr Elan Rodriguez will be the supervising MD  Patient cannot have pomalyst, cytoxan or sarclisa  Patient will begin tasquinimod on 3/26 if screening tests are within range for trial   Patient has infusion center appt tomorrow for cytoxan, sarclisa IVIG and zometa  Patient will receive the IVIG and zometa but not the others  Will send to Dr Abhishek Kat RN to amend orders      Wife aware of plan

## 2021-03-18 NOTE — PROGRESS NOTES
TIME OUT - call received from Austin Danielle with Dr Pacheco Flank  Sarclisa and Cytoxan on hold until further notice  There are currently no active paper orders in patient's paper chart  Sorin Goncalves from pharmacy made aware

## 2021-03-18 NOTE — TELEPHONE ENCOUNTER
Labs to be obtained one week prior to 3/26/21  Spoke with Mrs Soy Hauser and reviewed that labs have been ordered and the time frame

## 2021-03-18 NOTE — TELEPHONE ENCOUNTER
Rcvd a message and confirmed with Robin Kelly that he will not be rcving the [place] and Cytoxan on 3/19/21  Called [facility], and spoke with Mariajose/RENE to confirm that the pt will only rcv IVIG and Zometa

## 2021-03-19 ENCOUNTER — HOSPITAL ENCOUNTER (OUTPATIENT)
Dept: INFUSION CENTER | Facility: CLINIC | Age: 76
Discharge: HOME | End: 2021-03-19
Payer: COMMERCIAL

## 2021-03-19 VITALS
HEIGHT: 68 IN | BODY MASS INDEX: 27.13 KG/M2 | HEART RATE: 72 BPM | DIASTOLIC BLOOD PRESSURE: 74 MMHG | TEMPERATURE: 98 F | SYSTOLIC BLOOD PRESSURE: 123 MMHG | WEIGHT: 179.01 LBS | RESPIRATION RATE: 18 BRPM

## 2021-03-19 DIAGNOSIS — C90.00 IGG MULTIPLE MYELOMA (HCC): Primary | ICD-10-CM

## 2021-03-19 DIAGNOSIS — D80.1 HYPOGAMMAGLOBULINEMIA (HCC): ICD-10-CM

## 2021-03-19 DIAGNOSIS — D80.1 HYPOGAMMAGLOBULINEMIA (HCC): Primary | ICD-10-CM

## 2021-03-19 PROCEDURE — 96365 THER/PROPH/DIAG IV INF INIT: CPT

## 2021-03-19 PROCEDURE — 96367 TX/PROPH/DG ADDL SEQ IV INF: CPT

## 2021-03-19 PROCEDURE — 96366 THER/PROPH/DIAG IV INF ADDON: CPT

## 2021-03-19 RX ORDER — SODIUM CHLORIDE 9 MG/ML
20 INJECTION, SOLUTION INTRAVENOUS ONCE
Status: CANCELLED
Start: 2021-04-16

## 2021-03-19 RX ORDER — SODIUM CHLORIDE 9 MG/ML
20 INJECTION, SOLUTION INTRAVENOUS ONCE
Status: CANCELLED
Start: 2021-03-19

## 2021-03-19 RX ORDER — SODIUM CHLORIDE 9 MG/ML
20 INJECTION, SOLUTION INTRAVENOUS ONCE
Status: COMPLETED | OUTPATIENT
Start: 2021-03-19 | End: 2021-03-19

## 2021-03-19 RX ADMIN — Medication 30 G: at 09:14

## 2021-03-19 RX ADMIN — ZOLEDRONIC ACID 3.3 MG: 4 INJECTION, SOLUTION, CONCENTRATE INTRAVENOUS at 08:34

## 2021-03-19 RX ADMIN — SODIUM CHLORIDE 20 ML/HR: 0.9 INJECTION, SOLUTION INTRAVENOUS at 08:34

## 2021-03-19 NOTE — PLAN OF CARE
Problem: Potential for Falls  Goal: Patient will remain free of falls  Description: INTERVENTIONS:  - Assess patient frequently for physical needs  -  Identify cognitive and physical deficits and behaviors that affect risk of falls    -  [place] fall precautions as indicated by assessment   - Educate patient/family on patient safety including physical limitations  - Instruct patient to call for assistance with activity based on assessment  - Modify environment to reduce risk of injury  - Consider OT/PT consult to assist with strengthening/mobility  Outcome: Progressing

## 2021-03-19 NOTE — PROGRESS NOTES
Patient tolerated IVIG and Zometa infusion well  Offers no complaints  Pt is aware of all future appointments   Declined AVS

## 2021-03-22 ENCOUNTER — APPOINTMENT (OUTPATIENT)
Dept: LAB | Facility: CLINIC | Age: 76
End: 2021-03-22
Payer: COMMERCIAL

## 2021-03-22 DIAGNOSIS — C90.00 MULTIPLE MYELOMA NOT HAVING ACHIEVED REMISSION (HCC): ICD-10-CM

## 2021-03-22 LAB
ALBUMIN SERPL BCP-MCNC: 3.4 G/DL (ref 3.5–5)
ALP SERPL-CCNC: 84 U/L (ref 46–116)
ALT SERPL W P-5'-P-CCNC: 22 U/L (ref 12–78)
ANION GAP SERPL CALCULATED.3IONS-SCNC: 4 MMOL/L (ref 4–13)
AST SERPL W P-5'-P-CCNC: 18 U/L (ref 5–45)
BASOPHILS # BLD AUTO: 0.1 THOUSANDS/ΜL (ref 0–0.1)
BASOPHILS NFR BLD AUTO: 2 % (ref 0–1)
BILIRUB SERPL-MCNC: 0.66 MG/DL (ref 0.2–1)
BUN SERPL-MCNC: 15 MG/DL (ref 5–25)
CALCIUM ALBUM COR SERPL-MCNC: 8.9 MG/DL (ref 8.3–10.1)
CALCIUM SERPL-MCNC: 8.4 MG/DL (ref 8.3–10.1)
CHLORIDE SERPL-SCNC: 106 MMOL/L (ref 100–108)
CO2 SERPL-SCNC: 26 MMOL/L (ref 21–32)
CREAT SERPL-MCNC: 1.65 MG/DL (ref 0.6–1.3)
EOSINOPHIL # BLD AUTO: 0.06 THOUSAND/ΜL (ref 0–0.61)
EOSINOPHIL NFR BLD AUTO: 1 % (ref 0–6)
ERYTHROCYTE [DISTWIDTH] IN BLOOD BY AUTOMATED COUNT: 15.9 % (ref 11.6–15.1)
GFR SERPL CREATININE-BSD FRML MDRD: 41 ML/MIN/1.73SQ M
GLUCOSE P FAST SERPL-MCNC: 85 MG/DL (ref 65–99)
HCT VFR BLD AUTO: 34.4 % (ref 36.5–49.3)
HGB BLD-MCNC: 10.9 G/DL (ref 12–17)
IMM GRANULOCYTES # BLD AUTO: 0.04 THOUSAND/UL (ref 0–0.2)
IMM GRANULOCYTES NFR BLD AUTO: 1 % (ref 0–2)
LYMPHOCYTES # BLD AUTO: 0.42 THOUSANDS/ΜL (ref 0.6–4.47)
LYMPHOCYTES NFR BLD AUTO: 10 % (ref 14–44)
MAGNESIUM SERPL-MCNC: 1.9 MG/DL (ref 1.6–2.6)
MCH RBC QN AUTO: 33.1 PG (ref 26.8–34.3)
MCHC RBC AUTO-ENTMCNC: 31.7 G/DL (ref 31.4–37.4)
MCV RBC AUTO: 105 FL (ref 82–98)
MONOCYTES # BLD AUTO: 0.89 THOUSAND/ΜL (ref 0.17–1.22)
MONOCYTES NFR BLD AUTO: 21 % (ref 4–12)
NEUTROPHILS # BLD AUTO: 2.66 THOUSANDS/ΜL (ref 1.85–7.62)
NEUTS SEG NFR BLD AUTO: 65 % (ref 43–75)
NRBC BLD AUTO-RTO: 0 /100 WBCS
PHOSPHATE SERPL-MCNC: 2.7 MG/DL (ref 2.3–4.1)
PLATELET # BLD AUTO: 146 THOUSANDS/UL (ref 149–390)
PMV BLD AUTO: 11.2 FL (ref 8.9–12.7)
POTASSIUM SERPL-SCNC: 3.8 MMOL/L (ref 3.5–5.3)
PROT SERPL-MCNC: 7.7 G/DL (ref 6.4–8.2)
RBC # BLD AUTO: 3.29 MILLION/UL (ref 3.88–5.62)
SODIUM SERPL-SCNC: 136 MMOL/L (ref 136–145)
URATE SERPL-MCNC: 3.5 MG/DL (ref 4.2–8)
WBC # BLD AUTO: 4.17 THOUSAND/UL (ref 4.31–10.16)

## 2021-03-22 PROCEDURE — 85025 COMPLETE CBC W/AUTO DIFF WBC: CPT

## 2021-03-22 PROCEDURE — 36415 COLL VENOUS BLD VENIPUNCTURE: CPT

## 2021-03-22 PROCEDURE — 84550 ASSAY OF BLOOD/URIC ACID: CPT

## 2021-03-22 PROCEDURE — 84100 ASSAY OF PHOSPHORUS: CPT

## 2021-03-22 PROCEDURE — 83735 ASSAY OF MAGNESIUM: CPT

## 2021-03-22 PROCEDURE — 80053 COMPREHEN METABOLIC PANEL: CPT

## 2021-03-30 DIAGNOSIS — F41.9 ANXIETY: ICD-10-CM

## 2021-03-30 DIAGNOSIS — E03.9 HYPOTHYROIDISM, UNSPECIFIED TYPE: ICD-10-CM

## 2021-03-30 RX ORDER — ESCITALOPRAM OXALATE 20 MG/1
20 TABLET ORAL DAILY
Qty: 90 TABLET | Refills: 0 | Status: SHIPPED | OUTPATIENT
Start: 2021-03-30 | End: 2021-06-30 | Stop reason: SDUPTHER

## 2021-03-30 RX ORDER — LEVOTHYROXINE SODIUM 0.05 MG/1
50 TABLET ORAL DAILY
Qty: 90 TABLET | Refills: 0 | Status: SHIPPED | OUTPATIENT
Start: 2021-03-30 | End: 2021-06-30 | Stop reason: SDUPTHER

## 2021-03-31 ENCOUNTER — TELEPHONE (OUTPATIENT)
Dept: HEMATOLOGY ONCOLOGY | Facility: CLINIC | Age: 76
End: 2021-03-31

## 2021-03-31 DIAGNOSIS — Z29.9 PROPHYLACTIC MEASURE: Primary | ICD-10-CM

## 2021-03-31 NOTE — TELEPHONE ENCOUNTER
Called [facility] to let them know he is ok to continue Levaquin per Dr Srinath Boss  EKG ordered for pt to get done so that way may check his QTc   Attempted to call pt to let him know  Left VM for him to call back hopeline

## 2021-03-31 NOTE — TELEPHONE ENCOUNTER
[facility] calling to notify Dr Yina Bess that there is a drug interaction with Lexapro and Levaquin  They are calling since patient was ordered to be on Levaquin long term  Interaction states Lexapro increases toxicity of Levaquin by QTC interval - monitor closely    Concurrent use will prolong QTC interval     Pharmacy wants to confirm with Dr Yina Bess that patient is ok to continue on Levaquin daily with Lexapro     Call back number for [address]

## 2021-04-16 ENCOUNTER — HOSPITAL ENCOUNTER (OUTPATIENT)
Dept: INFUSION CENTER | Facility: CLINIC | Age: 76
Discharge: HOME | End: 2021-04-16
Payer: COMMERCIAL

## 2021-04-16 VITALS
HEART RATE: 80 BPM | DIASTOLIC BLOOD PRESSURE: 57 MMHG | RESPIRATION RATE: 18 BRPM | WEIGHT: 178.57 LBS | BODY MASS INDEX: 27.16 KG/M2 | SYSTOLIC BLOOD PRESSURE: 114 MMHG | TEMPERATURE: 98.9 F

## 2021-04-16 DIAGNOSIS — D80.1 HYPOGAMMAGLOBULINEMIA (HCC): ICD-10-CM

## 2021-04-16 DIAGNOSIS — C90.00 IGG MULTIPLE MYELOMA (HCC): Primary | ICD-10-CM

## 2021-04-16 PROCEDURE — 96366 THER/PROPH/DIAG IV INF ADDON: CPT

## 2021-04-16 PROCEDURE — 96365 THER/PROPH/DIAG IV INF INIT: CPT

## 2021-04-16 PROCEDURE — 96367 TX/PROPH/DG ADDL SEQ IV INF: CPT

## 2021-04-16 RX ORDER — SODIUM CHLORIDE 9 MG/ML
20 INJECTION, SOLUTION INTRAVENOUS ONCE
Status: CANCELLED
Start: 2021-04-16

## 2021-04-16 RX ORDER — SODIUM CHLORIDE 9 MG/ML
20 INJECTION, SOLUTION INTRAVENOUS ONCE
Status: COMPLETED | OUTPATIENT
Start: 2021-04-16 | End: 2021-04-16

## 2021-04-16 RX ORDER — SODIUM CHLORIDE 9 MG/ML
20 INJECTION, SOLUTION INTRAVENOUS ONCE
Status: CANCELLED
Start: 2021-05-14

## 2021-04-16 RX ADMIN — Medication 30 G: at 08:15

## 2021-04-16 RX ADMIN — SODIUM CHLORIDE 20 ML/HR: 0.9 INJECTION, SOLUTION INTRAVENOUS at 08:15

## 2021-04-16 RX ADMIN — ZOLEDRONIC ACID 3 MG: 4 INJECTION, SOLUTION, CONCENTRATE INTRAVENOUS at 10:17

## 2021-04-16 NOTE — PROGRESS NOTES
Patient tolerated IVIG and Zometa infusions well with no complications  Pt is aware of all future appointments   Declined AVS

## 2021-04-16 NOTE — PLAN OF CARE
Problem: Potential for Falls  Goal: Patient will remain free of falls  Description: INTERVENTIONS:  - Assess patient frequently for physical needs  -  Identify cognitive and physical deficits and behaviors that affect risk of falls    -  [place] fall precautions as indicated by assessment   - Educate patient/family on patient safety including physical limitations  - Instruct patient to call for assistance with activity based on assessment  - Modify environment to reduce risk of injury  - Consider OT/PT consult to assist with strengthening/mobility  Outcome: Progressing

## 2021-04-28 ENCOUNTER — APPOINTMENT (OUTPATIENT)
Dept: LAB | Facility: CLINIC | Age: 76
End: 2021-04-28
Payer: COMMERCIAL

## 2021-04-28 ENCOUNTER — TRANSCRIBE ORDERS (OUTPATIENT)
Dept: URGENT CARE | Facility: CLINIC | Age: 76
End: 2021-04-28

## 2021-04-28 DIAGNOSIS — C90.00 MULTIPLE MYELOMA NOT HAVING ACHIEVED REMISSION (HCC): Primary | ICD-10-CM

## 2021-04-28 DIAGNOSIS — C90.00 MULTIPLE MYELOMA NOT HAVING ACHIEVED REMISSION (HCC): ICD-10-CM

## 2021-04-28 LAB
ALBUMIN SERPL BCP-MCNC: 2 G/DL (ref 3.5–5)
ALP SERPL-CCNC: 67 U/L (ref 46–116)
ALT SERPL W P-5'-P-CCNC: 18 U/L (ref 12–78)
ANION GAP SERPL CALCULATED.3IONS-SCNC: 6 MMOL/L (ref 4–13)
AST SERPL W P-5'-P-CCNC: 20 U/L (ref 5–45)
BILIRUB SERPL-MCNC: 0.37 MG/DL (ref 0.2–1)
BUN SERPL-MCNC: 26 MG/DL (ref 5–25)
CALCIUM ALBUM COR SERPL-MCNC: 10.8 MG/DL (ref 8.3–10.1)
CALCIUM SERPL-MCNC: 9.2 MG/DL (ref 8.3–10.1)
CHLORIDE SERPL-SCNC: 104 MMOL/L (ref 100–108)
CO2 SERPL-SCNC: 23 MMOL/L (ref 21–32)
CREAT SERPL-MCNC: 1.5 MG/DL (ref 0.6–1.3)
ERYTHROCYTE [DISTWIDTH] IN BLOOD BY AUTOMATED COUNT: 16.6 % (ref 11.6–15.1)
GFR SERPL CREATININE-BSD FRML MDRD: 46 ML/MIN/1.73SQ M
GLUCOSE P FAST SERPL-MCNC: 99 MG/DL (ref 65–99)
HCT VFR BLD AUTO: 23.7 % (ref 36.5–49.3)
HGB BLD-MCNC: 7.7 G/DL (ref 12–17)
MCH RBC QN AUTO: 32.5 PG (ref 26.8–34.3)
MCHC RBC AUTO-ENTMCNC: 32.5 G/DL (ref 31.4–37.4)
MCV RBC AUTO: 100 FL (ref 82–98)
PLATELET # BLD AUTO: 60 THOUSANDS/UL (ref 149–390)
PMV BLD AUTO: 11.8 FL (ref 8.9–12.7)
POTASSIUM SERPL-SCNC: 4.3 MMOL/L (ref 3.5–5.3)
PROT SERPL-MCNC: 8.2 G/DL (ref 6.4–8.2)
RBC # BLD AUTO: 2.37 MILLION/UL (ref 3.88–5.62)
SODIUM SERPL-SCNC: 133 MMOL/L (ref 136–145)
WBC # BLD AUTO: 11.3 THOUSAND/UL (ref 4.31–10.16)

## 2021-04-28 PROCEDURE — 80053 COMPREHEN METABOLIC PANEL: CPT

## 2021-04-28 PROCEDURE — 36415 COLL VENOUS BLD VENIPUNCTURE: CPT

## 2021-04-28 PROCEDURE — 85027 COMPLETE CBC AUTOMATED: CPT

## 2021-05-11 ENCOUNTER — DOCUMENTATION (OUTPATIENT)
Dept: HEMATOLOGY ONCOLOGY | Facility: CLINIC | Age: 76
End: 2021-05-11

## 2021-05-11 ENCOUNTER — TRANSCRIBE ORDERS (OUTPATIENT)
Dept: URGENT CARE | Facility: CLINIC | Age: 76
End: 2021-05-11

## 2021-05-11 ENCOUNTER — APPOINTMENT (OUTPATIENT)
Dept: LAB | Facility: CLINIC | Age: 76
End: 2021-05-11
Payer: COMMERCIAL

## 2021-05-11 ENCOUNTER — TELEPHONE (OUTPATIENT)
Dept: OTHER | Facility: OTHER | Age: 76
End: 2021-05-11

## 2021-05-11 DIAGNOSIS — C90.00 MULTIPLE MYELOMA NOT HAVING ACHIEVED REMISSION (HCC): ICD-10-CM

## 2021-05-11 DIAGNOSIS — C90.00 MULTIPLE MYELOMA NOT HAVING ACHIEVED REMISSION (HCC): Primary | ICD-10-CM

## 2021-05-11 LAB
ALBUMIN SERPL BCP-MCNC: 2.5 G/DL (ref 3.5–5)
ALP SERPL-CCNC: 73 U/L (ref 46–116)
ALT SERPL W P-5'-P-CCNC: 22 U/L (ref 12–78)
ANION GAP SERPL CALCULATED.3IONS-SCNC: 7 MMOL/L (ref 4–13)
AST SERPL W P-5'-P-CCNC: 40 U/L (ref 5–45)
BASOPHILS # BLD AUTO: 0 THOUSANDS/ΜL (ref 0–0.1)
BASOPHILS NFR BLD AUTO: 0 % (ref 0–1)
BILIRUB SERPL-MCNC: 0.53 MG/DL (ref 0.2–1)
BUN SERPL-MCNC: 15 MG/DL (ref 5–25)
CALCIUM ALBUM COR SERPL-MCNC: 10.8 MG/DL (ref 8.3–10.1)
CALCIUM SERPL-MCNC: 9.6 MG/DL (ref 8.3–10.1)
CHLORIDE SERPL-SCNC: 102 MMOL/L (ref 100–108)
CO2 SERPL-SCNC: 25 MMOL/L (ref 21–32)
CREAT SERPL-MCNC: 1.49 MG/DL (ref 0.6–1.3)
EOSINOPHIL # BLD AUTO: 0.02 THOUSAND/ΜL (ref 0–0.61)
EOSINOPHIL NFR BLD AUTO: 1 % (ref 0–6)
ERYTHROCYTE [DISTWIDTH] IN BLOOD BY AUTOMATED COUNT: 16.6 % (ref 11.6–15.1)
GFR SERPL CREATININE-BSD FRML MDRD: 46 ML/MIN/1.73SQ M
GLUCOSE P FAST SERPL-MCNC: 93 MG/DL (ref 65–99)
HCT VFR BLD AUTO: 28.5 % (ref 36.5–49.3)
HGB BLD-MCNC: 9.1 G/DL (ref 12–17)
IMM GRANULOCYTES # BLD AUTO: 0.02 THOUSAND/UL (ref 0–0.2)
IMM GRANULOCYTES NFR BLD AUTO: 1 % (ref 0–2)
LYMPHOCYTES # BLD AUTO: 0.25 THOUSANDS/ΜL (ref 0.6–4.47)
LYMPHOCYTES NFR BLD AUTO: 9 % (ref 14–44)
MCH RBC QN AUTO: 32 PG (ref 26.8–34.3)
MCHC RBC AUTO-ENTMCNC: 31.9 G/DL (ref 31.4–37.4)
MCV RBC AUTO: 100 FL (ref 82–98)
MONOCYTES # BLD AUTO: 0.44 THOUSAND/ΜL (ref 0.17–1.22)
MONOCYTES NFR BLD AUTO: 16 % (ref 4–12)
NEUTROPHILS # BLD AUTO: 2.09 THOUSANDS/ΜL (ref 1.85–7.62)
NEUTS SEG NFR BLD AUTO: 73 % (ref 43–75)
NRBC BLD AUTO-RTO: 0 /100 WBCS
PLATELET # BLD AUTO: 9 THOUSANDS/UL (ref 149–390)
POTASSIUM SERPL-SCNC: 3.9 MMOL/L (ref 3.5–5.3)
PROT SERPL-MCNC: 8.7 G/DL (ref 6.4–8.2)
RBC # BLD AUTO: 2.84 MILLION/UL (ref 3.88–5.62)
SODIUM SERPL-SCNC: 134 MMOL/L (ref 136–145)
WBC # BLD AUTO: 2.82 THOUSAND/UL (ref 4.31–10.16)

## 2021-05-11 PROCEDURE — 36415 COLL VENOUS BLD VENIPUNCTURE: CPT

## 2021-05-11 PROCEDURE — 80053 COMPREHEN METABOLIC PANEL: CPT

## 2021-05-11 PROCEDURE — 85025 COMPLETE CBC W/AUTO DIFF WBC: CPT

## 2021-05-12 ENCOUNTER — TELEPHONE (OUTPATIENT)
Dept: HEMATOLOGY ONCOLOGY | Facility: CLINIC | Age: 76
End: 2021-05-12

## 2021-05-12 DIAGNOSIS — D69.3 CHRONIC ITP (IDIOPATHIC THROMBOCYTOPENIA) (HCC): Primary | ICD-10-CM

## 2021-05-12 RX ORDER — SODIUM CHLORIDE 9 MG/ML
20 INJECTION, SOLUTION INTRAVENOUS ONCE
Status: CANCELLED | OUTPATIENT
Start: 2021-05-14

## 2021-05-12 NOTE — PROGRESS NOTES
Answering service called  Platelets are 7,171  Called and spoke with pt/wife  Recommend ER visit for transfusion  They were educated on risk of bleeding with such counts

## 2021-05-12 NOTE — TELEPHONE ENCOUNTER
Pt to rcv platelets on Friday for platelet count of 0,731  Called and confirmed with Mavis Armor appt date and time  Reviewed bleeding precautions  Discussed if pt experiences bleeding he should go to the ER

## 2021-05-12 NOTE — TELEPHONE ENCOUNTER
Lab Result: Platelet Count: 9   Date/Time Drawn: 5/11/21 at 0730   Ordering Provider: Dr Caitlyn Bauman Name: [facility]       The following critical/stat result was read back to the lab as stated above and [employer] to the on-call provider

## 2021-05-13 ENCOUNTER — OFFICE VISIT (OUTPATIENT)
Dept: HEMATOLOGY ONCOLOGY | Facility: CLINIC | Age: 76
End: 2021-05-13
Payer: COMMERCIAL

## 2021-05-13 VITALS
RESPIRATION RATE: 16 BRPM | TEMPERATURE: 97.6 F | HEIGHT: 68 IN | DIASTOLIC BLOOD PRESSURE: 68 MMHG | SYSTOLIC BLOOD PRESSURE: 118 MMHG | BODY MASS INDEX: 25.76 KG/M2 | OXYGEN SATURATION: 97 % | WEIGHT: 170 LBS | HEART RATE: 61 BPM

## 2021-05-13 DIAGNOSIS — D69.3 CHRONIC ITP (IDIOPATHIC THROMBOCYTOPENIA) (HCC): ICD-10-CM

## 2021-05-13 DIAGNOSIS — C90.00 MULTIPLE MYELOMA NOT HAVING ACHIEVED REMISSION (HCC): Primary | ICD-10-CM

## 2021-05-13 PROCEDURE — 3008F BODY MASS INDEX DOCD: CPT | Performed by: INTERNAL MEDICINE

## 2021-05-13 PROCEDURE — 99215 OFFICE O/P EST HI 40 MIN: CPT | Performed by: INTERNAL MEDICINE

## 2021-05-13 RX ORDER — SODIUM CHLORIDE 9 MG/ML
20 INJECTION, SOLUTION INTRAVENOUS ONCE
Status: CANCELLED | OUTPATIENT
Start: 2021-05-13

## 2021-05-13 NOTE — PROGRESS NOTES
[facility] [place]  [address] [address]  [address] [place] [address],1945, [id number]  05/13/21    Discussion:   In summary, this is a 72-year-old male history of multiple myeloma as outlined  He had been treated with Tasquinomod on trial   This was complicated by significant bone pains and ultimately discontinued for toxicity and efficacy failure  He was started on study with Balantumab mafodotin on May 4, 2021  So far he has experienced very brief chills on the day of infusion and moderate fatigue since that time  Additionally, CBC shows severe thrombocytopenia, 9  Hemoglobin is actually improving, previously 7 7, currently 9 1  Platelet transfusion is arranged for tomorrow  He has no bleeding symptoms at this time  He knows to call if these occur  He will continue on his current treatment  Promacta to be increased to 75 mg p o  Daily  CAR-T is anticipated to open at [facility] in a month or so  This could be used if his current treatment is ineffective or not tolerated  I discussed the above with the patient  The patient and his wife voiced understanding and agreement   ______________________________________________________________________    Chief Complaint   Patient presents with    Follow-up       HPI:  Oncology History   IgG multiple myeloma ([address] )   9/2015 Initial Diagnosis    Found to have Hbg of 6 with SOB, creatinine 1 8 and normal calcium with albumin of 2 1  Additonial blood work showed elevated protien level (12 1g/dL)  9/29/2015 Biopsy    Bone marrow biopsy demonstrated approximately 80% plasma cells with some immature forms noted    These studies showed 13q14 deletion, +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, 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)       10/14/2015 - 11/11/2015 Chemotherapy    Velcade 1 3 mg/m2 Days 1,8,15,22  Cytoxan 300 mg/m2  PO Days 1, 8,15, 22  Dexamethasone 40 mg PO Days 1,8,15, 22  Zometa 4 mg IV Day 1  Cycle length = 28 days    Completed 2 cycles  Day one of cycle 2 was on 11/11/15      12/2015 - 2/2016 Chemotherapy    VDT-PACE x 2 cycles   (completed at [facility] in [place], [address] [place], [place] [address])     2/2016 Transplant    Melphalane 200 mg/m2 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  MRD +     8/2016 - 1/26/2018 Chemotherapy    Maintenance therapy:  Carfilzomib, orginally dosed 27 mg weekly then increased to 45 mg weekly after MRD reactivitation  Revlimid  Dexamethasone      2/2018 Biopsy    Bone marrow demonstrated positive minimal residual disease       2/23/2018 -  Chemotherapy    Daratumumab 16mg/m2 IV Day 1  Pomalyst 4 mg p o  daily 1-21  Dexamethasone 4 mg PO Days 2, 3  Dexamethasone 12 mg PODays 8, 15, 22  Cycle length = 28 days     4/11/2019 - 8/29/2019 Chemotherapy    methylPREDNISolone sodium succinate (Solu-MEDROL) 100 mg in sodium chloride 0 9 % 250 mL IVPB, 100 mg, Intravenous, Once, 5 of 12 cycles  Administration: 100 mg (6/7/2019), 100 mg (7/5/2019), 100 mg (8/2/2019)     8/30/2019 - 11/8/2019 Chemotherapy    cyclophosphamide (CYTOXAN) 1,000 mg in sodium chloride 0 9 % 250 mL IVPB, 990 mg (66 7 % of original dose 750 mg/m2), Intravenous, Once, 2 of 3 cycles  Dose modification: 500 mg/m2 (original dose 750 mg/m2, Cycle 1, Reason: Other (See Comments)), 250 mg/m2 (original dose 750 mg/m2, Cycle 4, Reason: Treatment Parameters Not Met)  Administration: 1,000 mg (8/30/2019), 1,000 mg (9/13/2019), 1,000 mg (9/27/2019), 500 mg (10/11/2019)  bortezomib (VELCADE) subcutaneous injection 2 6 mg, 1 3 mg/m2 = 2 6 mg (86 7 % of original dose 1 5 mg/m2), Subcutaneous, Once, 3 of 4 cycles  Dose modification: 1 3 mg/m2 (original dose 1 5 mg/m2, Cycle 1, Reason: Other (See Comments))  Administration: 2 6 mg (8/30/2019), 2 6 mg (9/13/2019), 2 6 mg (10/25/2019), 2 6 mg (11/8/2019), 2 6 mg (9/27/2019), 2 6 mg (11/1/2019), 2 6 mg (9/20/2019), 2 6 mg (10/4/2019), 2 6 mg (10/11/2019), 2 6 mg (10/18/2019)     12/6/2019 - 6/19/2020 Chemotherapy pegfilgrastim (NEULASTA ONPRO) subcutaneous injection kit 6 mg, 6 mg, Subcutaneous, Once, 5 of 9 cycles  Administration: 6 mg (2/28/2020), 6 mg (3/13/2020), 6 mg (3/27/2020), 6 mg (4/10/2020), 6 mg (4/24/2020), 6 mg (5/8/2020), 6 mg (6/5/2020), 6 mg (5/22/2020), 6 mg (6/19/2020)  cyclophosphamide (CYTOXAN) 784 mg in sodium chloride 0 9 % 250 mL IVPB, 400 mg/m2 = 784 mg, Intravenous, Once, 5 of 9 cycles  Dose modification: 400 mg/m2 (original dose 750 mg/m2, Cycle 7, Reason: Other (See Comments))  Administration: 784 mg (2/28/2020), 784 mg (3/13/2020), 784 mg (3/27/2020), 784 mg (4/10/2020), 784 mg (4/24/2020), 784 mg (5/8/2020), 784 mg (5/22/2020), 784 mg (6/19/2020), 784 mg (6/5/2020)  methylPREDNISolone sodium succinate (Solu-MEDROL) 100 mg in sodium chloride 0 9 % 250 mL IVPB, 100 mg, Intravenous, Once, 8 of 12 cycles  Administration: 100 mg (12/6/2019), 100 mg (12/13/2019), 100 mg (12/20/2019), 100 mg (1/3/2020), 100 mg (1/10/2020), 100 mg (1/17/2020), 100 mg (1/31/2020), 100 mg (2/14/2020), 100 mg (5/22/2020), 100 mg (12/27/2019), 100 mg (1/24/2020), 100 mg (2/28/2020), 100 mg (3/13/2020), 100 mg (3/27/2020), 100 mg (4/10/2020), 100 mg (4/24/2020), 100 mg (5/8/2020), 100 mg (6/19/2020)     7/2/2020 -  Chemotherapy           Interval History:  Clinically stable  ECOG-  1 - Symptomatic but completely ambulatory    Review of Systems   Constitutional: Negative for chills and fever  HENT: Negative for nosebleeds  Eyes: Negative for discharge  Respiratory: Negative for cough and shortness of breath  Cardiovascular: Negative for chest pain  Gastrointestinal: Negative for abdominal pain, constipation and diarrhea  Endocrine: Negative for polydipsia  Genitourinary: Negative for hematuria  Musculoskeletal: Negative for arthralgias  Skin: Negative for color change  Allergic/Immunologic: Negative for immunocompromised state  Neurological: Negative for dizziness and headaches     Hematological: Negative for adenopathy  Psychiatric/Behavioral: Negative for agitation  Past Medical History:   Diagnosis Date    History of autologous stem cell transplant ([facility] [address] )     Hypertension     History of HTN-stable since weight loss    Insomnia     Multiple myeloma (HCC)      Patient Active Problem List   Diagnosis    IgG multiple myeloma (HCC)    Persistent atrial fibrillation (HCC)    Essential hypertension    Chronic ITP (idiopathic thrombocytopenia) (HCC)    Hyperlipidemia    Hypocalcemia    Hypothyroidism    Long term current use of systemic steroids    Other fatigue    Prophylactic measure    Hypogammaglobulinemia (HCC)    Leukopenia    Anemia due to stage 3 chronic kidney disease (HCC)       Current Outpatient Medications:     acetaminophen (TYLENOL) 325 mg tablet, Take 650 mg by mouth every 6 (six) hours as needed for mild pain, Disp: , Rfl:     acyclovir (ZOVIRAX) 200 mg capsule, TAKE  (1)  CAPSULE  TWICE DAILY  , Disp: 60 capsule, Rfl: 5    Ascorbic Acid (vitamin C) 1000 MG tablet, Take 1,000 mg by mouth daily, Disp: , Rfl:     aspirin 81 MG tablet, Take 81 mg by mouth daily  , Disp: , Rfl:     atorvastatin (LIPITOR) 20 mg tablet, Take 1 tablet (20 mg total) by mouth daily, Disp: 90 tablet, Rfl: 0    Cholecalciferol (VITAMIN D-3 PO), Take 1,000 Units by mouth daily , Disp: , Rfl:     Docusate Sodium (COLACE PO), Take 1 tablet by mouth as needed  , Disp: , Rfl:     eltrombopag (PROMACTA) 25 mg tablet, Take 25 mg by mouth daily Administer on an empty stomach, 1 hour before or 2 hours after a meal  , Disp: , Rfl:     escitalopram (LEXAPRO) 20 mg tablet, Take 1 tablet (20 mg total) by mouth daily, Disp: 90 tablet, Rfl: 0    Glutamine POWD, by Does not apply route, Disp: , Rfl:     levothyroxine 50 mcg tablet, Take 1 tablet (50 mcg total) by mouth daily, Disp: 90 tablet, Rfl: 0    LORazepam (ATIVAN) 0 5 mg tablet, Take 0 5 mg by mouth every 6 (six) hours as needed for anxiety  , Disp: , Rfl:     Multiple Vitamin (MULTIVITAMINS PO), Take 1 tablet by mouth daily  , Disp: , Rfl:     ondansetron (ZOFRAN) 4 mg tablet, Take 4 mg by mouth every 8 (eight) hours as needed for nausea or vomiting , Disp: , Rfl:   No Known Allergies  Past Surgical History:   Procedure Laterality Date    APPENDECTOMY      Last assessed: 9/25/15    ARTHROSCOPY KNEE Left     9/30/09    EYE SURGERY      Lasik    KNEE CARTILAGE SURGERY      LIMBAL STEM CELL TRANSPLANT      Patient had 2 in [place]-2/29/2016 and 4/13/2016     Social History     Objective:  Vitals:    05/13/21 1126   BP: 118/68   BP Location: Left arm   Patient Position: Sitting   Pulse: 61   Resp: 16   Temp: 97 6 °F (36 4 °C)   TempSrc: Tympanic   SpO2: 97%   Weight: 77 1 kg (170 lb)   Height: 5' 7 9" (1 725 m)     Physical Exam  Constitutional:       Appearance: He is well-developed  HENT:      Head: Normocephalic and atraumatic  Eyes:      Pupils: Pupils are equal, round, and reactive to light  Neck:      Musculoskeletal: Neck supple  Cardiovascular:      Rate and Rhythm: Normal rate and regular rhythm  Heart sounds: No murmur  Pulmonary:      Breath sounds: Normal breath sounds  No wheezing or rales  Abdominal:      Palpations: Abdomen is soft  Tenderness: There is no abdominal tenderness  Musculoskeletal: Normal range of motion  General: No tenderness  Lymphadenopathy:      Cervical: No cervical adenopathy  Skin:     Findings: No erythema or rash  Neurological:      Mental Status: He is alert and oriented to person, place, and time  Cranial Nerves: No cranial nerve deficit  Deep Tendon Reflexes: Reflexes are normal and symmetric  Psychiatric:         Behavior: Behavior normal            Labs: I personally reviewed the labs and imaging pertinent to this patient care

## 2021-05-14 ENCOUNTER — HOSPITAL ENCOUNTER (OUTPATIENT)
Dept: INFUSION CENTER | Facility: CLINIC | Age: 76
Discharge: HOME | End: 2021-05-14
Payer: COMMERCIAL

## 2021-05-14 VITALS
TEMPERATURE: 97.3 F | BODY MASS INDEX: 26.22 KG/M2 | DIASTOLIC BLOOD PRESSURE: 82 MMHG | HEART RATE: 56 BPM | SYSTOLIC BLOOD PRESSURE: 114 MMHG | OXYGEN SATURATION: 100 % | RESPIRATION RATE: 18 BRPM | WEIGHT: 171.96 LBS

## 2021-05-14 DIAGNOSIS — C90.00 IGG MULTIPLE MYELOMA (HCC): ICD-10-CM

## 2021-05-14 DIAGNOSIS — D69.3 CHRONIC ITP (IDIOPATHIC THROMBOCYTOPENIA) (HCC): Primary | ICD-10-CM

## 2021-05-14 DIAGNOSIS — D80.1 HYPOGAMMAGLOBULINEMIA (HCC): ICD-10-CM

## 2021-05-14 PROCEDURE — 96366 THER/PROPH/DIAG IV INF ADDON: CPT

## 2021-05-14 PROCEDURE — 96367 TX/PROPH/DG ADDL SEQ IV INF: CPT

## 2021-05-14 PROCEDURE — 96365 THER/PROPH/DIAG IV INF INIT: CPT

## 2021-05-14 PROCEDURE — P9037 PLATE PHERES LEUKOREDU IRRAD: HCPCS

## 2021-05-14 PROCEDURE — 36430 TRANSFUSION BLD/BLD COMPNT: CPT

## 2021-05-14 RX ORDER — SODIUM CHLORIDE 9 MG/ML
20 INJECTION, SOLUTION INTRAVENOUS ONCE
Status: COMPLETED | OUTPATIENT
Start: 2021-05-14 | End: 2021-05-14

## 2021-05-14 RX ORDER — SODIUM CHLORIDE 9 MG/ML
20 INJECTION, SOLUTION INTRAVENOUS ONCE
Status: DISCONTINUED | OUTPATIENT
Start: 2021-05-14 | End: 2021-05-17 | Stop reason: HOSPADM

## 2021-05-14 RX ORDER — SODIUM CHLORIDE 9 MG/ML
20 INJECTION, SOLUTION INTRAVENOUS ONCE
Status: CANCELLED
Start: 2021-06-11

## 2021-05-14 RX ADMIN — Medication 30 G: at 10:22

## 2021-05-14 RX ADMIN — SODIUM CHLORIDE 20 ML/HR: 0.9 INJECTION, SOLUTION INTRAVENOUS at 08:47

## 2021-05-14 RX ADMIN — ZOLEDRONIC ACID 3 MG: 4 INJECTION, SOLUTION, CONCENTRATE INTRAVENOUS at 09:36

## 2021-05-14 NOTE — PLAN OF CARE
Problem: Potential for Falls  Goal: Patient will remain free of falls  Description: INTERVENTIONS:  - Assess patient frequently for physical needs  -  Identify cognitive and physical deficits and behaviors that affect risk of falls    -  [place] fall precautions as indicated by assessment   - Educate patient/family on patient safety including physical limitations  - Instruct patient to call for assistance with activity based on assessment  - Modify environment to reduce risk of injury  - Consider OT/PT consult to assist with strengthening/mobility  Outcome: Progressing

## 2021-05-17 ENCOUNTER — TELEPHONE (OUTPATIENT)
Dept: HEMATOLOGY ONCOLOGY | Facility: CLINIC | Age: 76
End: 2021-05-17

## 2021-05-17 ENCOUNTER — APPOINTMENT (OUTPATIENT)
Dept: LAB | Facility: CLINIC | Age: 76
End: 2021-05-17
Payer: COMMERCIAL

## 2021-05-17 DIAGNOSIS — D70.9 NEUTROPENIA, UNSPECIFIED TYPE (HCC): Primary | ICD-10-CM

## 2021-05-17 LAB
ALBUMIN SERPL BCP-MCNC: 2.9 G/DL (ref 3.5–5)
ALP SERPL-CCNC: 87 U/L (ref 46–116)
ALT SERPL W P-5'-P-CCNC: 24 U/L (ref 12–78)
ANION GAP SERPL CALCULATED.3IONS-SCNC: 7 MMOL/L (ref 4–13)
AST SERPL W P-5'-P-CCNC: 25 U/L (ref 5–45)
BASOPHILS # BLD AUTO: 0.01 THOUSANDS/ΜL (ref 0–0.1)
BASOPHILS NFR BLD AUTO: 0 % (ref 0–1)
BILIRUB SERPL-MCNC: 0.86 MG/DL (ref 0.2–1)
BUN SERPL-MCNC: 11 MG/DL (ref 5–25)
CALCIUM ALBUM COR SERPL-MCNC: 10.4 MG/DL (ref 8.3–10.1)
CALCIUM SERPL-MCNC: 9.5 MG/DL (ref 8.3–10.1)
CHLORIDE SERPL-SCNC: 103 MMOL/L (ref 100–108)
CO2 SERPL-SCNC: 24 MMOL/L (ref 21–32)
CREAT SERPL-MCNC: 1.53 MG/DL (ref 0.6–1.3)
EOSINOPHIL # BLD AUTO: 0.02 THOUSAND/ΜL (ref 0–0.61)
EOSINOPHIL NFR BLD AUTO: 1 % (ref 0–6)
ERYTHROCYTE [DISTWIDTH] IN BLOOD BY AUTOMATED COUNT: 18.2 % (ref 11.6–15.1)
GFR SERPL CREATININE-BSD FRML MDRD: 44 ML/MIN/1.73SQ M
GLUCOSE P FAST SERPL-MCNC: 93 MG/DL (ref 65–99)
HCT VFR BLD AUTO: 27.9 % (ref 36.5–49.3)
HGB BLD-MCNC: 8.8 G/DL (ref 12–17)
IMM GRANULOCYTES # BLD AUTO: 0.02 THOUSAND/UL (ref 0–0.2)
IMM GRANULOCYTES NFR BLD AUTO: 1 % (ref 0–2)
LYMPHOCYTES # BLD AUTO: 0.4 THOUSANDS/ΜL (ref 0.6–4.47)
LYMPHOCYTES NFR BLD AUTO: 15 % (ref 14–44)
MCH RBC QN AUTO: 32.1 PG (ref 26.8–34.3)
MCHC RBC AUTO-ENTMCNC: 31.5 G/DL (ref 31.4–37.4)
MCV RBC AUTO: 102 FL (ref 82–98)
MONOCYTES # BLD AUTO: 0.69 THOUSAND/ΜL (ref 0.17–1.22)
MONOCYTES NFR BLD AUTO: 25 % (ref 4–12)
NEUTROPHILS # BLD AUTO: 1.6 THOUSANDS/ΜL (ref 1.85–7.62)
NEUTS SEG NFR BLD AUTO: 58 % (ref 43–75)
NRBC BLD AUTO-RTO: 0 /100 WBCS
PLATELET # BLD AUTO: 33 THOUSANDS/UL (ref 149–390)
PMV BLD AUTO: 12.2 FL (ref 8.9–12.7)
POTASSIUM SERPL-SCNC: 3.9 MMOL/L (ref 3.5–5.3)
PROT SERPL-MCNC: 9.1 G/DL (ref 6.4–8.2)
RBC # BLD AUTO: 2.74 MILLION/UL (ref 3.88–5.62)
SODIUM SERPL-SCNC: 134 MMOL/L (ref 136–145)
WBC # BLD AUTO: 2.74 THOUSAND/UL (ref 4.31–10.16)

## 2021-05-17 PROCEDURE — 36415 COLL VENOUS BLD VENIPUNCTURE: CPT | Performed by: INTERNAL MEDICINE

## 2021-05-17 PROCEDURE — 80053 COMPREHEN METABOLIC PANEL: CPT | Performed by: INTERNAL MEDICINE

## 2021-05-17 PROCEDURE — 85025 COMPLETE CBC W/AUTO DIFF WBC: CPT | Performed by: INTERNAL MEDICINE

## 2021-05-17 NOTE — TELEPHONE ENCOUNTER
Spoke with wife   Patient was instructed to have labs done twice per week and she would like to continue this for this week  Will update Dr Jordan Rodriguez

## 2021-05-17 NOTE — TELEPHONE ENCOUNTER
Critical Results   Call Received From [facility]    Lab Study Platelet [id number]   Date Blood Work was Done 5/17   Read Back of Information Done Yes    Relevant Information

## 2021-05-17 NOTE — TELEPHONE ENCOUNTER
Per latest OV note:  He was started on study with Balantumab mafodotin on May 4, 2021  So far he has experienced very brief chills on the day of infusion and moderate fatigue since that time  Additionally, CBC shows severe thrombocytopenia, 9  Hemoglobin is actually improving, previously 7 7, currently 9 1  Platelet transfusion is arranged for tomorrow  He has no bleeding symptoms at this time  He knows to call if these occur  He will continue on his current treatment  Promacta to be increased to 75 mg p o  Daily  CAR-T is anticipated to open at [facility] in a month or so  This could be used if his current treatment is ineffective or not tolerated      Dr Cory Herndon RN aware of critical plt count

## 2021-05-18 ENCOUNTER — TELEPHONE (OUTPATIENT)
Dept: HEMATOLOGY ONCOLOGY | Facility: CLINIC | Age: 76
End: 2021-05-18

## 2021-05-18 DIAGNOSIS — D69.3 CHRONIC ITP (IDIOPATHIC THROMBOCYTOPENIA) (HCC): Primary | ICD-10-CM

## 2021-05-20 ENCOUNTER — TELEPHONE (OUTPATIENT)
Dept: HEMATOLOGY ONCOLOGY | Facility: CLINIC | Age: 76
End: 2021-05-20

## 2021-05-20 ENCOUNTER — APPOINTMENT (OUTPATIENT)
Dept: LAB | Facility: CLINIC | Age: 76
End: 2021-05-20
Payer: COMMERCIAL

## 2021-05-20 DIAGNOSIS — D70.9 NEUTROPENIA, UNSPECIFIED TYPE (HCC): ICD-10-CM

## 2021-05-20 LAB
ALBUMIN SERPL BCP-MCNC: 2.7 G/DL (ref 3.5–5)
ALP SERPL-CCNC: 82 U/L (ref 46–116)
ALT SERPL W P-5'-P-CCNC: 19 U/L (ref 12–78)
ANION GAP SERPL CALCULATED.3IONS-SCNC: 11 MMOL/L (ref 4–13)
AST SERPL W P-5'-P-CCNC: 19 U/L (ref 5–45)
BASOPHILS # BLD AUTO: 0.01 THOUSANDS/ΜL (ref 0–0.1)
BASOPHILS NFR BLD AUTO: 1 % (ref 0–1)
BILIRUB SERPL-MCNC: 0.56 MG/DL (ref 0.2–1)
BUN SERPL-MCNC: 18 MG/DL (ref 5–25)
CALCIUM ALBUM COR SERPL-MCNC: 10.5 MG/DL (ref 8.3–10.1)
CALCIUM SERPL-MCNC: 9.5 MG/DL (ref 8.3–10.1)
CHLORIDE SERPL-SCNC: 103 MMOL/L (ref 100–108)
CO2 SERPL-SCNC: 24 MMOL/L (ref 21–32)
CREAT SERPL-MCNC: 1.56 MG/DL (ref 0.6–1.3)
EOSINOPHIL # BLD AUTO: 0.02 THOUSAND/ΜL (ref 0–0.61)
EOSINOPHIL NFR BLD AUTO: 1 % (ref 0–6)
ERYTHROCYTE [DISTWIDTH] IN BLOOD BY AUTOMATED COUNT: 18.8 % (ref 11.6–15.1)
GFR SERPL CREATININE-BSD FRML MDRD: 43 ML/MIN/1.73SQ M
GLUCOSE P FAST SERPL-MCNC: 86 MG/DL (ref 65–99)
HCT VFR BLD AUTO: 27.2 % (ref 36.5–49.3)
HGB BLD-MCNC: 8.5 G/DL (ref 12–17)
IMM GRANULOCYTES # BLD AUTO: 0.02 THOUSAND/UL (ref 0–0.2)
IMM GRANULOCYTES NFR BLD AUTO: 1 % (ref 0–2)
LYMPHOCYTES # BLD AUTO: 0.29 THOUSANDS/ΜL (ref 0.6–4.47)
LYMPHOCYTES NFR BLD AUTO: 14 % (ref 14–44)
MCH RBC QN AUTO: 31.7 PG (ref 26.8–34.3)
MCHC RBC AUTO-ENTMCNC: 31.3 G/DL (ref 31.4–37.4)
MCV RBC AUTO: 102 FL (ref 82–98)
MONOCYTES # BLD AUTO: 0.69 THOUSAND/ΜL (ref 0.17–1.22)
MONOCYTES NFR BLD AUTO: 34 % (ref 4–12)
NEUTROPHILS # BLD AUTO: 1.03 THOUSANDS/ΜL (ref 1.85–7.62)
NEUTS SEG NFR BLD AUTO: 49 % (ref 43–75)
NRBC BLD AUTO-RTO: 0 /100 WBCS
PLATELET # BLD AUTO: 35 THOUSANDS/UL (ref 149–390)
PMV BLD AUTO: 12.6 FL (ref 8.9–12.7)
POTASSIUM SERPL-SCNC: 3.9 MMOL/L (ref 3.5–5.3)
PROT SERPL-MCNC: 8.5 G/DL (ref 6.4–8.2)
RBC # BLD AUTO: 2.68 MILLION/UL (ref 3.88–5.62)
SODIUM SERPL-SCNC: 138 MMOL/L (ref 136–145)
WBC # BLD AUTO: 2.06 THOUSAND/UL (ref 4.31–10.16)

## 2021-05-20 PROCEDURE — 85025 COMPLETE CBC W/AUTO DIFF WBC: CPT

## 2021-05-20 PROCEDURE — 80053 COMPREHEN METABOLIC PANEL: CPT

## 2021-05-20 PROCEDURE — 36415 COLL VENOUS BLD VENIPUNCTURE: CPT

## 2021-05-20 NOTE — TELEPHONE ENCOUNTER
Plt 35,000 verified with readback  Patient is onpromacta 75 mg daily and study drug balantumab  Dr Laurence Vazquez RNs aware

## 2021-05-20 NOTE — TELEPHONE ENCOUNTER
Called and spoke with Mrs Hwang to review platelet count  Reviewed that Jluis Conception should still maintain bleeding precautions  Mrs Heriberto Burks responded that she understood and that Jluis Conception would be doing this

## 2021-05-24 ENCOUNTER — APPOINTMENT (OUTPATIENT)
Dept: LAB | Facility: CLINIC | Age: 76
End: 2021-05-24
Payer: COMMERCIAL

## 2021-05-24 ENCOUNTER — TELEPHONE (OUTPATIENT)
Dept: HEMATOLOGY ONCOLOGY | Facility: CLINIC | Age: 76
End: 2021-05-24

## 2021-05-24 DIAGNOSIS — C90.00 IGG MULTIPLE MYELOMA (HCC): ICD-10-CM

## 2021-05-24 DIAGNOSIS — C90.00 MULTIPLE MYELOMA NOT HAVING ACHIEVED REMISSION (HCC): ICD-10-CM

## 2021-05-24 LAB
ALBUMIN SERPL BCP-MCNC: 3 G/DL (ref 3.5–5)
ALP SERPL-CCNC: 81 U/L (ref 46–116)
ALT SERPL W P-5'-P-CCNC: 19 U/L (ref 12–78)
ANION GAP SERPL CALCULATED.3IONS-SCNC: 10 MMOL/L (ref 4–13)
AST SERPL W P-5'-P-CCNC: 19 U/L (ref 5–45)
BASOPHILS # BLD AUTO: 0.02 THOUSANDS/ΜL (ref 0–0.1)
BASOPHILS NFR BLD AUTO: 1 % (ref 0–1)
BILIRUB SERPL-MCNC: 0.82 MG/DL (ref 0.2–1)
BUN SERPL-MCNC: 12 MG/DL (ref 5–25)
CALCIUM ALBUM COR SERPL-MCNC: 10 MG/DL (ref 8.3–10.1)
CALCIUM SERPL-MCNC: 9.2 MG/DL (ref 8.3–10.1)
CHLORIDE SERPL-SCNC: 104 MMOL/L (ref 100–108)
CO2 SERPL-SCNC: 23 MMOL/L (ref 21–32)
CREAT SERPL-MCNC: 1.52 MG/DL (ref 0.6–1.3)
EOSINOPHIL # BLD AUTO: 0.03 THOUSAND/ΜL (ref 0–0.61)
EOSINOPHIL NFR BLD AUTO: 1 % (ref 0–6)
ERYTHROCYTE [DISTWIDTH] IN BLOOD BY AUTOMATED COUNT: 19.5 % (ref 11.6–15.1)
GFR SERPL CREATININE-BSD FRML MDRD: 45 ML/MIN/1.73SQ M
GLUCOSE P FAST SERPL-MCNC: 87 MG/DL (ref 65–99)
HCT VFR BLD AUTO: 29.6 % (ref 36.5–49.3)
HGB BLD-MCNC: 9.2 G/DL (ref 12–17)
IMM GRANULOCYTES # BLD AUTO: 0.02 THOUSAND/UL (ref 0–0.2)
IMM GRANULOCYTES NFR BLD AUTO: 1 % (ref 0–2)
LYMPHOCYTES # BLD AUTO: 0.42 THOUSANDS/ΜL (ref 0.6–4.47)
LYMPHOCYTES NFR BLD AUTO: 16 % (ref 14–44)
MCH RBC QN AUTO: 32.1 PG (ref 26.8–34.3)
MCHC RBC AUTO-ENTMCNC: 31.1 G/DL (ref 31.4–37.4)
MCV RBC AUTO: 103 FL (ref 82–98)
MONOCYTES # BLD AUTO: 0.94 THOUSAND/ΜL (ref 0.17–1.22)
MONOCYTES NFR BLD AUTO: 36 % (ref 4–12)
NEUTROPHILS # BLD AUTO: 1.18 THOUSANDS/ΜL (ref 1.85–7.62)
NEUTS SEG NFR BLD AUTO: 45 % (ref 43–75)
NRBC BLD AUTO-RTO: 0 /100 WBCS
PLATELET # BLD AUTO: 49 THOUSANDS/UL (ref 149–390)
PMV BLD AUTO: 12.8 FL (ref 8.9–12.7)
POTASSIUM SERPL-SCNC: 4 MMOL/L (ref 3.5–5.3)
PROT SERPL-MCNC: 8.3 G/DL (ref 6.4–8.2)
RBC # BLD AUTO: 2.87 MILLION/UL (ref 3.88–5.62)
SODIUM SERPL-SCNC: 137 MMOL/L (ref 136–145)
WBC # BLD AUTO: 2.61 THOUSAND/UL (ref 4.31–10.16)

## 2021-05-24 PROCEDURE — 36415 COLL VENOUS BLD VENIPUNCTURE: CPT

## 2021-05-24 PROCEDURE — 80053 COMPREHEN METABOLIC PANEL: CPT

## 2021-05-24 PROCEDURE — 85025 COMPLETE CBC W/AUTO DIFF WBC: CPT

## 2021-05-24 NOTE — TELEPHONE ENCOUNTER
Called and spoke with Mrs Mahamed Dominguez  Reviewed Antonio's platelet count and bleeding precautions

## 2021-05-24 NOTE — TELEPHONE ENCOUNTER
Call received from:   [place]     Lab location:[facility]  Date of lab draw:5/24/21  Critical value:Platelets 49 thousand  Read back occurred:Yes  Tasked and IM:RENE Caban    No parameters in [place]    Will forward to RN to review with MD

## 2021-05-26 ENCOUNTER — TRANSCRIBE ORDERS (OUTPATIENT)
Dept: ADMINISTRATIVE | Facility: HOSPITAL | Age: 76
End: 2021-05-26

## 2021-05-26 DIAGNOSIS — C90.00 MULTIPLE MYELOMA NOT HAVING ACHIEVED REMISSION (HCC): Primary | ICD-10-CM

## 2021-06-01 DIAGNOSIS — E78.5 HYPERLIPIDEMIA, UNSPECIFIED HYPERLIPIDEMIA TYPE: ICD-10-CM

## 2021-06-01 RX ORDER — ATORVASTATIN CALCIUM 20 MG/1
20 TABLET, FILM COATED ORAL DAILY
Qty: 90 TABLET | Refills: 0 | Status: SHIPPED | OUTPATIENT
Start: 2021-06-01 | End: 2021-06-02

## 2021-06-02 DIAGNOSIS — E78.5 HYPERLIPIDEMIA, UNSPECIFIED HYPERLIPIDEMIA TYPE: ICD-10-CM

## 2021-06-02 RX ORDER — ATORVASTATIN CALCIUM 20 MG/1
TABLET, FILM COATED ORAL
Qty: 90 TABLET | Refills: 0 | Status: SHIPPED | OUTPATIENT
Start: 2021-06-02 | End: 2021-09-10 | Stop reason: SDUPTHER

## 2021-06-03 ENCOUNTER — TELEPHONE (OUTPATIENT)
Dept: HEMATOLOGY ONCOLOGY | Facility: CLINIC | Age: 76
End: 2021-06-03

## 2021-06-04 ENCOUNTER — DOCUMENTATION (OUTPATIENT)
Dept: HEMATOLOGY ONCOLOGY | Facility: CLINIC | Age: 76
End: 2021-06-04

## 2021-06-04 ENCOUNTER — HOSPITAL ENCOUNTER (OUTPATIENT)
Dept: NON INVASIVE DIAGNOSTICS | Facility: HOSPITAL | Age: 76
Discharge: HOME | End: 2021-06-04
Payer: COMMERCIAL

## 2021-06-04 DIAGNOSIS — C90.00 MULTIPLE MYELOMA NOT HAVING ACHIEVED REMISSION (HCC): ICD-10-CM

## 2021-06-04 DIAGNOSIS — D69.3 CHRONIC ITP (IDIOPATHIC THROMBOCYTOPENIA) (HCC): ICD-10-CM

## 2021-06-04 PROCEDURE — 93306 TTE W/DOPPLER COMPLETE: CPT

## 2021-06-04 PROCEDURE — 93306 TTE W/DOPPLER COMPLETE: CPT | Performed by: INTERNAL MEDICINE

## 2021-06-04 NOTE — PROGRESS NOTES
6-3-21  Received new oral chemo start-PROMACTA  Citlaly Aguilar is needed per [facility]  6-4-21  This is the first email I received regarding promacta  Prior to that it was back in July 2020  (see below)  I called the patient to ask what he had been paying as far as copay  Per Stefan Khanna (spouse) the first copay they paid was a little over $2000 then [facility] [place] has been charging them $20 for each refill  I called [facility] and spoke with Donna-Supervisor who stated there is a large copay of $ 4602 41, however it appears they owe the patient $3351 96 which balance of $1250 45 is what the true copay would be  I have obtained a [employer] Copay card:  ID# [id number]  BIN# [id number]  PCN#  [id number]  GRP# [id number]  Copay $ 25 00 w/ $15,000 limit  Per Donna-[facility] Supervisor once copay information is obtained, we should forward information to the pharmacy and be sure that information is sent to Cate Cedillo at [facility] [id number]       Epic NOTED

## 2021-06-06 NOTE — PLAN OF CARE
[facility]  PICU DAILY PROGRESS NOTE/ TRANSFER NOTE    ADMISSION DATE:  6/2/2021  DATE:  6/6/2021  CURRENT HOSPITAL DAY:  Hospital Day: 5     History Of Present Illness  Mariely is a 15 year old female presenting with COVID-19.  Patient started to have symptoms on Thursday 5/27 primarily a sore throat.  She went into school on Friday 5/28 and went to see the nurse who measured and said that she had a fever and that she should be tested for Covid.  She tested on 5/28 and got the results of positive for Covid on 5/29.  She went to quarantine with her aunt and uncle.  Her aunt and uncle both had pneumonia since then, with uncle requiring hospitalization.  Her symptoms are primarily been nausea, vomiting, body aches, fevers, chills, productive cough.  Today she started having some chest pain with her cough primarily only when she coughs.  She also that it feels harder to breathe and had before.  She is also having some very mild palpitations and feeling as if her heart is beating somewhat fast though so this is not uncomfortable for her.  She still has her sense of taste and smell.  She has not had any issues with constipation, diarrhea, rashes.  She has been attending in person school, just finishing up 7th grade.  She was scheduled to have her Covid vaccine on 5/29 but tested positive before then.     Hospital Course:   ED Course:  Patient presented to the ED for shortness of breath and chest pain, where she was found to be tachycardic and tachypneic  as well as febrile.  Her vital signs improved when she was settled though they still performed a sepsis huddle with no concern for acute sepsis at this time.  She had diminished breath sounds on the left and was still tachypneic.  He had a chest x-ray which showed a possible infiltrate.  Given the length of time of her symptoms they also did a work-up for MIS C which was negative at the time as well as blood cultures were drawn.  She had a  Problem: Potential for Falls  Goal: Patient will remain free of falls  INTERVENTIONS:  - Assess patient frequently for physical needs  -  Identify cognitive and physical deficits and behaviors that affect risk of falls  -  [place] fall precautions as indicated by assessment   - Educate patient/family on patient safety including physical limitations  - Instruct patient to call for assistance with activity based on assessment  - Modify environment to reduce risk of injury  - Consider OT/PT consult to assist with strengthening/mobility   Outcome: Progressing      Problem: SAFETY ADULT  Goal: Patient will remain free of falls  INTERVENTIONS:  - Assess patient frequently for physical needs  -  Identify cognitive and physical deficits and behaviors that affect risk of falls  -  [place] fall precautions as indicated by assessment   - Educate patient/family on patient safety including physical limitations  - Instruct patient to call for assistance with activity based on assessment  - Modify environment to reduce risk of injury  - Consider OT/PT consult to assist with strengthening/mobility   Outcome: Progressing      Problem: Knowledge Deficit  Goal: Patient/family/caregiver demonstrates understanding of disease process, treatment plan, medications, and discharge instructions  Complete learning assessment and assess knowledge base    Interventions:  - Provide teaching at level of understanding  - Provide teaching via preferred learning methods  Outcome: Progressing positive D-dimer.  She received 1 L saline bolus.  Due to the D-dimer, there obtained a CT PE which showed no PE and bilateral groundglass opacities.  CRP was mildly elevated, BNP reassuring, troponin negative, EKG showed normal sinus rhythm, CMP unremarkable other than mildly elevated liver enzymes.  Blood culture still pending.  Patient was never hypoxic.  She was admitted for possible monoclonal antibody infusion and for further observation given body habitus and disease course.  She was treated with Rocephin due to vital signs, consider adding azithromycin downstairs but held off at this point.     Floor course:  Patient arrived to the floor tachypneic and febrile. Patient was initially considered for monoclonal antibody therapy but overnight her respiratory status declined (desaturations down to the 80s) and she was placed on 2 L of oxygen therefore no longer a candidate for monoclonal antibody.  ID consulted and recommended patient instead received remdesivir and dexamethasone. Multiple rapid responses called for dips in O2 saturation. Overnight, patient started on HFNC at 25 L at 30%. Called Rapid response for decrease in O2 to 86-87% despite HFNC and requiring increasing FiO2. Patient started on q4h 3% saline nebs and albuterol to attempt to clear lungs. Patient transferred to the PICU for further management     PICU course:  Patient arrived to the PICU tachypneic but other vitals normal. Patient continued on HFNC and q4h albuterol with improvement of symptoms. Psych liaison consulted and recommended restarting patient's home medications. Patient weaned to 2LNC and maintained saturations. Patient stable and transferred to the floor for further management      INTERVAL HISTORY:    - Desaturated to 80% this morning with NC when getting up, recovered without intervention  - Tolerated HFNC wean     OBJECTIVE:    VITAL SIGNS:     Vital Last Value 24 Hour Range   Temperature 98.6 °F (37 °C) (06/06/21 0400) Temp   Min: 98.1 °F (36.7 °C)  Max: 98.6 °F (37 °C)   Pulse 74 (06/06/21 0700) Pulse  Min: 58  Max: 113   Respiratory (!) 35 (06/06/21 0700) Resp  Min: 20  Max: 51   Non-Invasive  Blood Pressure 110/69 (06/06/21 0400) BP  Min: 110/69  Max: 140/82   Pulse Oximetry 93 % (06/06/21 0700) SpO2  Min: 90 %  Max: 99 %     Vital Today Admitted   Weight (!) 116.4 kg (256 lb 9.9 oz) (06/05/21 2000) Weight: (!) 117.9 kg (259 lb 14.8 oz) (06/02/21 1225)   Height N/A Height: 5' 8.11\" (173 cm) (06/02/21 1225)   Body Mass Index N/A BMI (Calculated): 39.39 (06/02/21 1225)     INTAKE/OUTPUT:    Date 06/05/21 0700 - 06/06/21 0659 06/06/21 0700 - 06/07/21 0659   Shift 5709-1575 6131-1250 6530-5465 24 Hour Total 6149-3494 4139-1315 3825-0002 24 Hour Total   INTAKE   P.O. 960        I.V. 30   30       IV Piggyback 250   250       Shift Total 1240        OUTPUT   Urine 850 1100  1950       Shift Total 850 1100  1950       Weight (kg) 116 116.4 116.4 116.4 116.4 116.4 116.4 116.4           PHYSICAL EXAM:    Physical Exam   Constitutional: She is oriented to person, place, and time. She appears well-developed and well-nourished.   Obese   HENT:   Head: Normocephalic.   Eyes: Pupils are equal, round, and reactive to light. Conjunctivae are normal.   Neck: No thyromegaly present.   Cardiovascular: Normal rate, regular rhythm and normal heart sounds.   Pulmonary/Chest: Effort normal. No respiratory distress. She has no wheezes. She has no rales.   Dimished left lower lobe   Abdominal: Soft. Bowel sounds are normal. She exhibits no distension. There is no abdominal tenderness.   Musculoskeletal:      Cervical back: Normal range of motion and neck supple.   Neurological: She is alert and oriented to person, place, and time.   Skin: Skin is warm. No rash noted.       RESPIRATORY SUPPORT:  Oxygen Therapy  O2 Device: Nasal cannula  O2 Flow Rate (L/min): 22 L/min  FiO2 (%): 100 %      LABORATORY DATA:    Recent Results (from the  past 24 hour(s))   Comprehensive Metabolic Panel    Collection Time: 06/06/21  5:47 AM   Result Value Ref Range    Fasting Status      Sodium 139 135 - 145 mmol/L    Potassium 3.9 3.4 - 5.1 mmol/L    Chloride 108 (H) 98 - 107 mmol/L    Carbon Dioxide 25 21 - 32 mmol/L    Anion Gap 10 10 - 20 mmol/L    Glucose 100 (H) 65 - 99 mg/dL    BUN 13 5 - 18 mg/dL    Creatinine 0.61 0.39 - 0.90 mg/dL    Glomerular Filtration Rate      BUN/ Creatinine Ratio 21 7 - 25    Calcium 8.4 8.0 - 11.0 mg/dL    Bilirubin, Total 0.3 0.2 - 1.0 mg/dL    GOT/ (H) 10 - 45 Units/L    GPT/ (H) 6 - 35 Units/L    Alkaline Phosphatase 62 (L) 90 - 360 Units/L    Albumin 3.2 (L) 3.6 - 5.1 g/dL    Protein, Total 7.3 6.0 - 8.0 g/dL    Globulin 4.1 (H) 2.0 - 4.0 g/dL    A/G Ratio 0.8 (L) 1.0 - 2.4   ]       IMAGING STUDIES:    XR CHEST AP OR PA 1 VIEW    Result Date: 6/3/2021  EXAM: XR CHEST PA OR AP 1 VIEW CLINICAL INDICATION: Covid 19 related respiratory distress pneumonia. COMPARISON: 06/02/2021 at 1330 hour. IMAGING TECHNIQUE: A frontal  view of the chest was performed at 1534 hour. FINDINGS: Tubes/lines: No radiopaque tube or line identified. Lungs: Poor inspiration effort.  Worsening opacity in lower lobes, greater on the left side. Pleura: No drainable effusion nor pneumothorax. Cardiomediastinal silhouette: Stable. Bones: Stable Upper abdomen: Unremarkable.      Worsening opacities in lower lobes, greater on the left side.   Electronically Signed by: TERESITA GRAVES M.D. Signed on: 6/3/2021 5:53 PM       Imaging studies reviewed.      CXR looks improved and heart borders more defined  Liver enzymes elevated AST//197         MEDICATIONS:        melatonin  6 mg Oral Nightly    enoxaparin  40 mg Subcutaneous Q24H    escitalopram  10 mg Oral Daily    remdesivir infusion (adult and peds > 40 kg)  100 mg Intravenous Daily at noon    cholecalciferol  50 mcg Oral Daily    pentoxifylline  400 mg Oral TID     aspirin  81 mg Oral Daily     dexamethasone  6 mg Intravenous Daily       ACTIVE PROBLEMS:    Active Hospital Problems    Diagnosis     Pneumonia due to COVID-19 virus     Obesity (BMI 30-39.9)                         ASSESSMENT:     Mariely Elliott is a 15 year old female with history of obesity, depression, and anxiety admitted with acute hypoxemic respiratory failure due to COVID19 PNA. Patient is overall doing better and requiring less FiO2 and flow. Potential floor transfer today given tolerating NC    PLAN:    Neuro  -Tylenol/motrin pain and fever  -Melatonin 6mg at bedtime    Cardiovascular  -Tele    Heme  #Hypercoaguable state   -ASA 81mg QD   -Pentoxifylline 400mg TID   -Lovenox 40mg once a day SQ    Respiratory  #Acute respiratory failure  Pulm placed on consult, appreciate recs  -3% saline nebs/ Albuterol q4H   - Incentive spirometry  -NC 2L--> wean as tolerated  -CXR daily    FEN/GI  - Peds gen diet  - Zofran PRN Q6H    ID  #COVID PNA  Contact/Droplet Isolation per protocol  - ID consulted  - Redesivir 200 mg IV load, then 100 mg IV q24h (s6/3-6/13), for ten days or until discharge whatever comes first  - Dexamethasone 6 mg once daily  - BCX 6/2 NGTD  - Daily CMP while on remdesivir  S/P CTX 6/2    Psych  #History of anxiety and depression  Psych liaison consulted  - Continue home Lexapro 10mg QD    VTE SCORE (PICU, obesity, and infection)    LINES: The use, function and necessity of all lines and invasive devices was discussed on rounds     CAP-D Score: 0  PICU UP LEVEL  Level 3: Level 1 and 2 activities plus OOB to chair TID or sitting up in bed TID if appropriate chair is not available; ambulate BID if trunk control present     Disposition: Transfer to the floor    Plan was discussed with family, nursing staff, and PICU Attending. All in agreement with the plan.    Tonia Garcia DO  06/06/21 7:34 AM

## 2021-06-09 ENCOUNTER — TELEPHONE (OUTPATIENT)
Dept: HEMATOLOGY ONCOLOGY | Facility: CLINIC | Age: 76
End: 2021-06-09

## 2021-06-09 ENCOUNTER — APPOINTMENT (OUTPATIENT)
Dept: LAB | Facility: CLINIC | Age: 76
End: 2021-06-09
Payer: COMMERCIAL

## 2021-06-09 DIAGNOSIS — D69.3 CHRONIC ITP (IDIOPATHIC THROMBOCYTOPENIA) (HCC): Primary | ICD-10-CM

## 2021-06-09 RX ORDER — SODIUM CHLORIDE 9 MG/ML
20 INJECTION, SOLUTION INTRAVENOUS ONCE
Status: CANCELLED | OUTPATIENT
Start: 2021-06-10

## 2021-06-09 RX ORDER — SODIUM CHLORIDE 9 MG/ML
20 INJECTION, SOLUTION INTRAVENOUS ONCE
Status: CANCELLED | OUTPATIENT
Start: 2021-06-09

## 2021-06-09 NOTE — TELEPHONE ENCOUNTER
Critical Results   Call Received From  [facility]   Lab Study Platelets 10 thousand   Date Blood Work was Done 06/09/2021   Read Back of Information Done yes   Relevant Information

## 2021-06-09 NOTE — TELEPHONE ENCOUNTER
Patient called and he was not home yet  I spoke with patient's hPoebe Borden and she stated that patient is not having any active signs of bleeding or bruising  Patient is scheduled for IVIG on 6/11/21  No platelet parameters noted in [place]      Will forward to Dr Vijay Franklin RNs for review with MD Phoebe Borden ([facility]) [phone] Mason Peterson)

## 2021-06-09 NOTE — TELEPHONE ENCOUNTER
Pt to have platelets on 4/43/18 along with his IVIG  Called and spoke with Michael scheduling and appt confirmed

## 2021-06-11 ENCOUNTER — HOSPITAL ENCOUNTER (OUTPATIENT)
Dept: INFUSION CENTER | Facility: CLINIC | Age: 76
Discharge: HOME | End: 2021-06-11
Payer: COMMERCIAL

## 2021-06-11 ENCOUNTER — TELEPHONE (OUTPATIENT)
Dept: HEMATOLOGY ONCOLOGY | Facility: CLINIC | Age: 76
End: 2021-06-11

## 2021-06-11 VITALS
SYSTOLIC BLOOD PRESSURE: 128 MMHG | RESPIRATION RATE: 18 BRPM | HEART RATE: 69 BPM | TEMPERATURE: 97.6 F | DIASTOLIC BLOOD PRESSURE: 81 MMHG | BODY MASS INDEX: 26.56 KG/M2 | WEIGHT: 174.16 LBS

## 2021-06-11 DIAGNOSIS — D80.1 HYPOGAMMAGLOBULINEMIA (HCC): ICD-10-CM

## 2021-06-11 DIAGNOSIS — D69.3 CHRONIC ITP (IDIOPATHIC THROMBOCYTOPENIA) (HCC): Primary | ICD-10-CM

## 2021-06-11 DIAGNOSIS — C90.00 IGG MULTIPLE MYELOMA (HCC): ICD-10-CM

## 2021-06-11 PROCEDURE — 96366 THER/PROPH/DIAG IV INF ADDON: CPT

## 2021-06-11 PROCEDURE — P9037 PLATE PHERES LEUKOREDU IRRAD: HCPCS

## 2021-06-11 PROCEDURE — 96367 TX/PROPH/DG ADDL SEQ IV INF: CPT

## 2021-06-11 PROCEDURE — 36430 TRANSFUSION BLD/BLD COMPNT: CPT

## 2021-06-11 PROCEDURE — 96365 THER/PROPH/DIAG IV INF INIT: CPT

## 2021-06-11 RX ORDER — SODIUM CHLORIDE 9 MG/ML
20 INJECTION, SOLUTION INTRAVENOUS ONCE
Status: CANCELLED
Start: 2021-06-17

## 2021-06-11 RX ORDER — SODIUM CHLORIDE 9 MG/ML
20 INJECTION, SOLUTION INTRAVENOUS ONCE
Status: COMPLETED | OUTPATIENT
Start: 2021-06-11 | End: 2021-06-11

## 2021-06-11 RX ADMIN — ZOLEDRONIC ACID 3 MG: 4 INJECTION INTRAVENOUS at 09:03

## 2021-06-11 RX ADMIN — SODIUM CHLORIDE 20 ML/HR: 0.9 INJECTION, SOLUTION INTRAVENOUS at 08:30

## 2021-06-11 RX ADMIN — Medication 30 G: at 09:46

## 2021-06-11 NOTE — PLAN OF CARE
Problem: Potential for Falls  Goal: Patient will remain free of falls  Description: INTERVENTIONS:  - Assess patient frequently for physical needs  -  Identify cognitive and physical deficits and behaviors that affect risk of falls    -  [place] fall precautions as indicated by assessment   - Educate patient/family on patient safety including physical limitations  - Instruct patient to call for assistance with activity based on assessment  - Modify environment to reduce risk of injury  - Consider OT/PT consult to assist with strengthening/mobility  Outcome: Progressing

## 2021-06-11 NOTE — PROGRESS NOTES
Patient tolerated IVIG, Zometa, and platelet transfusion well with no complications  Pt is aware of all future appointments   Declined AVS

## 2021-06-15 DIAGNOSIS — C90.00 MULTIPLE MYELOMA NOT HAVING ACHIEVED REMISSION (HCC): ICD-10-CM

## 2021-06-15 DIAGNOSIS — C90.00 MULTIPLE MYELOMA NOT HAVING ACHIEVED REMISSION (HCC): Primary | ICD-10-CM

## 2021-06-15 RX ORDER — ACYCLOVIR 200 MG/1
CAPSULE ORAL
Qty: 60 CAPSULE | Refills: 0 | Status: CANCELLED | OUTPATIENT
Start: 2021-06-15 | End: 2021-07-15

## 2021-06-15 RX ORDER — ACYCLOVIR 200 MG/1
CAPSULE ORAL
Qty: 60 CAPSULE | Refills: 5 | Status: SHIPPED | OUTPATIENT
Start: 2021-06-15 | End: 2021-12-14

## 2021-06-16 ENCOUNTER — APPOINTMENT (OUTPATIENT)
Dept: LAB | Facility: CLINIC | Age: 76
End: 2021-06-16
Payer: COMMERCIAL

## 2021-06-16 ENCOUNTER — TELEPHONE (OUTPATIENT)
Dept: HEMATOLOGY ONCOLOGY | Facility: CLINIC | Age: 76
End: 2021-06-16

## 2021-06-16 DIAGNOSIS — D69.3 CHRONIC ITP (IDIOPATHIC THROMBOCYTOPENIA) (HCC): Primary | ICD-10-CM

## 2021-06-16 DIAGNOSIS — D69.3 CHRONIC ITP (IDIOPATHIC THROMBOCYTOPENIA) (HCC): ICD-10-CM

## 2021-06-16 DIAGNOSIS — C90.00 IGG MULTIPLE MYELOMA (HCC): ICD-10-CM

## 2021-06-16 LAB
ABO GROUP BLD: NORMAL
ALBUMIN SERPL BCP-MCNC: 3.1 G/DL (ref 3.5–5)
ALP SERPL-CCNC: 80 U/L (ref 46–116)
ALT SERPL W P-5'-P-CCNC: 18 U/L (ref 12–78)
ANION GAP SERPL CALCULATED.3IONS-SCNC: 6 MMOL/L (ref 4–13)
AST SERPL W P-5'-P-CCNC: 28 U/L (ref 5–45)
BILIRUB SERPL-MCNC: 0.66 MG/DL (ref 0.2–1)
BLD GP AB SCN SERPL QL: NEGATIVE
BUN SERPL-MCNC: 15 MG/DL (ref 5–25)
CALCIUM ALBUM COR SERPL-MCNC: 9.6 MG/DL (ref 8.3–10.1)
CALCIUM SERPL-MCNC: 8.9 MG/DL (ref 8.3–10.1)
CHLORIDE SERPL-SCNC: 106 MMOL/L (ref 100–108)
CO2 SERPL-SCNC: 26 MMOL/L (ref 21–32)
CREAT SERPL-MCNC: 1.46 MG/DL (ref 0.6–1.3)
GFR SERPL CREATININE-BSD FRML MDRD: 47 ML/MIN/1.73SQ M
GLUCOSE P FAST SERPL-MCNC: 97 MG/DL (ref 65–99)
POTASSIUM SERPL-SCNC: 3.9 MMOL/L (ref 3.5–5.3)
PROT SERPL-MCNC: 7.5 G/DL (ref 6.4–8.2)
RH BLD: POSITIVE
SODIUM SERPL-SCNC: 138 MMOL/L (ref 136–145)
SPECIMEN EXPIRATION DATE: NORMAL

## 2021-06-16 PROCEDURE — 86901 BLOOD TYPING SEROLOGIC RH(D): CPT

## 2021-06-16 PROCEDURE — 36415 COLL VENOUS BLD VENIPUNCTURE: CPT

## 2021-06-16 PROCEDURE — 80053 COMPREHEN METABOLIC PANEL: CPT

## 2021-06-16 PROCEDURE — 86900 BLOOD TYPING SEROLOGIC ABO: CPT

## 2021-06-16 PROCEDURE — 86850 RBC ANTIBODY SCREEN: CPT

## 2021-06-16 RX ORDER — SODIUM CHLORIDE 9 MG/ML
20 INJECTION, SOLUTION INTRAVENOUS ONCE
Status: CANCELLED | OUTPATIENT
Start: 2021-06-16

## 2021-06-16 RX ORDER — SODIUM CHLORIDE 9 MG/ML
20 INJECTION, SOLUTION INTRAVENOUS ONCE
Status: CANCELLED | OUTPATIENT
Start: 2021-06-17

## 2021-06-16 NOTE — TELEPHONE ENCOUNTER
Pt will have a blood transfusion tomorrow at [facility] @ 0690  To have a type and screen either today, or 0800 6/17/21

## 2021-06-16 NOTE — TELEPHONE ENCOUNTER
Will send to RN to review critical platelet count with Dr Alondra Lynn  Patient is scheduled to see Dr Alondra Lynn tomorrow     Hgb was 7 5

## 2021-06-16 NOTE — TELEPHONE ENCOUNTER
Critical Results   [facility]   Lab Study Platelet [id number]     Date Blood Work was Done  6-16-21   Read Back of Information Done Yes   Relevant Information

## 2021-06-17 ENCOUNTER — HOSPITAL ENCOUNTER (OUTPATIENT)
Dept: INFUSION CENTER | Facility: CLINIC | Age: 76
Discharge: HOME | End: 2021-06-17
Payer: COMMERCIAL

## 2021-06-17 ENCOUNTER — OFFICE VISIT (OUTPATIENT)
Dept: HEMATOLOGY ONCOLOGY | Facility: CLINIC | Age: 76
End: 2021-06-17
Payer: COMMERCIAL

## 2021-06-17 ENCOUNTER — TELEPHONE (OUTPATIENT)
Dept: HEMATOLOGY ONCOLOGY | Facility: CLINIC | Age: 76
End: 2021-06-17

## 2021-06-17 VITALS
SYSTOLIC BLOOD PRESSURE: 144 MMHG | WEIGHT: 172 LBS | TEMPERATURE: 98.7 F | DIASTOLIC BLOOD PRESSURE: 72 MMHG | HEART RATE: 72 BPM | BODY MASS INDEX: 26.23 KG/M2 | RESPIRATION RATE: 18 BRPM

## 2021-06-17 VITALS
TEMPERATURE: 98.7 F | HEART RATE: 72 BPM | DIASTOLIC BLOOD PRESSURE: 72 MMHG | SYSTOLIC BLOOD PRESSURE: 144 MMHG | RESPIRATION RATE: 18 BRPM

## 2021-06-17 DIAGNOSIS — D69.3 CHRONIC ITP (IDIOPATHIC THROMBOCYTOPENIA) (HCC): Primary | ICD-10-CM

## 2021-06-17 DIAGNOSIS — C90.00 MULTIPLE MYELOMA NOT HAVING ACHIEVED REMISSION (HCC): Primary | ICD-10-CM

## 2021-06-17 DIAGNOSIS — F19.982 DRUG-INDUCED INSOMNIA (HCC): ICD-10-CM

## 2021-06-17 PROCEDURE — 1036F TOBACCO NON-USER: CPT | Performed by: INTERNAL MEDICINE

## 2021-06-17 PROCEDURE — 86922 COMPATIBILITY TEST ANTIGLOB: CPT

## 2021-06-17 PROCEDURE — P9040 RBC LEUKOREDUCED IRRADIATED: HCPCS

## 2021-06-17 PROCEDURE — 1160F RVW MEDS BY RX/DR IN RCRD: CPT | Performed by: INTERNAL MEDICINE

## 2021-06-17 PROCEDURE — 36430 TRANSFUSION BLD/BLD COMPNT: CPT

## 2021-06-17 PROCEDURE — 86921 COMPATIBILITY TEST INCUBATE: CPT

## 2021-06-17 PROCEDURE — 99214 OFFICE O/P EST MOD 30 MIN: CPT | Performed by: INTERNAL MEDICINE

## 2021-06-17 RX ORDER — SODIUM CHLORIDE 9 MG/ML
20 INJECTION, SOLUTION INTRAVENOUS ONCE
Status: COMPLETED | OUTPATIENT
Start: 2021-06-17 | End: 2021-06-17

## 2021-06-17 RX ORDER — LORAZEPAM 1 MG/1
1 TABLET ORAL EVERY 4 HOURS PRN
Qty: 100 TABLET | Refills: 1 | Status: SHIPPED | OUTPATIENT
Start: 2021-06-17

## 2021-06-17 RX ADMIN — SODIUM CHLORIDE 20 ML/HR: 0.9 INJECTION, SOLUTION INTRAVENOUS at 12:39

## 2021-06-17 NOTE — TELEPHONE ENCOUNTER
Patients wife Luisa Davison calling in to advise patient may be a little late for appt with Dr Yue Fair due to infusion but still plans on coming   Luisa Davison can be reached at [phone]

## 2021-06-17 NOTE — PROGRESS NOTES
Patient tolerated transfusion of 1 unit of PRBCs well with no adverse events or complications  Pt is aware of all future appointments   Declined AVS

## 2021-06-17 NOTE — PROGRESS NOTES
[facility]  [address] [id number]  [address] [place] [address],1945, [phone]  06/17/21    Discussion:   In summary, this is a 72-year-old male history of multiple myeloma as outlined  He is currently on experimental protocol with Balantumab  This has been complicated by insomnia as well as severe cytopenias, platelet and red blood cell transfusion requiring  Fortunately, paraprotein is significantly improving  Plan is for lymphocyte collection in early July and CAR-T procedure in mid August   Transfusion support is continued locally  Ativan is prescribed for insomnia as he has an element of mental stimulation  Hopefully this can blunt that and allow him to fall asleep more naturally  I discussed the above with the patient  The patient and his wife voiced understanding and agreement   ______________________________________________________________________    Chief Complaint   Patient presents with    Follow-up       HPI:  Oncology History   IgG multiple myeloma ([facility] [address] )   9/2015 Initial Diagnosis    Found to have Hbg of 6 with SOB, creatinine 1 8 and normal calcium with albumin of 2 1  Additonial blood work showed elevated protien level (12 1g/dL)  9/29/2015 Biopsy    Bone marrow biopsy demonstrated approximately 80% plasma cells with some immature forms noted  These studies showed 13q14 deletion, +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, T (4:14)       10/14/2015 - 11/11/2015 Chemotherapy    Velcade 1 3 mg/m2 Days 1,8,15,22  Cytoxan 300 mg/m2  PO Days 1, 8,15, 22  Dexamethasone 40 mg PO Days 1,8,15, 22  Zometa 4 mg IV Day 1  Cycle length = 28 days    Completed 2 cycles  Day one of cycle 2 was on 11/11/15      12/2015 - 2/2016 Chemotherapy    VDT-PACE x 2 cycles   (completed at [facility] in [place], [address] [place] [address])     2/2016 Transplant    Melphalane 200 mg/m2 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      MRD +     8/2016 - 1/26/2018 Chemotherapy    Maintenance therapy:  Carfilzomib, orginally dosed 27 mg weekly then increased to 45 mg weekly after MRD reactivitation  Revlimid  Dexamethasone      2/2018 Biopsy    Bone marrow demonstrated positive minimal residual disease       2/23/2018 -  Chemotherapy    Daratumumab 16mg/m2 IV Day 1  Pomalyst 4 mg p o  daily 1-21  Dexamethasone 4 mg PO Days 2, 3  Dexamethasone 12 mg PODays 8, 15, 22  Cycle length = 28 days     4/11/2019 - 8/29/2019 Chemotherapy    methylPREDNISolone sodium succinate (Solu-MEDROL) 100 mg in sodium chloride 0 9 % 250 mL IVPB, 100 mg, Intravenous, Once, 5 of 12 cycles  Administration: 100 mg (6/7/2019), 100 mg (7/5/2019), 100 mg (8/2/2019)     8/30/2019 - 11/8/2019 Chemotherapy    cyclophosphamide (CYTOXAN) 1,000 mg in sodium chloride 0 9 % 250 mL IVPB, 990 mg (66 7 % of original dose 750 mg/m2), Intravenous, Once, 2 of 3 cycles  Dose modification: 500 mg/m2 (original dose 750 mg/m2, Cycle 1, Reason: Other (See Comments)), 250 mg/m2 (original dose 750 mg/m2, Cycle 4, Reason: Treatment Parameters Not Met)  Administration: 1,000 mg (8/30/2019), 1,000 mg (9/13/2019), 1,000 mg (9/27/2019), 500 mg (10/11/2019)  bortezomib (VELCADE) subcutaneous injection 2 6 mg, 1 3 mg/m2 = 2 6 mg (86 7 % of original dose 1 5 mg/m2), Subcutaneous, Once, 3 of 4 cycles  Dose modification: 1 3 mg/m2 (original dose 1 5 mg/m2, Cycle 1, Reason: Other (See Comments))  Administration: 2 6 mg (8/30/2019), 2 6 mg (9/13/2019), 2 6 mg (10/25/2019), 2 6 mg (11/8/2019), 2 6 mg (9/27/2019), 2 6 mg (11/1/2019), 2 6 mg (9/20/2019), 2 6 mg (10/4/2019), 2 6 mg (10/11/2019), 2 6 mg (10/18/2019)     12/6/2019 - 6/19/2020 Chemotherapy    pegfilgrastim (NEULASTA ONPRO) subcutaneous injection kit 6 mg, 6 mg, Subcutaneous, Once, 5 of 9 cycles  Administration: 6 mg (2/28/2020), 6 mg (3/13/2020), 6 mg (3/27/2020), 6 mg (4/10/2020), 6 mg (4/24/2020), 6 mg (5/8/2020), 6 mg (6/5/2020), 6 mg (5/22/2020), 6 mg (6/19/2020)  cyclophosphamide (CYTOXAN) 784 mg in sodium chloride 0 9 % 250 mL IVPB, 400 mg/m2 = 784 mg, Intravenous, Once, 5 of 9 cycles  Dose modification: 400 mg/m2 (original dose 750 mg/m2, Cycle 7, Reason: Other (See Comments))  Administration: 784 mg (2/28/2020), 784 mg (3/13/2020), 784 mg (3/27/2020), 784 mg (4/10/2020), 784 mg (4/24/2020), 784 mg (5/8/2020), 784 mg (5/22/2020), 784 mg (6/19/2020), 784 mg (6/5/2020)  methylPREDNISolone sodium succinate (Solu-MEDROL) 100 mg in sodium chloride 0 9 % 250 mL IVPB, 100 mg, Intravenous, Once, 8 of 12 cycles  Administration: 100 mg (12/6/2019), 100 mg (12/13/2019), 100 mg (12/20/2019), 100 mg (1/3/2020), 100 mg (1/10/2020), 100 mg (1/17/2020), 100 mg (1/31/2020), 100 mg (2/14/2020), 100 mg (5/22/2020), 100 mg (12/27/2019), 100 mg (1/24/2020), 100 mg (2/28/2020), 100 mg (3/13/2020), 100 mg (3/27/2020), 100 mg (4/10/2020), 100 mg (4/24/2020), 100 mg (5/8/2020), 100 mg (6/19/2020)     7/2/2020 -  Chemotherapy           Interval History:  Clinically stable  ECOG-  1 - Symptomatic but completely ambulatory    Review of Systems   Constitutional: Negative for chills and fever  HENT: Negative for nosebleeds  Eyes: Negative for discharge  Respiratory: Negative for cough and shortness of breath  Cardiovascular: Negative for chest pain  Gastrointestinal: Negative for abdominal pain, constipation and diarrhea  Endocrine: Negative for polydipsia  Genitourinary: Negative for hematuria  Musculoskeletal: Negative for arthralgias  Skin: Negative for color change  Allergic/Immunologic: Negative for immunocompromised state  Neurological: Negative for dizziness and headaches  Hematological: Negative for adenopathy  Psychiatric/Behavioral: Negative for agitation         Past Medical History:   Diagnosis Date    History of autologous stem cell transplant ([facility] [address] )     Hypertension     History of HTN-stable since weight loss    Insomnia     Multiple myeloma [facility])      Patient Active Problem List   Diagnosis    IgG multiple myeloma (HCC)    Persistent atrial fibrillation (HCC)    Essential hypertension    Chronic ITP (idiopathic thrombocytopenia) (HCC)    Hyperlipidemia    Hypocalcemia    Hypothyroidism    Long term current use of systemic steroids    Other fatigue    Prophylactic measure    Hypogammaglobulinemia (HCC)    Leukopenia    Anemia due to stage 3 chronic kidney disease (HCC)       Current Outpatient Medications:     acetaminophen (TYLENOL) 325 mg tablet, Take 650 mg by mouth every 6 (six) hours as needed for mild pain, Disp: , Rfl:     acyclovir (ZOVIRAX) 200 mg capsule, Take (1) Capsule twice daily, Disp: 60 capsule, Rfl: 5    Ascorbic Acid (vitamin C) 1000 MG tablet, Take 1,000 mg by mouth daily, Disp: , Rfl:     aspirin 81 MG tablet, Take 81 mg by mouth daily  , Disp: , Rfl:     atorvastatin (LIPITOR) 20 mg tablet, TAKE (1) TABLET DAILY  , Disp: 90 tablet, Rfl: 0    Cholecalciferol (VITAMIN D-3 PO), Take 1,000 Units by mouth daily , Disp: , Rfl:     Docusate Sodium (COLACE PO), Take 1 tablet by mouth as needed  , Disp: , Rfl:     eltrombopag (PROMACTA) 75 MG TABS, Take 1 tablet (75 mg total) by mouth daily, Disp: 30 tablet, Rfl: 3    escitalopram (LEXAPRO) 20 mg tablet, Take 1 tablet (20 mg total) by mouth daily, Disp: 90 tablet, Rfl: 0    Glutamine POWD, by Does not apply route, Disp: , Rfl:     levothyroxine 50 mcg tablet, Take 1 tablet (50 mcg total) by mouth daily, Disp: 90 tablet, Rfl: 0    LORazepam (ATIVAN) 0 5 mg tablet, Take 0 5 mg by mouth every 6 (six) hours as needed for anxiety  , Disp: , Rfl:     Multiple Vitamin (MULTIVITAMINS PO), Take 1 tablet by mouth daily  , Disp: , Rfl:     ondansetron (ZOFRAN) 4 mg tablet, Take 4 mg by mouth every 8 (eight) hours as needed for nausea or vomiting , Disp: , Rfl:   No current facility-administered medications for this visit    No Known Allergies  Past Surgical History:   Procedure Laterality Date    APPENDECTOMY      Last assessed: 9/25/15    ARTHROSCOPY KNEE Left     9/30/09    EYE SURGERY      Lasik    KNEE CARTILAGE SURGERY      LIMBAL STEM CELL TRANSPLANT      Patient had 2 in [place]-2/29/2016 and 4/13/2016     Social History     Objective:  Vitals:    06/17/21 1537   BP: 144/72   Pulse: 72   Resp: 18   Temp: 98 7 °F (37 1 °C)   TempSrc: Temporal   Weight: 78 kg (172 lb)     Physical Exam  Constitutional:       Appearance: He is well-developed  HENT:      Head: Normocephalic and atraumatic  Eyes:      Pupils: Pupils are equal, round, and reactive to light  Cardiovascular:      Rate and Rhythm: Normal rate and regular rhythm  Heart sounds: No murmur heard  Pulmonary:      Breath sounds: Normal breath sounds  No wheezing or rales  Abdominal:      Palpations: Abdomen is soft  Tenderness: There is no abdominal tenderness  Musculoskeletal:         General: No tenderness  Normal range of motion  Cervical back: Neck supple  Lymphadenopathy:      Cervical: No cervical adenopathy  Skin:     Findings: No erythema or rash  Neurological:      Mental Status: He is alert and oriented to person, place, and time  Cranial Nerves: No cranial nerve deficit  Deep Tendon Reflexes: Reflexes are normal and symmetric  Psychiatric:         Behavior: Behavior normal            Labs: I personally reviewed the labs and imaging pertinent to this patient care

## 2021-06-18 LAB
ABO GROUP BLD BPU: NORMAL
BPU ID: NORMAL
CROSSMATCH: NORMAL
UNIT DISPENSE STATUS: NORMAL
UNIT PRODUCT CODE: NORMAL
UNIT RH: NORMAL

## 2021-06-30 DIAGNOSIS — F41.9 ANXIETY: ICD-10-CM

## 2021-06-30 DIAGNOSIS — E03.9 HYPOTHYROIDISM, UNSPECIFIED TYPE: ICD-10-CM

## 2021-06-30 RX ORDER — LEVOTHYROXINE SODIUM 0.05 MG/1
50 TABLET ORAL DAILY
Qty: 90 TABLET | Refills: 0 | Status: SHIPPED | OUTPATIENT
Start: 2021-06-30 | End: 2021-10-04 | Stop reason: SDUPTHER

## 2021-06-30 RX ORDER — ESCITALOPRAM OXALATE 20 MG/1
20 TABLET ORAL DAILY
Qty: 90 TABLET | Refills: 0 | Status: SHIPPED | OUTPATIENT
Start: 2021-06-30 | End: 2021-10-04 | Stop reason: SDUPTHER

## 2021-07-08 DIAGNOSIS — C90.00 IGG MULTIPLE MYELOMA (HCC): Primary | ICD-10-CM

## 2021-07-09 ENCOUNTER — HOSPITAL ENCOUNTER (OUTPATIENT)
Dept: INFUSION CENTER | Facility: CLINIC | Age: 76
Discharge: HOME | End: 2021-07-09
Payer: COMMERCIAL

## 2021-07-09 VITALS
DIASTOLIC BLOOD PRESSURE: 83 MMHG | SYSTOLIC BLOOD PRESSURE: 145 MMHG | RESPIRATION RATE: 18 BRPM | TEMPERATURE: 98 F | HEART RATE: 62 BPM

## 2021-07-09 DIAGNOSIS — D80.1 HYPOGAMMAGLOBULINEMIA (HCC): Primary | ICD-10-CM

## 2021-07-09 DIAGNOSIS — C90.00 IGG MULTIPLE MYELOMA (HCC): ICD-10-CM

## 2021-07-09 PROCEDURE — 96365 THER/PROPH/DIAG IV INF INIT: CPT

## 2021-07-09 PROCEDURE — 96366 THER/PROPH/DIAG IV INF ADDON: CPT

## 2021-07-09 PROCEDURE — 96367 TX/PROPH/DG ADDL SEQ IV INF: CPT

## 2021-07-09 RX ORDER — SODIUM CHLORIDE 9 MG/ML
20 INJECTION, SOLUTION INTRAVENOUS ONCE
Status: CANCELLED
Start: 2021-08-06 | End: 2021-08-06

## 2021-07-09 RX ORDER — SODIUM CHLORIDE 9 MG/ML
20 INJECTION, SOLUTION INTRAVENOUS ONCE
Status: COMPLETED | OUTPATIENT
Start: 2021-07-09 | End: 2021-07-09

## 2021-07-09 RX ADMIN — Medication 30 G: at 08:25

## 2021-07-09 RX ADMIN — SODIUM CHLORIDE 20 ML/HR: 0.9 INJECTION, SOLUTION INTRAVENOUS at 08:25

## 2021-07-09 RX ADMIN — ZOLEDRONIC ACID 3 MG: 4 INJECTION, SOLUTION, CONCENTRATE INTRAVENOUS at 10:15

## 2021-08-02 ENCOUNTER — OFFICE VISIT (OUTPATIENT)
Dept: HEMATOLOGY ONCOLOGY | Facility: CLINIC | Age: 76
End: 2021-08-02
Payer: COMMERCIAL

## 2021-08-02 VITALS
BODY MASS INDEX: 26.32 KG/M2 | SYSTOLIC BLOOD PRESSURE: 122 MMHG | HEART RATE: 59 BPM | TEMPERATURE: 97.5 F | DIASTOLIC BLOOD PRESSURE: 74 MMHG | RESPIRATION RATE: 18 BRPM | OXYGEN SATURATION: 97 % | WEIGHT: 172.6 LBS

## 2021-08-02 DIAGNOSIS — C90.00 IGG MULTIPLE MYELOMA (HCC): Primary | ICD-10-CM

## 2021-08-02 PROCEDURE — 99214 OFFICE O/P EST MOD 30 MIN: CPT | Performed by: INTERNAL MEDICINE

## 2021-08-02 PROCEDURE — 1036F TOBACCO NON-USER: CPT | Performed by: INTERNAL MEDICINE

## 2021-08-02 PROCEDURE — 1160F RVW MEDS BY RX/DR IN RCRD: CPT | Performed by: INTERNAL MEDICINE

## 2021-08-02 NOTE — PROGRESS NOTES
[facility] [place]  [address] [facility] [address]  [address] [place] [address] Jaiden,1945, [id number]  08/02/21    Discussion:   In summary, this is a 62-year-old male history of myeloma as outlined  He had been on Balantumab  This had been on hold due to blurry vision  He also had severe cytopenias  His last infusion was about 2 months ago  He had good response with his myeloma  Plan is in place to proceed with CAR-T 2 weeks from today  Generally he feels well  Vision is intermittently blurry at this time  Otherwise performance status is unaffected  We will make arrangements for follow-up in 2 months  I discussed the above with the patient  The patient and his wife voiced understanding and agreement   ______________________________________________________________________    Chief Complaint   Patient presents with    Follow-up       HPI:  Oncology History   IgG multiple myeloma ([facility] [address] )   9/2015 Initial Diagnosis    Found to have Hbg of 6 with SOB, creatinine 1 8 and normal calcium with albumin of 2 1  Additonial blood work showed elevated protien level (12 1g/dL)  9/29/2015 Biopsy    Bone marrow biopsy demonstrated approximately 80% plasma cells with some immature forms noted  These studies showed 13q14 deletion, +1q21, T (4:14)       10/14/2015 - 11/11/2015 Chemotherapy    Velcade 1 3 mg/m2 Days 1,8,15,22  Cytoxan 300 mg/m2  PO Days 1, 8,15, 22  Dexamethasone 40 mg PO Days 1,8,15, 22  Zometa 4 mg IV Day 1  Cycle length = 28 days    Completed 2 cycles  Day one of cycle 2 was on 11/11/15      12/2015 - 2/2016 Chemotherapy    VDT-PACE x 2 cycles   (completed at [facility] in [place], [address] [place] [address])     2/2016 Transplant    Melphalane 200 mg/m2 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      MRD +     8/2016 - 1/26/2018 Chemotherapy    Maintenance therapy:  Carfilzomib, orginally dosed 27 mg weekly then increased to 45 mg weekly after MRD reactivitation  Revlimid  Dexamethasone      2/2018 Biopsy    Bone marrow demonstrated positive minimal residual disease       2/23/2018 -  Chemotherapy    Daratumumab 16mg/m2 IV Day 1  Pomalyst 4 mg p o  daily 1-21  Dexamethasone 4 mg PO Days 2, 3  Dexamethasone 12 mg PODays 8, 15, 22  Cycle length = 28 days     4/11/2019 - 8/29/2019 Chemotherapy    methylPREDNISolone sodium succinate (Solu-MEDROL) 100 mg in sodium chloride 0 9 % 250 mL IVPB, 100 mg, Intravenous, Once, 5 of 12 cycles  Administration: 100 mg (6/7/2019), 100 mg (7/5/2019), 100 mg (8/2/2019)     8/30/2019 - 11/8/2019 Chemotherapy    cyclophosphamide (CYTOXAN) 1,000 mg in sodium chloride 0 9 % 250 mL IVPB, 990 mg (66 7 % of original dose 750 mg/m2), Intravenous, Once, 2 of 3 cycles  Dose modification: 500 mg/m2 (original dose 750 mg/m2, Cycle 1, Reason: Other (See Comments)), 250 mg/m2 (original dose 750 mg/m2, Cycle 4, Reason: Treatment Parameters Not Met)  Administration: 1,000 mg (8/30/2019), 1,000 mg (9/13/2019), 1,000 mg (9/27/2019), 500 mg (10/11/2019)  bortezomib (VELCADE) subcutaneous injection 2 6 mg, 1 3 mg/m2 = 2 6 mg (86 7 % of original dose 1 5 mg/m2), Subcutaneous, Once, 3 of 4 cycles  Dose modification: 1 3 mg/m2 (original dose 1 5 mg/m2, Cycle 1, Reason: Other (See Comments))  Administration: 2 6 mg (8/30/2019), 2 6 mg (9/13/2019), 2 6 mg (10/25/2019), 2 6 mg (11/8/2019), 2 6 mg (9/27/2019), 2 6 mg (11/1/2019), 2 6 mg (9/20/2019), 2 6 mg (10/4/2019), 2 6 mg (10/11/2019), 2 6 mg (10/18/2019)     12/6/2019 - 6/19/2020 Chemotherapy    pegfilgrastim (NEULASTA ONPRO) subcutaneous injection kit 6 mg, 6 mg, Subcutaneous, Once, 5 of 9 cycles  Administration: 6 mg (2/28/2020), 6 mg (3/13/2020), 6 mg (3/27/2020), 6 mg (4/10/2020), 6 mg (4/24/2020), 6 mg (5/8/2020), 6 mg (6/5/2020), 6 mg (5/22/2020), 6 mg (6/19/2020)  cyclophosphamide (CYTOXAN) 784 mg in sodium chloride 0 9 % 250 mL IVPB, 400 mg/m2 = 784 mg, Intravenous, Once, 5 of 9 cycles  Dose modification: 400 mg/m2 (original dose 750 mg/m2, Cycle 7, Reason: Other (See Comments))  Administration: 784 mg (2/28/2020), 784 mg (3/13/2020), 784 mg (3/27/2020), 784 mg (4/10/2020), 784 mg (4/24/2020), 784 mg (5/8/2020), 784 mg (5/22/2020), 784 mg (6/19/2020), 784 mg (6/5/2020)  methylPREDNISolone sodium succinate (Solu-MEDROL) 100 mg in sodium chloride 0 9 % 250 mL IVPB, 100 mg, Intravenous, Once, 8 of 12 cycles  Administration: 100 mg (12/6/2019), 100 mg (12/13/2019), 100 mg (12/20/2019), 100 mg (1/3/2020), 100 mg (1/10/2020), 100 mg (1/17/2020), 100 mg (1/31/2020), 100 mg (2/14/2020), 100 mg (5/22/2020), 100 mg (12/27/2019), 100 mg (1/24/2020), 100 mg (2/28/2020), 100 mg (3/13/2020), 100 mg (3/27/2020), 100 mg (4/10/2020), 100 mg (4/24/2020), 100 mg (5/8/2020), 100 mg (6/19/2020)     7/2/2020 -  Chemotherapy           Interval History:  Clinically stable  ECOG-  1 - Symptomatic but completely ambulatory    Review of Systems   Constitutional: Negative for chills and fever  HENT: Negative for nosebleeds  Eyes: Negative for discharge  Respiratory: Negative for cough and shortness of breath  Cardiovascular: Negative for chest pain  Gastrointestinal: Negative for abdominal pain, constipation and diarrhea  Endocrine: Negative for polydipsia  Genitourinary: Negative for hematuria  Musculoskeletal: Negative for arthralgias  Skin: Negative for color change  Allergic/Immunologic: Negative for immunocompromised state  Neurological: Negative for dizziness and headaches  Hematological: Negative for adenopathy  Psychiatric/Behavioral: Negative for agitation         Past Medical History:   Diagnosis Date    History of autologous stem cell transplant ([facility] [address] )     Hypertension     History of HTN-stable since weight loss    Insomnia     Multiple myeloma (HCC)      Patient Active Problem List   Diagnosis    IgG multiple myeloma (HCC)    Persistent atrial fibrillation [facility])    Essential hypertension    Chronic ITP (idiopathic thrombocytopenia) (HCC)    Hyperlipidemia    Hypocalcemia    Hypothyroidism    Long term current use of systemic steroids    Other fatigue    Prophylactic measure    Hypogammaglobulinemia (HCC)    Leukopenia    Anemia due to stage 3 chronic kidney disease (HCC)       Current Outpatient Medications:     acetaminophen (TYLENOL) 325 mg tablet, Take 650 mg by mouth every 6 (six) hours as needed for mild pain, Disp: , Rfl:     acyclovir (ZOVIRAX) 200 mg capsule, Take (1) Capsule twice daily, Disp: 60 capsule, Rfl: 5    Ascorbic Acid (vitamin C) 1000 MG tablet, Take 1,000 mg by mouth daily, Disp: , Rfl:     aspirin 81 MG tablet, Take 81 mg by mouth daily  , Disp: , Rfl:     atorvastatin (LIPITOR) 20 mg tablet, TAKE (1) TABLET DAILY  , Disp: 90 tablet, Rfl: 0    Cholecalciferol (VITAMIN D-3 PO), Take 1,000 Units by mouth daily , Disp: , Rfl:     Docusate Sodium (COLACE PO), Take 1 tablet by mouth as needed  , Disp: , Rfl:     eltrombopag (PROMACTA) 75 MG TABS, Take 1 tablet (75 mg total) by mouth daily, Disp: 30 tablet, Rfl: 3    escitalopram (LEXAPRO) 20 mg tablet, Take 1 tablet (20 mg total) by mouth daily, Disp: 90 tablet, Rfl: 0    Glutamine POWD, by Does not apply route, Disp: , Rfl:     levothyroxine 50 mcg tablet, Take 1 tablet (50 mcg total) by mouth daily, Disp: 90 tablet, Rfl: 0    LORazepam (ATIVAN) 0 5 mg tablet, Take 0 5 mg by mouth every 6 (six) hours as needed for anxiety  , Disp: , Rfl:     LORazepam (ATIVAN) 1 mg tablet, Take 1 tablet (1 mg total) by mouth every 4 (four) hours as needed for anxiety, Disp: 100 tablet, Rfl: 1    Multiple Vitamin (MULTIVITAMINS PO), Take 1 tablet by mouth daily  , Disp: , Rfl:     ondansetron (ZOFRAN) 4 mg tablet, Take 4 mg by mouth every 8 (eight) hours as needed for nausea or vomiting , Disp: , Rfl:   No Known Allergies  Past Surgical History:   Procedure Laterality Date    APPENDECTOMY Last assessed: 9/25/15    ARTHROSCOPY KNEE Left     9/30/09    EYE SURGERY      Lasik    KNEE CARTILAGE SURGERY      LIMBAL STEM CELL TRANSPLANT      Patient had 2 in [place]-2/29/2016 and 4/13/2016     Social History     Objective:  Vitals:    08/02/21 0813   BP: 122/74   BP Location: Right arm   Patient Position: Sitting   Pulse: 59   Resp: 18   Temp: 97 5 °F (36 4 °C)   TempSrc: Tympanic   SpO2: 97%   Weight: 78 3 kg (172 lb 9 6 oz)     Physical Exam  Constitutional:       Appearance: He is well-developed  HENT:      Head: Normocephalic and atraumatic  Eyes:      Pupils: Pupils are equal, round, and reactive to light  Cardiovascular:      Rate and Rhythm: Normal rate and regular rhythm  Heart sounds: No murmur heard  Pulmonary:      Breath sounds: Normal breath sounds  No wheezing or rales  Abdominal:      Palpations: Abdomen is soft  Tenderness: There is no abdominal tenderness  Musculoskeletal:         General: No tenderness  Normal range of motion  Cervical back: Neck supple  Lymphadenopathy:      Cervical: No cervical adenopathy  Skin:     Findings: No erythema or rash  Neurological:      Mental Status: He is alert and oriented to person, place, and time  Cranial Nerves: No cranial nerve deficit  Deep Tendon Reflexes: Reflexes are normal and symmetric  Psychiatric:         Behavior: Behavior normal            Labs: I personally reviewed the labs and imaging pertinent to this patient care

## 2021-08-02 NOTE — LETTER
August 2, 2021     Juanita Blanca MD  [address]    Patient: Aubrie Pratt   YOB: 1945   Date of Visit: 8/2/2021       Dear Dr Adia Almaraz: Thank you for referring Allan Walker to me for evaluation  Below are my notes for this consultation  If you have questions, please do not hesitate to call me  I look forward to following your patient along with you  Sincerely,        Hao Matos DO        CC: MD Hao Cano DO  8/2/2021  8:57 AM  Sign when Signing Visit  [address] 285  [phone]    Harjinder Espinozabrea,1945, [id number]  08/02/21    Discussion:   In summary, this is a 14-year-old male history of myeloma as outlined  He had been on Balantumab  This had been on hold due to blurry vision  He also had severe cytopenias  His last infusion was about 2 months ago  He had good response with his myeloma  Plan is in place to proceed with CAR-T 2 weeks from today  Generally he feels well  Vision is intermittently blurry at this time  Otherwise performance status is unaffected  We will make arrangements for follow-up in 2 months  I discussed the above with the patient  The patient and his wife voiced understanding and agreement   ______________________________________________________________________    Chief Complaint   Patient presents with    Follow-up       HPI:  Oncology History   IgG multiple myeloma (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)       10/14/2015 - 11/11/2015 Chemotherapy    Velcade 1 3 mg/m2 Days 1,8,15,22  Cytoxan 300 mg/m2  PO Days 1, 8,15, 22  Dexamethasone 40 mg PO Days 1,8,15, 22  Zometa 4 mg IV Day 1  Cycle length = 28 days    Completed 2 cycles  Day one of cycle 2 was on 11/11/15      12/2015 - 2/2016 Chemotherapy    VDT-PACE x 2 cycles   (completed at [facility] [place], [address] [place] [address])     2/2016 Transplant    Melphalane 200 mg/m2 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  MRD +     8/2016 - 1/26/2018 Chemotherapy    Maintenance therapy:  Carfilzomib, orginally dosed 27 mg weekly then increased to 45 mg weekly after MRD reactivitation  Revlimid  Dexamethasone      2/2018 Biopsy    Bone marrow demonstrated positive minimal residual disease       2/23/2018 -  Chemotherapy    Daratumumab 16mg/m2 IV Day 1  Pomalyst 4 mg p o  daily 1-21  Dexamethasone 4 mg PO Days 2, 3  Dexamethasone 12 mg PODays 8, 15, 22  Cycle length = 28 days     4/11/2019 - 8/29/2019 Chemotherapy    methylPREDNISolone sodium succinate (Solu-MEDROL) 100 mg in sodium chloride 0 9 % 250 mL IVPB, 100 mg, Intravenous, Once, 5 of 12 cycles  Administration: 100 mg (6/7/2019), 100 mg (7/5/2019), 100 mg (8/2/2019)     8/30/2019 - 11/8/2019 Chemotherapy    cyclophosphamide (CYTOXAN) 1,000 mg in sodium chloride 0 9 % 250 mL IVPB, 990 mg (66 7 % of original dose 750 mg/m2), Intravenous, Once, 2 of 3 cycles  Dose modification: 500 mg/m2 (original dose 750 mg/m2, Cycle 1, Reason: Other (See Comments)), 250 mg/m2 (original dose 750 mg/m2, Cycle 4, Reason: Treatment Parameters Not Met)  Administration: 1,000 mg (8/30/2019), 1,000 mg (9/13/2019), 1,000 mg (9/27/2019), 500 mg (10/11/2019)  bortezomib (VELCADE) subcutaneous injection 2 6 mg, 1 3 mg/m2 = 2 6 mg (86 7 % of original dose 1 5 mg/m2), Subcutaneous, Once, 3 of 4 cycles  Dose modification: 1 3 mg/m2 (original dose 1 5 mg/m2, Cycle 1, Reason: Other (See Comments))  Administration: 2 6 mg (8/30/2019), 2 6 mg (9/13/2019), 2 6 mg (10/25/2019), 2 6 mg (11/8/2019), 2 6 mg (9/27/2019), 2 6 mg (11/1/2019), 2 6 mg (9/20/2019), 2 6 mg (10/4/2019), 2 6 mg (10/11/2019), 2 6 mg (10/18/2019)     12/6/2019 - 6/19/2020 Chemotherapy    pegfilgrastim (NEULASTA ONPRO) subcutaneous injection kit 6 mg, 6 mg, Subcutaneous, Once, 5 of 9 cycles  Administration: 6 mg (2/28/2020), 6 mg (3/13/2020), 6 mg (3/27/2020), 6 mg (4/10/2020), 6 mg (4/24/2020), 6 mg (5/8/2020), 6 mg (6/5/2020), 6 mg (5/22/2020), 6 mg (6/19/2020)  cyclophosphamide (CYTOXAN) 784 mg in sodium chloride 0 9 % 250 mL IVPB, 400 mg/m2 = 784 mg, Intravenous, Once, 5 of 9 cycles  Dose modification: 400 mg/m2 (original dose 750 mg/m2, Cycle 7, Reason: Other (See Comments))  Administration: 784 mg (2/28/2020), 784 mg (3/13/2020), 784 mg (3/27/2020), 784 mg (4/10/2020), 784 mg (4/24/2020), 784 mg (5/8/2020), 784 mg (5/22/2020), 784 mg (6/19/2020), 784 mg (6/5/2020)  methylPREDNISolone sodium succinate (Solu-MEDROL) 100 mg in sodium chloride 0 9 % 250 mL IVPB, 100 mg, Intravenous, Once, 8 of 12 cycles  Administration: 100 mg (12/6/2019), 100 mg (12/13/2019), 100 mg (12/20/2019), 100 mg (1/3/2020), 100 mg (1/10/2020), 100 mg (1/17/2020), 100 mg (1/31/2020), 100 mg (2/14/2020), 100 mg (5/22/2020), 100 mg (12/27/2019), 100 mg (1/24/2020), 100 mg (2/28/2020), 100 mg (3/13/2020), 100 mg (3/27/2020), 100 mg (4/10/2020), 100 mg (4/24/2020), 100 mg (5/8/2020), 100 mg (6/19/2020)     7/2/2020 -  Chemotherapy           Interval History:  Clinically stable  ECOG-  1 - Symptomatic but completely ambulatory    Review of Systems   Constitutional: Negative for chills and fever  HENT: Negative for nosebleeds  Eyes: Negative for discharge  Respiratory: Negative for cough and shortness of breath  Cardiovascular: Negative for chest pain  Gastrointestinal: Negative for abdominal pain, constipation and diarrhea  Endocrine: Negative for polydipsia  Genitourinary: Negative for hematuria     Musculoskeletal: Negative for arthralgias  Skin: Negative for color change  Allergic/Immunologic: Negative for immunocompromised state  Neurological: Negative for dizziness and headaches  Hematological: Negative for adenopathy  Psychiatric/Behavioral: Negative for agitation  Past Medical History:   Diagnosis Date    History of autologous stem cell transplant ([facility] [address] )     Hypertension     History of HTN-stable since weight loss    Insomnia     Multiple myeloma (HCC)      Patient Active Problem List   Diagnosis    IgG multiple myeloma (HCC)    Persistent atrial fibrillation (HCC)    Essential hypertension    Chronic ITP (idiopathic thrombocytopenia) (HCC)    Hyperlipidemia    Hypocalcemia    Hypothyroidism    Long term current use of systemic steroids    Other fatigue    Prophylactic measure    Hypogammaglobulinemia (HCC)    Leukopenia    Anemia due to stage 3 chronic kidney disease (HCC)       Current Outpatient Medications:     acetaminophen (TYLENOL) 325 mg tablet, Take 650 mg by mouth every 6 (six) hours as needed for mild pain, Disp: , Rfl:     acyclovir (ZOVIRAX) 200 mg capsule, Take (1) Capsule twice daily, Disp: 60 capsule, Rfl: 5    Ascorbic Acid (vitamin C) 1000 MG tablet, Take 1,000 mg by mouth daily, Disp: , Rfl:     aspirin 81 MG tablet, Take 81 mg by mouth daily  , Disp: , Rfl:     atorvastatin (LIPITOR) 20 mg tablet, TAKE (1) TABLET DAILY  , Disp: 90 tablet, Rfl: 0    Cholecalciferol (VITAMIN D-3 PO), Take 1,000 Units by mouth daily , Disp: , Rfl:     Docusate Sodium (COLACE PO), Take 1 tablet by mouth as needed  , Disp: , Rfl:     eltrombopag (PROMACTA) 75 MG TABS, Take 1 tablet (75 mg total) by mouth daily, Disp: 30 tablet, Rfl: 3    escitalopram (LEXAPRO) 20 mg tablet, Take 1 tablet (20 mg total) by mouth daily, Disp: 90 tablet, Rfl: 0    Glutamine POWD, by Does not apply route, Disp: , Rfl:     levothyroxine 50 mcg tablet, Take 1 tablet (50 mcg total) by mouth daily, Disp: 90 tablet, Rfl: 0    LORazepam (ATIVAN) 0 5 mg tablet, Take 0 5 mg by mouth every 6 (six) hours as needed for anxiety  , Disp: , Rfl:     LORazepam (ATIVAN) 1 mg tablet, Take 1 tablet (1 mg total) by mouth every 4 (four) hours as needed for anxiety, Disp: 100 tablet, Rfl: 1    Multiple Vitamin (MULTIVITAMINS PO), Take 1 tablet by mouth daily  , Disp: , Rfl:     ondansetron (ZOFRAN) 4 mg tablet, Take 4 mg by mouth every 8 (eight) hours as needed for nausea or vomiting , Disp: , Rfl:   No Known Allergies  Past Surgical History:   Procedure Laterality Date    APPENDECTOMY      Last assessed: 9/25/15    ARTHROSCOPY KNEE Left     9/30/09    EYE SURGERY      Lasik    KNEE CARTILAGE SURGERY      LIMBAL STEM CELL TRANSPLANT      Patient had 2 in [place]-2/29/2016 and 4/13/2016     Social History     Objective:  Vitals:    08/02/21 0813   BP: 122/74   BP Location: Right arm   Patient Position: Sitting   Pulse: 59   Resp: 18   Temp: 97 5 °F (36 4 °C)   TempSrc: Tympanic   SpO2: 97%   Weight: 78 3 kg (172 lb 9 6 oz)     Physical Exam  Constitutional:       Appearance: He is well-developed  HENT:      Head: Normocephalic and atraumatic  Eyes:      Pupils: Pupils are equal, round, and reactive to light  Cardiovascular:      Rate and Rhythm: Normal rate and regular rhythm  Heart sounds: No murmur heard  Pulmonary:      Breath sounds: Normal breath sounds  No wheezing or rales  Abdominal:      Palpations: Abdomen is soft  Tenderness: There is no abdominal tenderness  Musculoskeletal:         General: No tenderness  Normal range of motion  Cervical back: Neck supple  Lymphadenopathy:      Cervical: No cervical adenopathy  Skin:     Findings: No erythema or rash  Neurological:      Mental Status: He is alert and oriented to person, place, and time  Cranial Nerves: No cranial nerve deficit  Deep Tendon Reflexes: Reflexes are normal and symmetric     Psychiatric:         Behavior: Behavior normal  Labs:  I personally reviewed the labs and imaging pertinent to this patient care

## 2021-08-02 NOTE — LETTER
August 2, 2021     Marilin Hartmann MD  [address]  [address]    Patient: Evan Michel   YOB: 1945   Date of Visit: 8/2/2021       Dear Dr David Matthew: Thank you for referring Anya Grant to me for evaluation  Below are my notes for this consultation  If you have questions, please do not hesitate to call me  I look forward to following your patient along with you  Sincerely,        Danielito Waddell,         CC: MD Danielito Alves,   8/2/2021  8:30 AM  Sign when Signing Visit  [place] [id number]  [phone]    Dejuan Hwagn,1945, [id number]  08/02/21    Discussion:   ***    I discussed the above with the patient  The patient *** voiced understanding and agreement   ______________________________________________________________________    Chief Complaint   Patient presents with    Follow-up       HPI:  Oncology History   IgG multiple myeloma ([address] )   9/2015 Initial Diagnosis    Found to have Hbg of 6 with SOB, creatinine 1 8 and normal calcium with albumin of 2 1  Additonial blood work showed elevated protien level (12 1g/dL)  9/29/2015 Biopsy    Bone marrow biopsy demonstrated approximately 80% plasma cells with some immature forms noted  These studies showed 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 deletion, +1q21, T (4:14)       10/14/2015 - 11/11/2015 Chemotherapy    Velcade 1 3 mg/m2 Days 1,8,15,22  Cytoxan 300 mg/m2  PO Days 1, 8,15, 22  Dexamethasone 40 mg PO Days 1,8,15, 22  Zometa 4 mg IV Day 1  Cycle length = 28 days    Completed 2 cycles  Day one of cycle 2 was on 11/11/15      12/2015 - 2/2016 Chemotherapy    VDT-PACE x 2 cycles   (completed at [facility] [place], [address] [place] [address])     2/2016 Transplant    Melphalane 200 mg/m2 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      MRD +     8/2016 - 1/26/2018 Chemotherapy    Maintenance therapy:  Carfilzomib, orginally dosed 27 mg weekly then increased to 45 mg weekly after MRD reactivitation  Revlimid  Dexamethasone      2/2018 Biopsy    Bone marrow demonstrated positive minimal residual disease       2/23/2018 -  Chemotherapy    Daratumumab 16mg/m2 IV Day 1  Pomalyst 4 mg p o  daily 1-21  Dexamethasone 4 mg PO Days 2, 3  Dexamethasone 12 mg PODays 8, 15, 22  Cycle length = 28 days     4/11/2019 - 8/29/2019 Chemotherapy    methylPREDNISolone sodium succinate (Solu-MEDROL) 100 mg in sodium chloride 0 9 % 250 mL IVPB, 100 mg, Intravenous, Once, 5 of 12 cycles  Administration: 100 mg (6/7/2019), 100 mg (7/5/2019), 100 mg (8/2/2019)     8/30/2019 - 11/8/2019 Chemotherapy    cyclophosphamide (CYTOXAN) 1,000 mg in sodium chloride 0 9 % 250 mL IVPB, 990 mg (66 7 % of original dose 750 mg/m2), Intravenous, Once, 2 of 3 cycles  Dose modification: 500 mg/m2 (original dose 750 mg/m2, Cycle 1, Reason: Other (See Comments)), 250 mg/m2 (original dose 750 mg/m2, Cycle 4, Reason: Treatment Parameters Not Met)  Administration: 1,000 mg (8/30/2019), 1,000 mg (9/13/2019), 1,000 mg (9/27/2019), 500 mg (10/11/2019)  bortezomib (VELCADE) subcutaneous injection 2 6 mg, 1 3 mg/m2 = 2 6 mg (86 7 % of original dose 1 5 mg/m2), Subcutaneous, Once, 3 of 4 cycles  Dose modification: 1 3 mg/m2 (original dose 1 5 mg/m2, Cycle 1, Reason: Other (See Comments))  Administration: 2 6 mg (8/30/2019), 2 6 mg (9/13/2019), 2 6 mg (10/25/2019), 2 6 mg (11/8/2019), 2 6 mg (9/27/2019), 2 6 mg (11/1/2019), 2 6 mg (9/20/2019), 2 6 mg (10/4/2019), 2 6 mg (10/11/2019), 2 6 mg (10/18/2019)     12/6/2019 - 6/19/2020 Chemotherapy    pegfilgrastim (NEULASTA ONPRO) subcutaneous injection kit 6 mg, 6 mg, Subcutaneous, Once, 5 of 9 cycles  Administration: 6 mg (2/28/2020), 6 mg (3/13/2020), 6 mg (3/27/2020), 6 mg (4/10/2020), 6 mg (4/24/2020), 6 mg (5/8/2020), 6 mg (6/5/2020), 6 mg (5/22/2020), 6 mg (6/19/2020)  cyclophosphamide (CYTOXAN) 784 mg in sodium chloride 0 9 % 250 mL IVPB, 400 mg/m2 = 784 mg, Intravenous, Once, 5 of 9 cycles  Dose modification: 400 mg/m2 (original dose 750 mg/m2, Cycle 7, Reason: Other (See Comments))  Administration: 784 mg (2/28/2020), 784 mg (3/13/2020), 784 mg (3/27/2020), 784 mg (4/10/2020), 784 mg (4/24/2020), 784 mg (5/8/2020), 784 mg (5/22/2020), 784 mg (6/19/2020), 784 mg (6/5/2020)  methylPREDNISolone sodium succinate (Solu-MEDROL) 100 mg in sodium chloride 0 9 % 250 mL IVPB, 100 mg, Intravenous, Once, 8 of 12 cycles  Administration: 100 mg (12/6/2019), 100 mg (12/13/2019), 100 mg (12/20/2019), 100 mg (1/3/2020), 100 mg (1/10/2020), 100 mg (1/17/2020), 100 mg (1/31/2020), 100 mg (2/14/2020), 100 mg (5/22/2020), 100 mg (12/27/2019), 100 mg (1/24/2020), 100 mg (2/28/2020), 100 mg (3/13/2020), 100 mg (3/27/2020), 100 mg (4/10/2020), 100 mg (4/24/2020), 100 mg (5/8/2020), 100 mg (6/19/2020)     7/2/2020 -  Chemotherapy           Interval History:  ***  {performance status:33027}    Review of Systems   Constitutional: Negative for chills and fever  HENT: Negative for nosebleeds  Eyes: Negative for discharge  Respiratory: Negative for cough and shortness of breath  Cardiovascular: Negative for chest pain  Gastrointestinal: Negative for abdominal pain, constipation and diarrhea  Endocrine: Negative for polydipsia  Genitourinary: Negative for hematuria  Musculoskeletal: Negative for arthralgias  Skin: Negative for color change  Allergic/Immunologic: Negative for immunocompromised state  Neurological: Negative for dizziness and headaches  Hematological: Negative for adenopathy  Psychiatric/Behavioral: Negative for agitation         Past Medical History:   Diagnosis Date    History of autologous stem cell transplant ([facility] )     Hypertension     History of HTN-stable since weight loss    Insomnia     Multiple myeloma (HCC)      Patient Active Problem List   Diagnosis    IgG multiple myeloma ([facility] )    Persistent atrial fibrillation (HCC)    Essential hypertension    Chronic ITP (idiopathic thrombocytopenia) (HCC)    Hyperlipidemia    Hypocalcemia    Hypothyroidism    Long term current use of systemic steroids    Other fatigue    Prophylactic measure    Hypogammaglobulinemia (HCC)    Leukopenia    Anemia due to stage 3 chronic kidney disease (HCC)       Current Outpatient Medications:     acetaminophen (TYLENOL) 325 mg tablet, Take 650 mg by mouth every 6 (six) hours as needed for mild pain, Disp: , Rfl:     acyclovir (ZOVIRAX) 200 mg capsule, Take (1) Capsule twice daily, Disp: 60 capsule, Rfl: 5    Ascorbic Acid (vitamin C) 1000 MG tablet, Take 1,000 mg by mouth daily, Disp: , Rfl:     aspirin 81 MG tablet, Take 81 mg by mouth daily  , Disp: , Rfl:     atorvastatin (LIPITOR) 20 mg tablet, TAKE (1) TABLET DAILY  , Disp: 90 tablet, Rfl: 0    Cholecalciferol (VITAMIN D-3 PO), Take 1,000 Units by mouth daily , Disp: , Rfl:     Docusate Sodium (COLACE PO), Take 1 tablet by mouth as needed  , Disp: , Rfl:     eltrombopag (PROMACTA) 75 MG TABS, Take 1 tablet (75 mg total) by mouth daily, Disp: 30 tablet, Rfl: 3    escitalopram (LEXAPRO) 20 mg tablet, Take 1 tablet (20 mg total) by mouth daily, Disp: 90 tablet, Rfl: 0    Glutamine POWD, by Does not apply route, Disp: , Rfl:     levothyroxine 50 mcg tablet, Take 1 tablet (50 mcg total) by mouth daily, Disp: 90 tablet, Rfl: 0    LORazepam (ATIVAN) 0 5 mg tablet, Take 0 5 mg by mouth every 6 (six) hours as needed for anxiety  , Disp: , Rfl:     LORazepam (ATIVAN) 1 mg tablet, Take 1 tablet (1 mg total) by mouth every 4 (four) hours as needed for anxiety, Disp: 100 tablet, Rfl: 1    Multiple Vitamin (MULTIVITAMINS PO), Take 1 tablet by mouth daily  , Disp: , Rfl:     ondansetron (ZOFRAN) 4 mg tablet, Take 4 mg by mouth every 8 (eight) hours as needed for nausea or vomiting , Disp: , Rfl:   No Known Allergies  Past Surgical History:   Procedure Laterality Date    APPENDECTOMY      Last assessed: 9/25/15    ARTHROSCOPY KNEE Left     9/30/09    EYE SURGERY      Lasik    KNEE CARTILAGE SURGERY      LIMBAL STEM CELL TRANSPLANT      Patient had 2 in [place]-2/29/2016 and 4/13/2016     Social History     Objective:  Vitals:    08/02/21 0813   BP: 122/74   BP Location: Right arm   Patient Position: Sitting   Pulse: 59   Resp: 18   Temp: 97 5 °F (36 4 °C)   TempSrc: Tympanic   SpO2: 97%   Weight: 78 3 kg (172 lb 9 6 oz)     Physical Exam  Constitutional:       Appearance: He is well-developed  HENT:      Head: Normocephalic and atraumatic  Eyes:      Pupils: Pupils are equal, round, and reactive to light  Cardiovascular:      Rate and Rhythm: Normal rate and regular rhythm  Heart sounds: No murmur heard  Pulmonary:      Breath sounds: Normal breath sounds  No wheezing or rales  Abdominal:      Palpations: Abdomen is soft  Tenderness: There is no abdominal tenderness  Musculoskeletal:         General: No tenderness  Normal range of motion  Cervical back: Neck supple  Lymphadenopathy:      Cervical: No cervical adenopathy  Skin:     Findings: No erythema or rash  Neurological:      Mental Status: He is alert and oriented to person, place, and time  Cranial Nerves: No cranial nerve deficit  Deep Tendon Reflexes: Reflexes are normal and symmetric  Psychiatric:         Behavior: Behavior normal            Labs: I personally reviewed the labs and imaging pertinent to this patient care

## 2021-08-06 ENCOUNTER — HOSPITAL ENCOUNTER (OUTPATIENT)
Dept: INFUSION CENTER | Facility: CLINIC | Age: 76
Discharge: HOME | End: 2021-08-06
Payer: COMMERCIAL

## 2021-08-06 VITALS
SYSTOLIC BLOOD PRESSURE: 130 MMHG | HEART RATE: 56 BPM | WEIGHT: 171.08 LBS | BODY MASS INDEX: 25.93 KG/M2 | DIASTOLIC BLOOD PRESSURE: 87 MMHG | TEMPERATURE: 97.8 F | HEIGHT: 68 IN | RESPIRATION RATE: 18 BRPM

## 2021-08-06 DIAGNOSIS — C90.00 IGG MULTIPLE MYELOMA (HCC): ICD-10-CM

## 2021-08-06 DIAGNOSIS — D80.1 HYPOGAMMAGLOBULINEMIA (HCC): Primary | ICD-10-CM

## 2021-08-06 PROCEDURE — 96366 THER/PROPH/DIAG IV INF ADDON: CPT

## 2021-08-06 PROCEDURE — 96367 TX/PROPH/DG ADDL SEQ IV INF: CPT

## 2021-08-06 PROCEDURE — 96365 THER/PROPH/DIAG IV INF INIT: CPT

## 2021-08-06 RX ORDER — SODIUM CHLORIDE 9 MG/ML
20 INJECTION, SOLUTION INTRAVENOUS ONCE
Status: CANCELLED
Start: 2021-09-03 | End: 2021-09-03

## 2021-08-06 RX ORDER — SODIUM CHLORIDE 9 MG/ML
20 INJECTION, SOLUTION INTRAVENOUS ONCE
Status: COMPLETED | OUTPATIENT
Start: 2021-08-06 | End: 2021-08-06

## 2021-08-06 RX ADMIN — SODIUM CHLORIDE 20 ML/HR: 0.9 INJECTION, SOLUTION INTRAVENOUS at 08:33

## 2021-08-06 RX ADMIN — ZOLEDRONIC ACID 3.3 MG: 4 INJECTION, SOLUTION, CONCENTRATE INTRAVENOUS at 10:33

## 2021-08-06 RX ADMIN — Medication 30 G: at 08:33

## 2021-08-06 NOTE — PROGRESS NOTES
Patient tolerated IVIG and Zometa infusions well  Offers no complaints  Pt and wife are aware of all future appointments   Declined AVS

## 2021-08-17 DIAGNOSIS — C90.00 IGG MULTIPLE MYELOMA (HCC): Primary | ICD-10-CM

## 2021-08-18 DIAGNOSIS — D80.1 HYPOGAMMAGLOBULINEMIA (HCC): Primary | ICD-10-CM

## 2021-08-18 DIAGNOSIS — C90.00 IGG MULTIPLE MYELOMA (HCC): ICD-10-CM

## 2021-08-19 DIAGNOSIS — D80.1 HYPOGAMMAGLOBULINEMIA (HCC): Primary | ICD-10-CM

## 2021-08-19 DIAGNOSIS — C90.00 IGG MULTIPLE MYELOMA (HCC): ICD-10-CM

## 2021-09-04 NOTE — TELEPHONE ENCOUNTER
Critical results   Platelets [id number]
Dr Paris Najera is aware of PLT count and nothing needs to be done 
Noted  Please review with Dr Kamar Edmondsast 
GOAL: Pt will ambulate 150ft w/supervision w/RW within 2 weeks.

## 2021-09-07 ENCOUNTER — TELEPHONE (OUTPATIENT)
Dept: HEMATOLOGY ONCOLOGY | Facility: CLINIC | Age: 76
End: 2021-09-07

## 2021-09-07 NOTE — TELEPHONE ENCOUNTER
Dr Norma Philippe with [facility] calling to speak with Dr Angelika Harvey regarding patient   He can be reached at [phone]

## 2021-09-09 DIAGNOSIS — E78.5 HYPERLIPIDEMIA, UNSPECIFIED HYPERLIPIDEMIA TYPE: ICD-10-CM

## 2021-09-09 DIAGNOSIS — C90.00 IGG MULTIPLE MYELOMA (HCC): Primary | ICD-10-CM

## 2021-09-10 DIAGNOSIS — E78.5 HYPERLIPIDEMIA, UNSPECIFIED HYPERLIPIDEMIA TYPE: ICD-10-CM

## 2021-09-10 RX ORDER — ATORVASTATIN CALCIUM 20 MG/1
20 TABLET, FILM COATED ORAL DAILY
Qty: 90 TABLET | Refills: 0 | Status: SHIPPED | OUTPATIENT
Start: 2021-09-10 | End: 2021-12-27

## 2021-09-10 RX ORDER — ATORVASTATIN CALCIUM 20 MG/1
20 TABLET, FILM COATED ORAL DAILY
Qty: 90 TABLET | Refills: 0 | Status: SHIPPED | OUTPATIENT
Start: 2021-09-10 | End: 2021-10-06 | Stop reason: SDUPTHER

## 2021-09-13 ENCOUNTER — APPOINTMENT (OUTPATIENT)
Dept: LAB | Facility: CLINIC | Age: 76
End: 2021-09-13
Payer: COMMERCIAL

## 2021-09-13 ENCOUNTER — TELEPHONE (OUTPATIENT)
Dept: HEMATOLOGY ONCOLOGY | Facility: CLINIC | Age: 76
End: 2021-09-13

## 2021-09-13 DIAGNOSIS — C90.00 MULTIPLE MYELOMA, REMISSION STATUS UNSPECIFIED (HCC): ICD-10-CM

## 2021-09-13 LAB
ALBUMIN SERPL BCP-MCNC: 3.4 G/DL (ref 3.5–5)
ALP SERPL-CCNC: 90 U/L (ref 46–116)
ALT SERPL W P-5'-P-CCNC: 20 U/L (ref 12–78)
ANION GAP SERPL CALCULATED.3IONS-SCNC: 6 MMOL/L (ref 4–13)
AST SERPL W P-5'-P-CCNC: 23 U/L (ref 5–45)
BILIRUB SERPL-MCNC: 0.6 MG/DL (ref 0.2–1)
BUN SERPL-MCNC: 15 MG/DL (ref 5–25)
CALCIUM ALBUM COR SERPL-MCNC: 9.7 MG/DL (ref 8.3–10.1)
CALCIUM SERPL-MCNC: 9.2 MG/DL (ref 8.3–10.1)
CHLORIDE SERPL-SCNC: 107 MMOL/L (ref 100–108)
CO2 SERPL-SCNC: 26 MMOL/L (ref 21–32)
CREAT SERPL-MCNC: 1.7 MG/DL (ref 0.6–1.3)
CRP SERPL QL: <3 MG/L
FIBRINOGEN PPP-MCNC: 488 MG/DL (ref 227–495)
GFR SERPL CREATININE-BSD FRML MDRD: 39 ML/MIN/1.73SQ M
GLUCOSE P FAST SERPL-MCNC: 87 MG/DL (ref 65–99)
POTASSIUM SERPL-SCNC: 3.8 MMOL/L (ref 3.5–5.3)
PROT SERPL-MCNC: 7 G/DL (ref 6.4–8.2)
SODIUM SERPL-SCNC: 139 MMOL/L (ref 136–145)

## 2021-09-13 PROCEDURE — 86334 IMMUNOFIX E-PHORESIS SERUM: CPT

## 2021-09-13 PROCEDURE — 86334 IMMUNOFIX E-PHORESIS SERUM: CPT | Performed by: PATHOLOGY

## 2021-09-13 PROCEDURE — 84165 PROTEIN E-PHORESIS SERUM: CPT | Performed by: PATHOLOGY

## 2021-09-13 PROCEDURE — 83883 ASSAY NEPHELOMETRY NOT SPEC: CPT

## 2021-09-13 PROCEDURE — 80053 COMPREHEN METABOLIC PANEL: CPT | Performed by: INTERNAL MEDICINE

## 2021-09-13 PROCEDURE — 84165 PROTEIN E-PHORESIS SERUM: CPT

## 2021-09-13 PROCEDURE — 85384 FIBRINOGEN ACTIVITY: CPT

## 2021-09-13 PROCEDURE — 86140 C-REACTIVE PROTEIN: CPT

## 2021-09-13 NOTE — TELEPHONE ENCOUNTER
Left message to review above  Per Lg Ross patient can have a platelet transfusion  Awaiting return call

## 2021-09-13 NOTE — TELEPHONE ENCOUNTER
Patient's wife Suan Patron called back and reports that patient is not having any active bleeding or bruising  They are requesting SLA for his platelet transfusion  Call placed to [facility] infusion center spoke with Amira Pac  Patient scheduled for 9:30 AM tomorrow for 1 unit of platelets  Suan Patron called and advised of above  She verbalized understanding of above

## 2021-09-13 NOTE — TELEPHONE ENCOUNTER
Critical Results   Call Received From [address]- 19,000   Date Blood Work was Done 09/13   Read Back of Information Done yes   Relevant Information

## 2021-09-14 ENCOUNTER — HOSPITAL ENCOUNTER (OUTPATIENT)
Dept: INFUSION CENTER | Facility: CLINIC | Age: 76
Discharge: HOME | End: 2021-09-14
Payer: COMMERCIAL

## 2021-09-14 VITALS
TEMPERATURE: 97.4 F | DIASTOLIC BLOOD PRESSURE: 82 MMHG | HEART RATE: 60 BPM | SYSTOLIC BLOOD PRESSURE: 133 MMHG | RESPIRATION RATE: 16 BRPM

## 2021-09-14 DIAGNOSIS — D69.3 CHRONIC ITP (IDIOPATHIC THROMBOCYTOPENIA) (HCC): Primary | ICD-10-CM

## 2021-09-14 LAB
ALBUMIN SERPL ELPH-MCNC: 3.86 G/DL (ref 3.5–5)
ALBUMIN SERPL ELPH-MCNC: 59.4 % (ref 52–65)
ALPHA1 GLOB SERPL ELPH-MCNC: 0.35 G/DL (ref 0.1–0.4)
ALPHA1 GLOB SERPL ELPH-MCNC: 5.4 % (ref 2.5–5)
ALPHA2 GLOB SERPL ELPH-MCNC: 0.98 G/DL (ref 0.4–1.2)
ALPHA2 GLOB SERPL ELPH-MCNC: 15 % (ref 7–13)
BETA GLOB ABNORMAL SERPL ELPH-MCNC: 0.44 G/DL (ref 0.4–0.8)
BETA1 GLOB SERPL ELPH-MCNC: 6.8 % (ref 5–13)
BETA2 GLOB SERPL ELPH-MCNC: 5.5 % (ref 2–8)
BETA2+GAMMA GLOB SERPL ELPH-MCNC: 0.36 G/DL (ref 0.2–0.5)
GAMMA GLOB ABNORMAL SERPL ELPH-MCNC: 0.51 G/DL (ref 0.5–1.6)
GAMMA GLOB SERPL ELPH-MCNC: 7.9 % (ref 12–22)
IGG/ALB SER: 1.46 {RATIO} (ref 1.1–1.8)
INTERPRETATION UR IFE-IMP: NORMAL
KAPPA LC FREE SER-MCNC: <0.7 MG/L (ref 3.3–19.4)
KAPPA LC FREE/LAMBDA FREE SER: ABNORMAL {RATIO}
LAMBDA LC FREE SERPL-MCNC: <1.5 MG/L (ref 5.7–26.3)
M PROTEIN 1 MFR SERPL ELPH: 1.9 %
M PROTEIN 1 SERPL ELPH-MCNC: 0.12 G/DL
PROT PATTERN SERPL ELPH-IMP: ABNORMAL
PROT SERPL-MCNC: 6.5 G/DL (ref 6.4–8.2)

## 2021-09-14 PROCEDURE — P9037 PLATE PHERES LEUKOREDU IRRAD: HCPCS

## 2021-09-14 PROCEDURE — 36430 TRANSFUSION BLD/BLD COMPNT: CPT

## 2021-09-14 RX ORDER — SODIUM CHLORIDE 9 MG/ML
20 INJECTION, SOLUTION INTRAVENOUS ONCE
Status: CANCELLED | OUTPATIENT
Start: 2021-09-14

## 2021-09-14 RX ORDER — SODIUM CHLORIDE 9 MG/ML
20 INJECTION, SOLUTION INTRAVENOUS ONCE
Status: COMPLETED | OUTPATIENT
Start: 2021-09-14 | End: 2021-09-14

## 2021-09-14 RX ADMIN — SODIUM CHLORIDE 20 ML/HR: 0.9 INJECTION, SOLUTION INTRAVENOUS at 10:12

## 2021-09-14 NOTE — PROGRESS NOTES
Patient tolerated transfusion of 1 unit of platelets without incident  Pt is aware of all future appointments   Declined AVS

## 2021-09-15 LAB
ABO GROUP BLD BPU: NORMAL
BPU ID: NORMAL
UNIT DISPENSE STATUS: NORMAL
UNIT PRODUCT CODE: NORMAL
UNIT PRODUCT VOLUME: 240 ML
UNIT RH: NORMAL

## 2021-09-20 ENCOUNTER — TELEPHONE (OUTPATIENT)
Dept: HEMATOLOGY ONCOLOGY | Facility: CLINIC | Age: 76
End: 2021-09-20

## 2021-09-20 ENCOUNTER — APPOINTMENT (OUTPATIENT)
Dept: LAB | Facility: CLINIC | Age: 76
End: 2021-09-20
Payer: COMMERCIAL

## 2021-09-20 DIAGNOSIS — C90.00 IGG MULTIPLE MYELOMA (HCC): ICD-10-CM

## 2021-09-20 LAB
ALBUMIN SERPL BCP-MCNC: 3.6 G/DL (ref 3.5–5)
ALP SERPL-CCNC: 93 U/L (ref 46–116)
ALT SERPL W P-5'-P-CCNC: 28 U/L (ref 12–78)
ANION GAP SERPL CALCULATED.3IONS-SCNC: 3 MMOL/L (ref 4–13)
AST SERPL W P-5'-P-CCNC: 29 U/L (ref 5–45)
BILIRUB SERPL-MCNC: 0.83 MG/DL (ref 0.2–1)
BUN SERPL-MCNC: 19 MG/DL (ref 5–25)
CALCIUM SERPL-MCNC: 9.5 MG/DL (ref 8.3–10.1)
CHLORIDE SERPL-SCNC: 107 MMOL/L (ref 100–108)
CO2 SERPL-SCNC: 26 MMOL/L (ref 21–32)
CREAT SERPL-MCNC: 1.72 MG/DL (ref 0.6–1.3)
GFR SERPL CREATININE-BSD FRML MDRD: 39 ML/MIN/1.73SQ M
GLUCOSE P FAST SERPL-MCNC: 87 MG/DL (ref 65–99)
POTASSIUM SERPL-SCNC: 4 MMOL/L (ref 3.5–5.3)
PROT SERPL-MCNC: 7 G/DL (ref 6.4–8.2)
SODIUM SERPL-SCNC: 136 MMOL/L (ref 136–145)

## 2021-09-20 PROCEDURE — 83883 ASSAY NEPHELOMETRY NOT SPEC: CPT

## 2021-09-20 PROCEDURE — 80053 COMPREHEN METABOLIC PANEL: CPT

## 2021-09-20 NOTE — TELEPHONE ENCOUNTER
Critical Results   Call Received From [facility]   Lab Study Platelet [phone]   Date Blood Work was Done 09/20/2021   Read Back of Information Done Yes   Marizol

## 2021-09-21 LAB
KAPPA LC FREE SER-MCNC: <0.7 MG/L (ref 3.3–19.4)
KAPPA LC FREE/LAMBDA FREE SER: ABNORMAL {RATIO}
LAMBDA LC FREE SERPL-MCNC: <1.5 MG/L (ref 5.7–26.3)

## 2021-09-27 ENCOUNTER — TELEPHONE (OUTPATIENT)
Dept: HEMATOLOGY ONCOLOGY | Facility: CLINIC | Age: 76
End: 2021-09-27

## 2021-09-27 ENCOUNTER — APPOINTMENT (OUTPATIENT)
Dept: LAB | Facility: CLINIC | Age: 76
End: 2021-09-27
Payer: COMMERCIAL

## 2021-09-27 DIAGNOSIS — C90.00 IGG MULTIPLE MYELOMA (HCC): ICD-10-CM

## 2021-09-27 LAB
ALBUMIN SERPL BCP-MCNC: 3.6 G/DL (ref 3.5–5)
ALP SERPL-CCNC: 84 U/L (ref 46–116)
ALT SERPL W P-5'-P-CCNC: 27 U/L (ref 12–78)
ANION GAP SERPL CALCULATED.3IONS-SCNC: 3 MMOL/L (ref 4–13)
AST SERPL W P-5'-P-CCNC: 26 U/L (ref 5–45)
BILIRUB SERPL-MCNC: 0.89 MG/DL (ref 0.2–1)
BUN SERPL-MCNC: 18 MG/DL (ref 5–25)
CALCIUM SERPL-MCNC: 8.9 MG/DL (ref 8.3–10.1)
CHLORIDE SERPL-SCNC: 108 MMOL/L (ref 100–108)
CO2 SERPL-SCNC: 27 MMOL/L (ref 21–32)
CREAT SERPL-MCNC: 1.63 MG/DL (ref 0.6–1.3)
GFR SERPL CREATININE-BSD FRML MDRD: 41 ML/MIN/1.73SQ M
GLUCOSE P FAST SERPL-MCNC: 91 MG/DL (ref 65–99)
POTASSIUM SERPL-SCNC: 3.8 MMOL/L (ref 3.5–5.3)
PROT SERPL-MCNC: 7 G/DL (ref 6.4–8.2)
SODIUM SERPL-SCNC: 138 MMOL/L (ref 136–145)

## 2021-09-27 PROCEDURE — 84165 PROTEIN E-PHORESIS SERUM: CPT

## 2021-09-27 PROCEDURE — 83883 ASSAY NEPHELOMETRY NOT SPEC: CPT

## 2021-09-27 PROCEDURE — 80053 COMPREHEN METABOLIC PANEL: CPT

## 2021-09-27 PROCEDURE — 84165 PROTEIN E-PHORESIS SERUM: CPT | Performed by: PATHOLOGY

## 2021-09-27 RX ORDER — SODIUM CHLORIDE 9 MG/ML
20 INJECTION, SOLUTION INTRAVENOUS ONCE
Status: CANCELLED | OUTPATIENT
Start: 2021-09-28

## 2021-09-27 RX ORDER — SODIUM CHLORIDE 9 MG/ML
20 INJECTION, SOLUTION INTRAVENOUS ONCE
Status: CANCELLED | OUTPATIENT
Start: 2021-09-27

## 2021-09-27 NOTE — TELEPHONE ENCOUNTER
Critical Results   Call Received From [place] [id number]    Lab Study WBC 1 86  PLATELET 17   Date Blood Work was Done Today    Read Back of Information Done Yes    Relevant Information

## 2021-09-27 NOTE — TELEPHONE ENCOUNTER
Results reviewed with [facility]  Patient scheduled for 1 bag of platelets tomorrow at [facility] infusion center  Wife reports patient does not have any active bleeding at this time  Patient will be resuming Promacta 75,g today  Per wife they had Teouble getting the medication after CAR T    Patient will continue with weekly labs as planned

## 2021-09-28 ENCOUNTER — HOSPITAL ENCOUNTER (OUTPATIENT)
Dept: INFUSION CENTER | Facility: CLINIC | Age: 76
Discharge: HOME | End: 2021-09-28
Payer: COMMERCIAL

## 2021-09-28 VITALS
HEART RATE: 57 BPM | SYSTOLIC BLOOD PRESSURE: 117 MMHG | RESPIRATION RATE: 16 BRPM | DIASTOLIC BLOOD PRESSURE: 75 MMHG | TEMPERATURE: 97.4 F

## 2021-09-28 DIAGNOSIS — D69.3 CHRONIC ITP (IDIOPATHIC THROMBOCYTOPENIA) (HCC): Primary | ICD-10-CM

## 2021-09-28 LAB
ALBUMIN SERPL ELPH-MCNC: 4.08 G/DL (ref 3.5–5)
ALBUMIN SERPL ELPH-MCNC: 62.7 % (ref 52–65)
ALPHA1 GLOB SERPL ELPH-MCNC: 0.34 G/DL (ref 0.1–0.4)
ALPHA1 GLOB SERPL ELPH-MCNC: 5.2 % (ref 2.5–5)
ALPHA2 GLOB SERPL ELPH-MCNC: 0.86 G/DL (ref 0.4–1.2)
ALPHA2 GLOB SERPL ELPH-MCNC: 13.3 % (ref 7–13)
BETA GLOB ABNORMAL SERPL ELPH-MCNC: 0.45 G/DL (ref 0.4–0.8)
BETA1 GLOB SERPL ELPH-MCNC: 6.9 % (ref 5–13)
BETA2 GLOB SERPL ELPH-MCNC: 4.7 % (ref 2–8)
BETA2+GAMMA GLOB SERPL ELPH-MCNC: 0.31 G/DL (ref 0.2–0.5)
GAMMA GLOB ABNORMAL SERPL ELPH-MCNC: 0.47 G/DL (ref 0.5–1.6)
GAMMA GLOB SERPL ELPH-MCNC: 7.2 % (ref 12–22)
IGG/ALB SER: 1.68 {RATIO} (ref 1.1–1.8)
KAPPA LC FREE SER-MCNC: <0.7 MG/L (ref 3.3–19.4)
KAPPA LC FREE/LAMBDA FREE SER: ABNORMAL {RATIO}
LAMBDA LC FREE SERPL-MCNC: <1.5 MG/L (ref 5.7–26.3)
M PROTEIN 1 MFR SERPL ELPH: 1.1 %
M PROTEIN 1 SERPL ELPH-MCNC: 0.07 G/DL
PROT PATTERN SERPL ELPH-IMP: ABNORMAL
PROT SERPL-MCNC: 6.5 G/DL (ref 6.4–8.2)

## 2021-09-28 PROCEDURE — P9037 PLATE PHERES LEUKOREDU IRRAD: HCPCS

## 2021-09-28 PROCEDURE — 36430 TRANSFUSION BLD/BLD COMPNT: CPT

## 2021-09-28 RX ORDER — SODIUM CHLORIDE 9 MG/ML
20 INJECTION, SOLUTION INTRAVENOUS ONCE
Status: COMPLETED | OUTPATIENT
Start: 2021-09-28 | End: 2021-09-28

## 2021-09-28 RX ADMIN — SODIUM CHLORIDE 20 ML/HR: 0.9 INJECTION, SOLUTION INTRAVENOUS at 08:25

## 2021-09-29 LAB
ABO GROUP BLD BPU: NORMAL
BPU ID: NORMAL
UNIT DISPENSE STATUS: NORMAL
UNIT PRODUCT CODE: NORMAL
UNIT PRODUCT VOLUME: 300 ML
UNIT RH: NORMAL

## 2021-10-01 ENCOUNTER — HOSPITAL ENCOUNTER (OUTPATIENT)
Dept: INFUSION CENTER | Facility: CLINIC | Age: 76
Discharge: HOME | End: 2021-10-01
Payer: COMMERCIAL

## 2021-10-01 VITALS
DIASTOLIC BLOOD PRESSURE: 82 MMHG | TEMPERATURE: 98.1 F | RESPIRATION RATE: 18 BRPM | HEART RATE: 69 BPM | SYSTOLIC BLOOD PRESSURE: 142 MMHG

## 2021-10-01 DIAGNOSIS — D80.1 HYPOGAMMAGLOBULINEMIA (HCC): ICD-10-CM

## 2021-10-01 DIAGNOSIS — C90.00 IGG MULTIPLE MYELOMA (HCC): Primary | ICD-10-CM

## 2021-10-01 PROCEDURE — 96365 THER/PROPH/DIAG IV INF INIT: CPT

## 2021-10-01 PROCEDURE — 96366 THER/PROPH/DIAG IV INF ADDON: CPT

## 2021-10-01 PROCEDURE — 96367 TX/PROPH/DG ADDL SEQ IV INF: CPT

## 2021-10-01 RX ORDER — SODIUM CHLORIDE 9 MG/ML
20 INJECTION, SOLUTION INTRAVENOUS ONCE
Status: COMPLETED | OUTPATIENT
Start: 2021-10-01 | End: 2021-10-01

## 2021-10-01 RX ORDER — SODIUM CHLORIDE 9 MG/ML
20 INJECTION, SOLUTION INTRAVENOUS ONCE
Status: CANCELLED
Start: 2021-10-29 | End: 2021-10-29

## 2021-10-01 RX ADMIN — ZOLEDRONIC ACID 3 MG: 4 INJECTION, SOLUTION, CONCENTRATE INTRAVENOUS at 08:25

## 2021-10-01 RX ADMIN — Medication 30 G: at 09:17

## 2021-10-01 RX ADMIN — SODIUM CHLORIDE 20 ML/HR: 0.9 INJECTION, SOLUTION INTRAVENOUS at 08:25

## 2021-10-04 ENCOUNTER — APPOINTMENT (OUTPATIENT)
Dept: LAB | Facility: CLINIC | Age: 76
End: 2021-10-04
Payer: COMMERCIAL

## 2021-10-04 ENCOUNTER — TELEPHONE (OUTPATIENT)
Dept: HEMATOLOGY ONCOLOGY | Facility: CLINIC | Age: 76
End: 2021-10-04

## 2021-10-04 DIAGNOSIS — E03.9 HYPOTHYROIDISM, UNSPECIFIED TYPE: ICD-10-CM

## 2021-10-04 DIAGNOSIS — C90.00 IGG MULTIPLE MYELOMA (HCC): ICD-10-CM

## 2021-10-04 DIAGNOSIS — F41.9 ANXIETY: ICD-10-CM

## 2021-10-04 LAB
ALBUMIN SERPL BCP-MCNC: 3.7 G/DL (ref 3.5–5)
ALP SERPL-CCNC: 89 U/L (ref 46–116)
ALT SERPL W P-5'-P-CCNC: 28 U/L (ref 12–78)
ANION GAP SERPL CALCULATED.3IONS-SCNC: 6 MMOL/L (ref 4–13)
AST SERPL W P-5'-P-CCNC: 28 U/L (ref 5–45)
BILIRUB SERPL-MCNC: 1.02 MG/DL (ref 0.2–1)
BUN SERPL-MCNC: 18 MG/DL (ref 5–25)
CALCIUM SERPL-MCNC: 9.2 MG/DL (ref 8.3–10.1)
CHLORIDE SERPL-SCNC: 107 MMOL/L (ref 100–108)
CO2 SERPL-SCNC: 23 MMOL/L (ref 21–32)
CREAT SERPL-MCNC: 1.72 MG/DL (ref 0.6–1.3)
GFR SERPL CREATININE-BSD FRML MDRD: 39 ML/MIN/1.73SQ M
GLUCOSE P FAST SERPL-MCNC: 103 MG/DL (ref 65–99)
POTASSIUM SERPL-SCNC: 3.8 MMOL/L (ref 3.5–5.3)
PROT SERPL-MCNC: 7.7 G/DL (ref 6.4–8.2)
SODIUM SERPL-SCNC: 136 MMOL/L (ref 136–145)

## 2021-10-04 PROCEDURE — 80053 COMPREHEN METABOLIC PANEL: CPT

## 2021-10-04 PROCEDURE — 83883 ASSAY NEPHELOMETRY NOT SPEC: CPT

## 2021-10-04 RX ORDER — SODIUM CHLORIDE 9 MG/ML
20 INJECTION, SOLUTION INTRAVENOUS ONCE
Status: CANCELLED | OUTPATIENT
Start: 2021-10-06

## 2021-10-04 RX ORDER — LEVOTHYROXINE SODIUM 0.05 MG/1
50 TABLET ORAL DAILY
Qty: 90 TABLET | Refills: 0 | Status: SHIPPED | OUTPATIENT
Start: 2021-10-04 | End: 2022-04-14

## 2021-10-04 RX ORDER — ESCITALOPRAM OXALATE 20 MG/1
20 TABLET ORAL DAILY
Qty: 90 TABLET | Refills: 0 | Status: SHIPPED | OUTPATIENT
Start: 2021-10-04 | End: 2022-01-11 | Stop reason: SDUPTHER

## 2021-10-05 LAB
KAPPA LC FREE SER-MCNC: 0.7 MG/L (ref 3.3–19.4)
KAPPA LC FREE/LAMBDA FREE SER: >0.47 {RATIO} (ref 0.26–1.65)
LAMBDA LC FREE SERPL-MCNC: <1.5 MG/L (ref 5.7–26.3)

## 2021-10-06 ENCOUNTER — OFFICE VISIT (OUTPATIENT)
Dept: HEMATOLOGY ONCOLOGY | Facility: CLINIC | Age: 76
End: 2021-10-06
Payer: COMMERCIAL

## 2021-10-06 VITALS
HEART RATE: 65 BPM | WEIGHT: 163.6 LBS | OXYGEN SATURATION: 97 % | RESPIRATION RATE: 16 BRPM | BODY MASS INDEX: 24.94 KG/M2 | SYSTOLIC BLOOD PRESSURE: 126 MMHG | TEMPERATURE: 97.6 F | DIASTOLIC BLOOD PRESSURE: 74 MMHG

## 2021-10-06 DIAGNOSIS — C90.00 IGG MULTIPLE MYELOMA (HCC): Primary | ICD-10-CM

## 2021-10-06 DIAGNOSIS — D61.818 PANCYTOPENIA (HCC): ICD-10-CM

## 2021-10-06 PROCEDURE — 99214 OFFICE O/P EST MOD 30 MIN: CPT | Performed by: INTERNAL MEDICINE

## 2021-10-06 PROCEDURE — 1160F RVW MEDS BY RX/DR IN RCRD: CPT | Performed by: INTERNAL MEDICINE

## 2021-10-06 PROCEDURE — 1036F TOBACCO NON-USER: CPT | Performed by: INTERNAL MEDICINE

## 2021-10-11 ENCOUNTER — TELEPHONE (OUTPATIENT)
Dept: HEMATOLOGY ONCOLOGY | Facility: CLINIC | Age: 76
End: 2021-10-11

## 2021-10-11 ENCOUNTER — APPOINTMENT (OUTPATIENT)
Dept: LAB | Facility: CLINIC | Age: 76
End: 2021-10-11
Payer: COMMERCIAL

## 2021-10-11 DIAGNOSIS — C90.00 IGG MULTIPLE MYELOMA (HCC): ICD-10-CM

## 2021-10-11 LAB
ALBUMIN SERPL BCP-MCNC: 3.8 G/DL (ref 3.5–5)
ALP SERPL-CCNC: 88 U/L (ref 46–116)
ALT SERPL W P-5'-P-CCNC: 26 U/L (ref 12–78)
ANION GAP SERPL CALCULATED.3IONS-SCNC: 4 MMOL/L (ref 4–13)
AST SERPL W P-5'-P-CCNC: 29 U/L (ref 5–45)
BILIRUB SERPL-MCNC: 1.12 MG/DL (ref 0.2–1)
BUN SERPL-MCNC: 16 MG/DL (ref 5–25)
CALCIUM SERPL-MCNC: 9.3 MG/DL (ref 8.3–10.1)
CHLORIDE SERPL-SCNC: 108 MMOL/L (ref 100–108)
CO2 SERPL-SCNC: 25 MMOL/L (ref 21–32)
CREAT SERPL-MCNC: 1.87 MG/DL (ref 0.6–1.3)
GFR SERPL CREATININE-BSD FRML MDRD: 35 ML/MIN/1.73SQ M
GLUCOSE P FAST SERPL-MCNC: 100 MG/DL (ref 65–99)
POTASSIUM SERPL-SCNC: 3.7 MMOL/L (ref 3.5–5.3)
PROT SERPL-MCNC: 7.6 G/DL (ref 6.4–8.2)
SODIUM SERPL-SCNC: 137 MMOL/L (ref 136–145)

## 2021-10-11 PROCEDURE — 84165 PROTEIN E-PHORESIS SERUM: CPT

## 2021-10-11 PROCEDURE — 80053 COMPREHEN METABOLIC PANEL: CPT

## 2021-10-11 PROCEDURE — 86334 IMMUNOFIX E-PHORESIS SERUM: CPT | Performed by: PATHOLOGY

## 2021-10-11 PROCEDURE — 84165 PROTEIN E-PHORESIS SERUM: CPT | Performed by: PATHOLOGY

## 2021-10-11 PROCEDURE — 86334 IMMUNOFIX E-PHORESIS SERUM: CPT

## 2021-10-11 PROCEDURE — 83883 ASSAY NEPHELOMETRY NOT SPEC: CPT

## 2021-10-12 ENCOUNTER — HOSPITAL ENCOUNTER (OUTPATIENT)
Dept: INFUSION CENTER | Facility: CLINIC | Age: 76
Discharge: HOME | End: 2021-10-12
Payer: COMMERCIAL

## 2021-10-12 VITALS
RESPIRATION RATE: 18 BRPM | TEMPERATURE: 96.9 F | HEART RATE: 68 BPM | DIASTOLIC BLOOD PRESSURE: 85 MMHG | SYSTOLIC BLOOD PRESSURE: 144 MMHG

## 2021-10-12 DIAGNOSIS — D69.3 CHRONIC ITP (IDIOPATHIC THROMBOCYTOPENIA) (HCC): Primary | ICD-10-CM

## 2021-10-12 LAB
ALBUMIN SERPL ELPH-MCNC: 4.27 G/DL (ref 3.5–5)
ALBUMIN SERPL ELPH-MCNC: 60.1 % (ref 52–65)
ALPHA1 GLOB SERPL ELPH-MCNC: 0.34 G/DL (ref 0.1–0.4)
ALPHA1 GLOB SERPL ELPH-MCNC: 4.8 % (ref 2.5–5)
ALPHA2 GLOB SERPL ELPH-MCNC: 0.87 G/DL (ref 0.4–1.2)
ALPHA2 GLOB SERPL ELPH-MCNC: 12.3 % (ref 7–13)
BETA GLOB ABNORMAL SERPL ELPH-MCNC: 0.48 G/DL (ref 0.4–0.8)
BETA1 GLOB SERPL ELPH-MCNC: 6.8 % (ref 5–13)
BETA2 GLOB SERPL ELPH-MCNC: 6.3 % (ref 2–8)
BETA2+GAMMA GLOB SERPL ELPH-MCNC: 0.45 G/DL (ref 0.2–0.5)
GAMMA GLOB ABNORMAL SERPL ELPH-MCNC: 0.69 G/DL (ref 0.5–1.6)
GAMMA GLOB SERPL ELPH-MCNC: 9.7 % (ref 12–22)
IGG/ALB SER: 1.51 {RATIO} (ref 1.1–1.8)
INTERPRETATION UR IFE-IMP: NORMAL
KAPPA LC FREE SER-MCNC: <0.7 MG/L (ref 3.3–19.4)
KAPPA LC FREE/LAMBDA FREE SER: ABNORMAL {RATIO}
LAMBDA LC FREE SERPL-MCNC: <1.5 MG/L (ref 5.7–26.3)
M PROTEIN 1 MFR SERPL ELPH: 1.6 %
M PROTEIN 1 SERPL ELPH-MCNC: 0.11 G/DL
PROT PATTERN SERPL ELPH-IMP: ABNORMAL
PROT SERPL-MCNC: 7.1 G/DL (ref 6.4–8.2)

## 2021-10-12 PROCEDURE — 36430 TRANSFUSION BLD/BLD COMPNT: CPT

## 2021-10-12 PROCEDURE — P9037 PLATE PHERES LEUKOREDU IRRAD: HCPCS

## 2021-10-12 RX ORDER — SODIUM CHLORIDE 9 MG/ML
20 INJECTION, SOLUTION INTRAVENOUS ONCE
Status: CANCELLED | OUTPATIENT
Start: 2021-10-12

## 2021-10-12 RX ORDER — SODIUM CHLORIDE 9 MG/ML
20 INJECTION, SOLUTION INTRAVENOUS ONCE
Status: COMPLETED | OUTPATIENT
Start: 2021-10-12 | End: 2021-10-12

## 2021-10-12 RX ADMIN — SODIUM CHLORIDE 20 ML/HR: 0.9 INJECTION, SOLUTION INTRAVENOUS at 09:26

## 2021-10-18 ENCOUNTER — TELEPHONE (OUTPATIENT)
Dept: HEMATOLOGY ONCOLOGY | Facility: CLINIC | Age: 76
End: 2021-10-18

## 2021-10-18 ENCOUNTER — APPOINTMENT (OUTPATIENT)
Dept: LAB | Facility: CLINIC | Age: 76
End: 2021-10-18
Payer: COMMERCIAL

## 2021-10-18 DIAGNOSIS — C90.00 IGG MULTIPLE MYELOMA (HCC): ICD-10-CM

## 2021-10-18 LAB
ALBUMIN SERPL BCP-MCNC: 4 G/DL (ref 3.5–5)
ALP SERPL-CCNC: 88 U/L (ref 46–116)
ALT SERPL W P-5'-P-CCNC: 28 U/L (ref 12–78)
ANION GAP SERPL CALCULATED.3IONS-SCNC: 2 MMOL/L (ref 4–13)
AST SERPL W P-5'-P-CCNC: 30 U/L (ref 5–45)
BILIRUB SERPL-MCNC: 1.07 MG/DL (ref 0.2–1)
BUN SERPL-MCNC: 19 MG/DL (ref 5–25)
CALCIUM SERPL-MCNC: 9.3 MG/DL (ref 8.3–10.1)
CHLORIDE SERPL-SCNC: 108 MMOL/L (ref 100–108)
CO2 SERPL-SCNC: 26 MMOL/L (ref 21–32)
CREAT SERPL-MCNC: 1.72 MG/DL (ref 0.6–1.3)
GFR SERPL CREATININE-BSD FRML MDRD: 39 ML/MIN/1.73SQ M
GLUCOSE P FAST SERPL-MCNC: 90 MG/DL (ref 65–99)
POTASSIUM SERPL-SCNC: 4.1 MMOL/L (ref 3.5–5.3)
PROT SERPL-MCNC: 7.8 G/DL (ref 6.4–8.2)
SODIUM SERPL-SCNC: 136 MMOL/L (ref 136–145)

## 2021-10-18 PROCEDURE — 80053 COMPREHEN METABOLIC PANEL: CPT

## 2021-10-25 ENCOUNTER — APPOINTMENT (OUTPATIENT)
Dept: LAB | Facility: CLINIC | Age: 76
End: 2021-10-25
Payer: COMMERCIAL

## 2021-10-25 DIAGNOSIS — C90.00 IGG MULTIPLE MYELOMA (HCC): ICD-10-CM

## 2021-10-25 LAB
ALBUMIN SERPL BCP-MCNC: 4 G/DL (ref 3.5–5)
ALP SERPL-CCNC: 78 U/L (ref 46–116)
ALT SERPL W P-5'-P-CCNC: 25 U/L (ref 12–78)
ANION GAP SERPL CALCULATED.3IONS-SCNC: 6 MMOL/L (ref 4–13)
AST SERPL W P-5'-P-CCNC: 28 U/L (ref 5–45)
BILIRUB SERPL-MCNC: 1.26 MG/DL (ref 0.2–1)
BUN SERPL-MCNC: 23 MG/DL (ref 5–25)
CALCIUM SERPL-MCNC: 9.6 MG/DL (ref 8.3–10.1)
CHLORIDE SERPL-SCNC: 107 MMOL/L (ref 100–108)
CO2 SERPL-SCNC: 23 MMOL/L (ref 21–32)
CREAT SERPL-MCNC: 1.74 MG/DL (ref 0.6–1.3)
GFR SERPL CREATININE-BSD FRML MDRD: 38 ML/MIN/1.73SQ M
GLUCOSE P FAST SERPL-MCNC: 103 MG/DL (ref 65–99)
POTASSIUM SERPL-SCNC: 3.9 MMOL/L (ref 3.5–5.3)
PROT SERPL-MCNC: 7.5 G/DL (ref 6.4–8.2)
SODIUM SERPL-SCNC: 136 MMOL/L (ref 136–145)

## 2021-10-25 PROCEDURE — 80053 COMPREHEN METABOLIC PANEL: CPT

## 2021-10-26 ENCOUNTER — TELEPHONE (OUTPATIENT)
Dept: SURGICAL ONCOLOGY | Facility: CLINIC | Age: 76
End: 2021-10-26

## 2021-10-28 RX ORDER — SODIUM CHLORIDE 9 MG/ML
20 INJECTION, SOLUTION INTRAVENOUS ONCE
Status: CANCELLED | OUTPATIENT
Start: 2021-10-28

## 2021-10-29 ENCOUNTER — HOSPITAL ENCOUNTER (OUTPATIENT)
Dept: INFUSION CENTER | Facility: CLINIC | Age: 76
Discharge: HOME | End: 2021-10-29
Payer: COMMERCIAL

## 2021-10-29 VITALS
RESPIRATION RATE: 18 BRPM | TEMPERATURE: 96.3 F | SYSTOLIC BLOOD PRESSURE: 121 MMHG | DIASTOLIC BLOOD PRESSURE: 82 MMHG | HEART RATE: 65 BPM

## 2021-10-29 DIAGNOSIS — C90.00 IGG MULTIPLE MYELOMA (HCC): ICD-10-CM

## 2021-10-29 DIAGNOSIS — D80.1 HYPOGAMMAGLOBULINEMIA (HCC): ICD-10-CM

## 2021-10-29 DIAGNOSIS — D69.3 CHRONIC ITP (IDIOPATHIC THROMBOCYTOPENIA) (HCC): Primary | ICD-10-CM

## 2021-10-29 PROCEDURE — 96367 TX/PROPH/DG ADDL SEQ IV INF: CPT

## 2021-10-29 PROCEDURE — P9037 PLATE PHERES LEUKOREDU IRRAD: HCPCS

## 2021-10-29 PROCEDURE — 96366 THER/PROPH/DIAG IV INF ADDON: CPT

## 2021-10-29 PROCEDURE — 36430 TRANSFUSION BLD/BLD COMPNT: CPT

## 2021-10-29 PROCEDURE — 96365 THER/PROPH/DIAG IV INF INIT: CPT

## 2021-10-29 RX ORDER — SODIUM CHLORIDE 9 MG/ML
20 INJECTION, SOLUTION INTRAVENOUS ONCE
Status: COMPLETED | OUTPATIENT
Start: 2021-10-29 | End: 2021-10-29

## 2021-10-29 RX ORDER — SODIUM CHLORIDE 9 MG/ML
20 INJECTION, SOLUTION INTRAVENOUS ONCE
Status: CANCELLED
Start: 2021-11-26 | End: 2021-11-26

## 2021-10-29 RX ADMIN — Medication 30 G: at 10:02

## 2021-10-29 RX ADMIN — SODIUM CHLORIDE 20 ML/HR: 0.9 INJECTION, SOLUTION INTRAVENOUS at 08:25

## 2021-10-29 RX ADMIN — ZOLEDRONIC ACID 3 MG: 4 INJECTION, SOLUTION, CONCENTRATE INTRAVENOUS at 09:18

## 2021-10-29 RX ADMIN — SODIUM CHLORIDE 20 ML/HR: 0.9 INJECTION, SOLUTION INTRAVENOUS at 08:20

## 2021-10-30 LAB
ABO GROUP BLD BPU: NORMAL
BPU ID: NORMAL
UNIT DISPENSE STATUS: NORMAL
UNIT PRODUCT CODE: NORMAL
UNIT PRODUCT VOLUME: 247 ML
UNIT RH: NORMAL

## 2021-11-01 ENCOUNTER — TELEPHONE (OUTPATIENT)
Dept: SURGICAL ONCOLOGY | Facility: CLINIC | Age: 76
End: 2021-11-01

## 2021-11-01 ENCOUNTER — APPOINTMENT (OUTPATIENT)
Dept: LAB | Facility: CLINIC | Age: 76
End: 2021-11-01
Payer: COMMERCIAL

## 2021-11-01 DIAGNOSIS — D69.3 CHRONIC ITP (IDIOPATHIC THROMBOCYTOPENIA) (HCC): ICD-10-CM

## 2021-11-01 DIAGNOSIS — D84.821 IMMUNOSUPPRESSED DUE TO CHEMOTHERAPY (HCC): ICD-10-CM

## 2021-11-01 DIAGNOSIS — C90.00 MULTIPLE MYELOMA NOT HAVING ACHIEVED REMISSION (HCC): ICD-10-CM

## 2021-11-01 DIAGNOSIS — C90.00 IGG MULTIPLE MYELOMA (HCC): Primary | ICD-10-CM

## 2021-11-01 DIAGNOSIS — D61.818 PANCYTOPENIA (HCC): ICD-10-CM

## 2021-11-01 DIAGNOSIS — T45.1X5A IMMUNOSUPPRESSED DUE TO CHEMOTHERAPY (HCC): ICD-10-CM

## 2021-11-01 DIAGNOSIS — Z79.899 IMMUNOSUPPRESSED DUE TO CHEMOTHERAPY (HCC): ICD-10-CM

## 2021-11-01 DIAGNOSIS — N18.30 ANEMIA DUE TO STAGE 3 CHRONIC KIDNEY DISEASE, UNSPECIFIED WHETHER STAGE 3A OR 3B CKD (HCC): ICD-10-CM

## 2021-11-01 DIAGNOSIS — C90.00 IGG MULTIPLE MYELOMA (HCC): ICD-10-CM

## 2021-11-01 DIAGNOSIS — D80.1 HYPOGAMMAGLOBULINEMIA (HCC): ICD-10-CM

## 2021-11-01 DIAGNOSIS — D63.1 ANEMIA DUE TO STAGE 3 CHRONIC KIDNEY DISEASE, UNSPECIFIED WHETHER STAGE 3A OR 3B CKD (HCC): ICD-10-CM

## 2021-11-01 DIAGNOSIS — D70.9 NEUTROPENIA, UNSPECIFIED TYPE (HCC): ICD-10-CM

## 2021-11-08 ENCOUNTER — TELEPHONE (OUTPATIENT)
Dept: HEMATOLOGY ONCOLOGY | Facility: CLINIC | Age: 76
End: 2021-11-08

## 2021-11-08 ENCOUNTER — APPOINTMENT (OUTPATIENT)
Dept: LAB | Facility: CLINIC | Age: 76
End: 2021-11-08
Payer: COMMERCIAL

## 2021-11-08 LAB
ALBUMIN SERPL BCP-MCNC: 4 G/DL (ref 3.5–5)
ALP SERPL-CCNC: 77 U/L (ref 46–116)
ALT SERPL W P-5'-P-CCNC: 28 U/L (ref 12–78)
ANION GAP SERPL CALCULATED.3IONS-SCNC: 6 MMOL/L (ref 4–13)
AST SERPL W P-5'-P-CCNC: 26 U/L (ref 5–45)
BASOPHILS # BLD AUTO: 0.01 THOUSANDS/ΜL (ref 0–0.1)
BASOPHILS NFR BLD AUTO: 1 % (ref 0–1)
BILIRUB SERPL-MCNC: 1.05 MG/DL (ref 0.2–1)
BUN SERPL-MCNC: 20 MG/DL (ref 5–25)
CALCIUM SERPL-MCNC: 9.5 MG/DL (ref 8.3–10.1)
CHLORIDE SERPL-SCNC: 109 MMOL/L (ref 100–108)
CO2 SERPL-SCNC: 22 MMOL/L (ref 21–32)
CREAT SERPL-MCNC: 1.84 MG/DL (ref 0.6–1.3)
EOSINOPHIL # BLD AUTO: 0.01 THOUSAND/ΜL (ref 0–0.61)
EOSINOPHIL NFR BLD AUTO: 1 % (ref 0–6)
ERYTHROCYTE [DISTWIDTH] IN BLOOD BY AUTOMATED COUNT: 21.3 % (ref 11.6–15.1)
GFR SERPL CREATININE-BSD FRML MDRD: 35 ML/MIN/1.73SQ M
GLUCOSE P FAST SERPL-MCNC: 99 MG/DL (ref 65–99)
HCT VFR BLD AUTO: 25.2 % (ref 36.5–49.3)
HGB BLD-MCNC: 8.4 G/DL (ref 12–17)
IMM GRANULOCYTES # BLD AUTO: 0.01 THOUSAND/UL (ref 0–0.2)
IMM GRANULOCYTES NFR BLD AUTO: 1 % (ref 0–2)
LYMPHOCYTES # BLD AUTO: 0.27 THOUSANDS/ΜL (ref 0.6–4.47)
LYMPHOCYTES NFR BLD AUTO: 15 % (ref 14–44)
MCH RBC QN AUTO: 35.6 PG (ref 26.8–34.3)
MCHC RBC AUTO-ENTMCNC: 33.3 G/DL (ref 31.4–37.4)
MCV RBC AUTO: 107 FL (ref 82–98)
MONOCYTES # BLD AUTO: 0.41 THOUSAND/ΜL (ref 0.17–1.22)
MONOCYTES NFR BLD AUTO: 23 % (ref 4–12)
NEUTROPHILS # BLD AUTO: 1.08 THOUSANDS/ΜL (ref 1.85–7.62)
NEUTS SEG NFR BLD AUTO: 59 % (ref 43–75)
NRBC BLD AUTO-RTO: 0 /100 WBCS
PLATELET # BLD AUTO: 8 THOUSANDS/UL (ref 149–390)
POTASSIUM SERPL-SCNC: 3.8 MMOL/L (ref 3.5–5.3)
PROT SERPL-MCNC: 7.8 G/DL (ref 6.4–8.2)
RBC # BLD AUTO: 2.36 MILLION/UL (ref 3.88–5.62)
SODIUM SERPL-SCNC: 137 MMOL/L (ref 136–145)
WBC # BLD AUTO: 1.79 THOUSAND/UL (ref 4.31–10.16)

## 2021-11-08 PROCEDURE — 36415 COLL VENOUS BLD VENIPUNCTURE: CPT

## 2021-11-08 PROCEDURE — 85025 COMPLETE CBC W/AUTO DIFF WBC: CPT

## 2021-11-08 PROCEDURE — 80053 COMPREHEN METABOLIC PANEL: CPT

## 2021-11-08 RX ORDER — SODIUM CHLORIDE 9 MG/ML
20 INJECTION, SOLUTION INTRAVENOUS ONCE
Status: CANCELLED | OUTPATIENT
Start: 2021-11-08

## 2021-11-09 ENCOUNTER — HOSPITAL ENCOUNTER (OUTPATIENT)
Dept: INFUSION CENTER | Facility: CLINIC | Age: 76
Discharge: HOME | End: 2021-11-09
Payer: COMMERCIAL

## 2021-11-09 VITALS
DIASTOLIC BLOOD PRESSURE: 76 MMHG | TEMPERATURE: 96.8 F | RESPIRATION RATE: 16 BRPM | SYSTOLIC BLOOD PRESSURE: 130 MMHG | HEART RATE: 66 BPM

## 2021-11-09 DIAGNOSIS — D69.3 CHRONIC ITP (IDIOPATHIC THROMBOCYTOPENIA) (HCC): Primary | ICD-10-CM

## 2021-11-09 PROCEDURE — 36430 TRANSFUSION BLD/BLD COMPNT: CPT

## 2021-11-09 PROCEDURE — P9037 PLATE PHERES LEUKOREDU IRRAD: HCPCS

## 2021-11-09 RX ORDER — SODIUM CHLORIDE 9 MG/ML
20 INJECTION, SOLUTION INTRAVENOUS ONCE
Status: COMPLETED | OUTPATIENT
Start: 2021-11-09 | End: 2021-11-09

## 2021-11-09 RX ADMIN — SODIUM CHLORIDE 20 ML/HR: 0.9 INJECTION, SOLUTION INTRAVENOUS at 12:07

## 2021-11-10 LAB
ABO GROUP BLD BPU: NORMAL
BPU ID: NORMAL
UNIT DISPENSE STATUS: NORMAL
UNIT PRODUCT CODE: NORMAL
UNIT PRODUCT VOLUME: 225 ML
UNIT RH: NORMAL

## 2021-11-11 LAB
MISCELLANEOUS LAB TEST RESULT: NORMAL
MISCELLANEOUS LAB TEST RESULT: NORMAL

## 2021-11-22 ENCOUNTER — APPOINTMENT (OUTPATIENT)
Dept: LAB | Facility: CLINIC | Age: 76
End: 2021-11-22
Payer: COMMERCIAL

## 2021-11-22 ENCOUNTER — TELEPHONE (OUTPATIENT)
Dept: HEMATOLOGY ONCOLOGY | Facility: CLINIC | Age: 76
End: 2021-11-22

## 2021-11-22 RX ORDER — SODIUM CHLORIDE 9 MG/ML
20 INJECTION, SOLUTION INTRAVENOUS ONCE
Status: CANCELLED | OUTPATIENT
Start: 2021-11-22

## 2021-11-23 ENCOUNTER — HOSPITAL ENCOUNTER (OUTPATIENT)
Dept: INFUSION CENTER | Facility: CLINIC | Age: 76
Discharge: HOME | End: 2021-11-23
Payer: COMMERCIAL

## 2021-11-23 VITALS
SYSTOLIC BLOOD PRESSURE: 122 MMHG | DIASTOLIC BLOOD PRESSURE: 80 MMHG | TEMPERATURE: 97.9 F | HEART RATE: 60 BPM | RESPIRATION RATE: 16 BRPM

## 2021-11-23 DIAGNOSIS — D69.3 CHRONIC ITP (IDIOPATHIC THROMBOCYTOPENIA) (HCC): Primary | ICD-10-CM

## 2021-11-23 PROCEDURE — P9037 PLATE PHERES LEUKOREDU IRRAD: HCPCS

## 2021-11-23 PROCEDURE — 36430 TRANSFUSION BLD/BLD COMPNT: CPT

## 2021-11-23 RX ORDER — SODIUM CHLORIDE 9 MG/ML
20 INJECTION, SOLUTION INTRAVENOUS ONCE
Status: COMPLETED | OUTPATIENT
Start: 2021-11-23 | End: 2021-11-23

## 2021-11-23 RX ORDER — LANOLIN ALCOHOL/MO/W.PET/CERES
1000 CREAM (GRAM) TOPICAL DAILY
COMMUNITY

## 2021-11-23 RX ADMIN — SODIUM CHLORIDE 20 ML/HR: 0.9 INJECTION, SOLUTION INTRAVENOUS at 08:35

## 2021-11-24 LAB
ABO GROUP BLD BPU: NORMAL
BPU ID: NORMAL
UNIT DISPENSE STATUS: NORMAL
UNIT PRODUCT CODE: NORMAL
UNIT PRODUCT VOLUME: 234 ML
UNIT RH: NORMAL

## 2021-11-29 ENCOUNTER — APPOINTMENT (OUTPATIENT)
Dept: LAB | Facility: CLINIC | Age: 76
End: 2021-11-29
Payer: COMMERCIAL

## 2021-11-29 DIAGNOSIS — C90.00 IGG MULTIPLE MYELOMA (HCC): ICD-10-CM

## 2021-11-29 LAB
ALBUMIN SERPL BCP-MCNC: 3.9 G/DL (ref 3.5–5)
ALP SERPL-CCNC: 73 U/L (ref 46–116)
ALT SERPL W P-5'-P-CCNC: 24 U/L (ref 12–78)
ANION GAP SERPL CALCULATED.3IONS-SCNC: 9 MMOL/L (ref 4–13)
AST SERPL W P-5'-P-CCNC: 27 U/L (ref 5–45)
BILIRUB SERPL-MCNC: 1.49 MG/DL (ref 0.2–1)
BUN SERPL-MCNC: 20 MG/DL (ref 5–25)
CALCIUM SERPL-MCNC: 9.2 MG/DL (ref 8.3–10.1)
CHLORIDE SERPL-SCNC: 107 MMOL/L (ref 100–108)
CO2 SERPL-SCNC: 22 MMOL/L (ref 21–32)
CREAT SERPL-MCNC: 1.78 MG/DL (ref 0.6–1.3)
GFR SERPL CREATININE-BSD FRML MDRD: 37 ML/MIN/1.73SQ M
GLUCOSE P FAST SERPL-MCNC: 94 MG/DL (ref 65–99)
POTASSIUM SERPL-SCNC: 3.8 MMOL/L (ref 3.5–5.3)
PROT SERPL-MCNC: 7.6 G/DL (ref 6.4–8.2)
SODIUM SERPL-SCNC: 138 MMOL/L (ref 136–145)

## 2021-11-29 PROCEDURE — 80053 COMPREHEN METABOLIC PANEL: CPT

## 2021-11-30 ENCOUNTER — HOSPITAL ENCOUNTER (OUTPATIENT)
Dept: INFUSION CENTER | Facility: CLINIC | Age: 76
Discharge: HOME | End: 2021-11-30
Payer: COMMERCIAL

## 2021-11-30 VITALS
RESPIRATION RATE: 16 BRPM | SYSTOLIC BLOOD PRESSURE: 135 MMHG | HEART RATE: 63 BPM | DIASTOLIC BLOOD PRESSURE: 74 MMHG | TEMPERATURE: 97.5 F

## 2021-11-30 DIAGNOSIS — D69.3 CHRONIC ITP (IDIOPATHIC THROMBOCYTOPENIA) (HCC): Primary | ICD-10-CM

## 2021-11-30 PROCEDURE — 36430 TRANSFUSION BLD/BLD COMPNT: CPT

## 2021-11-30 PROCEDURE — P9037 PLATE PHERES LEUKOREDU IRRAD: HCPCS

## 2021-11-30 RX ORDER — SODIUM CHLORIDE 9 MG/ML
20 INJECTION, SOLUTION INTRAVENOUS ONCE
Status: CANCELLED | OUTPATIENT
Start: 2021-11-30

## 2021-11-30 RX ORDER — SODIUM CHLORIDE 9 MG/ML
20 INJECTION, SOLUTION INTRAVENOUS ONCE
Status: COMPLETED | OUTPATIENT
Start: 2021-11-30 | End: 2021-11-30

## 2021-11-30 RX ADMIN — SODIUM CHLORIDE 20 ML/HR: 0.9 INJECTION, SOLUTION INTRAVENOUS at 12:50

## 2021-12-01 LAB
ABO GROUP BLD BPU: NORMAL
BPU ID: NORMAL
UNIT DISPENSE STATUS: NORMAL
UNIT PRODUCT CODE: NORMAL
UNIT PRODUCT VOLUME: 266 ML
UNIT RH: NORMAL

## 2021-12-03 ENCOUNTER — HOSPITAL ENCOUNTER (OUTPATIENT)
Dept: INFUSION CENTER | Facility: CLINIC | Age: 76
Discharge: HOME | End: 2021-12-03
Payer: COMMERCIAL

## 2021-12-03 VITALS
TEMPERATURE: 97 F | SYSTOLIC BLOOD PRESSURE: 146 MMHG | BODY MASS INDEX: 24.33 KG/M2 | DIASTOLIC BLOOD PRESSURE: 84 MMHG | WEIGHT: 159.61 LBS | HEART RATE: 71 BPM | RESPIRATION RATE: 18 BRPM

## 2021-12-03 DIAGNOSIS — C90.00 IGG MULTIPLE MYELOMA (HCC): Primary | ICD-10-CM

## 2021-12-03 DIAGNOSIS — D80.1 HYPOGAMMAGLOBULINEMIA (HCC): ICD-10-CM

## 2021-12-03 PROCEDURE — 96366 THER/PROPH/DIAG IV INF ADDON: CPT

## 2021-12-03 PROCEDURE — 96365 THER/PROPH/DIAG IV INF INIT: CPT

## 2021-12-03 PROCEDURE — 96367 TX/PROPH/DG ADDL SEQ IV INF: CPT

## 2021-12-03 RX ORDER — SODIUM CHLORIDE 9 MG/ML
20 INJECTION, SOLUTION INTRAVENOUS ONCE
Status: CANCELLED
Start: 2021-12-30 | End: 2021-12-24

## 2021-12-03 RX ORDER — SODIUM CHLORIDE 9 MG/ML
20 INJECTION, SOLUTION INTRAVENOUS ONCE
Status: COMPLETED | OUTPATIENT
Start: 2021-12-03 | End: 2021-12-03

## 2021-12-03 RX ADMIN — ZOLEDRONIC ACID 3 MG: 4 INJECTION, SOLUTION, CONCENTRATE INTRAVENOUS at 08:39

## 2021-12-03 RX ADMIN — Medication 30 G: at 09:38

## 2021-12-03 RX ADMIN — SODIUM CHLORIDE 20 ML/HR: 0.9 INJECTION, SOLUTION INTRAVENOUS at 08:27

## 2021-12-06 ENCOUNTER — TELEPHONE (OUTPATIENT)
Dept: HEMATOLOGY ONCOLOGY | Facility: CLINIC | Age: 76
End: 2021-12-06

## 2021-12-06 ENCOUNTER — APPOINTMENT (OUTPATIENT)
Dept: LAB | Facility: CLINIC | Age: 76
End: 2021-12-06
Payer: COMMERCIAL

## 2021-12-06 DIAGNOSIS — C90.00 IGG MULTIPLE MYELOMA (HCC): ICD-10-CM

## 2021-12-06 RX ORDER — SODIUM CHLORIDE 9 MG/ML
20 INJECTION, SOLUTION INTRAVENOUS ONCE
Status: CANCELLED | OUTPATIENT
Start: 2021-12-06

## 2021-12-07 ENCOUNTER — HOSPITAL ENCOUNTER (OUTPATIENT)
Dept: INFUSION CENTER | Facility: CLINIC | Age: 76
Discharge: HOME | End: 2021-12-07
Payer: COMMERCIAL

## 2021-12-07 VITALS
RESPIRATION RATE: 16 BRPM | SYSTOLIC BLOOD PRESSURE: 130 MMHG | HEART RATE: 70 BPM | TEMPERATURE: 97.5 F | DIASTOLIC BLOOD PRESSURE: 78 MMHG

## 2021-12-07 DIAGNOSIS — D69.3 CHRONIC ITP (IDIOPATHIC THROMBOCYTOPENIA) (HCC): Primary | ICD-10-CM

## 2021-12-07 PROCEDURE — P9037 PLATE PHERES LEUKOREDU IRRAD: HCPCS

## 2021-12-07 PROCEDURE — 36430 TRANSFUSION BLD/BLD COMPNT: CPT

## 2021-12-07 RX ORDER — SODIUM CHLORIDE 9 MG/ML
20 INJECTION, SOLUTION INTRAVENOUS ONCE
Status: COMPLETED | OUTPATIENT
Start: 2021-12-07 | End: 2021-12-07

## 2021-12-07 RX ADMIN — SODIUM CHLORIDE 20 ML/HR: 0.9 INJECTION, SOLUTION INTRAVENOUS at 12:57

## 2021-12-08 ENCOUNTER — OFFICE VISIT (OUTPATIENT)
Dept: HEMATOLOGY ONCOLOGY | Facility: CLINIC | Age: 76
End: 2021-12-08
Payer: COMMERCIAL

## 2021-12-08 VITALS
SYSTOLIC BLOOD PRESSURE: 122 MMHG | DIASTOLIC BLOOD PRESSURE: 86 MMHG | HEART RATE: 71 BPM | RESPIRATION RATE: 16 BRPM | TEMPERATURE: 98.1 F | BODY MASS INDEX: 24.09 KG/M2 | WEIGHT: 158 LBS | OXYGEN SATURATION: 98 %

## 2021-12-08 DIAGNOSIS — C90.00 IGG MULTIPLE MYELOMA (HCC): Primary | ICD-10-CM

## 2021-12-08 DIAGNOSIS — D61.818 PANCYTOPENIA (HCC): ICD-10-CM

## 2021-12-08 PROCEDURE — 99214 OFFICE O/P EST MOD 30 MIN: CPT | Performed by: INTERNAL MEDICINE

## 2021-12-13 ENCOUNTER — APPOINTMENT (OUTPATIENT)
Dept: LAB | Facility: CLINIC | Age: 76
End: 2021-12-13
Payer: COMMERCIAL

## 2021-12-13 ENCOUNTER — TELEPHONE (OUTPATIENT)
Dept: GYNECOLOGIC ONCOLOGY | Facility: CLINIC | Age: 76
End: 2021-12-13

## 2021-12-13 RX ORDER — SODIUM CHLORIDE 9 MG/ML
20 INJECTION, SOLUTION INTRAVENOUS ONCE
Status: CANCELLED | OUTPATIENT
Start: 2021-12-13

## 2021-12-14 ENCOUNTER — HOSPITAL ENCOUNTER (OUTPATIENT)
Dept: INFUSION CENTER | Facility: CLINIC | Age: 76
Discharge: HOME | End: 2021-12-14
Payer: COMMERCIAL

## 2021-12-14 VITALS
HEART RATE: 64 BPM | RESPIRATION RATE: 16 BRPM | TEMPERATURE: 97.5 F | DIASTOLIC BLOOD PRESSURE: 77 MMHG | SYSTOLIC BLOOD PRESSURE: 133 MMHG

## 2021-12-14 DIAGNOSIS — C90.00 MULTIPLE MYELOMA NOT HAVING ACHIEVED REMISSION (HCC): ICD-10-CM

## 2021-12-14 DIAGNOSIS — D69.3 CHRONIC ITP (IDIOPATHIC THROMBOCYTOPENIA) (HCC): Primary | ICD-10-CM

## 2021-12-14 PROCEDURE — 36430 TRANSFUSION BLD/BLD COMPNT: CPT

## 2021-12-14 PROCEDURE — P9037 PLATE PHERES LEUKOREDU IRRAD: HCPCS

## 2021-12-14 RX ORDER — ACYCLOVIR 200 MG/1
CAPSULE ORAL
Qty: 60 CAPSULE | Refills: 5 | Status: SHIPPED | OUTPATIENT
Start: 2021-12-14 | End: 2022-06-21 | Stop reason: SDUPTHER

## 2021-12-14 RX ORDER — SODIUM CHLORIDE 9 MG/ML
20 INJECTION, SOLUTION INTRAVENOUS ONCE
Status: COMPLETED | OUTPATIENT
Start: 2021-12-14 | End: 2021-12-14

## 2021-12-14 RX ADMIN — SODIUM CHLORIDE 20 ML/HR: 0.9 INJECTION, SOLUTION INTRAVENOUS at 10:48

## 2021-12-15 LAB
ABO GROUP BLD BPU: NORMAL
BPU ID: NORMAL
UNIT DISPENSE STATUS: NORMAL
UNIT PRODUCT CODE: NORMAL
UNIT PRODUCT VOLUME: 244 ML
UNIT RH: NORMAL

## 2021-12-20 ENCOUNTER — TELEPHONE (OUTPATIENT)
Dept: HEMATOLOGY ONCOLOGY | Facility: MEDICAL CENTER | Age: 76
End: 2021-12-20

## 2021-12-20 ENCOUNTER — APPOINTMENT (OUTPATIENT)
Dept: LAB | Facility: CLINIC | Age: 76
End: 2021-12-20
Payer: COMMERCIAL

## 2021-12-20 LAB
ALBUMIN SERPL BCP-MCNC: 3.9 G/DL (ref 3.5–5)
ALP SERPL-CCNC: 72 U/L (ref 46–116)
ALT SERPL W P-5'-P-CCNC: 25 U/L (ref 12–78)
ANION GAP SERPL CALCULATED.3IONS-SCNC: 6 MMOL/L (ref 4–13)
AST SERPL W P-5'-P-CCNC: 27 U/L (ref 5–45)
BILIRUB SERPL-MCNC: 1.44 MG/DL (ref 0.2–1)
BUN SERPL-MCNC: 19 MG/DL (ref 5–25)
CALCIUM SERPL-MCNC: 9.4 MG/DL (ref 8.3–10.1)
CHLORIDE SERPL-SCNC: 108 MMOL/L (ref 100–108)
CO2 SERPL-SCNC: 25 MMOL/L (ref 21–32)
CREAT SERPL-MCNC: 1.69 MG/DL (ref 0.6–1.3)
GFR SERPL CREATININE-BSD FRML MDRD: 39 ML/MIN/1.73SQ M
GLUCOSE P FAST SERPL-MCNC: 91 MG/DL (ref 65–99)
POTASSIUM SERPL-SCNC: 3.8 MMOL/L (ref 3.5–5.3)
PROT SERPL-MCNC: 7.2 G/DL (ref 6.4–8.2)
SODIUM SERPL-SCNC: 139 MMOL/L (ref 136–145)

## 2021-12-20 PROCEDURE — 80053 COMPREHEN METABOLIC PANEL: CPT

## 2021-12-20 PROCEDURE — 36415 COLL VENOUS BLD VENIPUNCTURE: CPT

## 2021-12-20 RX ORDER — SODIUM CHLORIDE 9 MG/ML
20 INJECTION, SOLUTION INTRAVENOUS ONCE
Status: CANCELLED | OUTPATIENT
Start: 2021-12-28

## 2021-12-21 ENCOUNTER — HOSPITAL ENCOUNTER (OUTPATIENT)
Dept: INFUSION CENTER | Facility: CLINIC | Age: 76
Discharge: HOME | End: 2021-12-21
Payer: COMMERCIAL

## 2021-12-21 VITALS
DIASTOLIC BLOOD PRESSURE: 83 MMHG | SYSTOLIC BLOOD PRESSURE: 130 MMHG | TEMPERATURE: 97.7 F | RESPIRATION RATE: 16 BRPM | HEART RATE: 62 BPM

## 2021-12-21 DIAGNOSIS — D69.3 CHRONIC ITP (IDIOPATHIC THROMBOCYTOPENIA) (HCC): Primary | ICD-10-CM

## 2021-12-21 PROCEDURE — P9037 PLATE PHERES LEUKOREDU IRRAD: HCPCS

## 2021-12-21 PROCEDURE — 36430 TRANSFUSION BLD/BLD COMPNT: CPT

## 2021-12-21 RX ORDER — SODIUM CHLORIDE 9 MG/ML
20 INJECTION, SOLUTION INTRAVENOUS ONCE
Status: COMPLETED | OUTPATIENT
Start: 2021-12-21 | End: 2021-12-21

## 2021-12-21 RX ADMIN — SODIUM CHLORIDE 20 ML/HR: 0.9 INJECTION, SOLUTION INTRAVENOUS at 08:20

## 2021-12-22 LAB
ABO GROUP BLD BPU: NORMAL
BPU ID: NORMAL
UNIT DISPENSE STATUS: NORMAL
UNIT PRODUCT CODE: NORMAL
UNIT PRODUCT VOLUME: 245 ML
UNIT RH: NORMAL

## 2021-12-27 ENCOUNTER — APPOINTMENT (OUTPATIENT)
Dept: LAB | Facility: CLINIC | Age: 76
End: 2021-12-27
Payer: COMMERCIAL

## 2021-12-27 ENCOUNTER — TELEPHONE (OUTPATIENT)
Dept: HEMATOLOGY ONCOLOGY | Facility: CLINIC | Age: 76
End: 2021-12-27

## 2021-12-27 DIAGNOSIS — E78.5 HYPERLIPIDEMIA, UNSPECIFIED HYPERLIPIDEMIA TYPE: ICD-10-CM

## 2021-12-27 RX ORDER — ATORVASTATIN CALCIUM 20 MG/1
TABLET, FILM COATED ORAL
Qty: 90 TABLET | Refills: 0 | Status: SHIPPED | OUTPATIENT
Start: 2021-12-27 | End: 2022-03-29 | Stop reason: SDUPTHER

## 2021-12-28 ENCOUNTER — HOSPITAL ENCOUNTER (OUTPATIENT)
Dept: INFUSION CENTER | Facility: CLINIC | Age: 76
Discharge: HOME | End: 2021-12-28

## 2021-12-30 ENCOUNTER — HOSPITAL ENCOUNTER (OUTPATIENT)
Dept: INFUSION CENTER | Facility: CLINIC | Age: 76
Discharge: HOME | End: 2021-12-30
Payer: COMMERCIAL

## 2021-12-30 VITALS
BODY MASS INDEX: 23.89 KG/M2 | DIASTOLIC BLOOD PRESSURE: 71 MMHG | SYSTOLIC BLOOD PRESSURE: 135 MMHG | HEART RATE: 62 BPM | RESPIRATION RATE: 18 BRPM | WEIGHT: 156.75 LBS | TEMPERATURE: 98.5 F

## 2021-12-30 DIAGNOSIS — C90.00 IGG MULTIPLE MYELOMA (HCC): Primary | ICD-10-CM

## 2021-12-30 DIAGNOSIS — D80.1 HYPOGAMMAGLOBULINEMIA (HCC): ICD-10-CM

## 2021-12-30 PROCEDURE — 96366 THER/PROPH/DIAG IV INF ADDON: CPT

## 2021-12-30 PROCEDURE — 96365 THER/PROPH/DIAG IV INF INIT: CPT

## 2021-12-30 PROCEDURE — 96367 TX/PROPH/DG ADDL SEQ IV INF: CPT

## 2021-12-30 RX ORDER — SODIUM CHLORIDE 9 MG/ML
20 INJECTION, SOLUTION INTRAVENOUS ONCE
Status: COMPLETED | OUTPATIENT
Start: 2021-12-30 | End: 2021-12-30

## 2021-12-30 RX ORDER — SODIUM CHLORIDE 9 MG/ML
20 INJECTION, SOLUTION INTRAVENOUS ONCE
Status: CANCELLED
Start: 2021-12-31 | End: 2021-12-31

## 2021-12-30 RX ADMIN — ZOLEDRONIC ACID 3 MG: 4 INJECTION INTRAVENOUS at 08:20

## 2021-12-30 RX ADMIN — Medication 30 G: at 09:07

## 2021-12-30 RX ADMIN — SODIUM CHLORIDE 20 ML/HR: 0.9 INJECTION, SOLUTION INTRAVENOUS at 08:20

## 2022-01-01 NOTE — PROGRESS NOTES
Assessment/Plan:    No problem-specific Assessment & Plan notes found for this encounter  Diagnoses and all orders for this visit:    Need for hepatitis C screening test  -     Hepatitis C antibody; Future    Essential hypertension  -     CBC and differential; Future  -     Comprehensive metabolic panel; Future  -     Lipid panel; Future    Hypothyroidism, unspecified type  -     TSH, 3rd generation; Future    Vitamin D deficiency  -     Vitamin D 25 hydroxy; Future    Screen for colon cancer  -     Occult Bloood,Fecal Immunochemical; Future        Subjective:      Patient ID: Wyatt Peraza is a 81 y o  male  HPI   Marci Manuel is here today for visit accompanied by his wife  He was recently evaluated at his myeloma center in [place] and was found to have basically in no residual myeloma disease  He remains on a variety of chemotherapeutic agents that are changed periodically depending upon his blood counts and the most recent bone marrow  He is remains under the care of Dr Tin Christiansen  of [facility]   In community affairs enjoys life and really has no complaints at this time he had a colonoscopy 4 years ago He is very active    The following portions of the patient's history were reviewed and updated as appropriate: allergies, current medications, past family history, past medical history, past social history, past surgical history and problem list     Review of Systems      Objective:      /70 (BP Location: Left arm, Patient Position: Sitting, Cuff Size: Standard)   Pulse 62   Ht 5' 9" (1 753 m)   Wt 81 6 kg (180 lb)   SpO2 96%   BMI 26 58 kg/m²          Physical Exam  on physical examination he seems relaxed happy has a smile on his face Twin:  His eyes and a nice pink complexion his blood pressure is 120/80 his off blood pressure medicine of the carotids heart lungs abdomen extremities and skin within normal limits    The patient is quite stable will set him up with a complete set of lab studies with his next blood drawn also give him a fit test we discussed the pros and cons of PSA testing and he would like to forego this    We also discussed again the new shingles vaccine and I have encouraged him to discuss whether this would be a good idea for him with his other physicians return appointment made for 4 months EOAE (evoked otoacoustic emission)

## 2022-01-03 ENCOUNTER — APPOINTMENT (OUTPATIENT)
Dept: LAB | Facility: CLINIC | Age: 77
End: 2022-01-03
Payer: COMMERCIAL

## 2022-01-03 ENCOUNTER — TELEPHONE (OUTPATIENT)
Dept: HEMATOLOGY ONCOLOGY | Facility: CLINIC | Age: 77
End: 2022-01-03

## 2022-01-03 DIAGNOSIS — C90.00 IGG MULTIPLE MYELOMA (HCC): ICD-10-CM

## 2022-01-03 LAB
ALBUMIN SERPL BCP-MCNC: 3.7 G/DL (ref 3.5–5)
ALP SERPL-CCNC: 73 U/L (ref 46–116)
ALT SERPL W P-5'-P-CCNC: 23 U/L (ref 12–78)
ANION GAP SERPL CALCULATED.3IONS-SCNC: 5 MMOL/L (ref 4–13)
AST SERPL W P-5'-P-CCNC: 28 U/L (ref 5–45)
BILIRUB SERPL-MCNC: 0.7 MG/DL (ref 0.2–1)
BUN SERPL-MCNC: 18 MG/DL (ref 5–25)
CALCIUM SERPL-MCNC: 9.3 MG/DL (ref 8.3–10.1)
CHLORIDE SERPL-SCNC: 108 MMOL/L (ref 100–108)
CO2 SERPL-SCNC: 24 MMOL/L (ref 21–32)
CREAT SERPL-MCNC: 1.66 MG/DL (ref 0.6–1.3)
GFR SERPL CREATININE-BSD FRML MDRD: 39 ML/MIN/1.73SQ M
GLUCOSE P FAST SERPL-MCNC: 90 MG/DL (ref 65–99)
POTASSIUM SERPL-SCNC: 4 MMOL/L (ref 3.5–5.3)
PROT SERPL-MCNC: 7.5 G/DL (ref 6.4–8.2)
SODIUM SERPL-SCNC: 137 MMOL/L (ref 136–145)

## 2022-01-03 PROCEDURE — 80053 COMPREHEN METABOLIC PANEL: CPT

## 2022-01-03 RX ORDER — SODIUM CHLORIDE 9 MG/ML
20 INJECTION, SOLUTION INTRAVENOUS ONCE
Status: CANCELLED | OUTPATIENT
Start: 2022-01-11

## 2022-01-03 RX ORDER — SODIUM CHLORIDE 9 MG/ML
20 INJECTION, SOLUTION INTRAVENOUS ONCE
Status: CANCELLED
Start: 2022-01-27 | End: 2022-01-27

## 2022-01-03 NOTE — TELEPHONE ENCOUNTER
Spoke with patient's wife Chance Alvarez's platelets are 4,771   [facility] was able to schedule him tomorrow at [address] verbalized understanding

## 2022-01-04 ENCOUNTER — HOSPITAL ENCOUNTER (OUTPATIENT)
Dept: INFUSION CENTER | Facility: CLINIC | Age: 77
Discharge: HOME | End: 2022-01-04
Payer: MEDICARE

## 2022-01-04 VITALS
TEMPERATURE: 96.5 F | RESPIRATION RATE: 18 BRPM | SYSTOLIC BLOOD PRESSURE: 128 MMHG | DIASTOLIC BLOOD PRESSURE: 70 MMHG | HEART RATE: 60 BPM

## 2022-01-04 DIAGNOSIS — D69.3 CHRONIC ITP (IDIOPATHIC THROMBOCYTOPENIA) (HCC): Primary | ICD-10-CM

## 2022-01-04 PROCEDURE — 36430 TRANSFUSION BLD/BLD COMPNT: CPT

## 2022-01-04 PROCEDURE — P9037 PLATE PHERES LEUKOREDU IRRAD: HCPCS

## 2022-01-04 RX ORDER — SODIUM CHLORIDE 9 MG/ML
20 INJECTION, SOLUTION INTRAVENOUS ONCE
Status: COMPLETED | OUTPATIENT
Start: 2022-01-04 | End: 2022-01-04

## 2022-01-04 RX ADMIN — SODIUM CHLORIDE 20 ML/HR: 9 INJECTION, SOLUTION INTRAVENOUS at 08:35

## 2022-01-04 NOTE — PROGRESS NOTES
Patient arrived to unit without complaint  Patient tolerated 1 unit of platelets without incident  AVS declined and patient aware of next appointment  Patient left in stable condition

## 2022-01-05 LAB
ABO GROUP BLD BPU: NORMAL
BPU ID: NORMAL
UNIT DISPENSE STATUS: NORMAL
UNIT PRODUCT CODE: NORMAL
UNIT PRODUCT VOLUME: 237 ML
UNIT RH: NORMAL

## 2022-01-06 ENCOUNTER — OFFICE VISIT (OUTPATIENT)
Dept: HEMATOLOGY ONCOLOGY | Facility: CLINIC | Age: 77
End: 2022-01-06
Payer: MEDICARE

## 2022-01-06 VITALS
HEIGHT: 68 IN | WEIGHT: 157 LBS | RESPIRATION RATE: 16 BRPM | DIASTOLIC BLOOD PRESSURE: 70 MMHG | TEMPERATURE: 97.4 F | SYSTOLIC BLOOD PRESSURE: 110 MMHG | BODY MASS INDEX: 23.79 KG/M2 | HEART RATE: 60 BPM

## 2022-01-06 DIAGNOSIS — D70.9 NEUTROPENIA, UNSPECIFIED TYPE (HCC): ICD-10-CM

## 2022-01-06 DIAGNOSIS — D63.1 ANEMIA DUE TO STAGE 3 CHRONIC KIDNEY DISEASE, UNSPECIFIED WHETHER STAGE 3A OR 3B CKD (HCC): Primary | ICD-10-CM

## 2022-01-06 DIAGNOSIS — R53.83 OTHER FATIGUE: ICD-10-CM

## 2022-01-06 DIAGNOSIS — C90.00 IGG MULTIPLE MYELOMA (HCC): ICD-10-CM

## 2022-01-06 DIAGNOSIS — N18.30 ANEMIA DUE TO STAGE 3 CHRONIC KIDNEY DISEASE, UNSPECIFIED WHETHER STAGE 3A OR 3B CKD (HCC): Primary | ICD-10-CM

## 2022-01-06 DIAGNOSIS — D61.818 PANCYTOPENIA (HCC): ICD-10-CM

## 2022-01-06 PROCEDURE — 99215 OFFICE O/P EST HI 40 MIN: CPT | Performed by: INTERNAL MEDICINE

## 2022-01-06 RX ORDER — SULFAMETHOXAZOLE AND TRIMETHOPRIM 800; 160 MG/1; MG/1
TABLET ORAL 3 TIMES WEEKLY
COMMUNITY
Start: 2021-12-27

## 2022-01-06 NOTE — PROGRESS NOTES
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  Even if EPO level is not particularly elevated, I am not sure that supplementation will be helpful as I suspect he has marrow hypo cellularity  Folate was greater than 20  He reports orthostatic symptoms as well as fatigue  He does feel better with caffeine ingestion  Moderate caffeine ingestion was supported  Fortunately he is asymptomatic from leukopenia and thrombocytopenia  Platelet transfusions are ongoing  He has minimal, if any, bleeding symptoms  I discussed the above with the patient  The patient and his wife voiced understanding and agreement   ______________________________________________________________________    Chief Complaint   Patient presents with    Follow-up       HPI:  Oncology History   IgG multiple myeloma ([address] )   9/2015 Initial Diagnosis    Found to have Hbg of 6 with SOB, creatinine 1 8 and normal calcium with albumin of 2 1  Additonial blood work showed elevated protien level (12 1g/dL)  9/29/2015 Biopsy    Bone marrow biopsy demonstrated approximately 80% plasma cells with some immature forms noted  These studies showed 13q14 deletion, +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, 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)       10/14/2015 - 11/11/2015 Chemotherapy    Velcade 1 3 mg/m2 Days 1,8,15,22  Cytoxan 300 mg/m2  PO Days 1, 8,15, 22  Dexamethasone 40 mg PO Days 1,8,15, 22  Zometa 4 mg IV Day 1  Cycle length = 28 days    Completed 2 cycles    Day one of cycle 2 was on 11/11/15      12/2015 - 2/2016 Chemotherapy    VDT-PACE x 2 cycles (completed at [facility] in [place], [place])     2/2016 Transplant    Melphalan 200 mg/m2 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  MRD +     8/2016 - 1/26/2018 Chemotherapy    Maintenance therapy:  Carfilzomib, orginally dosed 27 mg weekly then increased to 45 mg weekly after MRD reactivitation  Revlimid  Dexamethasone      2/2018 Biopsy    Bone marrow demonstrated positive minimal residual disease       2/23/2018 -  Chemotherapy    Daratumumab 16mg/m2 IV Day 1  Pomalyst 4 mg p o  daily 1-21  Dexamethasone 4 mg PO Days 2, 3  Dexamethasone 12 mg PODays 8, 15, 22  Cycle length = 28 days     4/11/2019 - 8/29/2019 Chemotherapy    methylPREDNISolone sodium succinate (Solu-MEDROL) 100 mg in sodium chloride 0 9 % 250 mL IVPB, 100 mg, Intravenous, Once, 5 of 12 cycles  Administration: 100 mg (6/7/2019), 100 mg (7/5/2019), 100 mg (8/2/2019)     8/30/2019 - 11/8/2019 Chemotherapy    cyclophosphamide (CYTOXAN) 1,000 mg in sodium chloride 0 9 % 250 mL IVPB, 990 mg (66 7 % of original dose 750 mg/m2), Intravenous, Once, 2 of 3 cycles  Dose modification: 500 mg/m2 (original dose 750 mg/m2, Cycle 1, Reason: Other (See Comments)), 250 mg/m2 (original dose 750 mg/m2, Cycle 4, Reason: Treatment Parameters Not Met)  Administration: 1,000 mg (8/30/2019), 1,000 mg (9/13/2019), 1,000 mg (9/27/2019), 500 mg (10/11/2019)  bortezomib (VELCADE) subcutaneous injection 2 6 mg, 1 3 mg/m2 = 2 6 mg (86 7 % of original dose 1 5 mg/m2), Subcutaneous, Once, 3 of 4 cycles  Dose modification: 1 3 mg/m2 (original dose 1 5 mg/m2, Cycle 1, Reason: Other (See Comments))  Administration: 2 6 mg (8/30/2019), 2 6 mg (9/13/2019), 2 6 mg (10/25/2019), 2 6 mg (11/8/2019), 2 6 mg (9/27/2019), 2 6 mg (11/1/2019), 2 6 mg (9/20/2019), 2 6 mg (10/4/2019), 2 6 mg (10/11/2019), 2 6 mg (10/18/2019)     12/6/2019 - 6/19/2020 Chemotherapy    pegfilgrastim (NEULASTA ONPRO) subcutaneous injection kit 6 mg, 6 mg, Subcutaneous, Once, 5 of 9 cycles  Administration: 6 mg (2/28/2020), 6 mg (3/13/2020), 6 mg (3/27/2020), 6 mg (4/10/2020), 6 mg (4/24/2020), 6 mg (5/8/2020), 6 mg (6/5/2020), 6 mg (5/22/2020), 6 mg (6/19/2020)  cyclophosphamide (CYTOXAN) 784 mg in sodium chloride 0 9 % 250 mL IVPB, 400 mg/m2 = 784 mg, Intravenous, Once, 5 of 9 cycles  Dose modification: 400 mg/m2 (original dose 750 mg/m2, Cycle 7, Reason: Other (See Comments))  Administration: 784 mg (2/28/2020), 784 mg (3/13/2020), 784 mg (3/27/2020), 784 mg (4/10/2020), 784 mg (4/24/2020), 784 mg (5/8/2020), 784 mg (5/22/2020), 784 mg (6/19/2020), 784 mg (6/5/2020)  methylPREDNISolone sodium succinate (Solu-MEDROL) 100 mg in sodium chloride 0 9 % 250 mL IVPB, 100 mg, Intravenous, Once, 8 of 12 cycles  Administration: 100 mg (12/6/2019), 100 mg (12/13/2019), 100 mg (12/20/2019), 100 mg (1/3/2020), 100 mg (1/10/2020), 100 mg (1/17/2020), 100 mg (1/31/2020), 100 mg (2/14/2020), 100 mg (5/22/2020), 100 mg (12/27/2019), 100 mg (1/24/2020), 100 mg (2/28/2020), 100 mg (3/13/2020), 100 mg (3/27/2020), 100 mg (4/10/2020), 100 mg (4/24/2020), 100 mg (5/8/2020), 100 mg (6/19/2020)     7/2/2020 -  Chemotherapy           Interval History:  Clinically stable  ECOG-  1 - Symptomatic but completely ambulatory    Review of Systems   Constitutional: Negative for chills and fever  HENT: Negative for nosebleeds  Eyes: Negative for discharge  Respiratory: Negative for cough and shortness of breath  Cardiovascular: Negative for chest pain  Gastrointestinal: Negative for abdominal pain, constipation and diarrhea  Endocrine: Negative for polydipsia  Genitourinary: Negative for hematuria  Musculoskeletal: Negative for arthralgias  Skin: Negative for color change  Allergic/Immunologic: Negative for immunocompromised state  Neurological: Negative for dizziness and headaches  Hematological: Negative for adenopathy  Psychiatric/Behavioral: Negative for agitation         Past Medical History: Diagnosis Date    History of autologous stem cell transplant ([facility] [address] )     Hypertension     History of HTN-stable since weight loss    Insomnia     Multiple myeloma (HCC)      Patient Active Problem List   Diagnosis    IgG multiple myeloma (HCC)    Persistent atrial fibrillation (HCC)    Essential hypertension    Chronic ITP (idiopathic thrombocytopenia) (HCC)    Hyperlipidemia    Hypocalcemia    Hypothyroidism    Long term current use of systemic steroids    Other fatigue    Prophylactic measure    Hypogammaglobulinemia (HCC)    Leukopenia    Anemia due to stage 3 chronic kidney disease (HCC)    Pancytopenia (HCC)       Current Outpatient Medications:     acetaminophen (TYLENOL) 325 mg tablet, Take 650 mg by mouth every 6 (six) hours as needed for mild pain, Disp: , Rfl:     acyclovir (ZOVIRAX) 200 mg capsule, TAKE  (1)  CAPSULE  TWICE DAILY  , Disp: 60 capsule, Rfl: 5    Ascorbic Acid (vitamin C) 1000 MG tablet, Take 1,000 mg by mouth daily, Disp: , Rfl:     aspirin 81 MG tablet, Take 81 mg by mouth daily  , Disp: , Rfl:     atorvastatin (LIPITOR) 20 mg tablet, TAKE (1) TABLET DAILY  , Disp: 90 tablet, Rfl: 0    Cholecalciferol (VITAMIN D-3 PO), Take 1,000 Units by mouth daily , Disp: , Rfl:     Docusate Sodium (COLACE PO), Take 1 tablet by mouth as needed  , Disp: , Rfl:     eltrombopag (PROMACTA) 75 MG TABS, Take 1 tablet (75 mg total) by mouth daily, Disp: 30 tablet, Rfl: 3    escitalopram (LEXAPRO) 20 mg tablet, Take 1 tablet (20 mg total) by mouth daily, Disp: 90 tablet, Rfl: 0    Glutamine POWD, by Does not apply route, Disp: , Rfl:     levothyroxine 50 mcg tablet, Take 1 tablet (50 mcg total) by mouth daily (Patient taking differently: Take 75 mcg by mouth daily  ), Disp: 90 tablet, Rfl: 0    LORazepam (ATIVAN) 0 5 mg tablet, Take 0 5 mg by mouth every 6 (six) hours as needed for anxiety  , Disp: , Rfl:     LORazepam (ATIVAN) 1 mg tablet, Take 1 tablet (1 mg total) by mouth every 4 (four) hours as needed for anxiety, Disp: 100 tablet, Rfl: 1    Multiple Vitamin (MULTIVITAMINS PO), Take 1 tablet by mouth daily  , Disp: , Rfl:     ondansetron (ZOFRAN) 4 mg tablet, Take 4 mg by mouth every 8 (eight) hours as needed for nausea or vomiting , Disp: , Rfl:     vitamin B-12 (VITAMIN B-12) 1,000 mcg tablet, Take 1,000 mcg by mouth daily, Disp: , Rfl:     sulfamethoxazole-trimethoprim (BACTRIM DS) 800-160 mg per tablet, 3 (three) times a week Monday Wednesday friday, Disp: , Rfl:   No Known Allergies  Past Surgical History:   Procedure Laterality Date    APPENDECTOMY      Last assessed: 9/25/15    ARTHROSCOPY KNEE Left     9/30/09    EYE SURGERY      Lasik    KNEE CARTILAGE SURGERY      LIMBAL STEM CELL TRANSPLANT      Patient had 2 in [place]-2/29/2016 and 4/13/2016     Social History     Objective:  Vitals:    01/06/22 0843   Resp: 16   Weight: 71 2 kg (157 lb)   Height: 5' 7 91" (1 725 m)     Physical Exam  Constitutional:       Appearance: He is well-developed  HENT:      Head: Normocephalic and atraumatic  Eyes:      Pupils: Pupils are equal, round, and reactive to light  Cardiovascular:      Rate and Rhythm: Normal rate and regular rhythm  Heart sounds: No murmur heard  Pulmonary:      Breath sounds: Normal breath sounds  No wheezing or rales  Abdominal:      Palpations: Abdomen is soft  Tenderness: There is no abdominal tenderness  Musculoskeletal:         General: No tenderness  Normal range of motion  Cervical back: Neck supple  Lymphadenopathy:      Cervical: No cervical adenopathy  Skin:     Findings: No erythema or rash  Neurological:      Mental Status: He is alert and oriented to person, place, and time  Cranial Nerves: No cranial nerve deficit  Deep Tendon Reflexes: Reflexes are normal and symmetric  Psychiatric:         Behavior: Behavior normal            Labs:   I personally reviewed the labs and imaging pertinent to this patient care

## 2022-01-10 ENCOUNTER — TELEPHONE (OUTPATIENT)
Dept: HEMATOLOGY ONCOLOGY | Facility: CLINIC | Age: 77
End: 2022-01-10

## 2022-01-10 ENCOUNTER — APPOINTMENT (OUTPATIENT)
Dept: LAB | Facility: CLINIC | Age: 77
End: 2022-01-10
Payer: COMMERCIAL

## 2022-01-10 DIAGNOSIS — D63.1 ANEMIA DUE TO STAGE 3 CHRONIC KIDNEY DISEASE, UNSPECIFIED WHETHER STAGE 3A OR 3B CKD (HCC): ICD-10-CM

## 2022-01-10 DIAGNOSIS — N18.30 ANEMIA DUE TO STAGE 3 CHRONIC KIDNEY DISEASE, UNSPECIFIED WHETHER STAGE 3A OR 3B CKD (HCC): ICD-10-CM

## 2022-01-10 PROCEDURE — 82668 ASSAY OF ERYTHROPOIETIN: CPT

## 2022-01-10 RX ORDER — SODIUM CHLORIDE 9 MG/ML
20 INJECTION, SOLUTION INTRAVENOUS ONCE
Status: CANCELLED | OUTPATIENT
Start: 2022-01-18

## 2022-01-10 NOTE — TELEPHONE ENCOUNTER
Rec'd call from Juan Diego Tellez at [facility] lab with critical values for pt  WBC 1 87, plt 11  Pt has recurring transfusion plan  Will get platelets tomorrow

## 2022-01-11 ENCOUNTER — HOSPITAL ENCOUNTER (OUTPATIENT)
Dept: INFUSION CENTER | Facility: CLINIC | Age: 77
Discharge: HOME | End: 2022-01-11
Payer: MEDICARE

## 2022-01-11 VITALS
SYSTOLIC BLOOD PRESSURE: 126 MMHG | TEMPERATURE: 97.6 F | RESPIRATION RATE: 18 BRPM | HEART RATE: 68 BPM | DIASTOLIC BLOOD PRESSURE: 75 MMHG

## 2022-01-11 DIAGNOSIS — D69.3 CHRONIC ITP (IDIOPATHIC THROMBOCYTOPENIA) (HCC): Primary | ICD-10-CM

## 2022-01-11 DIAGNOSIS — F41.9 ANXIETY: ICD-10-CM

## 2022-01-11 LAB — EPO SERPL-ACNC: 396.5 MIU/ML (ref 2.6–18.5)

## 2022-01-11 PROCEDURE — P9037 PLATE PHERES LEUKOREDU IRRAD: HCPCS

## 2022-01-11 PROCEDURE — 36430 TRANSFUSION BLD/BLD COMPNT: CPT

## 2022-01-11 RX ORDER — ESCITALOPRAM OXALATE 20 MG/1
20 TABLET ORAL DAILY
Qty: 90 TABLET | Refills: 0 | Status: SHIPPED | OUTPATIENT
Start: 2022-01-11 | End: 2022-03-29 | Stop reason: SDUPTHER

## 2022-01-11 RX ORDER — SODIUM CHLORIDE 9 MG/ML
20 INJECTION, SOLUTION INTRAVENOUS ONCE
Status: COMPLETED | OUTPATIENT
Start: 2022-01-11 | End: 2022-01-11

## 2022-01-11 RX ADMIN — SODIUM CHLORIDE 20 ML/HR: 0.9 INJECTION, SOLUTION INTRAVENOUS at 08:32

## 2022-01-11 NOTE — PROGRESS NOTES
Patient arrived to unit without complaint  Patient received 1 unit of platelets without incident  AVS declined and patient aware of next appointment  Patient left in stable condition

## 2022-01-17 ENCOUNTER — APPOINTMENT (OUTPATIENT)
Dept: LAB | Facility: CLINIC | Age: 77
End: 2022-01-17
Payer: MEDICARE

## 2022-01-17 RX ORDER — SODIUM CHLORIDE 9 MG/ML
20 INJECTION, SOLUTION INTRAVENOUS ONCE
Status: CANCELLED | OUTPATIENT
Start: 2022-01-17

## 2022-01-18 ENCOUNTER — HOSPITAL ENCOUNTER (OUTPATIENT)
Dept: INFUSION CENTER | Facility: CLINIC | Age: 77
Discharge: HOME | End: 2022-01-18
Payer: MEDICARE

## 2022-01-18 VITALS
DIASTOLIC BLOOD PRESSURE: 77 MMHG | HEART RATE: 80 BPM | TEMPERATURE: 98 F | RESPIRATION RATE: 16 BRPM | SYSTOLIC BLOOD PRESSURE: 126 MMHG

## 2022-01-18 DIAGNOSIS — D69.3 CHRONIC ITP (IDIOPATHIC THROMBOCYTOPENIA) (HCC): Primary | ICD-10-CM

## 2022-01-18 PROCEDURE — P9037 PLATE PHERES LEUKOREDU IRRAD: HCPCS

## 2022-01-18 PROCEDURE — 36430 TRANSFUSION BLD/BLD COMPNT: CPT

## 2022-01-18 RX ORDER — SODIUM CHLORIDE 9 MG/ML
20 INJECTION, SOLUTION INTRAVENOUS ONCE
Status: COMPLETED | OUTPATIENT
Start: 2022-01-18 | End: 2022-01-18

## 2022-01-18 RX ADMIN — SODIUM CHLORIDE 20 ML/HR: 0.9 INJECTION, SOLUTION INTRAVENOUS at 08:37

## 2022-01-18 NOTE — PROGRESS NOTES
Pt arrived to unit without complaint  Pt tolerated 1 unit of Platelets without incident  AVS declined, but pt aware of future appts  Pt left unit in stable condition

## 2022-01-19 LAB
ABO GROUP BLD BPU: NORMAL
BPU ID: NORMAL
UNIT DISPENSE STATUS: NORMAL
UNIT PRODUCT CODE: NORMAL
UNIT PRODUCT VOLUME: 238 ML
UNIT RH: NORMAL

## 2022-01-20 ENCOUNTER — TELEPHONE (OUTPATIENT)
Dept: HEMATOLOGY ONCOLOGY | Facility: CLINIC | Age: 77
End: 2022-01-20

## 2022-01-20 NOTE — TELEPHONE ENCOUNTER
I phoned the patient and discussed with him that his appointment with Dr Bear Price on 2/21 would need to be rescheduled to 2/23 with Cyndi Askew, at [address] [facility] as Dr Bear Price will not be in the office on 2/21  The patient was agreeable to this time, date and provider change

## 2022-01-24 ENCOUNTER — APPOINTMENT (OUTPATIENT)
Dept: LAB | Facility: CLINIC | Age: 77
End: 2022-01-24
Payer: MEDICARE

## 2022-01-24 ENCOUNTER — TELEPHONE (OUTPATIENT)
Dept: HEMATOLOGY ONCOLOGY | Facility: CLINIC | Age: 77
End: 2022-01-24

## 2022-01-24 NOTE — TELEPHONE ENCOUNTER
Noted  Pt has a recurring transfusion plan  Will cancel tomorrows appointment and notify pt and infusion center

## 2022-01-25 ENCOUNTER — HOSPITAL ENCOUNTER (OUTPATIENT)
Dept: INFUSION CENTER | Facility: CLINIC | Age: 77
Discharge: HOME | End: 2022-01-25

## 2022-01-27 ENCOUNTER — HOSPITAL ENCOUNTER (OUTPATIENT)
Dept: INFUSION CENTER | Facility: CLINIC | Age: 77
Discharge: HOME | End: 2022-01-27
Payer: MEDICARE

## 2022-01-27 VITALS
HEART RATE: 84 BPM | DIASTOLIC BLOOD PRESSURE: 82 MMHG | WEIGHT: 158.07 LBS | SYSTOLIC BLOOD PRESSURE: 127 MMHG | TEMPERATURE: 98 F | BODY MASS INDEX: 24.1 KG/M2 | RESPIRATION RATE: 18 BRPM

## 2022-01-27 DIAGNOSIS — C90.00 IGG MULTIPLE MYELOMA (HCC): Primary | ICD-10-CM

## 2022-01-27 DIAGNOSIS — D80.1 HYPOGAMMAGLOBULINEMIA (HCC): ICD-10-CM

## 2022-01-27 PROCEDURE — 96367 TX/PROPH/DG ADDL SEQ IV INF: CPT

## 2022-01-27 PROCEDURE — 96365 THER/PROPH/DIAG IV INF INIT: CPT

## 2022-01-27 PROCEDURE — 96366 THER/PROPH/DIAG IV INF ADDON: CPT

## 2022-01-27 RX ORDER — SODIUM CHLORIDE 9 MG/ML
20 INJECTION, SOLUTION INTRAVENOUS ONCE
Status: CANCELLED
Start: 2022-02-08 | End: 2022-01-28

## 2022-01-27 RX ORDER — SODIUM CHLORIDE 9 MG/ML
20 INJECTION, SOLUTION INTRAVENOUS ONCE
Status: COMPLETED | OUTPATIENT
Start: 2022-01-27 | End: 2022-01-27

## 2022-01-27 RX ADMIN — Medication 30 G: at 09:11

## 2022-01-27 RX ADMIN — SODIUM CHLORIDE 20 ML/HR: 0.9 INJECTION, SOLUTION INTRAVENOUS at 08:30

## 2022-01-27 RX ADMIN — ZOLEDRONIC ACID 3 MG: 4 INJECTION INTRAVENOUS at 08:30

## 2022-01-27 NOTE — PROGRESS NOTES
Patient tolerated IVIG and zometa infusions well  Offers no complaints  Pt is aware of all future appointments   Declined AVS

## 2022-01-31 ENCOUNTER — TELEPHONE (OUTPATIENT)
Dept: HEMATOLOGY ONCOLOGY | Facility: CLINIC | Age: 77
End: 2022-01-31

## 2022-01-31 ENCOUNTER — APPOINTMENT (OUTPATIENT)
Dept: LAB | Facility: CLINIC | Age: 77
End: 2022-01-31
Payer: MEDICARE

## 2022-01-31 DIAGNOSIS — C90.00 IGG MULTIPLE MYELOMA (HCC): ICD-10-CM

## 2022-01-31 LAB
ALBUMIN SERPL BCP-MCNC: 3.9 G/DL (ref 3.5–5)
ALP SERPL-CCNC: 81 U/L (ref 46–116)
ALT SERPL W P-5'-P-CCNC: 23 U/L (ref 12–78)
ANION GAP SERPL CALCULATED.3IONS-SCNC: 6 MMOL/L (ref 4–13)
AST SERPL W P-5'-P-CCNC: 26 U/L (ref 5–45)
BILIRUB SERPL-MCNC: 0.73 MG/DL (ref 0.2–1)
BUN SERPL-MCNC: 18 MG/DL (ref 5–25)
CALCIUM SERPL-MCNC: 8.8 MG/DL (ref 8.3–10.1)
CHLORIDE SERPL-SCNC: 107 MMOL/L (ref 100–108)
CO2 SERPL-SCNC: 24 MMOL/L (ref 21–32)
CREAT SERPL-MCNC: 1.68 MG/DL (ref 0.6–1.3)
GFR SERPL CREATININE-BSD FRML MDRD: 39 ML/MIN/1.73SQ M
GLUCOSE P FAST SERPL-MCNC: 95 MG/DL (ref 65–99)
POTASSIUM SERPL-SCNC: 3.9 MMOL/L (ref 3.5–5.3)
PROT SERPL-MCNC: 7.9 G/DL (ref 6.4–8.2)
SODIUM SERPL-SCNC: 137 MMOL/L (ref 136–145)

## 2022-01-31 PROCEDURE — 80053 COMPREHEN METABOLIC PANEL: CPT

## 2022-01-31 RX ORDER — SODIUM CHLORIDE 9 MG/ML
20 INJECTION, SOLUTION INTRAVENOUS ONCE
Status: CANCELLED | OUTPATIENT
Start: 2022-02-08

## 2022-02-01 ENCOUNTER — HOSPITAL ENCOUNTER (OUTPATIENT)
Dept: INFUSION CENTER | Facility: CLINIC | Age: 77
Discharge: HOME | End: 2022-02-01
Payer: MEDICARE

## 2022-02-01 ENCOUNTER — DOCUMENTATION (OUTPATIENT)
Dept: HEMATOLOGY ONCOLOGY | Facility: CLINIC | Age: 77
End: 2022-02-01

## 2022-02-01 VITALS
TEMPERATURE: 97.9 F | SYSTOLIC BLOOD PRESSURE: 118 MMHG | DIASTOLIC BLOOD PRESSURE: 77 MMHG | HEART RATE: 66 BPM | RESPIRATION RATE: 16 BRPM

## 2022-02-01 DIAGNOSIS — D69.3 CHRONIC ITP (IDIOPATHIC THROMBOCYTOPENIA) (HCC): Primary | ICD-10-CM

## 2022-02-01 DIAGNOSIS — C90.00 IGG MULTIPLE MYELOMA (HCC): Primary | ICD-10-CM

## 2022-02-01 PROCEDURE — 36430 TRANSFUSION BLD/BLD COMPNT: CPT

## 2022-02-01 PROCEDURE — P9037 PLATE PHERES LEUKOREDU IRRAD: HCPCS

## 2022-02-01 RX ORDER — SODIUM CHLORIDE 9 MG/ML
20 INJECTION, SOLUTION INTRAVENOUS ONCE
Status: DISCONTINUED | OUTPATIENT
Start: 2022-02-01 | End: 2022-02-04 | Stop reason: HOSPADM

## 2022-02-01 NOTE — PROGRESS NOTES
Rcvd new oral chemo start- promacta  Patient has Copay $3000  Patient has been enrolled with [facility] ID# [id number]  CARD# [id number]  PCN#  [id number]  [facility]# [id number]  BIN# [id number]  AMT$ [id number]  EFF 2-1-22  THRU 1-1-23    Epic noted

## 2022-02-01 NOTE — PROGRESS NOTES
Pt  Tolerated 1 unit of platelets without adverse event  Future appointments reviewed  AVS declined

## 2022-02-01 NOTE — PROGRESS NOTES
Pt  Denies new symptoms or concerns today  Pt  Has ongoing fatigue  1 unit of platelets ordered for infusion today

## 2022-02-02 LAB
ABO GROUP BLD BPU: NORMAL
BPU ID: NORMAL
UNIT DISPENSE STATUS: NORMAL
UNIT PRODUCT CODE: NORMAL
UNIT PRODUCT VOLUME: 265 ML
UNIT RH: NORMAL

## 2022-02-03 DIAGNOSIS — C90.00 IGG MULTIPLE MYELOMA (HCC): ICD-10-CM

## 2022-02-03 DIAGNOSIS — D80.1 HYPOGAMMAGLOBULINEMIA (HCC): Primary | ICD-10-CM

## 2022-02-07 ENCOUNTER — TELEPHONE (OUTPATIENT)
Dept: HEMATOLOGY ONCOLOGY | Facility: CLINIC | Age: 77
End: 2022-02-07

## 2022-02-07 ENCOUNTER — APPOINTMENT (OUTPATIENT)
Dept: LAB | Facility: CLINIC | Age: 77
End: 2022-02-07
Payer: MEDICARE

## 2022-02-07 NOTE — TELEPHONE ENCOUNTER
Spoke with pt's wife and informed her that the patient does not have to go tomorrow to get platelets because his plts are 12  Pt's wife verbalized understanding

## 2022-02-08 ENCOUNTER — HOSPITAL ENCOUNTER (OUTPATIENT)
Dept: INFUSION CENTER | Facility: CLINIC | Age: 77
Discharge: HOME | End: 2022-02-08

## 2022-02-14 ENCOUNTER — TELEPHONE (OUTPATIENT)
Dept: HEMATOLOGY ONCOLOGY | Facility: CLINIC | Age: 77
End: 2022-02-14

## 2022-02-14 ENCOUNTER — APPOINTMENT (OUTPATIENT)
Dept: LAB | Facility: CLINIC | Age: 77
End: 2022-02-14
Payer: MEDICARE

## 2022-02-14 RX ORDER — SODIUM CHLORIDE 9 MG/ML
20 INJECTION, SOLUTION INTRAVENOUS ONCE
Status: CANCELLED | OUTPATIENT
Start: 2022-02-14

## 2022-02-14 NOTE — TELEPHONE ENCOUNTER
Received a call from Kristi Cain, from the [facility]  Critical lab values WBC 1 90 and Plt count of 5

## 2022-02-15 ENCOUNTER — HOSPITAL ENCOUNTER (OUTPATIENT)
Dept: INFUSION CENTER | Facility: CLINIC | Age: 77
Discharge: HOME | End: 2022-02-15
Payer: MEDICARE

## 2022-02-15 VITALS
RESPIRATION RATE: 18 BRPM | DIASTOLIC BLOOD PRESSURE: 72 MMHG | HEART RATE: 63 BPM | TEMPERATURE: 97.4 F | SYSTOLIC BLOOD PRESSURE: 114 MMHG

## 2022-02-15 DIAGNOSIS — D69.3 CHRONIC ITP (IDIOPATHIC THROMBOCYTOPENIA) (HCC): Primary | ICD-10-CM

## 2022-02-15 PROCEDURE — P9037 PLATE PHERES LEUKOREDU IRRAD: HCPCS

## 2022-02-15 PROCEDURE — 36430 TRANSFUSION BLD/BLD COMPNT: CPT

## 2022-02-21 ENCOUNTER — TELEPHONE (OUTPATIENT)
Dept: HEMATOLOGY ONCOLOGY | Facility: CLINIC | Age: 77
End: 2022-02-21

## 2022-02-21 ENCOUNTER — APPOINTMENT (OUTPATIENT)
Dept: LAB | Facility: CLINIC | Age: 77
End: 2022-02-21
Payer: MEDICARE

## 2022-02-21 RX ORDER — SODIUM CHLORIDE 9 MG/ML
20 INJECTION, SOLUTION INTRAVENOUS ONCE
Status: CANCELLED | OUTPATIENT
Start: 2022-03-01

## 2022-02-22 ENCOUNTER — HOSPITAL ENCOUNTER (OUTPATIENT)
Dept: INFUSION CENTER | Facility: CLINIC | Age: 77
Discharge: HOME | End: 2022-02-22
Payer: MEDICARE

## 2022-02-22 VITALS
SYSTOLIC BLOOD PRESSURE: 127 MMHG | DIASTOLIC BLOOD PRESSURE: 76 MMHG | HEART RATE: 99 BPM | RESPIRATION RATE: 18 BRPM | TEMPERATURE: 97.4 F

## 2022-02-22 DIAGNOSIS — D69.3 CHRONIC ITP (IDIOPATHIC THROMBOCYTOPENIA) (HCC): Primary | ICD-10-CM

## 2022-02-22 PROCEDURE — P9037 PLATE PHERES LEUKOREDU IRRAD: HCPCS

## 2022-02-22 PROCEDURE — 36430 TRANSFUSION BLD/BLD COMPNT: CPT

## 2022-02-22 RX ORDER — SODIUM CHLORIDE 9 MG/ML
20 INJECTION, SOLUTION INTRAVENOUS ONCE
Status: COMPLETED | OUTPATIENT
Start: 2022-02-22 | End: 2022-02-22

## 2022-02-22 RX ADMIN — SODIUM CHLORIDE 20 ML/HR: 9 INJECTION, SOLUTION INTRAVENOUS at 08:25

## 2022-02-23 ENCOUNTER — OFFICE VISIT (OUTPATIENT)
Dept: HEMATOLOGY ONCOLOGY | Facility: CLINIC | Age: 77
End: 2022-02-23
Payer: MEDICARE

## 2022-02-23 VITALS
BODY MASS INDEX: 22.19 KG/M2 | OXYGEN SATURATION: 99 % | WEIGHT: 155 LBS | HEIGHT: 70 IN | HEART RATE: 75 BPM | RESPIRATION RATE: 16 BRPM | DIASTOLIC BLOOD PRESSURE: 82 MMHG | SYSTOLIC BLOOD PRESSURE: 142 MMHG | TEMPERATURE: 97.8 F

## 2022-02-23 DIAGNOSIS — C90.00 IGG MULTIPLE MYELOMA (HCC): Primary | ICD-10-CM

## 2022-02-23 DIAGNOSIS — D69.3 CHRONIC ITP (IDIOPATHIC THROMBOCYTOPENIA) (HCC): ICD-10-CM

## 2022-02-23 DIAGNOSIS — D70.9 NEUTROPENIA, UNSPECIFIED TYPE (HCC): ICD-10-CM

## 2022-02-23 LAB
ABO GROUP BLD BPU: NORMAL
BPU ID: NORMAL
UNIT DISPENSE STATUS: NORMAL
UNIT PRODUCT CODE: NORMAL
UNIT PRODUCT VOLUME: 282 ML
UNIT RH: NORMAL

## 2022-02-23 PROCEDURE — 99214 OFFICE O/P EST MOD 30 MIN: CPT | Performed by: NURSE PRACTITIONER

## 2022-02-23 NOTE — PROGRESS NOTES
[facility]AT[facility] SPECIAL[facility] [address] [address] [phone]  Oncology Progress Note  Aroldo Both, 1945, [id number]  2/23/2022        Assessment/Plan:   1  IgG multiple myeloma (HCC)  2  Neutropenia, unspecified type ([address] )  3  Chronic ITP (idiopathic thrombocytopenia) (HCC)   Patient is a 60-year-old male with a history of IgG myeloma status post car T therapy in August 2021  He has subsequent pancytopenia that has been managed with transfusion support  His most recent platelet transfusion was yesterday for a platelet count of 8  We discussed cytopenias can happen in 20-30% of patients after car T therapy  He is closely followed with Dr Carl Gutierrez at [facility] and is expected to have a stem cell infusion within the next month  He also received Evusheld injection for COVID protection prior to his stem cell infusion  He also continues on Bactrim 3 times a week for bacteria prophylaxis given his leukopenia  He has been on Promacta and is in the process of weaning to be completed prior to the stem cell infusion  Overall patient is able to function without significant restriction  He does note shortness of breath when initially getting up  His hemoglobin is within established range at 7 8  He has not required a packed red blood cell transfusion as he is compensating fairly well  We will continue weekly CBC and plan to see him in April likely after his stem cell infusion  I advised them to contact our office should he have increased symptoms secondary to his cytopenias  Patient and his wife verbalized understanding are in agreement the plan  Goals and Barriers:    Current Goal:   Prolong Survival from Cancer  Barriers: None  Patient's Capacity to Self Care:  Patient is able to self care      [facility]  ______________________________________________________________________________________________________________    Subjective     History of present illness/Cancer History:   Oncology History   IgG multiple myeloma ([address] )   9/2015 Initial Diagnosis    Found to have Hbg of 6 with SOB, creatinine 1 8 and normal calcium with albumin of 2 1  Additonial blood work showed elevated protien level (12 1g/dL)  9/29/2015 Biopsy    Bone marrow biopsy demonstrated approximately 80% plasma cells with some immature forms noted  These studies showed 13q14 deletion, +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, T (4:14)       10/14/2015 - 11/11/2015 Chemotherapy    Velcade 1 3 mg/m2 Days 1,8,15,22  Cytoxan 300 mg/m2  PO Days 1, 8,15, 22  Dexamethasone 40 mg PO Days 1,8,15, 22  Zometa 4 mg IV Day 1  Cycle length = 28 days    Completed 2 cycles  Day one of cycle 2 was on 11/11/15      12/2015 - 2/2016 Chemotherapy    VDT-PACE x 2 cycles   (completed at [facility] in [place], [address] [place] [address])     2/2016 Transplant    Melphalan 200 mg/m2 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  MRD +     8/2016 - 1/26/2018 Chemotherapy    Maintenance therapy:  Carfilzomib, orginally dosed 27 mg weekly then increased to 45 mg weekly after MRD reactivitation  Revlimid  Dexamethasone      2/2018 Biopsy    Bone marrow demonstrated positive minimal residual disease       2/23/2018 -  Chemotherapy    Daratumumab 16mg/m2 IV Day 1  Pomalyst 4 mg p o  daily 1-21  Dexamethasone 4 mg PO Days 2, 3  Dexamethasone 12 mg PODays 8, 15, 22  Cycle length = 28 days     4/11/2019 - 8/29/2019 Chemotherapy    methylPREDNISolone sodium succinate (Solu-MEDROL) 100 mg in sodium chloride 0 9 % 250 mL IVPB, 100 mg, Intravenous, Once, 5 of 12 cycles  Administration: 100 mg (6/7/2019), 100 mg (7/5/2019), 100 mg (8/2/2019)     8/30/2019 - 11/8/2019 Chemotherapy    cyclophosphamide (CYTOXAN) 1,000 mg in sodium chloride 0 9 % 250 mL IVPB, 990 mg (66 7 % of original dose 750 mg/m2), Intravenous, Once, 2 of 3 cycles  Dose modification: 500 mg/m2 (original dose 750 mg/m2, Cycle 1, Reason: Other (See Comments)), 250 mg/m2 (original dose 750 mg/m2, Cycle 4, Reason: Treatment Parameters Not Met)  Administration: 1,000 mg (8/30/2019), 1,000 mg (9/13/2019), 1,000 mg (9/27/2019), 500 mg (10/11/2019)  bortezomib (VELCADE) subcutaneous injection 2 6 mg, 1 3 mg/m2 = 2 6 mg (86 7 % of original dose 1 5 mg/m2), Subcutaneous, Once, 3 of 4 cycles  Dose modification: 1 3 mg/m2 (original dose 1 5 mg/m2, Cycle 1, Reason: Other (See Comments))  Administration: 2 6 mg (8/30/2019), 2 6 mg (9/13/2019), 2 6 mg (10/25/2019), 2 6 mg (11/8/2019), 2 6 mg (9/27/2019), 2 6 mg (11/1/2019), 2 6 mg (9/20/2019), 2 6 mg (10/4/2019), 2 6 mg (10/11/2019), 2 6 mg (10/18/2019)     12/6/2019 - 6/19/2020 Chemotherapy    pegfilgrastim (NEULASTA ONPRO) subcutaneous injection kit 6 mg, 6 mg, Subcutaneous, Once, 5 of 9 cycles  Administration: 6 mg (2/28/2020), 6 mg (3/13/2020), 6 mg (3/27/2020), 6 mg (4/10/2020), 6 mg (4/24/2020), 6 mg (5/8/2020), 6 mg (6/5/2020), 6 mg (5/22/2020), 6 mg (6/19/2020)  cyclophosphamide (CYTOXAN) 784 mg in sodium chloride 0 9 % 250 mL IVPB, 400 mg/m2 = 784 mg, Intravenous, Once, 5 of 9 cycles  Dose modification: 400 mg/m2 (original dose 750 mg/m2, Cycle 7, Reason: Other (See Comments))  Administration: 784 mg (2/28/2020), 784 mg (3/13/2020), 784 mg (3/27/2020), 784 mg (4/10/2020), 784 mg (4/24/2020), 784 mg (5/8/2020), 784 mg (5/22/2020), 784 mg (6/19/2020), 784 mg (6/5/2020)  methylPREDNISolone sodium succinate (Solu-MEDROL) 100 mg in sodium chloride 0 9 % 250 mL IVPB, 100 mg, Intravenous, Once, 8 of 12 cycles  Administration: 100 mg (12/6/2019), 100 mg (12/13/2019), 100 mg (12/20/2019), 100 mg (1/3/2020), 100 mg (1/10/2020), 100 mg (1/17/2020), 100 mg (1/31/2020), 100 mg (2/14/2020), 100 mg (5/22/2020), 100 mg (12/27/2019), 100 mg (1/24/2020), 100 mg (2/28/2020), 100 mg (3/13/2020), 100 mg (3/27/2020), 100 mg (4/10/2020), 100 mg (2020), 100 mg (2020), 100 mg (2020)     2020 -  Chemotherapy            Lab Results   Component Value Date    WBC 1 79 (LL) 2022    HGB 7 8 (L) 2022    HCT 23 9 (L) 2022     (H) 2022    PLT 8 (LL) 2022     Lab Results   Component Value Date     (L) 10/14/2015    SODIUM 138 2022    K 4 1 2022     (H) 2022    CO2 23 2022    ANIONGAP 7 10/14/2015    AGAP 6 2022    BUN 22 2022    CREATININE 1 66 (H) 2022    GLUC 97 2021    GLUF 92 2022    CALCIUM 9 5 2022    AST 23 2022    ALT 22 2022    ALKPHOS 83 2022    PROT 12 6 (H) 10/14/2015    TP 8 1 2022    BILITOT 0 78 10/14/2015    TBILI 1 34 (H) 2022    EGFR 39 2022       Interval history:  Clinically stable     ECO - Asymptomatic    Review of Systems   Constitutional: Negative for activity change, appetite change, fatigue, fever and unexpected weight change  Respiratory: Positive for shortness of breath  Negative for cough  Cardiovascular: Negative for chest pain and leg swelling  Gastrointestinal: Negative for abdominal pain, constipation, diarrhea and nausea  Endocrine: Negative for cold intolerance and heat intolerance  Musculoskeletal: Negative for arthralgias and myalgias  Skin: Negative  Neurological: Negative for dizziness, weakness and headaches  Hematological: Negative for adenopathy  Does not bruise/bleed easily  Current Outpatient Medications:     acetaminophen (TYLENOL) 325 mg tablet, Take 650 mg by mouth every 6 (six) hours as needed for mild pain, Disp: , Rfl:     acyclovir (ZOVIRAX) 200 mg capsule, TAKE  (1)  CAPSULE  TWICE DAILY  , Disp: 60 capsule, Rfl: 5    Ascorbic Acid (vitamin C) 1000 MG tablet, Take 1,000 mg by mouth daily, Disp: , Rfl:     aspirin 81 MG tablet, Take 81 mg by mouth daily  , Disp: , Rfl:     atorvastatin (LIPITOR) 20 mg tablet, TAKE (1) TABLET DAILY  , Disp: 90 tablet, Rfl: 0    Cholecalciferol (VITAMIN D-3 PO), Take 1,000 Units by mouth daily , Disp: , Rfl:     Docusate Sodium (COLACE PO), Take 1 tablet by mouth as needed  , Disp: , Rfl:     eltrombopag (PROMACTA) 75 MG TABS, Take 1 tablet (75 mg total) by mouth daily, Disp: 30 tablet, Rfl: 3    escitalopram (LEXAPRO) 20 mg tablet, Take 1 tablet (20 mg total) by mouth daily, Disp: 90 tablet, Rfl: 0    Glutamine POWD, by Does not apply route, Disp: , Rfl:     levothyroxine 50 mcg tablet, Take 1 tablet (50 mcg total) by mouth daily (Patient taking differently: Take 75 mcg by mouth daily  ), Disp: 90 tablet, Rfl: 0    LORazepam (ATIVAN) 0 5 mg tablet, Take 0 5 mg by mouth every 6 (six) hours as needed for anxiety  , Disp: , Rfl:     LORazepam (ATIVAN) 1 mg tablet, Take 1 tablet (1 mg total) by mouth every 4 (four) hours as needed for anxiety, Disp: 100 tablet, Rfl: 1    Multiple Vitamin (MULTIVITAMINS PO), Take 1 tablet by mouth daily  , Disp: , Rfl:     ondansetron (ZOFRAN) 4 mg tablet, Take 4 mg by mouth every 8 (eight) hours as needed for nausea or vomiting , Disp: , Rfl:     sulfamethoxazole-trimethoprim (BACTRIM DS) 800-160 mg per tablet, 3 (three) times a week Monday Wednesday friday, Disp: , Rfl:     vitamin B-12 (VITAMIN B-12) 1,000 mcg tablet, Take 1,000 mcg by mouth daily, Disp: , Rfl:   No current facility-administered medications for this visit  No Known Allergies         Objective   /82 (BP Location: Left arm, Patient Position: Sitting, Cuff Size: Standard)   Pulse 75   Temp 97 8 °F (36 6 °C) (Temporal)   Resp 16   Ht 5' 10" (1 778 m)   Wt 70 3 kg (155 lb)   SpO2 99%   BMI 22 24 kg/m²   Wt Readings from Last 6 Encounters:   02/23/22 70 3 kg (155 lb)   01/27/22 71 7 kg (158 lb 1 1 oz)   01/06/22 71 2 kg (157 lb)   12/30/21 71 1 kg (156 lb 12 oz)   12/08/21 71 7 kg (158 lb)   12/03/21 72 4 kg (159 lb 9 8 oz)       Physical Exam  Vitals reviewed  Constitutional:       Appearance: Normal appearance  He is well-developed  HENT:      Head: Normocephalic and atraumatic  Eyes:      Pupils: Pupils are equal, round, and reactive to light  Cardiovascular:      Rate and Rhythm: Normal rate and regular rhythm  Pulses: Normal pulses  Heart sounds: Normal heart sounds  Pulmonary:      Effort: Pulmonary effort is normal  No respiratory distress  Breath sounds: Normal breath sounds  Abdominal:      General: Bowel sounds are normal       Palpations: Abdomen is soft  Musculoskeletal:         General: Normal range of motion  Cervical back: Normal range of motion  Lymphadenopathy:      Cervical: No cervical adenopathy  Skin:     General: Skin is warm and dry  Capillary Refill: Capillary refill takes less than 2 seconds  Neurological:      Mental Status: He is alert and oriented to person, place, and time     Psychiatric:         Behavior: Behavior normal        The following historical data was reviewed:  Past Medical History:   Diagnosis Date    History of autologous stem cell transplant ([facility] [address] )     Hypertension     History of HTN-stable since weight loss    Insomnia     Multiple myeloma (HCC)      Past Surgical History:   Procedure Laterality Date    APPENDECTOMY      Last assessed: 9/25/15    ARTHROSCOPY KNEE Left     9/30/09    EYE SURGERY      Lasik    KNEE CARTILAGE SURGERY      LIMBAL STEM CELL TRANSPLANT      Patient had 2 in [place]-2/29/2016 and 4/13/2016     Social History     Socioeconomic History    Marital status: /Civil Union     Spouse name: None    Number of children: None    Years of education: None    Highest education level: None   Occupational History    None   Tobacco Use    Smoking status: Never Smoker    Smokeless tobacco: Never Used   Vaping Use    Vaping Use: Never used   Substance and Sexual Activity    Alcohol use: No    Drug use: No    Sexual activity: None   Other Topics Concern    None   Social History Narrative    None     Social Determinants of Health     Financial Resource Strain: Not on file   Food Insecurity: Not on file   Transportation Needs: Not on file   Physical Activity: Not on file   Stress: Not on file   Social Connections: Not on file   Intimate Partner Violence: Not on file   Housing Stability: Not on file     Family History   Problem Relation Age of Onset    Heart disease Father     Colon cancer Paternal Grandmother        Please note: This report has been generated by a voice recognition software system  Therefore there may be syntax, spelling, and/or grammatical errors  Please call if you have any questions

## 2022-02-25 ENCOUNTER — HOSPITAL ENCOUNTER (OUTPATIENT)
Dept: INFUSION CENTER | Facility: CLINIC | Age: 77
End: 2022-02-25

## 2022-02-28 ENCOUNTER — APPOINTMENT (OUTPATIENT)
Dept: LAB | Facility: CLINIC | Age: 77
End: 2022-02-28
Payer: MEDICARE

## 2022-02-28 ENCOUNTER — TELEPHONE (OUTPATIENT)
Dept: HEMATOLOGY ONCOLOGY | Facility: CLINIC | Age: 77
End: 2022-02-28

## 2022-02-28 DIAGNOSIS — C90.00 IGG MULTIPLE MYELOMA (HCC): ICD-10-CM

## 2022-02-28 LAB
ALBUMIN SERPL BCP-MCNC: 4 G/DL (ref 3.5–5)
ALP SERPL-CCNC: 84 U/L (ref 46–116)
ALT SERPL W P-5'-P-CCNC: 25 U/L (ref 12–78)
ANION GAP SERPL CALCULATED.3IONS-SCNC: 7 MMOL/L (ref 4–13)
AST SERPL W P-5'-P-CCNC: 23 U/L (ref 5–45)
BILIRUB SERPL-MCNC: 1.02 MG/DL (ref 0.2–1)
BUN SERPL-MCNC: 23 MG/DL (ref 5–25)
CALCIUM SERPL-MCNC: 9.5 MG/DL (ref 8.3–10.1)
CHLORIDE SERPL-SCNC: 109 MMOL/L (ref 100–108)
CO2 SERPL-SCNC: 22 MMOL/L (ref 21–32)
CREAT SERPL-MCNC: 1.72 MG/DL (ref 0.6–1.3)
GFR SERPL CREATININE-BSD FRML MDRD: 38 ML/MIN/1.73SQ M
GLUCOSE P FAST SERPL-MCNC: 93 MG/DL (ref 65–99)
POTASSIUM SERPL-SCNC: 4 MMOL/L (ref 3.5–5.3)
PROT SERPL-MCNC: 7.9 G/DL (ref 6.4–8.2)
SODIUM SERPL-SCNC: 138 MMOL/L (ref 136–145)

## 2022-02-28 PROCEDURE — 80053 COMPREHEN METABOLIC PANEL: CPT

## 2022-03-01 ENCOUNTER — HOSPITAL ENCOUNTER (OUTPATIENT)
Dept: INFUSION CENTER | Facility: CLINIC | Age: 77
Discharge: HOME | End: 2022-03-01
Payer: MEDICARE

## 2022-03-01 VITALS
SYSTOLIC BLOOD PRESSURE: 128 MMHG | DIASTOLIC BLOOD PRESSURE: 80 MMHG | RESPIRATION RATE: 18 BRPM | TEMPERATURE: 98 F | HEART RATE: 97 BPM

## 2022-03-01 DIAGNOSIS — D80.1 HYPOGAMMAGLOBULINEMIA (HCC): ICD-10-CM

## 2022-03-01 DIAGNOSIS — C90.00 IGG MULTIPLE MYELOMA (HCC): Primary | ICD-10-CM

## 2022-03-01 PROCEDURE — 96365 THER/PROPH/DIAG IV INF INIT: CPT

## 2022-03-01 PROCEDURE — 96366 THER/PROPH/DIAG IV INF ADDON: CPT

## 2022-03-01 PROCEDURE — 96367 TX/PROPH/DG ADDL SEQ IV INF: CPT

## 2022-03-01 RX ORDER — SODIUM CHLORIDE 9 MG/ML
20 INJECTION, SOLUTION INTRAVENOUS ONCE
Status: CANCELLED
Start: 2022-03-24 | End: 2022-03-24

## 2022-03-01 RX ORDER — SODIUM CHLORIDE 9 MG/ML
20 INJECTION, SOLUTION INTRAVENOUS ONCE
Status: COMPLETED | OUTPATIENT
Start: 2022-03-01 | End: 2022-03-01

## 2022-03-01 RX ADMIN — SODIUM CHLORIDE 20 ML/HR: 0.9 INJECTION, SOLUTION INTRAVENOUS at 08:15

## 2022-03-01 RX ADMIN — Medication 30 G: at 09:22

## 2022-03-01 RX ADMIN — ZOLEDRONIC ACID 3 MG: 4 INJECTION INTRAVENOUS at 08:40

## 2022-03-01 NOTE — PROGRESS NOTES
Pt  Denies new symptoms or concerns today  Labs reviewed  Pt  Will receive Zometa and IVIG as ordered  Ca+  9 5  Cr 1 72 CrCl 37 5  Pt  Will receive 3mg Zometa today

## 2022-03-07 ENCOUNTER — APPOINTMENT (OUTPATIENT)
Dept: LAB | Facility: CLINIC | Age: 77
End: 2022-03-07
Payer: MEDICARE

## 2022-03-07 ENCOUNTER — TELEPHONE (OUTPATIENT)
Dept: HEMATOLOGY ONCOLOGY | Facility: CLINIC | Age: 77
End: 2022-03-07

## 2022-03-07 RX ORDER — SODIUM CHLORIDE 9 MG/ML
20 INJECTION, SOLUTION INTRAVENOUS ONCE
Status: CANCELLED | OUTPATIENT
Start: 2022-03-07

## 2022-03-08 ENCOUNTER — TELEPHONE (OUTPATIENT)
Dept: HEMATOLOGY ONCOLOGY | Facility: CLINIC | Age: 77
End: 2022-03-08

## 2022-03-08 ENCOUNTER — HOSPITAL ENCOUNTER (OUTPATIENT)
Dept: INFUSION CENTER | Facility: CLINIC | Age: 77
Discharge: HOME | End: 2022-03-08
Payer: MEDICARE

## 2022-03-08 VITALS
HEART RATE: 76 BPM | RESPIRATION RATE: 18 BRPM | DIASTOLIC BLOOD PRESSURE: 66 MMHG | SYSTOLIC BLOOD PRESSURE: 143 MMHG | TEMPERATURE: 98.4 F

## 2022-03-08 DIAGNOSIS — D69.3 CHRONIC ITP (IDIOPATHIC THROMBOCYTOPENIA) (HCC): Primary | ICD-10-CM

## 2022-03-08 PROCEDURE — P9037 PLATE PHERES LEUKOREDU IRRAD: HCPCS

## 2022-03-08 PROCEDURE — 36430 TRANSFUSION BLD/BLD COMPNT: CPT

## 2022-03-08 RX ORDER — SODIUM CHLORIDE 9 MG/ML
20 INJECTION, SOLUTION INTRAVENOUS ONCE
Status: COMPLETED | OUTPATIENT
Start: 2022-03-08 | End: 2022-03-08

## 2022-03-08 RX ADMIN — SODIUM CHLORIDE 20 ML/HR: 9 INJECTION, SOLUTION INTRAVENOUS at 08:43

## 2022-03-08 NOTE — TELEPHONE ENCOUNTER
----- Message from Elan Espino RN sent at 3/8/2022  9:38 AM EST -----  Regarding: FW: Medical marijuana    ----- Message -----  From: Cherelle Martins MA  Sent: 3/8/2022   7:31 AM EST  To: Elan Espino RN, Vikas Hunter RN  Subject: Kendrick Ee: Medical marijuana                              ----- Message -----  From: Dallas Daniel  Sent: 3/7/2022   4:37 PM EST  To: Hematology Oncology [facility]  Subject: Medical marijuana                                Hi, Dr Alondra Lynn,    [facility] [employer] [facility] you are doing well and had a nice relaxing vacation  Melecio's Medical Marijuana card  3/5 and he was wondering if you would renew it  He has paid the fee  Thank you    Enriqueta Altman and Jany Willard

## 2022-03-09 LAB
ABO GROUP BLD BPU: NORMAL
BPU ID: NORMAL
UNIT DISPENSE STATUS: NORMAL
UNIT PRODUCT CODE: NORMAL
UNIT PRODUCT VOLUME: 276 ML
UNIT RH: NORMAL

## 2022-03-14 ENCOUNTER — APPOINTMENT (OUTPATIENT)
Dept: LAB | Facility: CLINIC | Age: 77
End: 2022-03-14
Payer: MEDICARE

## 2022-03-14 ENCOUNTER — TELEPHONE (OUTPATIENT)
Dept: HEMATOLOGY ONCOLOGY | Facility: CLINIC | Age: 77
End: 2022-03-14

## 2022-03-14 DIAGNOSIS — C90.00 MYELOMA ASSOCIATED AMYLOIDOSIS (HCC): ICD-10-CM

## 2022-03-14 DIAGNOSIS — E85.9 MYELOMA ASSOCIATED AMYLOIDOSIS (HCC): ICD-10-CM

## 2022-03-14 LAB
ALBUMIN SERPL BCP-MCNC: 3.7 G/DL (ref 3.5–5)
ALP SERPL-CCNC: 80 U/L (ref 46–116)
ALT SERPL W P-5'-P-CCNC: 22 U/L (ref 12–78)
ANION GAP SERPL CALCULATED.3IONS-SCNC: 3 MMOL/L (ref 4–13)
AST SERPL W P-5'-P-CCNC: 26 U/L (ref 5–45)
BILIRUB SERPL-MCNC: 0.65 MG/DL (ref 0.2–1)
BUN SERPL-MCNC: 20 MG/DL (ref 5–25)
CALCIUM SERPL-MCNC: 8.7 MG/DL (ref 8.3–10.1)
CHLORIDE SERPL-SCNC: 111 MMOL/L (ref 100–108)
CO2 SERPL-SCNC: 24 MMOL/L (ref 21–32)
CREAT SERPL-MCNC: 1.53 MG/DL (ref 0.6–1.3)
ERYTHROCYTE [DISTWIDTH] IN BLOOD BY AUTOMATED COUNT: 18 % (ref 11.6–15.1)
GFR SERPL CREATININE-BSD FRML MDRD: 44 ML/MIN/1.73SQ M
GLUCOSE P FAST SERPL-MCNC: 92 MG/DL (ref 65–99)
HCT VFR BLD AUTO: 23.3 % (ref 36.5–49.3)
HGB BLD-MCNC: 8 G/DL (ref 12–17)
MAGNESIUM SERPL-MCNC: 2.2 MG/DL (ref 1.6–2.6)
MCH RBC QN AUTO: 35.7 PG (ref 26.8–34.3)
MCHC RBC AUTO-ENTMCNC: 34.3 G/DL (ref 31.4–37.4)
MCV RBC AUTO: 104 FL (ref 82–98)
PLATELET # BLD AUTO: 15 THOUSANDS/UL (ref 149–390)
POTASSIUM SERPL-SCNC: 3.7 MMOL/L (ref 3.5–5.3)
PROT SERPL-MCNC: 7.1 G/DL (ref 6.4–8.2)
RBC # BLD AUTO: 2.24 MILLION/UL (ref 3.88–5.62)
SODIUM SERPL-SCNC: 138 MMOL/L (ref 136–145)
WBC # BLD AUTO: 2.05 THOUSAND/UL (ref 4.31–10.16)

## 2022-03-14 PROCEDURE — 85027 COMPLETE CBC AUTOMATED: CPT

## 2022-03-14 PROCEDURE — 80053 COMPREHEN METABOLIC PANEL: CPT

## 2022-03-14 PROCEDURE — 36415 COLL VENOUS BLD VENIPUNCTURE: CPT

## 2022-03-14 PROCEDURE — 83735 ASSAY OF MAGNESIUM: CPT

## 2022-03-14 NOTE — TELEPHONE ENCOUNTER
Called and spoke to pts wife  Advised her Melecio's platelets are 15 and we will be cancelling his infusion appt for tomorrow as he does not need a transfusion  She verbalized understanding

## 2022-03-15 ENCOUNTER — HOSPITAL ENCOUNTER (OUTPATIENT)
Dept: INFUSION CENTER | Facility: CLINIC | Age: 77
Discharge: HOME | End: 2022-03-15

## 2022-03-17 ENCOUNTER — APPOINTMENT (OUTPATIENT)
Dept: LAB | Facility: CLINIC | Age: 77
End: 2022-03-17
Payer: MEDICARE

## 2022-03-17 ENCOUNTER — TELEPHONE (OUTPATIENT)
Dept: HEMATOLOGY ONCOLOGY | Facility: CLINIC | Age: 77
End: 2022-03-17

## 2022-03-17 DIAGNOSIS — C90.00 MULTIPLE MYELOMA, REMISSION STATUS UNSPECIFIED (HCC): ICD-10-CM

## 2022-03-17 LAB
ALBUMIN SERPL BCP-MCNC: 3.7 G/DL (ref 3.5–5)
ALP SERPL-CCNC: 86 U/L (ref 46–116)
ALT SERPL W P-5'-P-CCNC: 23 U/L (ref 12–78)
ANION GAP SERPL CALCULATED.3IONS-SCNC: 7 MMOL/L (ref 4–13)
AST SERPL W P-5'-P-CCNC: 26 U/L (ref 5–45)
BASOPHILS # BLD AUTO: 0.01 THOUSANDS/ΜL (ref 0–0.1)
BASOPHILS NFR BLD AUTO: 1 % (ref 0–1)
BILIRUB SERPL-MCNC: 0.67 MG/DL (ref 0.2–1)
BUN SERPL-MCNC: 27 MG/DL (ref 5–25)
CALCIUM SERPL-MCNC: 9.2 MG/DL (ref 8.3–10.1)
CHLORIDE SERPL-SCNC: 108 MMOL/L (ref 100–108)
CO2 SERPL-SCNC: 24 MMOL/L (ref 21–32)
CREAT SERPL-MCNC: 1.72 MG/DL (ref 0.6–1.3)
EOSINOPHIL # BLD AUTO: 0.02 THOUSAND/ΜL (ref 0–0.61)
EOSINOPHIL NFR BLD AUTO: 1 % (ref 0–6)
ERYTHROCYTE [DISTWIDTH] IN BLOOD BY AUTOMATED COUNT: 17.6 % (ref 11.6–15.1)
GFR SERPL CREATININE-BSD FRML MDRD: 38 ML/MIN/1.73SQ M
GLUCOSE P FAST SERPL-MCNC: 94 MG/DL (ref 65–99)
HCT VFR BLD AUTO: 23.6 % (ref 36.5–49.3)
HGB BLD-MCNC: 8.1 G/DL (ref 12–17)
IMM GRANULOCYTES # BLD AUTO: 0.01 THOUSAND/UL (ref 0–0.2)
IMM GRANULOCYTES NFR BLD AUTO: 1 % (ref 0–2)
LYMPHOCYTES # BLD AUTO: 0.12 THOUSANDS/ΜL (ref 0.6–4.47)
LYMPHOCYTES NFR BLD AUTO: 7 % (ref 14–44)
MAGNESIUM SERPL-MCNC: 2.2 MG/DL (ref 1.6–2.6)
MCH RBC QN AUTO: 35.5 PG (ref 26.8–34.3)
MCHC RBC AUTO-ENTMCNC: 34.3 G/DL (ref 31.4–37.4)
MCV RBC AUTO: 104 FL (ref 82–98)
MONOCYTES # BLD AUTO: 0.3 THOUSAND/ΜL (ref 0.17–1.22)
MONOCYTES NFR BLD AUTO: 18 % (ref 4–12)
NEUTROPHILS # BLD AUTO: 1.24 THOUSANDS/ΜL (ref 1.85–7.62)
NEUTS SEG NFR BLD AUTO: 72 % (ref 43–75)
NRBC BLD AUTO-RTO: 0 /100 WBCS
PLATELET # BLD AUTO: 6 THOUSANDS/UL (ref 149–390)
POTASSIUM SERPL-SCNC: 3.8 MMOL/L (ref 3.5–5.3)
PROT SERPL-MCNC: 7.1 G/DL (ref 6.4–8.2)
RBC # BLD AUTO: 2.28 MILLION/UL (ref 3.88–5.62)
SODIUM SERPL-SCNC: 139 MMOL/L (ref 136–145)
WBC # BLD AUTO: 1.7 THOUSAND/UL (ref 4.31–10.16)

## 2022-03-17 PROCEDURE — 83735 ASSAY OF MAGNESIUM: CPT

## 2022-03-17 PROCEDURE — 85025 COMPLETE CBC W/AUTO DIFF WBC: CPT

## 2022-03-17 PROCEDURE — 36415 COLL VENOUS BLD VENIPUNCTURE: CPT

## 2022-03-17 PROCEDURE — 80053 COMPREHEN METABOLIC PANEL: CPT

## 2022-03-17 RX ORDER — SODIUM CHLORIDE 9 MG/ML
20 INJECTION, SOLUTION INTRAVENOUS ONCE
OUTPATIENT
Start: 2022-04-05

## 2022-03-17 NOTE — TELEPHONE ENCOUNTER
Spoke with Rose Becerra from [facility], regarding critical lab results  WBC 1 70 and PLT count 6

## 2022-03-18 ENCOUNTER — HOSPITAL ENCOUNTER (OUTPATIENT)
Dept: INFUSION CENTER | Facility: CLINIC | Age: 77
Discharge: HOME | End: 2022-03-18
Payer: MEDICARE

## 2022-03-18 VITALS
HEART RATE: 60 BPM | DIASTOLIC BLOOD PRESSURE: 79 MMHG | SYSTOLIC BLOOD PRESSURE: 122 MMHG | RESPIRATION RATE: 16 BRPM | TEMPERATURE: 97.7 F

## 2022-03-18 DIAGNOSIS — D69.3 CHRONIC ITP (IDIOPATHIC THROMBOCYTOPENIA) (HCC): Primary | ICD-10-CM

## 2022-03-18 PROCEDURE — 36430 TRANSFUSION BLD/BLD COMPNT: CPT

## 2022-03-18 PROCEDURE — P9037 PLATE PHERES LEUKOREDU IRRAD: HCPCS

## 2022-03-18 RX ORDER — SODIUM CHLORIDE 9 MG/ML
20 INJECTION, SOLUTION INTRAVENOUS ONCE
Status: COMPLETED | OUTPATIENT
Start: 2022-03-18 | End: 2022-03-18

## 2022-03-18 RX ADMIN — SODIUM CHLORIDE 20 ML/HR: 9 INJECTION, SOLUTION INTRAVENOUS at 08:43

## 2022-03-18 NOTE — PROGRESS NOTES
Patient arrived on unit for platelets  Denies any present issues/concerns  Patient tolerated 1 unit for platelets without incident  Patient aware  of low platelet count and bleeding precautions  Aware of next appointment   Declined AVS  Left in stable condtiion

## 2022-03-19 LAB
ABO GROUP BLD BPU: NORMAL
BPU ID: NORMAL
UNIT DISPENSE STATUS: NORMAL
UNIT PRODUCT CODE: NORMAL
UNIT PRODUCT VOLUME: 248 ML
UNIT RH: NORMAL

## 2022-03-21 ENCOUNTER — TELEPHONE (OUTPATIENT)
Dept: HEMATOLOGY ONCOLOGY | Facility: CLINIC | Age: 77
End: 2022-03-21

## 2022-03-21 ENCOUNTER — APPOINTMENT (OUTPATIENT)
Dept: LAB | Facility: CLINIC | Age: 77
End: 2022-03-21
Payer: MEDICARE

## 2022-03-21 DIAGNOSIS — C90.00 MULTIPLE MYELOMA, REMISSION STATUS UNSPECIFIED (HCC): ICD-10-CM

## 2022-03-21 LAB
ALBUMIN SERPL BCP-MCNC: 3.5 G/DL (ref 3.5–5)
ALP SERPL-CCNC: 86 U/L (ref 46–116)
ALT SERPL W P-5'-P-CCNC: 28 U/L (ref 12–78)
ANION GAP SERPL CALCULATED.3IONS-SCNC: 6 MMOL/L (ref 4–13)
AST SERPL W P-5'-P-CCNC: 28 U/L (ref 5–45)
BASOPHILS # BLD AUTO: 0.01 THOUSANDS/ΜL (ref 0–0.1)
BASOPHILS NFR BLD AUTO: 1 % (ref 0–1)
BILIRUB SERPL-MCNC: 0.54 MG/DL (ref 0.2–1)
BUN SERPL-MCNC: 20 MG/DL (ref 5–25)
CALCIUM SERPL-MCNC: 9 MG/DL (ref 8.3–10.1)
CHLORIDE SERPL-SCNC: 106 MMOL/L (ref 100–108)
CO2 SERPL-SCNC: 25 MMOL/L (ref 21–32)
CREAT SERPL-MCNC: 1.6 MG/DL (ref 0.6–1.3)
EOSINOPHIL # BLD AUTO: 0.01 THOUSAND/ΜL (ref 0–0.61)
EOSINOPHIL NFR BLD AUTO: 1 % (ref 0–6)
ERYTHROCYTE [DISTWIDTH] IN BLOOD BY AUTOMATED COUNT: 18.3 % (ref 11.6–15.1)
GFR SERPL CREATININE-BSD FRML MDRD: 41 ML/MIN/1.73SQ M
GLUCOSE P FAST SERPL-MCNC: 90 MG/DL (ref 65–99)
HCT VFR BLD AUTO: 24.6 % (ref 36.5–49.3)
HGB BLD-MCNC: 8.1 G/DL (ref 12–17)
IMM GRANULOCYTES # BLD AUTO: 0.03 THOUSAND/UL (ref 0–0.2)
IMM GRANULOCYTES NFR BLD AUTO: 2 % (ref 0–2)
LYMPHOCYTES # BLD AUTO: 0.19 THOUSANDS/ΜL (ref 0.6–4.47)
LYMPHOCYTES NFR BLD AUTO: 12 % (ref 14–44)
MAGNESIUM SERPL-MCNC: 2.2 MG/DL (ref 1.6–2.6)
MCH RBC QN AUTO: 35.7 PG (ref 26.8–34.3)
MCHC RBC AUTO-ENTMCNC: 32.9 G/DL (ref 31.4–37.4)
MCV RBC AUTO: 108 FL (ref 82–98)
MONOCYTES # BLD AUTO: 0.28 THOUSAND/ΜL (ref 0.17–1.22)
MONOCYTES NFR BLD AUTO: 18 % (ref 4–12)
NEUTROPHILS # BLD AUTO: 1.03 THOUSANDS/ΜL (ref 1.85–7.62)
NEUTS SEG NFR BLD AUTO: 66 % (ref 43–75)
NRBC BLD AUTO-RTO: 0 /100 WBCS
PLATELET # BLD AUTO: 25 THOUSANDS/UL (ref 149–390)
PMV BLD AUTO: 12.6 FL (ref 8.9–12.7)
POTASSIUM SERPL-SCNC: 4 MMOL/L (ref 3.5–5.3)
PROT SERPL-MCNC: 7 G/DL (ref 6.4–8.2)
RBC # BLD AUTO: 2.27 MILLION/UL (ref 3.88–5.62)
SODIUM SERPL-SCNC: 137 MMOL/L (ref 136–145)
WBC # BLD AUTO: 1.55 THOUSAND/UL (ref 4.31–10.16)

## 2022-03-21 PROCEDURE — 83735 ASSAY OF MAGNESIUM: CPT

## 2022-03-21 PROCEDURE — 85025 COMPLETE CBC W/AUTO DIFF WBC: CPT

## 2022-03-21 PROCEDURE — 80053 COMPREHEN METABOLIC PANEL: CPT

## 2022-03-21 PROCEDURE — 36415 COLL VENOUS BLD VENIPUNCTURE: CPT

## 2022-03-21 NOTE — TELEPHONE ENCOUNTER
Rec'd critical lab results plt 25  Advised Elina LÓPEZ that Erasmo kaur Ohrí transfusion appt can be cancelled for tomorrow as he does not meet parameters  Called and spoke with his wife and advised her of the same  She verbalized understanding

## 2022-03-24 ENCOUNTER — TELEPHONE (OUTPATIENT)
Dept: HEMATOLOGY ONCOLOGY | Facility: CLINIC | Age: 77
End: 2022-03-24

## 2022-03-24 ENCOUNTER — APPOINTMENT (OUTPATIENT)
Dept: LAB | Facility: CLINIC | Age: 77
End: 2022-03-24
Payer: MEDICARE

## 2022-03-24 DIAGNOSIS — D69.3 CHRONIC ITP (IDIOPATHIC THROMBOCYTOPENIA) (HCC): ICD-10-CM

## 2022-03-24 DIAGNOSIS — D80.1 HYPOGAMMAGLOBULINEMIA (HCC): ICD-10-CM

## 2022-03-24 DIAGNOSIS — C90.00 IGG MULTIPLE MYELOMA (HCC): ICD-10-CM

## 2022-03-24 DIAGNOSIS — T45.1X5A IMMUNOSUPPRESSED DUE TO CHEMOTHERAPY (HCC): ICD-10-CM

## 2022-03-24 DIAGNOSIS — D61.818 PANCYTOPENIA (HCC): ICD-10-CM

## 2022-03-24 DIAGNOSIS — C90.00 MULTIPLE MYELOMA NOT HAVING ACHIEVED REMISSION (HCC): ICD-10-CM

## 2022-03-24 DIAGNOSIS — D63.1 ANEMIA DUE TO STAGE 3 CHRONIC KIDNEY DISEASE, UNSPECIFIED WHETHER STAGE 3A OR 3B CKD (HCC): ICD-10-CM

## 2022-03-24 DIAGNOSIS — D70.9 NEUTROPENIA, UNSPECIFIED TYPE (HCC): ICD-10-CM

## 2022-03-24 DIAGNOSIS — D84.821 IMMUNOSUPPRESSED DUE TO CHEMOTHERAPY (HCC): ICD-10-CM

## 2022-03-24 DIAGNOSIS — C90.00 MULTIPLE MYELOMA, REMISSION STATUS UNSPECIFIED (HCC): ICD-10-CM

## 2022-03-24 DIAGNOSIS — N18.30 ANEMIA DUE TO STAGE 3 CHRONIC KIDNEY DISEASE, UNSPECIFIED WHETHER STAGE 3A OR 3B CKD (HCC): ICD-10-CM

## 2022-03-24 DIAGNOSIS — Z79.899 IMMUNOSUPPRESSED DUE TO CHEMOTHERAPY (HCC): ICD-10-CM

## 2022-03-24 LAB — MAGNESIUM SERPL-MCNC: 2.3 MG/DL (ref 1.6–2.6)

## 2022-03-24 PROCEDURE — 83735 ASSAY OF MAGNESIUM: CPT

## 2022-03-25 ENCOUNTER — HOSPITAL ENCOUNTER (OUTPATIENT)
Dept: INFUSION CENTER | Facility: CLINIC | Age: 77
Discharge: HOME | End: 2022-03-25
Payer: MEDICARE

## 2022-03-25 VITALS
TEMPERATURE: 98.2 F | WEIGHT: 157.63 LBS | SYSTOLIC BLOOD PRESSURE: 150 MMHG | DIASTOLIC BLOOD PRESSURE: 78 MMHG | BODY MASS INDEX: 22.62 KG/M2 | RESPIRATION RATE: 16 BRPM | HEART RATE: 59 BPM

## 2022-03-25 DIAGNOSIS — D80.1 HYPOGAMMAGLOBULINEMIA (HCC): ICD-10-CM

## 2022-03-25 DIAGNOSIS — C90.00 IGG MULTIPLE MYELOMA (HCC): Primary | ICD-10-CM

## 2022-03-25 PROCEDURE — 96366 THER/PROPH/DIAG IV INF ADDON: CPT

## 2022-03-25 PROCEDURE — 96367 TX/PROPH/DG ADDL SEQ IV INF: CPT

## 2022-03-25 PROCEDURE — 96365 THER/PROPH/DIAG IV INF INIT: CPT

## 2022-03-25 RX ORDER — SODIUM CHLORIDE 9 MG/ML
20 INJECTION, SOLUTION INTRAVENOUS ONCE
Status: CANCELLED
Start: 2022-04-22 | End: 2022-03-29

## 2022-03-25 RX ORDER — SODIUM CHLORIDE 9 MG/ML
20 INJECTION, SOLUTION INTRAVENOUS ONCE
Status: COMPLETED | OUTPATIENT
Start: 2022-03-25 | End: 2022-03-25

## 2022-03-25 RX ADMIN — SODIUM CHLORIDE 20 ML/HR: 0.9 INJECTION, SOLUTION INTRAVENOUS at 08:35

## 2022-03-25 RX ADMIN — Medication 30 G: at 09:25

## 2022-03-25 RX ADMIN — ZOLEDRONIC ACID 3 MG: 4 INJECTION INTRAVENOUS at 08:35

## 2022-03-25 NOTE — PROGRESS NOTES
Patient tolerated Zometa and IVIG infusions without incident  Pt is aware of all future appointments   Declined AVS

## 2022-03-28 ENCOUNTER — TELEPHONE (OUTPATIENT)
Dept: HEMATOLOGY ONCOLOGY | Facility: CLINIC | Age: 77
End: 2022-03-28

## 2022-03-28 ENCOUNTER — APPOINTMENT (OUTPATIENT)
Dept: LAB | Facility: CLINIC | Age: 77
End: 2022-03-28
Payer: MEDICARE

## 2022-03-28 DIAGNOSIS — D63.1 ANEMIA DUE TO STAGE 3 CHRONIC KIDNEY DISEASE, UNSPECIFIED WHETHER STAGE 3A OR 3B CKD (HCC): ICD-10-CM

## 2022-03-28 DIAGNOSIS — D70.9 NEUTROPENIA, UNSPECIFIED TYPE (HCC): ICD-10-CM

## 2022-03-28 DIAGNOSIS — D84.821 IMMUNOSUPPRESSED DUE TO CHEMOTHERAPY (HCC): ICD-10-CM

## 2022-03-28 DIAGNOSIS — D69.3 CHRONIC ITP (IDIOPATHIC THROMBOCYTOPENIA) (HCC): ICD-10-CM

## 2022-03-28 DIAGNOSIS — D80.1 HYPOGAMMAGLOBULINEMIA (HCC): ICD-10-CM

## 2022-03-28 DIAGNOSIS — C90.00 IGG MULTIPLE MYELOMA (HCC): ICD-10-CM

## 2022-03-28 DIAGNOSIS — Z79.899 IMMUNOSUPPRESSED DUE TO CHEMOTHERAPY (HCC): ICD-10-CM

## 2022-03-28 DIAGNOSIS — C90.00 MULTIPLE MYELOMA NOT HAVING ACHIEVED REMISSION (HCC): ICD-10-CM

## 2022-03-28 DIAGNOSIS — T45.1X5A IMMUNOSUPPRESSED DUE TO CHEMOTHERAPY (HCC): ICD-10-CM

## 2022-03-28 DIAGNOSIS — D61.818 PANCYTOPENIA (HCC): ICD-10-CM

## 2022-03-28 DIAGNOSIS — C90.00 MULTIPLE MYELOMA, REMISSION STATUS UNSPECIFIED (HCC): ICD-10-CM

## 2022-03-28 DIAGNOSIS — N18.30 ANEMIA DUE TO STAGE 3 CHRONIC KIDNEY DISEASE, UNSPECIFIED WHETHER STAGE 3A OR 3B CKD (HCC): ICD-10-CM

## 2022-03-28 LAB
ALBUMIN SERPL BCP-MCNC: 3.8 G/DL (ref 3.5–5)
ALP SERPL-CCNC: 104 U/L (ref 46–116)
ALT SERPL W P-5'-P-CCNC: 28 U/L (ref 12–78)
ANION GAP SERPL CALCULATED.3IONS-SCNC: 4 MMOL/L (ref 4–13)
ANISOCYTOSIS BLD QL SMEAR: PRESENT
ARTIFACT: PRESENT
AST SERPL W P-5'-P-CCNC: 27 U/L (ref 5–45)
BASOPHILS # BLD MANUAL: 0.03 THOUSAND/UL (ref 0–0.1)
BASOPHILS NFR MAR MANUAL: 3 % (ref 0–1)
BILIRUB SERPL-MCNC: 0.52 MG/DL (ref 0.2–1)
BUN SERPL-MCNC: 24 MG/DL (ref 5–25)
CALCIUM SERPL-MCNC: 9.2 MG/DL (ref 8.3–10.1)
CHLORIDE SERPL-SCNC: 106 MMOL/L (ref 100–108)
CO2 SERPL-SCNC: 24 MMOL/L (ref 21–32)
CREAT SERPL-MCNC: 1.65 MG/DL (ref 0.6–1.3)
EOSINOPHIL # BLD MANUAL: 0.05 THOUSAND/UL (ref 0–0.4)
EOSINOPHIL NFR BLD MANUAL: 5 % (ref 0–6)
ERYTHROCYTE [DISTWIDTH] IN BLOOD BY AUTOMATED COUNT: 16.6 % (ref 11.6–15.1)
GFR SERPL CREATININE-BSD FRML MDRD: 40 ML/MIN/1.73SQ M
GLUCOSE P FAST SERPL-MCNC: 95 MG/DL (ref 65–99)
HCT VFR BLD AUTO: 24.7 % (ref 36.5–49.3)
HGB BLD-MCNC: 8.3 G/DL (ref 12–17)
LYMPHOCYTES # BLD AUTO: 0.05 THOUSAND/UL (ref 0.6–4.47)
LYMPHOCYTES # BLD AUTO: 5 % (ref 14–44)
MAGNESIUM SERPL-MCNC: 2.3 MG/DL (ref 1.6–2.6)
MCH RBC QN AUTO: 35 PG (ref 26.8–34.3)
MCHC RBC AUTO-ENTMCNC: 33.6 G/DL (ref 31.4–37.4)
MCV RBC AUTO: 104 FL (ref 82–98)
MONOCYTES # BLD AUTO: 0.24 THOUSAND/UL (ref 0–1.22)
MONOCYTES NFR BLD: 24 % (ref 4–12)
NEUTROPHILS # BLD MANUAL: 0.62 THOUSAND/UL (ref 1.85–7.62)
NEUTS BAND NFR BLD MANUAL: 4 % (ref 0–8)
NEUTS SEG NFR BLD AUTO: 59 % (ref 43–75)
NRBC BLD AUTO-RTO: 1 /100 WBC (ref 0–2)
PLATELET # BLD AUTO: 79 THOUSANDS/UL (ref 149–390)
PLATELET BLD QL SMEAR: ABNORMAL
PMV BLD AUTO: 11.9 FL (ref 8.9–12.7)
POIKILOCYTOSIS BLD QL SMEAR: PRESENT
POTASSIUM SERPL-SCNC: 4 MMOL/L (ref 3.5–5.3)
PROT SERPL-MCNC: 7.6 G/DL (ref 6.4–8.2)
RBC # BLD AUTO: 2.37 MILLION/UL (ref 3.88–5.62)
RBC MORPH BLD: PRESENT
SODIUM SERPL-SCNC: 134 MMOL/L (ref 136–145)
WBC # BLD AUTO: 0.99 THOUSAND/UL (ref 4.31–10.16)

## 2022-03-28 PROCEDURE — 85027 COMPLETE CBC AUTOMATED: CPT

## 2022-03-28 PROCEDURE — 85007 BL SMEAR W/DIFF WBC COUNT: CPT

## 2022-03-28 PROCEDURE — 36415 COLL VENOUS BLD VENIPUNCTURE: CPT

## 2022-03-28 PROCEDURE — 83735 ASSAY OF MAGNESIUM: CPT

## 2022-03-28 PROCEDURE — 80053 COMPREHEN METABOLIC PANEL: CPT

## 2022-03-29 ENCOUNTER — HOSPITAL ENCOUNTER (OUTPATIENT)
Dept: INFUSION CENTER | Facility: CLINIC | Age: 77
Discharge: HOME | End: 2022-03-29

## 2022-03-29 DIAGNOSIS — E78.5 HYPERLIPIDEMIA, UNSPECIFIED HYPERLIPIDEMIA TYPE: ICD-10-CM

## 2022-03-29 DIAGNOSIS — F41.9 ANXIETY: ICD-10-CM

## 2022-03-30 ENCOUNTER — TELEPHONE (OUTPATIENT)
Dept: INTERNAL MEDICINE CLINIC | Facility: CLINIC | Age: 77
End: 2022-03-30

## 2022-03-31 RX ORDER — ATORVASTATIN CALCIUM 20 MG/1
20 TABLET, FILM COATED ORAL DAILY
Qty: 90 TABLET | Refills: 0 | Status: SHIPPED | OUTPATIENT
Start: 2022-03-31 | End: 2022-06-21 | Stop reason: SDUPTHER

## 2022-03-31 RX ORDER — ESCITALOPRAM OXALATE 20 MG/1
20 TABLET ORAL DAILY
Qty: 90 TABLET | Refills: 0 | Status: SHIPPED | OUTPATIENT
Start: 2022-03-31 | End: 2022-07-12 | Stop reason: SDUPTHER

## 2022-04-04 ENCOUNTER — APPOINTMENT (OUTPATIENT)
Dept: LAB | Facility: CLINIC | Age: 77
End: 2022-04-04
Payer: MEDICARE

## 2022-04-04 DIAGNOSIS — D61.818 PANCYTOPENIA (HCC): ICD-10-CM

## 2022-04-04 DIAGNOSIS — D70.9 NEUTROPENIA, UNSPECIFIED TYPE (HCC): ICD-10-CM

## 2022-04-04 DIAGNOSIS — C90.00 MULTIPLE MYELOMA, REMISSION STATUS UNSPECIFIED (HCC): ICD-10-CM

## 2022-04-04 DIAGNOSIS — D84.821 IMMUNOSUPPRESSED DUE TO CHEMOTHERAPY (HCC): ICD-10-CM

## 2022-04-04 DIAGNOSIS — D80.1 HYPOGAMMAGLOBULINEMIA (HCC): ICD-10-CM

## 2022-04-04 DIAGNOSIS — Z79.899 IMMUNOSUPPRESSED DUE TO CHEMOTHERAPY (HCC): ICD-10-CM

## 2022-04-04 DIAGNOSIS — C90.00 IGG MULTIPLE MYELOMA (HCC): ICD-10-CM

## 2022-04-04 DIAGNOSIS — D69.3 CHRONIC ITP (IDIOPATHIC THROMBOCYTOPENIA) (HCC): ICD-10-CM

## 2022-04-04 DIAGNOSIS — T45.1X5A IMMUNOSUPPRESSED DUE TO CHEMOTHERAPY (HCC): ICD-10-CM

## 2022-04-04 DIAGNOSIS — N18.30 ANEMIA DUE TO STAGE 3 CHRONIC KIDNEY DISEASE, UNSPECIFIED WHETHER STAGE 3A OR 3B CKD (HCC): ICD-10-CM

## 2022-04-04 DIAGNOSIS — C90.00 MULTIPLE MYELOMA NOT HAVING ACHIEVED REMISSION (HCC): ICD-10-CM

## 2022-04-04 DIAGNOSIS — D63.1 ANEMIA DUE TO STAGE 3 CHRONIC KIDNEY DISEASE, UNSPECIFIED WHETHER STAGE 3A OR 3B CKD (HCC): ICD-10-CM

## 2022-04-04 LAB
ALBUMIN SERPL BCP-MCNC: 3.4 G/DL (ref 3.5–5)
ALP SERPL-CCNC: 107 U/L (ref 46–116)
ALT SERPL W P-5'-P-CCNC: 34 U/L (ref 12–78)
ANION GAP SERPL CALCULATED.3IONS-SCNC: 5 MMOL/L (ref 4–13)
AST SERPL W P-5'-P-CCNC: 34 U/L (ref 5–45)
BILIRUB SERPL-MCNC: 0.82 MG/DL (ref 0.2–1)
BUN SERPL-MCNC: 19 MG/DL (ref 5–25)
CALCIUM ALBUM COR SERPL-MCNC: 9.6 MG/DL (ref 8.3–10.1)
CALCIUM SERPL-MCNC: 9.1 MG/DL (ref 8.3–10.1)
CHLORIDE SERPL-SCNC: 105 MMOL/L (ref 100–108)
CO2 SERPL-SCNC: 24 MMOL/L (ref 21–32)
CREAT SERPL-MCNC: 1.84 MG/DL (ref 0.6–1.3)
GFR SERPL CREATININE-BSD FRML MDRD: 35 ML/MIN/1.73SQ M
GLUCOSE P FAST SERPL-MCNC: 96 MG/DL (ref 65–99)
MAGNESIUM SERPL-MCNC: 2.1 MG/DL (ref 1.6–2.6)
POTASSIUM SERPL-SCNC: 3.9 MMOL/L (ref 3.5–5.3)
PROT SERPL-MCNC: 7.4 G/DL (ref 6.4–8.2)
SODIUM SERPL-SCNC: 134 MMOL/L (ref 136–145)

## 2022-04-04 PROCEDURE — 36415 COLL VENOUS BLD VENIPUNCTURE: CPT

## 2022-04-04 PROCEDURE — 80053 COMPREHEN METABOLIC PANEL: CPT

## 2022-04-04 PROCEDURE — 85025 COMPLETE CBC W/AUTO DIFF WBC: CPT

## 2022-04-04 PROCEDURE — 83735 ASSAY OF MAGNESIUM: CPT

## 2022-04-05 ENCOUNTER — HOSPITAL ENCOUNTER (OUTPATIENT)
Dept: INFUSION CENTER | Facility: CLINIC | Age: 77
Discharge: HOME | End: 2022-04-05

## 2022-04-05 LAB
BASOPHILS # BLD AUTO: 0.02 THOUSANDS/ΜL (ref 0–0.1)
BASOPHILS NFR BLD AUTO: 1 % (ref 0–1)
EOSINOPHIL # BLD AUTO: 0.03 THOUSAND/ΜL (ref 0–0.61)
EOSINOPHIL NFR BLD AUTO: 2 % (ref 0–6)
ERYTHROCYTE [DISTWIDTH] IN BLOOD BY AUTOMATED COUNT: 16.8 % (ref 11.6–15.1)
HCT VFR BLD AUTO: 27.9 % (ref 36.5–49.3)
HGB BLD-MCNC: 8.7 G/DL (ref 12–17)
IMM GRANULOCYTES # BLD AUTO: 0.01 THOUSAND/UL (ref 0–0.2)
IMM GRANULOCYTES NFR BLD AUTO: 1 % (ref 0–2)
LYMPHOCYTES # BLD AUTO: 0.07 THOUSANDS/ΜL (ref 0.6–4.47)
LYMPHOCYTES NFR BLD AUTO: 5 % (ref 14–44)
MCH RBC QN AUTO: 33.7 PG (ref 26.8–34.3)
MCHC RBC AUTO-ENTMCNC: 31.2 G/DL (ref 31.4–37.4)
MCV RBC AUTO: 108 FL (ref 82–98)
MONOCYTES # BLD AUTO: 0.47 THOUSAND/ΜL (ref 0.17–1.22)
MONOCYTES NFR BLD AUTO: 33 % (ref 4–12)
NEUTROPHILS # BLD AUTO: 0.83 THOUSANDS/ΜL (ref 1.85–7.62)
NEUTS SEG NFR BLD AUTO: 58 % (ref 43–75)
NRBC BLD AUTO-RTO: 0 /100 WBCS
PLATELET # BLD AUTO: 67 THOUSANDS/UL (ref 149–390)
PMV BLD AUTO: 12.2 FL (ref 8.9–12.7)
RBC # BLD AUTO: 2.58 MILLION/UL (ref 3.88–5.62)
WBC # BLD AUTO: 1.43 THOUSAND/UL (ref 4.31–10.16)

## 2022-04-11 ENCOUNTER — APPOINTMENT (OUTPATIENT)
Dept: LAB | Facility: CLINIC | Age: 77
End: 2022-04-11
Payer: MEDICARE

## 2022-04-11 ENCOUNTER — OFFICE VISIT (OUTPATIENT)
Dept: HEMATOLOGY ONCOLOGY | Facility: CLINIC | Age: 77
End: 2022-04-11
Payer: MEDICARE

## 2022-04-11 VITALS
DIASTOLIC BLOOD PRESSURE: 82 MMHG | TEMPERATURE: 97.4 F | OXYGEN SATURATION: 97 % | RESPIRATION RATE: 17 BRPM | HEIGHT: 70 IN | BODY MASS INDEX: 22.22 KG/M2 | HEART RATE: 81 BPM | WEIGHT: 155.2 LBS | SYSTOLIC BLOOD PRESSURE: 140 MMHG

## 2022-04-11 DIAGNOSIS — D69.3 CHRONIC ITP (IDIOPATHIC THROMBOCYTOPENIA) (HCC): ICD-10-CM

## 2022-04-11 DIAGNOSIS — E44.0 MODERATE PROTEIN-CALORIE MALNUTRITION (HCC): ICD-10-CM

## 2022-04-11 DIAGNOSIS — D80.1 HYPOGAMMAGLOBULINEMIA (HCC): ICD-10-CM

## 2022-04-11 DIAGNOSIS — D63.1 ANEMIA DUE TO STAGE 3 CHRONIC KIDNEY DISEASE, UNSPECIFIED WHETHER STAGE 3A OR 3B CKD (HCC): ICD-10-CM

## 2022-04-11 DIAGNOSIS — D61.818 PANCYTOPENIA (HCC): ICD-10-CM

## 2022-04-11 DIAGNOSIS — T45.1X5A IMMUNOSUPPRESSED DUE TO CHEMOTHERAPY (HCC): ICD-10-CM

## 2022-04-11 DIAGNOSIS — C90.00 IGG MULTIPLE MYELOMA (HCC): ICD-10-CM

## 2022-04-11 DIAGNOSIS — D70.9 NEUTROPENIA, UNSPECIFIED TYPE (HCC): ICD-10-CM

## 2022-04-11 DIAGNOSIS — Z79.899 IMMUNOSUPPRESSED DUE TO CHEMOTHERAPY (HCC): ICD-10-CM

## 2022-04-11 DIAGNOSIS — N18.30 ANEMIA DUE TO STAGE 3 CHRONIC KIDNEY DISEASE, UNSPECIFIED WHETHER STAGE 3A OR 3B CKD (HCC): ICD-10-CM

## 2022-04-11 DIAGNOSIS — D84.821 IMMUNOSUPPRESSED DUE TO CHEMOTHERAPY (HCC): ICD-10-CM

## 2022-04-11 DIAGNOSIS — C90.00 IGG MULTIPLE MYELOMA (HCC): Primary | ICD-10-CM

## 2022-04-11 DIAGNOSIS — C90.00 MULTIPLE MYELOMA NOT HAVING ACHIEVED REMISSION (HCC): ICD-10-CM

## 2022-04-11 LAB
ALBUMIN SERPL BCP-MCNC: 3.5 G/DL (ref 3.5–5)
ALP SERPL-CCNC: 129 U/L (ref 46–116)
ALT SERPL W P-5'-P-CCNC: 32 U/L (ref 12–78)
ANION GAP SERPL CALCULATED.3IONS-SCNC: 8 MMOL/L (ref 4–13)
AST SERPL W P-5'-P-CCNC: 24 U/L (ref 5–45)
BASOPHILS # BLD AUTO: 0.03 THOUSANDS/ΜL (ref 0–0.1)
BASOPHILS NFR BLD AUTO: 1 % (ref 0–1)
BILIRUB SERPL-MCNC: 0.61 MG/DL (ref 0.2–1)
BUN SERPL-MCNC: 27 MG/DL (ref 5–25)
CALCIUM SERPL-MCNC: 9.7 MG/DL (ref 8.3–10.1)
CHLORIDE SERPL-SCNC: 104 MMOL/L (ref 100–108)
CO2 SERPL-SCNC: 23 MMOL/L (ref 21–32)
CREAT SERPL-MCNC: 2.17 MG/DL (ref 0.6–1.3)
EOSINOPHIL # BLD AUTO: 0.15 THOUSAND/ΜL (ref 0–0.61)
EOSINOPHIL NFR BLD AUTO: 5 % (ref 0–6)
ERYTHROCYTE [DISTWIDTH] IN BLOOD BY AUTOMATED COUNT: 16.7 % (ref 11.6–15.1)
GFR SERPL CREATININE-BSD FRML MDRD: 28 ML/MIN/1.73SQ M
GLUCOSE P FAST SERPL-MCNC: 99 MG/DL (ref 65–99)
HCT VFR BLD AUTO: 28.5 % (ref 36.5–49.3)
HGB BLD-MCNC: 8.9 G/DL (ref 12–17)
IGA SERPL-MCNC: <8 MG/DL (ref 70–400)
IGG SERPL-MCNC: 928 MG/DL (ref 700–1600)
IGM SERPL-MCNC: 37 MG/DL (ref 40–230)
IMM GRANULOCYTES # BLD AUTO: 0.02 THOUSAND/UL (ref 0–0.2)
IMM GRANULOCYTES NFR BLD AUTO: 1 % (ref 0–2)
LYMPHOCYTES # BLD AUTO: 0.1 THOUSANDS/ΜL (ref 0.6–4.47)
LYMPHOCYTES NFR BLD AUTO: 3 % (ref 14–44)
MCH RBC QN AUTO: 32.5 PG (ref 26.8–34.3)
MCHC RBC AUTO-ENTMCNC: 31.2 G/DL (ref 31.4–37.4)
MCV RBC AUTO: 104 FL (ref 82–98)
MONOCYTES # BLD AUTO: 0.45 THOUSAND/ΜL (ref 0.17–1.22)
MONOCYTES NFR BLD AUTO: 13 % (ref 4–12)
NEUTROPHILS # BLD AUTO: 2.6 THOUSANDS/ΜL (ref 1.85–7.62)
NEUTS SEG NFR BLD AUTO: 77 % (ref 43–75)
NRBC BLD AUTO-RTO: 0 /100 WBCS
PLATELET # BLD AUTO: 70 THOUSANDS/UL (ref 149–390)
PMV BLD AUTO: 11.6 FL (ref 8.9–12.7)
POTASSIUM SERPL-SCNC: 3.6 MMOL/L (ref 3.5–5.3)
PROT SERPL-MCNC: 7.6 G/DL (ref 6.4–8.2)
RBC # BLD AUTO: 2.74 MILLION/UL (ref 3.88–5.62)
SODIUM SERPL-SCNC: 135 MMOL/L (ref 136–145)
WBC # BLD AUTO: 3.35 THOUSAND/UL (ref 4.31–10.16)

## 2022-04-11 PROCEDURE — 82784 ASSAY IGA/IGD/IGG/IGM EACH: CPT

## 2022-04-11 PROCEDURE — 80053 COMPREHEN METABOLIC PANEL: CPT

## 2022-04-11 PROCEDURE — 36415 COLL VENOUS BLD VENIPUNCTURE: CPT

## 2022-04-11 PROCEDURE — 99214 OFFICE O/P EST MOD 30 MIN: CPT | Performed by: INTERNAL MEDICINE

## 2022-04-11 PROCEDURE — 85025 COMPLETE CBC W/AUTO DIFF WBC: CPT

## 2022-04-11 RX ORDER — NALOXONE HYDROCHLORIDE 4 MG/.1ML
SPRAY NASAL
Qty: 1 EACH | Refills: 1 | Status: SHIPPED | OUTPATIENT
Start: 2022-04-11

## 2022-04-11 RX ORDER — OXYCODONE HYDROCHLORIDE 5 MG/1
5 TABLET ORAL EVERY 4 HOURS PRN
Qty: 60 TABLET | Refills: 0 | Status: SHIPPED | OUTPATIENT
Start: 2022-04-11

## 2022-04-12 ENCOUNTER — HOSPITAL ENCOUNTER (OUTPATIENT)
Dept: INFUSION CENTER | Facility: CLINIC | Age: 77
Discharge: HOME | End: 2022-04-12

## 2022-04-14 ENCOUNTER — OFFICE VISIT (OUTPATIENT)
Dept: INTERNAL MEDICINE CLINIC | Facility: CLINIC | Age: 77
End: 2022-04-14
Payer: MEDICARE

## 2022-04-14 VITALS
TEMPERATURE: 97.7 F | HEIGHT: 70 IN | OXYGEN SATURATION: 98 % | BODY MASS INDEX: 22.65 KG/M2 | HEART RATE: 79 BPM | SYSTOLIC BLOOD PRESSURE: 110 MMHG | DIASTOLIC BLOOD PRESSURE: 66 MMHG | RESPIRATION RATE: 18 BRPM | WEIGHT: 158.2 LBS

## 2022-04-14 DIAGNOSIS — I10 ESSENTIAL HYPERTENSION: ICD-10-CM

## 2022-04-14 DIAGNOSIS — E78.5 HYPERLIPIDEMIA, UNSPECIFIED HYPERLIPIDEMIA TYPE: Primary | ICD-10-CM

## 2022-04-14 DIAGNOSIS — E03.9 HYPOTHYROIDISM, UNSPECIFIED TYPE: ICD-10-CM

## 2022-04-14 DIAGNOSIS — D61.818 PANCYTOPENIA (HCC): ICD-10-CM

## 2022-04-14 DIAGNOSIS — M79.601 RIGHT ARM PAIN: ICD-10-CM

## 2022-04-14 DIAGNOSIS — E83.51 HYPOCALCEMIA: ICD-10-CM

## 2022-04-14 DIAGNOSIS — C90.00 IGG MULTIPLE MYELOMA (HCC): ICD-10-CM

## 2022-04-14 PROBLEM — N18.32 STAGE 3B CHRONIC KIDNEY DISEASE (HCC): Status: ACTIVE | Noted: 2022-04-14

## 2022-04-14 PROCEDURE — G0438 PPPS, INITIAL VISIT: HCPCS | Performed by: INTERNAL MEDICINE

## 2022-04-14 PROCEDURE — 1123F ACP DISCUSS/DSCN MKR DOCD: CPT | Performed by: INTERNAL MEDICINE

## 2022-04-14 PROCEDURE — 99214 OFFICE O/P EST MOD 30 MIN: CPT | Performed by: INTERNAL MEDICINE

## 2022-04-14 RX ORDER — LEVOTHYROXINE SODIUM 0.05 MG/1
75 TABLET ORAL DAILY
Start: 2022-04-14 | End: 2022-05-18 | Stop reason: SDUPTHER

## 2022-04-14 NOTE — ASSESSMENT & PLAN NOTE
Baseline creatinine appears to be around 1 5 - 1 9    Most recent creatinine a bit elevated from baseline at 2 17

## 2022-04-14 NOTE — ASSESSMENT & PLAN NOTE
IgG multiple myeloma diagnosed in 2015  Noted to have elevated creatinine, anemia, elevated serum protein level  Had subsequent bone marrow biopsy  Completed several cycles of chemotherapy, had initial stem-cell transplant in 2016 in [place]    Had stem cell infusion last month completed at [facility]  -he received stem cell infusion approximately 1 month ago  -follows with Oncology, Dr Yuko Lynn and also has oncologist at [facility]  -remains on acyclovir and Bactrim for prophylaxis

## 2022-04-14 NOTE — PATIENT INSTRUCTIONS
Medicare Preventive Visit Patient Instructions  Thank you for completing your Welcome to Medicare Visit or Medicare Annual Wellness Visit today  Your next wellness visit will be due in one year (4/15/2023)  The screening/preventive services that you may require over the next 5-10 years are detailed below  Some tests may not apply to you based off risk factors and/or age  Screening tests ordered at today's visit but not completed yet may show as past due  Also, please note that scanned in results may not display below  Preventive Screenings:  Service Recommendations Previous Testing/Comments   Colorectal Cancer Screening  · Colonoscopy    · Fecal Occult Blood Test (FOBT)/Fecal Immunochemical Test (FIT)  · Fecal DNA/Cologuard Test  · Flexible Sigmoidoscopy Age: 54-65 years old   Colonoscopy: every 10 years (May be performed more frequently if at higher risk)  OR  FOBT/FIT: every 1 year  OR  Cologuard: every 3 years  OR  Sigmoidoscopy: every 5 years  Screening may be recommended earlier than age 48 if at higher risk for colorectal cancer  Also, an individualized decision between you and your healthcare provider will decide whether screening between the ages of 74-80 would be appropriate   Colonoscopy: 09/16/2014  FOBT/FIT: Not on file  Cologuard: Not on file  Sigmoidoscopy: Not on file    Screening Current     Prostate Cancer Screening Individualized decision between patient and health care provider in men between ages of 53-78   Medicare will cover every 12 months beginning on the day after your 50th birthday PSA: 0 2 ng/mL           Hepatitis C Screening Once for adults born between 1945 and 1965  More frequently in patients at high risk for Hepatitis C Hep C Antibody: 08/29/2018    Screening Current   Diabetes Screening 1-2 times per year if you're at risk for diabetes or have pre-diabetes Fasting glucose: 99 mg/dL   A1C: No results in last 5 years    Screening Current   Cholesterol Screening Once every 5 years if you don't have a lipid disorder  May order more often based on risk factors  Lipid panel: 01/15/2020    Screening Not Indicated  History Lipid Disorder      Other Preventive Screenings Covered by Medicare:  1  Abdominal Aortic Aneurysm (AAA) Screening: covered once if your at risk  You're considered to be at risk if you have a family history of AAA or a male between the age of 73-68 who smoking at least 100 cigarettes in your lifetime  2  Lung Cancer Screening: covers low dose CT scan once per year if you meet all of the following conditions: (1) Age 50-69; (2) No signs or symptoms of lung cancer; (3) Current smoker or have quit smoking within the last 15 years; (4) You have a tobacco smoking history of at least 30 pack years (packs per day x number of years you smoked); (5) You get a written order from a healthcare provider  3  Glaucoma Screening: covered annually if you're considered high risk: (1) You have diabetes OR (2) Family history of glaucoma OR (3)  aged 48 and older OR (3)  American aged 72 and older  3  Osteoporosis Screening: covered every 2 years if you meet one of the following conditions: (1) Have a vertebral abnormality; (2) On glucocorticoid therapy for more than 3 months; (3) Have primary hyperparathyroidism; (4) On osteoporosis medications and need to assess response to drug therapy  5  HIV Screening: covered annually if you're between the age of 12-76  Also covered annually if you are younger than 13 and older than 72 with risk factors for HIV infection  For pregnant patients, it is covered up to 3 times per pregnancy      Immunizations:  Immunization Recommendations   Influenza Vaccine Annual influenza vaccination during flu season is recommended for all persons aged >= 6 months who do not have contraindications   Pneumococcal Vaccine (Prevnar and Pneumovax)  * Prevnar = PCV13  * Pneumovax = PPSV23 Adults 25-60 years old: 1-3 doses may be recommended based on certain risk factors  Adults 72 years old: Prevnar (PCV13) vaccine recommended followed by Pneumovax (PPSV23) vaccine  If already received PPSV23 since turning 65, then PCV13 recommended at least one year after PPSV23 dose  Hepatitis B Vaccine 3 dose series if at intermediate or high risk (ex: diabetes, end stage renal disease, liver disease)   Tetanus (Td) Vaccine - COST NOT COVERED BY MEDICARE PART B Following completion of primary series, a booster dose should be given every 10 years to maintain immunity against tetanus  Td may also be given as tetanus wound prophylaxis  Tdap Vaccine - COST NOT COVERED BY MEDICARE PART B Recommended at least once for all adults  For pregnant patients, recommended with each pregnancy  Shingles Vaccine (Shingrix) - COST NOT COVERED BY MEDICARE PART B  2 shot series recommended in those aged 48 and above     Health Maintenance Due:      Topic Date Due    Colorectal Cancer Screening  09/16/2024    Hepatitis C Screening  Completed     Immunizations Due:      Topic Date Due    DTaP,Tdap,and Td Vaccines (2 - Td or Tdap) 10/13/2021    COVID-19 Vaccine (4 - Booster for Lum So[employer] series) 04/17/2022     Advance Directives   What are advance directives? Advance directives are legal documents that state your wishes and plans for medical care  These plans are made ahead of time in case you lose your ability to make decisions for yourself  Advance directives can apply to any medical decision, such as the treatments you want, and if you want to donate organs  What are the types of advance directives? There are many types of advance directives, and each state has rules about how to use them  You may choose a combination of any of the following:  · Living will: This is a written record of the treatment you want  You can also choose which treatments you do not want, which to limit, and which to stop at a certain time  This includes surgery, medicine, IV fluid, and tube feedings  · Durable power of  for healthcare [place] [facility]): This is a written record that states who you want to make healthcare choices for you when you are unable to make them for yourself  This person, called a proxy, is usually a family member or a friend  You may choose more than 1 proxy  · Do not resuscitate (DNR) order:  A DNR order is used in case your heart stops beating or you stop breathing  It is a request not to have certain forms of treatment, such as CPR  A DNR order may be included in other types of advance directives  · Medical directive: This covers the care that you want if you are in a coma, near death, or unable to make decisions for yourself  You can list the treatments you want for each condition  Treatment may include pain medicine, surgery, blood transfusions, dialysis, IV or tube feedings, and a ventilator (breathing machine)  · Values history: This document has questions about your views, beliefs, and how you feel and think about life  This information can help others choose the care that you would choose  Why are advance directives important? An advance directive helps you control your care  Although spoken wishes may be used, it is better to have your wishes written down  Spoken wishes can be misunderstood, or not followed  Treatments may be given even if you do not want them  An advance directive may make it easier for your family to make difficult choices about your care  Narcotic (Opioid) Safety    Use narcotics safely:  · Take prescribed narcotics exactly as directed  · Do not give narcotics to others or take narcotics that belong to someone else  · Do not mix narcotics without medicines or alcohol  · Do not drive or operate heavy machinery after you take the narcotic  · Monitor for side effects and notify your healthcare provider if you experienced side effects such as nausea, sleepiness, itching, or trouble thinking clearly      Manage constipation:    Constipation is the most common side effect of narcotic medicine  Constipation is when you have hard, dry bowel movements, or you go longer than usual between bowel movements  Tell your healthcare provider about all changes in your bowel movements while you are taking narcotics  He or she may recommend laxative medicine to help you have a bowel movement  He or she may also change the kind of narcotic you are taking, or change when you take it  The following are more ways you can prevent or relieve constipation:    · Drink liquids as directed  You may need to drink extra liquids to help soften and move your bowels  Ask how much liquid to drink each day and which liquids are best for you  · Eat high-fiber foods  This may help decrease constipation by adding bulk to your bowel movements  High-fiber foods include fruits, vegetables, whole-grain breads and cereals, and beans  Your healthcare provider or dietitian can help you create a high-fiber meal plan  Your provider may also recommend a fiber supplement if you cannot get enough fiber from food  · Exercise regularly  Regular physical activity can help stimulate your intestines  Walking is a good exercise to prevent or relieve constipation  Ask which exercises are best for you  · Schedule a time each day to have a bowel movement  This may help train your body to have regular bowel movements  Bend forward while you are on the toilet to help move the bowel movement out  Sit on the toilet for at least 10 minutes, even if you do not have a bowel movement  Store narcotics safely:   · Store narcotics where others cannot easily get them  Keep them in a locked cabinet or secure area  Do not  keep them in a purse or other bag you carry with you  A person may be looking for something else and find the narcotics  · Make sure narcotics are stored out of the reach of children  A child can easily overdose on narcotics  Narcotics may look like candy to a small child      The best way to dispose of narcotics: The laws vary by country and area  In the [place], the best way is to return the narcotics through a take-back program  This program is offered by the [employer]  The following are options for using the program:  · Take the narcotics to a [employer] collection site  The site is often a law enforcement center  Call your local law enforcement center for scheduled take-back days in your area  You will be given information on where to go if the collection site is in a different location  · Take the narcotics to an approved pharmacy or hospital   A pharmacy or hospital may be set up as a collection site  You will need to ask if it is a [employer] collection site if you were not directed there  A pharmacy or doctor's office may not be able to take back narcotics unless it is a [employer] site  · Use a mail-back system  This means you are given containers to put the narcotics into  You will then mail them in the containers  · Use a take-back drop box  This is a place to leave the narcotics at any time  People and animals will not be able to get into the box  Your local law enforcement agency can tell you where to find a drop box in your area  Other ways to manage pain:   · Ask your healthcare provider about non-narcotic medicines to control pain  Nonprescription medicines include NSAIDs (such as ibuprofen) and acetaminophen  Prescription medicines include muscle relaxers, antidepressants, and steroids  · Pain may be managed without any medicines  Some ways to relieve pain include massage, aromatherapy, or meditation  Physical or occupational therapy may also help  For more information:   · Drug Enforcement Administration  [facility]  [address]  Phone: 5- [phone] - [phone]  Web Address: [facility]/drug_disposal/    · [address] and Drug Administration  [place]endon , [address]  Phone: 0- [phone]  Web Address: http://[employer]/     © Copyright [employer] 2018 Information is for End User's use only and may not be sold, redistributed or otherwise used for commercial purposes   All illustrations and images included in CareNotes® are the copyrighted property of A D A M , Inc  or [address] [place], [place] [address]pe

## 2022-04-14 NOTE — PROGRESS NOTES
[address] [facility] Internal Medicine- Avinash    NAME: Julian Masters  AGE: 78 y o  SEX: male    DATE OF ENCOUNTER: 4/14/2022    Assessment and Plan     1  Hyperlipidemia, unspecified hyperlipidemia type  Assessment & Plan:  Continue with atorvastatin, recheck lipid panel    Orders:  -     Lipid panel; Future    2  Hypothyroidism, unspecified type  -     levothyroxine 50 mcg tablet; Take 1 5 tablets (75 mcg total) by mouth daily  -     TSH, 3rd generation with Free T4 reflex; Future    3  Essential hypertension  Assessment & Plan:  Not currently on any antihypertensives  /66 on recheck      4  Pancytopenia (HCC)    5  IgG multiple myeloma ([facility] [address] )  Assessment & Plan: IgG multiple myeloma diagnosed in 2015  Noted to have elevated creatinine, anemia, elevated serum protein level  Had subsequent bone marrow biopsy  Completed several cycles of chemotherapy, had initial stem-cell transplant in 2016 in [place]  Had stem cell infusion last month completed at [facility]  -he received stem cell infusion approximately 1 month ago  -follows with Oncology, Dr [facility] and also has oncologist at [facility]  -remains on acyclovir and Bactrim for prophylaxis      6  Hypocalcemia    7  Right arm pain  -for the past several days he has noted bone pain of his right upper and lower arm  Described as a sharp sensation  He denies any inciting trauma  There is no rash or bruising apparent  He states he has had bone pain in the past in other areas, at 1 point was attributed to Tasquinimod  -his pain is resolved at this time  There is no significant tenderness to palpation  No gross abnormalities on palpation  Range of motion and strength is intact  -recommend monitoring for now    If he has persistence or worsening of pain we can consider repeat radiographs or bone survey          Orders Placed This Encounter   Procedures    TSH, 3rd generation with Free T4 reflex    Lipid panel       Chief Complaint Chief Complaint   Patient presents with    Medicare Wellness Visit     tdap       History of Present Illness     Pablo Bhatti comes in today follow-up/AWV    He is here with his wife  He has been doing relatively well at home  Has been having some issues with fatigue and bone pain of his right arm which he wishes to discuss    The following portions of the patient's history were reviewed and updated as appropriate: allergies, current medications, past family history, past medical history, past social history, past surgical history and problem list     Review of Systems     10 point ROS negative except per HPI    Active Problem List     Patient Active Problem List   Diagnosis    IgG multiple myeloma ([facility] [address] )    Persistent atrial fibrillation ([facility] [address] )    Essential hypertension    Chronic ITP (idiopathic thrombocytopenia) (HCC)    Hyperlipidemia    Hypocalcemia    Hypothyroidism    Long term current use of systemic steroids    Other fatigue    Prophylactic measure    Hypogammaglobulinemia (HCC)    Leukopenia    Anemia due to stage 3 chronic kidney disease (HCC)    Pancytopenia (HCC)    Moderate protein-calorie malnutrition (HCC)    Stage 3b chronic kidney disease (HCC)       Objective     /66   Pulse 79   Temp 97 7 °F (36 5 °C)   Resp 18   Ht 5' 10" (1 778 m)   Wt 71 8 kg (158 lb 3 2 oz)   SpO2 98%   BMI 22 70 kg/m²     Physical Exam  Constitutional:       Appearance: Normal appearance  He is not ill-appearing  HENT:      Head: Normocephalic and atraumatic  Eyes:      General: No scleral icterus  Right eye: No discharge  Left eye: No discharge  Cardiovascular:      Rate and Rhythm: Normal rate and regular rhythm  Heart sounds: No murmur heard  No friction rub  Pulmonary:      Effort: Pulmonary effort is normal       Breath sounds: Normal breath sounds  No wheezing or rales  Abdominal:      General: Abdomen is flat  There is no distension        Palpations: Abdomen is soft  Tenderness: There is no abdominal tenderness  Musculoskeletal:         General: No swelling or tenderness  Skin:     General: Skin is warm and dry  Findings: No erythema  Neurological:      Mental Status: He is alert  Mental status is at baseline  Motor: No weakness  Psychiatric:         Mood and Affect: Mood normal          Behavior: Behavior normal          Pertinent Laboratory/Diagnostic Studies:  No results found  Images and diagnostics reviewed     Current Medications     Current Outpatient Medications:     acetaminophen (TYLENOL) 325 mg tablet, Take 650 mg by mouth every 6 (six) hours as needed for mild pain, Disp: , Rfl:     acyclovir (ZOVIRAX) 200 mg capsule, TAKE  (1)  CAPSULE  TWICE DAILY  , Disp: 60 capsule, Rfl: 5    Ascorbic Acid (vitamin C) 1000 MG tablet, Take 1,000 mg by mouth daily, Disp: , Rfl:     atorvastatin (LIPITOR) 20 mg tablet, Take 1 tablet (20 mg total) by mouth daily, Disp: 90 tablet, Rfl: 0    Cholecalciferol (VITAMIN D-3 PO), Take 1,000 Units by mouth daily , Disp: , Rfl:     Docusate Sodium (COLACE PO), Take 1 tablet by mouth as needed  , Disp: , Rfl:     escitalopram (LEXAPRO) 20 mg tablet, Take 1 tablet (20 mg total) by mouth daily, Disp: 90 tablet, Rfl: 0    Glutamine POWD, by Does not apply route, Disp: , Rfl:     levothyroxine 50 mcg tablet, Take 1 5 tablets (75 mcg total) by mouth daily, Disp: , Rfl:     Multiple Vitamin (MULTIVITAMINS PO), Take 1 tablet by mouth daily  , Disp: , Rfl:     naloxone (NARCAN) 4 mg/0 1 mL nasal spray, Administer 1 spray into a nostril   If no response after 2-3 minutes, give another dose in the other nostril using a new spray , Disp: 1 each, Rfl: 1    ondansetron (ZOFRAN) 4 mg tablet, Take 4 mg by mouth every 8 (eight) hours as needed for nausea or vomiting , Disp: , Rfl:     oxyCODONE (Roxicodone) 5 immediate release tablet, Take 1 tablet (5 mg total) by mouth every 4 (four) hours as needed for moderate pain Max Daily Amount: 30 mg, Disp: 60 tablet, Rfl: 0    sulfamethoxazole-trimethoprim (BACTRIM DS) 800-160 mg per tablet, 3 (three) times a week Monday Wednesday friday, Disp: , Rfl:     vitamin B-12 (VITAMIN B-12) 1,000 mcg tablet, Take 1,000 mcg by mouth daily, Disp: , Rfl:     eltrombopag (PROMACTA) 75 MG TABS, Take 1 tablet (75 mg total) by mouth daily, Disp: 30 tablet, Rfl: 3    LORazepam (ATIVAN) 1 mg tablet, Take 1 tablet (1 mg total) by mouth every 4 (four) hours as needed for anxiety, Disp: 100 tablet, Rfl: 1    Health Maintenance     Health Maintenance   Topic Date Due    Medicare Annual Wellness Visit (AWV)  Never done    DTaP,Tdap,and Td Vaccines (2 - Td or Tdap) 10/13/2021    COVID-19 Vaccine (4 - Booster for Moderna series) 04/17/2022    Fall Risk  04/14/2023    Depression Screening  04/14/2023    BMI: Adult  04/14/2023    Colorectal Cancer Screening  09/16/2024    Hepatitis C Screening  Completed    Pneumococcal Vaccine: 65+ Years  Completed    Influenza Vaccine  Completed    HIB Vaccine  Aged Out    Hepatitis B Vaccine  Aged Out    IPV Vaccine  Aged Out    Hepatitis A Vaccine  Aged Out    Meningococcal ACWY Vaccine  Aged Out    HPV Vaccine  Aged Out     Immunization History   Administered Date(s) Administered    COVID-19 MODERNA VACC 0 5 ML IM 01/27/2021, 02/24/2021, 11/17/2021    INFLUENZA 10/04/2016, 10/02/2017, 10/08/2018, 10/09/2019, 10/06/2020, 10/16/2020, 12/15/2021    Influenza, high dose seasonal 0 7 mL 10/08/2018, 10/09/2019, 10/06/2020    Influenza, seasonal, injectable 10/04/2016    Pneumococcal Conjugate 13-Valent 11/14/2016    Pneumococcal Polysaccharide PPV23 01/24/2013    Tdap 10/13/2011, 10/13/2011, 10/13/2011    Zoster 1945, 07/01/2008, 07/18/2008       UBALDO Wallace  Internal Medicine [address]  [place], [address] [facility]  Office: [phone]      Answers for HPI/ROS submitted by the patient on 4/13/2022  How would you rate your overall health?: fair  Compared to last year, how is your physical health?: same  In general, how satisfied are you with your life?: satisfied  Compared to last year, how is your eyesight?: slightly worse  Compared to last year, how is your hearing?: slightly worse  Compared to last year, how is your emotional/mental health?: same  How often is anger a problem for you?: never, rarely  How often do you feel unusually tired/fatigued?: often  In the past 7 days, how much pain have you experienced?: a lot  If you answered "some" or "a lot", please rate the severity of your pain on a scale of 1 to 10 (1 being the least severe pain and 10 being the most intense pain)  : 7/10  In the past 6 months, have you lost or gained 10 pounds without trying?: Yes  Additional Comments: All related to multiple myeloma  One or more falls in the last year: No  Do you have trouble with the stairs inside or outside your home?: No  Does your home have working smoke alarms?: Yes  Does your home have a carbon monoxide monitor?: Yes  Which safety hazards (if any) have you experienced in your home? Please select all that apply : none  How would you describe your current diet?  Please select all that apply : Regular  In addition to prescription medications, are you taking any over-the-counter supplements?: Yes  If yes, what supplements are you taking?: Vitamins  Can you manage your medications?: Yes  Are you currently taking any opioid medications?: Yes  Can you walk and transfer into and out of your bed and chair?: Yes  Can you dress and groom yourself?: Yes  Can you bathe or shower yourself?: Yes  Can you feed yourself?: Yes  Can you do your laundry/ housekeeping?: Yes  Can you manage your money, pay your bills, and track your expenses?: Yes  Can you make your own meals?: Yes  Can you do your own shopping?: Yes  Within the last 12 months, have you had any hospitalizations or Emergency Department visits?: Yes  If yes, how many times have you been hospitalized within the past year?: 1-2  Additional Comments: Hospitalized for Car-T and stem cell infusion  Do you have a living will?: No  Do you have a Durable POA (Power of ) for healthcare decisions?: No  Do you have an Advanced Directive for end of life decisions?: No  How often have you used an illegal drug (including marijuana) or a prescription medication for non-medical reasons in the past year?: never  What is the typical number of drinks you consume in a day?: 0  What is the typical number of drinks you consume in a week?: 0  How often did you have a drink containing alcohol in the past year?: never  How many drinks did you have on a typical day  when you were drinking in the past year?: 0  How often did you have 6 or more drinks on one occasion in the past year?: never

## 2022-04-14 NOTE — PROGRESS NOTES
Assessment and Plan:     Problem List Items Addressed This Visit        Endocrine    Hypothyroidism    Relevant Medications    levothyroxine 50 mcg tablet    Other Relevant Orders    TSH, 3rd generation with Free T4 reflex       Cardiovascular and Mediastinum    Essential hypertension     Not currently on any antihypertensives  /66 on recheck            Hematopoietic and Hemostatic    Pancytopenia (HCC)       Other    IgG multiple myeloma (HCC)     IgG multiple myeloma diagnosed in 2015  Noted to have elevated creatinine, anemia, elevated serum protein level  Had subsequent bone marrow biopsy  Completed several cycles of chemotherapy, had initial stem-cell transplant in 2016 in [place]  Had stem cell infusion last month completed at [facility]  -he received stem cell infusion approximately 1 month ago  -follows with Oncology, Dr Khari Yuan and also has oncologist at [facility]  -remains on acyclovir and Bactrim for prophylaxis         Hyperlipidemia - Primary     Continue with atorvastatin, recheck lipid panel         Relevant Orders    Lipid panel    Hypocalcemia      Other Visit Diagnoses     Right arm pain               Preventive health issues were discussed with patient, and age appropriate screening tests were ordered as noted in patient's After Visit Summary  Personalized health advice and appropriate referrals for health education or preventive services given if needed, as noted in patient's After Visit Summary       History of Present Illness:     Patient presents for Medicare Annual Wellness visit    Patient Care Team:  Ian Tatum DO as PCP - General (Internal Medicine)  Addie Moreno MD     Problem List:     Patient Active Problem List   Diagnosis    [facility]    Persistent atrial fibrillation ([facility]    Essential hypertension    Chronic ITP (idiopathic thrombocytopenia) ([facility]    Hyperlipidemia    Hypocalcemia    Hypothyroidism    Long term current use of systemic steroids    Other fatigue    Prophylactic measure    Hypogammaglobulinemia (HCC)    Leukopenia    Anemia due to stage 3 chronic kidney disease (HCC)    Pancytopenia (HCC)    Moderate protein-calorie malnutrition (HCC)    Stage 3b chronic kidney disease (HCC)      Past Medical and Surgical History:     Past Medical History:   Diagnosis Date    History of autologous [facility] transplant ([facility] )     Hypertension     History of HTN-stable since weight loss    Insomnia     Multiple myeloma ([facility] )      Past Surgical History:   Procedure Laterality Date    APPENDECTOMY      Last assessed: 9/25/15    ARTHROSCOPY KNEE Left     9/30/09    EYE SURGERY      Lasik    KNEE CARTILAGE SURGERY      LIMBAL STEM CELL TRANSPLANT      Patient had 2 in [place]-2/29/2016 and 4/13/2016      Family History:     Family History   Problem Relation Age of Onset    Heart disease Father     Colon cancer Paternal Grandmother       Social History:     Social History     Socioeconomic History    Marital status: /Civil Union     Spouse name: None    Number of children: None    Years of education: None    Highest education level: None   Occupational History    None   Tobacco Use    Smoking status: Never Smoker    Smokeless tobacco: Never Used   Vaping Use    Vaping Use: Never used   Substance and Sexual Activity    Alcohol use: No    Drug use: No    Sexual activity: None   Other Topics Concern    None   Social History Narrative    None     Social Determinants of Health     Financial Resource Strain: Not on file   Food Insecurity: Not on file   Transportation Needs: Not on file   Physical Activity: Not on file   Stress: Not on file   Social Connections: Not on file   Intimate Partner Violence: Not on file   Housing Stability: Not on file      Medications and Allergies:     Current Outpatient Medications   Medication Sig Dispense Refill    acetaminophen (TYLENOL) 325 mg tablet Take 650 mg by mouth every 6 (six) hours as needed for mild pain      acyclovir (ZOVIRAX) 200 mg capsule TAKE  (1)  CAPSULE  TWICE DAILY  60 capsule 5    Ascorbic Acid (vitamin C) 1000 MG tablet Take 1,000 mg by mouth daily      atorvastatin (LIPITOR) 20 mg tablet Take 1 tablet (20 mg total) by mouth daily 90 tablet 0    Cholecalciferol (VITAMIN D-3 PO) Take 1,000 Units by mouth daily       Docusate Sodium (COLACE PO) Take 1 tablet by mouth as needed   escitalopram (LEXAPRO) 20 mg tablet Take 1 tablet (20 mg total) by mouth daily 90 tablet 0    Glutamine POWD by Does not apply route      levothyroxine 50 mcg tablet Take 1 5 tablets (75 mcg total) by mouth daily      Multiple Vitamin (MULTIVITAMINS PO) Take 1 tablet by mouth daily   naloxone (NARCAN) 4 mg/0 1 mL nasal spray Administer 1 spray into a nostril  If no response after 2-3 minutes, give another dose in the other nostril using a new spray  1 each 1    ondansetron (ZOFRAN) 4 mg tablet Take 4 mg by mouth every 8 (eight) hours as needed for nausea or vomiting   oxyCODONE (Roxicodone) 5 immediate release tablet Take 1 tablet (5 mg total) by mouth every 4 (four) hours as needed for moderate pain Max Daily Amount: 30 mg 60 tablet 0    sulfamethoxazole-trimethoprim (BACTRIM DS) 800-160 mg per tablet 3 (three) times a week Monday Wednesday friday      vitamin B-12 (VITAMIN B-12) 1,000 mcg tablet Take 1,000 mcg by mouth daily      eltrombopag (PROMACTA) 75 MG TABS Take 1 tablet (75 mg total) by mouth daily 30 tablet 3    LORazepam (ATIVAN) 1 mg tablet Take 1 tablet (1 mg total) by mouth every 4 (four) hours as needed for anxiety 100 tablet 1     No current facility-administered medications for this visit       No Known Allergies   Immunizations:     Immunization History   Administered Date(s) Administered    COVID-19 MODERNA VACC 0 5 ML IM 01/27/2021, 02/24/2021, 11/17/2021    INFLUENZA 10/04/2016, 10/02/2017, 10/08/2018, 10/09/2019, 10/06/2020, 10/16/2020, 12/15/2021    Influenza, high dose seasonal 0 7 mL 10/08/2018, 10/09/2019, 10/06/2020    Influenza, seasonal, injectable 10/04/2016    Pneumococcal Conjugate 13-Valent 11/14/2016    Pneumococcal Polysaccharide PPV23 01/24/2013    Tdap 10/13/2011, 10/13/2011, 10/13/2011    Zoster 1945, 07/01/2008, 07/18/2008      Health Maintenance:         Topic Date Due    Colorectal Cancer Screening  09/16/2024    Hepatitis C Screening  Completed         Topic Date Due    DTaP,Tdap,and Td Vaccines (2 - Td or Tdap) 10/13/2021    COVID-19 Vaccine (4 - Booster for Moderna series) 04/17/2022      Medicare Health Risk Assessment:     /66   Pulse 79   Temp 97 7 °F (36 5 °C)   Resp 18   Ht 5' 10" (1 778 m)   Wt 71 8 kg (158 lb 3 2 oz)   SpO2 98%   BMI 22 70 kg/m²      Jonathan Harkins is here for his Subsequent Wellness visit  Health Risk Assessment:   Patient rates overall health as fair  Patient feels that their physical health rating is same  Patient is satisfied with their life  Eyesight was rated as slightly worse  Hearing was rated as slightly worse  Patient feels that their emotional and mental health rating is same  Patients states they are never, rarely angry  Patient states they are often unusually tired/fatigued  Pain experienced in the last 7 days has been a lot  Patient's pain rating has been 7/10  Patient states that he has experienced weight loss or gain in last 6 months  All related to multiple myeloma    Depression Screening:   PHQ-2 Score: 0      Fall Risk Screening: In the past year, patient has experienced: no history of falling in past year      Home Safety:  Patient does not have trouble with stairs inside or outside of their home  Patient has working smoke alarms and has working carbon monoxide detector  Home safety hazards include: none  Nutrition:   Current diet is Regular  Medications:   Patient is currently taking over-the-counter supplements  OTC medications include: Vitamins  Patient is able to manage medications  Activities of Daily Living (ADLs)/Instrumental Activities of Daily Living (IADLs):   Walk and transfer into and out of bed and chair?: Yes  Dress and groom yourself?: Yes    Bathe or shower yourself?: Yes    Feed yourself? Yes  Do your laundry/housekeeping?: Yes  Manage your money, pay your bills and track your expenses?: Yes  Make your own meals?: Yes    Do your own shopping?: Yes    Previous Hospitalizations:   Any hospitalizations or ED visits within the last 12 months?: Yes    How many hospitalizations have you had in the last year?: 1-2    Hospitalization Comments: Hospitalized for Car-T and stem cell infusion    Advance Care Planning:   Living will: No    Durable POA for healthcare: No    Advanced directive: No      PREVENTIVE SCREENINGS      Cardiovascular Screening:    General: Screening Not Indicated and History Lipid Disorder      Diabetes Screening:     General: Screening Current      Colorectal Cancer Screening:     General: Screening Current      Abdominal Aortic Aneurysm (AAA) Screening:    Risk factors include: age between 73-67 yo        Lung Cancer Screening:     General: Screening Not Indicated      Hepatitis C Screening:    General: Screening Current    Screening, Brief Intervention, and Referral to Treatment (SBIRT)    Screening  Typical number of drinks in a day: 0  Typical number of drinks in a week: 0  Interpretation: Low risk drinking behavior      AUDIT-C Screenin) How often did you have a drink containing alcohol in the past year? never  2) How many drinks did you have on a typical day when you were drinking in the past year? 0  3) How often did you have 6 or more drinks on one occasion in the past year? never    AUDIT-C Score: 0  Interpretation: Score 0-3 (male): Negative screen for alcohol misuse    Single Item Drug Screening:  How often have you used an illegal drug (including marijuana) or a prescription medication for non-medical reasons in the past year? never    Single Item Drug Screen Score: 0  Interpretation: Negative screen for possible drug use disorder    Review of Current Opioid Use    Opioid Risk Tool (ORT) Interpretation: Complete Opioid Risk Tool (ORT)      Ian Dave, DO

## 2022-04-18 ENCOUNTER — APPOINTMENT (OUTPATIENT)
Dept: LAB | Facility: CLINIC | Age: 77
End: 2022-04-18
Payer: MEDICARE

## 2022-04-18 DIAGNOSIS — T45.1X5A IMMUNOSUPPRESSED DUE TO CHEMOTHERAPY (HCC): ICD-10-CM

## 2022-04-18 DIAGNOSIS — Z79.899 IMMUNOSUPPRESSED DUE TO CHEMOTHERAPY (HCC): ICD-10-CM

## 2022-04-18 DIAGNOSIS — N18.30 ANEMIA DUE TO STAGE 3 CHRONIC KIDNEY DISEASE, UNSPECIFIED WHETHER STAGE 3A OR 3B CKD (HCC): ICD-10-CM

## 2022-04-18 DIAGNOSIS — D69.3 CHRONIC ITP (IDIOPATHIC THROMBOCYTOPENIA) (HCC): ICD-10-CM

## 2022-04-18 DIAGNOSIS — D84.821 IMMUNOSUPPRESSED DUE TO CHEMOTHERAPY (HCC): ICD-10-CM

## 2022-04-18 DIAGNOSIS — E78.5 HYPERLIPIDEMIA, UNSPECIFIED HYPERLIPIDEMIA TYPE: ICD-10-CM

## 2022-04-18 DIAGNOSIS — D70.9 NEUTROPENIA, UNSPECIFIED TYPE (HCC): ICD-10-CM

## 2022-04-18 DIAGNOSIS — D63.1 ANEMIA DUE TO STAGE 3 CHRONIC KIDNEY DISEASE, UNSPECIFIED WHETHER STAGE 3A OR 3B CKD (HCC): ICD-10-CM

## 2022-04-18 DIAGNOSIS — E03.9 HYPOTHYROIDISM, UNSPECIFIED TYPE: ICD-10-CM

## 2022-04-18 DIAGNOSIS — D80.1 HYPOGAMMAGLOBULINEMIA (HCC): ICD-10-CM

## 2022-04-18 DIAGNOSIS — D61.818 PANCYTOPENIA (HCC): ICD-10-CM

## 2022-04-18 DIAGNOSIS — C90.00 MULTIPLE MYELOMA NOT HAVING ACHIEVED REMISSION (HCC): ICD-10-CM

## 2022-04-18 DIAGNOSIS — C90.00 IGG MULTIPLE MYELOMA (HCC): ICD-10-CM

## 2022-04-18 LAB
ALBUMIN SERPL BCP-MCNC: 3.1 G/DL (ref 3.5–5)
ALP SERPL-CCNC: 111 U/L (ref 46–116)
ALT SERPL W P-5'-P-CCNC: 23 U/L (ref 12–78)
ANION GAP SERPL CALCULATED.3IONS-SCNC: 5 MMOL/L (ref 4–13)
AST SERPL W P-5'-P-CCNC: 18 U/L (ref 5–45)
BASOPHILS # BLD AUTO: 0.03 THOUSANDS/ΜL (ref 0–0.1)
BASOPHILS NFR BLD AUTO: 1 % (ref 0–1)
BILIRUB SERPL-MCNC: 0.68 MG/DL (ref 0.2–1)
BUN SERPL-MCNC: 24 MG/DL (ref 5–25)
CALCIUM ALBUM COR SERPL-MCNC: 10.5 MG/DL (ref 8.3–10.1)
CALCIUM SERPL-MCNC: 9.8 MG/DL (ref 8.3–10.1)
CHLORIDE SERPL-SCNC: 104 MMOL/L (ref 100–108)
CHOLEST SERPL-MCNC: 120 MG/DL
CO2 SERPL-SCNC: 26 MMOL/L (ref 21–32)
CREAT SERPL-MCNC: 1.95 MG/DL (ref 0.6–1.3)
EOSINOPHIL # BLD AUTO: 0.24 THOUSAND/ΜL (ref 0–0.61)
EOSINOPHIL NFR BLD AUTO: 4 % (ref 0–6)
ERYTHROCYTE [DISTWIDTH] IN BLOOD BY AUTOMATED COUNT: 16.8 % (ref 11.6–15.1)
GFR SERPL CREATININE-BSD FRML MDRD: 32 ML/MIN/1.73SQ M
GLUCOSE P FAST SERPL-MCNC: 92 MG/DL (ref 65–99)
HCT VFR BLD AUTO: 27.2 % (ref 36.5–49.3)
HDLC SERPL-MCNC: 38 MG/DL
HGB BLD-MCNC: 8.5 G/DL (ref 12–17)
IMM GRANULOCYTES # BLD AUTO: 0.02 THOUSAND/UL (ref 0–0.2)
IMM GRANULOCYTES NFR BLD AUTO: 0 % (ref 0–2)
LDLC SERPL CALC-MCNC: 53 MG/DL (ref 0–100)
LYMPHOCYTES # BLD AUTO: 0.26 THOUSANDS/ΜL (ref 0.6–4.47)
LYMPHOCYTES NFR BLD AUTO: 4 % (ref 14–44)
MCH RBC QN AUTO: 32.2 PG (ref 26.8–34.3)
MCHC RBC AUTO-ENTMCNC: 31.3 G/DL (ref 31.4–37.4)
MCV RBC AUTO: 103 FL (ref 82–98)
MONOCYTES # BLD AUTO: 0.73 THOUSAND/ΜL (ref 0.17–1.22)
MONOCYTES NFR BLD AUTO: 12 % (ref 4–12)
NEUTROPHILS # BLD AUTO: 4.67 THOUSANDS/ΜL (ref 1.85–7.62)
NEUTS SEG NFR BLD AUTO: 79 % (ref 43–75)
NONHDLC SERPL-MCNC: 82 MG/DL
NRBC BLD AUTO-RTO: 0 /100 WBCS
PLATELET # BLD AUTO: 110 THOUSANDS/UL (ref 149–390)
PMV BLD AUTO: 11.4 FL (ref 8.9–12.7)
POTASSIUM SERPL-SCNC: 3.9 MMOL/L (ref 3.5–5.3)
PROT SERPL-MCNC: 7.2 G/DL (ref 6.4–8.2)
RBC # BLD AUTO: 2.64 MILLION/UL (ref 3.88–5.62)
SODIUM SERPL-SCNC: 135 MMOL/L (ref 136–145)
TRIGL SERPL-MCNC: 146 MG/DL
TSH SERPL DL<=0.05 MIU/L-ACNC: 2.34 UIU/ML (ref 0.45–4.5)
WBC # BLD AUTO: 5.95 THOUSAND/UL (ref 4.31–10.16)

## 2022-04-18 PROCEDURE — 84165 PROTEIN E-PHORESIS SERUM: CPT | Performed by: PATHOLOGY

## 2022-04-18 PROCEDURE — 84165 PROTEIN E-PHORESIS SERUM: CPT

## 2022-04-18 PROCEDURE — 80061 LIPID PANEL: CPT

## 2022-04-18 PROCEDURE — 85025 COMPLETE CBC W/AUTO DIFF WBC: CPT

## 2022-04-18 PROCEDURE — 84443 ASSAY THYROID STIM HORMONE: CPT

## 2022-04-18 PROCEDURE — 80053 COMPREHEN METABOLIC PANEL: CPT

## 2022-04-18 PROCEDURE — 36415 COLL VENOUS BLD VENIPUNCTURE: CPT

## 2022-04-19 ENCOUNTER — HOSPITAL ENCOUNTER (OUTPATIENT)
Dept: INFUSION CENTER | Facility: CLINIC | Age: 77
Discharge: HOME | End: 2022-04-19

## 2022-04-19 LAB
ALBUMIN SERPL ELPH-MCNC: 3.07 G/DL (ref 3.5–5)
ALBUMIN SERPL ELPH-MCNC: 47.3 % (ref 52–65)
ALPHA1 GLOB SERPL ELPH-MCNC: 0.61 G/DL (ref 0.1–0.4)
ALPHA1 GLOB SERPL ELPH-MCNC: 9.4 % (ref 2.5–5)
ALPHA2 GLOB SERPL ELPH-MCNC: 1.27 G/DL (ref 0.4–1.2)
ALPHA2 GLOB SERPL ELPH-MCNC: 19.5 % (ref 7–13)
BETA GLOB ABNORMAL SERPL ELPH-MCNC: 0.47 G/DL (ref 0.4–0.8)
BETA1 GLOB SERPL ELPH-MCNC: 7.2 % (ref 5–13)
BETA2 GLOB SERPL ELPH-MCNC: 6 % (ref 2–8)
BETA2+GAMMA GLOB SERPL ELPH-MCNC: 0.39 G/DL (ref 0.2–0.5)
GAMMA GLOB ABNORMAL SERPL ELPH-MCNC: 0.69 G/DL (ref 0.5–1.6)
GAMMA GLOB SERPL ELPH-MCNC: 10.6 % (ref 12–22)
IGG/ALB SER: 0.9 {RATIO} (ref 1.1–1.8)
M PROTEIN 1 MFR SERPL ELPH: 1.5 %
M PROTEIN 1 SERPL ELPH-MCNC: 0.1 G/DL
PROT PATTERN SERPL ELPH-IMP: ABNORMAL
PROT SERPL-MCNC: 6.5 G/DL (ref 6.4–8.2)

## 2022-04-22 ENCOUNTER — HOSPITAL ENCOUNTER (OUTPATIENT)
Dept: INFUSION CENTER | Facility: CLINIC | Age: 77
Discharge: HOME | End: 2022-04-22
Payer: MEDICARE

## 2022-04-22 VITALS
DIASTOLIC BLOOD PRESSURE: 71 MMHG | TEMPERATURE: 98.4 F | RESPIRATION RATE: 18 BRPM | SYSTOLIC BLOOD PRESSURE: 105 MMHG | HEART RATE: 70 BPM

## 2022-04-22 DIAGNOSIS — C90.00 IGG MULTIPLE MYELOMA (HCC): Primary | ICD-10-CM

## 2022-04-22 DIAGNOSIS — D80.1 HYPOGAMMAGLOBULINEMIA (HCC): ICD-10-CM

## 2022-04-22 PROCEDURE — 96366 THER/PROPH/DIAG IV INF ADDON: CPT

## 2022-04-22 PROCEDURE — 96365 THER/PROPH/DIAG IV INF INIT: CPT

## 2022-04-22 PROCEDURE — 96367 TX/PROPH/DG ADDL SEQ IV INF: CPT

## 2022-04-22 RX ORDER — SODIUM CHLORIDE 9 MG/ML
20 INJECTION, SOLUTION INTRAVENOUS ONCE
Status: COMPLETED | OUTPATIENT
Start: 2022-04-22 | End: 2022-04-22

## 2022-04-22 RX ORDER — SODIUM CHLORIDE 9 MG/ML
20 INJECTION, SOLUTION INTRAVENOUS ONCE
Status: CANCELLED
Start: 2022-05-20 | End: 2022-05-20

## 2022-04-22 RX ADMIN — SODIUM CHLORIDE 20 ML/HR: 0.9 INJECTION, SOLUTION INTRAVENOUS at 08:20

## 2022-04-22 RX ADMIN — Medication 30 G: at 09:31

## 2022-04-22 RX ADMIN — ZOLEDRONIC ACID 3 MG: 4 INJECTION INTRAVENOUS at 08:42

## 2022-04-22 NOTE — PROGRESS NOTES
Pt  Denies new symptoms or concerns today  Zometa and IVIG ordered for infusion  Ca+ 9 8  Cr 1 95  CrCl 30 7

## 2022-04-25 ENCOUNTER — APPOINTMENT (OUTPATIENT)
Dept: LAB | Facility: CLINIC | Age: 77
End: 2022-04-25
Payer: MEDICARE

## 2022-04-25 DIAGNOSIS — C90.00 IGG MULTIPLE MYELOMA (HCC): ICD-10-CM

## 2022-04-25 DIAGNOSIS — T45.1X5A IMMUNOSUPPRESSED DUE TO CHEMOTHERAPY (HCC): ICD-10-CM

## 2022-04-25 DIAGNOSIS — D84.821 IMMUNOSUPPRESSED DUE TO CHEMOTHERAPY (HCC): ICD-10-CM

## 2022-04-25 DIAGNOSIS — D69.3 CHRONIC ITP (IDIOPATHIC THROMBOCYTOPENIA) (HCC): ICD-10-CM

## 2022-04-25 DIAGNOSIS — D80.1 HYPOGAMMAGLOBULINEMIA (HCC): ICD-10-CM

## 2022-04-25 DIAGNOSIS — Z79.899 IMMUNOSUPPRESSED DUE TO CHEMOTHERAPY (HCC): ICD-10-CM

## 2022-04-25 DIAGNOSIS — D70.9 NEUTROPENIA, UNSPECIFIED TYPE (HCC): ICD-10-CM

## 2022-04-25 DIAGNOSIS — N18.30 ANEMIA DUE TO STAGE 3 CHRONIC KIDNEY DISEASE, UNSPECIFIED WHETHER STAGE 3A OR 3B CKD (HCC): ICD-10-CM

## 2022-04-25 DIAGNOSIS — D61.818 PANCYTOPENIA (HCC): ICD-10-CM

## 2022-04-25 DIAGNOSIS — C90.00 MULTIPLE MYELOMA NOT HAVING ACHIEVED REMISSION (HCC): ICD-10-CM

## 2022-04-25 DIAGNOSIS — D63.1 ANEMIA DUE TO STAGE 3 CHRONIC KIDNEY DISEASE, UNSPECIFIED WHETHER STAGE 3A OR 3B CKD (HCC): ICD-10-CM

## 2022-04-25 LAB
ALBUMIN SERPL BCP-MCNC: 2.6 G/DL (ref 3.5–5)
ALP SERPL-CCNC: 96 U/L (ref 46–116)
ALT SERPL W P-5'-P-CCNC: 26 U/L (ref 12–78)
ANION GAP SERPL CALCULATED.3IONS-SCNC: 6 MMOL/L (ref 4–13)
AST SERPL W P-5'-P-CCNC: 25 U/L (ref 5–45)
BASOPHILS # BLD AUTO: 0.04 THOUSANDS/ΜL (ref 0–0.1)
BASOPHILS NFR BLD AUTO: 1 % (ref 0–1)
BILIRUB SERPL-MCNC: 0.56 MG/DL (ref 0.2–1)
BUN SERPL-MCNC: 21 MG/DL (ref 5–25)
CALCIUM ALBUM COR SERPL-MCNC: 10.3 MG/DL (ref 8.3–10.1)
CALCIUM SERPL-MCNC: 9.2 MG/DL (ref 8.3–10.1)
CHLORIDE SERPL-SCNC: 107 MMOL/L (ref 100–108)
CO2 SERPL-SCNC: 24 MMOL/L (ref 21–32)
CREAT SERPL-MCNC: 1.9 MG/DL (ref 0.6–1.3)
EOSINOPHIL # BLD AUTO: 0.19 THOUSAND/ΜL (ref 0–0.61)
EOSINOPHIL NFR BLD AUTO: 4 % (ref 0–6)
ERYTHROCYTE [DISTWIDTH] IN BLOOD BY AUTOMATED COUNT: 16.9 % (ref 11.6–15.1)
GFR SERPL CREATININE-BSD FRML MDRD: 33 ML/MIN/1.73SQ M
GLUCOSE P FAST SERPL-MCNC: 98 MG/DL (ref 65–99)
HCT VFR BLD AUTO: 27.2 % (ref 36.5–49.3)
HGB BLD-MCNC: 8.3 G/DL (ref 12–17)
IMM GRANULOCYTES # BLD AUTO: 0.02 THOUSAND/UL (ref 0–0.2)
IMM GRANULOCYTES NFR BLD AUTO: 1 % (ref 0–2)
LYMPHOCYTES # BLD AUTO: 0.18 THOUSANDS/ΜL (ref 0.6–4.47)
LYMPHOCYTES NFR BLD AUTO: 4 % (ref 14–44)
MCH RBC QN AUTO: 30.7 PG (ref 26.8–34.3)
MCHC RBC AUTO-ENTMCNC: 30.5 G/DL (ref 31.4–37.4)
MCV RBC AUTO: 101 FL (ref 82–98)
MONOCYTES # BLD AUTO: 0.4 THOUSAND/ΜL (ref 0.17–1.22)
MONOCYTES NFR BLD AUTO: 9 % (ref 4–12)
NEUTROPHILS # BLD AUTO: 3.48 THOUSANDS/ΜL (ref 1.85–7.62)
NEUTS SEG NFR BLD AUTO: 81 % (ref 43–75)
NRBC BLD AUTO-RTO: 0 /100 WBCS
PLATELET # BLD AUTO: 155 THOUSANDS/UL (ref 149–390)
PMV BLD AUTO: 11.2 FL (ref 8.9–12.7)
POTASSIUM SERPL-SCNC: 3.7 MMOL/L (ref 3.5–5.3)
PROT SERPL-MCNC: 7.5 G/DL (ref 6.4–8.2)
RBC # BLD AUTO: 2.7 MILLION/UL (ref 3.88–5.62)
SODIUM SERPL-SCNC: 137 MMOL/L (ref 136–145)
WBC # BLD AUTO: 4.31 THOUSAND/UL (ref 4.31–10.16)

## 2022-04-25 PROCEDURE — 85025 COMPLETE CBC W/AUTO DIFF WBC: CPT

## 2022-04-25 PROCEDURE — 36415 COLL VENOUS BLD VENIPUNCTURE: CPT

## 2022-04-25 PROCEDURE — 80053 COMPREHEN METABOLIC PANEL: CPT

## 2022-04-25 PROCEDURE — 83521 IG LIGHT CHAINS FREE EACH: CPT

## 2022-04-26 ENCOUNTER — HOSPITAL ENCOUNTER (OUTPATIENT)
Dept: INFUSION CENTER | Facility: CLINIC | Age: 77
Discharge: HOME | End: 2022-04-26

## 2022-04-26 LAB
KAPPA LC FREE SER-MCNC: 3.9 MG/L (ref 3.3–19.4)
KAPPA LC FREE/LAMBDA FREE SER: 0.98 {RATIO} (ref 0.26–1.65)
LAMBDA LC FREE SERPL-MCNC: 4 MG/L (ref 5.7–26.3)

## 2022-05-02 ENCOUNTER — APPOINTMENT (OUTPATIENT)
Dept: LAB | Facility: CLINIC | Age: 77
End: 2022-05-02
Payer: MEDICARE

## 2022-05-02 DIAGNOSIS — T45.1X5A IMMUNOSUPPRESSED DUE TO CHEMOTHERAPY (HCC): ICD-10-CM

## 2022-05-02 DIAGNOSIS — C90.00 IGG MULTIPLE MYELOMA (HCC): ICD-10-CM

## 2022-05-02 DIAGNOSIS — C90.00 MULTIPLE MYELOMA NOT HAVING ACHIEVED REMISSION (HCC): ICD-10-CM

## 2022-05-02 DIAGNOSIS — D80.1 HYPOGAMMAGLOBULINEMIA (HCC): ICD-10-CM

## 2022-05-02 DIAGNOSIS — D69.3 CHRONIC ITP (IDIOPATHIC THROMBOCYTOPENIA) (HCC): ICD-10-CM

## 2022-05-02 DIAGNOSIS — Z79.899 IMMUNOSUPPRESSED DUE TO CHEMOTHERAPY (HCC): ICD-10-CM

## 2022-05-02 DIAGNOSIS — D61.818 PANCYTOPENIA (HCC): ICD-10-CM

## 2022-05-02 DIAGNOSIS — D84.821 IMMUNOSUPPRESSED DUE TO CHEMOTHERAPY (HCC): ICD-10-CM

## 2022-05-02 DIAGNOSIS — N18.30 ANEMIA DUE TO STAGE 3 CHRONIC KIDNEY DISEASE, UNSPECIFIED WHETHER STAGE 3A OR 3B CKD (HCC): ICD-10-CM

## 2022-05-02 DIAGNOSIS — D70.9 NEUTROPENIA, UNSPECIFIED TYPE (HCC): ICD-10-CM

## 2022-05-02 DIAGNOSIS — D63.1 ANEMIA DUE TO STAGE 3 CHRONIC KIDNEY DISEASE, UNSPECIFIED WHETHER STAGE 3A OR 3B CKD (HCC): ICD-10-CM

## 2022-05-02 LAB
ALBUMIN SERPL BCP-MCNC: 2.9 G/DL (ref 3.5–5)
ALP SERPL-CCNC: 95 U/L (ref 46–116)
ALT SERPL W P-5'-P-CCNC: 23 U/L (ref 12–78)
ANION GAP SERPL CALCULATED.3IONS-SCNC: 8 MMOL/L (ref 4–13)
AST SERPL W P-5'-P-CCNC: 26 U/L (ref 5–45)
BASOPHILS # BLD AUTO: 0.04 THOUSANDS/ΜL (ref 0–0.1)
BASOPHILS NFR BLD AUTO: 1 % (ref 0–1)
BILIRUB SERPL-MCNC: 0.42 MG/DL (ref 0.2–1)
BUN SERPL-MCNC: 22 MG/DL (ref 5–25)
CALCIUM ALBUM COR SERPL-MCNC: 10.6 MG/DL (ref 8.3–10.1)
CALCIUM SERPL-MCNC: 9.7 MG/DL (ref 8.3–10.1)
CHLORIDE SERPL-SCNC: 105 MMOL/L (ref 100–108)
CO2 SERPL-SCNC: 23 MMOL/L (ref 21–32)
CREAT SERPL-MCNC: 2.31 MG/DL (ref 0.6–1.3)
EOSINOPHIL # BLD AUTO: 0.22 THOUSAND/ΜL (ref 0–0.61)
EOSINOPHIL NFR BLD AUTO: 5 % (ref 0–6)
ERYTHROCYTE [DISTWIDTH] IN BLOOD BY AUTOMATED COUNT: 17.1 % (ref 11.6–15.1)
GFR SERPL CREATININE-BSD FRML MDRD: 26 ML/MIN/1.73SQ M
GLUCOSE P FAST SERPL-MCNC: 95 MG/DL (ref 65–99)
HCT VFR BLD AUTO: 28.5 % (ref 36.5–49.3)
HGB BLD-MCNC: 8.6 G/DL (ref 12–17)
IMM GRANULOCYTES # BLD AUTO: 0.01 THOUSAND/UL (ref 0–0.2)
IMM GRANULOCYTES NFR BLD AUTO: 0 % (ref 0–2)
LYMPHOCYTES # BLD AUTO: 0.21 THOUSANDS/ΜL (ref 0.6–4.47)
LYMPHOCYTES NFR BLD AUTO: 4 % (ref 14–44)
MCH RBC QN AUTO: 30.4 PG (ref 26.8–34.3)
MCHC RBC AUTO-ENTMCNC: 30.2 G/DL (ref 31.4–37.4)
MCV RBC AUTO: 101 FL (ref 82–98)
MONOCYTES # BLD AUTO: 0.56 THOUSAND/ΜL (ref 0.17–1.22)
MONOCYTES NFR BLD AUTO: 12 % (ref 4–12)
NEUTROPHILS # BLD AUTO: 3.83 THOUSANDS/ΜL (ref 1.85–7.62)
NEUTS SEG NFR BLD AUTO: 78 % (ref 43–75)
NRBC BLD AUTO-RTO: 0 /100 WBCS
PLATELET # BLD AUTO: 144 THOUSANDS/UL (ref 149–390)
PMV BLD AUTO: 10.9 FL (ref 8.9–12.7)
POTASSIUM SERPL-SCNC: 4 MMOL/L (ref 3.5–5.3)
PROT SERPL-MCNC: 7.6 G/DL (ref 6.4–8.2)
RBC # BLD AUTO: 2.83 MILLION/UL (ref 3.88–5.62)
SODIUM SERPL-SCNC: 136 MMOL/L (ref 136–145)
WBC # BLD AUTO: 4.87 THOUSAND/UL (ref 4.31–10.16)

## 2022-05-02 PROCEDURE — 80053 COMPREHEN METABOLIC PANEL: CPT

## 2022-05-02 PROCEDURE — 36415 COLL VENOUS BLD VENIPUNCTURE: CPT

## 2022-05-02 PROCEDURE — 85025 COMPLETE CBC W/AUTO DIFF WBC: CPT

## 2022-05-03 ENCOUNTER — HOSPITAL ENCOUNTER (OUTPATIENT)
Dept: INFUSION CENTER | Facility: CLINIC | Age: 77
Discharge: HOME | End: 2022-05-03

## 2022-05-09 ENCOUNTER — APPOINTMENT (OUTPATIENT)
Dept: LAB | Facility: CLINIC | Age: 77
End: 2022-05-09
Payer: MEDICARE

## 2022-05-09 DIAGNOSIS — D70.9 NEUTROPENIA, UNSPECIFIED TYPE (HCC): ICD-10-CM

## 2022-05-09 DIAGNOSIS — D84.821 IMMUNOSUPPRESSED DUE TO CHEMOTHERAPY (HCC): ICD-10-CM

## 2022-05-09 DIAGNOSIS — D61.818 PANCYTOPENIA (HCC): ICD-10-CM

## 2022-05-09 DIAGNOSIS — D63.1 ANEMIA DUE TO STAGE 3 CHRONIC KIDNEY DISEASE, UNSPECIFIED WHETHER STAGE 3A OR 3B CKD (HCC): ICD-10-CM

## 2022-05-09 DIAGNOSIS — C90.00 IGG MULTIPLE MYELOMA (HCC): ICD-10-CM

## 2022-05-09 DIAGNOSIS — D69.3 CHRONIC ITP (IDIOPATHIC THROMBOCYTOPENIA) (HCC): ICD-10-CM

## 2022-05-09 DIAGNOSIS — C90.00 MULTIPLE MYELOMA NOT HAVING ACHIEVED REMISSION (HCC): ICD-10-CM

## 2022-05-09 DIAGNOSIS — T45.1X5A IMMUNOSUPPRESSED DUE TO CHEMOTHERAPY (HCC): ICD-10-CM

## 2022-05-09 DIAGNOSIS — D80.1 HYPOGAMMAGLOBULINEMIA (HCC): ICD-10-CM

## 2022-05-09 DIAGNOSIS — N18.30 ANEMIA DUE TO STAGE 3 CHRONIC KIDNEY DISEASE, UNSPECIFIED WHETHER STAGE 3A OR 3B CKD (HCC): ICD-10-CM

## 2022-05-09 DIAGNOSIS — Z79.899 IMMUNOSUPPRESSED DUE TO CHEMOTHERAPY (HCC): ICD-10-CM

## 2022-05-09 LAB
ALBUMIN SERPL BCP-MCNC: 3.1 G/DL (ref 3.5–5)
ALP SERPL-CCNC: 105 U/L (ref 46–116)
ALT SERPL W P-5'-P-CCNC: 22 U/L (ref 12–78)
ANION GAP SERPL CALCULATED.3IONS-SCNC: 7 MMOL/L (ref 4–13)
AST SERPL W P-5'-P-CCNC: 23 U/L (ref 5–45)
BASOPHILS # BLD AUTO: 0.02 THOUSANDS/ΜL (ref 0–0.1)
BASOPHILS NFR BLD AUTO: 1 % (ref 0–1)
BILIRUB SERPL-MCNC: 0.62 MG/DL (ref 0.2–1)
BUN SERPL-MCNC: 22 MG/DL (ref 5–25)
CALCIUM ALBUM COR SERPL-MCNC: 10.3 MG/DL (ref 8.3–10.1)
CALCIUM SERPL-MCNC: 9.6 MG/DL (ref 8.3–10.1)
CHLORIDE SERPL-SCNC: 106 MMOL/L (ref 100–108)
CO2 SERPL-SCNC: 25 MMOL/L (ref 21–32)
CREAT SERPL-MCNC: 1.95 MG/DL (ref 0.6–1.3)
EOSINOPHIL # BLD AUTO: 0.2 THOUSAND/ΜL (ref 0–0.61)
EOSINOPHIL NFR BLD AUTO: 5 % (ref 0–6)
ERYTHROCYTE [DISTWIDTH] IN BLOOD BY AUTOMATED COUNT: 17.3 % (ref 11.6–15.1)
GFR SERPL CREATININE-BSD FRML MDRD: 32 ML/MIN/1.73SQ M
GLUCOSE P FAST SERPL-MCNC: 94 MG/DL (ref 65–99)
HCT VFR BLD AUTO: 29.2 % (ref 36.5–49.3)
HGB BLD-MCNC: 9.3 G/DL (ref 12–17)
IMM GRANULOCYTES # BLD AUTO: 0.01 THOUSAND/UL (ref 0–0.2)
IMM GRANULOCYTES NFR BLD AUTO: 0 % (ref 0–2)
LYMPHOCYTES # BLD AUTO: 0.2 THOUSANDS/ΜL (ref 0.6–4.47)
LYMPHOCYTES NFR BLD AUTO: 5 % (ref 14–44)
MCH RBC QN AUTO: 30.3 PG (ref 26.8–34.3)
MCHC RBC AUTO-ENTMCNC: 31.8 G/DL (ref 31.4–37.4)
MCV RBC AUTO: 95 FL (ref 82–98)
MONOCYTES # BLD AUTO: 0.56 THOUSAND/ΜL (ref 0.17–1.22)
MONOCYTES NFR BLD AUTO: 15 % (ref 4–12)
NEUTROPHILS # BLD AUTO: 2.82 THOUSANDS/ΜL (ref 1.85–7.62)
NEUTS SEG NFR BLD AUTO: 74 % (ref 43–75)
NRBC BLD AUTO-RTO: 0 /100 WBCS
PLATELET # BLD AUTO: 116 THOUSANDS/UL (ref 149–390)
PMV BLD AUTO: 11.1 FL (ref 8.9–12.7)
POTASSIUM SERPL-SCNC: 4.3 MMOL/L (ref 3.5–5.3)
PROT SERPL-MCNC: 7.1 G/DL (ref 6.4–8.2)
RBC # BLD AUTO: 3.07 MILLION/UL (ref 3.88–5.62)
SODIUM SERPL-SCNC: 138 MMOL/L (ref 136–145)
WBC # BLD AUTO: 3.81 THOUSAND/UL (ref 4.31–10.16)

## 2022-05-09 PROCEDURE — 36415 COLL VENOUS BLD VENIPUNCTURE: CPT

## 2022-05-09 PROCEDURE — 80053 COMPREHEN METABOLIC PANEL: CPT

## 2022-05-09 PROCEDURE — 85025 COMPLETE CBC W/AUTO DIFF WBC: CPT

## 2022-05-10 ENCOUNTER — HOSPITAL ENCOUNTER (OUTPATIENT)
Dept: INFUSION CENTER | Facility: CLINIC | Age: 77
Discharge: HOME | End: 2022-05-10

## 2022-05-16 ENCOUNTER — APPOINTMENT (OUTPATIENT)
Dept: LAB | Facility: CLINIC | Age: 77
End: 2022-05-16
Payer: MEDICARE

## 2022-05-16 DIAGNOSIS — D69.3 CHRONIC ITP (IDIOPATHIC THROMBOCYTOPENIA) (HCC): ICD-10-CM

## 2022-05-16 DIAGNOSIS — C90.00 MULTIPLE MYELOMA NOT HAVING ACHIEVED REMISSION (HCC): ICD-10-CM

## 2022-05-16 DIAGNOSIS — N18.30 ANEMIA DUE TO STAGE 3 CHRONIC KIDNEY DISEASE, UNSPECIFIED WHETHER STAGE 3A OR 3B CKD (HCC): ICD-10-CM

## 2022-05-16 DIAGNOSIS — T45.1X5A IMMUNOSUPPRESSED DUE TO CHEMOTHERAPY (HCC): ICD-10-CM

## 2022-05-16 DIAGNOSIS — D70.9 NEUTROPENIA, UNSPECIFIED TYPE (HCC): ICD-10-CM

## 2022-05-16 DIAGNOSIS — D63.1 ANEMIA DUE TO STAGE 3 CHRONIC KIDNEY DISEASE, UNSPECIFIED WHETHER STAGE 3A OR 3B CKD (HCC): ICD-10-CM

## 2022-05-16 DIAGNOSIS — Z79.899 IMMUNOSUPPRESSED DUE TO CHEMOTHERAPY (HCC): ICD-10-CM

## 2022-05-16 DIAGNOSIS — D84.821 IMMUNOSUPPRESSED DUE TO CHEMOTHERAPY (HCC): ICD-10-CM

## 2022-05-16 DIAGNOSIS — D80.1 HYPOGAMMAGLOBULINEMIA (HCC): ICD-10-CM

## 2022-05-16 DIAGNOSIS — C90.00 IGG MULTIPLE MYELOMA (HCC): ICD-10-CM

## 2022-05-16 DIAGNOSIS — D61.818 PANCYTOPENIA (HCC): ICD-10-CM

## 2022-05-16 LAB
ALBUMIN SERPL BCP-MCNC: 3.1 G/DL (ref 3.5–5)
ALP SERPL-CCNC: 96 U/L (ref 46–116)
ALT SERPL W P-5'-P-CCNC: 27 U/L (ref 12–78)
ANION GAP SERPL CALCULATED.3IONS-SCNC: 6 MMOL/L (ref 4–13)
AST SERPL W P-5'-P-CCNC: 28 U/L (ref 5–45)
BASOPHILS # BLD AUTO: 0.04 THOUSANDS/ΜL (ref 0–0.1)
BASOPHILS NFR BLD AUTO: 1 % (ref 0–1)
BILIRUB SERPL-MCNC: 0.44 MG/DL (ref 0.2–1)
BUN SERPL-MCNC: 21 MG/DL (ref 5–25)
CALCIUM ALBUM COR SERPL-MCNC: 10.2 MG/DL (ref 8.3–10.1)
CALCIUM SERPL-MCNC: 9.5 MG/DL (ref 8.3–10.1)
CHLORIDE SERPL-SCNC: 107 MMOL/L (ref 100–108)
CO2 SERPL-SCNC: 25 MMOL/L (ref 21–32)
CREAT SERPL-MCNC: 1.97 MG/DL (ref 0.6–1.3)
EOSINOPHIL # BLD AUTO: 0.13 THOUSAND/ΜL (ref 0–0.61)
EOSINOPHIL NFR BLD AUTO: 4 % (ref 0–6)
ERYTHROCYTE [DISTWIDTH] IN BLOOD BY AUTOMATED COUNT: 17.4 % (ref 11.6–15.1)
GFR SERPL CREATININE-BSD FRML MDRD: 32 ML/MIN/1.73SQ M
GLUCOSE P FAST SERPL-MCNC: 90 MG/DL (ref 65–99)
HCT VFR BLD AUTO: 30.3 % (ref 36.5–49.3)
HGB BLD-MCNC: 9.1 G/DL (ref 12–17)
IMM GRANULOCYTES # BLD AUTO: 0.01 THOUSAND/UL (ref 0–0.2)
IMM GRANULOCYTES NFR BLD AUTO: 0 % (ref 0–2)
LYMPHOCYTES # BLD AUTO: 0.14 THOUSANDS/ΜL (ref 0.6–4.47)
LYMPHOCYTES NFR BLD AUTO: 4 % (ref 14–44)
MCH RBC QN AUTO: 29.4 PG (ref 26.8–34.3)
MCHC RBC AUTO-ENTMCNC: 30 G/DL (ref 31.4–37.4)
MCV RBC AUTO: 98 FL (ref 82–98)
MONOCYTES # BLD AUTO: 0.5 THOUSAND/ΜL (ref 0.17–1.22)
MONOCYTES NFR BLD AUTO: 14 % (ref 4–12)
NEUTROPHILS # BLD AUTO: 2.71 THOUSANDS/ΜL (ref 1.85–7.62)
NEUTS SEG NFR BLD AUTO: 77 % (ref 43–75)
NRBC BLD AUTO-RTO: 0 /100 WBCS
PLATELET # BLD AUTO: 110 THOUSANDS/UL (ref 149–390)
PMV BLD AUTO: 11.4 FL (ref 8.9–12.7)
POTASSIUM SERPL-SCNC: 4.3 MMOL/L (ref 3.5–5.3)
PROT SERPL-MCNC: 7.2 G/DL (ref 6.4–8.2)
RBC # BLD AUTO: 3.09 MILLION/UL (ref 3.88–5.62)
SODIUM SERPL-SCNC: 138 MMOL/L (ref 136–145)
WBC # BLD AUTO: 3.53 THOUSAND/UL (ref 4.31–10.16)

## 2022-05-16 PROCEDURE — 80053 COMPREHEN METABOLIC PANEL: CPT

## 2022-05-16 PROCEDURE — 36415 COLL VENOUS BLD VENIPUNCTURE: CPT

## 2022-05-16 PROCEDURE — 85025 COMPLETE CBC W/AUTO DIFF WBC: CPT

## 2022-05-17 ENCOUNTER — HOSPITAL ENCOUNTER (OUTPATIENT)
Dept: INFUSION CENTER | Facility: CLINIC | Age: 77
Discharge: HOME | End: 2022-05-17

## 2022-05-18 DIAGNOSIS — E03.9 HYPOTHYROIDISM, UNSPECIFIED TYPE: ICD-10-CM

## 2022-05-19 RX ORDER — LEVOTHYROXINE SODIUM 0.05 MG/1
75 TABLET ORAL DAILY
Qty: 45 TABLET | Refills: 1 | Status: SHIPPED | OUTPATIENT
Start: 2022-05-19 | End: 2022-07-12 | Stop reason: SDUPTHER

## 2022-05-20 ENCOUNTER — HOSPITAL ENCOUNTER (OUTPATIENT)
Dept: INFUSION CENTER | Facility: CLINIC | Age: 77
Discharge: HOME | End: 2022-05-20
Payer: MEDICARE

## 2022-05-20 VITALS
SYSTOLIC BLOOD PRESSURE: 122 MMHG | RESPIRATION RATE: 18 BRPM | WEIGHT: 153.99 LBS | BODY MASS INDEX: 22.1 KG/M2 | DIASTOLIC BLOOD PRESSURE: 79 MMHG | HEART RATE: 62 BPM | TEMPERATURE: 97.6 F

## 2022-05-20 DIAGNOSIS — C90.00 IGG MULTIPLE MYELOMA (HCC): Primary | ICD-10-CM

## 2022-05-20 DIAGNOSIS — D80.1 HYPOGAMMAGLOBULINEMIA (HCC): ICD-10-CM

## 2022-05-20 PROCEDURE — 96365 THER/PROPH/DIAG IV INF INIT: CPT

## 2022-05-20 PROCEDURE — 96366 THER/PROPH/DIAG IV INF ADDON: CPT

## 2022-05-20 PROCEDURE — 96367 TX/PROPH/DG ADDL SEQ IV INF: CPT

## 2022-05-20 RX ORDER — SODIUM CHLORIDE 9 MG/ML
20 INJECTION, SOLUTION INTRAVENOUS ONCE
Status: COMPLETED | OUTPATIENT
Start: 2022-05-20 | End: 2022-05-20

## 2022-05-20 RX ORDER — SODIUM CHLORIDE 9 MG/ML
20 INJECTION, SOLUTION INTRAVENOUS ONCE
Status: CANCELLED
Start: 2022-06-17 | End: 2022-06-17

## 2022-05-20 RX ADMIN — ZOLEDRONIC ACID 3 MG: 4 INJECTION INTRAVENOUS at 08:58

## 2022-05-20 RX ADMIN — Medication 30 G: at 09:45

## 2022-05-20 RX ADMIN — SODIUM CHLORIDE 20 ML/HR: 9 INJECTION, SOLUTION INTRAVENOUS at 08:58

## 2022-05-23 ENCOUNTER — APPOINTMENT (OUTPATIENT)
Dept: LAB | Facility: CLINIC | Age: 77
End: 2022-05-23
Payer: MEDICARE

## 2022-05-23 ENCOUNTER — OFFICE VISIT (OUTPATIENT)
Dept: HEMATOLOGY ONCOLOGY | Facility: CLINIC | Age: 77
End: 2022-05-23
Payer: MEDICARE

## 2022-05-23 VITALS
RESPIRATION RATE: 16 BRPM | WEIGHT: 156 LBS | HEART RATE: 65 BPM | BODY MASS INDEX: 22.33 KG/M2 | OXYGEN SATURATION: 98 % | HEIGHT: 70 IN | DIASTOLIC BLOOD PRESSURE: 58 MMHG | SYSTOLIC BLOOD PRESSURE: 112 MMHG | TEMPERATURE: 97.9 F

## 2022-05-23 DIAGNOSIS — C90.00 MULTIPLE MYELOMA NOT HAVING ACHIEVED REMISSION (HCC): ICD-10-CM

## 2022-05-23 DIAGNOSIS — D70.9 NEUTROPENIA, UNSPECIFIED TYPE (HCC): ICD-10-CM

## 2022-05-23 DIAGNOSIS — T45.1X5A IMMUNOSUPPRESSED DUE TO CHEMOTHERAPY (HCC): ICD-10-CM

## 2022-05-23 DIAGNOSIS — Z79.899 IMMUNOSUPPRESSED DUE TO CHEMOTHERAPY (HCC): ICD-10-CM

## 2022-05-23 DIAGNOSIS — C90.00 IGG MULTIPLE MYELOMA (HCC): ICD-10-CM

## 2022-05-23 DIAGNOSIS — D61.818 PANCYTOPENIA (HCC): ICD-10-CM

## 2022-05-23 DIAGNOSIS — D80.1 HYPOGAMMAGLOBULINEMIA (HCC): ICD-10-CM

## 2022-05-23 DIAGNOSIS — N18.30 ANEMIA DUE TO STAGE 3 CHRONIC KIDNEY DISEASE, UNSPECIFIED WHETHER STAGE 3A OR 3B CKD (HCC): ICD-10-CM

## 2022-05-23 DIAGNOSIS — D84.821 IMMUNOSUPPRESSED DUE TO CHEMOTHERAPY (HCC): ICD-10-CM

## 2022-05-23 DIAGNOSIS — N18.32 STAGE 3B CHRONIC KIDNEY DISEASE (HCC): ICD-10-CM

## 2022-05-23 DIAGNOSIS — C90.00 IGG MULTIPLE MYELOMA (HCC): Primary | ICD-10-CM

## 2022-05-23 DIAGNOSIS — R53.83 OTHER FATIGUE: ICD-10-CM

## 2022-05-23 DIAGNOSIS — D63.1 ANEMIA DUE TO STAGE 3 CHRONIC KIDNEY DISEASE, UNSPECIFIED WHETHER STAGE 3A OR 3B CKD (HCC): ICD-10-CM

## 2022-05-23 DIAGNOSIS — D69.3 CHRONIC ITP (IDIOPATHIC THROMBOCYTOPENIA) (HCC): ICD-10-CM

## 2022-05-23 LAB
ALBUMIN SERPL BCP-MCNC: 3.1 G/DL (ref 3.5–5)
ALP SERPL-CCNC: 91 U/L (ref 46–116)
ALT SERPL W P-5'-P-CCNC: 28 U/L (ref 12–78)
ANION GAP SERPL CALCULATED.3IONS-SCNC: 5 MMOL/L (ref 4–13)
AST SERPL W P-5'-P-CCNC: 29 U/L (ref 5–45)
BASOPHILS # BLD AUTO: 0.02 THOUSANDS/ΜL (ref 0–0.1)
BASOPHILS NFR BLD AUTO: 1 % (ref 0–1)
BILIRUB SERPL-MCNC: 0.49 MG/DL (ref 0.2–1)
BUN SERPL-MCNC: 24 MG/DL (ref 5–25)
CALCIUM ALBUM COR SERPL-MCNC: 9.5 MG/DL (ref 8.3–10.1)
CALCIUM SERPL-MCNC: 8.8 MG/DL (ref 8.3–10.1)
CHLORIDE SERPL-SCNC: 107 MMOL/L (ref 100–108)
CO2 SERPL-SCNC: 25 MMOL/L (ref 21–32)
CREAT SERPL-MCNC: 1.86 MG/DL (ref 0.6–1.3)
EOSINOPHIL # BLD AUTO: 0.13 THOUSAND/ΜL (ref 0–0.61)
EOSINOPHIL NFR BLD AUTO: 5 % (ref 0–6)
ERYTHROCYTE [DISTWIDTH] IN BLOOD BY AUTOMATED COUNT: 17.2 % (ref 11.6–15.1)
GFR SERPL CREATININE-BSD FRML MDRD: 34 ML/MIN/1.73SQ M
GLUCOSE P FAST SERPL-MCNC: 95 MG/DL (ref 65–99)
HCT VFR BLD AUTO: 28.7 % (ref 36.5–49.3)
HGB BLD-MCNC: 8.8 G/DL (ref 12–17)
IMM GRANULOCYTES # BLD AUTO: 0.01 THOUSAND/UL (ref 0–0.2)
IMM GRANULOCYTES NFR BLD AUTO: 0 % (ref 0–2)
LYMPHOCYTES # BLD AUTO: 0.2 THOUSANDS/ΜL (ref 0.6–4.47)
LYMPHOCYTES NFR BLD AUTO: 7 % (ref 14–44)
MCH RBC QN AUTO: 29.3 PG (ref 26.8–34.3)
MCHC RBC AUTO-ENTMCNC: 30.7 G/DL (ref 31.4–37.4)
MCV RBC AUTO: 96 FL (ref 82–98)
MONOCYTES # BLD AUTO: 0.42 THOUSAND/ΜL (ref 0.17–1.22)
MONOCYTES NFR BLD AUTO: 15 % (ref 4–12)
NEUTROPHILS # BLD AUTO: 1.95 THOUSANDS/ΜL (ref 1.85–7.62)
NEUTS SEG NFR BLD AUTO: 72 % (ref 43–75)
NRBC BLD AUTO-RTO: 0 /100 WBCS
PLATELET # BLD AUTO: 73 THOUSANDS/UL (ref 149–390)
PMV BLD AUTO: 11.8 FL (ref 8.9–12.7)
POTASSIUM SERPL-SCNC: 3.8 MMOL/L (ref 3.5–5.3)
PROT SERPL-MCNC: 7.4 G/DL (ref 6.4–8.2)
RBC # BLD AUTO: 3 MILLION/UL (ref 3.88–5.62)
SODIUM SERPL-SCNC: 137 MMOL/L (ref 136–145)
WBC # BLD AUTO: 2.73 THOUSAND/UL (ref 4.31–10.16)

## 2022-05-23 PROCEDURE — 85025 COMPLETE CBC W/AUTO DIFF WBC: CPT

## 2022-05-23 PROCEDURE — 99215 OFFICE O/P EST HI 40 MIN: CPT | Performed by: INTERNAL MEDICINE

## 2022-05-23 PROCEDURE — 80053 COMPREHEN METABOLIC PANEL: CPT

## 2022-05-23 PROCEDURE — 36415 COLL VENOUS BLD VENIPUNCTURE: CPT

## 2022-05-23 NOTE — PROGRESS NOTES
[facility]  [address] [address]  [address] [place] [address],1945, [phone]  05/23/22    Discussion:   In summary, this is a 72-year-old male history of myeloma  He had stem cell reinfusion in March 2022  Since that time blood counts have been trending upward  Quality of life has improved  He is able to walk 2 miles a day, in the cooler part of the day  He does report some brain fog  Generally, however, this is not disruptive  Most recent IgG level was 928, 10 days prior to IVIG infusion  This has been monthly  We will recheck a trough value and, if similar, extend IVIG infusions to every 2 months  Most recent free light chains had nearly normalized, previously being depressed  Creatinine is stable at 1 9  He also continues on Zometa monthly  Consideration for extension to 3 months  We reviewed some issues related to bone imaging  Most recent was a skeletal survey in 2015  There has been some movement toward total body bone CT  This will present possible difficulty in terms of comparison  I discussed the above with the patient  The patient and his wife voiced understanding and agreement   ______________________________________________________________________    Chief Complaint   Patient presents with    Follow-up       HPI:  Oncology History   IgG multiple myeloma ([address] )   9/2015 Initial Diagnosis    Found to have Hbg of 6 with SOB, creatinine 1 8 and normal calcium with albumin of 2 1  Additonial blood work showed elevated protien level (12 1g/dL)  9/29/2015 Biopsy    Bone marrow biopsy demonstrated approximately 80% plasma cells with some immature forms noted    These studies showed 13q14 deletion, +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, 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)       10/14/2015 - 11/11/2015 Chemotherapy    Velcade 1 3 mg/m2 Days 1,8,15,22  Cytoxan 300 mg/m2  PO Days 1, 8,15, 22  Dexamethasone 40 mg PO Days 1,8,15, 22  Zometa 4 mg IV Day 1  Cycle length = 28 days    Completed 2 cycles  Day one of cycle 2 was on 11/11/15      12/2015 - 2/2016 Chemotherapy    VDT-PACE x 2 cycles   (completed at [facility] in [place], [address] [place] [address])     2/2016 Transplant    Melphalan 200 mg/m2 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  MRD +     8/2016 - 1/26/2018 Chemotherapy    Maintenance therapy:  Carfilzomib, orginally dosed 27 mg weekly then increased to 45 mg weekly after MRD reactivitation  Revlimid  Dexamethasone      2/2018 Biopsy    Bone marrow demonstrated positive minimal residual disease       2/23/2018 -  Chemotherapy    Daratumumab 16mg/m2 IV Day 1  Pomalyst 4 mg p o  daily 1-21  Dexamethasone 4 mg PO Days 2, 3  Dexamethasone 12 mg PODays 8, 15, 22  Cycle length = 28 days     4/11/2019 - 8/29/2019 Chemotherapy    methylPREDNISolone sodium succinate (Solu-MEDROL) 100 mg in sodium chloride 0 9 % 250 mL IVPB, 100 mg, Intravenous, Once, 5 of 12 cycles  Administration: 100 mg (6/7/2019), 100 mg (7/5/2019), 100 mg (8/2/2019)     8/30/2019 - 11/8/2019 Chemotherapy    cyclophosphamide (CYTOXAN) 1,000 mg in sodium chloride 0 9 % 250 mL IVPB, 990 mg (66 7 % of original dose 750 mg/m2), Intravenous, Once, 2 of 3 cycles  Dose modification: 500 mg/m2 (original dose 750 mg/m2, Cycle 1, Reason: Other (See Comments)), 250 mg/m2 (original dose 750 mg/m2, Cycle 4, Reason: Treatment Parameters Not Met)  Administration: 1,000 mg (8/30/2019), 1,000 mg (9/13/2019), 1,000 mg (9/27/2019), 500 mg (10/11/2019)  bortezomib (VELCADE) subcutaneous injection 2 6 mg, 1 3 mg/m2 = 2 6 mg (86 7 % of original dose 1 5 mg/m2), Subcutaneous, Once, 3 of 4 cycles  Dose modification: 1 3 mg/m2 (original dose 1 5 mg/m2, Cycle 1, Reason: Other (See Comments))  Administration: 2 6 mg (8/30/2019), 2 6 mg (9/13/2019), 2 6 mg (10/25/2019), 2 6 mg (11/8/2019), 2 6 mg (9/27/2019), 2 6 mg (11/1/2019), 2 6 mg (9/20/2019), 2 6 mg (10/4/2019), 2 6 mg (10/11/2019), 2 6 mg (10/18/2019) 12/6/2019 - 6/19/2020 Chemotherapy    pegfilgrastim (NEULASTA ONPRO) subcutaneous injection kit 6 mg, 6 mg, Subcutaneous, Once, 5 of 9 cycles  Administration: 6 mg (2/28/2020), 6 mg (3/13/2020), 6 mg (3/27/2020), 6 mg (4/10/2020), 6 mg (4/24/2020), 6 mg (5/8/2020), 6 mg (6/5/2020), 6 mg (5/22/2020), 6 mg (6/19/2020)  cyclophosphamide (CYTOXAN) 784 mg in sodium chloride 0 9 % 250 mL IVPB, 400 mg/m2 = 784 mg, Intravenous, Once, 5 of 9 cycles  Dose modification: 400 mg/m2 (original dose 750 mg/m2, Cycle 7, Reason: Other (See Comments))  Administration: 784 mg (2/28/2020), 784 mg (3/13/2020), 784 mg (3/27/2020), 784 mg (4/10/2020), 784 mg (4/24/2020), 784 mg (5/8/2020), 784 mg (5/22/2020), 784 mg (6/19/2020), 784 mg (6/5/2020)  methylPREDNISolone sodium succinate (Solu-MEDROL) 100 mg in sodium chloride 0 9 % 250 mL IVPB, 100 mg, Intravenous, Once, 8 of 12 cycles  Administration: 100 mg (12/6/2019), 100 mg (12/13/2019), 100 mg (12/20/2019), 100 mg (1/3/2020), 100 mg (1/10/2020), 100 mg (1/17/2020), 100 mg (1/31/2020), 100 mg (2/14/2020), 100 mg (5/22/2020), 100 mg (12/27/2019), 100 mg (1/24/2020), 100 mg (2/28/2020), 100 mg (3/13/2020), 100 mg (3/27/2020), 100 mg (4/10/2020), 100 mg (4/24/2020), 100 mg (5/8/2020), 100 mg (6/19/2020)     7/2/2020 -  Chemotherapy           Interval History:  Clinically stable  Improving energy  ECOG-  1 - Symptomatic but completely ambulatory    Review of Systems   Constitutional: Negative for chills and fever  HENT: Negative for nosebleeds  Eyes: Negative for discharge  Respiratory: Negative for cough and shortness of breath  Cardiovascular: Negative for chest pain  Gastrointestinal: Negative for abdominal pain, constipation and diarrhea  Endocrine: Negative for polydipsia  Genitourinary: Negative for hematuria  Musculoskeletal: Negative for arthralgias  Skin: Negative for color change  Allergic/Immunologic: Negative for immunocompromised state     Neurological: Negative for dizziness and headaches  Hematological: Negative for adenopathy  Psychiatric/Behavioral: Negative for agitation  Past Medical History:   Diagnosis Date    [facility] of [facility] ([facility] )     Hypertension     History of HTN-stable since weight loss    Insomnia     Multiple myeloma (HCC)      Patient Active Problem List   Diagnosis    IgG multiple myeloma (HCC)    Persistent atrial fibrillation (HCC)    Essential hypertension    Chronic ITP (idiopathic thrombocytopenia) (HCC)    Hyperlipidemia    Hypocalcemia    Hypothyroidism    Long term current use of systemic steroids    Other fatigue    Prophylactic measure    Hypogammaglobulinemia (HCC)    Leukopenia    Anemia due to stage 3 chronic kidney disease (HCC)    Pancytopenia (HCC)    Moderate protein-calorie malnutrition (HCC)    Stage 3b chronic kidney disease (HCC)       Current Outpatient Medications:     acetaminophen (TYLENOL) 325 mg tablet, Take 650 mg by mouth every 6 (six) hours as needed for mild pain, Disp: , Rfl:     acyclovir (ZOVIRAX) 200 mg capsule, TAKE  (1)  CAPSULE  TWICE DAILY  , Disp: 60 capsule, Rfl: 5    Ascorbic Acid (vitamin C) 1000 MG tablet, Take 1,000 mg by mouth daily, Disp: , Rfl:     atorvastatin (LIPITOR) 20 mg tablet, Take 1 tablet (20 mg total) by mouth daily, Disp: 90 tablet, Rfl: 0    Cholecalciferol (VITAMIN D-3 PO), Take 1,000 Units by mouth daily , Disp: , Rfl:     escitalopram (LEXAPRO) 20 mg tablet, Take 1 tablet (20 mg total) by mouth daily, Disp: 90 tablet, Rfl: 0    Glutamine POWD, by Does not apply route, Disp: , Rfl:     levothyroxine 50 mcg tablet, Take 1 5 tablets (75 mcg total) by mouth in the morning , Disp: 45 tablet, Rfl: 1    Multiple Vitamin (MULTIVITAMINS PO), Take 1 tablet by mouth daily  , Disp: , Rfl:     sulfamethoxazole-trimethoprim (BACTRIM DS) 800-160 mg per tablet, 3 (three) times a week Monday Wednesday friday, Disp: , Rfl:     vitamin B-12 (VITAMIN B-12) 1,000 mcg tablet, Take 1,000 mcg by mouth daily, Disp: , Rfl:     Docusate Sodium (COLACE PO), Take 1 tablet by mouth as needed  (Patient not taking: Reported on 5/23/2022), Disp: , Rfl:     eltrombopag (PROMACTA) 75 MG TABS, Take 1 tablet (75 mg total) by mouth daily, Disp: 30 tablet, Rfl: 3    LORazepam (ATIVAN) 1 mg tablet, Take 1 tablet (1 mg total) by mouth every 4 (four) hours as needed for anxiety (Patient not taking: Reported on 5/23/2022), Disp: 100 tablet, Rfl: 1    naloxone (NARCAN) 4 mg/0 1 mL nasal spray, Administer 1 spray into a nostril  If no response after 2-3 minutes, give another dose in the other nostril using a new spray  (Patient not taking: Reported on 5/23/2022), Disp: 1 each, Rfl: 1    ondansetron (ZOFRAN) 4 mg tablet, Take 4 mg by mouth every 8 (eight) hours as needed for nausea or vomiting  (Patient not taking: Reported on 5/23/2022), Disp: , Rfl:     oxyCODONE (Roxicodone) 5 immediate release tablet, Take 1 tablet (5 mg total) by mouth every 4 (four) hours as needed for moderate pain Max Daily Amount: 30 mg (Patient not taking: Reported on 5/23/2022), Disp: 60 tablet, Rfl: 0  No Known Allergies  Past Surgical History:   Procedure Laterality Date    APPENDECTOMY      Last assessed: 9/25/15    ARTHROSCOPY KNEE Left     9/30/09    EYE SURGERY      Lasik    KNEE CARTILAGE SURGERY      LIMBAL STEM CELL TRANSPLANT      Patient had 2 in [place]-2/29/2016 and 4/13/2016     Social History     Objective:  Vitals:    05/23/22 0818   BP: 112/58   BP Location: Right arm   Patient Position: Sitting   Cuff Size: Adult   Pulse: 65   Resp: 16   Temp: 97 9 °F (36 6 °C)   TempSrc: Tympanic   SpO2: 98%   Weight: 70 8 kg (156 lb)   Height: 5' 10" (1 778 m)     Physical Exam  Constitutional:       Appearance: He is well-developed  HENT:      Head: Normocephalic and atraumatic  Eyes:      Pupils: Pupils are equal, round, and reactive to light     Cardiovascular:      Rate and Rhythm: Normal rate and regular rhythm  Heart sounds: No murmur heard  Pulmonary:      Breath sounds: Normal breath sounds  No wheezing or rales  Abdominal:      Palpations: Abdomen is soft  Tenderness: There is no abdominal tenderness  Musculoskeletal:         General: No tenderness  Normal range of motion  Cervical back: Neck supple  Lymphadenopathy:      Cervical: No cervical adenopathy  Skin:     Findings: No erythema or rash  Neurological:      Mental Status: He is alert and oriented to person, place, and time  Cranial Nerves: No cranial nerve deficit  Deep Tendon Reflexes: Reflexes are normal and symmetric  Psychiatric:         Behavior: Behavior normal            Labs: I personally reviewed the labs and imaging pertinent to this patient care

## 2022-05-24 ENCOUNTER — HOSPITAL ENCOUNTER (OUTPATIENT)
Dept: INFUSION CENTER | Facility: CLINIC | Age: 77
Discharge: HOME | End: 2022-05-24

## 2022-05-31 ENCOUNTER — HOSPITAL ENCOUNTER (OUTPATIENT)
Dept: INFUSION CENTER | Facility: CLINIC | Age: 77
Discharge: HOME | End: 2022-05-31

## 2022-06-06 ENCOUNTER — APPOINTMENT (OUTPATIENT)
Dept: LAB | Facility: CLINIC | Age: 77
End: 2022-06-06
Payer: MEDICARE

## 2022-06-06 LAB
ALBUMIN SERPL BCP-MCNC: 3.2 G/DL (ref 3.5–5)
ALP SERPL-CCNC: 98 U/L (ref 46–116)
ALT SERPL W P-5'-P-CCNC: 27 U/L (ref 12–78)
ANION GAP SERPL CALCULATED.3IONS-SCNC: 7 MMOL/L (ref 4–13)
AST SERPL W P-5'-P-CCNC: 26 U/L (ref 5–45)
BASOPHILS # BLD AUTO: 0.04 THOUSANDS/ΜL (ref 0–0.1)
BASOPHILS NFR BLD AUTO: 1 % (ref 0–1)
BILIRUB SERPL-MCNC: 0.46 MG/DL (ref 0.2–1)
BUN SERPL-MCNC: 20 MG/DL (ref 5–25)
CALCIUM ALBUM COR SERPL-MCNC: 10 MG/DL (ref 8.3–10.1)
CALCIUM SERPL-MCNC: 9.4 MG/DL (ref 8.3–10.1)
CHLORIDE SERPL-SCNC: 105 MMOL/L (ref 100–108)
CO2 SERPL-SCNC: 25 MMOL/L (ref 21–32)
CREAT SERPL-MCNC: 1.77 MG/DL (ref 0.6–1.3)
EOSINOPHIL # BLD AUTO: 0.13 THOUSAND/ΜL (ref 0–0.61)
EOSINOPHIL NFR BLD AUTO: 3 % (ref 0–6)
ERYTHROCYTE [DISTWIDTH] IN BLOOD BY AUTOMATED COUNT: 16.4 % (ref 11.6–15.1)
GFR SERPL CREATININE-BSD FRML MDRD: 37 ML/MIN/1.73SQ M
GLUCOSE P FAST SERPL-MCNC: 89 MG/DL (ref 65–99)
HCT VFR BLD AUTO: 30.2 % (ref 36.5–49.3)
HGB BLD-MCNC: 9.3 G/DL (ref 12–17)
IMM GRANULOCYTES # BLD AUTO: 0.02 THOUSAND/UL (ref 0–0.2)
IMM GRANULOCYTES NFR BLD AUTO: 1 % (ref 0–2)
LYMPHOCYTES # BLD AUTO: 0.24 THOUSANDS/ΜL (ref 0.6–4.47)
LYMPHOCYTES NFR BLD AUTO: 6 % (ref 14–44)
MCH RBC QN AUTO: 29.2 PG (ref 26.8–34.3)
MCHC RBC AUTO-ENTMCNC: 30.8 G/DL (ref 31.4–37.4)
MCV RBC AUTO: 95 FL (ref 82–98)
MONOCYTES # BLD AUTO: 0.48 THOUSAND/ΜL (ref 0.17–1.22)
MONOCYTES NFR BLD AUTO: 12 % (ref 4–12)
NEUTROPHILS # BLD AUTO: 3.04 THOUSANDS/ΜL (ref 1.85–7.62)
NEUTS SEG NFR BLD AUTO: 77 % (ref 43–75)
NRBC BLD AUTO-RTO: 0 /100 WBCS
PLATELET # BLD AUTO: 69 THOUSANDS/UL (ref 149–390)
PMV BLD AUTO: 11.7 FL (ref 8.9–12.7)
POTASSIUM SERPL-SCNC: 3.8 MMOL/L (ref 3.5–5.3)
PROT SERPL-MCNC: 7.3 G/DL (ref 6.4–8.2)
RBC # BLD AUTO: 3.18 MILLION/UL (ref 3.88–5.62)
SODIUM SERPL-SCNC: 137 MMOL/L (ref 136–145)
WBC # BLD AUTO: 3.95 THOUSAND/UL (ref 4.31–10.16)

## 2022-06-06 PROCEDURE — 80053 COMPREHEN METABOLIC PANEL: CPT | Performed by: INTERNAL MEDICINE

## 2022-06-06 PROCEDURE — 85025 COMPLETE CBC W/AUTO DIFF WBC: CPT | Performed by: INTERNAL MEDICINE

## 2022-06-06 PROCEDURE — 36415 COLL VENOUS BLD VENIPUNCTURE: CPT | Performed by: INTERNAL MEDICINE

## 2022-06-15 ENCOUNTER — APPOINTMENT (OUTPATIENT)
Dept: LAB | Facility: CLINIC | Age: 77
End: 2022-06-15
Payer: MEDICARE

## 2022-06-15 DIAGNOSIS — C90.00 IGG MULTIPLE MYELOMA (HCC): ICD-10-CM

## 2022-06-15 LAB
IGA SERPL-MCNC: <8 MG/DL (ref 70–400)
IGG SERPL-MCNC: 1040 MG/DL (ref 700–1600)
IGM SERPL-MCNC: 22 MG/DL (ref 40–230)

## 2022-06-15 PROCEDURE — 84165 PROTEIN E-PHORESIS SERUM: CPT

## 2022-06-15 PROCEDURE — 84165 PROTEIN E-PHORESIS SERUM: CPT | Performed by: PATHOLOGY

## 2022-06-15 PROCEDURE — 83521 IG LIGHT CHAINS FREE EACH: CPT

## 2022-06-15 PROCEDURE — 82784 ASSAY IGA/IGD/IGG/IGM EACH: CPT

## 2022-06-16 LAB
ALBUMIN SERPL ELPH-MCNC: 4.16 G/DL (ref 3.5–5)
ALBUMIN SERPL ELPH-MCNC: 57 % (ref 52–65)
ALPHA1 GLOB SERPL ELPH-MCNC: 0.39 G/DL (ref 0.1–0.4)
ALPHA1 GLOB SERPL ELPH-MCNC: 5.4 % (ref 2.5–5)
ALPHA2 GLOB SERPL ELPH-MCNC: 1.1 G/DL (ref 0.4–1.2)
ALPHA2 GLOB SERPL ELPH-MCNC: 15 % (ref 7–13)
BETA GLOB ABNORMAL SERPL ELPH-MCNC: 0.5 G/DL (ref 0.4–0.8)
BETA1 GLOB SERPL ELPH-MCNC: 6.9 % (ref 5–13)
BETA2 GLOB SERPL ELPH-MCNC: 4.1 % (ref 2–8)
BETA2+GAMMA GLOB SERPL ELPH-MCNC: 0.3 G/DL (ref 0.2–0.5)
GAMMA GLOB ABNORMAL SERPL ELPH-MCNC: 0.85 G/DL (ref 0.5–1.6)
GAMMA GLOB SERPL ELPH-MCNC: 11.6 % (ref 12–22)
IGG/ALB SER: 1.33 {RATIO} (ref 1.1–1.8)
KAPPA LC FREE SER-MCNC: 4 MG/L (ref 3.3–19.4)
KAPPA LC FREE/LAMBDA FREE SER: 1.18 {RATIO} (ref 0.26–1.65)
LAMBDA LC FREE SERPL-MCNC: 3.4 MG/L (ref 5.7–26.3)
M PROTEIN 1 MFR SERPL ELPH: 3.7 %
M PROTEIN 1 SERPL ELPH-MCNC: 0.27 G/DL
PROT PATTERN SERPL ELPH-IMP: ABNORMAL
PROT SERPL-MCNC: 7.3 G/DL (ref 6.4–8.2)

## 2022-06-17 ENCOUNTER — HOSPITAL ENCOUNTER (OUTPATIENT)
Dept: INFUSION CENTER | Facility: CLINIC | Age: 77
Discharge: HOME | End: 2022-06-17
Payer: MEDICARE

## 2022-06-17 VITALS
SYSTOLIC BLOOD PRESSURE: 117 MMHG | RESPIRATION RATE: 18 BRPM | WEIGHT: 156.97 LBS | TEMPERATURE: 98.2 F | HEART RATE: 69 BPM | DIASTOLIC BLOOD PRESSURE: 82 MMHG | BODY MASS INDEX: 22.52 KG/M2

## 2022-06-17 DIAGNOSIS — C90.00 IGG MULTIPLE MYELOMA (HCC): Primary | ICD-10-CM

## 2022-06-17 DIAGNOSIS — D80.1 HYPOGAMMAGLOBULINEMIA (HCC): ICD-10-CM

## 2022-06-17 PROCEDURE — 96367 TX/PROPH/DG ADDL SEQ IV INF: CPT

## 2022-06-17 PROCEDURE — 96365 THER/PROPH/DIAG IV INF INIT: CPT

## 2022-06-17 PROCEDURE — 96366 THER/PROPH/DIAG IV INF ADDON: CPT

## 2022-06-17 RX ORDER — SODIUM CHLORIDE 9 MG/ML
20 INJECTION, SOLUTION INTRAVENOUS ONCE
Status: CANCELLED
Start: 2022-07-15 | End: 2022-07-15

## 2022-06-17 RX ORDER — SODIUM CHLORIDE 9 MG/ML
20 INJECTION, SOLUTION INTRAVENOUS ONCE
Status: COMPLETED | OUTPATIENT
Start: 2022-06-17 | End: 2022-06-17

## 2022-06-17 RX ADMIN — Medication 30 G: at 09:30

## 2022-06-17 RX ADMIN — ZOLEDRONIC ACID 3 MG: 4 INJECTION INTRAVENOUS at 08:50

## 2022-06-17 RX ADMIN — SODIUM CHLORIDE 20 ML/HR: 9 INJECTION, SOLUTION INTRAVENOUS at 08:29

## 2022-06-17 NOTE — PROGRESS NOTES
Patient arrived on unit for IVIG and Zometa  Denies any present issues/concerns  Denies any jaw pain/dental  Issues/upcoming dental procedures  Creatinine clearance-28 6ml/min  As per note from pharmacy on [facility], patient cleared for Zometa today per Dr Pacheco Flank   Treatment initiated

## 2022-06-21 DIAGNOSIS — E78.5 HYPERLIPIDEMIA, UNSPECIFIED HYPERLIPIDEMIA TYPE: ICD-10-CM

## 2022-06-21 DIAGNOSIS — C90.00 MULTIPLE MYELOMA NOT HAVING ACHIEVED REMISSION (HCC): ICD-10-CM

## 2022-06-21 RX ORDER — ACYCLOVIR 200 MG/1
CAPSULE ORAL
Qty: 60 CAPSULE | Refills: 0 | Status: SHIPPED | OUTPATIENT
Start: 2022-06-21 | End: 2022-07-12 | Stop reason: SDUPTHER

## 2022-06-21 RX ORDER — ATORVASTATIN CALCIUM 20 MG/1
20 TABLET, FILM COATED ORAL DAILY
Qty: 90 TABLET | Refills: 0 | Status: SHIPPED | OUTPATIENT
Start: 2022-06-21 | End: 2022-08-10 | Stop reason: SDUPTHER

## 2022-06-27 ENCOUNTER — APPOINTMENT (OUTPATIENT)
Dept: LAB | Facility: CLINIC | Age: 77
End: 2022-06-27
Payer: MEDICARE

## 2022-06-27 DIAGNOSIS — C90.00 IGG MULTIPLE MYELOMA (HCC): ICD-10-CM

## 2022-07-05 NOTE — TELEPHONE ENCOUNTER
Wife Shruthi Mcgregor calls asking if the standing order for CBC and CMP can be changed to every 2 weeks rather than every 4 weeks  She would like to know Antonio's platelet count more often  If it stays stable and around 100,000, they can reduce his Promacta dose  Please call on Monday  Benefits, risks, and possible complications of procedure explained to patient/caregiver who verbalized understanding and gave verbal consent.

## 2022-07-07 ENCOUNTER — OFFICE VISIT (OUTPATIENT)
Dept: HEMATOLOGY ONCOLOGY | Facility: CLINIC | Age: 77
End: 2022-07-07
Payer: MEDICARE

## 2022-07-07 ENCOUNTER — TELEPHONE (OUTPATIENT)
Dept: HEMATOLOGY ONCOLOGY | Facility: CLINIC | Age: 77
End: 2022-07-07

## 2022-07-07 VITALS
RESPIRATION RATE: 16 BRPM | WEIGHT: 159 LBS | HEART RATE: 61 BPM | HEIGHT: 70 IN | OXYGEN SATURATION: 98 % | SYSTOLIC BLOOD PRESSURE: 138 MMHG | DIASTOLIC BLOOD PRESSURE: 80 MMHG | BODY MASS INDEX: 22.76 KG/M2 | TEMPERATURE: 98.1 F

## 2022-07-07 DIAGNOSIS — C90.00 IGG MULTIPLE MYELOMA (HCC): Primary | ICD-10-CM

## 2022-07-07 PROCEDURE — 99214 OFFICE O/P EST MOD 30 MIN: CPT | Performed by: INTERNAL MEDICINE

## 2022-07-07 NOTE — TELEPHONE ENCOUNTER
While we try to accommodate patient requests, our priority is to schedule treatment according to Doctor's orders and site availability  ESTABLISHED PT AT [facility]    6WK F/U WITH DHIRAJ MORNINGS    1  Does the Provider use the intake sheet or checkout note? CHECKOUT NOTES  2  What would be a preferred day of the week that would work best for your infusion appointment? FRIDAY  3  Do you prefer mornings or afternoons for your appointments? 8AM  4  Are there any days or dates that do not work for your schedule, including any upcoming vacations? NONE  5  We are going to try our best to schedule you at the infusion center closest to your home  In the event that we are unable to what would be your next preferred infusion site or sites? [facility]    1  [facility]  2  [facility]  3  [facility]    6  Do you have transportation to take you to all of your appointments? YES  7  Would you like the infusion center to draw labs from your port? NO (disregard if patient doesn't have a port or need labs for infusion appointment)

## 2022-07-07 NOTE — PROGRESS NOTES
[facility] [place]  [address] [address]  [address] [facility] [address]    Tracy Hwang,1945, [id number]  07/07/22    Discussion:   In summary, this is a 81-year-old man with history of myeloma  He had stem cell reinfusion in March 2022  Blood counts have stabilized his current values, hemoglobin 9 0 +/-, platelets 759 +/-, white count 3 5+/-  He functions with minimal restriction  He has 1 day of poor energy per week  Otherwise he is able to walk a mi or more and carry out normal social and other activities  Recent IgG level was a 1000  He continues on monthly IVIG replacement  We will extend Zometa to q 3 months  Recent myeloma parameters showed SPEP with monoclonal band 0 27 grams/deciliter, serum free kappa 4 0, lambda 3 4, ratio 1 1  Altogether he is doing well  Continue to monitor  We reviewed that EPO supplementation could be considered since some of his anemia is related to creatinine 2 1  Observation would certainly be reasonable, however  He will consider this  He is in the process of restarting COVID vaccination  I discussed the above with the patient  The patient and his wife voiced understanding and agreement   ______________________________________________________________________    Chief Complaint   Patient presents with    Follow-up       HPI:  Oncology History   IgG multiple myeloma ([facility] [address] )   9/2015 Initial Diagnosis    Found to have Hbg of 6 with SOB, creatinine 1 8 and normal calcium with albumin of 2 1  Additonial blood work showed elevated protien level (12 1g/dL)  9/29/2015 Biopsy    Bone marrow biopsy demonstrated approximately 80% plasma cells with some immature forms noted    These studies showed 13q14 deletion, +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, T (4:14)       10/14/2015 - 11/11/2015 Chemotherapy    Velcade 1 3 mg/m2 Days 1,8,15,22  Cytoxan 300 mg/m2  PO Days 1, 8,15, 22  Dexamethasone 40 mg PO Days 1,8,15, 22  Zometa 4 mg IV Day 1  Cycle length = 28 days    Completed 2 cycles  Day one of cycle 2 was on 11/11/15      12/2015 - 2/2016 Chemotherapy    VDT-PACE x 2 cycles   (completed at [facility] [place], [address] [place] [address])     2/2016 Transplant    Melphalan 200 mg/m2 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  MRD +     8/2016 - 1/26/2018 Chemotherapy    Maintenance therapy:  Carfilzomib, orginally dosed 27 mg weekly then increased to 45 mg weekly after MRD reactivitation  Revlimid  Dexamethasone      2/2018 Biopsy    Bone marrow demonstrated positive minimal residual disease       2/23/2018 -  Chemotherapy    Daratumumab 16mg/m2 IV Day 1  Pomalyst 4 mg p o  daily 1-21  Dexamethasone 4 mg PO Days 2, 3  Dexamethasone 12 mg PODays 8, 15, 22  Cycle length = 28 days     4/11/2019 - 8/29/2019 Chemotherapy    methylPREDNISolone sodium succinate (Solu-MEDROL) 100 mg in sodium chloride 0 9 % 250 mL IVPB, 100 mg, Intravenous, Once, 5 of 12 cycles  Administration: 100 mg (6/7/2019), 100 mg (7/5/2019), 100 mg (8/2/2019)     8/30/2019 - 11/8/2019 Chemotherapy    cyclophosphamide (CYTOXAN) 1,000 mg in sodium chloride 0 9 % 250 mL IVPB, 990 mg (66 7 % of original dose 750 mg/m2), Intravenous, Once, 2 of 3 cycles  Dose modification: 500 mg/m2 (original dose 750 mg/m2, Cycle 1, Reason: Other (See Comments)), 250 mg/m2 (original dose 750 mg/m2, Cycle 4, Reason: Treatment Parameters Not Met)  Administration: 1,000 mg (8/30/2019), 1,000 mg (9/13/2019), 1,000 mg (9/27/2019), 500 mg (10/11/2019)  bortezomib (VELCADE) subcutaneous injection 2 6 mg, 1 3 mg/m2 = 2 6 mg (86 7 % of original dose 1 5 mg/m2), Subcutaneous, Once, 3 of 4 cycles  Dose modification: 1 3 mg/m2 (original dose 1 5 mg/m2, Cycle 1, Reason: Other (See Comments))  Administration: 2 6 mg (8/30/2019), 2 6 mg (9/13/2019), 2 6 mg (10/25/2019), 2 6 mg (11/8/2019), 2 6 mg (9/27/2019), 2 6 mg (11/1/2019), 2 6 mg (9/20/2019), 2 6 mg (10/4/2019), 2 6 mg (10/11/2019), 2 6 mg (10/18/2019)     12/6/2019 - 6/19/2020 Chemotherapy    pegfilgrastim (NEULASTA ONPRO) subcutaneous injection kit 6 mg, 6 mg, Subcutaneous, Once, 5 of 9 cycles  Administration: 6 mg (2/28/2020), 6 mg (3/13/2020), 6 mg (3/27/2020), 6 mg (4/10/2020), 6 mg (4/24/2020), 6 mg (5/8/2020), 6 mg (6/5/2020), 6 mg (5/22/2020), 6 mg (6/19/2020)  cyclophosphamide (CYTOXAN) 784 mg in sodium chloride 0 9 % 250 mL IVPB, 400 mg/m2 = 784 mg, Intravenous, Once, 5 of 9 cycles  Dose modification: 400 mg/m2 (original dose 750 mg/m2, Cycle 7, Reason: Other (See Comments))  Administration: 784 mg (2/28/2020), 784 mg (3/13/2020), 784 mg (3/27/2020), 784 mg (4/10/2020), 784 mg (4/24/2020), 784 mg (5/8/2020), 784 mg (5/22/2020), 784 mg (6/19/2020), 784 mg (6/5/2020)  methylPREDNISolone sodium succinate (Solu-MEDROL) 100 mg in sodium chloride 0 9 % 250 mL IVPB, 100 mg, Intravenous, Once, 8 of 12 cycles  Administration: 100 mg (12/6/2019), 100 mg (12/13/2019), 100 mg (12/20/2019), 100 mg (1/3/2020), 100 mg (1/10/2020), 100 mg (1/17/2020), 100 mg (1/31/2020), 100 mg (2/14/2020), 100 mg (5/22/2020), 100 mg (12/27/2019), 100 mg (1/24/2020), 100 mg (2/28/2020), 100 mg (3/13/2020), 100 mg (3/27/2020), 100 mg (4/10/2020), 100 mg (4/24/2020), 100 mg (5/8/2020), 100 mg (6/19/2020)     7/2/2020 -  Chemotherapy           Interval History:  Clinically stable  ECOG-    1 - Symptomatic but completely ambulatory    Review of Systems   Constitutional: Negative for chills and fever  HENT: Negative for nosebleeds  Eyes: Negative for discharge  Respiratory: Negative for cough and shortness of breath  Cardiovascular: Negative for chest pain  Gastrointestinal: Negative for abdominal pain, constipation and diarrhea  Endocrine: Negative for polydipsia  Genitourinary: Negative for hematuria  Musculoskeletal: Negative for arthralgias  Skin: Negative for color change  Allergic/Immunologic: Negative for immunocompromised state     Neurological: Negative for dizziness and headaches  Hematological: Negative for adenopathy  Psychiatric/Behavioral: Negative for agitation  Past Medical History:   Diagnosis Date    History of autologous stem cell transplant ([facility] [address] )     Hypertension     History of HTN-stable since weight loss    Insomnia     Multiple myeloma (HCC)      Patient Active Problem List   Diagnosis    IgG multiple myeloma (HCC)    Persistent atrial fibrillation (HCC)    Essential hypertension    Chronic ITP (idiopathic thrombocytopenia) (HCC)    Hyperlipidemia    Hypocalcemia    Hypothyroidism    Long term current use of systemic steroids    Other fatigue    Prophylactic measure    Hypogammaglobulinemia (HCC)    Leukopenia    Anemia due to stage 3 chronic kidney disease (HCC)    Pancytopenia (HCC)    Moderate protein-calorie malnutrition (HCC)    Stage 3b chronic kidney disease (HCC)       Current Outpatient Medications:     acetaminophen (TYLENOL) 325 mg tablet, Take 650 mg by mouth every 6 (six) hours as needed for mild pain, Disp: , Rfl:     acyclovir (ZOVIRAX) 200 mg capsule, Take 1 tablet by mouth every 12 hours, Disp: 60 capsule, Rfl: 0    Ascorbic Acid (vitamin C) 1000 MG tablet, Take 1,000 mg by mouth daily, Disp: , Rfl:     atorvastatin (LIPITOR) 20 mg tablet, Take 1 tablet (20 mg total) by mouth daily, Disp: 90 tablet, Rfl: 0    Cholecalciferol (VITAMIN D-3 PO), Take 1,000 Units by mouth daily , Disp: , Rfl:     escitalopram (LEXAPRO) 20 mg tablet, Take 1 tablet (20 mg total) by mouth daily, Disp: 90 tablet, Rfl: 0    Glutamine POWD, by Does not apply route, Disp: , Rfl:     levothyroxine 50 mcg tablet, Take 1 5 tablets (75 mcg total) by mouth in the morning , Disp: 45 tablet, Rfl: 1    Multiple Vitamin (MULTIVITAMINS PO), Take 1 tablet by mouth daily  , Disp: , Rfl:     sulfamethoxazole-trimethoprim (BACTRIM DS) 800-160 mg per tablet, 3 (three) times a week Monday Wednesday friday, Disp: , Rfl:   [facility] vitamin B-12 (VITAMIN B-12) 1,000 mcg tablet, Take 1,000 mcg by mouth daily, Disp: , Rfl:     Docusate Sodium (COLACE PO), Take 1 tablet by mouth as needed  (Patient not taking: No sig reported), Disp: , Rfl:     eltrombopag (PROMACTA) 75 MG TABS, Take 1 tablet (75 mg total) by mouth daily (Patient not taking: Reported on 6/17/2022), Disp: 30 tablet, Rfl: 3    LORazepam (ATIVAN) 1 mg tablet, Take 1 tablet (1 mg total) by mouth every 4 (four) hours as needed for anxiety (Patient not taking: No sig reported), Disp: 100 tablet, Rfl: 1    naloxone (NARCAN) 4 mg/0 1 mL nasal spray, Administer 1 spray into a nostril  If no response after 2-3 minutes, give another dose in the other nostril using a new spray  (Patient not taking: No sig reported), Disp: 1 each, Rfl: 1    ondansetron (ZOFRAN) 4 mg tablet, Take 4 mg by mouth every 8 (eight) hours as needed for nausea or vomiting  (Patient not taking: No sig reported), Disp: , Rfl:     oxyCODONE (Roxicodone) 5 immediate release tablet, Take 1 tablet (5 mg total) by mouth every 4 (four) hours as needed for moderate pain Max Daily Amount: 30 mg (Patient not taking: No sig reported), Disp: 60 tablet, Rfl: 0  No Known Allergies  Past Surgical History:   Procedure Laterality Date    APPENDECTOMY      Last assessed: 9/25/15    ARTHROSCOPY KNEE Left     9/30/09    EYE SURGERY      Lasik    KNEE CARTILAGE SURGERY      LIMBAL STEM CELL TRANSPLANT      Patient had 2 in [place]-2/29/2016 and 4/13/2016     Social History     Objective:  Vitals:    07/07/22 0804   BP: 138/80   BP Location: Left arm   Patient Position: Sitting   Cuff Size: Adult   Pulse: 61   Resp: 16   Temp: 98 1 °F (36 7 °C)   TempSrc: Tympanic   SpO2: 98%   Weight: 72 1 kg (159 lb)   Height: 5' 10" (1 778 m)     Physical Exam  Constitutional:       Appearance: He is well-developed  HENT:      Head: Normocephalic and atraumatic  Eyes:      Pupils: Pupils are equal, round, and reactive to light  Cardiovascular:      Rate and Rhythm: Normal rate and regular rhythm  Heart sounds: No murmur heard  Pulmonary:      Breath sounds: Normal breath sounds  No wheezing or rales  Abdominal:      Palpations: Abdomen is soft  Tenderness: There is no abdominal tenderness  Musculoskeletal:         General: No tenderness  Normal range of motion  Cervical back: Neck supple  Lymphadenopathy:      Cervical: No cervical adenopathy  Skin:     Findings: No erythema or rash  Neurological:      Mental Status: He is alert and oriented to person, place, and time  Cranial Nerves: No cranial nerve deficit  Deep Tendon Reflexes: Reflexes are normal and symmetric  Psychiatric:         Behavior: Behavior normal            Labs: I personally reviewed the labs and imaging pertinent to this patient care

## 2022-07-07 NOTE — TELEPHONE ENCOUNTER
Good morning, please schedule patient for treatment  Patient is requesting Friday mornings around 8am          Thank you!

## 2022-07-11 ENCOUNTER — APPOINTMENT (OUTPATIENT)
Dept: LAB | Facility: CLINIC | Age: 77
End: 2022-07-11
Payer: MEDICARE

## 2022-07-11 DIAGNOSIS — C90.00 IGG MULTIPLE MYELOMA (HCC): ICD-10-CM

## 2022-07-12 DIAGNOSIS — F41.9 ANXIETY: ICD-10-CM

## 2022-07-12 DIAGNOSIS — E03.9 HYPOTHYROIDISM, UNSPECIFIED TYPE: ICD-10-CM

## 2022-07-12 DIAGNOSIS — C90.00 MULTIPLE MYELOMA NOT HAVING ACHIEVED REMISSION (HCC): ICD-10-CM

## 2022-07-12 RX ORDER — ESCITALOPRAM OXALATE 20 MG/1
20 TABLET ORAL DAILY
Qty: 90 TABLET | Refills: 0 | Status: SHIPPED | OUTPATIENT
Start: 2022-07-12 | End: 2022-10-03

## 2022-07-12 RX ORDER — LEVOTHYROXINE SODIUM 0.05 MG/1
75 TABLET ORAL DAILY
Qty: 45 TABLET | Refills: 0 | Status: SHIPPED | OUTPATIENT
Start: 2022-07-12 | End: 2022-08-12

## 2022-07-13 RX ORDER — ACYCLOVIR 200 MG/1
CAPSULE ORAL
Qty: 60 CAPSULE | Refills: 0 | Status: SHIPPED | OUTPATIENT
Start: 2022-07-13 | End: 2022-08-12 | Stop reason: SDUPTHER

## 2022-07-15 ENCOUNTER — HOSPITAL ENCOUNTER (OUTPATIENT)
Dept: INFUSION CENTER | Facility: CLINIC | Age: 77
Discharge: HOME | End: 2022-07-15
Payer: MEDICARE

## 2022-07-15 VITALS
RESPIRATION RATE: 18 BRPM | DIASTOLIC BLOOD PRESSURE: 76 MMHG | SYSTOLIC BLOOD PRESSURE: 122 MMHG | HEART RATE: 67 BPM | TEMPERATURE: 98.1 F

## 2022-07-15 DIAGNOSIS — D80.1 HYPOGAMMAGLOBULINEMIA (HCC): Primary | ICD-10-CM

## 2022-07-15 DIAGNOSIS — C90.00 IGG MULTIPLE MYELOMA (HCC): ICD-10-CM

## 2022-07-15 PROCEDURE — 96366 THER/PROPH/DIAG IV INF ADDON: CPT

## 2022-07-15 PROCEDURE — 96365 THER/PROPH/DIAG IV INF INIT: CPT

## 2022-07-15 RX ORDER — SODIUM CHLORIDE 9 MG/ML
20 INJECTION, SOLUTION INTRAVENOUS ONCE
Status: COMPLETED | OUTPATIENT
Start: 2022-07-15 | End: 2022-07-15

## 2022-07-15 RX ORDER — SODIUM CHLORIDE 9 MG/ML
20 INJECTION, SOLUTION INTRAVENOUS ONCE
Status: CANCELLED
Start: 2022-08-12 | End: 2022-08-12

## 2022-07-15 RX ADMIN — SODIUM CHLORIDE 20 ML/HR: 9 INJECTION, SOLUTION INTRAVENOUS at 08:47

## 2022-07-15 RX ADMIN — Medication 30 G: at 08:40

## 2022-07-15 NOTE — PROGRESS NOTES
Pt presents for IVIG infusion  No new meds or concerns  Pt tolerated infusion without adverse reaction  Future appointments discussed  AVS declined

## 2022-07-20 DIAGNOSIS — C90.00 IGG MULTIPLE MYELOMA (HCC): Primary | ICD-10-CM

## 2022-07-22 DIAGNOSIS — D80.1 HYPOGAMMAGLOBULINEMIA (HCC): Primary | ICD-10-CM

## 2022-07-22 DIAGNOSIS — C90.00 IGG MULTIPLE MYELOMA (HCC): ICD-10-CM

## 2022-07-25 ENCOUNTER — APPOINTMENT (OUTPATIENT)
Dept: LAB | Facility: CLINIC | Age: 77
End: 2022-07-25
Payer: MEDICARE

## 2022-08-08 ENCOUNTER — APPOINTMENT (OUTPATIENT)
Dept: LAB | Facility: CLINIC | Age: 77
End: 2022-08-08
Payer: MEDICARE

## 2022-08-08 LAB
ALBUMIN SERPL BCP-MCNC: 3.4 G/DL (ref 3.5–5)
ALP SERPL-CCNC: 83 U/L (ref 46–116)
ALT SERPL W P-5'-P-CCNC: 24 U/L (ref 12–78)
ANION GAP SERPL CALCULATED.3IONS-SCNC: 1 MMOL/L (ref 4–13)
AST SERPL W P-5'-P-CCNC: 29 U/L (ref 5–45)
BASOPHILS # BLD AUTO: 0.02 THOUSANDS/ΜL (ref 0–0.1)
BASOPHILS NFR BLD AUTO: 0 % (ref 0–1)
BILIRUB SERPL-MCNC: 0.41 MG/DL (ref 0.2–1)
BUN SERPL-MCNC: 19 MG/DL (ref 5–25)
CALCIUM ALBUM COR SERPL-MCNC: 9.3 MG/DL (ref 8.3–10.1)
CALCIUM SERPL-MCNC: 8.8 MG/DL (ref 8.3–10.1)
CHLORIDE SERPL-SCNC: 109 MMOL/L (ref 96–108)
CO2 SERPL-SCNC: 27 MMOL/L (ref 21–32)
CREAT SERPL-MCNC: 1.93 MG/DL (ref 0.6–1.3)
EOSINOPHIL # BLD AUTO: 0.14 THOUSAND/ΜL (ref 0–0.61)
EOSINOPHIL NFR BLD AUTO: 3 % (ref 0–6)
ERYTHROCYTE [DISTWIDTH] IN BLOOD BY AUTOMATED COUNT: 16.6 % (ref 11.6–15.1)
GFR SERPL CREATININE-BSD FRML MDRD: 33 ML/MIN/1.73SQ M
GLUCOSE P FAST SERPL-MCNC: 92 MG/DL (ref 65–99)
HCT VFR BLD AUTO: 33.6 % (ref 36.5–49.3)
HGB BLD-MCNC: 10.3 G/DL (ref 12–17)
IMM GRANULOCYTES # BLD AUTO: 0.01 THOUSAND/UL (ref 0–0.2)
IMM GRANULOCYTES NFR BLD AUTO: 0 % (ref 0–2)
LYMPHOCYTES # BLD AUTO: 0.25 THOUSANDS/ΜL (ref 0.6–4.47)
LYMPHOCYTES NFR BLD AUTO: 5 % (ref 14–44)
MCH RBC QN AUTO: 28.1 PG (ref 26.8–34.3)
MCHC RBC AUTO-ENTMCNC: 30.7 G/DL (ref 31.4–37.4)
MCV RBC AUTO: 92 FL (ref 82–98)
MONOCYTES # BLD AUTO: 0.62 THOUSAND/ΜL (ref 0.17–1.22)
MONOCYTES NFR BLD AUTO: 13 % (ref 4–12)
NEUTROPHILS # BLD AUTO: 3.81 THOUSANDS/ΜL (ref 1.85–7.62)
NEUTS SEG NFR BLD AUTO: 79 % (ref 43–75)
NRBC BLD AUTO-RTO: 0 /100 WBCS
PLATELET # BLD AUTO: 129 THOUSANDS/UL (ref 149–390)
PMV BLD AUTO: 10.5 FL (ref 8.9–12.7)
POTASSIUM SERPL-SCNC: 4.1 MMOL/L (ref 3.5–5.3)
PROT SERPL-MCNC: 7.6 G/DL (ref 6.4–8.4)
RBC # BLD AUTO: 3.67 MILLION/UL (ref 3.88–5.62)
SODIUM SERPL-SCNC: 137 MMOL/L (ref 135–147)
WBC # BLD AUTO: 4.85 THOUSAND/UL (ref 4.31–10.16)

## 2022-08-08 PROCEDURE — 80053 COMPREHEN METABOLIC PANEL: CPT

## 2022-08-08 PROCEDURE — 85025 COMPLETE CBC W/AUTO DIFF WBC: CPT

## 2022-08-08 PROCEDURE — 36415 COLL VENOUS BLD VENIPUNCTURE: CPT

## 2022-08-10 DIAGNOSIS — E78.5 HYPERLIPIDEMIA, UNSPECIFIED HYPERLIPIDEMIA TYPE: ICD-10-CM

## 2022-08-10 RX ORDER — ATORVASTATIN CALCIUM 20 MG/1
20 TABLET, FILM COATED ORAL DAILY
Qty: 90 TABLET | Refills: 1 | Status: SHIPPED | OUTPATIENT
Start: 2022-08-10

## 2022-08-12 DIAGNOSIS — C90.00 MULTIPLE MYELOMA NOT HAVING ACHIEVED REMISSION (HCC): Primary | ICD-10-CM

## 2022-08-12 RX ORDER — ACYCLOVIR 200 MG/1
CAPSULE ORAL
Qty: 60 CAPSULE | Refills: 6 | Status: SHIPPED | OUTPATIENT
Start: 2022-08-12 | End: 2023-02-12

## 2022-08-18 ENCOUNTER — TELEPHONE (OUTPATIENT)
Dept: GYNECOLOGIC ONCOLOGY | Facility: CLINIC | Age: 77
End: 2022-08-18

## 2022-08-18 ENCOUNTER — OFFICE VISIT (OUTPATIENT)
Dept: HEMATOLOGY ONCOLOGY | Facility: CLINIC | Age: 77
End: 2022-08-18
Payer: MEDICARE

## 2022-08-18 VITALS
TEMPERATURE: 97.2 F | HEIGHT: 70 IN | BODY MASS INDEX: 23.11 KG/M2 | DIASTOLIC BLOOD PRESSURE: 72 MMHG | SYSTOLIC BLOOD PRESSURE: 124 MMHG | HEART RATE: 67 BPM | RESPIRATION RATE: 17 BRPM | OXYGEN SATURATION: 99 % | WEIGHT: 161.4 LBS

## 2022-08-18 DIAGNOSIS — C90.00 IGG MULTIPLE MYELOMA (HCC): ICD-10-CM

## 2022-08-18 DIAGNOSIS — D80.1 HYPOGAMMAGLOBULINEMIA (HCC): ICD-10-CM

## 2022-08-18 DIAGNOSIS — C90.00 MULTIPLE MYELOMA NOT HAVING ACHIEVED REMISSION (HCC): Primary | ICD-10-CM

## 2022-08-18 DIAGNOSIS — R53.83 OTHER FATIGUE: ICD-10-CM

## 2022-08-18 PROBLEM — E44.0 MODERATE PROTEIN-CALORIE MALNUTRITION (HCC): Status: RESOLVED | Noted: 2022-04-11 | Resolved: 2022-08-18

## 2022-08-18 PROBLEM — D72.819 LEUKOPENIA: Status: RESOLVED | Noted: 2020-07-16 | Resolved: 2022-08-18

## 2022-08-18 PROCEDURE — 99214 OFFICE O/P EST MOD 30 MIN: CPT | Performed by: INTERNAL MEDICINE

## 2022-08-18 NOTE — TELEPHONE ENCOUNTER
Left a detailed message with my teams number to return my call  Patient was made aware of schedule as well as schedule can be seen on mychart

## 2022-08-18 NOTE — PROGRESS NOTES
[facility] [place]  [address] [address]  [address] [place] [address],1945, [id number]  08/18/22    Discussion:   In summary, this is a 62-year-old male history of myeloma  He had previous high-dose chemotherapy with stem cell support  He continues on IVIG infusion for hypogammaglobulinemia  He had CAR-T in August 2021  Stem cell reinfusion for persistent pancytopenia in March 2022  Since then blood counts have been gradually improving as has his performance status  Lab studies as below  Repeat myeloma parameters are requested at this time  He has had COVID revaccination, 1 , 2   Deferral of booster until updated vaccines become available in the next few months  I discussed the above with the patient  The patient and his wife voiced understanding and agreement   ______________________________________________________________________    Chief Complaint   Patient presents with    Follow-up       HPI:  Oncology History   IgG multiple myeloma ([address] )   9/2015 Initial Diagnosis    Found to have Hbg of 6 with SOB, creatinine 1 8 and normal calcium with albumin of 2 1  Additonial blood work showed elevated protien level (12 1g/dL)  9/29/2015 Biopsy    Bone marrow biopsy demonstrated approximately 80% plasma cells with some immature forms noted  These studies showed 13q14 deletion, +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, 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)       10/14/2015 - 11/11/2015 Chemotherapy    Velcade 1 3 mg/m2 Days 1,8,15,22  Cytoxan 300 mg/m2  PO Days 1, 8,15, 22  Dexamethasone 40 mg PO Days 1,8,15, 22  Zometa 4 mg IV Day 1  Cycle length = 28 days    Completed 2 cycles  Day one of cycle 2 was on 11/11/15      12/2015 - 2/2016 Chemotherapy    VDT-PACE x 2 cycles   (completed at [facility] [place], [address] [place] [address])     2/2016 Transplant    Melphalan 200 mg/m2 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      MRD +     8/2016 - 1/26/2018 Chemotherapy Maintenance therapy:  Carfilzomib, orginally dosed 27 mg weekly then increased to 45 mg weekly after MRD reactivitation  Revlimid  Dexamethasone      2/2018 Biopsy    Bone marrow demonstrated positive minimal residual disease       2/23/2018 -  Chemotherapy    Daratumumab 16mg/m2 IV Day 1  Pomalyst 4 mg p o  daily 1-21  Dexamethasone 4 mg PO Days 2, 3  Dexamethasone 12 mg PODays 8, 15, 22  Cycle length = 28 days     4/11/2019 - 8/29/2019 Chemotherapy    methylPREDNISolone sodium succinate (Solu-MEDROL) 100 mg in sodium chloride 0 9 % 250 mL IVPB, 100 mg, Intravenous, Once, 5 of 12 cycles  Administration: 100 mg (6/7/2019), 100 mg (7/5/2019), 100 mg (8/2/2019)     8/30/2019 - 11/8/2019 Chemotherapy    cyclophosphamide (CYTOXAN) 1,000 mg in sodium chloride 0 9 % 250 mL IVPB, 990 mg (66 7 % of original dose 750 mg/m2), Intravenous, Once, 2 of 3 cycles  Dose modification: 500 mg/m2 (original dose 750 mg/m2, Cycle 1, Reason: Other (See Comments)), 250 mg/m2 (original dose 750 mg/m2, Cycle 4, Reason: Treatment Parameters Not Met)  Administration: 1,000 mg (8/30/2019), 1,000 mg (9/13/2019), 1,000 mg (9/27/2019), 500 mg (10/11/2019)  bortezomib (VELCADE) subcutaneous injection 2 6 mg, 1 3 mg/m2 = 2 6 mg (86 7 % of original dose 1 5 mg/m2), Subcutaneous, Once, 3 of 4 cycles  Dose modification: 1 3 mg/m2 (original dose 1 5 mg/m2, Cycle 1, Reason: Other (See Comments))  Administration: 2 6 mg (8/30/2019), 2 6 mg (9/13/2019), 2 6 mg (10/25/2019), 2 6 mg (11/8/2019), 2 6 mg (9/27/2019), 2 6 mg (11/1/2019), 2 6 mg (9/20/2019), 2 6 mg (10/4/2019), 2 6 mg (10/11/2019), 2 6 mg (10/18/2019)     12/6/2019 - 6/19/2020 Chemotherapy    pegfilgrastim (NEULASTA ONPRO) subcutaneous injection kit 6 mg, 6 mg, Subcutaneous, Once, 5 of 9 cycles  Administration: 6 mg (2/28/2020), 6 mg (3/13/2020), 6 mg (3/27/2020), 6 mg (4/10/2020), 6 mg (4/24/2020), 6 mg (5/8/2020), 6 mg (6/5/2020), 6 mg (5/22/2020), 6 mg (6/19/2020)  cyclophosphamide (CYTOXAN) 784 mg in sodium chloride 0 9 % 250 mL IVPB, 400 mg/m2 = 784 mg, Intravenous, Once, 5 of 9 cycles  Dose modification: 400 mg/m2 (original dose 750 mg/m2, Cycle 7, Reason: Other (See Comments))  Administration: 784 mg (2/28/2020), 784 mg (3/13/2020), 784 mg (3/27/2020), 784 mg (4/10/2020), 784 mg (4/24/2020), 784 mg (5/8/2020), 784 mg (5/22/2020), 784 mg (6/19/2020), 784 mg (6/5/2020)  methylPREDNISolone sodium succinate (Solu-MEDROL) 100 mg in sodium chloride 0 9 % 250 mL IVPB, 100 mg, Intravenous, Once, 8 of 12 cycles  Administration: 100 mg (12/6/2019), 100 mg (12/13/2019), 100 mg (12/20/2019), 100 mg (1/3/2020), 100 mg (1/10/2020), 100 mg (1/17/2020), 100 mg (1/31/2020), 100 mg (2/14/2020), 100 mg (5/22/2020), 100 mg (12/27/2019), 100 mg (1/24/2020), 100 mg (2/28/2020), 100 mg (3/13/2020), 100 mg (3/27/2020), 100 mg (4/10/2020), 100 mg (4/24/2020), 100 mg (5/8/2020), 100 mg (6/19/2020)     7/2/2020 -  Chemotherapy           Interval History:  WBC 4 8, hemoglobin 10 3, platelet count 038  CMP shows creatinine 1 9, otherwise normal   ECOG-  1 - Symptomatic but completely ambulatory    Review of Systems   Constitutional: Negative for chills and fever  HENT: Negative for nosebleeds  Eyes: Negative for discharge  Respiratory: Negative for cough and shortness of breath  Cardiovascular: Negative for chest pain  Gastrointestinal: Negative for abdominal pain, constipation and diarrhea  Endocrine: Negative for polydipsia  Genitourinary: Negative for hematuria  Musculoskeletal: Negative for arthralgias  Skin: Negative for color change  Allergic/Immunologic: Negative for immunocompromised state  Neurological: Negative for dizziness and headaches  Hematological: Negative for adenopathy  Psychiatric/Behavioral: Negative for agitation         Past Medical History:   Diagnosis Date    History of autologous stem cell transplant ([facility] [address] )     Hypertension     History of HTN-stable since weight loss    Insomnia     Multiple myeloma (HCC)      Patient Active Problem List   Diagnosis    IgG multiple myeloma (HCC)    Persistent atrial fibrillation (HCC)    Essential hypertension    Chronic ITP (idiopathic thrombocytopenia) (HCC)    Hyperlipidemia    Hypocalcemia    Hypothyroidism    Long term current use of systemic steroids    Other fatigue    Prophylactic measure    Hypogammaglobulinemia (HCC)    Leukopenia    Anemia due to stage 3 chronic kidney disease (HCC)    Pancytopenia (HCC)    Moderate protein-calorie malnutrition (HCC)    Stage 3b chronic kidney disease (HCC)       Current Outpatient Medications:     acetaminophen (TYLENOL) 325 mg tablet, Take 650 mg by mouth every 6 (six) hours as needed for mild pain, Disp: , Rfl:     acyclovir (ZOVIRAX) 200 mg capsule, Take 1 tablet by mouth every 12 hours, Disp: 60 capsule, Rfl: 6    Ascorbic Acid (vitamin C) 1000 MG tablet, Take 1,000 mg by mouth daily, Disp: , Rfl:     atorvastatin (LIPITOR) 20 mg tablet, Take 1 tablet (20 mg total) by mouth daily, Disp: 90 tablet, Rfl: 1    Cholecalciferol (VITAMIN D-3 PO), Take 1,000 Units by mouth daily , Disp: , Rfl:     escitalopram (LEXAPRO) 20 mg tablet, Take 1 tablet (20 mg total) by mouth daily, Disp: 90 tablet, Rfl: 0    Glutamine POWD, by Does not apply route, Disp: , Rfl:     levothyroxine 50 mcg tablet, TAKE ONE AND ONE-HALF TABLETS DAILY, Disp: 45 tablet, Rfl: 2    Multiple Vitamin (MULTIVITAMINS PO), Take 1 tablet by mouth daily  , Disp: , Rfl:     sulfamethoxazole-trimethoprim (BACTRIM DS) 800-160 mg per tablet, 3 (three) times a week Monday Wednesday friday, Disp: , Rfl:     vitamin B-12 (VITAMIN B-12) 1,000 mcg tablet, Take 1,000 mcg by mouth daily, Disp: , Rfl:     Docusate Sodium (COLACE PO), Take 1 tablet by mouth as needed   (Patient not taking: No sig reported), Disp: , Rfl:     eltrombopag (PROMACTA) 75 MG TABS, Take 1 tablet (75 mg total) by mouth daily (Patient not taking: Reported on 6/17/2022), Disp: 30 tablet, Rfl: 3    LORazepam (ATIVAN) 1 mg tablet, Take 1 tablet (1 mg total) by mouth every 4 (four) hours as needed for anxiety (Patient not taking: No sig reported), Disp: 100 tablet, Rfl: 1    naloxone (NARCAN) 4 mg/0 1 mL nasal spray, Administer 1 spray into a nostril  If no response after 2-3 minutes, give another dose in the other nostril using a new spray  (Patient not taking: No sig reported), Disp: 1 each, Rfl: 1    ondansetron (ZOFRAN) 4 mg tablet, Take 4 mg by mouth every 8 (eight) hours as needed for nausea or vomiting  (Patient not taking: No sig reported), Disp: , Rfl:     oxyCODONE (Roxicodone) 5 immediate release tablet, Take 1 tablet (5 mg total) by mouth every 4 (four) hours as needed for moderate pain Max Daily Amount: 30 mg (Patient not taking: No sig reported), Disp: 60 tablet, Rfl: 0  No Known Allergies  Past Surgical History:   Procedure Laterality Date    APPENDECTOMY      Last assessed: 9/25/15    ARTHROSCOPY KNEE Left     9/30/09    EYE SURGERY      Lasik    KNEE CARTILAGE SURGERY      LIMBAL STEM CELL TRANSPLANT      Patient had 2 in [place]-2/29/2016 and 4/13/2016     Social History     Objective:  Vitals:    08/18/22 0809   BP: 124/72   BP Location: Left arm   Patient Position: Sitting   Cuff Size: Adult   Pulse: 67   Resp: 17   Temp: (!) 97 2 °F (36 2 °C)   SpO2: 99%   Weight: 73 2 kg (161 lb 6 4 oz)   Height: 5' 10" (1 778 m)     Physical Exam  Constitutional:       Appearance: He is well-developed  HENT:      Head: Normocephalic and atraumatic  Eyes:      Pupils: Pupils are equal, round, and reactive to light  Cardiovascular:      Rate and Rhythm: Normal rate and regular rhythm  Heart sounds: No murmur heard  Pulmonary:      Breath sounds: Normal breath sounds  No wheezing or rales  Abdominal:      Palpations: Abdomen is soft  Tenderness: There is no abdominal tenderness  Musculoskeletal:         General: No tenderness  Normal range of motion  Cervical back: Neck supple  Lymphadenopathy:      Cervical: No cervical adenopathy  Skin:     Findings: No erythema or rash  Neurological:      Mental Status: He is alert and oriented to person, place, and time  Cranial Nerves: No cranial nerve deficit  Deep Tendon Reflexes: Reflexes are normal and symmetric  Psychiatric:         Behavior: Behavior normal            Labs: I personally reviewed the labs and imaging pertinent to this patient care

## 2022-08-18 NOTE — TELEPHONE ENCOUNTER
Left a detailed message with my teams number to return my call  Patient was made aware of two additional infusions that were scheduled

## 2022-08-18 NOTE — TELEPHONE ENCOUNTER
Patient states he will need more treatments added, goes to [facility] typically on Fridays at 8 am, Does not need transport, does not use port for labs

## 2022-08-18 NOTE — TELEPHONE ENCOUNTER
Please schedule patient for additional treatments  Patient goes on Fridays at 8am          Thank you!

## 2022-08-19 ENCOUNTER — HOSPITAL ENCOUNTER (OUTPATIENT)
Dept: INFUSION CENTER | Facility: CLINIC | Age: 77
Discharge: HOME | End: 2022-08-19
Payer: MEDICARE

## 2022-08-19 DIAGNOSIS — D80.1 HYPOGAMMAGLOBULINEMIA (HCC): Primary | ICD-10-CM

## 2022-08-19 DIAGNOSIS — C90.00 IGG MULTIPLE MYELOMA (HCC): ICD-10-CM

## 2022-08-19 PROCEDURE — 96365 THER/PROPH/DIAG IV INF INIT: CPT

## 2022-08-19 PROCEDURE — 96366 THER/PROPH/DIAG IV INF ADDON: CPT

## 2022-08-19 RX ORDER — SODIUM CHLORIDE 9 MG/ML
20 INJECTION, SOLUTION INTRAVENOUS ONCE
Status: CANCELLED
Start: 2022-09-09 | End: 2022-09-09

## 2022-08-19 RX ORDER — SODIUM CHLORIDE 9 MG/ML
20 INJECTION, SOLUTION INTRAVENOUS ONCE
Status: COMPLETED | OUTPATIENT
Start: 2022-08-19 | End: 2022-08-19

## 2022-08-19 RX ADMIN — Medication 30 G: at 08:46

## 2022-08-19 RX ADMIN — SODIUM CHLORIDE 20 ML/HR: 0.9 INJECTION, SOLUTION INTRAVENOUS at 08:22

## 2022-08-19 NOTE — PROGRESS NOTES
Pt presents for IVIG infusion  No new meds or concerns  Pt tolerated infusion without adverse reaction  Future appointments discussed  AVS provided

## 2022-08-19 NOTE — PLAN OF CARE
Problem: Potential for Falls  Goal: Patient will remain free of falls  Description: INTERVENTIONS:  - Educate patient/family on patient safety including physical limitations  - Instruct patient to call for assistance with activity   - Consult OT/PT to assist with strengthening/mobility   - Keep Call bell within reach  - Keep bed low and locked with side rails adjusted as appropriate  - Keep care items and personal belongings within reach  - Initiate and maintain comfort rounds  - Make Fall Risk Sign visible to staff  - Offer To  - Apply yellow socks and bracelet for high fall risk patients  - Consider moving patient to room near nurses station  Outcome: Progressing

## 2022-08-19 NOTE — TELEPHONE ENCOUNTER
Good morning! I attempted to call patient more than once to inform him of updated schedule  Please print out schedule for patient, he is currently being treated  Thank you!

## 2022-08-22 ENCOUNTER — APPOINTMENT (OUTPATIENT)
Dept: LAB | Facility: CLINIC | Age: 77
End: 2022-08-22
Payer: MEDICARE

## 2022-08-22 DIAGNOSIS — C90.00 MULTIPLE MYELOMA NOT HAVING ACHIEVED REMISSION (HCC): ICD-10-CM

## 2022-08-22 LAB
IGA SERPL-MCNC: <8 MG/DL (ref 70–400)
IGG SERPL-MCNC: 1450 MG/DL (ref 700–1600)
IGM SERPL-MCNC: 24 MG/DL (ref 40–230)

## 2022-08-22 PROCEDURE — 84165 PROTEIN E-PHORESIS SERUM: CPT

## 2022-08-22 PROCEDURE — 86334 IMMUNOFIX E-PHORESIS SERUM: CPT

## 2022-08-22 PROCEDURE — 84165 PROTEIN E-PHORESIS SERUM: CPT | Performed by: PATHOLOGY

## 2022-08-22 PROCEDURE — 82784 ASSAY IGA/IGD/IGG/IGM EACH: CPT

## 2022-08-22 PROCEDURE — 83521 IG LIGHT CHAINS FREE EACH: CPT

## 2022-08-22 PROCEDURE — 86334 IMMUNOFIX E-PHORESIS SERUM: CPT | Performed by: PATHOLOGY

## 2022-08-23 LAB
ALBUMIN SERPL ELPH-MCNC: 3.85 G/DL (ref 3.5–5)
ALBUMIN SERPL ELPH-MCNC: 52.7 % (ref 52–65)
ALPHA1 GLOB SERPL ELPH-MCNC: 0.37 G/DL (ref 0.1–0.4)
ALPHA1 GLOB SERPL ELPH-MCNC: 5.1 % (ref 2.5–5)
ALPHA2 GLOB SERPL ELPH-MCNC: 0.97 G/DL (ref 0.4–1.2)
ALPHA2 GLOB SERPL ELPH-MCNC: 13.3 % (ref 7–13)
BETA GLOB ABNORMAL SERPL ELPH-MCNC: 0.47 G/DL (ref 0.4–0.8)
BETA1 GLOB SERPL ELPH-MCNC: 6.4 % (ref 5–13)
BETA2 GLOB SERPL ELPH-MCNC: 3.6 % (ref 2–8)
BETA2+GAMMA GLOB SERPL ELPH-MCNC: 0.26 G/DL (ref 0.2–0.5)
GAMMA GLOB ABNORMAL SERPL ELPH-MCNC: 1.38 G/DL (ref 0.5–1.6)
GAMMA GLOB SERPL ELPH-MCNC: 18.9 % (ref 12–22)
IGG/ALB SER: 1.11 {RATIO} (ref 1.1–1.8)
INTERPRETATION UR IFE-IMP: NORMAL
KAPPA LC FREE SER-MCNC: 5.2 MG/L (ref 3.3–19.4)
KAPPA LC FREE/LAMBDA FREE SER: 0.65 {RATIO} (ref 0.26–1.65)
LAMBDA LC FREE SERPL-MCNC: 8 MG/L (ref 5.7–26.3)
M PROTEIN 1 MFR SERPL ELPH: 5.6 %
M PROTEIN 1 SERPL ELPH-MCNC: 0.41 G/DL
PROT PATTERN SERPL ELPH-IMP: ABNORMAL
PROT SERPL-MCNC: 7.3 G/DL (ref 6.4–8.2)

## 2022-09-06 ENCOUNTER — APPOINTMENT (OUTPATIENT)
Dept: LAB | Facility: CLINIC | Age: 77
End: 2022-09-06
Payer: MEDICARE

## 2022-09-16 ENCOUNTER — HOSPITAL ENCOUNTER (OUTPATIENT)
Dept: INFUSION CENTER | Facility: CLINIC | Age: 77
End: 2022-09-16
Payer: MEDICARE

## 2022-09-16 VITALS
SYSTOLIC BLOOD PRESSURE: 139 MMHG | WEIGHT: 167.11 LBS | BODY MASS INDEX: 23.98 KG/M2 | RESPIRATION RATE: 18 BRPM | TEMPERATURE: 97.1 F | DIASTOLIC BLOOD PRESSURE: 65 MMHG | HEART RATE: 60 BPM

## 2022-09-16 DIAGNOSIS — C90.00 IGG MULTIPLE MYELOMA (HCC): ICD-10-CM

## 2022-09-16 DIAGNOSIS — D80.1 HYPOGAMMAGLOBULINEMIA (HCC): Primary | ICD-10-CM

## 2022-09-16 PROCEDURE — 96365 THER/PROPH/DIAG IV INF INIT: CPT

## 2022-09-16 PROCEDURE — 96366 THER/PROPH/DIAG IV INF ADDON: CPT

## 2022-09-16 PROCEDURE — 96367 TX/PROPH/DG ADDL SEQ IV INF: CPT

## 2022-09-16 RX ORDER — SODIUM CHLORIDE 9 MG/ML
20 INJECTION, SOLUTION INTRAVENOUS ONCE
Status: COMPLETED | OUTPATIENT
Start: 2022-09-16 | End: 2022-09-16

## 2022-09-16 RX ORDER — SODIUM CHLORIDE 9 MG/ML
20 INJECTION, SOLUTION INTRAVENOUS ONCE
Status: CANCELLED
Start: 2022-10-14 | End: 2022-10-14

## 2022-09-16 RX ADMIN — ZOLEDRONIC ACID 3 MG: 4 INJECTION INTRAVENOUS at 11:43

## 2022-09-16 RX ADMIN — SODIUM CHLORIDE 20 ML/HR: 0.9 INJECTION, SOLUTION INTRAVENOUS at 08:57

## 2022-09-16 RX ADMIN — Medication 30 G: at 09:14

## 2022-09-16 NOTE — PROGRESS NOTES
Pt tolerated IVIG and zometa infusions without incident today  Pt declined AVS but is aware of future appts

## 2022-09-19 ENCOUNTER — APPOINTMENT (OUTPATIENT)
Dept: LAB | Facility: CLINIC | Age: 77
End: 2022-09-19
Payer: MEDICARE

## 2022-09-19 NOTE — PROGRESS NOTES
[facility]ATOLOGY [facility]COL[facility] [place]  [address] [address]  [address] [place] [address],1945, [phone]  04/11/22    Discussion:   In summary, this is a 63-year-old male history of myeloma as outlined  He had stem cell infusion about a month ago  Since that time platelets have significantly risen  He is off Promacta  He was treated with Evusheld  Utility of booster 4  Is questionable  Down side is minimal   Administration is favored at sometime in the future  He had an improvement in energy with hemoglobin in the 8 0-9 0 range, previously 7 0-8 0  Over the past 72 hours he had some chills, no fever, right arm pains, malaise and fatigue  Etiology unclear  Possibly related to stem cell infusion although given the 1 month interval I think that is unlikely  Possible viral syndrome  In any event, he seems to be improving today  Observation favored  I discussed the above with the patient  The patient and his wife voiced understanding and agreement   ______________________________________________________________________    Chief Complaint   Patient presents with    Follow-up       HPI:  Oncology History   IgG multiple myeloma ([facility] [address] )   9/2015 Initial Diagnosis    Found to have Hbg of 6 with SOB, creatinine 1 8 and normal calcium with albumin of 2 1  Additonial blood work showed elevated protien level (12 1g/dL)  9/29/2015 Biopsy    Bone marrow biopsy demonstrated approximately 80% plasma cells with some immature forms noted  These studies showed 13q14 deletion, +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, T (4:14)       10/14/2015 - 11/11/2015 Chemotherapy    Velcade 1 3 mg/m2 Days 1,8,15,22  Cytoxan 300 mg/m2  PO Days 1, 8,15, 22  Dexamethasone 40 mg PO Days 1,8,15, 22  Zometa 4 mg IV Day 1  Cycle length = 28 days    Completed 2 cycles    Day one of cycle 2 was on 11/11/15      12/2015 - 2/2016 Chemotherapy    VDT-PACE x 2 cycles   (completed at [facility] in [place], [place])     2/2016 Transplant    Melphalan 200 mg/m2 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  MRD +     8/2016 - 1/26/2018 Chemotherapy    Maintenance therapy:  Carfilzomib, orginally dosed 27 mg weekly then increased to 45 mg weekly after MRD reactivitation  Revlimid  Dexamethasone      2/2018 Biopsy    Bone marrow demonstrated positive minimal residual disease       2/23/2018 -  Chemotherapy    Daratumumab 16mg/m2 IV Day 1  Pomalyst 4 mg p o  daily 1-21  Dexamethasone 4 mg PO Days 2, 3  Dexamethasone 12 mg PODays 8, 15, 22  Cycle length = 28 days     4/11/2019 - 8/29/2019 Chemotherapy    methylPREDNISolone sodium succinate (Solu-MEDROL) 100 mg in sodium chloride 0 9 % 250 mL IVPB, 100 mg, Intravenous, Once, 5 of 12 cycles  Administration: 100 mg (6/7/2019), 100 mg (7/5/2019), 100 mg (8/2/2019)     8/30/2019 - 11/8/2019 Chemotherapy    cyclophosphamide (CYTOXAN) 1,000 mg in sodium chloride 0 9 % 250 mL IVPB, 990 mg (66 7 % of original dose 750 mg/m2), Intravenous, Once, 2 of 3 cycles  Dose modification: 500 mg/m2 (original dose 750 mg/m2, Cycle 1, Reason: Other (See Comments)), 250 mg/m2 (original dose 750 mg/m2, Cycle 4, Reason: Treatment Parameters Not Met)  Administration: 1,000 mg (8/30/2019), 1,000 mg (9/13/2019), 1,000 mg (9/27/2019), 500 mg (10/11/2019)  bortezomib (VELCADE) subcutaneous injection 2 6 mg, 1 3 mg/m2 = 2 6 mg (86 7 % of original dose 1 5 mg/m2), Subcutaneous, Once, 3 of 4 cycles  Dose modification: 1 3 mg/m2 (original dose 1 5 mg/m2, Cycle 1, Reason: Other (See Comments))  Administration: 2 6 mg (8/30/2019), 2 6 mg (9/13/2019), 2 6 mg (10/25/2019), 2 6 mg (11/8/2019), 2 6 mg (9/27/2019), 2 6 mg (11/1/2019), 2 6 mg (9/20/2019), 2 6 mg (10/4/2019), 2 6 mg (10/11/2019), 2 6 mg (10/18/2019)     12/6/2019 - 6/19/2020 Chemotherapy    pegfilgrastim (NEULASTA ONPRO) subcutaneous injection kit 6 mg, 6 mg, Subcutaneous, Once, 5 of 9 cycles  Administration: 6 mg (2/28/2020), 6 mg (3/13/2020), 6 mg (3/27/2020), 6 mg (4/10/2020), 6 mg (4/24/2020), 6 mg (5/8/2020), 6 mg (6/5/2020), 6 mg (5/22/2020), 6 mg (6/19/2020)  cyclophosphamide (CYTOXAN) 784 mg in sodium chloride 0 9 % 250 mL IVPB, 400 mg/m2 = 784 mg, Intravenous, Once, 5 of 9 cycles  Dose modification: 400 mg/m2 (original dose 750 mg/m2, Cycle 7, Reason: Other (See Comments))  Administration: 784 mg (2/28/2020), 784 mg (3/13/2020), 784 mg (3/27/2020), 784 mg (4/10/2020), 784 mg (4/24/2020), 784 mg (5/8/2020), 784 mg (5/22/2020), 784 mg (6/19/2020), 784 mg (6/5/2020)  methylPREDNISolone sodium succinate (Solu-MEDROL) 100 mg in sodium chloride 0 9 % 250 mL IVPB, 100 mg, Intravenous, Once, 8 of 12 cycles  Administration: 100 mg (12/6/2019), 100 mg (12/13/2019), 100 mg (12/20/2019), 100 mg (1/3/2020), 100 mg (1/10/2020), 100 mg (1/17/2020), 100 mg (1/31/2020), 100 mg (2/14/2020), 100 mg (5/22/2020), 100 mg (12/27/2019), 100 mg (1/24/2020), 100 mg (2/28/2020), 100 mg (3/13/2020), 100 mg (3/27/2020), 100 mg (4/10/2020), 100 mg (4/24/2020), 100 mg (5/8/2020), 100 mg (6/19/2020)     7/2/2020 -  Chemotherapy           Interval History:  Clinically stable  ECOG-  1 - Symptomatic but completely ambulatory    Review of Systems   Constitutional: Negative for chills and fever  HENT: Negative for nosebleeds  Eyes: Negative for discharge  Respiratory: Negative for cough and shortness of breath  Cardiovascular: Negative for chest pain  Gastrointestinal: Negative for abdominal pain, constipation and diarrhea  Endocrine: Negative for polydipsia  Genitourinary: Negative for hematuria  Musculoskeletal: Negative for arthralgias  Skin: Negative for color change  Allergic/Immunologic: Negative for immunocompromised state  Neurological: Negative for dizziness and headaches  Hematological: Negative for adenopathy  Psychiatric/Behavioral: Negative for agitation         Past Medical History:   Diagnosis Date    History of autologous stem cell transplant ([facility] [address] )     Hypertension     History of HTN-stable since weight loss    Insomnia     Multiple myeloma (HCC)      Patient Active Problem List   Diagnosis    IgG multiple myeloma (HCC)    Persistent atrial fibrillation (HCC)    Essential hypertension    Chronic ITP (idiopathic thrombocytopenia) (HCC)    Hyperlipidemia    Hypocalcemia    Hypothyroidism    Long term current use of systemic steroids    Other fatigue    Prophylactic measure    Hypogammaglobulinemia (HCC)    Leukopenia    Anemia due to stage 3 chronic kidney disease (HCC)    Pancytopenia (HCC)       Current Outpatient Medications:     acetaminophen (TYLENOL) 325 mg tablet, Take 650 mg by mouth every 6 (six) hours as needed for mild pain, Disp: , Rfl:     acyclovir (ZOVIRAX) 200 mg capsule, TAKE  (1)  CAPSULE  TWICE DAILY  , Disp: 60 capsule, Rfl: 5    Ascorbic Acid (vitamin C) 1000 MG tablet, Take 1,000 mg by mouth daily, Disp: , Rfl:     aspirin 81 MG tablet, Take 81 mg by mouth daily  , Disp: , Rfl:     atorvastatin (LIPITOR) 20 mg tablet, Take 1 tablet (20 mg total) by mouth daily, Disp: 90 tablet, Rfl: 0    Cholecalciferol (VITAMIN D-3 PO), Take 1,000 Units by mouth daily , Disp: , Rfl:     Docusate Sodium (COLACE PO), Take 1 tablet by mouth as needed  , Disp: , Rfl:     escitalopram (LEXAPRO) 20 mg tablet, Take 1 tablet (20 mg total) by mouth daily, Disp: 90 tablet, Rfl: 0    Glutamine POWD, by Does not apply route, Disp: , Rfl:     levothyroxine 50 mcg tablet, Take 1 tablet (50 mcg total) by mouth daily (Patient taking differently: Take 75 mcg by mouth daily  ), Disp: 90 tablet, Rfl: 0    LORazepam (ATIVAN) 0 5 mg tablet, Take 0 5 mg by mouth every 6 (six) hours as needed for anxiety  , Disp: , Rfl:     LORazepam (ATIVAN) 1 mg tablet, Take 1 tablet (1 mg total) by mouth every 4 (four) hours as needed for anxiety, Disp: 100 tablet, Rfl: 1    Multiple Vitamin (MULTIVITAMINS PO), Take 1 tablet by mouth daily  , Disp: , Rfl:     ondansetron (ZOFRAN) 4 mg tablet, Take 4 mg by mouth every 8 (eight) hours as needed for nausea or vomiting , Disp: , Rfl:     sulfamethoxazole-trimethoprim (BACTRIM DS) 800-160 mg per tablet, 3 (three) times a week Monday Wednesday friday, Disp: , Rfl:     vitamin B-12 (VITAMIN B-12) 1,000 mcg tablet, Take 1,000 mcg by mouth daily, Disp: , Rfl:     eltrombopag (PROMACTA) 75 MG TABS, Take 1 tablet (75 mg total) by mouth daily, Disp: 30 tablet, Rfl: 3  No Known Allergies  Past Surgical History:   Procedure Laterality Date    APPENDECTOMY      Last assessed: 9/25/15    ARTHROSCOPY KNEE Left     9/30/09    EYE SURGERY      Lasik    KNEE CARTILAGE SURGERY      LIMBAL STEM CELL TRANSPLANT      Patient had 2 in [place]-2/29/2016 and 4/13/2016     Social History     Objective:  Vitals:    04/11/22 0809   BP: 140/82   BP Location: Left arm   Patient Position: Sitting   Cuff Size: Adult   Pulse: 81   Resp: 17   Temp: (!) 97 4 °F (36 3 °C)   SpO2: 97%   Weight: 70 4 kg (155 lb 3 2 oz)   Height: 5' 10" (1 778 m)     Physical Exam  Constitutional:       Appearance: He is well-developed  HENT:      Head: Normocephalic and atraumatic  Eyes:      Pupils: Pupils are equal, round, and reactive to light  Cardiovascular:      Rate and Rhythm: Normal rate and regular rhythm  Heart sounds: No murmur heard  Pulmonary:      Breath sounds: Normal breath sounds  No wheezing or rales  Abdominal:      Palpations: Abdomen is soft  Tenderness: There is no abdominal tenderness  Musculoskeletal:         General: No tenderness  Normal range of motion  Cervical back: Neck supple  Lymphadenopathy:      Cervical: No cervical adenopathy  Skin:     Findings: No erythema or rash  Neurological:      Mental Status: He is alert and oriented to person, place, and time  Cranial Nerves: No cranial nerve deficit        Deep Tendon Reflexes: Reflexes are normal and symmetric  Psychiatric:         Behavior: Behavior normal            Labs: I personally reviewed the labs and imaging pertinent to this patient care  individual instruction/verbal instruction/written material

## 2022-10-03 ENCOUNTER — TELEPHONE (OUTPATIENT)
Dept: HEMATOLOGY ONCOLOGY | Facility: CLINIC | Age: 77
End: 2022-10-03

## 2022-10-03 ENCOUNTER — OFFICE VISIT (OUTPATIENT)
Dept: HEMATOLOGY ONCOLOGY | Facility: CLINIC | Age: 77
End: 2022-10-03
Payer: MEDICARE

## 2022-10-03 VITALS
HEART RATE: 65 BPM | BODY MASS INDEX: 24.08 KG/M2 | SYSTOLIC BLOOD PRESSURE: 130 MMHG | WEIGHT: 168.2 LBS | OXYGEN SATURATION: 98 % | DIASTOLIC BLOOD PRESSURE: 80 MMHG | HEIGHT: 70 IN | TEMPERATURE: 97 F | RESPIRATION RATE: 16 BRPM

## 2022-10-03 DIAGNOSIS — Z29.9 PROPHYLACTIC MEASURE: ICD-10-CM

## 2022-10-03 DIAGNOSIS — C90.00 MULTIPLE MYELOMA NOT HAVING ACHIEVED REMISSION (HCC): Primary | ICD-10-CM

## 2022-10-03 PROCEDURE — 99214 OFFICE O/P EST MOD 30 MIN: CPT | Performed by: INTERNAL MEDICINE

## 2022-10-03 NOTE — TELEPHONE ENCOUNTER
December's appt request did not fall in the depot yet  We should be able to schedule that when he comes in for his October appt

## 2022-10-03 NOTE — TELEPHONE ENCOUNTER
While we try to accommodate patient requests, our priority is to schedule treatment according to Doctor's orders and site availability  1  Does the Provider use the intake sheet or checkout note? NO  2  What would be a preferred day of the week that would work best for your infusion appointment? FRIDAYS  3  Do you prefer mornings or afternoons for your appointments? MORNINGS AT 8AM  4  Are there any days or dates that do not work for your schedule, including any upcoming vacations? 11/9/22  5  We are going to try our best to schedule you at the infusion center closest to your home  In the event that we are unable to what would be your next preferred infusion site or sites? 1  [facility]  [facility]  [facility]  [facility]  [facility]    6  Do you have transportation to take you to all of your appointments? YES  7  Would you like the infusion center to draw labs from your port? (disregard if patient doesn't have a port or need labs for infusion appointment)     PATIENT GOES TO [facility] FOR IVIG ON FRIDAYS  [address]

## 2022-10-03 NOTE — PROGRESS NOTES
[facility] HEMATOLOGY ONCOLOGY SPECIALISTS [place]  [address] [id number]  [address] [place] [address],1945, [phone]  10/03/22    Discussion:   In summary, this is a 63-year-old male history of myeloma  He had previous high-dose chemotherapy with stem cell support  CAR-T in August 2021  Stem cell reinfusion for persistent pancytopenia, March 2022  He has been under observation since that time  Mild pancytopenia, better than prior to stem cell infusion  Asymptomatic  He has some fatigue but is able to carry out ADLs without restriction  Recent SPEP shows M peak 0 41, previous 0 27  Free light chains and ratio are normal     He is having minor progression  He had recent virtual visit at [facility]  Recommendation for Elotuzumab, Pomalyst 2 mg, dexamethasone  I discussed the above with the patient  The patient and his wife voiced understanding and agreement   ______________________________________________________________________    Chief Complaint   Patient presents with    Follow-up       HPI:  Oncology History   IgG multiple myeloma ([facility] [address] )   9/2015 Initial Diagnosis    Found to have Hbg of 6 with SOB, creatinine 1 8 and normal calcium with albumin of 2 1  Additonial blood work showed elevated protien level (12 1g/dL)  9/29/2015 Biopsy    Bone marrow biopsy demonstrated approximately 80% plasma cells with some immature forms noted  These studies showed 13q14 deletion, +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, 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)       10/14/2015 - 11/11/2015 Chemotherapy    Velcade 1 3 mg/m2 Days 1,8,15,22  Cytoxan 300 mg/m2  PO Days 1, 8,15, 22  Dexamethasone 40 mg PO Days 1,8,15, 22  Zometa 4 mg IV Day 1  Cycle length = 28 days    Completed 2 cycles    Day one of cycle 2 was on 11/11/15      12/2015 - 2/2016 Chemotherapy    VDT-PACE x 2 cycles   (completed at [facility] in [place], [address] [place] [address])     2/2016 Transplant    Melphalan 200 mg/m2 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  MRD +     8/2016 - 1/26/2018 Chemotherapy    Maintenance therapy:  Carfilzomib, orginally dosed 27 mg weekly then increased to 45 mg weekly after MRD reactivitation  Revlimid  Dexamethasone      2/2018 Biopsy    Bone marrow demonstrated positive minimal residual disease       2/23/2018 -  Chemotherapy    Daratumumab 16mg/m2 IV Day 1  Pomalyst 4 mg p o  daily 1-21  Dexamethasone 4 mg PO Days 2, 3  Dexamethasone 12 mg PODays 8, 15, 22  Cycle length = 28 days     4/11/2019 - 8/29/2019 Chemotherapy    methylPREDNISolone sodium succinate (Solu-MEDROL) 100 mg in sodium chloride 0 9 % 250 mL IVPB, 100 mg, Intravenous, Once, 5 of 12 cycles  Administration: 100 mg (6/7/2019), 100 mg (7/5/2019), 100 mg (8/2/2019)     8/30/2019 - 11/8/2019 Chemotherapy    cyclophosphamide (CYTOXAN) 1,000 mg in sodium chloride 0 9 % 250 mL IVPB, 990 mg (66 7 % of original dose 750 mg/m2), Intravenous, Once, 2 of 3 cycles  Dose modification: 500 mg/m2 (original dose 750 mg/m2, Cycle 1, Reason: Other (See Comments)), 250 mg/m2 (original dose 750 mg/m2, Cycle 4, Reason: Treatment Parameters Not Met)  Administration: 1,000 mg (8/30/2019), 1,000 mg (9/13/2019), 1,000 mg (9/27/2019), 500 mg (10/11/2019)  bortezomib (VELCADE) subcutaneous injection 2 6 mg, 1 3 mg/m2 = 2 6 mg (86 7 % of original dose 1 5 mg/m2), Subcutaneous, Once, 3 of 4 cycles  Dose modification: 1 3 mg/m2 (original dose 1 5 mg/m2, Cycle 1, Reason: Other (See Comments))  Administration: 2 6 mg (8/30/2019), 2 6 mg (9/13/2019), 2 6 mg (10/25/2019), 2 6 mg (11/8/2019), 2 6 mg (9/27/2019), 2 6 mg (11/1/2019), 2 6 mg (9/20/2019), 2 6 mg (10/4/2019), 2 6 mg (10/11/2019), 2 6 mg (10/18/2019)     12/6/2019 - 6/19/2020 Chemotherapy    pegfilgrastim (NEULASTA ONPRO) subcutaneous injection kit 6 mg, 6 mg, Subcutaneous, Once, 5 of 9 cycles  Administration: 6 mg (2/28/2020), 6 mg (3/13/2020), 6 mg (3/27/2020), 6 mg (4/10/2020), 6 mg (4/24/2020), 6 mg (5/8/2020), 6 mg (6/5/2020), 6 mg (5/22/2020), 6 mg (6/19/2020)  cyclophosphamide (CYTOXAN) 784 mg in sodium chloride 0 9 % 250 mL IVPB, 400 mg/m2 = 784 mg, Intravenous, Once, 5 of 9 cycles  Dose modification: 400 mg/m2 (original dose 750 mg/m2, Cycle 7, Reason: Other (See Comments))  Administration: 784 mg (2/28/2020), 784 mg (3/13/2020), 784 mg (3/27/2020), 784 mg (4/10/2020), 784 mg (4/24/2020), 784 mg (5/8/2020), 784 mg (5/22/2020), 784 mg (6/19/2020), 784 mg (6/5/2020)  methylPREDNISolone sodium succinate (Solu-MEDROL) 100 mg in sodium chloride 0 9 % 250 mL IVPB, 100 mg, Intravenous, Once, 8 of 12 cycles  Administration: 100 mg (12/6/2019), 100 mg (12/13/2019), 100 mg (12/20/2019), 100 mg (1/3/2020), 100 mg (1/10/2020), 100 mg (1/17/2020), 100 mg (1/31/2020), 100 mg (2/14/2020), 100 mg (5/22/2020), 100 mg (12/27/2019), 100 mg (1/24/2020), 100 mg (2/28/2020), 100 mg (3/13/2020), 100 mg (3/27/2020), 100 mg (4/10/2020), 100 mg (4/24/2020), 100 mg (5/8/2020), 100 mg (6/19/2020)     7/2/2020 -  Chemotherapy           Interval History:  Clinically stable  ECOG-  1 - Symptomatic but completely ambulatory    Review of Systems   Constitutional: Negative for chills and fever  HENT: Negative for nosebleeds  Eyes: Negative for discharge  Respiratory: Negative for cough and shortness of breath  Cardiovascular: Negative for chest pain  Gastrointestinal: Negative for abdominal pain, constipation and diarrhea  Endocrine: Negative for polydipsia  Genitourinary: Negative for hematuria  Musculoskeletal: Negative for arthralgias  Skin: Negative for color change  Allergic/Immunologic: Negative for immunocompromised state  Neurological: Negative for dizziness and headaches  Hematological: Negative for adenopathy  Psychiatric/Behavioral: Negative for agitation         Past Medical History:   Diagnosis Date    History of autologous stem cell [facility] ([facility] [address] )     Hypertension     History of HTN-stable since weight loss    Insomnia  Multiple myeloma (HCC)      Patient Active Problem List   Diagnosis    IgG multiple myeloma (HCC)    Persistent atrial fibrillation (HCC)    Essential hypertension    Chronic ITP (idiopathic thrombocytopenia) (HCC)    Hyperlipidemia    Hypocalcemia    Hypothyroidism    Other fatigue    Prophylactic measure    Hypogammaglobulinemia (HCC)    Anemia due to stage 3 chronic kidney disease (HCC)    Pancytopenia (HCC)    Stage 3b chronic kidney disease (HCC)       Current Outpatient Medications:     acetaminophen (TYLENOL) 325 mg tablet, Take 650 mg by mouth every 6 (six) hours as needed for mild pain, Disp: , Rfl:     acyclovir (ZOVIRAX) 200 mg capsule, Take 1 tablet by mouth every 12 hours, Disp: 60 capsule, Rfl: 6    Ascorbic Acid (vitamin C) 1000 MG tablet, Take 1,000 mg by mouth daily, Disp: , Rfl:     atorvastatin (LIPITOR) 20 mg tablet, Take 1 tablet (20 mg total) by mouth daily, Disp: 90 tablet, Rfl: 1    Cholecalciferol (VITAMIN D-3 PO), Take 1,000 Units by mouth daily , Disp: , Rfl:     escitalopram (LEXAPRO) 20 mg tablet, Take 1 tablet (20 mg total) by mouth daily, Disp: 90 tablet, Rfl: 0    Glutamine POWD, by Does not apply route, Disp: , Rfl:     levothyroxine 50 mcg tablet, TAKE ONE AND ONE-HALF TABLETS DAILY, Disp: 45 tablet, Rfl: 2    Multiple Vitamin (MULTIVITAMINS PO), Take 1 tablet by mouth daily  , Disp: , Rfl:     sulfamethoxazole-trimethoprim (BACTRIM DS) 800-160 mg per tablet, 3 (three) times a week Monday Wednesday friday, Disp: , Rfl:     vitamin B-12 (VITAMIN B-12) 1,000 mcg tablet, Take 1,000 mcg by mouth daily, Disp: , Rfl:     Docusate Sodium (COLACE PO), Take 1 tablet by mouth as needed   (Patient not taking: No sig reported), Disp: , Rfl:     LORazepam (ATIVAN) 1 mg tablet, Take 1 tablet (1 mg total) by mouth every 4 (four) hours as needed for anxiety (Patient not taking: No sig reported), Disp: 100 tablet, Rfl: 1    ondansetron (ZOFRAN) 4 mg tablet, Take 4 mg by mouth every 8 (eight) hours as needed for nausea or vomiting  (Patient not taking: No sig reported), Disp: , Rfl:   No Known Allergies  Past Surgical History:   Procedure Laterality Date    APPENDECTOMY      Last assessed: 9/25/15    ARTHROSCOPY KNEE Left     9/30/09    EYE SURGERY      Lasik    KNEE CARTILAGE SURGERY      LIMBAL STEM CELL TRANSPLANT      Patient had 2 in [place]-2/29/2016 and 4/13/2016     Social History     Objective:  Vitals:    10/03/22 0813   BP: 130/80   BP Location: Right arm   Patient Position: Sitting   Cuff Size: Adult   Pulse: 65   Resp: 16   Temp: (!) 97 °F (36 1 °C)   SpO2: 98%   Weight: 76 3 kg (168 lb 3 2 oz)   Height: 5' 10" (1 778 m)     Physical Exam  Constitutional:       Appearance: He is well-developed  HENT:      Head: Normocephalic and atraumatic  Eyes:      Pupils: Pupils are equal, round, and reactive to light  Cardiovascular:      Rate and Rhythm: Normal rate and regular rhythm  Heart sounds: No murmur heard  Pulmonary:      Breath sounds: Normal breath sounds  No wheezing or rales  Abdominal:      Palpations: Abdomen is soft  Tenderness: There is no abdominal tenderness  Musculoskeletal:         General: No tenderness  Normal range of motion  Cervical back: Neck supple  Lymphadenopathy:      Cervical: No cervical adenopathy  Skin:     Findings: No erythema or rash  Neurological:      Mental Status: He is alert and oriented to person, place, and time  Cranial Nerves: No cranial nerve deficit  Deep Tendon Reflexes: Reflexes are normal and symmetric  Psychiatric:         Behavior: Behavior normal            Labs: I personally reviewed the labs and imaging pertinent to this patient care

## 2022-10-03 NOTE — TELEPHONE ENCOUNTER
Good morning, please schedule patient for treatment on Fridays at 8am      Patient will be gone 11/9/22  Thank you!

## 2022-10-03 NOTE — TELEPHONE ENCOUNTER
Are we able to schedule December too? Or would patient have to schedule when November comes? Thanks!

## 2022-10-04 ENCOUNTER — RA CDI HCC (OUTPATIENT)
Dept: OTHER | Facility: HOSPITAL | Age: 77
End: 2022-10-04

## 2022-10-04 ENCOUNTER — APPOINTMENT (OUTPATIENT)
Dept: LAB | Facility: CLINIC | Age: 77
End: 2022-10-04
Payer: MEDICARE

## 2022-10-04 DIAGNOSIS — C90.00 MULTIPLE MYELOMA NOT HAVING ACHIEVED REMISSION (HCC): ICD-10-CM

## 2022-10-04 LAB
IGA SERPL-MCNC: <8 MG/DL (ref 70–400)
IGG SERPL-MCNC: 1780 MG/DL (ref 700–1600)
IGM SERPL-MCNC: 23 MG/DL (ref 40–230)

## 2022-10-04 PROCEDURE — 83521 IG LIGHT CHAINS FREE EACH: CPT

## 2022-10-04 PROCEDURE — 84165 PROTEIN E-PHORESIS SERUM: CPT

## 2022-10-04 PROCEDURE — 82784 ASSAY IGA/IGD/IGG/IGM EACH: CPT

## 2022-10-04 NOTE — PROGRESS NOTES
[facility] [address]  coding opportunities       Chart reviewed, no opportunity found: CHART REVIEWED, NO OPPORTUNITY FOUND        Patients Insurance     Medicare Insurance: Medicare

## 2022-10-05 LAB
KAPPA LC FREE SER-MCNC: 3 MG/L (ref 3.3–19.4)
KAPPA LC FREE/LAMBDA FREE SER: 0.19 {RATIO} (ref 0.26–1.65)
LAMBDA LC FREE SERPL-MCNC: 15.4 MG/L (ref 5.7–26.3)

## 2022-10-06 LAB
ALBUMIN SERPL ELPH-MCNC: 4.03 G/DL (ref 3.5–5)
ALBUMIN SERPL ELPH-MCNC: 50.4 % (ref 52–65)
ALPHA1 GLOB SERPL ELPH-MCNC: 0.38 G/DL (ref 0.1–0.4)
ALPHA1 GLOB SERPL ELPH-MCNC: 4.7 % (ref 2.5–5)
ALPHA2 GLOB SERPL ELPH-MCNC: 1.06 G/DL (ref 0.4–1.2)
ALPHA2 GLOB SERPL ELPH-MCNC: 13.2 % (ref 7–13)
BETA GLOB ABNORMAL SERPL ELPH-MCNC: 0.5 G/DL (ref 0.4–0.8)
BETA1 GLOB SERPL ELPH-MCNC: 6.3 % (ref 5–13)
BETA2 GLOB SERPL ELPH-MCNC: 4.1 % (ref 2–8)
BETA2+GAMMA GLOB SERPL ELPH-MCNC: 0.33 G/DL (ref 0.2–0.5)
GAMMA GLOB ABNORMAL SERPL ELPH-MCNC: 1.7 G/DL (ref 0.5–1.6)
GAMMA GLOB SERPL ELPH-MCNC: 21.3 % (ref 12–22)
IGG/ALB SER: 1.02 {RATIO} (ref 1.1–1.8)
M PROTEIN 1 MFR SERPL ELPH: 16.1 %
M PROTEIN 1 SERPL ELPH-MCNC: 1.29 G/DL
PROT PATTERN SERPL ELPH-IMP: ABNORMAL
PROT SERPL-MCNC: 8 G/DL (ref 6.4–8.2)

## 2022-10-06 PROCEDURE — 84165 PROTEIN E-PHORESIS SERUM: CPT | Performed by: PATHOLOGY

## 2022-10-11 ENCOUNTER — OFFICE VISIT (OUTPATIENT)
Dept: INTERNAL MEDICINE CLINIC | Facility: CLINIC | Age: 77
End: 2022-10-11
Payer: MEDICARE

## 2022-10-11 VITALS
RESPIRATION RATE: 18 BRPM | HEIGHT: 70 IN | HEART RATE: 60 BPM | WEIGHT: 167.4 LBS | SYSTOLIC BLOOD PRESSURE: 114 MMHG | DIASTOLIC BLOOD PRESSURE: 60 MMHG | BODY MASS INDEX: 23.96 KG/M2 | TEMPERATURE: 97.3 F | OXYGEN SATURATION: 100 %

## 2022-10-11 DIAGNOSIS — Z23 ENCOUNTER FOR IMMUNIZATION: ICD-10-CM

## 2022-10-11 DIAGNOSIS — I10 ESSENTIAL HYPERTENSION: ICD-10-CM

## 2022-10-11 DIAGNOSIS — F41.9 ANXIETY: ICD-10-CM

## 2022-10-11 DIAGNOSIS — E03.9 ACQUIRED HYPOTHYROIDISM: Primary | ICD-10-CM

## 2022-10-11 DIAGNOSIS — E78.2 MIXED HYPERLIPIDEMIA: ICD-10-CM

## 2022-10-11 DIAGNOSIS — N18.32 STAGE 3B CHRONIC KIDNEY DISEASE (HCC): ICD-10-CM

## 2022-10-11 DIAGNOSIS — D61.818 PANCYTOPENIA (HCC): ICD-10-CM

## 2022-10-11 DIAGNOSIS — C90.00 IGG MULTIPLE MYELOMA (HCC): ICD-10-CM

## 2022-10-11 PROCEDURE — 99214 OFFICE O/P EST MOD 30 MIN: CPT | Performed by: INTERNAL MEDICINE

## 2022-10-11 NOTE — ASSESSMENT & PLAN NOTE
Baseline creatinine appears to be around 1 8-2 1  Has stage III/4 CKD  We reviewed this diagnosis today  Likely largely related in part to underlying myeloma, also on Bactrim and acyclovir for prophylaxis    He does not follow with a nephrologist  -Renal ultrasound completed March 2019 unremarkable  -recommend avoidance of NSAID use  -if his kidney function continues to decline, I would recommend he establish with Nephrology  -check urinalysis and microalbumin to creatinine ratio with next set of labs

## 2022-10-11 NOTE — PROGRESS NOTES
[address] [facility]- [place]    NAME: Lauren Mchugh  AGE: 78 y o  SEX: male    DATE OF ENCOUNTER: 10/11/2022    Assessment and Plan/History of Present Illness     Here today for six-month follow-up    1  Acquired hypothyroidism  Assessment & Plan:  Continue levothyroxine    Orders:  -     T4, free; Future  -     TSH, 3rd generation; Future    2  Essential hypertension  Assessment & Plan:  Not currently on any antihypertensives  /66 on recheck      3  Stage 3b chronic kidney disease ([facility] [address] )  Assessment & Plan:  Baseline creatinine appears to be around 1 8-2 1  Has stage III/4 CKD  We reviewed this diagnosis today  Likely largely related in part to underlying myeloma, also on Bactrim and acyclovir for prophylaxis  He does not follow with a nephrologist  -Renal ultrasound completed March 2019 unremarkable  -recommend avoidance of NSAID use  -if his kidney function continues to decline, I would recommend he establish with Nephrology  -check urinalysis and microalbumin to creatinine ratio with next set of labs      Orders:  -     Urinalysis with microscopic  -     Microalbumin / creatinine urine ratio    4  Mixed hyperlipidemia  Assessment & Plan:  Well controlled, continue atorvastatin      5  IgG multiple myeloma ([facility] [address] )  Assessment & Plan: IgG multiple myeloma diagnosed in 2015  Noted to have elevated creatinine, anemia, elevated serum protein level  Had subsequent bone marrow biopsy  Completed several cycles of chemotherapy, had initial stem-cell transplant in 2016 in [place]  S/p CAR-T therapy in August 2021, had stem cell infusion in March 2022 at [facility] for persistent pancytopenia  -per Oncology, has had some minor progression of myeloma, they are recommending he restart treatment  -remains on acyclovir and Bactrim for prophylaxis      6  Anxiety  Assessment & Plan:  Symptoms appear to be stable, continue Lexapro      7  Encounter for immunization    8  Pancytopenia ([facility] [address] )  Assessment & Plan:  CBC overall stable, baseline hemoglobin around 9-10, mild lymphopenia, thrombocytopenia improved on most recent set of labs  -dyspnea on exertion has improved with improvement in hemoglobin         Receiving Evusheld at [facility]  He is going to get COVID booster next week             Orders Placed This Encounter   Procedures   • T4, free   • TSH, 3rd generation   • Urinalysis with microscopic   • Microalbumin / creatinine urine ratio       Chief Complaint     Chief Complaint   Patient presents with   • Follow-up     6 month        Review of Systems     10 point ROS negative except per HPI    The following portions of the patient's history were reviewed and updated as appropriate: allergies, current medications, past family history, past medical history, past social history, past surgical history and problem list     Active Problem List     Patient Active Problem List   Diagnosis   • IgG multiple myeloma ([address] )   • Essential hypertension   • Chronic ITP (idiopathic thrombocytopenia) (HCC)   • Hyperlipidemia   • Hypocalcemia   • Acquired hypothyroidism   • Prophylactic measure   • Hypogammaglobulinemia ([address] )   • Anemia due to stage 3 chronic kidney disease (HCC)   • Pancytopenia ([address] )   • Stage 3b chronic kidney disease ([address] )   • Anxiety       Objective     /60 (BP Location: Left arm, Patient Position: Sitting, Cuff Size: Standard)   Pulse 60   Temp (!) 97 3 °F (36 3 °C)   Resp 18   Ht 5' 10" (1 778 m)   Wt 75 9 kg (167 lb 6 4 oz)   SpO2 100%   BMI 24 02 kg/m²     Physical Exam  Constitutional:       Appearance: Normal appearance  He is not ill-appearing  HENT:      Head: Normocephalic and atraumatic  Eyes:      General: No scleral icterus  Right eye: No discharge  Left eye: No discharge  Cardiovascular:      Rate and Rhythm: Normal rate and regular rhythm  Heart sounds: No murmur heard  No friction rub     Pulmonary:      Effort: Pulmonary effort is normal       Breath sounds: Normal breath sounds  No wheezing or rales  Abdominal:      General: Abdomen is flat  There is no distension  Palpations: Abdomen is soft  Tenderness: There is no abdominal tenderness  Musculoskeletal:         General: No swelling or tenderness  Skin:     General: Skin is warm and dry  Findings: No erythema  Neurological:      Mental Status: He is alert  Mental status is at baseline  Motor: No weakness  Psychiatric:         Mood and Affect: Mood normal          Behavior: Behavior normal          Pertinent Laboratory/Diagnostic Studies:  No results found  Images and diagnostics reviewed     Current Medications     Current Outpatient Medications:   •  acetaminophen (TYLENOL) 325 mg tablet, Take 650 mg by mouth every 6 (six) hours as needed for mild pain, Disp: , Rfl:   •  acyclovir (ZOVIRAX) 200 mg capsule, Take 1 tablet by mouth every 12 hours, Disp: 60 capsule, Rfl: 6  •  Ascorbic Acid (vitamin C) 1000 MG tablet, Take 1,000 mg by mouth daily, Disp: , Rfl:   •  atorvastatin (LIPITOR) 20 mg tablet, Take 1 tablet (20 mg total) by mouth daily, Disp: 90 tablet, Rfl: 1  •  Cholecalciferol (VITAMIN D-3 PO), Take 1,000 Units by mouth daily , Disp: , Rfl:   •  escitalopram (LEXAPRO) 20 mg tablet, TAKE 1 TABLET DAILY, Disp: 90 tablet, Rfl: 1  •  Glutamine POWD, by Does not apply route, Disp: , Rfl:   •  levothyroxine 50 mcg tablet, TAKE ONE AND ONE-HALF TABLETS DAILY, Disp: 45 tablet, Rfl: 2  •  Multiple Vitamin (MULTIVITAMINS PO), Take 1 tablet by mouth daily  , Disp: , Rfl:   •  sulfamethoxazole-trimethoprim (BACTRIM DS) 800-160 mg per tablet, 3 (three) times a week Monday Wednesday friday, Disp: , Rfl:   •  vitamin B-12 (VITAMIN B-12) 1,000 mcg tablet, Take 1,000 mcg by mouth daily, Disp: , Rfl:   •  LORazepam (ATIVAN) 1 mg tablet, Take 1 tablet (1 mg total) by mouth every 4 (four) hours as needed for anxiety (Patient not taking: No sig reported), Disp: 100 tablet, Rfl: 1    Health Maintenance     Health Maintenance   Topic Date Due   • Pneumococcal Vaccine: 65+ Years (3 - PPSV23 or PCV20) 01/24/2018   • COVID-19 Vaccine (4 - Booster for Moderna series) 02/17/2022   • Fall Risk  04/14/2023   • Depression Screening  04/14/2023   • Medicare Annual Wellness Visit (AWV)  04/14/2023   • BMI: Adult  10/11/2023   • Colorectal Cancer Screening  09/16/2024   • Hepatitis C Screening  Completed   • Influenza Vaccine  Completed   • HIB Vaccine  Aged Out   • Hepatitis B Vaccine  Aged Out   • IPV Vaccine  Aged Out   • Hepatitis A Vaccine  Aged Out   • Meningococcal ACWY Vaccine  Aged Out   • HPV Vaccine  Aged Out     Immunization History   Administered Date(s) Administered   • COVID-19 MODERNA VACC 0 5 ML IM 01/27/2021, 02/24/2021, 11/17/2021   • INFLUENZA 10/04/2016, 10/02/2017, 10/08/2018, 10/09/2019, 10/06/2020, 10/16/2020, 12/15/2021, 10/04/2022   • Influenza, high dose seasonal 0 7 mL 10/08/2018, 10/09/2019, 10/06/2020   • Influenza, seasonal, injectable 10/04/2016   • Pneumococcal Conjugate 13-Valent 11/14/2016   • Pneumococcal Polysaccharide PPV23 01/24/2013   • Tdap 10/13/2011, 10/13/2011, 10/13/2011   • Zoster 1945, 07/01/2008, 07/18/2008       UBALDO Olsen O    [facility] [place] [address] [address], [facility] [address]  [place], [address]   Office: [phone]  Fax: [phone]

## 2022-10-11 NOTE — ASSESSMENT & PLAN NOTE
CBC overall stable, baseline hemoglobin around 9-10, mild lymphopenia, thrombocytopenia improved on most recent set of labs  -dyspnea on exertion has improved with improvement in hemoglobin

## 2022-10-11 NOTE — ASSESSMENT & PLAN NOTE
IgG multiple myeloma diagnosed in 2015  Noted to have elevated creatinine, anemia, elevated serum protein level  Had subsequent bone marrow biopsy  Completed several cycles of chemotherapy, had initial stem-cell transplant in 2016 in [place]   S/p CAR-T therapy in August 2021, had stem cell infusion in March 2022 at [facility] for persistent pancytopenia  -per Oncology, has had some minor progression of myeloma, they are recommending he restart treatment  -remains on acyclovir and Bactrim for prophylaxis

## 2022-10-12 ENCOUNTER — OFFICE VISIT (OUTPATIENT)
Dept: HEMATOLOGY ONCOLOGY | Facility: CLINIC | Age: 77
End: 2022-10-12
Payer: MEDICARE

## 2022-10-12 ENCOUNTER — DOCUMENTATION (OUTPATIENT)
Dept: HEMATOLOGY ONCOLOGY | Facility: CLINIC | Age: 77
End: 2022-10-12

## 2022-10-12 VITALS
DIASTOLIC BLOOD PRESSURE: 80 MMHG | BODY MASS INDEX: 23.91 KG/M2 | WEIGHT: 167 LBS | HEART RATE: 67 BPM | HEIGHT: 70 IN | OXYGEN SATURATION: 99 % | SYSTOLIC BLOOD PRESSURE: 126 MMHG | RESPIRATION RATE: 17 BRPM

## 2022-10-12 DIAGNOSIS — C90.00 IGG MULTIPLE MYELOMA (HCC): Primary | ICD-10-CM

## 2022-10-12 PROCEDURE — 99215 OFFICE O/P EST HI 40 MIN: CPT | Performed by: INTERNAL MEDICINE

## 2022-10-12 NOTE — PROGRESS NOTES
10-12-22  Received new oral chemo start- ppomalyst // auth required    Patient has been enrolled with [facility] ID# [id number]  CARD# [id number]  PCN#  [id number]  GRP# [id number]  BIN# [id number]  AMT$ [id number]  EFF 9-11-22  THRU 10-11-23     Epic noted

## 2022-10-12 NOTE — PROGRESS NOTES
[facility] [place]  [address] [address]  [address] [place] [address],1945, [phone]  10/12/22    Discussion:   In summary, this is a 81-year-old man with a history of myeloma status post high-dose chemotherapy with stem cell support  CAR-T in August 2021  Stem cell reinfusion March 2022  Recent SPEP shows M peak 1 4, previously 0 4  As such recommendation for elotuzumab, Pomalyst, dexamethasone  We reviewed potential toxicities including but not limited to cytopenias, constipation, diarrhea, tachycardia, bradycardia, elevated or depressed blood pressure, fatigue, peripheral neuropathy  We reviewed monitoring to allow for early intervention as appropriate  Our chemotherapy nurse reviewed the above information as well as providing written information in this regard  Treatment to start in the immediate future  Repeat myeloma parameters at 6-8 weeks  Continuation of Bactrim and acyclovir prophylaxis  I discussed the above with the patient  The patient and his wife voiced understanding and agreement   ______________________________________________________________________    Chief Complaint   Patient presents with   • Follow-up       HPI:  Oncology History   IgG multiple myeloma ([address] )   9/2015 Initial Diagnosis    Found to have Hbg of 6 with SOB, creatinine 1 8 and normal calcium with albumin of 2 1  Additonial blood work showed elevated protien level (12 1g/dL)  9/29/2015 Biopsy    Bone marrow biopsy demonstrated approximately 80% plasma cells with some immature forms noted  These studies showed 13q14 deletion, +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, 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)       10/14/2015 - 11/11/2015 Chemotherapy    Velcade 1 3 mg/m2 Days 1,8,15,22  Cytoxan 300 mg/m2  PO Days 1, 8,15, 22  Dexamethasone 40 mg PO Days 1,8,15, 22  Zometa 4 mg IV Day 1  Cycle length = 28 days    Completed 2 cycles    Day one of cycle 2 was on 11/11/15      12/2015 - 2/2016 Chemotherapy    VDT-PACE x 2 cycles   (completed at [facility] in [place], [place])     2/2016 Transplant    Melphalan 200 mg/m2 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  MRD +     8/2016 - 1/26/2018 Chemotherapy    Maintenance therapy:  Carfilzomib, orginally dosed 27 mg weekly then increased to 45 mg weekly after MRD reactivitation  Revlimid  Dexamethasone      2/2018 Biopsy    Bone marrow demonstrated positive minimal residual disease       2/23/2018 -  Chemotherapy    Daratumumab 16mg/m2 IV Day 1  Pomalyst 4 mg p o  daily 1-21  Dexamethasone 4 mg PO Days 2, 3  Dexamethasone 12 mg PODays 8, 15, 22  Cycle length = 28 days     4/11/2019 - 8/29/2019 Chemotherapy    methylPREDNISolone sodium succinate (Solu-MEDROL) 100 mg in sodium chloride 0 9 % 250 mL IVPB, 100 mg, Intravenous, Once, 5 of 12 cycles  Administration: 100 mg (6/7/2019), 100 mg (7/5/2019), 100 mg (8/2/2019)     8/30/2019 - 11/8/2019 Chemotherapy    cyclophosphamide (CYTOXAN) 1,000 mg in sodium chloride 0 9 % 250 mL IVPB, 990 mg (66 7 % of original dose 750 mg/m2), Intravenous, Once, 2 of 3 cycles  Dose modification: 500 mg/m2 (original dose 750 mg/m2, Cycle 1, Reason: Other (See Comments)), 250 mg/m2 (original dose 750 mg/m2, Cycle 4, Reason: Treatment Parameters Not Met)  Administration: 1,000 mg (8/30/2019), 1,000 mg (9/13/2019), 1,000 mg (9/27/2019), 500 mg (10/11/2019)  bortezomib (VELCADE) subcutaneous injection 2 6 mg, 1 3 mg/m2 = 2 6 mg (86 7 % of original dose 1 5 mg/m2), Subcutaneous, Once, 3 of 4 cycles  Dose modification: 1 3 mg/m2 (original dose 1 5 mg/m2, Cycle 1, Reason: Other (See Comments))  Administration: 2 6 mg (8/30/2019), 2 6 mg (9/13/2019), 2 6 mg (10/25/2019), 2 6 mg (11/8/2019), 2 6 mg (9/27/2019), 2 6 mg (11/1/2019), 2 6 mg (9/20/2019), 2 6 mg (10/4/2019), 2 6 mg (10/11/2019), 2 6 mg (10/18/2019)     12/6/2019 - 6/19/2020 Chemotherapy    pegfilgrastim (NEULASTA ONPRO) subcutaneous injection kit 6 mg, 6 mg, Subcutaneous, Once, 5 of 9 cycles  Administration: 6 mg (2/28/2020), 6 mg (3/13/2020), 6 mg (3/27/2020), 6 mg (4/10/2020), 6 mg (4/24/2020), 6 mg (5/8/2020), 6 mg (6/5/2020), 6 mg (5/22/2020), 6 mg (6/19/2020)  cyclophosphamide (CYTOXAN) 784 mg in sodium chloride 0 9 % 250 mL IVPB, 400 mg/m2 = 784 mg, Intravenous, Once, 5 of 9 cycles  Dose modification: 400 mg/m2 (original dose 750 mg/m2, Cycle 7, Reason: Other (See Comments))  Administration: 784 mg (2/28/2020), 784 mg (3/13/2020), 784 mg (3/27/2020), 784 mg (4/10/2020), 784 mg (4/24/2020), 784 mg (5/8/2020), 784 mg (5/22/2020), 784 mg (6/19/2020), 784 mg (6/5/2020)  methylPREDNISolone sodium succinate (Solu-MEDROL) 100 mg in sodium chloride 0 9 % 250 mL IVPB, 100 mg, Intravenous, Once, 8 of 12 cycles  Administration: 100 mg (12/6/2019), 100 mg (12/13/2019), 100 mg (12/20/2019), 100 mg (1/3/2020), 100 mg (1/10/2020), 100 mg (1/17/2020), 100 mg (1/31/2020), 100 mg (2/14/2020), 100 mg (5/22/2020), 100 mg (12/27/2019), 100 mg (1/24/2020), 100 mg (2/28/2020), 100 mg (3/13/2020), 100 mg (3/27/2020), 100 mg (4/10/2020), 100 mg (4/24/2020), 100 mg (5/8/2020), 100 mg (6/19/2020)     7/2/2020 -  Chemotherapy           Interval History:  Clinically stable  ECOG-  0 - Asymptomatic    Review of Systems   Constitutional: Negative for chills and fever  HENT: Negative for nosebleeds  Eyes: Negative for discharge  Respiratory: Negative for cough and shortness of breath  Cardiovascular: Negative for chest pain  Gastrointestinal: Negative for abdominal pain, constipation and diarrhea  Endocrine: Negative for polydipsia  Genitourinary: Negative for hematuria  Musculoskeletal: Negative for arthralgias  Skin: Negative for color change  Allergic/Immunologic: Negative for immunocompromised state  Neurological: Negative for dizziness and headaches  Hematological: Negative for adenopathy  Psychiatric/Behavioral: Negative for agitation         Past Medical History:   Diagnosis Date   • History of autologous stem cell transplant ([facility] )    • Insomnia    • Multiple myeloma ([place] [address] )      Patient Active Problem List   Diagnosis   • IgG multiple myeloma (HCC)   • Essential hypertension   • Chronic ITP (idiopathic thrombocytopenia) (HCC)   • Hyperlipidemia   • Hypocalcemia   • Acquired hypothyroidism   • Prophylactic measure   • Hypogammaglobulinemia ([place] [address] )   • Anemia due to stage 3 chronic kidney disease (HCC)   • Pancytopenia (HCC)   • Stage 3b chronic kidney disease (HCC)   • Anxiety       Current Outpatient Medications:   •  acetaminophen (TYLENOL) 325 mg tablet, Take 650 mg by mouth every 6 (six) hours as needed for mild pain, Disp: , Rfl:   •  acyclovir (ZOVIRAX) 200 mg capsule, Take 1 tablet by mouth every 12 hours, Disp: 60 capsule, Rfl: 6  •  Ascorbic Acid (vitamin C) 1000 MG tablet, Take 1,000 mg by mouth daily, Disp: , Rfl:   •  atorvastatin (LIPITOR) 20 mg tablet, Take 1 tablet (20 mg total) by mouth daily, Disp: 90 tablet, Rfl: 1  •  Cholecalciferol (VITAMIN D-3 PO), Take 1,000 Units by mouth daily , Disp: , Rfl:   •  escitalopram (LEXAPRO) 20 mg tablet, TAKE 1 TABLET DAILY, Disp: 90 tablet, Rfl: 1  •  Glutamine POWD, by Does not apply route, Disp: , Rfl:   •  levothyroxine 50 mcg tablet, TAKE ONE AND ONE-HALF TABLETS DAILY, Disp: 45 tablet, Rfl: 2  •  Multiple Vitamin (MULTIVITAMINS PO), Take 1 tablet by mouth daily  , Disp: , Rfl:   •  sulfamethoxazole-trimethoprim (BACTRIM DS) 800-160 mg per tablet, 3 (three) times a week Monday Wednesday friday, Disp: , Rfl:   •  vitamin B-12 (VITAMIN B-12) 1,000 mcg tablet, Take 1,000 mcg by mouth daily, Disp: , Rfl:   •  LORazepam (ATIVAN) 1 mg tablet, Take 1 tablet (1 mg total) by mouth every 4 (four) hours as needed for anxiety (Patient not taking: No sig reported), Disp: 100 tablet, Rfl: 1  No Known Allergies  Past Surgical History:   Procedure Laterality Date   • APPENDECTOMY      Last assessed: 9/25/15   • ARTHROSCOPY KNEE Left     9/30/09   • EYE SURGERY      Lasik   • KNEE CARTILAGE SURGERY     • LIMBAL STEM CELL TRANSPLANT      Patient had 2 in [place]-2/29/2016 and 4/13/2016     Social History     Objective:  Vitals:    10/12/22 1353   BP: 126/80   BP Location: Left arm   Patient Position: Sitting   Cuff Size: Adult   Pulse: 67   Resp: 17   SpO2: 99%   Weight: 75 8 kg (167 lb)   Height: 5' 10" (1 778 m)     Physical Exam  Constitutional:       Appearance: He is well-developed  HENT:      Head: Normocephalic and atraumatic  Eyes:      Pupils: Pupils are equal, round, and reactive to light  Cardiovascular:      Rate and Rhythm: Normal rate and regular rhythm  Heart sounds: No murmur heard  Pulmonary:      Breath sounds: Normal breath sounds  No wheezing or rales  Abdominal:      Palpations: Abdomen is soft  Tenderness: There is no abdominal tenderness  Musculoskeletal:         General: No tenderness  Normal range of motion  Cervical back: Neck supple  Lymphadenopathy:      Cervical: No cervical adenopathy  Skin:     Findings: No erythema or rash  Neurological:      Mental Status: He is alert and oriented to person, place, and time  Cranial Nerves: No cranial nerve deficit  Deep Tendon Reflexes: Reflexes are normal and symmetric  Psychiatric:         Behavior: Behavior normal            Labs: I personally reviewed the labs and imaging pertinent to this patient care

## 2022-10-13 ENCOUNTER — DOCUMENTATION (OUTPATIENT)
Dept: HEMATOLOGY ONCOLOGY | Facility: CLINIC | Age: 77
End: 2022-10-13

## 2022-10-13 RX ORDER — SODIUM CHLORIDE 9 MG/ML
20 INJECTION, SOLUTION INTRAVENOUS ONCE
OUTPATIENT
Start: 2022-11-07

## 2022-10-13 RX ORDER — ACETAMINOPHEN 325 MG/1
650 TABLET ORAL ONCE
OUTPATIENT
Start: 2022-10-31

## 2022-10-13 RX ORDER — SODIUM CHLORIDE 9 MG/ML
20 INJECTION, SOLUTION INTRAVENOUS ONCE
Status: CANCELLED | OUTPATIENT
Start: 2022-10-24

## 2022-10-13 RX ORDER — ACETAMINOPHEN 325 MG/1
650 TABLET ORAL ONCE
Status: CANCELLED | OUTPATIENT
Start: 2022-10-17

## 2022-10-13 RX ORDER — ACETAMINOPHEN 325 MG/1
650 TABLET ORAL ONCE
OUTPATIENT
Start: 2022-11-07

## 2022-10-13 RX ORDER — SODIUM CHLORIDE 9 MG/ML
20 INJECTION, SOLUTION INTRAVENOUS ONCE
Status: CANCELLED | OUTPATIENT
Start: 2022-10-17

## 2022-10-13 RX ORDER — SODIUM CHLORIDE 9 MG/ML
20 INJECTION, SOLUTION INTRAVENOUS ONCE
OUTPATIENT
Start: 2022-10-31

## 2022-10-13 RX ORDER — ACETAMINOPHEN 325 MG/1
650 TABLET ORAL ONCE
Status: CANCELLED | OUTPATIENT
Start: 2022-10-24

## 2022-10-13 NOTE — PROGRESS NOTES
Received request from clinical for patient to start on Pomalyst 2mg   Todd Ba has been submitted via cover my meds and this is pending     BIN:       [id number]  PCN:      [id number]  ID:          [id number]  GRP:      [address] [facility],  [address] has been provided as well  Patient has been enrolled with [facility] ID# [id number]  CARD# [id number]  PCN#  [id number]  GRP# [id number]  BIN# [id number]  AMT$ 55820  EFF 9-11-22  THRU 10-11-23     Epic noted    UPDATE:  This has been approved for patient from 10/13/2022-10/13/2023

## 2022-10-14 ENCOUNTER — HOSPITAL ENCOUNTER (OUTPATIENT)
Dept: INFUSION CENTER | Facility: CLINIC | Age: 77
End: 2022-10-14
Payer: MEDICARE

## 2022-10-14 VITALS
TEMPERATURE: 98 F | RESPIRATION RATE: 18 BRPM | BODY MASS INDEX: 24.36 KG/M2 | SYSTOLIC BLOOD PRESSURE: 135 MMHG | WEIGHT: 169.75 LBS | DIASTOLIC BLOOD PRESSURE: 70 MMHG | HEART RATE: 65 BPM

## 2022-10-14 DIAGNOSIS — D80.1 HYPOGAMMAGLOBULINEMIA (HCC): ICD-10-CM

## 2022-10-14 DIAGNOSIS — C90.00 IGG MULTIPLE MYELOMA (HCC): Primary | ICD-10-CM

## 2022-10-14 DIAGNOSIS — C90.00 MULTIPLE MYELOMA NOT HAVING ACHIEVED REMISSION (HCC): Primary | ICD-10-CM

## 2022-10-14 LAB
BASOPHILS # BLD AUTO: 0.02 THOUSANDS/ΜL (ref 0–0.1)
BASOPHILS NFR BLD AUTO: 0 % (ref 0–1)
EOSINOPHIL # BLD AUTO: 0.07 THOUSAND/ΜL (ref 0–0.61)
EOSINOPHIL NFR BLD AUTO: 2 % (ref 0–6)
ERYTHROCYTE [DISTWIDTH] IN BLOOD BY AUTOMATED COUNT: 18.6 % (ref 11.6–15.1)
HCT VFR BLD AUTO: 30.1 % (ref 36.5–49.3)
HGB BLD-MCNC: 10.1 G/DL (ref 12–17)
IMM GRANULOCYTES # BLD AUTO: 0.04 THOUSAND/UL (ref 0–0.2)
IMM GRANULOCYTES NFR BLD AUTO: 1 % (ref 0–2)
LYMPHOCYTES # BLD AUTO: 0.33 THOUSANDS/ΜL (ref 0.6–4.47)
LYMPHOCYTES NFR BLD AUTO: 7 % (ref 14–44)
MCH RBC QN AUTO: 31.3 PG (ref 26.8–34.3)
MCHC RBC AUTO-ENTMCNC: 33.6 G/DL (ref 31.4–37.4)
MCV RBC AUTO: 93 FL (ref 82–98)
MONOCYTES # BLD AUTO: 0.6 THOUSAND/ΜL (ref 0.17–1.22)
MONOCYTES NFR BLD AUTO: 13 % (ref 4–12)
NEUTROPHILS # BLD AUTO: 3.44 THOUSANDS/ΜL (ref 1.85–7.62)
NEUTS SEG NFR BLD AUTO: 77 % (ref 43–75)
NRBC BLD AUTO-RTO: 0 /100 WBCS
PLATELET # BLD AUTO: 380 THOUSANDS/UL (ref 149–390)
PMV BLD AUTO: 12.2 FL (ref 8.9–12.7)
RBC # BLD AUTO: 3.23 MILLION/UL (ref 3.88–5.62)
WBC # BLD AUTO: 4.5 THOUSAND/UL (ref 4.31–10.16)

## 2022-10-14 PROCEDURE — 85025 COMPLETE CBC W/AUTO DIFF WBC: CPT | Performed by: INTERNAL MEDICINE

## 2022-10-14 PROCEDURE — 96366 THER/PROPH/DIAG IV INF ADDON: CPT

## 2022-10-14 PROCEDURE — 96365 THER/PROPH/DIAG IV INF INIT: CPT

## 2022-10-14 RX ORDER — SODIUM CHLORIDE 9 MG/ML
20 INJECTION, SOLUTION INTRAVENOUS ONCE
Start: 2022-10-17 | End: 2022-11-11

## 2022-10-14 RX ORDER — SODIUM CHLORIDE 9 MG/ML
20 INJECTION, SOLUTION INTRAVENOUS ONCE
Status: COMPLETED | OUTPATIENT
Start: 2022-10-14 | End: 2022-10-14

## 2022-10-14 RX ADMIN — SODIUM CHLORIDE 20 ML/HR: 0.9 INJECTION, SOLUTION INTRAVENOUS at 08:42

## 2022-10-14 RX ADMIN — Medication 30 G: at 08:44

## 2022-10-14 NOTE — PROGRESS NOTES
Patient arrived on unit for IVIG  Denies any present issues/concerns  CBC/CMP drawn for new chemo treatment starting 10/17/2022  Tolerated IVIG without incident  Aware of next appointment   Declined AVS

## 2022-10-17 ENCOUNTER — HOSPITAL ENCOUNTER (OUTPATIENT)
Dept: INFUSION CENTER | Facility: CLINIC | Age: 77
Discharge: HOME | End: 2022-10-17
Payer: MEDICARE

## 2022-10-17 VITALS
SYSTOLIC BLOOD PRESSURE: 133 MMHG | TEMPERATURE: 97.3 F | WEIGHT: 170.64 LBS | HEIGHT: 70 IN | RESPIRATION RATE: 18 BRPM | HEART RATE: 58 BPM | DIASTOLIC BLOOD PRESSURE: 68 MMHG | BODY MASS INDEX: 24.43 KG/M2

## 2022-10-17 DIAGNOSIS — C90.00 MULTIPLE MYELOMA NOT HAVING ACHIEVED REMISSION (HCC): Primary | ICD-10-CM

## 2022-10-17 DIAGNOSIS — C90.00 IGG MULTIPLE MYELOMA (HCC): Primary | ICD-10-CM

## 2022-10-17 PROCEDURE — 96413 CHEMO IV INFUSION 1 HR: CPT

## 2022-10-17 PROCEDURE — 96415 CHEMO IV INFUSION ADDL HR: CPT

## 2022-10-17 PROCEDURE — 96367 TX/PROPH/DG ADDL SEQ IV INF: CPT

## 2022-10-17 RX ORDER — SODIUM CHLORIDE 9 MG/ML
20 INJECTION, SOLUTION INTRAVENOUS ONCE
Status: COMPLETED | OUTPATIENT
Start: 2022-10-17 | End: 2022-10-17

## 2022-10-17 RX ORDER — DEXAMETHASONE 4 MG/1
20 TABLET ORAL ONCE
Status: COMPLETED | OUTPATIENT
Start: 2022-10-17 | End: 2022-10-17

## 2022-10-17 RX ORDER — ACETAMINOPHEN 325 MG/1
650 TABLET ORAL ONCE
Status: COMPLETED | OUTPATIENT
Start: 2022-10-17 | End: 2022-10-17

## 2022-10-17 RX ORDER — DEXAMETHASONE 4 MG/1
20 TABLET ORAL ONCE
Qty: 55 TABLET | Refills: 0 | Status: SHIPPED | OUTPATIENT
Start: 2022-10-17 | End: 2022-10-17

## 2022-10-17 RX ORDER — DEXAMETHASONE 4 MG/1
20 TABLET ORAL ONCE
Status: CANCELLED
Start: 2022-10-17

## 2022-10-17 RX ADMIN — DEXAMETHASONE SODIUM PHOSPHATE 8 MG: 10 INJECTION, SOLUTION INTRAMUSCULAR; INTRAVENOUS at 09:42

## 2022-10-17 RX ADMIN — SODIUM CHLORIDE 20 ML/HR: 0.9 INJECTION, SOLUTION INTRAVENOUS at 09:38

## 2022-10-17 RX ADMIN — ACETAMINOPHEN 650 MG: 325 TABLET ORAL at 09:37

## 2022-10-17 RX ADMIN — FAMOTIDINE 20 MG: 10 INJECTION INTRAVENOUS at 10:34

## 2022-10-17 RX ADMIN — DEXAMETHASONE 20 MG: 4 TABLET ORAL at 10:03

## 2022-10-17 RX ADMIN — DIPHENHYDRAMINE HYDROCHLORIDE 25 MG: 50 INJECTION, SOLUTION INTRAMUSCULAR; INTRAVENOUS at 10:05

## 2022-10-17 RX ADMIN — SODIUM CHLORIDE 757.5 MG: 0.9 INJECTION, SOLUTION INTRAVENOUS at 11:57

## 2022-10-17 NOTE — PROGRESS NOTES
Per Dr Lazara Lo patient should receive a total of 28 mg Dexamethasone  He stated patient should have additional 20 mg Dexamethasone today  Going forward, he would like the patient to receive Dexamethasone 20 mg PO at home, morning of treatment  Clara Deshpande RN notified  Asked her to please notify patient of additional prescription to be taken at home day of treatment

## 2022-10-17 NOTE — PROGRESS NOTES
Patient tolerated first dose of Empliciti without complication  Patient aware for future cycles he will be taking Dexamethasone 20mg PO prior to coming in  AVS provided, patient left unit in stable condition with wife

## 2022-10-18 DIAGNOSIS — C90.00 MULTIPLE MYELOMA NOT HAVING ACHIEVED REMISSION (HCC): ICD-10-CM

## 2022-10-21 ENCOUNTER — APPOINTMENT (OUTPATIENT)
Dept: LAB | Facility: CLINIC | Age: 77
End: 2022-10-21
Payer: MEDICARE

## 2022-10-21 DIAGNOSIS — C90.00 IGG MULTIPLE MYELOMA (HCC): ICD-10-CM

## 2022-10-21 DIAGNOSIS — E03.9 ACQUIRED HYPOTHYROIDISM: ICD-10-CM

## 2022-10-21 LAB
BACTERIA UR QL AUTO: ABNORMAL /HPF
BILIRUB UR QL STRIP: NEGATIVE
CLARITY UR: CLEAR
COLOR UR: ABNORMAL
CREAT UR-MCNC: 113 MG/DL
GLUCOSE UR STRIP-MCNC: NEGATIVE MG/DL
HGB UR QL STRIP.AUTO: NEGATIVE
KETONES UR STRIP-MCNC: NEGATIVE MG/DL
LEUKOCYTE ESTERASE UR QL STRIP: NEGATIVE
MICROALBUMIN UR-MCNC: 70.3 MG/L (ref 0–20)
MICROALBUMIN/CREAT 24H UR: 62 MG/G CREATININE (ref 0–30)
NITRITE UR QL STRIP: NEGATIVE
NON-SQ EPI CELLS URNS QL MICRO: ABNORMAL /HPF
PH UR STRIP.AUTO: 6 [PH]
PROT UR STRIP-MCNC: ABNORMAL MG/DL
RBC #/AREA URNS AUTO: ABNORMAL /HPF
SP GR UR STRIP.AUTO: 1.02 (ref 1–1.03)
T4 FREE SERPL-MCNC: 0.45 NG/DL (ref 0.76–1.46)
TSH SERPL DL<=0.05 MIU/L-ACNC: 1.35 UIU/ML (ref 0.45–4.5)
UROBILINOGEN UR STRIP-ACNC: <2 MG/DL
WBC #/AREA URNS AUTO: ABNORMAL /HPF

## 2022-10-21 PROCEDURE — 81001 URINALYSIS AUTO W/SCOPE: CPT | Performed by: INTERNAL MEDICINE

## 2022-10-21 PROCEDURE — 82043 UR ALBUMIN QUANTITATIVE: CPT | Performed by: INTERNAL MEDICINE

## 2022-10-21 PROCEDURE — 82570 ASSAY OF URINE CREATININE: CPT | Performed by: INTERNAL MEDICINE

## 2022-10-21 PROCEDURE — 84439 ASSAY OF FREE THYROXINE: CPT

## 2022-10-21 PROCEDURE — 84443 ASSAY THYROID STIM HORMONE: CPT

## 2022-10-24 ENCOUNTER — HOSPITAL ENCOUNTER (OUTPATIENT)
Dept: INFUSION CENTER | Facility: CLINIC | Age: 77
Discharge: HOME | End: 2022-10-24
Payer: MEDICARE

## 2022-10-24 VITALS
SYSTOLIC BLOOD PRESSURE: 138 MMHG | WEIGHT: 171.96 LBS | RESPIRATION RATE: 18 BRPM | DIASTOLIC BLOOD PRESSURE: 78 MMHG | BODY MASS INDEX: 24.62 KG/M2 | HEIGHT: 70 IN | HEART RATE: 58 BPM | TEMPERATURE: 96.9 F

## 2022-10-24 DIAGNOSIS — C90.00 IGG MULTIPLE MYELOMA (HCC): Primary | ICD-10-CM

## 2022-10-24 RX ORDER — ACETAMINOPHEN 325 MG/1
650 TABLET ORAL ONCE
Status: COMPLETED | OUTPATIENT
Start: 2022-10-24 | End: 2022-10-24

## 2022-10-24 RX ORDER — SODIUM CHLORIDE 9 MG/ML
20 INJECTION, SOLUTION INTRAVENOUS ONCE
Status: COMPLETED | OUTPATIENT
Start: 2022-10-24 | End: 2022-10-24

## 2022-10-24 RX ADMIN — DEXAMETHASONE SODIUM PHOSPHATE 8 MG: 10 INJECTION, SOLUTION INTRAMUSCULAR; INTRAVENOUS at 08:37

## 2022-10-24 RX ADMIN — FAMOTIDINE 20 MG: 10 INJECTION INTRAVENOUS at 09:36

## 2022-10-24 RX ADMIN — DIPHENHYDRAMINE HYDROCHLORIDE 25 MG: 50 INJECTION, SOLUTION INTRAMUSCULAR; INTRAVENOUS at 09:04

## 2022-10-24 RX ADMIN — SODIUM CHLORIDE 20 ML/HR: 0.9 INJECTION, SOLUTION INTRAVENOUS at 08:34

## 2022-10-24 RX ADMIN — ACETAMINOPHEN 650 MG: 325 TABLET ORAL at 08:36

## 2022-10-24 RX ADMIN — SODIUM CHLORIDE 757.5 MG: 0.9 INJECTION, SOLUTION INTRAVENOUS at 10:46

## 2022-10-25 DIAGNOSIS — N18.32 STAGE 3B CHRONIC KIDNEY DISEASE (HCC): Primary | ICD-10-CM

## 2022-10-25 DIAGNOSIS — C90.00 IGG MULTIPLE MYELOMA (HCC): ICD-10-CM

## 2022-10-26 ENCOUNTER — DOCUMENTATION (OUTPATIENT)
Dept: NEPHROLOGY | Facility: CLINIC | Age: 77
End: 2022-10-26

## 2022-10-26 DIAGNOSIS — E03.9 ACQUIRED HYPOTHYROIDISM: Primary | ICD-10-CM

## 2022-10-28 ENCOUNTER — APPOINTMENT (OUTPATIENT)
Dept: LAB | Facility: CLINIC | Age: 77
End: 2022-10-28
Payer: MEDICARE

## 2022-10-28 DIAGNOSIS — C90.00 IGG MULTIPLE MYELOMA (HCC): ICD-10-CM

## 2022-10-28 LAB
ALBUMIN SERPL BCP-MCNC: 3 G/DL (ref 3.5–5)
ALP SERPL-CCNC: 66 U/L (ref 46–116)
ALT SERPL W P-5'-P-CCNC: 31 U/L (ref 12–78)
ANION GAP SERPL CALCULATED.3IONS-SCNC: 5 MMOL/L (ref 4–13)
ANISOCYTOSIS BLD QL SMEAR: PRESENT
AST SERPL W P-5'-P-CCNC: 22 U/L (ref 5–45)
BASOPHILS # BLD MANUAL: 0 THOUSAND/UL (ref 0–0.1)
BASOPHILS NFR MAR MANUAL: 0 % (ref 0–1)
BILIRUB SERPL-MCNC: 0.5 MG/DL (ref 0.2–1)
BUN SERPL-MCNC: 22 MG/DL (ref 5–25)
CALCIUM ALBUM COR SERPL-MCNC: 10.5 MG/DL (ref 8.3–10.1)
CALCIUM SERPL-MCNC: 9.7 MG/DL (ref 8.3–10.1)
CHLORIDE SERPL-SCNC: 104 MMOL/L (ref 96–108)
CO2 SERPL-SCNC: 27 MMOL/L (ref 21–32)
CREAT SERPL-MCNC: 1.85 MG/DL (ref 0.6–1.3)
DACRYOCYTES BLD QL SMEAR: PRESENT
EOSINOPHIL # BLD MANUAL: 0.12 THOUSAND/UL (ref 0–0.4)
EOSINOPHIL NFR BLD MANUAL: 2 % (ref 0–6)
ERYTHROCYTE [DISTWIDTH] IN BLOOD BY AUTOMATED COUNT: 18.9 % (ref 11.6–15.1)
GFR SERPL CREATININE-BSD FRML MDRD: 34 ML/MIN/1.73SQ M
GLUCOSE P FAST SERPL-MCNC: 96 MG/DL (ref 65–99)
HCT VFR BLD AUTO: 30.7 % (ref 36.5–49.3)
HGB BLD-MCNC: 9.7 G/DL (ref 12–17)
LYMPHOCYTES # BLD AUTO: 0.06 THOUSAND/UL (ref 0.6–4.47)
LYMPHOCYTES # BLD AUTO: 1 % (ref 14–44)
MCH RBC QN AUTO: 29.8 PG (ref 26.8–34.3)
MCHC RBC AUTO-ENTMCNC: 31.6 G/DL (ref 31.4–37.4)
MCV RBC AUTO: 94 FL (ref 82–98)
MONOCYTES # BLD AUTO: 0.29 THOUSAND/UL (ref 0–1.22)
MONOCYTES NFR BLD: 5 % (ref 4–12)
NEUTROPHILS # BLD MANUAL: 5.2 THOUSAND/UL (ref 1.85–7.62)
NEUTS BAND NFR BLD MANUAL: 8 % (ref 0–8)
NEUTS SEG NFR BLD AUTO: 82 % (ref 43–75)
NRBC BLD AUTO-RTO: 2 /100 WBC (ref 0–2)
OVALOCYTES BLD QL SMEAR: PRESENT
PLATELET # BLD AUTO: 128 THOUSANDS/UL (ref 149–390)
PLATELET BLD QL SMEAR: ABNORMAL
PMV BLD AUTO: 10.5 FL (ref 8.9–12.7)
POIKILOCYTOSIS BLD QL SMEAR: PRESENT
POLYCHROMASIA BLD QL SMEAR: PRESENT
POTASSIUM SERPL-SCNC: 4.4 MMOL/L (ref 3.5–5.3)
PROT SERPL-MCNC: 7.4 G/DL (ref 6.4–8.4)
RBC # BLD AUTO: 3.26 MILLION/UL (ref 3.88–5.62)
RBC MORPH BLD: PRESENT
SODIUM SERPL-SCNC: 136 MMOL/L (ref 135–147)
VARIANT LYMPHS # BLD AUTO: 2 %
WBC # BLD AUTO: 5.78 THOUSAND/UL (ref 4.31–10.16)

## 2022-10-28 PROCEDURE — 80053 COMPREHEN METABOLIC PANEL: CPT

## 2022-10-28 PROCEDURE — 36415 COLL VENOUS BLD VENIPUNCTURE: CPT

## 2022-10-28 PROCEDURE — 85027 COMPLETE CBC AUTOMATED: CPT

## 2022-10-28 PROCEDURE — 85007 BL SMEAR W/DIFF WBC COUNT: CPT

## 2022-10-31 ENCOUNTER — HOSPITAL ENCOUNTER (OUTPATIENT)
Dept: INFUSION CENTER | Facility: CLINIC | Age: 77
Discharge: HOME | End: 2022-10-31

## 2022-10-31 VITALS
BODY MASS INDEX: 24.78 KG/M2 | WEIGHT: 173.06 LBS | DIASTOLIC BLOOD PRESSURE: 78 MMHG | HEIGHT: 70 IN | TEMPERATURE: 98 F | HEART RATE: 62 BPM | SYSTOLIC BLOOD PRESSURE: 124 MMHG | RESPIRATION RATE: 18 BRPM

## 2022-10-31 DIAGNOSIS — C90.00 IGG MULTIPLE MYELOMA (HCC): Primary | ICD-10-CM

## 2022-10-31 RX ORDER — ACETAMINOPHEN 325 MG/1
650 TABLET ORAL ONCE
Status: COMPLETED | OUTPATIENT
Start: 2022-10-31 | End: 2022-10-31

## 2022-10-31 RX ORDER — SODIUM CHLORIDE 9 MG/ML
20 INJECTION, SOLUTION INTRAVENOUS ONCE
Status: COMPLETED | OUTPATIENT
Start: 2022-10-31 | End: 2022-10-31

## 2022-10-31 RX ADMIN — FAMOTIDINE 20 MG: 10 INJECTION INTRAVENOUS at 09:00

## 2022-10-31 RX ADMIN — DIPHENHYDRAMINE HYDROCHLORIDE 25 MG: 50 INJECTION, SOLUTION INTRAMUSCULAR; INTRAVENOUS at 08:40

## 2022-10-31 RX ADMIN — DEXAMETHASONE SODIUM PHOSPHATE 8 MG: 10 INJECTION, SOLUTION INTRAMUSCULAR; INTRAVENOUS at 08:17

## 2022-10-31 RX ADMIN — SODIUM CHLORIDE 757.5 MG: 0.9 INJECTION, SOLUTION INTRAVENOUS at 10:13

## 2022-10-31 RX ADMIN — ACETAMINOPHEN 650 MG: 325 TABLET ORAL at 08:17

## 2022-10-31 RX ADMIN — SODIUM CHLORIDE 20 ML/HR: 0.9 INJECTION, SOLUTION INTRAVENOUS at 08:17

## 2022-11-02 ENCOUNTER — TELEPHONE (OUTPATIENT)
Dept: INTERNAL MEDICINE CLINIC | Facility: CLINIC | Age: 77
End: 2022-11-02

## 2022-11-02 ENCOUNTER — TELEPHONE (OUTPATIENT)
Dept: NEPHROLOGY | Facility: CLINIC | Age: 77
End: 2022-11-02

## 2022-11-02 NOTE — TELEPHONE ENCOUNTER
cNew Patient Intake Form   Patient Details   Camacho Kong     1945     [id number]     Insurance Information   Name of [employer] a and b   Does the patient need an insurance referral? no   If patient has [employer], please ask if they will be using their [employer]  Appointment Information   Who is calling to schedule? If not patient, what is callers name? Patient    Referring Provider Dr Sincere Castillo   Referring Provider Number [id number]   Reason for Appt (Diagnosis) Stage 3b chronic kidney disease ([facility] [address] ) (N18 32 [ICD-10-CM])  IgG multiple myeloma      Is patient aware of why they are being referred? Yes    Does Patient have labs done at [place] [address]? If not, where do they go? yes   Has patient had labs / urine work done? List date of most recent lab / urine work yes   Has patient had a BMP & CBC done in the past 2 years? If so, list the date yes   Has patient been hospitalized recently? If yes, list name and location of hospital they were In no   Has patient been seen by a Nephrologist before? If yes, list name, location and phone number no   Has patient been see by another Speciality before (ex  Neurology, urology, cardiology)? If yes, please list name, and speciality  Hem onc Oncology at [place]   Has the patient had imaging done? If so, list the most recent date and type of imagineg yes   Does the patient has a stone analysis report if history of kidney stones? no   Appointment Details   Is there a referral on file?  yes   Appointment Date 1/25/23   Location [place]    Miscellaneous  Patient is requesting Dr Javier Root

## 2022-11-02 NOTE — TELEPHONE ENCOUNTER
----- Message from [facility] DO Corky sent at 10/25/2022  9:26 AM EDT -----  Urine studies are overall okay  There is a small amount of blood and protein in the urine  Given these findings and Antonio's underlying multiple myeloma with kidney involvement, I would recommend he establish care with a kidney doctor  I will place a referral in the system for him    Please let me know if any questions

## 2022-11-04 ENCOUNTER — APPOINTMENT (OUTPATIENT)
Dept: LAB | Facility: CLINIC | Age: 77
End: 2022-11-04

## 2022-11-04 DIAGNOSIS — C90.00 IGG MULTIPLE MYELOMA (HCC): ICD-10-CM

## 2022-11-04 LAB
ALBUMIN SERPL BCP-MCNC: 3 G/DL (ref 3.5–5)
ALP SERPL-CCNC: 72 U/L (ref 46–116)
ALT SERPL W P-5'-P-CCNC: 36 U/L (ref 12–78)
ANION GAP SERPL CALCULATED.3IONS-SCNC: 7 MMOL/L (ref 4–13)
AST SERPL W P-5'-P-CCNC: 26 U/L (ref 5–45)
BASOPHILS # BLD AUTO: 0.03 THOUSANDS/ÂΜL (ref 0–0.1)
BASOPHILS NFR BLD AUTO: 1 % (ref 0–1)
BILIRUB SERPL-MCNC: 0.86 MG/DL (ref 0.2–1)
BUN SERPL-MCNC: 26 MG/DL (ref 5–25)
CALCIUM ALBUM COR SERPL-MCNC: 10.1 MG/DL (ref 8.3–10.1)
CALCIUM SERPL-MCNC: 9.3 MG/DL (ref 8.3–10.1)
CHLORIDE SERPL-SCNC: 101 MMOL/L (ref 96–108)
CO2 SERPL-SCNC: 26 MMOL/L (ref 21–32)
CREAT SERPL-MCNC: 1.81 MG/DL (ref 0.6–1.3)
EOSINOPHIL # BLD AUTO: 0.55 THOUSAND/ÂΜL (ref 0–0.61)
EOSINOPHIL NFR BLD AUTO: 9 % (ref 0–6)
ERYTHROCYTE [DISTWIDTH] IN BLOOD BY AUTOMATED COUNT: 18.5 % (ref 11.6–15.1)
GFR SERPL CREATININE-BSD FRML MDRD: 35 ML/MIN/1.73SQ M
GLUCOSE P FAST SERPL-MCNC: 103 MG/DL (ref 65–99)
HCT VFR BLD AUTO: 35.1 % (ref 36.5–49.3)
HGB BLD-MCNC: 11 G/DL (ref 12–17)
IMM GRANULOCYTES # BLD AUTO: 0.04 THOUSAND/UL (ref 0–0.2)
IMM GRANULOCYTES NFR BLD AUTO: 1 % (ref 0–2)
LYMPHOCYTES # BLD AUTO: 0.37 THOUSANDS/ÂΜL (ref 0.6–4.47)
LYMPHOCYTES NFR BLD AUTO: 6 % (ref 14–44)
MCH RBC QN AUTO: 29.5 PG (ref 26.8–34.3)
MCHC RBC AUTO-ENTMCNC: 31.3 G/DL (ref 31.4–37.4)
MCV RBC AUTO: 94 FL (ref 82–98)
MONOCYTES # BLD AUTO: 0.93 THOUSAND/ÂΜL (ref 0.17–1.22)
MONOCYTES NFR BLD AUTO: 16 % (ref 4–12)
NEUTROPHILS # BLD AUTO: 4 THOUSANDS/ÂΜL (ref 1.85–7.62)
NEUTS SEG NFR BLD AUTO: 67 % (ref 43–75)
NRBC BLD AUTO-RTO: 0 /100 WBCS
PLATELET # BLD AUTO: 112 THOUSANDS/UL (ref 149–390)
PMV BLD AUTO: 9.8 FL (ref 8.9–12.7)
POTASSIUM SERPL-SCNC: 4.3 MMOL/L (ref 3.5–5.3)
PROT SERPL-MCNC: 7.7 G/DL (ref 6.4–8.4)
RBC # BLD AUTO: 3.73 MILLION/UL (ref 3.88–5.62)
SODIUM SERPL-SCNC: 134 MMOL/L (ref 135–147)
WBC # BLD AUTO: 5.92 THOUSAND/UL (ref 4.31–10.16)

## 2022-11-07 ENCOUNTER — HOSPITAL ENCOUNTER (OUTPATIENT)
Dept: INFUSION CENTER | Facility: CLINIC | Age: 77
Discharge: HOME | End: 2022-11-07

## 2022-11-07 VITALS
HEART RATE: 59 BPM | TEMPERATURE: 98 F | SYSTOLIC BLOOD PRESSURE: 145 MMHG | RESPIRATION RATE: 18 BRPM | HEIGHT: 70 IN | DIASTOLIC BLOOD PRESSURE: 75 MMHG | WEIGHT: 168.43 LBS | BODY MASS INDEX: 24.11 KG/M2

## 2022-11-07 DIAGNOSIS — C90.00 IGG MULTIPLE MYELOMA (HCC): Primary | ICD-10-CM

## 2022-11-07 DIAGNOSIS — D80.1 HYPOGAMMAGLOBULINEMIA (HCC): ICD-10-CM

## 2022-11-07 RX ORDER — SODIUM CHLORIDE 9 MG/ML
20 INJECTION, SOLUTION INTRAVENOUS ONCE
Status: DISCONTINUED | OUTPATIENT
Start: 2022-11-07 | End: 2022-11-10 | Stop reason: HOSPADM

## 2022-11-07 RX ORDER — SODIUM CHLORIDE 9 MG/ML
20 INJECTION, SOLUTION INTRAVENOUS ONCE
OUTPATIENT
Start: 2022-11-21

## 2022-11-07 RX ORDER — SODIUM CHLORIDE 9 MG/ML
20 INJECTION, SOLUTION INTRAVENOUS ONCE
OUTPATIENT
Start: 2022-11-28

## 2022-11-07 RX ORDER — SODIUM CHLORIDE 9 MG/ML
20 INJECTION, SOLUTION INTRAVENOUS ONCE
Status: CANCELLED | OUTPATIENT
Start: 2022-11-14

## 2022-11-07 RX ORDER — ACETAMINOPHEN 325 MG/1
650 TABLET ORAL ONCE
OUTPATIENT
Start: 2022-11-28

## 2022-11-07 RX ORDER — SODIUM CHLORIDE 9 MG/ML
20 INJECTION, SOLUTION INTRAVENOUS ONCE
Status: COMPLETED | OUTPATIENT
Start: 2022-11-07 | End: 2022-11-07

## 2022-11-07 RX ORDER — ACETAMINOPHEN 325 MG/1
650 TABLET ORAL ONCE
OUTPATIENT
Start: 2022-11-21

## 2022-11-07 RX ORDER — ACETAMINOPHEN 325 MG/1
650 TABLET ORAL ONCE
OUTPATIENT
Start: 2022-12-05

## 2022-11-07 RX ORDER — SODIUM CHLORIDE 9 MG/ML
20 INJECTION, SOLUTION INTRAVENOUS ONCE
OUTPATIENT
Start: 2022-12-05

## 2022-11-07 RX ORDER — ACETAMINOPHEN 325 MG/1
650 TABLET ORAL ONCE
Status: CANCELLED | OUTPATIENT
Start: 2022-11-14

## 2022-11-07 RX ORDER — ACETAMINOPHEN 325 MG/1
650 TABLET ORAL ONCE
Status: COMPLETED | OUTPATIENT
Start: 2022-11-07 | End: 2022-11-07

## 2022-11-07 RX ORDER — SODIUM CHLORIDE 9 MG/ML
20 INJECTION, SOLUTION INTRAVENOUS ONCE
Start: 2022-11-14 | End: 2022-11-11

## 2022-11-07 RX ADMIN — FAMOTIDINE 20 MG: 10 INJECTION INTRAVENOUS at 09:02

## 2022-11-07 RX ADMIN — SODIUM CHLORIDE 20 ML/HR: 0.9 INJECTION, SOLUTION INTRAVENOUS at 08:17

## 2022-11-07 RX ADMIN — DEXAMETHASONE SODIUM PHOSPHATE 8 MG: 10 INJECTION, SOLUTION INTRAMUSCULAR; INTRAVENOUS at 08:18

## 2022-11-07 RX ADMIN — SODIUM CHLORIDE 757.5 MG: 0.9 INJECTION, SOLUTION INTRAVENOUS at 10:21

## 2022-11-07 RX ADMIN — DIPHENHYDRAMINE HYDROCHLORIDE 25 MG: 50 INJECTION, SOLUTION INTRAMUSCULAR; INTRAVENOUS at 08:40

## 2022-11-07 RX ADMIN — ACETAMINOPHEN 650 MG: 325 TABLET ORAL at 08:18

## 2022-11-07 RX ADMIN — Medication 30 G: at 11:26

## 2022-11-08 DIAGNOSIS — E03.9 HYPOTHYROIDISM, UNSPECIFIED TYPE: ICD-10-CM

## 2022-11-08 RX ORDER — LEVOTHYROXINE SODIUM 0.05 MG/1
TABLET ORAL
Qty: 45 TABLET | Refills: 11 | Status: SHIPPED | OUTPATIENT
Start: 2022-11-08

## 2022-11-09 ENCOUNTER — TELEPHONE (OUTPATIENT)
Dept: GYNECOLOGIC ONCOLOGY | Facility: CLINIC | Age: 77
End: 2022-11-09

## 2022-11-09 ENCOUNTER — OFFICE VISIT (OUTPATIENT)
Dept: HEMATOLOGY ONCOLOGY | Facility: CLINIC | Age: 77
End: 2022-11-09

## 2022-11-09 VITALS
HEART RATE: 77 BPM | WEIGHT: 175 LBS | SYSTOLIC BLOOD PRESSURE: 136 MMHG | DIASTOLIC BLOOD PRESSURE: 80 MMHG | BODY MASS INDEX: 25.05 KG/M2 | OXYGEN SATURATION: 97 % | HEIGHT: 70 IN | RESPIRATION RATE: 17 BRPM

## 2022-11-09 DIAGNOSIS — C90.00 IGG MULTIPLE MYELOMA (HCC): Primary | ICD-10-CM

## 2022-11-09 DIAGNOSIS — C90.00 MULTIPLE MYELOMA NOT HAVING ACHIEVED REMISSION (HCC): Primary | ICD-10-CM

## 2022-11-09 DIAGNOSIS — C90.00 MULTIPLE MYELOMA NOT HAVING ACHIEVED REMISSION (HCC): ICD-10-CM

## 2022-11-09 RX ORDER — DEXAMETHASONE 4 MG/1
TABLET ORAL
COMMUNITY
Start: 2022-10-17

## 2022-11-09 NOTE — PROGRESS NOTES
[facility] HEMATOLOGY ONCOLOGY SPECIALISTS [facility]ELGeneral [facility] HospitalREJI  [address] [facility] [address]  [address]  [place] [id number] [phone]    Yana Hwang,1945, [id number]  11/09/22    Discussion:   In summary, this is a 72-year-old man with a history of myeloma status post high-dose chemotherapy with stem cell support  CAR-T in August 2021  Stem cell reinfusion March 2022 for persistent cytopenias  He has been on elotuzumab, Pomalyst, dexamethasone since mid October  Clinically he is doing well  He has some fatigue  Occasional aches and pains  Nothing persistent  Energy is increased on the day after dexamethasone  CBC shows hemoglobin 11 0, platelet count 113  White count 5 9, 16% monocytes  Creatinine 1 8, total protein 7 7, albumin 3 0, otherwise normal     Continuation of treatment is recommended at this time  We will recheck his myeloma parameters in mid December  Further treatment decisions to follow thereafter  I discussed the above with the patient  The patient and his wife voiced understanding and agreement   ______________________________________________________________________    Chief Complaint   Patient presents with   • Follow-up       HPI:  Oncology History   IgG multiple myeloma ([facility] [address] )   9/2015 Initial Diagnosis    Found to have Hbg of 6 with SOB, creatinine 1 8 and normal calcium with albumin of 2 1  Additonial blood work showed elevated protien level (12 1g/dL)  9/29/2015 Biopsy    Bone marrow biopsy demonstrated approximately 80% plasma cells with some immature forms noted  These studies showed 13q14 deletion, +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, 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)       10/14/2015 - 11/11/2015 Chemotherapy    Velcade 1 3 mg/m2 Days 1,8,15,22  Cytoxan 300 mg/m2  PO Days 1, 8,15, 22  Dexamethasone 40 mg PO Days 1,8,15, 22  Zometa 4 mg IV Day 1  Cycle length = 28 days    Completed 2 cycles    Day one of cycle 2 was on 11/11/15      12/2015 - 2/2016 Chemotherapy    VDT-PACE x 2 cycles   (completed at [facility] in [place], [place])     2/2016 Transplant    Melphalan 200 mg/m2 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  MRD +     8/2016 - 1/26/2018 Chemotherapy    Maintenance therapy:  Carfilzomib, orginally dosed 27 mg weekly then increased to 45 mg weekly after MRD reactivitation  Revlimid  Dexamethasone      2/2018 Biopsy    Bone marrow demonstrated positive minimal residual disease       2/23/2018 -  Chemotherapy    Daratumumab 16mg/m2 IV Day 1  Pomalyst 4 mg p o  daily 1-21  Dexamethasone 4 mg PO Days 2, 3  Dexamethasone 12 mg PODays 8, 15, 22  Cycle length = 28 days     4/11/2019 - 8/29/2019 Chemotherapy    methylPREDNISolone sodium succinate (Solu-MEDROL) 100 mg in sodium chloride 0 9 % 250 mL IVPB, 100 mg, Intravenous, Once, 5 of 12 cycles  Administration: 100 mg (6/7/2019), 100 mg (7/5/2019), 100 mg (8/2/2019)     8/30/2019 - 11/8/2019 Chemotherapy    cyclophosphamide (CYTOXAN) 1,000 mg in sodium chloride 0 9 % 250 mL IVPB, 990 mg (66 7 % of original dose 750 mg/m2), Intravenous, Once, 2 of 3 cycles  Dose modification: 500 mg/m2 (original dose 750 mg/m2, Cycle 1, Reason: Other (See Comments)), 250 mg/m2 (original dose 750 mg/m2, Cycle 4, Reason: Treatment Parameters Not Met)  Administration: 1,000 mg (8/30/2019), 1,000 mg (9/13/2019), 1,000 mg (9/27/2019), 500 mg (10/11/2019)  bortezomib (VELCADE) subcutaneous injection 2 6 mg, 1 3 mg/m2 = 2 6 mg (86 7 % of original dose 1 5 mg/m2), Subcutaneous, Once, 3 of 4 cycles  Dose modification: 1 3 mg/m2 (original dose 1 5 mg/m2, Cycle 1, Reason: Other (See Comments))  Administration: 2 6 mg (8/30/2019), 2 6 mg (9/13/2019), 2 6 mg (10/25/2019), 2 6 mg (11/8/2019), 2 6 mg (9/27/2019), 2 6 mg (11/1/2019), 2 6 mg (9/20/2019), 2 6 mg (10/4/2019), 2 6 mg (10/11/2019), 2 6 mg (10/18/2019)     12/6/2019 - 6/19/2020 Chemotherapy    pegfilgrastim (NEULASTA ONPRO) subcutaneous injection kit 6 mg, 6 mg, Subcutaneous, Once, 5 of 9 cycles  Administration: 6 mg (2/28/2020), 6 mg (3/13/2020), 6 mg (3/27/2020), 6 mg (4/10/2020), 6 mg (4/24/2020), 6 mg (5/8/2020), 6 mg (6/5/2020), 6 mg (5/22/2020), 6 mg (6/19/2020)  cyclophosphamide (CYTOXAN) 784 mg in sodium chloride 0 9 % 250 mL IVPB, 400 mg/m2 = 784 mg, Intravenous, Once, 5 of 9 cycles  Dose modification: 400 mg/m2 (original dose 750 mg/m2, Cycle 7, Reason: Other (See Comments))  Administration: 784 mg (2/28/2020), 784 mg (3/13/2020), 784 mg (3/27/2020), 784 mg (4/10/2020), 784 mg (4/24/2020), 784 mg (5/8/2020), 784 mg (5/22/2020), 784 mg (6/19/2020), 784 mg (6/5/2020)  methylPREDNISolone sodium succinate (Solu-MEDROL) 100 mg in sodium chloride 0 9 % 250 mL IVPB, 100 mg, Intravenous, Once, 8 of 12 cycles  Administration: 100 mg (12/6/2019), 100 mg (12/13/2019), 100 mg (12/20/2019), 100 mg (1/3/2020), 100 mg (1/10/2020), 100 mg (1/17/2020), 100 mg (1/31/2020), 100 mg (2/14/2020), 100 mg (5/22/2020), 100 mg (12/27/2019), 100 mg (1/24/2020), 100 mg (2/28/2020), 100 mg (3/13/2020), 100 mg (3/27/2020), 100 mg (4/10/2020), 100 mg (4/24/2020), 100 mg (5/8/2020), 100 mg (6/19/2020)     7/2/2020 -  Chemotherapy       10/17/2022 -  Chemotherapy    elotuzumab (EMPLICITI) IVPB, 10 mg/kg = 757 5 mg, Intravenous, Once, 1 of 6 cycles  Administration: 757 5 mg (10/17/2022), 757 5 mg (10/24/2022), 757 5 mg (11/7/2022), 757 5 mg (10/31/2022)         Interval History:  Clinically stable  ECOG-  1 - Symptomatic but completely ambulatory    Review of Systems   Constitutional: Positive for fatigue  Negative for chills and fever  HENT: Negative for nosebleeds  Eyes: Negative for discharge  Respiratory: Negative for cough and shortness of breath  Cardiovascular: Negative for chest pain  Gastrointestinal: Negative for abdominal pain, constipation and diarrhea  Endocrine: Negative for polydipsia  Genitourinary: Negative for hematuria  Musculoskeletal: Negative for arthralgias  Skin: Negative for color change  Allergic/Immunologic: Negative for immunocompromised state  Neurological: Negative for dizziness and headaches  Hematological: Negative for adenopathy  Psychiatric/Behavioral: Negative for agitation  Past Medical History:   Diagnosis Date   • History of autologous stem cell transplant ([place] [address] )    • Insomnia    • Multiple myeloma ([place] [address] )      Patient Active Problem List   Diagnosis   • IgG multiple myeloma (HCC)   • Essential hypertension   • Chronic ITP (idiopathic thrombocytopenia) ([facility])   • Hyperlipidemia   • Hypocalcemia   • Acquired hypothyroidism   • Prophylactic measure   • Hypogammaglobulinemia ([place] [address] )   • Anemia due to stage 3 chronic kidney disease (HCC)   • Pancytopenia (HCC)   • Stage 3b chronic kidney disease (HCC)   • Anxiety       Current Outpatient Medications:   •  acetaminophen (TYLENOL) 325 mg tablet, Take 650 mg by mouth every 6 (six) hours as needed for mild pain, Disp: , Rfl:   •  acyclovir (ZOVIRAX) 200 mg capsule, Take 1 tablet by mouth every 12 hours, Disp: 60 capsule, Rfl: 6  •  Ascorbic Acid (vitamin C) 1000 MG tablet, Take 1,000 mg by mouth daily, Disp: , Rfl:   •  atorvastatin (LIPITOR) 20 mg tablet, Take 1 tablet (20 mg total) by mouth daily, Disp: 90 tablet, Rfl: 1  •  Cholecalciferol (VITAMIN D-3 PO), Take 1,000 Units by mouth daily , Disp: , Rfl:   •  escitalopram (LEXAPRO) 20 mg tablet, TAKE 1 TABLET DAILY, Disp: 90 tablet, Rfl: 1  •  Glutamine POWD, by Does not apply route, Disp: , Rfl:   •  levothyroxine 50 mcg tablet, TAKE ONE AND ONE-HALF TABLETS DAILY, Disp: 45 tablet, Rfl: 11  •  Multiple Vitamin (MULTIVITAMINS PO), Take 1 tablet by mouth daily  , Disp: , Rfl:   •  sulfamethoxazole-trimethoprim (BACTRIM DS) 800-160 mg per tablet, 3 (three) times a week Monday Wednesday friday, Disp: , Rfl:   •  vitamin B-12 (VITAMIN B-12) 1,000 mcg tablet, Take 1,000 mcg by mouth daily, Disp: , Rfl:   •  dexamethasone (DECADRON) 4 mg tablet, take 5 tablets by mouth ONE TIME FOR 1 DOSE TO BE TAKEN EACH MORNING OF EACH CHEMO TREATMENT, Disp: , Rfl:   •  LORazepam (ATIVAN) 1 mg tablet, Take 1 tablet (1 mg total) by mouth every 4 (four) hours as needed for anxiety (Patient not taking: No sig reported), Disp: 100 tablet, Rfl: 1  •  Pomalidomide (Pomalyst) 2 MG CAPS, TAKE 1 CAPSULE DAILY FOR 21 DAYS, 7 DAYS OFF, Disp: 21 capsule, Rfl: 0  No current facility-administered medications for this visit  Facility-Administered Medications Ordered in Other Visits:   •  sodium chloride 0 9 % infusion, 20 mL/hr, Intravenous, Once, Wandra Ping, DO  No Known Allergies  Past Surgical History:   Procedure Laterality Date   • APPENDECTOMY      Last assessed: 9/25/15   • ARTHROSCOPY KNEE Left     9/30/09   • EYE SURGERY      Lasik   • KNEE CARTILAGE SURGERY     • LIMBAL STEM CELL TRANSPLANT      Patient had 2 in [place]-2/29/2016 and 4/13/2016     Social History     Objective:  Vitals:    11/09/22 0830   BP: 136/80   BP Location: Left arm   Patient Position: Sitting   Cuff Size: Adult   Pulse: 77   Resp: 17   SpO2: 97%   Weight: 79 4 kg (175 lb)   Height: 5' 10" (1 778 m)     Physical Exam  Constitutional:       Appearance: He is well-developed  HENT:      Head: Normocephalic and atraumatic  Eyes:      Pupils: Pupils are equal, round, and reactive to light  Cardiovascular:      Rate and Rhythm: Normal rate and regular rhythm  Heart sounds: No murmur heard  Pulmonary:      Breath sounds: Normal breath sounds  No wheezing or rales  Abdominal:      Palpations: Abdomen is soft  Tenderness: There is no abdominal tenderness  Musculoskeletal:         General: No tenderness  Normal range of motion  Cervical back: Neck supple  Lymphadenopathy:      Cervical: No cervical adenopathy  Skin:     Findings: No erythema or rash  Neurological:      Mental Status: He is alert and oriented to person, place, and time  Cranial Nerves: No cranial nerve deficit        Deep Tendon Reflexes: Reflexes are normal and symmetric  Psychiatric:         Behavior: Behavior normal            Labs: I personally reviewed the labs and imaging pertinent to this patient care

## 2022-11-11 ENCOUNTER — APPOINTMENT (OUTPATIENT)
Dept: LAB | Facility: CLINIC | Age: 77
End: 2022-11-11

## 2022-11-11 DIAGNOSIS — C90.00 IGG MULTIPLE MYELOMA (HCC): ICD-10-CM

## 2022-11-11 DIAGNOSIS — E03.9 ACQUIRED HYPOTHYROIDISM: ICD-10-CM

## 2022-11-11 LAB
ALBUMIN SERPL BCP-MCNC: 2.8 G/DL (ref 3.5–5)
ALP SERPL-CCNC: 71 U/L (ref 46–116)
ALT SERPL W P-5'-P-CCNC: 35 U/L (ref 12–78)
ANION GAP SERPL CALCULATED.3IONS-SCNC: 4 MMOL/L (ref 4–13)
AST SERPL W P-5'-P-CCNC: 28 U/L (ref 5–45)
BASOPHILS # BLD AUTO: 0.04 THOUSANDS/ÂΜL (ref 0–0.1)
BASOPHILS NFR BLD AUTO: 1 % (ref 0–1)
BILIRUB SERPL-MCNC: 0.49 MG/DL (ref 0.2–1)
BUN SERPL-MCNC: 24 MG/DL (ref 5–25)
CALCIUM ALBUM COR SERPL-MCNC: 10.1 MG/DL (ref 8.3–10.1)
CALCIUM SERPL-MCNC: 9.1 MG/DL (ref 8.3–10.1)
CHLORIDE SERPL-SCNC: 105 MMOL/L (ref 96–108)
CO2 SERPL-SCNC: 25 MMOL/L (ref 21–32)
CREAT SERPL-MCNC: 1.86 MG/DL (ref 0.6–1.3)
EOSINOPHIL # BLD AUTO: 0.51 THOUSAND/ÂΜL (ref 0–0.61)
EOSINOPHIL NFR BLD AUTO: 14 % (ref 0–6)
ERYTHROCYTE [DISTWIDTH] IN BLOOD BY AUTOMATED COUNT: 18.3 % (ref 11.6–15.1)
GFR SERPL CREATININE-BSD FRML MDRD: 34 ML/MIN/1.73SQ M
GLUCOSE P FAST SERPL-MCNC: 97 MG/DL (ref 65–99)
HCT VFR BLD AUTO: 32.4 % (ref 36.5–49.3)
HGB BLD-MCNC: 10.4 G/DL (ref 12–17)
IMM GRANULOCYTES # BLD AUTO: 0.03 THOUSAND/UL (ref 0–0.2)
IMM GRANULOCYTES NFR BLD AUTO: 1 % (ref 0–2)
LYMPHOCYTES # BLD AUTO: 0.33 THOUSANDS/ÂΜL (ref 0.6–4.47)
LYMPHOCYTES NFR BLD AUTO: 9 % (ref 14–44)
MCH RBC QN AUTO: 30.1 PG (ref 26.8–34.3)
MCHC RBC AUTO-ENTMCNC: 32.1 G/DL (ref 31.4–37.4)
MCV RBC AUTO: 94 FL (ref 82–98)
MONOCYTES # BLD AUTO: 0.72 THOUSAND/ÂΜL (ref 0.17–1.22)
MONOCYTES NFR BLD AUTO: 20 % (ref 4–12)
NEUTROPHILS # BLD AUTO: 1.91 THOUSANDS/ÂΜL (ref 1.85–7.62)
NEUTS SEG NFR BLD AUTO: 55 % (ref 43–75)
NRBC BLD AUTO-RTO: 0 /100 WBCS
PLATELET # BLD AUTO: 131 THOUSANDS/UL (ref 149–390)
PMV BLD AUTO: 10.5 FL (ref 8.9–12.7)
POTASSIUM SERPL-SCNC: 4.3 MMOL/L (ref 3.5–5.3)
PROT SERPL-MCNC: 7.7 G/DL (ref 6.4–8.4)
RBC # BLD AUTO: 3.46 MILLION/UL (ref 3.88–5.62)
SODIUM SERPL-SCNC: 134 MMOL/L (ref 135–147)
TSH SERPL DL<=0.05 MIU/L-ACNC: 1.87 UIU/ML (ref 0.45–4.5)
WBC # BLD AUTO: 3.54 THOUSAND/UL (ref 4.31–10.16)

## 2022-11-14 ENCOUNTER — HOSPITAL ENCOUNTER (OUTPATIENT)
Dept: INFUSION CENTER | Facility: CLINIC | Age: 77
Discharge: HOME | End: 2022-11-14

## 2022-11-14 VITALS
WEIGHT: 173.94 LBS | HEART RATE: 62 BPM | BODY MASS INDEX: 24.9 KG/M2 | DIASTOLIC BLOOD PRESSURE: 77 MMHG | HEIGHT: 70 IN | RESPIRATION RATE: 18 BRPM | SYSTOLIC BLOOD PRESSURE: 130 MMHG | TEMPERATURE: 97.9 F

## 2022-11-14 DIAGNOSIS — C90.00 IGG MULTIPLE MYELOMA (HCC): Primary | ICD-10-CM

## 2022-11-14 RX ORDER — SODIUM CHLORIDE 9 MG/ML
20 INJECTION, SOLUTION INTRAVENOUS ONCE
Status: COMPLETED | OUTPATIENT
Start: 2022-11-14 | End: 2022-11-14

## 2022-11-14 RX ORDER — ACETAMINOPHEN 325 MG/1
650 TABLET ORAL ONCE
Status: COMPLETED | OUTPATIENT
Start: 2022-11-14 | End: 2022-11-14

## 2022-11-14 RX ADMIN — SODIUM CHLORIDE 20 ML/HR: 0.9 INJECTION, SOLUTION INTRAVENOUS at 08:36

## 2022-11-14 RX ADMIN — ACETAMINOPHEN 650 MG: 325 TABLET ORAL at 08:36

## 2022-11-14 RX ADMIN — FAMOTIDINE 20 MG: 10 INJECTION INTRAVENOUS at 09:31

## 2022-11-14 RX ADMIN — DIPHENHYDRAMINE HYDROCHLORIDE 25 MG: 50 INJECTION, SOLUTION INTRAMUSCULAR; INTRAVENOUS at 09:04

## 2022-11-14 RX ADMIN — DEXAMETHASONE SODIUM PHOSPHATE 8 MG: 10 INJECTION, SOLUTION INTRAMUSCULAR; INTRAVENOUS at 08:37

## 2022-11-14 RX ADMIN — SODIUM CHLORIDE 757.5 MG: 0.9 INJECTION, SOLUTION INTRAVENOUS at 10:47

## 2022-11-18 ENCOUNTER — APPOINTMENT (OUTPATIENT)
Dept: LAB | Facility: CLINIC | Age: 77
End: 2022-11-18

## 2022-11-18 DIAGNOSIS — C90.00 IGG MULTIPLE MYELOMA (HCC): ICD-10-CM

## 2022-11-18 LAB
ALBUMIN SERPL BCP-MCNC: 2.9 G/DL (ref 3.5–5)
ALP SERPL-CCNC: 71 U/L (ref 46–116)
ALT SERPL W P-5'-P-CCNC: 39 U/L (ref 12–78)
ANION GAP SERPL CALCULATED.3IONS-SCNC: 7 MMOL/L (ref 4–13)
AST SERPL W P-5'-P-CCNC: 36 U/L (ref 5–45)
BASOPHILS # BLD AUTO: 0.07 THOUSANDS/ÂΜL (ref 0–0.1)
BASOPHILS NFR BLD AUTO: 2 % (ref 0–1)
BILIRUB SERPL-MCNC: 0.64 MG/DL (ref 0.2–1)
BUN SERPL-MCNC: 25 MG/DL (ref 5–25)
CALCIUM ALBUM COR SERPL-MCNC: 10.5 MG/DL (ref 8.3–10.1)
CALCIUM SERPL-MCNC: 9.6 MG/DL (ref 8.3–10.1)
CHLORIDE SERPL-SCNC: 104 MMOL/L (ref 96–108)
CO2 SERPL-SCNC: 24 MMOL/L (ref 21–32)
CREAT SERPL-MCNC: 2 MG/DL (ref 0.6–1.3)
EOSINOPHIL # BLD AUTO: 0.3 THOUSAND/ÂΜL (ref 0–0.61)
EOSINOPHIL NFR BLD AUTO: 7 % (ref 0–6)
ERYTHROCYTE [DISTWIDTH] IN BLOOD BY AUTOMATED COUNT: 18.4 % (ref 11.6–15.1)
GFR SERPL CREATININE-BSD FRML MDRD: 31 ML/MIN/1.73SQ M
GLUCOSE P FAST SERPL-MCNC: 97 MG/DL (ref 65–99)
HCT VFR BLD AUTO: 32.9 % (ref 36.5–49.3)
HGB BLD-MCNC: 10.5 G/DL (ref 12–17)
IMM GRANULOCYTES # BLD AUTO: 0.07 THOUSAND/UL (ref 0–0.2)
IMM GRANULOCYTES NFR BLD AUTO: 2 % (ref 0–2)
LYMPHOCYTES # BLD AUTO: 0.33 THOUSANDS/ÂΜL (ref 0.6–4.47)
LYMPHOCYTES NFR BLD AUTO: 8 % (ref 14–44)
MCH RBC QN AUTO: 30.1 PG (ref 26.8–34.3)
MCHC RBC AUTO-ENTMCNC: 31.9 G/DL (ref 31.4–37.4)
MCV RBC AUTO: 94 FL (ref 82–98)
MONOCYTES # BLD AUTO: 0.79 THOUSAND/ÂΜL (ref 0.17–1.22)
MONOCYTES NFR BLD AUTO: 18 % (ref 4–12)
NEUTROPHILS # BLD AUTO: 2.76 THOUSANDS/ÂΜL (ref 1.85–7.62)
NEUTS SEG NFR BLD AUTO: 63 % (ref 43–75)
NRBC BLD AUTO-RTO: 1 /100 WBCS
PLATELET # BLD AUTO: 168 THOUSANDS/UL (ref 149–390)
PMV BLD AUTO: 10.1 FL (ref 8.9–12.7)
POTASSIUM SERPL-SCNC: 4 MMOL/L (ref 3.5–5.3)
PROT SERPL-MCNC: 8.7 G/DL (ref 6.4–8.4)
RBC # BLD AUTO: 3.49 MILLION/UL (ref 3.88–5.62)
SODIUM SERPL-SCNC: 135 MMOL/L (ref 135–147)
WBC # BLD AUTO: 4.32 THOUSAND/UL (ref 4.31–10.16)

## 2022-11-21 ENCOUNTER — HOSPITAL ENCOUNTER (OUTPATIENT)
Dept: INFUSION CENTER | Facility: CLINIC | Age: 77
Discharge: HOME | End: 2022-11-21

## 2022-11-21 VITALS
HEIGHT: 70 IN | WEIGHT: 173.28 LBS | TEMPERATURE: 98.3 F | HEART RATE: 69 BPM | RESPIRATION RATE: 18 BRPM | BODY MASS INDEX: 24.81 KG/M2 | DIASTOLIC BLOOD PRESSURE: 79 MMHG | SYSTOLIC BLOOD PRESSURE: 123 MMHG

## 2022-11-21 DIAGNOSIS — C90.00 IGG MULTIPLE MYELOMA (HCC): Primary | ICD-10-CM

## 2022-11-21 RX ORDER — SODIUM CHLORIDE 9 MG/ML
20 INJECTION, SOLUTION INTRAVENOUS ONCE
Status: COMPLETED | OUTPATIENT
Start: 2022-11-21 | End: 2022-11-21

## 2022-11-21 RX ORDER — ACETAMINOPHEN 325 MG/1
650 TABLET ORAL ONCE
Status: COMPLETED | OUTPATIENT
Start: 2022-11-21 | End: 2022-11-21

## 2022-11-21 RX ADMIN — SODIUM CHLORIDE 20 ML/HR: 0.9 INJECTION, SOLUTION INTRAVENOUS at 08:28

## 2022-11-21 RX ADMIN — ACETAMINOPHEN 650 MG: 325 TABLET ORAL at 08:25

## 2022-11-21 RX ADMIN — FAMOTIDINE 20 MG: 10 INJECTION INTRAVENOUS at 09:07

## 2022-11-21 RX ADMIN — DIPHENHYDRAMINE HYDROCHLORIDE 25 MG: 50 INJECTION, SOLUTION INTRAMUSCULAR; INTRAVENOUS at 08:50

## 2022-11-21 RX ADMIN — DEXAMETHASONE SODIUM PHOSPHATE 8 MG: 10 INJECTION, SOLUTION INTRAMUSCULAR; INTRAVENOUS at 08:25

## 2022-11-21 RX ADMIN — SODIUM CHLORIDE 800 MG: 0.9 INJECTION, SOLUTION INTRAVENOUS at 10:11

## 2022-11-21 NOTE — PROGRESS NOTES
Patient arrived to appointment without concerns  Tolerated treatment with no incidents  Denies need for AVS, aware of upcoming appointments  Left unit in stable condition

## 2022-11-26 ENCOUNTER — APPOINTMENT (OUTPATIENT)
Dept: LAB | Facility: CLINIC | Age: 77
End: 2022-11-26

## 2022-11-26 DIAGNOSIS — C90.00 IGG MULTIPLE MYELOMA (HCC): ICD-10-CM

## 2022-11-26 LAB
ALBUMIN SERPL BCP-MCNC: 3 G/DL (ref 3.5–5)
ALP SERPL-CCNC: 66 U/L (ref 46–116)
ALT SERPL W P-5'-P-CCNC: 22 U/L (ref 12–78)
ANION GAP SERPL CALCULATED.3IONS-SCNC: 9 MMOL/L (ref 4–13)
AST SERPL W P-5'-P-CCNC: 26 U/L (ref 5–45)
BASOPHILS # BLD AUTO: 0.06 THOUSANDS/ÂΜL (ref 0–0.1)
BASOPHILS NFR BLD AUTO: 1 % (ref 0–1)
BILIRUB SERPL-MCNC: 1.04 MG/DL (ref 0.2–1)
BUN SERPL-MCNC: 25 MG/DL (ref 5–25)
CALCIUM ALBUM COR SERPL-MCNC: 10.8 MG/DL (ref 8.3–10.1)
CALCIUM SERPL-MCNC: 10 MG/DL (ref 8.3–10.1)
CHLORIDE SERPL-SCNC: 100 MMOL/L (ref 96–108)
CO2 SERPL-SCNC: 23 MMOL/L (ref 21–32)
CREAT SERPL-MCNC: 2.01 MG/DL (ref 0.6–1.3)
EOSINOPHIL # BLD AUTO: 0.22 THOUSAND/ÂΜL (ref 0–0.61)
EOSINOPHIL NFR BLD AUTO: 5 % (ref 0–6)
ERYTHROCYTE [DISTWIDTH] IN BLOOD BY AUTOMATED COUNT: 17.1 % (ref 11.6–15.1)
GFR SERPL CREATININE-BSD FRML MDRD: 31 ML/MIN/1.73SQ M
GLUCOSE P FAST SERPL-MCNC: 109 MG/DL (ref 65–99)
HCT VFR BLD AUTO: 35.9 % (ref 36.5–49.3)
HGB BLD-MCNC: 10.9 G/DL (ref 12–17)
IMM GRANULOCYTES # BLD AUTO: 0.05 THOUSAND/UL (ref 0–0.2)
IMM GRANULOCYTES NFR BLD AUTO: 1 % (ref 0–2)
LYMPHOCYTES # BLD AUTO: 0.44 THOUSANDS/ÂΜL (ref 0.6–4.47)
LYMPHOCYTES NFR BLD AUTO: 9 % (ref 14–44)
MCH RBC QN AUTO: 29.1 PG (ref 26.8–34.3)
MCHC RBC AUTO-ENTMCNC: 30.4 G/DL (ref 31.4–37.4)
MCV RBC AUTO: 96 FL (ref 82–98)
MONOCYTES # BLD AUTO: 0.58 THOUSAND/ÂΜL (ref 0.17–1.22)
MONOCYTES NFR BLD AUTO: 12 % (ref 4–12)
NEUTROPHILS # BLD AUTO: 3.5 THOUSANDS/ÂΜL (ref 1.85–7.62)
NEUTS SEG NFR BLD AUTO: 72 % (ref 43–75)
NRBC BLD AUTO-RTO: 0 /100 WBCS
PLATELET # BLD AUTO: 194 THOUSANDS/UL (ref 149–390)
PMV BLD AUTO: 9.5 FL (ref 8.9–12.7)
POTASSIUM SERPL-SCNC: 3.8 MMOL/L (ref 3.5–5.3)
PROT SERPL-MCNC: 8.6 G/DL (ref 6.4–8.4)
RBC # BLD AUTO: 3.74 MILLION/UL (ref 3.88–5.62)
SODIUM SERPL-SCNC: 132 MMOL/L (ref 135–147)
WBC # BLD AUTO: 4.85 THOUSAND/UL (ref 4.31–10.16)

## 2022-11-28 ENCOUNTER — HOSPITAL ENCOUNTER (OUTPATIENT)
Dept: INFUSION CENTER | Facility: CLINIC | Age: 77
Discharge: HOME | End: 2022-11-28

## 2022-11-28 VITALS
SYSTOLIC BLOOD PRESSURE: 111 MMHG | TEMPERATURE: 98.1 F | HEIGHT: 70 IN | WEIGHT: 170.86 LBS | BODY MASS INDEX: 24.46 KG/M2 | HEART RATE: 71 BPM | RESPIRATION RATE: 18 BRPM | DIASTOLIC BLOOD PRESSURE: 63 MMHG

## 2022-11-28 DIAGNOSIS — C90.00 IGG MULTIPLE MYELOMA (HCC): Primary | ICD-10-CM

## 2022-11-28 RX ORDER — SODIUM CHLORIDE 9 MG/ML
20 INJECTION, SOLUTION INTRAVENOUS ONCE
Status: COMPLETED | OUTPATIENT
Start: 2022-11-28 | End: 2022-11-28

## 2022-11-28 RX ORDER — ACETAMINOPHEN 325 MG/1
650 TABLET ORAL ONCE
Status: COMPLETED | OUTPATIENT
Start: 2022-11-28 | End: 2022-11-28

## 2022-11-28 RX ADMIN — DIPHENHYDRAMINE HYDROCHLORIDE 25 MG: 50 INJECTION, SOLUTION INTRAMUSCULAR; INTRAVENOUS at 08:50

## 2022-11-28 RX ADMIN — DEXAMETHASONE SODIUM PHOSPHATE 8 MG: 10 INJECTION, SOLUTION INTRAMUSCULAR; INTRAVENOUS at 08:25

## 2022-11-28 RX ADMIN — ACETAMINOPHEN 650 MG: 325 TABLET ORAL at 08:25

## 2022-11-28 RX ADMIN — FAMOTIDINE 20 MG: 10 INJECTION INTRAVENOUS at 09:12

## 2022-11-28 RX ADMIN — SODIUM CHLORIDE 20 ML/HR: 0.9 INJECTION, SOLUTION INTRAVENOUS at 08:25

## 2022-11-28 RX ADMIN — SODIUM CHLORIDE 800 MG: 0.9 INJECTION, SOLUTION INTRAVENOUS at 10:21

## 2022-12-02 ENCOUNTER — APPOINTMENT (OUTPATIENT)
Dept: LAB | Facility: CLINIC | Age: 77
End: 2022-12-02

## 2022-12-02 DIAGNOSIS — C90.00 IGG MULTIPLE MYELOMA (HCC): ICD-10-CM

## 2022-12-02 LAB
ALBUMIN SERPL BCP-MCNC: 2.6 G/DL (ref 3.5–5)
ALP SERPL-CCNC: 69 U/L (ref 46–116)
ALT SERPL W P-5'-P-CCNC: 29 U/L (ref 12–78)
ANION GAP SERPL CALCULATED.3IONS-SCNC: 6 MMOL/L (ref 4–13)
AST SERPL W P-5'-P-CCNC: 27 U/L (ref 5–45)
BASOPHILS # BLD AUTO: 0.04 THOUSANDS/ÂΜL (ref 0–0.1)
BASOPHILS NFR BLD AUTO: 1 % (ref 0–1)
BILIRUB SERPL-MCNC: 0.47 MG/DL (ref 0.2–1)
BUN SERPL-MCNC: 22 MG/DL (ref 5–25)
CALCIUM ALBUM COR SERPL-MCNC: 10.3 MG/DL (ref 8.3–10.1)
CALCIUM SERPL-MCNC: 9.2 MG/DL (ref 8.3–10.1)
CHLORIDE SERPL-SCNC: 104 MMOL/L (ref 96–108)
CO2 SERPL-SCNC: 25 MMOL/L (ref 21–32)
CREAT SERPL-MCNC: 1.79 MG/DL (ref 0.6–1.3)
EOSINOPHIL # BLD AUTO: 0.41 THOUSAND/ÂΜL (ref 0–0.61)
EOSINOPHIL NFR BLD AUTO: 9 % (ref 0–6)
ERYTHROCYTE [DISTWIDTH] IN BLOOD BY AUTOMATED COUNT: 16.7 % (ref 11.6–15.1)
GFR SERPL CREATININE-BSD FRML MDRD: 36 ML/MIN/1.73SQ M
GLUCOSE P FAST SERPL-MCNC: 96 MG/DL (ref 65–99)
HCT VFR BLD AUTO: 31.8 % (ref 36.5–49.3)
HGB BLD-MCNC: 9.9 G/DL (ref 12–17)
IMM GRANULOCYTES # BLD AUTO: 0.07 THOUSAND/UL (ref 0–0.2)
IMM GRANULOCYTES NFR BLD AUTO: 2 % (ref 0–2)
LYMPHOCYTES # BLD AUTO: 0.33 THOUSANDS/ÂΜL (ref 0.6–4.47)
LYMPHOCYTES NFR BLD AUTO: 7 % (ref 14–44)
MCH RBC QN AUTO: 29.6 PG (ref 26.8–34.3)
MCHC RBC AUTO-ENTMCNC: 31.1 G/DL (ref 31.4–37.4)
MCV RBC AUTO: 95 FL (ref 82–98)
MONOCYTES # BLD AUTO: 0.72 THOUSAND/ÂΜL (ref 0.17–1.22)
MONOCYTES NFR BLD AUTO: 16 % (ref 4–12)
NEUTROPHILS # BLD AUTO: 2.92 THOUSANDS/ÂΜL (ref 1.85–7.62)
NEUTS SEG NFR BLD AUTO: 65 % (ref 43–75)
NRBC BLD AUTO-RTO: 0 /100 WBCS
PLATELET # BLD AUTO: 144 THOUSANDS/UL (ref 149–390)
PMV BLD AUTO: 9.9 FL (ref 8.9–12.7)
POTASSIUM SERPL-SCNC: 4.1 MMOL/L (ref 3.5–5.3)
PROT SERPL-MCNC: 8.4 G/DL (ref 6.4–8.4)
RBC # BLD AUTO: 3.35 MILLION/UL (ref 3.88–5.62)
SODIUM SERPL-SCNC: 135 MMOL/L (ref 135–147)
WBC # BLD AUTO: 4.49 THOUSAND/UL (ref 4.31–10.16)

## 2022-12-05 ENCOUNTER — HOSPITAL ENCOUNTER (OUTPATIENT)
Dept: INFUSION CENTER | Facility: CLINIC | Age: 77
Discharge: HOME | End: 2022-12-05

## 2022-12-05 VITALS
BODY MASS INDEX: 25.09 KG/M2 | TEMPERATURE: 97.4 F | HEART RATE: 56 BPM | WEIGHT: 175.27 LBS | SYSTOLIC BLOOD PRESSURE: 113 MMHG | DIASTOLIC BLOOD PRESSURE: 73 MMHG | RESPIRATION RATE: 18 BRPM | HEIGHT: 70 IN

## 2022-12-05 DIAGNOSIS — D80.1 HYPOGAMMAGLOBULINEMIA (HCC): Primary | ICD-10-CM

## 2022-12-05 DIAGNOSIS — C90.00 IGG MULTIPLE MYELOMA (HCC): ICD-10-CM

## 2022-12-05 RX ORDER — ACETAMINOPHEN 325 MG/1
650 TABLET ORAL ONCE
Status: CANCELLED | OUTPATIENT
Start: 2022-12-12

## 2022-12-05 RX ORDER — SODIUM CHLORIDE 9 MG/ML
20 INJECTION, SOLUTION INTRAVENOUS ONCE
Status: CANCELLED | OUTPATIENT
Start: 2022-12-12

## 2022-12-05 RX ORDER — SODIUM CHLORIDE 9 MG/ML
20 INJECTION, SOLUTION INTRAVENOUS ONCE
Status: COMPLETED | OUTPATIENT
Start: 2022-12-05 | End: 2022-12-05

## 2022-12-05 RX ORDER — SODIUM CHLORIDE 9 MG/ML
20 INJECTION, SOLUTION INTRAVENOUS ONCE
Start: 2023-01-02 | End: 2023-01-02

## 2022-12-05 RX ORDER — ACETAMINOPHEN 325 MG/1
650 TABLET ORAL ONCE
Status: COMPLETED | OUTPATIENT
Start: 2022-12-05 | End: 2022-12-05

## 2022-12-05 RX ADMIN — SODIUM CHLORIDE 20 ML/HR: 0.9 INJECTION, SOLUTION INTRAVENOUS at 08:45

## 2022-12-05 RX ADMIN — DEXAMETHASONE SODIUM PHOSPHATE 8 MG: 10 INJECTION, SOLUTION INTRAMUSCULAR; INTRAVENOUS at 08:58

## 2022-12-05 RX ADMIN — ACETAMINOPHEN 650 MG: 325 TABLET ORAL at 09:02

## 2022-12-05 RX ADMIN — SODIUM CHLORIDE 800 MG: 0.9 INJECTION, SOLUTION INTRAVENOUS at 10:18

## 2022-12-05 RX ADMIN — Medication 30 G: at 11:37

## 2022-12-05 RX ADMIN — DIPHENHYDRAMINE HYDROCHLORIDE 25 MG: 50 INJECTION, SOLUTION INTRAMUSCULAR; INTRAVENOUS at 09:19

## 2022-12-05 RX ADMIN — FAMOTIDINE 20 MG: 10 INJECTION INTRAVENOUS at 09:43

## 2022-12-05 NOTE — PROGRESS NOTES
Pt  Denies new symptoms or concerns today  Labs reviewed  IVIG and Empliciti (elotuzumab) ordered for infusion today

## 2022-12-05 NOTE — PROGRESS NOTES
Pt  Tolerated chemotherapy and IVIG without adverse event  Future appointments reviewed  AVS declined

## 2022-12-07 DIAGNOSIS — C90.00 MULTIPLE MYELOMA NOT HAVING ACHIEVED REMISSION (HCC): ICD-10-CM

## 2022-12-10 ENCOUNTER — APPOINTMENT (OUTPATIENT)
Dept: LAB | Facility: CLINIC | Age: 77
End: 2022-12-10

## 2022-12-10 DIAGNOSIS — C90.00 MULTIPLE MYELOMA NOT HAVING ACHIEVED REMISSION (HCC): ICD-10-CM

## 2022-12-10 DIAGNOSIS — E03.9 ACQUIRED HYPOTHYROIDISM: ICD-10-CM

## 2022-12-10 DIAGNOSIS — C90.00 IGG MULTIPLE MYELOMA (HCC): ICD-10-CM

## 2022-12-10 LAB
ALBUMIN SERPL BCP-MCNC: 2.6 G/DL (ref 3.5–5)
ALP SERPL-CCNC: 69 U/L (ref 46–116)
ALT SERPL W P-5'-P-CCNC: 28 U/L (ref 12–78)
ANION GAP SERPL CALCULATED.3IONS-SCNC: 6 MMOL/L (ref 4–13)
AST SERPL W P-5'-P-CCNC: 27 U/L (ref 5–45)
BASOPHILS # BLD AUTO: 0.05 THOUSANDS/ÂΜL (ref 0–0.1)
BASOPHILS NFR BLD AUTO: 1 % (ref 0–1)
BILIRUB SERPL-MCNC: 0.62 MG/DL (ref 0.2–1)
BUN SERPL-MCNC: 27 MG/DL (ref 5–25)
CALCIUM ALBUM COR SERPL-MCNC: 10.5 MG/DL (ref 8.3–10.1)
CALCIUM SERPL-MCNC: 9.4 MG/DL (ref 8.3–10.1)
CHLORIDE SERPL-SCNC: 105 MMOL/L (ref 96–108)
CO2 SERPL-SCNC: 25 MMOL/L (ref 21–32)
CREAT SERPL-MCNC: 1.88 MG/DL (ref 0.6–1.3)
EOSINOPHIL # BLD AUTO: 0.32 THOUSAND/ÂΜL (ref 0–0.61)
EOSINOPHIL NFR BLD AUTO: 7 % (ref 0–6)
ERYTHROCYTE [DISTWIDTH] IN BLOOD BY AUTOMATED COUNT: 16.8 % (ref 11.6–15.1)
GFR SERPL CREATININE-BSD FRML MDRD: 34 ML/MIN/1.73SQ M
GLUCOSE P FAST SERPL-MCNC: 95 MG/DL (ref 65–99)
HCT VFR BLD AUTO: 30.9 % (ref 36.5–49.3)
HGB BLD-MCNC: 9.8 G/DL (ref 12–17)
IGA SERPL-MCNC: <8 MG/DL (ref 70–400)
IGG SERPL-MCNC: 2550 MG/DL (ref 700–1600)
IGM SERPL-MCNC: 23 MG/DL (ref 40–230)
IMM GRANULOCYTES # BLD AUTO: 0.09 THOUSAND/UL (ref 0–0.2)
IMM GRANULOCYTES NFR BLD AUTO: 2 % (ref 0–2)
LYMPHOCYTES # BLD AUTO: 0.53 THOUSANDS/ÂΜL (ref 0.6–4.47)
LYMPHOCYTES NFR BLD AUTO: 11 % (ref 14–44)
MCH RBC QN AUTO: 30.6 PG (ref 26.8–34.3)
MCHC RBC AUTO-ENTMCNC: 31.7 G/DL (ref 31.4–37.4)
MCV RBC AUTO: 97 FL (ref 82–98)
MONOCYTES # BLD AUTO: 0.95 THOUSAND/ÂΜL (ref 0.17–1.22)
MONOCYTES NFR BLD AUTO: 20 % (ref 4–12)
NEUTROPHILS # BLD AUTO: 2.74 THOUSANDS/ÂΜL (ref 1.85–7.62)
NEUTS SEG NFR BLD AUTO: 59 % (ref 43–75)
NRBC BLD AUTO-RTO: 1 /100 WBCS
PLATELET # BLD AUTO: 160 THOUSANDS/UL (ref 149–390)
PMV BLD AUTO: 9.5 FL (ref 8.9–12.7)
POTASSIUM SERPL-SCNC: 4.3 MMOL/L (ref 3.5–5.3)
PROT SERPL-MCNC: 8.7 G/DL (ref 6.4–8.4)
RBC # BLD AUTO: 3.2 MILLION/UL (ref 3.88–5.62)
SODIUM SERPL-SCNC: 136 MMOL/L (ref 135–147)
WBC # BLD AUTO: 4.68 THOUSAND/UL (ref 4.31–10.16)

## 2022-12-12 ENCOUNTER — HOSPITAL ENCOUNTER (OUTPATIENT)
Dept: INFUSION CENTER | Facility: CLINIC | Age: 77
Discharge: HOME | End: 2022-12-12

## 2022-12-12 VITALS
HEIGHT: 70 IN | DIASTOLIC BLOOD PRESSURE: 64 MMHG | RESPIRATION RATE: 18 BRPM | WEIGHT: 177.25 LBS | HEART RATE: 60 BPM | BODY MASS INDEX: 25.38 KG/M2 | TEMPERATURE: 97.4 F | SYSTOLIC BLOOD PRESSURE: 125 MMHG

## 2022-12-12 DIAGNOSIS — C90.00 IGG MULTIPLE MYELOMA (HCC): Primary | ICD-10-CM

## 2022-12-12 RX ORDER — DEXAMETHASONE 4 MG/1
20 TABLET ORAL
Qty: 30 TABLET | Refills: 3 | Status: SHIPPED | OUTPATIENT
Start: 2022-12-19 | End: 2023-01-03

## 2022-12-12 RX ORDER — SODIUM CHLORIDE 9 MG/ML
20 INJECTION, SOLUTION INTRAVENOUS ONCE
Status: COMPLETED | OUTPATIENT
Start: 2022-12-12 | End: 2022-12-12

## 2022-12-12 RX ORDER — ACETAMINOPHEN 325 MG/1
650 TABLET ORAL ONCE
Status: COMPLETED | OUTPATIENT
Start: 2022-12-12 | End: 2022-12-12

## 2022-12-12 RX ADMIN — SODIUM CHLORIDE 1600 MG: 0.9 INJECTION, SOLUTION INTRAVENOUS at 11:06

## 2022-12-12 RX ADMIN — ACETAMINOPHEN 650 MG: 325 TABLET ORAL at 08:50

## 2022-12-12 RX ADMIN — FAMOTIDINE 20 MG: 10 INJECTION INTRAVENOUS at 09:41

## 2022-12-12 RX ADMIN — DEXAMETHASONE SODIUM PHOSPHATE 8 MG: 10 INJECTION, SOLUTION INTRAMUSCULAR; INTRAVENOUS at 08:43

## 2022-12-12 RX ADMIN — SODIUM CHLORIDE 20 ML/HR: 0.9 INJECTION, SOLUTION INTRAVENOUS at 08:43

## 2022-12-12 RX ADMIN — ZOLEDRONIC ACID 3 MG: 4 INJECTION INTRAVENOUS at 10:17

## 2022-12-12 RX ADMIN — DIPHENHYDRAMINE HYDROCHLORIDE 25 MG: 50 INJECTION, SOLUTION INTRAMUSCULAR; INTRAVENOUS at 09:10

## 2022-12-12 NOTE — PLAN OF CARE
Problem: INFECTION - ADULT  Goal: Absence or prevention of progression during hospitalization  Description: INTERVENTIONS:  - Assess and monitor for signs and symptoms of infection  - Monitor lab/diagnostic results  - Monitor all insertion sites, i e  indwelling lines, tubes, and drains  - Monitor endotracheal if appropriate and nasal secretions for changes in amount and color  - [place] appropriate cooling/warming therapies per order  - Administer medications as ordered  - Instruct and encourage patient and family to use good hand hygiene technique  - Identify and instruct in appropriate isolation precautions for identified infection/condition  Outcome: Progressing

## 2022-12-12 NOTE — PROGRESS NOTES
Patient arrived to the unit and denied infection or complications  He tolerated his infusion of empliciti well without adverse reaction  He also denied dental concerns and tolerated his zometa well without adverse reaction  He is aware of his next infusion

## 2022-12-13 LAB
ALBUMIN SERPL ELPH-MCNC: 3.43 G/DL (ref 3.5–5)
ALBUMIN SERPL ELPH-MCNC: 41.8 % (ref 52–65)
ALPHA1 GLOB SERPL ELPH-MCNC: 0.34 G/DL (ref 0.1–0.4)
ALPHA1 GLOB SERPL ELPH-MCNC: 4.1 % (ref 2.5–5)
ALPHA2 GLOB SERPL ELPH-MCNC: 0.93 G/DL (ref 0.4–1.2)
ALPHA2 GLOB SERPL ELPH-MCNC: 11.4 % (ref 7–13)
BETA GLOB ABNORMAL SERPL ELPH-MCNC: 0.44 G/DL (ref 0.4–0.8)
BETA1 GLOB SERPL ELPH-MCNC: 5.4 % (ref 5–13)
BETA2 GLOB SERPL ELPH-MCNC: 3.2 % (ref 2–8)
BETA2+GAMMA GLOB SERPL ELPH-MCNC: 0.26 G/DL (ref 0.2–0.5)
GAMMA GLOB ABNORMAL SERPL ELPH-MCNC: 2.8 G/DL (ref 0.5–1.6)
GAMMA GLOB SERPL ELPH-MCNC: 34.1 % (ref 12–22)
IGG/ALB SER: 0.72 {RATIO} (ref 1.1–1.8)
KAPPA LC FREE SER-MCNC: 3.2 MG/L (ref 3.3–19.4)
KAPPA LC FREE/LAMBDA FREE SER: 0.12 {RATIO} (ref 0.26–1.65)
LAMBDA LC FREE SERPL-MCNC: 25.9 MG/L (ref 5.7–26.3)
M PROTEIN 1 MFR SERPL ELPH: 28.9 %
M PROTEIN 1 SERPL ELPH-MCNC: 2.37 G/DL
PROT PATTERN SERPL ELPH-IMP: ABNORMAL
PROT SERPL-MCNC: 8.2 G/DL (ref 6.4–8.2)

## 2022-12-21 ENCOUNTER — OFFICE VISIT (OUTPATIENT)
Dept: HEMATOLOGY ONCOLOGY | Facility: CLINIC | Age: 77
End: 2022-12-21

## 2022-12-21 VITALS
BODY MASS INDEX: 25.48 KG/M2 | DIASTOLIC BLOOD PRESSURE: 78 MMHG | HEART RATE: 85 BPM | HEIGHT: 70 IN | SYSTOLIC BLOOD PRESSURE: 124 MMHG | OXYGEN SATURATION: 99 % | WEIGHT: 178 LBS | RESPIRATION RATE: 17 BRPM

## 2022-12-21 DIAGNOSIS — Z29.9 PROPHYLACTIC MEASURE: ICD-10-CM

## 2022-12-21 DIAGNOSIS — D63.1 ANEMIA DUE TO STAGE 3A CHRONIC KIDNEY DISEASE (HCC): ICD-10-CM

## 2022-12-21 DIAGNOSIS — C90.00 IGG MULTIPLE MYELOMA (HCC): Primary | ICD-10-CM

## 2022-12-21 DIAGNOSIS — N18.31 ANEMIA DUE TO STAGE 3A CHRONIC KIDNEY DISEASE (HCC): ICD-10-CM

## 2022-12-21 PROBLEM — D61.818 PANCYTOPENIA (HCC): Status: RESOLVED | Noted: 2021-10-06 | Resolved: 2022-12-21

## 2022-12-21 NOTE — PROGRESS NOTES
[facility] HEMATOLOGY ONCOLOGY SPECIALISTS [address]  [address]  [place] [phone] [phone]    Carolina Hwang,1945, [id number]  12/21/22    Discussion:   In summary, this is a 82-year-old male with a history of myeloma status post high-dose chemotherapy with stem cell support  CAR-T in August 2021, stem cell reinfusion March 2022 for persistent cytopenias  He has been on elotuzumab/Pomalyst/dexamethasone  Recent studies show progression of his myeloma  Fortunately,Recent CBC shows normal white count and platelets  Hemoglobin 9 8, stable  CMP shows creatinine 1 8, elevated total protein, depressed albumin, as expected  Otherwise normal   He is anticipating admission at [facility] 1 week from now for Teclistamab  We will see him back in 1 month to review his status and proceed from there  We would anticipate continuation of IVIG replacement  I discussed the above with the patient  The patient and his wife voiced understanding and agreement   ______________________________________________________________________    Chief Complaint   Patient presents with   • Follow-up       HPI:  Oncology History   IgG multiple myeloma ([address] )   9/2015 Initial Diagnosis    Found to have Hbg of 6 with SOB, creatinine 1 8 and normal calcium with albumin of 2 1  Additonial blood work showed elevated protien level (12 1g/dL)  9/29/2015 Biopsy    Bone marrow biopsy demonstrated approximately 80% plasma cells with some immature forms noted  These studies showed 13q14 deletion, +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, T (4:14)       10/14/2015 - 11/11/2015 Chemotherapy    Velcade 1 3 mg/m2 Days 1,8,15,22  Cytoxan 300 mg/m2  PO Days 1, 8,15, 22  Dexamethasone 40 mg PO Days 1,8,15, 22  Zometa 4 mg IV Day 1  Cycle length = 28 days    Completed 2 cycles    Day one of cycle 2 was on 11/11/15      12/2015 - 2/2016 Chemotherapy    VDT-PACE x 2 cycles   (completed at [facility] in [place], [place])     2/2016 Transplant Melphalan 200 mg/m2 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  MRD +     8/2016 - 1/26/2018 Chemotherapy    Maintenance therapy:  Carfilzomib, orginally dosed 27 mg weekly then increased to 45 mg weekly after MRD reactivitation  Revlimid  Dexamethasone      2/2018 Biopsy    Bone marrow demonstrated positive minimal residual disease       2/23/2018 -  Chemotherapy    Daratumumab 16mg/m2 IV Day 1  Pomalyst 4 mg p o  daily 1-21  Dexamethasone 4 mg PO Days 2, 3  Dexamethasone 12 mg PODays 8, 15, 22  Cycle length = 28 days     4/11/2019 - 8/29/2019 Chemotherapy    methylPREDNISolone sodium succinate (Solu-MEDROL) 100 mg in sodium chloride 0 9 % 250 mL IVPB, 100 mg, Intravenous, Once, 5 of 12 cycles  Administration: 100 mg (6/7/2019), 100 mg (7/5/2019), 100 mg (8/2/2019)     8/30/2019 - 11/8/2019 Chemotherapy    cyclophosphamide (CYTOXAN) 1,000 mg in sodium chloride 0 9 % 250 mL IVPB, 990 mg (66 7 % of original dose 750 mg/m2), Intravenous, Once, 2 of 3 cycles  Dose modification: 500 mg/m2 (original dose 750 mg/m2, Cycle 1, Reason: Other (See Comments)), 250 mg/m2 (original dose 750 mg/m2, Cycle 4, Reason: Treatment Parameters Not Met)  Administration: 1,000 mg (8/30/2019), 1,000 mg (9/13/2019), 1,000 mg (9/27/2019), 500 mg (10/11/2019)  bortezomib (VELCADE) subcutaneous injection 2 6 mg, 1 3 mg/m2 = 2 6 mg (86 7 % of original dose 1 5 mg/m2), Subcutaneous, Once, 3 of 4 cycles  Dose modification: 1 3 mg/m2 (original dose 1 5 mg/m2, Cycle 1, Reason: Other (See Comments))  Administration: 2 6 mg (8/30/2019), 2 6 mg (9/13/2019), 2 6 mg (10/25/2019), 2 6 mg (11/8/2019), 2 6 mg (9/27/2019), 2 6 mg (11/1/2019), 2 6 mg (9/20/2019), 2 6 mg (10/4/2019), 2 6 mg (10/11/2019), 2 6 mg (10/18/2019)     12/6/2019 - 6/19/2020 Chemotherapy    pegfilgrastim (NEULASTA ONPRO) subcutaneous injection kit 6 mg, 6 mg, Subcutaneous, Once, 5 of 9 cycles  Administration: 6 mg (2/28/2020), 6 mg (3/13/2020), 6 mg (3/27/2020), 6 mg (4/10/2020), 6 mg (4/24/2020), 6 mg (5/8/2020), 6 mg (6/5/2020), 6 mg (5/22/2020), 6 mg (6/19/2020)  cyclophosphamide (CYTOXAN) 784 mg in sodium chloride 0 9 % 250 mL IVPB, 400 mg/m2 = 784 mg, Intravenous, Once, 5 of 9 cycles  Dose modification: 400 mg/m2 (original dose 750 mg/m2, Cycle 7, Reason: Other (See Comments))  Administration: 784 mg (2/28/2020), 784 mg (3/13/2020), 784 mg (3/27/2020), 784 mg (4/10/2020), 784 mg (4/24/2020), 784 mg (5/8/2020), 784 mg (5/22/2020), 784 mg (6/19/2020), 784 mg (6/5/2020)  methylPREDNISolone sodium succinate (Solu-MEDROL) 100 mg in sodium chloride 0 9 % 250 mL IVPB, 100 mg, Intravenous, Once, 8 of 12 cycles  Administration: 100 mg (12/6/2019), 100 mg (12/13/2019), 100 mg (12/20/2019), 100 mg (1/3/2020), 100 mg (1/10/2020), 100 mg (1/17/2020), 100 mg (1/31/2020), 100 mg (2/14/2020), 100 mg (5/22/2020), 100 mg (12/27/2019), 100 mg (1/24/2020), 100 mg (2/28/2020), 100 mg (3/13/2020), 100 mg (3/27/2020), 100 mg (4/10/2020), 100 mg (4/24/2020), 100 mg (5/8/2020), 100 mg (6/19/2020)     7/2/2020 -  Chemotherapy       10/17/2022 -  Chemotherapy    elotuzumab (EMPLICITI) IVPB, 10 mg/kg = 757 5 mg, Intravenous, Once, 3 of 6 cycles  Administration: 757 5 mg (10/17/2022), 757 5 mg (10/24/2022), 757 5 mg (11/7/2022), 1,600 mg (12/12/2022), 757 5 mg (10/31/2022), 757 5 mg (11/14/2022), 800 mg (11/21/2022), 800 mg (11/28/2022), 800 mg (12/5/2022)         Interval History: Clinically stable  ECOG-  0 - Asymptomatic    Review of Systems   Constitutional: Negative for chills and fever  HENT: Negative for nosebleeds  Eyes: Negative for discharge  Respiratory: Negative for cough and shortness of breath  Cardiovascular: Negative for chest pain  Gastrointestinal: Negative for abdominal pain, constipation and diarrhea  Endocrine: Negative for polydipsia  Genitourinary: Negative for hematuria  Musculoskeletal: Negative for arthralgias  Skin: Negative for color change  Allergic/Immunologic: Negative for immunocompromised state  Neurological: Negative for dizziness and headaches  Hematological: Negative for adenopathy  Psychiatric/Behavioral: Negative for agitation  Past Medical History:   Diagnosis Date   • History of autologous stem cell transplant ([place] [address] )    • Insomnia    • Multiple myeloma ([place] [address] )      Patient Active Problem List   Diagnosis   • IgG multiple myeloma (HCC)   • Essential hypertension   • Chronic ITP (idiopathic thrombocytopenia) ([facility])   • Hyperlipidemia   • Hypocalcemia   • Acquired hypothyroidism   • Prophylactic measure   • Hypogammaglobulinemia ([place] [address] )   • Anemia due to stage 3 chronic kidney disease (HCC)   • Pancytopenia (HCC)   • Stage 3b chronic kidney disease (HCC)   • Anxiety       Current Outpatient Medications:   •  acetaminophen (TYLENOL) 325 mg tablet, Take 650 mg by mouth every 6 (six) hours as needed for mild pain, Disp: , Rfl:   •  acyclovir (ZOVIRAX) 200 mg capsule, Take 1 tablet by mouth every 12 hours, Disp: 60 capsule, Rfl: 6  •  Ascorbic Acid (vitamin C) 1000 MG tablet, Take 1,000 mg by mouth daily, Disp: , Rfl:   •  atorvastatin (LIPITOR) 20 mg tablet, Take 1 tablet (20 mg total) by mouth daily, Disp: 90 tablet, Rfl: 1  •  Cholecalciferol (VITAMIN D-3 PO), Take 1,000 Units by mouth daily , Disp: , Rfl:   •  dexamethasone (DECADRON) 4 mg tablet, take 5 tablets by mouth ONE TIME FOR 1 DOSE TO BE TAKEN EACH MORNING OF EACH CHEMO TREATMENT, Disp: , Rfl:   •  dexamethasone (DECADRON) 4 mg tablet, Take 5 tablets (20 mg total) by mouth every 7 days for 3 doses Take once daily with food on days 8,15,22 of treatment   Do not start before December 19, 2022 , Disp: 30 tablet, Rfl: 3  •  escitalopram (LEXAPRO) 20 mg tablet, TAKE 1 TABLET DAILY, Disp: 90 tablet, Rfl: 1  •  Glutamine POWD, by Does not apply route, Disp: , Rfl:   •  levothyroxine 50 mcg tablet, TAKE ONE AND ONE-HALF TABLETS DAILY, Disp: 45 tablet, Rfl: 11  •  Multiple Vitamin (MULTIVITAMINS PO), Take 1 tablet by mouth daily  , Disp: , Rfl:   •  Pomalidomide (Pomalyst) 2 MG CAPS, TAKE 1 CAPSULE DAILY FOR 21 DAYS, THEN OFF 7 DAYS EVERY 28 DAY CYCLE, Disp: 21 capsule, Rfl: 4  •  sulfamethoxazole-trimethoprim (BACTRIM DS) 800-160 mg per tablet, 3 (three) times a week Monday Wednesday friday, Disp: , Rfl:   •  vitamin B-12 (VITAMIN B-12) 1,000 mcg tablet, Take 1,000 mcg by mouth daily, Disp: , Rfl:   •  LORazepam (ATIVAN) 1 mg tablet, Take 1 tablet (1 mg total) by mouth every 4 (four) hours as needed for anxiety (Patient not taking: Reported on 5/23/2022), Disp: 100 tablet, Rfl: 1  No Known Allergies  Past Surgical History:   Procedure Laterality Date   • APPENDECTOMY      Last assessed: 9/25/15   • ARTHROSCOPY KNEE Left     9/30/09   • EYE SURGERY      Lasik   • KNEE CARTILAGE SURGERY     • LIMBAL STEM CELL TRANSPLANT      Patient had 2 in [place]-2/29/2016 and 4/13/2016     Social History     Objective:  Vitals:    12/21/22 0838   BP: 124/78   BP Location: Left arm   Patient Position: Sitting   Cuff Size: Adult   Pulse: 85   Resp: 17   SpO2: 99%   Weight: 80 7 kg (178 lb)   Height: 5' 10" (1 778 m)     Physical Exam  Constitutional:       Appearance: He is well-developed  HENT:      Head: Normocephalic and atraumatic  Eyes:      Pupils: Pupils are equal, round, and reactive to light  Cardiovascular:      Rate and Rhythm: Normal rate and regular rhythm  Heart sounds: No murmur heard  Pulmonary:      Breath sounds: Normal breath sounds  No wheezing or rales  Abdominal:      Palpations: Abdomen is soft  Tenderness: There is no abdominal tenderness  Musculoskeletal:         General: No tenderness  Normal range of motion  Cervical back: Neck supple  Lymphadenopathy:      Cervical: No cervical adenopathy  Skin:     Findings: No erythema or rash  Neurological:      Mental Status: He is alert and oriented to person, place, and time        Cranial Nerves: No cranial nerve deficit  Deep Tendon Reflexes: Reflexes are normal and symmetric  Psychiatric:         Behavior: Behavior normal            Labs: I personally reviewed the labs and imaging pertinent to this patient care

## 2023-01-01 ENCOUNTER — APPOINTMENT (INPATIENT)
Dept: RADIOLOGY | Facility: HOSPITAL | Age: 78
DRG: 871 | End: 2023-01-01
Payer: MEDICARE

## 2023-01-01 ENCOUNTER — APPOINTMENT (EMERGENCY)
Dept: RADIOLOGY | Facility: HOSPITAL | Age: 78
DRG: 871 | End: 2023-01-01
Payer: MEDICARE

## 2023-01-01 ENCOUNTER — APPOINTMENT (INPATIENT)
Dept: INTERVENTIONAL RADIOLOGY/VASCULAR | Facility: HOSPITAL | Age: 78
DRG: 871 | End: 2023-01-01
Payer: MEDICARE

## 2023-01-01 ENCOUNTER — APPOINTMENT (EMERGENCY)
Dept: CT IMAGING | Facility: HOSPITAL | Age: 78
DRG: 871 | End: 2023-01-01
Payer: MEDICARE

## 2023-01-01 ENCOUNTER — APPOINTMENT (INPATIENT)
Dept: NON INVASIVE DIAGNOSTICS | Facility: HOSPITAL | Age: 78
DRG: 871 | End: 2023-01-01
Payer: MEDICARE

## 2023-01-01 ENCOUNTER — HOSPITAL ENCOUNTER (OUTPATIENT)
Dept: INFUSION CENTER | Facility: CLINIC | Age: 78
End: 2023-01-01

## 2023-01-01 ENCOUNTER — HOSPITAL ENCOUNTER (INPATIENT)
Facility: HOSPITAL | Age: 78
LOS: 6 days | DRG: 871 | End: 2023-09-13
Attending: EMERGENCY MEDICINE | Admitting: HOSPITALIST
Payer: MEDICARE

## 2023-01-01 VITALS
BODY MASS INDEX: 19.79 KG/M2 | WEIGHT: 138.23 LBS | TEMPERATURE: 100.2 F | DIASTOLIC BLOOD PRESSURE: 117 MMHG | HEIGHT: 70 IN | OXYGEN SATURATION: 92 % | RESPIRATION RATE: 12 BRPM | HEART RATE: 48 BPM | SYSTOLIC BLOOD PRESSURE: 180 MMHG

## 2023-01-01 DIAGNOSIS — J18.9 PNEUMONIA: Primary | ICD-10-CM

## 2023-01-01 DIAGNOSIS — N18.32 ANEMIA DUE TO STAGE 3B CHRONIC KIDNEY DISEASE: ICD-10-CM

## 2023-01-01 DIAGNOSIS — J18.9 PNEUMONIA OF BOTH LOWER LOBES DUE TO INFECTIOUS ORGANISM: ICD-10-CM

## 2023-01-01 DIAGNOSIS — I50.9 CHF EXACERBATION (HCC): ICD-10-CM

## 2023-01-01 DIAGNOSIS — E44.1 MILD PROTEIN-CALORIE MALNUTRITION (HCC): ICD-10-CM

## 2023-01-01 DIAGNOSIS — C90.00 IGG MULTIPLE MYELOMA (HCC): Primary | ICD-10-CM

## 2023-01-01 DIAGNOSIS — C90.00 MULTIPLE MYELOMA (HCC): ICD-10-CM

## 2023-01-01 DIAGNOSIS — D63.1 ANEMIA DUE TO STAGE 3B CHRONIC KIDNEY DISEASE: ICD-10-CM

## 2023-01-01 LAB
2HR DELTA HS TROPONIN: 0 NG/L
4HR DELTA HS TROPONIN: 2 NG/L
ABO GROUP BLD: NORMAL
ABO GROUP BLD: NORMAL
ALBUMIN SERPL BCP-MCNC: 2.8 G/DL (ref 3.5–5)
ALBUMIN SERPL BCP-MCNC: 2.9 G/DL (ref 3.5–5)
ALBUMIN SERPL BCP-MCNC: 2.9 G/DL (ref 3.5–5)
ALBUMIN SERPL BCP-MCNC: 3 G/DL (ref 3.5–5)
ALP SERPL-CCNC: 34 U/L (ref 34–104)
ALP SERPL-CCNC: 39 U/L (ref 34–104)
ALP SERPL-CCNC: 49 U/L (ref 34–104)
ALP SERPL-CCNC: 64 U/L (ref 34–104)
ALP SERPL-CCNC: 64 U/L (ref 34–104)
ALP SERPL-CCNC: 84 U/L (ref 34–104)
ALT SERPL W P-5'-P-CCNC: 18 U/L (ref 7–52)
ALT SERPL W P-5'-P-CCNC: 20 U/L (ref 7–52)
ALT SERPL W P-5'-P-CCNC: 28 U/L (ref 7–52)
ALT SERPL W P-5'-P-CCNC: 38 U/L (ref 7–52)
ALT SERPL W P-5'-P-CCNC: 44 U/L (ref 7–52)
ALT SERPL W P-5'-P-CCNC: 49 U/L (ref 7–52)
AMMONIA PLAS-SCNC: 32 UMOL/L (ref 18–72)
AMORPH URATE CRY URNS QL MICRO: NORMAL /HPF
ANION GAP SERPL CALCULATED.3IONS-SCNC: 10 MMOL/L
ANION GAP SERPL CALCULATED.3IONS-SCNC: 10 MMOL/L
ANION GAP SERPL CALCULATED.3IONS-SCNC: 11 MMOL/L
ANION GAP SERPL CALCULATED.3IONS-SCNC: 11 MMOL/L
ANION GAP SERPL CALCULATED.3IONS-SCNC: 12 MMOL/L
ANION GAP SERPL CALCULATED.3IONS-SCNC: 12 MMOL/L
ANION GAP SERPL CALCULATED.3IONS-SCNC: 13 MMOL/L
ANION GAP SERPL CALCULATED.3IONS-SCNC: 13 MMOL/L
ANION GAP SERPL CALCULATED.3IONS-SCNC: 14 MMOL/L
ANION GAP SERPL CALCULATED.3IONS-SCNC: 6 MMOL/L
ANISOCYTOSIS BLD QL SMEAR: PRESENT
ANISOCYTOSIS BLD QL SMEAR: PRESENT
AORTIC ROOT: 3.7 CM
AORTIC VALVE MEAN VELOCITY: 9.4 M/S
APICAL FOUR CHAMBER EJECTION FRACTION: 37 %
APPEARANCE FLD: ABNORMAL
APTT PPP: 29 SECONDS (ref 23–37)
APTT PPP: 32 SECONDS (ref 23–37)
ARTERIAL PATENCY WRIST A: YES
AST SERPL W P-5'-P-CCNC: 29 U/L (ref 13–39)
AST SERPL W P-5'-P-CCNC: 36 U/L (ref 13–39)
AST SERPL W P-5'-P-CCNC: 48 U/L (ref 13–39)
AST SERPL W P-5'-P-CCNC: 49 U/L (ref 13–39)
AST SERPL W P-5'-P-CCNC: 52 U/L (ref 13–39)
AST SERPL W P-5'-P-CCNC: 55 U/L (ref 13–39)
ATRIAL RATE: 111 BPM
ATRIAL RATE: 121 BPM
ATRIAL RATE: 92 BPM
ATRIAL RATE: 95 BPM
AV LVOT MEAN GRADIENT: 2 MMHG
AV LVOT PEAK GRADIENT: 4 MMHG
AV MEAN GRADIENT: 4 MMHG
AV PEAK GRADIENT: 8 MMHG
AV VELOCITY RATIO: 0.71
B PARAP IS1001 DNA NPH QL NAA+NON-PROBE: NOT DETECTED
B PERT.PT PRMT NPH QL NAA+NON-PROBE: NOT DETECTED
BACTERIA BLD CULT: NORMAL
BACTERIA BLD CULT: NORMAL
BACTERIA SPEC BFLD CULT: NO GROWTH
BACTERIA UR QL AUTO: ABNORMAL /HPF
BACTERIA UR QL AUTO: NORMAL /HPF
BASE EX.OXY STD BLDV CALC-SCNC: 70.1 % (ref 60–80)
BASE EX.OXY STD BLDV CALC-SCNC: 81.1 % (ref 60–80)
BASE EX.OXY STD BLDV CALC-SCNC: 92.3 % (ref 60–80)
BASE EXCESS BLDA CALC-SCNC: -10.7 MMOL/L
BASE EXCESS BLDV CALC-SCNC: -10 MMOL/L
BASE EXCESS BLDV CALC-SCNC: -4.6 MMOL/L
BASE EXCESS BLDV CALC-SCNC: -9.8 MMOL/L
BASOPHILS # BLD AUTO: 0 THOUSANDS/ÂΜL (ref 0–0.1)
BASOPHILS # BLD AUTO: 0 THOUSANDS/ÂΜL (ref 0–0.1)
BASOPHILS # BLD AUTO: 0.01 THOUSANDS/ÂΜL (ref 0–0.1)
BASOPHILS # BLD MANUAL: 0 THOUSAND/UL (ref 0–0.1)
BASOPHILS # BLD MANUAL: 0 THOUSAND/UL (ref 0–0.1)
BASOPHILS NFR BLD AUTO: 0 % (ref 0–1)
BASOPHILS NFR MAR MANUAL: 0 % (ref 0–1)
BASOPHILS NFR MAR MANUAL: 0 % (ref 0–1)
BILIRUB SERPL-MCNC: 0.64 MG/DL (ref 0.2–1)
BILIRUB SERPL-MCNC: 0.83 MG/DL (ref 0.2–1)
BILIRUB SERPL-MCNC: 1.01 MG/DL (ref 0.2–1)
BILIRUB SERPL-MCNC: 1.08 MG/DL (ref 0.2–1)
BILIRUB SERPL-MCNC: 1.16 MG/DL (ref 0.2–1)
BILIRUB SERPL-MCNC: 1.18 MG/DL (ref 0.2–1)
BILIRUB UR QL STRIP: NEGATIVE
BILIRUB UR QL STRIP: NEGATIVE
BLD GP AB SCN SERPL QL: NEGATIVE
BLD GP AB SCN SERPL QL: NEGATIVE
BNP SERPL-MCNC: >4700 PG/ML (ref 0–100)
BUN SERPL-MCNC: 29 MG/DL (ref 5–25)
BUN SERPL-MCNC: 40 MG/DL (ref 5–25)
BUN SERPL-MCNC: 41 MG/DL (ref 5–25)
BUN SERPL-MCNC: 42 MG/DL (ref 5–25)
BUN SERPL-MCNC: 43 MG/DL (ref 5–25)
BUN SERPL-MCNC: 43 MG/DL (ref 5–25)
BUN SERPL-MCNC: 46 MG/DL (ref 5–25)
BUN SERPL-MCNC: 46 MG/DL (ref 5–25)
BUN SERPL-MCNC: 47 MG/DL (ref 5–25)
BUN SERPL-MCNC: 49 MG/DL (ref 5–25)
BUN SERPL-MCNC: 52 MG/DL (ref 5–25)
BUN SERPL-MCNC: 53 MG/DL (ref 5–25)
C PNEUM DNA NPH QL NAA+NON-PROBE: NOT DETECTED
CA-I BLD-SCNC: 0.93 MMOL/L (ref 1.12–1.32)
CA-I BLD-SCNC: 1 MMOL/L (ref 1.12–1.32)
CA-I BLD-SCNC: 1.02 MMOL/L (ref 1.12–1.32)
CA-I BLD-SCNC: 1.02 MMOL/L (ref 1.12–1.32)
CA-I BLD-SCNC: 1.06 MMOL/L (ref 1.12–1.32)
CA-I BLD-SCNC: 1.07 MMOL/L (ref 1.12–1.32)
CA-I BLD-SCNC: 1.12 MMOL/L (ref 1.12–1.32)
CA-I BLD-SCNC: 1.13 MMOL/L (ref 1.12–1.32)
CA-I BLD-SCNC: 1.14 MMOL/L (ref 1.12–1.32)
CALCIUM ALBUM COR SERPL-MCNC: 10 MG/DL (ref 8.3–10.1)
CALCIUM ALBUM COR SERPL-MCNC: 7.9 MG/DL (ref 8.3–10.1)
CALCIUM ALBUM COR SERPL-MCNC: 8.3 MG/DL (ref 8.3–10.1)
CALCIUM ALBUM COR SERPL-MCNC: 8.9 MG/DL (ref 8.3–10.1)
CALCIUM ALBUM COR SERPL-MCNC: 9.2 MG/DL (ref 8.3–10.1)
CALCIUM ALBUM COR SERPL-MCNC: 9.2 MG/DL (ref 8.3–10.1)
CALCIUM SERPL-MCNC: 6.9 MG/DL (ref 8.4–10.2)
CALCIUM SERPL-MCNC: 7.2 MG/DL (ref 8.4–10.2)
CALCIUM SERPL-MCNC: 7.3 MG/DL (ref 8.4–10.2)
CALCIUM SERPL-MCNC: 7.6 MG/DL (ref 8.4–10.2)
CALCIUM SERPL-MCNC: 7.8 MG/DL (ref 8.4–10.2)
CALCIUM SERPL-MCNC: 8 MG/DL (ref 8.4–10.2)
CALCIUM SERPL-MCNC: 8 MG/DL (ref 8.4–10.2)
CALCIUM SERPL-MCNC: 8.2 MG/DL (ref 8.4–10.2)
CALCIUM SERPL-MCNC: 8.4 MG/DL (ref 8.4–10.2)
CALCIUM SERPL-MCNC: 8.4 MG/DL (ref 8.4–10.2)
CALCIUM SERPL-MCNC: 8.7 MG/DL (ref 8.4–10.2)
CALCIUM SERPL-MCNC: 9.1 MG/DL (ref 8.4–10.2)
CARDIAC TROPONIN I PNL SERPL HS: 10 NG/L
CARDIAC TROPONIN I PNL SERPL HS: 10 NG/L
CARDIAC TROPONIN I PNL SERPL HS: 12 NG/L
CHLORIDE SERPL-SCNC: 107 MMOL/L (ref 96–108)
CHLORIDE SERPL-SCNC: 108 MMOL/L (ref 96–108)
CHLORIDE SERPL-SCNC: 109 MMOL/L (ref 96–108)
CHLORIDE SERPL-SCNC: 109 MMOL/L (ref 96–108)
CHLORIDE SERPL-SCNC: 110 MMOL/L (ref 96–108)
CHLORIDE SERPL-SCNC: 112 MMOL/L (ref 96–108)
CHLORIDE SERPL-SCNC: 113 MMOL/L (ref 96–108)
CHLORIDE SERPL-SCNC: 115 MMOL/L (ref 96–108)
CHLORIDE SERPL-SCNC: 116 MMOL/L (ref 96–108)
CLARITY UR: ABNORMAL
CLARITY UR: CLEAR
CO2 SERPL-SCNC: 13 MMOL/L (ref 21–32)
CO2 SERPL-SCNC: 15 MMOL/L (ref 21–32)
CO2 SERPL-SCNC: 15 MMOL/L (ref 21–32)
CO2 SERPL-SCNC: 17 MMOL/L (ref 21–32)
CO2 SERPL-SCNC: 17 MMOL/L (ref 21–32)
CO2 SERPL-SCNC: 20 MMOL/L (ref 21–32)
CO2 SERPL-SCNC: 20 MMOL/L (ref 21–32)
CO2 SERPL-SCNC: 21 MMOL/L (ref 21–32)
CO2 SERPL-SCNC: 21 MMOL/L (ref 21–32)
CO2 SERPL-SCNC: 22 MMOL/L (ref 21–32)
COLOR FLD: ABNORMAL
COLOR UR: YELLOW
COLOR UR: YELLOW
CREAT SERPL-MCNC: 1.58 MG/DL (ref 0.6–1.3)
CREAT SERPL-MCNC: 1.66 MG/DL (ref 0.6–1.3)
CREAT SERPL-MCNC: 1.88 MG/DL (ref 0.6–1.3)
CREAT SERPL-MCNC: 1.91 MG/DL (ref 0.6–1.3)
CREAT SERPL-MCNC: 2.07 MG/DL (ref 0.6–1.3)
CREAT SERPL-MCNC: 2.12 MG/DL (ref 0.6–1.3)
CREAT SERPL-MCNC: 2.15 MG/DL (ref 0.6–1.3)
CREAT SERPL-MCNC: 2.17 MG/DL (ref 0.6–1.3)
CREAT SERPL-MCNC: 2.26 MG/DL (ref 0.6–1.3)
CREAT SERPL-MCNC: 2.27 MG/DL (ref 0.6–1.3)
CREAT SERPL-MCNC: 2.35 MG/DL (ref 0.6–1.3)
CREAT SERPL-MCNC: 2.36 MG/DL (ref 0.6–1.3)
CREAT UR-MCNC: 33.2 MG/DL
CREAT UR-MCNC: 59.2 MG/DL
CRP SERPL QL: 10.2 MG/L
D DIMER PPP FEU-MCNC: 1.39 UG/ML FEU
DOP CALC AO PEAK VEL: 1.37 M/S
DOP CALC AO VTI: 26.58 CM
DOP CALC LVOT PEAK VEL VTI: 18.04 CM
DOP CALC LVOT PEAK VEL: 0.97 M/S
E WAVE DECELERATION TIME: 208 MS
EOSINOPHIL # BLD AUTO: 0 THOUSAND/ÂΜL (ref 0–0.61)
EOSINOPHIL # BLD AUTO: 0.01 THOUSAND/ÂΜL (ref 0–0.61)
EOSINOPHIL # BLD MANUAL: 0 THOUSAND/UL (ref 0–0.4)
EOSINOPHIL # BLD MANUAL: 0 THOUSAND/UL (ref 0–0.4)
EOSINOPHIL NFR BLD AUTO: 0 % (ref 0–6)
EOSINOPHIL NFR BLD MANUAL: 0 % (ref 0–6)
EOSINOPHIL NFR BLD MANUAL: 0 % (ref 0–6)
ERYTHROCYTE [DISTWIDTH] IN BLOOD BY AUTOMATED COUNT: 25 % (ref 11.6–15.1)
ERYTHROCYTE [DISTWIDTH] IN BLOOD BY AUTOMATED COUNT: 25.4 % (ref 11.6–15.1)
ERYTHROCYTE [DISTWIDTH] IN BLOOD BY AUTOMATED COUNT: 25.5 % (ref 11.6–15.1)
ERYTHROCYTE [DISTWIDTH] IN BLOOD BY AUTOMATED COUNT: 25.5 % (ref 11.6–15.1)
ERYTHROCYTE [DISTWIDTH] IN BLOOD BY AUTOMATED COUNT: 25.6 % (ref 11.6–15.1)
ERYTHROCYTE [DISTWIDTH] IN BLOOD BY AUTOMATED COUNT: 25.6 % (ref 11.6–15.1)
ERYTHROCYTE [DISTWIDTH] IN BLOOD BY AUTOMATED COUNT: 25.9 % (ref 11.6–15.1)
ERYTHROCYTE [DISTWIDTH] IN BLOOD BY AUTOMATED COUNT: 25.9 % (ref 11.6–15.1)
EST. AVERAGE GLUCOSE BLD GHB EST-MCNC: 108 MG/DL
FINE GRAN CASTS URNS QL MICRO: ABNORMAL /LPF
FLUAV RNA NPH QL NAA+NON-PROBE: NOT DETECTED
FLUAV RNA RESP QL NAA+PROBE: NEGATIVE
FLUBV RNA NPH QL NAA+NON-PROBE: NOT DETECTED
FLUBV RNA RESP QL NAA+PROBE: NEGATIVE
FRACTIONAL SHORTENING: 15 (ref 28–44)
GFR SERPL CREATININE-BSD FRML MDRD: 25 ML/MIN/1.73SQ M
GFR SERPL CREATININE-BSD FRML MDRD: 26 ML/MIN/1.73SQ M
GFR SERPL CREATININE-BSD FRML MDRD: 27 ML/MIN/1.73SQ M
GFR SERPL CREATININE-BSD FRML MDRD: 27 ML/MIN/1.73SQ M
GFR SERPL CREATININE-BSD FRML MDRD: 28 ML/MIN/1.73SQ M
GFR SERPL CREATININE-BSD FRML MDRD: 29 ML/MIN/1.73SQ M
GFR SERPL CREATININE-BSD FRML MDRD: 29 ML/MIN/1.73SQ M
GFR SERPL CREATININE-BSD FRML MDRD: 30 ML/MIN/1.73SQ M
GFR SERPL CREATININE-BSD FRML MDRD: 33 ML/MIN/1.73SQ M
GFR SERPL CREATININE-BSD FRML MDRD: 34 ML/MIN/1.73SQ M
GFR SERPL CREATININE-BSD FRML MDRD: 39 ML/MIN/1.73SQ M
GFR SERPL CREATININE-BSD FRML MDRD: 42 ML/MIN/1.73SQ M
GLUCOSE FLD-MCNC: 153 MG/DL
GLUCOSE SERPL-MCNC: 104 MG/DL (ref 65–140)
GLUCOSE SERPL-MCNC: 108 MG/DL (ref 65–140)
GLUCOSE SERPL-MCNC: 110 MG/DL (ref 65–140)
GLUCOSE SERPL-MCNC: 113 MG/DL (ref 65–140)
GLUCOSE SERPL-MCNC: 117 MG/DL (ref 65–140)
GLUCOSE SERPL-MCNC: 117 MG/DL (ref 65–140)
GLUCOSE SERPL-MCNC: 126 MG/DL (ref 65–140)
GLUCOSE SERPL-MCNC: 128 MG/DL (ref 65–140)
GLUCOSE SERPL-MCNC: 129 MG/DL (ref 65–140)
GLUCOSE SERPL-MCNC: 137 MG/DL (ref 65–140)
GLUCOSE SERPL-MCNC: 142 MG/DL (ref 65–140)
GLUCOSE SERPL-MCNC: 149 MG/DL (ref 65–140)
GLUCOSE SERPL-MCNC: 151 MG/DL (ref 65–140)
GLUCOSE SERPL-MCNC: 165 MG/DL (ref 65–140)
GLUCOSE SERPL-MCNC: 175 MG/DL (ref 65–140)
GLUCOSE SERPL-MCNC: 197 MG/DL (ref 65–140)
GLUCOSE SERPL-MCNC: 198 MG/DL (ref 65–140)
GLUCOSE SERPL-MCNC: 220 MG/DL (ref 65–140)
GLUCOSE UR STRIP-MCNC: NEGATIVE MG/DL
GLUCOSE UR STRIP-MCNC: NEGATIVE MG/DL
GRAM STN SPEC: NORMAL
GRAM STN SPEC: NORMAL
HADV DNA NPH QL NAA+NON-PROBE: NOT DETECTED
HBA1C MFR BLD: 5.4 %
HCO3 BLDA-SCNC: 15.3 MMOL/L (ref 22–28)
HCO3 BLDV-SCNC: 14.4 MMOL/L (ref 24–30)
HCO3 BLDV-SCNC: 15.9 MMOL/L (ref 24–30)
HCO3 BLDV-SCNC: 18.6 MMOL/L (ref 24–30)
HCOV 229E RNA NPH QL NAA+NON-PROBE: NOT DETECTED
HCOV HKU1 RNA NPH QL NAA+NON-PROBE: NOT DETECTED
HCOV NL63 RNA NPH QL NAA+NON-PROBE: NOT DETECTED
HCOV OC43 RNA NPH QL NAA+NON-PROBE: NOT DETECTED
HCT VFR BLD AUTO: 22 % (ref 36.5–49.3)
HCT VFR BLD AUTO: 23.5 % (ref 36.5–49.3)
HCT VFR BLD AUTO: 25 % (ref 36.5–49.3)
HCT VFR BLD AUTO: 25.1 % (ref 36.5–49.3)
HCT VFR BLD AUTO: 25.6 % (ref 36.5–49.3)
HCT VFR BLD AUTO: 25.9 % (ref 36.5–49.3)
HCT VFR BLD AUTO: 26.4 % (ref 36.5–49.3)
HCT VFR BLD AUTO: 27.1 % (ref 36.5–49.3)
HGB BLD-MCNC: 7 G/DL (ref 12–17)
HGB BLD-MCNC: 7.3 G/DL (ref 12–17)
HGB BLD-MCNC: 7.7 G/DL (ref 12–17)
HGB BLD-MCNC: 7.7 G/DL (ref 12–17)
HGB BLD-MCNC: 7.8 G/DL (ref 12–17)
HGB BLD-MCNC: 7.8 G/DL (ref 12–17)
HGB BLD-MCNC: 8.1 G/DL (ref 12–17)
HGB BLD-MCNC: 8.1 G/DL (ref 12–17)
HGB UR QL STRIP.AUTO: ABNORMAL
HGB UR QL STRIP.AUTO: ABNORMAL
HMPV RNA NPH QL NAA+NON-PROBE: NOT DETECTED
HPIV1 RNA NPH QL NAA+NON-PROBE: NOT DETECTED
HPIV2 RNA NPH QL NAA+NON-PROBE: NOT DETECTED
HPIV3 RNA NPH QL NAA+NON-PROBE: NOT DETECTED
HPIV4 RNA NPH QL NAA+NON-PROBE: NOT DETECTED
IMM GRANULOCYTES # BLD AUTO: 0.04 THOUSAND/UL (ref 0–0.2)
IMM GRANULOCYTES # BLD AUTO: 0.07 THOUSAND/UL (ref 0–0.2)
IMM GRANULOCYTES # BLD AUTO: 0.1 THOUSAND/UL (ref 0–0.2)
IMM GRANULOCYTES # BLD AUTO: 0.14 THOUSAND/UL (ref 0–0.2)
IMM GRANULOCYTES # BLD AUTO: 0.28 THOUSAND/UL (ref 0–0.2)
IMM GRANULOCYTES # BLD AUTO: 0.3 THOUSAND/UL (ref 0–0.2)
IMM GRANULOCYTES NFR BLD AUTO: 1 % (ref 0–2)
IMM GRANULOCYTES NFR BLD AUTO: 2 % (ref 0–2)
IMM GRANULOCYTES NFR BLD AUTO: 4 % (ref 0–2)
IMM GRANULOCYTES NFR BLD AUTO: 4 % (ref 0–2)
INR PPP: 1.54 (ref 0.84–1.19)
INR PPP: 1.61 (ref 0.84–1.19)
INR PPP: 1.64 (ref 0.84–1.19)
INR PPP: 1.76 (ref 0.84–1.19)
INR PPP: 1.88 (ref 0.84–1.19)
INTERVENTRICULAR SEPTUM IN DIASTOLE (PARASTERNAL SHORT AXIS VIEW): 0.8 CM
INTERVENTRICULAR SEPTUM: 0.8 CM (ref 0.6–1.1)
IVC: 3 MM
KETONES UR STRIP-MCNC: NEGATIVE MG/DL
KETONES UR STRIP-MCNC: NEGATIVE MG/DL
L PNEUMO1 AG UR QL IA.RAPID: NEGATIVE
LAAS-AP2: 23.2 CM2
LAAS-AP4: 28.6 CM2
LACTATE SERPL-SCNC: 1.8 MMOL/L (ref 0.5–2)
LACTATE SERPL-SCNC: 2.2 MMOL/L (ref 0.5–2)
LACTATE SERPL-SCNC: 3.5 MMOL/L (ref 0.5–2)
LDH FLD L TO P-CCNC: 383 U/L
LDH SERPL-CCNC: 288 U/L (ref 140–271)
LEFT ATRIUM SIZE: 4.4 CM
LEFT ATRIUM VOLUME (MOD BIPLANE): 84 ML
LEFT INTERNAL DIMENSION IN SYSTOLE: 4.7 CM (ref 2.1–4)
LEFT VENTRICULAR INTERNAL DIMENSION IN DIASTOLE: 5.5 CM (ref 3.5–6)
LEFT VENTRICULAR POSTERIOR WALL IN END DIASTOLE: 0.8 CM
LEFT VENTRICULAR STROKE VOLUME: 46 ML
LEUKOCYTE ESTERASE UR QL STRIP: NEGATIVE
LEUKOCYTE ESTERASE UR QL STRIP: NEGATIVE
LIPASE SERPL-CCNC: <6 U/L (ref 11–82)
LVSV (TEICH): 46 ML
LYMPHOCYTES # BLD AUTO: 0.14 THOUSANDS/ÂΜL (ref 0.6–4.47)
LYMPHOCYTES # BLD AUTO: 0.24 THOUSANDS/ÂΜL (ref 0.6–4.47)
LYMPHOCYTES # BLD AUTO: 0.25 THOUSANDS/ÂΜL (ref 0.6–4.47)
LYMPHOCYTES # BLD AUTO: 0.26 THOUSANDS/ÂΜL (ref 0.6–4.47)
LYMPHOCYTES # BLD AUTO: 0.27 THOUSAND/UL (ref 0.6–4.47)
LYMPHOCYTES # BLD AUTO: 0.32 THOUSANDS/ÂΜL (ref 0.6–4.47)
LYMPHOCYTES # BLD AUTO: 0.56 THOUSAND/UL (ref 0.6–4.47)
LYMPHOCYTES # BLD AUTO: 1.27 THOUSANDS/ÂΜL (ref 0.6–4.47)
LYMPHOCYTES # BLD AUTO: 4 % (ref 14–44)
LYMPHOCYTES # BLD AUTO: 8 % (ref 14–44)
LYMPHOCYTES NFR BLD AUTO: 13 % (ref 14–44)
LYMPHOCYTES NFR BLD AUTO: 2 % (ref 14–44)
LYMPHOCYTES NFR BLD AUTO: 4 % (ref 14–44)
LYMPHOCYTES NFR BLD AUTO: 5 % (ref 14–44)
M PNEUMO DNA NPH QL NAA+NON-PROBE: NOT DETECTED
MAGNESIUM SERPL-MCNC: 1.9 MG/DL (ref 1.9–2.7)
MAGNESIUM SERPL-MCNC: 2.1 MG/DL (ref 1.9–2.7)
MAGNESIUM SERPL-MCNC: 2.4 MG/DL (ref 1.9–2.7)
MAGNESIUM SERPL-MCNC: 2.4 MG/DL (ref 1.9–2.7)
MAGNESIUM SERPL-MCNC: 2.5 MG/DL (ref 1.9–2.7)
MAGNESIUM SERPL-MCNC: 2.5 MG/DL (ref 1.9–2.7)
MAGNESIUM SERPL-MCNC: 2.6 MG/DL (ref 1.9–2.7)
MAGNESIUM SERPL-MCNC: 2.6 MG/DL (ref 1.9–2.7)
MAGNESIUM SERPL-MCNC: 2.7 MG/DL (ref 1.9–2.7)
MCH RBC QN AUTO: 34.7 PG (ref 26.8–34.3)
MCH RBC QN AUTO: 34.8 PG (ref 26.8–34.3)
MCH RBC QN AUTO: 35.1 PG (ref 26.8–34.3)
MCH RBC QN AUTO: 35.2 PG (ref 26.8–34.3)
MCH RBC QN AUTO: 35.5 PG (ref 26.8–34.3)
MCH RBC QN AUTO: 35.8 PG (ref 26.8–34.3)
MCH RBC QN AUTO: 35.8 PG (ref 26.8–34.3)
MCH RBC QN AUTO: 36.1 PG (ref 26.8–34.3)
MCHC RBC AUTO-ENTMCNC: 29.5 G/DL (ref 31.4–37.4)
MCHC RBC AUTO-ENTMCNC: 29.9 G/DL (ref 31.4–37.4)
MCHC RBC AUTO-ENTMCNC: 30.5 G/DL (ref 31.4–37.4)
MCHC RBC AUTO-ENTMCNC: 30.7 G/DL (ref 31.4–37.4)
MCHC RBC AUTO-ENTMCNC: 30.8 G/DL (ref 31.4–37.4)
MCHC RBC AUTO-ENTMCNC: 31.1 G/DL (ref 31.4–37.4)
MCHC RBC AUTO-ENTMCNC: 31.3 G/DL (ref 31.4–37.4)
MCHC RBC AUTO-ENTMCNC: 31.8 G/DL (ref 31.4–37.4)
MCV RBC AUTO: 113 FL (ref 82–98)
MCV RBC AUTO: 113 FL (ref 82–98)
MCV RBC AUTO: 115 FL (ref 82–98)
MCV RBC AUTO: 116 FL (ref 82–98)
MCV RBC AUTO: 120 FL (ref 82–98)
METAMYELOCYTES NFR BLD MANUAL: 1 % (ref 0–1)
MICROALBUMIN UR-MCNC: 815.8 MG/L
MICROALBUMIN/CREAT 24H UR: 1378 MG/G CREATININE (ref 0–30)
MITRAL REGURGITATION PEAK VELOCITY: 4.32 M/S
MITRAL VALVE MEAN INFLOW VELOCITY: 3.1 M/S
MITRAL VALVE REGURGITANT PEAK GRADIENT: 75 MMHG
MONO+MESO NFR FLD MANUAL: 86 %
MONOCYTES # BLD AUTO: 0.34 THOUSAND/UL (ref 0–1.22)
MONOCYTES # BLD AUTO: 0.49 THOUSAND/ÂΜL (ref 0.17–1.22)
MONOCYTES # BLD AUTO: 0.52 THOUSAND/ÂΜL (ref 0.17–1.22)
MONOCYTES # BLD AUTO: 0.56 THOUSAND/UL (ref 0–1.22)
MONOCYTES # BLD AUTO: 0.59 THOUSAND/ÂΜL (ref 0.17–1.22)
MONOCYTES # BLD AUTO: 0.6 THOUSAND/ÂΜL (ref 0.17–1.22)
MONOCYTES # BLD AUTO: 0.62 THOUSAND/ÂΜL (ref 0.17–1.22)
MONOCYTES # BLD AUTO: 0.8 THOUSAND/ÂΜL (ref 0.17–1.22)
MONOCYTES NFR BLD AUTO: 8 % (ref 4–12)
MONOCYTES NFR BLD AUTO: 9 % (ref 4–12)
MONOCYTES NFR BLD: 5 % (ref 4–12)
MONOCYTES NFR BLD: 8 % (ref 4–12)
MONONUC CELLS NFR FLD MANUAL: 8 %
MRSA NOSE QL CULT: NORMAL
MV E'TISSUE VEL-SEP: 9 CM/S
MV PEAK A VEL: 0.71 M/S
MV PEAK E VEL: 95 CM/S
MV STENOSIS PRESSURE HALF TIME: 60 MS
MV VALVE AREA P 1/2 METHOD: 3.67
MYELOCYTES NFR BLD MANUAL: 1 % (ref 0–1)
NASAL CANNULA: 15
NEUTROPHILS # BLD AUTO: 4.41 THOUSANDS/ÂΜL (ref 1.85–7.62)
NEUTROPHILS # BLD AUTO: 5.65 THOUSANDS/ÂΜL (ref 1.85–7.62)
NEUTROPHILS # BLD AUTO: 5.94 THOUSANDS/ÂΜL (ref 1.85–7.62)
NEUTROPHILS # BLD AUTO: 6 THOUSANDS/ÂΜL (ref 1.85–7.62)
NEUTROPHILS # BLD AUTO: 6.48 THOUSANDS/ÂΜL (ref 1.85–7.62)
NEUTROPHILS # BLD AUTO: 7.34 THOUSANDS/ÂΜL (ref 1.85–7.62)
NEUTROPHILS # BLD MANUAL: 5.7 THOUSAND/UL (ref 1.85–7.62)
NEUTROPHILS # BLD MANUAL: 6.11 THOUSAND/UL (ref 1.85–7.62)
NEUTS BAND NFR BLD MANUAL: 1 % (ref 0–8)
NEUTS BAND NFR BLD MANUAL: 5 % (ref 0–8)
NEUTS SEG NFR BLD AUTO: 6 %
NEUTS SEG NFR BLD AUTO: 77 % (ref 43–75)
NEUTS SEG NFR BLD AUTO: 77 % (ref 43–75)
NEUTS SEG NFR BLD AUTO: 83 % (ref 43–75)
NEUTS SEG NFR BLD AUTO: 84 % (ref 43–75)
NEUTS SEG NFR BLD AUTO: 85 % (ref 43–75)
NEUTS SEG NFR BLD AUTO: 86 % (ref 43–75)
NEUTS SEG NFR BLD AUTO: 89 % (ref 43–75)
NEUTS SEG NFR BLD AUTO: 90 % (ref 43–75)
NITRITE UR QL STRIP: NEGATIVE
NITRITE UR QL STRIP: NEGATIVE
NON-SQ EPI CELLS URNS QL MICRO: ABNORMAL /HPF
NON-SQ EPI CELLS URNS QL MICRO: NORMAL /HPF
NRBC BLD AUTO-RTO: 0 /100 WBCS
NRBC BLD AUTO-RTO: 13 /100 WBCS
NRBC BLD AUTO-RTO: 2 /100 WBCS
NRBC BLD AUTO-RTO: 5 /100 WBCS
NRBC BLD AUTO-RTO: 8 /100 WBC (ref 0–2)
NRBC BLD AUTO-RTO: 8 /100 WBCS
NRBC BLD AUTO-RTO: 8 /100 WBCS
O2 CT BLDA-SCNC: 10.7 ML/DL (ref 16–23)
O2 CT BLDV-SCNC: 11.7 ML/DL
O2 CT BLDV-SCNC: 9 ML/DL
O2 CT BLDV-SCNC: 9.9 ML/DL
OXYHGB MFR BLDA: 87.6 % (ref 94–97)
P AXIS: 65 DEGREES
P AXIS: 79 DEGREES
PCO2 BLDA: 34 MM HG (ref 36–44)
PCO2 BLDV: 26.8 MM HG (ref 42–50)
PCO2 BLDV: 27.1 MM HG (ref 42–50)
PCO2 BLDV: 34.3 MM HG (ref 42–50)
PH BLDA: 7.27 [PH] (ref 7.35–7.45)
PH BLDV: 7.28 [PH] (ref 7.3–7.4)
PH BLDV: 7.35 [PH] (ref 7.3–7.4)
PH BLDV: 7.46 [PH] (ref 7.3–7.4)
PH BODY FLUID: 7.5
PH UR STRIP.AUTO: 5 [PH]
PH UR STRIP.AUTO: 5.5 [PH]
PHOSPHATE SERPL-MCNC: 2.7 MG/DL (ref 2.3–4.1)
PHOSPHATE SERPL-MCNC: 3.9 MG/DL (ref 2.3–4.1)
PHOSPHATE SERPL-MCNC: 4 MG/DL (ref 2.3–4.1)
PHOSPHATE SERPL-MCNC: 4.2 MG/DL (ref 2.3–4.1)
PHOSPHATE SERPL-MCNC: 4.5 MG/DL (ref 2.3–4.1)
PHOSPHATE SERPL-MCNC: 4.7 MG/DL (ref 2.3–4.1)
PHOSPHATE SERPL-MCNC: 5.3 MG/DL (ref 2.3–4.1)
PHOSPHATE SERPL-MCNC: 5.8 MG/DL (ref 2.3–4.1)
PLATELET # BLD AUTO: 19 THOUSANDS/UL (ref 149–390)
PLATELET # BLD AUTO: 21 THOUSANDS/UL (ref 149–390)
PLATELET # BLD AUTO: 23 THOUSANDS/UL (ref 149–390)
PLATELET # BLD AUTO: 24 THOUSANDS/UL (ref 149–390)
PLATELET # BLD AUTO: 25 THOUSANDS/UL (ref 149–390)
PLATELET # BLD AUTO: 27 THOUSANDS/UL (ref 149–390)
PLATELET # BLD AUTO: 29 THOUSANDS/UL (ref 149–390)
PLATELET # BLD AUTO: 31 THOUSANDS/UL (ref 149–390)
PLATELET # BLD AUTO: 41 THOUSANDS/UL (ref 149–390)
PLATELET BLD QL SMEAR: ABNORMAL
PLATELET BLD QL SMEAR: ABNORMAL
PMV BLD AUTO: 10 FL (ref 8.9–12.7)
PMV BLD AUTO: 10.1 FL (ref 8.9–12.7)
PMV BLD AUTO: 10.3 FL (ref 8.9–12.7)
PMV BLD AUTO: 11.1 FL (ref 8.9–12.7)
PMV BLD AUTO: 11.3 FL (ref 8.9–12.7)
PMV BLD AUTO: 12.4 FL (ref 8.9–12.7)
PMV BLD AUTO: 12.6 FL (ref 8.9–12.7)
PMV BLD AUTO: 9.9 FL (ref 8.9–12.7)
PO2 BLDA: 59.9 MM HG (ref 75–129)
PO2 BLDV: 40.1 MM HG (ref 35–45)
PO2 BLDV: 42.4 MM HG (ref 35–45)
PO2 BLDV: 79.6 MM HG (ref 35–45)
POLYCHROMASIA BLD QL SMEAR: PRESENT
POTASSIUM SERPL-SCNC: 3.4 MMOL/L (ref 3.5–5.3)
POTASSIUM SERPL-SCNC: 3.5 MMOL/L (ref 3.5–5.3)
POTASSIUM SERPL-SCNC: 3.5 MMOL/L (ref 3.5–5.3)
POTASSIUM SERPL-SCNC: 3.6 MMOL/L (ref 3.5–5.3)
POTASSIUM SERPL-SCNC: 3.7 MMOL/L (ref 3.5–5.3)
POTASSIUM SERPL-SCNC: 3.9 MMOL/L (ref 3.5–5.3)
POTASSIUM SERPL-SCNC: 4 MMOL/L (ref 3.5–5.3)
POTASSIUM SERPL-SCNC: 4.1 MMOL/L (ref 3.5–5.3)
POTASSIUM SERPL-SCNC: 4.1 MMOL/L (ref 3.5–5.3)
POTASSIUM SERPL-SCNC: 4.3 MMOL/L (ref 3.5–5.3)
POTASSIUM SERPL-SCNC: 4.4 MMOL/L (ref 3.5–5.3)
POTASSIUM SERPL-SCNC: 4.5 MMOL/L (ref 3.5–5.3)
PR INTERVAL: 148 MS
PR INTERVAL: 156 MS
PR INTERVAL: 156 MS
PROCALCITONIN SERPL-MCNC: 0.32 NG/ML
PROCALCITONIN SERPL-MCNC: 10.18 NG/ML
PROCALCITONIN SERPL-MCNC: 14.45 NG/ML
PROCALCITONIN SERPL-MCNC: 3.31 NG/ML
PROCALCITONIN SERPL-MCNC: 4.21 NG/ML
PROT FLD-MCNC: 4.9 G/DL
PROT SERPL-MCNC: 10.2 G/DL (ref 6.4–8.4)
PROT SERPL-MCNC: 9 G/DL (ref 6.4–8.4)
PROT SERPL-MCNC: 9.1 G/DL (ref 6.4–8.4)
PROT SERPL-MCNC: 9.2 G/DL (ref 6.4–8.4)
PROT SERPL-MCNC: 9.3 G/DL (ref 6.4–8.4)
PROT SERPL-MCNC: 9.5 G/DL (ref 6.4–8.4)
PROT SERPL-MCNC: 9.6 G/DL (ref 6.4–8.4)
PROT UR STRIP-MCNC: ABNORMAL MG/DL
PROT UR STRIP-MCNC: ABNORMAL MG/DL
PROTHROMBIN TIME: 18.5 SECONDS (ref 11.6–14.5)
PROTHROMBIN TIME: 19.2 SECONDS (ref 11.6–14.5)
PROTHROMBIN TIME: 19.6 SECONDS (ref 11.6–14.5)
PROTHROMBIN TIME: 20.5 SECONDS (ref 11.6–14.5)
PROTHROMBIN TIME: 21.6 SECONDS (ref 11.6–14.5)
QRS AXIS: 126 DEGREES
QRS AXIS: 52 DEGREES
QRS AXIS: 7 DEGREES
QRS AXIS: 9 DEGREES
QRSD INTERVAL: 86 MS
QRSD INTERVAL: 88 MS
QRSD INTERVAL: 90 MS
QRSD INTERVAL: 90 MS
QT INTERVAL: 342 MS
QT INTERVAL: 382 MS
QT INTERVAL: 400 MS
QT INTERVAL: 410 MS
QTC INTERVAL: 485 MS
QTC INTERVAL: 502 MS
QTC INTERVAL: 507 MS
QTC INTERVAL: 537 MS
RA PRESSURE ESTIMATED: 20 MMHG
RBC # BLD AUTO: 1.94 MILLION/UL (ref 3.88–5.62)
RBC # BLD AUTO: 2.04 MILLION/UL (ref 3.88–5.62)
RBC # BLD AUTO: 2.19 MILLION/UL (ref 3.88–5.62)
RBC # BLD AUTO: 2.2 MILLION/UL (ref 3.88–5.62)
RBC # BLD AUTO: 2.22 MILLION/UL (ref 3.88–5.62)
RBC # BLD AUTO: 2.22 MILLION/UL (ref 3.88–5.62)
RBC # BLD AUTO: 2.26 MILLION/UL (ref 3.88–5.62)
RBC # BLD AUTO: 2.33 MILLION/UL (ref 3.88–5.62)
RBC #/AREA URNS AUTO: ABNORMAL /HPF
RBC #/AREA URNS AUTO: NORMAL /HPF
RBC MORPH BLD: PRESENT
RBC MORPH BLD: PRESENT
RH BLD: POSITIVE
RH BLD: POSITIVE
RIGHT ATRIUM AREA SYSTOLE A4C: 24.6 CM2
RIGHT VENTRICLE ID DIMENSION: 3.2 CM
ROULEAUX BLD QL SMEAR: PRESENT
RSV RNA NPH QL NAA+NON-PROBE: NOT DETECTED
RSV RNA RESP QL NAA+PROBE: NEGATIVE
RV PSP: 61 MMHG
RV+EV RNA NPH QL NAA+NON-PROBE: NOT DETECTED
S PNEUM AG UR QL: NEGATIVE
SARS-COV-2 RNA NPH QL NAA+NON-PROBE: NOT DETECTED
SARS-COV-2 RNA RESP QL NAA+PROBE: NEGATIVE
SITE: ABNORMAL
SL CV DOP CALC MV VTI RETROGRADE: 141.8 CM
SL CV LEFT ATRIUM LENGTH A2C: 6.1 CM
SL CV LV EF: 30
SL CV MV MEAN GRADIENT RETROGRADE: 45 MMHG
SL CV PED ECHO LEFT VENTRICLE DIASTOLIC VOLUME (MOD BIPLANE) 2D: 146 ML
SL CV PED ECHO LEFT VENTRICLE SYSTOLIC VOLUME (MOD BIPLANE) 2D: 100 ML
SODIUM 24H UR-SCNC: 101 MOL/L
SODIUM SERPL-SCNC: 134 MMOL/L (ref 135–147)
SODIUM SERPL-SCNC: 135 MMOL/L (ref 135–147)
SODIUM SERPL-SCNC: 136 MMOL/L (ref 135–147)
SODIUM SERPL-SCNC: 138 MMOL/L (ref 135–147)
SODIUM SERPL-SCNC: 140 MMOL/L (ref 135–147)
SODIUM SERPL-SCNC: 141 MMOL/L (ref 135–147)
SODIUM SERPL-SCNC: 142 MMOL/L (ref 135–147)
SODIUM SERPL-SCNC: 144 MMOL/L (ref 135–147)
SODIUM SERPL-SCNC: 146 MMOL/L (ref 135–147)
SODIUM SERPL-SCNC: 147 MMOL/L (ref 135–147)
SP GR UR STRIP.AUTO: 1.02
SP GR UR STRIP.AUTO: >=1.03
SPECIMEN EXPIRATION DATE: NORMAL
SPECIMEN EXPIRATION DATE: NORMAL
SPECIMEN SOURCE: ABNORMAL
T WAVE AXIS: 16 DEGREES
T WAVE AXIS: 55 DEGREES
T WAVE AXIS: 62 DEGREES
T WAVE AXIS: 73 DEGREES
TOTAL CELLS COUNTED SPEC: 100
TR MAX PG: 41 MMHG
TR PEAK VELOCITY: 3.2 M/S
TRICUSPID ANNULAR PLANE SYSTOLIC EXCURSION: 1.2 CM
TRICUSPID VALVE PEAK REGURGITATION VELOCITY: 3.2 M/S
TSH SERPL DL<=0.05 MIU/L-ACNC: 3.52 UIU/ML (ref 0.45–4.5)
URATE CRY URNS QL MICRO: ABNORMAL /HPF
UROBILINOGEN UR QL STRIP.AUTO: 0.2 E.U./DL
UROBILINOGEN UR QL STRIP.AUTO: 0.2 E.U./DL
UUN 24H UR-MCNC: 345 MG/DL
VENTRICULAR RATE: 119 BPM
VENTRICULAR RATE: 121 BPM
VENTRICULAR RATE: 92 BPM
VENTRICULAR RATE: 95 BPM
WBC # BLD AUTO: 5.23 THOUSAND/UL (ref 4.31–10.16)
WBC # BLD AUTO: 6.7 THOUSAND/UL (ref 4.31–10.16)
WBC # BLD AUTO: 6.71 THOUSAND/UL (ref 4.31–10.16)
WBC # BLD AUTO: 6.83 THOUSAND/UL (ref 4.31–10.16)
WBC # BLD AUTO: 6.9 THOUSAND/UL (ref 4.31–10.16)
WBC # BLD AUTO: 6.95 THOUSAND/UL (ref 4.31–10.16)
WBC # BLD AUTO: 7.69 THOUSAND/UL (ref 4.31–10.16)
WBC # BLD AUTO: 9.57 THOUSAND/UL (ref 4.31–10.16)
WBC # FLD MANUAL: 1075 /UL
WBC #/AREA URNS AUTO: ABNORMAL /HPF
WBC #/AREA URNS AUTO: NORMAL /HPF

## 2023-01-01 PROCEDURE — 85025 COMPLETE CBC W/AUTO DIFF WBC: CPT

## 2023-01-01 PROCEDURE — 85379 FIBRIN DEGRADATION QUANT: CPT | Performed by: PHYSICIAN ASSISTANT

## 2023-01-01 PROCEDURE — 81001 URINALYSIS AUTO W/SCOPE: CPT | Performed by: INTERNAL MEDICINE

## 2023-01-01 PROCEDURE — 84165 PROTEIN E-PHORESIS SERUM: CPT | Performed by: STUDENT IN AN ORGANIZED HEALTH CARE EDUCATION/TRAINING PROGRAM

## 2023-01-01 PROCEDURE — 71045 X-RAY EXAM CHEST 1 VIEW: CPT

## 2023-01-01 PROCEDURE — 71250 CT THORAX DX C-: CPT

## 2023-01-01 PROCEDURE — 85007 BL SMEAR W/DIFF WBC COUNT: CPT | Performed by: NURSE PRACTITIONER

## 2023-01-01 PROCEDURE — 82570 ASSAY OF URINE CREATININE: CPT | Performed by: PHYSICIAN ASSISTANT

## 2023-01-01 PROCEDURE — 86900 BLOOD TYPING SEROLOGIC ABO: CPT | Performed by: EMERGENCY MEDICINE

## 2023-01-01 PROCEDURE — 94664 DEMO&/EVAL PT USE INHALER: CPT

## 2023-01-01 PROCEDURE — 87205 SMEAR GRAM STAIN: CPT | Performed by: NURSE PRACTITIONER

## 2023-01-01 PROCEDURE — 32555 ASPIRATE PLEURA W/ IMAGING: CPT | Performed by: RADIOLOGY

## 2023-01-01 PROCEDURE — 85025 COMPLETE CBC W/AUTO DIFF WBC: CPT | Performed by: NURSE PRACTITIONER

## 2023-01-01 PROCEDURE — 93010 ELECTROCARDIOGRAM REPORT: CPT | Performed by: INTERNAL MEDICINE

## 2023-01-01 PROCEDURE — 93306 TTE W/DOPPLER COMPLETE: CPT | Performed by: INTERNAL MEDICINE

## 2023-01-01 PROCEDURE — 86901 BLOOD TYPING SEROLOGIC RH(D): CPT | Performed by: EMERGENCY MEDICINE

## 2023-01-01 PROCEDURE — 83880 ASSAY OF NATRIURETIC PEPTIDE: CPT | Performed by: EMERGENCY MEDICINE

## 2023-01-01 PROCEDURE — 85610 PROTHROMBIN TIME: CPT

## 2023-01-01 PROCEDURE — 94002 VENT MGMT INPAT INIT DAY: CPT

## 2023-01-01 PROCEDURE — 82805 BLOOD GASES W/O2 SATURATION: CPT | Performed by: NURSE PRACTITIONER

## 2023-01-01 PROCEDURE — 82330 ASSAY OF CALCIUM: CPT | Performed by: PHYSICIAN ASSISTANT

## 2023-01-01 PROCEDURE — 99233 SBSQ HOSP IP/OBS HIGH 50: CPT | Performed by: NURSE PRACTITIONER

## 2023-01-01 PROCEDURE — 87449 NOS EACH ORGANISM AG IA: CPT | Performed by: HOSPITALIST

## 2023-01-01 PROCEDURE — 97535 SELF CARE MNGMENT TRAINING: CPT

## 2023-01-01 PROCEDURE — 87040 BLOOD CULTURE FOR BACTERIA: CPT | Performed by: EMERGENCY MEDICINE

## 2023-01-01 PROCEDURE — 81001 URINALYSIS AUTO W/SCOPE: CPT | Performed by: EMERGENCY MEDICINE

## 2023-01-01 PROCEDURE — 36600 WITHDRAWAL OF ARTERIAL BLOOD: CPT

## 2023-01-01 PROCEDURE — 85610 PROTHROMBIN TIME: CPT | Performed by: NURSE PRACTITIONER

## 2023-01-01 PROCEDURE — 80048 BASIC METABOLIC PNL TOTAL CA: CPT | Performed by: NURSE PRACTITIONER

## 2023-01-01 PROCEDURE — 83735 ASSAY OF MAGNESIUM: CPT | Performed by: NURSE PRACTITIONER

## 2023-01-01 PROCEDURE — 84100 ASSAY OF PHOSPHORUS: CPT | Performed by: NURSE PRACTITIONER

## 2023-01-01 PROCEDURE — 93005 ELECTROCARDIOGRAM TRACING: CPT

## 2023-01-01 PROCEDURE — 84145 PROCALCITONIN (PCT): CPT | Performed by: NURSE PRACTITIONER

## 2023-01-01 PROCEDURE — 80053 COMPREHEN METABOLIC PANEL: CPT | Performed by: NURSE PRACTITIONER

## 2023-01-01 PROCEDURE — 92610 EVALUATE SWALLOWING FUNCTION: CPT

## 2023-01-01 PROCEDURE — 84155 ASSAY OF PROTEIN SERUM: CPT | Performed by: NURSE PRACTITIONER

## 2023-01-01 PROCEDURE — 88364 INSITU HYBRIDIZATION (FISH): CPT | Performed by: PATHOLOGY

## 2023-01-01 PROCEDURE — 88112 CYTOPATH CELL ENHANCE TECH: CPT | Performed by: PATHOLOGY

## 2023-01-01 PROCEDURE — 84157 ASSAY OF PROTEIN OTHER: CPT | Performed by: NURSE PRACTITIONER

## 2023-01-01 PROCEDURE — 87081 CULTURE SCREEN ONLY: CPT | Performed by: HOSPITALIST

## 2023-01-01 PROCEDURE — 85007 BL SMEAR W/DIFF WBC COUNT: CPT | Performed by: EMERGENCY MEDICINE

## 2023-01-01 PROCEDURE — 84484 ASSAY OF TROPONIN QUANT: CPT | Performed by: EMERGENCY MEDICINE

## 2023-01-01 PROCEDURE — 88305 TISSUE EXAM BY PATHOLOGIST: CPT | Performed by: PATHOLOGY

## 2023-01-01 PROCEDURE — 82140 ASSAY OF AMMONIA: CPT | Performed by: EMERGENCY MEDICINE

## 2023-01-01 PROCEDURE — 86850 RBC ANTIBODY SCREEN: CPT | Performed by: EMERGENCY MEDICINE

## 2023-01-01 PROCEDURE — 94760 N-INVAS EAR/PLS OXIMETRY 1: CPT

## 2023-01-01 PROCEDURE — 82330 ASSAY OF CALCIUM: CPT | Performed by: NURSE PRACTITIONER

## 2023-01-01 PROCEDURE — 83615 LACTATE (LD) (LDH) ENZYME: CPT | Performed by: NURSE PRACTITIONER

## 2023-01-01 PROCEDURE — 84540 ASSAY OF URINE/UREA-N: CPT | Performed by: INTERNAL MEDICINE

## 2023-01-01 PROCEDURE — 99291 CRITICAL CARE FIRST HOUR: CPT | Performed by: NURSE PRACTITIONER

## 2023-01-01 PROCEDURE — 97167 OT EVAL HIGH COMPLEX 60 MIN: CPT

## 2023-01-01 PROCEDURE — 0202U NFCT DS 22 TRGT SARS-COV-2: CPT | Performed by: NURSE PRACTITIONER

## 2023-01-01 PROCEDURE — 84145 PROCALCITONIN (PCT): CPT | Performed by: HOSPITALIST

## 2023-01-01 PROCEDURE — 70450 CT HEAD/BRAIN W/O DYE: CPT

## 2023-01-01 PROCEDURE — 86901 BLOOD TYPING SEROLOGIC RH(D): CPT

## 2023-01-01 PROCEDURE — NC001 PR NO CHARGE: Performed by: NURSE PRACTITIONER

## 2023-01-01 PROCEDURE — 99222 1ST HOSP IP/OBS MODERATE 55: CPT | Performed by: NURSE PRACTITIONER

## 2023-01-01 PROCEDURE — 85610 PROTHROMBIN TIME: CPT | Performed by: EMERGENCY MEDICINE

## 2023-01-01 PROCEDURE — 86140 C-REACTIVE PROTEIN: CPT | Performed by: PHYSICIAN ASSISTANT

## 2023-01-01 PROCEDURE — 88365 INSITU HYBRIDIZATION (FISH): CPT | Performed by: PATHOLOGY

## 2023-01-01 PROCEDURE — 85027 COMPLETE CBC AUTOMATED: CPT | Performed by: EMERGENCY MEDICINE

## 2023-01-01 PROCEDURE — 80053 COMPREHEN METABOLIC PANEL: CPT | Performed by: PHYSICIAN ASSISTANT

## 2023-01-01 PROCEDURE — C9113 INJ PANTOPRAZOLE SODIUM, VIA: HCPCS | Performed by: NURSE PRACTITIONER

## 2023-01-01 PROCEDURE — 94003 VENT MGMT INPAT SUBQ DAY: CPT

## 2023-01-01 PROCEDURE — 83605 ASSAY OF LACTIC ACID: CPT | Performed by: EMERGENCY MEDICINE

## 2023-01-01 PROCEDURE — 81003 URINALYSIS AUTO W/O SCOPE: CPT | Performed by: EMERGENCY MEDICINE

## 2023-01-01 PROCEDURE — 82948 REAGENT STRIP/BLOOD GLUCOSE: CPT

## 2023-01-01 PROCEDURE — 82570 ASSAY OF URINE CREATININE: CPT | Performed by: INTERNAL MEDICINE

## 2023-01-01 PROCEDURE — 36415 COLL VENOUS BLD VENIPUNCTURE: CPT | Performed by: EMERGENCY MEDICINE

## 2023-01-01 PROCEDURE — 83690 ASSAY OF LIPASE: CPT | Performed by: EMERGENCY MEDICINE

## 2023-01-01 PROCEDURE — G1004 CDSM NDSC: HCPCS

## 2023-01-01 PROCEDURE — 89051 BODY FLUID CELL COUNT: CPT | Performed by: NURSE PRACTITIONER

## 2023-01-01 PROCEDURE — 92526 ORAL FUNCTION THERAPY: CPT

## 2023-01-01 PROCEDURE — 99291 CRITICAL CARE FIRST HOUR: CPT | Performed by: INTERNAL MEDICINE

## 2023-01-01 PROCEDURE — 85730 THROMBOPLASTIN TIME PARTIAL: CPT | Performed by: EMERGENCY MEDICINE

## 2023-01-01 PROCEDURE — 88342 IMHCHEM/IMCYTCHM 1ST ANTB: CPT | Performed by: PATHOLOGY

## 2023-01-01 PROCEDURE — 84443 ASSAY THYROID STIM HORMONE: CPT | Performed by: EMERGENCY MEDICINE

## 2023-01-01 PROCEDURE — 88341 IMHCHEM/IMCYTCHM EA ADD ANTB: CPT | Performed by: PATHOLOGY

## 2023-01-01 PROCEDURE — 86900 BLOOD TYPING SEROLOGIC ABO: CPT

## 2023-01-01 PROCEDURE — 85027 COMPLETE CBC AUTOMATED: CPT | Performed by: NURSE PRACTITIONER

## 2023-01-01 PROCEDURE — 99285 EMERGENCY DEPT VISIT HI MDM: CPT | Performed by: EMERGENCY MEDICINE

## 2023-01-01 PROCEDURE — 97163 PT EVAL HIGH COMPLEX 45 MIN: CPT

## 2023-01-01 PROCEDURE — 99223 1ST HOSP IP/OBS HIGH 75: CPT | Performed by: HOSPITALIST

## 2023-01-01 PROCEDURE — 99232 SBSQ HOSP IP/OBS MODERATE 35: CPT | Performed by: INTERNAL MEDICINE

## 2023-01-01 PROCEDURE — 84145 PROCALCITONIN (PCT): CPT | Performed by: PHYSICIAN ASSISTANT

## 2023-01-01 PROCEDURE — 96365 THER/PROPH/DIAG IV INF INIT: CPT

## 2023-01-01 PROCEDURE — 80053 COMPREHEN METABOLIC PANEL: CPT

## 2023-01-01 PROCEDURE — 93970 EXTREMITY STUDY: CPT | Performed by: SURGERY

## 2023-01-01 PROCEDURE — 93970 EXTREMITY STUDY: CPT

## 2023-01-01 PROCEDURE — 99233 SBSQ HOSP IP/OBS HIGH 50: CPT | Performed by: INTERNAL MEDICINE

## 2023-01-01 PROCEDURE — 32555 ASPIRATE PLEURA W/ IMAGING: CPT

## 2023-01-01 PROCEDURE — 82945 GLUCOSE OTHER FLUID: CPT | Performed by: NURSE PRACTITIONER

## 2023-01-01 PROCEDURE — 83036 HEMOGLOBIN GLYCOSYLATED A1C: CPT | Performed by: NURSE PRACTITIONER

## 2023-01-01 PROCEDURE — 82043 UR ALBUMIN QUANTITATIVE: CPT | Performed by: PHYSICIAN ASSISTANT

## 2023-01-01 PROCEDURE — 83986 ASSAY PH BODY FLUID NOS: CPT | Performed by: NURSE PRACTITIONER

## 2023-01-01 PROCEDURE — 85025 COMPLETE CBC W/AUTO DIFF WBC: CPT | Performed by: PHYSICIAN ASSISTANT

## 2023-01-01 PROCEDURE — 93306 TTE W/DOPPLER COMPLETE: CPT

## 2023-01-01 PROCEDURE — 99285 EMERGENCY DEPT VISIT HI MDM: CPT

## 2023-01-01 PROCEDURE — 84300 ASSAY OF URINE SODIUM: CPT | Performed by: INTERNAL MEDICINE

## 2023-01-01 PROCEDURE — 80053 COMPREHEN METABOLIC PANEL: CPT | Performed by: EMERGENCY MEDICINE

## 2023-01-01 PROCEDURE — NC001 PR NO CHARGE: Performed by: INTERNAL MEDICINE

## 2023-01-01 PROCEDURE — 85730 THROMBOPLASTIN TIME PARTIAL: CPT

## 2023-01-01 PROCEDURE — 0241U HB NFCT DS VIR RESP RNA 4 TRGT: CPT | Performed by: EMERGENCY MEDICINE

## 2023-01-01 PROCEDURE — 82805 BLOOD GASES W/O2 SATURATION: CPT | Performed by: EMERGENCY MEDICINE

## 2023-01-01 PROCEDURE — 0W9B3ZZ DRAINAGE OF LEFT PLEURAL CAVITY, PERCUTANEOUS APPROACH: ICD-10-PCS | Performed by: RADIOLOGY

## 2023-01-01 PROCEDURE — 86850 RBC ANTIBODY SCREEN: CPT

## 2023-01-01 PROCEDURE — 96361 HYDRATE IV INFUSION ADD-ON: CPT

## 2023-01-01 PROCEDURE — 97110 THERAPEUTIC EXERCISES: CPT

## 2023-01-01 PROCEDURE — 87070 CULTURE OTHR SPECIMN AEROBIC: CPT | Performed by: NURSE PRACTITIONER

## 2023-01-01 PROCEDURE — 94640 AIRWAY INHALATION TREATMENT: CPT

## 2023-01-01 PROCEDURE — 85049 AUTOMATED PLATELET COUNT: CPT | Performed by: HOSPITALIST

## 2023-01-01 PROCEDURE — 83605 ASSAY OF LACTIC ACID: CPT | Performed by: NURSE PRACTITIONER

## 2023-01-01 RX ORDER — MELATONIN
1000 DAILY
Status: DISCONTINUED | OUTPATIENT
Start: 2023-01-01 | End: 2023-09-14 | Stop reason: HOSPADM

## 2023-01-01 RX ORDER — HYDROMORPHONE HCL IN WATER/PF 6 MG/30 ML
PATIENT CONTROLLED ANALGESIA SYRINGE INTRAVENOUS
Status: COMPLETED
Start: 2023-01-01 | End: 2023-01-01

## 2023-01-01 RX ORDER — FUROSEMIDE 10 MG/ML
40 INJECTION INTRAMUSCULAR; INTRAVENOUS ONCE
Status: COMPLETED | OUTPATIENT
Start: 2023-01-01 | End: 2023-01-01

## 2023-01-01 RX ORDER — ASCORBIC ACID 500 MG
1000 TABLET ORAL DAILY
Status: DISCONTINUED | OUTPATIENT
Start: 2023-01-01 | End: 2023-01-01

## 2023-01-01 RX ORDER — LEVOTHYROXINE SODIUM 0.1 MG/1
100 TABLET ORAL
Status: DISCONTINUED | OUTPATIENT
Start: 2023-01-01 | End: 2023-01-01

## 2023-01-01 RX ORDER — LORAZEPAM 1 MG/1
1 TABLET ORAL EVERY 4 HOURS PRN
Status: DISCONTINUED | OUTPATIENT
Start: 2023-01-01 | End: 2023-01-01

## 2023-01-01 RX ORDER — METOPROLOL TARTRATE 50 MG/1
50 TABLET, FILM COATED ORAL EVERY 12 HOURS SCHEDULED
Status: DISCONTINUED | OUTPATIENT
Start: 2023-01-01 | End: 2023-09-14 | Stop reason: HOSPADM

## 2023-01-01 RX ORDER — LANOLIN ALCOHOL/MO/W.PET/CERES
6 CREAM (GRAM) TOPICAL
Status: DISCONTINUED | OUTPATIENT
Start: 2023-01-01 | End: 2023-09-14 | Stop reason: HOSPADM

## 2023-01-01 RX ORDER — DEXAMETHASONE SODIUM PHOSPHATE 10 MG/ML
6 INJECTION, SOLUTION INTRAMUSCULAR; INTRAVENOUS EVERY 24 HOURS
Status: DISCONTINUED | OUTPATIENT
Start: 2023-01-01 | End: 2023-01-01

## 2023-01-01 RX ORDER — ATORVASTATIN CALCIUM 10 MG/1
20 TABLET, FILM COATED ORAL DAILY
Status: DISCONTINUED | OUTPATIENT
Start: 2023-01-01 | End: 2023-01-01

## 2023-01-01 RX ORDER — METOPROLOL TARTRATE 5 MG/5ML
5 INJECTION INTRAVENOUS ONCE
Status: COMPLETED | OUTPATIENT
Start: 2023-01-01 | End: 2023-01-01

## 2023-01-01 RX ORDER — LIDOCAINE HYDROCHLORIDE 10 MG/ML
INJECTION, SOLUTION EPIDURAL; INFILTRATION; INTRACAUDAL; PERINEURAL AS NEEDED
Status: COMPLETED | OUTPATIENT
Start: 2023-01-01 | End: 2023-01-01

## 2023-01-01 RX ORDER — CALCIUM GLUCONATE 20 MG/ML
1 INJECTION, SOLUTION INTRAVENOUS ONCE
Status: COMPLETED | OUTPATIENT
Start: 2023-01-01 | End: 2023-01-01

## 2023-01-01 RX ORDER — SULFAMETHOXAZOLE AND TRIMETHOPRIM 800; 160 MG/1; MG/1
TABLET ORAL 3 TIMES WEEKLY
Refills: 0 | Status: CANCELLED | OUTPATIENT
Start: 2023-01-01

## 2023-01-01 RX ORDER — METOPROLOL TARTRATE 5 MG/5ML
5 INJECTION INTRAVENOUS EVERY 12 HOURS
Status: DISCONTINUED | OUTPATIENT
Start: 2023-01-01 | End: 2023-01-01

## 2023-01-01 RX ORDER — ALBUMIN, HUMAN INJ 5% 5 %
12.5 SOLUTION INTRAVENOUS ONCE
Status: COMPLETED | OUTPATIENT
Start: 2023-01-01 | End: 2023-01-01

## 2023-01-01 RX ORDER — PANTOPRAZOLE SODIUM 40 MG/10ML
40 INJECTION, POWDER, LYOPHILIZED, FOR SOLUTION INTRAVENOUS
Status: DISCONTINUED | OUTPATIENT
Start: 2023-01-01 | End: 2023-01-01

## 2023-01-01 RX ORDER — CEFEPIME HYDROCHLORIDE 1 G/50ML
1000 INJECTION, SOLUTION INTRAVENOUS ONCE
Status: COMPLETED | OUTPATIENT
Start: 2023-01-01 | End: 2023-01-01

## 2023-01-01 RX ORDER — ONDANSETRON 2 MG/ML
4 INJECTION INTRAMUSCULAR; INTRAVENOUS EVERY 6 HOURS PRN
Status: DISCONTINUED | OUTPATIENT
Start: 2023-01-01 | End: 2023-09-14 | Stop reason: HOSPADM

## 2023-01-01 RX ORDER — CEFEPIME HYDROCHLORIDE 1 G/50ML
1000 INJECTION, SOLUTION INTRAVENOUS EVERY 12 HOURS
Status: DISCONTINUED | OUTPATIENT
Start: 2023-01-01 | End: 2023-01-01

## 2023-01-01 RX ORDER — POTASSIUM CHLORIDE 20MEQ/15ML
40 LIQUID (ML) ORAL ONCE
Status: COMPLETED | OUTPATIENT
Start: 2023-01-01 | End: 2023-01-01

## 2023-01-01 RX ORDER — ESCITALOPRAM OXALATE 10 MG/1
20 TABLET ORAL DAILY
Status: DISCONTINUED | OUTPATIENT
Start: 2023-01-01 | End: 2023-09-14 | Stop reason: HOSPADM

## 2023-01-01 RX ORDER — OLANZAPINE 10 MG/1
5 INJECTION, POWDER, LYOPHILIZED, FOR SOLUTION INTRAMUSCULAR ONCE
Status: COMPLETED | OUTPATIENT
Start: 2023-01-01 | End: 2023-01-01

## 2023-01-01 RX ORDER — DOCUSATE SODIUM 100 MG/1
100 CAPSULE, LIQUID FILLED ORAL 2 TIMES DAILY
Status: DISCONTINUED | OUTPATIENT
Start: 2023-01-01 | End: 2023-01-01

## 2023-01-01 RX ORDER — ACYCLOVIR 200 MG/1
400 CAPSULE ORAL 2 TIMES DAILY
Status: DISCONTINUED | OUTPATIENT
Start: 2023-01-01 | End: 2023-01-01

## 2023-01-01 RX ORDER — ACETAMINOPHEN 325 MG/1
650 TABLET ORAL EVERY 6 HOURS PRN
Status: DISCONTINUED | OUTPATIENT
Start: 2023-01-01 | End: 2023-09-14 | Stop reason: HOSPADM

## 2023-01-01 RX ORDER — SULFAMETHOXAZOLE AND TRIMETHOPRIM 400; 80 MG/1; MG/1
1 TABLET ORAL 3 TIMES WEEKLY
Status: DISCONTINUED | OUTPATIENT
Start: 2023-01-01 | End: 2023-09-14 | Stop reason: HOSPADM

## 2023-01-01 RX ORDER — ASCORBIC ACID 500 MG
1000 TABLET ORAL DAILY
Status: DISCONTINUED | OUTPATIENT
Start: 2023-01-01 | End: 2023-09-14 | Stop reason: HOSPADM

## 2023-01-01 RX ORDER — CALCIUM GLUCONATE 20 MG/ML
2 INJECTION, SOLUTION INTRAVENOUS ONCE
Status: COMPLETED | OUTPATIENT
Start: 2023-01-01 | End: 2023-01-01

## 2023-01-01 RX ORDER — ATORVASTATIN CALCIUM 10 MG/1
20 TABLET, FILM COATED ORAL DAILY
Status: DISCONTINUED | OUTPATIENT
Start: 2023-01-01 | End: 2023-09-14 | Stop reason: HOSPADM

## 2023-01-01 RX ORDER — POLYETHYLENE GLYCOL 3350 17 G/17G
17 POWDER, FOR SOLUTION ORAL DAILY PRN
Status: DISCONTINUED | OUTPATIENT
Start: 2023-01-01 | End: 2023-09-14 | Stop reason: HOSPADM

## 2023-01-01 RX ORDER — OLANZAPINE 2.5 MG/1
5 TABLET ORAL
Status: DISCONTINUED | OUTPATIENT
Start: 2023-01-01 | End: 2023-01-01

## 2023-01-01 RX ORDER — ACETAMINOPHEN 325 MG/1
650 TABLET ORAL EVERY 6 HOURS PRN
Status: DISCONTINUED | OUTPATIENT
Start: 2023-01-01 | End: 2023-01-01

## 2023-01-01 RX ORDER — MELATONIN
1000 DAILY
Status: DISCONTINUED | OUTPATIENT
Start: 2023-01-01 | End: 2023-01-01

## 2023-01-01 RX ORDER — HYDROMORPHONE HCL IN WATER/PF 6 MG/30 ML
0.2 PATIENT CONTROLLED ANALGESIA SYRINGE INTRAVENOUS ONCE
Status: DISCONTINUED | OUTPATIENT
Start: 2023-01-01 | End: 2023-01-01

## 2023-01-01 RX ORDER — ESCITALOPRAM OXALATE 10 MG/1
20 TABLET ORAL DAILY
Status: DISCONTINUED | OUTPATIENT
Start: 2023-01-01 | End: 2023-01-01

## 2023-01-01 RX ORDER — GUAIFENESIN 600 MG/1
600 TABLET, EXTENDED RELEASE ORAL 2 TIMES DAILY
Status: DISCONTINUED | OUTPATIENT
Start: 2023-01-01 | End: 2023-01-01

## 2023-01-01 RX ORDER — AMOXICILLIN 250 MG
2 CAPSULE ORAL
Status: DISCONTINUED | OUTPATIENT
Start: 2023-01-01 | End: 2023-09-14 | Stop reason: HOSPADM

## 2023-01-01 RX ORDER — ACYCLOVIR 400 MG/1
400 TABLET ORAL 2 TIMES DAILY
Status: DISCONTINUED | OUTPATIENT
Start: 2023-01-01 | End: 2023-01-01

## 2023-01-01 RX ORDER — SODIUM BICARBONATE 650 MG/1
650 TABLET ORAL 2 TIMES DAILY
Status: DISCONTINUED | OUTPATIENT
Start: 2023-01-01 | End: 2023-01-01

## 2023-01-01 RX ORDER — ACYCLOVIR 400 MG/1
400 TABLET ORAL 2 TIMES DAILY
Status: DISCONTINUED | OUTPATIENT
Start: 2023-01-01 | End: 2023-09-14 | Stop reason: HOSPADM

## 2023-01-01 RX ORDER — ALBUTEROL SULFATE 2.5 MG/3ML
2.5 SOLUTION RESPIRATORY (INHALATION) ONCE
Status: COMPLETED | OUTPATIENT
Start: 2023-01-01 | End: 2023-01-01

## 2023-01-01 RX ORDER — METOPROLOL TARTRATE 5 MG/5ML
5 INJECTION INTRAVENOUS EVERY 6 HOURS PRN
Status: COMPLETED | OUTPATIENT
Start: 2023-01-01 | End: 2023-01-01

## 2023-01-01 RX ORDER — DEXAMETHASONE SODIUM PHOSPHATE 4 MG/ML
INJECTION, SOLUTION INTRA-ARTICULAR; INTRALESIONAL; INTRAMUSCULAR; INTRAVENOUS; SOFT TISSUE
Status: COMPLETED
Start: 2023-01-01 | End: 2023-01-01

## 2023-01-01 RX ORDER — MEGESTROL ACETATE 40 MG/1
40 TABLET ORAL 4 TIMES DAILY
Status: DISCONTINUED | OUTPATIENT
Start: 2023-01-01 | End: 2023-09-14 | Stop reason: HOSPADM

## 2023-01-01 RX ORDER — SULFAMETHOXAZOLE AND TRIMETHOPRIM 400; 80 MG/1; MG/1
1 TABLET ORAL 3 TIMES WEEKLY
Status: DISCONTINUED | OUTPATIENT
Start: 2023-01-01 | End: 2023-01-01

## 2023-01-01 RX ORDER — LEVOTHYROXINE SODIUM 0.1 MG/1
100 TABLET ORAL
Status: DISCONTINUED | OUTPATIENT
Start: 2023-01-01 | End: 2023-09-14 | Stop reason: HOSPADM

## 2023-01-01 RX ORDER — OLANZAPINE 2.5 MG/1
2.5 TABLET ORAL
Status: DISCONTINUED | OUTPATIENT
Start: 2023-01-01 | End: 2023-01-01

## 2023-01-01 RX ORDER — MAGNESIUM SULFATE HEPTAHYDRATE 40 MG/ML
2 INJECTION, SOLUTION INTRAVENOUS ONCE
Status: COMPLETED | OUTPATIENT
Start: 2023-01-01 | End: 2023-01-01

## 2023-01-01 RX ORDER — DILTIAZEM HYDROCHLORIDE 5 MG/ML
20 INJECTION INTRAVENOUS ONCE
Status: COMPLETED | OUTPATIENT
Start: 2023-01-01 | End: 2023-01-01

## 2023-01-01 RX ORDER — CALCIUM GLUCONATE 20 MG/ML
2 INJECTION, SOLUTION INTRAVENOUS ONCE
Status: DISCONTINUED | OUTPATIENT
Start: 2023-01-01 | End: 2023-01-01

## 2023-01-01 RX ORDER — PANTOPRAZOLE SODIUM 40 MG/1
40 TABLET, DELAYED RELEASE ORAL
Status: DISCONTINUED | OUTPATIENT
Start: 2023-01-01 | End: 2023-01-01

## 2023-01-01 RX ORDER — POTASSIUM CHLORIDE 20MEQ/15ML
20 LIQUID (ML) ORAL ONCE
Status: COMPLETED | OUTPATIENT
Start: 2023-01-01 | End: 2023-01-01

## 2023-01-01 RX ORDER — CEFEPIME HYDROCHLORIDE 2 G/1
2000 INJECTION, POWDER, FOR SOLUTION INTRAVENOUS EVERY 24 HOURS
Status: DISCONTINUED | OUTPATIENT
Start: 2023-01-01 | End: 2023-01-01

## 2023-01-01 RX ORDER — ACETAMINOPHEN 650 MG/1
650 SUPPOSITORY RECTAL EVERY 6 HOURS PRN
Status: DISCONTINUED | OUTPATIENT
Start: 2023-01-01 | End: 2023-01-01

## 2023-01-01 RX ORDER — HEPARIN SODIUM 5000 [USP'U]/ML
5000 INJECTION, SOLUTION INTRAVENOUS; SUBCUTANEOUS EVERY 8 HOURS SCHEDULED
Status: DISCONTINUED | OUTPATIENT
Start: 2023-01-01 | End: 2023-01-01

## 2023-01-01 RX ORDER — METOPROLOL TARTRATE 5 MG/5ML
5 INJECTION INTRAVENOUS EVERY 6 HOURS PRN
Status: DISCONTINUED | OUTPATIENT
Start: 2023-01-01 | End: 2023-01-01

## 2023-01-01 RX ORDER — INSULIN LISPRO 100 [IU]/ML
1-5 INJECTION, SOLUTION INTRAVENOUS; SUBCUTANEOUS EVERY 6 HOURS SCHEDULED
Status: DISCONTINUED | OUTPATIENT
Start: 2023-01-01 | End: 2023-01-01

## 2023-01-01 RX ORDER — POTASSIUM CHLORIDE 14.9 MG/ML
20 INJECTION INTRAVENOUS
Status: COMPLETED | OUTPATIENT
Start: 2023-01-01 | End: 2023-01-01

## 2023-01-01 RX ORDER — HYDROMORPHONE HCL/PF 1 MG/ML
0.3 SYRINGE (ML) INJECTION ONCE
Status: COMPLETED | OUTPATIENT
Start: 2023-01-01 | End: 2023-01-01

## 2023-01-01 RX ORDER — HYDROMORPHONE HCL IN WATER/PF 6 MG/30 ML
0.2 PATIENT CONTROLLED ANALGESIA SYRINGE INTRAVENOUS ONCE
Status: COMPLETED | OUTPATIENT
Start: 2023-01-01 | End: 2023-01-01

## 2023-01-01 RX ADMIN — B-COMPLEX W/ C & FOLIC ACID TAB 1 TABLET: TAB at 09:12

## 2023-01-01 RX ADMIN — DOCUSATE SODIUM 100 MG: 100 CAPSULE, LIQUID FILLED ORAL at 11:33

## 2023-01-01 RX ADMIN — CEFEPIME HYDROCHLORIDE 1000 MG: 1 INJECTION, SOLUTION INTRAVENOUS at 07:51

## 2023-01-01 RX ADMIN — HYDROMORPHONE HYDROCHLORIDE 0.3 MG: 1 INJECTION, SOLUTION INTRAMUSCULAR; INTRAVENOUS; SUBCUTANEOUS at 05:28

## 2023-01-01 RX ADMIN — METOPROLOL TARTRATE 50 MG: 50 TABLET, FILM COATED ORAL at 08:16

## 2023-01-01 RX ADMIN — ATORVASTATIN CALCIUM 20 MG: 20 TABLET, FILM COATED ORAL at 11:33

## 2023-01-01 RX ADMIN — FUROSEMIDE 40 MG: 10 INJECTION, SOLUTION INTRAVENOUS at 14:09

## 2023-01-01 RX ADMIN — PANTOPRAZOLE SODIUM 40 MG: 40 TABLET, DELAYED RELEASE ORAL at 11:33

## 2023-01-01 RX ADMIN — SENNOSIDES AND DOCUSATE SODIUM 2 TABLET: 50; 8.6 TABLET ORAL at 21:56

## 2023-01-01 RX ADMIN — METOPROLOL TARTRATE 25 MG: 25 TABLET, FILM COATED ORAL at 09:43

## 2023-01-01 RX ADMIN — ATORVASTATIN CALCIUM 20 MG: 10 TABLET, FILM COATED ORAL at 09:11

## 2023-01-01 RX ADMIN — OXYCODONE HYDROCHLORIDE AND ACETAMINOPHEN 1000 MG: 500 TABLET ORAL at 08:11

## 2023-01-01 RX ADMIN — ACYCLOVIR 400 MG: 400 TABLET ORAL at 08:51

## 2023-01-01 RX ADMIN — LEVOTHYROXINE SODIUM 100 MCG: 100 TABLET ORAL at 06:02

## 2023-01-01 RX ADMIN — CEFEPIME HYDROCHLORIDE 1000 MG: 1 INJECTION, SOLUTION INTRAVENOUS at 06:16

## 2023-01-01 RX ADMIN — Medication 20 MG: at 09:22

## 2023-01-01 RX ADMIN — SODIUM BICARBONATE 650 MG: 650 TABLET ORAL at 09:10

## 2023-01-01 RX ADMIN — MEGESTROL ACETATE 40 MG: 40 TABLET ORAL at 11:58

## 2023-01-01 RX ADMIN — Medication 6 MG: at 21:00

## 2023-01-01 RX ADMIN — DEXTROSE 500 ML: 5 SOLUTION INTRAVENOUS at 12:03

## 2023-01-01 RX ADMIN — INSULIN LISPRO 1 UNITS: 100 INJECTION, SOLUTION INTRAVENOUS; SUBCUTANEOUS at 00:25

## 2023-01-01 RX ADMIN — CYANOCOBALAMIN TAB 500 MCG 1000 MCG: 500 TAB at 08:11

## 2023-01-01 RX ADMIN — SULFAMETHOXAZOLE AND TRIMETHOPRIM 1 TABLET: 400; 80 TABLET ORAL at 09:43

## 2023-01-01 RX ADMIN — ACYCLOVIR 400 MG: 400 TABLET ORAL at 21:35

## 2023-01-01 RX ADMIN — Medication 12.5 MG: at 23:54

## 2023-01-01 RX ADMIN — FUROSEMIDE 40 MG: 10 INJECTION, SOLUTION INTRAMUSCULAR; INTRAVENOUS at 23:33

## 2023-01-01 RX ADMIN — METOROPROLOL TARTRATE 5 MG: 5 INJECTION, SOLUTION INTRAVENOUS at 22:32

## 2023-01-01 RX ADMIN — CEFEPIME HYDROCHLORIDE 1000 MG: 1 INJECTION, SOLUTION INTRAVENOUS at 07:44

## 2023-01-01 RX ADMIN — CYANOCOBALAMIN TAB 500 MCG 1000 MCG: 500 TAB at 09:11

## 2023-01-01 RX ADMIN — CEFEPIME HYDROCHLORIDE 1000 MG: 1 INJECTION, SOLUTION INTRAVENOUS at 18:18

## 2023-01-01 RX ADMIN — CALCIUM GLUCONATE 1 G: 20 INJECTION, SOLUTION INTRAVENOUS at 01:43

## 2023-01-01 RX ADMIN — AZITHROMYCIN MONOHYDRATE 500 MG: 500 INJECTION, POWDER, LYOPHILIZED, FOR SOLUTION INTRAVENOUS at 08:30

## 2023-01-01 RX ADMIN — MEGESTROL ACETATE 40 MG: 40 TABLET ORAL at 22:01

## 2023-01-01 RX ADMIN — CYANOCOBALAMIN TAB 500 MCG 1000 MCG: 500 TAB at 13:08

## 2023-01-01 RX ADMIN — ACYCLOVIR 400 MG: 400 TABLET ORAL at 20:24

## 2023-01-01 RX ADMIN — SODIUM BICARBONATE 50 MEQ: 84 INJECTION, SOLUTION INTRAVENOUS at 06:35

## 2023-01-01 RX ADMIN — ATORVASTATIN CALCIUM 20 MG: 10 TABLET, FILM COATED ORAL at 13:08

## 2023-01-01 RX ADMIN — Medication 50 MEQ: at 06:35

## 2023-01-01 RX ADMIN — Medication 6 MG: at 22:01

## 2023-01-01 RX ADMIN — CEFEPIME HYDROCHLORIDE 1000 MG: 1 INJECTION, SOLUTION INTRAVENOUS at 07:17

## 2023-01-01 RX ADMIN — ALBUMIN (HUMAN) 12.5 G: 12.5 INJECTION, SOLUTION INTRAVENOUS at 08:37

## 2023-01-01 RX ADMIN — METOROPROLOL TARTRATE 5 MG: 5 INJECTION, SOLUTION INTRAVENOUS at 17:58

## 2023-01-01 RX ADMIN — ESCITALOPRAM OXALATE 20 MG: 10 TABLET ORAL at 09:11

## 2023-01-01 RX ADMIN — B-COMPLEX W/ C & FOLIC ACID TAB 1 TABLET: TAB at 08:52

## 2023-01-01 RX ADMIN — OXYCODONE HYDROCHLORIDE AND ACETAMINOPHEN 1000 MG: 500 TABLET ORAL at 09:10

## 2023-01-01 RX ADMIN — B-COMPLEX W/ C & FOLIC ACID TAB 1 TABLET: TAB at 08:12

## 2023-01-01 RX ADMIN — CALCIUM GLUCONATE 2 G: 20 INJECTION, SOLUTION INTRAVENOUS at 05:24

## 2023-01-01 RX ADMIN — METOPROLOL TARTRATE 25 MG: 25 TABLET, FILM COATED ORAL at 22:01

## 2023-01-01 RX ADMIN — ACYCLOVIR 400 MG: 400 TABLET ORAL at 13:09

## 2023-01-01 RX ADMIN — ACYCLOVIR 400 MG: 400 TABLET ORAL at 21:15

## 2023-01-01 RX ADMIN — Medication 20 MG: at 09:47

## 2023-01-01 RX ADMIN — LIDOCAINE HYDROCHLORIDE 10 ML: 10 INJECTION, SOLUTION EPIDURAL; INFILTRATION; INTRACAUDAL; PERINEURAL at 14:20

## 2023-01-01 RX ADMIN — AZITHROMYCIN MONOHYDRATE 500 MG: 500 INJECTION, POWDER, LYOPHILIZED, FOR SOLUTION INTRAVENOUS at 08:00

## 2023-01-01 RX ADMIN — Medication 20 MG: at 10:38

## 2023-01-01 RX ADMIN — LEVOTHYROXINE SODIUM 100 MCG: 100 TABLET ORAL at 13:09

## 2023-01-01 RX ADMIN — LEVOTHYROXINE SODIUM 100 MCG: 100 TABLET ORAL at 05:24

## 2023-01-01 RX ADMIN — POTASSIUM CHLORIDE 20 MEQ: 14.9 INJECTION, SOLUTION INTRAVENOUS at 08:53

## 2023-01-01 RX ADMIN — OXYCODONE HYDROCHLORIDE AND ACETAMINOPHEN 1000 MG: 500 TABLET ORAL at 08:51

## 2023-01-01 RX ADMIN — OLANZAPINE 5 MG: 10 INJECTION, POWDER, FOR SOLUTION INTRAMUSCULAR at 22:26

## 2023-01-01 RX ADMIN — ESCITALOPRAM OXALATE 20 MG: 10 TABLET ORAL at 08:50

## 2023-01-01 RX ADMIN — CEFEPIME HYDROCHLORIDE 1000 MG: 1 INJECTION, SOLUTION INTRAVENOUS at 18:24

## 2023-01-01 RX ADMIN — CYANOCOBALAMIN TAB 500 MCG 1000 MCG: 500 TAB at 09:43

## 2023-01-01 RX ADMIN — DOCUSATE SODIUM 100 MG: 100 CAPSULE, LIQUID FILLED ORAL at 17:41

## 2023-01-01 RX ADMIN — SULFAMETHOXAZOLE AND TRIMETHOPRIM 1 TABLET: 400; 80 TABLET ORAL at 08:12

## 2023-01-01 RX ADMIN — AZITHROMYCIN MONOHYDRATE 500 MG: 500 INJECTION, POWDER, LYOPHILIZED, FOR SOLUTION INTRAVENOUS at 08:48

## 2023-01-01 RX ADMIN — OLANZAPINE 2.5 MG: 2.5 TABLET, FILM COATED ORAL at 20:42

## 2023-01-01 RX ADMIN — ESCITALOPRAM OXALATE 20 MG: 10 TABLET ORAL at 08:12

## 2023-01-01 RX ADMIN — OXYCODONE HYDROCHLORIDE AND ACETAMINOPHEN 1000 MG: 500 TABLET ORAL at 09:11

## 2023-01-01 RX ADMIN — Medication 1000 UNITS: at 08:51

## 2023-01-01 RX ADMIN — CALCIUM GLUCONATE 2 G: 20 INJECTION, SOLUTION INTRAVENOUS at 03:46

## 2023-01-01 RX ADMIN — DILTIAZEM HYDROCHLORIDE 20 MG: 5 INJECTION INTRAVENOUS at 05:36

## 2023-01-01 RX ADMIN — SENNOSIDES AND DOCUSATE SODIUM 2 TABLET: 50; 8.6 TABLET ORAL at 22:01

## 2023-01-01 RX ADMIN — CEFEPIME HYDROCHLORIDE 1000 MG: 1 INJECTION, SOLUTION INTRAVENOUS at 20:36

## 2023-01-01 RX ADMIN — ACETAMINOPHEN 650 MG: 325 TABLET ORAL at 18:40

## 2023-01-01 RX ADMIN — ALBUMIN (HUMAN) 12.5 G: 12.5 INJECTION, SOLUTION INTRAVENOUS at 18:34

## 2023-01-01 RX ADMIN — MEGESTROL ACETATE 40 MG: 40 TABLET ORAL at 17:30

## 2023-01-01 RX ADMIN — MEGESTROL ACETATE 40 MG: 40 TABLET ORAL at 21:15

## 2023-01-01 RX ADMIN — ACYCLOVIR 400 MG: 400 TABLET ORAL at 20:37

## 2023-01-01 RX ADMIN — Medication 1000 UNITS: at 11:33

## 2023-01-01 RX ADMIN — HYDROMORPHONE HYDROCHLORIDE 0.2 MG: 0.2 INJECTION, SOLUTION INTRAMUSCULAR; INTRAVENOUS; SUBCUTANEOUS at 03:52

## 2023-01-01 RX ADMIN — POTASSIUM CHLORIDE 40 MEQ: 1.5 SOLUTION ORAL at 01:43

## 2023-01-01 RX ADMIN — METOROPROLOL TARTRATE 5 MG: 5 INJECTION, SOLUTION INTRAVENOUS at 03:09

## 2023-01-01 RX ADMIN — Medication 6 MG: at 21:15

## 2023-01-01 RX ADMIN — CYANOCOBALAMIN TAB 500 MCG 1000 MCG: 500 TAB at 09:10

## 2023-01-01 RX ADMIN — ACYCLOVIR 400 MG: 400 TABLET ORAL at 09:09

## 2023-01-01 RX ADMIN — B-COMPLEX W/ C & FOLIC ACID TAB 1 TABLET: TAB at 09:10

## 2023-01-01 RX ADMIN — SODIUM BICARBONATE 50 MEQ: 84 INJECTION, SOLUTION INTRAVENOUS at 13:48

## 2023-01-01 RX ADMIN — Medication 0.2 MG: at 03:52

## 2023-01-01 RX ADMIN — Medication 6 MG: at 21:35

## 2023-01-01 RX ADMIN — CEFEPIME HYDROCHLORIDE 1000 MG: 1 INJECTION, SOLUTION INTRAVENOUS at 07:07

## 2023-01-01 RX ADMIN — METOPROLOL TARTRATE 25 MG: 25 TABLET, FILM COATED ORAL at 08:51

## 2023-01-01 RX ADMIN — ACYCLOVIR 400 MG: 400 TABLET ORAL at 08:12

## 2023-01-01 RX ADMIN — METOPROLOL TARTRATE 25 MG: 25 TABLET, FILM COATED ORAL at 09:11

## 2023-01-01 RX ADMIN — MEGESTROL ACETATE 40 MG: 40 TABLET ORAL at 12:04

## 2023-01-01 RX ADMIN — ATORVASTATIN CALCIUM 20 MG: 10 TABLET, FILM COATED ORAL at 08:51

## 2023-01-01 RX ADMIN — Medication 20 MG: at 08:16

## 2023-01-01 RX ADMIN — METOPROLOL TARTRATE 25 MG: 25 TABLET, FILM COATED ORAL at 21:15

## 2023-01-01 RX ADMIN — MEGESTROL ACETATE 40 MG: 40 TABLET ORAL at 21:35

## 2023-01-01 RX ADMIN — POTASSIUM CHLORIDE 20 MEQ: 14.9 INJECTION, SOLUTION INTRAVENOUS at 11:27

## 2023-01-01 RX ADMIN — CEFEPIME HYDROCHLORIDE 1000 MG: 1 INJECTION, SOLUTION INTRAVENOUS at 09:46

## 2023-01-01 RX ADMIN — LEVOTHYROXINE SODIUM 100 MCG: 100 TABLET ORAL at 05:51

## 2023-01-01 RX ADMIN — LEVOTHYROXINE SODIUM 100 MCG: 100 TABLET ORAL at 05:12

## 2023-01-01 RX ADMIN — POTASSIUM CHLORIDE 20 MEQ: 1.5 SOLUTION ORAL at 18:07

## 2023-01-01 RX ADMIN — ESCITALOPRAM OXALATE 20 MG: 10 TABLET ORAL at 09:44

## 2023-01-01 RX ADMIN — CEFEPIME HYDROCHLORIDE 1000 MG: 1 INJECTION, SOLUTION INTRAVENOUS at 18:13

## 2023-01-01 RX ADMIN — MEGESTROL ACETATE 40 MG: 40 TABLET ORAL at 12:12

## 2023-01-01 RX ADMIN — Medication 1000 UNITS: at 08:12

## 2023-01-01 RX ADMIN — METOPROLOL TARTRATE 50 MG: 50 TABLET, FILM COATED ORAL at 20:24

## 2023-01-01 RX ADMIN — ALBUMIN (HUMAN) 12.5 G: 12.5 INJECTION, SOLUTION INTRAVENOUS at 04:56

## 2023-01-01 RX ADMIN — MEGESTROL ACETATE 40 MG: 40 TABLET ORAL at 17:13

## 2023-01-01 RX ADMIN — B-COMPLEX W/ C & FOLIC ACID TAB 1 TABLET: TAB at 09:43

## 2023-01-01 RX ADMIN — ACYCLOVIR 400 MG: 400 TABLET ORAL at 09:11

## 2023-01-01 RX ADMIN — Medication 12.5 MG: at 09:09

## 2023-01-01 RX ADMIN — OXYCODONE HYDROCHLORIDE AND ACETAMINOPHEN 1000 MG: 500 TABLET ORAL at 11:33

## 2023-01-01 RX ADMIN — INSULIN LISPRO 1 UNITS: 100 INJECTION, SOLUTION INTRAVENOUS; SUBCUTANEOUS at 13:00

## 2023-01-01 RX ADMIN — Medication 12.5 MG: at 20:36

## 2023-01-01 RX ADMIN — GUAIFENESIN 600 MG: 600 TABLET ORAL at 11:33

## 2023-01-01 RX ADMIN — MEGESTROL ACETATE 40 MG: 40 TABLET ORAL at 08:13

## 2023-01-01 RX ADMIN — OXYCODONE HYDROCHLORIDE AND ACETAMINOPHEN 1000 MG: 500 TABLET ORAL at 13:08

## 2023-01-01 RX ADMIN — CALCIUM GLUCONATE 2 G: 20 INJECTION, SOLUTION INTRAVENOUS at 13:48

## 2023-01-01 RX ADMIN — FUROSEMIDE 40 MG: 10 INJECTION, SOLUTION INTRAMUSCULAR; INTRAVENOUS at 15:18

## 2023-01-01 RX ADMIN — METOPROLOL TARTRATE 25 MG: 25 TABLET, FILM COATED ORAL at 21:36

## 2023-01-01 RX ADMIN — VANCOMYCIN HYDROCHLORIDE 750 MG: 750 INJECTION, SOLUTION INTRAVENOUS at 08:22

## 2023-01-01 RX ADMIN — CALCIUM GLUCONATE 2 G: 20 INJECTION, SOLUTION INTRAVENOUS at 18:28

## 2023-01-01 RX ADMIN — ACYCLOVIR 400 MG: 400 TABLET ORAL at 20:27

## 2023-01-01 RX ADMIN — Medication 20 MG: at 09:04

## 2023-01-01 RX ADMIN — Medication 1000 UNITS: at 13:08

## 2023-01-01 RX ADMIN — MEGESTROL ACETATE 40 MG: 40 TABLET ORAL at 09:43

## 2023-01-01 RX ADMIN — MEGESTROL ACETATE 40 MG: 40 TABLET ORAL at 18:18

## 2023-01-01 RX ADMIN — VANCOMYCIN HYDROCHLORIDE 1500 MG: 1 INJECTION, POWDER, LYOPHILIZED, FOR SOLUTION INTRAVENOUS at 13:04

## 2023-01-01 RX ADMIN — CEFEPIME HYDROCHLORIDE 1000 MG: 1 INJECTION, SOLUTION INTRAVENOUS at 20:00

## 2023-01-01 RX ADMIN — PANTOPRAZOLE SODIUM 40 MG: 40 INJECTION, POWDER, FOR SOLUTION INTRAVENOUS at 08:37

## 2023-01-01 RX ADMIN — METOROPROLOL TARTRATE 5 MG: 5 INJECTION, SOLUTION INTRAVENOUS at 00:28

## 2023-01-01 RX ADMIN — FUROSEMIDE 40 MG: 10 INJECTION, SOLUTION INTRAMUSCULAR; INTRAVENOUS at 17:00

## 2023-01-01 RX ADMIN — Medication 1000 UNITS: at 09:09

## 2023-01-01 RX ADMIN — ESCITALOPRAM OXALATE 20 MG: 10 TABLET ORAL at 09:10

## 2023-01-01 RX ADMIN — AZITHROMYCIN MONOHYDRATE 500 MG: 500 INJECTION, POWDER, LYOPHILIZED, FOR SOLUTION INTRAVENOUS at 07:10

## 2023-01-01 RX ADMIN — CYANOCOBALAMIN TAB 500 MCG 1000 MCG: 500 TAB at 11:33

## 2023-01-01 RX ADMIN — MEGESTROL ACETATE 40 MG: 40 TABLET ORAL at 08:52

## 2023-01-01 RX ADMIN — B-COMPLEX W/ C & FOLIC ACID TAB 1 TABLET: TAB at 11:33

## 2023-01-01 RX ADMIN — B-COMPLEX W/ C & FOLIC ACID TAB 1 TABLET: TAB at 12:45

## 2023-01-01 RX ADMIN — INSULIN LISPRO 1 UNITS: 100 INJECTION, SOLUTION INTRAVENOUS; SUBCUTANEOUS at 11:52

## 2023-01-01 RX ADMIN — ESCITALOPRAM OXALATE 20 MG: 10 TABLET ORAL at 11:33

## 2023-01-01 RX ADMIN — CEFEPIME HYDROCHLORIDE 1000 MG: 1 INJECTION, SOLUTION INTRAVENOUS at 18:11

## 2023-01-01 RX ADMIN — SULFAMETHOXAZOLE AND TRIMETHOPRIM 1 TABLET: 400; 80 TABLET ORAL at 13:08

## 2023-01-01 RX ADMIN — Medication 12.5 MG: at 20:27

## 2023-01-01 RX ADMIN — LEVOTHYROXINE SODIUM 100 MCG: 100 TABLET ORAL at 05:00

## 2023-01-01 RX ADMIN — Medication 12.5 MG: at 11:33

## 2023-01-01 RX ADMIN — DEXAMETHASONE SODIUM PHOSPHATE 4 MG: 4 INJECTION, SOLUTION INTRAMUSCULAR; INTRAVENOUS at 23:50

## 2023-01-01 RX ADMIN — AZITHROMYCIN MONOHYDRATE 500 MG: 500 INJECTION, POWDER, LYOPHILIZED, FOR SOLUTION INTRAVENOUS at 08:18

## 2023-01-01 RX ADMIN — ACYCLOVIR 400 MG: 400 TABLET ORAL at 22:01

## 2023-01-01 RX ADMIN — OXYCODONE HYDROCHLORIDE AND ACETAMINOPHEN 1000 MG: 500 TABLET ORAL at 09:43

## 2023-01-01 RX ADMIN — ACYCLOVIR 400 MG: 400 TABLET ORAL at 20:42

## 2023-01-01 RX ADMIN — DEXAMETHASONE SODIUM PHOSPHATE 6 MG: 10 INJECTION, SOLUTION INTRAMUSCULAR; INTRAVENOUS at 23:49

## 2023-01-01 RX ADMIN — AZITHROMYCIN MONOHYDRATE 500 MG: 500 INJECTION, POWDER, LYOPHILIZED, FOR SOLUTION INTRAVENOUS at 08:47

## 2023-01-01 RX ADMIN — FUROSEMIDE 40 MG: 10 INJECTION, SOLUTION INTRAMUSCULAR; INTRAVENOUS at 09:30

## 2023-01-01 RX ADMIN — ALBUTEROL SULFATE 2.5 MG: 2.5 SOLUTION RESPIRATORY (INHALATION) at 02:35

## 2023-01-01 RX ADMIN — ACYCLOVIR 400 MG: 400 TABLET ORAL at 09:44

## 2023-01-01 RX ADMIN — Medication 6 MG: at 21:56

## 2023-01-01 RX ADMIN — ATORVASTATIN CALCIUM 20 MG: 10 TABLET, FILM COATED ORAL at 09:10

## 2023-01-01 RX ADMIN — Medication 1000 UNITS: at 09:44

## 2023-01-01 RX ADMIN — CYANOCOBALAMIN TAB 500 MCG 1000 MCG: 500 TAB at 08:50

## 2023-01-01 RX ADMIN — ATORVASTATIN CALCIUM 20 MG: 10 TABLET, FILM COATED ORAL at 09:44

## 2023-01-01 RX ADMIN — SODIUM CHLORIDE 1000 ML: 0.9 INJECTION, SOLUTION INTRAVENOUS at 07:30

## 2023-01-01 RX ADMIN — POTASSIUM CHLORIDE 20 MEQ: 14.9 INJECTION, SOLUTION INTRAVENOUS at 13:44

## 2023-01-01 RX ADMIN — ATORVASTATIN CALCIUM 20 MG: 10 TABLET, FILM COATED ORAL at 08:13

## 2023-01-01 RX ADMIN — MEGESTROL ACETATE 40 MG: 40 TABLET ORAL at 12:33

## 2023-01-01 RX ADMIN — MEGESTROL ACETATE 40 MG: 40 TABLET ORAL at 17:27

## 2023-01-01 RX ADMIN — Medication 1000 UNITS: at 09:11

## 2023-01-01 RX ADMIN — METOPROLOL TARTRATE 5 MG: 5 INJECTION INTRAVENOUS at 18:28

## 2023-01-01 RX ADMIN — CEFEPIME HYDROCHLORIDE 1000 MG: 1 INJECTION, SOLUTION INTRAVENOUS at 06:15

## 2023-01-01 RX ADMIN — GUAIFENESIN 600 MG: 600 TABLET ORAL at 17:41

## 2023-01-01 RX ADMIN — MAGNESIUM SULFATE HEPTAHYDRATE 2 G: 40 INJECTION, SOLUTION INTRAVENOUS at 05:01

## 2023-01-04 DIAGNOSIS — C90.00 IGG MULTIPLE MYELOMA (HCC): Primary | ICD-10-CM

## 2023-01-16 ENCOUNTER — APPOINTMENT (OUTPATIENT)
Dept: LAB | Facility: CLINIC | Age: 78
End: 2023-01-16

## 2023-01-16 DIAGNOSIS — C90.00 MULTIPLE MYELOMA, REMISSION STATUS UNSPECIFIED (HCC): ICD-10-CM

## 2023-01-16 LAB
ALBUMIN SERPL BCP-MCNC: 2.3 G/DL (ref 3.5–5)
ALP SERPL-CCNC: 59 U/L (ref 46–116)
ALT SERPL W P-5'-P-CCNC: 22 U/L (ref 12–78)
ANION GAP SERPL CALCULATED.3IONS-SCNC: 3 MMOL/L (ref 4–13)
ANISOCYTOSIS BLD QL SMEAR: PRESENT
AST SERPL W P-5'-P-CCNC: 36 U/L (ref 5–45)
BASOPHILS # BLD MANUAL: 0.04 THOUSAND/UL (ref 0–0.1)
BASOPHILS NFR MAR MANUAL: 1 % (ref 0–1)
BILIRUB SERPL-MCNC: 0.3 MG/DL (ref 0.2–1)
BUN SERPL-MCNC: 26 MG/DL (ref 5–25)
CALCIUM ALBUM COR SERPL-MCNC: 10.8 MG/DL (ref 8.3–10.1)
CALCIUM SERPL-MCNC: 9.4 MG/DL (ref 8.3–10.1)
CHLORIDE SERPL-SCNC: 106 MMOL/L (ref 96–108)
CO2 SERPL-SCNC: 25 MMOL/L (ref 21–32)
CREAT SERPL-MCNC: 1.81 MG/DL (ref 0.6–1.3)
DACRYOCYTES BLD QL SMEAR: PRESENT
EOSINOPHIL # BLD MANUAL: 0.23 THOUSAND/UL (ref 0–0.4)
EOSINOPHIL NFR BLD MANUAL: 6 % (ref 0–6)
ERYTHROCYTE [DISTWIDTH] IN BLOOD BY AUTOMATED COUNT: 16.2 % (ref 11.6–15.1)
GFR SERPL CREATININE-BSD FRML MDRD: 35 ML/MIN/1.73SQ M
GLUCOSE P FAST SERPL-MCNC: 95 MG/DL (ref 65–99)
HCT VFR BLD AUTO: 29.4 % (ref 36.5–49.3)
HGB BLD-MCNC: 9.1 G/DL (ref 12–17)
LYMPHOCYTES # BLD AUTO: 0.15 THOUSAND/UL (ref 0.6–4.47)
LYMPHOCYTES # BLD AUTO: 4 % (ref 14–44)
MACROCYTES BLD QL AUTO: PRESENT
MCH RBC QN AUTO: 30.1 PG (ref 26.8–34.3)
MCHC RBC AUTO-ENTMCNC: 31 G/DL (ref 31.4–37.4)
MCV RBC AUTO: 97 FL (ref 82–98)
MONOCYTES # BLD AUTO: 0.26 THOUSAND/UL (ref 0–1.22)
MONOCYTES NFR BLD: 7 % (ref 4–12)
NEUTROPHILS # BLD MANUAL: 3.02 THOUSAND/UL (ref 1.85–7.62)
NEUTS BAND NFR BLD MANUAL: 7 % (ref 0–8)
NEUTS SEG NFR BLD AUTO: 73 % (ref 43–75)
NRBC BLD AUTO-RTO: 1 /100 WBC (ref 0–2)
PLASMA CELLS NFR BLD: 1 % (ref 0–0)
PLATELET # BLD AUTO: 196 THOUSANDS/UL (ref 149–390)
PLATELET BLD QL SMEAR: ADEQUATE
PMV BLD AUTO: 9.8 FL (ref 8.9–12.7)
POIKILOCYTOSIS BLD QL SMEAR: PRESENT
POLYCHROMASIA BLD QL SMEAR: PRESENT
POTASSIUM SERPL-SCNC: 4.5 MMOL/L (ref 3.5–5.3)
PROT SERPL-MCNC: 9.9 G/DL (ref 6.4–8.4)
RBC # BLD AUTO: 3.02 MILLION/UL (ref 3.88–5.62)
RBC MORPH BLD: PRESENT
SODIUM SERPL-SCNC: 134 MMOL/L (ref 135–147)
VARIANT LYMPHS # BLD AUTO: 1 %
WBC # BLD AUTO: 3.77 THOUSAND/UL (ref 4.31–10.16)

## 2023-01-23 ENCOUNTER — APPOINTMENT (OUTPATIENT)
Dept: LAB | Facility: CLINIC | Age: 78
End: 2023-01-23

## 2023-01-23 DIAGNOSIS — C90.00 MULTIPLE MYELOMA, REMISSION STATUS UNSPECIFIED (HCC): ICD-10-CM

## 2023-01-23 LAB
ALBUMIN SERPL BCP-MCNC: 2.4 G/DL (ref 3.5–5)
ALP SERPL-CCNC: 66 U/L (ref 46–116)
ALT SERPL W P-5'-P-CCNC: 22 U/L (ref 12–78)
ANION GAP SERPL CALCULATED.3IONS-SCNC: 7 MMOL/L (ref 4–13)
AST SERPL W P-5'-P-CCNC: 44 U/L (ref 5–45)
BASOPHILS # BLD AUTO: 0.04 THOUSANDS/ÂΜL (ref 0–0.1)
BASOPHILS NFR BLD AUTO: 1 % (ref 0–1)
BILIRUB SERPL-MCNC: 0.36 MG/DL (ref 0.2–1)
BUN SERPL-MCNC: 26 MG/DL (ref 5–25)
CALCIUM ALBUM COR SERPL-MCNC: 11.3 MG/DL (ref 8.3–10.1)
CALCIUM SERPL-MCNC: 10 MG/DL (ref 8.3–10.1)
CHLORIDE SERPL-SCNC: 104 MMOL/L (ref 96–108)
CO2 SERPL-SCNC: 24 MMOL/L (ref 21–32)
CREAT SERPL-MCNC: 2.2 MG/DL (ref 0.6–1.3)
EOSINOPHIL # BLD AUTO: 0.2 THOUSAND/ÂΜL (ref 0–0.61)
EOSINOPHIL NFR BLD AUTO: 4 % (ref 0–6)
ERYTHROCYTE [DISTWIDTH] IN BLOOD BY AUTOMATED COUNT: 16.2 % (ref 11.6–15.1)
GFR SERPL CREATININE-BSD FRML MDRD: 28 ML/MIN/1.73SQ M
GLUCOSE P FAST SERPL-MCNC: 102 MG/DL (ref 65–99)
HCT VFR BLD AUTO: 28.9 % (ref 36.5–49.3)
HGB BLD-MCNC: 8.9 G/DL (ref 12–17)
IMM GRANULOCYTES # BLD AUTO: 0.11 THOUSAND/UL (ref 0–0.2)
IMM GRANULOCYTES NFR BLD AUTO: 2 % (ref 0–2)
LYMPHOCYTES # BLD AUTO: 0.92 THOUSANDS/ÂΜL (ref 0.6–4.47)
LYMPHOCYTES NFR BLD AUTO: 17 % (ref 14–44)
MCH RBC QN AUTO: 29.7 PG (ref 26.8–34.3)
MCHC RBC AUTO-ENTMCNC: 30.8 G/DL (ref 31.4–37.4)
MCV RBC AUTO: 96 FL (ref 82–98)
MONOCYTES # BLD AUTO: 0.73 THOUSAND/ÂΜL (ref 0.17–1.22)
MONOCYTES NFR BLD AUTO: 14 % (ref 4–12)
NEUTROPHILS # BLD AUTO: 3.41 THOUSANDS/ÂΜL (ref 1.85–7.62)
NEUTS SEG NFR BLD AUTO: 62 % (ref 43–75)
NRBC BLD AUTO-RTO: 0 /100 WBCS
PLATELET # BLD AUTO: 161 THOUSANDS/UL (ref 149–390)
PMV BLD AUTO: 9.5 FL (ref 8.9–12.7)
POTASSIUM SERPL-SCNC: 4.2 MMOL/L (ref 3.5–5.3)
PROT SERPL-MCNC: 10 G/DL (ref 6.4–8.4)
RBC # BLD AUTO: 3 MILLION/UL (ref 3.88–5.62)
SODIUM SERPL-SCNC: 135 MMOL/L (ref 135–147)
WBC # BLD AUTO: 5.41 THOUSAND/UL (ref 4.31–10.16)

## 2023-01-24 ENCOUNTER — TELEPHONE (OUTPATIENT)
Dept: NEPHROLOGY | Facility: CLINIC | Age: 78
End: 2023-01-24

## 2023-01-24 NOTE — TELEPHONE ENCOUNTER
Appointment Confirmation   Person confirmed appointment with  If not patient, name of the person Answering Machine    Date and time of appointment 9 1/25    Patient acknowledged and will be at appointment? confirmed on mychart   Did you advise the patient that they will need a urine sample if they are a new patient?  Yes    Did you advise the patient to bring their current medications for verification? (including any OTC) Yes    Additional Information

## 2023-01-25 ENCOUNTER — CONSULT (OUTPATIENT)
Dept: NEPHROLOGY | Facility: CLINIC | Age: 78
End: 2023-01-25

## 2023-01-25 VITALS
WEIGHT: 175.5 LBS | BODY MASS INDEX: 25.13 KG/M2 | HEIGHT: 70 IN | SYSTOLIC BLOOD PRESSURE: 110 MMHG | HEART RATE: 70 BPM | DIASTOLIC BLOOD PRESSURE: 62 MMHG

## 2023-01-25 DIAGNOSIS — N18.9 CHRONIC KIDNEY DISEASE, UNSPECIFIED CKD STAGE: Primary | ICD-10-CM

## 2023-01-25 DIAGNOSIS — C90.00 IGG MULTIPLE MYELOMA (HCC): ICD-10-CM

## 2023-01-25 DIAGNOSIS — N18.32 STAGE 3B CHRONIC KIDNEY DISEASE (HCC): ICD-10-CM

## 2023-01-25 NOTE — PATIENT INSTRUCTIONS
You are here for your initial visit  It was very nice to meet you and thank you so much for the history  The issue is that the kidney function has drifted up it seems over the last year or so  Creatinine is a blood test that measures kidney function your levels been running in the 2 range  The issue is it related to the myeloma in someway or some medications or other treatments that you have had over the years  For now you are under treatment for the multiple myeloma  I am going to look into Zometa use  I am going to evaluate the urine protein quantitation  Continue with your routine labs we will monitor your renal function    I will contact you and then determine follow-up

## 2023-01-25 NOTE — PROGRESS NOTES
Consultation - Nephrology   Jeni Alvarado 78 y o  male MRN: [id number]  Unit/Bed#:  Encounter: [id number]      Assessment/Plan     Assessment / Plan:  1  Renal    The patient appears to have chronic kidney disease is looking back a year ago creatinine was tending to run around 1 7  Creatinine was 1 8 1/16 and then repeated 1/23 and was 2 2  That degree of change makes me think that there might be factors such as prerenal azotemia in terms of that change so hopefully will see the creatinine declined back towards his baseline of around 1 7  Saying that the patient's been dealing with multiple myeloma for around 7 years  He does not recall that he had significant kidney involvement throughout his extensive treatments and care that he has been receiving  He does now have an increase in his M spike protein is going to be starting some new medications  Urinalysis in the office showed trace protein  In looking in the past he had a microalbumin screen which screens for albumin and it was not very low  I bring this up because if people begin to develop renal dysfunction after years of having myeloma you start to think about amyloidosis or effects related to some medications such as Zometa which she has been on now regularly for years  I do not feel that it is amyloidosis as it does not appear that he has a lot of albumin in the urine and that would lead to glomerular pathology with albuminuria  Potentially it could be due to monoclonal protein of renal significance  Saying that the treatment obviously is trying to reduce the M protein and get a response to his new medication  Zometa can cause renal toxicity as well some going to look into the trends over the years  The bisphosphonate pamidronate has been shown to cause collapsing FSGS but again this would be glomerular pathology and would cause significant albuminuria      I will contact the patient with the results of his repeat lab testing is ongoing to monitor his renal function as he gets labs very frequently and also do a urine protein to creatinine ratio to get an idea of the total quantity of proteinuria  We will then determine follow-up  History of Present Illness   Physician Requesting Consult: No att  providers found  Reason for Consult / Principal Problem: Chronic kidney disease  Hx and PE limited by:   HPI: Anitra Alas is a 78y o  year old male who presents for his first visit  The patient has history of multiple myeloma diagnosed about 7 years ago  Over the course has been on many different chemotherapeutic agents biologic agents he has had a stem cell transplant as well  Recently his creatinine has been drifting up above his baseline so has been referred for evaluation and recommendations  History obtained from chart review and the patient  Consults    Review of Systems   Constitutional: Negative for appetite change, chills, diaphoresis and fever  HENT: Negative  Eyes: Negative  Respiratory: Negative  Negative for cough, chest tightness, shortness of breath and wheezing  Cardiovascular: Negative  Negative for chest pain, palpitations and leg swelling  Gastrointestinal: Negative  Negative for abdominal pain, diarrhea, nausea and vomiting  Endocrine: Negative  Genitourinary: Negative for difficulty urinating, dysuria, flank pain and hematuria  Skin: Negative  Negative for rash  Neurological: Negative for dizziness, light-headedness, numbness and headaches  Psychiatric/Behavioral: Negative for agitation, behavioral problems, confusion and decreased concentration         Historical Information   Patient Active Problem List   Diagnosis   • IgG multiple myeloma (HCC)   • Essential hypertension   • Chronic ITP (idiopathic thrombocytopenia) (HCC)   • Hyperlipidemia   • Hypocalcemia   • Acquired hypothyroidism   • Prophylactic measure   • Hypogammaglobulinemia (HCC)   • Anemia due to stage 3 chronic kidney disease ([facility] )   • Stage 3b chronic kidney disease ([facility] )   • Anxiety     Past Medical History:   Diagnosis Date   • History of [facility] cell [facility] ([facility] )    • Insomnia    • Multiple myeloma (HCC)    No history of diabetes, stroke, high blood pressure, liver disease, lung disease, bowel disease, kidney stones  Past Surgical History:   Procedure Laterality Date   • APPENDECTOMY      Last assessed: 9/25/15   • ARTHROSCOPY KNEE Left     9/30/09   • EYE SURGERY      Lasik   • KNEE CARTILAGE SURGERY     • LIMBAL STEM CELL TRANSPLANT      Patient had 2 in [place]-2/29/2016 and 4/13/2016     Social History   Social History     Substance and Sexual Activity   Alcohol Use No   No tobacco ethanol or drug abuse  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 [employer]   Social History     Substance and Sexual Activity   Drug Use No     Social History     Tobacco Use   Smoking Status Never   Smokeless Tobacco Never     Family History   Problem Relation Age of Onset   • Heart disease Father    • Colon cancer Paternal Grandmother        Meds/Allergies   current meds:   No current facility-administered medications for this visit  No Known Allergies    Objective   [unfilled]  Body mass index is 25 18 kg/m²  Invasive Devices:        PHYSICAL EXAM:  /62 (BP Location: Left arm, Patient Position: Sitting, Cuff Size: Standard)   Pulse 70   Ht 5' 10" (1 778 m)   Wt 79 6 kg (175 lb 8 oz)   BMI 25 18 kg/m²     Physical Exam  Constitutional:       General: He is not in acute distress  Appearance: He is not ill-appearing or toxic-appearing  HENT:      Head: Normocephalic and atraumatic  Nose:      Comments: Wearing mask  Mouth/Throat:      Comments: Wearing mask  Eyes:      General: No scleral icterus  Extraocular Movements: Extraocular movements intact  Cardiovascular:      Rate and Rhythm: Normal rate and regular rhythm  Heart sounds: No friction rub  No gallop  Comments: No edema  Pulmonary:      Effort: Pulmonary effort is normal  No respiratory distress  Breath sounds: No wheezing, rhonchi or rales  Abdominal:      General: Bowel sounds are normal  There is no distension  Palpations: Abdomen is soft  Tenderness: There is no abdominal tenderness  There is no rebound  Musculoskeletal:      Cervical back: Normal range of motion and neck supple  Skin:     General: Skin is warm and dry  Neurological:      General: No focal deficit present  Mental Status: He is alert and oriented to person, place, and time  Mental status is at baseline  Psychiatric:         Mood and Affect: Mood normal          Behavior: Behavior normal          Thought Content:  Thought content normal          Judgment: Judgment normal            Current Weight: Weight - Scale: 79 6 kg (175 lb 8 oz)  First Weight: Weight - Scale: 79 6 kg (175 lb 8 oz)    Lab Results:    Results from last 7 days   Lab Units 01/23/23  0713   WBC Thousand/uL 5 41   HEMOGLOBIN g/dL 8 9*   HEMATOCRIT % 28 9*   PLATELETS Thousands/uL 161     Results from last 7 days   Lab Units 01/23/23  0713   POTASSIUM mmol/L 4 2   CHLORIDE mmol/L 104   CO2 mmol/L 24   BUN mg/dL 26*   CREATININE mg/dL 2 20*   CALCIUM mg/dL 10 0     Results from last 7 days   Lab Units 01/23/23  0713   POTASSIUM mmol/L 4 2   CHLORIDE mmol/L 104   CO2 mmol/L 24   BUN mg/dL 26*   CREATININE mg/dL 2 20*   CALCIUM mg/dL 10 0   ALK PHOS U/L 66   ALT U/L 22   AST U/L 44

## 2023-01-25 NOTE — LETTER
January 25, 2023     Ian Bearden DO  [address]    Patient: Leyla Francis   YOB: 1945   Date of Visit: 1/25/2023       Dear Dr Jarrett Santana:    Thank you for referring Minor Ratliff to me for evaluation  Below are my notes for this consultation  If you have questions, please do not hesitate to call me  I look forward to following your patient along with you  Sincerely,        Lanny Leyva MD        CC: DO Lanny Nance MD  1/25/2023 12:55 PM  Sign when Signing Visit  Consultation - Nephrology   Leyla Francis 78 y o  male MRN: [id number]  Unit/Bed#:  Encounter: [id number]      Assessment/Plan     Assessment / Plan:  1  Renal    The patient appears to have chronic kidney disease is looking back a year ago creatinine was tending to run around 1 7  Creatinine was 1 8 1/16 and then repeated 1/23 and was 2 2  That degree of change makes me think that there might be factors such as prerenal azotemia in terms of that change so hopefully will see the creatinine declined back towards his baseline of around 1 7  Saying that the patient's been dealing with multiple myeloma for around 7 years  He does not recall that he had significant kidney involvement throughout his extensive treatments and care that he has been receiving  He does now have an increase in his M spike protein is going to be starting some new medications  Urinalysis in the office showed trace protein  In looking in the past he had a microalbumin screen which screens for albumin and it was not very low  I bring this up because if people begin to develop renal dysfunction after years of having myeloma you start to think about amyloidosis or effects related to some medications such as Zometa which she has been on now regularly for years      I do not feel that it is amyloidosis as it does not appear that he has a lot of albumin in the urine and that would lead to glomerular pathology with albuminuria  Potentially it could be due to monoclonal protein of renal significance  Saying that the treatment obviously is trying to reduce the M protein and get a response to his new medication  Zometa can cause renal toxicity as well some going to look into the trends over the years  The bisphosphonate pamidronate has been shown to cause collapsing FSGS but again this would be glomerular pathology and would cause significant albuminuria  I will contact the patient with the results of his repeat lab testing is ongoing to monitor his renal function as he gets labs very frequently and also do a urine protein to creatinine ratio to get an idea of the total quantity of proteinuria  We will then determine follow-up  History of Present Illness   Physician Requesting Consult: No att  providers found  Reason for Consult / Principal Problem: Chronic kidney disease  Hx and PE limited by:   HPI: Ting Cleaning is a 78y o  year old male who presents for his first visit  The patient has history of multiple myeloma diagnosed about 7 years ago  Over the course has been on many different chemotherapeutic agents biologic agents he has had a stem cell transplant as well  Recently his creatinine has been drifting up above his baseline so has been referred for evaluation and recommendations  History obtained from chart review and the patient  Consults    Review of Systems   Constitutional: Negative for appetite change, chills, diaphoresis and fever  HENT: Negative  Eyes: Negative  Respiratory: Negative  Negative for cough, chest tightness, shortness of breath and wheezing  Cardiovascular: Negative  Negative for chest pain, palpitations and leg swelling  Gastrointestinal: Negative  Negative for abdominal pain, diarrhea, nausea and vomiting  Endocrine: Negative      Genitourinary: Negative for difficulty urinating, dysuria, flank pain and hematuria  Skin: Negative  Negative for rash  Neurological: Negative for dizziness, light-headedness, numbness and headaches  Psychiatric/Behavioral: Negative for agitation, behavioral problems, confusion and decreased concentration  Historical Information   Patient Active Problem List   Diagnosis   • IgG multiple myeloma (HCC)   • Essential hypertension   • Chronic ITP (idiopathic thrombocytopenia) (HCC)   • Hyperlipidemia   • Hypocalcemia   • Acquired hypothyroidism   • Prophylactic measure   • Hypogammaglobulinemia ([facility] [address] )   • Anemia due to stage 3 chronic kidney disease ([facility])   • Stage 3b chronic kidney disease ([facility] )   • Anxiety     Past Medical History:   Diagnosis Date   • History of autologous stem cell transplant (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   Social History     Substance and Sexual Activity   Drug Use No     Social History     Tobacco Use   Smoking Status Never   Smokeless Tobacco Never     Family History   Problem Relation Age of Onset   • Heart disease Father    • Colon cancer Paternal Grandmother        Meds/Allergies    current meds:   No current facility-administered medications for this visit  No Known Allergies    Objective    [unfilled]  Body mass index is 25 18 kg/m²      Invasive Devices:        PHYSICAL EXAM:  /62 (BP Location: Left arm, Patient Position: Sitting, Cuff Size: Standard)   Pulse 70   Ht 5' 10" (1 778 m)   Wt 79 6 kg (175 lb 8 oz)   BMI 25 18 kg/m²     Physical Exam  Constitutional:       General: He is not in acute distress  Appearance: He is not ill-appearing or toxic-appearing  HENT:      Head: Normocephalic and atraumatic  Nose:      Comments: Wearing mask  Mouth/Throat:      Comments: Wearing mask  Eyes:      General: No scleral icterus  Extraocular Movements: Extraocular movements intact  Cardiovascular:      Rate and Rhythm: Normal rate and regular rhythm  Heart sounds: No friction rub  No gallop  Comments: No edema  Pulmonary:      Effort: Pulmonary effort is normal  No respiratory distress  Breath sounds: No wheezing, rhonchi or rales  Abdominal:      General: Bowel sounds are normal  There is no distension  Palpations: Abdomen is soft  Tenderness: There is no abdominal tenderness  There is no rebound  Musculoskeletal:      Cervical back: Normal range of motion and neck supple  Skin:     General: Skin is warm and dry  Neurological:      General: No focal deficit present  Mental Status: He is alert and oriented to person, place, and time  Mental status is at baseline  Psychiatric:         Mood and Affect: Mood normal          Behavior: Behavior normal          Thought Content:  Thought content normal          Judgment: Judgment normal            Current Weight: Weight - Scale: 79 6 kg (175 lb 8 oz)  First Weight: Weight - Scale: 79 6 kg (175 lb 8 oz)    Lab Results:    Results from last 7 days   Lab Units 01/23/23  0713   WBC Thousand/uL 5 41   HEMOGLOBIN g/dL 8 9*   HEMATOCRIT % 28 9*   PLATELETS Thousands/uL 161     Results from last 7 days   Lab Units 01/23/23  0713   POTASSIUM mmol/L 4 2   CHLORIDE mmol/L 104   CO2 mmol/L 24   BUN mg/dL 26*   CREATININE mg/dL 2 20*   CALCIUM mg/dL 10 0     Results from last 7 days   Lab Units 01/23/23  0713   POTASSIUM mmol/L 4 2   CHLORIDE mmol/L 104   CO2 mmol/L 24   BUN mg/dL 26*   CREATININE mg/dL 2 20*   CALCIUM mg/dL 10 0   ALK PHOS U/L 66   ALT U/L 22   AST U/L 44

## 2023-01-30 ENCOUNTER — APPOINTMENT (OUTPATIENT)
Dept: LAB | Facility: CLINIC | Age: 78
End: 2023-01-30

## 2023-01-30 ENCOUNTER — OFFICE VISIT (OUTPATIENT)
Dept: HEMATOLOGY ONCOLOGY | Facility: CLINIC | Age: 78
End: 2023-01-30

## 2023-01-30 VITALS
HEART RATE: 68 BPM | HEIGHT: 70 IN | DIASTOLIC BLOOD PRESSURE: 60 MMHG | WEIGHT: 175.6 LBS | SYSTOLIC BLOOD PRESSURE: 114 MMHG | BODY MASS INDEX: 25.14 KG/M2 | OXYGEN SATURATION: 97 % | TEMPERATURE: 97.8 F

## 2023-01-30 DIAGNOSIS — C90.01 MULTIPLE MYELOMA IN REMISSION (HCC): ICD-10-CM

## 2023-01-30 DIAGNOSIS — D80.1 HYPOGAMMAGLOBULINEMIA (HCC): ICD-10-CM

## 2023-01-30 DIAGNOSIS — C90.00 IGG MULTIPLE MYELOMA (HCC): Primary | ICD-10-CM

## 2023-01-30 DIAGNOSIS — G44.40 DRUG-INDUCED HEADACHE, NOT ELSEWHERE CLASSIFIED, NOT INTRACTABLE: ICD-10-CM

## 2023-01-30 PROBLEM — R51.9 HEADACHE: Status: ACTIVE | Noted: 2023-01-30

## 2023-01-30 LAB
ALBUMIN SERPL BCP-MCNC: 2.7 G/DL (ref 3.5–5)
ALP SERPL-CCNC: 56 U/L (ref 46–116)
ALT SERPL W P-5'-P-CCNC: 22 U/L (ref 12–78)
ANION GAP SERPL CALCULATED.3IONS-SCNC: 7 MMOL/L (ref 4–13)
AST SERPL W P-5'-P-CCNC: 46 U/L (ref 5–45)
BACTERIA UR QL AUTO: ABNORMAL /HPF
BASOPHILS # BLD AUTO: 0.02 THOUSANDS/ÂΜL (ref 0–0.1)
BASOPHILS NFR BLD AUTO: 1 % (ref 0–1)
BILIRUB SERPL-MCNC: 0.4 MG/DL (ref 0.2–1)
BILIRUB UR QL STRIP: NEGATIVE
BUN SERPL-MCNC: 21 MG/DL (ref 5–25)
CALCIUM ALBUM COR SERPL-MCNC: 11.4 MG/DL (ref 8.3–10.1)
CALCIUM SERPL-MCNC: 10.4 MG/DL (ref 8.3–10.1)
CHLORIDE SERPL-SCNC: 103 MMOL/L (ref 96–108)
CLARITY UR: CLEAR
CO2 SERPL-SCNC: 26 MMOL/L (ref 21–32)
COLOR UR: COLORLESS
CREAT SERPL-MCNC: 2.33 MG/DL (ref 0.6–1.3)
CREAT UR-MCNC: 39.7 MG/DL
EOSINOPHIL # BLD AUTO: 0.26 THOUSAND/ÂΜL (ref 0–0.61)
EOSINOPHIL NFR BLD AUTO: 6 % (ref 0–6)
ERYTHROCYTE [DISTWIDTH] IN BLOOD BY AUTOMATED COUNT: 16 % (ref 11.6–15.1)
GFR SERPL CREATININE-BSD FRML MDRD: 26 ML/MIN/1.73SQ M
GLUCOSE P FAST SERPL-MCNC: 94 MG/DL (ref 65–99)
GLUCOSE UR STRIP-MCNC: NEGATIVE MG/DL
HCT VFR BLD AUTO: 28.2 % (ref 36.5–49.3)
HGB BLD-MCNC: 8.7 G/DL (ref 12–17)
HGB UR QL STRIP.AUTO: NEGATIVE
IMM GRANULOCYTES # BLD AUTO: 0.06 THOUSAND/UL (ref 0–0.2)
IMM GRANULOCYTES NFR BLD AUTO: 1 % (ref 0–2)
KETONES UR STRIP-MCNC: NEGATIVE MG/DL
LEUKOCYTE ESTERASE UR QL STRIP: NEGATIVE
LYMPHOCYTES # BLD AUTO: 0.63 THOUSANDS/ÂΜL (ref 0.6–4.47)
LYMPHOCYTES NFR BLD AUTO: 15 % (ref 14–44)
MCH RBC QN AUTO: 30 PG (ref 26.8–34.3)
MCHC RBC AUTO-ENTMCNC: 30.9 G/DL (ref 31.4–37.4)
MCV RBC AUTO: 97 FL (ref 82–98)
MONOCYTES # BLD AUTO: 0.65 THOUSAND/ÂΜL (ref 0.17–1.22)
MONOCYTES NFR BLD AUTO: 15 % (ref 4–12)
NEUTROPHILS # BLD AUTO: 2.67 THOUSANDS/ÂΜL (ref 1.85–7.62)
NEUTS SEG NFR BLD AUTO: 62 % (ref 43–75)
NITRITE UR QL STRIP: NEGATIVE
NON-SQ EPI CELLS URNS QL MICRO: ABNORMAL /HPF
NRBC BLD AUTO-RTO: 0 /100 WBCS
PH UR STRIP.AUTO: 6.5 [PH]
PLATELET # BLD AUTO: 177 THOUSANDS/UL (ref 149–390)
PMV BLD AUTO: 9.5 FL (ref 8.9–12.7)
POTASSIUM SERPL-SCNC: 4.2 MMOL/L (ref 3.5–5.3)
PROT SERPL-MCNC: 10.8 G/DL (ref 6.4–8.4)
PROT UR STRIP-MCNC: ABNORMAL MG/DL
PROT UR-MCNC: 30 MG/DL
PROT/CREAT UR: 0.76 MG/G{CREAT} (ref 0–0.1)
RBC # BLD AUTO: 2.9 MILLION/UL (ref 3.88–5.62)
RBC #/AREA URNS AUTO: ABNORMAL /HPF
SODIUM SERPL-SCNC: 136 MMOL/L (ref 135–147)
SP GR UR STRIP.AUTO: 1.01 (ref 1–1.03)
UROBILINOGEN UR STRIP-ACNC: <2 MG/DL
WBC # BLD AUTO: 4.29 THOUSAND/UL (ref 4.31–10.16)
WBC #/AREA URNS AUTO: ABNORMAL /HPF

## 2023-01-30 NOTE — PROGRESS NOTES
[facility] HEMATOLOGY ONCOLOGY SPECIALISTS [place]  [address] [facility]  [address]  [place] [address] [phone]    Claudine Hwang,1945, [id number]  01/30/23    Discussion:   In summary, this is a 31-year-old man with history of myeloma status post high-dose chemotherapy in the past   CAR-T in August 2021  Recently started Marie in early January  Unfortunately IgG has risen, previously 3800, currently 4900  Further treatment is anticipated  Hopefully his myeloma will stabilize/improve  He has persistent headache related to Teclistamab  This is controlled with Excedrin or tramadol  I encouraged him to alternate these and gradually investigate/wean  IVIG replacement continues  Plan is in place for investigational agent should his current treatment prove ineffective  I discussed the above with the patient  The patient and his wife voiced understanding and agreement   ______________________________________________________________________    Chief Complaint   Patient presents with   • Follow-up       HPI:  Oncology History   IgG multiple myeloma ([address] )   9/2015 Initial Diagnosis    Found to have Hbg of 6 with SOB, creatinine 1 8 and normal calcium with albumin of 2 1  Additonial blood work showed elevated protien level (12 1g/dL)  9/29/2015 Biopsy    Bone marrow biopsy demonstrated approximately 80% plasma cells with some immature forms noted  These studies showed 13q14 deletion, +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, 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)       10/14/2015 - 11/11/2015 Chemotherapy    Velcade 1 3 mg/m2 Days 1,8,15,22  Cytoxan 300 mg/m2  PO Days 1, 8,15, 22  Dexamethasone 40 mg PO Days 1,8,15, 22  Zometa 4 mg IV Day 1  Cycle length = 28 days    Completed 2 cycles  Day one of cycle 2 was on 11/11/15      12/2015 - 2/2016 Chemotherapy    VDT-PACE x 2 cycles   (completed at [facility] in [place], [address] [place] [address])     2/2016 Transplant    Melphalan 200 mg/m2 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      MRD +     8/2016 - 1/26/2018 Chemotherapy    Maintenance therapy:  Carfilzomib, orginally dosed 27 mg weekly then increased to 45 mg weekly after MRD reactivitation  Revlimid  Dexamethasone      2/2018 Biopsy    Bone marrow demonstrated positive minimal residual disease       2/23/2018 -  Chemotherapy    Daratumumab 16mg/m2 IV Day 1  Pomalyst 4 mg p o  daily 1-21  Dexamethasone 4 mg PO Days 2, 3  Dexamethasone 12 mg PODays 8, 15, 22  Cycle length = 28 days     4/11/2019 - 8/29/2019 Chemotherapy    methylPREDNISolone sodium succinate (Solu-MEDROL) 100 mg in sodium chloride 0 9 % 250 mL IVPB, 100 mg, Intravenous, Once, 5 of 12 cycles  Administration: 100 mg (6/7/2019), 100 mg (7/5/2019), 100 mg (8/2/2019)     8/30/2019 - 11/8/2019 Chemotherapy    cyclophosphamide (CYTOXAN) 1,000 mg in sodium chloride 0 9 % 250 mL IVPB, 990 mg (66 7 % of original dose 750 mg/m2), Intravenous, Once, 2 of 3 cycles  Dose modification: 500 mg/m2 (original dose 750 mg/m2, Cycle 1, Reason: Other (See Comments)), 250 mg/m2 (original dose 750 mg/m2, Cycle 4, Reason: Treatment Parameters Not Met)  Administration: 1,000 mg (8/30/2019), 1,000 mg (9/13/2019), 1,000 mg (9/27/2019), 500 mg (10/11/2019)  bortezomib (VELCADE) subcutaneous injection 2 6 mg, 1 3 mg/m2 = 2 6 mg (86 7 % of original dose 1 5 mg/m2), Subcutaneous, Once, 3 of 4 cycles  Dose modification: 1 3 mg/m2 (original dose 1 5 mg/m2, Cycle 1, Reason: Other (See Comments))  Administration: 2 6 mg (8/30/2019), 2 6 mg (9/13/2019), 2 6 mg (10/25/2019), 2 6 mg (11/8/2019), 2 6 mg (9/27/2019), 2 6 mg (11/1/2019), 2 6 mg (9/20/2019), 2 6 mg (10/4/2019), 2 6 mg (10/11/2019), 2 6 mg (10/18/2019)     12/6/2019 - 6/19/2020 Chemotherapy    pegfilgrastim (NEULASTA ONPRO) subcutaneous injection kit 6 mg, 6 mg, Subcutaneous, Once, 5 of 9 cycles  Administration: 6 mg (2/28/2020), 6 mg (3/13/2020), 6 mg (3/27/2020), 6 mg (4/10/2020), 6 mg (4/24/2020), 6 mg (5/8/2020), 6 mg (6/5/2020), 6 mg (5/22/2020), 6 mg (6/19/2020)  cyclophosphamide (CYTOXAN) 784 mg in sodium chloride 0 9 % 250 mL IVPB, 400 mg/m2 = 784 mg, Intravenous, Once, 5 of 9 cycles  Dose modification: 400 mg/m2 (original dose 750 mg/m2, Cycle 7, Reason: Other (See Comments))  Administration: 784 mg (2/28/2020), 784 mg (3/13/2020), 784 mg (3/27/2020), 784 mg (4/10/2020), 784 mg (4/24/2020), 784 mg (5/8/2020), 784 mg (5/22/2020), 784 mg (6/19/2020), 784 mg (6/5/2020)  methylPREDNISolone sodium succinate (Solu-MEDROL) 100 mg in sodium chloride 0 9 % 250 mL IVPB, 100 mg, Intravenous, Once, 8 of 12 cycles  Administration: 100 mg (12/6/2019), 100 mg (12/13/2019), 100 mg (12/20/2019), 100 mg (1/3/2020), 100 mg (1/10/2020), 100 mg (1/17/2020), 100 mg (1/31/2020), 100 mg (2/14/2020), 100 mg (5/22/2020), 100 mg (12/27/2019), 100 mg (1/24/2020), 100 mg (2/28/2020), 100 mg (3/13/2020), 100 mg (3/27/2020), 100 mg (4/10/2020), 100 mg (4/24/2020), 100 mg (5/8/2020), 100 mg (6/19/2020)     7/2/2020 -  Chemotherapy       10/17/2022 - 12/12/2022 Chemotherapy    elotuzumab (EMPLICITI) IVPB, 10 mg/kg = 757 5 mg, Intravenous, Once, 3 of 6 cycles  Administration: 757 5 mg (10/17/2022), 757 5 mg (10/24/2022), 757 5 mg (11/7/2022), 1,600 mg (12/12/2022), 757 5 mg (10/31/2022), 757 5 mg (11/14/2022), 800 mg (11/21/2022), 800 mg (11/28/2022), 800 mg (12/5/2022)         Interval History: Clinically stable  Headache  ECOG-  1 - Symptomatic but completely ambulatory    Review of Systems   Constitutional: Positive for fatigue  Negative for chills and fever  HENT: Negative for nosebleeds  Eyes: Negative for discharge  Respiratory: Negative for cough and shortness of breath  Cardiovascular: Negative for chest pain  Gastrointestinal: Negative for abdominal pain, constipation and diarrhea  Endocrine: Negative for polydipsia  Genitourinary: Negative for hematuria  Musculoskeletal: Negative for arthralgias  Skin: Negative for color change     Allergic/Immunologic: Negative for immunocompromised state  Neurological: Positive for headaches  Negative for dizziness  Hematological: Negative for adenopathy  Psychiatric/Behavioral: Negative for agitation  Past Medical History:   Diagnosis Date   • History of autologous stem cell transplant ([place] [address] )    • Insomnia    • Multiple myeloma ([place] [address] )      Patient Active Problem List   Diagnosis   • IgG multiple myeloma (HCC)   • Essential hypertension   • Chronic ITP (idiopathic thrombocytopenia) ([facility])   • Hyperlipidemia   • Hypocalcemia   • Acquired hypothyroidism   • Prophylactic measure   • Hypogammaglobulinemia ([place] [address] )   • Anemia due to stage 3 chronic kidney disease (HCC)   • Stage 3b chronic kidney disease (HCC)   • Anxiety       Current Outpatient Medications:   •  acetaminophen (TYLENOL) 325 mg tablet, Take 650 mg by mouth every 6 (six) hours as needed for mild pain, Disp: , Rfl:   •  acyclovir (ZOVIRAX) 200 mg capsule, Take 1 tablet by mouth every 12 hours, Disp: 60 capsule, Rfl: 6  •  Ascorbic Acid (vitamin C) 1000 MG tablet, Take 1,000 mg by mouth daily, Disp: , Rfl:   •  atorvastatin (LIPITOR) 20 mg tablet, TAKE 1 TABLET DAILY, Disp: 90 tablet, Rfl: 0  •  Cholecalciferol (VITAMIN D-3 PO), Take 1,000 Units by mouth daily , Disp: , Rfl:   •  escitalopram (LEXAPRO) 20 mg tablet, TAKE 1 TABLET DAILY, Disp: 90 tablet, Rfl: 1  •  Glutamine POWD, by Does not apply route, Disp: , Rfl:   •  levothyroxine 50 mcg tablet, TAKE ONE AND ONE-HALF TABLETS DAILY (Patient taking differently: Take 75 mcg by mouth daily), Disp: 45 tablet, Rfl: 11  •  Multiple Vitamin (MULTIVITAMINS PO), Take 1 tablet by mouth daily  , Disp: , Rfl:   •  sulfamethoxazole-trimethoprim (BACTRIM DS) 800-160 mg per tablet, 3 (three) times a week Monday Wednesday friday, Disp: , Rfl:   •  vitamin B-12 (VITAMIN B-12) 1,000 mcg tablet, Take 1,000 mcg by mouth daily, Disp: , Rfl:   •  dexamethasone (DECADRON) 4 mg tablet, take 5 tablets by mouth ONE TIME FOR 1 DOSE TO BE TAKEN EACH MORNING OF EACH CHEMO TREATMENT (Patient not taking: Reported on 1/25/2023), Disp: , Rfl:   •  LORazepam (ATIVAN) 1 mg tablet, Take 1 tablet (1 mg total) by mouth every 4 (four) hours as needed for anxiety (Patient not taking: Reported on 5/23/2022), Disp: 100 tablet, Rfl: 1  •  Pomalidomide (Pomalyst) 2 MG CAPS, TAKE 1 CAPSULE DAILY FOR 21 DAYS, THEN OFF 7 DAYS EVERY 28 DAY CYCLE (Patient not taking: Reported on 1/25/2023), Disp: 21 capsule, Rfl: 4  No Known Allergies  Past Surgical History:   Procedure Laterality Date   • APPENDECTOMY      Last assessed: 9/25/15   • ARTHROSCOPY KNEE Left     9/30/09   • EYE SURGERY      Lasik   • KNEE CARTILAGE SURGERY     • LIMBAL STEM CELL TRANSPLANT      Patient had 2 in [place]-2/29/2016 and 4/13/2016     Social History     Objective:  Vitals:    01/30/23 0841   BP: 114/60   BP Location: Left arm   Patient Position: Sitting   Cuff Size: Large   Pulse: 68   Temp: 97 8 °F (36 6 °C)   TempSrc: Temporal   SpO2: 97%   Weight: 79 7 kg (175 lb 9 6 oz)   Height: 5' 10" (1 778 m)     Physical Exam  Constitutional:       Appearance: He is well-developed  HENT:      Head: Normocephalic and atraumatic  Eyes:      Pupils: Pupils are equal, round, and reactive to light  Cardiovascular:      Rate and Rhythm: Normal rate and regular rhythm  Heart sounds: No murmur heard  Pulmonary:      Breath sounds: Normal breath sounds  No wheezing or rales  Abdominal:      Palpations: Abdomen is soft  Tenderness: There is no abdominal tenderness  Musculoskeletal:         General: No tenderness  Normal range of motion  Cervical back: Neck supple  Lymphadenopathy:      Cervical: No cervical adenopathy  Skin:     Findings: No erythema or rash  Neurological:      Mental Status: He is alert and oriented to person, place, and time  Cranial Nerves: No cranial nerve deficit  Deep Tendon Reflexes: Reflexes are normal and symmetric     Psychiatric: Behavior: Behavior normal            Labs: I personally reviewed the labs and imaging pertinent to this patient care

## 2023-02-06 ENCOUNTER — HOSPITAL ENCOUNTER (OUTPATIENT)
Dept: INFUSION CENTER | Facility: CLINIC | Age: 78
Discharge: HOME | End: 2023-02-06

## 2023-02-06 ENCOUNTER — APPOINTMENT (OUTPATIENT)
Dept: LAB | Facility: CLINIC | Age: 78
End: 2023-02-06

## 2023-02-06 VITALS
WEIGHT: 175.93 LBS | TEMPERATURE: 97.6 F | SYSTOLIC BLOOD PRESSURE: 111 MMHG | RESPIRATION RATE: 18 BRPM | DIASTOLIC BLOOD PRESSURE: 66 MMHG | HEART RATE: 67 BPM | BODY MASS INDEX: 25.24 KG/M2

## 2023-02-06 DIAGNOSIS — D80.1 HYPOGAMMAGLOBULINEMIA (HCC): Primary | ICD-10-CM

## 2023-02-06 DIAGNOSIS — C90.00 IGG MULTIPLE MYELOMA (HCC): ICD-10-CM

## 2023-02-06 LAB
ALBUMIN SERPL BCP-MCNC: 3.1 G/DL (ref 3.5–5)
ALP SERPL-CCNC: 38 U/L (ref 34–104)
ALT SERPL W P-5'-P-CCNC: 16 U/L (ref 7–52)
ANION GAP SERPL CALCULATED.3IONS-SCNC: 6 MMOL/L (ref 4–13)
AST SERPL W P-5'-P-CCNC: 67 U/L (ref 13–39)
BASOPHILS # BLD AUTO: 0.02 THOUSANDS/ÂΜL (ref 0–0.1)
BASOPHILS NFR BLD AUTO: 1 % (ref 0–1)
BILIRUB SERPL-MCNC: 0.32 MG/DL (ref 0.2–1)
BUN SERPL-MCNC: 24 MG/DL (ref 5–25)
CALCIUM ALBUM COR SERPL-MCNC: 10.5 MG/DL (ref 8.3–10.1)
CALCIUM SERPL-MCNC: 9.8 MG/DL (ref 8.4–10.2)
CHLORIDE SERPL-SCNC: 102 MMOL/L (ref 96–108)
CO2 SERPL-SCNC: 24 MMOL/L (ref 21–32)
CREAT SERPL-MCNC: 1.99 MG/DL (ref 0.6–1.3)
EOSINOPHIL # BLD AUTO: 0.12 THOUSAND/ÂΜL (ref 0–0.61)
EOSINOPHIL NFR BLD AUTO: 3 % (ref 0–6)
ERYTHROCYTE [DISTWIDTH] IN BLOOD BY AUTOMATED COUNT: 16.6 % (ref 11.6–15.1)
GFR SERPL CREATININE-BSD FRML MDRD: 31 ML/MIN/1.73SQ M
GLUCOSE SERPL-MCNC: 86 MG/DL (ref 65–140)
HCT VFR BLD AUTO: 26.1 % (ref 36.5–49.3)
HGB BLD-MCNC: 8.4 G/DL (ref 12–17)
IMM GRANULOCYTES # BLD AUTO: 0.1 THOUSAND/UL (ref 0–0.2)
IMM GRANULOCYTES NFR BLD AUTO: 2 % (ref 0–2)
LYMPHOCYTES # BLD AUTO: 0.39 THOUSANDS/ÂΜL (ref 0.6–4.47)
LYMPHOCYTES NFR BLD AUTO: 9 % (ref 14–44)
MCH RBC QN AUTO: 31 PG (ref 26.8–34.3)
MCHC RBC AUTO-ENTMCNC: 32.2 G/DL (ref 31.4–37.4)
MCV RBC AUTO: 96 FL (ref 82–98)
MONOCYTES # BLD AUTO: 0.7 THOUSAND/ÂΜL (ref 0.17–1.22)
MONOCYTES NFR BLD AUTO: 17 % (ref 4–12)
NEUTROPHILS # BLD AUTO: 2.83 THOUSANDS/ÂΜL (ref 1.85–7.62)
NEUTS SEG NFR BLD AUTO: 68 % (ref 43–75)
NRBC BLD AUTO-RTO: 1 /100 WBCS
PLATELET # BLD AUTO: 173 THOUSANDS/UL (ref 149–390)
PMV BLD AUTO: 10.1 FL (ref 8.9–12.7)
POTASSIUM SERPL-SCNC: 5.2 MMOL/L (ref 3.5–5.3)
PROT SERPL-MCNC: 10.5 G/DL (ref 6.4–8.4)
RBC # BLD AUTO: 2.71 MILLION/UL (ref 3.88–5.62)
SODIUM SERPL-SCNC: 132 MMOL/L (ref 135–147)
WBC # BLD AUTO: 4.16 THOUSAND/UL (ref 4.31–10.16)

## 2023-02-06 RX ORDER — SODIUM CHLORIDE 9 MG/ML
20 INJECTION, SOLUTION INTRAVENOUS ONCE
Start: 2023-02-27 | End: 2023-02-27

## 2023-02-06 RX ORDER — SODIUM CHLORIDE 9 MG/ML
20 INJECTION, SOLUTION INTRAVENOUS ONCE
Status: COMPLETED | OUTPATIENT
Start: 2023-02-06 | End: 2023-02-06

## 2023-02-06 RX ADMIN — SODIUM CHLORIDE 20 ML/HR: 0.9 INJECTION, SOLUTION INTRAVENOUS at 08:30

## 2023-02-06 RX ADMIN — Medication 30 G: at 08:53

## 2023-02-16 ENCOUNTER — TELEPHONE (OUTPATIENT)
Dept: NEPHROLOGY | Facility: CLINIC | Age: 78
End: 2023-02-16

## 2023-02-16 DIAGNOSIS — N18.31 ANEMIA DUE TO STAGE 3A CHRONIC KIDNEY DISEASE (HCC): ICD-10-CM

## 2023-02-16 DIAGNOSIS — I10 ESSENTIAL HYPERTENSION: Primary | ICD-10-CM

## 2023-02-16 DIAGNOSIS — D63.1 ANEMIA DUE TO STAGE 3A CHRONIC KIDNEY DISEASE (HCC): ICD-10-CM

## 2023-02-16 DIAGNOSIS — N18.32 STAGE 3B CHRONIC KIDNEY DISEASE (HCC): ICD-10-CM

## 2023-02-16 NOTE — TELEPHONE ENCOUNTER
Called patient and spoke with his wife about the following from Dr Cassi Garay:    Please let the patient know his creatinine was back down to 1 9, that is an improvement  There is really no changes for now, the fluctuations potentially could be due to the Zometa shots he receives but given that creatinine is lower will monitor  Patient has been scheduled for 5/24 and the BMP has been added to his chart and mailed to the home address  Patients wife  Had no further questions

## 2023-02-16 NOTE — TELEPHONE ENCOUNTER
----- Message from Carol Garcia MD sent at 2/15/2023  2:38 PM EST -----  Let the patient know his creatinine was back down or improved to 1 9  That is improved  There is really no changes for now the fluctuations potentially could be due to the Zometa shots he receives but given that creatinine is lower will monitor  Make an appointment with me for April or May and make sure he will get a BMP prior to that  Let me know he got the message  Thank you

## 2023-02-17 ENCOUNTER — APPOINTMENT (OUTPATIENT)
Dept: LAB | Facility: CLINIC | Age: 78
End: 2023-02-17

## 2023-02-17 DIAGNOSIS — C90.00 MULTIPLE MYELOMA, REMISSION STATUS UNSPECIFIED (HCC): ICD-10-CM

## 2023-02-17 LAB
ALBUMIN SERPL BCP-MCNC: 2.5 G/DL (ref 3.5–5)
ALP SERPL-CCNC: 47 U/L (ref 46–116)
ALT SERPL W P-5'-P-CCNC: 17 U/L (ref 12–78)
ANION GAP SERPL CALCULATED.3IONS-SCNC: 5 MMOL/L (ref 4–13)
ANISOCYTOSIS BLD QL SMEAR: PRESENT
AST SERPL W P-5'-P-CCNC: 41 U/L (ref 5–45)
BASOPHILS # BLD MANUAL: 0 THOUSAND/UL (ref 0–0.1)
BASOPHILS NFR MAR MANUAL: 0 % (ref 0–1)
BILIRUB SERPL-MCNC: 0.37 MG/DL (ref 0.2–1)
BUN SERPL-MCNC: 26 MG/DL (ref 5–25)
CALCIUM ALBUM COR SERPL-MCNC: 12 MG/DL (ref 8.3–10.1)
CALCIUM SERPL-MCNC: 10.8 MG/DL (ref 8.3–10.1)
CHLORIDE SERPL-SCNC: 105 MMOL/L (ref 96–108)
CO2 SERPL-SCNC: 23 MMOL/L (ref 21–32)
CREAT SERPL-MCNC: 2.35 MG/DL (ref 0.6–1.3)
EOSINOPHIL # BLD MANUAL: 0.07 THOUSAND/UL (ref 0–0.4)
EOSINOPHIL NFR BLD MANUAL: 2 % (ref 0–6)
ERYTHROCYTE [DISTWIDTH] IN BLOOD BY AUTOMATED COUNT: 17.2 % (ref 11.6–15.1)
GFR SERPL CREATININE-BSD FRML MDRD: 26 ML/MIN/1.73SQ M
GLUCOSE P FAST SERPL-MCNC: 92 MG/DL (ref 65–99)
HCT VFR BLD AUTO: 24.4 % (ref 36.5–49.3)
HGB BLD-MCNC: 7.7 G/DL (ref 12–17)
LYMPHOCYTES # BLD AUTO: 0.17 THOUSAND/UL (ref 0.6–4.47)
LYMPHOCYTES # BLD AUTO: 5 % (ref 14–44)
MCH RBC QN AUTO: 30.6 PG (ref 26.8–34.3)
MCHC RBC AUTO-ENTMCNC: 31.6 G/DL (ref 31.4–37.4)
MCV RBC AUTO: 97 FL (ref 82–98)
MONOCYTES # BLD AUTO: 0.31 THOUSAND/UL (ref 0–1.22)
MONOCYTES NFR BLD: 9 % (ref 4–12)
MYELOCYTES NFR BLD MANUAL: 1 % (ref 0–1)
NEUTROPHILS # BLD MANUAL: 2.67 THOUSAND/UL (ref 1.85–7.62)
NEUTS BAND NFR BLD MANUAL: 1 % (ref 0–8)
NEUTS SEG NFR BLD AUTO: 77 % (ref 43–75)
NRBC BLD AUTO-RTO: 2 /100 WBC (ref 0–2)
PLATELET # BLD AUTO: 120 THOUSANDS/UL (ref 149–390)
PLATELET BLD QL SMEAR: ABNORMAL
PMV BLD AUTO: 9.3 FL (ref 8.9–12.7)
POTASSIUM SERPL-SCNC: 3.9 MMOL/L (ref 3.5–5.3)
PROT SERPL-MCNC: 10.8 G/DL (ref 6.4–8.4)
RBC # BLD AUTO: 2.52 MILLION/UL (ref 3.88–5.62)
RBC MORPH BLD: PRESENT
ROULEAUX BLD QL SMEAR: PRESENT
SODIUM SERPL-SCNC: 133 MMOL/L (ref 135–147)
VARIANT LYMPHS # BLD AUTO: 5 %
WBC # BLD AUTO: 3.42 THOUSAND/UL (ref 4.31–10.16)

## 2023-02-20 ENCOUNTER — APPOINTMENT (OUTPATIENT)
Dept: LAB | Facility: CLINIC | Age: 78
End: 2023-02-20

## 2023-02-20 DIAGNOSIS — C90.00 MULTIPLE MYELOMA, REMISSION STATUS UNSPECIFIED (HCC): ICD-10-CM

## 2023-02-20 LAB
ALBUMIN SERPL BCP-MCNC: 2.6 G/DL (ref 3.5–5)
ALP SERPL-CCNC: 55 U/L (ref 46–116)
ALT SERPL W P-5'-P-CCNC: 20 U/L (ref 12–78)
ANION GAP SERPL CALCULATED.3IONS-SCNC: 6 MMOL/L (ref 4–13)
AST SERPL W P-5'-P-CCNC: 48 U/L (ref 5–45)
BASOPHILS # BLD AUTO: 0.01 THOUSANDS/ÂΜL (ref 0–0.1)
BASOPHILS NFR BLD AUTO: 0 % (ref 0–1)
BILIRUB SERPL-MCNC: 0.56 MG/DL (ref 0.2–1)
BUN SERPL-MCNC: 22 MG/DL (ref 5–25)
CALCIUM ALBUM COR SERPL-MCNC: 11.4 MG/DL (ref 8.3–10.1)
CALCIUM SERPL-MCNC: 10.3 MG/DL (ref 8.3–10.1)
CHLORIDE SERPL-SCNC: 104 MMOL/L (ref 96–108)
CO2 SERPL-SCNC: 23 MMOL/L (ref 21–32)
CREAT SERPL-MCNC: 2.36 MG/DL (ref 0.6–1.3)
EOSINOPHIL # BLD AUTO: 0.08 THOUSAND/ÂΜL (ref 0–0.61)
EOSINOPHIL NFR BLD AUTO: 2 % (ref 0–6)
ERYTHROCYTE [DISTWIDTH] IN BLOOD BY AUTOMATED COUNT: 17.5 % (ref 11.6–15.1)
GFR SERPL CREATININE-BSD FRML MDRD: 25 ML/MIN/1.73SQ M
GLUCOSE P FAST SERPL-MCNC: 89 MG/DL (ref 65–99)
HCT VFR BLD AUTO: 24.9 % (ref 36.5–49.3)
HGB BLD-MCNC: 7.8 G/DL (ref 12–17)
IMM GRANULOCYTES # BLD AUTO: 0.06 THOUSAND/UL (ref 0–0.2)
IMM GRANULOCYTES NFR BLD AUTO: 2 % (ref 0–2)
LYMPHOCYTES # BLD AUTO: 0.44 THOUSANDS/ÂΜL (ref 0.6–4.47)
LYMPHOCYTES NFR BLD AUTO: 11 % (ref 14–44)
MCH RBC QN AUTO: 30.6 PG (ref 26.8–34.3)
MCHC RBC AUTO-ENTMCNC: 31.3 G/DL (ref 31.4–37.4)
MCV RBC AUTO: 98 FL (ref 82–98)
MONOCYTES # BLD AUTO: 0.78 THOUSAND/ÂΜL (ref 0.17–1.22)
MONOCYTES NFR BLD AUTO: 19 % (ref 4–12)
NEUTROPHILS # BLD AUTO: 2.65 THOUSANDS/ÂΜL (ref 1.85–7.62)
NEUTS SEG NFR BLD AUTO: 66 % (ref 43–75)
NRBC BLD AUTO-RTO: 0 /100 WBCS
PLATELET # BLD AUTO: 132 THOUSANDS/UL (ref 149–390)
PMV BLD AUTO: 9.6 FL (ref 8.9–12.7)
POTASSIUM SERPL-SCNC: 4 MMOL/L (ref 3.5–5.3)
PROT SERPL-MCNC: 11.6 G/DL (ref 6.4–8.4)
RBC # BLD AUTO: 2.55 MILLION/UL (ref 3.88–5.62)
SODIUM SERPL-SCNC: 133 MMOL/L (ref 135–147)
WBC # BLD AUTO: 4.02 THOUSAND/UL (ref 4.31–10.16)

## 2023-02-24 ENCOUNTER — TELEPHONE (OUTPATIENT)
Dept: HEMATOLOGY ONCOLOGY | Facility: CLINIC | Age: 78
End: 2023-02-24

## 2023-02-24 ENCOUNTER — TELEPHONE (OUTPATIENT)
Dept: OBGYN CLINIC | Facility: OTHER | Age: 78
End: 2023-02-24

## 2023-02-24 DIAGNOSIS — C90.00 IGG MULTIPLE MYELOMA (HCC): Primary | ICD-10-CM

## 2023-02-24 DIAGNOSIS — D69.3 CHRONIC ITP (IDIOPATHIC THROMBOCYTOPENIA) (HCC): Primary | ICD-10-CM

## 2023-02-24 RX ORDER — SODIUM CHLORIDE 9 MG/ML
20 INJECTION, SOLUTION INTRAVENOUS ONCE
Status: CANCELLED | OUTPATIENT
Start: 2023-02-27

## 2023-02-24 NOTE — TELEPHONE ENCOUNTER
Call Transfer   Reason for patient call? Dr Brooks Lara wanted to speak with Dr Prashant Leon regarding pt's care plan  If someone other than patient is calling, are they listed on the communication consent? N/A   Patient's primary oncologist? Dr Prashant Leon    Person/Department that the call was transferred to? Time that call was transferred? Dr Fausto Jha nurse    Informed patient that the message will be forwarded to the team and someone will get back to them as soon as possible      Did you relay this information to the patient? n/a

## 2023-02-24 NOTE — TELEPHONE ENCOUNTER
Called and left pt VM advising him he is set up for blood transfusion and zomenta infusion for Monday 2/27 at [facility] him type and screen will need to be drawn at the [facility] between now and Sunday  Teams number left for him to call back with any questions

## 2023-02-25 ENCOUNTER — APPOINTMENT (OUTPATIENT)
Dept: LAB | Facility: HOSPITAL | Age: 78
End: 2023-02-25

## 2023-02-25 DIAGNOSIS — C90.00 IGG MULTIPLE MYELOMA (HCC): ICD-10-CM

## 2023-02-25 DIAGNOSIS — D69.3 CHRONIC ITP (IDIOPATHIC THROMBOCYTOPENIA) (HCC): ICD-10-CM

## 2023-02-25 LAB
ABO GROUP BLD BPU: NORMAL
ABO GROUP BLD: NORMAL
ALBUMIN SERPL BCP-MCNC: 3 G/DL (ref 3.5–5)
ALP SERPL-CCNC: 45 U/L (ref 34–104)
ALT SERPL W P-5'-P-CCNC: 13 U/L (ref 7–52)
ANION GAP SERPL CALCULATED.3IONS-SCNC: 7 MMOL/L (ref 4–13)
AST SERPL W P-5'-P-CCNC: 37 U/L (ref 13–39)
BILIRUB SERPL-MCNC: 0.41 MG/DL (ref 0.2–1)
BLD GP AB SCN SERPL QL: NEGATIVE
BPU ID: NORMAL
BUN SERPL-MCNC: 27 MG/DL (ref 5–25)
CALCIUM ALBUM COR SERPL-MCNC: 11.4 MG/DL (ref 8.3–10.1)
CALCIUM SERPL-MCNC: 10.6 MG/DL (ref 8.4–10.2)
CHLORIDE SERPL-SCNC: 102 MMOL/L (ref 96–108)
CO2 SERPL-SCNC: 23 MMOL/L (ref 21–32)
CREAT SERPL-MCNC: 2.43 MG/DL (ref 0.6–1.3)
CROSSMATCH: NORMAL
GFR SERPL CREATININE-BSD FRML MDRD: 25 ML/MIN/1.73SQ M
GLUCOSE P FAST SERPL-MCNC: 89 MG/DL (ref 65–99)
POTASSIUM SERPL-SCNC: 4.3 MMOL/L (ref 3.5–5.3)
PROT SERPL-MCNC: 11.1 G/DL (ref 6.4–8.4)
RH BLD: POSITIVE
SODIUM SERPL-SCNC: 132 MMOL/L (ref 135–147)
SPECIMEN EXPIRATION DATE: NORMAL
T4 FREE SERPL-MCNC: 0.56 NG/DL (ref 0.76–1.46)
UNIT DISPENSE STATUS: NORMAL
UNIT PRODUCT CODE: NORMAL
UNIT PRODUCT VOLUME: 350 ML
UNIT RH: NORMAL

## 2023-02-27 ENCOUNTER — HOSPITAL ENCOUNTER (OUTPATIENT)
Dept: INFUSION CENTER | Facility: CLINIC | Age: 78
Discharge: HOME | End: 2023-02-27

## 2023-02-27 ENCOUNTER — OFFICE VISIT (OUTPATIENT)
Dept: HEMATOLOGY ONCOLOGY | Facility: CLINIC | Age: 78
End: 2023-02-27

## 2023-02-27 VITALS
DIASTOLIC BLOOD PRESSURE: 68 MMHG | HEART RATE: 98 BPM | HEIGHT: 70 IN | WEIGHT: 174 LBS | SYSTOLIC BLOOD PRESSURE: 118 MMHG | BODY MASS INDEX: 24.91 KG/M2 | OXYGEN SATURATION: 98 % | TEMPERATURE: 98.1 F

## 2023-02-27 VITALS
TEMPERATURE: 99.2 F | RESPIRATION RATE: 18 BRPM | SYSTOLIC BLOOD PRESSURE: 115 MMHG | HEART RATE: 81 BPM | DIASTOLIC BLOOD PRESSURE: 70 MMHG

## 2023-02-27 DIAGNOSIS — D69.3 CHRONIC ITP (IDIOPATHIC THROMBOCYTOPENIA) (HCC): ICD-10-CM

## 2023-02-27 DIAGNOSIS — C90.00 IGG MULTIPLE MYELOMA (HCC): Primary | ICD-10-CM

## 2023-02-27 DIAGNOSIS — F19.982 DRUG-INDUCED INSOMNIA (HCC): ICD-10-CM

## 2023-02-27 DIAGNOSIS — D69.3 CHRONIC ITP (IDIOPATHIC THROMBOCYTOPENIA) (HCC): Primary | ICD-10-CM

## 2023-02-27 DIAGNOSIS — N18.32 STAGE 3B CHRONIC KIDNEY DISEASE (HCC): ICD-10-CM

## 2023-02-27 DIAGNOSIS — E83.52 MALIGNANCY ASSOCIATED HYPERCALCEMIA: ICD-10-CM

## 2023-02-27 DIAGNOSIS — D61.82 MYELOPHTHISIC ANEMIA (HCC): ICD-10-CM

## 2023-02-27 DIAGNOSIS — D63.1 ANEMIA DUE TO STAGE 3A CHRONIC KIDNEY DISEASE (HCC): ICD-10-CM

## 2023-02-27 DIAGNOSIS — N18.31 ANEMIA DUE TO STAGE 3A CHRONIC KIDNEY DISEASE (HCC): ICD-10-CM

## 2023-02-27 DIAGNOSIS — C90.00 IGG MULTIPLE MYELOMA (HCC): ICD-10-CM

## 2023-02-27 PROBLEM — D64.9 ANEMIA, UNSPECIFIED: Status: ACTIVE | Noted: 2023-02-27

## 2023-02-27 RX ORDER — LORAZEPAM 1 MG/1
1 TABLET ORAL EVERY 4 HOURS PRN
Qty: 100 TABLET | Refills: 1 | Status: SHIPPED | OUTPATIENT
Start: 2023-02-27

## 2023-02-27 RX ORDER — SODIUM CHLORIDE 9 MG/ML
20 INJECTION, SOLUTION INTRAVENOUS ONCE
Status: COMPLETED | OUTPATIENT
Start: 2023-02-27 | End: 2023-02-27

## 2023-02-27 RX ORDER — OXYCODONE HYDROCHLORIDE 5 MG/1
5 TABLET ORAL EVERY 4 HOURS PRN
Qty: 90 TABLET | Refills: 0 | Status: SHIPPED | OUTPATIENT
Start: 2023-02-27

## 2023-02-27 RX ORDER — SODIUM CHLORIDE 9 MG/ML
20 INJECTION, SOLUTION INTRAVENOUS ONCE
Status: CANCELLED | OUTPATIENT
Start: 2023-02-27

## 2023-02-27 RX ORDER — NALOXONE HYDROCHLORIDE 4 MG/.1ML
SPRAY NASAL
Qty: 1 EACH | Refills: 1 | Status: SHIPPED | OUTPATIENT
Start: 2023-02-27

## 2023-02-27 RX ADMIN — SODIUM CHLORIDE 20 ML/HR: 0.9 INJECTION, SOLUTION INTRAVENOUS at 08:32

## 2023-02-27 RX ADMIN — ZOLEDRONIC ACID 3 MG: 4 INJECTION INTRAVENOUS at 11:09

## 2023-02-27 NOTE — PROGRESS NOTES
[facility]  [address] [facility] [address]  [place] [id number] [phone]    Orion Hwang,1945, [id number]  02/27/23    Discussion:   In summary, this is a 81-year-old male with a history of myeloma status post high-dose chemotherapy and CAR-T in the past   He had been on Teclistamab for almost 2 months  Disease is progressive now, however  Recent CBC showed hemoglobin of 7 5, platelets 014, white count 4 3  CMP showed creatinine 2 3, calcium 10 4, corrected calcium 11 4, sodium 131  He has received 1 unit of red blood cells today  Zometa administered today, renal adjustment  We will continue Zometa on a monthly basis for now  Hopefully he will meet eligibility criteria for research trial, CD38 antibody interferon conjugate  We reviewed that if disease is progressive thereafter and systems are functioning adequately cytotoxic chemotherapy may be a consideration  He understands that he is entering the later phases of his disease  I discussed the above with the patient  The patient and his wife voiced understanding and agreement   ______________________________________________________________________    Chief Complaint   Patient presents with   • Follow-up       HPI:  Oncology History   IgG multiple myeloma ([facility] [address] )   9/2015 Initial Diagnosis    Found to have Hbg of 6 with SOB, creatinine 1 8 and normal calcium with albumin of 2 1  Additonial blood work showed elevated protien level (12 1g/dL)  9/29/2015 Biopsy    Bone marrow biopsy demonstrated approximately 80% plasma cells with some immature forms noted  These studies showed 13q14 deletion, +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, 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)       10/14/2015 - 11/11/2015 Chemotherapy    Velcade 1 3 mg/m2 Days 1,8,15,22  Cytoxan 300 mg/m2  PO Days 1, 8,15, 22  Dexamethasone 40 mg PO Days 1,8,15, 22  Zometa 4 mg IV Day 1  Cycle length = 28 days    Completed 2 cycles    Day one of cycle 2 was on 11/11/15      12/2015 - 2/2016 Chemotherapy    VDT-PACE x 2 cycles   (completed at [facility] in [place], [place])     2/2016 Transplant    Melphalan 200 mg/m2 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  MRD +     8/2016 - 1/26/2018 Chemotherapy    Maintenance therapy:  Carfilzomib, orginally dosed 27 mg weekly then increased to 45 mg weekly after MRD reactivitation  Revlimid  Dexamethasone      2/2018 Biopsy    Bone marrow demonstrated positive minimal residual disease       2/23/2018 -  Chemotherapy    Daratumumab 16mg/m2 IV Day 1  Pomalyst 4 mg p o  daily 1-21  Dexamethasone 4 mg PO Days 2, 3  Dexamethasone 12 mg PODays 8, 15, 22  Cycle length = 28 days     4/11/2019 - 8/29/2019 Chemotherapy    methylPREDNISolone sodium succinate (Solu-MEDROL) 100 mg in sodium chloride 0 9 % 250 mL IVPB, 100 mg, Intravenous, Once, 5 of 12 cycles  Administration: 100 mg (6/7/2019), 100 mg (7/5/2019), 100 mg (8/2/2019)     8/30/2019 - 11/8/2019 Chemotherapy    cyclophosphamide (CYTOXAN) 1,000 mg in sodium chloride 0 9 % 250 mL IVPB, 990 mg (66 7 % of original dose 750 mg/m2), Intravenous, Once, 2 of 3 cycles  Dose modification: 500 mg/m2 (original dose 750 mg/m2, Cycle 1, Reason: Other (See Comments)), 250 mg/m2 (original dose 750 mg/m2, Cycle 4, Reason: Treatment Parameters Not Met)  Administration: 1,000 mg (8/30/2019), 1,000 mg (9/13/2019), 1,000 mg (9/27/2019), 500 mg (10/11/2019)  bortezomib (VELCADE) subcutaneous injection 2 6 mg, 1 3 mg/m2 = 2 6 mg (86 7 % of original dose 1 5 mg/m2), Subcutaneous, Once, 3 of 4 cycles  Dose modification: 1 3 mg/m2 (original dose 1 5 mg/m2, Cycle 1, Reason: Other (See Comments))  Administration: 2 6 mg (8/30/2019), 2 6 mg (9/13/2019), 2 6 mg (10/25/2019), 2 6 mg (11/8/2019), 2 6 mg (9/27/2019), 2 6 mg (11/1/2019), 2 6 mg (9/20/2019), 2 6 mg (10/4/2019), 2 6 mg (10/11/2019), 2 6 mg (10/18/2019)     12/6/2019 - 6/19/2020 Chemotherapy    pegfilgrastim (NEULASTA ONPRO) subcutaneous injection kit 6 mg, 6 mg, Subcutaneous, Once, 5 of 9 cycles  Administration: 6 mg (2/28/2020), 6 mg (3/13/2020), 6 mg (3/27/2020), 6 mg (4/10/2020), 6 mg (4/24/2020), 6 mg (5/8/2020), 6 mg (6/5/2020), 6 mg (5/22/2020), 6 mg (6/19/2020)  cyclophosphamide (CYTOXAN) 784 mg in sodium chloride 0 9 % 250 mL IVPB, 400 mg/m2 = 784 mg, Intravenous, Once, 5 of 9 cycles  Dose modification: 400 mg/m2 (original dose 750 mg/m2, Cycle 7, Reason: Other (See Comments))  Administration: 784 mg (2/28/2020), 784 mg (3/13/2020), 784 mg (3/27/2020), 784 mg (4/10/2020), 784 mg (4/24/2020), 784 mg (5/8/2020), 784 mg (5/22/2020), 784 mg (6/19/2020), 784 mg (6/5/2020)  methylPREDNISolone sodium succinate (Solu-MEDROL) 100 mg in sodium chloride 0 9 % 250 mL IVPB, 100 mg, Intravenous, Once, 8 of 12 cycles  Administration: 100 mg (12/6/2019), 100 mg (12/13/2019), 100 mg (12/20/2019), 100 mg (1/3/2020), 100 mg (1/10/2020), 100 mg (1/17/2020), 100 mg (1/31/2020), 100 mg (2/14/2020), 100 mg (5/22/2020), 100 mg (12/27/2019), 100 mg (1/24/2020), 100 mg (2/28/2020), 100 mg (3/13/2020), 100 mg (3/27/2020), 100 mg (4/10/2020), 100 mg (4/24/2020), 100 mg (5/8/2020), 100 mg (6/19/2020)     7/2/2020 -  Chemotherapy       10/17/2022 - 12/12/2022 Chemotherapy    elotuzumab (EMPLICITI) IVPB, 10 mg/kg = 757 5 mg, Intravenous, Once, 3 of 6 cycles  Administration: 757 5 mg (10/17/2022), 757 5 mg (10/24/2022), 757 5 mg (11/7/2022), 1,600 mg (12/12/2022), 757 5 mg (10/31/2022), 757 5 mg (11/14/2022), 800 mg (11/21/2022), 800 mg (11/28/2022), 800 mg (12/5/2022)         Interval History: Clinically stable  Fatigue  ECOG-  1 - Symptomatic but completely ambulatory    Review of Systems   Constitutional: Positive for appetite change and fatigue  Negative for chills and fever  HENT: Negative for nosebleeds  Eyes: Negative for discharge  Respiratory: Negative for cough and shortness of breath  Cardiovascular: Negative for chest pain     Gastrointestinal: Negative for abdominal pain, constipation and diarrhea  Endocrine: Negative for polydipsia  Genitourinary: Negative for hematuria  Nocturia   Musculoskeletal: Negative for arthralgias  Skin: Negative for color change  Allergic/Immunologic: Negative for immunocompromised state  Neurological: Negative for dizziness and headaches  Hematological: Negative for adenopathy  Psychiatric/Behavioral: Negative for agitation  Past Medical History:   Diagnosis Date   • History of autologous stem cell transplant ([place] [address] )    • Insomnia    • Multiple myeloma ([place] [address] )      Patient Active Problem List   Diagnosis   • IgG multiple myeloma (HCC)   • Essential hypertension   • Chronic ITP (idiopathic thrombocytopenia) ([facility])   • Hyperlipidemia   • Hypocalcemia   • Acquired hypothyroidism   • Prophylactic measure   • Hypogammaglobulinemia ([place] [address] )   • Anemia due to stage 3 chronic kidney disease ([facility])   • Stage 3b chronic kidney disease ([facility])   • Anxiety   • Headache       Current Outpatient Medications:   •  acetaminophen (TYLENOL) 325 mg tablet, Take 650 mg by mouth every 6 (six) hours as needed for mild pain, Disp: , Rfl:   •  Ascorbic Acid (vitamin C) 1000 MG tablet, Take 1,000 mg by mouth daily, Disp: , Rfl:   •  atorvastatin (LIPITOR) 20 mg tablet, TAKE 1 TABLET DAILY, Disp: 90 tablet, Rfl: 0  •  Cholecalciferol (VITAMIN D-3 PO), Take 1,000 Units by mouth daily , Disp: , Rfl:   •  escitalopram (LEXAPRO) 20 mg tablet, TAKE 1 TABLET DAILY, Disp: 90 tablet, Rfl: 1  •  Glutamine POWD, by Does not apply route, Disp: , Rfl:   •  levothyroxine 50 mcg tablet, TAKE ONE AND ONE-HALF TABLETS DAILY (Patient taking differently: Take 75 mcg by mouth daily), Disp: 45 tablet, Rfl: 11  •  Multiple Vitamin (MULTIVITAMINS PO), Take 1 tablet by mouth daily  , Disp: , Rfl:   •  sulfamethoxazole-trimethoprim (BACTRIM DS) 800-160 mg per tablet, 3 (three) times a week Monday Wednesday friday, Disp: , Rfl:   •  vitamin B-12 (VITAMIN B-12) 1,000 mcg tablet, Take 1,000 mcg by mouth daily, Disp: , Rfl:   •  acyclovir (ZOVIRAX) 200 mg capsule, Take 1 tablet by mouth every 12 hours, Disp: 60 capsule, Rfl: 6  •  dexamethasone (DECADRON) 4 mg tablet, take 5 tablets by mouth ONE TIME FOR 1 DOSE TO BE TAKEN EACH MORNING OF EACH CHEMO TREATMENT (Patient not taking: Reported on 1/25/2023), Disp: , Rfl:   •  LORazepam (ATIVAN) 1 mg tablet, Take 1 tablet (1 mg total) by mouth every 4 (four) hours as needed for anxiety (Patient not taking: Reported on 5/23/2022), Disp: 100 tablet, Rfl: 1  •  Pomalidomide (Pomalyst) 2 MG CAPS, TAKE 1 CAPSULE DAILY FOR 21 DAYS, THEN OFF 7 DAYS EVERY 28 DAY CYCLE (Patient not taking: Reported on 1/25/2023), Disp: 21 capsule, Rfl: 4  No current facility-administered medications for this visit  Facility-Administered Medications Ordered in Other Visits:   •  alteplase (CATHFLO) injection 2 mg, 2 mg, Intracatheter, Q1MIN PRN, Americo Lob, DO  No Known Allergies  Past Surgical History:   Procedure Laterality Date   • APPENDECTOMY      Last assessed: 9/25/15   • ARTHROSCOPY KNEE Left     9/30/09   • EYE SURGERY      Lasik   • KNEE CARTILAGE SURGERY     • LIMBAL STEM CELL TRANSPLANT      Patient had 2 in [place]-2/29/2016 and 4/13/2016     Social History     Objective:  Vitals:    02/27/23 1319   BP: 118/68   BP Location: Left arm   Patient Position: Sitting   Cuff Size: Standard   Pulse: 98   Temp: 98 1 °F (36 7 °C)   TempSrc: Temporal   SpO2: 98%   Weight: 78 9 kg (174 lb)   Height: 5' 10" (1 778 m)     Physical Exam  Constitutional:       Appearance: He is well-developed  HENT:      Head: Normocephalic and atraumatic  Eyes:      Pupils: Pupils are equal, round, and reactive to light  Cardiovascular:      Rate and Rhythm: Normal rate and regular rhythm  Heart sounds: No murmur heard  Pulmonary:      Breath sounds: Normal breath sounds  No wheezing or rales  Abdominal:      Palpations: Abdomen is soft  Tenderness:  There is no abdominal tenderness  Musculoskeletal:         General: No tenderness  Normal range of motion  Cervical back: Neck supple  Lymphadenopathy:      Cervical: No cervical adenopathy  Skin:     Findings: No erythema or rash  Neurological:      Mental Status: He is alert and oriented to person, place, and time  Cranial Nerves: No cranial nerve deficit  Deep Tendon Reflexes: Reflexes are normal and symmetric  Psychiatric:         Behavior: Behavior normal            Labs: I personally reviewed the labs and imaging pertinent to this patient care

## 2023-02-27 NOTE — PROGRESS NOTES
Patient arrived to unit and denied infection or dental complications  His crcl was 24 6  Dr Mimi Martni was made aware by David Vázquez RN  An order was placed to administer the lowest dose  Blood is transfusing now

## 2023-02-27 NOTE — LETTER
February 27, 2023     Ian Michelle DO  [address]    Patient: Rosa Maurer   YOB: 1945   Date of Visit: 2/27/2023       Dear Dr Jose Crump:    Thank you for referring Earline Ellis to me for evaluation  Below are my notes for this consultation  If you have questions, please do not hesitate to call me  I look forward to following your patient along with you  Sincerely,        Sergey Valencia DO        CC: MD Sergey Schulz DO  2/27/2023  1:30 PM  [facility] [address]  [facility] [address]  [phone]    Rafa Hwang,1945, [id number]  02/27/23    Discussion:   ***    I discussed the above with the patient  The patient *** voiced understanding and agreement   ______________________________________________________________________    Chief Complaint   Patient presents with   • Follow-up       HPI:  Oncology History   IgG multiple myeloma ([address] )   9/2015 Initial Diagnosis    Found to have Hbg of 6 with SOB, creatinine 1 8 and normal calcium with albumin of 2 1  Additonial blood work showed elevated protien level (12 1g/dL)  9/29/2015 Biopsy    Bone marrow biopsy demonstrated approximately 80% plasma cells with some immature forms noted  These studies showed 13q14 deletion, +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, T (4:14)       10/14/2015 - 11/11/2015 Chemotherapy    Velcade 1 3 mg/m2 Days 1,8,15,22  Cytoxan 300 mg/m2  PO Days 1, 8,15, 22  Dexamethasone 40 mg PO Days 1,8,15, 22  Zometa 4 mg IV Day 1  Cycle length = 28 days    Completed 2 cycles  Day one of cycle 2 was on 11/11/15      12/2015 - 2/2016 Chemotherapy    VDT-PACE x 2 cycles   (completed at [facility] in [facility] [address])     2/2016 Transplant    Melphalan 200 mg/m2 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      MRD +     8/2016 - 1/26/2018 Chemotherapy Maintenance therapy:  Carfilzomib, orginally dosed 27 mg weekly then increased to 45 mg weekly after MRD reactivitation  Revlimid  Dexamethasone      2/2018 Biopsy    Bone marrow demonstrated positive minimal residual disease       2/23/2018 -  Chemotherapy    Daratumumab 16mg/m2 IV Day 1  Pomalyst 4 mg p o  daily 1-21  Dexamethasone 4 mg PO Days 2, 3  Dexamethasone 12 mg PODays 8, 15, 22  Cycle length = 28 days     4/11/2019 - 8/29/2019 Chemotherapy    methylPREDNISolone sodium succinate (Solu-MEDROL) 100 mg in sodium chloride 0 9 % 250 mL IVPB, 100 mg, Intravenous, Once, 5 of 12 cycles  Administration: 100 mg (6/7/2019), 100 mg (7/5/2019), 100 mg (8/2/2019)     8/30/2019 - 11/8/2019 Chemotherapy    cyclophosphamide (CYTOXAN) 1,000 mg in sodium chloride 0 9 % 250 mL IVPB, 990 mg (66 7 % of original dose 750 mg/m2), Intravenous, Once, 2 of 3 cycles  Dose modification: 500 mg/m2 (original dose 750 mg/m2, Cycle 1, Reason: Other (See Comments)), 250 mg/m2 (original dose 750 mg/m2, Cycle 4, Reason: Treatment Parameters Not Met)  Administration: 1,000 mg (8/30/2019), 1,000 mg (9/13/2019), 1,000 mg (9/27/2019), 500 mg (10/11/2019)  bortezomib (VELCADE) subcutaneous injection 2 6 mg, 1 3 mg/m2 = 2 6 mg (86 7 % of original dose 1 5 mg/m2), Subcutaneous, Once, 3 of 4 cycles  Dose modification: 1 3 mg/m2 (original dose 1 5 mg/m2, Cycle 1, Reason: Other (See Comments))  Administration: 2 6 mg (8/30/2019), 2 6 mg (9/13/2019), 2 6 mg (10/25/2019), 2 6 mg (11/8/2019), 2 6 mg (9/27/2019), 2 6 mg (11/1/2019), 2 6 mg (9/20/2019), 2 6 mg (10/4/2019), 2 6 mg (10/11/2019), 2 6 mg (10/18/2019)     12/6/2019 - 6/19/2020 Chemotherapy    pegfilgrastim (NEULASTA ONPRO) subcutaneous injection kit 6 mg, 6 mg, Subcutaneous, Once, 5 of 9 cycles  Administration: 6 mg (2/28/2020), 6 mg (3/13/2020), 6 mg (3/27/2020), 6 mg (4/10/2020), 6 mg (4/24/2020), 6 mg (5/8/2020), 6 mg (6/5/2020), 6 mg (5/22/2020), 6 mg (6/19/2020)  cyclophosphamide (CYTOXAN) 784 mg in sodium chloride 0 9 % 250 mL IVPB, 400 mg/m2 = 784 mg, Intravenous, Once, 5 of 9 cycles  Dose modification: 400 mg/m2 (original dose 750 mg/m2, Cycle 7, Reason: Other (See Comments))  Administration: 784 mg (2/28/2020), 784 mg (3/13/2020), 784 mg (3/27/2020), 784 mg (4/10/2020), 784 mg (4/24/2020), 784 mg (5/8/2020), 784 mg (5/22/2020), 784 mg (6/19/2020), 784 mg (6/5/2020)  methylPREDNISolone sodium succinate (Solu-MEDROL) 100 mg in sodium chloride 0 9 % 250 mL IVPB, 100 mg, Intravenous, Once, 8 of 12 cycles  Administration: 100 mg (12/6/2019), 100 mg (12/13/2019), 100 mg (12/20/2019), 100 mg (1/3/2020), 100 mg (1/10/2020), 100 mg (1/17/2020), 100 mg (1/31/2020), 100 mg (2/14/2020), 100 mg (5/22/2020), 100 mg (12/27/2019), 100 mg (1/24/2020), 100 mg (2/28/2020), 100 mg (3/13/2020), 100 mg (3/27/2020), 100 mg (4/10/2020), 100 mg (4/24/2020), 100 mg (5/8/2020), 100 mg (6/19/2020)     7/2/2020 -  Chemotherapy       10/17/2022 - 12/12/2022 Chemotherapy    elotuzumab (EMPLICITI) IVPB, 10 mg/kg = 757 5 mg, Intravenous, Once, 3 of 6 cycles  Administration: 757 5 mg (10/17/2022), 757 5 mg (10/24/2022), 757 5 mg (11/7/2022), 1,600 mg (12/12/2022), 757 5 mg (10/31/2022), 757 5 mg (11/14/2022), 800 mg (11/21/2022), 800 mg (11/28/2022), 800 mg (12/5/2022)         Interval History:  ***  {performance status:12339}    Review of Systems   Constitutional: Negative for chills and fever  HENT: Negative for nosebleeds  Eyes: Negative for discharge  Respiratory: Negative for cough and shortness of breath  Cardiovascular: Negative for chest pain  Gastrointestinal: Negative for abdominal pain, constipation and diarrhea  Endocrine: Negative for polydipsia  Genitourinary: Negative for hematuria  Musculoskeletal: Negative for arthralgias  Skin: Negative for color change  Allergic/Immunologic: Negative for immunocompromised state  Neurological: Negative for dizziness and headaches     Hematological: Negative for adenopathy  Psychiatric/Behavioral: Negative for agitation  Past Medical History:   Diagnosis Date   • History of autologous stem cell transplant ([place] [address] )    • Insomnia    • Multiple myeloma ([place] [address] )      Patient Active Problem List   Diagnosis   • IgG multiple myeloma (HCC)   • Essential hypertension   • Chronic ITP (idiopathic thrombocytopenia) ([facility])   • Hyperlipidemia   • Hypocalcemia   • Acquired hypothyroidism   • Prophylactic measure   • Hypogammaglobulinemia ([place] [address] )   • Anemia due to stage 3 chronic kidney disease (HCC)   • Stage 3b chronic kidney disease (HCC)   • Anxiety   • Headache       Current Outpatient Medications:   •  acetaminophen (TYLENOL) 325 mg tablet, Take 650 mg by mouth every 6 (six) hours as needed for mild pain, Disp: , Rfl:   •  Ascorbic Acid (vitamin C) 1000 MG tablet, Take 1,000 mg by mouth daily, Disp: , Rfl:   •  atorvastatin (LIPITOR) 20 mg tablet, TAKE 1 TABLET DAILY, Disp: 90 tablet, Rfl: 0  •  Cholecalciferol (VITAMIN D-3 PO), Take 1,000 Units by mouth daily , Disp: , Rfl:   •  escitalopram (LEXAPRO) 20 mg tablet, TAKE 1 TABLET DAILY, Disp: 90 tablet, Rfl: 1  •  Glutamine POWD, by Does not apply route, Disp: , Rfl:   •  levothyroxine 50 mcg tablet, TAKE ONE AND ONE-HALF TABLETS DAILY (Patient taking differently: Take 75 mcg by mouth daily), Disp: 45 tablet, Rfl: 11  •  Multiple Vitamin (MULTIVITAMINS PO), Take 1 tablet by mouth daily  , Disp: , Rfl:   •  sulfamethoxazole-trimethoprim (BACTRIM DS) 800-160 mg per tablet, 3 (three) times a week Monday Wednesday friday, Disp: , Rfl:   •  vitamin B-12 (VITAMIN B-12) 1,000 mcg tablet, Take 1,000 mcg by mouth daily, Disp: , Rfl:   •  acyclovir (ZOVIRAX) 200 mg capsule, Take 1 tablet by mouth every 12 hours, Disp: 60 capsule, Rfl: 6  •  dexamethasone (DECADRON) 4 mg tablet, take 5 tablets by mouth ONE TIME FOR 1 DOSE TO BE TAKEN EACH MORNING OF EACH CHEMO TREATMENT (Patient not taking: Reported on 1/25/2023), Disp: , Rfl:   •  LORazepam (ATIVAN) 1 mg tablet, Take 1 tablet (1 mg total) by mouth every 4 (four) hours as needed for anxiety (Patient not taking: Reported on 5/23/2022), Disp: 100 tablet, Rfl: 1  •  Pomalidomide (Pomalyst) 2 MG CAPS, TAKE 1 CAPSULE DAILY FOR 21 DAYS, THEN OFF 7 DAYS EVERY 28 DAY CYCLE (Patient not taking: Reported on 1/25/2023), Disp: 21 capsule, Rfl: 4  No current facility-administered medications for this visit  Facility-Administered Medications Ordered in Other Visits:   •  alteplase (CATHFLO) injection 2 mg, 2 mg, Intracatheter, Q1MIN PRN, Jose Fusi, DO  No Known Allergies  Past Surgical History:   Procedure Laterality Date   • APPENDECTOMY      Last assessed: 9/25/15   • ARTHROSCOPY KNEE Left     9/30/09   • EYE SURGERY      Lasik   • KNEE CARTILAGE SURGERY     • LIMBAL STEM CELL TRANSPLANT      Patient had 2 in [place]-2/29/2016 and 4/13/2016     Social History     Objective:  Vitals:    02/27/23 1319   BP: 118/68   BP Location: Left arm   Patient Position: Sitting   Cuff Size: Standard   Pulse: 98   Temp: 98 1 °F (36 7 °C)   TempSrc: Temporal   SpO2: 98%   Weight: 78 9 kg (174 lb)   Height: 5' 10" (1 778 m)     Physical Exam      Labs: I personally reviewed the labs and imaging pertinent to this patient care

## 2023-02-27 NOTE — LETTER
February 27, 2023     Ian Michael,   [address]    Patient: Melia Amos   YOB: 1945   Date of Visit: 2/27/2023       Dear Dr Sony Carr:    Thank you for referring Marisel Summers to me for evaluation  Below are my notes for this consultation  If you have questions, please do not hesitate to call me  I look forward to following your patient along with you  Sincerely,        Grisel Palumbo,         CC: MD Grisel White,   2/27/2023  2:08 PM  Sign when Signing Visit  [address]  [phone]    Jennifer Hwang,1945, [id number]  02/27/23    Discussion:   In summary, this is a 72-year-old male with a history of myeloma status post high-dose chemotherapy and CAR-T in the past   He had been on Teclistamab for almost 2 months  Disease is progressive now, however  Recent CBC showed hemoglobin of 7 5, platelets 536, white count 4 3  CMP showed creatinine 2 3, calcium 10 4, corrected calcium 11 4, sodium 131  He has received 1 unit of red blood cells today  Zometa administered today, renal adjustment  We will continue Zometa on a monthly basis for now  Hopefully he will meet eligibility criteria for research trial, CD38 antibody interferon conjugate  We reviewed that if disease is progressive thereafter and systems are functioning adequately cytotoxic chemotherapy may be a consideration  He understands that he is entering the later phases of his disease  I discussed the above with the patient    The patient and his wife voiced understanding and agreement   ______________________________________________________________________    Chief Complaint   Patient presents with   • Follow-up       HPI:  Oncology History   IgG multiple myeloma ([facility] [address] )   9/2015 Initial Diagnosis    Found to have Hbg of 6 with SOB, creatinine 1 8 and normal calcium with albumin of 2 1  Additonial blood work showed elevated protien level (12 1g/dL)  9/29/2015 Biopsy    Bone marrow biopsy demonstrated approximately 80% plasma cells with some immature forms noted  These studies showed 13q14 deletion, +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, 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)       10/14/2015 - 11/11/2015 Chemotherapy    Velcade 1 3 mg/m2 Days 1,8,15,22  Cytoxan 300 mg/m2  PO Days 1, 8,15, 22  Dexamethasone 40 mg PO Days 1,8,15, 22  Zometa 4 mg IV Day 1  Cycle length = 28 days    Completed 2 cycles  Day one of cycle 2 was on 11/11/15      12/2015 - 2/2016 Chemotherapy    VDT-PACE x 2 cycles   (completed at [facility] in [address])     2/2016 Transplant    Melphalan 200 mg/m2 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  MRD +     8/2016 - 1/26/2018 Chemotherapy    Maintenance therapy:  Carfilzomib, orginally dosed 27 mg weekly then increased to 45 mg weekly after MRD reactivitation  Revlimid  Dexamethasone      2/2018 Biopsy    Bone marrow demonstrated positive minimal residual disease       2/23/2018 -  Chemotherapy    Daratumumab 16mg/m2 IV Day 1  Pomalyst 4 mg p o  daily 1-21  Dexamethasone 4 mg PO Days 2, 3  Dexamethasone 12 mg PODays 8, 15, 22  Cycle length = 28 days     4/11/2019 - 8/29/2019 Chemotherapy    methylPREDNISolone sodium succinate (Solu-MEDROL) 100 mg in sodium chloride 0 9 % 250 mL IVPB, 100 mg, Intravenous, Once, 5 of 12 cycles  Administration: 100 mg (6/7/2019), 100 mg (7/5/2019), 100 mg (8/2/2019)     8/30/2019 - 11/8/2019 Chemotherapy    cyclophosphamide (CYTOXAN) 1,000 mg in sodium chloride 0 9 % 250 mL IVPB, 990 mg (66 7 % of original dose 750 mg/m2), Intravenous, Once, 2 of 3 cycles  Dose modification: 500 mg/m2 (original dose 750 mg/m2, Cycle 1, Reason: Other (See Comments)), 250 mg/m2 (original dose 750 mg/m2, Cycle 4, Reason: Treatment Parameters Not Met)  Administration: 1,000 mg (8/30/2019), 1,000 mg (9/13/2019), 1,000 mg (9/27/2019), 500 mg (10/11/2019)  bortezomib (VELCADE) subcutaneous injection 2 6 mg, 1 3 mg/m2 = 2 6 mg (86 7 % of original dose 1 5 mg/m2), Subcutaneous, Once, 3 of 4 cycles  Dose modification: 1 3 mg/m2 (original dose 1 5 mg/m2, Cycle 1, Reason: Other (See Comments))  Administration: 2 6 mg (8/30/2019), 2 6 mg (9/13/2019), 2 6 mg (10/25/2019), 2 6 mg (11/8/2019), 2 6 mg (9/27/2019), 2 6 mg (11/1/2019), 2 6 mg (9/20/2019), 2 6 mg (10/4/2019), 2 6 mg (10/11/2019), 2 6 mg (10/18/2019)     12/6/2019 - 6/19/2020 Chemotherapy    pegfilgrastim (NEULASTA ONPRO) subcutaneous injection kit 6 mg, 6 mg, Subcutaneous, Once, 5 of 9 cycles  Administration: 6 mg (2/28/2020), 6 mg (3/13/2020), 6 mg (3/27/2020), 6 mg (4/10/2020), 6 mg (4/24/2020), 6 mg (5/8/2020), 6 mg (6/5/2020), 6 mg (5/22/2020), 6 mg (6/19/2020)  cyclophosphamide (CYTOXAN) 784 mg in sodium chloride 0 9 % 250 mL IVPB, 400 mg/m2 = 784 mg, Intravenous, Once, 5 of 9 cycles  Dose modification: 400 mg/m2 (original dose 750 mg/m2, Cycle 7, Reason: Other (See Comments))  Administration: 784 mg (2/28/2020), 784 mg (3/13/2020), 784 mg (3/27/2020), 784 mg (4/10/2020), 784 mg (4/24/2020), 784 mg (5/8/2020), 784 mg (5/22/2020), 784 mg (6/19/2020), 784 mg (6/5/2020)  methylPREDNISolone sodium succinate (Solu-MEDROL) 100 mg in sodium chloride 0 9 % 250 mL IVPB, 100 mg, Intravenous, Once, 8 of 12 cycles  Administration: 100 mg (12/6/2019), 100 mg (12/13/2019), 100 mg (12/20/2019), 100 mg (1/3/2020), 100 mg (1/10/2020), 100 mg (1/17/2020), 100 mg (1/31/2020), 100 mg (2/14/2020), 100 mg (5/22/2020), 100 mg (12/27/2019), 100 mg (1/24/2020), 100 mg (2/28/2020), 100 mg (3/13/2020), 100 mg (3/27/2020), 100 mg (4/10/2020), 100 mg (4/24/2020), 100 mg (5/8/2020), 100 mg (6/19/2020)     7/2/2020 -  Chemotherapy       10/17/2022 - 12/12/2022 Chemotherapy    elotuzumab (EMPLICITI) IVPB, 10 mg/kg = 757 5 mg, Intravenous, Once, 3 of 6 cycles  Administration: 757 5 mg (10/17/2022), 757 5 mg (10/24/2022), 757 5 mg (11/7/2022), 1,600 mg (12/12/2022), 757 5 mg (10/31/2022), 757 5 mg (11/14/2022), 800 mg (11/21/2022), 800 mg (11/28/2022), 800 mg (12/5/2022)         Interval History: Clinically stable  Fatigue  ECOG-  1 - Symptomatic but completely ambulatory    Review of Systems   Constitutional: Positive for appetite change and fatigue  Negative for chills and fever  HENT: Negative for nosebleeds  Eyes: Negative for discharge  Respiratory: Negative for cough and shortness of breath  Cardiovascular: Negative for chest pain  Gastrointestinal: Negative for abdominal pain, constipation and diarrhea  Endocrine: Negative for polydipsia  Genitourinary: Negative for hematuria  Nocturia   Musculoskeletal: Negative for arthralgias  Skin: Negative for color change  Allergic/Immunologic: Negative for immunocompromised state  Neurological: Negative for dizziness and headaches  Hematological: Negative for adenopathy  Psychiatric/Behavioral: Negative for agitation         Past Medical History:   Diagnosis Date   • History of autologous stem cell transplant ([place] [address] )    • Insomnia    • Multiple myeloma ([place] [address] )      Patient Active Problem List   Diagnosis   • IgG multiple myeloma (HCC)   • Essential hypertension   • Chronic ITP (idiopathic thrombocytopenia) ([facility])   • Hyperlipidemia   • Hypocalcemia   • Acquired hypothyroidism   • Prophylactic measure   • Hypogammaglobulinemia ([place] [address] )   • Anemia due to stage 3 chronic kidney disease ([facility])   • Stage 3b chronic kidney disease ([facility])   • Anxiety   • Headache       Current Outpatient Medications:   •  acetaminophen (TYLENOL) 325 mg tablet, Take 650 mg by mouth every 6 (six) hours as needed for mild pain, Disp: , Rfl:   •  Ascorbic Acid (vitamin C) 1000 MG tablet, Take 1,000 mg by mouth daily, Disp: , Rfl:   •  atorvastatin (LIPITOR) 20 mg tablet, TAKE 1 TABLET DAILY, Disp: 90 tablet, Rfl: 0  •  Cholecalciferol (VITAMIN D-3 PO), Take 1,000 Units by mouth daily , Disp: , Rfl:   •  escitalopram (LEXAPRO) 20 mg tablet, TAKE 1 TABLET DAILY, Disp: 90 tablet, Rfl: 1  •  Glutamine POWD, by Does not apply route, Disp: , Rfl:   •  levothyroxine 50 mcg tablet, TAKE ONE AND ONE-HALF TABLETS DAILY (Patient taking differently: Take 75 mcg by mouth daily), Disp: 45 tablet, Rfl: 11  •  Multiple Vitamin (MULTIVITAMINS PO), Take 1 tablet by mouth daily  , Disp: , Rfl:   •  sulfamethoxazole-trimethoprim (BACTRIM DS) 800-160 mg per tablet, 3 (three) times a week Monday Wednesday friday, Disp: , Rfl:   •  vitamin B-12 (VITAMIN B-12) 1,000 mcg tablet, Take 1,000 mcg by mouth daily, Disp: , Rfl:   •  acyclovir (ZOVIRAX) 200 mg capsule, Take 1 tablet by mouth every 12 hours, Disp: 60 capsule, Rfl: 6  •  dexamethasone (DECADRON) 4 mg tablet, take 5 tablets by mouth ONE TIME FOR 1 DOSE TO BE TAKEN EACH MORNING OF EACH CHEMO TREATMENT (Patient not taking: Reported on 1/25/2023), Disp: , Rfl:   •  LORazepam (ATIVAN) 1 mg tablet, Take 1 tablet (1 mg total) by mouth every 4 (four) hours as needed for anxiety (Patient not taking: Reported on 5/23/2022), Disp: 100 tablet, Rfl: 1  •  Pomalidomide (Pomalyst) 2 MG CAPS, TAKE 1 CAPSULE DAILY FOR 21 DAYS, THEN OFF 7 DAYS EVERY 28 DAY CYCLE (Patient not taking: Reported on 1/25/2023), Disp: 21 capsule, Rfl: 4  No current facility-administered medications for this visit      Facility-Administered Medications Ordered in Other Visits:   •  alteplase (CATHFLO) injection 2 mg, 2 mg, Intracatheter, Q1MIN PRN, Curry Schilder,   No Known Allergies  Past Surgical History:   Procedure Laterality Date   • APPENDECTOMY      Last assessed: 9/25/15   • ARTHROSCOPY KNEE Left     9/30/09   • EYE SURGERY      Lasik   • KNEE CARTILAGE SURGERY     • LIMBAL STEM CELL TRANSPLANT      Patient had 2 in [place]-2/29/2016 and 4/13/2016     Social History     Objective:  Vitals:    02/27/23 1319   BP: 118/68   BP Location: Left arm   Patient Position: Sitting   Cuff Size: Standard   Pulse: 98   Temp: 98 1 °F (36 7 °C)   TempSrc: Temporal   SpO2: 98%   Weight: 78 9 kg (174 lb)   Height: 5' 10" (1 778 m)     Physical Exam  Constitutional:       Appearance: He is well-developed  HENT:      Head: Normocephalic and atraumatic  Eyes:      Pupils: Pupils are equal, round, and reactive to light  Cardiovascular:      Rate and Rhythm: Normal rate and regular rhythm  Heart sounds: No murmur heard  Pulmonary:      Breath sounds: Normal breath sounds  No wheezing or rales  Abdominal:      Palpations: Abdomen is soft  Tenderness: There is no abdominal tenderness  Musculoskeletal:         General: No tenderness  Normal range of motion  Cervical back: Neck supple  Lymphadenopathy:      Cervical: No cervical adenopathy  Skin:     Findings: No erythema or rash  Neurological:      Mental Status: He is alert and oriented to person, place, and time  Cranial Nerves: No cranial nerve deficit  Deep Tendon Reflexes: Reflexes are normal and symmetric  Psychiatric:         Behavior: Behavior normal            Labs: I personally reviewed the labs and imaging pertinent to this patient care

## 2023-02-28 LAB
ABO GROUP BLD BPU: NORMAL
BPU ID: NORMAL
CROSSMATCH: NORMAL
UNIT DISPENSE STATUS: NORMAL
UNIT PRODUCT CODE: NORMAL
UNIT PRODUCT VOLUME: 350 ML
UNIT RH: NORMAL

## 2023-03-02 ENCOUNTER — APPOINTMENT (OUTPATIENT)
Dept: LAB | Facility: CLINIC | Age: 78
End: 2023-03-02

## 2023-03-02 DIAGNOSIS — C90.00 MULTIPLE MYELOMA, REMISSION STATUS UNSPECIFIED (HCC): ICD-10-CM

## 2023-03-02 LAB
ALBUMIN SERPL BCP-MCNC: 2.3 G/DL (ref 3.5–5)
ALP SERPL-CCNC: 52 U/L (ref 46–116)
ALT SERPL W P-5'-P-CCNC: 17 U/L (ref 12–78)
ANION GAP SERPL CALCULATED.3IONS-SCNC: 5 MMOL/L (ref 4–13)
AST SERPL W P-5'-P-CCNC: 43 U/L (ref 5–45)
BASOPHILS # BLD AUTO: 0.01 THOUSANDS/ÂΜL (ref 0–0.1)
BASOPHILS NFR BLD AUTO: 0 % (ref 0–1)
BILIRUB SERPL-MCNC: 0.4 MG/DL (ref 0.2–1)
BUN SERPL-MCNC: 20 MG/DL (ref 5–25)
CALCIUM ALBUM COR SERPL-MCNC: 11.4 MG/DL (ref 8.3–10.1)
CALCIUM SERPL-MCNC: 10 MG/DL (ref 8.3–10.1)
CHLORIDE SERPL-SCNC: 103 MMOL/L (ref 96–108)
CO2 SERPL-SCNC: 23 MMOL/L (ref 21–32)
CREAT SERPL-MCNC: 2.46 MG/DL (ref 0.6–1.3)
EOSINOPHIL # BLD AUTO: 0.04 THOUSAND/ÂΜL (ref 0–0.61)
EOSINOPHIL NFR BLD AUTO: 1 % (ref 0–6)
ERYTHROCYTE [DISTWIDTH] IN BLOOD BY AUTOMATED COUNT: 18.2 % (ref 11.6–15.1)
GFR SERPL CREATININE-BSD FRML MDRD: 24 ML/MIN/1.73SQ M
GLUCOSE P FAST SERPL-MCNC: 95 MG/DL (ref 65–99)
HCT VFR BLD AUTO: 24.2 % (ref 36.5–49.3)
HGB BLD-MCNC: 7.7 G/DL (ref 12–17)
IMM GRANULOCYTES # BLD AUTO: 0.09 THOUSAND/UL (ref 0–0.2)
IMM GRANULOCYTES NFR BLD AUTO: 2 % (ref 0–2)
LYMPHOCYTES # BLD AUTO: 0.52 THOUSANDS/ÂΜL (ref 0.6–4.47)
LYMPHOCYTES NFR BLD AUTO: 14 % (ref 14–44)
MCH RBC QN AUTO: 30.6 PG (ref 26.8–34.3)
MCHC RBC AUTO-ENTMCNC: 31.8 G/DL (ref 31.4–37.4)
MCV RBC AUTO: 96 FL (ref 82–98)
MONOCYTES # BLD AUTO: 0.79 THOUSAND/ÂΜL (ref 0.17–1.22)
MONOCYTES NFR BLD AUTO: 21 % (ref 4–12)
NEUTROPHILS # BLD AUTO: 2.41 THOUSANDS/ÂΜL (ref 1.85–7.62)
NEUTS SEG NFR BLD AUTO: 62 % (ref 43–75)
NRBC BLD AUTO-RTO: 0 /100 WBCS
PLATELET # BLD AUTO: 111 THOUSANDS/UL (ref 149–390)
PMV BLD AUTO: 9.9 FL (ref 8.9–12.7)
POTASSIUM SERPL-SCNC: 3.7 MMOL/L (ref 3.5–5.3)
PROT SERPL-MCNC: 11.3 G/DL (ref 6.4–8.4)
RBC # BLD AUTO: 2.52 MILLION/UL (ref 3.88–5.62)
SODIUM SERPL-SCNC: 131 MMOL/L (ref 135–147)
WBC # BLD AUTO: 3.86 THOUSAND/UL (ref 4.31–10.16)

## 2023-03-06 ENCOUNTER — TELEPHONE (OUTPATIENT)
Dept: NEPHROLOGY | Facility: CLINIC | Age: 78
End: 2023-03-06

## 2023-03-06 ENCOUNTER — APPOINTMENT (OUTPATIENT)
Dept: LAB | Facility: CLINIC | Age: 78
End: 2023-03-06

## 2023-03-06 ENCOUNTER — HOSPITAL ENCOUNTER (OUTPATIENT)
Dept: INFUSION CENTER | Facility: CLINIC | Age: 78
Discharge: HOME | End: 2023-03-06

## 2023-03-06 ENCOUNTER — TELEPHONE (OUTPATIENT)
Dept: HEMATOLOGY ONCOLOGY | Facility: CLINIC | Age: 78
End: 2023-03-06

## 2023-03-06 VITALS
SYSTOLIC BLOOD PRESSURE: 118 MMHG | WEIGHT: 172.4 LBS | DIASTOLIC BLOOD PRESSURE: 70 MMHG | HEART RATE: 86 BPM | BODY MASS INDEX: 24.74 KG/M2 | RESPIRATION RATE: 18 BRPM | TEMPERATURE: 97.6 F

## 2023-03-06 DIAGNOSIS — C90.00 MULTIPLE MYELOMA, REMISSION STATUS UNSPECIFIED (HCC): ICD-10-CM

## 2023-03-06 DIAGNOSIS — C90.00 IGG MULTIPLE MYELOMA (HCC): ICD-10-CM

## 2023-03-06 DIAGNOSIS — D69.3 CHRONIC ITP (IDIOPATHIC THROMBOCYTOPENIA) (HCC): Primary | ICD-10-CM

## 2023-03-06 DIAGNOSIS — D80.1 HYPOGAMMAGLOBULINEMIA (HCC): Primary | ICD-10-CM

## 2023-03-06 DIAGNOSIS — D69.3 CHRONIC ITP (IDIOPATHIC THROMBOCYTOPENIA) (HCC): ICD-10-CM

## 2023-03-06 LAB
ABO GROUP BLD: NORMAL
ALBUMIN SERPL BCP-MCNC: 2.8 G/DL (ref 3.5–5)
ALP SERPL-CCNC: 48 U/L (ref 34–104)
ALT SERPL W P-5'-P-CCNC: 13 U/L (ref 7–52)
ANION GAP SERPL CALCULATED.3IONS-SCNC: 7 MMOL/L (ref 4–13)
AST SERPL W P-5'-P-CCNC: 58 U/L (ref 13–39)
BASOPHILS # BLD MANUAL: 0 THOUSAND/UL (ref 0–0.1)
BASOPHILS NFR MAR MANUAL: 0 % (ref 0–1)
BILIRUB SERPL-MCNC: 0.34 MG/DL (ref 0.2–1)
BLD GP AB SCN SERPL QL: NEGATIVE
BUN SERPL-MCNC: 23 MG/DL (ref 5–25)
CALCIUM ALBUM COR SERPL-MCNC: 10.2 MG/DL (ref 8.3–10.1)
CALCIUM SERPL-MCNC: 9.2 MG/DL (ref 8.4–10.2)
CHLORIDE SERPL-SCNC: 101 MMOL/L (ref 96–108)
CO2 SERPL-SCNC: 22 MMOL/L (ref 21–32)
CREAT SERPL-MCNC: 2.08 MG/DL (ref 0.6–1.3)
EOSINOPHIL # BLD MANUAL: 0 THOUSAND/UL (ref 0–0.4)
EOSINOPHIL NFR BLD MANUAL: 0 % (ref 0–6)
ERYTHROCYTE [DISTWIDTH] IN BLOOD BY AUTOMATED COUNT: 18 % (ref 11.6–15.1)
GFR SERPL CREATININE-BSD FRML MDRD: 30 ML/MIN/1.73SQ M
GLUCOSE SERPL-MCNC: 84 MG/DL (ref 65–140)
HCT VFR BLD AUTO: 23.5 % (ref 36.5–49.3)
HGB BLD-MCNC: 7.4 G/DL (ref 12–17)
LYMPHOCYTES # BLD AUTO: 0.81 THOUSAND/UL (ref 0.6–4.47)
LYMPHOCYTES # BLD AUTO: 21 % (ref 14–44)
MCH RBC QN AUTO: 30.6 PG (ref 26.8–34.3)
MCHC RBC AUTO-ENTMCNC: 31.5 G/DL (ref 31.4–37.4)
MCV RBC AUTO: 97 FL (ref 82–98)
MONOCYTES # BLD AUTO: 0.31 THOUSAND/UL (ref 0–1.22)
MONOCYTES NFR BLD: 8 % (ref 4–12)
NEUTROPHILS # BLD MANUAL: 2.75 THOUSAND/UL (ref 1.85–7.62)
NEUTS BAND NFR BLD MANUAL: 5 % (ref 0–8)
NEUTS SEG NFR BLD AUTO: 66 % (ref 43–75)
PLATELET # BLD AUTO: 123 THOUSANDS/UL (ref 149–390)
PLATELET BLD QL SMEAR: ABNORMAL
PMV BLD AUTO: 11.2 FL (ref 8.9–12.7)
POTASSIUM SERPL-SCNC: 4.5 MMOL/L (ref 3.5–5.3)
PROT SERPL-MCNC: 11.3 G/DL (ref 6.4–8.4)
RBC # BLD AUTO: 2.42 MILLION/UL (ref 3.88–5.62)
RBC MORPH BLD: NORMAL
RH BLD: POSITIVE
SODIUM SERPL-SCNC: 130 MMOL/L (ref 135–147)
SPECIMEN EXPIRATION DATE: NORMAL
WBC # BLD AUTO: 3.87 THOUSAND/UL (ref 4.31–10.16)

## 2023-03-06 RX ORDER — SODIUM CHLORIDE 9 MG/ML
20 INJECTION, SOLUTION INTRAVENOUS ONCE
Status: CANCELLED | OUTPATIENT
Start: 2023-03-07

## 2023-03-06 RX ORDER — SODIUM CHLORIDE 9 MG/ML
20 INJECTION, SOLUTION INTRAVENOUS ONCE
Status: CANCELLED | OUTPATIENT
Start: 2023-03-08

## 2023-03-06 RX ORDER — SODIUM CHLORIDE 9 MG/ML
20 INJECTION, SOLUTION INTRAVENOUS ONCE
Start: 2023-03-31 | End: 2023-03-31

## 2023-03-06 RX ORDER — SODIUM CHLORIDE 9 MG/ML
20 INJECTION, SOLUTION INTRAVENOUS ONCE
Status: COMPLETED | OUTPATIENT
Start: 2023-03-06 | End: 2023-03-06

## 2023-03-06 RX ADMIN — SODIUM CHLORIDE 20 ML/HR: 0.9 INJECTION, SOLUTION INTRAVENOUS at 08:51

## 2023-03-06 RX ADMIN — Medication 30 G: at 08:55

## 2023-03-06 NOTE — TELEPHONE ENCOUNTER
----- Message from Yosi Olson MD sent at 3/4/2023  7:45 AM EST -----  Let him know creatinine stable at 2 4 and total calcium lower  When is he on call back for me ?

## 2023-03-06 NOTE — TELEPHONE ENCOUNTER
Received message from infusion (robles) regarding patients hemoglobin of 7 5  Per Dr Enedelia Torres would like patient to receive 1UPRBC  AL infusion able to transfuse patient 3/8 @ 12:30  Patient will need blood bank hold tube drawn  Called patient to relay the above  Voicemail was received  LVM stating name, office, callback, and need for discussion of treatment

## 2023-03-06 NOTE — TELEPHONE ENCOUNTER
Spoke with patients wife  Confirmed time date and location of blood transfusion Wednesday  States blood bank tube will be done tomorrow

## 2023-03-06 NOTE — TELEPHONE ENCOUNTER
Called patient and left a voicemail stating the following:    Patients creatinine stable at 2 4 and total calcium lower  I asked the patient call back with further questions  Patient is scheduled for 5/24 with you

## 2023-03-07 ENCOUNTER — HOSPITAL ENCOUNTER (OUTPATIENT)
Dept: INFUSION CENTER | Facility: CLINIC | Age: 78
Discharge: HOME | End: 2023-03-07

## 2023-03-07 VITALS
DIASTOLIC BLOOD PRESSURE: 74 MMHG | HEART RATE: 74 BPM | RESPIRATION RATE: 18 BRPM | SYSTOLIC BLOOD PRESSURE: 120 MMHG | TEMPERATURE: 98.2 F

## 2023-03-07 DIAGNOSIS — D69.3 CHRONIC ITP (IDIOPATHIC THROMBOCYTOPENIA) (HCC): Primary | ICD-10-CM

## 2023-03-07 RX ORDER — SODIUM CHLORIDE 9 MG/ML
20 INJECTION, SOLUTION INTRAVENOUS ONCE
Status: COMPLETED | OUTPATIENT
Start: 2023-03-07 | End: 2023-03-07

## 2023-03-07 RX ADMIN — SODIUM CHLORIDE 20 ML/HR: 0.9 INJECTION, SOLUTION INTRAVENOUS at 08:27

## 2023-03-07 NOTE — PROGRESS NOTES
Pt arrived to unit without complaint accompanied by his wife  Pt tolerated transfusion of 1unit PRBC well without incident  Pt left unit in stable condition, aware of next appt  Wife and patient without question or concern

## 2023-03-08 DIAGNOSIS — C90.00 MULTIPLE MYELOMA NOT HAVING ACHIEVED REMISSION (HCC): ICD-10-CM

## 2023-03-08 RX ORDER — ACYCLOVIR 200 MG/1
CAPSULE ORAL
Qty: 60 CAPSULE | Refills: 11 | Status: SHIPPED | OUTPATIENT
Start: 2023-03-08 | End: 2023-04-04

## 2023-03-30 ENCOUNTER — OFFICE VISIT (OUTPATIENT)
Dept: HEMATOLOGY ONCOLOGY | Facility: CLINIC | Age: 78
End: 2023-03-30

## 2023-03-30 VITALS
WEIGHT: 162 LBS | HEIGHT: 70 IN | HEART RATE: 80 BPM | OXYGEN SATURATION: 98 % | RESPIRATION RATE: 18 BRPM | BODY MASS INDEX: 23.19 KG/M2 | SYSTOLIC BLOOD PRESSURE: 120 MMHG | DIASTOLIC BLOOD PRESSURE: 75 MMHG

## 2023-03-30 DIAGNOSIS — N18.32 STAGE 3B CHRONIC KIDNEY DISEASE (HCC): ICD-10-CM

## 2023-03-30 DIAGNOSIS — C90.00 IGG MULTIPLE MYELOMA (HCC): Primary | ICD-10-CM

## 2023-03-30 DIAGNOSIS — E83.52 MALIGNANCY ASSOCIATED HYPERCALCEMIA: ICD-10-CM

## 2023-03-30 DIAGNOSIS — D61.82 MYELOPHTHISIC ANEMIA (HCC): ICD-10-CM

## 2023-03-30 NOTE — PROGRESS NOTES
[facility] [place]  [address]  [facility] [address]  [address] [address] [phone]    Soraya Hwang,1945, [id number]  03/30/23    Discussion:   In summary, this is a 79-year-old male with history of myeloma as outlined  He is on trial, CD38 antibody/interferon conjugate  He has fatigue, poor appetite, decreased mental acuity  Insomnia is moderate  Recent WBC is 2 3, hemoglobin 7 6, platelet count 31  He was treated with red blood cell and platelet transfusion (nosebleeding)  Energy level improved minimally  CMP shows creatinine 2 8, sodium 129, calcium 9 8, albumin 2 8  IgG 6500  Corrected calcium is 10 7  Cytopenias likely secondary to investigational agent  Probable pseudohyponatremia  While generally IgG elevation is not associated with hyperviscosity symptoms given the height of his elevation, I wonder if pheresis might be helpful  He will have consultation with his consultant oncologist tomorrow  In the meantime, I think it is reasonable to try some Ativan at bedtime to try to improve his sleep  Possibly this could help with daytime symptoms  Lastly, I note that his IgG had been rising steadily from February into early March  Now values have decreased from 7200  I wonder if this is the beginning of a downward trend  Obviously time will tell  I discussed the above with the patient  The patient and his wife voiced understanding and agreement   ______________________________________________________________________    Chief Complaint   Patient presents with   • Follow-up       HPI:  Oncology History   IgG multiple myeloma ([facility] [address] )   9/2015 Initial Diagnosis    Found to have Hbg of 6 with SOB, creatinine 1 8 and normal calcium with albumin of 2 1  Additonial blood work showed elevated protien level (12 1g/dL)  9/29/2015 Biopsy    Bone marrow biopsy demonstrated approximately 80% plasma cells with some immature forms noted  These studies showed 13q14 deletion, +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, T (4:14)       10/14/2015 - 11/11/2015 Chemotherapy    Velcade 1 3 mg/m2 Days 1,8,15,22  Cytoxan 300 mg/m2  PO Days 1, 8,15, 22  Dexamethasone 40 mg PO Days 1,8,15, 22  Zometa 4 mg IV Day 1  Cycle length = 28 days    Completed 2 cycles  Day one of cycle 2 was on 11/11/15      12/2015 - 2/2016 Chemotherapy    VDT-PACE x 2 cycles   (completed at [facility] [place], [address] [place] [address])     2/2016 Transplant    Melphalan 200 mg/m2 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  MRD +     8/2016 - 1/26/2018 Chemotherapy    Maintenance therapy:  Carfilzomib, orginally dosed 27 mg weekly then increased to 45 mg weekly after MRD reactivitation  Revlimid  Dexamethasone      2/2018 Biopsy    Bone marrow demonstrated positive minimal residual disease       2/23/2018 -  Chemotherapy    Daratumumab 16mg/m2 IV Day 1  Pomalyst 4 mg p o  daily 1-21  Dexamethasone 4 mg PO Days 2, 3  Dexamethasone 12 mg PODays 8, 15, 22  Cycle length = 28 days     4/11/2019 - 8/29/2019 Chemotherapy    methylPREDNISolone sodium succinate (Solu-MEDROL) 100 mg in sodium chloride 0 9 % 250 mL IVPB, 100 mg, Intravenous, Once, 5 of 12 cycles  Administration: 100 mg (6/7/2019), 100 mg (7/5/2019), 100 mg (8/2/2019)     8/30/2019 - 11/8/2019 Chemotherapy    cyclophosphamide (CYTOXAN) 1,000 mg in sodium chloride 0 9 % 250 mL IVPB, 990 mg (66 7 % of original dose 750 mg/m2), Intravenous, Once, 2 of 3 cycles  Dose modification: 500 mg/m2 (original dose 750 mg/m2, Cycle 1, Reason: Other (See Comments)), 250 mg/m2 (original dose 750 mg/m2, Cycle 4, Reason: Treatment Parameters Not Met)  Administration: 1,000 mg (8/30/2019), 1,000 mg (9/13/2019), 1,000 mg (9/27/2019), 500 mg (10/11/2019)  bortezomib (VELCADE) subcutaneous injection 2 6 mg, 1 3 mg/m2 = 2 6 mg (86 7 % of original dose 1 5 mg/m2), Subcutaneous, Once, 3 of 4 cycles  Dose modification: 1 3 mg/m2 (original dose 1 5 mg/m2, Cycle 1, Reason: Other (See Comments))  Administration: 2 6 mg (8/30/2019), 2 6 mg (9/13/2019), 2 6 mg (10/25/2019), 2 6 mg (11/8/2019), 2 6 mg (9/27/2019), 2 6 mg (11/1/2019), 2 6 mg (9/20/2019), 2 6 mg (10/4/2019), 2 6 mg (10/11/2019), 2 6 mg (10/18/2019)     12/6/2019 - 6/19/2020 Chemotherapy    pegfilgrastim (NEULASTA ONPRO) subcutaneous injection kit 6 mg, 6 mg, Subcutaneous, Once, 5 of 9 cycles  Administration: 6 mg (2/28/2020), 6 mg (3/13/2020), 6 mg (3/27/2020), 6 mg (4/10/2020), 6 mg (4/24/2020), 6 mg (5/8/2020), 6 mg (6/5/2020), 6 mg (5/22/2020), 6 mg (6/19/2020)  cyclophosphamide (CYTOXAN) 784 mg in sodium chloride 0 9 % 250 mL IVPB, 400 mg/m2 = 784 mg, Intravenous, Once, 5 of 9 cycles  Dose modification: 400 mg/m2 (original dose 750 mg/m2, Cycle 7, Reason: Other (See Comments))  Administration: 784 mg (2/28/2020), 784 mg (3/13/2020), 784 mg (3/27/2020), 784 mg (4/10/2020), 784 mg (4/24/2020), 784 mg (5/8/2020), 784 mg (5/22/2020), 784 mg (6/19/2020), 784 mg (6/5/2020)  methylPREDNISolone sodium succinate (Solu-MEDROL) 100 mg in sodium chloride 0 9 % 250 mL IVPB, 100 mg, Intravenous, Once, 8 of 12 cycles  Administration: 100 mg (12/6/2019), 100 mg (12/13/2019), 100 mg (12/20/2019), 100 mg (1/3/2020), 100 mg (1/10/2020), 100 mg (1/17/2020), 100 mg (1/31/2020), 100 mg (2/14/2020), 100 mg (5/22/2020), 100 mg (12/27/2019), 100 mg (1/24/2020), 100 mg (2/28/2020), 100 mg (3/13/2020), 100 mg (3/27/2020), 100 mg (4/10/2020), 100 mg (4/24/2020), 100 mg (5/8/2020), 100 mg (6/19/2020)     7/2/2020 -  Chemotherapy       10/17/2022 - 12/12/2022 Chemotherapy    elotuzumab (EMPLICITI) IVPB, 10 mg/kg = 757 5 mg, Intravenous, Once, 3 of 6 cycles  Administration: 757 5 mg (10/17/2022), 757 5 mg (10/24/2022), 757 5 mg (11/7/2022), 1,600 mg (12/12/2022), 757 5 mg (10/31/2022), 757 5 mg (11/14/2022), 800 mg (11/21/2022), 800 mg (11/28/2022), 800 mg (12/5/2022)         Interval History: Fatigue, decreased mental acuity    ECOG-  3-symptomatic, greater than 50% sedentary  Review of Systems   Constitutional: Positive for appetite change and fatigue  Negative for chills and fever  HENT: Negative for nosebleeds  Eyes: Negative for discharge  Respiratory: Negative for cough and shortness of breath  Cardiovascular: Negative for chest pain  Gastrointestinal: Negative for abdominal pain, constipation and diarrhea  Endocrine: Negative for polydipsia  Genitourinary: Negative for hematuria  Musculoskeletal: Negative for arthralgias  Skin: Negative for color change  Allergic/Immunologic: Negative for immunocompromised state  Neurological: Positive for weakness  Negative for dizziness and headaches  Hematological: Negative for adenopathy  Psychiatric/Behavioral: Negative for agitation         Past Medical History:   Diagnosis Date   • History of autologous stem cell transplant ([place] [address] )    • Insomnia    • Multiple myeloma ([place] [address] )      Patient Active Problem List   Diagnosis   • IgG multiple myeloma (HCC)   • Essential hypertension   • Chronic ITP (idiopathic thrombocytopenia) (formerly [facility])   • Hyperlipidemia   • Hypocalcemia   • Acquired hypothyroidism   • Prophylactic measure   • Hypogammaglobulinemia ([place] [address] )   • Anemia due to stage 3 chronic kidney disease (formerly [facility])   • Stage 3b chronic kidney disease (formerly [facility])   • Anxiety   • Headache   • Anemia, unspecified   • Malignancy associated hypercalcemia       Current Outpatient Medications:   •  acetaminophen (TYLENOL) 325 mg tablet, Take 650 mg by mouth every 6 (six) hours as needed for mild pain, Disp: , Rfl:   •  acyclovir (ZOVIRAX) 200 mg capsule, TAKE 1 CAPSULE EVERY 12 HOURS, Disp: 60 capsule, Rfl: 11  •  Ascorbic Acid (vitamin C) 1000 MG tablet, Take 1,000 mg by mouth daily, Disp: , Rfl:   •  atorvastatin (LIPITOR) 20 mg tablet, TAKE 1 TABLET DAILY, Disp: 90 tablet, Rfl: 0  •  Cholecalciferol (VITAMIN D-3 PO), Take 1,000 Units by mouth daily , Disp: , Rfl:   •  escitalopram (LEXAPRO) 20 mg tablet, TAKE 1 TABLET DAILY, Disp: 90 tablet, Rfl: 1  •  Glutamine POWD, by Does not apply route, Disp: , Rfl:   •  LORazepam (ATIVAN) 1 mg tablet, Take 1 tablet (1 mg total) by mouth every 4 (four) hours as needed for anxiety, Disp: 100 tablet, Rfl: 1  •  Multiple Vitamin (MULTIVITAMINS PO), Take 1 tablet by mouth daily  , Disp: , Rfl:   •  naloxone (NARCAN) 4 mg/0 1 mL nasal spray, Administer 1 spray into a nostril   If no response after 2-3 minutes, give another dose in the other nostril using a new spray , Disp: 1 each, Rfl: 1  •  oxyCODONE (Roxicodone) 5 immediate release tablet, Take 1 tablet (5 mg total) by mouth every 4 (four) hours as needed for moderate pain Max Daily Amount: 30 mg, Disp: 90 tablet, Rfl: 0  •  sulfamethoxazole-trimethoprim (BACTRIM DS) 800-160 mg per tablet, 3 (three) times a week Monday Wednesday friday, Disp: , Rfl:   •  vitamin B-12 (VITAMIN B-12) 1,000 mcg tablet, Take 1,000 mcg by mouth daily, Disp: , Rfl:   •  dexamethasone (DECADRON) 4 mg tablet, take 5 tablets by mouth ONE TIME FOR 1 DOSE TO BE TAKEN EACH MORNING OF EACH CHEMO TREATMENT (Patient not taking: Reported on 1/25/2023), Disp: , Rfl:   •  levothyroxine 50 mcg tablet, TAKE ONE AND ONE-HALF TABLETS DAILY (Patient taking differently: Take 75 mcg by mouth daily), Disp: 45 tablet, Rfl: 11  •  Pomalidomide (Pomalyst) 2 MG CAPS, TAKE 1 CAPSULE DAILY FOR 21 DAYS, THEN OFF 7 DAYS EVERY 28 DAY CYCLE (Patient not taking: Reported on 1/25/2023), Disp: 21 capsule, Rfl: 4  No Known Allergies  Past Surgical History:   Procedure Laterality Date   • APPENDECTOMY      Last assessed: 9/25/15   • ARTHROSCOPY KNEE Left     9/30/09   • EYE SURGERY      Lasik   • KNEE CARTILAGE SURGERY     • LIMBAL STEM CELL TRANSPLANT      Patient had 2 in [place]-2/29/2016 and 4/13/2016     Social History     Objective:  Vitals:    03/30/23 0813   BP: 120/75   BP Location: Left arm   Patient Position: Sitting   Cuff Size: Adult   Pulse: 80   Resp: 18   SpO2: 98%   Weight: 73 5 kg "(162 lb)   Height: 5' 10\" (1 778 m)     Physical Exam  Constitutional:       Appearance: He is well-developed  HENT:      Head: Normocephalic and atraumatic  Eyes:      Pupils: Pupils are equal, round, and reactive to light  Cardiovascular:      Rate and Rhythm: Normal rate and regular rhythm  Heart sounds: No murmur heard  Pulmonary:      Breath sounds: Normal breath sounds  No wheezing or rales  Abdominal:      Palpations: Abdomen is soft  Tenderness: There is no abdominal tenderness  Musculoskeletal:         General: No tenderness  Normal range of motion  Cervical back: Neck supple  Lymphadenopathy:      Cervical: No cervical adenopathy  Skin:     Findings: No erythema or rash  Neurological:      Mental Status: He is alert and oriented to person, place, and time  Cranial Nerves: No cranial nerve deficit  Deep Tendon Reflexes: Reflexes are normal and symmetric  Psychiatric:         Behavior: Behavior normal            Labs: I personally reviewed the labs and imaging pertinent to this patient care    "

## 2023-04-03 ENCOUNTER — RA CDI HCC (OUTPATIENT)
Dept: OTHER | Facility: HOSPITAL | Age: 78
End: 2023-04-03

## 2023-04-03 ENCOUNTER — HOSPITAL ENCOUNTER (OUTPATIENT)
Dept: INFUSION CENTER | Facility: CLINIC | Age: 78
Discharge: HOME | End: 2023-04-03

## 2023-04-03 DIAGNOSIS — F41.9 ANXIETY: ICD-10-CM

## 2023-04-03 RX ORDER — ESCITALOPRAM OXALATE 20 MG/1
TABLET ORAL
Qty: 90 TABLET | Refills: 0 | Status: SHIPPED | OUTPATIENT
Start: 2023-04-03

## 2023-04-10 ENCOUNTER — HOME HEALTH ADMISSION (OUTPATIENT)
Dept: HOME HEALTH SERVICES | Facility: HOME HEALTHCARE | Age: 78
End: 2023-04-10

## 2023-04-11 ENCOUNTER — HOME CARE VISIT (OUTPATIENT)
Dept: HOME HEALTH SERVICES | Facility: HOME HEALTHCARE | Age: 78
End: 2023-04-11

## 2023-04-19 ENCOUNTER — HOSPITAL ENCOUNTER (OUTPATIENT)
Dept: INFUSION CENTER | Facility: CLINIC | Age: 78
Discharge: HOME | End: 2023-04-19

## 2023-04-19 VITALS
HEART RATE: 76 BPM | BODY MASS INDEX: 22.35 KG/M2 | SYSTOLIC BLOOD PRESSURE: 117 MMHG | WEIGHT: 156.09 LBS | HEIGHT: 70 IN | TEMPERATURE: 98 F | RESPIRATION RATE: 18 BRPM | DIASTOLIC BLOOD PRESSURE: 71 MMHG

## 2023-04-19 DIAGNOSIS — D61.82 MYELOPHTHISIC ANEMIA (HCC): ICD-10-CM

## 2023-04-19 DIAGNOSIS — D69.3 CHRONIC ITP (IDIOPATHIC THROMBOCYTOPENIA) (HCC): ICD-10-CM

## 2023-04-19 DIAGNOSIS — D80.1 HYPOGAMMAGLOBULINEMIA (HCC): ICD-10-CM

## 2023-04-19 DIAGNOSIS — N18.31 ANEMIA DUE TO STAGE 3A CHRONIC KIDNEY DISEASE (HCC): ICD-10-CM

## 2023-04-19 DIAGNOSIS — C90.00 IGG MULTIPLE MYELOMA (HCC): Primary | ICD-10-CM

## 2023-04-19 DIAGNOSIS — D63.1 ANEMIA DUE TO STAGE 3A CHRONIC KIDNEY DISEASE (HCC): ICD-10-CM

## 2023-04-19 LAB
ABO GROUP BLD: NORMAL
ANION GAP SERPL CALCULATED.3IONS-SCNC: 6 MMOL/L (ref 4–13)
BLD GP AB SCN SERPL QL: NEGATIVE
BUN SERPL-MCNC: 22 MG/DL (ref 5–25)
CALCIUM SERPL-MCNC: 8.5 MG/DL (ref 8.4–10.2)
CHLORIDE SERPL-SCNC: 103 MMOL/L (ref 96–108)
CO2 SERPL-SCNC: 20 MMOL/L (ref 21–32)
CREAT SERPL-MCNC: 2.45 MG/DL (ref 0.6–1.3)
GFR SERPL CREATININE-BSD FRML MDRD: 24 ML/MIN/1.73SQ M
GLUCOSE SERPL-MCNC: 95 MG/DL (ref 65–140)
PHOSPHATE SERPL-MCNC: 2.7 MG/DL (ref 2.3–4.1)
POTASSIUM SERPL-SCNC: 4.3 MMOL/L (ref 3.5–5.3)
RH BLD: POSITIVE
SODIUM SERPL-SCNC: 129 MMOL/L (ref 135–147)
SPECIMEN EXPIRATION DATE: NORMAL
URATE SERPL-MCNC: 5.5 MG/DL (ref 3.5–8.5)

## 2023-04-19 RX ORDER — SODIUM CHLORIDE 9 MG/ML
20 INJECTION, SOLUTION INTRAVENOUS ONCE
Start: 2023-05-03 | End: 2023-05-01

## 2023-04-19 RX ORDER — SODIUM CHLORIDE 9 MG/ML
20 INJECTION, SOLUTION INTRAVENOUS ONCE
Status: DISCONTINUED | OUTPATIENT
Start: 2023-04-19 | End: 2023-04-22 | Stop reason: HOSPADM

## 2023-04-19 RX ORDER — SODIUM CHLORIDE 9 MG/ML
20 INJECTION, SOLUTION INTRAVENOUS ONCE
Status: COMPLETED | OUTPATIENT
Start: 2023-04-19 | End: 2023-04-19

## 2023-04-19 RX ORDER — SODIUM CHLORIDE 9 MG/ML
20 INJECTION, SOLUTION INTRAVENOUS ONCE
Status: CANCELLED | OUTPATIENT
Start: 2023-04-26

## 2023-04-19 RX ADMIN — Medication 30 G: at 09:40

## 2023-04-19 RX ADMIN — SODIUM CHLORIDE 20 ML/HR: 0.9 INJECTION, SOLUTION INTRAVENOUS at 09:39

## 2023-04-19 RX ADMIN — CARFILZOMIB 110 MG: 10 INJECTION, POWDER, LYOPHILIZED, FOR SOLUTION INTRAVENOUS at 13:58

## 2023-04-19 RX ADMIN — SODIUM CHLORIDE 250 ML: 0.9 INJECTION, SOLUTION INTRAVENOUS at 12:43

## 2023-04-19 RX ADMIN — DEXAMETHASONE SODIUM PHOSPHATE: 10 INJECTION, SOLUTION INTRAMUSCULAR; INTRAVENOUS at 12:09

## 2023-04-19 NOTE — PROGRESS NOTES
Patient arrived to the unit and denied infection  Patient appeared to be fatigued  Confirmed plan with Jamison Carlson RN that Dr Andrez Ramirez wants to proceed with Kyprolis  Patient is also being seen by Berna Ko at the [facility] where pt had a recent stay  There is an admission note from 3/31 scanned on 4/3 that states plan  Patient did receive Kyprolis at the [facility]  Today is day 8  A type and screen was sent to lab for blood transfusion on Friday

## 2023-04-19 NOTE — PROGRESS NOTES
Patient tolerated his IVIG and his Kyprolis well without adverse reaction  He will return on Friday for a blood transfusion

## 2023-04-21 ENCOUNTER — HOSPITAL ENCOUNTER (OUTPATIENT)
Dept: INFUSION CENTER | Facility: CLINIC | Age: 78
Discharge: HOME | End: 2023-04-21

## 2023-04-21 VITALS
DIASTOLIC BLOOD PRESSURE: 69 MMHG | RESPIRATION RATE: 16 BRPM | TEMPERATURE: 97.1 F | SYSTOLIC BLOOD PRESSURE: 115 MMHG | HEART RATE: 61 BPM

## 2023-04-21 DIAGNOSIS — D63.1 ANEMIA DUE TO STAGE 3A CHRONIC KIDNEY DISEASE (HCC): ICD-10-CM

## 2023-04-21 DIAGNOSIS — D69.3 CHRONIC ITP (IDIOPATHIC THROMBOCYTOPENIA) (HCC): ICD-10-CM

## 2023-04-21 DIAGNOSIS — N18.31 ANEMIA DUE TO STAGE 3A CHRONIC KIDNEY DISEASE (HCC): ICD-10-CM

## 2023-04-21 DIAGNOSIS — D61.82 MYELOPHTHISIC ANEMIA (HCC): Primary | ICD-10-CM

## 2023-04-21 RX ORDER — SODIUM CHLORIDE 9 MG/ML
20 INJECTION, SOLUTION INTRAVENOUS ONCE
Status: COMPLETED | OUTPATIENT
Start: 2023-04-21 | End: 2023-04-21

## 2023-04-21 RX ORDER — LANSOPRAZOLE 15 MG/1
15 CAPSULE, DELAYED RELEASE ORAL 2 TIMES DAILY
COMMUNITY

## 2023-04-21 RX ORDER — METHYLPHENIDATE HYDROCHLORIDE 5 MG/1
5 TABLET ORAL
COMMUNITY

## 2023-04-21 RX ORDER — SODIUM CHLORIDE 9 MG/ML
20 INJECTION, SOLUTION INTRAVENOUS ONCE
OUTPATIENT
Start: 2023-05-03

## 2023-04-21 RX ADMIN — SODIUM CHLORIDE 20 ML/HR: 0.9 INJECTION, SOLUTION INTRAVENOUS at 11:48

## 2023-04-21 NOTE — PROGRESS NOTES
Pt presents for PRBC x1 unit  No new meds or concerns  Pt tolerated transfusion without adverse reaction  Future appointments discussed  AVS declined

## 2023-04-24 ENCOUNTER — TELEPHONE (OUTPATIENT)
Dept: HEMATOLOGY ONCOLOGY | Facility: CLINIC | Age: 78
End: 2023-04-24

## 2023-04-24 ENCOUNTER — APPOINTMENT (OUTPATIENT)
Dept: LAB | Facility: CLINIC | Age: 78
End: 2023-04-24

## 2023-04-24 DIAGNOSIS — C90.00 IGG MULTIPLE MYELOMA (HCC): Primary | ICD-10-CM

## 2023-04-24 DIAGNOSIS — C90.00 IGG MULTIPLE MYELOMA (HCC): ICD-10-CM

## 2023-04-24 DIAGNOSIS — D63.1 ANEMIA DUE TO STAGE 3A CHRONIC KIDNEY DISEASE (HCC): ICD-10-CM

## 2023-04-24 DIAGNOSIS — D61.82 MYELOPHTHISIC ANEMIA (HCC): ICD-10-CM

## 2023-04-24 DIAGNOSIS — N18.31 ANEMIA DUE TO STAGE 3A CHRONIC KIDNEY DISEASE (HCC): ICD-10-CM

## 2023-04-24 DIAGNOSIS — D69.3 CHRONIC ITP (IDIOPATHIC THROMBOCYTOPENIA) (HCC): ICD-10-CM

## 2023-04-24 LAB
ALBUMIN SERPL BCP-MCNC: 1.8 G/DL (ref 3.5–5)
ALP SERPL-CCNC: 51 U/L (ref 46–116)
ALT SERPL W P-5'-P-CCNC: 20 U/L (ref 12–78)
ANION GAP SERPL CALCULATED.3IONS-SCNC: 6 MMOL/L (ref 4–13)
AST SERPL W P-5'-P-CCNC: 21 U/L (ref 5–45)
BASOPHILS # BLD AUTO: 0.01 THOUSANDS/ΜL (ref 0–0.1)
BASOPHILS NFR BLD AUTO: 0 % (ref 0–1)
BILIRUB SERPL-MCNC: 0.62 MG/DL (ref 0.2–1)
BUN SERPL-MCNC: 29 MG/DL (ref 5–25)
CALCIUM ALBUM COR SERPL-MCNC: 9.2 MG/DL (ref 8.3–10.1)
CALCIUM SERPL-MCNC: 7.4 MG/DL (ref 8.3–10.1)
CHLORIDE SERPL-SCNC: 104 MMOL/L (ref 96–108)
CO2 SERPL-SCNC: 19 MMOL/L (ref 21–32)
CREAT SERPL-MCNC: 2.24 MG/DL (ref 0.6–1.3)
EOSINOPHIL # BLD AUTO: 0.02 THOUSAND/ΜL (ref 0–0.61)
EOSINOPHIL NFR BLD AUTO: 1 % (ref 0–6)
ERYTHROCYTE [DISTWIDTH] IN BLOOD BY AUTOMATED COUNT: 20.1 % (ref 11.6–15.1)
GFR SERPL CREATININE-BSD FRML MDRD: 27 ML/MIN/1.73SQ M
GLUCOSE P FAST SERPL-MCNC: 92 MG/DL (ref 65–99)
HCT VFR BLD AUTO: 24.9 % (ref 36.5–49.3)
HGB BLD-MCNC: 8.1 G/DL (ref 12–17)
IMM GRANULOCYTES # BLD AUTO: 0.03 THOUSAND/UL (ref 0–0.2)
IMM GRANULOCYTES NFR BLD AUTO: 1 % (ref 0–2)
LYMPHOCYTES # BLD AUTO: 0.27 THOUSANDS/ΜL (ref 0.6–4.47)
LYMPHOCYTES NFR BLD AUTO: 8 % (ref 14–44)
MCH RBC QN AUTO: 30.1 PG (ref 26.8–34.3)
MCHC RBC AUTO-ENTMCNC: 32.5 G/DL (ref 31.4–37.4)
MCV RBC AUTO: 93 FL (ref 82–98)
MONOCYTES # BLD AUTO: 0.54 THOUSAND/ΜL (ref 0.17–1.22)
MONOCYTES NFR BLD AUTO: 16 % (ref 4–12)
NEUTROPHILS # BLD AUTO: 2.56 THOUSANDS/ΜL (ref 1.85–7.62)
NEUTS SEG NFR BLD AUTO: 74 % (ref 43–75)
NRBC BLD AUTO-RTO: 1 /100 WBCS
PLATELET # BLD AUTO: 39 THOUSANDS/UL (ref 149–390)
PMV BLD AUTO: 10.7 FL (ref 8.9–12.7)
POTASSIUM SERPL-SCNC: 3.9 MMOL/L (ref 3.5–5.3)
PROT SERPL-MCNC: 10.7 G/DL (ref 6.4–8.4)
RBC # BLD AUTO: 2.69 MILLION/UL (ref 3.88–5.62)
SODIUM SERPL-SCNC: 129 MMOL/L (ref 135–147)
WBC # BLD AUTO: 3.43 THOUSAND/UL (ref 4.31–10.16)

## 2023-04-24 NOTE — TELEPHONE ENCOUNTER
Lab calling with PLT - 39  Reviewed with Dr Pilar Bains  Pt is scheduled for treatment on 4/26  He would like labs repeated day of treatment to see if PLT count has recovered  Orders placed for CBC w/diff STAT and nursing communication put in treatment plan  Message sent to infusion staff

## 2023-04-26 ENCOUNTER — TELEPHONE (OUTPATIENT)
Dept: HEMATOLOGY ONCOLOGY | Facility: CLINIC | Age: 78
End: 2023-04-26

## 2023-04-26 ENCOUNTER — HOSPITAL ENCOUNTER (OUTPATIENT)
Dept: INFUSION CENTER | Facility: CLINIC | Age: 78
Discharge: HOME | End: 2023-04-26

## 2023-04-26 VITALS
HEART RATE: 63 BPM | DIASTOLIC BLOOD PRESSURE: 77 MMHG | WEIGHT: 159.17 LBS | TEMPERATURE: 97.2 F | RESPIRATION RATE: 16 BRPM | HEIGHT: 70 IN | BODY MASS INDEX: 22.79 KG/M2 | SYSTOLIC BLOOD PRESSURE: 123 MMHG

## 2023-04-26 DIAGNOSIS — C90.00 IGG MULTIPLE MYELOMA (HCC): Primary | ICD-10-CM

## 2023-04-26 LAB
ANISOCYTOSIS BLD QL SMEAR: PRESENT
BASOPHILS # BLD MANUAL: 0.02 THOUSAND/UL (ref 0–0.1)
BASOPHILS NFR MAR MANUAL: 1 % (ref 0–1)
EOSINOPHIL # BLD MANUAL: 0.02 THOUSAND/UL (ref 0–0.4)
EOSINOPHIL NFR BLD MANUAL: 1 % (ref 0–6)
ERYTHROCYTE [DISTWIDTH] IN BLOOD BY AUTOMATED COUNT: 21.4 % (ref 11.6–15.1)
HCT VFR BLD AUTO: 23.3 % (ref 36.5–49.3)
HGB BLD-MCNC: 7.5 G/DL (ref 12–17)
LYMPHOCYTES # BLD AUTO: 0.35 THOUSAND/UL (ref 0.6–4.47)
LYMPHOCYTES # BLD AUTO: 19 % (ref 14–44)
MCH RBC QN AUTO: 30 PG (ref 26.8–34.3)
MCHC RBC AUTO-ENTMCNC: 32.2 G/DL (ref 31.4–37.4)
MCV RBC AUTO: 93 FL (ref 82–98)
MONOCYTES # BLD AUTO: 0.2 THOUSAND/UL (ref 0–1.22)
MONOCYTES NFR BLD: 11 % (ref 4–12)
NEUTROPHILS # BLD MANUAL: 1.25 THOUSAND/UL (ref 1.85–7.62)
NEUTS BAND NFR BLD MANUAL: 1 % (ref 0–8)
NEUTS SEG NFR BLD AUTO: 67 % (ref 43–75)
PLATELET # BLD AUTO: 67 THOUSANDS/UL (ref 149–390)
PLATELET BLD QL SMEAR: ABNORMAL
PMV BLD AUTO: 12.7 FL (ref 8.9–12.7)
RBC # BLD AUTO: 2.5 MILLION/UL (ref 3.88–5.62)
WBC # BLD AUTO: 1.84 THOUSAND/UL (ref 4.31–10.16)

## 2023-04-26 RX ORDER — SODIUM CHLORIDE 9 MG/ML
20 INJECTION, SOLUTION INTRAVENOUS ONCE
Status: COMPLETED | OUTPATIENT
Start: 2023-04-26 | End: 2023-04-26

## 2023-04-26 RX ADMIN — CARFILZOMIB 110 MG: 10 INJECTION, POWDER, LYOPHILIZED, FOR SOLUTION INTRAVENOUS at 10:07

## 2023-04-26 RX ADMIN — DEXAMETHASONE SODIUM PHOSPHATE: 10 INJECTION, SOLUTION INTRAMUSCULAR; INTRAVENOUS at 09:32

## 2023-04-26 RX ADMIN — SODIUM CHLORIDE 20 ML/HR: 0.9 INJECTION, SOLUTION INTRAVENOUS at 09:31

## 2023-04-26 RX ADMIN — SODIUM CHLORIDE 250 ML: 0.9 INJECTION, SOLUTION INTRAVENOUS at 08:31

## 2023-04-26 NOTE — TELEPHONE ENCOUNTER
Call Transfer   Who are you speaking with?  lab   If it is not the patient, are they listed on an active communication consent form? N/A   Who is the patients HemOnc/SurgOnc provider? Dr Lulu Nguyen   What is the reason for this call? results   Person/Department that the call was transferred to? Time that call was transferred? Aryan Staff   Your call will be transferred now  If you receive a voicemail, please leave a detailed message and a member of the team will return your call as soon as possible  Did you relay this information to the caller?   Yes

## 2023-04-26 NOTE — PROGRESS NOTES
Received ok to proceed Kyprolis while waiting for lab results  Platelets 67  Pt  Tolerated Kyprolis and hydration as ordered without adverse event  Dr Gomez Apo made aware of platelets  We will not replete platelets at this time  Future appointments reviewed  AVS declined

## 2023-04-28 ENCOUNTER — OFFICE VISIT (OUTPATIENT)
Dept: INTERNAL MEDICINE CLINIC | Facility: CLINIC | Age: 78
End: 2023-04-28

## 2023-04-28 VITALS
TEMPERATURE: 97.3 F | RESPIRATION RATE: 18 BRPM | HEIGHT: 70 IN | DIASTOLIC BLOOD PRESSURE: 66 MMHG | WEIGHT: 160 LBS | SYSTOLIC BLOOD PRESSURE: 122 MMHG | OXYGEN SATURATION: 99 % | BODY MASS INDEX: 22.9 KG/M2 | HEART RATE: 63 BPM

## 2023-04-28 DIAGNOSIS — C90.00 IGG MULTIPLE MYELOMA (HCC): ICD-10-CM

## 2023-04-28 DIAGNOSIS — D61.818 PANCYTOPENIA (HCC): ICD-10-CM

## 2023-04-28 DIAGNOSIS — N18.32 STAGE 3B CHRONIC KIDNEY DISEASE (HCC): ICD-10-CM

## 2023-04-28 DIAGNOSIS — F41.9 ANXIETY: ICD-10-CM

## 2023-04-28 DIAGNOSIS — E03.9 HYPOTHYROIDISM, UNSPECIFIED TYPE: ICD-10-CM

## 2023-04-28 DIAGNOSIS — E03.9 ACQUIRED HYPOTHYROIDISM: ICD-10-CM

## 2023-04-28 DIAGNOSIS — Z00.00 MEDICARE ANNUAL WELLNESS VISIT, SUBSEQUENT: Primary | ICD-10-CM

## 2023-04-28 DIAGNOSIS — E78.5 HYPERLIPIDEMIA, UNSPECIFIED HYPERLIPIDEMIA TYPE: ICD-10-CM

## 2023-04-28 DIAGNOSIS — I10 ESSENTIAL HYPERTENSION: ICD-10-CM

## 2023-04-28 RX ORDER — LEVOTHYROXINE SODIUM 0.1 MG/1
100 TABLET ORAL DAILY
Start: 2023-04-28

## 2023-04-28 RX ORDER — LEVOTHYROXINE SODIUM 0.05 MG/1
100 TABLET ORAL DAILY
Start: 2023-04-28 | End: 2023-04-28

## 2023-04-28 NOTE — ASSESSMENT & PLAN NOTE
Continue Lexapro  Also has prescription for Ativan as needed    He is tolerating half a tablet of Ativan at bedtime and may continue as needed from my standpoint

## 2023-04-28 NOTE — ASSESSMENT & PLAN NOTE
-Continue follow-up with oncology  -Currently on combination of selinexor and kyprolis  -remains on acyclovir and Bactrim for prophylaxis

## 2023-04-28 NOTE — ASSESSMENT & PLAN NOTE
Noted to have tachycardia/atrial fibrillation on recent hospital admission per the patient's wife  He was started on low-dose of metoprolol tartrate 12 5 mg 2 times daily  Not currently on anticoagulation  -Continue with metoprolol tartrate    We discussed hold parameters of heart rate less than 60, systolic blood pressure less than 100 for the metoprolol

## 2023-04-28 NOTE — PROGRESS NOTES
Assessment and Plan:     Here today for follow up  Medical history of IgG multiple myeloma, pancytopenia, chronic kidney disease, hypertension, anxiety, hyperlipidemia    Had admission at [facility] in late March -presented with several week history of increasing insomnia, agitation, reduced awareness at home  Found to have acute kidney injury, hypercalcemia  Treated with IV fluids and denosumab  Had flash pulmonary edema and was treated with IV diuresis  Completed course of Carfilzomib for presumed progression of myeloma      Problem List Items Addressed This Visit        Endocrine    Acquired hypothyroidism     Continue levothyroxine 100 mcg daily         Relevant Medications    levothyroxine 100 mcg tablet       Cardiovascular and Mediastinum    Essential hypertension     Noted to have tachycardia/atrial fibrillation on recent hospital admission per the patient's wife  He was started on low-dose of metoprolol tartrate 12 5 mg 2 times daily  Not currently on anticoagulation  -Continue with metoprolol tartrate  We discussed hold parameters of heart rate less than 60, systolic blood pressure less than 100 for the metoprolol            Genitourinary    Stage 3b chronic kidney disease ([facility] [address] )     Baseline creatinine appears to be around 1 8-2 1  Creatinine seems to be back near baseline  Likely largely related in part to underlying myeloma, also on Bactrim and acyclovir for prophylaxis  -Established with nephrology  -recommend avoidance of NSAID use              Other    IgG multiple myeloma ([facility] [address] )     -Continue follow-up with oncology  -Currently on combination of selinexor and kyprolis  -remains on acyclovir and Bactrim for prophylaxis         Hyperlipidemia    Anxiety     Continue Lexapro  Also has prescription for Ativan as needed    He is tolerating half a tablet of Ativan at bedtime and may continue as needed from my standpoint        Other Visit Diagnoses     Medicare annual wellness visit, subsequent    - Primary    Pancytopenia (HCC)        Hypothyroidism, unspecified type        Relevant Medications    levothyroxine 100 mcg tablet          Depression Screening and Follow-up Plan: Patient was screened for depression during today's encounter  They screened negative with a PHQ-2 score of 0  Preventive health issues were discussed with patient, and age appropriate screening tests were ordered as noted in patient's After Visit Summary  Personalized health advice and appropriate referrals for health education or preventive services given if needed, as noted in patient's After Visit Summary       History of Present Illness:     Patient presents for a Medicare Wellness Visit    HPI   Patient Care Team:  Ian Gomes DO as PCP - General (Internal Medicine)  MD Viet Hdz MD (Nephrology)     Review of Systems:     Review of Systems     Problem List:     Patient Active Problem List   Diagnosis   • IgG multiple myeloma [facility])   • Essential hypertension   • Chronic ITP (idiopathic thrombocytopenia) (HCC)   • Hyperlipidemia   • Hypocalcemia   • Acquired hypothyroidism   • Prophylactic measure   • Hypogammaglobulinemia ([address] )   • Anemia due to stage 3 chronic kidney disease (HCC)   • Stage 3b chronic kidney disease ([address] )   • Anxiety   • Headache   • Anemia, unspecified   • Malignancy associated hypercalcemia      Past Medical and Surgical History:     Past Medical History:   Diagnosis Date   • History of autologous stem cell transplant ([facility] [address] )    • Insomnia    • Multiple myeloma ([address] )      Past Surgical History:   Procedure Laterality Date   • APPENDECTOMY      Last assessed: 9/25/15   • ARTHROSCOPY KNEE Left     9/30/09   • EYE SURGERY      Lasik   • KNEE CARTILAGE SURGERY     • LIMBAL STEM CELL TRANSPLANT      Patient had 2 in [place]-2/29/2016 and 4/13/2016      Family History:     Family History   Problem Relation Age of Onset   • Heart disease Father    • Colon cancer Paternal Grandmother       Social History:     Social History     Socioeconomic History   • Marital status: /Civil Union     Spouse name: None   • Number of children: None   • Years of education: None   • Highest education level: None   Occupational History   • None   Tobacco Use   • Smoking status: Never   • Smokeless tobacco: Never   Vaping Use   • Vaping Use: Never used   Substance and Sexual Activity   • Alcohol use: No   • Drug use: No   • Sexual activity: Not Currently   Other Topics Concern   • None   Social History Narrative   • None     Social Determinants of Health     Financial Resource Strain: Low Risk    • Difficulty of Paying Living Expenses: Not hard at all   Food Insecurity: Not on file   Transportation Needs: No Transportation Needs   • Lack of Transportation (Medical): No   • Lack of Transportation (Non-Medical): No   Physical Activity: Not on file   Stress: Not on file   Social Connections: Not on file   Intimate Partner Violence: Not on file   Housing Stability: Not on file      Medications and Allergies:     Current Outpatient Medications   Medication Sig Dispense Refill   • acetaminophen (TYLENOL) 325 mg tablet Take 650 mg by mouth every 6 (six) hours as needed for mild pain     • Ascorbic Acid (vitamin C) 1000 MG tablet Take 1,000 mg by mouth daily     • atorvastatin (LIPITOR) 20 mg tablet TAKE 1 TABLET DAILY 90 tablet 3   • Cholecalciferol (VITAMIN D-3 PO) Take 1,000 Units by mouth daily      • dexamethasone (DECADRON) 4 mg tablet Take 5 tablets (20 mg total) by mouth once a week On morning of Kyprolis treatment   20 tablet 5   • escitalopram (LEXAPRO) 20 mg tablet TAKE 1 TABLET DAILY 90 tablet 0   • Glutamine POWD by Does not apply route     • lansoprazole (PREVACID) 15 mg capsule Take 15 mg by mouth 2 (two) times a day Before meals     • levothyroxine 100 mcg tablet Take 1 tablet (100 mcg total) by mouth daily     • LORazepam (ATIVAN) 1 mg tablet Take 1 tablet (1 mg total) by mouth every 4 (four) hours as needed for anxiety 100 tablet 1   • methylphenidate (RITALIN) 5 mg tablet Take 5 mg by mouth 2 (two) times a day before breakfast and lunch     • metoprolol tartrate (LOPRESSOR) 25 mg tablet Take 12 5 mg by mouth every 12 (twelve) hours     • Multiple Vitamin (MULTIVITAMINS PO) Take 1 tablet by mouth daily  • naloxone (NARCAN) 4 mg/0 1 mL nasal spray Administer 1 spray into a nostril  If no response after 2-3 minutes, give another dose in the other nostril using a new spray  1 each 1   • oxyCODONE (Roxicodone) 5 immediate release tablet Take 1 tablet (5 mg total) by mouth every 4 (four) hours as needed for moderate pain Max Daily Amount: 30 mg 90 tablet 0   • SELINEXOR, 60 MG ONCE WEEKLY, PO Take by mouth When he has kyprolis     • sulfamethoxazole-trimethoprim (BACTRIM DS) 800-160 mg per tablet 3 (three) times a week Monday Wednesday friday     • vitamin B-12 (VITAMIN B-12) 1,000 mcg tablet Take 1,000 mcg by mouth daily     • acyclovir (ZOVIRAX) 200 mg capsule TAKE 1 CAPSULE EVERY 12 HOURS (Patient taking differently: 400 mg TAKE 1 CAPSULE EVERY 12 HOURS) 60 capsule 11   • Pomalidomide (Pomalyst) 2 MG CAPS TAKE 1 CAPSULE DAILY FOR 21 DAYS, THEN OFF 7 DAYS EVERY 28 DAY CYCLE (Patient not taking: Reported on 1/25/2023) 21 capsule 4     No current facility-administered medications for this visit       Facility-Administered Medications Ordered in Other Visits   Medication Dose Route Frequency Provider Last Rate Last Admin   • alteplase (CATHFLO) injection 2 mg  2 mg Intracatheter Q1MIN PRN Jaqueline Castillo DO       • ondansetron (ZOFRAN) 16 mg, dexamethasone (DECADRON) 20 mg in sodium chloride 0 9 % 50 mL IVPB   Intravenous Q12H Jaqueline Castillo DO   Stopped at 04/26/23 4592     No Known Allergies   Immunizations:     Immunization History   Administered Date(s) Administered   • COVID-19 MODERNA VACC 0 5 ML IM 01/27/2021, 02/24/2021, 11/17/2021   • INFLUENZA 10/04/2016, 10/02/2017, 10/08/2018, 10/09/2019, 10/06/2020, 10/16/2020, 12/15/2021, 10/04/2022   • Influenza, high dose seasonal 0 7 mL 10/08/2018, 10/09/2019, 10/06/2020   • Influenza, seasonal, injectable 10/04/2016   • Pneumococcal Conjugate 13-Valent 11/14/2016   • Pneumococcal Polysaccharide PPV23 01/24/2013   • Tdap 10/13/2011, 10/13/2011, 10/13/2011   • Zoster 1945, 07/01/2008, 07/18/2008      Health Maintenance:         Topic Date Due   • Colorectal Cancer Screening  09/16/2024   • Hepatitis C Screening  Completed         Topic Date Due   • Hepatitis A Vaccine (1 of 2 - Risk 2-dose series) Never done   • Hepatitis B Vaccine (1 of 3 - Risk 3-dose series) Never done   • COVID-19 Vaccine (4 - Booster for [place] series) 01/12/2022      Medicare Screening Tests and Risk Assessments:     Chava Farris is here for his Subsequent Wellness visit  Health Risk Assessment:   Patient rates overall health as fair  Patient feels that their physical health rating is slightly worse  Patient is satisfied with their life  Eyesight was rated as same  Hearing was rated as slightly worse  Patient feels that their emotional and mental health rating is same  Patients states they are never, rarely angry  Patient states they are often unusually tired/fatigued  Pain experienced in the last 7 days has been some  Patient's pain rating has been 5/10  Patient states that he has experienced weight loss or gain in last 6 months  Depression Screening:   PHQ-2 Score: 0      Fall Risk Screening: In the past year, patient has experienced: no history of falling in past year      Home Safety:  Patient does not have trouble with stairs inside or outside of their home  Patient has working smoke alarms and has working carbon monoxide detector  Home safety hazards include: none  Nutrition:   Current diet is Regular  Medications:   Patient is currently taking over-the-counter supplements  OTC medications include: VitaminC, B12, D3, Tylenol   Patient is able to manage medications  Activities of Daily Living (ADLs)/Instrumental Activities of Daily Living (IADLs):   Walk and transfer into and out of bed and chair?: Yes  Dress and groom yourself?: Yes    Bathe or shower yourself?: Yes    Feed yourself? Yes  Do your laundry/housekeeping?: No  Manage your money, pay your bills and track your expenses?: Yes  Make your own meals?: Yes    Do your own shopping?: No    Previous Hospitalizations:   Any hospitalizations or ED visits within the last 12 months?: Yes    How many hospitalizations have you had in the last year?: 1-2    Hospitalization Comments: -April 10, 2023 [address] [place] [address]:   Living will: No    Durable POA for healthcare: No    Advanced directive: No      Cognitive Screening:   Provider or family/friend/caregiver concerned regarding cognition?: No    PREVENTIVE SCREENINGS      Cardiovascular Screening:    General: Screening Not Indicated and History Lipid Disorder      Diabetes Screening:     General: Screening Current      Colorectal Cancer Screening:     General: Screening Current      Prostate Cancer Screening:    General: Screening Not Indicated      Osteoporosis Screening:    General: Screening Not Indicated      Abdominal Aortic Aneurysm (AAA) Screening:    Risk factors include: age between 73-69 yo        Lung Cancer Screening:     General: Screening Not Indicated      Hepatitis C Screening:    General: Screening Current    Screening, Brief Intervention, and Referral to Treatment (SBIRT)    Screening  Typical number of drinks in a day: 0  Typical number of drinks in a week: 0  Interpretation: Low risk drinking behavior      AUDIT-C Screenin) How often did you have a drink containing alcohol in the past year? never  2) How many drinks did you have on a typical day when you were drinking in the past year? 0  3) How often did you have 6 or more drinks on one occasion in the past year? never    AUDIT-C Score: 0  Interpretation: Score "0-3 (male): Negative screen for alcohol misuse    Single Item Drug Screening:  How often have you used an illegal drug (including marijuana) or a prescription medication for non-medical reasons in the past year? never    Single Item Drug Screen Score: 0  Interpretation: Negative screen for possible drug use disorder    Brief Intervention  Alcohol & drug use screenings were reviewed  No concerns regarding substance use disorder identified  No results found  Physical Exam:     /66 (BP Location: Left arm, Patient Position: Sitting, Cuff Size: Standard)   Pulse 63   Temp (!) 97 3 °F (36 3 °C)   Resp 18   Ht 5' 10\" (1 778 m)   Wt 72 6 kg (160 lb)   SpO2 99%   BMI 22 96 kg/m²     Physical Exam  Constitutional:       Appearance: Normal appearance  He is not ill-appearing  HENT:      Head: Normocephalic and atraumatic  Eyes:      General: No scleral icterus  Right eye: No discharge  Left eye: No discharge  Cardiovascular:      Rate and Rhythm: Normal rate and regular rhythm  Heart sounds: No murmur heard  No friction rub  Pulmonary:      Effort: Pulmonary effort is normal       Breath sounds: Normal breath sounds  No wheezing or rales  Abdominal:      General: Abdomen is flat  There is no distension  Palpations: Abdomen is soft  Tenderness: There is no abdominal tenderness  Musculoskeletal:         General: No swelling, tenderness or deformity  Skin:     General: Skin is warm and dry  Findings: No erythema  Neurological:      Mental Status: He is alert and oriented to person, place, and time  Mental status is at baseline  Motor: No weakness     Psychiatric:         Mood and Affect: Mood normal          Behavior: Behavior normal           Ian Blankenship, DO  "

## 2023-04-28 NOTE — PATIENT INSTRUCTIONS
Medicare Preventive Visit Patient Instructions  Thank you for completing your Welcome to Medicare Visit or Medicare Annual Wellness Visit today  Your next wellness visit will be due in one year (4/28/2024)  The screening/preventive services that you may require over the next 5-10 years are detailed below  Some tests may not apply to you based off risk factors and/or age  Screening tests ordered at today's visit but not completed yet may show as past due  Also, please note that scanned in results may not display below  Preventive Screenings:  Service Recommendations Previous Testing/Comments   Colorectal Cancer Screening  · Colonoscopy    · Fecal Occult Blood Test (FOBT)/Fecal Immunochemical Test (FIT)  · Fecal DNA/Cologuard Test  · Flexible Sigmoidoscopy Age: 39-70 years old   Colonoscopy: every 10 years (May be performed more frequently if at higher risk)  OR  FOBT/FIT: every 1 year  OR  Cologuard: every 3 years  OR  Sigmoidoscopy: every 5 years  Screening may be recommended earlier than age 39 if at higher risk for colorectal cancer  Also, an individualized decision between you and your healthcare provider will decide whether screening between the ages of 74-80 would be appropriate   Colonoscopy: 09/16/2014  FOBT/FIT: Not on file  Cologuard: Not on file  Sigmoidoscopy: Not on file    Screening Current     Prostate Cancer Screening Individualized decision between patient and health care provider in men between ages of 53-78   Medicare will cover every 12 months beginning on the day after your 50th birthday PSA: 0 2 ng/mL     Screening Not Indicated     Hepatitis C Screening Once for adults born between 1945 and 1965  More frequently in patients at high risk for Hepatitis C Hep C Antibody: 08/29/2018    Screening Current   Diabetes Screening 1-2 times per year if you're at risk for diabetes or have pre-diabetes Fasting glucose: 92 mg/dL (4/24/2023)  A1C: No results in last 5 years (No results in last 5 years)  Screening Current   Cholesterol Screening Once every 5 years if you don't have a lipid disorder  May order more often based on risk factors  Lipid panel: 04/18/2022  Screening Not Indicated  History Lipid Disorder      Other Preventive Screenings Covered by Medicare:  1  Abdominal Aortic Aneurysm (AAA) Screening: covered once if your at risk  You're considered to be at risk if you have a family history of AAA or a male between the age of 73-68 who smoking at least 100 cigarettes in your lifetime  2  Lung Cancer Screening: covers low dose CT scan once per year if you meet all of the following conditions: (1) Age 50-69; (2) No signs or symptoms of lung cancer; (3) Current smoker or have quit smoking within the last 15 years; (4) You have a tobacco smoking history of at least 20 pack years (packs per day x number of years you smoked); (5) You get a written order from a healthcare provider  3  Glaucoma Screening: covered annually if you're considered high risk: (1) You have diabetes OR (2) Family history of glaucoma OR (3)  aged 48 and older OR (3)  American aged 72 and older  3  Osteoporosis Screening: covered every 2 years if you meet one of the following conditions: (1) Have a vertebral abnormality; (2) On glucocorticoid therapy for more than 3 months; (3) Have primary hyperparathyroidism; (4) On osteoporosis medications and need to assess response to drug therapy  5  HIV Screening: covered annually if you're between the age of 12-76  Also covered annually if you are younger than 13 and older than 72 with risk factors for HIV infection  For pregnant patients, it is covered up to 3 times per pregnancy      Immunizations:  Immunization Recommendations   Influenza Vaccine Annual influenza vaccination during flu season is recommended for all persons aged >= 6 months who do not have contraindications   Pneumococcal Vaccine   * Pneumococcal conjugate vaccine = PCV13 (Prevnar 13), PCV15 (Vaxneuvance), PCV20 (Prevnar 20)  * Pneumococcal polysaccharide vaccine = PPSV23 (Pneumovax) Adults 2364 years old: 1-3 doses may be recommended based on certain risk factors  Adults 72 years old: 1-2 doses may be recommended based off what pneumonia vaccine you previously received   Hepatitis B Vaccine 3 dose series if at intermediate or high risk (ex: diabetes, end stage renal disease, liver disease)   Tetanus (Td) Vaccine - COST NOT COVERED BY MEDICARE PART B Following completion of primary series, a booster dose should be given every 10 years to maintain immunity against tetanus  Td may also be given as tetanus wound prophylaxis  Tdap Vaccine - COST NOT COVERED BY MEDICARE PART B Recommended at least once for all adults  For pregnant patients, recommended with each pregnancy  Shingles Vaccine (Shingrix) - COST NOT COVERED BY MEDICARE PART B  2 shot series recommended in those aged 48 and above     Health Maintenance Due:      Topic Date Due   • Colorectal Cancer Screening  09/16/2024   • Hepatitis C Screening  Completed     Immunizations Due:      Topic Date Due   • Hepatitis A Vaccine (1 of 2 - Risk 2-dose series) Never done   • Hepatitis B Vaccine (1 of 3 - Risk 3-dose series) Never done   • COVID-19 Vaccine (4 - Booster for Moderna series) 01/12/2022     Advance Directives   What are advance directives? Advance directives are legal documents that state your wishes and plans for medical care  These plans are made ahead of time in case you lose your ability to make decisions for yourself  Advance directives can apply to any medical decision, such as the treatments you want, and if you want to donate organs  What are the types of advance directives? There are many types of advance directives, and each state has rules about how to use them  You may choose a combination of any of the following:  · Living will: This is a written record of the treatment you want   You can also choose which treatments you do not want, which to limit, and which to stop at a certain time  This includes surgery, medicine, IV fluid, and tube feedings  · Durable power of  for healthcare [facility]): This is a written record that states who you want to make healthcare choices for you when you are unable to make them for yourself  This person, called a proxy, is usually a family member or a friend  You may choose more than 1 proxy  · Do not resuscitate (DNR) order:  A DNR order is used in case your heart stops beating or you stop breathing  It is a request not to have certain forms of treatment, such as CPR  A DNR order may be included in other types of advance directives  · Medical directive: This covers the care that you want if you are in a coma, near death, or unable to make decisions for yourself  You can list the treatments you want for each condition  Treatment may include pain medicine, surgery, blood transfusions, dialysis, IV or tube feedings, and a ventilator (breathing machine)  · Values history: This document has questions about your views, beliefs, and how you feel and think about life  This information can help others choose the care that you would choose  Why are advance directives important? An advance directive helps you control your care  Although spoken wishes may be used, it is better to have your wishes written down  Spoken wishes can be misunderstood, or not followed  Treatments may be given even if you do not want them  An advance directive may make it easier for your family to make difficult choices about your care  © Copyright [employer] 2018 Information is for End User's use only and may not be sold, redistributed or otherwise used for commercial purposes   All illustrations and images included in CareNotes® are the copyrighted property of [employer]  or [employer]

## 2023-04-28 NOTE — ASSESSMENT & PLAN NOTE
Baseline creatinine appears to be around 1 8-2 1  Creatinine seems to be back near baseline   Likely largely related in part to underlying myeloma, also on Bactrim and acyclovir for prophylaxis  -Established with nephrology  -recommend avoidance of NSAID use

## 2023-05-01 ENCOUNTER — APPOINTMENT (OUTPATIENT)
Dept: LAB | Facility: CLINIC | Age: 78
End: 2023-05-01

## 2023-05-01 ENCOUNTER — OFFICE VISIT (OUTPATIENT)
Dept: HEMATOLOGY ONCOLOGY | Facility: CLINIC | Age: 78
End: 2023-05-01

## 2023-05-01 VITALS
TEMPERATURE: 97.8 F | SYSTOLIC BLOOD PRESSURE: 118 MMHG | DIASTOLIC BLOOD PRESSURE: 74 MMHG | OXYGEN SATURATION: 98 % | HEART RATE: 61 BPM | BODY MASS INDEX: 22.05 KG/M2 | WEIGHT: 154 LBS | HEIGHT: 70 IN

## 2023-05-01 DIAGNOSIS — D61.82 MYELOPHTHISIC ANEMIA (HCC): ICD-10-CM

## 2023-05-01 DIAGNOSIS — N18.31 ANEMIA DUE TO STAGE 3A CHRONIC KIDNEY DISEASE (HCC): ICD-10-CM

## 2023-05-01 DIAGNOSIS — C90.00 IGG MULTIPLE MYELOMA (HCC): ICD-10-CM

## 2023-05-01 DIAGNOSIS — N18.32 STAGE 3B CHRONIC KIDNEY DISEASE (HCC): ICD-10-CM

## 2023-05-01 DIAGNOSIS — D69.3 CHRONIC ITP (IDIOPATHIC THROMBOCYTOPENIA) (HCC): ICD-10-CM

## 2023-05-01 DIAGNOSIS — D63.1 ANEMIA DUE TO STAGE 3A CHRONIC KIDNEY DISEASE (HCC): ICD-10-CM

## 2023-05-01 DIAGNOSIS — E83.52 HYPERCALCEMIA: ICD-10-CM

## 2023-05-01 DIAGNOSIS — C90.00 IGG MULTIPLE MYELOMA (HCC): Primary | ICD-10-CM

## 2023-05-01 LAB
ALBUMIN SERPL BCP-MCNC: 2 G/DL (ref 3.5–5)
ALP SERPL-CCNC: 49 U/L (ref 46–116)
ALT SERPL W P-5'-P-CCNC: 23 U/L (ref 12–78)
ANION GAP SERPL CALCULATED.3IONS-SCNC: 2 MMOL/L (ref 4–13)
AST SERPL W P-5'-P-CCNC: 25 U/L (ref 5–45)
BASOPHILS # BLD MANUAL: 0 THOUSAND/UL (ref 0–0.1)
BASOPHILS NFR MAR MANUAL: 0 % (ref 0–1)
BILIRUB SERPL-MCNC: 0.57 MG/DL (ref 0.2–1)
BUN SERPL-MCNC: 25 MG/DL (ref 5–25)
CALCIUM ALBUM COR SERPL-MCNC: 9.3 MG/DL (ref 8.3–10.1)
CALCIUM SERPL-MCNC: 7.7 MG/DL (ref 8.3–10.1)
CHLORIDE SERPL-SCNC: 105 MMOL/L (ref 96–108)
CO2 SERPL-SCNC: 22 MMOL/L (ref 21–32)
CREAT SERPL-MCNC: 2.01 MG/DL (ref 0.6–1.3)
DIFFERENTIAL COMMENT: ABNORMAL
EOSINOPHIL # BLD MANUAL: 0 THOUSAND/UL (ref 0–0.4)
EOSINOPHIL NFR BLD MANUAL: 0 % (ref 0–6)
ERYTHROCYTE [DISTWIDTH] IN BLOOD BY AUTOMATED COUNT: 21.2 % (ref 11.6–15.1)
GFR SERPL CREATININE-BSD FRML MDRD: 31 ML/MIN/1.73SQ M
GLUCOSE P FAST SERPL-MCNC: 88 MG/DL (ref 65–99)
HCT VFR BLD AUTO: 25.1 % (ref 36.5–49.3)
HGB BLD-MCNC: 8.1 G/DL (ref 12–17)
LYMPHOCYTES # BLD AUTO: 0.11 THOUSAND/UL (ref 0.6–4.47)
LYMPHOCYTES # BLD AUTO: 5 % (ref 14–44)
MCH RBC QN AUTO: 29.9 PG (ref 26.8–34.3)
MCHC RBC AUTO-ENTMCNC: 32.3 G/DL (ref 31.4–37.4)
MCV RBC AUTO: 93 FL (ref 82–98)
MONOCYTES # BLD AUTO: 0.22 THOUSAND/UL (ref 0–1.22)
MONOCYTES NFR BLD: 10 % (ref 4–12)
NEUTROPHILS # BLD MANUAL: 1.86 THOUSAND/UL (ref 1.85–7.62)
NEUTS BAND NFR BLD MANUAL: 6 % (ref 0–8)
NEUTS SEG NFR BLD AUTO: 79 % (ref 43–75)
NRBC BLD AUTO-RTO: 6 /100 WBC (ref 0–2)
PATHOLOGY REVIEW: YES
PLATELET # BLD AUTO: 30 THOUSANDS/UL (ref 149–390)
PLATELET BLD QL SMEAR: ABNORMAL
PMV BLD AUTO: 11.5 FL (ref 8.9–12.7)
POIKILOCYTOSIS BLD QL SMEAR: PRESENT
POLYCHROMASIA BLD QL SMEAR: PRESENT
POTASSIUM SERPL-SCNC: 4 MMOL/L (ref 3.5–5.3)
PROT SERPL-MCNC: 9.8 G/DL (ref 6.4–8.4)
RBC # BLD AUTO: 2.71 MILLION/UL (ref 3.88–5.62)
RBC MORPH BLD: PRESENT
ROULEAUX BLD QL SMEAR: PRESENT
SODIUM SERPL-SCNC: 129 MMOL/L (ref 135–147)
WBC # BLD AUTO: 2.19 THOUSAND/UL (ref 4.31–10.16)

## 2023-05-01 NOTE — PROGRESS NOTES
[facility] HEMATOLOGY ONCOLOGY SPECIALISTS [facility]  [address] [id number] [phone]    Radha Hwang,1945, [id number]  05/01/23    Discussion:   In summary, this is a 56-year-old male with a history of recurrent myeloma  He has started selinexor and Kyprolis with dexamethasone  He seems to be tolerating this reasonably well over the past 2 weeks  He had been hospitalized for hypercalcemia, BLAS, fatigue, severe anemia, primarily driven by his myeloma  He was treated with supportive measures with some improvement  Appetite is fair  He has lost about 20 pounds over the past 2 months  The patient is aware of the gravity of his situation but remains hopeful for response, understandably  Excellent family support  I discussed the above with the patient  The patient and his wife voiced understanding and agreement   ______________________________________________________________________    Chief Complaint   Patient presents with    Follow-up       HPI:  Oncology History   IgG multiple myeloma ([address] )   9/2015 Initial Diagnosis    Found to have Hbg of 6 with SOB, creatinine 1 8 and normal calcium with albumin of 2 1  Additonial blood work showed elevated protien level (12 1g/dL)  9/29/2015 Biopsy    Bone marrow biopsy demonstrated approximately 80% plasma cells with some immature forms noted  These studies showed 13q14 deletion, +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, 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)       10/14/2015 - 11/11/2015 Chemotherapy    Velcade 1 3 mg/m2 Days 1,8,15,22  Cytoxan 300 mg/m2  PO Days 1, 8,15, 22  Dexamethasone 40 mg PO Days 1,8,15, 22  Zometa 4 mg IV Day 1  Cycle length = 28 days    Completed 2 cycles  Day one of cycle 2 was on 11/11/15      12/2015 - 2/2016 Chemotherapy    VDT-PACE x 2 cycles   (completed at [facility] in [place], [address] [place] [address])     2/2016 Transplant    Melphalan 200 mg/m2 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      MRD +     8/2016 - 1/26/2018 Patients left sided port accessed using non-coring, 19 gauge, 3/4 needle. Port accessed per facility protocol.  Hand hygiene performed: yes   Mask donned by caregiver: yes Site prepped with CHG: yes Labs drawn: yes Port flushed easily, without resistance. Flushed with 10 cc's normal saline.   Immediate blood return noted. 10 cc blood discarded.  2 vials blood draw and sent to lab for results.  Port flushed with 20 cc 0.9% normal saline and 5 cc heparinized saline.  Needle removed intact, sterile dressing applied.  Slight pressure applied for 30 seconds.   Patient tolerated port flush well, denies pain nor discomfort at this time. Patient instructed to leave dressing intact for a minimum of one hour, and to call with questions or concerns.  Patient states understanding and is in agreement with this plan. Copy of appointments, and after visit summary (AVS) given to patient.  Patient discharged ambulatory. Natalia Lea     Chemotherapy    Maintenance therapy:  Carfilzomib, orginally dosed 27 mg weekly then increased to 45 mg weekly after MRD reactivitation  Revlimid  Dexamethasone      2/2018 Biopsy    Bone marrow demonstrated positive minimal residual disease       2/23/2018 -  Chemotherapy    Daratumumab 16mg/m2 IV Day 1  Pomalyst 4 mg p o  daily 1-21  Dexamethasone 4 mg PO Days 2, 3  Dexamethasone 12 mg PODays 8, 15, 22  Cycle length = 28 days     4/11/2019 - 8/29/2019 Chemotherapy    methylPREDNISolone sodium succinate (Solu-MEDROL) 100 mg in sodium chloride 0 9 % 250 mL IVPB, 100 mg, Intravenous, Once, 5 of 12 cycles  Administration: 100 mg (6/7/2019), 100 mg (7/5/2019), 100 mg (8/2/2019)     8/30/2019 - 11/8/2019 Chemotherapy    cyclophosphamide (CYTOXAN) 1,000 mg in sodium chloride 0 9 % 250 mL IVPB, 990 mg (66 7 % of original dose 750 mg/m2), Intravenous, Once, 2 of 3 cycles  Dose modification: 500 mg/m2 (original dose 750 mg/m2, Cycle 1, Reason: Other (See Comments)), 250 mg/m2 (original dose 750 mg/m2, Cycle 4, Reason: Treatment Parameters Not Met)  Administration: 1,000 mg (8/30/2019), 1,000 mg (9/13/2019), 1,000 mg (9/27/2019), 500 mg (10/11/2019)  bortezomib (VELCADE) subcutaneous injection 2 6 mg, 1 3 mg/m2 = 2 6 mg (86 7 % of original dose 1 5 mg/m2), Subcutaneous, Once, 3 of 4 cycles  Dose modification: 1 3 mg/m2 (original dose 1 5 mg/m2, Cycle 1, Reason: Other (See Comments))  Administration: 2 6 mg (8/30/2019), 2 6 mg (9/13/2019), 2 6 mg (10/25/2019), 2 6 mg (11/8/2019), 2 6 mg (9/27/2019), 2 6 mg (11/1/2019), 2 6 mg (9/20/2019), 2 6 mg (10/4/2019), 2 6 mg (10/11/2019), 2 6 mg (10/18/2019)     12/6/2019 - 6/19/2020 Chemotherapy    pegfilgrastim (NEULASTA ONPRO) subcutaneous injection kit 6 mg, 6 mg, Subcutaneous, Once, 5 of 9 cycles  Administration: 6 mg (2/28/2020), 6 mg (3/13/2020), 6 mg (3/27/2020), 6 mg (4/10/2020), 6 mg (4/24/2020), 6 mg (5/8/2020), 6 mg (6/5/2020), 6 mg (5/22/2020), 6 mg (6/19/2020)  cyclophosphamide (CYTOXAN) 784 mg in sodium chloride 0 9 % 250 mL IVPB, 400 mg/m2 = 784 mg, Intravenous, Once, 5 of 9 cycles  Dose modification: 400 mg/m2 (original dose 750 mg/m2, Cycle 7, Reason: Other (See Comments))  Administration: 784 mg (2/28/2020), 784 mg (3/13/2020), 784 mg (3/27/2020), 784 mg (4/10/2020), 784 mg (4/24/2020), 784 mg (5/8/2020), 784 mg (5/22/2020), 784 mg (6/19/2020), 784 mg (6/5/2020)  methylPREDNISolone sodium succinate (Solu-MEDROL) 100 mg in sodium chloride 0 9 % 250 mL IVPB, 100 mg, Intravenous, Once, 8 of 12 cycles  Administration: 100 mg (12/6/2019), 100 mg (12/13/2019), 100 mg (12/20/2019), 100 mg (1/3/2020), 100 mg (1/10/2020), 100 mg (1/17/2020), 100 mg (1/31/2020), 100 mg (2/14/2020), 100 mg (5/22/2020), 100 mg (12/27/2019), 100 mg (1/24/2020), 100 mg (2/28/2020), 100 mg (3/13/2020), 100 mg (3/27/2020), 100 mg (4/10/2020), 100 mg (4/24/2020), 100 mg (5/8/2020), 100 mg (6/19/2020)     7/2/2020 -  Chemotherapy       10/17/2022 - 12/12/2022 Chemotherapy    elotuzumab (EMPLICITI) IVPB, 10 mg/kg = 757 5 mg, Intravenous, Once, 3 of 6 cycles  Administration: 757 5 mg (10/17/2022), 757 5 mg (10/24/2022), 757 5 mg (11/7/2022), 1,600 mg (12/12/2022), 757 5 mg (10/31/2022), 757 5 mg (11/14/2022), 800 mg (11/21/2022), 800 mg (11/28/2022), 800 mg (12/5/2022)     4/19/2023 -  Chemotherapy    carfilzomib (KYPROLIS) IVPB in dextrose, 107 mg (280 % of original dose 20 mg/m2), Intravenous, Once, 1 of 6 cycles  Dose modification: 56 mg/m2 (original dose 20 mg/m2, Cycle 1, Reason: Dose modified as per discussion with consulting physician), 56 mg/m2 (original dose 70 mg/m2, Cycle 1, Reason: Dose modified as per discussion with consulting physician)  Administration: 110 mg (4/19/2023), 110 mg (4/26/2023)         Interval History: Clinically stable  ECOG  2-symptomatic, less than 50% sedentary  Review of Systems   Constitutional: Positive for fatigue  Negative for chills and fever  HENT: Negative for nosebleeds  Eyes: Negative for discharge  Respiratory: Negative for cough and shortness of breath  Cardiovascular: Negative for chest pain  Gastrointestinal: Negative for abdominal pain, constipation and diarrhea  Endocrine: Negative for polydipsia  Genitourinary: Negative for hematuria  Musculoskeletal: Negative for arthralgias  Skin: Negative for color change  Allergic/Immunologic: Negative for immunocompromised state  Neurological: Negative for dizziness and headaches  Hematological: Negative for adenopathy  Psychiatric/Behavioral: Negative for agitation  Past Medical History:   Diagnosis Date    History of autologous stem cell transplant ([place] [address] )     Insomnia     Multiple myeloma ([place] [address] )      Patient Active Problem List   Diagnosis    IgG multiple myeloma ([place] [address] )    Essential hypertension    [facility]ocytopenia[facility])    Hyperlipidemia    Hypocalcemia    Acquired hypothyroidism    Prophylactic measure    Hypogammaglobulinemia (HCC)    Anemia due to stage 3 chronic kidney disease (HCC)    Stage 3b chronic kidney disease (HCC)    Anxiety    Headache    Anemia, unspecified    Malignancy associated hypercalcemia       Current Outpatient Medications:     acetaminophen (TYLENOL) 325 mg tablet, Take 650 mg by mouth every 6 (six) hours as needed for mild pain, Disp: , Rfl:     acyclovir (ZOVIRAX) 200 mg capsule, TAKE 1 CAPSULE EVERY 12 HOURS (Patient taking differently: 400 mg TAKE 1 CAPSULE EVERY 12 HOURS), Disp: 60 capsule, Rfl: 11    Ascorbic Acid (vitamin C) 1000 MG tablet, Take 1,000 mg by mouth daily, Disp: , Rfl:     atorvastatin (LIPITOR) 20 mg tablet, TAKE 1 TABLET DAILY, Disp: 90 tablet, Rfl: 3    Cholecalciferol (VITAMIN D-3 PO), Take 1,000 Units by mouth daily , Disp: , Rfl:     dexamethasone (DECADRON) 4 mg tablet, Take 5 tablets (20 mg total) by mouth once a week On morning of Kyprolis treatment  , Disp: 20 tablet, Rfl: 5    escitalopram (LEXAPRO) 20 mg tablet, TAKE 1 TABLET DAILY, Disp: 90 tablet, Rfl: 0    Glutamine POWD, by Does not apply route, Disp: , Rfl:     lansoprazole (PREVACID) 15 mg capsule, Take 15 mg by mouth 2 (two) times a day Before meals, Disp: , Rfl:     levothyroxine 100 mcg tablet, Take 1 tablet (100 mcg total) by mouth daily, Disp: , Rfl:     LORazepam (ATIVAN) 1 mg tablet, Take 1 tablet (1 mg total) by mouth every 4 (four) hours as needed for anxiety, Disp: 100 tablet, Rfl: 1    methylphenidate (RITALIN) 5 mg tablet, Take 5 mg by mouth 2 (two) times a day before breakfast and lunch, Disp: , Rfl:     metoprolol tartrate (LOPRESSOR) 25 mg tablet, Take 12 5 mg by mouth every 12 (twelve) hours, Disp: , Rfl:     Multiple Vitamin (MULTIVITAMINS PO), Take 1 tablet by mouth daily  , Disp: , Rfl:     naloxone (NARCAN) 4 mg/0 1 mL nasal spray, Administer 1 spray into a nostril   If no response after 2-3 minutes, give another dose in the other nostril using a new spray , Disp: 1 each, Rfl: 1    oxyCODONE (Roxicodone) 5 immediate release tablet, Take 1 tablet (5 mg total) by mouth every 4 (four) hours as needed for moderate pain Max Daily Amount: 30 mg, Disp: 90 tablet, Rfl: 0    Pomalidomide (Pomalyst) 2 MG CAPS, TAKE 1 CAPSULE DAILY FOR 21 DAYS, THEN OFF 7 DAYS EVERY 28 DAY CYCLE, Disp: 21 capsule, Rfl: 4    SELINEXOR, 60 MG ONCE WEEKLY, PO, Take by mouth When he has kyprolis, Disp: , Rfl:     sulfamethoxazole-trimethoprim (BACTRIM DS) 800-160 mg per tablet, 3 (three) times a week Monday Wednesday friday, Disp: , Rfl:     vitamin B-12 (VITAMIN B-12) 1,000 mcg tablet, Take 1,000 mcg by mouth daily, Disp: , Rfl:   No Known Allergies  Past Surgical History:   Procedure Laterality Date    APPENDECTOMY      Last assessed: 9/25/15    ARTHROSCOPY KNEE Left     9/30/09    EYE SURGERY      Lasik    KNEE CARTILAGE SURGERY      LIMBAL STEM CELL TRANSPLANT      Patient had 2 in [place]-2/29/2016 and "4/13/2016     Social History     Objective:  Vitals:    05/01/23 1529   BP: 118/74   BP Location: Left arm   Patient Position: Sitting   Cuff Size: Standard   Pulse: 61   Temp: 97 8 °F (36 6 °C)   TempSrc: Temporal   SpO2: 98%   Weight: 69 9 kg (154 lb)   Height: 5' 10\" (1 778 m)     Physical Exam  Constitutional:       Appearance: He is well-developed  HENT:      Head: Normocephalic and atraumatic  Eyes:      Pupils: Pupils are equal, round, and reactive to light  Cardiovascular:      Rate and Rhythm: Normal rate and regular rhythm  Heart sounds: No murmur heard  Pulmonary:      Breath sounds: Normal breath sounds  No wheezing or rales  Abdominal:      Palpations: Abdomen is soft  Tenderness: There is no abdominal tenderness  Musculoskeletal:         General: No tenderness  Normal range of motion  Cervical back: Neck supple  Lymphadenopathy:      Cervical: No cervical adenopathy  Skin:     Findings: No erythema or rash  Neurological:      Mental Status: He is alert and oriented to person, place, and time  Cranial Nerves: No cranial nerve deficit  Deep Tendon Reflexes: Reflexes are normal and symmetric  Psychiatric:         Behavior: Behavior normal            Labs: I personally reviewed the labs and imaging pertinent to this patient care    "

## 2023-05-02 ENCOUNTER — TELEPHONE (OUTPATIENT)
Dept: HEMATOLOGY ONCOLOGY | Facility: CLINIC | Age: 78
End: 2023-05-02

## 2023-05-02 NOTE — ADDENDUM NOTE
Encounter addended by: Michel Infante, Pharmacist on: 5/2/2023 11:24 AM   Actions taken: i-Bharathi created or edited

## 2023-05-03 ENCOUNTER — HOSPITAL ENCOUNTER (OUTPATIENT)
Dept: INFUSION CENTER | Facility: CLINIC | Age: 78
Discharge: HOME | End: 2023-05-03

## 2023-05-03 VITALS
TEMPERATURE: 97.6 F | SYSTOLIC BLOOD PRESSURE: 118 MMHG | HEART RATE: 65 BPM | WEIGHT: 156.97 LBS | HEIGHT: 70 IN | DIASTOLIC BLOOD PRESSURE: 72 MMHG | BODY MASS INDEX: 22.47 KG/M2 | RESPIRATION RATE: 18 BRPM

## 2023-05-03 DIAGNOSIS — C90.00 IGG MULTIPLE MYELOMA (HCC): Primary | ICD-10-CM

## 2023-05-03 RX ORDER — SODIUM CHLORIDE 9 MG/ML
20 INJECTION, SOLUTION INTRAVENOUS ONCE
OUTPATIENT
Start: 2023-05-10

## 2023-05-03 RX ORDER — SODIUM CHLORIDE 9 MG/ML
20 INJECTION, SOLUTION INTRAVENOUS ONCE
Status: COMPLETED | OUTPATIENT
Start: 2023-05-03 | End: 2023-05-03

## 2023-05-03 RX ORDER — SODIUM CHLORIDE 9 MG/ML
20 INJECTION, SOLUTION INTRAVENOUS ONCE
OUTPATIENT
Start: 2023-05-31

## 2023-05-03 RX ORDER — SODIUM CHLORIDE 9 MG/ML
20 INJECTION, SOLUTION INTRAVENOUS ONCE
OUTPATIENT
Start: 2023-05-17

## 2023-05-03 RX ORDER — SODIUM CHLORIDE 9 MG/ML
20 INJECTION, SOLUTION INTRAVENOUS ONCE
OUTPATIENT
Start: 2023-05-24

## 2023-05-03 RX ADMIN — DEXAMETHASONE SODIUM PHOSPHATE: 10 INJECTION, SOLUTION INTRAMUSCULAR; INTRAVENOUS at 08:59

## 2023-05-03 RX ADMIN — SODIUM CHLORIDE 20 ML/HR: 9 INJECTION, SOLUTION INTRAVENOUS at 08:50

## 2023-05-03 RX ADMIN — SODIUM CHLORIDE 250 ML: 9 INJECTION, SOLUTION INTRAVENOUS at 08:49

## 2023-05-03 RX ADMIN — CARFILZOMIB 110 MG: 10 INJECTION, POWDER, LYOPHILIZED, FOR SOLUTION INTRAVENOUS at 09:50

## 2023-05-03 NOTE — PROGRESS NOTES
Pt presents for Kyprolis  No new meds or concerns  Platelets 30, hgb 8 1  OK to treat per Ana Nick RN  Pt tolerated infusion without adverse reaction  Future appointments discussed  AVS declined

## 2023-05-08 ENCOUNTER — APPOINTMENT (OUTPATIENT)
Dept: LAB | Facility: CLINIC | Age: 78
End: 2023-05-08

## 2023-05-08 ENCOUNTER — TELEPHONE (OUTPATIENT)
Dept: HEMATOLOGY ONCOLOGY | Facility: CLINIC | Age: 78
End: 2023-05-08

## 2023-05-08 DIAGNOSIS — C90.00 IGG MULTIPLE MYELOMA (HCC): ICD-10-CM

## 2023-05-08 DIAGNOSIS — N18.31 ANEMIA DUE TO STAGE 3A CHRONIC KIDNEY DISEASE (HCC): ICD-10-CM

## 2023-05-08 DIAGNOSIS — D63.1 ANEMIA DUE TO STAGE 3A CHRONIC KIDNEY DISEASE (HCC): ICD-10-CM

## 2023-05-08 DIAGNOSIS — D61.82 MYELOPHTHISIC ANEMIA (HCC): ICD-10-CM

## 2023-05-08 DIAGNOSIS — D69.3 CHRONIC ITP (IDIOPATHIC THROMBOCYTOPENIA) (HCC): ICD-10-CM

## 2023-05-08 LAB
ALBUMIN SERPL BCP-MCNC: 2.1 G/DL (ref 3.5–5)
ALP SERPL-CCNC: 55 U/L (ref 46–116)
ALT SERPL W P-5'-P-CCNC: 22 U/L (ref 12–78)
ANION GAP SERPL CALCULATED.3IONS-SCNC: 7 MMOL/L (ref 4–13)
ANISOCYTOSIS BLD QL SMEAR: PRESENT
AST SERPL W P-5'-P-CCNC: 20 U/L (ref 5–45)
BASOPHILS # BLD MANUAL: 0 THOUSAND/UL (ref 0–0.1)
BASOPHILS NFR MAR MANUAL: 0 % (ref 0–1)
BILIRUB SERPL-MCNC: 0.57 MG/DL (ref 0.2–1)
BUN SERPL-MCNC: 24 MG/DL (ref 5–25)
CALCIUM ALBUM COR SERPL-MCNC: 9.4 MG/DL (ref 8.3–10.1)
CALCIUM SERPL-MCNC: 7.9 MG/DL (ref 8.3–10.1)
CHLORIDE SERPL-SCNC: 103 MMOL/L (ref 96–108)
CO2 SERPL-SCNC: 21 MMOL/L (ref 21–32)
CREAT SERPL-MCNC: 1.95 MG/DL (ref 0.6–1.3)
EOSINOPHIL # BLD MANUAL: 0 THOUSAND/UL (ref 0–0.4)
EOSINOPHIL NFR BLD MANUAL: 0 % (ref 0–6)
ERYTHROCYTE [DISTWIDTH] IN BLOOD BY AUTOMATED COUNT: 23 % (ref 11.6–15.1)
GFR SERPL CREATININE-BSD FRML MDRD: 32 ML/MIN/1.73SQ M
GLUCOSE P FAST SERPL-MCNC: 81 MG/DL (ref 65–99)
HCT VFR BLD AUTO: 23.9 % (ref 36.5–49.3)
HGB BLD-MCNC: 7.7 G/DL (ref 12–17)
LYMPHOCYTES # BLD AUTO: 0.05 THOUSAND/UL (ref 0.6–4.47)
LYMPHOCYTES # BLD AUTO: 2 % (ref 14–44)
MCH RBC QN AUTO: 31 PG (ref 26.8–34.3)
MCHC RBC AUTO-ENTMCNC: 32.2 G/DL (ref 31.4–37.4)
MCV RBC AUTO: 96 FL (ref 82–98)
MONOCYTES # BLD AUTO: 0.21 THOUSAND/UL (ref 0–1.22)
MONOCYTES NFR BLD: 8 % (ref 4–12)
MYELOCYTES NFR BLD MANUAL: 1 % (ref 0–1)
NEUTROPHILS # BLD MANUAL: 2.39 THOUSAND/UL (ref 1.85–7.62)
NEUTS BAND NFR BLD MANUAL: 6 % (ref 0–8)
NEUTS SEG NFR BLD AUTO: 83 % (ref 43–75)
PLATELET # BLD AUTO: 28 THOUSANDS/UL (ref 149–390)
PLATELET BLD QL SMEAR: ABNORMAL
PMV BLD AUTO: 12.3 FL (ref 8.9–12.7)
POTASSIUM SERPL-SCNC: 4.4 MMOL/L (ref 3.5–5.3)
PROT SERPL-MCNC: 9.4 G/DL (ref 6.4–8.4)
RBC # BLD AUTO: 2.48 MILLION/UL (ref 3.88–5.62)
RBC MORPH BLD: PRESENT
SODIUM SERPL-SCNC: 131 MMOL/L (ref 135–147)
WBC # BLD AUTO: 2.68 THOUSAND/UL (ref 4.31–10.16)

## 2023-05-08 NOTE — TELEPHONE ENCOUNTER
Rec'd critical plt 28  Reviewed with Dr Sun Garcia  Pt ok to restart selinexor at the reduced 40mg dose this week  No new orders

## 2023-05-09 DIAGNOSIS — C90.00 MULTIPLE MYELOMA NOT HAVING ACHIEVED REMISSION (HCC): ICD-10-CM

## 2023-05-09 DIAGNOSIS — C90.00 MULTIPLE MYELOMA NOT HAVING ACHIEVED REMISSION (HCC): Primary | ICD-10-CM

## 2023-05-09 DIAGNOSIS — C90.00 IGG MULTIPLE MYELOMA (HCC): Primary | ICD-10-CM

## 2023-05-09 DIAGNOSIS — Z29.9 PROPHYLACTIC MEASURE: ICD-10-CM

## 2023-05-09 RX ORDER — ACYCLOVIR 200 MG/1
400 CAPSULE ORAL 2 TIMES DAILY
Qty: 120 CAPSULE | Refills: 6 | Status: SHIPPED | OUTPATIENT
Start: 2023-05-09 | End: 2023-05-11 | Stop reason: SDUPTHER

## 2023-05-09 RX ORDER — LANSOPRAZOLE 15 MG/1
15 CAPSULE, DELAYED RELEASE ORAL 2 TIMES DAILY
Qty: 120 CAPSULE | Refills: 6 | Status: SHIPPED | OUTPATIENT
Start: 2023-05-09

## 2023-05-10 ENCOUNTER — HOSPITAL ENCOUNTER (OUTPATIENT)
Dept: INFUSION CENTER | Facility: CLINIC | Age: 78
Discharge: HOME | End: 2023-05-10

## 2023-05-10 VITALS
BODY MASS INDEX: 22.72 KG/M2 | TEMPERATURE: 98.1 F | SYSTOLIC BLOOD PRESSURE: 133 MMHG | RESPIRATION RATE: 18 BRPM | DIASTOLIC BLOOD PRESSURE: 77 MMHG | HEIGHT: 70 IN | WEIGHT: 158.73 LBS | HEART RATE: 59 BPM

## 2023-05-10 DIAGNOSIS — C90.00 IGG MULTIPLE MYELOMA (HCC): Primary | ICD-10-CM

## 2023-05-10 RX ORDER — SODIUM CHLORIDE 9 MG/ML
20 INJECTION, SOLUTION INTRAVENOUS ONCE
Status: COMPLETED | OUTPATIENT
Start: 2023-05-10 | End: 2023-05-10

## 2023-05-10 RX ADMIN — DEXAMETHASONE SODIUM PHOSPHATE: 10 INJECTION, SOLUTION INTRAMUSCULAR; INTRAVENOUS at 08:48

## 2023-05-10 RX ADMIN — SODIUM CHLORIDE 20 ML/HR: 0.9 INJECTION, SOLUTION INTRAVENOUS at 08:48

## 2023-05-10 RX ADMIN — SODIUM CHLORIDE 250 ML: 0.9 INJECTION, SOLUTION INTRAVENOUS at 08:48

## 2023-05-10 RX ADMIN — CARFILZOMIB 110 MG: 10 INJECTION, POWDER, LYOPHILIZED, FOR SOLUTION INTRAVENOUS at 09:49

## 2023-05-11 ENCOUNTER — TELEPHONE (OUTPATIENT)
Dept: HEMATOLOGY ONCOLOGY | Facility: CLINIC | Age: 78
End: 2023-05-11

## 2023-05-11 DIAGNOSIS — C90.00 MULTIPLE MYELOMA NOT HAVING ACHIEVED REMISSION (HCC): ICD-10-CM

## 2023-05-11 DIAGNOSIS — Z29.9 PROPHYLACTIC MEASURE: ICD-10-CM

## 2023-05-11 RX ORDER — ACYCLOVIR 200 MG/1
400 CAPSULE ORAL 2 TIMES DAILY
Qty: 120 CAPSULE | Refills: 6 | Status: SHIPPED | OUTPATIENT
Start: 2023-05-11 | End: 2023-12-07

## 2023-05-11 NOTE — TELEPHONE ENCOUNTER
Spoke with pt's wife and confirmed Selinexor is scheduled for delivery today through [facility]  Explained my role and provided my number for any future needs

## 2023-05-15 ENCOUNTER — TELEPHONE (OUTPATIENT)
Dept: HEMATOLOGY ONCOLOGY | Facility: CLINIC | Age: 78
End: 2023-05-15

## 2023-05-15 ENCOUNTER — APPOINTMENT (OUTPATIENT)
Dept: LAB | Facility: CLINIC | Age: 78
End: 2023-05-15

## 2023-05-15 DIAGNOSIS — N18.31 ANEMIA DUE TO STAGE 3A CHRONIC KIDNEY DISEASE (HCC): ICD-10-CM

## 2023-05-15 DIAGNOSIS — D61.82 MYELOPHTHISIC ANEMIA (HCC): ICD-10-CM

## 2023-05-15 DIAGNOSIS — D69.3 CHRONIC ITP (IDIOPATHIC THROMBOCYTOPENIA) (HCC): ICD-10-CM

## 2023-05-15 DIAGNOSIS — C90.00 IGG MULTIPLE MYELOMA (HCC): ICD-10-CM

## 2023-05-15 DIAGNOSIS — D63.1 ANEMIA DUE TO STAGE 3A CHRONIC KIDNEY DISEASE (HCC): ICD-10-CM

## 2023-05-15 LAB
ALBUMIN SERPL BCP-MCNC: 2.4 G/DL (ref 3.5–5)
ALP SERPL-CCNC: 57 U/L (ref 46–116)
ALT SERPL W P-5'-P-CCNC: 21 U/L (ref 12–78)
ANION GAP SERPL CALCULATED.3IONS-SCNC: 2 MMOL/L (ref 4–13)
ANISOCYTOSIS BLD QL SMEAR: PRESENT
AST SERPL W P-5'-P-CCNC: 16 U/L (ref 5–45)
BILIRUB SERPL-MCNC: 0.8 MG/DL (ref 0.2–1)
BUN SERPL-MCNC: 31 MG/DL (ref 5–25)
CALCIUM ALBUM COR SERPL-MCNC: 9.4 MG/DL (ref 8.3–10.1)
CALCIUM SERPL-MCNC: 8.1 MG/DL (ref 8.3–10.1)
CHLORIDE SERPL-SCNC: 105 MMOL/L (ref 96–108)
CO2 SERPL-SCNC: 22 MMOL/L (ref 21–32)
CREAT SERPL-MCNC: 2.03 MG/DL (ref 0.6–1.3)
EOSINOPHIL NFR BLD MANUAL: 1 % (ref 0–6)
ERYTHROCYTE [DISTWIDTH] IN BLOOD BY AUTOMATED COUNT: 23.2 % (ref 11.6–15.1)
GFR SERPL CREATININE-BSD FRML MDRD: 31 ML/MIN/1.73SQ M
GLUCOSE P FAST SERPL-MCNC: 88 MG/DL (ref 65–99)
HCT VFR BLD AUTO: 24 % (ref 36.5–49.3)
HGB BLD-MCNC: 7.8 G/DL (ref 12–17)
LYMPHOCYTES # BLD AUTO: 2 % (ref 14–44)
MCH RBC QN AUTO: 31.5 PG (ref 26.8–34.3)
MCHC RBC AUTO-ENTMCNC: 32.5 G/DL (ref 31.4–37.4)
MCV RBC AUTO: 97 FL (ref 82–98)
MONOCYTES NFR BLD: 2 % (ref 4–12)
NEUTS BAND NFR BLD MANUAL: 7 % (ref 0–8)
NEUTS SEG NFR BLD AUTO: 87 % (ref 43–75)
NRBC BLD AUTO-RTO: 5 /100 WBC (ref 0–2)
PLATELET # BLD AUTO: 19 THOUSANDS/UL (ref 149–390)
PLATELET BLD QL SMEAR: ABNORMAL
PMV BLD AUTO: 12.1 FL (ref 8.9–12.7)
POIKILOCYTOSIS BLD QL SMEAR: PRESENT
POLYCHROMASIA BLD QL SMEAR: PRESENT
POTASSIUM SERPL-SCNC: 4.3 MMOL/L (ref 3.5–5.3)
PROT SERPL-MCNC: 9.3 G/DL (ref 6.4–8.4)
RBC # BLD AUTO: 2.48 MILLION/UL (ref 3.88–5.62)
SODIUM SERPL-SCNC: 129 MMOL/L (ref 135–147)
TARGETS BLD QL SMEAR: PRESENT
TOTAL CELLS COUNTED SPEC: 100
VARIANT LYMPHS # BLD AUTO: 1 %
WBC # BLD AUTO: 2.73 THOUSAND/UL (ref 4.31–10.16)

## 2023-05-15 NOTE — TELEPHONE ENCOUNTER
Called and spoke with pts wife  Pt has no sx of bleeding  She is aware of what to look for and when to call us  Advised her per Dr Adama Wills, no changes to tx plan for now, will continue to monitor

## 2023-05-17 ENCOUNTER — HOSPITAL ENCOUNTER (OUTPATIENT)
Dept: INFUSION CENTER | Facility: CLINIC | Age: 78
Discharge: HOME | End: 2023-05-17

## 2023-05-17 VITALS
HEIGHT: 70 IN | DIASTOLIC BLOOD PRESSURE: 70 MMHG | HEART RATE: 71 BPM | WEIGHT: 156.97 LBS | TEMPERATURE: 98.1 F | SYSTOLIC BLOOD PRESSURE: 108 MMHG | BODY MASS INDEX: 22.47 KG/M2 | RESPIRATION RATE: 18 BRPM

## 2023-05-17 DIAGNOSIS — C90.00 IGG MULTIPLE MYELOMA (HCC): Primary | ICD-10-CM

## 2023-05-17 DIAGNOSIS — D80.1 HYPOGAMMAGLOBULINEMIA (HCC): ICD-10-CM

## 2023-05-17 RX ORDER — SODIUM CHLORIDE 9 MG/ML
20 INJECTION, SOLUTION INTRAVENOUS ONCE
Status: DISCONTINUED | OUTPATIENT
Start: 2023-05-17 | End: 2023-05-20 | Stop reason: HOSPADM

## 2023-05-17 RX ORDER — SODIUM CHLORIDE 9 MG/ML
20 INJECTION, SOLUTION INTRAVENOUS ONCE
Status: COMPLETED | OUTPATIENT
Start: 2023-05-17 | End: 2023-05-17

## 2023-05-17 RX ORDER — SODIUM CHLORIDE 9 MG/ML
20 INJECTION, SOLUTION INTRAVENOUS ONCE
Start: 2023-05-31 | End: 2023-05-29

## 2023-05-17 RX ADMIN — DEXAMETHASONE SODIUM PHOSPHATE: 10 INJECTION, SOLUTION INTRAMUSCULAR; INTRAVENOUS at 08:51

## 2023-05-17 RX ADMIN — Medication 30 G: at 10:41

## 2023-05-17 RX ADMIN — SODIUM CHLORIDE 250 ML: 0.9 INJECTION, SOLUTION INTRAVENOUS at 08:51

## 2023-05-17 RX ADMIN — SODIUM CHLORIDE 20 ML/HR: 0.9 INJECTION, SOLUTION INTRAVENOUS at 08:47

## 2023-05-17 RX ADMIN — CARFILZOMIB 110 MG: 10 INJECTION, POWDER, LYOPHILIZED, FOR SOLUTION INTRAVENOUS at 09:56

## 2023-05-17 NOTE — PROGRESS NOTES
Patient arrived to the unit and denied complications or infections  He tolerated his kyprolis and [place] well without adverse reaction  Patient is aware of his next infusion

## 2023-05-22 ENCOUNTER — LAB REQUISITION (OUTPATIENT)
Dept: LAB | Facility: HOSPITAL | Age: 78
End: 2023-05-22

## 2023-05-22 ENCOUNTER — APPOINTMENT (OUTPATIENT)
Dept: LAB | Facility: CLINIC | Age: 78
End: 2023-05-22

## 2023-05-22 DIAGNOSIS — C90.00 IGG MULTIPLE MYELOMA (HCC): ICD-10-CM

## 2023-05-22 DIAGNOSIS — N18.31 ANEMIA DUE TO STAGE 3A CHRONIC KIDNEY DISEASE (HCC): ICD-10-CM

## 2023-05-22 DIAGNOSIS — D63.1 ANEMIA IN CHRONIC KIDNEY DISEASE (CODE): ICD-10-CM

## 2023-05-22 DIAGNOSIS — N18.32 STAGE 3B CHRONIC KIDNEY DISEASE (HCC): ICD-10-CM

## 2023-05-22 DIAGNOSIS — D61.82: ICD-10-CM

## 2023-05-22 DIAGNOSIS — D61.82 MYELOPHTHISIC ANEMIA (HCC): ICD-10-CM

## 2023-05-22 DIAGNOSIS — N18.31 CHRONIC KIDNEY DISEASE, STAGE 3A (HCC): ICD-10-CM

## 2023-05-22 DIAGNOSIS — I10 ESSENTIAL HYPERTENSION: ICD-10-CM

## 2023-05-22 DIAGNOSIS — D63.1 ANEMIA DUE TO STAGE 3A CHRONIC KIDNEY DISEASE (HCC): ICD-10-CM

## 2023-05-22 DIAGNOSIS — D69.3 IMMUNE THROMBOCYTOPENIC PURPURA (HCC): ICD-10-CM

## 2023-05-22 DIAGNOSIS — D69.3 CHRONIC ITP (IDIOPATHIC THROMBOCYTOPENIA) (HCC): ICD-10-CM

## 2023-05-22 LAB
ABO GROUP BLD: NORMAL
ALBUMIN SERPL BCP-MCNC: 2.5 G/DL (ref 3.5–5)
ALP SERPL-CCNC: 58 U/L (ref 46–116)
ALT SERPL W P-5'-P-CCNC: 18 U/L (ref 12–78)
ANION GAP SERPL CALCULATED.3IONS-SCNC: 1 MMOL/L (ref 4–13)
AST SERPL W P-5'-P-CCNC: 22 U/L (ref 5–45)
BASOPHILS # BLD AUTO: 0 THOUSANDS/ÂΜL (ref 0–0.1)
BASOPHILS NFR BLD AUTO: 0 % (ref 0–1)
BILIRUB SERPL-MCNC: 1.2 MG/DL (ref 0.2–1)
BLD GP AB SCN SERPL QL: NEGATIVE
BUN SERPL-MCNC: 23 MG/DL (ref 5–25)
CALCIUM ALBUM COR SERPL-MCNC: 9 MG/DL (ref 8.3–10.1)
CALCIUM SERPL-MCNC: 7.8 MG/DL (ref 8.3–10.1)
CHLORIDE SERPL-SCNC: 103 MMOL/L (ref 96–108)
CO2 SERPL-SCNC: 22 MMOL/L (ref 21–32)
CREAT SERPL-MCNC: 2.15 MG/DL (ref 0.6–1.3)
EOSINOPHIL # BLD AUTO: 0.02 THOUSAND/ÂΜL (ref 0–0.61)
EOSINOPHIL NFR BLD AUTO: 1 % (ref 0–6)
ERYTHROCYTE [DISTWIDTH] IN BLOOD BY AUTOMATED COUNT: 24.9 % (ref 11.6–15.1)
GFR SERPL CREATININE-BSD FRML MDRD: 29 ML/MIN/1.73SQ M
GLUCOSE P FAST SERPL-MCNC: 86 MG/DL (ref 65–99)
HCT VFR BLD AUTO: 22.9 % (ref 36.5–49.3)
HGB BLD-MCNC: 7.5 G/DL (ref 12–17)
IMM GRANULOCYTES # BLD AUTO: 0.03 THOUSAND/UL (ref 0–0.2)
IMM GRANULOCYTES NFR BLD AUTO: 1 % (ref 0–2)
LYMPHOCYTES # BLD AUTO: 0.22 THOUSANDS/ÂΜL (ref 0.6–4.47)
LYMPHOCYTES NFR BLD AUTO: 10 % (ref 14–44)
MCH RBC QN AUTO: 32.3 PG (ref 26.8–34.3)
MCHC RBC AUTO-ENTMCNC: 32.8 G/DL (ref 31.4–37.4)
MCV RBC AUTO: 99 FL (ref 82–98)
MONOCYTES # BLD AUTO: 0.37 THOUSAND/ÂΜL (ref 0.17–1.22)
MONOCYTES NFR BLD AUTO: 17 % (ref 4–12)
NEUTROPHILS # BLD AUTO: 1.6 THOUSANDS/ÂΜL (ref 1.85–7.62)
NEUTS SEG NFR BLD AUTO: 71 % (ref 43–75)
NRBC BLD AUTO-RTO: 2 /100 WBCS
PLATELET # BLD AUTO: 27 THOUSANDS/UL (ref 149–390)
PMV BLD AUTO: 11.3 FL (ref 8.9–12.7)
POTASSIUM SERPL-SCNC: 4.3 MMOL/L (ref 3.5–5.3)
PROT SERPL-MCNC: 9.4 G/DL (ref 6.4–8.4)
RBC # BLD AUTO: 2.32 MILLION/UL (ref 3.88–5.62)
RH BLD: POSITIVE
SODIUM SERPL-SCNC: 126 MMOL/L (ref 135–147)
SPECIMEN EXPIRATION DATE: NORMAL
WBC # BLD AUTO: 2.24 THOUSAND/UL (ref 4.31–10.16)

## 2023-05-23 DIAGNOSIS — C90.00 IGG MULTIPLE MYELOMA (HCC): Primary | ICD-10-CM

## 2023-05-24 ENCOUNTER — HOSPITAL ENCOUNTER (OUTPATIENT)
Dept: INFUSION CENTER | Facility: CLINIC | Age: 78
Discharge: HOME | End: 2023-05-24

## 2023-05-24 ENCOUNTER — OFFICE VISIT (OUTPATIENT)
Dept: NEPHROLOGY | Facility: CLINIC | Age: 78
End: 2023-05-24

## 2023-05-24 VITALS — BODY MASS INDEX: 21.76 KG/M2 | HEIGHT: 70 IN | WEIGHT: 152 LBS

## 2023-05-24 VITALS
TEMPERATURE: 98.5 F | RESPIRATION RATE: 18 BRPM | DIASTOLIC BLOOD PRESSURE: 75 MMHG | HEART RATE: 90 BPM | SYSTOLIC BLOOD PRESSURE: 114 MMHG | BODY MASS INDEX: 21.65 KG/M2 | WEIGHT: 151.24 LBS | HEIGHT: 70 IN

## 2023-05-24 DIAGNOSIS — C90.00 IGG MULTIPLE MYELOMA (HCC): Primary | ICD-10-CM

## 2023-05-24 DIAGNOSIS — D69.3 CHRONIC ITP (IDIOPATHIC THROMBOCYTOPENIA) (HCC): ICD-10-CM

## 2023-05-24 DIAGNOSIS — D61.82 MYELOPHTHISIC ANEMIA (HCC): ICD-10-CM

## 2023-05-24 DIAGNOSIS — N18.9 CHRONIC KIDNEY DISEASE, UNSPECIFIED CKD STAGE: Primary | ICD-10-CM

## 2023-05-24 DIAGNOSIS — E87.1 HYPONATREMIA: ICD-10-CM

## 2023-05-24 DIAGNOSIS — D63.1 ANEMIA DUE TO STAGE 3A CHRONIC KIDNEY DISEASE (HCC): ICD-10-CM

## 2023-05-24 DIAGNOSIS — N18.31 ANEMIA DUE TO STAGE 3A CHRONIC KIDNEY DISEASE (HCC): ICD-10-CM

## 2023-05-24 PROBLEM — N18.32 STAGE 3B CHRONIC KIDNEY DISEASE (HCC): Status: RESOLVED | Noted: 2022-04-14 | Resolved: 2023-05-24

## 2023-05-24 RX ORDER — SODIUM CHLORIDE 9 MG/ML
20 INJECTION, SOLUTION INTRAVENOUS ONCE
Status: COMPLETED | OUTPATIENT
Start: 2023-05-24 | End: 2023-05-24

## 2023-05-24 RX ORDER — SODIUM CHLORIDE 9 MG/ML
20 INJECTION, SOLUTION INTRAVENOUS ONCE
Status: DISCONTINUED | OUTPATIENT
Start: 2023-05-24 | End: 2023-05-27 | Stop reason: HOSPADM

## 2023-05-24 RX ORDER — SODIUM CHLORIDE 9 MG/ML
20 INJECTION, SOLUTION INTRAVENOUS ONCE
OUTPATIENT
Start: 2023-05-31

## 2023-05-24 RX ADMIN — DEXAMETHASONE SODIUM PHOSPHATE: 10 INJECTION, SOLUTION INTRAMUSCULAR; INTRAVENOUS at 08:42

## 2023-05-24 RX ADMIN — SODIUM CHLORIDE 250 ML: 0.9 INJECTION, SOLUTION INTRAVENOUS at 08:36

## 2023-05-24 RX ADMIN — SODIUM CHLORIDE 20 ML/HR: 0.9 INJECTION, SOLUTION INTRAVENOUS at 08:45

## 2023-05-24 RX ADMIN — ZOLEDRONIC ACID 3 MG: 4 INJECTION, SOLUTION, CONCENTRATE INTRAVENOUS at 09:25

## 2023-05-24 RX ADMIN — CARFILZOMIB 110 MG: 10 INJECTION, POWDER, LYOPHILIZED, FOR SOLUTION INTRAVENOUS at 10:11

## 2023-05-24 NOTE — PLAN OF CARE
Problem: INFECTION - ADULT  Goal: Absence or prevention of progression during hospitalization  Description: INTERVENTIONS:  - Assess and monitor for signs and symptoms of infection  - Monitor lab/diagnostic results  - Monitor all insertion sites, i e  indwelling lines, tubes, and drains  - Monitor endotracheal if appropriate and nasal secretions for changes in amount and color  - [place] appropriate cooling/warming therapies per order  - Administer medications as ordered  - Instruct and encourage patient and family to use good hand hygiene technique  - Identify and instruct in appropriate isolation precautions for identified infection/condition  Outcome: Progressing

## 2023-05-24 NOTE — LETTER
May 24, 2023     Ian Hyman DO  [address]    Patient: Roberto Hernandez   YOB: 1945   Date of Visit: 5/24/2023       Dear Dr Jimbo Cadena:    Thank you for referring Pastora Collins to me for evaluation  Below are my notes for this consultation  If you have questions, please do not hesitate to call me  I look forward to following your patient along with you  Sincerely,        Wayne Hernandez MD        CC: DO Wayne Plata MD  5/24/2023  6:59 PM  Sign when Signing Visit  NEPHROLOGY PROGRESS NOTE    Roberto Hernandez 78 y o  male MRN: [id number]  Unit/Bed#:  Encounter: [id number]  Reason for Consult: Chronic kidney disease and hyponatremia                          The patient is here for routine follow-up  Reviewing his interim history with him he has been admitted to the hospital a couple times  Most recently was for syncope hypotension he had acute renal failure and hypercalcemia  He is receiving treatment for recurrent myeloma  Today he looks well he is here with his wife he is got a great attitude and really had no specific complaints except fatigue  ASSESSMENT/PLAN:  1  Renal    The patient has chronic kidney disease and creatinine seems to be fluctuating from the high ones to low 2 range  His most recent creatinine was 2 1 and this is posthospitalization when he recently had acute renal failure likely from volume depletion and hypercalcemia  Presently his calcium levels have improved on chemotherapy and he does continue with Zometa treatment  It seems that the renal functions worsened since he has had recurrence of myeloma and been on different therapeutic agents but fortunately it is stable and the patient is feeling well and tolerating his new medication      He receives labs weekly so we will monitor renal function they are familiar with the creatinine levels I told him to reach out if there is any "concerns before next visit in a few months  I would not recommend any changes in his medications at this point with respect to myeloma treatment  2   Hyponatremia    Patient has no symptoms related to this but he does have recurrence of his paraprotein  The question is whether or not this is true hypoosmolar hyponatremia or potentially pseudohyponatremia  I have given him a slip to check a serum osmolality with his next labs and make an assessment of this  I will contact him with results and then determine if further work-up is needed  In the meantime I told him not to drink excessive fluids as this potentially could worsen it if its not related to pseudohyponatremia  Serum osmolality  SUBJECTIVE:  Review of Systems   Constitutional: Positive for malaise/fatigue  Negative for chills, diaphoresis and fever  HENT: Negative  Eyes: Negative  Cardiovascular: Negative for chest pain, dyspnea on exertion, leg swelling and orthopnea  Respiratory: Negative  Negative for cough, shortness of breath, sputum production and wheezing  Gastrointestinal: Negative for abdominal pain, diarrhea, nausea and vomiting  Genitourinary: Negative for dysuria, flank pain, hematuria and incomplete emptying  Neurological: Negative for dizziness, focal weakness, headaches and weakness  Psychiatric/Behavioral: Negative for altered mental status, hallucinations and hypervigilance  The patient is not nervous/anxious  OBJECTIVE:  Current Weight: Weight - Scale: 68 9 kg (152 lb)  [email or web address]:     Height 5' 10\" (1 778 m), weight 68 9 kg (152 lb)  , Body mass index is 21 81 kg/m²  [unfilled]    Physical Exam: Ht 5' 10\" (1 778 m)   Wt 68 9 kg (152 lb)   BMI 21 81 kg/m²   Physical Exam  Constitutional:       General: He is not in acute distress  Appearance: He is not toxic-appearing or diaphoretic  HENT:      Head: Normocephalic and atraumatic        Nose: Nose normal       Mouth/Throat: " Mouth: Mucous membranes are moist    Eyes:      General: No scleral icterus  Extraocular Movements: Extraocular movements intact  Cardiovascular:      Rate and Rhythm: Normal rate and regular rhythm  Heart sounds: No friction rub  No gallop  Comments: No significant edema  Pulmonary:      Effort: Pulmonary effort is normal  No respiratory distress  Breath sounds: No wheezing, rhonchi or rales  Abdominal:      General: Bowel sounds are normal  There is no distension  Palpations: Abdomen is soft  Tenderness: There is no abdominal tenderness  There is no rebound  Musculoskeletal:      Cervical back: Normal range of motion and neck supple  Neurological:      General: No focal deficit present  Mental Status: He is alert and oriented to person, place, and time  Mental status is at baseline  Psychiatric:         Mood and Affect: Mood normal          Behavior: Behavior normal          Thought Content: Thought content normal          Judgment: Judgment normal          Medications:    Current Outpatient Medications:   •  acetaminophen (TYLENOL) 325 mg tablet, Take 650 mg by mouth every 6 (six) hours as needed for mild pain, Disp: , Rfl:   •  acyclovir (ZOVIRAX) 200 mg capsule, Take 2 capsules (400 mg total) by mouth 2 (two) times a day, Disp: 120 capsule, Rfl: 6  •  Ascorbic Acid (vitamin C) 1000 MG tablet, Take 1,000 mg by mouth daily, Disp: , Rfl:   •  atorvastatin (LIPITOR) 20 mg tablet, TAKE 1 TABLET DAILY, Disp: 90 tablet, Rfl: 3  •  Cholecalciferol (VITAMIN D-3 PO), Take 1,000 Units by mouth daily , Disp: , Rfl:   •  dexamethasone (DECADRON) 4 mg tablet, Take 5 tablets (20 mg total) by mouth once a week On morning of Kyprolis treatment  , Disp: 20 tablet, Rfl: 5  •  escitalopram (LEXAPRO) 20 mg tablet, TAKE 1 TABLET DAILY, Disp: 90 tablet, Rfl: 0  •  Glutamine POWD, by Does not apply route, Disp: , Rfl:   •  lansoprazole (PREVACID) 15 mg capsule, Take 1 capsule (15 mg total) by mouth 2 (two) times a day Before meals, Disp: 120 capsule, Rfl: 6  •  levothyroxine 100 mcg tablet, Take 1 tablet (100 mcg total) by mouth daily, Disp: , Rfl:   •  LORazepam (ATIVAN) 1 mg tablet, Take 1 tablet (1 mg total) by mouth every 4 (four) hours as needed for anxiety, Disp: 100 tablet, Rfl: 1  •  methylphenidate (RITALIN) 5 mg tablet, Take 5 mg by mouth 2 (two) times a day before breakfast and lunch, Disp: , Rfl:   •  metoprolol tartrate (LOPRESSOR) 25 mg tablet, Take 12 5 mg by mouth every 12 (twelve) hours, Disp: , Rfl:   •  Multiple Vitamin (MULTIVITAMINS PO), Take 1 tablet by mouth daily  , Disp: , Rfl:   •  naloxone (NARCAN) 4 mg/0 1 mL nasal spray, Administer 1 spray into a nostril  If no response after 2-3 minutes, give another dose in the other nostril using a new spray , Disp: 1 each, Rfl: 1  •  oxyCODONE (Roxicodone) 5 immediate release tablet, Take 1 tablet (5 mg total) by mouth every 4 (four) hours as needed for moderate pain Max Daily Amount: 30 mg, Disp: 90 tablet, Rfl: 0  •  Selinexor, 40 MG Once Weekly, 40 MG TBPK, Take 1 tablet by mouth once a week, Disp: 4 each, Rfl: 3  •  sulfamethoxazole-trimethoprim (BACTRIM DS) 800-160 mg per tablet, 3 (three) times a week Monday Wednesday friday, Disp: , Rfl:   •  vitamin B-12 (VITAMIN B-12) 1,000 mcg tablet, Take 1,000 mcg by mouth daily, Disp: , Rfl:   •  Pomalidomide (Pomalyst) 2 MG CAPS, TAKE 1 CAPSULE DAILY FOR 21 DAYS, THEN OFF 7 DAYS EVERY 28 DAY CYCLE, Disp: 21 capsule, Rfl: 4  No current facility-administered medications for this visit      Facility-Administered Medications Ordered in Other Visits:   •  alteplase (CATHFLO) injection 2 mg, 2 mg, Intracatheter, Q1MIN PRN, Olam Mends, DO  •  alteplase (CATHFLO) injection 2 mg, 2 mg, Intracatheter, Q1MIN PRN, Olebenezer Mends, DO  •  ondansetron (ZOFRAN) 16 mg, dexamethasone (DECADRON) 20 mg in sodium chloride 0 9 % 50 mL IVPB, , Intravenous, Q12H, Gigi Dwyer DO, Stopped "at 05/24/23 0902  •  sodium chloride 0 9 % infusion, 20 mL/hr, Intravenous, Once, Ferna Rose, DO    Laboratory Results:  Lab Results   Component Value Date    HCT 22 9 (L) 05/22/2023    HGB 7 5 (L) 05/22/2023    MCV 99 (H) 05/22/2023    PLT 27 (LL) 05/22/2023    WBC 2 24 (L) 05/22/2023     Lab Results   Component Value Date    BUN 23 05/22/2023    CALCIUM 7 8 (L) 05/22/2023     05/22/2023    CO2 22 05/22/2023    CREATININE 2 15 (H) 05/22/2023    GLUC 95 04/19/2023    K 4 3 05/22/2023    SODIUM 126 (L) 05/22/2023     Lab Results   Component Value Date    CALCIUM 7 8 (L) 05/22/2023    PHOS 2 7 04/19/2023     No results found for: \"LABPROT\"    "

## 2023-05-24 NOTE — PROGRESS NOTES
Patient arrived to the unit and denied infection or dental concerns  He tolerated his kyprolis, zometa and a unit of blood  Patient is aware of his future appointments and left in stable condition

## 2023-05-24 NOTE — PATIENT INSTRUCTIONS
You are here for follow-up it was great to see you and we reviewed what you have been through in the interim between her last visit and you are now on new medication and you seem to be tolerating that well and you look really good  Your calcium is now normalized hopefully that means that the medicines are helping  Your creatinine level is 2 1 its been fluctuating a little bit between the high ones to low twos but this is around the same as when I met you  I am not quite sure but potentially could be related to the recurrence of the myeloma could be some of the medications but overall it is stable and at this point I do not want to recommend stopping anything such as Zometa because I want you on the best treatment available  You get labs weekly I will review them to make sure things are stable but if you see something that concerns you please reach out and call me or or contact me through [phone]    Also we noticed that your sodium concentration is low  I am not sure if that something called pseudohyponatremia that sometimes can occur when the total protein levels are high so working on a measure a test called a serum osmolality with your next blood work and I will call you to let you know the results and whether or not there is any other testing or things that would need to be done to evaluate it

## 2023-05-24 NOTE — PROGRESS NOTES
NEPHROLOGY PROGRESS NOTE    Torsten Villalpando 78 y o  male MRN: [id number]  Unit/Bed#:  Encounter: [id number]  Reason for Consult: Chronic kidney disease and hyponatremia                          The patient is here for routine follow-up  Reviewing his interim history with him he has been admitted to the hospital a couple times  Most recently was for syncope hypotension he had acute renal failure and hypercalcemia  He is receiving treatment for recurrent myeloma  Today he looks well he is here with his wife he is got a great attitude and really had no specific complaints except fatigue  ASSESSMENT/PLAN:  1  Renal    The patient has chronic kidney disease and creatinine seems to be fluctuating from the high ones to low 2 range  His most recent creatinine was 2 1 and this is posthospitalization when he recently had acute renal failure likely from volume depletion and hypercalcemia  Presently his calcium levels have improved on chemotherapy and he does continue with Zometa treatment  It seems that the renal functions worsened since he has had recurrence of myeloma and been on different therapeutic agents but fortunately it is stable and the patient is feeling well and tolerating his new medication  He receives labs weekly so we will monitor renal function they are familiar with the creatinine levels I told him to reach out if there is any concerns before next visit in a few months  I would not recommend any changes in his medications at this point with respect to myeloma treatment  2   Hyponatremia    Patient has no symptoms related to this but he does have recurrence of his paraprotein  The question is whether or not this is true hypoosmolar hyponatremia or potentially pseudohyponatremia  I have given him a slip to check a serum osmolality with his next labs and make an assessment of this  I will contact him with results and then determine if further work-up is needed    In the meantime I told him "not to drink excessive fluids as this potentially could worsen it if its not related to pseudohyponatremia  Serum osmolality  SUBJECTIVE:  Review of Systems   Constitutional: Positive for malaise/fatigue  Negative for chills, diaphoresis and fever  HENT: Negative  Eyes: Negative  Cardiovascular: Negative for chest pain, dyspnea on exertion, leg swelling and orthopnea  Respiratory: Negative  Negative for cough, shortness of breath, sputum production and wheezing  Gastrointestinal: Negative for abdominal pain, diarrhea, nausea and vomiting  Genitourinary: Negative for dysuria, flank pain, hematuria and incomplete emptying  Neurological: Negative for dizziness, focal weakness, headaches and weakness  Psychiatric/Behavioral: Negative for altered mental status, hallucinations and hypervigilance  The patient is not nervous/anxious  OBJECTIVE:  Current Weight: Weight - Scale: 68 9 kg (152 lb)  [email or web address] com:     Height 5' 10\" (1 778 m), weight 68 9 kg (152 lb)  , Body mass index is 21 81 kg/m²  [unfilled]    Physical Exam: Ht 5' 10\" (1 778 m)   Wt 68 9 kg (152 lb)   BMI 21 81 kg/m²   Physical Exam  Constitutional:       General: He is not in acute distress  Appearance: He is not toxic-appearing or diaphoretic  HENT:      Head: Normocephalic and atraumatic  Nose: Nose normal       Mouth/Throat:      Mouth: Mucous membranes are moist    Eyes:      General: No scleral icterus  Extraocular Movements: Extraocular movements intact  Cardiovascular:      Rate and Rhythm: Normal rate and regular rhythm  Heart sounds: No friction rub  No gallop  Comments: No significant edema  Pulmonary:      Effort: Pulmonary effort is normal  No respiratory distress  Breath sounds: No wheezing, rhonchi or rales  Abdominal:      General: Bowel sounds are normal  There is no distension  Palpations: Abdomen is soft  Tenderness:  There is no abdominal " tenderness  There is no rebound  Musculoskeletal:      Cervical back: Normal range of motion and neck supple  Neurological:      General: No focal deficit present  Mental Status: He is alert and oriented to person, place, and time  Mental status is at baseline  Psychiatric:         Mood and Affect: Mood normal          Behavior: Behavior normal          Thought Content: Thought content normal          Judgment: Judgment normal          Medications:    Current Outpatient Medications:   •  acetaminophen (TYLENOL) 325 mg tablet, Take 650 mg by mouth every 6 (six) hours as needed for mild pain, Disp: , Rfl:   •  acyclovir (ZOVIRAX) 200 mg capsule, Take 2 capsules (400 mg total) by mouth 2 (two) times a day, Disp: 120 capsule, Rfl: 6  •  Ascorbic Acid (vitamin C) 1000 MG tablet, Take 1,000 mg by mouth daily, Disp: , Rfl:   •  atorvastatin (LIPITOR) 20 mg tablet, TAKE 1 TABLET DAILY, Disp: 90 tablet, Rfl: 3  •  Cholecalciferol (VITAMIN D-3 PO), Take 1,000 Units by mouth daily , Disp: , Rfl:   •  dexamethasone (DECADRON) 4 mg tablet, Take 5 tablets (20 mg total) by mouth once a week On morning of Kyprolis treatment  , Disp: 20 tablet, Rfl: 5  •  escitalopram (LEXAPRO) 20 mg tablet, TAKE 1 TABLET DAILY, Disp: 90 tablet, Rfl: 0  •  Glutamine POWD, by Does not apply route, Disp: , Rfl:   •  lansoprazole (PREVACID) 15 mg capsule, Take 1 capsule (15 mg total) by mouth 2 (two) times a day Before meals, Disp: 120 capsule, Rfl: 6  •  levothyroxine 100 mcg tablet, Take 1 tablet (100 mcg total) by mouth daily, Disp: , Rfl:   •  LORazepam (ATIVAN) 1 mg tablet, Take 1 tablet (1 mg total) by mouth every 4 (four) hours as needed for anxiety, Disp: 100 tablet, Rfl: 1  •  methylphenidate (RITALIN) 5 mg tablet, Take 5 mg by mouth 2 (two) times a day before breakfast and lunch, Disp: , Rfl:   •  metoprolol tartrate (LOPRESSOR) 25 mg tablet, Take 12 5 mg by mouth every 12 (twelve) hours, Disp: , Rfl:   •  Multiple Vitamin (MULTIVITAMINS PO), Take 1 tablet by mouth daily  , Disp: , Rfl:   •  naloxone (NARCAN) 4 mg/0 1 mL nasal spray, Administer 1 spray into a nostril  If no response after 2-3 minutes, give another dose in the other nostril using a new spray , Disp: 1 each, Rfl: 1  •  oxyCODONE (Roxicodone) 5 immediate release tablet, Take 1 tablet (5 mg total) by mouth every 4 (four) hours as needed for moderate pain Max Daily Amount: 30 mg, Disp: 90 tablet, Rfl: 0  •  Selinexor, 40 MG Once Weekly, 40 MG TBPK, Take 1 tablet by mouth once a week, Disp: 4 each, Rfl: 3  •  sulfamethoxazole-trimethoprim (BACTRIM DS) 800-160 mg per tablet, 3 (three) times a week Monday Wednesday friday, Disp: , Rfl:   •  vitamin B-12 (VITAMIN B-12) 1,000 mcg tablet, Take 1,000 mcg by mouth daily, Disp: , Rfl:   •  Pomalidomide (Pomalyst) 2 MG CAPS, TAKE 1 CAPSULE DAILY FOR 21 DAYS, THEN OFF 7 DAYS EVERY 28 DAY CYCLE, Disp: 21 capsule, Rfl: 4  No current facility-administered medications for this visit      Facility-Administered Medications Ordered in Other Visits:   •  alteplase (CATHFLO) injection 2 mg, 2 mg, Intracatheter, Q1MIN PRN, Vickie Alva, DO  •  alteplase (CATHFLO) injection 2 mg, 2 mg, Intracatheter, Q1MIN PRN, Vickie Alva, DO  •  ondansetron (ZOFRAN) 16 mg, dexamethasone (DECADRON) 20 mg in sodium chloride 0 9 % 50 mL IVPB, , Intravenous, Q12H, Vickie Alva, DO, Stopped at 05/24/23 0902  •  sodium chloride 0 9 % infusion, 20 mL/hr, Intravenous, Once, Vickie Alva, DO    Laboratory Results:  Lab Results   Component Value Date    HCT 22 9 (L) 05/22/2023    HGB 7 5 (L) 05/22/2023    MCV 99 (H) 05/22/2023    PLT 27 (LL) 05/22/2023    WBC 2 24 (L) 05/22/2023     Lab Results   Component Value Date    BUN 23 05/22/2023    CALCIUM 7 8 (L) 05/22/2023     05/22/2023    CO2 22 05/22/2023    CREATININE 2 15 (H) 05/22/2023    GLUC 95 04/19/2023    K 4 3 05/22/2023    SODIUM 126 (L) 05/22/2023     Lab Results   Component Value Date "   CALCIUM 7 8 (L) 05/22/2023    PHOS 2 7 04/19/2023     No results found for: \"LABPROT\"    "

## 2023-05-25 ENCOUNTER — OFFICE VISIT (OUTPATIENT)
Dept: HEMATOLOGY ONCOLOGY | Facility: CLINIC | Age: 78
End: 2023-05-25

## 2023-05-25 VITALS
TEMPERATURE: 98 F | SYSTOLIC BLOOD PRESSURE: 122 MMHG | OXYGEN SATURATION: 100 % | HEART RATE: 69 BPM | RESPIRATION RATE: 17 BRPM | DIASTOLIC BLOOD PRESSURE: 74 MMHG | HEIGHT: 70 IN | WEIGHT: 154 LBS | BODY MASS INDEX: 22.05 KG/M2

## 2023-05-25 DIAGNOSIS — N18.31 ANEMIA DUE TO STAGE 3A CHRONIC KIDNEY DISEASE (HCC): ICD-10-CM

## 2023-05-25 DIAGNOSIS — C90.00 IGG MULTIPLE MYELOMA (HCC): Primary | ICD-10-CM

## 2023-05-25 DIAGNOSIS — D63.1 ANEMIA DUE TO STAGE 3A CHRONIC KIDNEY DISEASE (HCC): ICD-10-CM

## 2023-05-25 DIAGNOSIS — D61.82 MYELOPHTHISIC ANEMIA (HCC): ICD-10-CM

## 2023-05-25 NOTE — PROGRESS NOTES
[facility] [address]  [place] [id number] [phone]    Genevieve Hwang,1945, [id number]  05/25/23    Discussion:   In summary, this is a 68-year-old male with a history of myeloma as outlined  He is currently on an XR, Kyprolis, dexamethasone  Clinically he is stable  He has fatigue  He has some mental fogginess, fluctuating  He is able to carry out ADLs  CBC shows pancytopenia  Platelets have worsened since treatment was started in late April  He has no bleeding symptoms  He is required occasional red cell transfusions for symptomatic anemia  CMP shows stable creatinine at 2 0 +/-  Total protein had been up to 11 8 prior to treatment initiation, currently 9 8   ? Responding disease  He has discussed with his consultant physicians consideration for high-dose chemotherapy  Performance status probably precludes that at this time but if his disease improves it might become more reasonable consideration  I discussed the above with the patient  The patient and his wife voiced understanding and agreement   ______________________________________________________________________    Chief Complaint   Patient presents with   • Follow-up       HPI:  Oncology History   IgG multiple myeloma ([address] )   9/2015 Initial Diagnosis    Found to have Hbg of 6 with SOB, creatinine 1 8 and normal calcium with albumin of 2 1  Additonial blood work showed elevated protien level (12 1g/dL)  9/29/2015 Biopsy    Bone marrow biopsy demonstrated approximately 80% plasma cells with some immature forms noted  These studies showed 13q14 deletion, +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, 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)       10/14/2015 - 11/11/2015 Chemotherapy    Velcade 1 3 mg/m2 Days 1,8,15,22  Cytoxan 300 mg/m2  PO Days 1, 8,15, 22  Dexamethasone 40 mg PO Days 1,8,15, 22  Zometa 4 mg IV Day 1  Cycle length = 28 days    Completed 2 cycles    Day one of cycle 2 was on 11/11/15      12/2015 - 2/2016 Chemotherapy    VDT-PACE x 2 cycles   (completed at [facility] in [place], [place])     2/2016 Transplant    Melphalan 200 mg/m2 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  MRD +     8/2016 - 1/26/2018 Chemotherapy    Maintenance therapy:  Carfilzomib, orginally dosed 27 mg weekly then increased to 45 mg weekly after MRD reactivitation  Revlimid  Dexamethasone      2/2018 Biopsy    Bone marrow demonstrated positive minimal residual disease       2/23/2018 -  Chemotherapy    Daratumumab 16mg/m2 IV Day 1  Pomalyst 4 mg p o  daily 1-21  Dexamethasone 4 mg PO Days 2, 3  Dexamethasone 12 mg PODays 8, 15, 22  Cycle length = 28 days     4/11/2019 - 8/29/2019 Chemotherapy    methylPREDNISolone sodium succinate (Solu-MEDROL) 100 mg in sodium chloride 0 9 % 250 mL IVPB, 100 mg, Intravenous, Once, 5 of 12 cycles  Administration: 100 mg (6/7/2019), 100 mg (7/5/2019), 100 mg (8/2/2019)     8/30/2019 - 11/8/2019 Chemotherapy    cyclophosphamide (CYTOXAN) 1,000 mg in sodium chloride 0 9 % 250 mL IVPB, 990 mg (66 7 % of original dose 750 mg/m2), Intravenous, Once, 2 of 3 cycles  Dose modification: 500 mg/m2 (original dose 750 mg/m2, Cycle 1, Reason: Other (See Comments)), 250 mg/m2 (original dose 750 mg/m2, Cycle 4, Reason: Treatment Parameters Not Met)  Administration: 1,000 mg (8/30/2019), 1,000 mg (9/13/2019), 1,000 mg (9/27/2019), 500 mg (10/11/2019)  bortezomib (VELCADE) subcutaneous injection 2 6 mg, 1 3 mg/m2 = 2 6 mg (86 7 % of original dose 1 5 mg/m2), Subcutaneous, Once, 3 of 4 cycles  Dose modification: 1 3 mg/m2 (original dose 1 5 mg/m2, Cycle 1, Reason: Other (See Comments))  Administration: 2 6 mg (8/30/2019), 2 6 mg (9/13/2019), 2 6 mg (10/25/2019), 2 6 mg (11/8/2019), 2 6 mg (9/27/2019), 2 6 mg (11/1/2019), 2 6 mg (9/20/2019), 2 6 mg (10/4/2019), 2 6 mg (10/11/2019), 2 6 mg (10/18/2019)     12/6/2019 - 6/19/2020 Chemotherapy    pegfilgrastim (NEULASTA ONPRO) subcutaneous injection kit 6 mg, 6 mg, Subcutaneous, Once, 5 of 9 cycles  Administration: 6 mg (2/28/2020), 6 mg (3/13/2020), 6 mg (3/27/2020), 6 mg (4/10/2020), 6 mg (4/24/2020), 6 mg (5/8/2020), 6 mg (6/5/2020), 6 mg (5/22/2020), 6 mg (6/19/2020)  cyclophosphamide (CYTOXAN) 784 mg in sodium chloride 0 9 % 250 mL IVPB, 400 mg/m2 = 784 mg, Intravenous, Once, 5 of 9 cycles  Dose modification: 400 mg/m2 (original dose 750 mg/m2, Cycle 7, Reason: Other (See Comments))  Administration: 784 mg (2/28/2020), 784 mg (3/13/2020), 784 mg (3/27/2020), 784 mg (4/10/2020), 784 mg (4/24/2020), 784 mg (5/8/2020), 784 mg (5/22/2020), 784 mg (6/19/2020), 784 mg (6/5/2020)  methylPREDNISolone sodium succinate (Solu-MEDROL) 100 mg in sodium chloride 0 9 % 250 mL IVPB, 100 mg, Intravenous, Once, 8 of 12 cycles  Administration: 100 mg (12/6/2019), 100 mg (12/13/2019), 100 mg (12/20/2019), 100 mg (1/3/2020), 100 mg (1/10/2020), 100 mg (1/17/2020), 100 mg (1/31/2020), 100 mg (2/14/2020), 100 mg (5/22/2020), 100 mg (12/27/2019), 100 mg (1/24/2020), 100 mg (2/28/2020), 100 mg (3/13/2020), 100 mg (3/27/2020), 100 mg (4/10/2020), 100 mg (4/24/2020), 100 mg (5/8/2020), 100 mg (6/19/2020)     7/2/2020 -  Chemotherapy       10/17/2022 - 12/12/2022 Chemotherapy    elotuzumab (EMPLICITI) IVPB, 10 mg/kg = 757 5 mg, Intravenous, Once, 3 of 6 cycles  Administration: 757 5 mg (10/17/2022), 757 5 mg (10/24/2022), 757 5 mg (11/7/2022), 1,600 mg (12/12/2022), 757 5 mg (10/31/2022), 757 5 mg (11/14/2022), 800 mg (11/21/2022), 800 mg (11/28/2022), 800 mg (12/5/2022)     4/19/2023 -  Chemotherapy    alteplase (CATHFLO), 2 mg, Intracatheter, Every 1 Minute as needed, 2 of 6 cycles  carfilzomib (KYPROLIS) IVPB in dextrose, 107 mg (280 % of original dose 20 mg/m2), Intravenous, Once, 2 of 6 cycles  Dose modification: 56 mg/m2 (original dose 20 mg/m2, Cycle 1, Reason: Dose modified as per discussion with consulting physician), 56 mg/m2 (original dose 70 mg/m2, Cycle 1, Reason: Dose modified as per discussion with consulting physician)  Administration: 110 mg (4/19/2023), 110 mg (4/26/2023), 110 mg (5/10/2023), 110 mg (5/17/2023), 110 mg (5/24/2023), 110 mg (5/3/2023)         Interval History: Clinically stable  Fatigue  ECOG-  1 - Symptomatic but completely ambulatory    Review of Systems   Constitutional: Positive for fatigue  Negative for chills and fever  HENT: Negative for nosebleeds  Eyes: Negative for discharge  Respiratory: Negative for cough and shortness of breath  Cardiovascular: Negative for chest pain  Gastrointestinal: Negative for abdominal pain, constipation and diarrhea  Endocrine: Negative for polydipsia  Genitourinary: Negative for hematuria  Musculoskeletal: Negative for arthralgias  Skin: Negative for color change  Allergic/Immunologic: Negative for immunocompromised state  Neurological: Negative for dizziness and headaches  Hematological: Negative for adenopathy  Psychiatric/Behavioral: Negative for agitation         Past Medical History:   Diagnosis Date   • Cancer ([place] [address] ) 2015   • History of autologous stem cell transplant ([place] [address] )    • Hypertension    • Insomnia    • Multiple myeloma ([place] [address] )      Patient Active Problem List   Diagnosis   • IgG multiple myeloma (HCC)   • Essential hypertension   • Chronic ITP (idiopathic thrombocytopenia) ([facility])   • Hyperlipidemia   • Acquired hypothyroidism   • Prophylactic measure   • Hypogammaglobulinemia ([place] [address] )   • Anemia due to stage 3 chronic kidney disease ([facility])   • Anxiety   • Headache   • Anemia, unspecified   • Hypercalcemia   • CKD (chronic kidney disease)       Current Outpatient Medications:   •  acetaminophen (TYLENOL) 325 mg tablet, Take 650 mg by mouth every 6 (six) hours as needed for mild pain, Disp: , Rfl:   •  acyclovir (ZOVIRAX) 200 mg capsule, Take 2 capsules (400 mg total) by mouth 2 (two) times a day, Disp: 120 capsule, Rfl: 6  •  Ascorbic Acid (vitamin C) 1000 MG tablet, Take 1,000 mg by mouth daily, Disp: , Rfl:   •  atorvastatin (LIPITOR) 20 mg tablet, TAKE 1 TABLET DAILY, Disp: 90 tablet, Rfl: 3  •  Cholecalciferol (VITAMIN D-3 PO), Take 1,000 Units by mouth daily , Disp: , Rfl:   •  dexamethasone (DECADRON) 4 mg tablet, Take 5 tablets (20 mg total) by mouth once a week On morning of Kyprolis treatment  , Disp: 20 tablet, Rfl: 5  •  escitalopram (LEXAPRO) 20 mg tablet, TAKE 1 TABLET DAILY, Disp: 90 tablet, Rfl: 0  •  Glutamine POWD, by Does not apply route, Disp: , Rfl:   •  lansoprazole (PREVACID) 15 mg capsule, Take 1 capsule (15 mg total) by mouth 2 (two) times a day Before meals, Disp: 120 capsule, Rfl: 6  •  levothyroxine 100 mcg tablet, Take 1 tablet (100 mcg total) by mouth daily, Disp: , Rfl:   •  LORazepam (ATIVAN) 1 mg tablet, Take 1 tablet (1 mg total) by mouth every 4 (four) hours as needed for anxiety, Disp: 100 tablet, Rfl: 1  •  methylphenidate (RITALIN) 5 mg tablet, Take 5 mg by mouth 2 (two) times a day before breakfast and lunch, Disp: , Rfl:   •  metoprolol tartrate (LOPRESSOR) 25 mg tablet, Take 12 5 mg by mouth every 12 (twelve) hours, Disp: , Rfl:   •  Multiple Vitamin (MULTIVITAMINS PO), Take 1 tablet by mouth daily  , Disp: , Rfl:   •  naloxone (NARCAN) 4 mg/0 1 mL nasal spray, Administer 1 spray into a nostril   If no response after 2-3 minutes, give another dose in the other nostril using a new spray , Disp: 1 each, Rfl: 1  •  oxyCODONE (Roxicodone) 5 immediate release tablet, Take 1 tablet (5 mg total) by mouth every 4 (four) hours as needed for moderate pain Max Daily Amount: 30 mg, Disp: 90 tablet, Rfl: 0  •  Selinexor, 40 MG Once Weekly, 40 MG TBPK, Take 1 tablet by mouth once a week, Disp: 4 each, Rfl: 3  •  sulfamethoxazole-trimethoprim (BACTRIM DS) 800-160 mg per tablet, 3 (three) times a week Monday Wednesday friday, Disp: , Rfl:   •  vitamin B-12 (VITAMIN B-12) 1,000 mcg tablet, Take 1,000 mcg by mouth daily, Disp: , Rfl:   •  Pomalidomide (Pomalyst) 2 MG CAPS, TAKE 1 CAPSULE "DAILY FOR 21 DAYS, THEN OFF 7 DAYS EVERY 28 DAY CYCLE, Disp: 21 capsule, Rfl: 4  No current facility-administered medications for this visit  Facility-Administered Medications Ordered in Other Visits:   •  alteplase (CATHFLO) injection 2 mg, 2 mg, Intracatheter, Q1MIN PRN, Kimmie Doll, DO  •  alteplase (CATHFLO) injection 2 mg, 2 mg, Intracatheter, Q1MIN PRN, Kimmie Doll, DO  •  sodium chloride 0 9 % infusion, 20 mL/hr, Intravenous, Once, Kimmie Doll, DO  No Known Allergies  Past Surgical History:   Procedure Laterality Date   • APPENDECTOMY      Last assessed: 9/25/15   • ARTHROSCOPY KNEE Left     9/30/09   • EYE SURGERY      Lasik   • KNEE CARTILAGE SURGERY     • LIMBAL STEM CELL TRANSPLANT      Patient had 2 in [place]-2/29/2016 and 4/13/2016     Social History     Objective:  Vitals:    05/25/23 0802   BP: 122/74   BP Location: Left arm   Patient Position: Sitting   Cuff Size: Adult   Pulse: 69   Resp: 17   Temp: 98 °F (36 7 °C)   SpO2: 100%   Weight: 69 9 kg (154 lb)   Height: 5' 10\" (1 778 m)     Physical Exam  Constitutional:       Appearance: He is well-developed  HENT:      Head: Normocephalic and atraumatic  Eyes:      Pupils: Pupils are equal, round, and reactive to light  Cardiovascular:      Rate and Rhythm: Normal rate and regular rhythm  Heart sounds: No murmur heard  Pulmonary:      Breath sounds: Normal breath sounds  No wheezing or rales  Abdominal:      Palpations: Abdomen is soft  Tenderness: There is no abdominal tenderness  Musculoskeletal:         General: No tenderness  Normal range of motion  Cervical back: Neck supple  Lymphadenopathy:      Cervical: No cervical adenopathy  Skin:     Findings: No erythema or rash  Neurological:      Mental Status: He is alert and oriented to person, place, and time  Cranial Nerves: No cranial nerve deficit  Deep Tendon Reflexes: Reflexes are normal and symmetric     Psychiatric:         " Behavior: Behavior normal            Labs: I personally reviewed the labs and imaging pertinent to this patient care

## 2023-05-28 DIAGNOSIS — Z29.9 PROPHYLACTIC MEASURE: ICD-10-CM

## 2023-05-30 ENCOUNTER — TELEPHONE (OUTPATIENT)
Dept: HEMATOLOGY ONCOLOGY | Facility: CLINIC | Age: 78
End: 2023-05-30

## 2023-05-30 ENCOUNTER — APPOINTMENT (OUTPATIENT)
Dept: LAB | Facility: CLINIC | Age: 78
End: 2023-05-30

## 2023-05-30 ENCOUNTER — LAB REQUISITION (OUTPATIENT)
Dept: LAB | Facility: HOSPITAL | Age: 78
End: 2023-05-30

## 2023-05-30 DIAGNOSIS — D61.82 MYELOPHTHISIC ANEMIA (HCC): ICD-10-CM

## 2023-05-30 DIAGNOSIS — D63.1 ANEMIA IN CHRONIC KIDNEY DISEASE (CODE): ICD-10-CM

## 2023-05-30 DIAGNOSIS — D69.3 CHRONIC ITP (IDIOPATHIC THROMBOCYTOPENIA) (HCC): ICD-10-CM

## 2023-05-30 DIAGNOSIS — D61.82: ICD-10-CM

## 2023-05-30 DIAGNOSIS — D69.3 IMMUNE THROMBOCYTOPENIC PURPURA (HCC): ICD-10-CM

## 2023-05-30 DIAGNOSIS — N18.31 CHRONIC KIDNEY DISEASE, STAGE 3A (HCC): ICD-10-CM

## 2023-05-30 DIAGNOSIS — D63.1 ANEMIA DUE TO STAGE 3A CHRONIC KIDNEY DISEASE (HCC): ICD-10-CM

## 2023-05-30 DIAGNOSIS — N18.31 ANEMIA DUE TO STAGE 3A CHRONIC KIDNEY DISEASE (HCC): ICD-10-CM

## 2023-05-30 DIAGNOSIS — C90.00 IGG MULTIPLE MYELOMA (HCC): ICD-10-CM

## 2023-05-30 LAB
ABO GROUP BLD: NORMAL
BASOPHILS # BLD AUTO: 0 THOUSANDS/ÂΜL (ref 0–0.1)
BASOPHILS NFR BLD AUTO: 0 % (ref 0–1)
BLD GP AB SCN SERPL QL: NEGATIVE
EOSINOPHIL # BLD AUTO: 0.01 THOUSAND/ÂΜL (ref 0–0.61)
EOSINOPHIL NFR BLD AUTO: 1 % (ref 0–6)
ERYTHROCYTE [DISTWIDTH] IN BLOOD BY AUTOMATED COUNT: 24.6 % (ref 11.6–15.1)
HCT VFR BLD AUTO: 26 % (ref 36.5–49.3)
HGB BLD-MCNC: 8.5 G/DL (ref 12–17)
IMM GRANULOCYTES # BLD AUTO: 0.02 THOUSAND/UL (ref 0–0.2)
IMM GRANULOCYTES NFR BLD AUTO: 1 % (ref 0–2)
LYMPHOCYTES # BLD AUTO: 0.29 THOUSANDS/ÂΜL (ref 0.6–4.47)
LYMPHOCYTES NFR BLD AUTO: 13 % (ref 14–44)
MCH RBC QN AUTO: 32.9 PG (ref 26.8–34.3)
MCHC RBC AUTO-ENTMCNC: 32.7 G/DL (ref 31.4–37.4)
MCV RBC AUTO: 101 FL (ref 82–98)
MONOCYTES # BLD AUTO: 0.46 THOUSAND/ÂΜL (ref 0.17–1.22)
MONOCYTES NFR BLD AUTO: 21 % (ref 4–12)
NEUTROPHILS # BLD AUTO: 1.39 THOUSANDS/ÂΜL (ref 1.85–7.62)
NEUTS SEG NFR BLD AUTO: 64 % (ref 43–75)
NRBC BLD AUTO-RTO: 0 /100 WBCS
PLATELET # BLD AUTO: 29 THOUSANDS/UL (ref 149–390)
PMV BLD AUTO: 11.8 FL (ref 8.9–12.7)
RBC # BLD AUTO: 2.58 MILLION/UL (ref 3.88–5.62)
RH BLD: POSITIVE
SPECIMEN EXPIRATION DATE: NORMAL
WBC # BLD AUTO: 2.17 THOUSAND/UL (ref 4.31–10.16)

## 2023-05-30 RX ORDER — MECOBALAMIN 5000 MCG
15 TABLET,DISINTEGRATING ORAL 2 TIMES DAILY
Qty: 120 CAPSULE | Refills: 0 | Status: SHIPPED | OUTPATIENT
Start: 2023-05-30

## 2023-05-30 NOTE — TELEPHONE ENCOUNTER
Rec'd critical plt of 29  Expected abnormality, positive improvement  Pt gets transfusions for plts less than 10  No new orders

## 2023-05-31 ENCOUNTER — HOSPITAL ENCOUNTER (OUTPATIENT)
Dept: INFUSION CENTER | Facility: CLINIC | Age: 78
Discharge: HOME | End: 2023-05-31
Payer: MEDICARE

## 2023-05-31 VITALS
DIASTOLIC BLOOD PRESSURE: 81 MMHG | BODY MASS INDEX: 21.27 KG/M2 | HEIGHT: 70 IN | RESPIRATION RATE: 16 BRPM | WEIGHT: 148.59 LBS | HEART RATE: 87 BPM | SYSTOLIC BLOOD PRESSURE: 119 MMHG | TEMPERATURE: 98.3 F

## 2023-05-31 DIAGNOSIS — C90.00 IGG MULTIPLE MYELOMA (HCC): Primary | ICD-10-CM

## 2023-05-31 PROCEDURE — 96367 TX/PROPH/DG ADDL SEQ IV INF: CPT

## 2023-05-31 PROCEDURE — 96413 CHEMO IV INFUSION 1 HR: CPT

## 2023-05-31 RX ORDER — SODIUM CHLORIDE 9 MG/ML
20 INJECTION, SOLUTION INTRAVENOUS ONCE
Status: COMPLETED | OUTPATIENT
Start: 2023-05-31 | End: 2023-05-31

## 2023-05-31 RX ADMIN — SODIUM CHLORIDE 20 ML/HR: 0.9 INJECTION, SOLUTION INTRAVENOUS at 09:16

## 2023-05-31 RX ADMIN — SODIUM CHLORIDE 250 ML: 0.9 INJECTION, SOLUTION INTRAVENOUS at 08:40

## 2023-05-31 RX ADMIN — DEXAMETHASONE SODIUM PHOSPHATE: 10 INJECTION, SOLUTION INTRAMUSCULAR; INTRAVENOUS at 09:16

## 2023-05-31 RX ADMIN — CARFILZOMIB 110 MG: 10 INJECTION, POWDER, LYOPHILIZED, FOR SOLUTION INTRAVENOUS at 10:03

## 2023-05-31 NOTE — PLAN OF CARE
Problem: INFECTION - ADULT  Goal: Absence or prevention of progression during hospitalization  Description: INTERVENTIONS:  - Assess and monitor for signs and symptoms of infection  - Monitor lab/diagnostic results  - Monitor all insertion sites, i e  indwelling lines, tubes, and drains  - Monitor endotracheal if appropriate and nasal secretions for changes in amount and color  - [place] appropriate cooling/warming therapies per order  - Administer medications as ordered  - Instruct and encourage patient and family to use good hand hygiene technique  - Identify and instruct in appropriate isolation precautions for identified infection/condition  Outcome: Progressing  Goal: Absence of fever/infection during neutropenic period  Description: INTERVENTIONS:  - Monitor WBC    Outcome: Progressing     Problem: Knowledge Deficit  Goal: Patient/family/caregiver demonstrates understanding of disease process, treatment plan, medications, and discharge instructions  Description: Complete learning assessment and assess knowledge base    Interventions:  - Provide teaching at level of understanding  - Provide teaching via preferred learning methods  Outcome: Progressing

## 2023-06-01 RX ORDER — SODIUM CHLORIDE 9 MG/ML
20 INJECTION, SOLUTION INTRAVENOUS ONCE
Status: CANCELLED | OUTPATIENT
Start: 2023-06-07

## 2023-06-01 RX ORDER — SODIUM CHLORIDE 9 MG/ML
20 INJECTION, SOLUTION INTRAVENOUS ONCE
OUTPATIENT
Start: 2023-06-28

## 2023-06-01 RX ORDER — SODIUM CHLORIDE 9 MG/ML
20 INJECTION, SOLUTION INTRAVENOUS ONCE
OUTPATIENT
Start: 2023-06-14

## 2023-06-01 RX ORDER — SODIUM CHLORIDE 9 MG/ML
20 INJECTION, SOLUTION INTRAVENOUS ONCE
OUTPATIENT
Start: 2023-06-21

## 2023-06-05 ENCOUNTER — APPOINTMENT (OUTPATIENT)
Dept: LAB | Facility: CLINIC | Age: 78
End: 2023-06-05
Payer: MEDICARE

## 2023-06-05 DIAGNOSIS — C90.00 IGG MULTIPLE MYELOMA (HCC): ICD-10-CM

## 2023-06-05 DIAGNOSIS — D69.3 CHRONIC ITP (IDIOPATHIC THROMBOCYTOPENIA) (HCC): ICD-10-CM

## 2023-06-05 DIAGNOSIS — C90.00 IGG MULTIPLE MYELOMA (HCC): Primary | ICD-10-CM

## 2023-06-05 DIAGNOSIS — D63.1 ANEMIA DUE TO STAGE 3A CHRONIC KIDNEY DISEASE (HCC): ICD-10-CM

## 2023-06-05 DIAGNOSIS — I10 ESSENTIAL HYPERTENSION: Primary | ICD-10-CM

## 2023-06-05 DIAGNOSIS — N18.31 ANEMIA DUE TO STAGE 3A CHRONIC KIDNEY DISEASE (HCC): ICD-10-CM

## 2023-06-05 DIAGNOSIS — D61.82 MYELOPHTHISIC ANEMIA (HCC): ICD-10-CM

## 2023-06-05 DIAGNOSIS — E87.1 HYPONATREMIA: ICD-10-CM

## 2023-06-05 LAB
ALBUMIN SERPL BCP-MCNC: 2.8 G/DL (ref 3.5–5)
ALP SERPL-CCNC: 64 U/L (ref 46–116)
ALT SERPL W P-5'-P-CCNC: 21 U/L (ref 12–78)
ANION GAP SERPL CALCULATED.3IONS-SCNC: 3 MMOL/L (ref 4–13)
AST SERPL W P-5'-P-CCNC: 22 U/L (ref 5–45)
BASOPHILS # BLD AUTO: 0.01 THOUSANDS/ÂΜL (ref 0–0.1)
BASOPHILS NFR BLD AUTO: 1 % (ref 0–1)
BILIRUB SERPL-MCNC: 0.86 MG/DL (ref 0.2–1)
BUN SERPL-MCNC: 34 MG/DL (ref 5–25)
CALCIUM ALBUM COR SERPL-MCNC: 9.7 MG/DL (ref 8.3–10.1)
CALCIUM SERPL-MCNC: 8.7 MG/DL (ref 8.3–10.1)
CHLORIDE SERPL-SCNC: 106 MMOL/L (ref 96–108)
CO2 SERPL-SCNC: 21 MMOL/L (ref 21–32)
CREAT SERPL-MCNC: 1.93 MG/DL (ref 0.6–1.3)
EOSINOPHIL # BLD AUTO: 0.01 THOUSAND/ÂΜL (ref 0–0.61)
EOSINOPHIL NFR BLD AUTO: 1 % (ref 0–6)
ERYTHROCYTE [DISTWIDTH] IN BLOOD BY AUTOMATED COUNT: 24.3 % (ref 11.6–15.1)
GFR SERPL CREATININE-BSD FRML MDRD: 33 ML/MIN/1.73SQ M
GLUCOSE P FAST SERPL-MCNC: 93 MG/DL (ref 65–99)
HCT VFR BLD AUTO: 24.6 % (ref 36.5–49.3)
HGB BLD-MCNC: 8.4 G/DL (ref 12–17)
IMM GRANULOCYTES # BLD AUTO: 0.04 THOUSAND/UL (ref 0–0.2)
IMM GRANULOCYTES NFR BLD AUTO: 2 % (ref 0–2)
LYMPHOCYTES # BLD AUTO: 0.31 THOUSANDS/ÂΜL (ref 0.6–4.47)
LYMPHOCYTES NFR BLD AUTO: 14 % (ref 14–44)
MCH RBC QN AUTO: 34.4 PG (ref 26.8–34.3)
MCHC RBC AUTO-ENTMCNC: 34.1 G/DL (ref 31.4–37.4)
MCV RBC AUTO: 101 FL (ref 82–98)
MONOCYTES # BLD AUTO: 0.37 THOUSAND/ÂΜL (ref 0.17–1.22)
MONOCYTES NFR BLD AUTO: 17 % (ref 4–12)
NEUTROPHILS # BLD AUTO: 1.45 THOUSANDS/ÂΜL (ref 1.85–7.62)
NEUTS SEG NFR BLD AUTO: 65 % (ref 43–75)
NRBC BLD AUTO-RTO: 1 /100 WBCS
OSMOLALITY UR/SERPL-RTO: 292 MMOL/KG (ref 282–298)
PLATELET # BLD AUTO: 25 THOUSANDS/UL (ref 149–390)
PMV BLD AUTO: 11.1 FL (ref 8.9–12.7)
POTASSIUM SERPL-SCNC: 4.2 MMOL/L (ref 3.5–5.3)
PROT SERPL-MCNC: 10 G/DL (ref 6.4–8.4)
RBC # BLD AUTO: 2.44 MILLION/UL (ref 3.88–5.62)
SODIUM SERPL-SCNC: 130 MMOL/L (ref 135–147)
WBC # BLD AUTO: 2.19 THOUSAND/UL (ref 4.31–10.16)

## 2023-06-05 PROCEDURE — 85025 COMPLETE CBC W/AUTO DIFF WBC: CPT

## 2023-06-05 PROCEDURE — 83930 ASSAY OF BLOOD OSMOLALITY: CPT

## 2023-06-05 PROCEDURE — 36415 COLL VENOUS BLD VENIPUNCTURE: CPT

## 2023-06-05 PROCEDURE — 80053 COMPREHEN METABOLIC PANEL: CPT

## 2023-06-06 NOTE — PROGRESS NOTES
I reviewed patient's labs sodium concentration was 130 mill equivalents per liter but serum osmolality was normal at 292 mmol/kg  I was suspicious this may have been pseudohyponatremia due to the proteins in his blood and this supports that suspicion so I contacted left a message that nothing to do about it  Also creatinine of 1 9 which is stable and a little better than last visit  We will just continue with follow-up  I left a message on his home phone

## 2023-06-07 ENCOUNTER — HOSPITAL ENCOUNTER (OUTPATIENT)
Dept: INFUSION CENTER | Facility: CLINIC | Age: 78
Discharge: HOME | End: 2023-06-07
Payer: MEDICARE

## 2023-06-07 VITALS
BODY MASS INDEX: 21.4 KG/M2 | SYSTOLIC BLOOD PRESSURE: 102 MMHG | HEART RATE: 77 BPM | TEMPERATURE: 97.7 F | RESPIRATION RATE: 18 BRPM | WEIGHT: 149.47 LBS | HEIGHT: 70 IN | DIASTOLIC BLOOD PRESSURE: 66 MMHG

## 2023-06-07 DIAGNOSIS — C90.00 IGG MULTIPLE MYELOMA (HCC): Primary | ICD-10-CM

## 2023-06-07 RX ORDER — SODIUM CHLORIDE 9 MG/ML
20 INJECTION, SOLUTION INTRAVENOUS ONCE
Status: COMPLETED | OUTPATIENT
Start: 2023-06-07 | End: 2023-06-07

## 2023-06-07 RX ADMIN — SODIUM CHLORIDE 250 ML: 0.9 INJECTION, SOLUTION INTRAVENOUS at 08:22

## 2023-06-07 RX ADMIN — CARFILZOMIB 100 MG: 10 INJECTION, POWDER, LYOPHILIZED, FOR SOLUTION INTRAVENOUS at 09:27

## 2023-06-07 RX ADMIN — SODIUM CHLORIDE 20 ML/HR: 0.9 INJECTION, SOLUTION INTRAVENOUS at 08:21

## 2023-06-07 RX ADMIN — DEXAMETHASONE SODIUM PHOSPHATE: 10 INJECTION, SOLUTION INTRAMUSCULAR; INTRAVENOUS at 08:24

## 2023-06-12 ENCOUNTER — TELEPHONE (OUTPATIENT)
Dept: HEMATOLOGY ONCOLOGY | Facility: CLINIC | Age: 78
End: 2023-06-12

## 2023-06-12 ENCOUNTER — APPOINTMENT (OUTPATIENT)
Dept: LAB | Facility: CLINIC | Age: 78
End: 2023-06-12
Payer: MEDICARE

## 2023-06-12 DIAGNOSIS — D63.1 ANEMIA DUE TO STAGE 3A CHRONIC KIDNEY DISEASE (HCC): ICD-10-CM

## 2023-06-12 DIAGNOSIS — D61.82 MYELOPHTHISIC ANEMIA (HCC): ICD-10-CM

## 2023-06-12 DIAGNOSIS — N18.31 ANEMIA DUE TO STAGE 3A CHRONIC KIDNEY DISEASE (HCC): ICD-10-CM

## 2023-06-12 DIAGNOSIS — D69.3 CHRONIC ITP (IDIOPATHIC THROMBOCYTOPENIA) (HCC): ICD-10-CM

## 2023-06-12 DIAGNOSIS — C90.00 IGG MULTIPLE MYELOMA (HCC): ICD-10-CM

## 2023-06-12 LAB
ABO GROUP BLD: NORMAL
ALBUMIN SERPL BCP-MCNC: 2.8 G/DL (ref 3.5–5)
ALP SERPL-CCNC: 59 U/L (ref 46–116)
ALT SERPL W P-5'-P-CCNC: 18 U/L (ref 12–78)
ANION GAP SERPL CALCULATED.3IONS-SCNC: 7 MMOL/L (ref 4–13)
AST SERPL W P-5'-P-CCNC: 19 U/L (ref 5–45)
BASOPHILS # BLD AUTO: 0 THOUSANDS/ÂΜL (ref 0–0.1)
BASOPHILS NFR BLD AUTO: 0 % (ref 0–1)
BILIRUB SERPL-MCNC: 0.73 MG/DL (ref 0.2–1)
BLD GP AB SCN SERPL QL: NEGATIVE
BUN SERPL-MCNC: 25 MG/DL (ref 5–25)
CALCIUM ALBUM COR SERPL-MCNC: 9.5 MG/DL (ref 8.3–10.1)
CALCIUM SERPL-MCNC: 8.5 MG/DL (ref 8.3–10.1)
CHLORIDE SERPL-SCNC: 104 MMOL/L (ref 96–108)
CO2 SERPL-SCNC: 20 MMOL/L (ref 21–32)
CREAT SERPL-MCNC: 1.84 MG/DL (ref 0.6–1.3)
EOSINOPHIL # BLD AUTO: 0.01 THOUSAND/ÂΜL (ref 0–0.61)
EOSINOPHIL NFR BLD AUTO: 1 % (ref 0–6)
ERYTHROCYTE [DISTWIDTH] IN BLOOD BY AUTOMATED COUNT: 24.6 % (ref 11.6–15.1)
GFR SERPL CREATININE-BSD FRML MDRD: 35 ML/MIN/1.73SQ M
GLUCOSE P FAST SERPL-MCNC: 90 MG/DL (ref 65–99)
HCT VFR BLD AUTO: 23.8 % (ref 36.5–49.3)
HGB BLD-MCNC: 7.6 G/DL (ref 12–17)
IGA SERPL-MCNC: <8 MG/DL (ref 70–400)
IGG SERPL-MCNC: 3950 MG/DL (ref 700–1600)
IGM SERPL-MCNC: 10 MG/DL (ref 40–230)
IMM GRANULOCYTES # BLD AUTO: 0.02 THOUSAND/UL (ref 0–0.2)
IMM GRANULOCYTES NFR BLD AUTO: 1 % (ref 0–2)
LYMPHOCYTES # BLD AUTO: 0.21 THOUSANDS/ÂΜL (ref 0.6–4.47)
LYMPHOCYTES NFR BLD AUTO: 11 % (ref 14–44)
MCH RBC QN AUTO: 33.9 PG (ref 26.8–34.3)
MCHC RBC AUTO-ENTMCNC: 31.9 G/DL (ref 31.4–37.4)
MCV RBC AUTO: 106 FL (ref 82–98)
MONOCYTES # BLD AUTO: 0.31 THOUSAND/ÂΜL (ref 0.17–1.22)
MONOCYTES NFR BLD AUTO: 17 % (ref 4–12)
NEUTROPHILS # BLD AUTO: 1.32 THOUSANDS/ÂΜL (ref 1.85–7.62)
NEUTS SEG NFR BLD AUTO: 70 % (ref 43–75)
NRBC BLD AUTO-RTO: 1 /100 WBCS
PLATELET # BLD AUTO: 29 THOUSANDS/UL (ref 149–390)
PMV BLD AUTO: 11 FL (ref 8.9–12.7)
POTASSIUM SERPL-SCNC: 3.8 MMOL/L (ref 3.5–5.3)
PROT SERPL-MCNC: 9.9 G/DL (ref 6.4–8.4)
RBC # BLD AUTO: 2.24 MILLION/UL (ref 3.88–5.62)
RH BLD: POSITIVE
SODIUM SERPL-SCNC: 131 MMOL/L (ref 135–147)
SPECIMEN EXPIRATION DATE: NORMAL
WBC # BLD AUTO: 1.92 THOUSAND/UL (ref 4.31–10.16)

## 2023-06-12 PROCEDURE — 86850 RBC ANTIBODY SCREEN: CPT

## 2023-06-12 PROCEDURE — 86900 BLOOD TYPING SEROLOGIC ABO: CPT

## 2023-06-12 PROCEDURE — 80053 COMPREHEN METABOLIC PANEL: CPT

## 2023-06-12 PROCEDURE — 85025 COMPLETE CBC W/AUTO DIFF WBC: CPT

## 2023-06-12 PROCEDURE — 82784 ASSAY IGA/IGD/IGG/IGM EACH: CPT

## 2023-06-12 PROCEDURE — 83521 IG LIGHT CHAINS FREE EACH: CPT

## 2023-06-12 PROCEDURE — 86901 BLOOD TYPING SEROLOGIC RH(D): CPT

## 2023-06-12 PROCEDURE — 36415 COLL VENOUS BLD VENIPUNCTURE: CPT

## 2023-06-12 PROCEDURE — 84165 PROTEIN E-PHORESIS SERUM: CPT

## 2023-06-12 NOTE — TELEPHONE ENCOUNTER
Discussed with Dr Nancy Gonzalez, patient ok to proceed with treatment; will put an ok to treat order in

## 2023-06-13 LAB
KAPPA LC FREE SER-MCNC: 0.8 MG/L (ref 3.3–19.4)
KAPPA LC FREE/LAMBDA FREE SER: 0.02 {RATIO} (ref 0.26–1.65)
LAMBDA LC FREE SERPL-MCNC: 49.4 MG/L (ref 5.7–26.3)

## 2023-06-14 ENCOUNTER — HOSPITAL ENCOUNTER (OUTPATIENT)
Dept: INFUSION CENTER | Facility: CLINIC | Age: 78
Discharge: HOME | End: 2023-06-14
Payer: MEDICARE

## 2023-06-14 VITALS
BODY MASS INDEX: 21.02 KG/M2 | TEMPERATURE: 98.7 F | HEART RATE: 91 BPM | DIASTOLIC BLOOD PRESSURE: 71 MMHG | RESPIRATION RATE: 18 BRPM | HEIGHT: 70 IN | SYSTOLIC BLOOD PRESSURE: 117 MMHG | WEIGHT: 146.83 LBS

## 2023-06-14 DIAGNOSIS — D63.1 ANEMIA DUE TO STAGE 3A CHRONIC KIDNEY DISEASE (HCC): ICD-10-CM

## 2023-06-14 DIAGNOSIS — N18.31 ANEMIA DUE TO STAGE 3A CHRONIC KIDNEY DISEASE (HCC): ICD-10-CM

## 2023-06-14 DIAGNOSIS — D69.3 CHRONIC ITP (IDIOPATHIC THROMBOCYTOPENIA) (HCC): ICD-10-CM

## 2023-06-14 DIAGNOSIS — D80.1 HYPOGAMMAGLOBULINEMIA (HCC): ICD-10-CM

## 2023-06-14 DIAGNOSIS — C90.00 IGG MULTIPLE MYELOMA (HCC): Primary | ICD-10-CM

## 2023-06-14 DIAGNOSIS — D61.82 MYELOPHTHISIC ANEMIA (HCC): ICD-10-CM

## 2023-06-14 LAB
ALBUMIN SERPL ELPH-MCNC: 3.54 G/DL (ref 3.2–5.1)
ALBUMIN SERPL ELPH-MCNC: 36.9 % (ref 48–70)
ALPHA1 GLOB SERPL ELPH-MCNC: 0.36 G/DL (ref 0.15–0.47)
ALPHA1 GLOB SERPL ELPH-MCNC: 3.7 % (ref 1.8–7)
ALPHA2 GLOB SERPL ELPH-MCNC: 0.8 G/DL (ref 0.42–1.04)
ALPHA2 GLOB SERPL ELPH-MCNC: 8.3 % (ref 5.9–14.9)
BETA GLOB ABNORMAL SERPL ELPH-MCNC: 0.39 G/DL (ref 0.31–0.57)
BETA1 GLOB SERPL ELPH-MCNC: 4.1 % (ref 4.7–7.7)
BETA2 GLOB SERPL ELPH-MCNC: 2.3 % (ref 3.1–7.9)
BETA2+GAMMA GLOB SERPL ELPH-MCNC: 0.22 G/DL (ref 0.2–0.58)
GAMMA GLOB ABNORMAL SERPL ELPH-MCNC: 4.29 G/DL (ref 0.4–1.66)
GAMMA GLOB SERPL ELPH-MCNC: 44.7 % (ref 6.9–22.3)
IGG/ALB SER: 0.58 {RATIO} (ref 1.1–1.8)
M PROTEIN 1 MFR SERPL ELPH: 43.2 %
M PROTEIN 1 SERPL ELPH-MCNC: 4.15 G/DL
PROT PATTERN SERPL ELPH-IMP: ABNORMAL
PROT SERPL-MCNC: 9.6 G/DL (ref 6.4–8.2)

## 2023-06-14 PROCEDURE — 86921 COMPATIBILITY TEST INCUBATE: CPT

## 2023-06-14 PROCEDURE — 84165 PROTEIN E-PHORESIS SERUM: CPT | Performed by: PATHOLOGY

## 2023-06-14 PROCEDURE — 86922 COMPATIBILITY TEST ANTIGLOB: CPT

## 2023-06-14 PROCEDURE — P9040 RBC LEUKOREDUCED IRRADIATED: HCPCS

## 2023-06-14 RX ORDER — SODIUM CHLORIDE 9 MG/ML
20 INJECTION, SOLUTION INTRAVENOUS ONCE
Status: COMPLETED | OUTPATIENT
Start: 2023-06-14 | End: 2023-06-14

## 2023-06-14 RX ORDER — SODIUM CHLORIDE 9 MG/ML
20 INJECTION, SOLUTION INTRAVENOUS ONCE
Start: 2023-06-28 | End: 2023-06-26

## 2023-06-14 RX ORDER — SODIUM CHLORIDE 9 MG/ML
20 INJECTION, SOLUTION INTRAVENOUS ONCE
OUTPATIENT
Start: 2023-06-19

## 2023-06-14 RX ADMIN — SODIUM CHLORIDE 20 ML/HR: 0.9 INJECTION, SOLUTION INTRAVENOUS at 08:35

## 2023-06-14 RX ADMIN — SODIUM CHLORIDE 250 ML: 0.9 INJECTION, SOLUTION INTRAVENOUS at 08:35

## 2023-06-14 RX ADMIN — DEXAMETHASONE SODIUM PHOSPHATE: 10 INJECTION, SOLUTION INTRAMUSCULAR; INTRAVENOUS at 08:36

## 2023-06-14 RX ADMIN — SODIUM CHLORIDE 20 ML/HR: 0.9 INJECTION, SOLUTION INTRAVENOUS at 13:11

## 2023-06-14 RX ADMIN — SODIUM CHLORIDE 20 ML/HR: 0.9 INJECTION, SOLUTION INTRAVENOUS at 10:32

## 2023-06-14 RX ADMIN — Medication 30 G: at 10:33

## 2023-06-14 RX ADMIN — CARFILZOMIB 100 MG: 10 INJECTION, POWDER, LYOPHILIZED, FOR SOLUTION INTRAVENOUS at 09:43

## 2023-06-14 NOTE — PROGRESS NOTES
Patient arrived to unit without complaint  Patient tolerated chemotherapy, IVIG, and 1 unit of PRBCs without incident  AVS declined and patient aware of next appointment  Patient left in stable condition

## 2023-06-19 ENCOUNTER — APPOINTMENT (OUTPATIENT)
Dept: LAB | Facility: CLINIC | Age: 78
End: 2023-06-19
Payer: MEDICARE

## 2023-06-19 DIAGNOSIS — D69.3 CHRONIC ITP (IDIOPATHIC THROMBOCYTOPENIA) (HCC): ICD-10-CM

## 2023-06-19 DIAGNOSIS — C90.00 IGG MULTIPLE MYELOMA (HCC): ICD-10-CM

## 2023-06-19 DIAGNOSIS — D63.1 ANEMIA DUE TO STAGE 3A CHRONIC KIDNEY DISEASE (HCC): ICD-10-CM

## 2023-06-19 DIAGNOSIS — N18.31 ANEMIA DUE TO STAGE 3A CHRONIC KIDNEY DISEASE (HCC): ICD-10-CM

## 2023-06-19 DIAGNOSIS — D61.82 MYELOPHTHISIC ANEMIA (HCC): ICD-10-CM

## 2023-06-19 LAB
BASOPHILS # BLD AUTO: 0 THOUSANDS/ÂΜL (ref 0–0.1)
BASOPHILS NFR BLD AUTO: 0 % (ref 0–1)
EOSINOPHIL # BLD AUTO: 0.01 THOUSAND/ÂΜL (ref 0–0.61)
EOSINOPHIL NFR BLD AUTO: 0 % (ref 0–6)
ERYTHROCYTE [DISTWIDTH] IN BLOOD BY AUTOMATED COUNT: 26.3 % (ref 11.6–15.1)
HCT VFR BLD AUTO: 23.9 % (ref 36.5–49.3)
HGB BLD-MCNC: 8.1 G/DL (ref 12–17)
IMM GRANULOCYTES # BLD AUTO: 0.04 THOUSAND/UL (ref 0–0.2)
IMM GRANULOCYTES NFR BLD AUTO: 2 % (ref 0–2)
LYMPHOCYTES # BLD AUTO: 0.3 THOUSANDS/ÂΜL (ref 0.6–4.47)
LYMPHOCYTES NFR BLD AUTO: 13 % (ref 14–44)
MCH RBC QN AUTO: 34.3 PG (ref 26.8–34.3)
MCHC RBC AUTO-ENTMCNC: 33.9 G/DL (ref 31.4–37.4)
MCV RBC AUTO: 101 FL (ref 82–98)
MONOCYTES # BLD AUTO: 0.37 THOUSAND/ÂΜL (ref 0.17–1.22)
MONOCYTES NFR BLD AUTO: 16 % (ref 4–12)
NEUTROPHILS # BLD AUTO: 1.59 THOUSANDS/ÂΜL (ref 1.85–7.62)
NEUTS SEG NFR BLD AUTO: 69 % (ref 43–75)
NRBC BLD AUTO-RTO: 1 /100 WBCS
PLATELET # BLD AUTO: 35 THOUSANDS/UL (ref 149–390)
PMV BLD AUTO: 10.9 FL (ref 8.9–12.7)
RBC # BLD AUTO: 2.36 MILLION/UL (ref 3.88–5.62)
WBC # BLD AUTO: 2.31 THOUSAND/UL (ref 4.31–10.16)

## 2023-06-19 PROCEDURE — 36415 COLL VENOUS BLD VENIPUNCTURE: CPT

## 2023-06-19 PROCEDURE — 85025 COMPLETE CBC W/AUTO DIFF WBC: CPT

## 2023-06-21 ENCOUNTER — HOSPITAL ENCOUNTER (OUTPATIENT)
Dept: INFUSION CENTER | Facility: CLINIC | Age: 78
Discharge: HOME | End: 2023-06-21
Payer: MEDICARE

## 2023-06-21 VITALS
HEIGHT: 70 IN | SYSTOLIC BLOOD PRESSURE: 108 MMHG | HEART RATE: 72 BPM | RESPIRATION RATE: 16 BRPM | TEMPERATURE: 98 F | WEIGHT: 146.39 LBS | DIASTOLIC BLOOD PRESSURE: 60 MMHG | BODY MASS INDEX: 20.96 KG/M2

## 2023-06-21 DIAGNOSIS — C90.00 IGG MULTIPLE MYELOMA (HCC): Primary | ICD-10-CM

## 2023-06-21 PROCEDURE — 96367 TX/PROPH/DG ADDL SEQ IV INF: CPT

## 2023-06-21 PROCEDURE — 96413 CHEMO IV INFUSION 1 HR: CPT

## 2023-06-21 RX ORDER — SODIUM CHLORIDE 9 MG/ML
20 INJECTION, SOLUTION INTRAVENOUS ONCE
Status: COMPLETED | OUTPATIENT
Start: 2023-06-21 | End: 2023-06-21

## 2023-06-21 RX ADMIN — CARFILZOMIB 100 MG: 10 INJECTION, POWDER, LYOPHILIZED, FOR SOLUTION INTRAVENOUS at 09:30

## 2023-06-21 RX ADMIN — DEXAMETHASONE SODIUM PHOSPHATE: 10 INJECTION, SOLUTION INTRAMUSCULAR; INTRAVENOUS at 08:27

## 2023-06-21 RX ADMIN — SODIUM CHLORIDE 250 ML: 0.9 INJECTION, SOLUTION INTRAVENOUS at 08:29

## 2023-06-21 RX ADMIN — SODIUM CHLORIDE 20 ML/HR: 0.9 INJECTION, SOLUTION INTRAVENOUS at 08:27

## 2023-06-22 ENCOUNTER — OFFICE VISIT (OUTPATIENT)
Dept: HEMATOLOGY ONCOLOGY | Facility: CLINIC | Age: 78
End: 2023-06-22
Payer: MEDICARE

## 2023-06-22 ENCOUNTER — TELEPHONE (OUTPATIENT)
Dept: HEMATOLOGY ONCOLOGY | Facility: CLINIC | Age: 78
End: 2023-06-22

## 2023-06-22 VITALS
DIASTOLIC BLOOD PRESSURE: 78 MMHG | SYSTOLIC BLOOD PRESSURE: 124 MMHG | TEMPERATURE: 97.3 F | HEIGHT: 70 IN | RESPIRATION RATE: 17 BRPM | OXYGEN SATURATION: 100 % | HEART RATE: 68 BPM | WEIGHT: 149 LBS | BODY MASS INDEX: 21.33 KG/M2

## 2023-06-22 DIAGNOSIS — C90.00 IGG MULTIPLE MYELOMA (HCC): Primary | ICD-10-CM

## 2023-06-22 PROCEDURE — 99215 OFFICE O/P EST HI 40 MIN: CPT | Performed by: INTERNAL MEDICINE

## 2023-06-22 NOTE — TELEPHONE ENCOUNTER
Called out to patient, spoke with spouse confirmed additional cycles  Per patient will check mychart

## 2023-06-22 NOTE — PROGRESS NOTES
[facility] HEMATOLOGY ONCOLOGY SPECIALISTS [facility]RE[facility]  [address] [address]  [phone]    Elida Hwang,1945, [id number]  06/22/23    Discussion:   In summary, this is a 45-year-old male with a history of myeloma as outlined  He is currently on selinexor, Kyprolis, dexamethasone  He continues to have intermittent mental fogginess  He also has blunted appetite and altered smell sensation  Disease is responding by decreased IgG, serum free kappa, etc  most recent values prior to ours were at [facility]  We will check EPO level to evaluate for potential utility of supplementation  Nutritional recommendations  I suggested increasing dexamethasone to 20 mg BIW rather than once a week to try to improve his taste  If this is ineffective after 2 weeks we could consider Zyprexa on the possibility that this is related to Selinexor  I discussed the above with the patient  The patient and his wife voiced understanding and agreement   ______________________________________________________________________    Chief Complaint   Patient presents with   • Follow-up       HPI:  Oncology History   IgG multiple myeloma ([address] )   9/2015 Initial Diagnosis    Found to have Hbg of 6 with SOB, creatinine 1 8 and normal calcium with albumin of 2 1  Additonial blood work showed elevated protien level (12 1g/dL)  9/29/2015 Biopsy    Bone marrow biopsy demonstrated approximately 80% plasma cells with some immature forms noted  These studies showed 13q14 deletion, +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, 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)       10/14/2015 - 11/11/2015 Chemotherapy    Velcade 1 3 mg/m2 Days 1,8,15,22  Cytoxan 300 mg/m2  PO Days 1, 8,15, 22  Dexamethasone 40 mg PO Days 1,8,15, 22  Zometa 4 mg IV Day 1  Cycle length = 28 days    Completed 2 cycles    Day one of cycle 2 was on 11/11/15      12/2015 - 2/2016 Chemotherapy    VDT-PACE x 2 cycles   (completed at [facility] in [place], [place])     2/2016 Transplant    Melphalan 200 mg/m2 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  MRD +     8/2016 - 1/26/2018 Chemotherapy    Maintenance therapy:  Carfilzomib, orginally dosed 27 mg weekly then increased to 45 mg weekly after MRD reactivitation  Revlimid  Dexamethasone      2/2018 Biopsy    Bone marrow demonstrated positive minimal residual disease       2/23/2018 -  Chemotherapy    Daratumumab 16mg/m2 IV Day 1  Pomalyst 4 mg p o  daily 1-21  Dexamethasone 4 mg PO Days 2, 3  Dexamethasone 12 mg PODays 8, 15, 22  Cycle length = 28 days     4/11/2019 - 8/29/2019 Chemotherapy    methylPREDNISolone sodium succinate (Solu-MEDROL) 100 mg in sodium chloride 0 9 % 250 mL IVPB, 100 mg, Intravenous, Once, 5 of 12 cycles  Administration: 100 mg (6/7/2019), 100 mg (7/5/2019), 100 mg (8/2/2019)     8/30/2019 - 11/8/2019 Chemotherapy    cyclophosphamide (CYTOXAN) 1,000 mg in sodium chloride 0 9 % 250 mL IVPB, 990 mg (66 7 % of original dose 750 mg/m2), Intravenous, Once, 2 of 3 cycles  Dose modification: 500 mg/m2 (original dose 750 mg/m2, Cycle 1, Reason: Other (See Comments)), 250 mg/m2 (original dose 750 mg/m2, Cycle 4, Reason: Treatment Parameters Not Met)  Administration: 1,000 mg (8/30/2019), 1,000 mg (9/13/2019), 1,000 mg (9/27/2019), 500 mg (10/11/2019)  bortezomib (VELCADE) subcutaneous injection 2 6 mg, 1 3 mg/m2 = 2 6 mg (86 7 % of original dose 1 5 mg/m2), Subcutaneous, Once, 3 of 4 cycles  Dose modification: 1 3 mg/m2 (original dose 1 5 mg/m2, Cycle 1, Reason: Other (See Comments))  Administration: 2 6 mg (8/30/2019), 2 6 mg (9/13/2019), 2 6 mg (10/25/2019), 2 6 mg (11/8/2019), 2 6 mg (9/27/2019), 2 6 mg (11/1/2019), 2 6 mg (9/20/2019), 2 6 mg (10/4/2019), 2 6 mg (10/11/2019), 2 6 mg (10/18/2019)     12/6/2019 - 6/19/2020 Chemotherapy    pegfilgrastim (NEULASTA ONPRO) subcutaneous injection kit 6 mg, 6 mg, Subcutaneous, Once, 5 of 9 cycles  Administration: 6 mg (2/28/2020), 6 mg (3/13/2020), 6 mg (3/27/2020), 6 mg (4/10/2020), 6 mg (4/24/2020), 6 mg (5/8/2020), 6 mg (6/5/2020), 6 mg (5/22/2020), 6 mg (6/19/2020)  cyclophosphamide (CYTOXAN) 784 mg in sodium chloride 0 9 % 250 mL IVPB, 400 mg/m2 = 784 mg, Intravenous, Once, 5 of 9 cycles  Dose modification: 400 mg/m2 (original dose 750 mg/m2, Cycle 7, Reason: Other (See Comments))  Administration: 784 mg (2/28/2020), 784 mg (3/13/2020), 784 mg (3/27/2020), 784 mg (4/10/2020), 784 mg (4/24/2020), 784 mg (5/8/2020), 784 mg (5/22/2020), 784 mg (6/19/2020), 784 mg (6/5/2020)  methylPREDNISolone sodium succinate (Solu-MEDROL) 100 mg in sodium chloride 0 9 % 250 mL IVPB, 100 mg, Intravenous, Once, 8 of 12 cycles  Administration: 100 mg (12/6/2019), 100 mg (12/13/2019), 100 mg (12/20/2019), 100 mg (1/3/2020), 100 mg (1/10/2020), 100 mg (1/17/2020), 100 mg (1/31/2020), 100 mg (2/14/2020), 100 mg (5/22/2020), 100 mg (12/27/2019), 100 mg (1/24/2020), 100 mg (2/28/2020), 100 mg (3/13/2020), 100 mg (3/27/2020), 100 mg (4/10/2020), 100 mg (4/24/2020), 100 mg (5/8/2020), 100 mg (6/19/2020)     7/2/2020 -  Chemotherapy       10/17/2022 - 12/12/2022 Chemotherapy    elotuzumab (EMPLICITI) IVPB, 10 mg/kg = 757 5 mg, Intravenous, Once, 3 of 6 cycles  Administration: 757 5 mg (10/17/2022), 757 5 mg (10/24/2022), 757 5 mg (11/7/2022), 1,600 mg (12/12/2022), 757 5 mg (10/31/2022), 757 5 mg (11/14/2022), 800 mg (11/21/2022), 800 mg (11/28/2022), 800 mg (12/5/2022)     4/19/2023 -  Chemotherapy    alteplase (CATHFLO), 2 mg, Intracatheter, Every 1 Minute as needed, 3 of 6 cycles  carfilzomib (KYPROLIS) IVPB in dextrose, 107 mg (280 % of original dose 20 mg/m2), Intravenous, Once, 3 of 6 cycles  Dose modification: 56 mg/m2 (original dose 20 mg/m2, Cycle 1, Reason: Dose modified as per discussion with consulting physician), 56 mg/m2 (original dose 70 mg/m2, Cycle 1, Reason: Dose modified as per discussion with consulting physician)  Administration: 110 mg (4/19/2023), 110 mg (4/26/2023), 110 mg (5/10/2023), 110 mg (5/17/2023), 110 mg (5/24/2023), 110 mg (5/3/2023), 110 mg (5/31/2023), 100 mg (6/7/2023), 100 mg (6/14/2023), 100 mg (6/21/2023)         Interval History: Clinically stable  Altered taste sensation  ECOG-  1 - Symptomatic but completely ambulatory    Review of Systems   Constitutional: Positive for appetite change and fatigue  Negative for chills and fever  HENT: Negative for nosebleeds  Eyes: Negative for discharge  Respiratory: Negative for cough and shortness of breath  Cardiovascular: Negative for chest pain  Gastrointestinal: Negative for abdominal pain, constipation and diarrhea  Endocrine: Negative for polydipsia  Genitourinary: Negative for hematuria  Musculoskeletal: Negative for arthralgias  Skin: Negative for color change  Allergic/Immunologic: Negative for immunocompromised state  Neurological: Positive for weakness  Negative for dizziness and headaches  Hematological: Negative for adenopathy  Psychiatric/Behavioral: Negative for agitation         Past Medical History:   Diagnosis Date   • Cancer ([place] [address] ) 2015   • History of autologous stem cell transplant ([place] [address] )    • Hypertension    • Insomnia    • Multiple myeloma ([place] [address] )      Patient Active Problem List   Diagnosis   • IgG multiple myeloma (HCC)   • Essential hypertension   • Chronic ITP (idiopathic thrombocytopenia) ([facility])   • Hyperlipidemia   • Acquired hypothyroidism   • Prophylactic measure   • Hypogammaglobulinemia ([place] [address] )   • Anemia due to stage 3 chronic kidney disease (HCC)   • Anxiety   • Headache   • Anemia, unspecified   • Hypercalcemia   • CKD (chronic kidney disease)       Current Outpatient Medications:   •  acetaminophen (TYLENOL) 325 mg tablet, Take 650 mg by mouth every 6 (six) hours as needed for mild pain, Disp: , Rfl:   •  acyclovir (ZOVIRAX) 200 mg capsule, Take 2 capsules (400 mg total) by mouth 2 (two) times a day, Disp: 120 capsule, Rfl: 6  •  Ascorbic Acid (vitamin C) 1000 MG tablet, Take 1,000 mg by mouth daily, Disp: , Rfl:   •  atorvastatin (LIPITOR) 20 mg tablet, TAKE 1 TABLET DAILY, Disp: 90 tablet, Rfl: 3  •  Cholecalciferol (VITAMIN D-3 PO), Take 1,000 Units by mouth daily , Disp: , Rfl:   •  dexamethasone (DECADRON) 4 mg tablet, Take 5 tablets (20 mg total) by mouth once a week On morning of Kyprolis treatment  , Disp: 20 tablet, Rfl: 5  •  escitalopram (LEXAPRO) 20 mg tablet, TAKE 1 TABLET DAILY, Disp: 90 tablet, Rfl: 0  •  Glutamine POWD, by Does not apply route, Disp: , Rfl:   •  lansoprazole (PREVACID) 15 mg capsule, Take 1 capsule (15 mg total) by mouth 2 (two) times a day Before meals, Disp: 120 capsule, Rfl: 0  •  levothyroxine 100 mcg tablet, Take 1 tablet (100 mcg total) by mouth daily, Disp: , Rfl:   •  LORazepam (ATIVAN) 1 mg tablet, Take 1 tablet (1 mg total) by mouth every 4 (four) hours as needed for anxiety, Disp: 100 tablet, Rfl: 1  •  methylphenidate (RITALIN) 5 mg tablet, Take 5 mg by mouth 2 (two) times a day before breakfast and lunch, Disp: , Rfl:   •  metoprolol tartrate (LOPRESSOR) 25 mg tablet, Take 0 5 tablets (12 5 mg total) by mouth every 12 (twelve) hours, Disp: 90 tablet, Rfl: 0  •  Multiple Vitamin (MULTIVITAMINS PO), Take 1 tablet by mouth daily  , Disp: , Rfl:   •  naloxone (NARCAN) 4 mg/0 1 mL nasal spray, Administer 1 spray into a nostril   If no response after 2-3 minutes, give another dose in the other nostril using a new spray , Disp: 1 each, Rfl: 1  •  oxyCODONE (Roxicodone) 5 immediate release tablet, Take 1 tablet (5 mg total) by mouth every 4 (four) hours as needed for moderate pain Max Daily Amount: 30 mg, Disp: 90 tablet, Rfl: 0  •  Selinexor, 40 MG Once Weekly, 40 MG TBPK, Take 1 tablet by mouth once a week, Disp: 4 each, Rfl: 3  •  sulfamethoxazole-trimethoprim (BACTRIM DS) 800-160 mg per tablet, 3 (three) times a week Monday Wednesday friday, Disp: , Rfl:   •  vitamin B-12 (VITAMIN B-12) 1,000 mcg tablet, Take 1,000 mcg by mouth daily, Disp: , Rfl:   •  Pomalidomide "(Pomalyst) 2 MG CAPS, TAKE 1 CAPSULE DAILY FOR 21 DAYS, THEN OFF 7 DAYS EVERY 28 DAY CYCLE, Disp: 21 capsule, Rfl: 4  No current facility-administered medications for this visit  Facility-Administered Medications Ordered in Other Visits:   •  alteplase (CATHFLO) injection 2 mg, 2 mg, Intracatheter, Q1MIN PRN, La [place], DO  •  ondansetron (ZOFRAN) 16 mg, dexamethasone (DECADRON) 20 mg in sodium chloride 0 9 % 50 mL IVPB, , Intravenous, Q12H, [place], DO, Stopped at 06/21/23 0847  No Known Allergies  Past Surgical History:   Procedure Laterality Date   • APPENDECTOMY      Last assessed: 9/25/15   • ARTHROSCOPY KNEE Left     9/30/09   • EYE SURGERY      Lasik   • KNEE CARTILAGE SURGERY     • LIMBAL STEM CELL TRANSPLANT      Patient had 2 in [place]-2/29/2016 and 4/13/2016     Social History     Objective:  Vitals:    06/22/23 1054   BP: 124/78   BP Location: Left arm   Patient Position: Sitting   Cuff Size: Adult   Pulse: 68   Resp: 17   Temp: (!) 97 3 °F (36 3 °C)   SpO2: 100%   Weight: 67 6 kg (149 lb)   Height: 5' 10\" (1 778 m)     Physical Exam  Constitutional:       Appearance: He is well-developed  HENT:      Head: Normocephalic and atraumatic  Eyes:      Pupils: Pupils are equal, round, and reactive to light  Cardiovascular:      Rate and Rhythm: Normal rate and regular rhythm  Heart sounds: No murmur heard  Pulmonary:      Breath sounds: Normal breath sounds  No wheezing or rales  Abdominal:      Palpations: Abdomen is soft  Tenderness: There is no abdominal tenderness  Musculoskeletal:         General: No tenderness  Normal range of motion  Cervical back: Neck supple  Lymphadenopathy:      Cervical: No cervical adenopathy  Skin:     Findings: No erythema or rash  Neurological:      Mental Status: He is alert and oriented to person, place, and time  Cranial Nerves: No cranial nerve deficit  Deep Tendon Reflexes: Reflexes are normal and symmetric   " Psychiatric:         Behavior: Behavior normal            Labs: I personally reviewed the labs and imaging pertinent to this patient care

## 2023-06-26 ENCOUNTER — APPOINTMENT (OUTPATIENT)
Dept: LAB | Facility: CLINIC | Age: 78
End: 2023-06-26
Payer: MEDICARE

## 2023-06-26 DIAGNOSIS — D63.1 ANEMIA DUE TO STAGE 3A CHRONIC KIDNEY DISEASE (HCC): ICD-10-CM

## 2023-06-26 DIAGNOSIS — N18.31 ANEMIA DUE TO STAGE 3A CHRONIC KIDNEY DISEASE (HCC): ICD-10-CM

## 2023-06-26 DIAGNOSIS — R63.0 DECREASED APPETITE: Primary | ICD-10-CM

## 2023-06-26 DIAGNOSIS — T45.1X5A CHEMOTHERAPY-INDUCED NAUSEA: ICD-10-CM

## 2023-06-26 DIAGNOSIS — C90.00 IGG MULTIPLE MYELOMA (HCC): ICD-10-CM

## 2023-06-26 DIAGNOSIS — R11.0 CHEMOTHERAPY-INDUCED NAUSEA: ICD-10-CM

## 2023-06-26 DIAGNOSIS — D69.3 CHRONIC ITP (IDIOPATHIC THROMBOCYTOPENIA) (HCC): ICD-10-CM

## 2023-06-26 DIAGNOSIS — D61.82 MYELOPHTHISIC ANEMIA (HCC): ICD-10-CM

## 2023-06-26 LAB
ALBUMIN SERPL BCP-MCNC: 2.5 G/DL (ref 3.5–5)
ALP SERPL-CCNC: 51 U/L (ref 46–116)
ALT SERPL W P-5'-P-CCNC: 16 U/L (ref 12–78)
ANION GAP SERPL CALCULATED.3IONS-SCNC: 2 MMOL/L
AST SERPL W P-5'-P-CCNC: 16 U/L (ref 5–45)
BASOPHILS # BLD AUTO: 0.02 THOUSANDS/ÂΜL (ref 0–0.1)
BASOPHILS NFR BLD AUTO: 0 % (ref 0–1)
BILIRUB SERPL-MCNC: 0.76 MG/DL (ref 0.2–1)
BUN SERPL-MCNC: 36 MG/DL (ref 5–25)
CALCIUM ALBUM COR SERPL-MCNC: 9.6 MG/DL (ref 8.3–10.1)
CALCIUM SERPL-MCNC: 8.4 MG/DL (ref 8.3–10.1)
CHLORIDE SERPL-SCNC: 108 MMOL/L (ref 96–108)
CO2 SERPL-SCNC: 21 MMOL/L (ref 21–32)
CREAT SERPL-MCNC: 1.84 MG/DL (ref 0.6–1.3)
EOSINOPHIL # BLD AUTO: 0.01 THOUSAND/ÂΜL (ref 0–0.61)
EOSINOPHIL NFR BLD AUTO: 0 % (ref 0–6)
GFR SERPL CREATININE-BSD FRML MDRD: 35 ML/MIN/1.73SQ M
GLUCOSE P FAST SERPL-MCNC: 92 MG/DL (ref 65–99)
HCT VFR BLD AUTO: 20.9 % (ref 36.5–49.3)
HGB BLD-MCNC: 7 G/DL (ref 12–17)
IMM GRANULOCYTES # BLD AUTO: 0.12 THOUSAND/UL (ref 0–0.2)
IMM GRANULOCYTES NFR BLD AUTO: 2 % (ref 0–2)
LYMPHOCYTES # BLD AUTO: 0.42 THOUSANDS/ÂΜL (ref 0.6–4.47)
LYMPHOCYTES NFR BLD AUTO: 7 % (ref 14–44)
MCH RBC QN AUTO: 35.2 PG (ref 26.8–34.3)
MCHC RBC AUTO-ENTMCNC: 33.5 G/DL (ref 31.4–37.4)
MCV RBC AUTO: 105 FL (ref 82–98)
MONOCYTES # BLD AUTO: 0.57 THOUSAND/ÂΜL (ref 0.17–1.22)
MONOCYTES NFR BLD AUTO: 10 % (ref 4–12)
NEUTROPHILS # BLD AUTO: 4.58 THOUSANDS/ÂΜL (ref 1.85–7.62)
NEUTS SEG NFR BLD AUTO: 81 % (ref 43–75)
NRBC BLD AUTO-RTO: 1 /100 WBCS
PLATELET # BLD AUTO: 31 THOUSANDS/UL (ref 149–390)
PMV BLD AUTO: 10.7 FL (ref 8.9–12.7)
POTASSIUM SERPL-SCNC: 4.1 MMOL/L (ref 3.5–5.3)
PROT SERPL-MCNC: 9.4 G/DL (ref 6.4–8.4)
RBC # BLD AUTO: 1.99 MILLION/UL (ref 3.88–5.62)
SODIUM SERPL-SCNC: 131 MMOL/L (ref 135–147)
WBC # BLD AUTO: 5.72 THOUSAND/UL (ref 4.31–10.16)

## 2023-06-26 PROCEDURE — 36415 COLL VENOUS BLD VENIPUNCTURE: CPT

## 2023-06-26 PROCEDURE — 86850 RBC ANTIBODY SCREEN: CPT | Performed by: INTERNAL MEDICINE

## 2023-06-26 PROCEDURE — 85025 COMPLETE CBC W/AUTO DIFF WBC: CPT

## 2023-06-26 PROCEDURE — 86921 COMPATIBILITY TEST INCUBATE: CPT

## 2023-06-26 PROCEDURE — 82668 ASSAY OF ERYTHROPOIETIN: CPT

## 2023-06-26 PROCEDURE — 86922 COMPATIBILITY TEST ANTIGLOB: CPT

## 2023-06-26 PROCEDURE — 86900 BLOOD TYPING SEROLOGIC ABO: CPT | Performed by: INTERNAL MEDICINE

## 2023-06-26 PROCEDURE — 86901 BLOOD TYPING SEROLOGIC RH(D): CPT | Performed by: INTERNAL MEDICINE

## 2023-06-26 PROCEDURE — 80053 COMPREHEN METABOLIC PANEL: CPT

## 2023-06-26 RX ORDER — OLANZAPINE 2.5 MG/1
2.5 TABLET ORAL
Qty: 30 TABLET | Refills: 3 | Status: SHIPPED | OUTPATIENT
Start: 2023-06-26

## 2023-06-26 RX ORDER — OLANZAPINE 2.5 MG/1
2.5 TABLET ORAL
Qty: 30 TABLET | Refills: 3 | Status: CANCELLED | OUTPATIENT
Start: 2023-06-26

## 2023-06-27 ENCOUNTER — TELEPHONE (OUTPATIENT)
Dept: HEMATOLOGY ONCOLOGY | Facility: CLINIC | Age: 78
End: 2023-06-27

## 2023-06-27 DIAGNOSIS — D61.82 MYELOPHTHISIC ANEMIA (HCC): Primary | ICD-10-CM

## 2023-06-27 LAB
ABO GROUP BLD: NORMAL
BLD GP AB SCN SERPL QL: NEGATIVE
EPO SERPL-ACNC: 107.5 MIU/ML (ref 2.6–18.5)
RH BLD: POSITIVE
SPECIMEN EXPIRATION DATE: NORMAL

## 2023-06-27 RX ORDER — SODIUM CHLORIDE 9 MG/ML
20 INJECTION, SOLUTION INTRAVENOUS ONCE
Status: CANCELLED | OUTPATIENT
Start: 2023-07-03

## 2023-06-27 NOTE — TELEPHONE ENCOUNTER
"Called patient to see if he is symptomatic secondary to his hemoglobin being 7  Wife, Ale Cooper answered the phone and reports patient gets SOB when moving around, but \"otherwise is good\"  I made her aware that a blood transfusion was added to patient's treatment tomorrow, so he will be there a little longer tomorrow but won't have to go another day to make another trip  She verbalized understanding and has no other questions or concerns at this moment     "

## 2023-06-28 ENCOUNTER — HOSPITAL ENCOUNTER (OUTPATIENT)
Dept: INFUSION CENTER | Facility: CLINIC | Age: 78
Discharge: HOME | End: 2023-06-28
Payer: MEDICARE

## 2023-06-28 VITALS
BODY MASS INDEX: 20.96 KG/M2 | TEMPERATURE: 97.9 F | DIASTOLIC BLOOD PRESSURE: 56 MMHG | RESPIRATION RATE: 18 BRPM | HEIGHT: 70 IN | HEART RATE: 69 BPM | SYSTOLIC BLOOD PRESSURE: 103 MMHG | WEIGHT: 146.39 LBS

## 2023-06-28 DIAGNOSIS — D61.82 MYELOPHTHISIC ANEMIA (HCC): Primary | ICD-10-CM

## 2023-06-28 DIAGNOSIS — N18.31 ANEMIA DUE TO STAGE 3A CHRONIC KIDNEY DISEASE (HCC): ICD-10-CM

## 2023-06-28 DIAGNOSIS — D69.3 CHRONIC ITP (IDIOPATHIC THROMBOCYTOPENIA) (HCC): ICD-10-CM

## 2023-06-28 DIAGNOSIS — D63.1 ANEMIA DUE TO STAGE 3A CHRONIC KIDNEY DISEASE (HCC): ICD-10-CM

## 2023-06-28 DIAGNOSIS — C90.00 IGG MULTIPLE MYELOMA (HCC): ICD-10-CM

## 2023-06-28 PROCEDURE — P9040 RBC LEUKOREDUCED IRRADIATED: HCPCS

## 2023-06-28 RX ORDER — SODIUM CHLORIDE 9 MG/ML
20 INJECTION, SOLUTION INTRAVENOUS ONCE
Status: COMPLETED | OUTPATIENT
Start: 2023-06-28 | End: 2023-06-28

## 2023-06-28 RX ADMIN — SODIUM CHLORIDE 250 ML: 9 INJECTION, SOLUTION INTRAVENOUS at 08:30

## 2023-06-28 RX ADMIN — CARFILZOMIB 100 MG: 10 INJECTION, POWDER, LYOPHILIZED, FOR SOLUTION INTRAVENOUS at 09:42

## 2023-06-28 RX ADMIN — SODIUM CHLORIDE 20 ML/HR: 0.9 INJECTION, SOLUTION INTRAVENOUS at 08:30

## 2023-06-28 RX ADMIN — SODIUM CHLORIDE 20 ML/HR: 0.9 INJECTION, SOLUTION INTRAVENOUS at 11:15

## 2023-06-28 RX ADMIN — DEXAMETHASONE SODIUM PHOSPHATE: 10 INJECTION, SOLUTION INTRAMUSCULAR; INTRAVENOUS at 08:57

## 2023-06-28 NOTE — PROGRESS NOTES
Patient arrived to unit without complaint  Patient tolerated chemotherapy and 1 unit of PRBCs without incident  AVS declined and patient aware of next appointment  Patient left in stable condition

## 2023-06-29 RX ORDER — SODIUM CHLORIDE 9 MG/ML
20 INJECTION, SOLUTION INTRAVENOUS ONCE
OUTPATIENT
Start: 2023-07-12

## 2023-06-29 RX ORDER — SODIUM CHLORIDE 9 MG/ML
20 INJECTION, SOLUTION INTRAVENOUS ONCE
OUTPATIENT
Start: 2023-07-19

## 2023-06-29 RX ORDER — SODIUM CHLORIDE 9 MG/ML
20 INJECTION, SOLUTION INTRAVENOUS ONCE
Status: CANCELLED | OUTPATIENT
Start: 2023-07-05

## 2023-06-29 RX ORDER — SODIUM CHLORIDE 9 MG/ML
20 INJECTION, SOLUTION INTRAVENOUS ONCE
OUTPATIENT
Start: 2023-07-26

## 2023-06-30 DIAGNOSIS — F41.9 ANXIETY: ICD-10-CM

## 2023-06-30 RX ORDER — ESCITALOPRAM OXALATE 20 MG/1
TABLET ORAL
Qty: 90 TABLET | Refills: 1 | Status: SHIPPED | OUTPATIENT
Start: 2023-06-30

## 2023-07-03 ENCOUNTER — APPOINTMENT (OUTPATIENT)
Dept: LAB | Facility: CLINIC | Age: 78
End: 2023-07-03
Payer: MEDICARE

## 2023-07-03 DIAGNOSIS — C90.00 IGG MULTIPLE MYELOMA (HCC): ICD-10-CM

## 2023-07-03 DIAGNOSIS — N18.31 ANEMIA DUE TO STAGE 3A CHRONIC KIDNEY DISEASE (HCC): ICD-10-CM

## 2023-07-03 DIAGNOSIS — D63.1 ANEMIA DUE TO STAGE 3A CHRONIC KIDNEY DISEASE (HCC): ICD-10-CM

## 2023-07-03 DIAGNOSIS — D69.3 CHRONIC ITP (IDIOPATHIC THROMBOCYTOPENIA) (HCC): ICD-10-CM

## 2023-07-03 DIAGNOSIS — D61.82 MYELOPHTHISIC ANEMIA (HCC): ICD-10-CM

## 2023-07-03 LAB
ALBUMIN SERPL BCP-MCNC: 2.5 G/DL (ref 3.5–5)
ALP SERPL-CCNC: 49 U/L (ref 46–116)
ALT SERPL W P-5'-P-CCNC: 21 U/L (ref 12–78)
ANION GAP SERPL CALCULATED.3IONS-SCNC: 6 MMOL/L
AST SERPL W P-5'-P-CCNC: 26 U/L (ref 5–45)
BASOPHILS # BLD AUTO: 0 THOUSANDS/ÂΜL (ref 0–0.1)
BASOPHILS NFR BLD AUTO: 0 % (ref 0–1)
BILIRUB SERPL-MCNC: 0.83 MG/DL (ref 0.2–1)
BUN SERPL-MCNC: 34 MG/DL (ref 5–25)
CALCIUM ALBUM COR SERPL-MCNC: 9.4 MG/DL (ref 8.3–10.1)
CALCIUM SERPL-MCNC: 8.2 MG/DL (ref 8.3–10.1)
CHLORIDE SERPL-SCNC: 106 MMOL/L (ref 96–108)
CO2 SERPL-SCNC: 21 MMOL/L (ref 21–32)
CREAT SERPL-MCNC: 2.05 MG/DL (ref 0.6–1.3)
EOSINOPHIL # BLD AUTO: 0.01 THOUSAND/ÂΜL (ref 0–0.61)
EOSINOPHIL NFR BLD AUTO: 0 % (ref 0–6)
ERYTHROCYTE [DISTWIDTH] IN BLOOD BY AUTOMATED COUNT: 26.6 % (ref 11.6–15.1)
GFR SERPL CREATININE-BSD FRML MDRD: 30 ML/MIN/1.73SQ M
GLUCOSE P FAST SERPL-MCNC: 83 MG/DL (ref 65–99)
HCT VFR BLD AUTO: 22.8 % (ref 36.5–49.3)
HGB BLD-MCNC: 7.4 G/DL (ref 12–17)
IMM GRANULOCYTES # BLD AUTO: 0.03 THOUSAND/UL (ref 0–0.2)
IMM GRANULOCYTES NFR BLD AUTO: 1 % (ref 0–2)
LYMPHOCYTES # BLD AUTO: 0.32 THOUSANDS/ÂΜL (ref 0.6–4.47)
LYMPHOCYTES NFR BLD AUTO: 9 % (ref 14–44)
MCH RBC QN AUTO: 34.3 PG (ref 26.8–34.3)
MCHC RBC AUTO-ENTMCNC: 32.5 G/DL (ref 31.4–37.4)
MCV RBC AUTO: 106 FL (ref 82–98)
MONOCYTES # BLD AUTO: 0.39 THOUSAND/ÂΜL (ref 0.17–1.22)
MONOCYTES NFR BLD AUTO: 10 % (ref 4–12)
NEUTROPHILS # BLD AUTO: 2.99 THOUSANDS/ÂΜL (ref 1.85–7.62)
NEUTS SEG NFR BLD AUTO: 80 % (ref 43–75)
NRBC BLD AUTO-RTO: 1 /100 WBCS
PLATELET # BLD AUTO: 32 THOUSANDS/UL (ref 149–390)
PMV BLD AUTO: 11.5 FL (ref 8.9–12.7)
POTASSIUM SERPL-SCNC: 3.8 MMOL/L (ref 3.5–5.3)
PROT SERPL-MCNC: 9.6 G/DL (ref 6.4–8.4)
RBC # BLD AUTO: 2.16 MILLION/UL (ref 3.88–5.62)
SODIUM SERPL-SCNC: 133 MMOL/L (ref 135–147)
WBC # BLD AUTO: 3.74 THOUSAND/UL (ref 4.31–10.16)

## 2023-07-03 PROCEDURE — 80053 COMPREHEN METABOLIC PANEL: CPT

## 2023-07-03 PROCEDURE — 85025 COMPLETE CBC W/AUTO DIFF WBC: CPT

## 2023-07-03 PROCEDURE — 36415 COLL VENOUS BLD VENIPUNCTURE: CPT

## 2023-07-05 ENCOUNTER — HOSPITAL ENCOUNTER (OUTPATIENT)
Dept: INFUSION CENTER | Facility: CLINIC | Age: 78
Discharge: HOME | End: 2023-07-05
Payer: MEDICARE

## 2023-07-05 VITALS
BODY MASS INDEX: 20.59 KG/M2 | WEIGHT: 143.85 LBS | HEART RATE: 80 BPM | SYSTOLIC BLOOD PRESSURE: 129 MMHG | DIASTOLIC BLOOD PRESSURE: 78 MMHG | TEMPERATURE: 97.5 F | HEIGHT: 70 IN | RESPIRATION RATE: 18 BRPM

## 2023-07-05 DIAGNOSIS — N18.31 ANEMIA DUE TO STAGE 3A CHRONIC KIDNEY DISEASE (HCC): ICD-10-CM

## 2023-07-05 DIAGNOSIS — D61.82 MYELOPHTHISIC ANEMIA (HCC): ICD-10-CM

## 2023-07-05 DIAGNOSIS — D63.1 ANEMIA DUE TO STAGE 3A CHRONIC KIDNEY DISEASE (HCC): ICD-10-CM

## 2023-07-05 DIAGNOSIS — D69.3 CHRONIC ITP (IDIOPATHIC THROMBOCYTOPENIA) (HCC): ICD-10-CM

## 2023-07-05 DIAGNOSIS — C90.00 IGG MULTIPLE MYELOMA (HCC): Primary | ICD-10-CM

## 2023-07-05 PROCEDURE — 96413 CHEMO IV INFUSION 1 HR: CPT

## 2023-07-05 PROCEDURE — 96367 TX/PROPH/DG ADDL SEQ IV INF: CPT

## 2023-07-05 PROCEDURE — 86900 BLOOD TYPING SEROLOGIC ABO: CPT | Performed by: INTERNAL MEDICINE

## 2023-07-05 PROCEDURE — 86850 RBC ANTIBODY SCREEN: CPT | Performed by: INTERNAL MEDICINE

## 2023-07-05 PROCEDURE — 86901 BLOOD TYPING SEROLOGIC RH(D): CPT | Performed by: INTERNAL MEDICINE

## 2023-07-05 PROCEDURE — 86922 COMPATIBILITY TEST ANTIGLOB: CPT

## 2023-07-05 PROCEDURE — 86921 COMPATIBILITY TEST INCUBATE: CPT

## 2023-07-05 PROCEDURE — P9040 RBC LEUKOREDUCED IRRADIATED: HCPCS

## 2023-07-05 PROCEDURE — 36430 TRANSFUSION BLD/BLD COMPNT: CPT

## 2023-07-05 RX ORDER — SODIUM CHLORIDE 9 MG/ML
20 INJECTION, SOLUTION INTRAVENOUS ONCE
Status: COMPLETED | OUTPATIENT
Start: 2023-07-05 | End: 2023-07-05

## 2023-07-05 RX ORDER — SODIUM CHLORIDE 9 MG/ML
20 INJECTION, SOLUTION INTRAVENOUS ONCE
Status: CANCELLED | OUTPATIENT
Start: 2023-07-12

## 2023-07-05 RX ADMIN — DEXAMETHASONE SODIUM PHOSPHATE: 10 INJECTION, SOLUTION INTRAMUSCULAR; INTRAVENOUS at 09:38

## 2023-07-05 RX ADMIN — SODIUM CHLORIDE 20 ML/HR: 0.9 INJECTION, SOLUTION INTRAVENOUS at 08:25

## 2023-07-05 RX ADMIN — SODIUM CHLORIDE 20 ML/HR: 0.9 INJECTION, SOLUTION INTRAVENOUS at 11:25

## 2023-07-05 RX ADMIN — SODIUM CHLORIDE 250 ML: 0.9 INJECTION, SOLUTION INTRAVENOUS at 08:36

## 2023-07-05 RX ADMIN — CARFILZOMIB 100 MG: 10 INJECTION, POWDER, LYOPHILIZED, FOR SOLUTION INTRAVENOUS at 10:15

## 2023-07-05 NOTE — PROGRESS NOTES
Patient arrived to unit without complaint. Patient tolerated chemotherapy and 1 unit of PRBCs without incident. AVS declined and patient aware of next appointment. Patient left in stable condition.

## 2023-07-10 ENCOUNTER — APPOINTMENT (OUTPATIENT)
Dept: LAB | Facility: CLINIC | Age: 78
End: 2023-07-10
Payer: MEDICARE

## 2023-07-10 DIAGNOSIS — D63.1 ANEMIA DUE TO STAGE 3A CHRONIC KIDNEY DISEASE (HCC): ICD-10-CM

## 2023-07-10 DIAGNOSIS — D69.3 CHRONIC ITP (IDIOPATHIC THROMBOCYTOPENIA) (HCC): ICD-10-CM

## 2023-07-10 DIAGNOSIS — N18.31 ANEMIA DUE TO STAGE 3A CHRONIC KIDNEY DISEASE (HCC): ICD-10-CM

## 2023-07-10 DIAGNOSIS — D61.82 MYELOPHTHISIC ANEMIA (HCC): ICD-10-CM

## 2023-07-10 DIAGNOSIS — C90.00 IGG MULTIPLE MYELOMA (HCC): ICD-10-CM

## 2023-07-10 LAB
ABO GROUP BLD: NORMAL
BASOPHILS # BLD AUTO: 0 THOUSANDS/ÂΜL (ref 0–0.1)
BASOPHILS NFR BLD AUTO: 0 % (ref 0–1)
BLD GP AB SCN SERPL QL: NEGATIVE
EOSINOPHIL # BLD AUTO: 0 THOUSAND/ÂΜL (ref 0–0.61)
EOSINOPHIL NFR BLD AUTO: 0 % (ref 0–6)
ERYTHROCYTE [DISTWIDTH] IN BLOOD BY AUTOMATED COUNT: 24.3 % (ref 11.6–15.1)
HCT VFR BLD AUTO: 25.3 % (ref 36.5–49.3)
HGB BLD-MCNC: 8.4 G/DL (ref 12–17)
IMM GRANULOCYTES # BLD AUTO: 0.04 THOUSAND/UL (ref 0–0.2)
IMM GRANULOCYTES NFR BLD AUTO: 1 % (ref 0–2)
LYMPHOCYTES # BLD AUTO: 0.43 THOUSANDS/ÂΜL (ref 0.6–4.47)
LYMPHOCYTES NFR BLD AUTO: 11 % (ref 14–44)
MCH RBC QN AUTO: 34.4 PG (ref 26.8–34.3)
MCHC RBC AUTO-ENTMCNC: 33.2 G/DL (ref 31.4–37.4)
MCV RBC AUTO: 104 FL (ref 82–98)
MONOCYTES # BLD AUTO: 0.41 THOUSAND/ÂΜL (ref 0.17–1.22)
MONOCYTES NFR BLD AUTO: 11 % (ref 4–12)
NEUTROPHILS # BLD AUTO: 2.97 THOUSANDS/ÂΜL (ref 1.85–7.62)
NEUTS SEG NFR BLD AUTO: 77 % (ref 43–75)
NRBC BLD AUTO-RTO: 0 /100 WBCS
PLATELET # BLD AUTO: 27 THOUSANDS/UL (ref 149–390)
PMV BLD AUTO: 10 FL (ref 8.9–12.7)
RBC # BLD AUTO: 2.44 MILLION/UL (ref 3.88–5.62)
RH BLD: POSITIVE
SPECIMEN EXPIRATION DATE: NORMAL
WBC # BLD AUTO: 3.85 THOUSAND/UL (ref 4.31–10.16)

## 2023-07-10 PROCEDURE — 86850 RBC ANTIBODY SCREEN: CPT | Performed by: INTERNAL MEDICINE

## 2023-07-10 PROCEDURE — 86901 BLOOD TYPING SEROLOGIC RH(D): CPT | Performed by: INTERNAL MEDICINE

## 2023-07-10 PROCEDURE — 86900 BLOOD TYPING SEROLOGIC ABO: CPT | Performed by: INTERNAL MEDICINE

## 2023-07-10 PROCEDURE — 36415 COLL VENOUS BLD VENIPUNCTURE: CPT

## 2023-07-10 PROCEDURE — 85025 COMPLETE CBC W/AUTO DIFF WBC: CPT

## 2023-07-11 DIAGNOSIS — R53.83 OTHER FATIGUE: Primary | ICD-10-CM

## 2023-07-12 ENCOUNTER — HOSPITAL ENCOUNTER (OUTPATIENT)
Dept: INFUSION CENTER | Facility: CLINIC | Age: 78
Discharge: HOME | End: 2023-07-12
Payer: MEDICARE

## 2023-07-12 VITALS
RESPIRATION RATE: 18 BRPM | BODY MASS INDEX: 20.96 KG/M2 | WEIGHT: 146.39 LBS | SYSTOLIC BLOOD PRESSURE: 122 MMHG | DIASTOLIC BLOOD PRESSURE: 78 MMHG | HEART RATE: 88 BPM | HEIGHT: 70 IN | TEMPERATURE: 98.6 F

## 2023-07-12 DIAGNOSIS — D80.1 HYPOGAMMAGLOBULINEMIA (HCC): ICD-10-CM

## 2023-07-12 DIAGNOSIS — C90.00 IGG MULTIPLE MYELOMA (HCC): Primary | ICD-10-CM

## 2023-07-12 DIAGNOSIS — N18.31 ANEMIA DUE TO STAGE 3A CHRONIC KIDNEY DISEASE (HCC): ICD-10-CM

## 2023-07-12 DIAGNOSIS — D69.3 CHRONIC ITP (IDIOPATHIC THROMBOCYTOPENIA) (HCC): ICD-10-CM

## 2023-07-12 DIAGNOSIS — C90.00 IGG MULTIPLE MYELOMA (HCC): ICD-10-CM

## 2023-07-12 DIAGNOSIS — D61.82 MYELOPHTHISIC ANEMIA (HCC): Primary | ICD-10-CM

## 2023-07-12 DIAGNOSIS — C90.00 MULTIPLE MYELOMA NOT HAVING ACHIEVED REMISSION (HCC): ICD-10-CM

## 2023-07-12 DIAGNOSIS — D63.1 ANEMIA DUE TO STAGE 3A CHRONIC KIDNEY DISEASE (HCC): ICD-10-CM

## 2023-07-12 PROCEDURE — 96367 TX/PROPH/DG ADDL SEQ IV INF: CPT

## 2023-07-12 PROCEDURE — 96366 THER/PROPH/DIAG IV INF ADDON: CPT

## 2023-07-12 PROCEDURE — 96413 CHEMO IV INFUSION 1 HR: CPT

## 2023-07-12 RX ORDER — SODIUM CHLORIDE 9 MG/ML
20 INJECTION, SOLUTION INTRAVENOUS ONCE
Status: DISCONTINUED | OUTPATIENT
Start: 2023-07-12 | End: 2023-07-15 | Stop reason: HOSPADM

## 2023-07-12 RX ORDER — SODIUM CHLORIDE 9 MG/ML
20 INJECTION, SOLUTION INTRAVENOUS ONCE
Start: 2023-07-24 | End: 2023-07-24

## 2023-07-12 RX ORDER — METHYLPHENIDATE HYDROCHLORIDE 5 MG/1
TABLET ORAL
Qty: 90 TABLET | Refills: 0 | Status: SHIPPED | OUTPATIENT
Start: 2023-07-12

## 2023-07-12 RX ORDER — SODIUM CHLORIDE 9 MG/ML
20 INJECTION, SOLUTION INTRAVENOUS ONCE
Status: COMPLETED | OUTPATIENT
Start: 2023-07-12 | End: 2023-07-12

## 2023-07-12 RX ADMIN — SODIUM CHLORIDE 20 ML/HR: 0.9 INJECTION, SOLUTION INTRAVENOUS at 10:26

## 2023-07-12 RX ADMIN — Medication 30 G: at 12:27

## 2023-07-12 RX ADMIN — SODIUM CHLORIDE 250 ML: 0.9 INJECTION, SOLUTION INTRAVENOUS at 10:27

## 2023-07-12 RX ADMIN — DEXAMETHASONE SODIUM PHOSPHATE: 10 INJECTION, SOLUTION INTRAMUSCULAR; INTRAVENOUS at 10:33

## 2023-07-12 RX ADMIN — CARFILZOMIB 100 MG: 10 INJECTION, POWDER, LYOPHILIZED, FOR SOLUTION INTRAVENOUS at 11:38

## 2023-07-12 NOTE — PROGRESS NOTES
Patient arrived to the unit and denied infection or complications. He tolerated his kyprolis and his IVIG well without adverse reaction. He is aware of his next infusion.

## 2023-07-17 ENCOUNTER — LAB REQUISITION (OUTPATIENT)
Dept: LAB | Facility: HOSPITAL | Age: 78
End: 2023-07-17
Payer: MEDICARE

## 2023-07-17 ENCOUNTER — APPOINTMENT (OUTPATIENT)
Dept: LAB | Facility: CLINIC | Age: 78
End: 2023-07-17
Payer: MEDICARE

## 2023-07-17 DIAGNOSIS — N18.31 CHRONIC KIDNEY DISEASE, STAGE 3A (HCC): ICD-10-CM

## 2023-07-17 DIAGNOSIS — C90.00 IGG MULTIPLE MYELOMA (HCC): ICD-10-CM

## 2023-07-17 DIAGNOSIS — N18.31 ANEMIA DUE TO STAGE 3A CHRONIC KIDNEY DISEASE (HCC): ICD-10-CM

## 2023-07-17 DIAGNOSIS — D69.3 CHRONIC ITP (IDIOPATHIC THROMBOCYTOPENIA) (HCC): ICD-10-CM

## 2023-07-17 DIAGNOSIS — D61.82: ICD-10-CM

## 2023-07-17 DIAGNOSIS — D69.3 IMMUNE THROMBOCYTOPENIC PURPURA (HCC): ICD-10-CM

## 2023-07-17 DIAGNOSIS — N63.10 UNSPECIFIED LUMP IN THE RIGHT BREAST, UNSPECIFIED QUADRANT: ICD-10-CM

## 2023-07-17 DIAGNOSIS — D63.1 ANEMIA DUE TO STAGE 3A CHRONIC KIDNEY DISEASE (HCC): ICD-10-CM

## 2023-07-17 DIAGNOSIS — D61.82 MYELOPHTHISIC ANEMIA (HCC): ICD-10-CM

## 2023-07-17 LAB
ABO GROUP BLD: NORMAL
ALBUMIN SERPL BCP-MCNC: 2.3 G/DL (ref 3.5–5)
ALP SERPL-CCNC: 39 U/L (ref 46–116)
ALT SERPL W P-5'-P-CCNC: 19 U/L (ref 12–78)
ANION GAP SERPL CALCULATED.3IONS-SCNC: 5 MMOL/L
AST SERPL W P-5'-P-CCNC: 16 U/L (ref 5–45)
BASOPHILS # BLD AUTO: 0 THOUSANDS/ÂΜL (ref 0–0.1)
BASOPHILS NFR BLD AUTO: 0 % (ref 0–1)
BILIRUB SERPL-MCNC: 0.84 MG/DL (ref 0.2–1)
BLD GP AB SCN SERPL QL: NEGATIVE
BUN SERPL-MCNC: 27 MG/DL (ref 5–25)
CALCIUM ALBUM COR SERPL-MCNC: 9.6 MG/DL (ref 8.3–10.1)
CALCIUM SERPL-MCNC: 8.2 MG/DL (ref 8.3–10.1)
CHLORIDE SERPL-SCNC: 109 MMOL/L (ref 96–108)
CO2 SERPL-SCNC: 20 MMOL/L (ref 21–32)
CREAT SERPL-MCNC: 1.86 MG/DL (ref 0.6–1.3)
EOSINOPHIL # BLD AUTO: 0 THOUSAND/ÂΜL (ref 0–0.61)
EOSINOPHIL NFR BLD AUTO: 0 % (ref 0–6)
ERYTHROCYTE [DISTWIDTH] IN BLOOD BY AUTOMATED COUNT: 25.2 % (ref 11.6–15.1)
GFR SERPL CREATININE-BSD FRML MDRD: 34 ML/MIN/1.73SQ M
GLUCOSE P FAST SERPL-MCNC: 90 MG/DL (ref 65–99)
HCT VFR BLD AUTO: 22.3 % (ref 36.5–49.3)
HGB BLD-MCNC: 7.3 G/DL (ref 12–17)
IGA SERPL-MCNC: <8 MG/DL (ref 70–400)
IGG SERPL-MCNC: 5200 MG/DL (ref 700–1600)
IGM SERPL-MCNC: 6 MG/DL (ref 40–230)
IMM GRANULOCYTES # BLD AUTO: 0.07 THOUSAND/UL (ref 0–0.2)
IMM GRANULOCYTES NFR BLD AUTO: 2 % (ref 0–2)
LYMPHOCYTES # BLD AUTO: 0.39 THOUSANDS/ÂΜL (ref 0.6–4.47)
LYMPHOCYTES NFR BLD AUTO: 9 % (ref 14–44)
MCH RBC QN AUTO: 34.9 PG (ref 26.8–34.3)
MCHC RBC AUTO-ENTMCNC: 32.7 G/DL (ref 31.4–37.4)
MCV RBC AUTO: 107 FL (ref 82–98)
MONOCYTES # BLD AUTO: 0.38 THOUSAND/ÂΜL (ref 0.17–1.22)
MONOCYTES NFR BLD AUTO: 9 % (ref 4–12)
NEUTROPHILS # BLD AUTO: 3.57 THOUSANDS/ÂΜL (ref 1.85–7.62)
NEUTS SEG NFR BLD AUTO: 80 % (ref 43–75)
NRBC BLD AUTO-RTO: 1 /100 WBCS
PLATELET # BLD AUTO: 29 THOUSANDS/UL (ref 149–390)
PMV BLD AUTO: 11.8 FL (ref 8.9–12.7)
POTASSIUM SERPL-SCNC: 3.5 MMOL/L (ref 3.5–5.3)
PROT SERPL-MCNC: 9.6 G/DL (ref 6.4–8.4)
RBC # BLD AUTO: 2.09 MILLION/UL (ref 3.88–5.62)
RH BLD: POSITIVE
SODIUM SERPL-SCNC: 134 MMOL/L (ref 135–147)
SPECIMEN EXPIRATION DATE: NORMAL
WBC # BLD AUTO: 4.41 THOUSAND/UL (ref 4.31–10.16)

## 2023-07-17 PROCEDURE — 86850 RBC ANTIBODY SCREEN: CPT | Performed by: INTERNAL MEDICINE

## 2023-07-17 PROCEDURE — 86900 BLOOD TYPING SEROLOGIC ABO: CPT | Performed by: INTERNAL MEDICINE

## 2023-07-17 PROCEDURE — 83521 IG LIGHT CHAINS FREE EACH: CPT | Performed by: NURSE PRACTITIONER

## 2023-07-17 PROCEDURE — 36415 COLL VENOUS BLD VENIPUNCTURE: CPT | Performed by: NURSE PRACTITIONER

## 2023-07-17 PROCEDURE — 82784 ASSAY IGA/IGD/IGG/IGM EACH: CPT | Performed by: NURSE PRACTITIONER

## 2023-07-17 PROCEDURE — 84165 PROTEIN E-PHORESIS SERUM: CPT | Performed by: NURSE PRACTITIONER

## 2023-07-17 PROCEDURE — 80053 COMPREHEN METABOLIC PANEL: CPT

## 2023-07-17 PROCEDURE — 86334 IMMUNOFIX E-PHORESIS SERUM: CPT | Performed by: NURSE PRACTITIONER

## 2023-07-17 PROCEDURE — 85025 COMPLETE CBC W/AUTO DIFF WBC: CPT

## 2023-07-17 PROCEDURE — 86901 BLOOD TYPING SEROLOGIC RH(D): CPT | Performed by: INTERNAL MEDICINE

## 2023-07-18 DIAGNOSIS — C90.00 IGG MULTIPLE MYELOMA (HCC): Primary | ICD-10-CM

## 2023-07-18 LAB
KAPPA LC FREE SER-MCNC: 1.2 MG/L (ref 3.3–19.4)
KAPPA LC FREE/LAMBDA FREE SER: 0.02 {RATIO} (ref 0.26–1.65)
LAMBDA LC FREE SERPL-MCNC: 64.7 MG/L (ref 5.7–26.3)

## 2023-07-19 ENCOUNTER — HOSPITAL ENCOUNTER (OUTPATIENT)
Dept: INFUSION CENTER | Facility: CLINIC | Age: 78
Discharge: HOME | End: 2023-07-19
Payer: MEDICARE

## 2023-07-19 VITALS
WEIGHT: 145.5 LBS | BODY MASS INDEX: 20.83 KG/M2 | RESPIRATION RATE: 18 BRPM | DIASTOLIC BLOOD PRESSURE: 66 MMHG | HEART RATE: 80 BPM | HEIGHT: 70 IN | SYSTOLIC BLOOD PRESSURE: 105 MMHG | TEMPERATURE: 98.1 F

## 2023-07-19 DIAGNOSIS — D61.82 MYELOPHTHISIC ANEMIA (HCC): ICD-10-CM

## 2023-07-19 DIAGNOSIS — D69.3 CHRONIC ITP (IDIOPATHIC THROMBOCYTOPENIA) (HCC): ICD-10-CM

## 2023-07-19 DIAGNOSIS — C90.00 IGG MULTIPLE MYELOMA (HCC): Primary | ICD-10-CM

## 2023-07-19 DIAGNOSIS — D63.1 ANEMIA DUE TO STAGE 3A CHRONIC KIDNEY DISEASE (HCC): ICD-10-CM

## 2023-07-19 DIAGNOSIS — N18.31 ANEMIA DUE TO STAGE 3A CHRONIC KIDNEY DISEASE (HCC): ICD-10-CM

## 2023-07-19 LAB
ALBUMIN SERPL ELPH-MCNC: 3.06 G/DL (ref 3.2–5.1)
ALBUMIN SERPL ELPH-MCNC: 33.3 % (ref 48–70)
ALPHA1 GLOB SERPL ELPH-MCNC: 0.32 G/DL (ref 0.15–0.47)
ALPHA1 GLOB SERPL ELPH-MCNC: 3.5 % (ref 1.8–7)
ALPHA2 GLOB SERPL ELPH-MCNC: 0.7 G/DL (ref 0.42–1.04)
ALPHA2 GLOB SERPL ELPH-MCNC: 7.6 % (ref 5.9–14.9)
BETA GLOB ABNORMAL SERPL ELPH-MCNC: 0.35 G/DL (ref 0.31–0.57)
BETA1 GLOB SERPL ELPH-MCNC: 3.8 % (ref 4.7–7.7)
BETA2 GLOB SERPL ELPH-MCNC: 48.7 % (ref 3.1–7.9)
BETA2+GAMMA GLOB SERPL ELPH-MCNC: 4.48 G/DL (ref 0.2–0.58)
GAMMA GLOB ABNORMAL SERPL ELPH-MCNC: 0.29 G/DL (ref 0.4–1.66)
GAMMA GLOB SERPL ELPH-MCNC: 3.1 % (ref 6.9–22.3)
IGG/ALB SER: 0.5 {RATIO} (ref 1.1–1.8)
INTERPRETATION UR IFE-IMP: NORMAL
M PROTEIN 1 MFR SERPL ELPH: 43.4 %
M PROTEIN 1 SERPL ELPH-MCNC: 3.99 G/DL
PROT PATTERN SERPL ELPH-IMP: ABNORMAL
PROT SERPL-MCNC: 9.2 G/DL (ref 6.4–8.2)

## 2023-07-19 PROCEDURE — 84165 PROTEIN E-PHORESIS SERUM: CPT | Performed by: STUDENT IN AN ORGANIZED HEALTH CARE EDUCATION/TRAINING PROGRAM

## 2023-07-19 PROCEDURE — 86921 COMPATIBILITY TEST INCUBATE: CPT

## 2023-07-19 PROCEDURE — 86334 IMMUNOFIX E-PHORESIS SERUM: CPT | Performed by: STUDENT IN AN ORGANIZED HEALTH CARE EDUCATION/TRAINING PROGRAM

## 2023-07-19 PROCEDURE — P9040 RBC LEUKOREDUCED IRRADIATED: HCPCS

## 2023-07-19 PROCEDURE — 86922 COMPATIBILITY TEST ANTIGLOB: CPT

## 2023-07-19 RX ORDER — SODIUM CHLORIDE 9 MG/ML
20 INJECTION, SOLUTION INTRAVENOUS ONCE
Status: COMPLETED | OUTPATIENT
Start: 2023-07-19 | End: 2023-07-19

## 2023-07-19 RX ORDER — SODIUM CHLORIDE 9 MG/ML
20 INJECTION, SOLUTION INTRAVENOUS ONCE
OUTPATIENT
Start: 2023-07-24

## 2023-07-19 RX ADMIN — CARFILZOMIB 100 MG: 10 INJECTION, POWDER, LYOPHILIZED, FOR SOLUTION INTRAVENOUS at 09:52

## 2023-07-19 RX ADMIN — SODIUM CHLORIDE 20 ML/HR: 0.9 INJECTION, SOLUTION INTRAVENOUS at 08:43

## 2023-07-19 RX ADMIN — DEXAMETHASONE SODIUM PHOSPHATE: 10 INJECTION, SOLUTION INTRAMUSCULAR; INTRAVENOUS at 08:46

## 2023-07-19 RX ADMIN — SODIUM CHLORIDE 250 ML: 0.9 INJECTION, SOLUTION INTRAVENOUS at 08:43

## 2023-07-19 NOTE — PROGRESS NOTES
Pt arrived to unit without complaint, OK given to treat with platelets=29, CWD=3.2. Pt tolerated Kyprolis as ordered without incident. Pt presently receiving one unit PRBC   and tolerating well without adverse reaction. Pt and his wife decline AVS, aware of all future appts.

## 2023-07-23 DIAGNOSIS — Z29.9 PROPHYLACTIC MEASURE: ICD-10-CM

## 2023-07-24 ENCOUNTER — APPOINTMENT (OUTPATIENT)
Dept: LAB | Facility: CLINIC | Age: 78
End: 2023-07-24
Payer: MEDICARE

## 2023-07-24 DIAGNOSIS — D69.3 CHRONIC ITP (IDIOPATHIC THROMBOCYTOPENIA) (HCC): ICD-10-CM

## 2023-07-24 DIAGNOSIS — D63.1 ANEMIA DUE TO STAGE 3A CHRONIC KIDNEY DISEASE (HCC): ICD-10-CM

## 2023-07-24 DIAGNOSIS — D61.82 MYELOPHTHISIC ANEMIA (HCC): ICD-10-CM

## 2023-07-24 DIAGNOSIS — C90.00 IGG MULTIPLE MYELOMA (HCC): Primary | ICD-10-CM

## 2023-07-24 DIAGNOSIS — C90.00 IGG MULTIPLE MYELOMA (HCC): ICD-10-CM

## 2023-07-24 DIAGNOSIS — N18.31 ANEMIA DUE TO STAGE 3A CHRONIC KIDNEY DISEASE (HCC): ICD-10-CM

## 2023-07-24 LAB
BASOPHILS # BLD AUTO: 0 THOUSANDS/ÂΜL (ref 0–0.1)
BASOPHILS NFR BLD AUTO: 0 % (ref 0–1)
EOSINOPHIL # BLD AUTO: 0 THOUSAND/ÂΜL (ref 0–0.61)
EOSINOPHIL NFR BLD AUTO: 0 % (ref 0–6)
ERYTHROCYTE [DISTWIDTH] IN BLOOD BY AUTOMATED COUNT: 25.2 % (ref 11.6–15.1)
HCT VFR BLD AUTO: 24.6 % (ref 36.5–49.3)
HGB BLD-MCNC: 8.1 G/DL (ref 12–17)
IMM GRANULOCYTES # BLD AUTO: 0.03 THOUSAND/UL (ref 0–0.2)
IMM GRANULOCYTES NFR BLD AUTO: 1 % (ref 0–2)
LYMPHOCYTES # BLD AUTO: 0.39 THOUSANDS/ÂΜL (ref 0.6–4.47)
LYMPHOCYTES NFR BLD AUTO: 10 % (ref 14–44)
MCH RBC QN AUTO: 34.6 PG (ref 26.8–34.3)
MCHC RBC AUTO-ENTMCNC: 32.9 G/DL (ref 31.4–37.4)
MCV RBC AUTO: 105 FL (ref 82–98)
MONOCYTES # BLD AUTO: 0.4 THOUSAND/ÂΜL (ref 0.17–1.22)
MONOCYTES NFR BLD AUTO: 10 % (ref 4–12)
NEUTROPHILS # BLD AUTO: 3.26 THOUSANDS/ÂΜL (ref 1.85–7.62)
NEUTS SEG NFR BLD AUTO: 79 % (ref 43–75)
NRBC BLD AUTO-RTO: 1 /100 WBCS
PLATELET # BLD AUTO: 22 THOUSANDS/UL (ref 149–390)
PMV BLD AUTO: 11.2 FL (ref 8.9–12.7)
RBC # BLD AUTO: 2.34 MILLION/UL (ref 3.88–5.62)
WBC # BLD AUTO: 4.08 THOUSAND/UL (ref 4.31–10.16)

## 2023-07-24 PROCEDURE — 85025 COMPLETE CBC W/AUTO DIFF WBC: CPT

## 2023-07-24 PROCEDURE — 36415 COLL VENOUS BLD VENIPUNCTURE: CPT

## 2023-07-24 RX ORDER — MECOBALAMIN 5000 MCG
TABLET,DISINTEGRATING ORAL
Qty: 120 CAPSULE | Refills: 0 | Status: SHIPPED | OUTPATIENT
Start: 2023-07-24

## 2023-07-26 ENCOUNTER — HOSPITAL ENCOUNTER (OUTPATIENT)
Dept: INFUSION CENTER | Facility: CLINIC | Age: 78
Discharge: HOME | End: 2023-07-26
Payer: MEDICARE

## 2023-07-26 VITALS
BODY MASS INDEX: 21.08 KG/M2 | WEIGHT: 147.27 LBS | HEIGHT: 70 IN | TEMPERATURE: 96.9 F | RESPIRATION RATE: 18 BRPM | HEART RATE: 69 BPM | DIASTOLIC BLOOD PRESSURE: 70 MMHG | SYSTOLIC BLOOD PRESSURE: 132 MMHG

## 2023-07-26 DIAGNOSIS — C90.00 IGG MULTIPLE MYELOMA (HCC): Primary | ICD-10-CM

## 2023-07-26 LAB
ALBUMIN SERPL BCP-MCNC: 2.9 G/DL (ref 3.5–5)
ALP SERPL-CCNC: 33 U/L (ref 34–104)
ALT SERPL W P-5'-P-CCNC: 17 U/L (ref 7–52)
ANION GAP SERPL CALCULATED.3IONS-SCNC: 8 MMOL/L
AST SERPL W P-5'-P-CCNC: 29 U/L (ref 13–39)
BILIRUB SERPL-MCNC: 0.88 MG/DL (ref 0.2–1)
BUN SERPL-MCNC: 18 MG/DL (ref 5–25)
CALCIUM ALBUM COR SERPL-MCNC: 9.3 MG/DL (ref 8.3–10.1)
CALCIUM SERPL-MCNC: 8.4 MG/DL (ref 8.4–10.2)
CHLORIDE SERPL-SCNC: 106 MMOL/L (ref 96–108)
CO2 SERPL-SCNC: 21 MMOL/L (ref 21–32)
CREAT SERPL-MCNC: 1.77 MG/DL (ref 0.6–1.3)
GFR SERPL CREATININE-BSD FRML MDRD: 36 ML/MIN/1.73SQ M
GLUCOSE SERPL-MCNC: 86 MG/DL (ref 65–140)
POTASSIUM SERPL-SCNC: 4 MMOL/L (ref 3.5–5.3)
PROT SERPL-MCNC: 9.1 G/DL (ref 6.4–8.4)
SODIUM SERPL-SCNC: 135 MMOL/L (ref 135–147)

## 2023-07-26 PROCEDURE — 96367 TX/PROPH/DG ADDL SEQ IV INF: CPT

## 2023-07-26 PROCEDURE — 96413 CHEMO IV INFUSION 1 HR: CPT

## 2023-07-26 PROCEDURE — 80053 COMPREHEN METABOLIC PANEL: CPT | Performed by: INTERNAL MEDICINE

## 2023-07-26 RX ORDER — SODIUM CHLORIDE 9 MG/ML
20 INJECTION, SOLUTION INTRAVENOUS ONCE
Status: COMPLETED | OUTPATIENT
Start: 2023-07-26 | End: 2023-07-26

## 2023-07-26 RX ADMIN — SODIUM CHLORIDE 20 ML/HR: 0.9 INJECTION, SOLUTION INTRAVENOUS at 08:42

## 2023-07-26 RX ADMIN — CARFILZOMIB 100 MG: 10 INJECTION, POWDER, LYOPHILIZED, FOR SOLUTION INTRAVENOUS at 09:44

## 2023-07-26 RX ADMIN — DEXAMETHASONE SODIUM PHOSPHATE: 10 INJECTION, SOLUTION INTRAMUSCULAR; INTRAVENOUS at 08:42

## 2023-07-26 RX ADMIN — SODIUM CHLORIDE 250 ML: 0.9 INJECTION, SOLUTION INTRAVENOUS at 08:42

## 2023-07-26 NOTE — PROGRESS NOTES
Pt presents for kyprolis. No new meds or concerns. Pt tolerated infusion without adverse reaction. Future appointments discussed. AVS declined.

## 2023-07-27 ENCOUNTER — OFFICE VISIT (OUTPATIENT)
Dept: HEMATOLOGY ONCOLOGY | Facility: CLINIC | Age: 78
End: 2023-07-27
Payer: MEDICARE

## 2023-07-27 VITALS
RESPIRATION RATE: 17 BRPM | OXYGEN SATURATION: 97 % | HEART RATE: 81 BPM | BODY MASS INDEX: 21.5 KG/M2 | SYSTOLIC BLOOD PRESSURE: 132 MMHG | DIASTOLIC BLOOD PRESSURE: 70 MMHG | TEMPERATURE: 98.3 F | WEIGHT: 150.2 LBS | HEIGHT: 70 IN

## 2023-07-27 DIAGNOSIS — C90.00 IGG MULTIPLE MYELOMA (HCC): ICD-10-CM

## 2023-07-27 PROCEDURE — 99214 OFFICE O/P EST MOD 30 MIN: CPT | Performed by: INTERNAL MEDICINE

## 2023-07-27 RX ORDER — DEXAMETHASONE 4 MG/1
20 TABLET ORAL 2 TIMES WEEKLY
Qty: 120 TABLET | Refills: 1 | Status: SHIPPED | OUTPATIENT
Start: 2023-07-27

## 2023-07-27 RX ORDER — SODIUM CHLORIDE 9 MG/ML
20 INJECTION, SOLUTION INTRAVENOUS ONCE
OUTPATIENT
Start: 2023-08-16

## 2023-07-27 RX ORDER — SODIUM CHLORIDE 9 MG/ML
20 INJECTION, SOLUTION INTRAVENOUS ONCE
OUTPATIENT
Start: 2023-08-09

## 2023-07-27 RX ORDER — SODIUM CHLORIDE 9 MG/ML
20 INJECTION, SOLUTION INTRAVENOUS ONCE
OUTPATIENT
Start: 2023-08-23

## 2023-07-27 RX ORDER — SODIUM CHLORIDE 9 MG/ML
20 INJECTION, SOLUTION INTRAVENOUS ONCE
Status: CANCELLED | OUTPATIENT
Start: 2023-08-02

## 2023-07-27 NOTE — LETTER
July 27, 2023     Bernardo Ladd MD  [address]    Patient: Favian Cobos   YOB: 1945   Date of Visit: 7/27/2023       Dear Dr. Jennifer Vanegas: Thank you for referring Montserrat Mack to me for evaluation. Below are my notes for this consultation. If you have questions, please do not hesitate to call me. I look forward to following your patient along with you. Sincerely,        Fadumo Bustamante DO        CC: No Recipients    Fadumo Bustamante DO  7/27/2023  9:01 AM  Incomplete  [facility] [address] [place] [id number] [phone]    [facility] Ja[facility],1945, [id number]  07/27/23    Discussion:   In summary, this is a 72-year-old male with a history of myeloma as outlined. He is currently on selinexor, Kyprolis, dexamethasone. Responding disease by decreased IgG, serum free kappa light chains. Recent WBCs 4.0, hemoglobin 8.1, platelet count 22. Normal differential.  CMP showed creatinine 1.7, total protein 9.1, albumin 2.9. Otherwise normal.  Recent IgG 5200, decreased IgA and IgM, as before. Serum free kappa 1.2, lambda 64.7. Ratio 53. SPEP showed M peak IgG lambda 3.9  , Previous value 1 month ago 4.15. Greater than 7 in March 2023. Clinically he is feeling well. Appetite has improved. Energy level is better. Myeloma parameters appear to be declining. IgG has increased, possibly partly related to IVIG supplementation. No changes in treatment at this time. I discussed the above with the patient. The patient and his wife voiced understanding and agreement.  ______________________________________________________________________    Chief Complaint   Patient presents with   • Follow-up       HPI:  Oncology History   IgG multiple myeloma ([address])   9/2015 Initial Diagnosis    Found to have Hbg of 6 with SOB, creatinine 1.8 and normal calcium with albumin of 2.1. Additonial blood work showed elevated protien level (12.1g/dL). 9/29/2015 Biopsy    Bone marrow biopsy demonstrated approximately 80% plasma cells with some immature forms noted. 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 Day one of cycle 2 was on 11/11/15.     12/2015 - 2/2016 Chemotherapy    VDT-PACE x 2 cycles   (completed at [facility], [address])     2/2016 Transplant    Melphalan 200 mg/m2 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. MRD +     8/2016 - 1/26/2018 Chemotherapy    Maintenance therapy:  Carfilzomib, orginally dosed 27 mg weekly then increased to 45 mg weekly after MRD reactivitation. Revlimid  Dexamethasone      2/2018 Biopsy    Bone marrow demonstrated positive minimal residual disease.      2/23/2018 -  Chemotherapy    Daratumumab 16mg/m2 IV Day 1  Pomalyst 4 mg p.o. daily 1-21  Dexamethasone 4 mg PO Days 2, 3  Dexamethasone 12 mg PODays 8, 15, 22  Cycle length = 28 days     4/11/2019 - 8/29/2019 Chemotherapy    methylPREDNISolone sodium succinate (Solu-MEDROL) 100 mg in sodium chloride 0.9 % 250 mL IVPB, 100 mg, Intravenous, Once, 5 of 12 cycles  Administration: 100 mg (6/7/2019), 100 mg (7/5/2019), 100 mg (8/2/2019)     8/30/2019 - 11/8/2019 Chemotherapy    cyclophosphamide (CYTOXAN) 1,000 mg in sodium chloride 0.9 % 250 mL IVPB, 990 mg (66.7 % of original dose 750 mg/m2), Intravenous, Once, 2 of 3 cycles  Dose modification: 500 mg/m2 (original dose 750 mg/m2, Cycle 1, Reason: Other (See Comments)), 250 mg/m2 (original dose 750 mg/m2, Cycle 4, Reason: Treatment Parameters Not Met)  Administration: 1,000 mg (8/30/2019), 1,000 mg (9/13/2019), 1,000 mg (9/27/2019), 500 mg (10/11/2019)  bortezomib (VELCADE) subcutaneous injection 2.6 mg, 1.3 mg/m2 = 2.6 mg (86.7 % of original dose 1.5 mg/m2), Subcutaneous, Once, 3 of 4 cycles  Dose modification: 1.3 mg/m2 (original dose 1.5 mg/m2, Cycle 1, Reason: Other (See Comments))  Administration: 2.6 mg (8/30/2019), 2.6 mg (9/13/2019), 2.6 mg (10/25/2019), 2.6 mg (11/8/2019), 2.6 mg (9/27/2019), 2.6 mg (11/1/2019), 2.6 mg (9/20/2019), 2.6 mg (10/4/2019), 2.6 mg (10/11/2019), 2.6 mg (10/18/2019)     12/6/2019 - 6/19/2020 Chemotherapy    pegfilgrastim (NEULASTA ONPRO) subcutaneous injection kit 6 mg, 6 mg, Subcutaneous, Once, 5 of 9 cycles  Administration: 6 mg (2/28/2020), 6 mg (3/13/2020), 6 mg (3/27/2020), 6 mg (4/10/2020), 6 mg (4/24/2020), 6 mg (5/8/2020), 6 mg (6/5/2020), 6 mg (5/22/2020), 6 mg (6/19/2020)  cyclophosphamide (CYTOXAN) 784 mg in sodium chloride 0.9 % 250 mL IVPB, 400 mg/m2 = 784 mg, Intravenous, Once, 5 of 9 cycles  Dose modification: 400 mg/m2 (original dose 750 mg/m2, Cycle 7, Reason: Other (See Comments))  Administration: 784 mg (2/28/2020), 784 mg (3/13/2020), 784 mg (3/27/2020), 784 mg (4/10/2020), 784 mg (4/24/2020), 784 mg (5/8/2020), 784 mg (5/22/2020), 784 mg (6/19/2020), 784 mg (6/5/2020)  methylPREDNISolone sodium succinate (Solu-MEDROL) 100 mg in sodium chloride 0.9 % 250 mL IVPB, 100 mg, Intravenous, Once, 8 of 12 cycles  Administration: 100 mg (12/6/2019), 100 mg (12/13/2019), 100 mg (12/20/2019), 100 mg (1/3/2020), 100 mg (1/10/2020), 100 mg (1/17/2020), 100 mg (1/31/2020), 100 mg (2/14/2020), 100 mg (5/22/2020), 100 mg (12/27/2019), 100 mg (1/24/2020), 100 mg (2/28/2020), 100 mg (3/13/2020), 100 mg (3/27/2020), 100 mg (4/10/2020), 100 mg (4/24/2020), 100 mg (5/8/2020), 100 mg (6/19/2020)     7/2/2020 -  Chemotherapy       10/17/2022 - 12/12/2022 Chemotherapy    elotuzumab (EMPLICITI) IVPB, 10 mg/kg = 757.5 mg, Intravenous, Once, 3 of 6 cycles  Administration: 757.5 mg (10/17/2022), 757.5 mg (10/24/2022), 757.5 mg (11/7/2022), 1,600 mg (12/12/2022), 757.5 mg (10/31/2022), 757.5 mg (11/14/2022), 800 mg (11/21/2022), 800 mg (11/28/2022), 800 mg (12/5/2022)     4/19/2023 -  Chemotherapy    alteplase (CATHFLO), 2 mg, Intracatheter, Every 1 Minute as needed, 4 of 6 cycles  carfilzomib (KYPROLIS) IVPB in dextrose, 107 mg (280 % of original dose 20 mg/m2), Intravenous, Once, 4 of 6 cycles  Dose modification: 56 mg/m2 (original dose 20 mg/m2, Cycle 1, Reason: Dose modified as per discussion with consulting physician), 56 mg/m2 (original dose 70 mg/m2, Cycle 1, Reason: Dose modified as per discussion with consulting physician)  Administration: 110 mg (4/19/2023), 110 mg (4/26/2023), 110 mg (5/10/2023), 110 mg (5/17/2023), 110 mg (5/24/2023), 110 mg (5/3/2023), 110 mg (5/31/2023), 100 mg (6/7/2023), 100 mg (6/14/2023), 100 mg (6/21/2023), 100 mg (6/28/2023), 100 mg (7/5/2023), 100 mg (7/12/2023), 100 mg (7/19/2023), 100 mg (7/26/2023)         Interval History: Clinically improved. ECOG-  0 - Asymptomatic    Review of Systems   Constitutional: Negative for chills and fever. HENT: Negative for nosebleeds. Eyes: Negative for discharge. Respiratory: Negative for cough and shortness of breath. Cardiovascular: Negative for chest pain. Gastrointestinal: Negative for abdominal pain, constipation and diarrhea. Endocrine: Negative for polydipsia. Genitourinary: Negative for hematuria. Musculoskeletal: Negative for arthralgias. Skin: Negative for color change. Allergic/Immunologic: Negative for immunocompromised state. Neurological: Negative for dizziness and headaches. Hematological: Negative for adenopathy. Psychiatric/Behavioral: Negative for agitation.        Past Medical History:   Diagnosis Date   • Cancer [facility]) 2015   • History of autologous stem cell transplant ([address])    • Hypertension    • Insomnia    • Multiple myeloma [facility])      Patient Active Problem List   Diagnosis   • IgG multiple myeloma (HCC)   • Essential hypertension   • Chronic ITP (idiopathic thrombocytopenia) (HCC)   • Hyperlipidemia   • Acquired hypothyroidism   • Prophylactic measure   • Hypogammaglobulinemia (HCC)   • Anemia due to stage 3 chronic kidney disease (HCC)   • Anxiety   • Headache   • Anemia, unspecified   • Hypercalcemia   • CKD (chronic kidney disease)       Current Outpatient Medications:   •  acetaminophen (TYLENOL) 325 mg tablet, Take 650 mg by mouth every 6 (six) hours as needed for mild pain, Disp: , Rfl:   •  acyclovir (ZOVIRAX) 200 mg capsule, Take 2 capsules (400 mg total) by mouth 2 (two) times a day, Disp: 120 capsule, Rfl: 6  •  Ascorbic Acid (vitamin C) 1000 MG tablet, Take 1,000 mg by mouth daily, Disp: , Rfl:   •  atorvastatin (LIPITOR) 20 mg tablet, TAKE 1 TABLET DAILY, Disp: 90 tablet, Rfl: 3  •  Cholecalciferol (VITAMIN D-3 PO), Take 1,000 Units by mouth daily , Disp: , Rfl:   •  dexamethasone (DECADRON) 4 mg tablet, Take 5 tablets (20 mg total) by mouth once a week On morning of Kyprolis treatment.  (Patient taking differently: Take 20 mg by mouth 2 (two) times a week On morning of Kyprolis treatment.), Disp: 20 tablet, Rfl: 5  •  escitalopram (LEXAPRO) 20 mg tablet, TAKE 1 TABLET DAILY, Disp: 90 tablet, Rfl: 1  •  Glutamine POWD, by Does not apply route, Disp: , Rfl:   •  lansoprazole (PREVACID) 15 mg capsule, take 1 capsule by mouth twice a day before meals, Disp: 120 capsule, Rfl: 0  •  levothyroxine 100 mcg tablet, Take 1 tablet (100 mcg total) by mouth daily, Disp: , Rfl:   •  LORazepam (ATIVAN) 1 mg tablet, Take 1 tablet (1 mg total) by mouth every 4 (four) hours as needed for anxiety, Disp: 100 tablet, Rfl: 1  •  methylphenidate (RITALIN) 5 mg tablet, Take 2 tablets (10mg) in the morning, and 1 tablet (5mg) in the afternoon for cancer related fatigue., Disp: 90 tablet, Rfl: 0  •  metoprolol tartrate (LOPRESSOR) 25 mg tablet, Take 0.5 tablets (12.5 mg total) by mouth every 12 (twelve) hours, Disp: 90 tablet, Rfl: 0  •  Multiple Vitamin (MULTIVITAMINS PO), Take 1 tablet by mouth daily. , Disp: , Rfl:   •  naloxone (NARCAN) 4 mg/0.1 mL nasal spray, Administer 1 spray into a nostril. If no response after 2-3 minutes, give another dose in the other nostril using a new spray., Disp: 1 each, Rfl: 1  •  OLANZapine (ZyPREXA) 2.5 mg tablet, Take 1 tablet (2.5 mg total) by mouth daily at bedtime, Disp: 30 tablet, Rfl: 3  •  oxyCODONE (Roxicodone) 5 immediate release tablet, Take 1 tablet (5 mg total) by mouth every 4 (four) hours as needed for moderate pain Max Daily Amount: 30 mg, Disp: 90 tablet, Rfl: 0  •  Selinexor, 40 MG Once Weekly, 40 MG TBPK, Take 1 tablet by mouth once a week, Disp: 4 each, Rfl: 3  •  sulfamethoxazole-trimethoprim (BACTRIM DS) 800-160 mg per tablet, 3 (three) times a week Monday Wednesday friday, Disp: , Rfl:   •  vitamin B-12 (VITAMIN B-12) 1,000 mcg tablet, Take 1,000 mcg by mouth daily, Disp: , Rfl:   •  Pomalidomide (Pomalyst) 2 MG CAPS, TAKE 1 CAPSULE DAILY FOR 21 DAYS, THEN OFF 7 DAYS EVERY 28 DAY CYCLE, Disp: 21 capsule, Rfl: 4  No current facility-administered medications for this visit.     Facility-Administered Medications Ordered in Other Visits:   •  alteplase (CATHFLO) injection 2 mg, 2 mg, Intracatheter, Q1MIN PRN, Bobbye Bath, DO  •  ondansetron (ZOFRAN) 16 mg, dexamethasone (DECADRON) 20 mg in sodium chloride 0.9 % 50 mL IVPB, , Intravenous, Q12H, Bobbye Bath, DO, Stopped at 07/26/23 0902  No Known Allergies  Past Surgical History:   Procedure Laterality Date   • APPENDECTOMY      Last assessed: 9/25/15   • ARTHROSCOPY KNEE Left     9/30/09   • EYE SURGERY      Lasik   • KNEE CARTILAGE SURGERY     • LIMBAL STEM CELL TRANSPLANT      Patient had 2 in [place]-2/29/2016 and 4/13/2016     Social History     Objective:  Vitals:    07/27/23 0828   BP: 132/70   BP Location: Left arm   Patient Position: Sitting   Cuff Size: Adult   Pulse: 81   Resp: 17   Temp: 98.3 °F (36.8 °C)   TempSrc: Temporal   SpO2: 97%   Weight: 68.1 kg (150 lb 3.2 oz)   Height: 5' 10" (1.778 m)     Physical Exam  Constitutional:       Appearance: He is well-developed. HENT:      Head: Normocephalic and atraumatic. Eyes:      Pupils: Pupils are equal, round, and reactive to light. Cardiovascular:      Rate and Rhythm: Normal rate and regular rhythm. Heart sounds: No murmur heard. Pulmonary:      Breath sounds: Normal breath sounds. No wheezing or rales. Abdominal:      Palpations: Abdomen is soft. Tenderness: There is no abdominal tenderness. Musculoskeletal:         General: No tenderness. Normal range of motion. Cervical back: Neck supple. Lymphadenopathy:      Cervical: No cervical adenopathy. Skin:     Findings: No erythema or rash. Neurological:      Mental Status: He is alert and oriented to person, place, and time. Cranial Nerves: No cranial nerve deficit. Deep Tendon Reflexes: Reflexes are normal and symmetric. Psychiatric:         Behavior: Behavior normal.           Labs: I personally reviewed the labs and imaging pertinent to this patient care. Grazyna Bradley DO  7/27/2023  8:43 AM  Sign when Signing Visit  [address]  [phone]    [facility] Susan Hwang,1945, [id number]  07/27/23    Discussion:   In summary, this is a 47-year-old male with a history of myeloma as outlined. He is currently on selinexor, Kyprolis, dexamethasone. Responding disease by decreased IgG, serum free kappa light chains. Recent WBCs 4.0, hemoglobin 8.1, platelet count 22. Normal differential.  CMP showed creatinine 1.7, total protein 9.1, albumin 2.9. Otherwise normal.  Recent IgG 5200, decreased IgA and IgM, as before. Serum free kappa 1.2, lambda 64.7. Ratio 53. SPEP showed M peak IgG lambda 3.9  , Previous value 1 month ago 4.15. I discussed the above with the patient.   The patient *** voiced understanding and agreement.  ______________________________________________________________________    Chief Complaint   Patient presents with   • Follow-up       HPI:  Oncology History   IgG multiple myeloma ([address])   9/2015 Initial Diagnosis    Found to have Hbg of 6 with SOB, creatinine 1.8 and normal calcium with albumin of 2.1. Additonial blood work showed elevated protien level (12.1g/dL). 9/29/2015 Biopsy    Bone marrow biopsy demonstrated approximately 80% plasma cells with some immature forms noted. 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 Day one of cycle 2 was on 11/11/15.     12/2015 - 2/2016 Chemotherapy    VDT-PACE x 2 cycles   (completed at [facility] in [facility], [address])     2/2016 Transplant    Melphalan 200 mg/m2 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. MRD +     8/2016 - 1/26/2018 Chemotherapy    Maintenance therapy:  Carfilzomib, orginally dosed 27 mg weekly then increased to 45 mg weekly after MRD reactivitation. Revlimid  Dexamethasone      2/2018 Biopsy    Bone marrow demonstrated positive minimal residual disease.      2/23/2018 -  Chemotherapy    Daratumumab 16mg/m2 IV Day 1  Pomalyst 4 mg p.o. daily 1-21  Dexamethasone 4 mg PO Days 2, 3  Dexamethasone 12 mg PODays 8, 15, 22  Cycle length = 28 days     4/11/2019 - 8/29/2019 Chemotherapy    methylPREDNISolone sodium succinate (Solu-MEDROL) 100 mg in sodium chloride 0.9 % 250 mL IVPB, 100 mg, Intravenous, Once, 5 of 12 cycles  Administration: 100 mg (6/7/2019), 100 mg (7/5/2019), 100 mg (8/2/2019)     8/30/2019 - 11/8/2019 Chemotherapy    cyclophosphamide (CYTOXAN) 1,000 mg in sodium chloride 0.9 % 250 mL IVPB, 990 mg (66.7 % of original dose 750 mg/m2), Intravenous, Once, 2 of 3 cycles  Dose modification: 500 mg/m2 (original dose 750 mg/m2, Cycle 1, Reason: Other (See Comments)), 250 mg/m2 (original dose 750 mg/m2, Cycle 4, Reason: Treatment Parameters Not Met)  Administration: 1,000 mg (8/30/2019), 1,000 mg (9/13/2019), 1,000 mg (9/27/2019), 500 mg (10/11/2019)  bortezomib (VELCADE) subcutaneous injection 2.6 mg, 1.3 mg/m2 = 2.6 mg (86.7 % of original dose 1.5 mg/m2), Subcutaneous, Once, 3 of 4 cycles  Dose modification: 1.3 mg/m2 (original dose 1.5 mg/m2, Cycle 1, Reason: Other (See Comments))  Administration: 2.6 mg (8/30/2019), 2.6 mg (9/13/2019), 2.6 mg (10/25/2019), 2.6 mg (11/8/2019), 2.6 mg (9/27/2019), 2.6 mg (11/1/2019), 2.6 mg (9/20/2019), 2.6 mg (10/4/2019), 2.6 mg (10/11/2019), 2.6 mg (10/18/2019)     12/6/2019 - 6/19/2020 Chemotherapy    pegfilgrastim (NEULASTA ONPRO) subcutaneous injection kit 6 mg, 6 mg, Subcutaneous, Once, 5 of 9 cycles  Administration: 6 mg (2/28/2020), 6 mg (3/13/2020), 6 mg (3/27/2020), 6 mg (4/10/2020), 6 mg (4/24/2020), 6 mg (5/8/2020), 6 mg (6/5/2020), 6 mg (5/22/2020), 6 mg (6/19/2020)  cyclophosphamide (CYTOXAN) 784 mg in sodium chloride 0.9 % 250 mL IVPB, 400 mg/m2 = 784 mg, Intravenous, Once, 5 of 9 cycles  Dose modification: 400 mg/m2 (original dose 750 mg/m2, Cycle 7, Reason: Other (See Comments))  Administration: 784 mg (2/28/2020), 784 mg (3/13/2020), 784 mg (3/27/2020), 784 mg (4/10/2020), 784 mg (4/24/2020), 784 mg (5/8/2020), 784 mg (5/22/2020), 784 mg (6/19/2020), 784 mg (6/5/2020)  methylPREDNISolone sodium succinate (Solu-MEDROL) 100 mg in sodium chloride 0.9 % 250 mL IVPB, 100 mg, Intravenous, Once, 8 of 12 cycles  Administration: 100 mg (12/6/2019), 100 mg (12/13/2019), 100 mg (12/20/2019), 100 mg (1/3/2020), 100 mg (1/10/2020), 100 mg (1/17/2020), 100 mg (1/31/2020), 100 mg (2/14/2020), 100 mg (5/22/2020), 100 mg (12/27/2019), 100 mg (1/24/2020), 100 mg (2/28/2020), 100 mg (3/13/2020), 100 mg (3/27/2020), 100 mg (4/10/2020), 100 mg (4/24/2020), 100 mg (5/8/2020), 100 mg (6/19/2020)     7/2/2020 -  Chemotherapy       10/17/2022 - 12/12/2022 Chemotherapy    elotuzumab (EMPLICITI) IVPB, 10 mg/kg = 757.5 mg, Intravenous, Once, 3 of 6 cycles  Administration: 757.5 mg (10/17/2022), 757.5 mg (10/24/2022), 757.5 mg (11/7/2022), 1,600 mg (12/12/2022), 757.5 mg (10/31/2022), 757.5 mg (11/14/2022), 800 mg (11/21/2022), 800 mg (11/28/2022), 800 mg (12/5/2022)     4/19/2023 -  Chemotherapy    alteplase (CATHFLO), 2 mg, Intracatheter, Every 1 Minute as needed, 4 of 6 cycles  carfilzomib (KYPROLIS) IVPB in dextrose, 107 mg (280 % of original dose 20 mg/m2), Intravenous, Once, 4 of 6 cycles  Dose modification: 56 mg/m2 (original dose 20 mg/m2, Cycle 1, Reason: Dose modified as per discussion with consulting physician), 56 mg/m2 (original dose 70 mg/m2, Cycle 1, Reason: Dose modified as per discussion with consulting physician)  Administration: 110 mg (4/19/2023), 110 mg (4/26/2023), 110 mg (5/10/2023), 110 mg (5/17/2023), 110 mg (5/24/2023), 110 mg (5/3/2023), 110 mg (5/31/2023), 100 mg (6/7/2023), 100 mg (6/14/2023), 100 mg (6/21/2023), 100 mg (6/28/2023), 100 mg (7/5/2023), 100 mg (7/12/2023), 100 mg (7/19/2023), 100 mg (7/26/2023)         Interval History:  ***  {performance status:54506}    Review of Systems   Constitutional: Negative for chills and fever. HENT: Negative for nosebleeds. Eyes: Negative for discharge. Respiratory: Negative for cough and shortness of breath. Cardiovascular: Negative for chest pain. Gastrointestinal: Negative for abdominal pain, constipation and diarrhea. Endocrine: Negative for polydipsia. Genitourinary: Negative for hematuria. Musculoskeletal: Negative for arthralgias. Skin: Negative for color change. Allergic/Immunologic: Negative for immunocompromised state. Neurological: Negative for dizziness and headaches. Hematological: Negative for adenopathy. Psychiatric/Behavioral: Negative for agitation.        Past Medical History:   Diagnosis Date   • Cancer [facility]) 2015   • History of autologous stem cell transplant ([address] )    • Hypertension    • Insomnia    • Multiple myeloma ([address])      Patient Active Problem List   Diagnosis   • IgG multiple myeloma (HCC)   • Essential hypertension   • Chronic ITP (idiopathic thrombocytopenia) (HCC)   • Hyperlipidemia   • Acquired hypothyroidism   • Prophylactic measure   • Hypogammaglobulinemia ([address])   • Anemia due to stage 3 chronic kidney disease (HCC)   • Anxiety   • Headache   • Anemia, unspecified   • Hypercalcemia   • CKD (chronic kidney disease)       Current Outpatient Medications:   •  acetaminophen (TYLENOL) 325 mg tablet, Take 650 mg by mouth every 6 (six) hours as needed for mild pain, Disp: , Rfl:   •  acyclovir (ZOVIRAX) 200 mg capsule, Take 2 capsules (400 mg total) by mouth 2 (two) times a day, Disp: 120 capsule, Rfl: 6  •  Ascorbic Acid (vitamin C) 1000 MG tablet, Take 1,000 mg by mouth daily, Disp: , Rfl:   •  atorvastatin (LIPITOR) 20 mg tablet, TAKE 1 TABLET DAILY, Disp: 90 tablet, Rfl: 3  •  Cholecalciferol (VITAMIN D-3 PO), Take 1,000 Units by mouth daily , Disp: , Rfl:   •  dexamethasone (DECADRON) 4 mg tablet, Take 5 tablets (20 mg total) by mouth once a week On morning of Kyprolis treatment.  (Patient taking differently: Take 20 mg by mouth 2 (two) times a week On morning of Kyprolis treatment.), Disp: 20 tablet, Rfl: 5  •  escitalopram (LEXAPRO) 20 mg tablet, TAKE 1 TABLET DAILY, Disp: 90 tablet, Rfl: 1  •  Glutamine POWD, by Does not apply route, Disp: , Rfl:   •  lansoprazole (PREVACID) 15 mg capsule, take 1 capsule by mouth twice a day before meals, Disp: 120 capsule, Rfl: 0  •  levothyroxine 100 mcg tablet, Take 1 tablet (100 mcg total) by mouth daily, Disp: , Rfl:   •  LORazepam (ATIVAN) 1 mg tablet, Take 1 tablet (1 mg total) by mouth every 4 (four) hours as needed for anxiety, Disp: 100 tablet, Rfl: 1  •  methylphenidate (RITALIN) 5 mg tablet, Take 2 tablets (10mg) in the morning, and 1 tablet (5mg) in the afternoon for cancer related fatigue., Disp: 90 tablet, Rfl: 0  •  metoprolol tartrate (LOPRESSOR) 25 mg tablet, Take 0.5 tablets (12.5 mg total) by mouth every 12 (twelve) hours, Disp: 90 tablet, Rfl: 0  •  Multiple Vitamin (MULTIVITAMINS PO), Take 1 tablet by mouth daily. , Disp: , Rfl:   •  naloxone (NARCAN) 4 mg/0.1 mL nasal spray, Administer 1 spray into a nostril. If no response after 2-3 minutes, give another dose in the other nostril using a new spray., Disp: 1 each, Rfl: 1  •  OLANZapine (ZyPREXA) 2.5 mg tablet, Take 1 tablet (2.5 mg total) by mouth daily at bedtime, Disp: 30 tablet, Rfl: 3  •  oxyCODONE (Roxicodone) 5 immediate release tablet, Take 1 tablet (5 mg total) by mouth every 4 (four) hours as needed for moderate pain Max Daily Amount: 30 mg, Disp: 90 tablet, Rfl: 0  •  Selinexor, 40 MG Once Weekly, 40 MG TBPK, Take 1 tablet by mouth once a week, Disp: 4 each, Rfl: 3  •  sulfamethoxazole-trimethoprim (BACTRIM DS) 800-160 mg per tablet, 3 (three) times a week Monday Wednesday friday, Disp: , Rfl:   •  vitamin B-12 (VITAMIN B-12) 1,000 mcg tablet, Take 1,000 mcg by mouth daily, Disp: , Rfl:   •  Pomalidomide (Pomalyst) 2 MG CAPS, TAKE 1 CAPSULE DAILY FOR 21 DAYS, THEN OFF 7 DAYS EVERY 28 DAY CYCLE, Disp: 21 capsule, Rfl: 4  No current facility-administered medications for this visit.     Facility-Administered Medications Ordered in Other Visits:   •  alteplase (CATHFLO) injection 2 mg, 2 mg, Intracatheter, Q1MIN PRN, Cheyenne Mishra DO  •  ondansetron (ZOFRAN) 16 mg, dexamethasone (DECADRON) 20 mg in sodium chloride 0.9 % 50 mL IVPB, , Intravenous, Q12H, Cheyenne Mishra DO, Stopped at 07/26/23 0902  No Known Allergies  Past Surgical History:   Procedure Laterality Date   • APPENDECTOMY      Last assessed: 9/25/15   • ARTHROSCOPY KNEE Left     9/30/09   • EYE SURGERY      Lasik   • KNEE CARTILAGE SURGERY     • LIMBAL STEM CELL TRANSPLANT Patient had 2 in [place]-2/29/2016 and 4/13/2016     Social History     Objective:  Vitals:    07/27/23 0828   BP: 132/70   BP Location: Left arm   Patient Position: Sitting   Cuff Size: Adult   Pulse: 81   Resp: 17   Temp: 98.3 °F (36.8 °C)   TempSrc: Temporal   SpO2: 97%   Weight: 68.1 kg (150 lb 3.2 oz)   Height: 5' 10" (1.778 m)     Physical Exam  Constitutional:       Appearance: He is well-developed. HENT:      Head: Normocephalic and atraumatic. Eyes:      Pupils: Pupils are equal, round, and reactive to light. Cardiovascular:      Rate and Rhythm: Normal rate and regular rhythm. Heart sounds: No murmur heard. Pulmonary:      Breath sounds: Normal breath sounds. No wheezing or rales. Abdominal:      Palpations: Abdomen is soft. Tenderness: There is no abdominal tenderness. Musculoskeletal:         General: No tenderness. Normal range of motion. Cervical back: Neck supple. Lymphadenopathy:      Cervical: No cervical adenopathy. Skin:     Findings: No erythema or rash. Neurological:      Mental Status: He is alert and oriented to person, place, and time. Cranial Nerves: No cranial nerve deficit. Deep Tendon Reflexes: Reflexes are normal and symmetric. Psychiatric:         Behavior: Behavior normal.           Labs: I personally reviewed the labs and imaging pertinent to this patient care.

## 2023-07-27 NOTE — PROGRESS NOTES
[facility] [address] [place] [address]  [phone]    Sophia Hwang,1945, [id number]  07/27/23    Discussion:   In summary, this is a 77-year-old male with a history of myeloma as outlined. He is currently on selinexor, Kyprolis, dexamethasone. Responding disease by decreased IgG, serum free kappa light chains. Recent WBCs 4.0, hemoglobin 8.1, platelet count 22. Normal differential.  CMP showed creatinine 1.7, total protein 9.1, albumin 2.9. Otherwise normal.  Recent IgG 5200, decreased IgA and IgM, as before. Serum free kappa 1.2, lambda 64.7. Ratio 53. SPEP showed M peak IgG lambda 3.9  , Previous value 1 month ago 4.15. Greater than 7 in March 2023. Clinically he is feeling well. Appetite has improved. Energy level is better. Myeloma parameters appear to be declining. IgG has increased, possibly partly related to IVIG supplementation. No changes in treatment at this time. I discussed the above with the patient. The patient and his wife voiced understanding and agreement.  ______________________________________________________________________    Chief Complaint   Patient presents with   • Follow-up       HPI:  Oncology History   IgG multiple myeloma ([facility])   9/2015 Initial Diagnosis    Found to have Hbg of 6 with SOB, creatinine 1.8 and normal calcium with albumin of 2.1. Additonial blood work showed elevated protien level (12.1g/dL). 9/29/2015 Biopsy    Bone marrow biopsy demonstrated approximately 80% plasma cells with some immature forms noted. 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   Day one of cycle 2 was on 11/11/15.     12/2015 - 2/2016 Chemotherapy    VDT-PACE x 2 cycles   (completed at [facility] in [place], [place])     2/2016 Transplant    Melphalan 200 mg/m2 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. MRD +     8/2016 - 1/26/2018 Chemotherapy    Maintenance therapy:  Carfilzomib, orginally dosed 27 mg weekly then increased to 45 mg weekly after MRD reactivitation. Revlimid  Dexamethasone      2/2018 Biopsy    Bone marrow demonstrated positive minimal residual disease.      2/23/2018 -  Chemotherapy    Daratumumab 16mg/m2 IV Day 1  Pomalyst 4 mg p.o. daily 1-21  Dexamethasone 4 mg PO Days 2, 3  Dexamethasone 12 mg PODays 8, 15, 22  Cycle length = 28 days     4/11/2019 - 8/29/2019 Chemotherapy    methylPREDNISolone sodium succinate (Solu-MEDROL) 100 mg in sodium chloride 0.9 % 250 mL IVPB, 100 mg, Intravenous, Once, 5 of 12 cycles  Administration: 100 mg (6/7/2019), 100 mg (7/5/2019), 100 mg (8/2/2019)     8/30/2019 - 11/8/2019 Chemotherapy    cyclophosphamide (CYTOXAN) 1,000 mg in sodium chloride 0.9 % 250 mL IVPB, 990 mg (66.7 % of original dose 750 mg/m2), Intravenous, Once, 2 of 3 cycles  Dose modification: 500 mg/m2 (original dose 750 mg/m2, Cycle 1, Reason: Other (See Comments)), 250 mg/m2 (original dose 750 mg/m2, Cycle 4, Reason: Treatment Parameters Not Met)  Administration: 1,000 mg (8/30/2019), 1,000 mg (9/13/2019), 1,000 mg (9/27/2019), 500 mg (10/11/2019)  bortezomib (VELCADE) subcutaneous injection 2.6 mg, 1.3 mg/m2 = 2.6 mg (86.7 % of original dose 1.5 mg/m2), Subcutaneous, Once, 3 of 4 cycles  Dose modification: 1.3 mg/m2 (original dose 1.5 mg/m2, Cycle 1, Reason: Other (See Comments))  Administration: 2.6 mg (8/30/2019), 2.6 mg (9/13/2019), 2.6 mg (10/25/2019), 2.6 mg (11/8/2019), 2.6 mg (9/27/2019), 2.6 mg (11/1/2019), 2.6 mg (9/20/2019), 2.6 mg (10/4/2019), 2.6 mg (10/11/2019), 2.6 mg (10/18/2019)     12/6/2019 - 6/19/2020 Chemotherapy    pegfilgrastim (NEULASTA ONPRO) subcutaneous injection kit 6 mg, 6 mg, Subcutaneous, Once, 5 of 9 cycles  Administration: 6 mg (2/28/2020), 6 mg (3/13/2020), 6 mg (3/27/2020), 6 mg (4/10/2020), 6 mg (4/24/2020), 6 mg (5/8/2020), 6 mg (6/5/2020), 6 mg (5/22/2020), 6 mg (6/19/2020)  cyclophosphamide (CYTOXAN) 784 mg in sodium chloride 0.9 % 250 mL IVPB, 400 mg/m2 = 784 mg, Intravenous, Once, 5 of 9 cycles  Dose modification: 400 mg/m2 (original dose 750 mg/m2, Cycle 7, Reason: Other (See Comments))  Administration: 784 mg (2/28/2020), 784 mg (3/13/2020), 784 mg (3/27/2020), 784 mg (4/10/2020), 784 mg (4/24/2020), 784 mg (5/8/2020), 784 mg (5/22/2020), 784 mg (6/19/2020), 784 mg (6/5/2020)  methylPREDNISolone sodium succinate (Solu-MEDROL) 100 mg in sodium chloride 0.9 % 250 mL IVPB, 100 mg, Intravenous, Once, 8 of 12 cycles  Administration: 100 mg (12/6/2019), 100 mg (12/13/2019), 100 mg (12/20/2019), 100 mg (1/3/2020), 100 mg (1/10/2020), 100 mg (1/17/2020), 100 mg (1/31/2020), 100 mg (2/14/2020), 100 mg (5/22/2020), 100 mg (12/27/2019), 100 mg (1/24/2020), 100 mg (2/28/2020), 100 mg (3/13/2020), 100 mg (3/27/2020), 100 mg (4/10/2020), 100 mg (4/24/2020), 100 mg (5/8/2020), 100 mg (6/19/2020)     7/2/2020 -  Chemotherapy       10/17/2022 - 12/12/2022 Chemotherapy    elotuzumab (EMPLICITI) IVPB, 10 mg/kg = 757.5 mg, Intravenous, Once, 3 of 6 cycles  Administration: 757.5 mg (10/17/2022), 757.5 mg (10/24/2022), 757.5 mg (11/7/2022), 1,600 mg (12/12/2022), 757.5 mg (10/31/2022), 757.5 mg (11/14/2022), 800 mg (11/21/2022), 800 mg (11/28/2022), 800 mg (12/5/2022)     4/19/2023 -  Chemotherapy    alteplase (CATHFLO), 2 mg, Intracatheter, Every 1 Minute as needed, 4 of 6 cycles  carfilzomib (KYPROLIS) IVPB in dextrose, 107 mg (280 % of original dose 20 mg/m2), Intravenous, Once, 4 of 6 cycles  Dose modification: 56 mg/m2 (original dose 20 mg/m2, Cycle 1, Reason: Dose modified as per discussion with consulting physician), 56 mg/m2 (original dose 70 mg/m2, Cycle 1, Reason: Dose modified as per discussion with consulting physician)  Administration: 110 mg (4/19/2023), 110 mg (4/26/2023), 110 mg (5/10/2023), 110 mg (5/17/2023), 110 mg (5/24/2023), 110 mg (5/3/2023), 110 mg (5/31/2023), 100 mg (6/7/2023), 100 mg (6/14/2023), 100 mg (6/21/2023), 100 mg (6/28/2023), 100 mg (7/5/2023), 100 mg (7/12/2023), 100 mg (7/19/2023), 100 mg (7/26/2023)         Interval History: Clinically improved. ECOG-  0 - Asymptomatic    Review of Systems   Constitutional: Negative for chills and fever. HENT: Negative for nosebleeds. Eyes: Negative for discharge. Respiratory: Negative for cough and shortness of breath. Cardiovascular: Negative for chest pain. Gastrointestinal: Negative for abdominal pain, constipation and diarrhea. Endocrine: Negative for polydipsia. Genitourinary: Negative for hematuria. Musculoskeletal: Negative for arthralgias. Skin: Negative for color change. Allergic/Immunologic: Negative for immunocompromised state. Neurological: Negative for dizziness and headaches. Hematological: Negative for adenopathy. Psychiatric/Behavioral: Negative for agitation.        Past Medical History:   Diagnosis Date   • Cancer ([address] [facility]) 2015   • History of autologous stem cell transplant ([facility])    • Hypertension    • Insomnia    • Multiple myeloma ([facility])      Patient Active Problem List   Diagnosis   • IgG multiple myeloma (HCC)   • Essential hypertension   • Chronic ITP (idiopathic thrombocytopenia) ([facility])   • Hyperlipidemia   • Acquired hypothyroidism   • Prophylactic measure   • Hypogammaglobulinemia ([address] [facility])   • Anemia due to stage 3 chronic kidney disease (HCC)   • Anxiety   • Headache   • Anemia, unspecified   • Hypercalcemia   • CKD (chronic kidney disease)       Current Outpatient Medications:   •  acetaminophen (TYLENOL) 325 mg tablet, Take 650 mg by mouth every 6 (six) hours as needed for mild pain, Disp: , Rfl:   •  acyclovir (ZOVIRAX) 200 mg capsule, Take 2 capsules (400 mg total) by mouth 2 (two) times a day, Disp: 120 capsule, Rfl: 6  •  Ascorbic Acid (vitamin C) 1000 MG tablet, Take 1,000 mg by mouth daily, Disp: , Rfl:   •  atorvastatin (LIPITOR) 20 mg tablet, TAKE 1 TABLET DAILY, Disp: 90 tablet, Rfl: 3  •  Cholecalciferol (VITAMIN D-3 PO), Take 1,000 Units by mouth daily , Disp: , Rfl:   •  dexamethasone (DECADRON) 4 mg tablet, Take 5 tablets (20 mg total) by mouth once a week On morning of Kyprolis treatment. (Patient taking differently: Take 20 mg by mouth 2 (two) times a week On morning of Kyprolis treatment.), Disp: 20 tablet, Rfl: 5  •  escitalopram (LEXAPRO) 20 mg tablet, TAKE 1 TABLET DAILY, Disp: 90 tablet, Rfl: 1  •  Glutamine POWD, by Does not apply route, Disp: , Rfl:   •  lansoprazole (PREVACID) 15 mg capsule, take 1 capsule by mouth twice a day before meals, Disp: 120 capsule, Rfl: 0  •  levothyroxine 100 mcg tablet, Take 1 tablet (100 mcg total) by mouth daily, Disp: , Rfl:   •  LORazepam (ATIVAN) 1 mg tablet, Take 1 tablet (1 mg total) by mouth every 4 (four) hours as needed for anxiety, Disp: 100 tablet, Rfl: 1  •  methylphenidate (RITALIN) 5 mg tablet, Take 2 tablets (10mg) in the morning, and 1 tablet (5mg) in the afternoon for cancer related fatigue., Disp: 90 tablet, Rfl: 0  •  metoprolol tartrate (LOPRESSOR) 25 mg tablet, Take 0.5 tablets (12.5 mg total) by mouth every 12 (twelve) hours, Disp: 90 tablet, Rfl: 0  •  Multiple Vitamin (MULTIVITAMINS PO), Take 1 tablet by mouth daily. , Disp: , Rfl:   •  naloxone (NARCAN) 4 mg/0.1 mL nasal spray, Administer 1 spray into a nostril.  If no response after 2-3 minutes, give another dose in the other nostril using a new spray., Disp: 1 each, Rfl: 1  •  OLANZapine (ZyPREXA) 2.5 mg tablet, Take 1 tablet (2.5 mg total) by mouth daily at bedtime, Disp: 30 tablet, Rfl: 3  •  oxyCODONE (Roxicodone) 5 immediate release tablet, Take 1 tablet (5 mg total) by mouth every 4 (four) hours as needed for moderate pain Max Daily Amount: 30 mg, Disp: 90 tablet, Rfl: 0  • Selinexor, 40 MG Once Weekly, 40 MG TBPK, Take 1 tablet by mouth once a week, Disp: 4 each, Rfl: 3  •  sulfamethoxazole-trimethoprim (BACTRIM DS) 800-160 mg per tablet, 3 (three) times a week Monday Wednesday friday, Disp: , Rfl:   •  vitamin B-12 (VITAMIN B-12) 1,000 mcg tablet, Take 1,000 mcg by mouth daily, Disp: , Rfl:   •  Pomalidomide (Pomalyst) 2 MG CAPS, TAKE 1 CAPSULE DAILY FOR 21 DAYS, THEN OFF 7 DAYS EVERY 28 DAY CYCLE, Disp: 21 capsule, Rfl: 4  No current facility-administered medications for this visit. Facility-Administered Medications Ordered in Other Visits:   •  alteplase (CATHFLO) injection 2 mg, 2 mg, Intracatheter, Q1MIN PRN, Lori Vasquez DO  •  ondansetron (ZOFRAN) 16 mg, dexamethasone (DECADRON) 20 mg in sodium chloride 0.9 % 50 mL IVPB, , Intravenous, Q12H, Lori Vasquez DO, Stopped at 07/26/23 0902  No Known Allergies  Past Surgical History:   Procedure Laterality Date   • APPENDECTOMY      Last assessed: 9/25/15   • ARTHROSCOPY KNEE Left     9/30/09   • EYE SURGERY      Lasik   • KNEE CARTILAGE SURGERY     • LIMBAL STEM CELL TRANSPLANT      Patient had 2 in [place]-2/29/2016 and 4/13/2016     Social History     Objective:  Vitals:    07/27/23 0828   BP: 132/70   BP Location: Left arm   Patient Position: Sitting   Cuff Size: Adult   Pulse: 81   Resp: 17   Temp: 98.3 °F (36.8 °C)   TempSrc: Temporal   SpO2: 97%   Weight: 68.1 kg (150 lb 3.2 oz)   Height: 5' 10" (1.778 m)     Physical Exam  Constitutional:       Appearance: He is well-developed. HENT:      Head: Normocephalic and atraumatic. Eyes:      Pupils: Pupils are equal, round, and reactive to light. Cardiovascular:      Rate and Rhythm: Normal rate and regular rhythm. Heart sounds: No murmur heard. Pulmonary:      Breath sounds: Normal breath sounds. No wheezing or rales. Abdominal:      Palpations: Abdomen is soft. Tenderness: There is no abdominal tenderness.    Musculoskeletal:         General: No tenderness. Normal range of motion. Cervical back: Neck supple. Lymphadenopathy:      Cervical: No cervical adenopathy. Skin:     Findings: No erythema or rash. Neurological:      Mental Status: He is alert and oriented to person, place, and time. Cranial Nerves: No cranial nerve deficit. Deep Tendon Reflexes: Reflexes are normal and symmetric. Psychiatric:         Behavior: Behavior normal.           Labs: I personally reviewed the labs and imaging pertinent to this patient care.

## 2023-07-27 NOTE — LETTER
July 27, 2023     Sridhar Landers MD  [address]    Patient: Sophia Mcclelland   YOB: 1945   Date of Visit: 7/27/2023       Dear Dr. Rafal Courtney: Thank you for referring Suzi Butler to me for evaluation. Below are my notes for this consultation. If you have questions, please do not hesitate to call me. I look forward to following your patient along with you. Sincerely,        Eli Monreal DO        CC: No Recipients    Eli Monreal DO  7/27/2023  9:01 AM  Sign when Signing Visit  [facility]  [address] [employer] [facility] [address]  [address] [address] [phone]    Al Hwang,1945, [id number]  07/27/23    Discussion:   In summary, this is a 44-year-old male with a history of myeloma as outlined. He is currently on selinexor, Kyprolis, dexamethasone. Responding disease by decreased IgG, serum free kappa light chains. Recent WBCs 4.0, hemoglobin 8.1, platelet count 22. Normal differential.  CMP showed creatinine 1.7, total protein 9.1, albumin 2.9. Otherwise normal.  Recent IgG 5200, decreased IgA and IgM, as before. Serum free kappa 1.2, lambda 64.7. Ratio 53. SPEP showed M peak IgG lambda 3.9  , Previous value 1 month ago 4.15. Greater than 7 in March 2023. Clinically he is feeling well. Appetite has improved. Energy level is better. Myeloma parameters appear to be declining. IgG has increased, possibly partly related to IVIG supplementation. No changes in treatment at this time. I discussed the above with the patient.   The patient and his wife voiced understanding and agreement.  ______________________________________________________________________    Chief Complaint   Patient presents with   • Follow-up       HPI:  Oncology History   IgG multiple myeloma ([address])   9/2015 Initial Diagnosis    Found to have Hbg of 6 with SOB, creatinine 1.8 and normal calcium with albumin of 2.1. Additonial blood work showed elevated protien level (12.1g/dL). 9/29/2015 Biopsy    Bone marrow biopsy demonstrated approximately 80% plasma cells with some immature forms noted. 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 Day one of cycle 2 was on 11/11/15.     12/2015 - 2/2016 Chemotherapy    VDT-PACE x 2 cycles   (completed at [facility] in [facility], [address])     2/2016 Transplant    Melphalan 200 mg/m2 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. MRD +     8/2016 - 1/26/2018 Chemotherapy    Maintenance therapy:  Carfilzomib, orginally dosed 27 mg weekly then increased to 45 mg weekly after MRD reactivitation. Revlimid  Dexamethasone      2/2018 Biopsy    Bone marrow demonstrated positive minimal residual disease.      2/23/2018 -  Chemotherapy    Daratumumab 16mg/m2 IV Day 1  Pomalyst 4 mg p.o. daily 1-21  Dexamethasone 4 mg PO Days 2, 3  Dexamethasone 12 mg PODays 8, 15, 22  Cycle length = 28 days     4/11/2019 - 8/29/2019 Chemotherapy    methylPREDNISolone sodium succinate (Solu-MEDROL) 100 mg in sodium chloride 0.9 % 250 mL IVPB, 100 mg, Intravenous, Once, 5 of 12 cycles  Administration: 100 mg (6/7/2019), 100 mg (7/5/2019), 100 mg (8/2/2019)     8/30/2019 - 11/8/2019 Chemotherapy    cyclophosphamide (CYTOXAN) 1,000 mg in sodium chloride 0.9 % 250 mL IVPB, 990 mg (66.7 % of original dose 750 mg/m2), Intravenous, Once, 2 of 3 cycles  Dose modification: 500 mg/m2 (original dose 750 mg/m2, Cycle 1, Reason: Other (See Comments)), 250 mg/m2 (original dose 750 mg/m2, Cycle 4, Reason: Treatment Parameters Not Met)  Administration: 1,000 mg (8/30/2019), 1,000 mg (9/13/2019), 1,000 mg (9/27/2019), 500 mg (10/11/2019)  bortezomib (VELCADE) subcutaneous injection 2.6 mg, 1.3 mg/m2 = 2.6 mg (86.7 % of original dose 1.5 mg/m2), Subcutaneous, Once, 3 of 4 cycles  Dose modification: 1.3 mg/m2 (original dose 1.5 mg/m2, Cycle 1, Reason: Other (See Comments))  Administration: 2.6 mg (8/30/2019), 2.6 mg (9/13/2019), 2.6 mg (10/25/2019), 2.6 mg (11/8/2019), 2.6 mg (9/27/2019), 2.6 mg (11/1/2019), 2.6 mg (9/20/2019), 2.6 mg (10/4/2019), 2.6 mg (10/11/2019), 2.6 mg (10/18/2019)     12/6/2019 - 6/19/2020 Chemotherapy    pegfilgrastim (NEULASTA ONPRO) subcutaneous injection kit 6 mg, 6 mg, Subcutaneous, Once, 5 of 9 cycles  Administration: 6 mg (2/28/2020), 6 mg (3/13/2020), 6 mg (3/27/2020), 6 mg (4/10/2020), 6 mg (4/24/2020), 6 mg (5/8/2020), 6 mg (6/5/2020), 6 mg (5/22/2020), 6 mg (6/19/2020)  cyclophosphamide (CYTOXAN) 784 mg in sodium chloride 0.9 % 250 mL IVPB, 400 mg/m2 = 784 mg, Intravenous, Once, 5 of 9 cycles  Dose modification: 400 mg/m2 (original dose 750 mg/m2, Cycle 7, Reason: Other (See Comments))  Administration: 784 mg (2/28/2020), 784 mg (3/13/2020), 784 mg (3/27/2020), 784 mg (4/10/2020), 784 mg (4/24/2020), 784 mg (5/8/2020), 784 mg (5/22/2020), 784 mg (6/19/2020), 784 mg (6/5/2020)  methylPREDNISolone sodium succinate (Solu-MEDROL) 100 mg in sodium chloride 0.9 % 250 mL IVPB, 100 mg, Intravenous, Once, 8 of 12 cycles  Administration: 100 mg (12/6/2019), 100 mg (12/13/2019), 100 mg (12/20/2019), 100 mg (1/3/2020), 100 mg (1/10/2020), 100 mg (1/17/2020), 100 mg (1/31/2020), 100 mg (2/14/2020), 100 mg (5/22/2020), 100 mg (12/27/2019), 100 mg (1/24/2020), 100 mg (2/28/2020), 100 mg (3/13/2020), 100 mg (3/27/2020), 100 mg (4/10/2020), 100 mg (4/24/2020), 100 mg (5/8/2020), 100 mg (6/19/2020)     7/2/2020 -  Chemotherapy       10/17/2022 - 12/12/2022 Chemotherapy    elotuzumab (EMPLICITI) IVPB, 10 mg/kg = 757.5 mg, Intravenous, Once, 3 of 6 cycles  Administration: 757.5 mg (10/17/2022), 757.5 mg (10/24/2022), 757.5 mg (11/7/2022), 1,600 mg (12/12/2022), 757.5 mg (10/31/2022), 757.5 mg (11/14/2022), 800 mg (11/21/2022), 800 mg (11/28/2022), 800 mg (12/5/2022)     4/19/2023 -  Chemotherapy    alteplase (CATHFLO), 2 mg, Intracatheter, Every 1 Minute as needed, 4 of 6 cycles  carfilzomib (KYPROLIS) IVPB in dextrose, 107 mg (280 % of original dose 20 mg/m2), Intravenous, Once, 4 of 6 cycles  Dose modification: 56 mg/m2 (original dose 20 mg/m2, Cycle 1, Reason: Dose modified as per discussion with consulting physician), 56 mg/m2 (original dose 70 mg/m2, Cycle 1, Reason: Dose modified as per discussion with consulting physician)  Administration: 110 mg (4/19/2023), 110 mg (4/26/2023), 110 mg (5/10/2023), 110 mg (5/17/2023), 110 mg (5/24/2023), 110 mg (5/3/2023), 110 mg (5/31/2023), 100 mg (6/7/2023), 100 mg (6/14/2023), 100 mg (6/21/2023), 100 mg (6/28/2023), 100 mg (7/5/2023), 100 mg (7/12/2023), 100 mg (7/19/2023), 100 mg (7/26/2023)         Interval History: Clinically improved. ECOG-  0 - Asymptomatic    Review of Systems   Constitutional: Negative for chills and fever. HENT: Negative for nosebleeds. Eyes: Negative for discharge. Respiratory: Negative for cough and shortness of breath. Cardiovascular: Negative for chest pain. Gastrointestinal: Negative for abdominal pain, constipation and diarrhea. Endocrine: Negative for polydipsia. Genitourinary: Negative for hematuria. Musculoskeletal: Negative for arthralgias. Skin: Negative for color change. Allergic/Immunologic: Negative for immunocompromised state. Neurological: Negative for dizziness and headaches. Hematological: Negative for adenopathy. Psychiatric/Behavioral: Negative for agitation.        Past Medical History:   Diagnosis Date   • Cancer [facility]) 2015   • History of autologous stem cell transplant ([address])    • Hypertension    • Insomnia    • Multiple myeloma [facility])      Patient Active Problem List   Diagnosis   • IgG multiple myeloma ([address])   • Essential hypertension   • Chronic ITP (idiopathic thrombocytopenia) (HCC)   • Hyperlipidemia   • Acquired hypothyroidism   • Prophylactic measure   • Hypogammaglobulinemia (HCC)   • Anemia due to stage 3 chronic kidney disease (HCC)   • Anxiety   • Headache   • Anemia, unspecified   • Hypercalcemia   • CKD (chronic kidney disease)       Current Outpatient Medications:   •  acetaminophen (TYLENOL) 325 mg tablet, Take 650 mg by mouth every 6 (six) hours as needed for mild pain, Disp: , Rfl:   •  acyclovir (ZOVIRAX) 200 mg capsule, Take 2 capsules (400 mg total) by mouth 2 (two) times a day, Disp: 120 capsule, Rfl: 6  •  Ascorbic Acid (vitamin C) 1000 MG tablet, Take 1,000 mg by mouth daily, Disp: , Rfl:   •  atorvastatin (LIPITOR) 20 mg tablet, TAKE 1 TABLET DAILY, Disp: 90 tablet, Rfl: 3  •  Cholecalciferol (VITAMIN D-3 PO), Take 1,000 Units by mouth daily , Disp: , Rfl:   •  dexamethasone (DECADRON) 4 mg tablet, Take 5 tablets (20 mg total) by mouth once a week On morning of Kyprolis treatment.  (Patient taking differently: Take 20 mg by mouth 2 (two) times a week On morning of Kyprolis treatment.), Disp: 20 tablet, Rfl: 5  •  escitalopram (LEXAPRO) 20 mg tablet, TAKE 1 TABLET DAILY, Disp: 90 tablet, Rfl: 1  •  Glutamine POWD, by Does not apply route, Disp: , Rfl:   •  lansoprazole (PREVACID) 15 mg capsule, take 1 capsule by mouth twice a day before meals, Disp: 120 capsule, Rfl: 0  •  levothyroxine 100 mcg tablet, Take 1 tablet (100 mcg total) by mouth daily, Disp: , Rfl:   •  LORazepam (ATIVAN) 1 mg tablet, Take 1 tablet (1 mg total) by mouth every 4 (four) hours as needed for anxiety, Disp: 100 tablet, Rfl: 1  •  methylphenidate (RITALIN) 5 mg tablet, Take 2 tablets (10mg) in the morning, and 1 tablet (5mg) in the afternoon for cancer related fatigue., Disp: 90 tablet, Rfl: 0  •  metoprolol tartrate (LOPRESSOR) 25 mg tablet, Take 0.5 tablets (12.5 mg total) by mouth every 12 (twelve) hours, Disp: 90 tablet, Rfl: 0  •  Multiple Vitamin (MULTIVITAMINS PO), Take 1 tablet by mouth daily. , Disp: , Rfl:   •  naloxone (NARCAN) 4 mg/0.1 mL nasal spray, Administer 1 spray into a nostril. If no response after 2-3 minutes, give another dose in the other nostril using a new spray., Disp: 1 each, Rfl: 1  •  OLANZapine (ZyPREXA) 2.5 mg tablet, Take 1 tablet (2.5 mg total) by mouth daily at bedtime, Disp: 30 tablet, Rfl: 3  •  oxyCODONE (Roxicodone) 5 immediate release tablet, Take 1 tablet (5 mg total) by mouth every 4 (four) hours as needed for moderate pain Max Daily Amount: 30 mg, Disp: 90 tablet, Rfl: 0  •  Selinexor, 40 MG Once Weekly, 40 MG TBPK, Take 1 tablet by mouth once a week, Disp: 4 each, Rfl: 3  •  sulfamethoxazole-trimethoprim (BACTRIM DS) 800-160 mg per tablet, 3 (three) times a week Monday Wednesday friday, Disp: , Rfl:   •  vitamin B-12 (VITAMIN B-12) 1,000 mcg tablet, Take 1,000 mcg by mouth daily, Disp: , Rfl:   •  Pomalidomide (Pomalyst) 2 MG CAPS, TAKE 1 CAPSULE DAILY FOR 21 DAYS, THEN OFF 7 DAYS EVERY 28 DAY CYCLE, Disp: 21 capsule, Rfl: 4  No current facility-administered medications for this visit.     Facility-Administered Medications Ordered in Other Visits:   •  alteplase (CATHFLO) injection 2 mg, 2 mg, Intracatheter, Q1MIN PRN, Norma Contreras, DO  •  ondansetron (ZOFRAN) 16 mg, dexamethasone (DECADRON) 20 mg in sodium chloride 0.9 % 50 mL IVPB, , Intravenous, Q12H, Norma Contreras, DO, Stopped at 07/26/23 0902  No Known Allergies  Past Surgical History:   Procedure Laterality Date   • APPENDECTOMY      Last assessed: 9/25/15   • ARTHROSCOPY KNEE Left     9/30/09   • EYE SURGERY      Lasik   • KNEE CARTILAGE SURGERY     • LIMBAL STEM CELL TRANSPLANT      Patient had 2 in [place]-2/29/2016 and 4/13/2016     Social History     Objective:  Vitals:    07/27/23 0828   BP: 132/70   BP Location: Left arm   Patient Position: Sitting   Cuff Size: Adult   Pulse: 81   Resp: 17   Temp: 98.3 °F (36.8 °C)   TempSrc: Temporal   SpO2: 97% Weight: 68.1 kg (150 lb 3.2 oz)   Height: 5' 10" (1.778 m)     Physical Exam  Constitutional:       Appearance: He is well-developed. HENT:      Head: Normocephalic and atraumatic. Eyes:      Pupils: Pupils are equal, round, and reactive to light. Cardiovascular:      Rate and Rhythm: Normal rate and regular rhythm. Heart sounds: No murmur heard. Pulmonary:      Breath sounds: Normal breath sounds. No wheezing or rales. Abdominal:      Palpations: Abdomen is soft. Tenderness: There is no abdominal tenderness. Musculoskeletal:         General: No tenderness. Normal range of motion. Cervical back: Neck supple. Lymphadenopathy:      Cervical: No cervical adenopathy. Skin:     Findings: No erythema or rash. Neurological:      Mental Status: He is alert and oriented to person, place, and time. Cranial Nerves: No cranial nerve deficit. Deep Tendon Reflexes: Reflexes are normal and symmetric. Psychiatric:         Behavior: Behavior normal.           Labs: I personally reviewed the labs and imaging pertinent to this patient care.

## 2023-07-31 ENCOUNTER — APPOINTMENT (OUTPATIENT)
Dept: LAB | Facility: CLINIC | Age: 78
End: 2023-07-31
Payer: MEDICARE

## 2023-07-31 DIAGNOSIS — N18.31 ANEMIA DUE TO STAGE 3A CHRONIC KIDNEY DISEASE (HCC): ICD-10-CM

## 2023-07-31 DIAGNOSIS — D63.1 ANEMIA DUE TO STAGE 3A CHRONIC KIDNEY DISEASE (HCC): ICD-10-CM

## 2023-07-31 DIAGNOSIS — C90.00 IGG MULTIPLE MYELOMA (HCC): ICD-10-CM

## 2023-07-31 DIAGNOSIS — D69.3 CHRONIC ITP (IDIOPATHIC THROMBOCYTOPENIA) (HCC): ICD-10-CM

## 2023-07-31 DIAGNOSIS — D61.82 MYELOPHTHISIC ANEMIA (HCC): ICD-10-CM

## 2023-07-31 LAB
BASOPHILS # BLD AUTO: 0 THOUSANDS/ÂΜL (ref 0–0.1)
BASOPHILS NFR BLD AUTO: 0 % (ref 0–1)
EOSINOPHIL # BLD AUTO: 0 THOUSAND/ÂΜL (ref 0–0.61)
EOSINOPHIL NFR BLD AUTO: 0 % (ref 0–6)
ERYTHROCYTE [DISTWIDTH] IN BLOOD BY AUTOMATED COUNT: 25.2 % (ref 11.6–15.1)
HCT VFR BLD AUTO: 22.4 % (ref 36.5–49.3)
HGB BLD-MCNC: 7.1 G/DL (ref 12–17)
IMM GRANULOCYTES # BLD AUTO: 0.05 THOUSAND/UL (ref 0–0.2)
IMM GRANULOCYTES NFR BLD AUTO: 1 % (ref 0–2)
LYMPHOCYTES # BLD AUTO: 0.3 THOUSANDS/ÂΜL (ref 0.6–4.47)
LYMPHOCYTES NFR BLD AUTO: 7 % (ref 14–44)
MCH RBC QN AUTO: 34.6 PG (ref 26.8–34.3)
MCHC RBC AUTO-ENTMCNC: 31.7 G/DL (ref 31.4–37.4)
MCV RBC AUTO: 109 FL (ref 82–98)
MONOCYTES # BLD AUTO: 0.3 THOUSAND/ÂΜL (ref 0.17–1.22)
MONOCYTES NFR BLD AUTO: 7 % (ref 4–12)
NEUTROPHILS # BLD AUTO: 3.86 THOUSANDS/ÂΜL (ref 1.85–7.62)
NEUTS SEG NFR BLD AUTO: 85 % (ref 43–75)
NRBC BLD AUTO-RTO: 1 /100 WBCS
PLATELET # BLD AUTO: 19 THOUSANDS/UL (ref 149–390)
PMV BLD AUTO: 11.2 FL (ref 8.9–12.7)
RBC # BLD AUTO: 2.05 MILLION/UL (ref 3.88–5.62)
WBC # BLD AUTO: 4.51 THOUSAND/UL (ref 4.31–10.16)

## 2023-07-31 PROCEDURE — 86901 BLOOD TYPING SEROLOGIC RH(D): CPT | Performed by: INTERNAL MEDICINE

## 2023-07-31 PROCEDURE — 86900 BLOOD TYPING SEROLOGIC ABO: CPT | Performed by: INTERNAL MEDICINE

## 2023-07-31 PROCEDURE — 85025 COMPLETE CBC W/AUTO DIFF WBC: CPT

## 2023-07-31 PROCEDURE — 86850 RBC ANTIBODY SCREEN: CPT | Performed by: INTERNAL MEDICINE

## 2023-08-01 RX ORDER — SODIUM CHLORIDE 9 MG/ML
20 INJECTION, SOLUTION INTRAVENOUS ONCE
Status: CANCELLED | OUTPATIENT
Start: 2023-08-09

## 2023-08-02 ENCOUNTER — HOSPITAL ENCOUNTER (OUTPATIENT)
Dept: INFUSION CENTER | Facility: CLINIC | Age: 78
Discharge: HOME | End: 2023-08-02
Payer: MEDICARE

## 2023-08-02 VITALS
BODY MASS INDEX: 20.52 KG/M2 | SYSTOLIC BLOOD PRESSURE: 103 MMHG | WEIGHT: 143.3 LBS | HEIGHT: 70 IN | HEART RATE: 86 BPM | RESPIRATION RATE: 16 BRPM | TEMPERATURE: 98.6 F | DIASTOLIC BLOOD PRESSURE: 66 MMHG

## 2023-08-02 DIAGNOSIS — D69.3 CHRONIC ITP (IDIOPATHIC THROMBOCYTOPENIA) (HCC): ICD-10-CM

## 2023-08-02 DIAGNOSIS — N18.31 ANEMIA DUE TO STAGE 3A CHRONIC KIDNEY DISEASE (HCC): ICD-10-CM

## 2023-08-02 DIAGNOSIS — C90.00 IGG MULTIPLE MYELOMA (HCC): ICD-10-CM

## 2023-08-02 DIAGNOSIS — D61.82 MYELOPHTHISIC ANEMIA (HCC): Primary | ICD-10-CM

## 2023-08-02 DIAGNOSIS — D63.1 ANEMIA DUE TO STAGE 3A CHRONIC KIDNEY DISEASE (HCC): ICD-10-CM

## 2023-08-02 PROCEDURE — 36430 TRANSFUSION BLD/BLD COMPNT: CPT

## 2023-08-02 PROCEDURE — 86921 COMPATIBILITY TEST INCUBATE: CPT

## 2023-08-02 PROCEDURE — 96413 CHEMO IV INFUSION 1 HR: CPT

## 2023-08-02 PROCEDURE — P9040 RBC LEUKOREDUCED IRRADIATED: HCPCS

## 2023-08-02 PROCEDURE — 96367 TX/PROPH/DG ADDL SEQ IV INF: CPT

## 2023-08-02 PROCEDURE — 86922 COMPATIBILITY TEST ANTIGLOB: CPT

## 2023-08-02 RX ORDER — SODIUM CHLORIDE 9 MG/ML
20 INJECTION, SOLUTION INTRAVENOUS ONCE
Status: COMPLETED | OUTPATIENT
Start: 2023-08-02 | End: 2023-08-02

## 2023-08-02 RX ADMIN — SODIUM CHLORIDE 250 ML: 0.9 INJECTION, SOLUTION INTRAVENOUS at 08:42

## 2023-08-02 RX ADMIN — SODIUM CHLORIDE 20 ML/HR: 0.9 INJECTION, SOLUTION INTRAVENOUS at 11:40

## 2023-08-02 RX ADMIN — DEXAMETHASONE SODIUM PHOSPHATE: 10 INJECTION, SOLUTION INTRAMUSCULAR; INTRAVENOUS at 08:53

## 2023-08-02 RX ADMIN — SODIUM CHLORIDE 20 ML/HR: 0.9 INJECTION, SOLUTION INTRAVENOUS at 08:42

## 2023-08-02 RX ADMIN — CARFILZOMIB 100 MG: 10 INJECTION, POWDER, LYOPHILIZED, FOR SOLUTION INTRAVENOUS at 09:57

## 2023-08-02 NOTE — PROGRESS NOTES
Blood transfusion completed without adverse reaction. Pt remained in good spirits and without complaints. PIV removed and pt discharged with steady gait. AVS declined, aware of next appt.

## 2023-08-02 NOTE — PROGRESS NOTES
Rec'd pt in good spirits, see toxicity flowsheet. PIV easily obtained. Infusions tolerated well. Blood transfusion now in process. Pt appears comfortable and is without complaints.
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
## 2023-08-07 ENCOUNTER — LAB REQUISITION (OUTPATIENT)
Dept: LAB | Facility: HOSPITAL | Age: 78
End: 2023-08-07
Payer: MEDICARE

## 2023-08-07 ENCOUNTER — APPOINTMENT (OUTPATIENT)
Dept: LAB | Facility: CLINIC | Age: 78
End: 2023-08-07
Payer: MEDICARE

## 2023-08-07 DIAGNOSIS — D63.1 ANEMIA IN CHRONIC KIDNEY DISEASE (CODE): ICD-10-CM

## 2023-08-07 DIAGNOSIS — D69.3 CHRONIC ITP (IDIOPATHIC THROMBOCYTOPENIA) (HCC): ICD-10-CM

## 2023-08-07 DIAGNOSIS — N18.31 CHRONIC KIDNEY DISEASE, STAGE 3A (HCC): ICD-10-CM

## 2023-08-07 DIAGNOSIS — C90.00 IGG MULTIPLE MYELOMA (HCC): ICD-10-CM

## 2023-08-07 DIAGNOSIS — D61.82 MYELOPHTHISIC ANEMIA (HCC): ICD-10-CM

## 2023-08-07 DIAGNOSIS — D63.1 ANEMIA DUE TO STAGE 3A CHRONIC KIDNEY DISEASE (HCC): ICD-10-CM

## 2023-08-07 DIAGNOSIS — D61.82: ICD-10-CM

## 2023-08-07 DIAGNOSIS — N18.31 ANEMIA DUE TO STAGE 3A CHRONIC KIDNEY DISEASE (HCC): ICD-10-CM

## 2023-08-07 DIAGNOSIS — D69.3 IMMUNE THROMBOCYTOPENIC PURPURA (HCC): ICD-10-CM

## 2023-08-07 LAB
ABO GROUP BLD: NORMAL
ALBUMIN SERPL BCP-MCNC: 2.2 G/DL (ref 3.5–5)
ALP SERPL-CCNC: 41 U/L (ref 46–116)
ALT SERPL W P-5'-P-CCNC: 19 U/L (ref 12–78)
ANION GAP SERPL CALCULATED.3IONS-SCNC: 6 MMOL/L
AST SERPL W P-5'-P-CCNC: 22 U/L (ref 5–45)
BASOPHILS # BLD AUTO: 0 THOUSANDS/ÂΜL (ref 0–0.1)
BASOPHILS NFR BLD AUTO: 0 % (ref 0–1)
BILIRUB SERPL-MCNC: 0.45 MG/DL (ref 0.2–1)
BLD GP AB SCN SERPL QL: NEGATIVE
BUN SERPL-MCNC: 22 MG/DL (ref 5–25)
CALCIUM ALBUM COR SERPL-MCNC: 9.8 MG/DL (ref 8.3–10.1)
CALCIUM SERPL-MCNC: 8.4 MG/DL (ref 8.3–10.1)
CHLORIDE SERPL-SCNC: 109 MMOL/L (ref 96–108)
CO2 SERPL-SCNC: 21 MMOL/L (ref 21–32)
CREAT SERPL-MCNC: 2.08 MG/DL (ref 0.6–1.3)
EOSINOPHIL # BLD AUTO: 0 THOUSAND/ÂΜL (ref 0–0.61)
EOSINOPHIL NFR BLD AUTO: 0 % (ref 0–6)
ERYTHROCYTE [DISTWIDTH] IN BLOOD BY AUTOMATED COUNT: 24.6 % (ref 11.6–15.1)
GFR SERPL CREATININE-BSD FRML MDRD: 30 ML/MIN/1.73SQ M
GLUCOSE P FAST SERPL-MCNC: 80 MG/DL (ref 65–99)
HCT VFR BLD AUTO: 25.4 % (ref 36.5–49.3)
HGB BLD-MCNC: 8.4 G/DL (ref 12–17)
IMM GRANULOCYTES # BLD AUTO: 0.04 THOUSAND/UL (ref 0–0.2)
IMM GRANULOCYTES NFR BLD AUTO: 1 % (ref 0–2)
LYMPHOCYTES # BLD AUTO: 0.31 THOUSANDS/ÂΜL (ref 0.6–4.47)
LYMPHOCYTES NFR BLD AUTO: 10 % (ref 14–44)
MCH RBC QN AUTO: 34.9 PG (ref 26.8–34.3)
MCHC RBC AUTO-ENTMCNC: 33.1 G/DL (ref 31.4–37.4)
MCV RBC AUTO: 105 FL (ref 82–98)
MONOCYTES # BLD AUTO: 0.34 THOUSAND/ÂΜL (ref 0.17–1.22)
MONOCYTES NFR BLD AUTO: 11 % (ref 4–12)
NEUTROPHILS # BLD AUTO: 2.35 THOUSANDS/ÂΜL (ref 1.85–7.62)
NEUTS SEG NFR BLD AUTO: 78 % (ref 43–75)
NRBC BLD AUTO-RTO: 1 /100 WBCS
PLATELET # BLD AUTO: 21 THOUSANDS/UL (ref 149–390)
PMV BLD AUTO: 12.5 FL (ref 8.9–12.7)
POTASSIUM SERPL-SCNC: 3.2 MMOL/L (ref 3.5–5.3)
PROT SERPL-MCNC: 9 G/DL (ref 6.4–8.4)
RBC # BLD AUTO: 2.41 MILLION/UL (ref 3.88–5.62)
RH BLD: POSITIVE
SODIUM SERPL-SCNC: 136 MMOL/L (ref 135–147)
SPECIMEN EXPIRATION DATE: NORMAL
WBC # BLD AUTO: 3.04 THOUSAND/UL (ref 4.31–10.16)

## 2023-08-07 PROCEDURE — 86901 BLOOD TYPING SEROLOGIC RH(D): CPT | Performed by: INTERNAL MEDICINE

## 2023-08-07 PROCEDURE — 86900 BLOOD TYPING SEROLOGIC ABO: CPT | Performed by: INTERNAL MEDICINE

## 2023-08-07 PROCEDURE — 36415 COLL VENOUS BLD VENIPUNCTURE: CPT

## 2023-08-07 PROCEDURE — 86850 RBC ANTIBODY SCREEN: CPT | Performed by: INTERNAL MEDICINE

## 2023-08-07 PROCEDURE — 85025 COMPLETE CBC W/AUTO DIFF WBC: CPT

## 2023-08-07 PROCEDURE — 80053 COMPREHEN METABOLIC PANEL: CPT

## 2023-08-08 DIAGNOSIS — R53.83 OTHER FATIGUE: ICD-10-CM

## 2023-08-09 ENCOUNTER — HOSPITAL ENCOUNTER (OUTPATIENT)
Dept: INFUSION CENTER | Facility: CLINIC | Age: 78
Discharge: HOME | End: 2023-08-09
Payer: MEDICARE

## 2023-08-09 VITALS
DIASTOLIC BLOOD PRESSURE: 77 MMHG | BODY MASS INDEX: 20.58 KG/M2 | WEIGHT: 143.74 LBS | SYSTOLIC BLOOD PRESSURE: 118 MMHG | HEART RATE: 88 BPM | RESPIRATION RATE: 18 BRPM | HEIGHT: 70 IN | TEMPERATURE: 98 F

## 2023-08-09 DIAGNOSIS — C90.00 IGG MULTIPLE MYELOMA (HCC): Primary | ICD-10-CM

## 2023-08-09 DIAGNOSIS — D80.1 HYPOGAMMAGLOBULINEMIA (HCC): ICD-10-CM

## 2023-08-09 RX ORDER — SODIUM CHLORIDE 9 MG/ML
20 INJECTION, SOLUTION INTRAVENOUS ONCE
Start: 2023-08-16 | End: 2023-08-21

## 2023-08-09 RX ORDER — SODIUM CHLORIDE 9 MG/ML
20 INJECTION, SOLUTION INTRAVENOUS ONCE
Status: COMPLETED | OUTPATIENT
Start: 2023-08-09 | End: 2023-08-09

## 2023-08-09 RX ORDER — METHYLPHENIDATE HYDROCHLORIDE 5 MG/1
TABLET ORAL
Qty: 90 TABLET | Refills: 0 | Status: SHIPPED | OUTPATIENT
Start: 2023-08-09

## 2023-08-09 RX ORDER — SODIUM CHLORIDE 9 MG/ML
20 INJECTION, SOLUTION INTRAVENOUS ONCE
Status: DISCONTINUED | OUTPATIENT
Start: 2023-08-09 | End: 2023-08-12 | Stop reason: HOSPADM

## 2023-08-09 RX ADMIN — CARFILZOMIB 100 MG: 10 INJECTION, POWDER, LYOPHILIZED, FOR SOLUTION INTRAVENOUS at 09:53

## 2023-08-09 RX ADMIN — Medication 30 G: at 10:58

## 2023-08-09 RX ADMIN — SODIUM CHLORIDE 20 ML/HR: 0.9 INJECTION, SOLUTION INTRAVENOUS at 08:39

## 2023-08-09 RX ADMIN — SODIUM CHLORIDE 250 ML: 0.9 INJECTION, SOLUTION INTRAVENOUS at 08:44

## 2023-08-09 RX ADMIN — DEXAMETHASONE SODIUM PHOSPHATE: 10 INJECTION, SOLUTION INTRAMUSCULAR; INTRAVENOUS at 08:40

## 2023-08-09 NOTE — PROGRESS NOTES
Patient arrived to unit without complaint. Patient tolerated chemotherapy and IVIG without incident. AVS declined and patient aware of next appointment. Patient left in stable condition.

## 2023-08-14 ENCOUNTER — APPOINTMENT (OUTPATIENT)
Dept: LAB | Facility: CLINIC | Age: 78
End: 2023-08-14
Payer: MEDICARE

## 2023-08-14 DIAGNOSIS — C90.00 IGG MULTIPLE MYELOMA (HCC): ICD-10-CM

## 2023-08-14 DIAGNOSIS — D69.3 CHRONIC ITP (IDIOPATHIC THROMBOCYTOPENIA) (HCC): ICD-10-CM

## 2023-08-14 DIAGNOSIS — D61.82 MYELOPHTHISIC ANEMIA (HCC): ICD-10-CM

## 2023-08-14 DIAGNOSIS — D63.1 ANEMIA DUE TO STAGE 3A CHRONIC KIDNEY DISEASE (HCC): ICD-10-CM

## 2023-08-14 DIAGNOSIS — N18.31 ANEMIA DUE TO STAGE 3A CHRONIC KIDNEY DISEASE (HCC): ICD-10-CM

## 2023-08-14 LAB
ALBUMIN SERPL BCP-MCNC: 2.2 G/DL (ref 3.5–5)
ALP SERPL-CCNC: 38 U/L (ref 46–116)
ALT SERPL W P-5'-P-CCNC: 19 U/L (ref 12–78)
ANION GAP SERPL CALCULATED.3IONS-SCNC: 4 MMOL/L
AST SERPL W P-5'-P-CCNC: 18 U/L (ref 5–45)
BASOPHILS # BLD AUTO: 0 THOUSANDS/ÂΜL (ref 0–0.1)
BASOPHILS NFR BLD AUTO: 0 % (ref 0–1)
BILIRUB SERPL-MCNC: 0.79 MG/DL (ref 0.2–1)
BUN SERPL-MCNC: 30 MG/DL (ref 5–25)
CALCIUM ALBUM COR SERPL-MCNC: 9.6 MG/DL (ref 8.3–10.1)
CALCIUM SERPL-MCNC: 8.2 MG/DL (ref 8.3–10.1)
CHLORIDE SERPL-SCNC: 110 MMOL/L (ref 96–108)
CO2 SERPL-SCNC: 23 MMOL/L (ref 21–32)
CREAT SERPL-MCNC: 1.91 MG/DL (ref 0.6–1.3)
EOSINOPHIL # BLD AUTO: 0.01 THOUSAND/ÂΜL (ref 0–0.61)
EOSINOPHIL NFR BLD AUTO: 0 % (ref 0–6)
ERYTHROCYTE [DISTWIDTH] IN BLOOD BY AUTOMATED COUNT: 24.8 % (ref 11.6–15.1)
GFR SERPL CREATININE-BSD FRML MDRD: 33 ML/MIN/1.73SQ M
GLUCOSE P FAST SERPL-MCNC: 89 MG/DL (ref 65–99)
HCT VFR BLD AUTO: 21.4 % (ref 36.5–49.3)
HGB BLD-MCNC: 7 G/DL (ref 12–17)
IMM GRANULOCYTES # BLD AUTO: 0.04 THOUSAND/UL (ref 0–0.2)
IMM GRANULOCYTES NFR BLD AUTO: 1 % (ref 0–2)
LYMPHOCYTES # BLD AUTO: 0.35 THOUSANDS/ÂΜL (ref 0.6–4.47)
LYMPHOCYTES NFR BLD AUTO: 9 % (ref 14–44)
MCH RBC QN AUTO: 35.4 PG (ref 26.8–34.3)
MCHC RBC AUTO-ENTMCNC: 32.7 G/DL (ref 31.4–37.4)
MCV RBC AUTO: 108 FL (ref 82–98)
MONOCYTES # BLD AUTO: 0.35 THOUSAND/ÂΜL (ref 0.17–1.22)
MONOCYTES NFR BLD AUTO: 9 % (ref 4–12)
NEUTROPHILS # BLD AUTO: 3.01 THOUSANDS/ÂΜL (ref 1.85–7.62)
NEUTS SEG NFR BLD AUTO: 81 % (ref 43–75)
NRBC BLD AUTO-RTO: 1 /100 WBCS
PLATELET # BLD AUTO: 25 THOUSANDS/UL (ref 149–390)
PMV BLD AUTO: 11.3 FL (ref 8.9–12.7)
POTASSIUM SERPL-SCNC: 3.7 MMOL/L (ref 3.5–5.3)
PROT SERPL-MCNC: 9.1 G/DL (ref 6.4–8.4)
RBC # BLD AUTO: 1.98 MILLION/UL (ref 3.88–5.62)
SODIUM SERPL-SCNC: 137 MMOL/L (ref 135–147)
WBC # BLD AUTO: 3.76 THOUSAND/UL (ref 4.31–10.16)

## 2023-08-14 PROCEDURE — 80053 COMPREHEN METABOLIC PANEL: CPT

## 2023-08-14 PROCEDURE — 85025 COMPLETE CBC W/AUTO DIFF WBC: CPT

## 2023-08-15 ENCOUNTER — TELEPHONE (OUTPATIENT)
Dept: HEMATOLOGY ONCOLOGY | Facility: CLINIC | Age: 78
End: 2023-08-15

## 2023-08-15 ENCOUNTER — APPOINTMENT (OUTPATIENT)
Dept: LAB | Facility: HOSPITAL | Age: 78
End: 2023-08-15
Payer: MEDICARE

## 2023-08-15 DIAGNOSIS — E83.52 HYPERCALCEMIA: Primary | ICD-10-CM

## 2023-08-15 PROCEDURE — 86901 BLOOD TYPING SEROLOGIC RH(D): CPT | Performed by: INTERNAL MEDICINE

## 2023-08-15 PROCEDURE — 86900 BLOOD TYPING SEROLOGIC ABO: CPT | Performed by: INTERNAL MEDICINE

## 2023-08-15 PROCEDURE — 86850 RBC ANTIBODY SCREEN: CPT | Performed by: INTERNAL MEDICINE

## 2023-08-15 RX ORDER — SODIUM CHLORIDE 9 MG/ML
20 INJECTION, SOLUTION INTRAVENOUS ONCE
Status: CANCELLED | OUTPATIENT
Start: 2023-08-21

## 2023-08-15 NOTE — TELEPHONE ENCOUNTER
Dr. Cary Mccormack changed patient's treatment from [address] [place] [address] to [facility]. (Every 84 days). Called and discussed this change with Mrs. Bernadette Graham. She was in agreement and will let Krystal Bloom know about this change.

## 2023-08-16 ENCOUNTER — HOSPITAL ENCOUNTER (OUTPATIENT)
Dept: INFUSION CENTER | Facility: CLINIC | Age: 78
Discharge: HOME | End: 2023-08-16
Payer: MEDICARE

## 2023-08-16 VITALS
RESPIRATION RATE: 18 BRPM | HEART RATE: 65 BPM | SYSTOLIC BLOOD PRESSURE: 101 MMHG | HEIGHT: 70 IN | DIASTOLIC BLOOD PRESSURE: 65 MMHG | TEMPERATURE: 98.3 F | WEIGHT: 141.09 LBS | BODY MASS INDEX: 20.2 KG/M2

## 2023-08-16 DIAGNOSIS — D69.3 CHRONIC ITP (IDIOPATHIC THROMBOCYTOPENIA) (HCC): ICD-10-CM

## 2023-08-16 DIAGNOSIS — D63.1 ANEMIA DUE TO STAGE 3A CHRONIC KIDNEY DISEASE (HCC): ICD-10-CM

## 2023-08-16 DIAGNOSIS — E83.52 HYPERCALCEMIA: ICD-10-CM

## 2023-08-16 DIAGNOSIS — D61.82 MYELOPHTHISIC ANEMIA (HCC): Primary | ICD-10-CM

## 2023-08-16 DIAGNOSIS — N18.31 ANEMIA DUE TO STAGE 3A CHRONIC KIDNEY DISEASE (HCC): ICD-10-CM

## 2023-08-16 DIAGNOSIS — C90.00 IGG MULTIPLE MYELOMA (HCC): ICD-10-CM

## 2023-08-16 DIAGNOSIS — C90.00 MULTIPLE MYELOMA NOT HAVING ACHIEVED REMISSION (HCC): ICD-10-CM

## 2023-08-16 PROCEDURE — 96372 THER/PROPH/DIAG INJ SC/IM: CPT

## 2023-08-16 PROCEDURE — 36430 TRANSFUSION BLD/BLD COMPNT: CPT

## 2023-08-16 PROCEDURE — 84165 PROTEIN E-PHORESIS SERUM: CPT | Performed by: PATHOLOGY

## 2023-08-16 PROCEDURE — 96367 TX/PROPH/DG ADDL SEQ IV INF: CPT

## 2023-08-16 PROCEDURE — 96413 CHEMO IV INFUSION 1 HR: CPT

## 2023-08-16 PROCEDURE — P9040 RBC LEUKOREDUCED IRRADIATED: HCPCS

## 2023-08-16 PROCEDURE — 86922 COMPATIBILITY TEST ANTIGLOB: CPT

## 2023-08-16 PROCEDURE — 86921 COMPATIBILITY TEST INCUBATE: CPT

## 2023-08-16 RX ORDER — SODIUM CHLORIDE 9 MG/ML
20 INJECTION, SOLUTION INTRAVENOUS ONCE
Status: COMPLETED | OUTPATIENT
Start: 2023-08-16 | End: 2023-08-16

## 2023-08-16 RX ORDER — SODIUM CHLORIDE 9 MG/ML
20 INJECTION, SOLUTION INTRAVENOUS ONCE
Status: DISCONTINUED | OUTPATIENT
Start: 2023-08-16 | End: 2023-08-19 | Stop reason: HOSPADM

## 2023-08-16 RX ADMIN — CARFILZOMIB 100 MG: 10 INJECTION, POWDER, LYOPHILIZED, FOR SOLUTION INTRAVENOUS at 11:53

## 2023-08-16 RX ADMIN — DENOSUMAB 120 MG: 120 INJECTION SUBCUTANEOUS at 12:23

## 2023-08-16 RX ADMIN — SODIUM CHLORIDE 20 ML/HR: 0.9 INJECTION, SOLUTION INTRAVENOUS at 08:32

## 2023-08-16 RX ADMIN — DEXAMETHASONE SODIUM PHOSPHATE: 10 INJECTION, SOLUTION INTRAMUSCULAR; INTRAVENOUS at 10:55

## 2023-08-16 RX ADMIN — SODIUM CHLORIDE 250 ML: 0.9 INJECTION, SOLUTION INTRAVENOUS at 10:48

## 2023-08-16 NOTE — PROGRESS NOTES
Pt arrived to unit without complaint. Pt tolerated 1 unit of PRBCs without incident. Pt tolerated Kyprolis and Xgeva without incident. AVS declined, but aware of future appts. Pt left unit in stable condition.

## 2023-08-17 DIAGNOSIS — E03.9 HYPOTHYROIDISM, UNSPECIFIED TYPE: ICD-10-CM

## 2023-08-17 RX ORDER — LEVOTHYROXINE SODIUM 0.1 MG/1
100 TABLET ORAL DAILY
Qty: 90 TABLET | Refills: 0 | Status: SHIPPED | OUTPATIENT
Start: 2023-08-17

## 2023-08-21 ENCOUNTER — APPOINTMENT (OUTPATIENT)
Dept: LAB | Facility: CLINIC | Age: 78
End: 2023-08-21
Payer: MEDICARE

## 2023-08-21 DIAGNOSIS — D69.3 CHRONIC ITP (IDIOPATHIC THROMBOCYTOPENIA) (HCC): ICD-10-CM

## 2023-08-21 DIAGNOSIS — N18.31 ANEMIA DUE TO STAGE 3A CHRONIC KIDNEY DISEASE (HCC): ICD-10-CM

## 2023-08-21 DIAGNOSIS — D63.1 ANEMIA DUE TO STAGE 3A CHRONIC KIDNEY DISEASE (HCC): ICD-10-CM

## 2023-08-21 DIAGNOSIS — E83.52 HYPERCALCEMIA: ICD-10-CM

## 2023-08-21 DIAGNOSIS — D61.82 MYELOPHTHISIC ANEMIA (HCC): ICD-10-CM

## 2023-08-21 DIAGNOSIS — C90.00 IGG MULTIPLE MYELOMA (HCC): ICD-10-CM

## 2023-08-21 LAB
ALBUMIN SERPL BCP-MCNC: 2.1 G/DL (ref 3.5–5)
ALP SERPL-CCNC: 42 U/L (ref 46–116)
ALT SERPL W P-5'-P-CCNC: 24 U/L (ref 12–78)
ANION GAP SERPL CALCULATED.3IONS-SCNC: 7 MMOL/L
AST SERPL W P-5'-P-CCNC: 22 U/L (ref 5–45)
BASOPHILS # BLD AUTO: 0 THOUSANDS/ÂΜL (ref 0–0.1)
BASOPHILS NFR BLD AUTO: 0 % (ref 0–1)
BILIRUB SERPL-MCNC: 0.73 MG/DL (ref 0.2–1)
BUN SERPL-MCNC: 23 MG/DL (ref 5–25)
CALCIUM ALBUM COR SERPL-MCNC: 8.9 MG/DL (ref 8.3–10.1)
CALCIUM SERPL-MCNC: 7.4 MG/DL (ref 8.3–10.1)
CHLORIDE SERPL-SCNC: 109 MMOL/L (ref 96–108)
CO2 SERPL-SCNC: 21 MMOL/L (ref 21–32)
CREAT SERPL-MCNC: 1.83 MG/DL (ref 0.6–1.3)
EOSINOPHIL # BLD AUTO: 0 THOUSAND/ÂΜL (ref 0–0.61)
EOSINOPHIL NFR BLD AUTO: 0 % (ref 0–6)
ERYTHROCYTE [DISTWIDTH] IN BLOOD BY AUTOMATED COUNT: 24.7 % (ref 11.6–15.1)
GFR SERPL CREATININE-BSD FRML MDRD: 35 ML/MIN/1.73SQ M
GLUCOSE P FAST SERPL-MCNC: 84 MG/DL (ref 65–99)
HCT VFR BLD AUTO: 24.3 % (ref 36.5–49.3)
HGB BLD-MCNC: 7.9 G/DL (ref 12–17)
IMM GRANULOCYTES # BLD AUTO: 0.03 THOUSAND/UL (ref 0–0.2)
IMM GRANULOCYTES NFR BLD AUTO: 1 % (ref 0–2)
LYMPHOCYTES # BLD AUTO: 0.34 THOUSANDS/ÂΜL (ref 0.6–4.47)
LYMPHOCYTES NFR BLD AUTO: 10 % (ref 14–44)
MCH RBC QN AUTO: 35.1 PG (ref 26.8–34.3)
MCHC RBC AUTO-ENTMCNC: 32.5 G/DL (ref 31.4–37.4)
MCV RBC AUTO: 108 FL (ref 82–98)
MONOCYTES # BLD AUTO: 0.32 THOUSAND/ÂΜL (ref 0.17–1.22)
MONOCYTES NFR BLD AUTO: 9 % (ref 4–12)
NEUTROPHILS # BLD AUTO: 2.77 THOUSANDS/ÂΜL (ref 1.85–7.62)
NEUTS SEG NFR BLD AUTO: 80 % (ref 43–75)
NRBC BLD AUTO-RTO: 1 /100 WBCS
PLATELET # BLD AUTO: 18 THOUSANDS/UL (ref 149–390)
PMV BLD AUTO: 11.6 FL (ref 8.9–12.7)
POTASSIUM SERPL-SCNC: 4 MMOL/L (ref 3.5–5.3)
PROT SERPL-MCNC: 9.2 G/DL (ref 6.4–8.4)
RBC # BLD AUTO: 2.25 MILLION/UL (ref 3.88–5.62)
SODIUM SERPL-SCNC: 137 MMOL/L (ref 135–147)
WBC # BLD AUTO: 3.46 THOUSAND/UL (ref 4.31–10.16)

## 2023-08-21 PROCEDURE — 85025 COMPLETE CBC W/AUTO DIFF WBC: CPT

## 2023-08-21 PROCEDURE — 80053 COMPREHEN METABOLIC PANEL: CPT

## 2023-08-22 DIAGNOSIS — C90.00 IGG MULTIPLE MYELOMA (HCC): Primary | ICD-10-CM

## 2023-08-22 RX ORDER — SULFAMETHOXAZOLE AND TRIMETHOPRIM 800; 160 MG/1; MG/1
1 TABLET ORAL 3 TIMES WEEKLY
Qty: 12 TABLET | Refills: 11 | Status: SHIPPED | OUTPATIENT
Start: 2023-08-23 | End: 2024-08-22

## 2023-08-23 ENCOUNTER — HOSPITAL ENCOUNTER (OUTPATIENT)
Dept: INFUSION CENTER | Facility: CLINIC | Age: 78
Discharge: HOME | End: 2023-08-23
Payer: MEDICARE

## 2023-08-23 VITALS
HEIGHT: 70 IN | BODY MASS INDEX: 20.58 KG/M2 | WEIGHT: 143.74 LBS | SYSTOLIC BLOOD PRESSURE: 124 MMHG | TEMPERATURE: 97.6 F | RESPIRATION RATE: 18 BRPM | DIASTOLIC BLOOD PRESSURE: 76 MMHG | HEART RATE: 68 BPM

## 2023-08-23 DIAGNOSIS — C90.00 IGG MULTIPLE MYELOMA (HCC): Primary | ICD-10-CM

## 2023-08-23 PROCEDURE — 96367 TX/PROPH/DG ADDL SEQ IV INF: CPT

## 2023-08-23 PROCEDURE — 96413 CHEMO IV INFUSION 1 HR: CPT

## 2023-08-23 RX ORDER — SODIUM CHLORIDE 9 MG/ML
20 INJECTION, SOLUTION INTRAVENOUS ONCE
OUTPATIENT
Start: 2023-09-06

## 2023-08-23 RX ORDER — SODIUM CHLORIDE 9 MG/ML
20 INJECTION, SOLUTION INTRAVENOUS ONCE
OUTPATIENT
Start: 2023-09-13

## 2023-08-23 RX ORDER — SODIUM CHLORIDE 9 MG/ML
20 INJECTION, SOLUTION INTRAVENOUS ONCE
Status: COMPLETED | OUTPATIENT
Start: 2023-08-23 | End: 2023-08-23

## 2023-08-23 RX ORDER — SODIUM CHLORIDE 9 MG/ML
20 INJECTION, SOLUTION INTRAVENOUS ONCE
OUTPATIENT
Start: 2023-09-20

## 2023-08-23 RX ORDER — SODIUM CHLORIDE 9 MG/ML
20 INJECTION, SOLUTION INTRAVENOUS ONCE
OUTPATIENT
Start: 2023-08-30

## 2023-08-23 RX ADMIN — DEXAMETHASONE SODIUM PHOSPHATE: 10 INJECTION, SOLUTION INTRAMUSCULAR; INTRAVENOUS at 08:34

## 2023-08-23 RX ADMIN — SODIUM CHLORIDE 20 ML/HR: 0.9 INJECTION, SOLUTION INTRAVENOUS at 08:33

## 2023-08-23 RX ADMIN — SODIUM CHLORIDE 250 ML: 0.9 INJECTION, SOLUTION INTRAVENOUS at 08:33

## 2023-08-23 RX ADMIN — CARFILZOMIB 100 MG: 10 INJECTION, POWDER, LYOPHILIZED, FOR SOLUTION INTRAVENOUS at 09:37

## 2023-08-23 NOTE — PROGRESS NOTES
Pt arrived to unit, ok given to treat with Hgb=7.9 and platelet WNSKP=09,416. Pt received Kyprolis as ordered and tolerated well. No blood products required today, pt monitors CBC weekly. Pt and wife decline AVS, aware of future appts.

## 2023-08-24 ENCOUNTER — OFFICE VISIT (OUTPATIENT)
Dept: HEMATOLOGY ONCOLOGY | Facility: CLINIC | Age: 78
End: 2023-08-24
Payer: MEDICARE

## 2023-08-24 VITALS
DIASTOLIC BLOOD PRESSURE: 84 MMHG | TEMPERATURE: 97.6 F | SYSTOLIC BLOOD PRESSURE: 138 MMHG | BODY MASS INDEX: 20.9 KG/M2 | HEART RATE: 73 BPM | WEIGHT: 146 LBS | RESPIRATION RATE: 17 BRPM | HEIGHT: 70 IN | OXYGEN SATURATION: 98 %

## 2023-08-24 DIAGNOSIS — E44.1 MILD PROTEIN-CALORIE MALNUTRITION (HCC): ICD-10-CM

## 2023-08-24 DIAGNOSIS — C90.00 MULTIPLE MYELOMA, REMISSION STATUS UNSPECIFIED (HCC): Primary | ICD-10-CM

## 2023-08-24 DIAGNOSIS — C90.00 MULTIPLE MYELOMA NOT HAVING ACHIEVED REMISSION (HCC): ICD-10-CM

## 2023-08-24 DIAGNOSIS — E03.9 HYPOTHYROIDISM, UNSPECIFIED TYPE: ICD-10-CM

## 2023-08-24 PROCEDURE — 99214 OFFICE O/P EST MOD 30 MIN: CPT | Performed by: STUDENT IN AN ORGANIZED HEALTH CARE EDUCATION/TRAINING PROGRAM

## 2023-08-24 NOTE — PROGRESS NOTES
Hematology/Oncology Outpatient Follow- up Note  Israel Bucio 78 y.o. male MRN: @ Encounter: [id number]        Date:  8/24/2023      HPI: Dutch Parsons is a 78years old male with past medical history remarkable for IgG lambda multiple myeloma, as outlined below, since April 2023 on carfilzomib, selinexor and dexamethasone, also on every 3 months Xgeva, monthly IVIG, presenting today, with his wife, for follow-up      Interval History:      ROS   · Review of system positive for chronic fatigue, which she got transiently worse when he ran out Methylphenidate last week, now he got the refill available  · His previously reported decreased appetite is now relatively improved, he is taking Zyprexa evening time  · Still have some sleep pattern issue , wakes up ~ 3-4 am ; medication timing reviewed and he takes med's as prescribed ; will continue to work on sleep hygiene and regularize sleep hours. · Recent blood work reviewed with the patient, overall stable:    WBC 3.46, hemoglobin 7.9, from 7.0, he received a unit of blood about a week ago, platelets no baseline 18 down from 25 denies any bleeding or relevant symptoms    Calcium WNL corrected 8.9, creatinine 1.83 EGFR  35, IgA less than 8, IgM 9, IgG 4930 down from 5200, he is getting IVIG monthly, denies any recent infection/illness    Kappa/lambda 1.0/68.1, previously 1.2/64.7    Assessment / Plan:      1. Multiple myeloma, remission status unspecified (HCC)  As above, overall stable disease now on current regimen, will continue with no change, currently on carfilzomib, selinexor, DEXA, months IVIG and every 3 month Xgeva    CBC weekly for now, CMP every 2 weeks, and will repeat MM work-up next months prior to follow-up    - CBC and differential; Standing  - Comprehensive metabolic panel; Standing  - Immunoglobulin free LT chains blood; Future  - IgG, IgA, IgM; Future  - Protein electrophoresis, serum;  Future  - CBC and differential  - Comprehensive metabolic panel    2. Hypothyroidism, unspecified type  TSH most recently checked in February 2023 T4 was abnormally low 0.56, patient is on levothyroxine which was increased in March, will repeat TSH with T4 reflex given patient's ongoing fatigue   - TSH, 3rd generation with Free T4 reflex; Future    3. Mild protein-calorie malnutrition ([address])  Patient appetite improved on Zyprexa, will continue on the encouraged to increase p.o. intake        Cancer Staging:  Cancer Staging   No matching staging information was found for the patient. Molecular Testing:     Previous Hematologic/ Oncologic History:    Oncology History   IgG multiple myeloma ([address])   9/2015 Initial Diagnosis    Found to have Hbg of 6 with SOB, creatinine 1.8 and normal calcium with albumin of 2.1. Additonial blood work showed elevated protien level (12.1g/dL). 9/29/2015 Biopsy    Bone marrow biopsy demonstrated approximately 80% plasma cells with some immature forms noted. 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 Day one of cycle 2 was on 11/11/15.     12/2015 - 2/2016 Chemotherapy    VDT-PACE x 2 cycles   (completed at [facility] in [facility], [address])     2/2016 Transplant    Melphalan 200 mg/m2 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. MRD +     8/2016 - 1/26/2018 Chemotherapy    Maintenance therapy:  Carfilzomib, orginally dosed 27 mg weekly then increased to 45 mg weekly after MRD reactivitation. Revlimid  Dexamethasone      2/2018 Biopsy    Bone marrow demonstrated positive minimal residual disease.      2/23/2018 -  Chemotherapy    Daratumumab 16mg/m2 IV Day 1  Pomalyst 4 mg p.o. daily 1-21  Dexamethasone 4 mg PO Days 2, 3  Dexamethasone 12 mg PODays 8, 15, 22  Cycle length = 28 days 4/11/2019 - 8/29/2019 Chemotherapy    methylPREDNISolone sodium succinate (Solu-MEDROL) 100 mg in sodium chloride 0.9 % 250 mL IVPB, 100 mg, Intravenous, Once, 5 of 12 cycles  Administration: 100 mg (6/7/2019), 100 mg (7/5/2019), 100 mg (8/2/2019)     8/30/2019 - 11/8/2019 Chemotherapy    cyclophosphamide (CYTOXAN) 1,000 mg in sodium chloride 0.9 % 250 mL IVPB, 990 mg (66.7 % of original dose 750 mg/m2), Intravenous, Once, 2 of 3 cycles  Dose modification: 500 mg/m2 (original dose 750 mg/m2, Cycle 1, Reason: Other (See Comments)), 250 mg/m2 (original dose 750 mg/m2, Cycle 4, Reason: Treatment Parameters Not Met)  Administration: 1,000 mg (8/30/2019), 1,000 mg (9/13/2019), 1,000 mg (9/27/2019), 500 mg (10/11/2019)  bortezomib (VELCADE) subcutaneous injection 2.6 mg, 1.3 mg/m2 = 2.6 mg (86.7 % of original dose 1.5 mg/m2), Subcutaneous, Once, 3 of 4 cycles  Dose modification: 1.3 mg/m2 (original dose 1.5 mg/m2, Cycle 1, Reason: Other (See Comments))  Administration: 2.6 mg (8/30/2019), 2.6 mg (9/13/2019), 2.6 mg (10/25/2019), 2.6 mg (11/8/2019), 2.6 mg (9/27/2019), 2.6 mg (11/1/2019), 2.6 mg (9/20/2019), 2.6 mg (10/4/2019), 2.6 mg (10/11/2019), 2.6 mg (10/18/2019)     12/6/2019 - 6/19/2020 Chemotherapy    pegfilgrastim (NEULASTA ONPRO) subcutaneous injection kit 6 mg, 6 mg, Subcutaneous, Once, 5 of 9 cycles  Administration: 6 mg (2/28/2020), 6 mg (3/13/2020), 6 mg (3/27/2020), 6 mg (4/10/2020), 6 mg (4/24/2020), 6 mg (5/8/2020), 6 mg (6/5/2020), 6 mg (5/22/2020), 6 mg (6/19/2020)  cyclophosphamide (CYTOXAN) 784 mg in sodium chloride 0.9 % 250 mL IVPB, 400 mg/m2 = 784 mg, Intravenous, Once, 5 of 9 cycles  Dose modification: 400 mg/m2 (original dose 750 mg/m2, Cycle 7, Reason: Other (See Comments))  Administration: 784 mg (2/28/2020), 784 mg (3/13/2020), 784 mg (3/27/2020), 784 mg (4/10/2020), 784 mg (4/24/2020), 784 mg (5/8/2020), 784 mg (5/22/2020), 784 mg (6/19/2020), 784 mg (6/5/2020)  methylPREDNISolone sodium succinate (Solu-MEDROL) 100 mg in sodium chloride 0.9 % 250 mL IVPB, 100 mg, Intravenous, Once, 8 of 12 cycles  Administration: 100 mg (12/6/2019), 100 mg (12/13/2019), 100 mg (12/20/2019), 100 mg (1/3/2020), 100 mg (1/10/2020), 100 mg (1/17/2020), 100 mg (1/31/2020), 100 mg (2/14/2020), 100 mg (5/22/2020), 100 mg (12/27/2019), 100 mg (1/24/2020), 100 mg (2/28/2020), 100 mg (3/13/2020), 100 mg (3/27/2020), 100 mg (4/10/2020), 100 mg (4/24/2020), 100 mg (5/8/2020), 100 mg (6/19/2020)     7/2/2020 -  Chemotherapy       10/17/2022 - 12/12/2022 Chemotherapy    elotuzumab (EMPLICITI) IVPB, 10 mg/kg = 757.5 mg, Intravenous, Once, 3 of 6 cycles  Administration: 757.5 mg (10/17/2022), 757.5 mg (10/24/2022), 757.5 mg (11/7/2022), 1,600 mg (12/12/2022), 757.5 mg (10/31/2022), 757.5 mg (11/14/2022), 800 mg (11/21/2022), 800 mg (11/28/2022), 800 mg (12/5/2022)     4/19/2023 -  Chemotherapy    alteplase (CATHFLO), 2 mg, Intracatheter, Every 1 Minute as needed, 5 of 6 cycles  carfilzomib (KYPROLIS) IVPB in dextrose, 107 mg (280 % of original dose 20 mg/m2), Intravenous, Once, 5 of 6 cycles  Dose modification: 56 mg/m2 (original dose 20 mg/m2, Cycle 1, Reason: Dose modified as per discussion with consulting physician), 56 mg/m2 (original dose 70 mg/m2, Cycle 1, Reason: Dose modified as per discussion with consulting physician)  Administration: 110 mg (4/19/2023), 110 mg (4/26/2023), 110 mg (5/10/2023), 110 mg (5/17/2023), 110 mg (5/24/2023), 110 mg (5/3/2023), 110 mg (5/31/2023), 100 mg (6/7/2023), 100 mg (6/14/2023), 100 mg (6/21/2023), 100 mg (6/28/2023), 100 mg (7/5/2023), 100 mg (7/12/2023), 100 mg (7/19/2023), 100 mg (7/26/2023), 100 mg (8/23/2023), 100 mg (8/2/2023), 100 mg (8/9/2023), 100 mg (8/16/2023)         Current Hematologic/ Oncologic Treatment:       Cycle 1         Test Results:    Imaging: No results found.           Labs:   Lab Results   Component Value Date    WBC 3.46 (L) 08/21/2023    HGB 7.9 (L) 08/21/2023 HCT 24.3 (L) 08/21/2023     (H) 08/21/2023    PLT 18 (LL) 08/21/2023     Lab Results   Component Value Date     (L) 10/14/2015    K 4.0 08/21/2023     (H) 08/21/2023    CO2 21 08/21/2023    ANIONGAP 7 10/14/2015    BUN 23 08/21/2023    CREATININE 1.83 (H) 08/21/2023    GLUCOSE 85 10/14/2015    GLUF 84 08/21/2023    CALCIUM 7.4 (L) 08/21/2023    CORRECTEDCA 8.9 08/21/2023    AST 22 08/21/2023    ALT 24 08/21/2023    ALKPHOS 42 (L) 08/21/2023    PROT 12.6 (H) 10/14/2015    BILITOT 0.78 10/14/2015    EGFR 35 08/21/2023         Lab Results   Component Value Date    SPEP See Comment 08/14/2023    UPEP The serum total 09/24/2015       Lab Results   Component Value Date    PSA 0.2 01/15/2020    PSA 0.5 03/13/2019       No results found for: "CEA"    No results found for: ""    No results found for: "AFP"    Lab Results   Component Value Date    IRON 119 09/27/2015    TIBC 294 09/27/2015    FERRITIN 289.0 09/25/2015       No results found for: "PBNZAMTY20"      ROS: Review of Systems  - GENERAL: Chronic fatigue, insomnia, and decreased appetite as noted above,, negative for any nausea, vomiting, fevers, chills, or weight loss. - HEENT: Negative for any head/Neck trauma, pain, double/blurry vision, sinusitis, rhinitis, nose bleeding.  - CARDIAC: Negative for any chest pain, palpitation, Dyspnea on exertion, peripheral edema. - PULMONARY: Negative for any SOB, cough, wheezing.   - GASTROINTESTINAL: Negative for any abdominal pain, N/V/D/C, blood in stool.   - GENITOURINARY: Negative for any dysuria, hematuria, incontinence.  - NEUROLOGIC: Negative for any muscle weakness, numbness/tingling, memory changes. - MUSCULOSKELETAL: Negative for any joint pains/swelling, limited ROM. - INTEGUMENTARY: Negative for any rashes, cuts/ lesions.  - HEMATOLOGIC: Negative for any abnormal bruising, frequent infections or bleeding.       Current Medications: Reviewed  Allergies: Reviewed  PMH/FH/SH: Reviewed      Physical Exam:    Body surface area is 1.83 meters squared. Wt Readings from Last 3 Encounters:   08/24/23 66.2 kg (146 lb)   08/23/23 65.2 kg (143 lb 11.8 oz)   08/16/23 64 kg (141 lb 1.5 oz)        Temp Readings from Last 3 Encounters:   08/24/23 97.6 °F (36.4 °C)   08/23/23 97.6 °F (36.4 °C) (Tympanic)   08/16/23 98.3 °F (36.8 °C) (Temporal)        BP Readings from Last 3 Encounters:   08/24/23 138/84   08/23/23 124/76   08/16/23 101/65         Pulse Readings from Last 3 Encounters:   08/24/23 73   08/23/23 68   08/16/23 65     @LASTSAO2(3)@      Physical Exam  - GEN: Appears well, alert and oriented x 3, pleasant and cooperative, in no acute distress  - HEENT: Anicteric, mucous membranes moist, PERRL and EOMI   - NECK: No lymphadenopathy, JVD or carotid bruits   - HEART: RRR, normal S1 and S2, no murmurs, clicks, gallops or rubs   - LUNGS: Clear to auscultation bilaterally; no wheezes, rales, or rhonchi  - ABDOMEN: Normal bowel sounds, soft, no tenderness, no distention, no organomegaly or masses felt on exam.   - EXTREMITIES: Peripheral pulses normal; no clubbing, cyanosis, or edema  - NEURO: No focal findings, CN II-XII are grossly intact. - Musculoskeletal: 5/5 strength, normal ROM, no swollen or erythematous joints. - SKIN: Normal without suspicious lesions on exposed skin      Goals and Barriers:  Current Goal: Prolong Survival from Cancer. Barriers: None. Patient's Capacity to Self Care:  Patient is able to self care.     Code Status: [unfilled] so daughters right now mother right now I am done with the nodule is  Advance Directive and Living Will:      Power of :       Jorge Alberto Kemp,    Hematology and Medical Oncology - PGY [facility]

## 2023-08-28 ENCOUNTER — APPOINTMENT (OUTPATIENT)
Dept: LAB | Facility: CLINIC | Age: 78
End: 2023-08-28
Payer: MEDICARE

## 2023-08-28 DIAGNOSIS — D69.3 CHRONIC ITP (IDIOPATHIC THROMBOCYTOPENIA) (HCC): ICD-10-CM

## 2023-08-28 DIAGNOSIS — D61.82 MYELOPHTHISIC ANEMIA (HCC): ICD-10-CM

## 2023-08-28 DIAGNOSIS — D63.1 ANEMIA DUE TO STAGE 3A CHRONIC KIDNEY DISEASE (HCC): ICD-10-CM

## 2023-08-28 DIAGNOSIS — N18.31 ANEMIA DUE TO STAGE 3A CHRONIC KIDNEY DISEASE (HCC): ICD-10-CM

## 2023-08-28 LAB
BASOPHILS # BLD AUTO: 0 THOUSANDS/ÂΜL (ref 0–0.1)
BASOPHILS NFR BLD AUTO: 0 % (ref 0–1)
EOSINOPHIL # BLD AUTO: 0.01 THOUSAND/ÂΜL (ref 0–0.61)
EOSINOPHIL NFR BLD AUTO: 0 % (ref 0–6)
ERYTHROCYTE [DISTWIDTH] IN BLOOD BY AUTOMATED COUNT: 24.7 % (ref 11.6–15.1)
HCT VFR BLD AUTO: 23.7 % (ref 36.5–49.3)
HGB BLD-MCNC: 7.6 G/DL (ref 12–17)
IMM GRANULOCYTES # BLD AUTO: 0.06 THOUSAND/UL (ref 0–0.2)
IMM GRANULOCYTES NFR BLD AUTO: 2 % (ref 0–2)
LYMPHOCYTES # BLD AUTO: 0.27 THOUSANDS/ÂΜL (ref 0.6–4.47)
LYMPHOCYTES NFR BLD AUTO: 8 % (ref 14–44)
MCH RBC QN AUTO: 35.2 PG (ref 26.8–34.3)
MCHC RBC AUTO-ENTMCNC: 32.1 G/DL (ref 31.4–37.4)
MCV RBC AUTO: 110 FL (ref 82–98)
MONOCYTES # BLD AUTO: 0.38 THOUSAND/ÂΜL (ref 0.17–1.22)
MONOCYTES NFR BLD AUTO: 11 % (ref 4–12)
NEUTROPHILS # BLD AUTO: 2.68 THOUSANDS/ÂΜL (ref 1.85–7.62)
NEUTS SEG NFR BLD AUTO: 79 % (ref 43–75)
NRBC BLD AUTO-RTO: 2 /100 WBCS
PLATELET # BLD AUTO: 15 THOUSANDS/UL (ref 149–390)
PMV BLD AUTO: 11 FL (ref 8.9–12.7)
RBC # BLD AUTO: 2.16 MILLION/UL (ref 3.88–5.62)
WBC # BLD AUTO: 3.4 THOUSAND/UL (ref 4.31–10.16)

## 2023-08-28 PROCEDURE — 86900 BLOOD TYPING SEROLOGIC ABO: CPT | Performed by: INTERNAL MEDICINE

## 2023-08-28 PROCEDURE — 86922 COMPATIBILITY TEST ANTIGLOB: CPT

## 2023-08-28 PROCEDURE — 86921 COMPATIBILITY TEST INCUBATE: CPT

## 2023-08-28 PROCEDURE — 86901 BLOOD TYPING SEROLOGIC RH(D): CPT | Performed by: INTERNAL MEDICINE

## 2023-08-28 PROCEDURE — 86850 RBC ANTIBODY SCREEN: CPT | Performed by: INTERNAL MEDICINE

## 2023-08-28 PROCEDURE — 85025 COMPLETE CBC W/AUTO DIFF WBC: CPT | Performed by: INTERNAL MEDICINE

## 2023-08-29 DIAGNOSIS — I10 ESSENTIAL HYPERTENSION: ICD-10-CM

## 2023-08-29 RX ORDER — SODIUM CHLORIDE 9 MG/ML
20 INJECTION, SOLUTION INTRAVENOUS ONCE
OUTPATIENT
Start: 2023-09-04

## 2023-08-29 NOTE — TELEPHONE ENCOUNTER
Reviewed platelet count of 41,584 with EB/SANTANA. Tx on hold. Attempted to call pt without success. Left a VM on pt's cell. Also spoke with Geno Clarice Arredondo. Pt to have repeat cbc in one week. Reviewed bleeding precautions with Mrs. Clarice Arredondo. Pt to have his blood transfusion 8/30/23 for hgb 7.6. Infusion room notified of cancellation. Spoke with Corwin/RENE.

## 2023-08-30 ENCOUNTER — HOSPITAL ENCOUNTER (OUTPATIENT)
Dept: INFUSION CENTER | Facility: CLINIC | Age: 78
Discharge: HOME | End: 2023-08-30
Payer: MEDICARE

## 2023-08-30 DIAGNOSIS — N18.31 ANEMIA DUE TO STAGE 3A CHRONIC KIDNEY DISEASE (HCC): ICD-10-CM

## 2023-08-30 DIAGNOSIS — C90.00 IGG MULTIPLE MYELOMA (HCC): ICD-10-CM

## 2023-08-30 DIAGNOSIS — D63.1 ANEMIA DUE TO STAGE 3A CHRONIC KIDNEY DISEASE (HCC): ICD-10-CM

## 2023-08-30 DIAGNOSIS — D69.3 CHRONIC ITP (IDIOPATHIC THROMBOCYTOPENIA) (HCC): ICD-10-CM

## 2023-08-30 DIAGNOSIS — D61.82 MYELOPHTHISIC ANEMIA (HCC): Primary | ICD-10-CM

## 2023-08-30 PROCEDURE — P9040 RBC LEUKOREDUCED IRRADIATED: HCPCS

## 2023-08-30 PROCEDURE — P9037 PLATE PHERES LEUKOREDU IRRAD: HCPCS

## 2023-08-30 RX ORDER — SODIUM CHLORIDE 9 MG/ML
20 INJECTION, SOLUTION INTRAVENOUS ONCE
Status: COMPLETED | OUTPATIENT
Start: 2023-08-30 | End: 2023-08-30

## 2023-08-30 RX ADMIN — SODIUM CHLORIDE 20 ML/HR: 0.9 INJECTION, SOLUTION INTRAVENOUS at 08:22

## 2023-08-30 NOTE — PROGRESS NOTES
Pt's chemotx on hold for today d/t cytopenias. Pt tolerated transfusion of one unit PRBC and one unit platelets as ordered. Pt left unit in stable condition, aware of appt next week for potential chemotherapy.

## 2023-08-31 VITALS
HEART RATE: 75 BPM | RESPIRATION RATE: 18 BRPM | TEMPERATURE: 98.2 F | DIASTOLIC BLOOD PRESSURE: 68 MMHG | SYSTOLIC BLOOD PRESSURE: 115 MMHG

## 2023-08-31 LAB
ABO GROUP BLD BPU: NORMAL
ABO GROUP BLD BPU: NORMAL
BPU ID: NORMAL
BPU ID: NORMAL
CROSSMATCH: NORMAL
UNIT DISPENSE STATUS: NORMAL
UNIT DISPENSE STATUS: NORMAL
UNIT PRODUCT CODE: NORMAL
UNIT PRODUCT CODE: NORMAL
UNIT PRODUCT VOLUME: 243 ML
UNIT PRODUCT VOLUME: 350 ML
UNIT RH: NORMAL
UNIT RH: NORMAL

## 2023-09-05 ENCOUNTER — LAB REQUISITION (OUTPATIENT)
Dept: LAB | Facility: HOSPITAL | Age: 78
End: 2023-09-05
Payer: MEDICARE

## 2023-09-05 ENCOUNTER — APPOINTMENT (OUTPATIENT)
Dept: LAB | Facility: CLINIC | Age: 78
End: 2023-09-05
Payer: MEDICARE

## 2023-09-05 DIAGNOSIS — D69.3 CHRONIC ITP (IDIOPATHIC THROMBOCYTOPENIA) (HCC): ICD-10-CM

## 2023-09-05 DIAGNOSIS — D61.82: ICD-10-CM

## 2023-09-05 DIAGNOSIS — E03.9 HYPOTHYROIDISM, UNSPECIFIED TYPE: ICD-10-CM

## 2023-09-05 DIAGNOSIS — N18.31 ANEMIA DUE TO STAGE 3A CHRONIC KIDNEY DISEASE (HCC): ICD-10-CM

## 2023-09-05 DIAGNOSIS — D69.3 IMMUNE THROMBOCYTOPENIC PURPURA (HCC): ICD-10-CM

## 2023-09-05 DIAGNOSIS — D63.1 ANEMIA DUE TO STAGE 3A CHRONIC KIDNEY DISEASE (HCC): ICD-10-CM

## 2023-09-05 DIAGNOSIS — D63.1 ANEMIA IN CHRONIC KIDNEY DISEASE (CODE): ICD-10-CM

## 2023-09-05 DIAGNOSIS — D61.82 MYELOPHTHISIC ANEMIA (HCC): ICD-10-CM

## 2023-09-05 DIAGNOSIS — C90.00 MULTIPLE MYELOMA, REMISSION STATUS UNSPECIFIED (HCC): ICD-10-CM

## 2023-09-05 DIAGNOSIS — N18.31 CHRONIC KIDNEY DISEASE, STAGE 3A (HCC): ICD-10-CM

## 2023-09-05 LAB
ABO GROUP BLD: NORMAL
ALBUMIN SERPL BCP-MCNC: 2.6 G/DL (ref 3.5–5)
ALP SERPL-CCNC: 39 U/L (ref 34–104)
ALT SERPL W P-5'-P-CCNC: 21 U/L (ref 7–52)
ANION GAP SERPL CALCULATED.3IONS-SCNC: 8 MMOL/L
AST SERPL W P-5'-P-CCNC: 24 U/L (ref 13–39)
BILIRUB SERPL-MCNC: 1.31 MG/DL (ref 0.2–1)
BLD GP AB SCN SERPL QL: NEGATIVE
BUN SERPL-MCNC: 20 MG/DL (ref 5–25)
CALCIUM ALBUM COR SERPL-MCNC: 8.5 MG/DL (ref 8.3–10.1)
CALCIUM SERPL-MCNC: 7.4 MG/DL (ref 8.4–10.2)
CHLORIDE SERPL-SCNC: 110 MMOL/L (ref 96–108)
CO2 SERPL-SCNC: 20 MMOL/L (ref 21–32)
CREAT SERPL-MCNC: 1.75 MG/DL (ref 0.6–1.3)
GFR SERPL CREATININE-BSD FRML MDRD: 37 ML/MIN/1.73SQ M
GLUCOSE P FAST SERPL-MCNC: 78 MG/DL (ref 65–99)
POTASSIUM SERPL-SCNC: 3.1 MMOL/L (ref 3.5–5.3)
PROT SERPL-MCNC: 8.8 G/DL (ref 6.4–8.4)
RH BLD: POSITIVE
SODIUM SERPL-SCNC: 138 MMOL/L (ref 135–147)
SPECIMEN EXPIRATION DATE: NORMAL
T4 FREE SERPL-MCNC: 0.43 NG/DL (ref 0.61–1.12)
TSH SERPL DL<=0.05 MIU/L-ACNC: 6.41 UIU/ML (ref 0.45–4.5)

## 2023-09-05 PROCEDURE — 80053 COMPREHEN METABOLIC PANEL: CPT

## 2023-09-05 PROCEDURE — 86850 RBC ANTIBODY SCREEN: CPT | Performed by: INTERNAL MEDICINE

## 2023-09-05 PROCEDURE — 86901 BLOOD TYPING SEROLOGIC RH(D): CPT | Performed by: INTERNAL MEDICINE

## 2023-09-05 PROCEDURE — 84443 ASSAY THYROID STIM HORMONE: CPT

## 2023-09-05 PROCEDURE — 84439 ASSAY OF FREE THYROXINE: CPT

## 2023-09-05 PROCEDURE — 86900 BLOOD TYPING SEROLOGIC ABO: CPT | Performed by: INTERNAL MEDICINE

## 2023-09-06 ENCOUNTER — HOSPITAL ENCOUNTER (OUTPATIENT)
Dept: INFUSION CENTER | Facility: CLINIC | Age: 78
Discharge: HOME | End: 2023-09-06
Payer: MEDICARE

## 2023-09-06 VITALS
RESPIRATION RATE: 18 BRPM | HEART RATE: 76 BPM | WEIGHT: 144.62 LBS | HEIGHT: 70 IN | DIASTOLIC BLOOD PRESSURE: 64 MMHG | BODY MASS INDEX: 20.7 KG/M2 | SYSTOLIC BLOOD PRESSURE: 109 MMHG | TEMPERATURE: 98.5 F

## 2023-09-06 DIAGNOSIS — D80.1 HYPOGAMMAGLOBULINEMIA (HCC): ICD-10-CM

## 2023-09-06 DIAGNOSIS — C90.00 IGG MULTIPLE MYELOMA (HCC): Primary | ICD-10-CM

## 2023-09-06 PROCEDURE — 96367 TX/PROPH/DG ADDL SEQ IV INF: CPT

## 2023-09-06 PROCEDURE — 96361 HYDRATE IV INFUSION ADD-ON: CPT

## 2023-09-06 PROCEDURE — 96413 CHEMO IV INFUSION 1 HR: CPT

## 2023-09-06 PROCEDURE — 96368 THER/DIAG CONCURRENT INF: CPT

## 2023-09-06 PROCEDURE — 96366 THER/PROPH/DIAG IV INF ADDON: CPT

## 2023-09-06 RX ORDER — POTASSIUM CHLORIDE 14.9 MG/ML
20 INJECTION INTRAVENOUS ONCE
Status: CANCELLED
Start: 2023-09-06

## 2023-09-06 RX ORDER — SODIUM CHLORIDE 9 MG/ML
20 INJECTION, SOLUTION INTRAVENOUS ONCE
Status: COMPLETED | OUTPATIENT
Start: 2023-09-06 | End: 2023-09-06

## 2023-09-06 RX ORDER — SODIUM CHLORIDE 9 MG/ML
20 INJECTION, SOLUTION INTRAVENOUS ONCE
Status: CANCELLED
Start: 2023-09-18 | End: 2023-09-18

## 2023-09-06 RX ORDER — POTASSIUM CHLORIDE 14.9 MG/ML
20 INJECTION INTRAVENOUS ONCE
Status: COMPLETED | OUTPATIENT
Start: 2023-09-06 | End: 2023-09-06

## 2023-09-06 RX ADMIN — SODIUM CHLORIDE 500 ML: 0.9 INJECTION, SOLUTION INTRAVENOUS at 09:20

## 2023-09-06 RX ADMIN — Medication 30 G: at 12:15

## 2023-09-06 RX ADMIN — POTASSIUM CHLORIDE 20 MEQ: 14.9 INJECTION, SOLUTION INTRAVENOUS at 12:25

## 2023-09-06 RX ADMIN — CARFILZOMIB 100 MG: 10 INJECTION, POWDER, LYOPHILIZED, FOR SOLUTION INTRAVENOUS at 11:25

## 2023-09-06 RX ADMIN — SODIUM CHLORIDE 20 ML/HR: 0.9 INJECTION, SOLUTION INTRAVENOUS at 09:15

## 2023-09-06 RX ADMIN — DEXAMETHASONE SODIUM PHOSPHATE: 10 INJECTION, SOLUTION INTRAMUSCULAR; INTRAVENOUS at 10:25

## 2023-09-06 RX ADMIN — SODIUM CHLORIDE 20 ML/HR: 0.9 INJECTION, SOLUTION INTRAVENOUS at 12:05

## 2023-09-06 NOTE — PROGRESS NOTES
Tolerated infusion without incident: No adverse reactions noted: Verified follow up appt with patient ( 09/13/23 ): AVS offered and declined

## 2023-09-06 NOTE — PROGRESS NOTES
Patient to [address] [facility] for Kyprolis / IVIG / Hydration: Complaining of dizziness / headache / fatigue / color change: Appetite / Fluid intake decreased: Lab work ( 09/05/23 ) reviewed: Potassium - 3.1: Calcium - 7.4: Dr Sebastian Gay made aware: Awaiting further orders

## 2023-09-07 PROBLEM — J18.9 PNEUMONIA DUE TO INFECTIOUS ORGANISM: Status: ACTIVE | Noted: 2023-09-07

## 2023-09-07 NOTE — ASSESSMENT & PLAN NOTE
Lab Results   Component Value Date    EGFR 30 09/07/2023    EGFR 37 09/05/2023    EGFR 35 08/21/2023    CREATININE 2.07 (H) 09/07/2023    CREATININE 1.75 (H) 09/05/2023    CREATININE 1.83 (H) 08/21/2023   · Patient has a baseline CKD stage IIIb-baseline creatinine 1.5-1.7  · Acute kidney injury today's present on admission  · Arrival creatinine 2.07  · Suspect that this is secondary to intravascular depletion-we will be cautious with IV fluid in view of bilateral pleural effusions  · We will consult nephrology

## 2023-09-07 NOTE — PROGRESS NOTES
Umm Steele is a 78 y.o. male who is currently ordered Vancomycin IV with management by the Pharmacy Consult service. Relevant clinical data and objective / subjective history reviewed. Vancomycin Assessment:  Indication and Goal AUC/Trough: Pneumonia (goal -600, trough >10), -600, trough >10  Clinical Status: stable  Micro:   pending  Renal Function:  SCr: 2.07 mg/dL  CrCl: 28.6 mL/min  Renal replacement: Not on dialysis  Days of Therapy: 1  Current Dose: 1500 mg once  Vancomycin Plan:  New Dosin mg q24  Estimated AUC: 513 mcg*hr/mL  Estimated Trough: 17.3 mcg/mL  Next Level:  6am  Renal Function Monitoring: Daily BMP and UOP  Pharmacy will continue to follow closely for s/sx of nephrotoxicity, infusion reactions and appropriateness of therapy. BMP and CBC will be ordered per protocol. We will continue to follow the patient’s culture results and clinical progress daily.     Alice De Leon, Pharmacist

## 2023-09-07 NOTE — CONSULTS
Anais Hwang 78 y.o. male MRN: [id number]  Unit/Bed#: -01 Encounter: [id number]        Assessment and Plan:    1. Elevated creatinine, stage 3B chronic kidney disease with baseline creatinine around 1.7-2.1 mg/dL   · Creatinine 1.75 mg/dL on 9/5 -> 2.07 mg/dL today. Suspect due to intra-arterial volume depletion despite peripheral edema on exam and pleural effusions on CT scan. Has profound hypoalbuminemia, cancer. Status post 1 L normal saline bolus in ER. Can redose with small aliquots of fluid as needed. Defer diuretics at this time-can utilize as needed for volume overload/respiratory distress. Check bladder scan. Bactrim on hold per primary team.  Antibiotics and acyclovir renally dosed. Work-up thus far significant for +2 protein and urates on urinalysis  · From a chronic standpoint, follows with Dr. Margarita Gaston. Current baseline creatinine since around 2021. Etiology potentially due to medication effect, recurrent BLAS, HTN nephrosclerosis, renal senescence Per Dr. Margarita Gaston, felt less likely to be glomerular in nature. Urates and 2+ protein noted on most recent urinalysis. · Outpatient follow-up with Dr. Margarita Gaston  2. Pneumonia  · Being treated with vancomycin and cefepime given immunocompromise state. Holding off on additional IV fluids as above as volume status is tenuous  3. Elevated anion gap acidosis  · Elevated lactate upon arrival downtrending with treatment. 4. IgG multiple myeloma  · Follows locally with hematology/oncology. Last chemotherapy earlier this week. 5.  Hypocalcemia  · Agree with checking ionized calcium in a.m. HPI:    Jose Juarez is a 78 y.o. male with CKD 3b, IgG multiple myeloma receiving carfilzomib, selinexor, dexamethasone, lytic lesion receiving Xgeva, HTN, HLD who presented to [facility] today with chief complaint generalized weakness. Last chemotherapy 9/6.  Initial CT scan significant for GGO upper lobes, pleural effusions with compressive atelectasis. Flu/RSV/COVID testing negative. Being treated for community-acquired pneumonia with vancomycin and cefepime. Nephrology consultation was requested for stage IIIb chronic kidney disease    Upon discussion with the patient, he is a poor historian. 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 He cannot further elucidate. He denies nausea, vomiting, diarrhea, dysuria, urgency, frequency, shortness of breath. He has no physical complaints on exam.  He does not know why he is in the hospital.  HPI obtained from chart and discussion with hospitalist    From a nephrology perspective, follows with Dr. Moody Muro for stage IIIb chronic kidney disease with baseline creatinine around 1.7-2.1 mg/dL since 2021. Reason for Consult: CKD 3b    Review of Systems:  A complete 10-point review of systems was performed. Aside from what was mentioned in the HPI, it is otherwise negative.       Historical Information   Past Medical History:   Diagnosis Date   • Cancer ([address]) 2015   • History of autologous stem cell transplant ([address])    • Hypertension    • Insomnia    • Multiple myeloma ([address])      Past Surgical History:   Procedure Laterality Date   • APPENDECTOMY      Last assessed: 9/25/15   • ARTHROSCOPY KNEE Left     9/30/09   • EYE SURGERY      Lasik   • KNEE CARTILAGE SURGERY     • LIMBAL STEM CELL TRANSPLANT      Patient had 2 in [place]-2/29/2016 and 4/13/2016     Social History   Social History     Substance and Sexual Activity   Alcohol Use Never     Social History     Substance and Sexual Activity   Drug Use No     Social History     Tobacco Use   Smoking Status Never   Smokeless Tobacco Never       Family History:   Family History   Problem Relation Age of Onset   • Heart disease Father    • Colon cancer Paternal Grandmother        Medications:  Pertinent medications were reviewed  Current Facility-Administered Medications   Medication Dose Route Frequency Provider Last Rate   • acetaminophen  650 mg Oral Q6H PRN Beth Minor MD     • acyclovir  400 mg Oral BID Beth Minor MD     • vitamin C  1,000 mg Oral Daily Beth Minor MD     • atorvastatin  20 mg Oral Daily Beth Minor MD     • cefepime  1,000 mg Intravenous Q12H Beth Minor MD     • cholecalciferol  1,000 Units Oral Daily Beth Minor MD     • vitamin B-12  1,000 mcg Oral Daily Beth Minor MD     • docusate sodium  100 mg Oral BID Beth Minor MD     • escitalopram  20 mg Oral Daily Beth Minor MD     • guaiFENesin  600 mg Oral BID Beth Minor MD     • [START ON 9/8/2023] levothyroxine  100 mcg Oral Early Morning Beth Minor MD     • LORazepam  1 mg Oral Q4H PRN Beth Minor MD     • metoprolol tartrate  12.5 mg Oral Q12H Beth Minor MD     • multivitamin stress formula  1 tablet Oral Daily Beth Minor MD     • OLANZapine  2.5 mg Oral HS Beth Minor MD     • ondansetron  4 mg Intravenous Q6H PRN Beth Minor MD     • pantoprazole  40 mg Oral Early Morning Beth Minor MD     • [START ON 9/8/2023] vancomycin  750 mg Intravenous Q24H Beth Minor MD       Facility-Administered Medications Ordered in Other Encounters   Medication Dose Route Frequency Provider Last Rate   • [MAR Hold] alteplase  2 mg Intracatheter Q1MIN PRN Chelsi Man, DO           No Known Allergies      Vitals:   /70 (BP Location: Left arm)   Pulse (!) 115   Temp 97.8 °F (36.6 °C) (Oral)   Resp 16   Ht 5' 10" (1.778 m)   Wt 65.6 kg (144 lb 10 oz)   SpO2 96%   BMI 20.75 kg/m²   Body mass index is 20.75 kg/m².   SpO2: 96 %,   SpO2 Activity: At Rest,   O2 Device: None (Room air)      Intake/Output Summary (Last 24 hours) at 9/7/2023 1455  Last data filed at 9/7/2023 1258  Gross per 24 hour   Intake 120 ml   Output --   Net 120 ml     Invasive Devices     Peripheral Intravenous Line  Duration           Peripheral IV 09/07/23 Left;Ventral (anterior) Forearm <1 day    Peripheral IV 09/07/23 Right;Ventral (anterior) Forearm <1 day                Physical Exam:  General: conscious, cooperative, in no acute distress  Eyes: conjunctivae pink, anicteric sclerae  ENT: lips and mucous membranes moist  Neck: supple, no JVD, left clavicular mass noted  Chest: very diminished breath sounds bilateral, no crackles, ronchus or wheezings  CVS: S1 & S2, normal rate, regular rhythm  Abdomen: soft, non-tender, non-distended, normoactive bowel sounds  Extremities: mild edema of both legs  Skin: no rash  Neuro: awake, alert, oriented to person      Diagnostic Data:  Lab: I have personally reviewed pertinent lab results. ,   CBC:  Results from last 7 days   Lab Units 09/07/23  1143 09/07/23  0619   WBC Thousand/uL  --  6.71   HEMOGLOBIN g/dL  --  8.1*   HEMATOCRIT %  --  27.1*   PLATELETS Thousands/uL 31* 41*      CMP:   Lab Results   Component Value Date    SODIUM 136 09/07/2023    K 4.0 09/07/2023     09/07/2023    CO2 15 (L) 09/07/2023    BUN 29 (H) 09/07/2023    CREATININE 2.07 (H) 09/07/2023    CALCIUM 7.3 (L) 09/07/2023    AST 29 09/07/2023    ALT 18 09/07/2023    ALKPHOS 39 09/07/2023    EGFR 30 09/07/2023   ,   PT/INR:   Lab Results   Component Value Date    INR 1.61 (H) 09/07/2023   ,   Magnesium: No components found for: "MAG",  Phosphorous: No results found for: "PHOS"    Microbiology:  @LAB[facility],(urinecx:7)@        SANTANA Hoffman    Portions of the record may have been created with voice recognition software. Occasional wrong word or "sound a like" substitutions may have occurred due to the inherent limitations of voice recognition software. Read the chart carefully and recognize, using context, where substitutions have occurred.

## 2023-09-07 NOTE — ASSESSMENT & PLAN NOTE
· Admit inpatient to [facility]  · Initial CT chest with the following findings-Small subpleural groundglass opacities in the upper lobes. These are nonspecific but pneumonia is considered, including COVID-pneumonia. · Testing for COVID-19, RSV, and influenza is negative  · Suspect that this is a community-acquired pneumonia  · We will follow-up on the blood cultures that were ordered and completed in the emergency room  · Check urine for Legionella antigen, and Streptococcus pneumonia antigen testing, check sputum culture  · Check an MRSA nasal swab  · In view of the patient being immunocompromised-we will start antibiotic therapy with vancomycin, and cefepime  · Patient also has bilateral pleural effusions -we will hold on further IV fluid, additionally will consult pulmonary to have the patient be evaluated for the possibility of a thoracentesis  · Repeat blood work for the a.m.

## 2023-09-07 NOTE — H&P
[address]  H&P  Name: Jose Dhaliwal 78 y.o. male I MRN: [id number]  Unit/Bed#: -01 I Date of Admission: 9/7/2023   Date of Service: 9/7/2023 I Hospital Day: 0      Assessment/Plan   * Pneumonia due to infectious organism  Assessment & Plan  · Admit inpatient to [facility] [place]  · Initial CT chest with the following findings-Small subpleural groundglass opacities in the upper lobes. These are nonspecific but pneumonia is considered, including COVID-pneumonia. · Testing for COVID-19, RSV, and influenza is negative  · Suspect that this is a community-acquired pneumonia  · We will follow-up on the blood cultures that were ordered and completed in the emergency room  · Check urine for Legionella antigen, and Streptococcus pneumonia antigen testing, check sputum culture  · Check an MRSA nasal swab  · In view of the patient being immunocompromised-we will start antibiotic therapy with vancomycin, and cefepime  · Patient also has bilateral pleural effusions -we will hold on further IV fluid, additionally will consult pulmonary to have the patient be evaluated for the possibility of a thoracentesis  · Repeat blood work for the a.m.     Essential hypertension  Assessment & Plan  · Continue metoprolol tartrate    Anemia due to stage 3 chronic kidney disease [facility])  Assessment & Plan  Lab Results   Component Value Date    EGFR 30 09/07/2023    EGFR 37 09/05/2023    EGFR 35 08/21/2023    CREATININE 2.07 (H) 09/07/2023    CREATININE 1.75 (H) 09/05/2023    CREATININE 1.83 (H) 08/21/2023   · Patient has a baseline CKD stage IIIb-baseline creatinine 1.5-1.7  · Acute kidney injury today's present on admission  · Arrival creatinine 2.07  · Suspect that this is secondary to intravascular depletion-we will be cautious with IV fluid in view of bilateral pleural effusions  · We will consult nephrology    Chronic ITP (idiopathic thrombocytopenia) (HCC)  Assessment & Plan  · Platelet count on arrival is 41,000  · We will continue to monitor  · Avoid heparin    Acquired hypothyroidism  Assessment & Plan  · Continue levothyroxine    Hyperlipidemia  Assessment & Plan  · Continue Lipitor    IgG multiple myeloma (HCC)  Assessment & Plan  · Outpatient follow-up with heme-onc  · Hold prophylactic Bactrim  · Okay to continue acyclovir    Anxiety  Assessment & Plan  · Continue as needed Xanax    Hypocalcemia  Assessment & Plan  · Pseudo hypocalcemia in the setting of hypoalbuminemia  · We will check an iCal in the a.m. VTE Prophylaxis: Pharmacologic VTE Prophylaxis contraindicated due to Thrombocytopenia, SCDs only  Code Status: DNR/DNI level 3  Discussion with family: Patient's wife is bedside at the time of my H&P evaluation, all questions answered to her satisfaction    Anticipated Length of Stay:  Patient will be admitted on an Inpatient basis with an anticipated length of stay of  > 2 midnights. Justification for Hospital Stay: Need for IV antibiotics    Total Time for Visit, including Counseling / Coordination of Care: 1 hour. Greater than 50% of this total time spent on direct patient counseling and coordination of care. Chief Complaint:   Generalized weakness x1 week, worse over the past 24 hours    History of Present Illness:    Muna Zhao is a 78 y.o. male who presents with a chief complaint of generalized weakness. In brief, the patient is a 29-year-old male, that presented to the emergency room earlier today with the aforementioned chief complaint. Unfortunately, at baseline the patient has a history of multiple myeloma for which she is actively receiving chemotherapy in the outpatient setting. His last chemotherapy was yesterday on 9/6/2023. The patient reports that he has been weak, has had a significantly diminished appetite, and has been very restless with insomnia providing over the past few nights.   Patient reports increasing dyspnea with minimal exertion as described as moving around the house. Patient also noted to have increasing periods of diaphoresis at night. The patient, and his wife denies any chest pain, nausea, vomiting, diarrhea, fevers, chills, palpitations but endorsed the aforementioned shortness of breath, and generalized weakness. Patient also reports to having had worsening bilateral lower extremity edema over the past week. The patient presented to the ER earlier today for the further evaluation of these issues, was found to be in a state of an acute kidney injury, was found to have a suspected pneumonia, bilateral pleural effusions-hence the reason for admission. .    Review of Systems:    Review of Systems   Constitutional: Positive for appetite change, diaphoresis and fatigue. Negative for fever. Cardiovascular: Positive for leg swelling. Neurological: Positive for weakness. All other systems reviewed and are negative. Past Medical and Surgical History:     Past Medical History:   Diagnosis Date   • Cancer ([address]) 2015   • History of autologous stem cell transplant ([address])    • Hypertension    • Insomnia    • Multiple myeloma ([address])        Past Surgical History:   Procedure Laterality Date   • APPENDECTOMY      Last assessed: 9/25/15   • ARTHROSCOPY KNEE Left     9/30/09   • EYE SURGERY      Lasik   • KNEE CARTILAGE SURGERY     • LIMBAL STEM CELL TRANSPLANT      Patient had 2 in [place]-2/29/2016 and 4/13/2016       Meds/Allergies:    Prior to Admission medications    Medication Sig Start Date End Date Taking?  Authorizing Provider   acetaminophen (TYLENOL) 325 mg tablet Take 650 mg by mouth every 6 (six) hours as needed for mild pain    Historical Provider, MD   acyclovir (ZOVIRAX) 200 mg capsule Take 2 capsules (400 mg total) by mouth 2 (two) times a day 5/11/23 12/7/23  Irl Bolus, CRNP   Ascorbic Acid (vitamin C) 1000 MG tablet Take 1,000 mg by mouth daily    Historical Provider, MD   atorvastatin (LIPITOR) 20 mg tablet TAKE 1 TABLET DAILY 4/14/23 Ian Zimmerman DO   Cholecalciferol (VITAMIN D-3 PO) Take 1,000 Units by mouth daily     Historical Provider, MD   dexamethasone (DECADRON) 4 mg tablet Take 5 tablets (20 mg total) by mouth 2 (two) times a week On morning of Kyprolis treatment. 7/27/23   Nance Severance, DO   escitalopram (LEXAPRO) 20 mg tablet TAKE 1 TABLET DAILY 6/30/23   Ian Zimmerman DO   Glutamine POWD by Does not apply route    Historical Provider, MD   lansoprazole (PREVACID) 15 mg capsule take 1 capsule by mouth twice a day before meals 7/24/23   Nance Severance, DO   levothyroxine 100 mcg tablet Take 1 tablet (100 mcg total) by mouth daily 8/17/23   Ian Zimmerman DO   LORazepam (ATIVAN) 1 mg tablet Take 1 tablet (1 mg total) by mouth every 4 (four) hours as needed for anxiety 2/27/23   Nance Severance, DO   methylphenidate (RITALIN) 5 mg tablet Take 2 tablets (10mg) in the morning, and 1 tablet (5mg) in the afternoon for cancer related fatigue. 8/9/23   Nance Severance, DO   metoprolol tartrate (LOPRESSOR) 25 mg tablet take 1/2 tablet by mouth every 12 hours 8/29/23   Ian Zimmerman DO   Multiple Vitamin (MULTIVITAMINS PO) Take 1 tablet by mouth daily. Historical Provider, MD   naloxone (NARCAN) 4 mg/0.1 mL nasal spray Administer 1 spray into a nostril. If no response after 2-3 minutes, give another dose in the other nostril using a new spray.  2/27/23   Nance Severance, DO   OLANZapine (ZyPREXA) 2.5 mg tablet Take 1 tablet (2.5 mg total) by mouth daily at bedtime 6/26/23   SANTANA Barros   oxyCODONE (Roxicodone) 5 immediate release tablet Take 1 tablet (5 mg total) by mouth every 4 (four) hours as needed for moderate pain Max Daily Amount: 30 mg 2/27/23   Nance Severance, DO   Pomalidomide (Pomalyst) 2 MG CAPS TAKE 1 CAPSULE DAILY FOR 21 DAYS, THEN OFF 7 DAYS EVERY 28 DAY CYCLE 12/7/22   Nance Severance, DO   Selinexor, 40 MG Once Weekly, 40 MG TBPK Take 1 tablet by mouth once a week 8/16/23   Josué Zeng Kay Motta DO   sulfamethoxazole-trimethoprim (BACTRIM DS) 800-160 mg per tablet Take 1 tablet by mouth 3 (three) times a week Monday Wednesday friday 8/23/23 8/22/24  SANTANA [place]   vitamin B-12 (VITAMIN B-12) 1,000 mcg tablet Take 1,000 mcg by mouth daily    Historical Provider, MD     all medications and allergies reviewed    Allergies: No Known Allergies    Social History:     Marital Status: /Civil Union   Occupation: Retired  Patient Pre-hospital Living Situation: Lives at home with his wife  Patient Pre-hospital Level of Mobility: No issues with prehospital level of mobility  Patient Pre-hospital Diet Restrictions: None  Substance Use History:   Social History     Substance and Sexual Activity   Alcohol Use Never     Social History     Tobacco Use   Smoking Status Never   Smokeless Tobacco Never     Social History     Substance and Sexual Activity   Drug Use No       Family History:  I have reviewed the patient's family history -family history is noncontributory in view of the patient being 78years old    Physical Exam:     Vitals:   Blood Pressure: 117/70 (09/07/23 1006)  Pulse: (!) 115 (09/07/23 1006)  Temperature: 97.8 °F (36.6 °C) (09/07/23 1006)  Temp Source: Oral (09/07/23 1006)  Respirations: 16 (09/07/23 1006)  Height: 5' 10" (177.8 cm) (09/07/23 1006)  Weight - Scale: 65.6 kg (144 lb 10 oz) (09/07/23 1006)  SpO2: 96 % (09/07/23 1117)    Physical Exam  Vitals and nursing note reviewed. Constitutional:       General: He is not in acute distress. Appearance: Normal appearance. He is not ill-appearing. HENT:      Head: Normocephalic and atraumatic. Comments: dry mucous membranes     Nose: Nose normal.   Eyes:      Extraocular Movements: Extraocular movements intact. Pupils: Pupils are equal, round, and reactive to light. Neck:      Comments: Palm-sized mass at the base of his neck on the left side  Cardiovascular:      Rate and Rhythm: Normal rate and regular rhythm. Pulses: Normal pulses. Heart sounds: Normal heart sounds. No murmur heard. No friction rub. No gallop. Pulmonary:      Effort: Pulmonary effort is normal.      Breath sounds: Normal breath sounds. Abdominal:      General: There is no distension. Palpations: Abdomen is soft. There is no mass. Tenderness: There is no abdominal tenderness. There is no guarding or rebound. Musculoskeletal:         General: No swelling or tenderness. Normal range of motion. Cervical back: Normal range of motion and neck supple. No rigidity. No muscular tenderness. Right lower leg: No edema. Left lower leg: No edema. Comments: 1-2+ bilateral lower extremity pitting edema   Skin:     General: Skin is warm. Capillary Refill: Capillary refill takes 2 to 3 seconds. Findings: No erythema or rash. Neurological:      General: No focal deficit present. Mental Status: He is alert and oriented to person, place, and time. Mental status is at baseline. Psychiatric:         Mood and Affect: Mood normal.         Behavior: Behavior normal.         Additional Data:     Lab Results: I have personally reviewed pertinent reports.       Results from last 7 days   Lab Units 09/07/23 0619 09/05/23 0713   WBC Thousand/uL 6.71 2.86*   HEMOGLOBIN g/dL 8.1* 8.3*   HEMATOCRIT % 27.1* 26.8*   PLATELETS Thousands/uL 41* 51*   BANDS PCT % 1  --    NEUTROS PCT %  --  75   LYMPHS PCT %  --  9*   LYMPHO PCT % 4*  --    MONOS PCT %  --  15*   MONO PCT % 5  --    EOS PCT % 0 0     Results from last 7 days   Lab Units 09/07/23  0619   SODIUM mmol/L 136   POTASSIUM mmol/L 4.0   CHLORIDE mmol/L 107   CO2 mmol/L 15*   BUN mg/dL 29*   CREATININE mg/dL 2.07*   ANION GAP mmol/L 14   CALCIUM mg/dL 7.3*   ALBUMIN g/dL 2.8*   TOTAL BILIRUBIN mg/dL 0.83   ALK PHOS U/L 39   ALT U/L 18   AST U/L 29   GLUCOSE RANDOM mg/dL 126     Results from last 7 days   Lab Units 09/07/23  0619   INR  1.61*             Results from last 7 days   Lab Units 09/07/23  0922 09/07/23  0619   LACTIC ACID mmol/L 2.2* 3.5*       Imaging: I have personally reviewed pertinent reports. CT chest without contrast   Final Result by Maxine Horta MD (09/07 6916)      Pleural effusions with compressive atelectasis. Small subpleural groundglass opacities in the upper lobes. These are nonspecific but pneumonia is considered, including COVID-pneumonia. Correlate with clinical findings. Moderate cardiomegaly. Parietal pleural thickening, possibly related to multiple myeloma. Destructive soft tissue mass in the right rib 7. Workstation performed: [id number]         CT head without contrast   Final Result by Rona Dance, MD (09/07 0730)      No acute intracranial abnormality. Workstation performed: [id number]         XR chest 1 view portable   Final Result by Luciano Ritter MD (09/07 3301)      Congestive heart failure with pulmonary edema and bilateral pleural effusions. Workstation performed: [id number]               EKG, Pathology, and Other Studies Reviewed on Admission:   · EKG: None reviewed    [facility] Everywhere Records Reviewed: Yes    ** Please Note: This note has been constructed using a voice recognition system.  **

## 2023-09-07 NOTE — ED PROVIDER NOTES
History  Chief Complaint   Patient presents with   • Shortness of Breath     Woke up working to breathe; using abdominal muscles; woke up with a cold sweat; unable to get much information from pt   • Altered Mental Status     68-year-old male with history of multiple myeloma diagnosed in 2017 presents to the emergency department due to shortness of breath weakness as well as confusion that has been ongoing and getting worse over the last 9 days. Patient is not with any history of fever but his wife, who is a nurse, notes that he has had progressive weakness to the point where he cannot really get up and get out of bed. She states he is normally alert and oriented x3 but here just answers with 1 or 2 words. She states that something like this happened in the past and it was thought that it was due to the chemo he tried, which caused his ammonia level to increase. Patient has not been on that medication. Wife notes that recently the patient had had low platelet count due to chemo so his last dose of chemo was held. The patient got a transfusion of blood and platelets at that time. Prior to Admission Medications   Prescriptions Last Dose Informant Patient Reported? Taking? Ascorbic Acid (vitamin C) 1000 MG tablet  Self Yes No   Sig: Take 1,000 mg by mouth daily   Cholecalciferol (VITAMIN D-3 PO)  Self Yes No   Sig: Take 1,000 Units by mouth daily    Glutamine POWD  Self Yes No   Sig: by Does not apply route   LORazepam (ATIVAN) 1 mg tablet   No No   Sig: Take 1 tablet (1 mg total) by mouth every 4 (four) hours as needed for anxiety   Multiple Vitamin (MULTIVITAMINS PO)  Self Yes No   Sig: Take 1 tablet by mouth daily.    OLANZapine (ZyPREXA) 2.5 mg tablet   No No   Sig: Take 1 tablet (2.5 mg total) by mouth daily at bedtime   Pomalidomide (Pomalyst) 2 MG CAPS   No No   Sig: TAKE 1 CAPSULE DAILY FOR 21 DAYS, THEN OFF 7 DAYS EVERY 28 DAY CYCLE   Selinexor, 40 MG Once Weekly, 40 MG TBPK   No No   Sig: Take 1 tablet by mouth once a week   acetaminophen (TYLENOL) 325 mg tablet  Self Yes No   Sig: Take 650 mg by mouth every 6 (six) hours as needed for mild pain   acyclovir (ZOVIRAX) 200 mg capsule   No No   Sig: Take 2 capsules (400 mg total) by mouth 2 (two) times a day   atorvastatin (LIPITOR) 20 mg tablet   No No   Sig: TAKE 1 TABLET DAILY   dexamethasone (DECADRON) 4 mg tablet   No No   Sig: Take 5 tablets (20 mg total) by mouth 2 (two) times a week On morning of Kyprolis treatment. escitalopram (LEXAPRO) 20 mg tablet   No No   Sig: TAKE 1 TABLET DAILY   lansoprazole (PREVACID) 15 mg capsule   No No   Sig: take 1 capsule by mouth twice a day before meals   levothyroxine 100 mcg tablet   No No   Sig: Take 1 tablet (100 mcg total) by mouth daily   methylphenidate (RITALIN) 5 mg tablet   No No   Sig: Take 2 tablets (10mg) in the morning, and 1 tablet (5mg) in the afternoon for cancer related fatigue. metoprolol tartrate (LOPRESSOR) 25 mg tablet   No No   Sig: take 1/2 tablet by mouth every 12 hours   naloxone (NARCAN) 4 mg/0.1 mL nasal spray   No No   Sig: Administer 1 spray into a nostril. If no response after 2-3 minutes, give another dose in the other nostril using a new spray.    oxyCODONE (Roxicodone) 5 immediate release tablet   No No   Sig: Take 1 tablet (5 mg total) by mouth every 4 (four) hours as needed for moderate pain Max Daily Amount: 30 mg   sulfamethoxazole-trimethoprim (BACTRIM DS) 800-160 mg per tablet   No No   Sig: Take 1 tablet by mouth 3 (three) times a week Monday Wednesday friday   vitamin B-12 (VITAMIN B-12) 1,000 mcg tablet  Self Yes No   Sig: Take 1,000 mcg by mouth daily      Facility-Administered Medications: None       Past Medical History:   Diagnosis Date   • Cancer ([address]) 2015   • History of autologous stem cell transplant ([address])    • Hypertension    • Insomnia    • Multiple myeloma ([address])        Past Surgical History:   Procedure Laterality Date   • APPENDECTOMY      Last assessed: 9/25/15   • ARTHROSCOPY KNEE Left     9/30/09   • EYE SURGERY      Lasik   • KNEE CARTILAGE SURGERY     • LIMBAL STEM CELL TRANSPLANT      Patient had 2 in [place]-2/29/2016 and 4/13/2016       Family History   Problem Relation Age of Onset   • Heart disease Father    • Colon cancer Paternal Grandmother      I have reviewed and agree with the history as documented. E-Cigarette/Vaping   • E-Cigarette Use Never User      E-Cigarette/Vaping Substances   • Nicotine No    • THC No    • CBD No    • Flavoring No    • Other No    • Unknown No      Social History     Tobacco Use   • Smoking status: Never   • Smokeless tobacco: Never   Vaping Use   • Vaping Use: Never used   Substance Use Topics   • Alcohol use: Never   • Drug use: No       Review of Systems   Constitutional: Positive for activity change and fatigue. Negative for chills and fever. HENT: Negative for ear pain and sore throat. Eyes: Negative for pain and visual disturbance. Respiratory: Positive for shortness of breath. Negative for cough. Cardiovascular: Negative for chest pain and palpitations. Gastrointestinal: Negative for abdominal pain and vomiting. Genitourinary: Negative for dysuria and hematuria. Musculoskeletal: Negative for arthralgias and back pain. Skin: Negative for color change and rash. Neurological: Positive for weakness. Negative for seizures and syncope. Psychiatric/Behavioral: Positive for confusion. All other systems reviewed and are negative. Physical Exam  Physical Exam  Constitutional:       General: He is not in acute distress. Appearance: Normal appearance. He is normal weight. He is not ill-appearing. HENT:      Head: Normocephalic and atraumatic.       Right Ear: External ear normal.      Left Ear: External ear normal.      Nose: Nose normal.      Mouth/Throat:      Mouth: Mucous membranes are moist.   Eyes:      Conjunctiva/sclera: Conjunctivae normal.   Cardiovascular:      Rate and Rhythm: Normal rate and regular rhythm. Pulses: Normal pulses. Heart sounds: Normal heart sounds. Pulmonary:      Effort: Pulmonary effort is normal.      Breath sounds: Examination of the right-lower field reveals rales. Examination of the left-lower field reveals rales. Decreased breath sounds and rales present. Comments: Patient with basilar Rales noted bilaterally however right is greater than left  Abdominal:      General: Abdomen is flat. There is no distension. Palpations: Abdomen is soft. There is no mass. Musculoskeletal:         General: No swelling, tenderness or deformity. Normal range of motion. Cervical back: Normal range of motion. Right lower leg: Edema present. Left lower leg: Edema present. Comments: Trace lower extremity edema noted bilaterally   Skin:     General: Skin is warm and dry. Capillary Refill: Capillary refill takes 2 to 3 seconds. Coloration: Skin is not pale. Neurological:      General: No focal deficit present. Mental Status: He is alert and oriented to person, place, and time. Mental status is at baseline.    Psychiatric:         Mood and Affect: Mood normal.         Vital Signs  ED Triage Vitals   Temperature Pulse Respirations Blood Pressure SpO2   09/07/23 0554 09/07/23 0554 09/07/23 0554 09/07/23 0554 09/07/23 0554   97.9 °F (36.6 °C) 93 20 116/67 93 %      Temp Source Heart Rate Source Patient Position - Orthostatic VS BP Location FiO2 (%)   09/07/23 0554 09/07/23 0554 09/07/23 0554 09/07/23 0554 --   Tympanic Monitor Sitting Left arm       Pain Score       09/07/23 1006       No Pain           Vitals:    09/07/23 0900 09/07/23 0930 09/07/23 1006 09/07/23 1547   BP: 112/67 115/70 117/70 122/70   Pulse: 79 82 (!) 115 71   Patient Position - Orthostatic VS:   Sitting          Visual Acuity  Visual Acuity    Flowsheet Row Most Recent Value   L Pupil Size (mm) 3   R Pupil Size (mm) 3          ED Medications  Medications   ascorbic acid (VITAMIN C) tablet 1,000 mg (1,000 mg Oral Given 9/7/23 1133)   cholecalciferol (VITAMIN D3) tablet 1,000 Units (1,000 Units Oral Given 9/7/23 1133)   levothyroxine tablet 100 mcg (has no administration in time range)   metoprolol tartrate (LOPRESSOR) partial tablet 12.5 mg (12.5 mg Oral Given 9/7/23 1133)   multivitamin stress formula tablet 1 tablet (1 tablet Oral Given 9/7/23 1133)   cyanocobalamin (VITAMIN B-12) tablet 1,000 mcg (1,000 mcg Oral Given 9/7/23 1133)   LORazepam (ATIVAN) tablet 1 mg (has no administration in time range)   atorvastatin (LIPITOR) tablet 20 mg (20 mg Oral Given 9/7/23 1133)   escitalopram (LEXAPRO) tablet 20 mg (20 mg Oral Given 9/7/23 1133)   pantoprazole (PROTONIX) EC tablet 40 mg (40 mg Oral Given 9/7/23 1133)   acetaminophen (TYLENOL) tablet 650 mg (has no administration in time range)   docusate sodium (COLACE) capsule 100 mg (100 mg Oral Given 9/7/23 1741)   ondansetron (ZOFRAN) injection 4 mg (has no administration in time range)   guaiFENesin (MUCINEX) 12 hr tablet 600 mg (600 mg Oral Given 9/7/23 1741)   acyclovir (ZOVIRAX) tablet 400 mg (400 mg Oral Given 9/7/23 2042)   cefepime (MAXIPIME) IVPB (premix in dextrose) 1,000 mg 50 mL (1,000 mg Intravenous New Bag 9/7/23 1813)   vancomycin (VANCOCIN) IVPB (premix in dextrose) 750 mg 150 mL (has no administration in time range)   OLANZapine (ZyPREXA) tablet 2.5 mg (2.5 mg Oral Given 9/7/23 2042)   cefepime (MAXIPIME) IVPB (premix in dextrose) 1,000 mg 50 mL (0 mg Intravenous Stopped 9/7/23 0810)   sodium chloride 0.9 % bolus 1,000 mL (1,000 mL Intravenous New Bag 9/7/23 0730)   vancomycin (VANCOCIN) 1500 mg in sodium chloride 0.9% 250 mL IVPB (1,500 mg Intravenous New Bag 9/7/23 1304)       Diagnostic Studies  Results Reviewed     Procedure Component Value Units Date/Time    Blood culture #1 [[id number]] Collected: 09/07/23 0619    Lab Status: Preliminary result Specimen: Blood from Arm, Right Updated: 09/07/23 1401     Blood Culture Received in Microbiology Lab. Culture in Progress. Blood culture #2 [[id number]] Collected: 09/07/23 8059    Lab Status: Preliminary result Specimen: Blood from Arm, Left Updated: 09/07/23 1401     Blood Culture Received in Microbiology Lab. Culture in Progress. HS Troponin I 4hr [[id number]]  (Normal) Collected: 09/07/23 1056    Lab Status: Final result Specimen: Blood from Arm, Right Updated: 09/07/23 1125     hs TnI 4hr 12 ng/L      Delta 4hr hsTnI 2 ng/L     Lactic acid 2 Hours [[id number]]  (Abnormal) Collected: 09/07/23 0922    Lab Status: Final result Specimen: Blood from Arm, Right Updated: 09/07/23 0957     LACTIC ACID 2.2 mmol/L     Narrative:      Result may be elevated if tourniquet was used during collection.     HS Troponin I 2hr [[id number]]  (Normal) Collected: 09/07/23 0922    Lab Status: Final result Specimen: Blood from Arm, Right Updated: 09/07/23 0953     hs TnI 2hr 10 ng/L      Delta 2hr hsTnI 0 ng/L     B-Type Natriuretic Peptide(BNP) [[id number]]  (Abnormal) Collected: 09/07/23 0619    Lab Status: Final result Specimen: Blood from Arm, Right Updated: 09/07/23 0913     BNP >4,700 pg/mL     TSH, 3rd generation with Free T4 reflex [[id number]]  (Normal) Collected: 09/07/23 0619    Lab Status: Final result Specimen: Blood from Arm, Right Updated: 09/07/23 0910     TSH 3RD GENERATON 3.523 uIU/mL     Urine Microscopic [[id number]] Collected: 09/07/23 0813    Lab Status: Final result Specimen: Urine, Other Updated: 09/07/23 0851     RBC, UA 0-1 /hpf      WBC, UA None Seen /hpf      Epithelial Cells None Seen /hpf      Bacteria, UA Occasional /hpf      AMORPH URATES Moderate /hpf     UA w Reflex to Microscopic w Reflex to Culture [[id number]]  (Abnormal) Collected: 09/07/23 0813    Lab Status: Final result Specimen: Urine, Other Updated: 09/07/23 0822     Color, UA Yellow     Clarity, UA Hazy     Specific Gravity, UA >=1.030     pH, UA 5.0     Leukocytes, UA Negative     Nitrite, UA Negative Protein, UA 2+ mg/dl      Glucose, UA Negative mg/dl      Ketones, UA Negative mg/dl      Urobilinogen, UA 0.2 E.U./dl      Bilirubin, UA Negative     Occult Blood, UA 2+    RBC Morphology Reflex Test [[id number]] Collected: 09/07/23 0619    Lab Status: Final result Specimen: Blood from Arm, Right Updated: 09/07/23 0801    HS Troponin 0hr (reflex protocol) [[id number]]  (Normal) Collected: 09/07/23 0717    Lab Status: Final result Specimen: Blood from Arm, Left Updated: 09/07/23 0752     hs TnI 0hr 10 ng/L     Blood gas, venous [[id number]]  (Abnormal) Collected: 09/07/23 0717    Lab Status: Final result Specimen: Blood from Arm, Left Updated: 09/07/23 0728     pH, Young 7.349     pCO2, Young 26.8 mm Hg      pO2, Young 79.6 mm Hg      HCO3, Young 14.4 mmol/L      Base Excess, Young -10.0 mmol/L      O2 Content, Young 11.7 ml/dL      O2 HGB, VENOUS 92.3 %     CBC and differential [[id number]]  (Abnormal) Collected: 09/07/23 0619    Lab Status: Final result Specimen: Blood from Arm, Right Updated: 09/07/23 0712     WBC 6.71 Thousand/uL      RBC 2.33 Million/uL      Hemoglobin 8.1 g/dL      Hematocrit 27.1 %       fL      MCH 34.8 pg      MCHC 29.9 g/dL      RDW 25.6 %      MPV 10.1 fL      Platelets 41 Thousands/uL     FLU/RSV/COVID - if FLU/RSV clinically relevant [[id number]]  (Normal) Collected: 09/07/23 0626    Lab Status: Final result Specimen: Nares from Nose Updated: 09/07/23 0711     SARS-CoV-2 Negative     INFLUENZA A PCR Negative     INFLUENZA B PCR Negative     RSV PCR Negative    Narrative:      FOR PEDIATRIC PATIENTS - copy/paste COVID Guidelines URL to browser: [email or web address]    SARS-CoV-2 assay is a Nucleic Acid Amplification assay intended for the  qualitative detection of nucleic acid from SARS-CoV-2 in nasopharyngeal  swabs. Results are for the presumptive identification of SARS-CoV-2 RNA.     Positive results are indicative of infection with SARS-CoV-2, the virus  causing COVID-19, but do not rule out bacterial infection or co-infection  with other viruses. Laboratories within the [facility] and its  territories are required to report all positive results to the appropriate  public health authorities. Negative results do not preclude SARS-CoV-2  infection and should not be used as the sole basis for treatment or other  patient management decisions. Negative results must be combined with  clinical observations, patient history, and epidemiological information. This test has not been FDA cleared or approved. This test has been authorized by FDA under an Emergency Use Authorization  (EUA). This test is only authorized for the duration of time the  declaration that circumstances exist justifying the authorization of the  emergency use of an in vitro diagnostic tests for detection of SARS-CoV-2  virus and/or diagnosis of COVID-19 infection under section 564(b)(1) of  the Act, 21 U. S.C. 972UGU-1(H)(6), unless the authorization is terminated  or revoked sooner. The test has been validated but independent review by FDA  and CLIA is pending. Test performed using [employer] Gene[employer]pert: This RT-PCR assay targets N2,  a region unique to SARS-CoV-2. A conserved region in the E-gene was chosen  for pan-Sarbecovirus detection which includes SARS-CoV-2. According to CMS-2020-01-R, this platform meets the definition of high-throughput technology.     Manual Differential(PHLEBS Do Not Order) [[id number]]  (Abnormal) Collected: 09/07/23 0619    Lab Status: Final result Specimen: Blood from Arm, Right Updated: 09/07/23 0710     Segmented % 90 %      Bands % 1 %      Lymphocytes % 4 %      Monocytes % 5 %      Eosinophils, % 0 %      Basophils % 0 %      Absolute Neutrophils 6.11 Thousand/uL      Lymphocytes Absolute 0.27 Thousand/uL      Monocytes Absolute 0.34 Thousand/uL      Eosinophils Absolute 0.00 Thousand/uL      Basophils Absolute 0.00 Thousand/uL      Total Counted --     RBC Morphology Present     Rouleaux Present     Platelet Estimate Decreased     Anisocytosis Present     Polychromasia Present    Comprehensive metabolic panel [[id number]]  (Abnormal) Collected: 09/07/23 0619    Lab Status: Final result Specimen: Blood from Arm, Right Updated: 09/07/23 7818     Sodium 136 mmol/L      Potassium 4.0 mmol/L      Chloride 107 mmol/L      CO2 15 mmol/L      ANION GAP 14 mmol/L      BUN 29 mg/dL      Creatinine 2.07 mg/dL      Glucose 126 mg/dL      Calcium 7.3 mg/dL      Corrected Calcium 8.3 mg/dL      AST 29 U/L      ALT 18 U/L      Alkaline Phosphatase 39 U/L      Total Protein 10.2 g/dL      Albumin 2.8 g/dL      Total Bilirubin 0.83 mg/dL      eGFR 30 ml/min/1.73sq m     Narrative:      National Kidney Disease Foundation guidelines for Chronic Kidney Disease (CKD):   •  Stage 1 with normal or high GFR (GFR > 90 mL/min/1.73 square meters)  •  Stage 2 Mild CKD (GFR = 60-89 mL/min/1.73 square meters)  •  Stage 3A Moderate CKD (GFR = 45-59 mL/min/1.73 square meters)  •  Stage 3B Moderate CKD (GFR = 30-44 mL/min/1.73 square meters)  •  Stage 4 Severe CKD (GFR = 15-29 mL/min/1.73 square meters)  •  Stage 5 End Stage CKD (GFR <15 mL/min/1.73 square meters)  Note: GFR calculation is accurate only with a steady state creatinine    Lipase [[id number]]  (Abnormal) Collected: 09/07/23 0619    Lab Status: Final result Specimen: Blood from Arm, Right Updated: 09/07/23 9757     Lipase <6 u/L     Lactic acid, plasma (w/reflex if result > 2.0) [[id number]]  (Abnormal) Collected: 09/07/23 0619    Lab Status: Final result Specimen: Blood from Arm, Right Updated: 09/07/23 0651     LACTIC ACID 3.5 mmol/L     Narrative:      Result may be elevated if tourniquet was used during collection.     Ammonia [[id number]]  (Normal) Collected: 09/07/23 0619    Lab Status: Final result Specimen: Blood from Arm, Right Updated: 09/07/23 0650     Ammonia 32 umol/L     Protime-INR [[id number]]  (Abnormal) Collected: 09/07/23 0619    Lab Status: Final result Specimen: Blood from Arm, Right Updated: 09/07/23 0648     Protime 19.2 seconds      INR 1.61    APTT [[id number]]  (Normal) Collected: 09/07/23 2321    Lab Status: Final result Specimen: Blood from Arm, Right Updated: 09/07/23 0648     PTT 29 seconds                  CT chest without contrast   Final Result by Jenaro Wong MD (09/07 5439)      Pleural effusions with compressive atelectasis. Small subpleural groundglass opacities in the upper lobes. These are nonspecific but pneumonia is considered, including COVID-pneumonia. Correlate with clinical findings. Moderate cardiomegaly. Parietal pleural thickening, possibly related to multiple myeloma. Destructive soft tissue mass in the right rib 7. Workstation performed: [id number]         CT head without contrast   Final Result by Michael Goncalves MD (09/07 0730)      No acute intracranial abnormality. Workstation performed: [id number]         XR chest 1 view portable   Final Result by Irina Ba MD (09/07 7478)      Congestive heart failure with pulmonary edema and bilateral pleural effusions. Workstation performed: [id number]                    Procedures  ECG 12 Lead Documentation Only    Date/Time: 9/7/2023 6:36 AM    Performed by: Mary Villavicencio DO  Authorized by: Mary Villavicencio DO    ECG reviewed by me, the ED Provider: yes    Patient location:  ED  Comments:      EKG shows a sinus rhythm at 92 beats a minute with a normal axis. There is no definitive acute ST or T wave changes present. QTc is elongated at 507 ms. ED Course  ED Course as of 09/07/23 2134   Thu Sep 07, 2023   5217 According to the patient's wife, other medical problems are sporadic atrial fibrillation as well as hypertension however the patient's blood pressure is no longer been high over the past couple of years. SBIRT 20yo+    Flowsheet Row Most Recent Value   Initial Alcohol Screen: US AUDIT-C     1. How often do you have a drink containing alcohol? 0 Filed at: 09/07/2023 0704   2. How many drinks containing alcohol do you have on a typical day you are drinking? 0 Filed at: 09/07/2023 0704   3a. Male UNDER 65: How often do you have five or more drinks on one occasion? 0 Filed at: 09/07/2023 0704   3b. FEMALE Any Age, or MALE 65+: How often do you have 4 or more drinks on one occassion? 0 Filed at: 09/07/2023 0704   Audit-C Score 0 Filed at: 09/07/2023 9525   KESHA: How many times in the past year have you. .. Used an illegal drug or used a prescription medication for non-medical reasons? Never Filed at: 09/07/2023 1976                    Medical Decision Making  Patient is a 72-year-old male who presents emergency room with his wife who is a retired nurse who notes that the patient has been having worsening issues with some confusion as well as dyspnea which occurred today. The confusion however has been an ongoing issue over the last 7 to 9 days. The patient denies any pain, and notes that the last time she saw him in this manner, the patient was encephalopathic from an elevated ammonia level likely due to his chemotherapy for multiple myeloma. She states that it seemed like he was having difficulty breathing and was tachypneic so she brought him to the emergency department for evaluation. The patient is pleasant however is answering and only 1-2 word sentences. Patient notes that he is normally alert and oriented. Differential diagnosis is metastatic cancer to the brain, electrolyte abnormality, especially calcium, symptomatic anemia or possibly infection. Congestive heart failure is also in the differential.  Lab work was done which shows chronic anemia without elevated ammonia level.   The patient's chest x-ray shows increased vascular congestion with possible increase consolidation in the right base. Patient's lactate is also elevated over 3. Patient was given IV antibiotics and will be admitted. Case signed out to Dr. Rayna Helms pending the results of these initial tests. CHF exacerbation [facility]): acute illness or injury  Multiple myeloma ([address]): chronic illness or injury  Pneumonia: acute illness or injury  Amount and/or Complexity of Data Reviewed  Labs: ordered. Radiology: ordered. Risk  Prescription drug management. Decision regarding hospitalization. Disposition  Final diagnoses:   Pneumonia   CHF exacerbation ([address])   Multiple myeloma ([address])     Time reflects when diagnosis was documented in both MDM as applicable and the Disposition within this note     Time User Action Codes Description Comment    9/7/2023  9:13 AM Lorene Powell Add [J18.9] Pneumonia     9/7/2023  9:13 AM Jacky Botello Add [I50.9] CHF exacerbation ([address])     9/7/2023  9:13 AM Jacky Herring Add [C90.00] Multiple myeloma ([address])     9/7/2023 11:10 AM Mercy Stalls Add [J18.9] Pneumonia of both lower lobes due to infectious organism     9/7/2023 11:15 AM Cristian Castillo [N78.93,  D63.1] Anemia due to stage 3b chronic kidney disease [facility])       ED Disposition     ED Disposition   Admit    Condition   Stable    Date/Time   Thu Sep 7, 2023  9:13 AM    Comment   Case was discussed with SLIM and the patient's admission status was agreed to be Admission Status: inpatient status to the service of Dr. Ade Lomas . Follow-up Information    None         Current Discharge Medication List      CONTINUE these medications which have NOT CHANGED    Details   acetaminophen (TYLENOL) 325 mg tablet Take 650 mg by mouth every 6 (six) hours as needed for mild pain      acyclovir (ZOVIRAX) 200 mg capsule Take 2 capsules (400 mg total) by mouth 2 (two) times a day  Qty: 120 capsule, Refills: 6    Associated Diagnoses: Multiple myeloma not having achieved remission ([address]);  Prophylactic measure      Ascorbic Acid (vitamin C) 1000 MG tablet Take 1,000 mg by mouth daily      atorvastatin (LIPITOR) 20 mg tablet TAKE 1 TABLET DAILY  Qty: 90 tablet, Refills: 3    Associated Diagnoses: Hyperlipidemia, unspecified hyperlipidemia type      Cholecalciferol (VITAMIN D-3 PO) Take 1,000 Units by mouth daily       dexamethasone (DECADRON) 4 mg tablet Take 5 tablets (20 mg total) by mouth 2 (two) times a week On morning of Kyprolis treatment. Qty: 120 tablet, Refills: 1    Associated Diagnoses: IgG multiple myeloma (HCC)      escitalopram (LEXAPRO) 20 mg tablet TAKE 1 TABLET DAILY  Qty: 90 tablet, Refills: 1    Associated Diagnoses: Anxiety      Glutamine POWD by Does not apply route      lansoprazole (PREVACID) 15 mg capsule take 1 capsule by mouth twice a day before meals  Qty: 120 capsule, Refills: 0    Associated Diagnoses: Prophylactic measure      levothyroxine 100 mcg tablet Take 1 tablet (100 mcg total) by mouth daily  Qty: 90 tablet, Refills: 0    Associated Diagnoses: Hypothyroidism, unspecified type      LORazepam (ATIVAN) 1 mg tablet Take 1 tablet (1 mg total) by mouth every 4 (four) hours as needed for anxiety  Qty: 100 tablet, Refills: 1    Associated Diagnoses: Drug-induced insomnia (HCC)      methylphenidate (RITALIN) 5 mg tablet Take 2 tablets (10mg) in the morning, and 1 tablet (5mg) in the afternoon for cancer related fatigue. Qty: 90 tablet, Refills: 0    Associated Diagnoses: Other fatigue      metoprolol tartrate (LOPRESSOR) 25 mg tablet take 1/2 tablet by mouth every 12 hours  Qty: 90 tablet, Refills: 0    Associated Diagnoses: Essential hypertension      Multiple Vitamin (MULTIVITAMINS PO) Take 1 tablet by mouth daily. naloxone (NARCAN) 4 mg/0.1 mL nasal spray Administer 1 spray into a nostril. If no response after 2-3 minutes, give another dose in the other nostril using a new spray. Qty: 1 each, Refills: 1    Associated Diagnoses:  IgG multiple myeloma (HCC)      OLANZapine (ZyPREXA) 2.5 mg tablet Take 1 tablet (2.5 mg total) by mouth daily at bedtime  Qty: 30 tablet, Refills: 3    Associated Diagnoses: Decreased appetite; Chemotherapy-induced nausea      oxyCODONE (Roxicodone) 5 immediate release tablet Take 1 tablet (5 mg total) by mouth every 4 (four) hours as needed for moderate pain Max Daily Amount: 30 mg  Qty: 90 tablet, Refills: 0    Associated Diagnoses: IgG multiple myeloma (HCC)      Pomalidomide (Pomalyst) 2 MG CAPS TAKE 1 CAPSULE DAILY FOR 21 DAYS, THEN OFF 7 DAYS EVERY 28 DAY CYCLE  Qty: 21 capsule, Refills: 4    Comments: Auth# [id number] 12/7/22  Associated Diagnoses: Multiple myeloma not having achieved remission (HCC)      Selinexor, 40 MG Once Weekly, 40 MG TBPK Take 1 tablet by mouth once a week  Qty: 4 each, Refills: 3    Associated Diagnoses: Multiple myeloma not having achieved remission (HCC)      sulfamethoxazole-trimethoprim (BACTRIM DS) 800-160 mg per tablet Take 1 tablet by mouth 3 (three) times a week Monday Wednesday friday  Qty: 12 tablet, Refills: 11    Associated Diagnoses: IgG multiple myeloma (HCC)      vitamin B-12 (VITAMIN B-12) 1,000 mcg tablet Take 1,000 mcg by mouth daily             No discharge procedures on file.     PDMP Review       Value Time User    PDMP Reviewed  Yes 8/9/2023  1:30 PM Nathanael Ruelas DO          ED Provider  Electronically Signed by           Ольга Aranda.DO  09/07/23 1675

## 2023-09-07 NOTE — ED CARE HANDOFF
Emergency Department Sign Out Note        Sign out and transfer of care from Dr. Sheryle Pickler. See Separate Emergency Department note. The patient, Emperatriz Pascal, was evaluated by the previous provider for worsening confusion and dyspnea. .    Workup Completed:  Blood work    ED Course / Workup Pending (followup):  CT head reviewed and negative. CT chest shows pleural effusions which likely explains the patient's dyspnea. Also possible pneumonia. Will treat with antibiotics. No other obvious cause for his confusion other than possible sleep deprivation and sepsis. Lactic acid is elevated. Patient likely intravascular depleted though is also volume overloaded. Will treat with IV fluids with the understanding likely will need diuresis later in the admission. Admitted in hemodynamically stable status. ED Course as of 09/07/23 1510   Thu Sep 07, 2023   0710 Dyspnea, worsening confusion     Procedures  MDM        Disposition  Final diagnoses:   Pneumonia   CHF exacerbation ([address])   Multiple myeloma ([address])     Time reflects when diagnosis was documented in both MDM as applicable and the Disposition within this note     Time User Action Codes Description Comment    9/7/2023  9:13 AM James Felipe Add [J18.9] Pneumonia     9/7/2023  9:13 AM Charity Foote Add [I50.9] CHF exacerbation ([address])     9/7/2023  9:13 AM Charity Foote Add [C90.00] Multiple myeloma ([address])     9/7/2023 11:10 AM Cheli Carr Add [J18.9] Pneumonia of both lower lobes due to infectious organism     9/7/2023 11:15 AM Cristian Hawley [W11.11,  D63.1] Anemia due to stage 3b chronic kidney disease [facility])       ED Disposition     ED Disposition   Admit    Condition   Stable    Date/Time   Thu Sep 7, 2023  9:13 AM    Comment   Case was discussed with [facility] and the patient's admission status was agreed to be Admission Status: inpatient status to the service of Dr. Danielle Zhang .            Follow-up Information    None       Current Discharge Medication List      CONTINUE these medications which have NOT CHANGED    Details   acetaminophen (TYLENOL) 325 mg tablet Take 650 mg by mouth every 6 (six) hours as needed for mild pain      acyclovir (ZOVIRAX) 200 mg capsule Take 2 capsules (400 mg total) by mouth 2 (two) times a day  Qty: 120 capsule, Refills: 6    Associated Diagnoses: Multiple myeloma not having achieved remission ([address]); Prophylactic measure      Ascorbic Acid (vitamin C) 1000 MG tablet Take 1,000 mg by mouth daily      atorvastatin (LIPITOR) 20 mg tablet TAKE 1 TABLET DAILY  Qty: 90 tablet, Refills: 3    Associated Diagnoses: Hyperlipidemia, unspecified hyperlipidemia type      Cholecalciferol (VITAMIN D-3 PO) Take 1,000 Units by mouth daily       dexamethasone (DECADRON) 4 mg tablet Take 5 tablets (20 mg total) by mouth 2 (two) times a week On morning of Kyprolis treatment. Qty: 120 tablet, Refills: 1    Associated Diagnoses: IgG multiple myeloma (HCC)      escitalopram (LEXAPRO) 20 mg tablet TAKE 1 TABLET DAILY  Qty: 90 tablet, Refills: 1    Associated Diagnoses: Anxiety      Glutamine POWD by Does not apply route      lansoprazole (PREVACID) 15 mg capsule take 1 capsule by mouth twice a day before meals  Qty: 120 capsule, Refills: 0    Associated Diagnoses: Prophylactic measure      levothyroxine 100 mcg tablet Take 1 tablet (100 mcg total) by mouth daily  Qty: 90 tablet, Refills: 0    Associated Diagnoses: Hypothyroidism, unspecified type      LORazepam (ATIVAN) 1 mg tablet Take 1 tablet (1 mg total) by mouth every 4 (four) hours as needed for anxiety  Qty: 100 tablet, Refills: 1    Associated Diagnoses: Drug-induced insomnia (HCC)      methylphenidate (RITALIN) 5 mg tablet Take 2 tablets (10mg) in the morning, and 1 tablet (5mg) in the afternoon for cancer related fatigue.   Qty: 90 tablet, Refills: 0    Associated Diagnoses: Other fatigue      metoprolol tartrate (LOPRESSOR) 25 mg tablet take 1/2 tablet by mouth every 12 hours  Qty: 90 tablet, Refills: 0    Associated Diagnoses: Essential hypertension      Multiple Vitamin (MULTIVITAMINS PO) Take 1 tablet by mouth daily. naloxone (NARCAN) 4 mg/0.1 mL nasal spray Administer 1 spray into a nostril. If no response after 2-3 minutes, give another dose in the other nostril using a new spray. Qty: 1 each, Refills: 1    Associated Diagnoses: IgG multiple myeloma (HCC)      OLANZapine (ZyPREXA) 2.5 mg tablet Take 1 tablet (2.5 mg total) by mouth daily at bedtime  Qty: 30 tablet, Refills: 3    Associated Diagnoses: Decreased appetite; Chemotherapy-induced nausea      oxyCODONE (Roxicodone) 5 immediate release tablet Take 1 tablet (5 mg total) by mouth every 4 (four) hours as needed for moderate pain Max Daily Amount: 30 mg  Qty: 90 tablet, Refills: 0    Associated Diagnoses: IgG multiple myeloma (HCC)      Pomalidomide (Pomalyst) 2 MG CAPS TAKE 1 CAPSULE DAILY FOR 21 DAYS, THEN OFF 7 DAYS EVERY 28 DAY CYCLE  Qty: 21 capsule, Refills: 4    Comments: Auth# [id number] 12/7/22  Associated Diagnoses: Multiple myeloma not having achieved remission (HCC)      Selinexor, 40 MG Once Weekly, 40 MG TBPK Take 1 tablet by mouth once a week  Qty: 4 each, Refills: 3    Associated Diagnoses: Multiple myeloma not having achieved remission (HCC)      sulfamethoxazole-trimethoprim (BACTRIM DS) 800-160 mg per tablet Take 1 tablet by mouth 3 (three) times a week Monday Wednesday friday  Qty: 12 tablet, Refills: 11    Associated Diagnoses: IgG multiple myeloma (HCC)      vitamin B-12 (VITAMIN B-12) 1,000 mcg tablet Take 1,000 mcg by mouth daily           No discharge procedures on file.        ED Provider  Electronically Signed by     Shaye Gilbert MD  09/07/23 7792

## 2023-09-07 NOTE — PLAN OF CARE
Problem: Potential for Falls  Goal: Patient will remain free of falls  Description: INTERVENTIONS:  - Educate patient/family on patient safety including physical limitations  - Instruct patient to call for assistance with activity   - Consult OT/PT to assist with strengthening/mobility   - Keep Call bell within reach  - Keep bed low and locked with side rails adjusted as appropriate  - Keep care items and personal belongings within reach  - Initiate and maintain comfort rounds  - Make Fall Risk Sign visible to staff  - Offer Toileting every 2 Hours, in advance of need  - Initiate/Maintain chair alarm  - Obtain necessary fall risk management equipment: non skid socks  - Apply yellow socks and bracelet for high fall risk patients  - Consider moving patient to room near nurses station  Outcome: Not Progressing

## 2023-09-08 PROBLEM — A41.9 SEPSIS (HCC): Status: ACTIVE | Noted: 2023-09-08

## 2023-09-08 PROBLEM — N17.9 AKI (ACUTE KIDNEY INJURY) (HCC): Status: ACTIVE | Noted: 2020-07-16

## 2023-09-08 PROBLEM — J96.01 ACUTE RESPIRATORY FAILURE WITH HYPOXIA (HCC): Status: ACTIVE | Noted: 2023-09-08

## 2023-09-08 PROBLEM — G93.40 ENCEPHALOPATHY ACUTE: Status: ACTIVE | Noted: 2023-09-08

## 2023-09-08 NOTE — ASSESSMENT & PLAN NOTE
Lab Results   Component Value Date    EGFR 29 09/08/2023    EGFR 30 09/07/2023    EGFR 37 09/05/2023    CREATININE 2.12 (H) 09/08/2023    CREATININE 2.07 (H) 09/07/2023    CREATININE 1.75 (H) 09/05/2023     · Baseline creat 1.75  · Elevated to 2.12-suspect 2/2 intravascular depletion  · Received lasix 40 mg IV x1   · Albumin 5% 12.5 G given x1  · Nephrology following

## 2023-09-08 NOTE — QUICK NOTE
Patient with worsening respiratory status. Patient was transitioned to [facility] and transferred to ICU as step down 2. He had worsening PNA on cxr and required increased oxygen to [facility]. Wife was called and updated. She reports that she did not want any cpr intubation if the hypoxia was related to his multiple myeloma but if its pneumonia she would want to trial treatment and intubation if needed.

## 2023-09-08 NOTE — ASSESSMENT & PLAN NOTE
· Diagnosed in 2015, s/p stem cell tx 2016, now on chemo with last session 9/6/23  · Continue Bactrim and Acyclovir ppx  · OP follow up with heme/onc

## 2023-09-08 NOTE — PLAN OF CARE
Problem: Potential for Falls  Goal: Patient will remain free of falls  Description: INTERVENTIONS:  - Educate patient/family on patient safety including physical limitations  - Instruct patient to call for assistance with activity   - Consult OT/PT to assist with strengthening/mobility   - Keep Call bell within reach  - Keep bed low and locked with side rails adjusted as appropriate  - Keep care items and personal belongings within reach  - Initiate and maintain comfort rounds  - Make Fall Risk Sign visible to staff  - Offer Toileting every  Hours, in advance of need  - Initiate/Maintain alarm  - Obtain necessary fall risk management equipment:  - Apply yellow socks and bracelet for high fall risk patients  - Consider moving patient to room near nurses station  9/8/2023 0852 by Jason Francis RN  Outcome: Progressing  9/8/2023 0850 by Jason Francis RN  Outcome: Progressing     Problem: PAIN - ADULT  Goal: Verbalizes/displays adequate comfort level or baseline comfort level  Description: Interventions:  - Encourage patient to monitor pain and request assistance  - Assess pain using appropriate pain scale  - Administer analgesics based on type and severity of pain and evaluate response  - Implement non-pharmacological measures as appropriate and evaluate response  - Consider cultural and social influences on pain and pain management  - Notify physician/advanced practitioner if interventions unsuccessful or patient reports new pain  9/8/2023 0852 by Jason Francis RN  Outcome: Progressing  9/8/2023 0850 by Jason Francis RN  Outcome: Progressing     Problem: INFECTION - ADULT  Goal: Absence or prevention of progression during hospitalization  Description: INTERVENTIONS:  - Assess and monitor for signs and symptoms of infection  - Monitor lab/diagnostic results  - Monitor all insertion sites, i.e. indwelling lines, tubes, and drains  - Monitor endotracheal if appropriate and nasal secretions for changes in amount and color  - [place] appropriate cooling/warming therapies per order  - Administer medications as ordered  - Instruct and encourage patient and family to use good hand hygiene technique  - Identify and instruct in appropriate isolation precautions for identified infection/condition  9/8/2023 0852 by Lord Heidi RN  Outcome: Progressing  9/8/2023 0850 by Lord Gordon RN  Outcome: Progressing

## 2023-09-08 NOTE — NUTRITION
09/08/23 1537   Biochemical Data,Medical Tests, and Procedures   Biochemical Data/Medical Tests/Procedures Lab values reviewed; Meds reviewed   Labs (Comment) 9/8/23 glucose , BUN 43, creat 2.15, Ca 7.2, P 5.8   Meds (Comment) vitamin C, atorvastatin, vitamin D3, lasix, insulin, MVI, omeprazole   Nutrition-Focused Physical Exam   Nutrition-Focused Physical Exam Findings RN skin assessment reviewed;Edema; No skin issues documented  (+1 BL LE edema)   Medical-Related Concerns IgG multiple myeloma, HTN, acute respiratory failure, malnutrition   Adequacy of Intake   Nutrition Modality NPO;EN   Feeding Route   Tube Feeding NG-tube   Formula Jevity 1.2   Formula rate 55 mL/hr  (initiated today, unable to report on TF administered)   Pump Type Kangaroo ePump   Current PO Intake   Estimated calorie intake compared to estimated need Nutrient needs are not met at this time. PES Statement   Oral or Nutritional Support Intake (2) Inadequate oral intake NI-2.1   Related to Other (Comment)  (acute clinical condition)   As evidenced by: Other (Comment)  (need for nutrition via alternate route)   Recommendations/Interventions   Malnutrition/BMI Present No  (will monitor)   Summary HCT Referral. Presents with SOB, AMS. Past medical history significant for IgG multiple myeloma, HTN, acute respiratory failure, malnutrition. Weight history reviewed. Noted significant 26# weight loss in 6 months, 15% body weight. +2 BL LE edema. No pressure areas. Prescribed Jevity 1.2 at 55 mL/hr continuous for 22 hours via NGT with Prosource TF once daily - provides 1492 kcal, 78 g protein, 1021 mL free water. Recommend adjusting TF regimen to goal of Jevity 1.2 at 65 mL/hr for 22 hours (hold 1 hour pre and post Omeprazole administration). Recommend 90 mL water flush q4 hr. Additional flushes per MD discretion. RD to follow.    Interventions/Recommendations Adjust EN/ PN;Tube feeding recs provided   Recommendations to Provider See summary Education Assessment   Education Patient/caregiver not appropriate for education at this time   Patient Nutrition Goals   Goal Meet EN/PN needs

## 2023-09-08 NOTE — CASE MANAGEMENT
Case Management Progress Note    Patient name Ivana Espinoza  Location ICU 05/ICU 05- MRN [id number]  : 1945 Date 2023       LOS (days): 1  Geometric Mean LOS (GMLOS) (days): 4.30  Days to GMLOS:3.4        OBJECTIVE:        Current admission status: Inpatient  Preferred Pharmacy:   [facility], [address], [address]  [address] [facility]  Phone: [phone] Fax: [phone]    [facility] - [address] [facility], [address] [facility]  [address] [facility] [address] [facility] [address]  Phone: [phone] Fax: [phone]    Susie Anderson [address] [facility], [address] Sharalyn Yolis  [address] [facility]  Phone: [phone] Fax: [phone] [address], [address] [facility] [address]  [facility] [address]  Phone: [phone] Fax: [phone]    [facility] [address]  [address] [facility] [address]  Phone: [phone] Fax: [phone]    Primary Care Provider: Ian Allison, DO    Primary Insurance: MEDICARE  Secondary Insurance: Louellen Sea [employer]    PROGRESS NOTE:  Reviewed the chart for CM purposes. Pt currently COVID positive on 40LHF O2. Will follow for care needs.

## 2023-09-08 NOTE — PROGRESS NOTES
[address]  Progress Note  Name: Geovanna Kennedy  MRN: [id number]  Unit/Bed#: ICU 05-01 I Date of Admission: 9/7/2023   Date of Service: 9/8/2023 I Hospital Day: 1    Assessment/Plan   Encephalopathy acute  Assessment & Plan  · Multifactorial hypoxia, PNA  · CT brain-no intracranial abnormalities  · Regulate sleep/wake cycle  · Trend neuro exam     Acute respiratory failure with hypoxia (HCC)  Assessment & Plan  · Secondary to worsening pneumonia  · Chest x-ray-. Increased opacity within the right lung may reflect worsening pulmonary edema versus pneumonia.  2. Redemonstrated pulmonary edema and bilateral moderate pleural effusions  · Oxygen requirements quickly escalated from nasal cannula to mid flow to high flow  · Lasix 40 mg IV given earlier   · ABG 7.27/34/60/15.3  · Maintain SPO2 greater than 92  · COVID negative-consider rechecking COVID and RP2 panel  · Pulmonary toileting    Sepsis ([address])  Assessment & Plan  · AEB tachycardia, hypoxia and tachypnea with BLAS  · CXR with worsening PNA  · Strep/legionella negative  · BC x2 pending  · Procal elevated from 0.32-3.3  · Continue Cefepime/Vanco  · Covid,RSV,influenza -negative-  · Trend WBC, fever curve    Anxiety  Assessment & Plan  · Continue to hold home lexapro and prn ativan    BLAS (acute kidney injury) (HCC)on stage 3 CKD  Assessment & Plan  Lab Results   Component Value Date    EGFR 29 09/08/2023    EGFR 30 09/07/2023    EGFR 37 09/05/2023    CREATININE 2.12 (H) 09/08/2023    CREATININE 2.07 (H) 09/07/2023    CREATININE 1.75 (H) 09/05/2023     · Baseline creat 1.75  · Elevated to 2.12-suspect 2/2 intravascular depletion  · Received lasix 40 mg IV x1   · Albumin 5% 12.5 G given x1  · Nephrology following    Acquired hypothyroidism  Assessment & Plan  · Hold home levothyroxine while NPO    Hypocalcemia  Assessment & Plan  · Suspect in the setting of hypoalbuminemia  · Received calcium gluconate 2 g IV x1  · Continue to trend iCal    Hyperlipidemia  Assessment & Plan  · Continue to hold home statin while NPO    Chronic ITP (idiopathic thrombocytopenia) (HCC)  Assessment & Plan  · Platelet downtrending to 27 suspect in the setting of severe sepsis  · No signs of bleeding  · We will continue to monitor    Essential hypertension  Assessment & Plan  · Continue lopressor IV    IgG multiple myeloma (HCC)  Assessment & Plan  · Continue to hold Bactrim  · Hold home acyclovir in setting of NPO  · Will need out patinet F/U with hemo onc    * Pneumonia due to infectious organism  Assessment & Plan  · initial CT chest with subpleural GGO in upper lobes-CAP  · Covid 19/influenza/rsv negative  · Negative Urine strep and legionella  · Sputm cx pending collection  · Procal elevated  · Continue Cefepime/Vanco             ICU Core Measures     A: Assess, Prevent, and Manage Pain · Has pain been assessed? Yes  · Need for changes to pain regimen? NA   B: Both SAT/SAT  · N/A   C: Choice of Sedation · RASS Goal: N/A patient not on sedation  · Need for changes to sedation or analgesia regimen? NA   D: Delirium · CAM-ICU: Positive   E: Early Mobility  · Plan for early mobility? Yes   F: Family Engagement · Plan for family engagement today? Yes     Antibiotic Review: Continue broad spectrum secondary to severity of illness. Review of Invasive Devices: Dylan Plan: Continue for accurate I/O monitoring for 48 hours        Prophylaxis:  VTE Contraindicated secondary to: Plt <50   Stress Ulcer  covered bypantoprazole (PROTONIX) EC tablet 40 mg [[id number]]     Subjective   HPI/24hr events: Micha Mathis. Rowan Goncalves is a 77 yo male with past medical history of CKD 3B, IgG multiple myeloma-last chemotherapy was 9/6/2023, hypertension, hyperlipidemia who presented yesterday with complaints of generalized weakness. He was admitted under internal medicine for pneumonia and BLAS on CKD 3.   Overnight he had escalating oxygen requirements leading up to high flow nasal cannula despite being diuresed with 40 of IV Lasix. He continued to be tachypneic, diaphoretic with increased use of accessory muscles. Chest x-ray with worsening pneumonia. Patient upgraded to stepdown level 1. Review of Systems   Unable to perform ROS: Mental status change            Objective                            Vitals I/O      Most Recent Min/Max in 24hrs   Temp 98.8 °F (37.1 °C) Temp  Min: 97.6 °F (36.4 °C)  Max: 98.8 °F (37.1 °C)   Pulse 92 Pulse  Min: 71  Max: 140   Resp (!) 25 Resp  Min: 13  Max: 35   /82 BP  Min: 112/67  Max: 131/82   O2 Sat 90 % SpO2  Min: 83 %  Max: 97 %      Intake/Output Summary (Last 24 hours) at 2023 0539  Last data filed at 2023 1700  Gross per 24 hour   Intake 240 ml   Output --   Net 240 ml         Diet NPO     Invasive Monitoring Physical exam    Physical Exam  Eyes:      Extraocular Movements: Extraocular movements intact. Conjunctiva/sclera: Conjunctivae normal.      Pupils: Pupils are equal, round, and reactive to light. Skin:     Capillary Refill: Capillary refill takes 2 to 3 seconds. HENT:      Head: Normocephalic and atraumatic. Mouth/Throat:      Mouth: Mucous membranes are dry. Cardiovascular:      Rate and Rhythm: Regular rhythm. Tachycardia present. Pulses: Normal pulses. Musculoskeletal:         General: Normal range of motion. Right lower le+ Edema present. Left lower le+ Edema present. Abdominal:      General: There is no distension. Palpations: Abdomen is soft. Tenderness: There is no abdominal tenderness. Constitutional:       Appearance: He is ill-appearing. Pulmonary:      Effort: Tachypnea, accessory muscle usage and accessory muscle usage present. Breath sounds: Wheezing and rales present. Psychiatric:         Mood and Affect: Mood is anxious. Neurological:      Mental Status: He is confused. GCS: GCS eye subscore is 4. GCS verbal subscore is 4. GCS motor subscore is 6. Genitourinary/Anorectal:  [place]         Diagnostic Studies    EKG: Sinus tachycardia  Imaging:  XR chest portable ICU   Final Result by Laura Iniguez MD (09/08 9546)      1. Increased opacity within the right lung may reflect worsening pulmonary edema versus pneumonia. 2.  Redemonstrated pulmonary edema and bilateral moderate pleural effusions. Workstation performed: [id number]         CT chest without contrast   Final Result by Ynes Bay MD (09/07 0713)      Pleural effusions with compressive atelectasis. Small subpleural groundglass opacities in the upper lobes. These are nonspecific but pneumonia is considered, including COVID-pneumonia. Correlate with clinical findings. Moderate cardiomegaly. Parietal pleural thickening, possibly related to multiple myeloma. Destructive soft tissue mass in the right rib 7. Workstation performed: [id number]         CT head without contrast   Final Result by Anu Mares MD (09/07 0730)      No acute intracranial abnormality. Workstation performed: [id number]         XR chest 1 view portable   Final Result by Liu Man MD (09/07 8007)      Congestive heart failure with pulmonary edema and bilateral pleural effusions. Workstation performed: [id number]            I have personally reviewed pertinent reports.    and I have personally reviewed pertinent films in PACS     Medications:  Scheduled PRN   acyclovir, 400 mg, BID  vitamin C, 1,000 mg, Daily  atorvastatin, 20 mg, Daily  cefepime, 1,000 mg, Q12H  cholecalciferol, 1,000 Units, Daily  vitamin B-12, 1,000 mcg, Daily  docusate sodium, 100 mg, BID  guaiFENesin, 600 mg, BID  levothyroxine, 100 mcg, Early Morning  magnesium sulfate, 2 g, Once  metoprolol tartrate, 12.5 mg, Q12H  multivitamin stress formula, 1 tablet, Daily  OLANZapine, 5 mg, HS  pantoprazole, 40 mg, Early Morning  vancomycin, 750 mg, Q24H      acetaminophen, 650 mg, Q6H PRN  ondansetron, 4 mg, Q6H PRN       Continuous          Labs:    CBC    Recent Labs     09/07/23 0619 09/07/23  1143 09/08/23  0202   WBC 6.71  --  6.70   HGB 8.1*  --  7.7*   HCT 27.1*  --  25.1*   PLT 41* 31* 27*   BANDSPCT 1  --   --      BMP    Recent Labs     09/07/23 0619 09/08/23  0202   SODIUM 136 134*   K 4.0 3.7    107   CO2 15* 13*   AGAP 14 14   BUN 29* 40*   CREATININE 2.07* 2.12*   CALCIUM 7.3* 6.9*       Coags    Recent Labs     09/07/23 0619   INR 1.61*   PTT 29        Additional Electrolytes  Recent Labs     09/08/23  0202   MG 1.9   PHOS 5.3*   CAIONIZED 1.00*          Blood Gas    Recent Labs     09/08/23  0206   PHART 7.270*   VSV7SSA 34.0*   PO2ART 59.9*   LER0ARP 15.3*   BEART -10.7   SOURCE Radial, Left     Recent Labs     09/07/23  0717 09/08/23  0206   PHVEN 7.349  --    HUM1ZXN 26.8*  --    PO2VEN 79.6*  --    JDD7MLA 14.4*  --    BEVEN -10.0  --    SOURCE  --  Radial, Left    LFTs  Recent Labs     09/07/23 0619 09/08/23  0202   ALT 18 20   AST 29 36   ALKPHOS 39 34   ALB 2.8* 2.8*   TBILI 0.83 0.64       Infectious  Recent Labs     09/07/23  1143 09/08/23  0202   PROCALCITONI 0.32* 3.31*     Glucose  Recent Labs     09/07/23  0619 09/08/23  0202   GLUC 126 220*               Critical Care Time Delivered: Upon my evaluation, this patient had a high probability of imminent or life-threatening deterioration due to Acute hypoxic respiratory failure, acute encephalopathy, pneumonia, BLAS, which required my direct attention, intervention, and personal management. I have personally provided 32 minutes of critical care time, exclusive of procedures, teaching, family meetings, and any prior time recorded by providers other than myself.      [address] [place]

## 2023-09-08 NOTE — ASSESSMENT & PLAN NOTE
· AEB tachycardia, hypoxia and tachypnea with BLAS  · Likely in the setting of pneumonia   · Continue Cefepime and Azithro - Vanco stopped with negative MRSA   · Follow up on blood cultures  · Trend WBC, fever curve, procal

## 2023-09-08 NOTE — PROGRESS NOTES
Swati Gonzalez is a 78 y.o. male who is currently ordered Vancomycin IV with management by the Pharmacy Consult service. Relevant clinical data and objective / subjective history reviewed. Vancomycin Assessment:  Indication and Goal AUC/Trough: Pneumonia (goal -600, trough >10), -600, trough >10  Clinical Status: stable  Micro:     Renal Function:  SCr: 2.12 mg/dL  CrCl: 28 mL/min  Renal replacement: Not on dialysis  Days of Therapy: 2  Current Dose: 750 mg iv q 24 hrs  Vancomycin Plan:  New Dosing: continue 750 mg iv q 24 hrs  Estimated AUC: 518 mcg*hr/mL  Estimated Trough: 17.5 mcg/mL  Next Level: 9/9/23 @ 0600  Renal Function Monitoring: Daily BMP and [place] will continue to follow closely for s/sx of nephrotoxicity, infusion reactions and appropriateness of therapy. BMP and CBC will be ordered per protocol. We will continue to follow the patient’s culture results and clinical progress daily.     Gilda Solano, Pharmacist

## 2023-09-08 NOTE — ASSESSMENT & PLAN NOTE
· Suspect in the setting of hypoalbuminemia  · Received calcium gluconate 2 g IV x1  · Continue to trend iCal

## 2023-09-08 NOTE — QUICK NOTE
Wife updated extensively at bedside regarding plan of care and overall patient status. All questions/concerns were answered and addressed.

## 2023-09-08 NOTE — PLAN OF CARE
Problem: INFECTION - ADULT  Goal: Absence or prevention of progression during hospitalization  Description: INTERVENTIONS:  - Assess and monitor for signs and symptoms of infection  - Monitor lab/diagnostic results  - Monitor all insertion sites, i.e. indwelling lines, tubes, and drains  - Monitor endotracheal if appropriate and nasal secretions for changes in amount and color  - [place] appropriate cooling/warming therapies per order  - Administer medications as ordered  - Instruct and encourage patient and family to use good hand hygiene technique  - Identify and instruct in appropriate isolation precautions for identified infection/condition  Outcome: Progressing     Problem: SAFETY ADULT  Goal: Maintain or return to baseline ADL function  Description: INTERVENTIONS:  -  Assess patient's ability to carry out ADLs; assess patient's baseline for ADL function and identify physical deficits which impact ability to perform ADLs (bathing, care of mouth/teeth, toileting, grooming, dressing, etc.)  - Assess/evaluate cause of self-care deficits   - Assess range of motion  - Assess patient's mobility; develop plan if impaired  - Assess patient's need for assistive devices and provide as appropriate  - Encourage maximum independence but intervene and supervise when necessary  - Involve family in performance of ADLs  - Assess for home care needs following discharge   - Consider OT consult to assist with ADL evaluation and planning for discharge  - Provide patient education as appropriate  Outcome: Progressing     Problem: Potential for Falls  Goal: Patient will remain free of falls  Description: INTERVENTIONS:  - Educate patient/family on patient safety including physical limitations  - Instruct patient to call for assistance with activity   - Consult OT/PT to assist with strengthening/mobility   - Keep Call bell within reach  - Keep bed low and locked with side rails adjusted as appropriate  - Keep care items and personal belongings within reach  - Initiate and maintain comfort rounds  - Make Fall Risk Sign visible to staff  - Offer Toileting every  Hours, in advance of need  - Initiate/Maintain alarm  - Obtain necessary fall risk management equipment:  - Apply yellow socks and bracelet for high fall risk patients  - Consider moving patient to room near nurses station  Outcome: Progressing

## 2023-09-08 NOTE — ASSESSMENT & PLAN NOTE
· AEB tachycardia, hypoxia and tachypnea with BLAS  · CXR with worsening PNA  · Strep/legionella negative  · BC x2 pending  · Procal elevated from 0.32-3.3  · Continue Cefepime/Vanco  · Covid,RSV,influenza -negative-  · Trend WBC, fever curve

## 2023-09-08 NOTE — RESPIRATORY THERAPY NOTE
09/08/23 0236   Respiratory Assessment   Assessment Type Pre-treatment   General Appearance Awake   Respiratory Pattern Tachypneic; Accessory muscle use   Chest Assessment Chest expansion symmetrical   Bilateral Breath Sounds Diminished;Rales;Scattered; Expiratory wheezes   Resp Comments pt remains diaphoretic, clamy, not as restless as prior, tx given vapotherm started 80% 40 l/m   O2 Device vapotherm   Non-Invasive Information   O2 Interface Device HFNC prongs   Non-Invasive Ventilation Mode HFNC (High flow)  (vapotherm)   $ Continous NIV Initial   SpO2 90 %   $ Pulse Oximetry Spot Check Charge Completed   Non-Invasive Settings   FiO2 (%) 80   Flow (lpm) 40   Temperature (Set) 33   Non-Invasive Readings   Skin Intervention Skin intact   Heater Temperature (Obs) 33

## 2023-09-08 NOTE — SEPSIS NOTE
Sepsis Note   Elpidio Lee 78 y.o. male MRN: [id number]  Unit/Bed#: ICU 05-01 Encounter: [id number]       Initial Sepsis Screening     [address] Name 09/08/23 0252                Is the patient's history suggestive of a new or worsening infection? Yes (Proceed)  -SS        Suspected source of infection pneumonia  -SS        Indicate SIRS criteria Tachypnea > 20 resp per min; Tachycardia > 90 bpm  -SS        Are two or more of the above signs & symptoms of infection both present and new to the patient? Yes (Proceed)  -SS        Assess for evidence of organ dysfunction: Are any of the below criteria present within 6 hours of suspected infection and SIRS criteria that are NOT considered to be chronic conditions? New need for invasive/non-invasive ventilation  -        Date of presentation of severe sepsis 09/08/23  -        Time of presentation of severe sepsis 0230  -        Sepsis Note: Click "NEXT" below (NOT "close") to generate sepsis note based on above information. YES (proceed by clicking "NEXT")  -              User Key  (r) = Recorded By, (t) = Taken By, (c) = Cosigned By    [address] [place] [address] Name Provider Type    [facility], [address] [place] [address] Nurse Practitioner                    Body mass index is 20.75 kg/m².   Wt Readings from Last 1 Encounters:   09/07/23 65.6 kg (144 lb 10 oz)     IBW (Ideal Body Weight): 73 kg    Ideal body weight: 73 kg (160 lb 15 oz)

## 2023-09-08 NOTE — PROGRESS NOTES
Progress Note - Nephrology   Muna Spine 78 y.o. male MRN: [id number]  Unit/Bed#: ICU 05-01 Encounter: [id number]    A/P:  1. Acute kidney injury superimposed on chronic kidney disease stage 3B   - followed by Dr Sugey Pepper at [facility]   - baseline creatinine 1.7-2 on records review. Admitted with marked dyspnea and pneumonia   - may have acute tubular necrosis 12/2 sepsis, voluem shifts/inflammation   - check urine electrolytes     2. Volume status   - CXR demonstrates GGO with possible volume overload   - echo demonstrates biventricular failure   - would utilize albumin with loop diuretics   - continue to monitor I/O   - has hypoxic respiratory failure, LE edema and dec BS on lung exam    3. Sepsis/pneumonia   - treated with vancomycin and cefepime     4. Multiple myeloma not in remission   - since 2015   - received chemo on 9/6: carfilzomib 100 mg and IVIG 30 g    5. Acid base   - received sodium bicarbonate 50 meq injection   - follow levels         Follow up reason for today's visit: Acute kidney injury/CKD 3B/Pneumonia with sepsis/ respiratory failure with hypoxia/multiple myeloma not in remission    Pneumonia due to infectious organism    Patient Active Problem List   Diagnosis   • IgG multiple myeloma (HCC)   • Essential hypertension   • Chronic ITP (idiopathic thrombocytopenia) (HCC)   • Hyperlipidemia   • Hypocalcemia   • Acquired hypothyroidism   • Prophylactic measure   • Hypogammaglobulinemia (HCC)   • BLAS (acute kidney injury) (HCC)on stage 3 CKD   • Anxiety   • Headache   • Anemia, unspecified   • Hypercalcemia   • CKD (chronic kidney disease)   • Mild protein-calorie malnutrition (HCC)   • Pneumonia due to infectious organism   • Sepsis ([address])   • Acute respiratory failure with hypoxia (HCC)   • Encephalopathy acute         Subjective:   No appetite for 7 days, no chest pain, has dyspnea    Objective:     Vitals: Blood pressure 140/71, pulse 95, temperature 98.8 °F (37.1 °C), resp.  rate 22, height 5' 10" (1.778 m), weight 66.2 kg (146 lb), SpO2 (!) 86 %. ,Body mass index is 20.95 kg/m². Weight (last 2 days)     Date/Time Weight    09/08/23 0922 66.2 (146)    09/08/23 0600 66.6 (146.83)    09/07/23 1528 65.6 (144.62)    09/07/23 10:06:02 65.6 (144.62)            Intake/Output Summary (Last 24 hours) at 9/8/2023 1346  Last data filed at 9/8/2023 1301  Gross per 24 hour   Intake 520 ml   Output 1525 ml   Net -1005 ml     I/O last 3 completed shifts: In: 640 [P.O.:240; IV Piggyback:400]  Out: 650 [Urine:650]    Urethral Catheter Temperature probe;Straight-tip 16 Fr. (Active)   [facility] Done 09/08/23 0900   Output (mL) 600 mL 09/08/23 1301       Physical Exam: /71   Pulse 95   Temp 98.8 °F (37.1 °C)   Resp 22   Ht 5' 10" (1.778 m)   Wt 66.2 kg (146 lb)   SpO2 (!) 86%   BMI 20.95 kg/m²     General Appearance:     Weak, in distress   Head:    Normocephalic, without obvious abnormality, atraumatic   Eyes:    Conjunctiva/corneas clear   Ears:    Normal external ears   Nose:      Throat:   Lips, mucosadry   Neck:   Supple,    Back:     Symmetric, no curvature,    Lungs:      Decreased BS with exp rhonchi   Chest wall:    No tenderness or deformity   Heart:    Regular rate and rhythm, S1 and S2 normal, no murmur,    Abdomen:     Soft, non-tender, bowel sounds active   Extremities:   Extremities has edema   Skin:   Skin color,pale   Lymph nodes:      Neurologic:   CNII-XII awake and responsive to his wife            Lab, Imaging and other studies: I have personally reviewed pertinent labs.   CBC:   Lab Results   Component Value Date    WBC 6.70 09/08/2023    HGB 7.7 (L) 09/08/2023    HCT 25.1 (L) 09/08/2023     (H) 09/08/2023    PLT 27 (LL) 09/08/2023    RBC 2.19 (L) 09/08/2023    MCH 35.2 (H) 09/08/2023    MCHC 30.7 (L) 09/08/2023    RDW 25.4 (H) 09/08/2023    MPV 9.9 09/08/2023    NRBC 0 09/08/2023     CMP:   Lab Results   Component Value Date    K 4.1 09/08/2023     09/08/2023    CO2 15 (L) 09/08/2023    BUN 43 (H) 09/08/2023    CREATININE 2.15 (H) 09/08/2023    CALCIUM 7.2 (L) 09/08/2023    AST 36 09/08/2023    ALT 20 09/08/2023    ALKPHOS 34 09/08/2023    EGFR 29 09/08/2023       . Results from last 7 days   Lab Units 09/08/23  1001 09/08/23  0202 09/07/23  0619 09/05/23  0713   POTASSIUM mmol/L 4.1 3.7 4.0 3.1*   CHLORIDE mmol/L 107 107 107 110*   CO2 mmol/L 15* 13* 15* 20*   BUN mg/dL 43* 40* 29* 20   CREATININE mg/dL 2.15* 2.12* 2.07* 1.75*   CALCIUM mg/dL 7.2* 6.9* 7.3* 7.4*   ALK PHOS U/L  --  34 39 39   ALT U/L  --  20 18 21   AST U/L  --  36 29 24         Phosphorus:   Lab Results   Component Value Date    PHOS 5.8 (H) 09/08/2023     Magnesium:   Lab Results   Component Value Date    MG 2.5 09/08/2023     Urinalysis:   Lab Results   Component Value Date    COLORU Yellow 09/08/2023    CLARITYU Clear 09/08/2023    SPECGRAV 1.025 09/08/2023    PHUR 5.5 09/08/2023    LEUKOCYTESUR Negative 09/08/2023    NITRITE Negative 09/08/2023    GLUCOSEU Negative 09/08/2023    KETONESU Negative 09/08/2023    BILIRUBINUR Negative 09/08/2023    BLOODU 2+ (A) 09/08/2023     Ionized Calcium: No results found for: "CAION"  Coagulation: No results found for: "PT", "INR", "APTT"  Troponin: No results found for: "TROPONINI"  ABG:   Lab Results   Component Value Date    PHART 7.270 (L) 09/08/2023    DOW1KVG 34.0 (L) 09/08/2023    PO2ART 59.9 (LL) 09/08/2023    NLC6MQU 15.3 (L) 09/08/2023    BEART -10.7 09/08/2023    SOURCE Radial, Left 09/08/2023     Radiology review:     IMAGING  Procedure: VAS lower limb venous duplex study, complete bilateral    Result Date: 9/8/2023  Narrative:  [facility] REPORT CLINICAL: Indications: Patient presents with bilateral leg edema, pneumonia and multiple myeloma. CONCLUSION: Impression: RIGHT LOWER LIMB: No evidence of acute or chronic deep vein thrombosis. No evidence of superficial thrombophlebitis noted.  Doppler evaluation shows a normal response to augmentation maneuvers. . Popliteal, posterior tibial and anterior tibial arterial Doppler waveform's are triphasic. LEFT LOWER LIMB: No evidence of acute or chronic deep vein thrombosis. No evidence of superficial thrombophlebitis noted. Doppler evaluation shows a normal response to augmentation maneuvers. Popliteal, posterior tibial and anterior tibial arterial Doppler waveform's are triphasic. Kamryn Murrieta SIGNATURE: Electronically Signed by: Shabana Simmons DO on 2023-09-08 01:17:24 PM    Procedure: Echo complete w/ contrast if indicated    Result Date: 9/8/2023  Narrative: •  Left Ventricle: Left ventricular cavity size is mildly dilated. Wall thickness is normal. The left ventricular ejection fraction is 30%. Systolic function is moderate to severely reduced in a global manner. •  Right Ventricle: Right ventricular cavity size is moderately dilated. Systolic function is mildly to moderately reduced. Abnormal tricuspid annular plane systolic excursion (TAPSE) < 1.7 cm. •  Left Atrium: The atrium is moderately dilated. •  Right Atrium: The atrium is moderately dilated. •  Aortic Valve: There is mild regurgitation. •  Mitral Valve: There is moderate regurgitation. •  Tricuspid Valve: There is moderate to severe regurgitation. The right ventricular systolic pressure is moderately elevated. The estimated right ventricular systolic pressure is 03.78 mmHg. •  There is a moderately sized left pleural effusion. •  Changes noted when compared to prior study. Changes include: Decrease in LV and RV function . Procedure: XR chest portable ICU    Result Date: 9/8/2023  Narrative: CHEST INDICATION:   resp failure. COMPARISON: Radiograph of the chest from 9/8/2023 EXAM PERFORMED/VIEWS:  XR CHEST PORTABLE ICU FINDINGS: Mildly enlarged cardiomediastinal silhouette, not significantly changed. Similar patchy opacities most pronounced in the right lung, suspicious for underlying pneumonia. There is background pulmonary edema. Bibasilar atelectasis. Small-moderate right pleural effusion and small left pleural effusion. No pneumothorax. Osseous structures appear within normal limits for patient age. Impression: 1. Similar multifocal patchy right lung opacities, suspicious for pneumonia. There is superimposed mild background pulmonary edema throughout the lungs bilaterally. 2. Stable small-moderate right pleural effusion, small left pleural effusion, and bibasilar atelectasis. Workstation performed: [id number]     Procedure: XR chest portable ICU    Result Date: 9/8/2023  Narrative: CHEST INDICATION:   Hypoxia. COMPARISON: Radiograph of the chest on September 7, 2023. CT of the chest on September 7, 2023. EXAM PERFORMED/VIEWS:  XR CHEST PORTABLE ICU FINDINGS: The cardiac silhouette is enlarged similar to prior examination. Redemonstrated pulmonary vascular congestion/edema and bilateral moderate pleural effusions. Interval increase in right lung opacity in the upper and lower lung fields which may be due to worsening pulmonary edema versus pneumonia. There is a edge overlying the right peripheral lung likely representing a skinfold. No pneumothorax. Osseous structures appear within normal limits for patient age. Impression: 1. Increased opacity within the right lung may reflect worsening pulmonary edema versus pneumonia. 2.  Redemonstrated pulmonary edema and bilateral moderate pleural effusions. Workstation performed: [id number]     Procedure: XR chest 1 view portable    Result Date: 9/7/2023  Narrative: CHEST INDICATION:   Shortness of breath. COMPARISON: 3/9/2020 EXAM PERFORMED/VIEWS:  XR CHEST PORTABLE FINDINGS: Heart shadow is obscured by adjacent opacity. Small bilateral pleural effusions. Prominent leah and pulmonary markings. Osseous structures appear within normal limits for patient age. Impression: Congestive heart failure with pulmonary edema and bilateral pleural effusions.  Workstation performed: [id number]     Procedure: CT chest without contrast    Result Date: 9/7/2023  Narrative: CT CHEST WITHOUT IV CONTRAST INDICATION:   Dyspnea, differentiation between pulmonary edema pneumonia. Also with left supraclavicular mass, possibly enlarged lymph node? Dave Long History of multiple myeloma. COMPARISON:  None. TECHNIQUE: CT examination of the chest was performed without intravenous contrast. Multiplanar 2D reformatted images were created from the source data. This examination, like all CT scans performed in the [facility], was performed utilizing techniques to minimize radiation dose exposure, including the use of iterative reconstruction and automated exposure control. Radiation dose length product (DLP) for this visit:  331.85 mGy-cm FINDINGS: LUNGS: Compressive atelectasis in the posterior lower lobes. Small patchy subpleural groundglass opacities in the upper lobes, right greater than left. Patent tracheobronchial tree. PLEURA: Small to moderate bilateral pleural effusions. Suspected pleural thickening in the costophrenic angles, up to 1 cm, suboptimally visualized due to absence of contrast. HEART/GREAT VESSELS: Moderate cardiomegaly. Coronary artery calcifications. No thoracic aortic aneurysm. MEDIASTINUM AND JAKOB: No mediastinal lymphadenopathy. Limited evaluation of jakob without contrast. CHEST WALL AND LOWER NECK: No obvious supraclavicular lymphadenopathy; the appearance may be created by ectatic vessels VISUALIZED STRUCTURES IN THE UPPER ABDOMEN:  Unremarkable. OSSEOUS STRUCTURES: Multiple lucencies in keeping with history of multiple myeloma. Destructive 3.9 x 2.3 cm soft tissue mass in the anterolateral right rib 7. Multiple healed or healing rib fractures. Central height loss at the superior endplate of T7 that may reflect Schmorl's node or less likely a chronic mild compression fracture. Impression: Pleural effusions with compressive atelectasis. Small subpleural groundglass opacities in the upper lobes.  These are nonspecific but pneumonia is considered, including COVID-pneumonia. Correlate with clinical findings. Moderate cardiomegaly. Parietal pleural thickening, possibly related to multiple myeloma. Destructive soft tissue mass in the right rib 7. Workstation performed: [id number]     Procedure: CT head without contrast    Result Date: 9/7/2023  Narrative: CT BRAIN - WITHOUT CONTRAST INDICATION:   MS change. COMPARISON:  None. TECHNIQUE:  CT examination of the brain was performed. Multiplanar 2D reformatted images were created from the source data. Radiation dose length product (DLP) for this visit:  860.69 mGy-cm . This examination, like all CT scans performed in the [facility], was performed utilizing techniques to minimize radiation dose exposure, including the use of iterative  reconstruction and automated exposure control. IMAGE QUALITY:  Diagnostic. FINDINGS: PARENCHYMA: Decreased attenuation is noted in periventricular and subcortical white matter demonstrating an appearance that is statistically most likely to represent mild microangiopathic change. No CT signs of acute infarction. No intracranial mass, mass effect or midline shift. No acute parenchymal hemorrhage. VENTRICLES AND EXTRA-AXIAL SPACES:  Normal for the patient's age. VISUALIZED ORBITS: Normal visualized orbits. PARANASAL SINUSES: Normal visualized paranasal sinuses. CALVARIUM AND EXTRACRANIAL SOFT TISSUES:  Normal.     Impression: No acute intracranial abnormality.  Workstation performed: [id number]       Current Facility-Administered Medications   Medication Dose Route Frequency   • acetaminophen (TYLENOL) rectal suppository 650 mg  650 mg Rectal Q6H PRN   • acyclovir (ZOVIRAX) tablet 400 mg  400 mg Per NG Tube BID   • ascorbic acid (VITAMIN C) tablet 1,000 mg  1,000 mg Per NG Tube Daily   • atorvastatin (LIPITOR) tablet 20 mg  20 mg Per NG Tube Daily   • azithromycin (ZITHROMAX) 500 mg in sodium chloride 0.9 % 250 mL IVPB  500 mg Intravenous Q24H   • calcium gluconate 2 g in sodium chloride 0.9% 100 mL (premix)  2 g Intravenous Once   • cefepime (MAXIPIME) IVPB (premix in dextrose) 1,000 mg 50 mL  1,000 mg Intravenous Q12H   • cholecalciferol (VITAMIN D3) tablet 1,000 Units  1,000 Units Per NG Tube Daily   • cyanocobalamin (VITAMIN B-12) tablet 1,000 mcg  1,000 mcg Per NG Tube Daily   • [START ON 9/9/2023] escitalopram (LEXAPRO) tablet 20 mg  20 mg Per NG Tube Daily   • furosemide (LASIX) injection 40 mg  40 mg Intravenous Once   • insulin lispro (HumaLOG) 100 units/mL subcutaneous injection 1-5 Units  1-5 Units Subcutaneous Q6H [address]   • levothyroxine tablet 100 mcg  100 mcg Per NG Tube Early Morning   • multivitamin stress formula tablet 1 tablet  1 tablet Oral Daily   • [START ON 9/9/2023] omeprazole (PRILOSEC) suspension 2 mg/mL  20 mg Per NG Tube Daily   • ondansetron (ZOFRAN) injection 4 mg  4 mg Intravenous Q6H PRN   • sodium bicarbonate 8.4 % injection 50 mEq  50 mEq Intravenous Once   • sulfamethoxazole-trimethoprim (BACTRIM) 400-80 mg per tablet 1 tablet  1 tablet Per NG Tube Once per day on Mon Wed Fri   • vancomycin (VANCOCIN) IVPB (premix in dextrose) 750 mg 150 mL  750 mg Intravenous Q24H     Facility-Administered Medications Ordered in Other Encounters   Medication Dose Route Frequency   • [MAR Hold] alteplase (CATHFLO) injection 2 mg  2 mg Intracatheter Q1MIN PRN     Medications Discontinued During This Encounter   Medication Reason   • heparin (porcine) subcutaneous injection 5,000 Units    • acyclovir (ZOVIRAX) capsule 400 mg    • ceFEPime (MAXIPIME) injection 2,000 mg    • OLANZapine (ZyPREXA) tablet 2.5 mg    • OLANZapine (ZyPREXA) tablet 2.5 mg    • escitalopram (LEXAPRO) tablet 20 mg    • LORazepam (ATIVAN) tablet 1 mg    • HYDROmorphone HCl (DILAUDID) injection 0.2 mg    • dexamethasone (PF) (DECADRON) injection 6 mg    • levothyroxine tablet 100 mcg    • metoprolol tartrate (LOPRESSOR) partial tablet 12.5 mg    • cyanocobalamin (VITAMIN B-12) tablet 1,000 mcg    • atorvastatin (LIPITOR) tablet 20 mg    • pantoprazole (PROTONIX) EC tablet 40 mg    • docusate sodium (COLACE) capsule 100 mg    • ascorbic acid (VITAMIN C) tablet 1,000 mg    • cholecalciferol (VITAMIN D3) tablet 1,000 Units    • multivitamin stress formula tablet 1 tablet    • guaiFENesin (MUCINEX) 12 hr tablet 600 mg    • acyclovir (ZOVIRAX) tablet 400 mg    • acetaminophen (TYLENOL) tablet 650 mg    • metoprolol (LOPRESSOR) injection 5 mg    • OLANZapine (ZyPREXA) tablet 5 mg    • acyclovir (ZOVIRAX) capsule 400 mg    • pantoprazole (PROTONIX) injection 40 mg    • ascorbic acid (VITAMIN C) tablet 1,000 mg    • atorvastatin (LIPITOR) tablet 20 mg    • cholecalciferol (VITAMIN D3) tablet 1,000 Units    • cyanocobalamin (VITAMIN B-12) tablet 1,000 mcg    • escitalopram (LEXAPRO) tablet 20 mg    • levothyroxine tablet 100 mcg    • omeprazole (PRILOSEC) suspension 2 mg/mL    • sulfamethoxazole-trimethoprim (BACTRIM) 400-80 mg per tablet 1 tablet    • acyclovir (ZOVIRAX) tablet 400 mg    • sodium bicarbonate 8.4 % injection 50 mEq    • calcium gluconate 2 g in sodium chloride 0.9% 100 mL (premix)        Jane Ratliff MD      This progress note was produced in part using a dictation device which may document imprecise wording from author's original intent.

## 2023-09-08 NOTE — PLAN OF CARE

## 2023-09-08 NOTE — SEDATION DOCUMENTATION
Left sided thoracentesis complete. 500ml of clear rosita fluid removed. Patient offering no complaints. Bandage applied. Labs sent.

## 2023-09-08 NOTE — QUICK NOTE
Update wife at bedside on escalating oxygen requirements and worsening pneumonia. We discussed Antonio's current oxygen requirements of [place] and whether her spouse would want to be intubated and placed on a ventilator. She stated that Claudette Alpers would not want to be intubated or have CPR performed on him. Mercy Horan stated she just wants Claudette Alpers to be comfortable and to continue with the current treatment plan. Patient's code status changed to level 3 DNR/DNI.

## 2023-09-08 NOTE — ASSESSMENT & PLAN NOTE
· Suspect multifactorial in the setting of pneumonia, b/l pleural effusion, and pulmonary edema   · Continue HFNC 40%/25L, titrate FiO2 for SpO2>90  · Encourage cough, deep breathing, IS  · Continue abx as above  · Continue diuresis for goal -500-1L/24 hours   · S/p L thoracentesis with IR - follow up on fluid studies

## 2023-09-08 NOTE — ASSESSMENT & PLAN NOTE
Lab Results   Component Value Date    EGFR 29 09/08/2023    EGFR 30 09/07/2023    EGFR 37 09/05/2023    CREATININE 2.12 (H) 09/08/2023    CREATININE 2.07 (H) 09/07/2023    CREATININE 1.75 (H) 09/05/2023     · Likely in the setting of severe sepsis/ATN  · S/p IVF resuscitation  · Continue lasix PRN for goal -500-1L over 24 hours   · Monitor I/O and renal indices

## 2023-09-08 NOTE — ASSESSMENT & PLAN NOTE
· Secondary to worsening pneumonia  · Chest x-ray-. Increased opacity within the right lung may reflect worsening pulmonary edema versus pneumonia.  2. Redemonstrated pulmonary edema and bilateral moderate pleural effusions  · Oxygen requirements quickly escalated from nasal cannula to mid flow to high flow  · Lasix 40 mg IV given earlier   · ABG 7.27/34/60/15.3  · Maintain SPO2 greater than 92  · COVID negative-consider rechecking COVID and RP2 panel  · Pulmonary toileting

## 2023-09-08 NOTE — ASSESSMENT & PLAN NOTE
· Platelet downtrending to 27 suspect in the setting of severe sepsis  · No signs of bleeding  · We will continue to monitor

## 2023-09-08 NOTE — ASSESSMENT & PLAN NOTE
· Multifactorial hypoxia, PNA  · CT brain-no intracranial abnormalities  · Regulate sleep/wake cycle  · Trend neuro exam

## 2023-09-08 NOTE — ASSESSMENT & PLAN NOTE
· initial CT chest with subpleural GGO in upper lobes-CAP  · Covid 19/influenza/rsv negative  · Negative Urine strep and legionella  · Sputm cx pending collection  · Procal elevated  · Continue Cefepime/Vanco

## 2023-09-08 NOTE — BRIEF OP NOTE (RAD/CATH)
INTERVENTIONAL RADIOLOGY PROCEDURE NOTE    Date: 9/8/2023    Procedure: IR THORACENTESIS  Procedure Summary     Date:  Room / Location:     Anesthesia Start:  Anesthesia Stop:     Procedure:  Diagnosis:     Scheduled Providers:  Responsible Provider:     Anesthesia Type: Not recorded ASA Status: Not recorded          Preoperative diagnosis:   1. Pneumonia    2. CHF exacerbation ([address])    3. Multiple myeloma ([address])    4. Pneumonia of both lower lobes due to infectious organism    5. Anemia due to stage 3b chronic kidney disease (HCC)         Postoperative diagnosis: Same. Surgeon: Karthik Zamudio MD     Assistant: None. No qualified resident was available. Blood loss: 0    Specimens: multiple   Including cytology    Findings: left thoracentesis  500 cc    I am concerned that the pleural surface is studded with masses      4 Portuguese micropuncture set utilized  Gelfoam on catheter exit    Complications: None immediate.     Anesthesia: local

## 2023-09-08 NOTE — ASSESSMENT & PLAN NOTE
· Likely multifactorial in the setting of hypoxia and severe sepsis   · Continue frequent neuro checks and reorientation  · Start melatonin to regulate sleep/wake  · Delirium precations

## 2023-09-08 NOTE — ASSESSMENT & PLAN NOTE
· With acute component, likely in the setting of severe sepsis  · Continue to trend plt, transfuse for plt<20 or active bleeding   · Hold DVT ppx for now with plt<50

## 2023-09-09 PROBLEM — R65.20 SEVERE SEPSIS (HCC): Status: ACTIVE | Noted: 2023-09-08

## 2023-09-09 PROBLEM — I42.9 CARDIOMYOPATHY (HCC): Status: ACTIVE | Noted: 2023-09-09

## 2023-09-09 PROBLEM — I48.0 PAROXYSMAL ATRIAL FIBRILLATION (HCC): Status: ACTIVE | Noted: 2023-09-09

## 2023-09-09 NOTE — ASSESSMENT & PLAN NOTE
· With acute component, likely in the setting of severe sepsis    Plan:  · Continue to trend plt, transfuse for plt<20 or active bleeding   · Hold DVT ppx for now with plt<50

## 2023-09-09 NOTE — ASSESSMENT & PLAN NOTE
· Diagnosed in 2015, s/p stem cell tx 2016, now on chemo with last session 9/6/23    Plan:  · Continue Bactrim and Acyclovir ppx D4  · OP follow up with heme/onc

## 2023-09-09 NOTE — ASSESSMENT & PLAN NOTE
· Likely multifactorial in the setting of hypoxia and severe sepsis     Plan:  · Continue frequent neuro checks and reorientation  · Melatonin to regulate sleep/wake  · Delirium precations

## 2023-09-09 NOTE — ASSESSMENT & PLAN NOTE
· Without documented history   · Not on AC 2/2 ITP    Plan:  · Continue home metoprolol 12.5 mg PO bid  · IV Lopressor if needed

## 2023-09-09 NOTE — CONSULTS
The patient's Vancomycin therapy has been discontinued / completed. Thank you for this consult. Pharmacy will sign-off now.

## 2023-09-09 NOTE — QUICK NOTE
I updated patient's wife at bedside regarding plan of care and overall patient status. All questions/concerns were answered and addressed.

## 2023-09-09 NOTE — ASSESSMENT & PLAN NOTE
· Noted new LVEF 30% with RV dysfunction and pulmonary HTN  · Unclear etiology, consider OP cardiology workup    Plan:  · Continue diuresis, goal -500ml-1L over next 24 hours  · Continue BB  · Monitor I/O and daily weights

## 2023-09-09 NOTE — ASSESSMENT & PLAN NOTE
· Suspect multifactorial in the setting of pneumonia, b/l pleural effusion, and pulmonary edema     Plan:  · Titrate FiO2 for SpO2>90  · Encourage cough, deep breathing, IS  · Continue abx as above  · Continue diuresis for goal -500-1L/24 hours   · S/p L thoracentesis with IR - body fluid cx with no growth

## 2023-09-09 NOTE — PROGRESS NOTES
[id number] [facility]  Progress Note  Name: Violetta Barker  MRN: [id number]  Unit/Bed#: ICU 05-01 I Date of Admission: 9/7/2023   Date of Service: 9/9/2023 I Hospital Day: 2    Assessment/Plan   * Severe sepsis (HCC)  Assessment & Plan  · AEB tachycardia, hypoxia and tachypnea with BLAS  · Likely in the setting of pneumonia   · Continue Cefepime and Azithro - Vanco stopped with negative MRSA   · Follow up on blood cultures  · Trend WBC, fever curve, procal     Acute respiratory failure with hypoxia (HCC)  Assessment & Plan  · Suspect multifactorial in the setting of pneumonia, b/l pleural effusion, and pulmonary edema   · Continue HFNC 40%/25L, titrate FiO2 for SpO2>90  · Encourage cough, deep breathing, IS  · Continue abx as above  · Continue diuresis for goal -500-1L/24 hours   · S/p L thoracentesis with IR - follow up on fluid studies     BLAS (acute kidney injury) ([facility])on stage 3 CKD  Assessment & Plan  Lab Results   Component Value Date    EGFR 29 09/08/2023    EGFR 30 09/07/2023    EGFR 37 09/05/2023    CREATININE 2.12 (H) 09/08/2023    CREATININE 2.07 (H) 09/07/2023    CREATININE 1.75 (H) 09/05/2023     · Likely in the setting of severe sepsis/ATN  · S/p IVF resuscitation  · Continue lasix PRN for goal -500-1L over 24 hours   · Monitor I/O and renal indices      Cardiomyopathy ([address])  Assessment & Plan  · Noted new LVEF 30% with RV dysfunction and pulmonary HTN  · Unclear etiology, consider OP cardiology workup  · Continue diuresis, goal -500ml-1L over next 24 hours  · Continue BB  · Monitor I/O and daily weights    Paroxysmal atrial fibrillation ([address])  Assessment & Plan  · Currently rate controlled  · Without documented history   · Continue home metoprolol    Encephalopathy acute  Assessment & Plan  · Likely multifactorial in the setting of hypoxia and severe sepsis   · Continue frequent neuro checks and reorientation  · Start melatonin to regulate sleep/wake  · Delirium precations Anxiety  Assessment & Plan  · Continue home lexapro     Acquired hypothyroidism  Assessment & Plan  · Continue home levothyroxine    Hyperlipidemia  Assessment & Plan  · Continue home statin    Chronic ITP (idiopathic thrombocytopenia) (HCC)  Assessment & Plan  · With acute component, likely in the setting of severe sepsis  · Continue to trend plt, transfuse for plt<20 or active bleeding   · Hold DVT ppx for now with plt<50     Essential hypertension  Assessment & Plan  · Continue home lopressor    IgG multiple myeloma ([address])  Assessment & Plan  · Diagnosed in 2015, s/p stem cell tx 2016, now on chemo with last session 9/6/23  · Continue Bactrim and Acyclovir ppx  · OP follow up with heme/onc            ICU Core Measures     A: Assess, Prevent, and Manage Pain · Has pain been assessed? Yes  · Need for changes to pain regimen? No   B: Both SAT/SAT  · N/A   C: Choice of Sedation · RASS Goal: 0 Alert and Calm or N/A patient not on sedation  · Need for changes to sedation or analgesia regimen? NA   D: Delirium · CAM-ICU: Unable to perform secondary to Acute cognitive dysfunction   E: Early Mobility  · Plan for early mobility? Yes   F: Family Engagement · Plan for family engagement today? Yes           Review of Invasive Devices: Dylan Plan: Continue for accurate I/O monitoring for 48 hours        Prophylaxis:  VTE Contraindicated secondary to: Plt <50   Stress Ulcer  covered byomeprazole (PRILOSEC) suspension 2 mg/mL [[id number]]       Subjective   HPI/24hr events: Remains on HFNC 40% 25L. Received IV lasix with good response. NGT placed for meds and nutrition. Vanco stopped.    ROBERTO ROS secondary to AMS     Objective                            Vitals I/O      Most Recent Min/Max in 24hrs   Temp 99 °F (37.2 °C) Temp  Min: 97.9 °F (36.6 °C)  Max: 99.5 °F (37.5 °C)   Pulse 89 Pulse  Min: 70  Max: 131   Resp (!) 30 Resp  Min: 14  Max: 40   /73 BP  Min: 87/51  Max: 140/71   O2 Sat 93 % SpO2  Min: 86 %  Max: 98 % Intake/Output Summary (Last 24 hours) at 9/9/2023 0106  Last data filed at 9/9/2023 0000  Gross per 24 hour   Intake 1748 ml   Output 3485 ml   Net -1737 ml         Diet Enteral/Parenteral; Tube Feeding No Oral Diet; Jevity 1.2 Bhupinder; Continuous; 55; Prosource TF- One Packet Daily; 60; Water; Every 6 hours     Invasive Monitoring Physical exam    Physical Exam  Skin:     General: Skin is warm and dry. HENT:      Head: Normocephalic and atraumatic. Mouth/Throat:      Mouth: Mucous membranes are moist.   Cardiovascular:      Rate and Rhythm: Normal rate and regular rhythm. Pulses: Normal pulses. Heart sounds: Normal heart sounds. Musculoskeletal:         General: Swelling present. Right lower leg: Edema present. Left lower leg: Edema present. Abdominal:      General: There is no distension. Palpations: Abdomen is soft. Tenderness: There is no abdominal tenderness. Constitutional:       General: He is not in acute distress. Appearance: He is well-developed. He is ill-appearing. Pulmonary:      Effort: Pulmonary effort is normal. No accessory muscle usage, respiratory distress or accessory muscle usage. Breath sounds: No wheezing, rhonchi or rales. Comments: diminished  Neurological:      Mental Status: He is alert. He is confused. Comments: Opens eyes to voice, oriented to person only. Weakly FC in all extremities. Diagnostic Studies      EKG: AF rate 104  Imaging:  I have personally reviewed pertinent reports.    and I have personally reviewed pertinent films in PACS     Medications:  Scheduled PRN   acyclovir, 400 mg, BID  vitamin C, 1,000 mg, Daily  atorvastatin, 20 mg, Daily  azithromycin, 500 mg, Q24H  calcium gluconate, 1 g, Once  cefepime, 1,000 mg, Q12H  cholecalciferol, 1,000 Units, Daily  vitamin B-12, 1,000 mcg, Daily  escitalopram, 20 mg, Daily  insulin lispro, 1-5 Units, Q6H KRISTEN  levothyroxine, 100 mcg, Early Morning  melatonin, 6 mg, HS  metoprolol tartrate, 12.5 mg, Q12H KRISTEN  multivitamin stress formula, 1 tablet, Daily  omeprazole (PRILOSEC) suspension 2 mg/mL, 20 mg, Daily  potassium chloride, 40 mEq, Once  sulfamethoxazole-trimethoprim, 1 tablet, Once per day on Mon Wed Fri      acetaminophen, 650 mg, Q6H PRN  ondansetron, 4 mg, Q6H PRN       Continuous          Labs:    CBC    Recent Labs     09/07/23  0619 09/07/23  1143 09/08/23  0202   WBC 6.71  --  6.70   HGB 8.1*  --  7.7*   HCT 27.1*  --  25.1*   PLT 41* 31* 27*   BANDSPCT 1  --   --      BMP    Recent Labs     09/08/23  1001 09/08/23  1811   SODIUM 135 138   K 4.1 3.5    107   CO2 15* 17*   AGAP 13 14   BUN 43* 42*   CREATININE 2.15* 2.17*   CALCIUM 7.2* 7.6*       Coags    Recent Labs     09/07/23 0619   INR 1.61*   PTT 29        Additional Electrolytes  Recent Labs     09/08/23  0829 09/08/23  1001 09/08/23  1738 09/08/23  1811   MG  --  2.5  --  2.4   PHOS  --  5.8*  --  4.5*   CAIONIZED 1.02*  --  0.93*  --           Blood Gas    Recent Labs     09/08/23 0206   PHART 7.270*   UMF4DLM 34.0*   PO2ART 59.9*   JVZ3DIE 15.3*   BEART -10.7   SOURCE Radial, Left     Recent Labs     09/08/23  0206 09/08/23  0938 09/08/23  1751   PHVEN  --    < > 7.455*   HCE5JYS  --    < > 27.1*   PO2VEN  --    < > 42.4   LOL8JVM  --    < > 18.6*   BEVEN  --    < > -4.6   SOURCE Radial, Left  --   --     < > = values in this interval not displayed.     LFTs  Recent Labs     09/07/23  0619 09/08/23  0202   ALT 18 20   AST 29 36   ALKPHOS 39 34   ALB 2.8* 2.8*   TBILI 0.83 0.64       Infectious  Recent Labs     09/07/23  1143 09/08/23  0202   PROCALCITONI 0.32* 3.31*     Glucose  Recent Labs     09/07/23  0619 09/08/23  0202 09/08/23  1001 09/08/23  1811   GLUC 126 220* 198* 129               Critical Care Time Delivered: Upon my evaluation, this patient had a high probability of imminent or life-threatening deterioration due to hypoxic resp failure, which required my direct attention, intervention, and personal management. I have personally provided 33 minutes of critical care time, exclusive of procedures, teaching, family meetings, and any prior time recorded by providers other than myself.      Mannie Chao

## 2023-09-09 NOTE — PROGRESS NOTES
Progress Note - Nephrology   Prince Hanks 78 y.o. male MRN: [id number]  Unit/Bed#: ICU  Encounter: [id number]    A/P:  1. Acute kidney injury superimposed on chronic kidney disease stage IIIb   - Followed by Dr. Janae Bermeo at [facility]   - Baseline creatinine 1.7 to 2 mg/dL on records review   - Creatinine has trended up to 2.35 mg/dL today    -urine studies suggest acute tubular necrosis likely due to anemia, volume shifts, pneumonia and newly diagnosed cardiomyopathy   - He is taking Bactrim for PCP prophylaxis. Due to trimethoprim interference with creatinine secretion will check a cystatin C which is not dependent on muscle mass   - Continue to monitor daily avoid nephrotoxins and maintain mean arterial pressure greater than 65    2. Volume status   - Cxray demonstrated groundglass opacity with possible volume overload   - Treated with furosemide 40 mg IV yesterday twice a day and 40 mg this morning   - Is diuresin/3035 (-1614)    -underwent a thoracentesis on the right side yesterday for 500 mils of pink fluid    3. Pneumonia/severe sepsis   - Blood cultures negative, viral cultures negative   - Receiving azithromycin and cefepime    4.  Acid-base   - has non anion gap metabolic acidosis and respiratory alkalosis   - Continue to monitor for need for bicarbonate   - has acute hypoxic respiratory failure    5.  Essential hypertension   - blood pressure is stable at 137/81 at this time          Follow up reason for today's visit: Acute kidney injury superimposed on chronic kidney disease stage III, severe sepsis, IgG multiple myeloma, essential hypertension, chronic and idiopathic thrombocytopenia, acute respiratory failure with hypoxia, paroxysmal atrial fibrillation, cardiomyopathy, pneumonia    Severe sepsis [facility])    Patient Active Problem List   Diagnosis   • IgG multiple myeloma ([address])   • Essential hypertension   • Chronic ITP (idiopathic thrombocytopenia) (HCC)   • Hyperlipidemia   • Hypocalcemia • Acquired hypothyroidism   • Prophylactic measure   • Hypogammaglobulinemia (HCC)   • BLAS (acute kidney injury) (HCC)on stage 3 CKD   • Anxiety   • Headache   • Anemia, unspecified   • Hypercalcemia   • CKD (chronic kidney disease)   • Mild protein-calorie malnutrition (HCC)   • Severe sepsis (HCC)   • Acute respiratory failure with hypoxia (HCC)   • Encephalopathy acute   • Paroxysmal atrial fibrillation (HCC)   • Cardiomyopathy (HCC)         Subjective:   No chest pain or dyspnea or abdominal pain. Limited communication. Eating very little    Objective:     Vitals: Blood pressure 137/81, pulse 75, temperature 99.5 °F (37.5 °C), temperature source Bladder, resp. rate 15, height 5' 10" (1.778 m), weight 63.6 kg (140 lb 3.4 oz), SpO2 95 %. ,Body mass index is 20.12 kg/m². Weight (last 2 days)     Date/Time Weight    09/09/23 0400 63.6 (140.21)    09/08/23 0922 66.2 (146)    09/08/23 0600 66.6 (146.83)    09/07/23 1528 65.6 (144.62)    09/07/23 10:06:02 65.6 (144.62)            Intake/Output Summary (Last 24 hours) at 9/9/2023 1557  Last data filed at 9/9/2023 1401  Gross per 24 hour   Intake 1856 ml   Output 2160 ml   Net -304 ml     I/O last 3 completed shifts: In: 2056 [NG/GT:415; IV Piggyback:1200; Feedings:441]  Out: 2514 [Urine:3885]    Urethral Catheter Temperature probe;Straight-tip 16 Fr.  (Active)   Reasons to continue Urinary Catheter  Accurate I&O assessment in critically ill patients (48 hr. max) 09/08/23 2000   Goal for Removal Remove after 48 hrs of I/O monitoring 09/08/23 2000   Site Assessment Clean;Skin intact 09/08/23 2000   [facility] Care Done 09/09/23 0900   Collection Container Standard drainage bag 09/08/23 2000   Securement Method Securing device (Describe) 09/08/23 2000   Output (mL) 325 mL 09/09/23 1400   CAUTI Time-out performed before Urine Culture Yes 09/08/23 2000       Physical Exam: /81   Pulse 75   Temp 99.5 °F (37.5 °C) (Bladder)   Resp 15   Ht 5' 10" (1.778 m)   Wt 63.6 kg (140 lb 3.4 oz)   SpO2 95%   BMI 20.12 kg/m²     General Appearance:    More alert, cooperative frail and ill appeariing,, appears stated age   Head:    Normocephalic, without obvious abnormality, atraumatic   Eyes:    Conjunctiva/corneas clear   Ears:    Normal external ears   Nose:   Nares normal, septum midline, mucosa normal, no drainage    or sinus tenderness   Throat:   Lips, mucosa, and tongue normal; teeth and gums normal   Neck:   Supple, symmetrical, trachea midline, no adenopathy;        thyroid:  No enlargement/tenderness/nodules; no carotid    bruit or JVD   Back:     Symmetric, no curvature, ROM normal, no CVA tenderness   Lungs:     Decreased at the bases to auscultation bilaterally, respirations unlabored   Chest wall:    No tenderness or deformity   Heart:    Regular rate and rhythm, S1 and S2 normal, no murmur, rub   or gallop   Abdomen:     Soft, non-tender, bowel sounds active   Extremities:   Extremities normal, atraumatic, no cyanosis hasr edema   Skin:   Skin color, texture, turgor normal, no rashes or lesions   Lymph nodes:   Cervical normal   Neurologic:   CNII-XII intact            Lab, Imaging and other studies: I have personally reviewed pertinent labs. CBC:   Lab Results   Component Value Date    WBC 5.23 09/09/2023    HGB 7.7 (L) 09/09/2023    HCT 25.0 (L) 09/09/2023     (H) 09/09/2023    PLT 23 (LL) 09/09/2023    RBC 2.22 (L) 09/09/2023    MCH 34.7 (H) 09/09/2023    MCHC 30.8 (L) 09/09/2023    RDW 25.9 (H) 09/09/2023    MPV 11.3 09/09/2023    NRBC 2 09/09/2023     CMP:   Lab Results   Component Value Date    K 4.5 09/09/2023     (H) 09/09/2023    CO2 17 (L) 09/09/2023    BUN 47 (H) 09/09/2023    CREATININE 2.35 (H) 09/09/2023    CALCIUM 9.1 09/09/2023    AST 48 (H) 09/09/2023    ALT 28 09/09/2023    ALKPHOS 49 09/09/2023    EGFR 26 09/09/2023       .   Results from last 7 days   Lab Units 09/09/23  0451 09/08/23  1811 09/08/23  1001 09/08/23  0202 09/07/23  5060 POTASSIUM mmol/L 4.5 3.5 4.1 3.7 4.0   CHLORIDE mmol/L 110* 107 107 107 107   CO2 mmol/L 17* 17* 15* 13* 15*   BUN mg/dL 47* 42* 43* 40* 29*   CREATININE mg/dL 2.35* 2.17* 2.15* 2.12* 2.07*   CALCIUM mg/dL 9.1 7.6* 7.2* 6.9* 7.3*   ALK PHOS U/L 49  --   --  34 39   ALT U/L 28  --   --  20 18   AST U/L 48*  --   --  36 29         Phosphorus:   Lab Results   Component Value Date    PHOS 4.7 (H) 09/09/2023     Magnesium:   Lab Results   Component Value Date    MG 2.6 09/09/2023     Urinalysis: No results found for: "COLORU", "CLARITYU", "SPECGRAV", "PHUR", "LEUKOCYTESUR", "NITRITE", "PROTEINUA", "GLUCOSEU", "Brad Trevino", "BILIRUBINUR", "BLOODU"  Ionized Calcium: No results found for: "CAION"  Coagulation:   Lab Results   Component Value Date    INR 1.88 (H) 09/09/2023     Troponin: No results found for: "TROPONINI"  ABG: No results found for: "PHART", "IGO2OMN", "PO2ART", "LTD2PAL", "S1NJAEXK", "BEART", "SOURCE"  Radiology review:     IMAGING  Procedure: IR IN-Patient Thoracentesis    Result Date: 9/8/2023  Narrative: Ultrasound-guided thoracentesis Clinical History: Bilateral left greater than right pleural effusion Pleural thickening/malignancy Severe thrombocytopenia. On questioning the patient and his family report that he was exposed to agent orange [place] (he served in [employer]) . He does not have risk factors for asbestos exposure. Procedure/findings: After explaining the risks and benefits of the procedure to the patient spouse, informed consent was obtained. Procedure was performed in his ICU bed while patient was admitted for low oxygen. Left side was chosen. We elected to drain only one side given risk factors. Survey ultrasound was performed. This revealed soft tissue masses compatible with malignant implants in the pleural space. Ultrasound was used to localize the pleural effusion.  The overlying skin was prepped and draped in usual sterile fashion and local anesthesia was obtained with the 1% lidocaine solution. A micropuncture needle was advanced. Over the wire a 4 [place] transition dilator was inserted. We elected to use a small needle to reduce risk of bleeding. Fluid was removed. Final ultrasound revealed almost complete evacuation of fluid. Gelfoam slurry was prepared and injected during needle removal. 500 cc of pink-rosita fluid was aspirated. The patient tolerated the procedure well without immediate complication Specimens: Multiple including cytology, flow cytometry. Impression: impression: Ultrasound-guided left thoracentesis using small catheter Findings suspicious for malignant soft tissue throughout the pleural space including surface coating/studding Workstation performed: [id number]     Procedure: VAS lower limb venous duplex study, complete bilateral    Result Date: 9/8/2023  Narrative:  [facility] REPORT CLINICAL: Indications: Patient presents with bilateral leg edema, pneumonia and multiple myeloma. CONCLUSION: Impression: RIGHT LOWER LIMB: No evidence of acute or chronic deep vein thrombosis. No evidence of superficial thrombophlebitis noted. Doppler evaluation shows a normal response to augmentation maneuvers. . Popliteal, posterior tibial and anterior tibial arterial Doppler waveform's are triphasic. LEFT LOWER LIMB: No evidence of acute or chronic deep vein thrombosis. No evidence of superficial thrombophlebitis noted. Doppler evaluation shows a normal response to augmentation maneuvers. Popliteal, posterior tibial and anterior tibial arterial Doppler waveform's are triphasic. Andres Gonsalez SIGNATURE: Electronically Signed by: Lauren Fernandez DO on 2023-09-08 01:17:24 PM    Procedure: Echo complete w/ contrast if indicated    Result Date: 9/8/2023  Narrative: •  Left Ventricle: Left ventricular cavity size is mildly dilated. Wall thickness is normal. The left ventricular ejection fraction is 30%. Systolic function is moderate to severely reduced in a global manner.  • Right Ventricle: Right ventricular cavity size is moderately dilated. Systolic function is mildly to moderately reduced. Abnormal tricuspid annular plane systolic excursion (TAPSE) < 1.7 cm. •  Left Atrium: The atrium is moderately dilated. •  Right Atrium: The atrium is moderately dilated. •  Aortic Valve: There is mild regurgitation. •  Mitral Valve: There is moderate regurgitation. •  Tricuspid Valve: There is moderate to severe regurgitation. The right ventricular systolic pressure is moderately elevated. The estimated right ventricular systolic pressure is 29.59 mmHg. •  There is a moderately sized left pleural effusion. •  Changes noted when compared to prior study. Changes include: Decrease in LV and RV function . Procedure: XR chest portable ICU    Result Date: 9/8/2023  Narrative: CHEST INDICATION:   resp failure. COMPARISON: Radiograph of the chest from 9/8/2023 EXAM PERFORMED/VIEWS:  XR CHEST PORTABLE ICU FINDINGS: Mildly enlarged cardiomediastinal silhouette, not significantly changed. Similar patchy opacities most pronounced in the right lung, suspicious for underlying pneumonia. There is background pulmonary edema. Bibasilar atelectasis. Small-moderate right pleural effusion and small left pleural effusion. No pneumothorax. Osseous structures appear within normal limits for patient age. Impression: 1. Similar multifocal patchy right lung opacities, suspicious for pneumonia. There is superimposed mild background pulmonary edema throughout the lungs bilaterally. 2. Stable small-moderate right pleural effusion, small left pleural effusion, and bibasilar atelectasis. Workstation performed: [id number]     Procedure: XR chest portable ICU    Result Date: 9/8/2023  Narrative: CHEST INDICATION:   Hypoxia. COMPARISON: Radiograph of the chest on September 7, 2023. CT of the chest on September 7, 2023.  EXAM PERFORMED/VIEWS:  XR CHEST PORTABLE ICU FINDINGS: The cardiac silhouette is enlarged similar to prior examination. Redemonstrated pulmonary vascular congestion/edema and bilateral moderate pleural effusions. Interval increase in right lung opacity in the upper and lower lung fields which may be due to worsening pulmonary edema versus pneumonia. There is a edge overlying the right peripheral lung likely representing a skinfold. No pneumothorax. Osseous structures appear within normal limits for patient age. Impression: 1. Increased opacity within the right lung may reflect worsening pulmonary edema versus pneumonia. 2.  Redemonstrated pulmonary edema and bilateral moderate pleural effusions. Workstation performed: [id number]     Procedure: XR chest 1 view portable    Result Date: 9/7/2023  Narrative: CHEST INDICATION:   Shortness of breath. COMPARISON: 3/9/2020 EXAM PERFORMED/VIEWS:  XR CHEST PORTABLE FINDINGS: Heart shadow is obscured by adjacent opacity. Small bilateral pleural effusions. Prominent leah and pulmonary markings. Osseous structures appear within normal limits for patient age. Impression: Congestive heart failure with pulmonary edema and bilateral pleural effusions. Workstation performed: [id number]     Procedure: CT chest without contrast    Result Date: 9/7/2023  Narrative: CT CHEST WITHOUT IV CONTRAST INDICATION:   Dyspnea, differentiation between pulmonary edema pneumonia. Also with left supraclavicular mass, possibly enlarged lymph node? Abhijit Speaker History of multiple myeloma. COMPARISON:  None. TECHNIQUE: CT examination of the chest was performed without intravenous contrast. Multiplanar 2D reformatted images were created from the source data. This examination, like all CT scans performed in the [facility], was performed utilizing techniques to minimize radiation dose exposure, including the use of iterative reconstruction and automated exposure control.  Radiation dose length product (DLP) for this visit:  331.85 mGy-cm FINDINGS: LUNGS: Compressive atelectasis in the posterior lower lobes. Small patchy subpleural groundglass opacities in the upper lobes, right greater than left. Patent tracheobronchial tree. PLEURA: Small to moderate bilateral pleural effusions. Suspected pleural thickening in the costophrenic angles, up to 1 cm, suboptimally visualized due to absence of contrast. HEART/GREAT VESSELS: Moderate cardiomegaly. Coronary artery calcifications. No thoracic aortic aneurysm. MEDIASTINUM AND JAKOB: No mediastinal lymphadenopathy. Limited evaluation of jakob without contrast. CHEST WALL AND LOWER NECK: No obvious supraclavicular lymphadenopathy; the appearance may be created by ectatic vessels VISUALIZED STRUCTURES IN THE UPPER ABDOMEN:  Unremarkable. OSSEOUS STRUCTURES: Multiple lucencies in keeping with history of multiple myeloma. Destructive 3.9 x 2.3 cm soft tissue mass in the anterolateral right rib 7. Multiple healed or healing rib fractures. Central height loss at the superior endplate of T7 that may reflect Schmorl's node or less likely a chronic mild compression fracture. Impression: Pleural effusions with compressive atelectasis. Small subpleural groundglass opacities in the upper lobes. These are nonspecific but pneumonia is considered, including COVID-pneumonia. Correlate with clinical findings. Moderate cardiomegaly. Parietal pleural thickening, possibly related to multiple myeloma. Destructive soft tissue mass in the right rib 7. Workstation performed: [id number]     Procedure: CT head without contrast    Result Date: 9/7/2023  Narrative: CT BRAIN - WITHOUT CONTRAST INDICATION:   MS change. COMPARISON:  None. TECHNIQUE:  CT examination of the brain was performed. Multiplanar 2D reformatted images were created from the source data. Radiation dose length product (DLP) for this visit:  860.69 mGy-cm .   This examination, like all CT scans performed in the [facility], was performed utilizing techniques to minimize radiation dose exposure, including the use of iterative  reconstruction and automated exposure control. IMAGE QUALITY:  Diagnostic. FINDINGS: PARENCHYMA: Decreased attenuation is noted in periventricular and subcortical white matter demonstrating an appearance that is statistically most likely to represent mild microangiopathic change. No CT signs of acute infarction. No intracranial mass, mass effect or midline shift. No acute parenchymal hemorrhage. VENTRICLES AND EXTRA-AXIAL SPACES:  Normal for the patient's age. VISUALIZED ORBITS: Normal visualized orbits. PARANASAL SINUSES: Normal visualized paranasal sinuses. CALVARIUM AND EXTRACRANIAL SOFT TISSUES:  Normal.     Impression: No acute intracranial abnormality.  Workstation performed: [id number]       Current Facility-Administered Medications   Medication Dose Route Frequency   • acetaminophen (TYLENOL) tablet 650 mg  650 mg Oral Q6H PRN   • acyclovir (ZOVIRAX) tablet 400 mg  400 mg Per NG Tube BID   • ascorbic acid (VITAMIN C) tablet 1,000 mg  1,000 mg Per NG Tube Daily   • atorvastatin (LIPITOR) tablet 20 mg  20 mg Per NG Tube Daily   • azithromycin (ZITHROMAX) 500 mg in sodium chloride 0.9 % 250 mL IVPB  500 mg Intravenous Q24H   • cefepime (MAXIPIME) IVPB (premix in dextrose) 1,000 mg 50 mL  1,000 mg Intravenous Q12H   • cholecalciferol (VITAMIN D3) tablet 1,000 Units  1,000 Units Per NG Tube Daily   • cyanocobalamin (VITAMIN B-12) tablet 1,000 mcg  1,000 mcg Per NG Tube Daily   • escitalopram (LEXAPRO) tablet 20 mg  20 mg Per NG Tube Daily   • insulin lispro (HumaLOG) 100 units/mL subcutaneous injection 1-5 Units  1-5 Units Subcutaneous Q6H [address]   • levothyroxine tablet 100 mcg  100 mcg Per NG Tube Early Morning   • melatonin tablet 6 mg  6 mg Oral HS   • metoprolol tartrate (LOPRESSOR) partial tablet 12.5 mg  12.5 mg Oral Q12H KRISTEN   • multivitamin stress formula tablet 1 tablet  1 tablet Oral Daily   • omeprazole (PRILOSEC) suspension 2 mg/mL  20 mg Per NG Tube Daily   • ondansetron [facility]) injection 4 mg  4 mg Intravenous Q6H PRN   • polyethylene glycol (MIRALAX) packet 17 g  17 g Oral Daily PRN   • senna-docusate sodium (SENOKOT S) 8.6-50 mg per tablet 2 tablet  2 tablet Oral HS   • sulfamethoxazole-trimethoprim (BACTRIM) 400-80 mg per tablet 1 tablet  1 tablet Per NG Tube Once per day on Mon Wed Fri     Medications Discontinued During This Encounter   Medication Reason   • heparin (porcine) subcutaneous injection 5,000 Units    • acyclovir (ZOVIRAX) capsule 400 mg    • ceFEPime (MAXIPIME) injection 2,000 mg    • OLANZapine (ZyPREXA) tablet 2.5 mg    • OLANZapine (ZyPREXA) tablet 2.5 mg    • escitalopram (LEXAPRO) tablet 20 mg    • LORazepam (ATIVAN) tablet 1 mg    • HYDROmorphone HCl (DILAUDID) injection 0.2 mg    • dexamethasone (PF) (DECADRON) injection 6 mg    • levothyroxine tablet 100 mcg    • metoprolol tartrate (LOPRESSOR) partial tablet 12.5 mg    • cyanocobalamin (VITAMIN B-12) tablet 1,000 mcg    • atorvastatin (LIPITOR) tablet 20 mg    • pantoprazole (PROTONIX) EC tablet 40 mg    • docusate sodium (COLACE) capsule 100 mg    • ascorbic acid (VITAMIN C) tablet 1,000 mg    • cholecalciferol (VITAMIN D3) tablet 1,000 Units    • multivitamin stress formula tablet 1 tablet    • guaiFENesin (MUCINEX) 12 hr tablet 600 mg    • acyclovir (ZOVIRAX) tablet 400 mg    • acetaminophen (TYLENOL) tablet 650 mg    • metoprolol (LOPRESSOR) injection 5 mg    • OLANZapine (ZyPREXA) tablet 5 mg    • acyclovir (ZOVIRAX) capsule 400 mg    • pantoprazole (PROTONIX) injection 40 mg    • ascorbic acid (VITAMIN C) tablet 1,000 mg    • atorvastatin (LIPITOR) tablet 20 mg    • cholecalciferol (VITAMIN D3) tablet 1,000 Units    • cyanocobalamin (VITAMIN B-12) tablet 1,000 mcg    • escitalopram (LEXAPRO) tablet 20 mg    • levothyroxine tablet 100 mcg    • omeprazole (PRILOSEC) suspension 2 mg/mL    • sulfamethoxazole-trimethoprim (BACTRIM) 400-80 mg per tablet 1 tablet    • acyclovir (ZOVIRAX) tablet 400 mg • sodium bicarbonate 8.4 % injection 50 mEq    • calcium gluconate 2 g in sodium chloride 0.9% 100 mL (premix)    • vancomycin (VANCOCIN) IVPB (premix in dextrose) 750 mg 150 mL    • sodium bicarbonate tablet 650 mg    • acetaminophen (TYLENOL) rectal suppository 650 mg        Carson Laws MD      This progress note was produced in part using a dictation device which may document imprecise wording from author's original intent.

## 2023-09-09 NOTE — ASSESSMENT & PLAN NOTE
Lab Results   Component Value Date    EGFR 27 09/09/2023    EGFR 26 09/09/2023    EGFR 28 09/08/2023    CREATININE 2.26 (H) 09/09/2023    CREATININE 2.35 (H) 09/09/2023    CREATININE 2.17 (H) 09/08/2023     · Likely in the setting of severe sepsis/ATN  · S/p IVF resuscitation    Plan:  · Continue lasix PRN for goal -500-1L over 24 hours   · Monitor I/O and renal indices

## 2023-09-09 NOTE — ASSESSMENT & PLAN NOTE
· Noted new LVEF 30% with RV dysfunction and pulmonary HTN  · Unclear etiology, consider OP cardiology workup  · Continue diuresis, goal -500ml-1L over next 24 hours  · Continue BB  · Monitor I/O and daily weights

## 2023-09-09 NOTE — RESPIRATORY THERAPY NOTE
09/09/23 0510   Respiratory Assessment   Resp Comments pt remained on hfnc without incident.  will continue t o monitor pt   O2 Device hfnc   Non-Invasive Information   O2 Interface Device HFNC prongs   Non-Invasive Ventilation Mode HFNC (High flow)   $ Continous NIV Subsequent   $ Pulse Oximetry Spot Check Charge Completed   Non-Invasive Settings   FiO2 (%) 40   Flow (lpm) 25   Temperature (Set) 33   Non-Invasive Readings   Skin Intervention Skin intact   Heater Temperature (Obs) 33

## 2023-09-09 NOTE — PLAN OF CARE
Problem: Potential for Falls  Goal: Patient will remain free of falls  Description: INTERVENTIONS:  - Educate patient/family on patient safety including physical limitations  - Instruct patient to call for assistance with activity   - Consult OT/PT to assist with strengthening/mobility   - Keep Call bell within reach  - Keep bed low and locked with side rails adjusted as appropriate  - Keep care items and personal belongings within reach  - Initiate and maintain comfort rounds  - Make Fall Risk Sign visible to staff  - Apply yellow socks and bracelet for high fall risk patients  - Consider moving patient to room near nurses station  Outcome: Progressing     Problem: PAIN - ADULT  Goal: Verbalizes/displays adequate comfort level or baseline comfort level  Description: Interventions:  - Encourage patient to monitor pain and request assistance  - Assess pain using appropriate pain scale  - Administer analgesics based on type and severity of pain and evaluate response  - Implement non-pharmacological measures as appropriate and evaluate response  - Consider cultural and social influences on pain and pain management  - Notify physician/advanced practitioner if interventions unsuccessful or patient reports new pain  Outcome: Progressing     Problem: INFECTION - ADULT  Goal: Absence or prevention of progression during hospitalization  Description: INTERVENTIONS:  - Assess and monitor for signs and symptoms of infection  - Monitor lab/diagnostic results  - Monitor all insertion sites, i.e. indwelling lines, tubes, and drains  - Monitor endotracheal if appropriate and nasal secretions for changes in amount and color  - [place] appropriate cooling/warming therapies per order  - Administer medications as ordered  - Instruct and encourage patient and family to use good hand hygiene technique  - Identify and instruct in appropriate isolation precautions for identified infection/condition  Outcome: Progressing  Goal: Absence of fever/infection during neutropenic period  Description: INTERVENTIONS:  - Monitor WBC    Outcome: Progressing     Problem: SAFETY ADULT  Goal: Patient will remain free of falls  Description: INTERVENTIONS:  - Educate patient/family on patient safety including physical limitations  - Instruct patient to call for assistance with activity   - Consult OT/PT to assist with strengthening/mobility   - Keep Call bell within reach  - Keep bed low and locked with side rails adjusted as appropriate  - Keep care items and personal belongings within reach  - Initiate and maintain comfort rounds  - Make Fall Risk Sign visible to staff  - Apply yellow socks and bracelet for high fall risk patients  - Consider moving patient to room near nurses station  Outcome: Progressing  Goal: Maintain or return to baseline ADL function  Description: INTERVENTIONS:  -  Assess patient's ability to carry out ADLs; assess patient's baseline for ADL function and identify physical deficits which impact ability to perform ADLs (bathing, care of mouth/teeth, toileting, grooming, dressing, etc.)  - Assess/evaluate cause of self-care deficits   - Assess range of motion  - Assess patient's mobility; develop plan if impaired  - Assess patient's need for assistive devices and provide as appropriate  - Encourage maximum independence but intervene and supervise when necessary  - Involve family in performance of ADLs  - Assess for home care needs following discharge   - Consider OT consult to assist with ADL evaluation and planning for discharge  - Provide patient education as appropriate  Outcome: Progressing  Goal: Maintains/Returns to pre admission functional level  Description: INTERVENTIONS:  - Perform BMAT or MOVE assessment daily.   - Set and communicate daily mobility goal to care team and patient/family/caregiver.    - Collaborate with rehabilitation services on mobility goals if consulted  - Out of bed for toileting  - Record patient progress and toleration of activity level   Outcome: Progressing     Problem: DISCHARGE PLANNING  Goal: Discharge to home or other facility with appropriate resources  Description: INTERVENTIONS:  - Identify barriers to discharge w/patient and caregiver  - Arrange for needed discharge resources and transportation as appropriate  - Identify discharge learning needs (meds, wound care, etc.)  - Arrange for interpretive services to assist at discharge as needed  - Refer to Case Management Department for coordinating discharge planning if the patient needs post-hospital services based on physician/advanced practitioner order or complex needs related to functional status, cognitive ability, or social support system  Outcome: Progressing     Problem: Knowledge Deficit  Goal: Patient/family/caregiver demonstrates understanding of disease process, treatment plan, medications, and discharge instructions  Description: Complete learning assessment and assess knowledge base.   Interventions:  - Provide teaching at level of understanding  - Provide teaching via preferred learning methods  Outcome: Progressing     Problem: MOBILITY - ADULT  Goal: Maintain or return to baseline ADL function  Description: INTERVENTIONS:  -  Assess patient's ability to carry out ADLs; assess patient's baseline for ADL function and identify physical deficits which impact ability to perform ADLs (bathing, care of mouth/teeth, toileting, grooming, dressing, etc.)  - Assess/evaluate cause of self-care deficits   - Assess range of motion  - Assess patient's mobility; develop plan if impaired  - Assess patient's need for assistive devices and provide as appropriate  - Encourage maximum independence but intervene and supervise when necessary  - Involve family in performance of ADLs  - Assess for home care needs following discharge   - Consider OT consult to assist with ADL evaluation and planning for discharge  - Provide patient education as appropriate  Outcome: Progressing  Goal: Maintains/Returns to pre admission functional level  Description: INTERVENTIONS:  - Perform BMAT or MOVE assessment daily.   - Set and communicate daily mobility goal to care team and patient/family/caregiver. - Collaborate with rehabilitation services on mobility goals if consulted  - Out of bed for toileting  - Record patient progress and toleration of activity level   Outcome: Progressing     Problem: Nutrition/Hydration-ADULT  Goal: Nutrient/Hydration intake appropriate for improving, restoring or maintaining nutritional needs  Description: Monitor and assess patient's nutrition/hydration status for malnutrition. Collaborate with interdisciplinary team and initiate plan and interventions as ordered. Monitor patient's weight and dietary intake as ordered or per policy. Utilize nutrition screening tool and intervene as necessary. Determine patient's food preferences and provide high-protein, high-caloric foods as appropriate.      INTERVENTIONS:  - Monitor oral intake, urinary output, labs, and treatment plans  - Assess nutrition and hydration status and recommend course of action  - Evaluate amount of meals eaten  - Assist patient with eating if necessary   - Allow adequate time for meals  - Recommend/ encourage appropriate diets, oral nutritional supplements, and vitamin/mineral supplements  - Order, calculate, and assess calorie counts as needed  - Recommend, monitor, and adjust tube feedings and TPN/PPN based on assessed needs  - Assess need for intravenous fluids  - Provide specific nutrition/hydration education as appropriate  - Include patient/family/caregiver in decisions related to nutrition  Outcome: Progressing     Problem: Prexisting or High Potential for Compromised Skin Integrity  Goal: Skin integrity is maintained or improved  Description: INTERVENTIONS:  - Identify patients at risk for skin breakdown  - Assess and monitor skin integrity  - Assess and monitor nutrition and hydration status  - Monitor labs   - Assess for incontinence   - Turn and reposition patient  - Assist with mobility/ambulation  - Relieve pressure over bony prominences  - Avoid friction and shearing  - Provide appropriate hygiene as needed including keeping skin clean and dry  - Evaluate need for skin moisturizer/barrier cream  - Collaborate with interdisciplinary team   - Patient/family teaching  - Consider wound care consult   Outcome: Progressing

## 2023-09-09 NOTE — ASSESSMENT & PLAN NOTE
· AEB tachycardia, hypoxia and tachypnea with BLAS  · Likely in the setting of pneumonia     Plan:  · Continue Cefepime D4 and Azithro D3  · MRSA-negative  · Blood cultures - negative x48h  · Trend WBC, fever curve, procal

## 2023-09-10 PROBLEM — I48.91 ATRIAL FIBRILLATION WITH RVR (HCC): Status: ACTIVE | Noted: 2023-09-10

## 2023-09-10 NOTE — QUICK NOTE
Patient and patient's wife updated at bedside regarding plan of care and overall patient status. All questions/concerns were answered and addressed.

## 2023-09-10 NOTE — PROGRESS NOTES
[address]  Progress Note  Name: Aung Matthew  MRN: [id number]  Unit/Bed#: ICU 05-01 I Date of Admission: 9/7/2023   Date of Service: 9/10/2023 I Hospital Day: 3    Assessment/Plan   * Severe sepsis (HCC)  Assessment & Plan  · AEB tachycardia, hypoxia and tachypnea with BLAS  · Likely in the setting of pneumonia     Plan:  · Continue Cefepime D4 and Azithro D3  · MRSA-negative  · Blood cultures - negative x48h  · Trend WBC, fever curve, procal     Acute respiratory failure with hypoxia (HCC)  Assessment & Plan  · Suspect multifactorial in the setting of pneumonia, b/l pleural effusion, and pulmonary edema     Plan:  · Titrate FiO2 for SpO2>90  · Encourage cough, deep breathing, IS  · Continue abx as above  · Continue diuresis for goal -500-1L/24 hours   · S/p L thoracentesis with IR - body fluid cx with no growth    BLAS (acute kidney injury) (Former[facility])on stage 3 CKD  Assessment & Plan  Lab Results   Component Value Date    EGFR 27 09/09/2023    EGFR 26 09/09/2023    EGFR 28 09/08/2023    CREATININE 2.26 (H) 09/09/2023    CREATININE 2.35 (H) 09/09/2023    CREATININE 2.17 (H) 09/08/2023     · Likely in the setting of severe sepsis/ATN  · S/p IVF resuscitation    Plan:  · Continue lasix PRN for goal -500-1L over 24 hours   · Monitor I/O and renal indices      Atrial fibrillation with RVR ([facility])  Assessment & Plan  · Pt went into Afib with RVR 9/9 in the evening  · Pt was given 5 mg IV Lopressor with no improvement  · Albumin 5% 12.5g given for suspected hypovolemia  · HR did not improve; another dose of 5 mg Lopressor IV given around 2230 with HR improving from 110's-120's to the 60's-70's    Plan:  · Monitor telemetry  · Continue with home Lopressor dose     Cardiomyopathy ([address])  Assessment & Plan  · Noted new LVEF 30% with RV dysfunction and pulmonary HTN  · Unclear etiology, consider OP cardiology workup    Plan:  · Continue diuresis, goal -500ml-1L over next 24 hours  · Continue BB  · Monitor I/O and daily weights    Paroxysmal atrial fibrillation (HCC)  Assessment & Plan  · Without documented history   · Not on AC 2/2 ITP    Plan:  · Continue home metoprolol 12.5 mg PO bid  · IV Lopressor if needed       Encephalopathy acute  Assessment & Plan  · Likely multifactorial in the setting of hypoxia and severe sepsis     Plan:  · Continue frequent neuro checks and reorientation  · Melatonin to regulate sleep/wake  · Delirium precations     Anxiety  Assessment & Plan  Plan:  · Continue home lexapro     Acquired hypothyroidism  Assessment & Plan  Plan:  · Continue home levothyroxine    Hyperlipidemia  Assessment & Plan  Plan:  · Continue home statin    Chronic ITP (idiopathic thrombocytopenia) (HCC)  Assessment & Plan  · With acute component, likely in the setting of severe sepsis    Plan:  · Continue to trend plt, transfuse for plt<20 or active bleeding   · Hold DVT ppx for now with plt<50     Essential hypertension  Assessment & Plan  Plan:  · Continue home Lopressor dose of 12.5 mg bid    IgG multiple myeloma ([address])  Assessment & Plan  · Diagnosed in 2015, s/p stem cell tx 2016, now on chemo with last session 9/6/23    Plan:  · Continue Bactrim and Acyclovir ppx D4  · OP follow up with heme/onc            ICU Core Measures     A: Assess, Prevent, and Manage Pain · Has pain been assessed? Yes  · Need for changes to pain regimen? No   B: Both SAT/SAT  · N/A   C: Choice of Sedation · RASS Goal: 0 Alert and Calm or N/A patient not on sedation  · Need for changes to sedation or analgesia regimen? NA   D: Delirium · CAM-ICU: Unable to perform secondary to Dementia or other chronic cognitive dysfunction   E: Early Mobility  · Plan for early mobility? Yes   F: Family Engagement · Plan for family engagement today? Yes       Antibiotic Review: Patient on appropriate coverage based on culture data. Review of Invasive Devices:     Dylan Plan: Continue for accurate I/O monitoring for 48 hours        Prophylaxis:  VTE Contraindicated secondary to: Plt <50   Stress Ulcer  covered byomeprazole (PRILOSEC) suspension 2 mg/mL [[id number]]       Subjective   HPI/24hr events: Pt received 40 mg Lasix IV yesterday for volume overload with a net goal of negative 500 mL- 1 L for 24h. Yesterday evening, pt went into afib with RVR with a HR in the 120's. Pt was given 5 mg IV Lopressor with no response as well as Albumin 5% 12.5g with no response. After no improvement in HR after 2100 PO Lopressor dose, another 5 mg IV Lopressor was given and pt's HR improved to the 60's-80's NSR. Pt continues on abx regimen. Review of Systems   Constitutional: Positive for fatigue. HENT: Positive for sore throat. Negative for trouble swallowing. Respiratory: Negative for chest tightness and shortness of breath. Cardiovascular: Negative for chest pain. Gastrointestinal: Negative for diarrhea, nausea and vomiting. Neurological: Negative for dizziness and headaches. Psychiatric/Behavioral: Negative for agitation. Objective                            Vitals I/O      Most Recent Min/Max in 24hrs   Temp 99.7 °F (37.6 °C) Temp  Min: 97.2 °F (36.2 °C)  Max: 99.7 °F (37.6 °C)   Pulse (!) 114 Pulse  Min: 71  Max: 127   Resp (!) 27 Resp  Min: 15  Max: 41   /89 BP  Min: 112/77  Max: 148/93   O2 Sat 95 % SpO2  Min: 94 %  Max: 99 %      Intake/Output Summary (Last 24 hours) at 9/10/2023 0017  Last data filed at 9/9/2023 2201  Gross per 24 hour   Intake 1118 ml   Output 2025 ml   Net -907 ml         Diet Dysphagia/Modified Consistency; Dysphagia 1-Pureed; Nectar Thick Liquid     Invasive Monitoring Physical exam     Physical Exam  Skin:     General: Skin is warm and dry. Capillary Refill: Capillary refill takes less than 2 seconds. HENT:      Head: Normocephalic. Mouth/Throat:      Mouth: Mucous membranes are moist.   Cardiovascular:      Rate and Rhythm: Normal rate and regular rhythm.       Pulses: Normal pulses. Heart sounds: Normal heart sounds. Musculoskeletal:         General: Normal range of motion. Right lower leg: Trace Edema present. Left lower leg: Trace Edema present. Abdominal:      General: Bowel sounds are normal.      Palpations: Abdomen is soft. Constitutional:       General: He is awake. Appearance: He is ill-appearing. Interventions: Nasal cannula in place. Pulmonary:      Effort: Pulmonary effort is normal. No respiratory distress. Breath sounds: Decreased breath sounds present. Psychiatric:         Behavior: Behavior is cooperative. Neurological:      Mental Status: He is alert. He is disoriented to place, disoriented to time and disoriented to situation. He is not agitated. Comments: Pt is only oriented to person   Genitourinary/Anorectal:  FoleyVitals and nursing note reviewed. Diagnostic Studies      EKG: NSR  Imaging:  I have personally reviewed pertinent reports.    and I have personally reviewed pertinent films in PACS     Medications:  Scheduled PRN   acyclovir, 400 mg, BID  vitamin C, 1,000 mg, Daily  atorvastatin, 20 mg, Daily  azithromycin, 500 mg, Q24H  cefepime, 1,000 mg, Q12H  cholecalciferol, 1,000 Units, Daily  vitamin B-12, 1,000 mcg, Daily  escitalopram, 20 mg, Daily  levothyroxine, 100 mcg, Early Morning  melatonin, 6 mg, HS  metoprolol tartrate, 12.5 mg, Q12H KRISTEN  multivitamin stress formula, 1 tablet, Daily  omeprazole (PRILOSEC) suspension 2 mg/mL, 20 mg, Daily  senna-docusate sodium, 2 tablet, HS  sulfamethoxazole-trimethoprim, 1 tablet, Once per day on Mon Wed Fri      acetaminophen, 650 mg, Q6H PRN  ondansetron, 4 mg, Q6H PRN  polyethylene glycol, 17 g, Daily PRN       Continuous          Labs:    CBC    Recent Labs     09/08/23  0202 09/09/23  0451   WBC 6.70 5.23   HGB 7.7* 7.7*   HCT 25.1* 25.0*   PLT 27* 23*     BMP    Recent Labs     09/09/23  0451 09/09/23  1603   SODIUM 140 141   K 4.5 4.3   * 109* CO2 17* 20*   AGAP 13 12   BUN 47* 46*   CREATININE 2.35* 2.26*   CALCIUM 9.1 8.4       Coags    Recent Labs     09/09/23  0451   INR 1.88*        Additional Electrolytes  Recent Labs     09/09/23  0451 09/09/23  1603   MG 2.6 2.6   PHOS 4.7* 4.2*   CAIONIZED 1.07* 1.13          Blood Gas    Recent Labs     09/08/23  0206   PHART 7.270*   OXD3RLO 34.0*   PO2ART 59.9*   [id number] 15.3*   BEART -10.7   SOURCE Radial, Left     Recent Labs     09/08/23  0206 09/08/23  0938 09/08/23  1751   PHVEN  --    < > 7.455*   [id number]  --    < > 27.1*   PO2VEN  --    < > 42.4   [id number]  --    < > 18.6*   BEVEN  --    < > -4.6   SOURCE Radial, Left  --   --     < > = values in this interval not displayed. LFTs  Recent Labs     09/08/23  0202 09/09/23 0451   ALT 20 28   AST 36 48*   ALKPHOS 34 49   ALB 2.8* 2.9*   TBILI 0.64 1.16*       Infectious  Recent Labs     09/08/23  0202 09/09/23  0451   PROCALCITONI 3.31* 14.45*     Glucose  Recent Labs     09/08/23  1001 09/08/23  1811 09/09/23  0451 09/09/23  1603   GLUC 198* 129 128 104               Critical Care Time Delivered: Upon my evaluation, this patient had a high probability of imminent or life-threatening deterioration due to severe sepsis, which required my direct attention, intervention, and personal management. I have personally provided 28 minutes of critical care time, exclusive of procedures, teaching, family meetings, and any prior time recorded by providers other than myself.      Sybil Spurling, CRNP

## 2023-09-10 NOTE — ASSESSMENT & PLAN NOTE
· Pt went into Afib with RVR 9/9 in the evening  · Pt was given 5 mg IV Lopressor with no improvement  · Albumin 5% 12.5g given for suspected hypovolemia  · HR did not improve; another dose of 5 mg Lopressor IV given around 2230 with HR improving from 110's-120's to the 60's-70's    Plan:  · Monitor telemetry  · Continue with home Lopressor dose

## 2023-09-10 NOTE — PLAN OF CARE
Problem: CARDIOVASCULAR - ADULT  Goal: Absence of cardiac dysrhythmias or at baseline rhythm  Description: INTERVENTIONS:  - Continuous cardiac monitoring, vital signs, obtain 12 lead EKG if ordered  - Administer antiarrhythmic and heart rate control medications as ordered  - Monitor electrolytes and administer replacement therapy as ordered  Outcome: Progressing  Note: Episodes of tachyarrythmias;Sinus tachycardia to Afib with RVR max rate of 130's with stable BP,DGeno Randle made aware. Lopressor 5 mg IVP as ordered ( see flowsheet and MAR),Heart rate converted to RSR. Pt denies any chest pain,palpitation. Maintained on O2 4L Midflow. Will continue to monitor.      Problem: RESPIRATORY - ADULT  Goal: Achieves optimal ventilation and oxygenation  Description: INTERVENTIONS:  - Assess for changes in respiratory status  - Assess for changes in mentation and behavior  - Position to facilitate oxygenation and minimize respiratory effort  - Oxygen administered by appropriate delivery if ordered  - Initiate smoking cessation education as indicated  - Encourage broncho-pulmonary hygiene including cough, deep breathe, Incentive Spirometry  - Assess the need for suctioning and aspirate as needed  - Assess and instruct to report SOB or any respiratory difficulty  - Respiratory Therapy support as indicated  Outcome: Progressing

## 2023-09-10 NOTE — PLAN OF CARE
Problem: OCCUPATIONAL THERAPY ADULT  Goal: Performs self-care activities at highest level of function for planned discharge setting. See evaluation for individualized goals. Description: Treatment Interventions: ADL retraining, Functional transfer training, UE strengthening/ROM, Endurance training, Patient/family training, Energy conservation, Activityengagement     See flowsheet documentation for full assessment, interventions and recommendations. Note: Limitation: Decreased ADL status, Decreased UE strength, Decreased Safe judgement during ADL, Decreased endurance, Decreased self-care trans, Decreased high-level ADLs, Decreased cognition  Prognosis: Fair, Good  Assessment: Pt is a 78 y.o. male seen for OT evaluation s/p admit to [facility] on 9/7/2023 w/ Severe sepsis ([address] Comorbidities affecting pt's functional performance at time of assessment include: myeloma, HTN, BLAS, anxiety, chronic ITP, a-fib. Personal factors affecting pt at time of IE include:behavioral pattern, difficulty performing ADLS, difficulty performing IADLS , limited insight into deficits, decreased initiation and engagement  and health management . Prior to admission, pt was (I) with ADLs and IADLs until recently being weaker requiring increased (A) as per wife. Upon evaluation: the following deficits impact occupational performance: weakness, decreased strength, decreased balance, decreased tolerance, impaired arousal, impaired attention, impaired memory and decreased safety awareness. Pt to benefit from continued skilled OT tx while in the hospital to address deficits as defined above and maximize level of functional independence w ADL's and functional mobility. Occupational Performance areas to address include: grooming, bathing/shower, toilet hygiene, dressing, functional mobility, community mobility and clothing management. From OT standpoint, recommendation at time of d/c would be STR.      OT Discharge Recommendation: Post acute rehabilitation services     Chelsi Wilkerson MS, OTR/L

## 2023-09-10 NOTE — PROGRESS NOTES
Progress Note - Nephrology   Emperatriz Pascal 78 y.o. male MRN: [id number]  Unit/Bed#: ICU 05-01 Encounter: [id number]    A/P:  1. Acute kidney injury present on admission superimposed on chronic kidney disease stage IIIb   - Followed by Dr. Monroy Likely today SL[facility]   - Baseline creatinine 1.7 to 2 mg on records review -creatinine has trended up to 2.36 mg/dL today   - Has not been eating and this may be an overestimation. Cystatin C is pending  -  - nonoliguric: 1090/1925 (-1001)   - Continue to trend and obtain daily studies   - Urine studies suggest ATN due to anemia, volume depletion, pneumonia and  newly diagnosed cardiomyopathy    2. Volume status   - As above. Did have a thoracentesis on 984 500 mils of pink fluid with improvement in his respiratory status   - Received  furosemide 40 mg IV yesterday. Hold today and check kidney function in the morning    3. Pneumonia/severe sepsis   - Improving and receiving acyclovir 400 mg via NG tube twice a day, cefepime 1 g every 12 hours and azithromycin 500 mg every 24 hours     4.  Acid-base   -Bicarbonate has trended up to 22 mmol/L   - He has started NG tube feeds which is helped    5. Essential hypertension   - Blood pressure is 158/72 at this time    6. IgG multiple myeloma   - Receiving chemotherapy as an outpatient    7. Hypocalcemia   - Ionized calcium was 1.13 yesterday within normal limits.   A repeat is pending      Follow up reason for today's visit: Acute kidney injury due to ATN superimposed on chronic kidney disease stage III, severe sepsis secondary to pneumonia, IgG multiple myeloma since 2015 on chemotherapy, essential hypertension, idiopathic thrombocytopenia, acute respiratory failure with hypoxia, paroxysmal atrial fibrillation, cardiomyopathy    Severe sepsis [facility])    Patient Active Problem List   Diagnosis   • IgG multiple myeloma ([address])   • Essential hypertension   • Chronic ITP (idiopathic thrombocytopenia) (HCC)   • Hyperlipidemia   • Hypocalcemia • Acquired hypothyroidism   • Prophylactic measure   • Hypogammaglobulinemia (HCC)   • BLAS (acute kidney injury) (HCC)on stage 3 CKD   • Anxiety   • Headache   • Anemia, unspecified   • Hypercalcemia   • CKD (chronic kidney disease)   • Mild protein-calorie malnutrition (HCC)   • Severe sepsis (HCC)   • Acute respiratory failure with hypoxia (HCC)   • Encephalopathy acute   • Paroxysmal atrial fibrillation (HCC)   • Cardiomyopathy ([address])   • Atrial fibrillation with RVR (HCC)         Subjective:   He is extremely weak but answers point to questions. Denies chest pain says breathing is better and denies abdominal pain. Objective:     Vitals: Blood pressure 140/81, pulse 65, temperature 97.6 °F (36.4 °C), temperature source Tympanic, resp. rate 14, height 5' 10" (1.778 m), weight 61.6 kg (135 lb 12.9 oz), SpO2 99 %. ,Body mass index is 19.49 kg/m². Weight (last 2 days)     Date/Time Weight    09/10/23 0547 61.6 (135.8)    09/09/23 0400 63.6 (140.21)    09/08/23 0922 66.2 (146)    09/08/23 0600 66.6 (146.83)            Intake/Output Summary (Last 24 hours) at 9/10/2023 1443  Last data filed at 9/10/2023 1200  Gross per 24 hour   Intake 1030 ml   Output 1800 ml   Net -770 ml     I/O last 3 completed shifts: In: 2646 [P.O.:740; NG/GT:315; IV Piggyback:1150;  Feedings:441]  Out: 2985 [Urine:2985]         Physical Exam: /81 (BP Location: Left arm)   Pulse 65   Temp 97.6 °F (36.4 °C) (Tympanic)   Resp 14   Ht 5' 10" (1.778 m)   Wt 61.6 kg (135 lb 12.9 oz)   SpO2 99%   BMI 19.49 kg/m²     General Appearance:    Alert, cooperative, no distress, appears stated age   Head:    Normocephalic, without obvious abnormality, atraumatic   Eyes:    Conjunctiva/corneas clear   Ears:    Normal external ears   Nose:   Nares normal, septum midline, mucosa normal, no drainage    or sinus tenderness   Throat:   Lips, mucosa, and tongue normal; teeth and gums normal   Neck:   Supple, symmetrical, trachea midline, no adenopathy;        thyroid:  No enlargement/tenderness/nodules; no carotid    bruit or JVD   Back:     Symmetric, no curvature, ROM normal, no CVA tenderness   Lungs:      Decreased to auscultation bilaterally, respirations unlabored   Chest wall:    No tenderness or deformity   Heart:    Regular rate and rhythm, S1 and S2 normal, no murmur, rub   or gallop   Abdomen:     Soft, non-tender, bowel sounds active   Extremities:   Extremities has decreased edema   Skin:   Skin color, texture, turgor normal, no rashes or lesions   Lymph nodes:   Cervical normal   Neurologic: Cannot test due to weakened state. Able to answer questions with delayed response time and move extremities            Lab, Imaging and other studies: I have personally reviewed pertinent labs. CBC:   Lab Results   Component Value Date    WBC 7.69 09/10/2023    HGB 7.0 (L) 09/10/2023    HCT 22.0 (L) 09/10/2023     (H) 09/10/2023    PLT 21 (LL) 09/10/2023    RBC 1.94 (L) 09/10/2023    MCH 36.1 (H) 09/10/2023    MCHC 31.8 09/10/2023    RDW 25.6 (H) 09/10/2023    MPV 10.0 09/10/2023    NRBC 8 09/10/2023     CMP:   Lab Results   Component Value Date    K 4.4 09/10/2023     09/10/2023    CO2 22 09/10/2023    BUN 52 (H) 09/10/2023    CREATININE 2.36 (H) 09/10/2023    CALCIUM 8.0 (L) 09/10/2023    AST 55 (H) 09/10/2023    ALT 38 09/10/2023    ALKPHOS 64 09/10/2023    EGFR 25 09/10/2023       . Results from last 7 days   Lab Units 09/10/23  0441 09/09/23  1603 09/09/23  0451 09/08/23  1001 09/08/23  0202   POTASSIUM mmol/L 4.4 4.3 4.5   < > 3.7   CHLORIDE mmol/L 108 109* 110*   < > 107   CO2 mmol/L 22 20* 17*   < > 13*   BUN mg/dL 52* 46* 47*   < > 40*   CREATININE mg/dL 2.36* 2.26* 2.35*   < > 2.12*   CALCIUM mg/dL 8.0* 8.4 9.1   < > 6.9*   ALK PHOS U/L 64  --  49  --  34   ALT U/L 38  --  28  --  20   AST U/L 55*  --  48*  --  36    < > = values in this interval not displayed.          Phosphorus:   Lab Results   Component Value Date    PHOS 4.0 09/10/2023     Magnesium:   Lab Results   Component Value Date    MG 2.7 09/10/2023     Urinalysis: No results found for: "COLORU", "CLARITYU", "Selma Leather", "PHUR", "LEUKOCYTESUR", "NITRITE", "Manford Simpers", "Sherrell Clap", "Darcie Elgin", "Lorriane Lin", "BLOODU"  Ionized Calcium: No results found for: "CAION"  Coagulation:   Lab Results   Component Value Date    INR 1.76 (H) 09/10/2023     Troponin: No results found for: "TROPONINI"  ABG: No results found for: "PHART", "IIL3BQG", "PO2ART", "IZH0JMG", "I4RWUKNW", "BEART", "SOURCE"  Radiology review:     IMAGING  Procedure: IR IN-Patient Thoracentesis    Result Date: 9/8/2023  Narrative: Ultrasound-guided thoracentesis Clinical History: Bilateral left greater than right pleural effusion Pleural thickening/malignancy Severe thrombocytopenia. On questioning the patient and his family report that he was exposed to agent orange [place] (he served in [employer]) . He does not have risk factors for asbestos exposure. Procedure/findings: After explaining the risks and benefits of the procedure to the patient spouse, informed consent was obtained. Procedure was performed in his ICU bed while patient was admitted for low oxygen. Left side was chosen. We elected to drain only one side given risk factors. Survey ultrasound was performed. This revealed soft tissue masses compatible with malignant implants in the pleural space. Ultrasound was used to localize the pleural effusion. The overlying skin was prepped and draped in usual sterile fashion and local anesthesia was obtained with the 1% lidocaine solution. A micropuncture needle was advanced. Over the wire a 4 [place] transition dilator was inserted. We elected to use a small needle to reduce risk of bleeding. Fluid was removed. Final ultrasound revealed almost complete evacuation of fluid. Gelfoam slurry was prepared and injected during needle removal. 500 cc of pink-rosita fluid was aspirated.  The patient tolerated the procedure well without immediate complication Specimens: Multiple including cytology, flow cytometry. Impression: impression: Ultrasound-guided left thoracentesis using small catheter Findings suspicious for malignant soft tissue throughout the pleural space including surface coating/studding Workstation performed: [id number]     Procedure: VAS lower limb venous duplex study, complete bilateral    Result Date: 9/8/2023  Narrative:  [facility] REPORT CLINICAL: Indications: Patient presents with bilateral leg edema, pneumonia and multiple myeloma. CONCLUSION: Impression: RIGHT LOWER LIMB: No evidence of acute or chronic deep vein thrombosis. No evidence of superficial thrombophlebitis noted. Doppler evaluation shows a normal response to augmentation maneuvers. . Popliteal, posterior tibial and anterior tibial arterial Doppler waveform's are triphasic. LEFT LOWER LIMB: No evidence of acute or chronic deep vein thrombosis. No evidence of superficial thrombophlebitis noted. Doppler evaluation shows a normal response to augmentation maneuvers. Popliteal, posterior tibial and anterior tibial arterial Doppler waveform's are triphasic. Denisse Husain SIGNATURE: Electronically Signed by: Heather Ko DO on 2023-09-08 01:17:24 PM    Procedure: Echo complete w/ contrast if indicated    Result Date: 9/8/2023  Narrative: •  Left Ventricle: Left ventricular cavity size is mildly dilated. Wall thickness is normal. The left ventricular ejection fraction is 30%. Systolic function is moderate to severely reduced in a global manner. •  Right Ventricle: Right ventricular cavity size is moderately dilated. Systolic function is mildly to moderately reduced. Abnormal tricuspid annular plane systolic excursion (TAPSE) < 1.7 cm. •  Left Atrium: The atrium is moderately dilated. •  Right Atrium: The atrium is moderately dilated. •  Aortic Valve: There is mild regurgitation. •  Mitral Valve: There is moderate regurgitation. •  Tricuspid Valve:  There is moderate to severe regurgitation. The right ventricular systolic pressure is moderately elevated. The estimated right ventricular systolic pressure is 86.63 mmHg. •  There is a moderately sized left pleural effusion. •  Changes noted when compared to prior study. Changes include: Decrease in LV and RV function . Procedure: XR chest portable ICU    Result Date: 9/8/2023  Narrative: CHEST INDICATION:   resp failure. COMPARISON: Radiograph of the chest from 9/8/2023 EXAM PERFORMED/VIEWS:  XR CHEST PORTABLE ICU FINDINGS: Mildly enlarged cardiomediastinal silhouette, not significantly changed. Similar patchy opacities most pronounced in the right lung, suspicious for underlying pneumonia. There is background pulmonary edema. Bibasilar atelectasis. Small-moderate right pleural effusion and small left pleural effusion. No pneumothorax. Osseous structures appear within normal limits for patient age. Impression: 1. Similar multifocal patchy right lung opacities, suspicious for pneumonia. There is superimposed mild background pulmonary edema throughout the lungs bilaterally. 2. Stable small-moderate right pleural effusion, small left pleural effusion, and bibasilar atelectasis. Workstation performed: [id number]     Procedure: XR chest portable ICU    Result Date: 9/8/2023  Narrative: CHEST INDICATION:   Hypoxia. COMPARISON: Radiograph of the chest on September 7, 2023. CT of the chest on September 7, 2023. EXAM PERFORMED/VIEWS:  XR CHEST PORTABLE ICU FINDINGS: The cardiac silhouette is enlarged similar to prior examination. Redemonstrated pulmonary vascular congestion/edema and bilateral moderate pleural effusions. Interval increase in right lung opacity in the upper and lower lung fields which may be due to worsening pulmonary edema versus pneumonia. There is a edge overlying the right peripheral lung likely representing a skinfold. No pneumothorax. Osseous structures appear within normal limits for patient age. Impression: 1. Increased opacity within the right lung may reflect worsening pulmonary edema versus pneumonia. 2.  Redemonstrated pulmonary edema and bilateral moderate pleural effusions.  Workstation performed: [id number]       Current Facility-Administered Medications   Medication Dose Route Frequency   • acetaminophen (TYLENOL) tablet 650 mg  650 mg Oral Q6H PRN   • acyclovir (ZOVIRAX) tablet 400 mg  400 mg Per NG Tube BID   • ascorbic acid (VITAMIN C) tablet 1,000 mg  1,000 mg Per NG Tube Daily   • atorvastatin (LIPITOR) tablet 20 mg  20 mg Per NG Tube Daily   • azithromycin (ZITHROMAX) 500 mg in sodium chloride 0.9 % 250 mL IVPB  500 mg Intravenous Q24H   • cefepime (MAXIPIME) IVPB (premix in dextrose) 1,000 mg 50 mL  1,000 mg Intravenous Q12H   • cholecalciferol (VITAMIN D3) tablet 1,000 Units  1,000 Units Per NG Tube Daily   • cyanocobalamin (VITAMIN B-12) tablet 1,000 mcg  1,000 mcg Per NG Tube Daily   • escitalopram (LEXAPRO) tablet 20 mg  20 mg Per NG Tube Daily   • levothyroxine tablet 100 mcg  100 mcg Per NG Tube Early Morning   • megestrol (MEGACE) tablet 40 mg  40 mg Oral 4x Daily   • melatonin tablet 6 mg  6 mg Oral HS   • metoprolol tartrate (LOPRESSOR) tablet 25 mg  25 mg Oral Q12H KRISTEN   • multivitamin stress formula tablet 1 tablet  1 tablet Oral Daily   • omeprazole (PRILOSEC) suspension 2 mg/mL  20 mg Per NG Tube Daily   • ondansetron (ZOFRAN) injection 4 mg  4 mg Intravenous Q6H PRN   • phenol (CHLORASEPTIC) 1.4 % mucosal liquid 1 spray  1 spray Mouth/Throat Q2H PRN   • polyethylene glycol (MIRALAX) packet 17 g  17 g Oral Daily PRN   • senna-docusate sodium (SENOKOT S) 8.6-50 mg per tablet 2 tablet  2 tablet Oral HS   • sulfamethoxazole-trimethoprim (BACTRIM) 400-80 mg per tablet 1 tablet  1 tablet Per NG Tube Once per day on Mon Wed Fri     Medications Discontinued During This Encounter   Medication Reason   • heparin (porcine) subcutaneous injection 5,000 Units    • acyclovir (ZOVIRAX) capsule 400 mg    • ceFEPime (MAXIPIME) injection 2,000 mg    • OLANZapine (ZyPREXA) tablet 2.5 mg    • OLANZapine (ZyPREXA) tablet 2.5 mg    • escitalopram (LEXAPRO) tablet 20 mg    • LORazepam (ATIVAN) tablet 1 mg    • HYDROmorphone HCl (DILAUDID) injection 0.2 mg    • dexamethasone (PF) (DECADRON) injection 6 mg    • levothyroxine tablet 100 mcg    • metoprolol tartrate (LOPRESSOR) partial tablet 12.5 mg    • cyanocobalamin (VITAMIN B-12) tablet 1,000 mcg    • atorvastatin (LIPITOR) tablet 20 mg    • pantoprazole (PROTONIX) EC tablet 40 mg    • docusate sodium (COLACE) capsule 100 mg    • ascorbic acid (VITAMIN C) tablet 1,000 mg    • cholecalciferol (VITAMIN D3) tablet 1,000 Units    • multivitamin stress formula tablet 1 tablet    • guaiFENesin (MUCINEX) 12 hr tablet 600 mg    • acyclovir (ZOVIRAX) tablet 400 mg    • acetaminophen (TYLENOL) tablet 650 mg    • metoprolol (LOPRESSOR) injection 5 mg    • OLANZapine (ZyPREXA) tablet 5 mg    • acyclovir (ZOVIRAX) capsule 400 mg    • pantoprazole (PROTONIX) injection 40 mg    • ascorbic acid (VITAMIN C) tablet 1,000 mg    • atorvastatin (LIPITOR) tablet 20 mg    • cholecalciferol (VITAMIN D3) tablet 1,000 Units    • cyanocobalamin (VITAMIN B-12) tablet 1,000 mcg    • escitalopram (LEXAPRO) tablet 20 mg    • levothyroxine tablet 100 mcg    • omeprazole (PRILOSEC) suspension 2 mg/mL    • sulfamethoxazole-trimethoprim (BACTRIM) 400-80 mg per tablet 1 tablet    • acyclovir (ZOVIRAX) tablet 400 mg    • sodium bicarbonate 8.4 % injection 50 mEq    • calcium gluconate 2 g in sodium chloride 0.9% 100 mL (premix)    • vancomycin (VANCOCIN) IVPB (premix in dextrose) 750 mg 150 mL    • sodium bicarbonate tablet 650 mg    • acetaminophen (TYLENOL) rectal suppository 650 mg    • insulin lispro (HumaLOG) 100 units/mL subcutaneous injection 1-5 Units    • metoprolol tartrate (LOPRESSOR) partial tablet 12.5 mg        Rogers Schwab MD      This progress note was produced in part using a dictation device which may document imprecise wording from author's original intent.

## 2023-09-10 NOTE — PLAN OF CARE
Problem: Potential for Falls  Goal: Patient will remain free of falls  Description: INTERVENTIONS:  - Educate patient/family on patient safety including physical limitations  - Instruct patient to call for assistance with activity   - Consult OT/PT to assist with strengthening/mobility   - Keep Call bell within reach  - Keep bed low and locked with side rails adjusted as appropriate  - Keep care items and personal belongings within reach  - Initiate and maintain comfort rounds  - Make Fall Risk Sign visible to staff  - Offer Toileting every 2 Hours, in advance of need  - Initiate/Maintain bed alarm  - Apply yellow socks and bracelet for high fall risk patients  - Consider moving patient to room near nurses station  Outcome: Progressing     Problem: PAIN - ADULT  Goal: Verbalizes/displays adequate comfort level or baseline comfort level  Description: Interventions:  - Encourage patient to monitor pain and request assistance  - Assess pain using appropriate pain scale  - Administer analgesics based on type and severity of pain and evaluate response  - Implement non-pharmacological measures as appropriate and evaluate response  - Consider cultural and social influences on pain and pain management  - Notify physician/advanced practitioner if interventions unsuccessful or patient reports new pain  Outcome: Progressing     Problem: INFECTION - ADULT  Goal: Absence or prevention of progression during hospitalization  Description: INTERVENTIONS:  - Assess and monitor for signs and symptoms of infection  - Monitor lab/diagnostic results  - Monitor all insertion sites, i.e. indwelling lines, tubes, and drains  - Monitor endotracheal if appropriate and nasal secretions for changes in amount and color  - [place] appropriate cooling/warming therapies per order  - Administer medications as ordered  - Instruct and encourage patient and family to use good hand hygiene technique  - Identify and instruct in appropriate isolation precautions for identified infection/condition  Outcome: Progressing  Goal: Absence of fever/infection during neutropenic period  Description: INTERVENTIONS:  - Monitor WBC    Outcome: Progressing     Problem: SAFETY ADULT  Goal: Patient will remain free of falls  Description: INTERVENTIONS:  - Educate patient/family on patient safety including physical limitations  - Instruct patient to call for assistance with activity   - Consult OT/PT to assist with strengthening/mobility   - Keep Call bell within reach  - Keep bed low and locked with side rails adjusted as appropriate  - Keep care items and personal belongings within reach  - Initiate and maintain comfort rounds  - Make Fall Risk Sign visible to staff  - Offer Toileting every 2 Hours, in advance of need  - Initiate/Maintain bed alarm  - Apply yellow socks and bracelet for high fall risk patients  - Consider moving patient to room near nurses station  Outcome: Progressing  Goal: Maintain or return to baseline ADL function  Description: INTERVENTIONS:  - Educate patient/family on patient safety including physical limitations  - Instruct patient to call for assistance with activity   - Consult OT/PT to assist with strengthening/mobility   - Keep Call bell within reach  - Keep bed low and locked with side rails adjusted as appropriate  - Keep care items and personal belongings within reach  - Initiate and maintain comfort rounds  - Make Fall Risk Sign visible to staff  - Offer Toileting every 2 Hours, in advance of need  - Initiate/Maintain bed alarm  - Apply yellow socks and bracelet for high fall risk patients  - Consider moving patient to room near nurses station  Outcome: Progressing  Goal: Maintains/Returns to pre admission functional level  Description: INTERVENTIONS:  - Perform BMAT or MOVE assessment daily.   - Set and communicate daily mobility goal to care team and patient/family/caregiver.    - Collaborate with rehabilitation services on mobility goals if consulted  - Perform Range of Motion 3 times a day. - Reposition patient every 2 hours.   - Dangle patient 3 times a day  - Stand patient 3 times a day  - Ambulate patient 3 times a day  - Out of bed for meals   Problem: PAIN - ADULT  Goal: Verbalizes/displays adequate comfort level or baseline comfort level  Description: Interventions:  - Encourage patient to monitor pain and request assistance  - Assess pain using appropriate pain scale  - Administer analgesics based on type and severity of pain and evaluate response  - Implement non-pharmacological measures as appropriate and evaluate response  - Consider cultural and social influences on pain and pain management  - Notify physician/advanced practitioner if interventions unsuccessful or patient reports new pain  Outcome: Progressing     Problem: INFECTION - ADULT  Goal: Absence or prevention of progression during hospitalization  Description: INTERVENTIONS:  - Assess and monitor for signs and symptoms of infection  - Monitor lab/diagnostic results  - Monitor all insertion sites, i.e. indwelling lines, tubes, and drains  - Monitor endotracheal if appropriate and nasal secretions for changes in amount and color  - [place] appropriate cooling/warming therapies per order  - Administer medications as ordered  - Instruct and encourage patient and family to use good hand hygiene technique  - Identify and instruct in appropriate isolation precautions for identified infection/condition  Outcome: Progressing  Goal: Absence of fever/infection during neutropenic period  Description: INTERVENTIONS:  - Monitor WBC    Outcome: Progressing     Problem: DISCHARGE PLANNING  Goal: Discharge to home or other facility with appropriate resources  Description: INTERVENTIONS:  - Identify barriers to discharge w/patient and caregiver  - Arrange for needed discharge resources and transportation as appropriate  - Identify discharge learning needs (meds, wound care, etc.)  - Arrange for interpretive services to assist at discharge as needed  - Refer to Case Management Department for coordinating discharge planning if the patient needs post-hospital services based on physician/advanced practitioner order or complex needs related to functional status, cognitive ability, or social support system  Outcome: Progressing     Problem: Knowledge Deficit  Goal: Patient/family/caregiver demonstrates understanding of disease process, treatment plan, medications, and discharge instructions  Description: Complete learning assessment and assess knowledge base.   Interventions:  - Provide teaching at level of understanding  - Provide teaching via preferred learning methods  Outcome: Progressing     Problem: Prexisting or High Potential for Compromised Skin Integrity  Goal: Skin integrity is maintained or improved  Description: INTERVENTIONS:  - Identify patients at risk for skin breakdown  - Assess and monitor skin integrity  - Assess and monitor nutrition and hydration status  - Monitor labs   - Assess for incontinence   - Turn and reposition patient  - Assist with mobility/ambulation  - Relieve pressure over bony prominences  - Avoid friction and shearing  - Provide appropriate hygiene as needed including keeping skin clean and dry  - Evaluate need for skin moisturizer/barrier cream  - Collaborate with interdisciplinary team   - Patient/family teaching  - Consider wound care consult   Outcome: Progressing     Problem: CARDIOVASCULAR - ADULT  Goal: Maintains optimal cardiac output and hemodynamic stability  Description: INTERVENTIONS:  - Monitor I/O, vital signs and rhythm  - Monitor for S/S and trends of decreased cardiac output  - Administer and titrate ordered vasoactive medications to optimize hemodynamic stability  - Assess quality of pulses, skin color and temperature  - Assess for signs of decreased coronary artery perfusion  - Instruct patient to report change in severity of symptoms  Outcome: Progressing  Goal: Absence of cardiac dysrhythmias or at baseline rhythm  Description: INTERVENTIONS:  - Continuous cardiac monitoring, vital signs, obtain 12 lead EKG if ordered  - Administer antiarrhythmic and heart rate control medications as ordered  - Monitor electrolytes and administer replacement therapy as ordered  Outcome: Progressing     Problem: Nutrition/Hydration-ADULT  Goal: Nutrient/Hydration intake appropriate for improving, restoring or maintaining nutritional needs  Description: Monitor and assess patient's nutrition/hydration status for malnutrition. Collaborate with interdisciplinary team and initiate plan and interventions as ordered. Monitor patient's weight and dietary intake as ordered or per policy. Utilize nutrition screening tool and intervene as necessary. Determine patient's food preferences and provide high-protein, high-caloric foods as appropriate.      INTERVENTIONS:  - Monitor oral intake, urinary output, labs, and treatment plans  - Assess nutrition and hydration status and recommend course of action  - Evaluate amount of meals eaten  - Assist patient with eating if necessary   - Allow adequate time for meals  - Recommend/ encourage appropriate diets, oral nutritional supplements, and vitamin/mineral supplements  - Order, calculate, and assess calorie counts as needed  - Recommend, monitor, and adjust tube feedings and TPN/PPN based on assessed needs  - Assess need for intravenous fluids  - Provide specific nutrition/hydration education as appropriate  - Include patient/family/caregiver in decisions related to nutrition  Outcome: Progressing     Problem: RESPIRATORY - ADULT  Goal: Achieves optimal ventilation and oxygenation  Description: INTERVENTIONS:  - Assess for changes in respiratory status  - Assess for changes in mentation and behavior  - Position to facilitate oxygenation and minimize respiratory effort  - Oxygen administered by appropriate delivery if ordered  - Initiate smoking cessation education as indicated  - Encourage broncho-pulmonary hygiene including cough, deep breathe, Incentive Spirometry  - Assess the need for suctioning and aspirate as needed  - Assess and instruct to report SOB or any respiratory difficulty  - Respiratory Therapy support as indicated  Outcome: Progressing     - Out of bed for toileting  - Record patient progress and toleration of activity level   Outcome: Progressing

## 2023-09-10 NOTE — PROGRESS NOTES
Critical Care Interval Transfer Note:    Please refer to progress note from earlier today for full details. Barriers to discharge/Daily Update:   · Continue IV Azithromycin/Cefepime for total of 7D or until PCT drops by greater than or equal to 90%  · Previously aggressively diuresed in setting of volume overload - holding for today  · Home PO Lopressor increased to 25 mg BID for uncontrolled AF  · Pleural fluid cytology pending - Pulmonology will continue to follow  · Weaned to RA  · Currently on dysphagia diet with speech eval pending - NGT placed today to allow for appropriate nutrition   · PO Megace started to help stimulate appetite  · Richardson cath removed - urinary retention protocol     Consults: IP CONSULT TO NEPHROLOGY     Discharge Plan: Anticipate discharge in 48-72 hrs to discharge location to be determined pending rehab evaluations. PT Recommendations: Post acute rehabilitation services  OT Recommendations: Post acute rehabilitation services      Patient seen and evaluated by Critical Care today and deemed to be appropriate for transfer to Med Surg with Telemetry. Spoke to Dr. Maya Vega from [facility] to accept transfer. Critical care can be contacted via [email or web address] with any questions or concerns.

## 2023-09-10 NOTE — OCCUPATIONAL THERAPY NOTE
Occupational Therapy Evaluation     Patient Name: Swati Gonzalez  VSQPT'C Date: 9/10/2023  Problem List  Principal Problem:    Severe sepsis [facility])  Active Problems: IgG multiple myeloma (HCC)    Essential hypertension    Chronic ITP (idiopathic thrombocytopenia) (HCC)    Hyperlipidemia    Acquired hypothyroidism    BLAS (acute kidney injury) (HCC)on stage 3 CKD    Anxiety    Acute respiratory failure with hypoxia (HCC)    Encephalopathy acute    Paroxysmal atrial fibrillation (HCC)    Cardiomyopathy (HCC)    Atrial fibrillation with RVR (HCC)    Past Medical History  Past Medical History:   Diagnosis Date    Cancer ([address]) 2015    History of autologous stem cell transplant ([address])     Hypertension     Insomnia     Multiple myeloma ([address])      Past Surgical History  Past Surgical History:   Procedure Laterality Date    APPENDECTOMY      Last assessed: 9/25/15    ARTHROSCOPY KNEE Left     9/30/09    EYE SURGERY      Lasik    IR THORACENTESIS  9/8/2023    KNEE CARTILAGE SURGERY      LIMBAL STEM CELL TRANSPLANT      Patient had 2 in [place]-2/29/2016 and 4/13/2016        09/10/23 0840   OT Last Visit   OT Visit Date 09/10/23   Note Type   Note type Evaluation   Additional Comments Pt seen as a co-eval with PT due to the patient's co-morbidities, clinically unstable presentation, and present impairments which are a regression from the patient's baseline.    Pain Assessment   Pain Assessment Tool 0-10   Pain Score No Pain   Restrictions/Precautions   Weight Bearing Precautions Per Order No   Braces or Orthoses   (none reported)   Other Precautions Fall Risk;Cognitive;Multiple lines;Telemetry;O2  (4L O2)   Home Living   Type of [address] [place] [address] One level;Stairs to enter with rails  (5 MURRAY)   Bathroom Shower/Tub Tub/shower unit   Bathroom Toilet Raised   Bathroom Equipment Shower chair   Bathroom Accessibility Accessible   Home Equipment   (no AD used at baseline)   Prior Function   Level of [place] Independent with ADLs; Independent with functional mobility; Independent with IADLS  (at times wife would provide supervision for tub transfers)   Lives With Spouse   Receives Help From Family   IADLs Family/Friend/Other provides transportation; Family/Friend/Other provides meals; Family/Friend/Other provides medication management   Falls in the last 6 months 0   Vocational Retired   Comments wife reports pt has declined past 2 weeks and requiring increased assistance   Subjective   Subjective "It's your birthday"   ADL   Where Assessed Edge of bed   UB Bathing Assistance 4  Minimal Assistance   LB Bathing Assistance 3  Moderate Assistance   UB Dressing Assistance 4  Minimal Assistance   LB Dressing Assistance 3  Moderate [facility] [address] [place] [address]  3  Moderate Assistance   Toileting Deficit Use of bedpan/urinal setup  (positioning)   Bed Mobility   Supine to Sit 2  Maximal assistance   Additional items Assist x 2;LE management;Verbal cues; Increased time required;HOB elevated   Sit to Supine   (DNT; pt seated in recliner upon conclusion of session)   Additional Comments min (A) to sit EOB d/t posterior lean   Transfers   Sit to Stand 2  Maximal assistance   Additional items Assist x 1;Verbal cues; Increased time required   Stand to Sit 2  Maximal assistance   Additional items Assist x 2;Verbal cues; Increased time required   Stand pivot 2  Maximal assistance   Additional items Assist x 2;Verbal cues; Increased time required  (HHA; slow step sequence)   Additional Comments VC for safe hand placement and step sequence   Balance   Static Sitting Fair -   Dynamic Sitting Poor +   Static Standing Poor   Dynamic Standing Poor -   Activity Tolerance   Activity Tolerance Patient limited by fatigue   Nurse Made Aware Yes, nursing staff aware of session outcomes   RUE Assessment   RUE Assessment WFL   RUE Strength   RUE Overall Strength   (3/5 grossly assessed)   LUE Assessment   LUE Assessment WFL   LUE Strength   LUE Overall Strength   (3/5 grossly assessed)   Hand Function   Gross Motor Coordination Functional   Fine Motor Coordination Functional   Cognition   Overall Cognitive Status Impaired   Arousal/Participation Arousable; Cooperative   Attention Attends with cues to redirect   Orientation Level Oriented to person;Oriented to place; Disoriented to time;Disoriented to situation   Memory Decreased recall of precautions;Decreased recall of recent events   Following Commands Follows one step commands with increased time or repetition   Comments Pt agreeable to OT evaluation   Assessment   Limitation Decreased ADL status; Decreased UE strength;Decreased Safe judgement during ADL;Decreased endurance;Decreased self-care trans;Decreased high-level ADLs; Decreased cognition   Prognosis Fair;Good   Assessment Pt is a 78 y.o. male seen for OT evaluation s/p admit to [facility] on 9/7/2023 w/ Severe sepsis ([address] Comorbidities affecting pt's functional performance at time of assessment include: myeloma, HTN, BLAS, anxiety, chronic ITP, a-fib. Personal factors affecting pt at time of IE include:behavioral pattern, difficulty performing ADLS, difficulty performing IADLS , limited insight into deficits, decreased initiation and engagement  and health management . Prior to admission, pt was (I) with ADLs and IADLs until recently being weaker requiring increased (A) as per wife. Upon evaluation: the following deficits impact occupational performance: weakness, decreased strength, decreased balance, decreased tolerance, impaired arousal, impaired attention, impaired memory and decreased safety awareness. Pt to benefit from continued skilled OT tx while in the hospital to address deficits as defined above and maximize level of functional independence w ADL's and functional mobility.  Occupational Performance areas to address include: grooming, bathing/shower, toilet hygiene, dressing, functional mobility, community mobility and clothing management. From OT standpoint, recommendation at time of d/c would be STR. Goals   Patient Goals to rest   Plan   Treatment Interventions ADL retraining;Functional transfer training;UE strengthening/ROM; Endurance training;Patient/family training;Energy conservation; Activityengagement   Goal Expiration Date 09/20/23   OT Treatment Day 0   OT Frequency 3-5x/wk   Recommendation   OT Discharge Recommendation Post acute rehabilitation services   Additional Comments  The patient's raw score on the AM-PAC Daily Activity inpatient short form is 14, standardized score is 33.39, less than 39.4. Patients at this level are likely to benefit from discharge to post-acute rehabilitation services. Please refer to the recommendation of the Occupational Therapist for safe discharge planning.    AM-PAC Daily Activity Inpatient   Lower Body Dressing 2   Bathing 2   Toileting 2   Upper Body Dressing 2   Grooming 3   Eating 3   Daily Activity Raw Score 14   Daily Activity Standardized Score (Calc for Raw Score >=11) 33.39   AM-PAC Applied Cognition Inpatient   Following a Speech/Presentation 1   Understanding Ordinary Conversation 3   Taking Medications 2   Remembering Where Things Are Placed or Put Away 2   Remembering List of 4-5 Errands 2   Taking Care of Complicated Tasks 1   Applied Cognition Raw Score 11   Applied Cognition Standardized Score 27.03       GOALS:    Pt will achieve the following within specified time frame: LTG  Pt will achieve the following goals within 10 days    *ADL transfers with CGA for inc'd independence with ADLs/purposeful tasks    *UB ADL with (S) for inc'd independence with self cares    *LB ADL with Min (A) using AE prn for inc'd independence with self cares    *Toileting with CGA for clothing management and hygiene for return to PLOF with personal care    *Increase static stand balance and dyn stand balance to F for inc'd safety with standing purposeful tasks    *Increase stand tolerance x5 m for inc'd tolerance with standing purposeful tasks    *Bed mobility- Min (A) for inc'd independence to manage own comfort and initiate EOB & OOB purposeful tasks    *Participate in 10-15m UE therex to increase overall stamina/activity tolerance for purposeful tasks      Chito Driscoll MS, OTR/L

## 2023-09-10 NOTE — PLAN OF CARE
Problem: PHYSICAL THERAPY ADULT  Goal: Performs mobility at highest level of function for planned discharge setting. See evaluation for individualized goals. Description: Treatment/Interventions: Functional transfer training, LE strengthening/ROM, Elevations, Therapeutic exercise, Endurance training, Bed mobility, Gait training, Equipment eval/education  Equipment Recommended:  (TBD)       See flowsheet documentation for full assessment, interventions and recommendations. Outcome: Progressing  Note: Prognosis: Fair     Assessment: Pt is 78 y.o. male seen for PT evaluation s/p admit to [facility] on 9/7/2023 w/ Severe sepsis ([address] PT consulted to assess pt's functional mobility and d/c needs. Order placed for PT eval and tx, w/ up and OOB as tolerated order. Pt agreeable to PT  session upon arrival, pt found supine in bed. PTA, pt was independent w/ all functional mobility w/ no AD, ambulates household distances, has 5 MURRAY, lives w/ wife in 1 level home and retired. Pt to benefit from continued PT tx to address deficits and maximize level of functional independent mobility and consistency. From PT/mobility standpoint, recommendation at time of d/c would be post acute rehabilitation services pending progress in order to facilitate return to PLOF. Upon conclusion pt  seated in recliner. Complexity: Comorbidities affecting pt's physical performance at time of assessment include: a fib, respiratory failure, anxiety and encephalopathy. Personal factors affecting pt at time of IE include: advanced age, limited mobility, inability to ambulate household distances, anxiety and decreased cognition. Please find objective findings from PT assessment regarding body systems outlined above with impairments and limitations including weakness, impaired balance, decreased endurance, decreased activity tolerance, decreased functional mobility tolerance, fall risk and decreased cognition.   Pt's clinical presentation is currently unstable/unpredictable seen in pt's presentation of abnormal renal values, abnormal H&H, abnormal calcium levels, new onset of O2 use, confusion, polypharmacy and telemetry use. The patient's AM-PAC Basic Mobility Inpatient Short Form Raw Score is 7. A Raw score of less than or equal to 16 suggests the patient may benefit from discharge to home. Please also refer to the recommendation of the Physical Therapist for safe discharge planning. RN verbalized pt appropriate for PT session. Pt seen as a co-eval with OT due to the patient's co-morbidities, clinically unstable presentation, and present impairments which are a regression from the patient's baseline. Barriers to Discharge:  (increased assistance required)     PT Discharge Recommendation: Post acute rehabilitation services    See flowsheet documentation for full assessment.

## 2023-09-10 NOTE — PHYSICAL THERAPY NOTE
PHYSICAL THERAPY EVALUATION  NAME:  Bennie Zamora  DATE: 09/10/23    AGE:   78 y.o.   Mrn:   [id number]  ADMIT DX:  Pneumonia [J18.9]  Multiple myeloma ([address]) [C90.00]  SOB (shortness of breath) [R06.02]  CHF exacerbation ([address]) [I50.9]  Problem List:   Patient Active Problem List   Diagnosis    IgG multiple myeloma ([address])    Essential hypertension    Chronic ITP (idiopathic thrombocytopenia) (HCC)    Hyperlipidemia    Hypocalcemia    Acquired hypothyroidism    Prophylactic measure    Hypogammaglobulinemia (HCC)    BLAS (acute kidney injury) (HCC)on stage 3 CKD    Anxiety    Headache    Anemia, unspecified    Hypercalcemia    CKD (chronic kidney disease)    Mild protein-calorie malnutrition (HCC)    Severe sepsis (HCC)    Acute respiratory failure with hypoxia (HCC)    Encephalopathy acute    Paroxysmal atrial fibrillation (HCC)    Cardiomyopathy ([address])    Atrial fibrillation with RVR ([address])       Past Medical History  Past Medical History:   Diagnosis Date    Cancer ([address]) 2015    History of autologous stem cell transplant ([address])     Hypertension     Insomnia     Multiple myeloma ([address])        Past Surgical History  Past Surgical History:   Procedure Laterality Date    APPENDECTOMY      Last assessed: 9/25/15    ARTHROSCOPY KNEE Left     9/30/09    EYE SURGERY      Lasik    IR THORACENTESIS  9/8/2023    KNEE CARTILAGE SURGERY      LIMBAL STEM CELL TRANSPLANT      Patient had 2 in [place]-2/29/2016 and 4/13/2016       Length Of Stay: 3  Performed at least 2 patient identifiers during session: Name and ID bracelet       09/10/23 0903   PT Last Visit   PT Visit Date 09/10/23   Note Type   Note type Evaluation   Pain Assessment   Pain Score No Pain   Restrictions/Precautions   Weight Bearing Precautions Per Order No   Other Precautions Multiple lines;Telemetry;O2;Fall Risk;Cognitive   Home Living   Type of [address] [place] [address] Dr One level;Stairs to enter with rails  (5 MURRAY)   Bathroom Shower/Tub Tub/shower unit Bathroom Toilet Raised   Bathroom Equipment Shower chair   Home Equipment   (none used at baseline)   Prior Function   Level of [place] Independent with IADLS; Independent with ADLs; Independent with functional mobility  (at times wife would provide Supervision for tub transfers)   Lives With Spouse   Receives Help From Family   IADLs Family/Friend/Other provides transportation; Family/Friend/Other provides meals; Family/Friend/Other provides medication management   Falls in the last 6 months 0   Vocational Retired   Comments wife reports pt has declined the past 2 wks and was requiring increased assistance   General   Family/Caregiver Present Yes  (wife assisted with obtaining home living and PLOF information)   Cognition   Overall Cognitive Status Impaired   Arousal/Participation Responsive   Orientation Level Oriented to person;Disoriented to place; Disoriented to time;Disoriented to situation   Following Commands Follows one step commands with increased time or repetition   RLE Assessment   RLE Assessment X   Strength RLE   RLE Overall Strength 4-/5   LLE Assessment   LLE Assessment X   Strength LLE   LLE Overall Strength 4-/5   Bed Mobility   Supine to Sit 2  Maximal assistance   Additional items Assist x 2;LE management;Verbal cues; Increased time required;HOB elevated   Additional Comments pt required Min A to sit EOB due to posterior lean   Transfers   Sit to Stand 2  Maximal assistance   Additional items Assist x 2;Verbal cues; Increased time required   Stand to Sit 2  Maximal assistance   Additional items Assist x 2;Verbal cues; Increased time required   Stand pivot 2  Maximal assistance   Additional items Assist x 2;Verbal cues; Increased time required  (HHA; slow cadance with step by step direction)   Balance   Static Sitting Fair -   Dynamic Sitting Poor +   Static Standing Poor   Dynamic Standing Poor -   Endurance Deficit   Endurance Deficit Yes   Endurance Deficit Description extreme fatigue noted with mobility   Activity Tolerance   Activity Tolerance Patient limited by fatigue   Assessment   Prognosis Fair   Assessment Pt is 78 y.o. male seen for PT evaluation s/p admit to [facility] on 9/7/2023 w/ Severe sepsis ([address] PT consulted to assess pt's functional mobility and d/c needs. Order placed for PT eval and tx, w/ up and OOB as tolerated order. Pt agreeable to PT  session upon arrival, pt found supine in bed. PTA, pt was independent w/ all functional mobility w/ no AD, ambulates household distances, has 5 MURRAY, lives w/ wife in 1 level home and retired. Pt to benefit from continued PT tx to address deficits and maximize level of functional independent mobility and consistency. From PT/mobility standpoint, recommendation at time of d/c would be post acute rehabilitation services pending progress in order to facilitate return to PLOF. Upon conclusion pt  seated in recliner. Complexity: Comorbidities affecting pt's physical performance at time of assessment include: a fib, respiratory failure, anxiety and encephalopathy. Personal factors affecting pt at time of IE include: advanced age, limited mobility, inability to ambulate household distances, anxiety and decreased cognition. Please find objective findings from PT assessment regarding body systems outlined above with impairments and limitations including weakness, impaired balance, decreased endurance, decreased activity tolerance, decreased functional mobility tolerance, fall risk and decreased cognition. Pt's clinical presentation is currently unstable/unpredictable seen in pt's presentation of abnormal renal values, abnormal H&H, abnormal calcium levels, new onset of O2 use, confusion, polypharmacy and telemetry use. The patient's AM-PAC Basic Mobility Inpatient Short Form Raw Score is 7. A Raw score of less than or equal to 16 suggests the patient may benefit from discharge to home.  Please also refer to the recommendation of the Physical Therapist for safe discharge planning. RN verbalized pt appropriate for PT session. Pt seen as a co-eval with OT due to the patient's co-morbidities, clinically unstable presentation, and present impairments which are a regression from the patient's baseline. Barriers to Discharge   (increased assistance required)   Goals   Patient Goals to rest   LTG Expiration Date 09/20/23   Long Term Goal #1 Pt will: Perform bed mobility tasks to consistent min A of 1 to improve ease of bed mobility. Perform transfers to consistent min A of 1 to improve ease of transfers. Perform ambulation with Min A and RW for 25 ft to  decrease burden of care. Increase dynamic standing balance to F- to decrease fall risk. Increase BLE strength to 4-/5 to improve functional mobility. Increase OOB activity tolerance to 10 minutes without s/s of exertion to decrease fall risk. Navigate up and down 5 steps with consistent min A of 1 so patient can enter and exit home. Plan   Treatment/Interventions Functional transfer training;LE strengthening/ROM; Elevations; Therapeutic exercise; Endurance training;Bed mobility;Gait training;Equipment eval/education   PT Frequency 3-5x/wk   Recommendation   PT Discharge Recommendation Post acute rehabilitation services   Equipment Recommended   (TBD)   AM-PAC Basic Mobility Inpatient   Turning in Flat Bed Without Bedrails 2   Lying on Back to Sitting on Edge of Flat Bed Without Bedrails 1   Moving Bed to Chair 1   Standing Up From Chair Using Arms 1   Walk in Room 1   Climb 3-5 Stairs With Railing 1   Basic Mobility Inpatient Raw Score 7   Turning Head Towards Sound 3   Follow Simple Instructions 3   Low Function Basic Mobility Raw Score  13   Low Function Basic Mobility Standardized Score  20.14   Highest Level Of Mobility   JH-HLM Goal 2: Bed activities/Dependent transfer   JH-HLM Achieved 4: Move to chair/commode       Time In: 0838  Time Out: 0903   Total Evaluation Minutes: [address], PT

## 2023-09-11 NOTE — ASSESSMENT & PLAN NOTE
Lab Results   Component Value Date    EGFR 33 09/11/2023    EGFR 27 09/10/2023    EGFR 25 09/10/2023    CREATININE 1.91 (H) 09/11/2023    CREATININE 2.27 (H) 09/10/2023    CREATININE 2.36 (H) 09/10/2023     · POA with creatinine 2.15. Baseline creatinine is around 1.6.   Suspect prerenal due to volume depletion  · Would use caution with IV fluids in the setting of bilateral pleural effusions  · Nephrology recommendations appreciated  · Daily BMP and trend creatinine  · Caution with nephrotoxins, avoid hypotension

## 2023-09-11 NOTE — SPEECH THERAPY NOTE
Speech Language/Pathology  Speech/Language Pathology  Assessment    Patient Name: Yoseph Morgan  LSGPL'B Date: 9/11/2023     Problem List  Principal Problem:    Severe sepsis [facility])  Active Problems: IgG multiple myeloma (HCC)    Chronic ITP (idiopathic thrombocytopenia) (HCC)    BLAS (acute kidney injury) (HCC)on stage 3 CKD    Acute respiratory failure with hypoxia (HCC)    Encephalopathy acute    Paroxysmal atrial fibrillation (HCC)    Cardiomyopathy [facility])    Past Medical History  Past Medical History:   Diagnosis Date    Cancer ([address]) 2015    History of autologous stem cell transplant ([address])     Hypertension     Insomnia     Multiple myeloma ([address])      Past Surgical History  Past Surgical History:   Procedure Laterality Date    APPENDECTOMY      Last assessed: 9/25/15    ARTHROSCOPY KNEE Left     9/30/09    EYE SURGERY      Lasik    IR THORACENTESIS  9/8/2023    KNEE CARTILAGE SURGERY      LIMBAL STEM CELL TRANSPLANT      Patient had 2 in [place]-2/29/2016 and 4/13/2016 09/11/23 1300   Patient Information   Current Medical pneumonia, multiple myeloma   Special Studies CT, chest xray   Past Medical History CHF, multiple myeloma   Swallow Information   Current Risks for Dysphagia & Aspiration Respiratory compromise;Weak voicing;Reduced alertnes   Current Symptoms/Concerns Clear throat; With liquids;Current Pneumonia; Decreased oral intake   Current Diet Dysphagia pureed; Nectar thick liquid   Baseline Diet Regular; Thin liquids   Baseline Assessment   Behavior/Cognition Cooperative; Interactive   Speech/Language Status WFL for this evaluation   Patient Positioning Upright in bed   Consistencies Assessed and Performance   Materials Admnistered Puree/Level 1;Nectar thick liquid;Mechanical Soft/Level 2   Oral Stage Mild impaired   Phargngeal Stage Moderate impaired;Aspiration risk   Swallow Mechanics Mild delayed; Moderate delayed   Strategies and Efficacy small bites small sips slow rate   Summary   Swallow Summary Patient received sitting upright in bed with his wife present. Patient demonstrating wincing and reports that his swallow has been "painful" for "the last two days."  Pt gesturing to his NG tube, very minimal movement of BUE at this time, requesting clinician to help him eat PO trials. Pt provided NTL via cup, throat clear x2. Swallow initiation overall was very delayed and benefits from verbal cue to initiate swallow. Patient's overall initiation and very unaware of trials taking place despite eye contact and verbal understanding. Diet modifications in place to ensure tolerance, will attempt [facility] soft diet upgrade at this time to determine if overall PO intake can be positively influenced by presentation of items. Pt is safe for NTL and mech going forward, ST to watch closely for changes and conversations surrounding NGT needs. Refer to RD. Recommendations   Risk for Aspiration Moderate   Recommendations Consider oral diet   Diet Solid Recommendation Level 2 Dysphagia/ mechanical soft/altered   Diet Liquid Recommendation Nectar thick liquid   Recommended Form of Meds Crushed; With thick liquid   General Precautions Aspiration precautions; Feed only when alert;Minimize distractions;Upright as possible for all oral intake;Remain upright for 45 mins after meals;Assist with feeding;Supervision with meals   Compensatory Swallowing Strategies Alternate solids and liquids; External pacing   Further Evaluations Dietician   Results Reviewed with PAC/CRNP;RN;PT/Family/Caregiver   Speech Therapy Prognosis   Prognosis Guarded

## 2023-09-11 NOTE — PROGRESS NOTES
[address]  Progress Note  Name: Kyler Luque  MRN: [id number]  Unit/Bed#: ICU 05-01 I Date of Admission: 9/7/2023   Date of Service: 9/11/2023 I Hospital Day: 4    Assessment/Plan      Acute respiratory failure with hypoxia [facility])  Assessment & Plan  · POA, required supplemental oxygen. Probably multifactorial in the setting of pneumonia/pulmonary edema/pleural effusion  · CXR 9/7/2023: Congestive heart failure with pulmonary edema and bilateral pleural effusions  · Chest x-ray 9/8/2023: Similar multifocal patchy right lung opacities, suspicious for pneumonia. There is superimposed mild background pulmonary edema throughout the lungs bilaterally. Stable small-moderate right pleural effusion, small left pleural effusion, and bibasilar atelectasis. · Thoracentesis 9/8/2023: 500 mL of pleural fluid removed . Findings suspicious for malignant soft tissue throughout the pleural space including surface coating/studding   · Has been weaned to room air  · Monitor vital signs and respiratory status  · Continue treatment with IV antibiotics for pneumonia/sepsis as below    Encephalopathy acute  Assessment & Plan  · POA and now resolved  · Likely due to sepsis/hypoxia  · Serial assessments, monitor mental status closely    * Severe sepsis (HCC)  Assessment & Plan  · POA with tachypnea and tachycardia with BLAS, now resolved  · Continue cefepime and azithromycin  · Blood cultures negative x 4 days  · Procalcitonin has trended downward from 14 to 4    BLAS (acute kidney injury) (HCC)on stage 3 CKD  Assessment & Plan  Lab Results   Component Value Date    EGFR 33 09/11/2023    EGFR 27 09/10/2023    EGFR 25 09/10/2023    CREATININE 1.91 (H) 09/11/2023    CREATININE 2.27 (H) 09/10/2023    CREATININE 2.36 (H) 09/10/2023     · POA with creatinine 2.15. Baseline creatinine is around 1.6.   Suspect prerenal due to volume depletion  · Would use caution with IV fluids in the setting of bilateral pleural effusions  · Nephrology recommendations appreciated  · Daily BMP and trend creatinine  · Caution with nephrotoxins, avoid hypotension      Cardiomyopathy ([address])  Assessment & Plan  · Echocardiogram 9/8/2023 EF 30%  · S/p IV diuresis  · Continue metoprolol  · Monitor volume status    Paroxysmal atrial fibrillation (HCC)  Assessment & Plan  · Had episode of atrial fibrillation with RVR on 9/9/2023-now resolved  · Continue metoprolol tartrate 25 mg Q12H  · Not on anticoagulation due to thrombocytopenia  · Monitor VS closely    Chronic ITP (idiopathic thrombocytopenia) (HCC)  Assessment & Plan  · History of chronic ITP  · Daily CBC and trend platelets  · Monitor closely for bleeding  · Avoid therapeutic coagulation/VTE prophylaxis    IgG multiple myeloma (HCC)  Assessment & Plan  · Prophylactic Bactrim on hold  · Ok to continue acyclovir  · Continue outpatient follow-up with hematology/oncology         VTE Pharmacologic Prophylaxis: VTE Score: 5 High Risk (Score >/= 5) - Pharmacological DVT Prophylaxis Contraindicated. Sequential Compression Devices Ordered. Patient Centered Rounds: I performed bedside rounds with nursing staff today. Discussions with Specialists or Other Care Team Provider: Nephrology    Education and Discussions with Family / Patient: Updated  (wife) at bedside. Total Time Spent on Date of Encounter in care of patient: 55 minutes This time was spent on one or more of the following: performing physical exam; counseling and coordination of care; obtaining or reviewing history; documenting in the medical record; reviewing/ordering tests, medications or procedures; communicating with other healthcare professionals and discussing with patient's family/caregivers. Current Length of Stay: 4 day(s)  Current Patient Status: Inpatient   Certification Statement: The patient will continue to require additional inpatient hospital stay due to sepsis, IV antibiotics.   Discharge Plan: Anticipate discharge in 24-48 hrs to rehab facility. Code Status: Level 3 - DNAR and DNI    Subjective:   No new complaints offered. Objective:     Vitals:   Temp (24hrs), Av.9 °F (36.6 °C), Min:97.8 °F (36.6 °C), Max:97.9 °F (36.6 °C)    Temp:  [97.8 °F (36.6 °C)-97.9 °F (36.6 °C)] 97.8 °F (36.6 °C)  HR:  [68-80] 79  Resp:  [15-26] 17  BP: (110-158)/(72-89) 144/80  SpO2:  [93 %-100 %] 98 %  Body mass index is 19.87 kg/m². Input and Output Summary (last 24 hours): Intake/Output Summary (Last 24 hours) at 2023 1528  Last data filed at 2023 1300  Gross per 24 hour   Intake 460 ml   Output 750 ml   Net -290 ml       Physical Exam:     Physical Exam  Vitals and nursing note reviewed. Constitutional:       Comments: Chronically ill-appearing   HENT:      Head: Normocephalic and atraumatic. Mouth/Throat:      Pharynx: Oropharynx is clear. Eyes:      Pupils: Pupils are equal, round, and reactive to light. Pulmonary:      Effort: Pulmonary effort is normal. No respiratory distress. Breath sounds: Normal breath sounds. Abdominal:      General: Bowel sounds are normal.      Palpations: Abdomen is soft. Tenderness: There is no abdominal tenderness. Musculoskeletal:      Cervical back: Neck supple. Skin:     General: Skin is warm and dry. Capillary Refill: Capillary refill takes less than 2 seconds. Neurological:      General: No focal deficit present. Mental Status: He is alert.         Additional Data:     Labs:  Results from last 7 days   Lab Units 23  0605 09/10/23  0441   WBC Thousand/uL 6.95 7.69   HEMOGLOBIN g/dL 7.8* 7.0*   HEMATOCRIT % 25.6* 22.0*   PLATELETS Thousands/uL 25* 21*   BANDS PCT % 5  --    NEUTROS PCT %  --  84*   LYMPHS PCT %  --  4*   LYMPHO PCT % 8*  --    MONOS PCT %  --  8   MONO PCT % 8  --    EOS PCT % 0 0     Results from last 7 days   Lab Units 23  0909   SODIUM mmol/L 146   POTASSIUM mmol/L 3.4*   CHLORIDE mmol/L 113* CO2 mmol/L 21   BUN mg/dL 49*   CREATININE mg/dL 1.91*   ANION GAP mmol/L 12   CALCIUM mg/dL 8.2*   ALBUMIN g/dL 2.8*   TOTAL BILIRUBIN mg/dL 1.01*   ALK PHOS U/L 84   ALT U/L 49   AST U/L 52*   GLUCOSE RANDOM mg/dL 117     Results from last 7 days   Lab Units 09/10/23  0441   INR  1.76*     Results from last 7 days   Lab Units 09/09/23  1149 09/09/23  0511 09/09/23  0024 09/08/23  1815 09/08/23  1207 09/08/23  0154   POC GLUCOSE mg/dl 151* 149* 165* 137 175* 197*     Results from last 7 days   Lab Units 09/08/23  0202   HEMOGLOBIN A1C % 5.4     Results from last 7 days   Lab Units 09/11/23  0909 09/10/23  0441 09/09/23  0451 09/08/23  0214 09/08/23  0202 09/07/23  1143 09/07/23  0922 09/07/23  0619   LACTIC ACID mmol/L  --   --   --  1.8  --   --  2.2* 3.5*   PROCALCITONIN ng/ml 4.21* 10.18* 14.45*  --  3.31* 0.32*  --   --        Lines/Drains:  Invasive Devices     Peripheral Intravenous Line  Duration           Peripheral IV 09/07/23 Left;Ventral (anterior) Forearm 4 days          Drain  Duration           NG/OG/Enteral Tube Nasogastric 14 Fr Right nare 1 day                  Telemetry:  Telemetry Orders (From admission, onward)             24 Hour Telemetry Monitoring  Continuous x 24 Hours (Telem)        Expiring   Question:  Reason for 24 Hour Telemetry  Answer:  Arrhythmias requiring acute medical intervention / PPM or ICD malfunction                        Imaging: Reviewed radiology reports from this admission including: chest xray, chest CT scan and abdominal/pelvic CT    Recent Cultures (last 7 days):   Results from last 7 days   Lab Units 09/08/23  1445 09/07/23  1201 09/07/23  0619   BLOOD CULTURE   --   --  No Growth After 4 Days. No Growth After 4 Days.    GRAM STAIN RESULT  2+ Polys  No bacteria seen  --   --    BODY FLUID CULTURE, STERILE  No growth  --   --    LEGIONELLA URINARY ANTIGEN   --  Negative  --        Last 24 Hours Medication List:   Current Facility-Administered Medications Medication Dose Route Frequency Provider Last Rate   • acetaminophen  650 mg Oral Q6H PRN SANTANA Grant     • acyclovir  400 mg Per NG Tube BID SANTANA Grant     • vitamin C  1,000 mg Per NG Tube Daily SANTANA Grant     • atorvastatin  20 mg Per NG Tube Daily SANTANA Grant     • azithromycin  500 mg Intravenous Q24H SANTANA Grant 500 mg (09/11/23 0830)   • cefepime  1,000 mg Intravenous Q12H SANTANA Grant 1,000 mg (09/11/23 0946)   • cholecalciferol  1,000 Units Per NG Tube Daily SANTANA Grant     • vitamin B-12  1,000 mcg Per NG Tube Daily SANTANA Grant     • escitalopram  20 mg Per NG Tube Daily SANTANA Grant     • levothyroxine  100 mcg Per NG Tube Early Morning SANTANA Grant     • megestrol  40 mg Oral 4x Daily SANTANA Grant     • melatonin  6 mg Oral HS SANTANA Grant     • metoprolol tartrate  25 mg Oral Q12H [address] SANTANA Grant     • multivitamin stress formula  1 tablet Oral Daily SANTANA Grant     • omeprazole (PRILOSEC) suspension 2 mg/mL  20 mg Per NG Tube Daily SANTANA Grant     • ondansetron  4 mg Intravenous Q6H PRN SANTANA Grant     • phenol  1 spray Mouth/Throat Q2H PRN SANTANA Grant     • polyethylene glycol  17 g Oral Daily PRN SANTANA Grant     • senna-docusate sodium  2 tablet Oral HS SANTANA Grant     • sulfamethoxazole-trimethoprim  1 tablet Per NG Tube Once per day on Mon Wed Fri SANTANA Connolly          Today, Patient Was Seen By: SANTANA Rodriguez    **Please Note: This note may have been constructed using a voice recognition system. **

## 2023-09-11 NOTE — ASSESSMENT & PLAN NOTE
· POA with tachypnea and tachycardia with BLAS, now resolved  · Continue cefepime and azithromycin  · Blood cultures negative x 4 days  · Procalcitonin has trended downward from 14 to 4

## 2023-09-11 NOTE — ASSESSMENT & PLAN NOTE
· POA, required supplemental oxygen. Probably multifactorial in the setting of pneumonia/pulmonary edema/pleural effusion  · CXR 9/7/2023: Congestive heart failure with pulmonary edema and bilateral pleural effusions  · Chest x-ray 9/8/2023: Similar multifocal patchy right lung opacities, suspicious for pneumonia. There is superimposed mild background pulmonary edema throughout the lungs bilaterally. Stable small-moderate right pleural effusion, small left pleural effusion, and bibasilar atelectasis. · Thoracentesis 9/8/2023: 500 mL of pleural fluid removed . Findings suspicious for malignant soft tissue throughout the pleural space including surface coating/studding   · Has been weaned to room air  · Monitor vital signs and respiratory status  · Continue treatment with IV antibiotics for pneumonia/sepsis as below

## 2023-09-11 NOTE — OCCUPATIONAL THERAPY NOTE
09/11/23 0926   OT Last Visit   OT Visit Date 09/11/23   Note Type   Note Type Treatment   Pain Assessment   Pain Assessment Tool 0-10   Pain Score No Pain   Restrictions/Precautions   Weight Bearing Precautions Per Order No   Other Precautions Multiple lines;Telemetry;O2;Fall Risk;Cognitive   Lifestyle   Reciprocal Relationships wife present for entire session > attentive   Intrinsic Gratification reading (fiction and non-fiction)   ADL   Grooming Comments patient brushed teeth - Required multiple verbal (and a few physical) prompts to accomplish same - Rinsed mouth with thickened water with adequate results   UB Bathing Comments *patient demos. 'd ability to reach to cheeks   Therapeutic Exercise - ROM   UE-ROM Yes   ROM- Right Upper Extremities   R Shoulder AAROM; Flexion;ABduction;Horizontal ABduction  (horizontal adduction;   Pro/re-traction)   R Elbow AAROM;AROM;Elbow flexion;Elbow extension  (and supin/pron-ation)   R Weight/Reps/Sets 10 reps each exercise   ROM - Left Upper Extremities    L Weight/Reps/Sets all exer. same as RUE above   Coordination   Gross Motor impaired by weakness   Dexterity impaired  (had some difficulty managing toothbrush, and required Assist. to open toothpaste and mouthwash containers)   Subjective   Subjective "I'm tired"   Cognition   Orientation Level Oriented to person;Disoriented to place; Disoriented to time;Disoriented to situation   Activity Tolerance   Activity Tolerance Patient limited by fatigue   Medical Staff Made Aware nurse Marisela Kuhn aware of session   Assessment   Assessment Patient participated in Skilled OT session this date with interventions consisting of ADL re training with the use of correct body mechnaics, Energy Conservation techniques and therapeutic exercise to: increase functional use of BUEs, increase BUE muscle strength (*Active/Active-Assisted ROM) . Patient agreeable to OT treatment session, upon arrival patient was found seated in bed (back supported). Patient requiring frequent re direction, verbal cues for correct technique, cognitive assistance to anticipate next step, one step directives and frequent rest periods, and self-care assistance as noted in flow sheet/AM-PAC. Patient continues to be functioning below baseline level, occupational performance remains limited secondary to factors listed above and increased risk for falls and injury. From OT standpoint, recommendation at time of d/c would be post-acute rehabilitation services. Patient to benefit from continued Occupational Therapy treatment while in the hospital to address deficits as defined above and maximize level of functional independence with ADLs and functional mobility. Plan   Treatment Interventions ADL retraining;UE strengthening/ROM; Patient/family training; Activityengagement; Energy conservation   Goal Expiration Date 09/20/23   OT Treatment Day 1   Recommendation   Additional Comments 2 The patient's raw score on the AM-PAC Daily Activity Inpatient Short Form is 9. A raw score of less than 19 suggests the patient may benefit from discharge to post-acute rehabilitation services. Please refer to the recommendation of the Occupational Therapist for safe discharge planning.    AM-PAC Daily Activity Inpatient   Lower Body Dressing 1   Bathing 2   Toileting 1   Upper Body Dressing 1   Grooming 2   Eating 2   Daily Activity Raw Score 9   Turning Head Towards Sound 3   Follow Simple Instructions 2   Low Function Daily Activity Raw Score 14   Low Function Daily Activity Standardized Score  24.79   AM-PAC Applied Cognition Inpatient   Following a Speech/Presentation 1   Understanding Ordinary Conversation 3   Taking Medications 2   Remembering Where Things Are Placed or Put Away 2   Remembering List of 4-5 Errands 1   Taking Care of Complicated Tasks 1   Applied Cognition Raw Score 10   Applied Cognition Standardized Score 24.98     [place], COTE/L

## 2023-09-11 NOTE — ASSESSMENT & PLAN NOTE
· Had episode of atrial fibrillation with RVR on 9/9/2023-now resolved  · Continue metoprolol tartrate 25 mg Q12H  · Not on anticoagulation due to thrombocytopenia  · Monitor VS closely

## 2023-09-11 NOTE — ASSESSMENT & PLAN NOTE
· POA and now resolved  · Likely due to sepsis/hypoxia  · Serial assessments, monitor mental status closely

## 2023-09-11 NOTE — PROGRESS NOTES
Progress Note - Nephrology   Jose Dhaliwal 78 y.o. male MRN: [id number]  Unit/Bed#: ICU 05-01 Encounter: [id number]    A/P:  1. Acute kidney injury on top of chronic kidney disease   Most likely due to acute tubular necrosis, creatinine this morning remains essentially stable around 2.3 mg/dL. Continue to maximize medical management avoid potential nephrotoxins. 2.  Chronic disease stage IIIb with baseline creatinine likely between 1.7-2 mg/dL  3. Hypertension in the setting of chronic kidney disease stage III   Blood pressures are appropriate, continue with current medications  4. Acidosis   Serum bicarbonate has been stable at 22 mmol/L, continue to monitor at this time. 5.  Pneumonia associated severe sepsis   Patient continues to recover, on antiviral and antibacterial medications according to critical care and pulmonary recommendations. Patient will be transitioning to hospitalist care this morning. 6.  Multiple myeloma with IgG specificity   Patient receiving chemotherapy in the outpatient setting. 7.  Electrolyte disorders   Continue to monitor electrolytes, supplement where clinically indicated.     Follow up reason for today's visit: Acute kidney injury/chronic kidney disease/acute tubular necrosis/acidosis    Severe sepsis [facility])    Patient Active Problem List   Diagnosis   • IgG multiple myeloma (HCC)   • Essential hypertension   • Chronic ITP (idiopathic thrombocytopenia) ([facility])   • Hyperlipidemia   • Hypocalcemia   • Acquired hypothyroidism   • Prophylactic measure   • Hypogammaglobulinemia ([address])   • BLAS (acute kidney injury) ([facility])on stage 3 CKD   • Anxiety   • Headache   • Anemia, unspecified   • Hypercalcemia   • CKD (chronic kidney disease)   • Mild protein-calorie malnutrition (HCC)   • Severe sepsis ([facility])   • Acute respiratory failure with hypoxia ([facility])   • Encephalopathy acute   • Paroxysmal atrial fibrillation ([facility])   • Cardiomyopathy ([address])   • Atrial fibrillation with RVR ([address]) Subjective:   No acute issues at this time, denies abdominal pain, no nausea or vomiting. Tolerating tube feeds via [place] at this time    Objective:     Vitals: Blood pressure 135/74, pulse 74, temperature 97.9 °F (36.6 °C), temperature source Tympanic, resp. rate 16, height 5' 10" (1.778 m), weight 62.8 kg (138 lb 7.2 oz), SpO2 95 %. ,Body mass index is 19.87 kg/m². Weight (last 2 days)     Date/Time Weight    09/11/23 0551 62.8 (138.45)    09/10/23 0547 61.6 (135.8)    09/09/23 0400 63.6 (140.21)            Intake/Output Summary (Last 24 hours) at 9/11/2023 0829  Last data filed at 9/11/2023 0101  Gross per 24 hour   Intake 700 ml   Output 750 ml   Net -50 ml     I/O last 3 completed shifts:   In: 200 [P.O.:540; NG/GT:200; IV Piggyback:350; Feedings:140]  Out: 1400 [Urine:1400]         Physical Exam: /74 (BP Location: Left arm)   Pulse 74   Temp 97.9 °F (36.6 °C) (Tympanic)   Resp 16   Ht 5' 10" (1.778 m)   Wt 62.8 kg (138 lb 7.2 oz)   SpO2 95%   BMI 19.87 kg/m²     General Appearance:    Alert, cooperative, no distress, appears stated age   Head:    Normocephalic, without obvious abnormality, atraumatic   Eyes:    Conjunctiva/corneas clear   Ears:    Normal external ears   Nose:   Nares normal, septum midline, mucosa normal, no drainage    or sinus tenderness   Throat:   Lips, mucosa, and tongue normal; teeth and gums normal   Neck:   Supple   Back:     Symmetric, no curvature, ROM normal, no CVA tenderness   Lungs:     Clear to auscultation bilaterally, respirations unlabored   Chest wall:    No tenderness or deformity   Heart:    Regular rate and rhythm, S1 and S2 normal, no murmur, rub   or gallop   Abdomen:     Soft, non-tender, bowel sounds active   Extremities:   Extremities normal, atraumatic, no cyanosis or edema   Skin:   Skin color, texture, turgor normal, no rashes or lesions   Lymph nodes:   Cervical normal   Neurologic:   CNII-XII intact            Lab, Imaging and other studies: I have personally reviewed pertinent labs. CBC:   Lab Results   Component Value Date    WBC 6.95 09/11/2023    HGB 7.8 (L) 09/11/2023    HCT 25.6 (L) 09/11/2023     (H) 09/11/2023    PLT 25 (LL) 09/11/2023    RBC 2.22 (L) 09/11/2023    MCH 35.1 (H) 09/11/2023    MCHC 30.5 (L) 09/11/2023    RDW 25.5 (H) 09/11/2023     CMP:   Lab Results   Component Value Date    K 3.6 09/10/2023     (H) 09/10/2023    CO2 22 09/10/2023    BUN 53 (H) 09/10/2023    CREATININE 2.27 (H) 09/10/2023    CALCIUM 8.7 09/10/2023    EGFR 27 09/10/2023       . Results from last 7 days   Lab Units 09/10/23  1708 09/10/23  0441 09/09/23  1603 09/09/23  0451 09/08/23  1001 09/08/23  0202   POTASSIUM mmol/L 3.6 4.4 4.3 4.5   < > 3.7   CHLORIDE mmol/L 109* 108 109* 110*   < > 107   CO2 mmol/L 22 22 20* 17*   < > 13*   BUN mg/dL 53* 52* 46* 47*   < > 40*   CREATININE mg/dL 2.27* 2.36* 2.26* 2.35*   < > 2.12*   CALCIUM mg/dL 8.7 8.0* 8.4 9.1   < > 6.9*   ALK PHOS U/L  --  64  --  49  --  34   ALT U/L  --  38  --  28  --  20   AST U/L  --  55*  --  48*  --  36    < > = values in this interval not displayed. Phosphorus:   Lab Results   Component Value Date    PHOS 3.9 09/10/2023     Magnesium:   Lab Results   Component Value Date    MG 2.5 09/10/2023     Urinalysis: No results found for: "COLORU", "CLARITYU", "Brady Skeans", "PHUR", "LEUKOCYTESUR", "NITRITE", "PROTEINUA", "GLUCOSEU", "Harika Starcher", "BILIRUBINUR", "BLOODU"  Ionized Calcium: No results found for: "CAION"  Coagulation: No results found for: "PT", "INR", "APTT"  Troponin: No results found for: "TROPONINI"  ABG: No results found for: "PHART", "NAL1UTK", "PO2ART", "XXZ0LNV", "R2BSWFIA", "BEART", "SOURCE"  Radiology review:     IMAGING  Procedure: XR chest portable ICU    Result Date: 9/10/2023  Narrative: CHEST INDICATION:   NGT. History of multiple myeloma. COMPARISON: CXR 9/8/2023 and chest CT 9/7/2023. Report from a PET CT [facility] from 3/1/2023.  EXAM PERFORMED/VIEWS:  XR CHEST PORTABLE ICU. FINDINGS: NG tube in stomach. Cardiomediastinal silhouette normal. Moderate pulmonary edema with pleural effusions and bibasilar atelectasis. Extensive nodular pleural thickening on CT is poorly demonstrated Upper abdomen normal. Multiple lucencies throughout the skeleton due to multiple myeloma with pathologic fracture of the lateral right eighth rib. Impression: NG tube in stomach. Moderate pulmonary edema with small effusions and bibasilar atelectasis. Multiple lytic lesions in the skeleton due to multiple myeloma with pathologic fracture of the lateral right eighth rib. Nodular pleural thickening due to tumor on CT is poorly demonstrated on radiograph. Workstation performed: [id number]     Procedure: IR IN-Patient Thoracentesis    Result Date: 9/8/2023  Narrative: Ultrasound-guided thoracentesis Clinical History: Bilateral left greater than right pleural effusion Pleural thickening/malignancy Severe thrombocytopenia. On questioning the patient and his family report that he was exposed to agent orange [place] (he served in [employer]) . He does not have risk factors for asbestos exposure. Procedure/findings: After explaining the risks and benefits of the procedure to the patient spouse, informed consent was obtained. Procedure was performed in his ICU bed while patient was admitted for low oxygen. Left side was chosen. We elected to drain only one side given risk factors. Survey ultrasound was performed. This revealed soft tissue masses compatible with malignant implants in the pleural space. Ultrasound was used to localize the pleural effusion. The overlying skin was prepped and draped in usual sterile fashion and local anesthesia was obtained with the 1% lidocaine solution. A micropuncture needle was advanced. Over the wire a 4 [place] transition dilator was inserted. We elected to use a small needle to reduce risk of bleeding. Fluid was removed.  Final ultrasound revealed almost complete evacuation of fluid. Gelfoam slurry was prepared and injected during needle removal. 500 cc of pink-rosita fluid was aspirated. The patient tolerated the procedure well without immediate complication Specimens: Multiple including cytology, flow cytometry. Impression: impression: Ultrasound-guided left thoracentesis using small catheter Findings suspicious for malignant soft tissue throughout the pleural space including surface coating/studding Workstation performed: [id number]     Procedure: VAS lower limb venous duplex study, complete bilateral    Result Date: 9/8/2023  Narrative:  [facility] REPORT CLINICAL: Indications: Patient presents with bilateral leg edema, pneumonia and multiple myeloma. CONCLUSION: Impression: RIGHT LOWER LIMB: No evidence of acute or chronic deep vein thrombosis. No evidence of superficial thrombophlebitis noted. Doppler evaluation shows a normal response to augmentation maneuvers. . Popliteal, posterior tibial and anterior tibial arterial Doppler waveform's are triphasic. LEFT LOWER LIMB: No evidence of acute or chronic deep vein thrombosis. No evidence of superficial thrombophlebitis noted. Doppler evaluation shows a normal response to augmentation maneuvers. Popliteal, posterior tibial and anterior tibial arterial Doppler waveform's are triphasic. Nina Chung SIGNATURE: Electronically Signed by: Karely Sinha DO on 2023-09-08 01:17:24 PM    Procedure: Echo complete w/ contrast if indicated    Result Date: 9/8/2023  Narrative: •  Left Ventricle: Left ventricular cavity size is mildly dilated. Wall thickness is normal. The left ventricular ejection fraction is 30%. Systolic function is moderate to severely reduced in a global manner. •  Right Ventricle: Right ventricular cavity size is moderately dilated. Systolic function is mildly to moderately reduced. Abnormal tricuspid annular plane systolic excursion (TAPSE) < 1.7 cm.  •  Left Atrium: The atrium is moderately dilated. •  Right Atrium: The atrium is moderately dilated. •  Aortic Valve: There is mild regurgitation. •  Mitral Valve: There is moderate regurgitation. •  Tricuspid Valve: There is moderate to severe regurgitation. The right ventricular systolic pressure is moderately elevated. The estimated right ventricular systolic pressure is 56.42 mmHg. •  There is a moderately sized left pleural effusion. •  Changes noted when compared to prior study. Changes include: Decrease in LV and RV function .        Current Facility-Administered Medications   Medication Dose Route Frequency   • acetaminophen (TYLENOL) tablet 650 mg  650 mg Oral Q6H PRN   • acyclovir (ZOVIRAX) tablet 400 mg  400 mg Per NG Tube BID   • ascorbic acid (VITAMIN C) tablet 1,000 mg  1,000 mg Per NG Tube Daily   • atorvastatin (LIPITOR) tablet 20 mg  20 mg Per NG Tube Daily   • azithromycin (ZITHROMAX) 500 mg in sodium chloride 0.9 % 250 mL IVPB  500 mg Intravenous Q24H   • cefepime (MAXIPIME) IVPB (premix in dextrose) 1,000 mg 50 mL  1,000 mg Intravenous Q12H   • cholecalciferol (VITAMIN D3) tablet 1,000 Units  1,000 Units Per NG Tube Daily   • cyanocobalamin (VITAMIN B-12) tablet 1,000 mcg  1,000 mcg Per NG Tube Daily   • escitalopram (LEXAPRO) tablet 20 mg  20 mg Per NG Tube Daily   • levothyroxine tablet 100 mcg  100 mcg Per NG Tube Early Morning   • megestrol (MEGACE) tablet 40 mg  40 mg Oral 4x Daily   • melatonin tablet 6 mg  6 mg Oral HS   • metoprolol tartrate (LOPRESSOR) tablet 25 mg  25 mg Oral Q12H KRISTEN   • multivitamin stress formula tablet 1 tablet  1 tablet Oral Daily   • omeprazole (PRILOSEC) suspension 2 mg/mL  20 mg Per NG Tube Daily   • ondansetron (ZOFRAN) injection 4 mg  4 mg Intravenous Q6H PRN   • phenol (CHLORASEPTIC) 1.4 % mucosal liquid 1 spray  1 spray Mouth/Throat Q2H PRN   • polyethylene glycol (MIRALAX) packet 17 g  17 g Oral Daily PRN   • senna-docusate sodium (SENOKOT S) 8.6-50 mg per tablet 2 tablet  2 tablet Oral HS   • sulfamethoxazole-trimethoprim (BACTRIM) 400-80 mg per tablet 1 tablet  1 tablet Per NG Tube Once per day on Mon Wed Fri     Medications Discontinued During This Encounter   Medication Reason   • heparin (porcine) subcutaneous injection 5,000 Units    • acyclovir (ZOVIRAX) capsule 400 mg    • ceFEPime (MAXIPIME) injection 2,000 mg    • OLANZapine (ZyPREXA) tablet 2.5 mg    • OLANZapine (ZyPREXA) tablet 2.5 mg    • escitalopram (LEXAPRO) tablet 20 mg    • LORazepam (ATIVAN) tablet 1 mg    • HYDROmorphone HCl (DILAUDID) injection 0.2 mg    • dexamethasone (PF) (DECADRON) injection 6 mg    • levothyroxine tablet 100 mcg    • metoprolol tartrate (LOPRESSOR) partial tablet 12.5 mg    • cyanocobalamin (VITAMIN B-12) tablet 1,000 mcg    • atorvastatin (LIPITOR) tablet 20 mg    • pantoprazole (PROTONIX) EC tablet 40 mg    • docusate sodium (COLACE) capsule 100 mg    • ascorbic acid (VITAMIN C) tablet 1,000 mg    • cholecalciferol (VITAMIN D3) tablet 1,000 Units    • multivitamin stress formula tablet 1 tablet    • guaiFENesin (MUCINEX) 12 hr tablet 600 mg    • acyclovir (ZOVIRAX) tablet 400 mg    • acetaminophen (TYLENOL) tablet 650 mg    • metoprolol (LOPRESSOR) injection 5 mg    • OLANZapine (ZyPREXA) tablet 5 mg    • acyclovir (ZOVIRAX) capsule 400 mg    • pantoprazole (PROTONIX) injection 40 mg    • ascorbic acid (VITAMIN C) tablet 1,000 mg    • atorvastatin (LIPITOR) tablet 20 mg    • cholecalciferol (VITAMIN D3) tablet 1,000 Units    • cyanocobalamin (VITAMIN B-12) tablet 1,000 mcg    • escitalopram (LEXAPRO) tablet 20 mg    • levothyroxine tablet 100 mcg    • omeprazole (PRILOSEC) suspension 2 mg/mL    • sulfamethoxazole-trimethoprim (BACTRIM) 400-80 mg per tablet 1 tablet    • acyclovir (ZOVIRAX) tablet 400 mg    • sodium bicarbonate 8.4 % injection 50 mEq    • calcium gluconate 2 g in sodium chloride 0.9% 100 mL (premix)    • vancomycin (VANCOCIN) IVPB (premix in dextrose) 750 mg 150 mL    • sodium bicarbonate tablet 650 mg    • acetaminophen (TYLENOL) rectal suppository 650 mg    • insulin lispro (HumaLOG) 100 units/mL subcutaneous injection 1-5 Units    • metoprolol tartrate (LOPRESSOR) partial tablet 12.5 mg        Clayton Terrazas DO      This progress note was produced in part using a dictation device which may document imprecise wording from author's original intent.

## 2023-09-11 NOTE — PROGRESS NOTES
Pastoral Care Progress Note    2023  Patient: Alin Reeder : 1945  Admission Date & Time: 2023 0549  MRN: [id number] [facility]: [id number]      [facility] initiated support visit to pt and wife. Pt received CH upright in bed, wife bedside. Pt engaged minimally with [facility]. Wife shared regarding the pt: they have been  for over 48 years and have 2 children and 5 grand children. Wife attests pt is a fun loving and active person and well loved by his grand children. Wife attests that pt is "being a trooper" in regards to his current admission. Wife of pt shared regarding her past profession of nursing: she attests it makes it easier to care for the pt in some ways as she understands the medical aspects of his care. She also shared it can make it difficult to know a lot about prognosis and treatment, it can also bring up pt's that she cared for many years ago. [facility] will continue to follow. Chaplaincy Interventions Utilized:     Exploration: Explored relational needs & resources and Facilitated story telling    Relationship Building: Listened empathically    Chaplaincy Outcomes Achieved:   Identified meaningful connections      Spiritual Coping Strategies Utilized:   Connectedness       [id number]   Clinical Encounter Type   Visited With Patient and family together  (wife bedside)   Routine Visit Introduction

## 2023-09-11 NOTE — PLAN OF CARE
Problem: PAIN - ADULT  Goal: Verbalizes/displays adequate comfort level or baseline comfort level  Description: Interventions:  - Encourage patient to monitor pain and request assistance  - Assess pain using appropriate pain scale  - Administer analgesics based on type and severity of pain and evaluate response  - Implement non-pharmacological measures as appropriate and evaluate response  - Consider cultural and social influences on pain and pain management  - Notify physician/advanced practitioner if interventions unsuccessful or patient reports new pain  Outcome: Progressing     Problem: INFECTION - ADULT  Goal: Absence or prevention of progression during hospitalization  Description: INTERVENTIONS:  - Assess and monitor for signs and symptoms of infection  - Monitor lab/diagnostic results  - Monitor all insertion sites, i.e. indwelling lines, tubes, and drains  - Monitor endotracheal if appropriate and nasal secretions for changes in amount and color  - [place] appropriate cooling/warming therapies per order  - Administer medications as ordered  - Instruct and encourage patient and family to use good hand hygiene technique  - Identify and instruct in appropriate isolation precautions for identified infection/condition  Outcome: Progressing  Goal: Absence of fever/infection during neutropenic period  Description: INTERVENTIONS:  - Monitor WBC    Outcome: Progressing

## 2023-09-11 NOTE — CASE MANAGEMENT
Case Management Assessment & Discharge Planning Note    Patient name Emperatriz Pascal  Location ICU 05/ICU 89-61 MRN [id number]  : 1945 Date 2023       Current Admission Date: 2023  Current Admission Diagnosis:Severe sepsis [facility])   Patient Active Problem List    Diagnosis Date Noted   • Atrial fibrillation with RVR ([address]) 09/10/2023   • Paroxysmal atrial fibrillation ([address]) 2023   • Cardiomyopathy ([address]) 2023   • Severe sepsis ([address]) 2023   • Acute respiratory failure with hypoxia ([address]) 2023   • Encephalopathy acute 2023   • Mild protein-calorie malnutrition ([address]) 2023   • CKD (chronic kidney disease) 2023   • Anemia, unspecified 2023   • Hypercalcemia 2023   • Headache 2023   • Anxiety 10/11/2022   • BLAS (acute kidney injury) (HCC)on stage 3 CKD 2020   • Hypogammaglobulinemia ([address]) 2019   • IgG multiple myeloma ([address]) 2018   • Essential hypertension 2018   • Chronic ITP (idiopathic thrombocytopenia) ([address]) 2017   • Hypocalcemia 2015   • Prophylactic measure 2015   • Acquired hypothyroidism 2015   • Hyperlipidemia 2012      LOS (days): 4  Geometric Mean LOS (GMLOS) (days): 4.30  Days to GMLOS:0.3     OBJECTIVE:    Risk of Unplanned Readmission Score: 26.77     Current admission status: Inpatient    Preferred Pharmacy:   [facility] [place] [facility] [address]  [address] [facility] [place] [address]  Phone: [phone] Fax: [address] [place] [address] - [address] [place], [place] [id number] [address] [place] [address]  Phone: [phone] Fax: [phone]    [address] - [address] [place] [address]  [address] [place] [address]  Phone: [phone] Fax: [phone] [facility] [address], [address] [place] [address],[address]  [address] [facility] Floor  [address] [place], [place] [id number]  Phone: [phone] Fax: [phone]    [facility], [address] [facility] [address]  [id number] [address]  [address] [facility] [address]  Phone: [phone] Fax: [phone]    Primary Care Provider: Ian Collins,     Primary Insurance: MEDICARE  Secondary Insurance: [address]:  Active Health Care Proxies    There are no active Health Care Proxies on file. Advance Directives  Does patient have a [address]?: No  Was patient offered paperwork?: Yes (HAs papers at home)  Does patient currently have a Health Care decision maker?: Yes, please see Health Care Proxy section  Does patient have Advance Directives?: No  Was patient offered paperwork?: Yes (has papers at home)  Primary Contact: Anshul Gil wife    Readmission Root Cause  30 Day Readmission: No    Patient Information  Admitted from[de-identified] Home  Mental Status: Alert  During Assessment patient was accompanied by: Spouse  Assessment information provided by[de-identified] Spouse  Primary Caregiver: Self  Support Systems: Spouse/significant other  Home entry access options.  Select all that apply.: Stairs  Number of steps to enter home.: 8  Do the steps have railings?: Yes  Type of Current Residence: [place]  In the last 12 months, was there a time when you were not able to pay the mortgage or rent on time?: No  In the last 12 months, how many places have you lived?: 1  In the last 12 months, was there a time when you did not have a steady place to sleep or slept in a shelter (including now)?: No  Homeless/housing insecurity resource given?: N/A  Living Arrangements: Lives w/ Spouse/significant other  Is patient a ?: Yes  Is patient active with [facility])?: No    Activities of Daily Living Prior to Admission  Functional Status: Independent  Completes ADLs independently?: Yes  Ambulates independently?: Yes  Does patient use assisted devices?: No  Does patient currently own DME?: No  Does patient have a history of Outpatient Therapy (PT/OT)?: No  Does the patient have a history of Short-Term Rehab?: No  Does patient have a history of HHC?: No  Does patient currently have [address]?: No    Patient Information Continued  Income Source: Pension/[employer]  Does patient have prescription coverage?: Yes  Within the past 12 months, you worried that your food would run out before you got the money to buy more.: Never true  Within the past 12 months, the food you bought just didn't last and you didn't have money to get more.: Never true  Food insecurity resource given?: N/A  Does patient receive dialysis treatments?: No  Does patient have a history of Mental Health Diagnosis?: No    PHQ 2/9 Screening   Reviewed PHQ 2/9 Depression Screening Score?: No    Means of Transportation  Means of Transport to Appts[de-identified] Family transport  In the past 12 months, has lack of transportation kept you from medical appointments or from getting medications?: No  In the past 12 months, has lack of transportation kept you from meetings, work, or from getting things needed for daily living?: No  Was application for public transport provided?: N/A  DISCHARGE DETAILS:    Discharge planning discussed with[de-identified] Pt and spouse  Freedom of Choice: Yes  Comments - Freedom of Choice: PT is recommending STR. Pt does weekly outpt chemotherapy [place]. Wife states he ahs underlying multiple myeloma. HAd a thoracentsis and are awaiting results of same to determine a DC plan. Will continue to follow. Contacts  Patient Contacts: Samuel Metcalf  Contact Method: In Person  Reason/Outcome: Discharge Planning    Would you like to participate in our [facility] [employer] service program?  : No - Declined    Wife takes pt to oupt weekly chemotherapy tx. PT is recommending STR. They want to wait for the results of the thoracentesis before deciding any DC plan. Will continue to follow for care needs.

## 2023-09-11 NOTE — PLAN OF CARE
Problem: Nutrition/Hydration-ADULT  Goal: Nutrient/Hydration intake appropriate for improving, restoring or maintaining nutritional needs  Description: Monitor and assess patient's nutrition/hydration status for malnutrition. Collaborate with interdisciplinary team and initiate plan and interventions as ordered. Monitor patient's weight and dietary intake as ordered or per policy. Utilize nutrition screening tool and intervene as necessary. Determine patient's food preferences and provide high-protein, high-caloric foods as appropriate. INTERVENTIONS:  - Monitor oral intake, urinary output, labs, and treatment plans  - Assess nutrition and hydration status and recommend course of action  - Evaluate amount of meals eaten  - Assist patient with eating if necessary   - Allow adequate time for meals  - Recommend/ encourage appropriate diets, oral nutritional supplements, and vitamin/mineral supplements  - Order, calculate, and assess calorie counts as needed  - Recommend, monitor, and adjust tube feedings and TPN/PPN based on assessed needs  - Assess need for intravenous fluids  - Provide specific nutrition/hydration education as appropriate  - Include patient/family/caregiver in decisions related to nutrition  Outcome: Progressing  Note: Received with NGT feed in progress. ,Jevity 1.2 full strength at 40 ml/hour,no gastric residual,tolerated well. Will make adjustments  to   Increase rate to max of 55 ml/hour. Flush with free water 100 ml q 4h.

## 2023-09-11 NOTE — ASSESSMENT & PLAN NOTE
· Prophylactic Bactrim on hold  · Ok to continue acyclovir  · Continue outpatient follow-up with hematology/oncology

## 2023-09-11 NOTE — PLAN OF CARE
Problem: OCCUPATIONAL THERAPY ADULT  Goal: Performs self-care activities at highest level of function for planned discharge setting. See evaluation for individualized goals. Description: Treatment Interventions: ADL retraining, Functional transfer training, UE strengthening/ROM, Endurance training, Patient/family training, Energy conservation, Activityengagement          See flowsheet documentation for full assessment, interventions and recommendations. Outcome: Progressing >gradually/slowly   Note: Limitation: Decreased ADL status, Decreased UE strength, Decreased Safe judgement during ADL, Decreased endurance, Decreased self-care trans, Decreased high-level ADLs, Decreased cognition  Prognosis: Fair  Assessment: Patient participated in Skilled OT session this date with interventions consisting of ADL re training with the use of correct body mechnaics, Energy Conservation techniques and therapeutic exercise to: increase functional use of BUEs, increase BUE muscle strength (*Active/Active-Assisted ROM) . Patient agreeable to OT treatment session, upon arrival patient was found seated in bed (back supported). Patient requiring frequent re direction, verbal cues for correct technique, cognitive assistance to anticipate next step, one step directives and frequent rest periods, and self-care assistance as noted in flow sheet/AM-PAC. Patient continues to be functioning below baseline level, occupational performance remains limited secondary to factors listed above and increased risk for falls and injury. From OT standpoint, recommendation at time of d/c would be post-acute rehabilitation services. Patient to benefit from continued Occupational Therapy treatment while in the hospital to address deficits as defined above and maximize level of functional independence with ADLs and functional mobility.      OT Discharge Recommendation: Post acute rehabilitation services     [place], COTE/L

## 2023-09-11 NOTE — PLAN OF CARE
Problem: Potential for Falls  Goal: Patient will remain free of falls  Description: INTERVENTIONS:  - Educate patient/family on patient safety including physical limitations  - Instruct patient to call for assistance with activity   - Consult OT/PT to assist with strengthening/mobility   - Keep Call bell within reach  - Keep bed low and locked with side rails adjusted as appropriate  - Keep care items and personal belongings within reach  - Initiate and maintain comfort rounds  - Make Fall Risk Sign visible to staff  - Offer Toileting every 2 Hours, in advance of need  - Initiate/Maintain bed alarm  - Apply yellow socks and bracelet for high fall risk patients  - Consider moving patient to room near nurses station  Outcome: Progressing     Problem: PAIN - ADULT  Goal: Verbalizes/displays adequate comfort level or baseline comfort level  Description: Interventions:  - Encourage patient to monitor pain and request assistance  - Assess pain using appropriate pain scale  - Administer analgesics based on type and severity of pain and evaluate response  - Implement non-pharmacological measures as appropriate and evaluate response  - Consider cultural and social influences on pain and pain management  - Notify physician/advanced practitioner if interventions unsuccessful or patient reports new pain  Outcome: Progressing     Problem: SAFETY ADULT  Goal: Patient will remain free of falls  Description: INTERVENTIONS:  - Educate patient/family on patient safety including physical limitations  - Instruct patient to call for assistance with activity   - Consult OT/PT to assist with strengthening/mobility   - Keep Call bell within reach  - Keep bed low and locked with side rails adjusted as appropriate  - Keep care items and personal belongings within reach  - Initiate and maintain comfort rounds  - Make Fall Risk Sign visible to staff  - Offer Toileting every 2 Hours, in advance of need  - Initiate/Maintain bed alarm  - Apply yellow socks and bracelet for high fall risk patients  - Consider moving patient to room near nurses station  Outcome: Progressing     Problem: Knowledge Deficit  Goal: Patient/family/caregiver demonstrates understanding of disease process, treatment plan, medications, and discharge instructions  Description: Complete learning assessment and assess knowledge base. Interventions:  - Provide teaching at level of understanding  - Provide teaching via preferred learning methods  Outcome: Progressing     Problem: Nutrition/Hydration-ADULT  Goal: Nutrient/Hydration intake appropriate for improving, restoring or maintaining nutritional needs  Description: Monitor and assess patient's nutrition/hydration status for malnutrition. Collaborate with interdisciplinary team and initiate plan and interventions as ordered. Monitor patient's weight and dietary intake as ordered or per policy. Utilize nutrition screening tool and intervene as necessary. Determine patient's food preferences and provide high-protein, high-caloric foods as appropriate.      INTERVENTIONS:  - Monitor oral intake, urinary output, labs, and treatment plans  - Assess nutrition and hydration status and recommend course of action  - Evaluate amount of meals eaten  - Assist patient with eating if necessary   - Allow adequate time for meals  - Recommend/ encourage appropriate diets, oral nutritional supplements, and vitamin/mineral supplements  - Order, calculate, and assess calorie counts as needed  - Recommend, monitor, and adjust tube feedings and TPN/PPN based on assessed needs  - Assess need for intravenous fluids  - Provide specific nutrition/hydration education as appropriate  - Include patient/family/caregiver in decisions related to nutrition  Outcome: Progressing     Problem: Prexisting or High Potential for Compromised Skin Integrity  Goal: Skin integrity is maintained or improved  Description: INTERVENTIONS:  - Identify patients at risk for skin breakdown  - Assess and monitor skin integrity  - Assess and monitor nutrition and hydration status  - Monitor labs   - Assess for incontinence   - Turn and reposition patient  - Assist with mobility/ambulation  - Relieve pressure over bony prominences  - Avoid friction and shearing  - Provide appropriate hygiene as needed including keeping skin clean and dry  - Evaluate need for skin moisturizer/barrier cream  - Collaborate with interdisciplinary team   - Patient/family teaching  - Consider wound care consult   Outcome: Progressing     Problem: CARDIOVASCULAR - ADULT  Goal: Maintains optimal cardiac output and hemodynamic stability  Description: INTERVENTIONS:  - Monitor I/O, vital signs and rhythm  - Monitor for S/S and trends of decreased cardiac output  - Administer and titrate ordered vasoactive medications to optimize hemodynamic stability  - Assess quality of pulses, skin color and temperature  - Assess for signs of decreased coronary artery perfusion  - Instruct patient to report change in severity of symptoms  Outcome: Progressing     Problem: RESPIRATORY - ADULT  Goal: Achieves optimal ventilation and oxygenation  Description: INTERVENTIONS:  - Assess for changes in respiratory status  - Assess for changes in mentation and behavior  - Position to facilitate oxygenation and minimize respiratory effort  - Oxygen administered by appropriate delivery if ordered  - Initiate smoking cessation education as indicated  - Encourage broncho-pulmonary hygiene including cough, deep breathe, Incentive Spirometry  - Assess the need for suctioning and aspirate as needed  - Assess and instruct to report SOB or any respiratory difficulty  - Respiratory Therapy support as indicated  Outcome: Progressing

## 2023-09-11 NOTE — ASSESSMENT & PLAN NOTE
· History of chronic ITP  · Daily CBC and trend platelets  · Monitor closely for bleeding  · Avoid therapeutic coagulation/VTE prophylaxis

## 2023-09-12 NOTE — SPEECH THERAPY NOTE
Speech Language/Pathology  Speech/Language Pathology Progress Note    Patient Name: Keyshawn Washington  PUTVJ'T Date: 9/12/2023     Problem List  Principal Problem:    Severe sepsis [facility])  Active Problems: IgG multiple myeloma (HCC)    Chronic ITP (idiopathic thrombocytopenia) (HCC)    BLAS (acute kidney injury) (HCC)on stage 3 CKD    Acute respiratory failure with hypoxia (HCC)    Encephalopathy acute    Paroxysmal atrial fibrillation (HCC)    Cardiomyopathy ([address])    Past Medical History  Past Medical History:   Diagnosis Date   • Cancer ([address]) 2015   • History of autologous stem cell transplant ([address])    • Hypertension    • Insomnia    • Multiple myeloma ([address])      Past Surgical History  Past Surgical History:   Procedure Laterality Date   • APPENDECTOMY      Last assessed: 9/25/15   • ARTHROSCOPY KNEE Left     9/30/09   • EYE SURGERY      Lasik   • IR THORACENTESIS  9/8/2023   • KNEE CARTILAGE SURGERY     • LIMBAL STEM CELL TRANSPLANT      Patient had 2 in [place]-2/29/2016 and 4/13/2016         Subjective:  Patient received sitting upright in bed with wife present. Patient much more alert and engaged today in conversation. He demonstrates tolerance of these items with no overt s/s of penetration or aspiration throughout. O2 saturations remain appropriate throughout, NGT removed by patient overnight and replaced. D/W care team NGT plans. Objective:  Mechanical soft solids:  Pt tolerating all purees, mech trials today with hopes for improved overall PO intake. Most preferred items by patient are puree options (yogurt, pudding, applesauce, oatmeal, etc) but [facility] would expand other options to pt with less overall manipulation of the solid. Assessment:  Patient observed tolerating diet, diet upgrade requested to medical provider. Watching overall intake for NGT planning. Suspect patient's overall intake, appetite paired with nausea and comorbidity will remain consistent. Plan/Recommendations:  Mechanical soft diet upgrade and NTL ongoing, ST following

## 2023-09-12 NOTE — ASSESSMENT & PLAN NOTE
· POA with tachypnea and tachycardia with BLAS, now resolved  · Continue cefepime, D7 and azithromycin, D5  · Blood cultures 9/7/2023 negative x 4 days  · Procalcitonin has trended downward from 14 to 4  · Continue to monitor labs and vital signs

## 2023-09-12 NOTE — ASSESSMENT & PLAN NOTE
· History of chronic ITP  · S/p 1 pheresis product 9/8/2023  · Daily CBC and trend platelets.  Today, platelets are 19  · Monitor closely for bleeding  · Avoid therapeutic coagulation/VTE prophylaxis

## 2023-09-12 NOTE — ASSESSMENT & PLAN NOTE
· POA, required supplemental oxygen. Probably multifactorial in the setting of pneumonia/pulmonary edema/pleural effusion. Has been weaned to room air, and is saturating well  · CXR 9/7/2023: Congestive heart failure with pulmonary edema and bilateral pleural effusions  · Chest x-ray 9/8/2023: Similar multifocal patchy right lung opacities, suspicious for pneumonia. There is superimposed mild background pulmonary edema throughout the lungs bilaterally. Stable small-moderate right pleural effusion, small left pleural effusion, and bibasilar atelectasis. · Thoracentesis 9/8/2023: 500 mL of pleural fluid removed.  Findings suspicious for malignant soft tissue throughout the pleural space including surface coating/studding   · Monitor vital signs and respiratory status  · Continue treatment with IV antibiotics for pneumonia/sepsis as below

## 2023-09-12 NOTE — PROGRESS NOTES
[address]  Progress Note  Name: Vinod Yanes  MRN: [id number]  Unit/Bed#: ICU 05-01 I Date of Admission: 9/7/2023   Date of Service: 9/12/2023 I Hospital Day: 5    Assessment/Plan      Acute respiratory failure with hypoxia [facility])  Assessment & Plan  · POA, required supplemental oxygen. Probably multifactorial in the setting of pneumonia/pulmonary edema/pleural effusion. Has been weaned to room air, and is saturating well  · CXR 9/7/2023: Congestive heart failure with pulmonary edema and bilateral pleural effusions  · Chest x-ray 9/8/2023: Similar multifocal patchy right lung opacities, suspicious for pneumonia. There is superimposed mild background pulmonary edema throughout the lungs bilaterally. Stable small-moderate right pleural effusion, small left pleural effusion, and bibasilar atelectasis. · Thoracentesis 9/8/2023: 500 mL of pleural fluid removed. Findings suspicious for malignant soft tissue throughout the pleural space including surface coating/studding   · Monitor vital signs and respiratory status  · Continue treatment with IV antibiotics for pneumonia/sepsis as below    Encephalopathy acute  Assessment & Plan  · POA and now resolved  · Likely due to sepsis/hypoxia  · Serial assessments, monitor mental status closely    * Severe sepsis (HCC)  Assessment & Plan  · POA with tachypnea and tachycardia with BLAS, now resolved  · Continue cefepime, D7 and azithromycin, D5  · Blood cultures 9/7/2023 negative x 4 days  · Procalcitonin has trended downward from 14 to 4  · Continue to monitor labs and vital signs    BLAS (acute kidney injury) (HCC)on stage 3 CKD  Assessment & Plan  Lab Results   Component Value Date    EGFR 39 09/12/2023    EGFR 33 09/11/2023    EGFR 27 09/10/2023    CREATININE 1.66 (H) 09/12/2023    CREATININE 1.91 (H) 09/11/2023    CREATININE 2.27 (H) 09/10/2023     · POA with creatinine 2.15.  Baseline creatinine is around 1.6  · Caution with IV fluids in the setting of bilateral pleural effusions  · Nephrology following with recommendations appreciated  · Daily BMP and trend creatinine  · Caution with nephrotoxins, avoid hypotension      Cardiomyopathy ([address])  Assessment & Plan  · Echocardiogram 9/8/2023 EF 30%  · S/p IV diuresis  · Continue metoprolol  · Monitor volume status    Paroxysmal atrial fibrillation (HCC)  Assessment & Plan  · Had episode of atrial fibrillation with RVR on 9/9/2023-now resolved  · Continue metoprolol tartrate 25 mg Q12H  · Not on anticoagulation due to thrombocytopenia  · Monitor VS closely    Chronic ITP (idiopathic thrombocytopenia) (HCC)  Assessment & Plan  · History of chronic ITP  · S/p 1 pheresis product 9/8/2023  · Daily CBC and trend platelets. Today, platelets are 19  · Monitor closely for bleeding  · Avoid therapeutic coagulation/VTE prophylaxis    IgG multiple myeloma (HCC)  Assessment & Plan  · Prophylactic Bactrim on hold  · Ok to continue acyclovir  · Continue outpatient follow-up with hematology/oncology         VTE Pharmacologic Prophylaxis: VTE Score: 5 High Risk (Score >/= 5) - Pharmacological DVT Prophylaxis Contraindicated. Sequential Compression Devices Ordered. Patient Centered Rounds: I performed bedside rounds with nursing staff today. Discussions with Specialists or Other Care Team Provider: Nephrology, speech    Education and Discussions with Family / Patient: Updated  (wife) at bedside. Total Time Spent on Date of Encounter in care of patient: 50 mins. This time was spent on one or more of the following: performing physical exam; counseling and coordination of care; obtaining or reviewing history; documenting in the medical record; reviewing/ordering tests, medications or procedures; communicating with other healthcare professionals and discussing with patient's family/caregivers.     Current Length of Stay: 5 day(s)  Current Patient Status: Inpatient   Certification Statement: The patient will continue to require additional inpatient hospital stay due to sepsis, ongoing E/M, and care coordination. Discharge Plan: TBD. Code Status: Level 3 - DNAR and DNI    Subjective:   Patient seen and examined. He says he is feeling better. Objective:     Vitals:   Temp (24hrs), Av.7 °F (37.1 °C), Min:98.6 °F (37 °C), Max:98.8 °F (37.1 °C)    Temp:  [98.6 °F (37 °C)-98.8 °F (37.1 °C)] 98.6 °F (37 °C)  HR:  [68-87] 87  Resp:  [16-18] 16  BP: (135-146)/(77-85) 146/85  SpO2:  [94 %-97 %] 94 %  Body mass index is 19.04 kg/m². Input and Output Summary (last 24 hours): Intake/Output Summary (Last 24 hours) at 2023 1419  Last data filed at 2023 1127  Gross per 24 hour   Intake 1270 ml   Output 0 ml   Net 1270 ml       Physical Exam:   Physical Exam  Vitals and nursing note reviewed. Constitutional:       Comments: Looks better today, alert, smiling, cheeks pink, no longer looks pale   HENT:      Head: Normocephalic and atraumatic. Nose: Nose normal.      Mouth/Throat:      Mouth: Mucous membranes are moist.      Pharynx: Oropharynx is clear. Eyes:      Pupils: Pupils are equal, round, and reactive to light. Cardiovascular:      Rate and Rhythm: Normal rate and regular rhythm. Pulmonary:      Effort: Pulmonary effort is normal. No respiratory distress. Breath sounds: Decreased breath sounds present. No wheezing or rhonchi. Abdominal:      General: Bowel sounds are normal.      Palpations: Abdomen is soft. Tenderness: There is no abdominal tenderness. Musculoskeletal:      Cervical back: Neck supple. Right lower leg: Edema present. Left lower leg: Edema present. Skin:     General: Skin is warm and dry. Capillary Refill: Capillary refill takes less than 2 seconds. Neurological:      General: No focal deficit present. Mental Status: He is alert.           Additional Data:     Labs:  Results from last 7 days   Lab Units 23  0625 23  6169 WBC Thousand/uL 6.83 6.95   HEMOGLOBIN g/dL 8.1* 7.8*   HEMATOCRIT % 25.9* 25.6*   PLATELETS Thousands/uL 19* 25*   BANDS PCT %  --  5   NEUTROS PCT % 83*  --    LYMPHS PCT % 4*  --    LYMPHO PCT %  --  8*   MONOS PCT % 9  --    MONO PCT %  --  8   EOS PCT % 0 0     Results from last 7 days   Lab Units 23  0625 23  0909   SODIUM mmol/L 147 146   POTASSIUM mmol/L 3.5 3.4*   CHLORIDE mmol/L 116* 113*   CO2 mmol/L 20* 21   BUN mg/dL 46* 49*   CREATININE mg/dL 1.66* 1.91*   ANION GAP mmol/L 11 12   CALCIUM mg/dL 8.0* 8.2*   ALBUMIN g/dL  --  2.8*   TOTAL BILIRUBIN mg/dL  --  1.01*   ALK PHOS U/L  --  84   ALT U/L  --  49   AST U/L  --  52*   GLUCOSE RANDOM mg/dL 110 117     Results from last 7 days   Lab Units 23  0625   INR  1.54*     Results from last 7 days   Lab Units 23  1149 23  0511 23  0024 23  1815 23  1207 23  0154   POC GLUCOSE mg/dl 151* 149* 165* 137 175* 197*     Results from last 7 days   Lab Units 23  0202   HEMOGLOBIN A1C % 5.4     Results from last 7 days   Lab Units 23  0909 09/10/23  0441 23  0451 23  0214 23  0202 23  1143 23  0922 23  0619   LACTIC ACID mmol/L  --   --   --  1.8  --   --  2.2* 3.5*   PROCALCITONIN ng/ml 4.21* 10.18* 14.45*  --  3.31* 0.32*  --   --        Lines/Drains:  Invasive Devices     Peripheral Intravenous Line  Duration           Peripheral IV 23 Left;Proximal;Ventral (anterior) Forearm <1 day          Drain  Duration           NG/OG/Enteral Tube Nasogastric Left nare <1 day                  Telemetry:  Telemetry Orders (From admission, onward)             24 Hour Telemetry Monitoring  Continuous x 24 Hours (Telem)           Question:  Reason for 24 Hour Telemetry  Answer:  Arrhythmias requiring acute medical intervention / PPM or ICD malfunction                        Recent Cultures (last 7 days):   Results from last 7 days   Lab Units 23  1449 09/07/23  1201 09/07/23  0619   BLOOD CULTURE   --   --  No Growth After 5 Days. No Growth After 5 Days.    GRAM STAIN RESULT  2+ Polys  No bacteria seen  --   --    BODY FLUID CULTURE, STERILE  No growth  --   --    LEGIONELLA URINARY ANTIGEN   --  Negative  --        Last 24 Hours Medication List:   Current Facility-Administered Medications   Medication Dose Route Frequency Provider Last Rate   • acetaminophen  650 mg Oral Q6H PRN SANTANA Grant     • acyclovir  400 mg Per NG Tube BID SANTAAN Grant     • vitamin C  1,000 mg Per NG Tube Daily SANTANA Grant     • atorvastatin  20 mg Per NG Tube Daily SANTANA Grant     • azithromycin  500 mg Intravenous Q24H SANTANA Grant 500 mg (09/12/23 0848)   • cefepime  1,000 mg Intravenous Q12H SANTANA Grant 1,000 mg (09/12/23 4554)   • cholecalciferol  1,000 Units Per NG Tube Daily SANTANA Grant     • vitamin B-12  1,000 mcg Per NG Tube Daily SANTANA Grant     • dextrose  500 mL Intravenous Once SANTANA Fine     • escitalopram  20 mg Per NG Tube Daily SANTANA Grant     • levothyroxine  100 mcg Per NG Tube Early Morning SANTANA Grant     • megestrol  40 mg Oral 4x Daily SANTANA Grant     • melatonin  6 mg Oral HS SANTANA Grant     • metoprolol tartrate  25 mg Oral Q12H [address] SANTANA Grant     • multivitamin stress formula  1 tablet Oral Daily SANTANA Grant     • omeprazole (PRILOSEC) suspension 2 mg/mL  20 mg Per NG Tube Daily SANTANA Grant     • ondansetron  4 mg Intravenous Q6H PRN SANTANA Grant     • phenol  1 spray Mouth/Throat Q2H PRN SANTANA Grant     • polyethylene glycol  17 g Oral Daily PRN SANTANA Grant     • potassium chloride  20 mEq Intravenous Q2H Dell Tracy DO 20 mEq (09/12/23 1344)   • senna-docusate sodium  2 tablet Oral HS Vicki Metcalf • sulfamethoxazole-trimethoprim  1 tablet Per NG Tube Once per day on Mon Wed Fri SANTANA Lester          Today, Patient Was Seen By: SANTANA Mcdaniel    **Please Note: This note may have been constructed using a voice recognition system. **

## 2023-09-12 NOTE — ASSESSMENT & PLAN NOTE
Lab Results   Component Value Date    EGFR 39 09/12/2023    EGFR 33 09/11/2023    EGFR 27 09/10/2023    CREATININE 1.66 (H) 09/12/2023    CREATININE 1.91 (H) 09/11/2023    CREATININE 2.27 (H) 09/10/2023     · POA with creatinine 2.15.  Baseline creatinine is around 1.6  · Caution with IV fluids in the setting of bilateral pleural effusions  · Nephrology following with recommendations appreciated  · Daily BMP and trend creatinine  · Caution with nephrotoxins, avoid hypotension

## 2023-09-12 NOTE — PROGRESS NOTES
NEPHROLOGY PROGRESS NOTE   Barrington Esquivel 78 y.o. male MRN: [id number]  Unit/Bed#: ICU 05-01 Encounter: [id number]    Assessment/Plan:    79 yo man with IgG multiple myeloma, HTN, CKD 3B being treated for severe sepsis and acute hypoxic respiratory failure secondary to pneumonia. Nephrology following along for acute kidney injury atop chronic kidney disease    1. Acute kidney injury atop chronic kidney disease  · Peak creatinine 2.3 mg/dL improved to 1.6 mg/dL today. Etiology felt to be related to acute tubular necrosis in the setting of severe sepsis. Acute kidney injury is now resolved. Continue to encourage appropriate nutrition and hydration  2. Stage 3b chronic kidney disease with baseline creatinine around 1.7-2.0 mg/dL  · Follows with Dr. Katt Lucas  3. Hypertension in the setting of CKD 3  · Blood pressure appropriate this morning 146/85 mmHg. Avoid overly controlled blood pressure. Avoid hypotension, maintain mean arterial pressure greater than 65 mmHg  4.  Acidosis  · tCO2 20 mmol/L-no intervention necessary at this time  5. Severe sepsis associated with pneumonia  · Receiving cefepime, azithromycin and desciclovir per primary team  6. IgG multiple myeloma  7. Electrolyte derangements  · Hypernatremia-1.5 L free water deficit not counting insensible losses. Currently receiving 600 mL free water per day. Increase to 200 mL every 4 hours +d5w 500 mL over 4 hrs  · Hypokalemia-potassium chloride 20 mill equivalents IV ordered by Dr. Ender Garcia lying in bed with wife at bedside. No physical complaints. A complete 10 point review of systems have been performed and are otherwise negative.        Historical Information   Past Medical History:   Diagnosis Date   • Cancer ([address]) 2015   • History of autologous stem cell transplant ([address])    • Hypertension    • Insomnia    • Multiple myeloma ([address])      Past Surgical History:   Procedure Laterality Date   • APPENDECTOMY      Last assessed: 9/25/15   • ARTHROSCOPY KNEE Left     9/30/09   • EYE SURGERY      Lasik   • IR THORACENTESIS  9/8/2023   • KNEE CARTILAGE SURGERY     • LIMBAL STEM CELL TRANSPLANT      Patient had 2 in [place]-2/29/2016 and 4/13/2016     Social History   Social History     Substance and Sexual Activity   Alcohol Use Never     Social History     Substance and Sexual Activity   Drug Use No     Social History     Tobacco Use   Smoking Status Never   Smokeless Tobacco Never       Family History:   Family History   Problem Relation Age of Onset   • Heart disease Father    • Colon cancer Paternal Grandmother        Medications:  Pertinent medications were reviewed  Current Facility-Administered Medications   Medication Dose Route Frequency Provider Last Rate   • acetaminophen  650 mg Oral Q6H PRN SANTANA Grant     • acyclovir  400 mg Per NG Tube BID SANTANA Grant     • vitamin C  1,000 mg Per NG Tube Daily SANTANA Grant     • atorvastatin  20 mg Per NG Tube Daily SANTANA Grant     • azithromycin  500 mg Intravenous Q24H SANTANA Grant 500 mg (09/12/23 0851)   • cefepime  1,000 mg Intravenous Q12H SANTANA Grant 1,000 mg (09/12/23 5744)   • cholecalciferol  1,000 Units Per NG Tube Daily SANTANA Grant     • vitamin B-12  1,000 mcg Per NG Tube Daily SANTANA Grant     • escitalopram  20 mg Per NG Tube Daily SANTANA Grant     • levothyroxine  100 mcg Per NG Tube Early Morning SANTANA Grant     • megestrol  40 mg Oral 4x Daily SANTANA Grant     • melatonin  6 mg Oral HS SANTANA Grant     • metoprolol tartrate  25 mg Oral Q12H [facility] [place] [facility] SANTANA Grant     • multivitamin stress formula  1 tablet Oral Daily SANTANA Grant     • omeprazole (PRILOSEC) suspension 2 mg/mL  20 mg Per NG Tube Daily SANTANA Grant     • ondansetron  4 mg Intravenous Q6H PRN SANTANA Grant     • phenol  1 spray Mouth/Throat Q2H PRN SANTANA Grant     • polyethylene glycol  17 g Oral Daily PRN SANTANA Grant     • potassium chloride  20 mEq Intravenous Q2H Dell Tracy DO 20 mEq (09/12/23 1127)   • senna-docusate sodium  2 tablet Oral HS SANTANA Grant     • sulfamethoxazole-trimethoprim  1 tablet Per NG Tube Once per day on Mon Wed Fri SANTANA Grant           No Known Allergies      Vitals:   /85   Pulse 87   Temp 98.6 °F (37 °C) (Tympanic)   Resp 16   Ht 5' 10" (1.778 m)   Wt 60.2 kg (132 lb 11.5 oz)   SpO2 94%   BMI 19.04 kg/m²   Body mass index is 19.04 kg/m². SpO2: 94 %,   SpO2 Activity: At Rest,   O2 Device: None (Room air)      Intake/Output Summary (Last 24 hours) at 9/12/2023 1151  Last data filed at 9/12/2023 1127  Gross per 24 hour   Intake 1490 ml   Output 200 ml   Net 1290 ml     Invasive Devices     Peripheral Intravenous Line  Duration           Peripheral IV 09/11/23 Left;Proximal;Ventral (anterior) Forearm <1 day          Drain  Duration           NG/OG/Enteral Tube Nasogastric Left nare <1 day                Physical Exam  General: conscious, cooperative, in no acute distress  Eyes: conjunctivae pink, anicteric sclerae  ENT: lips and mucous membranes dry, NG tube  Neck: supple, no JVD, no masses  Chest: Diminished breath sounds bilaterally, no crackles, ronchus or wheezings  CVS: S1 & S2, normal rate, regular rhythm  Abdomen: soft, non-tender, non-distended, normoactive bowel sounds  Extremities: Mild edema of both legs  Skin: no rash  Neuro: awake, alert, oriented      Diagnostic Data:  Lab: I have personally reviewed pertinent lab results. ,   CBC:  Results from last 7 days   Lab Units 09/12/23  0625   WBC Thousand/uL 6.83   HEMOGLOBIN g/dL 8.1*   HEMATOCRIT % 25.9*   PLATELETS Thousands/uL 19*      CMP:   Lab Results   Component Value Date    SODIUM 147 09/12/2023    K 3.5 09/12/2023     (H) 09/12/2023    CO2 20 (L) 09/12/2023 BUN 46 (H) 09/12/2023    CREATININE 1.66 (H) 09/12/2023    CALCIUM 8.0 (L) 09/12/2023    EGFR 39 09/12/2023   ,   PT/INR:   Lab Results   Component Value Date    INR 1.54 (H) 09/12/2023   ,   Magnesium: No components found for: "MAG",  Phosphorous: No results found for: "PHOS"    Microbiology:  @LABNT,(urinecx:7)@        SANTANA Bowen    Portions of the record may have been created with voice recognition software. Occasional wrong word or "sound a like" substitutions may have occurred due to the inherent limitations of voice recognition software. Read the chart carefully and recognize, using context, where substitutions have occurred.

## 2023-09-12 NOTE — PLAN OF CARE
Problem: PAIN - ADULT  Goal: Verbalizes/displays adequate comfort level or baseline comfort level  Description: Interventions:  - Encourage patient to monitor pain and request assistance  - Assess pain using appropriate pain scale  - Administer analgesics based on type and severity of pain and evaluate response  - Implement non-pharmacological measures as appropriate and evaluate response  - Consider cultural and social influences on pain and pain management  - Notify physician/advanced practitioner if interventions unsuccessful or patient reports new pain  Outcome: Progressing     Problem: INFECTION - ADULT  Goal: Absence or prevention of progression during hospitalization  Description: INTERVENTIONS:  - Assess and monitor for signs and symptoms of infection  - Monitor lab/diagnostic results  - Monitor all insertion sites, i.e. indwelling lines, tubes, and drains  - Monitor endotracheal if appropriate and nasal secretions for changes in amount and color  - [place] appropriate cooling/warming therapies per order  - Administer medications as ordered  - Instruct and encourage patient and family to use good hand hygiene technique  - Identify and instruct in appropriate isolation precautions for identified infection/condition  Outcome: Progressing  Goal: Absence of fever/infection during neutropenic period  Description: INTERVENTIONS:  - Monitor WBC    Outcome: Progressing

## 2023-09-13 PROBLEM — R63.8 INADEQUATE ORAL INTAKE: Status: ACTIVE | Noted: 2023-01-01

## 2023-09-13 NOTE — ASSESSMENT & PLAN NOTE
· POA as evidenced by tachypnea and tachycardia with BLAS  · Likely due to pneumonia   · BClx (9/7): NGTD  · CT chest as above  · Received 7 days of Cefepime and 6 days of Azithromycin  · Will discontinue abx at this time and closely monitor

## 2023-09-13 NOTE — PROGRESS NOTES
[address] [facility]  Progress Note  Name: Felicita Richards  MRN: [id number]  Unit/Bed#: ICU 05-01 I Date of Admission: 9/7/2023   Date of Service: 9/13/2023 I Hospital Day: 6    Assessment/Plan   * Encephalopathy acute  Assessment & Plan  · Patient presented with increased lethargy/"vaguness" and poor PO intake  · This is likely multifactorial in the setting of BLAS, Sepsis, and Hypoxia  · CT Brain (9/7): No acute intracranial abnormality. · Wife notes that prior to hospitalization he was alert and oriented x3  · She notes improvement in his mentation since arrival and believes yesterday was his best day yet  · Unfortunately, mentation is worse in the evening hours and the patient required soft restraints to keep him from removing NGT  · Continue supportive care    Inadequate oral intake  Assessment & Plan  · This was noted by the patient's wife prior to arrival   · Currently has NGT in place with tube feedings at 55/h continuously  · Upgraded to Mechanical soft and NTL diet on 9/12  · Continue to encourage oral intake so NGT can be removed     Acute respiratory failure with hypoxia ([address])  Assessment & Plan  · POA but resolved at this time (currently saturating well on room air)  · This is in the setting of pneumonia, pulmonary edema, and pleural effusion  · CT Chest (9/7): Pleural effusions with compressive atelectasis. Small subpleural groundglass opacities in the upper lobes. These are nonspecific but pneumonia is considered, including COVID-pneumonia. Parietal pleural thickening, possibly related to multiple myeloma. Destructive soft tissue mass in the right rib 7.   · L Thoracentesis (9/8): 500cc fluid removed;  Findings suspicious for malignant soft tissue throughout the pleural space including surface coating/studding   · Continue to monitor respiratory status while admitted    Severe sepsis ([address])  Assessment & Plan  · POA as evidenced by tachypnea and tachycardia with BLAS  · Likely due to pneumonia   · BClx (9/7): NGTD  · CT chest as above  · Received 7 days of Cefepime and 6 days of Azithromycin  · Will discontinue abx at this time and closely monitor     BLAS (acute kidney injury) ([facility])on stage 3 CKD  Assessment & Plan  Lab Results   Component Value Date    EGFR 42 09/13/2023    EGFR 39 09/12/2023    EGFR 33 09/11/2023    CREATININE 1.58 (H) 09/13/2023    CREATININE 1.66 (H) 09/12/2023    CREATININE 1.91 (H) 09/11/2023     · POA with creatinine 2.15. Baseline creatinine is around 1.6  · Creatinine has returned to baseline at this time  · Continue to avoid hypotension, nephrotoxic agents, and encourage PO intake  · Nephrology following    Paroxysmal atrial fibrillation ([facility])  Assessment & Plan  · RVR again noted overnight with HR between 120-130  · Will increase Metoprolol to 50mg BID  · Patient is not on [address] due to significant thrombocytopenia  · Monitor on telemetry    IgG multiple myeloma ([facility])  Assessment & Plan  · Currently following with Dr. Rich Kramer for outpatient chemotherapy  · Awaiting cytology from thoracentesis   · Continue Acyclovir and Bactrim for ppx    Chronic ITP (idiopathic thrombocytopenia) ([facility])  Assessment & Plan  · History of chronic ITP  · S/p Platelet transfusion on 9/8/2023  · Continue to monitor daily labs and for evidence of active bleeding          VTE Pharmacologic Prophylaxis: VTE Score: 5 High Risk (Score >/= 5) - Pharmacological DVT Prophylaxis Contraindicated. Sequential Compression Devices Ordered. Patient Centered Rounds: I performed bedside rounds with nursing staff today. Discussions with Specialists or Other Care Team Provider: Nursing    Education and Discussions with Family / Patient: Updated  (wife) at bedside. Total Time Spent on Date of Encounter in care of patient: 45 mins.  This time was spent on one or more of the following: performing physical exam; counseling and coordination of care; obtaining or reviewing history; documenting in the medical record; reviewing/ordering tests, medications or procedures; communicating with other healthcare professionals and discussing with patient's family/caregivers. Current Length of Stay: 6 day(s)  Current Patient Status: Inpatient   Certification Statement: The patient will continue to require additional inpatient hospital stay due to encephalopathy intermittently requiring reatraints and poor po intake with NGT in place for feeding  Discharge Plan: TBD based on clinical improvement    Code Status: Level 3 - DNAR and DNI    Subjective:   Patient examined at the bedside. He was limited with conversation/questioning. Wife notes this is typical when he is tired and seems to perk up for through out the day. Nursing notes RVR with rates between 120-130. Nursing also notes restraints were required to avoid patient removing his NGT. Objective:     Vitals:   Temp (24hrs), Av.1 °F (36.7 °C), Min:97.8 °F (36.6 °C), Max:98.7 °F (37.1 °C)    Temp:  [97.8 °F (36.6 °C)-98.7 °F (37.1 °C)] 98.7 °F (37.1 °C)  HR:  [] 113  Resp:  [16-25] 25  BP: ()/(58-96) 118/69  SpO2:  [95 %-99 %] 97 %  Body mass index is 19.83 kg/m². Input and Output Summary (last 24 hours): Intake/Output Summary (Last 24 hours) at 2023 0859  Last data filed at 2023 0800  Gross per 24 hour   Intake 1810 ml   Output 0 ml   Net 1810 ml       Physical Exam:   Physical Exam  Vitals and nursing note reviewed. Constitutional:       General: He is not in acute distress. Appearance: He is normal weight. HENT:      Nose:      Comments: NGT in place  Eyes:      Extraocular Movements: Extraocular movements intact. Conjunctiva/sclera: Conjunctivae normal.   Cardiovascular:      Rate and Rhythm: Tachycardia present. Rhythm irregular. Pulses: Normal pulses. Heart sounds: No murmur heard. Pulmonary:      Effort: Pulmonary effort is normal. No respiratory distress. Breath sounds: Normal breath sounds.  No wheezing. Abdominal:      General: Bowel sounds are normal. There is no distension. Palpations: Abdomen is soft. Tenderness: There is no abdominal tenderness. Musculoskeletal:         General: Normal range of motion. Skin:     General: Skin is warm and dry. Neurological:      General: No focal deficit present. Mental Status: He is alert. Labs:  Results from last 7 days   Lab Units 09/13/23  0451 09/12/23  0625 09/11/23  0605   WBC Thousand/uL 6.90   < > 6.95   HEMOGLOBIN g/dL 7.3*   < > 7.8*   HEMATOCRIT % 23.5*   < > 25.6*   PLATELETS Thousands/uL 24*   < > 25*   BANDS PCT %  --   --  5   NEUTROS PCT % 86*   < >  --    LYMPHS PCT % 4*   < >  --    LYMPHO PCT %  --   --  8*   MONOS PCT % 9   < >  --    MONO PCT %  --   --  8   EOS PCT % 0   < > 0    < > = values in this interval not displayed. Results from last 7 days   Lab Units 09/13/23  0451 09/12/23  0625 09/11/23  0909   SODIUM mmol/L 142   < > 146   POTASSIUM mmol/L 3.9   < > 3.4*   CHLORIDE mmol/L 115*   < > 113*   CO2 mmol/L 21   < > 21   BUN mg/dL 41*   < > 49*   CREATININE mg/dL 1.58*   < > 1.91*   ANION GAP mmol/L 6   < > 12   CALCIUM mg/dL 7.8*   < > 8.2*   ALBUMIN g/dL  --   --  2.8*   TOTAL BILIRUBIN mg/dL  --   --  1.01*   ALK PHOS U/L  --   --  84   ALT U/L  --   --  49   AST U/L  --   --  52*   GLUCOSE RANDOM mg/dL 117   < > 117    < > = values in this interval not displayed.      Results from last 7 days   Lab Units 09/12/23  0625   INR  1.54*     Results from last 7 days   Lab Units 09/09/23  1149 09/09/23  0511 09/09/23  0024 09/08/23  1815 09/08/23  1207 09/08/23  0154   POC GLUCOSE mg/dl 151* 149* 165* 137 175* 197*     Results from last 7 days   Lab Units 09/08/23  0202   HEMOGLOBIN A1C % 5.4     Results from last 7 days   Lab Units 09/11/23  0909 09/10/23  0441 09/09/23  0451 09/08/23  0214 09/08/23  0202 09/07/23  1143 09/07/23  0922 09/07/23  8715   LACTIC ACID mmol/L  --   --   --  1.8  --   --  2.2* 3.5* PROCALCITONIN ng/ml 4.21* 10.18* 14.45*  --  3.31* 0.32*  --   --        Lines/Drains:  Invasive Devices     Peripheral Intravenous Line  Duration           Peripheral IV 09/11/23 Left;Proximal;Ventral (anterior) Forearm 1 day    Peripheral IV 09/13/23 Left;Upper;Ventral (anterior) Arm <1 day          Drain  Duration           NG/OG/Enteral Tube Nasogastric Left nare 1 day                  Telemetry:  Telemetry Orders (From admission, onward)             24 Hour Telemetry Monitoring  Continuous x 24 Hours (Telem)        Question:  Reason for 24 Hour Telemetry  Answer:  Arrhythmias requiring acute medical intervention / PPM or ICD malfunction                 Telemetry Reviewed: Atrial fibrillation. HR averaging 120  Indication for Continued Telemetry Use: Arrthymias requiring medical therapy             Imaging: Reviewed radiology reports from this admission including: chest CT scan, xray(s) and procedure reports    Recent Cultures (last 7 days):   Results from last 7 days   Lab Units 09/08/23  1445 09/07/23  1201 09/07/23  0619   BLOOD CULTURE   --   --  No Growth After 5 Days. No Growth After 5 Days.    GRAM STAIN RESULT  2+ Polys  No bacteria seen  --   --    BODY FLUID CULTURE, STERILE  No growth  --   --    LEGIONELLA URINARY ANTIGEN   --  Negative  --        Last 24 Hours Medication List:   Current Facility-Administered Medications   Medication Dose Route Frequency Provider Last Rate   • acetaminophen  650 mg Oral Q6H PRN SANTANA Grant     • acyclovir  400 mg Per NG Tube BID SANTANA Grant     • vitamin C  1,000 mg Per NG Tube Daily SANTANA Grant     • atorvastatin  20 mg Per NG Tube Daily SANTANA Grant     • cholecalciferol  1,000 Units Per NG Tube Daily SANTANA Grant     • vitamin B-12  1,000 mcg Per NG Tube Daily SANTANA Grant     • escitalopram  20 mg Per NG Tube Daily SANTANA Grant     • levothyroxine  100 mcg Per NG Tube Early Morning SANTANA Llamas     • megestrol  40 mg Oral 4x Daily SANTANA Grant     • melatonin  6 mg Oral HS SANTANA Grant     • metoprolol tartrate  50 mg Oral Q12H [address], DO     • multivitamin stress formula  1 tablet Oral Daily SANTANA Grant     • omeprazole (PRILOSEC) suspension 2 mg/mL  20 mg Per NG Tube Daily SANTANA Grant     • ondansetron  4 mg Intravenous Q6H PRN SANTANA Grant     • phenol  1 spray Mouth/Throat Q2H PRN SANTANA Grant     • polyethylene glycol  17 g Oral Daily PRN SANTANA Grant     • senna-docusate sodium  2 tablet Oral HS SANTANA Grant     • sulfamethoxazole-trimethoprim  1 tablet Per NG Tube Once per day on Mon Wed Fri SANTANA Llamas          Today, Patient Was Seen By: Siri Baron DO    **Please Note: This note may have been constructed using a voice recognition system. **

## 2023-09-13 NOTE — CASE MANAGEMENT
Case Management Discharge Planning Note    Patient name Tiff Mcguire  Location ICU 05/ICU 39-37 MRN [id number]  : 1945 Date 2023       Current Admission Date: 2023  Current Admission Diagnosis:Encephalopathy acute   Patient Active Problem List    Diagnosis Date Noted   • Inadequate oral intake 2023   • Atrial fibrillation with RVR ([address]) 09/10/2023   • Paroxysmal atrial fibrillation ([address]) 2023   • Cardiomyopathy ([address]) 2023   • Severe sepsis ([address]) 2023   • Acute respiratory failure with hypoxia ([address]) 2023   • Encephalopathy acute 2023   • Mild protein-calorie malnutrition ([address]) 2023   • CKD (chronic kidney disease) 2023   • Anemia, unspecified 2023   • Hypercalcemia 2023   • Headache 2023   • Anxiety 10/11/2022   • BLAS (acute kidney injury) ([facility])on stage 3 CKD 2020   • Hypogammaglobulinemia ([address]) 2019   • IgG multiple myeloma ([address]) 2018   • Essential hypertension 2018   • Chronic ITP (idiopathic thrombocytopenia) ([address]) 2017   • Hypocalcemia 2015   • Prophylactic measure 2015   • Acquired hypothyroidism 2015   • Hyperlipidemia 2012      LOS (days): 6  Geometric Mean LOS (GMLOS) (days): 5.00  Days to GMLOS:-1.2     OBJECTIVE:  Risk of Unplanned Readmission Score: 34.41         Current admission status: Inpatient   Preferred Pharmacy:   [facility] [place] [address]  [address] [facility] [address]  Phone: [phone] Fax: [address] [place] [address] - [address] [place] [id number] [address]  Phone: [phone] Fax: [phone]    [address] - [address] [place] [address] [address]  [address]  Phone: [phone] Fax: [phone] [address], [address] [place] [address],[address]  [address] [facility],[address] [facility] [address] [facility] [address]  Phone: [phone] Fax: [phone]    [facility], [address] [place] [address]  [address] [facility] [address]   [address] [facility] [address]  Phone: [phone] Fax: [phone]    Primary Care Provider: Ian Fuchs DO    Primary Insurance: MEDICARE  Secondary Insurance: [facility] DETAILS:  Pt wife does not know yet if she would like [facility]. Is awaiting the pathology results to make any decisions.

## 2023-09-13 NOTE — ASSESSMENT & PLAN NOTE
· POA but resolved at this time (currently saturating well on room air)  · This is in the setting of pneumonia, pulmonary edema, and pleural effusion  · CT Chest (9/7): Pleural effusions with compressive atelectasis. Small subpleural groundglass opacities in the upper lobes. These are nonspecific but pneumonia is considered, including COVID-pneumonia. Parietal pleural thickening, possibly related to multiple myeloma. Destructive soft tissue mass in the right rib 7.   · L Thoracentesis (9/8): 500cc fluid removed;  Findings suspicious for malignant soft tissue throughout the pleural space including surface coating/studding   · Continue to monitor respiratory status while admitted

## 2023-09-13 NOTE — ASSESSMENT & PLAN NOTE
· Patient presented with increased lethargy/"vaguness" and poor PO intake  · This is likely multifactorial in the setting of BLAS, Sepsis, and Hypoxia  · CT Brain (9/7): No acute intracranial abnormality.    · Wife notes that prior to hospitalization he was alert and oriented x3  · She notes improvement in his mentation since arrival and believes yesterday was his best day yet  · Unfortunately, mentation is worse in the evening hours and the patient required soft restraints to keep him from removing NGT  · Continue supportive care

## 2023-09-13 NOTE — ASSESSMENT & PLAN NOTE
· RVR again noted overnight with HR between 120-130  · Will increase Metoprolol to 50mg BID  · Patient is not on [address] due to significant thrombocytopenia  · Monitor on telemetry

## 2023-09-13 NOTE — ASSESSMENT & PLAN NOTE
· Currently following with Dr. Keyla Cruz for outpatient chemotherapy  · Awaiting cytology from thoracentesis   · Continue Acyclovir and Bactrim for ppx

## 2023-09-13 NOTE — ASSESSMENT & PLAN NOTE
Lab Results   Component Value Date    EGFR 42 09/13/2023    EGFR 39 09/12/2023    EGFR 33 09/11/2023    CREATININE 1.58 (H) 09/13/2023    CREATININE 1.66 (H) 09/12/2023    CREATININE 1.91 (H) 09/11/2023     · POA with creatinine 2.15.  Baseline creatinine is around 1.6  · Creatinine has returned to baseline at this time  · Continue to avoid hypotension, nephrotoxic agents, and encourage PO intake  · Nephrology following

## 2023-09-13 NOTE — PLAN OF CARE
Problem: Potential for Falls  Goal: Patient will remain free of falls  Description: INTERVENTIONS:  - Educate patient/family on patient safety including physical limitations  - Instruct patient to call for assistance with activity   - Consult OT/PT to assist with strengthening/mobility   - Keep Call bell within reach  - Keep bed low and locked with side rails adjusted as appropriate  - Keep care items and personal belongings within reach  - Initiate and maintain comfort rounds  - Make Fall Risk Sign visible to staff  - Offer Toileting every 2 Hours, in advance of need  - Initiate/Maintain bed alarm  - Apply yellow socks and bracelet for high fall risk patients  - Consider moving patient to room near nurses station  Outcome: Progressing     Problem: SAFETY ADULT  Goal: Patient will remain free of falls  Description: INTERVENTIONS:  - Educate patient/family on patient safety including physical limitations  - Instruct patient to call for assistance with activity   - Consult OT/PT to assist with strengthening/mobility   - Keep Call bell within reach  - Keep bed low and locked with side rails adjusted as appropriate  - Keep care items and personal belongings within reach  - Initiate and maintain comfort rounds  - Make Fall Risk Sign visible to staff  - Offer Toileting every 2 Hours, in advance of need  - Initiate/Maintain bed alarm  - Apply yellow socks and bracelet for high fall risk patients  - Consider moving patient to room near nurses station  Outcome: Progressing     Problem: Nutrition/Hydration-ADULT  Goal: Nutrient/Hydration intake appropriate for improving, restoring or maintaining nutritional needs  Description: Monitor and assess patient's nutrition/hydration status for malnutrition. Collaborate with interdisciplinary team and initiate plan and interventions as ordered. Monitor patient's weight and dietary intake as ordered or per policy. Utilize nutrition screening tool and intervene as necessary. Determine patient's food preferences and provide high-protein, high-caloric foods as appropriate.      INTERVENTIONS:  - Monitor oral intake, urinary output, labs, and treatment plans  - Assess nutrition and hydration status and recommend course of action  - Evaluate amount of meals eaten  - Assist patient with eating if necessary   - Allow adequate time for meals  - Recommend/ encourage appropriate diets, oral nutritional supplements, and vitamin/mineral supplements  - Order, calculate, and assess calorie counts as needed  - Recommend, monitor, and adjust tube feedings and TPN/PPN based on assessed needs  - Assess need for intravenous fluids  - Provide specific nutrition/hydration education as appropriate  - Include patient/family/caregiver in decisions related to nutrition  Outcome: Progressing     Problem: CARDIOVASCULAR - ADULT  Goal: Maintains optimal cardiac output and hemodynamic stability  Description: INTERVENTIONS:  - Monitor I/O, vital signs and rhythm  - Monitor for S/S and trends of decreased cardiac output  - Administer and titrate ordered vasoactive medications to optimize hemodynamic stability  - Assess quality of pulses, skin color and temperature  - Assess for signs of decreased coronary artery perfusion  - Instruct patient to report change in severity of symptoms  Outcome: Progressing     Problem: RESPIRATORY - ADULT  Goal: Achieves optimal ventilation and oxygenation  Description: INTERVENTIONS:  - Assess for changes in respiratory status  - Assess for changes in mentation and behavior  - Position to facilitate oxygenation and minimize respiratory effort  - Oxygen administered by appropriate delivery if ordered  - Initiate smoking cessation education as indicated  - Encourage broncho-pulmonary hygiene including cough, deep breathe, Incentive Spirometry  - Assess the need for suctioning and aspirate as needed  - Assess and instruct to report SOB or any respiratory difficulty  - Respiratory Therapy support as indicated  Outcome: Progressing     Problem: SAFETY,RESTRAINT: NV/NON-SELF DESTRUCTIVE BEHAVIOR  Goal: Remains free of harm/injury (restraint for non violent/non self-detsructive behavior)  Description: INTERVENTIONS:  - Instruct patient/family regarding restraint use   - Assess and monitor physiologic and psychological status   - Provide interventions and comfort measures to meet assessed patient needs   - Identify and implement measures to help patient regain control  - Assess readiness for release of restraint   Outcome: Progressing

## 2023-09-13 NOTE — ASSESSMENT & PLAN NOTE
· This was noted by the patient's wife prior to arrival   · Currently has NGT in place with tube feedings at 55/h continuously  · Upgraded to Mechanical soft and NTL diet on 9/12  · Continue to encourage oral intake so NGT can be removed

## 2023-09-13 NOTE — PLAN OF CARE
Problem: Potential for Falls  Goal: Patient will remain free of falls  Description: INTERVENTIONS:  - Educate patient/family on patient safety including physical limitations  - Instruct patient to call for assistance with activity   - Consult OT/PT to assist with strengthening/mobility   - Keep Call bell within reach  - Keep bed low and locked with side rails adjusted as appropriate  - Keep care items and personal belongings within reach  - Initiate and maintain comfort rounds  - Make Fall Risk Sign visible to staff  - Offer Toileting every 2 Hours, in advance of need  - Initiate/Maintain bed alarm  - Apply yellow socks and bracelet for high fall risk patients  - Consider moving patient to room near nurses station  Outcome: Progressing     Problem: PAIN - ADULT  Goal: Verbalizes/displays adequate comfort level or baseline comfort level  Description: Interventions:  - Encourage patient to monitor pain and request assistance  - Assess pain using appropriate pain scale  - Administer analgesics based on type and severity of pain and evaluate response  - Implement non-pharmacological measures as appropriate and evaluate response  - Consider cultural and social influences on pain and pain management  - Notify physician/advanced practitioner if interventions unsuccessful or patient reports new pain  Outcome: Progressing     Problem: INFECTION - ADULT  Goal: Absence or prevention of progression during hospitalization  Description: INTERVENTIONS:  - Assess and monitor for signs and symptoms of infection  - Monitor lab/diagnostic results  - Monitor all insertion sites, i.e. indwelling lines, tubes, and drains  - Monitor endotracheal if appropriate and nasal secretions for changes in amount and color  - [place] appropriate cooling/warming therapies per order  - Administer medications as ordered  - Instruct and encourage patient and family to use good hand hygiene technique  - Identify and instruct in appropriate isolation precautions for identified infection/condition  Outcome: Progressing

## 2023-09-13 NOTE — PROGRESS NOTES
NEPHROLOGY PROGRESS NOTE   Alma Curran 78 y.o. male MRN: [id number]  Unit/Bed#: ICU 05-01 Encounter: [id number]    Assessment/Plan:    79 yo man with IgG multiple myeloma, HTN, CKD 3B being treated for severe sepsis and acute hypoxic respiratory failure secondary to pneumonia. Nephrology following along for acute kidney injury atop chronic kidney disease     1. Acute kidney injury atop chronic kidney disease  ? Creatinine 2.3 mg/dL improved back to baseline today. Etiology acute tubular necrosis. Continue to encourage appropriate nutrition and hydration which is occurring via NG tube. Will receive Lasix due to pulmonary edema noted on chest x-ray. Can redose as indicated. 2.  Stage 3b chronic kidney disease with baseline creatinine around 1.7-2.0 mg/dL  ? Patient follow-up with Dr. Sharon Cormier  3. Pulmonary edema  ? Status post Lasix 40 mg IV earlier today per primary team.  Redose as indicated  4. Acidosis  ? Improved, no need for intervention  5. Severe sepsis associated with pneumonia  ? CV antibiotic and antiviral treatment per primary team  6. IgG multiple myeloma  7. Electrolyte derangements  ? Hypernatremia and hypokalemia resolved. Continue current free water flush           ROS  Unable to obtain due to encephalopathy. Wife at bedside states he is more confused today and just received Lasix.       Historical Information   Past Medical History:   Diagnosis Date   • Cancer ([address]) 2015   • History of autologous stem cell transplant ([address])    • Hypertension    • Insomnia    • Multiple myeloma [facility])      Past Surgical History:   Procedure Laterality Date   • APPENDECTOMY      Last assessed: 9/25/15   • ARTHROSCOPY KNEE Left     9/30/09   • EYE SURGERY      Lasik   • IR THORACENTESIS  9/8/2023   • KNEE CARTILAGE SURGERY     • LIMBAL STEM CELL TRANSPLANT      Patient had 2 in [place]-2/29/2016 and 4/13/2016     Social History   Social History     Substance and Sexual Activity   Alcohol Use Never Social History     Substance and Sexual Activity   Drug Use No     Social History     Tobacco Use   Smoking Status Never   Smokeless Tobacco Never       Family History:   Family History   Problem Relation Age of Onset   • Heart disease Father    • Colon cancer Paternal Grandmother        Medications:  Pertinent medications were reviewed  Current Facility-Administered Medications   Medication Dose Route Frequency Provider Last Rate   • acetaminophen  650 mg Oral Q6H PRN SANTANA Grant     • acyclovir  400 mg Per NG Tube BID SANTANA Grant     • vitamin C  1,000 mg Per NG Tube Daily SANTANA Grant     • atorvastatin  20 mg Per NG Tube Daily SANTANA Grant     • cholecalciferol  1,000 Units Per NG Tube Daily SANTANA Grant     • vitamin B-12  1,000 mcg Per NG Tube Daily SANTANA Grant     • escitalopram  20 mg Per NG Tube Daily SANTANA Grant     • levothyroxine  100 mcg Per NG Tube Early Morning SANTANA Grant     • megestrol  40 mg Oral 4x Daily SANTANA Grant     • melatonin  6 mg Oral HS SANTANA Grant     • metoprolol tartrate  50 mg Oral Q12H 8535 [employer], DO     • multivitamin stress formula  1 tablet Oral Daily SANTANA Grant     • omeprazole (PRILOSEC) suspension 2 mg/mL  20 mg Per NG Tube Daily SANTANA Grant     • ondansetron  4 mg Intravenous Q6H PRN SANTANA Grant     • phenol  1 spray Mouth/Throat Q2H PRN SANTANA Grant     • polyethylene glycol  17 g Oral Daily PRN SANTANA Grant     • senna-docusate sodium  2 tablet Oral HS SANTANA Grant     • sulfamethoxazole-trimethoprim  1 tablet Per NG Tube Once per day on Mon Wed Fri SANTANA Grant           No Known Allergies      Vitals:   /69   Pulse (!) 113   Temp 98.7 °F (37.1 °C) (Tympanic)   Resp (!) 25   Ht 5' 10" (1.778 m)   Wt 62.7 kg (138 lb 3.7 oz)   SpO2 97%   BMI 19.83 kg/m²   Body mass index is 19.83 kg/m². SpO2: 97 %,   SpO2 Activity: At Rest,   O2 Device: None (Room air)      Intake/Output Summary (Last 24 hours) at 9/13/2023 1514  Last data filed at 9/13/2023 0800  Gross per 24 hour   Intake 1710 ml   Output 0 ml   Net 1710 ml     Invasive Devices     Peripheral Intravenous Line  Duration           Peripheral IV 09/11/23 Left;Proximal;Ventral (anterior) Forearm 1 day    Peripheral IV 09/13/23 Left;Upper;Ventral (anterior) Arm <1 day          Drain  Duration           NG/OG/Enteral Tube Nasogastric Left nare 1 day                Physical Exam  General: conscious, cooperative, in no acute distress  Eyes: conjunctivae pink, anicteric sclerae  ENT: lips and mucous membranes moist  Neck: supple, no JVD, no masses  Chest: Diminished breath sounds bilaterally, scattered rhonchi  CVS: S1 & S2, normal rate, regular rhythm  Abdomen: soft, non-tender, non-distended, normoactive bowel sounds  Extremities: no edema of both legs  Skin: no rash  Neuro: Confused, encephalopathic      Diagnostic Data:  Lab: I have personally reviewed pertinent lab results. ,   CBC:  Results from last 7 days   Lab Units 09/13/23  0451   WBC Thousand/uL 6.90   HEMOGLOBIN g/dL 7.3*   HEMATOCRIT % 23.5*   PLATELETS Thousands/uL 24*      CMP:   Lab Results   Component Value Date    SODIUM 142 09/13/2023    K 3.9 09/13/2023     (H) 09/13/2023    CO2 21 09/13/2023    BUN 41 (H) 09/13/2023    CREATININE 1.58 (H) 09/13/2023    CALCIUM 7.8 (L) 09/13/2023    EGFR 42 09/13/2023   ,   PT/INR: No results found for: "PT", "INR",   Magnesium: No components found for: "MAG",  Phosphorous: No results found for: "PHOS"    Microbiology:  @LABRCNTIP,(urinecx:7)@        SANTANA Braxton    Portions of the record may have been created with voice recognition software. Occasional wrong word or "sound a like" substitutions may have occurred due to the inherent limitations of voice recognition software.  Read the chart carefully and recognize, using context, where substitutions have occurred.

## 2023-09-13 NOTE — ASSESSMENT & PLAN NOTE
· History of chronic ITP  · S/p Platelet transfusion on 9/8/2023  · Continue to monitor daily labs and for evidence of active bleeding

## 2023-09-14 NOTE — ASSESSMENT & PLAN NOTE
· POA as evidenced by tachypnea and tachycardia with BLAS  · Likely due to pneumonia   · BClx (9/7): NGTD  · CT chest as above  · Received 7 days of Cefepime and 6 days of Azithromycin

## 2023-09-14 NOTE — ASSESSMENT & PLAN NOTE
· This is in the setting of pneumonia, pulmonary edema, and pleural effusion  · CT Chest (9/7): Pleural effusions with compressive atelectasis. Small subpleural groundglass opacities in the upper lobes. These are nonspecific but pneumonia is considered, including COVID-pneumonia. Parietal pleural thickening, possibly related to multiple myeloma. Destructive soft tissue mass in the right rib 7.   · L Thoracentesis (9/8): 500cc fluid removed;  Findings suspicious for malignant soft tissue throughout the pleural space including surface coating/studding

## 2023-09-14 NOTE — NURSING NOTE
Pt is awake but confused. Follows most commands. Impulsive. Pulled out NGT during care. Moves all extremities. Very warm to touch. Tepid bath given. States "yes" when asked if he has a headache but does not answer questions about pain. Cooperative with care. Heart tones clear, irregular. Pulses palpable with no edema. Skin appears pale and slightly jaundice. Lungs are diminished with inspiratory and expiratory wheezes. Breathing is labored at rest with audible wheezes noted. Oral membranes are dry. Abdomen is unremarkable. Incontinent of clear pale yellow urine. Incontinent of loose mustard colored stool. Nova area is pink. calazime applied. IV sites intact. allevyn to sacrum. Takes po meds crushed in applesauce with encouragement needed for swallowing.

## 2023-09-14 NOTE — ASSESSMENT & PLAN NOTE
· This was noted by the patient's wife prior to arrival   · NGT was removed by patient over night.  Re-insertion was attempted with significant hemoptysis noted  · Patient ultimately

## 2023-09-14 NOTE — ASSESSMENT & PLAN NOTE
· Patient presented with increased lethargy/"vaguness" and poor PO intake  · This is likely multifactorial in the setting of BLAS, Sepsis, and Hypoxia  · CT Brain (9/7): No acute intracranial abnormality.    · Wife notes that prior to hospitalization he was alert and oriented x3

## 2023-09-14 NOTE — ASSESSMENT & PLAN NOTE
Lab Results   Component Value Date    EGFR 34 09/13/2023    EGFR 42 09/13/2023    EGFR 39 09/12/2023    CREATININE 1.88 (H) 09/13/2023    CREATININE 1.58 (H) 09/13/2023    CREATININE 1.66 (H) 09/12/2023     · POA with creatinine 2.15.  Baseline creatinine is around 1.6

## 2023-09-14 NOTE — DEATH NOTE
INPATIENT DEATH NOTE  Jabier Jordan 78 y.o. male MRN: [id number]  Unit/Bed#: ICU  Encounter: [id number]    Date, Time and Cause of Death    Date of Death: 23  Time of Death:  9:21 PM  Preliminary Cause of Death: Respiratory arrest  Entered by: Xochitl Tejada PA-C[ES1.1]     Attribution     ES1.1 Axel Lincoln PA-C 23 21:25           Patient's Information  Pronounced by: Xochitl Tejada PA-C  Did the patient's death occur in the ED?: No  Did the patient's death occur in the OR?: No  Did the patient's death occur less than 10 days post-op?: No  Did the patient's death occur within 24 hours of admission?: No  Was code status DNR at the time of death?: Yes    PHYSICAL EXAM:  Heart sounds absent    Medical Examiner notification criteria:  NONE APPLICABLE   Medical Examiner's office notified?:  Yes   Medical Examiner accepted case?:  No  Name of Medical Examiner: Gokul Lackey         Autopsy Options:  Post-mortem examination declined by next of kin    Primary Service Attending Physician notified?:  yes - Attending:  Perez Brooks DO    Physician/Resident responsible for completing Discharge Summary:  Perez Brooks DO

## 2023-09-14 NOTE — DISCHARGE SUMMARY
[address] [facility]  Discharge- Dallas Larry 1945, 78 y.o. male MRN: [id number]  Unit/Bed#: ICU  Encounter: [id number]  Primary Care Provider: Ian Devries DO   Date and time admitted to hospital: 2023  5:49 AM    * Encephalopathy acute  Assessment & Plan  · Patient presented with increased lethargy/"vaguness" and poor PO intake  · This is likely multifactorial in the setting of BLAS, Sepsis, and Hypoxia  · CT Brain (): No acute intracranial abnormality. · Wife notes that prior to hospitalization he was alert and oriented x3    Inadequate oral intake  Assessment & Plan  · This was noted by the patient's wife prior to arrival   · NGT was removed by patient over night. Re-insertion was attempted with significant hemoptysis noted  · Patient ultimately     Acute respiratory failure with hypoxia (HCC)  Assessment & Plan  · This is in the setting of pneumonia, pulmonary edema, and pleural effusion  · CT Chest (): Pleural effusions with compressive atelectasis. Small subpleural groundglass opacities in the upper lobes. These are nonspecific but pneumonia is considered, including COVID-pneumonia. Parietal pleural thickening, possibly related to multiple myeloma. Destructive soft tissue mass in the right rib 7.   · L Thoracentesis (): 500cc fluid removed;  Findings suspicious for malignant soft tissue throughout the pleural space including surface coating/studding     Severe sepsis (HCC)  Assessment & Plan  · Evidenced by tachypnea and tachycardia with BLAS  · Likely due to pneumonia   · BClx (): NGTD  · CT chest as above  · Received 7 days of Cefepime and 6 days of Azithromycin    BLAS (acute kidney injury) (HCC)on stage 3 CKD  Assessment & Plan  Lab Results   Component Value Date    EGFR 34 2023    EGFR 42 2023    EGFR 39 2023    CREATININE 1.88 (H) 2023    CREATININE 1.58 (H) 2023    CREATININE 1.66 (H) 2023     · Creatinine 2.15. Baseline creatinine is around 1.6    IgG multiple myeloma (HCC)  Assessment & Plan  · Previously following with Dr. Calos Rivera for outpatient chemotherapy    Chronic ITP (idiopathic thrombocytopenia) [facility])  Assessment & Plan  · History of chronic ITP  · S/p Platelet transfusion on 2023    Medical Problems     Resolved Problems  Date Reviewed: 2023   None       Discharging Physician / Practitioner: Sandeep Gaitan DO  PCP: Inez SNIDER DO  Admission Date:   Admission Orders (From admission, onward)     Ordered        23 0914  INPATIENT ADMISSION  Once                      Discharge Date: 23    Consultations During Hospital Stay:  · Nephrology, Critical Care, IR    Procedures Performed:   · L Thoracentesis (): 500cc fluid removed; Findings suspicious for malignant soft tissue throughout the pleural space including surface coating/studding     Significant Findings / Test Results:   · CT Brain (): No acute intracranial abnormality. · CT Chest (): Pleural effusions with compressive atelectasis. Small subpleural groundglass opacities in the upper lobes. These are nonspecific but pneumonia is considered, including COVID-pneumonia. Parietal pleural thickening, possibly related to multiple myeloma. Destructive soft tissue mass in the right rib 7. Incidental Findings:   · None     Test Results Pending at Discharge (will require follow up): · None     Outpatient Tests Requested:  · None    Complications:  Patient     Reason for Admission: Generalized weakness    Hospital Course:   Umm Steele is a 78 y.o. male patient who originally presented to the hospital on 2023 due to generalized weakness. Please see H&P as documented Dr. Kathy Mendoza for complete details regarding history of presenting illness. In brief, the patient has a PMHx of ITP and Multiple Myeloma who presented due increased lethargy and poor oral intake.  On arrival, he was noted to have pneumonia and was initiated on antibiotic therapy. His hospitalization was complicated by the development of hypoxic respiratory failure due to pleural effusions requiring thoracentesis and BLAS due to poor oral intake. NGT was eventually placed to aide in proper nutrition/hydration with noted improvement in his mentation and BLAS. Unfortunately, on the evening of  the patient became confused and removed his NGT. Nursing attempted to re-insert the tube with noted significant hemoptysis. As the patient was becoming hemodynamically unstable, call was made to the patient's wife who confirmed DNR/DNI status. The patient ultimately  on 2023 at 9:21 PM. This is a brief summary of events, please see full medical record for more details. Please see above list of diagnoses and related plan for additional information. Condition at Discharge:     Discharge Day Visit / Exam:   * Please refer to separate death note for these details *    Discussion with Family: Overnight AP updated patient's family. Discharge instructions/Information to patient and family:   See after visit summary for information provided to patient and family. Provisions for Follow-Up Care:  See after visit summary for information related to follow-up care and any pertinent home health orders. Disposition:   Other:     Planned Readmission: None     Discharge Statement:  I spent <35 minutes discharging the patient. This time was spent on the day of discharge. I had direct contact with the patient on the day of discharge. Greater than 50% of the total time was spent examining patient, answering all patient questions, arranging and discussing plan of care with patient as well as directly providing post-discharge instructions. Additional time then spent on discharge activities. Discharge Medications:  See after visit summary for reconciled discharge medications provided to patient and/or family.       **Please Note: This note may have been constructed using a voice recognition system**

## 2023-10-03 NOTE — TELEPHONE ENCOUNTER
[facility] calling to follow up on authorization request that was sent on 5/19/21  Per Sary they sent a request through [address] [place] [address] [employer] on 5/19/21  Prior authorization is needed for patients Promacta 75mg  Patient had a dose change - Carl Redo is not required     Will forward this to oncology finance and RN     Call back number for pharmacy if needed - [phone] Validate Date Of Previous Biopsy (Can Hide Date Of Previous Biopsy In Settings Tab): No